# Patient Record
Sex: MALE | Race: WHITE | NOT HISPANIC OR LATINO | Employment: OTHER | URBAN - METROPOLITAN AREA
[De-identification: names, ages, dates, MRNs, and addresses within clinical notes are randomized per-mention and may not be internally consistent; named-entity substitution may affect disease eponyms.]

---

## 2017-01-03 ENCOUNTER — GENERIC CONVERSION - ENCOUNTER (OUTPATIENT)
Dept: OTHER | Facility: OTHER | Age: 62
End: 2017-01-03

## 2017-01-04 LAB — ERYTHROCYTE SEDIMENTATION RATE (HISTORICAL): 6 MM/HR (ref 0–30)

## 2017-03-13 ENCOUNTER — ALLSCRIPTS OFFICE VISIT (OUTPATIENT)
Dept: OTHER | Facility: OTHER | Age: 62
End: 2017-03-13

## 2017-03-13 DIAGNOSIS — I00 RHEUMATIC FEVER WITHOUT HEART INVOLVEMENT: ICD-10-CM

## 2017-03-15 ENCOUNTER — GENERIC CONVERSION - ENCOUNTER (OUTPATIENT)
Dept: OTHER | Facility: OTHER | Age: 62
End: 2017-03-15

## 2017-03-16 LAB
ANTINUCLEAR ANTIBODIES, IFA (HISTORICAL): NEGATIVE
C-REACT.PROTEIN,QUANT (HISTORICAL): 1.4 MG/L (ref 0–4.9)
ERYTHROCYTE SEDIMENTATION RATE (HISTORICAL): 5 MM/HR (ref 0–30)
LYME IGG/IGM AB (HISTORICAL): <0.91 ISR (ref 0–0.9)
LYME IGM (HISTORICAL): <0.8 INDEX (ref 0–0.79)

## 2017-03-28 ENCOUNTER — ALLSCRIPTS OFFICE VISIT (OUTPATIENT)
Dept: OTHER | Facility: OTHER | Age: 62
End: 2017-03-28

## 2017-04-22 ENCOUNTER — TRANSCRIBE ORDERS (OUTPATIENT)
Dept: ADMINISTRATIVE | Age: 62
End: 2017-04-22

## 2017-04-22 ENCOUNTER — HOSPITAL ENCOUNTER (OUTPATIENT)
Dept: RADIOLOGY | Age: 62
Discharge: HOME | End: 2017-04-22
Payer: COMMERCIAL

## 2017-04-22 DIAGNOSIS — M06.09 RHEUMATOID ARTHRITIS OF MULTIPLE SITES WITHOUT RHEUMATOID FACTOR (HCC): ICD-10-CM

## 2017-04-22 DIAGNOSIS — M06.09 RHEUMATOID ARTHRITIS OF MULTIPLE SITES WITHOUT RHEUMATOID FACTOR (HCC): Primary | ICD-10-CM

## 2017-04-22 PROCEDURE — 73502 X-RAY EXAM HIP UNI 2-3 VIEWS: CPT

## 2017-08-18 ENCOUNTER — APPOINTMENT (EMERGENCY)
Dept: RADIOLOGY | Facility: HOSPITAL | Age: 62
End: 2017-08-18
Payer: COMMERCIAL

## 2017-08-18 ENCOUNTER — HOSPITAL ENCOUNTER (EMERGENCY)
Facility: HOSPITAL | Age: 62
Discharge: HOME | End: 2017-08-18
Attending: EMERGENCY MEDICINE | Admitting: EMERGENCY MEDICINE
Payer: COMMERCIAL

## 2017-08-18 VITALS
DIASTOLIC BLOOD PRESSURE: 79 MMHG | OXYGEN SATURATION: 93 % | SYSTOLIC BLOOD PRESSURE: 123 MMHG | TEMPERATURE: 99 F | BODY MASS INDEX: 29.13 KG/M2 | WEIGHT: 203.04 LBS | RESPIRATION RATE: 18 BRPM | HEART RATE: 82 BPM

## 2017-08-18 DIAGNOSIS — M06.9 RHEUMATOID ARTHRITIS INVOLVING RIGHT HIP, UNSPECIFIED RHEUMATOID FACTOR PRESENCE: ICD-10-CM

## 2017-08-18 DIAGNOSIS — M25.551 ACUTE RIGHT HIP PAIN: Primary | ICD-10-CM

## 2017-08-18 PROCEDURE — 99284 EMERGENCY DEPT VISIT MOD MDM: CPT

## 2017-08-18 PROCEDURE — 73502 X-RAY EXAM HIP UNI 2-3 VIEWS: CPT

## 2017-08-18 PROCEDURE — 96374 THER/PROPH/DIAG INJ IV PUSH: CPT

## 2017-08-18 RX ORDER — FLUOXETINE HYDROCHLORIDE 20 MG/1
5 CAPSULE ORAL DAILY
COMMUNITY
End: 2018-07-18 | Stop reason: SDUPTHER

## 2017-08-18 RX ORDER — LISINOPRIL AND HYDROCHLOROTHIAZIDE 12.5; 1 MG/1; MG/1
1 TABLET ORAL DAILY
COMMUNITY
End: 2018-09-20 | Stop reason: SDUPTHER

## 2017-08-18 RX ORDER — ATORVASTATIN CALCIUM 10 MG/1
10 TABLET, FILM COATED ORAL DAILY
COMMUNITY
End: 2018-08-20 | Stop reason: SDUPTHER

## 2017-08-18 RX ORDER — PRAMIPEXOLE DIHYDROCHLORIDE 0.25 MG/1
0.25 TABLET ORAL DAILY
COMMUNITY
End: 2017-12-29 | Stop reason: ALTCHOICE

## 2017-08-18 RX ORDER — ASPIRIN 81 MG/1
81 TABLET, CHEWABLE ORAL DAILY
COMMUNITY
End: 2018-07-16 | Stop reason: SDUPTHER

## 2017-08-18 RX ORDER — PREDNISONE 10 MG/1
TABLET ORAL
Qty: 42 TABLET | Refills: 0 | Status: SHIPPED | OUTPATIENT
Start: 2017-08-18 | End: 2017-10-13

## 2017-08-18 RX ORDER — OXYBUTYNIN CHLORIDE 5 MG/1
5 TABLET, EXTENDED RELEASE ORAL DAILY
COMMUNITY
End: 2018-01-31 | Stop reason: ALTCHOICE

## 2017-08-18 RX ORDER — MORPHINE SULFATE 10 MG/ML
6 INJECTION, SOLUTION INTRAMUSCULAR; INTRAVENOUS ONCE
Status: COMPLETED | OUTPATIENT
Start: 2017-08-18 | End: 2017-08-18

## 2017-08-18 RX ORDER — PREDNISONE 20 MG/1
60 TABLET ORAL ONCE
Status: COMPLETED | OUTPATIENT
Start: 2017-08-18 | End: 2017-08-18

## 2017-08-18 RX ORDER — FOLIC ACID 1 MG/1
TABLET ORAL DAILY
COMMUNITY
End: 2022-01-01 | Stop reason: HOSPADM

## 2017-08-18 RX ORDER — CHLORAL HYDRATE 500 MG
1000 CAPSULE ORAL DAILY
COMMUNITY
End: 2021-01-01

## 2017-08-18 RX ORDER — TAMSULOSIN HYDROCHLORIDE 0.4 MG/1
0.4 CAPSULE ORAL
COMMUNITY
End: 2018-02-10 | Stop reason: SDUPTHER

## 2017-08-18 RX ADMIN — MORPHINE SULFATE 6 MG: 10 INJECTION, SOLUTION INTRAMUSCULAR; INTRAVENOUS at 09:23

## 2017-08-18 RX ADMIN — PREDNISONE 60 MG: 20 TABLET ORAL at 08:40

## 2017-08-23 ENCOUNTER — TRANSCRIBE ORDERS (OUTPATIENT)
Dept: ADMINISTRATIVE | Facility: HOSPITAL | Age: 62
End: 2017-08-23

## 2017-08-23 DIAGNOSIS — Z09 FOLLOW UP: Primary | ICD-10-CM

## 2017-08-28 ENCOUNTER — ALLSCRIPTS OFFICE VISIT (OUTPATIENT)
Dept: OTHER | Facility: OTHER | Age: 62
End: 2017-08-28

## 2017-08-30 ENCOUNTER — GENERIC CONVERSION - ENCOUNTER (OUTPATIENT)
Dept: OTHER | Facility: OTHER | Age: 62
End: 2017-08-30

## 2017-09-01 ENCOUNTER — GENERIC CONVERSION - ENCOUNTER (OUTPATIENT)
Dept: OTHER | Facility: OTHER | Age: 62
End: 2017-09-01

## 2017-09-01 LAB
A/G RATIO (HISTORICAL): 2.1 (ref 1.2–2.2)
ALBUMIN SERPL BCP-MCNC: 3.9 G/DL (ref 3.6–4.8)
ALP SERPL-CCNC: 75 IU/L (ref 39–117)
ALT SERPL W P-5'-P-CCNC: 33 IU/L (ref 0–44)
AST SERPL W P-5'-P-CCNC: 18 IU/L (ref 0–40)
BASOPHILS # BLD AUTO: 0 %
BASOPHILS # BLD AUTO: 0 X10E3/UL (ref 0–0.2)
BILIRUB SERPL-MCNC: 1 MG/DL (ref 0–1.2)
BUN SERPL-MCNC: 22 MG/DL (ref 8–27)
BUN/CREA RATIO (HISTORICAL): 20 (ref 10–24)
CALCIUM SERPL-MCNC: 8.9 MG/DL (ref 8.6–10.2)
CHLORIDE SERPL-SCNC: 103 MMOL/L (ref 96–106)
CO2 SERPL-SCNC: 23 MMOL/L (ref 18–29)
CREAT SERPL-MCNC: 1.11 MG/DL (ref 0.76–1.27)
DEPRECATED RDW RBC AUTO: 14.3 % (ref 12.3–15.4)
EGFR AFRICAN AMERICAN (HISTORICAL): 82 ML/MIN/1.73
EGFR-AMERICAN CALC (HISTORICAL): 71 ML/MIN/1.73
EOSINOPHIL # BLD AUTO: 0.3 X10E3/UL (ref 0–0.4)
EOSINOPHIL # BLD AUTO: 3 %
ERYTHROCYTE SEDIMENTATION RATE (HISTORICAL): 3 MM/HR (ref 0–30)
GLUCOSE SERPL-MCNC: 117 MG/DL (ref 65–99)
HCT VFR BLD AUTO: 43 % (ref 37.5–51)
HGB BLD-MCNC: 15 G/DL (ref 12.6–17.7)
IMM.GRANULOCYTES (CD4/8) (HISTORICAL): 0.1 X10E3/UL (ref 0–0.1)
IMM.GRANULOCYTES (CD4/8) (HISTORICAL): 1 %
LYMPHOCYTES # BLD AUTO: 2.2 X10E3/UL (ref 0.7–3.1)
LYMPHOCYTES # BLD AUTO: 28 %
MCH RBC QN AUTO: 32.6 PG (ref 26.6–33)
MCHC RBC AUTO-ENTMCNC: 34.9 G/DL (ref 31.5–35.7)
MCV RBC AUTO: 94 FL (ref 79–97)
MONOCYTES # BLD AUTO: 0.7 X10E3/UL (ref 0.1–0.9)
MONOCYTES (HISTORICAL): 10 %
NEUTROPHILS # BLD AUTO: 4.5 X10E3/UL (ref 1.4–7)
NEUTROPHILS # BLD AUTO: 58 %
PLATELET # BLD AUTO: 262 X10E3/UL (ref 150–379)
POTASSIUM SERPL-SCNC: 4.5 MMOL/L (ref 3.5–5.2)
RBC (HISTORICAL): 4.6 X10E6/UL (ref 4.14–5.8)
SODIUM SERPL-SCNC: 143 MMOL/L (ref 134–144)
TOT. GLOBULIN, SERUM (HISTORICAL): 1.9 G/DL (ref 1.5–4.5)
TOTAL PROTEIN (HISTORICAL): 5.8 G/DL (ref 6–8.5)
WBC # BLD AUTO: 7.8 X10E3/UL (ref 3.4–10.8)

## 2017-09-02 LAB
LYME 18 KD IGG (HISTORICAL): ABNORMAL
LYME 23 KD IGG (HISTORICAL): ABNORMAL
LYME 23 KD IGM (HISTORICAL): ABNORMAL
LYME 28 KD IGG (HISTORICAL): ABNORMAL
LYME 30 KD IGG (HISTORICAL): PRESENT
LYME 39 KD IGG (HISTORICAL): PRESENT
LYME 39 KD IGM (HISTORICAL): ABNORMAL
LYME 41 KD IGG (HISTORICAL): PRESENT
LYME 41 KD IGM (HISTORICAL): ABNORMAL
LYME 45 KD IGG (HISTORICAL): ABNORMAL
LYME 58 KD IGG (HISTORICAL): PRESENT
LYME 66 KD IGG (HISTORICAL): ABNORMAL
LYME 93 KD IGG (HISTORICAL): ABNORMAL
LYME IGG WB INTERP. (HISTORICAL): NEGATIVE
LYME IGM WB INTERP. (HISTORICAL): NEGATIVE

## 2017-09-08 ENCOUNTER — HOSPITAL ENCOUNTER (OUTPATIENT)
Dept: MRI IMAGING | Facility: HOSPITAL | Age: 62
Discharge: HOME | End: 2017-09-08
Payer: COMMERCIAL

## 2017-09-08 DIAGNOSIS — R16.0 HEPATOMEGALY, NOT ELSEWHERE CLASSIFIED: ICD-10-CM

## 2017-09-08 DIAGNOSIS — R93.89 ABNORMAL FINDINGS ON DIAGNOSTIC IMAGING OF OTHER SPECIFIED BODY STRUCTURES: ICD-10-CM

## 2017-09-08 DIAGNOSIS — R31.9 HEMATURIA: ICD-10-CM

## 2017-09-08 DIAGNOSIS — R10.9 ABDOMINAL PAIN: ICD-10-CM

## 2017-09-08 DIAGNOSIS — S37.009A INJURY OF KIDNEY: ICD-10-CM

## 2017-09-08 PROCEDURE — A9585 GADOBUTROL INJECTION: HCPCS | Performed by: FAMILY MEDICINE

## 2017-09-08 PROCEDURE — 74183 MRI ABD W/O CNTR FLWD CNTR: CPT

## 2017-09-08 RX ADMIN — GADOBUTROL 9 ML: 604.72 INJECTION INTRAVENOUS at 16:52

## 2017-09-12 ENCOUNTER — TRANSCRIBE ORDERS (OUTPATIENT)
Dept: ADMINISTRATIVE | Facility: HOSPITAL | Age: 62
End: 2017-09-12

## 2017-09-12 DIAGNOSIS — M25.551 RIGHT HIP PAIN: Primary | ICD-10-CM

## 2017-09-15 ENCOUNTER — GENERIC CONVERSION - ENCOUNTER (OUTPATIENT)
Dept: OTHER | Facility: OTHER | Age: 62
End: 2017-09-15

## 2017-09-26 ENCOUNTER — ALLSCRIPTS OFFICE VISIT (OUTPATIENT)
Dept: OTHER | Facility: OTHER | Age: 62
End: 2017-09-26

## 2017-09-26 ENCOUNTER — TRANSCRIBE ORDERS (OUTPATIENT)
Dept: ADMINISTRATIVE | Facility: HOSPITAL | Age: 62
End: 2017-09-26

## 2017-09-26 ENCOUNTER — HOSPITAL ENCOUNTER (OUTPATIENT)
Dept: MRI IMAGING | Facility: HOSPITAL | Age: 62
Discharge: HOME | End: 2017-09-26
Payer: COMMERCIAL

## 2017-09-26 ENCOUNTER — HOSPITAL ENCOUNTER (OUTPATIENT)
Dept: RADIOLOGY | Age: 62
Discharge: HOME | End: 2017-09-26
Payer: COMMERCIAL

## 2017-09-26 DIAGNOSIS — R31.9 PAINLESS HEMATURIA: Primary | ICD-10-CM

## 2017-09-26 DIAGNOSIS — R31.9 PAINLESS HEMATURIA: ICD-10-CM

## 2017-09-26 DIAGNOSIS — M25.551 RIGHT HIP PAIN: ICD-10-CM

## 2017-09-26 LAB
BILIRUB UR QL STRIP: NORMAL
CLARITY UR: NORMAL
COLOR UR: YELLOW
GLUCOSE (HISTORICAL): NORMAL
HGB UR QL STRIP.AUTO: NORMAL
KETONES UR STRIP-MCNC: NORMAL MG/DL
LEUKOCYTE ESTERASE UR QL STRIP: NORMAL
NITRITE UR QL STRIP: NORMAL
PH UR STRIP.AUTO: 7 [PH]
PROT UR STRIP-MCNC: NORMAL MG/DL
SP GR UR STRIP.AUTO: 1.01
UROBILINOGEN UR QL STRIP.AUTO: 0.2

## 2017-09-26 PROCEDURE — 74177 CT ABD & PELVIS W/CONTRAST: CPT

## 2017-09-26 PROCEDURE — 73721 MRI JNT OF LWR EXTRE W/O DYE: CPT

## 2017-09-26 RX ADMIN — IOHEXOL 100 ML: 350 INJECTION, SOLUTION INTRAVENOUS at 15:47

## 2017-10-13 ENCOUNTER — APPOINTMENT (EMERGENCY)
Dept: RADIOLOGY | Facility: HOSPITAL | Age: 62
DRG: 546 | End: 2017-10-13
Payer: COMMERCIAL

## 2017-10-13 ENCOUNTER — APPOINTMENT (INPATIENT)
Dept: CT IMAGING | Facility: HOSPITAL | Age: 62
DRG: 546 | End: 2017-10-13
Payer: COMMERCIAL

## 2017-10-13 ENCOUNTER — HOSPITAL ENCOUNTER (INPATIENT)
Facility: HOSPITAL | Age: 62
LOS: 4 days | Discharge: HOME | DRG: 546 | End: 2017-10-17
Attending: EMERGENCY MEDICINE | Admitting: FAMILY MEDICINE
Payer: COMMERCIAL

## 2017-10-13 ENCOUNTER — GENERIC CONVERSION - ENCOUNTER (OUTPATIENT)
Dept: OTHER | Facility: OTHER | Age: 62
End: 2017-10-13

## 2017-10-13 DIAGNOSIS — M79.601 RIGHT ARM PAIN: ICD-10-CM

## 2017-10-13 DIAGNOSIS — R50.9 FEVER: ICD-10-CM

## 2017-10-13 DIAGNOSIS — M79.601 ARM PAIN, DIFFUSE, RIGHT: Primary | ICD-10-CM

## 2017-10-13 PROBLEM — I10 HYPERTENSION: Status: ACTIVE | Noted: 2017-10-13

## 2017-10-13 PROBLEM — M06.9 RHEUMATOID ARTHRITIS (HCC): Status: ACTIVE | Noted: 2017-10-13

## 2017-10-13 LAB
ALBUMIN SERPL BCP-MCNC: 3.5 G/DL (ref 3.5–5)
ALP SERPL-CCNC: 89 U/L (ref 46–116)
ALT SERPL W P-5'-P-CCNC: 40 U/L (ref 12–78)
ANION GAP SERPL CALCULATED.3IONS-SCNC: 10 MMOL/L (ref 4–13)
AST SERPL W P-5'-P-CCNC: 15 U/L (ref 5–45)
BACTERIA UR QL AUTO: ABNORMAL /HPF
BASOPHILS # BLD AUTO: 0.01 THOUSANDS/ΜL (ref 0–0.1)
BASOPHILS NFR BLD AUTO: 0 % (ref 0–1)
BILIRUB SERPL-MCNC: 0.9 MG/DL (ref 0.2–1)
BILIRUB UR QL STRIP: NEGATIVE
BUN SERPL-MCNC: 20 MG/DL (ref 5–25)
CALCIUM SERPL-MCNC: 8.6 MG/DL (ref 8.3–10.1)
CHLORIDE SERPL-SCNC: 103 MMOL/L (ref 100–108)
CLARITY UR: CLEAR
CO2 SERPL-SCNC: 25 MMOL/L (ref 21–32)
COLOR UR: YELLOW
CREAT SERPL-MCNC: 1.07 MG/DL (ref 0.6–1.3)
CRP SERPL QL: 8.8 MG/L
EOSINOPHIL # BLD AUTO: 0.02 THOUSAND/ΜL (ref 0–0.61)
EOSINOPHIL NFR BLD AUTO: 0 % (ref 0–6)
ERYTHROCYTE [DISTWIDTH] IN BLOOD BY AUTOMATED COUNT: 14.1 % (ref 11.6–15.1)
ERYTHROCYTE [SEDIMENTATION RATE] IN BLOOD: 10 MM/HOUR (ref 0–10)
GFR SERPL CREATININE-BSD FRML MDRD: 75 ML/MIN/1.73SQ M
GLUCOSE SERPL-MCNC: 126 MG/DL (ref 65–140)
GLUCOSE UR STRIP-MCNC: NEGATIVE MG/DL
HCT VFR BLD AUTO: 40.5 % (ref 36.5–49.3)
HGB BLD-MCNC: 13.7 G/DL (ref 12–17)
HGB UR QL STRIP.AUTO: ABNORMAL
KETONES UR STRIP-MCNC: NEGATIVE MG/DL
LACTATE SERPL-SCNC: 1.3 MMOL/L (ref 0.5–2)
LEUKOCYTE ESTERASE UR QL STRIP: NEGATIVE
LYMPHOCYTES # BLD AUTO: 1.38 THOUSANDS/ΜL (ref 0.6–4.47)
LYMPHOCYTES NFR BLD AUTO: 9 % (ref 14–44)
MCH RBC QN AUTO: 31.2 PG (ref 26.8–34.3)
MCHC RBC AUTO-ENTMCNC: 33.8 G/DL (ref 31.4–37.4)
MCV RBC AUTO: 92 FL (ref 82–98)
MONOCYTES # BLD AUTO: 1.16 THOUSAND/ΜL (ref 0.17–1.22)
MONOCYTES NFR BLD AUTO: 8 % (ref 4–12)
NEUTROPHILS # BLD AUTO: 12.28 THOUSANDS/ΜL (ref 1.85–7.62)
NEUTS SEG NFR BLD AUTO: 83 % (ref 43–75)
NITRITE UR QL STRIP: NEGATIVE
NON-SQ EPI CELLS URNS QL MICRO: ABNORMAL /HPF
PH UR STRIP.AUTO: 8 [PH] (ref 4.5–8)
PLATELET # BLD AUTO: 273 THOUSANDS/UL (ref 149–390)
PMV BLD AUTO: 11.1 FL (ref 8.9–12.7)
POTASSIUM SERPL-SCNC: 3.4 MMOL/L (ref 3.5–5.3)
PROT SERPL-MCNC: 6.5 G/DL (ref 6.4–8.2)
PROT UR STRIP-MCNC: ABNORMAL MG/DL
RBC # BLD AUTO: 4.39 MILLION/UL (ref 3.88–5.62)
RBC #/AREA URNS AUTO: ABNORMAL /HPF
SODIUM SERPL-SCNC: 138 MMOL/L (ref 136–145)
SP GR UR STRIP.AUTO: 1.01 (ref 1–1.03)
UROBILINOGEN UR QL STRIP.AUTO: 0.2 E.U./DL
WBC # BLD AUTO: 14.85 THOUSAND/UL (ref 4.31–10.16)
WBC #/AREA URNS AUTO: ABNORMAL /HPF

## 2017-10-13 PROCEDURE — 93005 ELECTROCARDIOGRAM TRACING: CPT

## 2017-10-13 PROCEDURE — 36415 COLL VENOUS BLD VENIPUNCTURE: CPT | Performed by: EMERGENCY MEDICINE

## 2017-10-13 PROCEDURE — 81001 URINALYSIS AUTO W/SCOPE: CPT | Performed by: EMERGENCY MEDICINE

## 2017-10-13 PROCEDURE — 73060 X-RAY EXAM OF HUMERUS: CPT

## 2017-10-13 PROCEDURE — 87040 BLOOD CULTURE FOR BACTERIA: CPT | Performed by: EMERGENCY MEDICINE

## 2017-10-13 PROCEDURE — 86617 LYME DISEASE ANTIBODY: CPT | Performed by: EMERGENCY MEDICINE

## 2017-10-13 PROCEDURE — 96375 TX/PRO/DX INJ NEW DRUG ADDON: CPT

## 2017-10-13 PROCEDURE — 80053 COMPREHEN METABOLIC PANEL: CPT | Performed by: EMERGENCY MEDICINE

## 2017-10-13 PROCEDURE — 86140 C-REACTIVE PROTEIN: CPT | Performed by: EMERGENCY MEDICINE

## 2017-10-13 PROCEDURE — 96361 HYDRATE IV INFUSION ADD-ON: CPT

## 2017-10-13 PROCEDURE — 99285 EMERGENCY DEPT VISIT HI MDM: CPT

## 2017-10-13 PROCEDURE — 73030 X-RAY EXAM OF SHOULDER: CPT

## 2017-10-13 PROCEDURE — 73201 CT UPPER EXTREMITY W/DYE: CPT

## 2017-10-13 PROCEDURE — 85652 RBC SED RATE AUTOMATED: CPT | Performed by: EMERGENCY MEDICINE

## 2017-10-13 PROCEDURE — 71020 HB CHEST X-RAY 2VW FRONTAL&LATL: CPT

## 2017-10-13 PROCEDURE — 85025 COMPLETE CBC W/AUTO DIFF WBC: CPT | Performed by: EMERGENCY MEDICINE

## 2017-10-13 PROCEDURE — 83605 ASSAY OF LACTIC ACID: CPT | Performed by: EMERGENCY MEDICINE

## 2017-10-13 PROCEDURE — 96374 THER/PROPH/DIAG INJ IV PUSH: CPT

## 2017-10-13 RX ORDER — ACETAMINOPHEN 325 MG/1
650 TABLET ORAL EVERY 6 HOURS PRN
Status: DISCONTINUED | OUTPATIENT
Start: 2017-10-13 | End: 2017-10-17 | Stop reason: HOSPADM

## 2017-10-13 RX ORDER — KETOROLAC TROMETHAMINE 30 MG/ML
15 INJECTION, SOLUTION INTRAMUSCULAR; INTRAVENOUS EVERY 6 HOURS PRN
Status: DISPENSED | OUTPATIENT
Start: 2017-10-13 | End: 2017-10-16

## 2017-10-13 RX ORDER — ATORVASTATIN CALCIUM 10 MG/1
10 TABLET, FILM COATED ORAL DAILY
Status: DISCONTINUED | OUTPATIENT
Start: 2017-10-14 | End: 2017-10-17 | Stop reason: HOSPADM

## 2017-10-13 RX ORDER — OXYBUTYNIN CHLORIDE 5 MG/1
5 TABLET, EXTENDED RELEASE ORAL DAILY
Status: DISCONTINUED | OUTPATIENT
Start: 2017-10-14 | End: 2017-10-17 | Stop reason: HOSPADM

## 2017-10-13 RX ORDER — SODIUM CHLORIDE 9 MG/ML
125 INJECTION, SOLUTION INTRAVENOUS CONTINUOUS
Status: DISCONTINUED | OUTPATIENT
Start: 2017-10-13 | End: 2017-10-14

## 2017-10-13 RX ORDER — PRAMIPEXOLE DIHYDROCHLORIDE 0.25 MG/1
0.25 TABLET ORAL DAILY
Status: DISCONTINUED | OUTPATIENT
Start: 2017-10-14 | End: 2017-10-17 | Stop reason: HOSPADM

## 2017-10-13 RX ORDER — TRAMADOL HYDROCHLORIDE 50 MG/1
100 TABLET ORAL EVERY 6 HOURS PRN
Status: DISCONTINUED | OUTPATIENT
Start: 2017-10-13 | End: 2017-10-17 | Stop reason: HOSPADM

## 2017-10-13 RX ORDER — ASPIRIN 81 MG/1
81 TABLET, CHEWABLE ORAL DAILY
Status: DISCONTINUED | OUTPATIENT
Start: 2017-10-14 | End: 2017-10-17 | Stop reason: HOSPADM

## 2017-10-13 RX ORDER — FLUOXETINE HYDROCHLORIDE 20 MG/1
20 CAPSULE ORAL DAILY
Status: DISCONTINUED | OUTPATIENT
Start: 2017-10-14 | End: 2017-10-17 | Stop reason: HOSPADM

## 2017-10-13 RX ORDER — CHLORAL HYDRATE 500 MG
1000 CAPSULE ORAL DAILY
Status: DISCONTINUED | OUTPATIENT
Start: 2017-10-14 | End: 2017-10-17 | Stop reason: HOSPADM

## 2017-10-13 RX ORDER — FOLIC ACID 1 MG/1
1 TABLET ORAL DAILY
Status: DISCONTINUED | OUTPATIENT
Start: 2017-10-14 | End: 2017-10-17 | Stop reason: HOSPADM

## 2017-10-13 RX ORDER — DOCUSATE SODIUM 100 MG/1
100 CAPSULE, LIQUID FILLED ORAL 2 TIMES DAILY
Status: DISCONTINUED | OUTPATIENT
Start: 2017-10-13 | End: 2017-10-17 | Stop reason: HOSPADM

## 2017-10-13 RX ORDER — KETOROLAC TROMETHAMINE 30 MG/ML
15 INJECTION, SOLUTION INTRAMUSCULAR; INTRAVENOUS ONCE
Status: COMPLETED | OUTPATIENT
Start: 2017-10-13 | End: 2017-10-13

## 2017-10-13 RX ORDER — ONDANSETRON 2 MG/ML
4 INJECTION INTRAMUSCULAR; INTRAVENOUS EVERY 6 HOURS PRN
Status: DISCONTINUED | OUTPATIENT
Start: 2017-10-13 | End: 2017-10-17 | Stop reason: HOSPADM

## 2017-10-13 RX ORDER — TAMSULOSIN HYDROCHLORIDE 0.4 MG/1
0.4 CAPSULE ORAL
Status: DISCONTINUED | OUTPATIENT
Start: 2017-10-14 | End: 2017-10-17 | Stop reason: HOSPADM

## 2017-10-13 RX ORDER — MORPHINE SULFATE 4 MG/ML
4 INJECTION, SOLUTION INTRAMUSCULAR; INTRAVENOUS ONCE
Status: COMPLETED | OUTPATIENT
Start: 2017-10-13 | End: 2017-10-13

## 2017-10-13 RX ORDER — SIMETHICONE 80 MG
80 TABLET,CHEWABLE ORAL 4 TIMES DAILY PRN
Status: DISCONTINUED | OUTPATIENT
Start: 2017-10-13 | End: 2017-10-17 | Stop reason: HOSPADM

## 2017-10-13 RX ORDER — ACETAMINOPHEN 325 MG/1
650 TABLET ORAL ONCE
Status: COMPLETED | OUTPATIENT
Start: 2017-10-13 | End: 2017-10-13

## 2017-10-13 RX ADMIN — ACETAMINOPHEN 650 MG: 325 TABLET ORAL at 17:10

## 2017-10-13 RX ADMIN — KETOROLAC TROMETHAMINE 15 MG: 30 INJECTION, SOLUTION INTRAMUSCULAR at 20:40

## 2017-10-13 RX ADMIN — SODIUM CHLORIDE 1000 ML: 0.9 INJECTION, SOLUTION INTRAVENOUS at 17:18

## 2017-10-13 RX ADMIN — KETOROLAC TROMETHAMINE 15 MG: 30 INJECTION, SOLUTION INTRAMUSCULAR at 15:35

## 2017-10-13 RX ADMIN — IOHEXOL 100 ML: 350 INJECTION, SOLUTION INTRAVENOUS at 18:36

## 2017-10-13 RX ADMIN — SODIUM CHLORIDE 125 ML/HR: 0.9 INJECTION, SOLUTION INTRAVENOUS at 20:45

## 2017-10-13 RX ADMIN — MORPHINE SULFATE 4 MG: 4 INJECTION INTRAVENOUS at 15:35

## 2017-10-13 NOTE — ED NOTES
Pt returned from X-Ray, reported from X-Ray tech that arm is starting to hurt more again     Bret Godoy RN  10/13/17 4660

## 2017-10-13 NOTE — LETTER
October 17, 2017     Patient: Sabrina Ham   YOB: 1955   Date of Visit: 10/13/2017       To Whom It May Concern: It is my medical opinion that Demetrius Whitehead may return to work on 10/23/17  If you have any questions or concerns, please don't hesitate to call  Alfreda Sole    Sincerely,        No name on file      CC: No Recipients

## 2017-10-13 NOTE — ED PROVIDER NOTES
History  Chief Complaint   Patient presents with    Arm Pain     Pt states he had some R arm pain last night but ok today, Pt presents now with R upper arm pain that is constant, pt denies injury     This is a 66 y/o male that presents to the ED with c/o right shoulder/upper arm pain  Fairly sudden onset while driving  Patient with hx of RA  Had some right shoulder pain last night  Pain is worse with movement  Rated as severe in nature  Patient denies fevers  No trauma  No radiation of pain  No weakness or numbness  No neck pain  DDx: RA flare, infectious process  Prior to Admission Medications   Prescriptions Last Dose Informant Patient Reported? Taking?    FLUoxetine (PROzac) 20 mg capsule   Yes Yes   Sig: Take 20 mg by mouth daily   Multiple Vitamins-Minerals (MULTIVITAMIN MEN) TABS   Yes Yes   Sig: Take 1 tablet by mouth daily   Omega-3 Fatty Acids (FISH OIL) 1,000 mg   Yes Yes   Sig: Take 1,000 mg by mouth daily   adalimumab (HUMIRA) 40 mg/0 8 mL PSKT   Yes Yes   Sig: Inject 40 mg under the skin once   aspirin 81 mg chewable tablet   Yes Yes   Sig: Chew 81 mg daily   atorvastatin (LIPITOR) 10 mg tablet   Yes Yes   Sig: Take 10 mg by mouth daily   folic acid (FOLVITE) 1 mg tablet   Yes Yes   Sig: Take by mouth daily   lisinopril-hydrochlorothiazide (PRINZIDE,ZESTORETIC) 10-12 5 MG per tablet   Yes Yes   Sig: Take 1 tablet by mouth daily   methotrexate 2 5 mg tablet   Yes Yes   Sig: Take 20 mg by mouth once a week   oxybutynin (DITROPAN-XL) 5 mg 24 hr tablet   Yes Yes   Sig: Take 5 mg by mouth daily   pramipexole (MIRAPEX) 0 25 mg tablet   Yes Yes   Sig: Take 0 25 mg by mouth daily   tamsulosin (FLOMAX) 0 4 mg   Yes Yes   Sig: Take 0 4 mg by mouth daily with dinner      Facility-Administered Medications: None       Past Medical History:   Diagnosis Date    Arthritis     Hyperlipidemia     Hypertension        Past Surgical History:   Procedure Laterality Date    SCROTAL SURGERY      benign "lump" History reviewed  No pertinent family history  I have reviewed and agree with the history as documented  Social History   Substance Use Topics    Smoking status: Never Smoker    Smokeless tobacco: Never Used    Alcohol use No        Review of Systems   Constitutional: Negative for activity change, appetite change, chills and fever  HENT: Negative for congestion, ear pain, rhinorrhea and sore throat  Eyes: Negative for pain, discharge and redness  Respiratory: Negative for cough, shortness of breath and wheezing  Cardiovascular: Negative for chest pain and palpitations  Gastrointestinal: Negative for abdominal pain, diarrhea, nausea and vomiting  Endocrine: Negative for polyuria  Genitourinary: Negative for difficulty urinating, dysuria, frequency and urgency  Musculoskeletal: Positive for arthralgias  Negative for back pain, joint swelling, myalgias, neck pain and neck stiffness  Skin: Negative for color change, pallor, rash and wound  Allergic/Immunologic: Negative for immunocompromised state  Neurological: Negative for dizziness, syncope, weakness, light-headedness and numbness  Hematological: Does not bruise/bleed easily  Psychiatric/Behavioral: Negative for confusion  All other systems reviewed and are negative  Physical Exam  ED Triage Vitals [10/13/17 1437]   Temperature Pulse Respirations Blood Pressure SpO2   98 1 °F (36 7 °C) 76 18 126/71 97 %      Temp Source Heart Rate Source Patient Position - Orthostatic VS BP Location FiO2 (%)   Oral Monitor Sitting Left arm --      Pain Score       Worst Possible Pain           Physical Exam   Constitutional: He is oriented to person, place, and time  He appears well-developed  No distress  HENT:   Head: Normocephalic and atraumatic  Nose: Nose normal    Eyes: Conjunctivae are normal  No scleral icterus  Neck: Normal range of motion  Neck supple     Cardiovascular: Normal rate, regular rhythm, normal heart sounds and intact distal pulses  Pulmonary/Chest: Effort normal and breath sounds normal  No stridor  No respiratory distress  He has no wheezes  Abdominal: Soft  He exhibits no distension  There is no tenderness  There is no rebound and no guarding  Musculoskeletal: He exhibits no edema or deformity  Severe Right shoulder pain with rom  Tender over deltoid of the right arm  Neurological: He is alert and oriented to person, place, and time  Skin: Skin is warm and dry  No rash noted  Psychiatric: He has a normal mood and affect  Thought content normal    Nursing note and vitals reviewed        ED Medications  Medications   ketorolac (TORADOL) 30 mg/mL injection 15 mg (15 mg Intravenous Given 10/13/17 2040)    EMS REPLENISHMENT MED ( Does not apply Given to EMS 10/13/17 1539)   morphine (PF) 4 mg/mL injection 4 mg (4 mg Intravenous Given 10/13/17 1535)   ketorolac (TORADOL) 30 mg/mL injection 15 mg (15 mg Intravenous Given 10/13/17 1535)   sodium chloride 0 9 % bolus 1,000 mL (0 mL Intravenous Stopped 10/13/17 1928)   acetaminophen (TYLENOL) tablet 650 mg (650 mg Oral Given 10/13/17 1710)   iohexol (OMNIPAQUE) 350 MG/ML injection (SINGLE-DOSE) 100 mL (100 mL Intravenous Given 10/13/17 1836)   gadobutrol injection (MULTI-DOSE) SOLN 8 mL (8 mL Intravenous Given 10/14/17 1334)   iohexol (OMNIPAQUE) 300 mg/mL injection 50 mL (3 mL Other Given 10/16/17 1500)   lidocaine (PF) (XYLOCAINE-MPF) 1 % injection 5 mL (5 mL Injection Given 10/16/17 1500)   predniSONE tablet 60 mg (60 mg Oral Given 10/17/17 1433)       Diagnostic Studies  Labs Reviewed   CBC AND DIFFERENTIAL - Abnormal        Result Value Ref Range Status    WBC 14 85 (*) 4 31 - 10 16 Thousand/uL Final    Neutrophils Relative 83 (*) 43 - 75 % Final    Lymphocytes Relative 9 (*) 14 - 44 % Final    Neutrophils Absolute 12 28 (*) 1 85 - 7 62 Thousands/µL Final    RBC 4 39  3 88 - 5 62 Million/uL Final    Hemoglobin 13 7  12 0 - 17 0 g/dL Final    Hematocrit 40 5 m Final    Narrative:     National Kidney Disease Education Program recommendations are as follows:  GFR calculation is accurate only with a steady state creatinine  Chronic Kidney disease less than 60 ml/min/1 73 sq  meters  Kidney failure less than 15 ml/min/1 73 sq  meters  URINALYSIS WITH REFLEX TO MICROSCOPIC - Abnormal     Protein, UA Trace (*) Negative mg/dl Final    Blood, UA Small (*) Negative Final    Color, UA Yellow   Final    Clarity, UA Clear   Final    Specific [place], UA 1 015  1 003 - 1 030 Final    pH, UA 8 0  4 5 - 8 0 Final    Leukocytes, UA Negative  Negative Final    Nitrite, UA Negative  Negative Final    Glucose, UA Negative  Negative mg/dl Final    Ketones, UA Negative  Negative mg/dl Final    Urobilinogen, UA 0 2  0 2, 1 0 E U /dl E U /dl Final    Bilirubin, UA Negative  Negative Final   URINE MICROSCOPIC - Abnormal     RBC, UA 10-20 (*) None Seen, 0-5 /hpf Final    WBC, UA 2-4 (*) None Seen, 0-5, 5-55, 5-65 /hpf Final    Epithelial Cells None Seen  None Seen, Occasional /hpf Final    Bacteria, UA Occasional  None Seen, Occasional /hpf Final   C-REACTIVE PROTEIN - Abnormal     CRP 8 8 (*) <3 0 mg/L Final   LACTIC ACID, PLASMA - Normal    LACTIC ACID 1 3  0 5 - 2 0 mmol/L Final    Narrative:     Result may be elevated if tourniquet was used during collection  FL guided needle plac bx/asp/inj   Final Result      Successful right shoulder arthrocentesis with aspiration of 8 mL of yellow, opaque right shoulder joint fluid  Workstation performed: [id number]         MRI shoulder right w wo contrast   Final Result   There is moderate sized shoulder joint effusion along with the fluid in the subacromial subdeltoid bursa, subscapularis bursa and subcoracoid bursa    This fluid may be inflammatory, reactive, if concern for infection correlate clinically      No focal lesion seen within the proximal humerus      A small 6 mm full-thickness tear seen within the supraspinatus Partial tearing infraspinatus with the undersurface delamination      No significant atrophy of the supraspinatus or infraspinatus      Tendinosis and synovitis of the long head of the biceps tendon with extensive inflammatory changes within the rotator interval with obliteration of fat in the subcoracoid triangle  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  There are no focal lesion seen within the proximal humerus on the scapular      ##sigslh##sigslh         Workstation performed: [id number]         XR chest 2 views   ED Interpretation   No infiltrate      Final Result      No active pulmonary disease  Workstation performed: [id number]         CT humerus right w contrast   Final Result   Impression:   No acute fractures or dislocations identified  There is a nonspecific focal 2 8 x 2 cm area of intramuscular hypoattenuation identified in the anterior portion of the proximal humeral neck of uncertain significance  MRI right shoulder is recommended for    further evaluation  Acromioclavicular degenerative changes are identified  No drainable fluid collection identified  The vessels appear patent  Workstation performed: [id number]         XR humerus RIGHT   Final Result      No acute osseous abnormality  Workstation performed: [id number]         XR shoulder 2+ views RIGHT   ED Interpretation   No fracture   No free air      Final Result      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  No acute findings         Workstation performed: [id number]             Procedures  Procedures      Phone Contacts  ED Phone Contact    ED Course  ED Course as of Nov 02 1239   Fri Oct 13, 2017   1803 Discussed with Dr Dariana Hardwick  Will admit patient inpatient status  I will order CT humerus  No clear infectious source at this time  Patient with elevated WBC however increased steroids to Prednisone 10 mg today   DDx: RA flare vs  Infectious source  No clear infectious source at this time  MDM  Number of Diagnoses or Management Options  Arm pain, diffuse, right:   Fever:   Diagnosis management comments: Patient with severe right shoulder pain  Developed fever while in the ED  CT arm ordered to r/o infectious process  Amount and/or Complexity of Data Reviewed  Clinical lab tests: ordered and reviewed  Tests in the radiology section of CPT®: ordered and reviewed  Discuss the patient with other providers: yes  Independent visualization of images, tracings, or specimens: yes      CritCare Time    Disposition  Final diagnoses:   Arm pain, diffuse, right   Fever     Time reflects when diagnosis was documented in both MDM as applicable and the Disposition within this note     Time User Action Codes Description Comment    10/13/2017  6:10 PM Elena Roberts Add [M79 601] Arm pain, diffuse, right     10/13/2017  6:10 PM Elena Roberts Add [R50 9] Fever     10/13/2017  8:18 PM Carlineharshad Logan [R50 9] Fever     10/14/2017  8:36 AM Librada Lefort Add [M79 601] Right arm pain       ED Disposition     ED Disposition Condition Comment    Admit  Case was discussed with Dr Kimmie Willett and the patient's admission status was agreed to be Admission Status: inpatient status to the service of Dr Kimmie Willett   Follow-up Information     Follow up With Specialties Details Why Contact Info    Trina Guerin MD Family Medicine Follow up in 1 day(s) Appointment has been made for Wednesday, Oct  18th at 1:45pm   Please take your discharge instructions with you to this appointment  If you are unable to make this appointment, please reschedule as soon as possible  [id number] [phone]          Discharge Medication List as of 10/17/2017  3:40 PM      START taking these medications    Details   predniSONE 10 mg tablet 6tabs daily for 3 days,5 tabs daily for 3 days,4 tabs daily for 3 days,3 tabs daily  For 3 days, 2 tabs daily for 3 days, 1 tab daily for 3 days, Print      traMADol (ULTRAM) 50 mg tablet Take 2 tablets by mouth every 6 (six) hours as needed for moderate pain for up to 10 days, Starting Tue 10/17/2017, Until Fri 10/27/2017, Print         CONTINUE these medications which have NOT CHANGED    Details   aspirin 81 mg chewable tablet Chew 81 mg daily, Historical Med      atorvastatin (LIPITOR) 10 mg tablet Take 10 mg by mouth daily, Historical Med      FLUoxetine (PROzac) 20 mg capsule Take 20 mg by mouth daily, Historical Med      folic acid (FOLVITE) 1 mg tablet Take by mouth daily, Historical Med      lisinopril-hydrochlorothiazide (PRINZIDE,ZESTORETIC) 10-12 5 MG per tablet Take 1 tablet by mouth daily, Historical Med      methotrexate 2 5 mg tablet Take 20 mg by mouth once a week, Historical Med      Multiple Vitamins-Minerals (MULTIVITAMIN MEN) TABS Take 1 tablet by mouth daily, Historical Med      Omega-3 Fatty Acids (FISH OIL) 1,000 mg Take 1,000 mg by mouth daily, Historical Med      oxybutynin (DITROPAN-XL) 5 mg 24 hr tablet Take 5 mg by mouth daily, Historical Med      pramipexole (MIRAPEX) 0 25 mg tablet Take 0 25 mg by mouth daily, Historical Med      tamsulosin (FLOMAX) 0 4 mg Take 0 4 mg by mouth daily with dinner, Historical Med         STOP taking these medications       adalimumab (HUMIRA) 40 mg/0 8 mL PSKT Comments:   Reason for Stopping:               Outpatient Discharge Orders  Discharge Diet     Activity as tolerated     Call provider for:  severe uncontrolled pain         ED Provider  Electronically Signed by       Funmi Ellsworth MD  11/02/17 2377

## 2017-10-14 ENCOUNTER — APPOINTMENT (INPATIENT)
Dept: MRI IMAGING | Facility: HOSPITAL | Age: 62
DRG: 546 | End: 2017-10-14
Payer: COMMERCIAL

## 2017-10-14 LAB
ALBUMIN SERPL BCP-MCNC: 3 G/DL (ref 3.5–5)
ALP SERPL-CCNC: 80 U/L (ref 46–116)
ALT SERPL W P-5'-P-CCNC: 34 U/L (ref 12–78)
ANION GAP SERPL CALCULATED.3IONS-SCNC: 7 MMOL/L (ref 4–13)
AST SERPL W P-5'-P-CCNC: 13 U/L (ref 5–45)
BILIRUB SERPL-MCNC: 1.2 MG/DL (ref 0.2–1)
BUN SERPL-MCNC: 19 MG/DL (ref 5–25)
CALCIUM SERPL-MCNC: 8.3 MG/DL (ref 8.3–10.1)
CHLORIDE SERPL-SCNC: 108 MMOL/L (ref 100–108)
CK SERPL-CCNC: 50 U/L (ref 39–308)
CO2 SERPL-SCNC: 26 MMOL/L (ref 21–32)
CREAT SERPL-MCNC: 1.05 MG/DL (ref 0.6–1.3)
ERYTHROCYTE [DISTWIDTH] IN BLOOD BY AUTOMATED COUNT: 14.5 % (ref 11.6–15.1)
FLUAV AG SPEC QL: NORMAL
FLUBV AG SPEC QL: NORMAL
GFR SERPL CREATININE-BSD FRML MDRD: 76 ML/MIN/1.73SQ M
GLUCOSE SERPL-MCNC: 99 MG/DL (ref 65–140)
HCT VFR BLD AUTO: 39.6 % (ref 36.5–49.3)
HGB BLD-MCNC: 13.1 G/DL (ref 12–17)
MAGNESIUM SERPL-MCNC: 2.1 MG/DL (ref 1.6–2.6)
MCH RBC QN AUTO: 31.2 PG (ref 26.8–34.3)
MCHC RBC AUTO-ENTMCNC: 33.1 G/DL (ref 31.4–37.4)
MCV RBC AUTO: 94 FL (ref 82–98)
PLATELET # BLD AUTO: 253 THOUSANDS/UL (ref 149–390)
PLATELET # BLD AUTO: 253 THOUSANDS/UL (ref 149–390)
PMV BLD AUTO: 11 FL (ref 8.9–12.7)
PMV BLD AUTO: 11.6 FL (ref 8.9–12.7)
POTASSIUM SERPL-SCNC: 3.9 MMOL/L (ref 3.5–5.3)
PROT SERPL-MCNC: 5.8 G/DL (ref 6.4–8.2)
RBC # BLD AUTO: 4.2 MILLION/UL (ref 3.88–5.62)
RSV B RNA SPEC QL NAA+PROBE: NORMAL
SODIUM SERPL-SCNC: 141 MMOL/L (ref 136–145)
WBC # BLD AUTO: 10.88 THOUSAND/UL (ref 4.31–10.16)

## 2017-10-14 PROCEDURE — 85027 COMPLETE CBC AUTOMATED: CPT | Performed by: FAMILY MEDICINE

## 2017-10-14 PROCEDURE — 80053 COMPREHEN METABOLIC PANEL: CPT | Performed by: FAMILY MEDICINE

## 2017-10-14 PROCEDURE — 85049 AUTOMATED PLATELET COUNT: CPT | Performed by: FAMILY MEDICINE

## 2017-10-14 PROCEDURE — 87798 DETECT AGENT NOS DNA AMP: CPT | Performed by: FAMILY MEDICINE

## 2017-10-14 PROCEDURE — 83735 ASSAY OF MAGNESIUM: CPT | Performed by: FAMILY MEDICINE

## 2017-10-14 PROCEDURE — 86618 LYME DISEASE ANTIBODY: CPT | Performed by: FAMILY MEDICINE

## 2017-10-14 PROCEDURE — A9585 GADOBUTROL INJECTION: HCPCS | Performed by: FAMILY MEDICINE

## 2017-10-14 PROCEDURE — 82550 ASSAY OF CK (CPK): CPT | Performed by: FAMILY MEDICINE

## 2017-10-14 PROCEDURE — 73223 MRI JOINT UPR EXTR W/O&W/DYE: CPT

## 2017-10-14 RX ORDER — LORAZEPAM 0.5 MG/1
0.5 TABLET ORAL ONCE
Status: DISCONTINUED | OUTPATIENT
Start: 2017-10-14 | End: 2017-10-17 | Stop reason: HOSPADM

## 2017-10-14 RX ADMIN — ASPIRIN 81 MG 81 MG: 81 TABLET ORAL at 08:12

## 2017-10-14 RX ADMIN — TAMSULOSIN HYDROCHLORIDE 0.4 MG: 0.4 CAPSULE ORAL at 17:28

## 2017-10-14 RX ADMIN — Medication 1 TABLET: at 08:12

## 2017-10-14 RX ADMIN — FOLIC ACID 1 MG: 1 TABLET ORAL at 08:12

## 2017-10-14 RX ADMIN — PRAMIPEXOLE DIHYDROCHLORIDE 0.25 MG: 0.25 TABLET ORAL at 08:12

## 2017-10-14 RX ADMIN — ATORVASTATIN CALCIUM 10 MG: 10 TABLET, FILM COATED ORAL at 08:12

## 2017-10-14 RX ADMIN — LISINOPRIL: 10 TABLET ORAL at 08:12

## 2017-10-14 RX ADMIN — DOCUSATE SODIUM 100 MG: 100 CAPSULE, LIQUID FILLED ORAL at 17:28

## 2017-10-14 RX ADMIN — TRAMADOL HYDROCHLORIDE 100 MG: 50 TABLET, FILM COATED ORAL at 21:16

## 2017-10-14 RX ADMIN — DOCUSATE SODIUM 100 MG: 100 CAPSULE, LIQUID FILLED ORAL at 08:12

## 2017-10-14 RX ADMIN — OXYBUTYNIN CHLORIDE 5 MG: 5 TABLET, EXTENDED RELEASE ORAL at 08:12

## 2017-10-14 RX ADMIN — FLUOXETINE 20 MG: 20 CAPSULE ORAL at 08:13

## 2017-10-14 RX ADMIN — Medication 1000 MG: at 08:12

## 2017-10-14 RX ADMIN — GADOBUTROL 8 ML: 604.72 INJECTION INTRAVENOUS at 13:34

## 2017-10-14 NOTE — H&P
History and Physical - [facility] Internal Medicine    Patient Information: Kalie Colmenares 65 y o  male MRN: [id number]  Unit/Bed#: -01 Encounter: [id number]  Admitting Physician: aJnki Ayon MD  PCP: Arun Serra MD  Date of Admission:  10/13/17    Assessment/Plan:    Hospital Problem List:     Active Problems:    Right arm pain    Fever    Rheumatoid arthritis ([address] )    Hypertension      Plan   1  Right upper extremity pain and fever-there is no rash suggestive of and cellulitis or any fluid collection suggestive of an abscess    CT scan Report reviewed will obtain an MRI follow up on the cultures  Consult Infectious Disease  Patient with known history of rheumatoid arthritis but ESR within normal limits  Patient remains hemodynamically stable so will monitor off of the antibiotics just in case if he had proceed with the culture  Discussed with the overnight provider just in case if there is any concern we can start on antibiotics overnight  2   Hypertension-monitor blood pressure with current medication  3  Leukocytosis-likely related to acute even  VTE Prophylaxis: Enoxaparin (Lovenox)  / sequential compression device   Code Status: full code  POLST: POLST form is not discussed and not completed at this time  Anticipated Length of Stay:  Patient will be admitted on an Inpatient basis with an anticipated length of stay of  > 2 midnights  Justification for Hospital Stay:     Total Time for Visit, including Counseling / Coordination of Care: 1 hour  Greater than 50% of this total time spent on direct patient counseling and coordination of care  Chief Complaint:   Right upper extremity    History of Present Illness:    Kalie Colmenares is a 65 y o  male who presents with right upper extremity pain  Patient reported that his symptoms started at last night with right upper extremity pain and he had some Zack-Trejo to his right upper extremity with improvement in his symptoms    Today his pain was constant so he presented to the emergency room  Patient denied any fever or chills at home  Patient with known history of rheumatoid arthritis and had flare-ups in the past involving upper extremity  But he reported that his pain is different from his usual rheumatoid arthritis flare ups  The patient has not had any fever while at home and he developed a fever in the emergency room  Patient denies any cough runny nose , no nausea vomiting diarrhea abdominal   No chest pain no shortness of breath  No palpitation no leg pain lower edema no headache no dizziness no lightheadedness  No urinary symptoms  No sick contacts  Patient reported that he usually gets rheumatoid arthritis flare-up in his hands  He was on Humira recently which was discontinued by his rheumatologist   He denied any injury or any kind of injections to right upper extremity  Review of Systems:    Review of Systems   HENT: Negative  Respiratory: Negative  Cardiovascular: Negative  Gastrointestinal: Negative  Genitourinary: Negative  Musculoskeletal: Positive for arthralgias and myalgias  Skin: Negative  Neurological: Negative  Past Medical and Surgical History:     Past Medical History:   Diagnosis Date    Arthritis     Hyperlipidemia     Hypertension        Past Surgical History:   Procedure Laterality Date    SCROTAL SURGERY      benign "lump"       Meds/Allergies:    Prior to Admission medications    Medication Sig Start Date End Date Taking?  Authorizing Provider   adalimumab (HUMIRA) 40 mg/0 8 mL PSKT Inject 40 mg under the skin once   Yes Historical Provider, MD   aspirin 81 mg chewable tablet Chew 81 mg daily   Yes Historical Provider, MD   atorvastatin (LIPITOR) 10 mg tablet Take 10 mg by mouth daily   Yes Historical Provider, MD   FLUoxetine (PROzac) 20 mg capsule Take 20 mg by mouth daily   Yes Historical Provider, MD   folic acid (FOLVITE) 1 mg tablet Take by mouth daily   Yes Historical Provider, MD   lisinopril-hydrochlorothiazide (PRINZIDE,ZESTORETIC) 10-12 5 MG per tablet Take 1 tablet by mouth daily   Yes Historical Provider, MD   methotrexate 2 5 mg tablet Take 20 mg by mouth once a week   Yes Historical Provider, MD   Multiple Vitamins-Minerals (MULTIVITAMIN MEN) TABS Take 1 tablet by mouth daily   Yes Historical Provider, MD   Omega-3 Fatty Acids (FISH OIL) 1,000 mg Take 1,000 mg by mouth daily   Yes Historical Provider, MD   oxybutynin (DITROPAN-XL) 5 mg 24 hr tablet Take 5 mg by mouth daily   Yes Historical Provider, MD   pramipexole (MIRAPEX) 0 25 mg tablet Take 0 25 mg by mouth daily   Yes Historical Provider, MD   tamsulosin (FLOMAX) 0 4 mg Take 0 4 mg by mouth daily with dinner   Yes Historical Provider, MD   predniSONE 10 mg tablet 6 tabs PO qDaily x2 days, 5 tabs PO qD x 2d, 4 tabs PO qD x 2d,   3 tabs PO qD x 2d, 2 tabs PO qD x 2d, 1 tab PO qD x 2d 8/18/17 10/13/17  Darin York Spurling, DO   PREDNISONE PO Take by mouth daily  10/13/17  Historical Provider, MD     I have reviewed home medications with patient personally  Allergies:    Allergies   Allergen Reactions    Fentanyl        Social History:     Marital Status: /Civil Union   Occupation:   Patient Pre-hospital Living Situation:  Lives at home with family  Patient Pre-hospital Level of Mobility:   Patient Pre-hospital Diet Restrictions:   Substance Use History:   History   Alcohol Use No     History   Smoking Status    Never Smoker   Smokeless Tobacco    Never Used     History   Drug Use No       Family History:    non-contributory    Physical Exam:     Vitals:   Blood Pressure: 122/77 (10/13/17 2007)  Pulse: 94 (10/13/17 2007)  Temperature: 97 9 °F (36 6 °C) (10/13/17 2007)  Temp Source: Oral (10/13/17 2007)  Respirations: 18 (10/13/17 2007)  Height: 5' 11" (180 3 cm) (10/13/17 2007)  Weight - Scale: 89 4 kg (197 lb 1 5 oz) (10/13/17 2007)  SpO2: 95 % (10/13/17 2007)    Physical Exam   Constitutional: He is oriented to person, place, and time  He appears well-developed and well-nourished  HENT:   Head: Normocephalic and atraumatic  Eyes: EOM are normal  Pupils are equal, round, and reactive to light  Neck: Normal range of motion  Neck supple  Cardiovascular: Normal rate and regular rhythm  No murmur heard  Pulmonary/Chest: Effort normal and breath sounds normal  No respiratory distress  He has no wheezes  Abdominal: Soft  Bowel sounds are normal  He exhibits no distension  There is no tenderness  Musculoskeletal: Normal range of motion  Decreased range of movement on the right shoulder joint  Tenderness to palpation in the right shoulder and also right upper arm  No warmth or erythema   Neurological: He is alert and oriented to person, place, and time  Skin: Skin is warm and dry  Additional Data:     Lab Results: I have personally reviewed pertinent reports  Results from last 7 days  Lab Units 10/13/17  1722   WBC Thousand/uL 14 85*   HEMOGLOBIN g/dL 13 7   HEMATOCRIT % 40 5   PLATELETS Thousands/uL 273   NEUTROS PCT % 83*   LYMPHS PCT % 9*   MONOS PCT % 8   EOS PCT % 0       Results from last 7 days  Lab Units 10/13/17  1722   SODIUM mmol/L 138   POTASSIUM mmol/L 3 4*   CHLORIDE mmol/L 103   CO2 mmol/L 25   BUN mg/dL 20   CREATININE mg/dL 1 07   CALCIUM mg/dL 8 6   TOTAL PROTEIN g/dL 6 5   BILIRUBIN TOTAL mg/dL 0 90   ALK PHOS U/L 89   ALT U/L 40   AST U/L 15   GLUCOSE RANDOM mg/dL 126           Imaging: I have personally reviewed pertinent films in PACS    Xr Chest 2 Views    Result Date: 10/13/2017  Narrative: CHEST INDICATION:  Fever COMPARISON:  Chest x-ray dated September 27, 2013  VIEWS:  Frontal and lateral projections IMAGES:  3 FINDINGS:     Cardiomediastinal silhouette appears unremarkable  The lungs are clear  No pneumothorax or pleural effusion  Visualized osseous structures appear within normal limits for the patient's age  Impression: No active pulmonary disease  Workstation performed: [id number]     Xr Shoulder 2+ Views Right    Result Date: 10/13/2017  Narrative: RIGHT SHOULDER INDICATION:  Constant pain for one to 2 days COMPARISON: 12/7/2012 VIEWS:  3 IMAGES:  3 FINDINGS: There is no acute fracture or dislocation  There is degenerative arthritis of the acromioclavicular joint  This has progressed since the previous exam The glenohumeral joint is unremarkable  No lytic or blastic lesions are seen  Soft tissues are unremarkable  Impression: 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  No acute findings Workstation performed: [id number]     Xr Humerus Right    Result Date: 10/13/2017  Narrative: RIGHT HUMERUS INDICATION:  Constant right arm pain for one to 2 days COMPARISON: None VIEWS:  2 IMAGES:  4 FINDINGS: There is no acute fracture or dislocation  Questionable old mild right rib deformities  Degenerative change at the acromioclavicular joint  Glenohumeral joint and elbow joint grossly unremarkable  No lytic or blastic lesions are seen  Soft tissues are unremarkable  Impression: No acute osseous abnormality  Workstation performed: [id number]     Ct Humerus Right W Contrast    Result Date: 10/13/2017  Narrative: CT right humerus with contrast  History: Right arm pain and fever  Technique: Multiplanar contrast enhanced CT of the right humerus  Findings: No acute fractures or dislocations identified  There is a nonspecific focal 2 8 x 2 cm area of intramuscular hypoattenuation identified in the anterior portion of the proximal humeral neck of uncertain significance  MRI right shoulder is recommended for further evaluation  Acromioclavicular degenerative changes are identified  No drainable fluid collection identified  The vessels appear patent  Impression: Impression: No acute fractures or dislocations identified   There is a nonspecific focal 2 8 x 2 cm area of intramuscular hypoattenuation identified in the anterior portion of the proximal humeral neck of uncertain significance  MRI right shoulder is recommended for further evaluation  Acromioclavicular degenerative changes are identified  No drainable fluid collection identified  The vessels appear patent  Workstation performed: [id number]     Mri Hip Right Wo Contrast    Result Date: 9/26/2017  Narrative: MRI RIGHT HIP INDICATION:  M25 [id number]: Pain in right hip  History taken directly from the electronic ordering system  Right hip pain  COMPARISON: None  TECHNIQUE:  MR sequences were obtained of the right hip and pelvis including: Localizer, axial T2 fat sat, coronal T1/STIR, cone-down axial oblique PD of the affected hip, coronal PD of the affected hip, sagittal T2 fat sat of the affected hip  Images were acquired on a 1 5 Ban unit  Gadolinium was not used  FINDINGS: RIGHT HIP: -JOINT EFFUSION: None  -BONE MARROW SIGNAL AND ALIGNMENT: Mild right hip degenerative change noted  No fracture dislocation or osseous destructive lesion such as AVN  -ACETABULAR LABRUM: Intact  -TROCHANTERIC BURSA: Normal  LEFT HIP: (please note dedicated small field of view images were not made of the left hip joint limiting its evaluation ) -JOINT EFFUSION: None  -BONE MARROW SIGNAL AND ALIGNMENT: Normal marrow signal demonstrated without hip fracture or AVN  -ACETABULAR LABRUM: No gross abnormalities although evaluation is very limited  -TROCHANTERIC BURSA: Normal  REST OF PELVIS: -BONE MARROW SIGNAL: Normal  -SI JOINTS AND SYMPHYSIS PUBIS:  Intact  -VISUALIZED LUMBAR SPINE:  Unremarkable  -MUSCULATURE: Intact with intact hamstring origins bilaterally  -PELVIC SOFT TISSUES: Enlarged prostate gland  SUBCUTANEOUS TISSUES: Left hydrocele appears to extend into a left inguinal hernia that also contains fat  Similar findings noted on prior CT study dated 11/10/2016  Impression: 1  Mild right hip degenerative change without acute osseous abnormality  Intact acetabular labrum  2   Prostatomegaly   Workstation performed: [id number]     Ct Abdomen Pelvis W Contrast    Result Date: 9/26/2017  Narrative: CT ABDOMEN AND PELVIS WITH IV CONTRAST INDICATION:  Painless hematuria  COMPARISON: CT of the abdomen/pelvis dated 11/10/2016  TECHNIQUE:  CT examination of the abdomen and pelvis was performed  In addition to portal venous phase postcontrast scanning through the abdomen and pelvis, delayed phase postcontrast scanning was performed through the upper abdominal viscera  Reformatted images were created in axial, sagittal, and coronal planes  Radiation dose length product (DLP) for this visit:  1019 mGy-cm   This examination, like all CT scans performed in the [facility], was performed utilizing techniques to minimize radiation dose exposure, including the use of iterative reconstruction and automated exposure control  IV Contrast:  100 mL of iohexol (OMNIPAQUE)     Enteric Contrast:  Enteric contrast was not administered  FINDINGS: ABDOMEN LOWER CHEST:  Consolidation in the left lower lobe with associated volume loss demonstrated by posterior bowing of the major fissure  Linear atelectasis is also demonstrated in the lingula and right lower lobe  LIVER/BILIARY TREE:  Multiple hepatic cysts demonstrated  2 6 cm hyperdense lesion in segment 3 of the left hepatic lobe  Hyperdensity could represent blood products or enhancement  However, there is a change in internal density between portal venous and delayed phases (187 HU versus 85 HU, respectively) which would be inconsistent with stagnant blood products  GALLBLADDER:  No calcified gallstones  No pericholecystic inflammatory change  SPLEEN:  Unremarkable  PANCREAS:  Unremarkable  ADRENAL GLANDS:  Unremarkable  KIDNEYS/URETERS:  One or more sharply circumscribed subcentimeter renal hypodensities are noted  These lesions are too small to accurately characterize, but are statistically most likely to represent benign cortical renal cyst(s)    According to the guidelines published in the [facility] Paper of the ACR Incidental Findings Committee (Radiology 2010), no further workup of these lesions is recommended  No suspicious masses  No nephrolithiasis  No ureteral stones or endoluminal masses  Renal collecting systems are decompressed  STOMACH AND BOWEL:  Unremarkable  APPENDIX:  No findings to suggest appendicitis  ABDOMINOPELVIC CAVITY:  No ascites or free intraperitoneal air  No lymphadenopathy  VESSELS:  Unremarkable for patient's age  PELVIS REPRODUCTIVE ORGANS:  Nodular prostatic hypertrophy with dystrophic calcifications  Normal seminal vesicles  URINARY BLADDER:  Unremarkable  ABDOMINAL WALL/INGUINAL REGIONS:  Lobular mass measuring 2 2 cm centered on the subcutaneous tissues of the right ventral chest wall overlying the right anterior 6th rib  OSSEOUS STRUCTURES:  No acute fracture or destructive osseous lesion  Impression: 1   2 6 cm hyperdense lesion in the left hepatic lobe  Change in attenuation of the lesion between phases suggests enhancement rather then intrinsically hyperdense material such as hemorrhagic cyst   Comparison to prior MRI shows that this is, indeed, a  solid enhancing lesion--though nonaggressive in appearance  This could represent a hemangioma  Recommend repeat liver MRI in 6 months to document stability  2   No urolithiasis, suspicious renal parenchymal masses, or urothelial masses to account for the patient's hematuria  It should be noted that negative CT urography may not obviate the need for cystoscopy in the setting of persistent microhematuria  3   Left lower lobe consolidation  In the absence of clinical evidence for pneumonia, this likely represents atelectasis given bowing of the major fissure suggesting volume loss  4   Soft tissue mass in the ventral right chest wall  Could represent proteinaceous sebaceous cyst, epidermal inclusion cyst, or solid lesion  Recommend direct visualization   Workstation performed: [id number]       EKG, Pathology, and Other Studies Reviewed on Admission:   · EKG:  Sinus rhythm    Allscripts / Epic Records Reviewed: Yes     ** Please Note: This note has been constructed using a voice recognition system   **

## 2017-10-14 NOTE — PROGRESS NOTES
[facility] Internal Medicine Progress Note  Patient: Ludwin Clinton 65 y o  male   MRN: [id number]  PCP: Giancarlo Lopes MD  Unit/Bed#: -01 Encounter: [id number]  Date Of Visit: 10/14/17    Assessment:    Principal Problem:    Fever  Active Problems:    Right arm pain    Rheumatoid arthritis ([address] )    Hypertension      Plan:    1  Febrile illness-no further fever    White count is improving  Flu negative follow-up on the blood culture  No other definite source of infection yet  2  Right shoulder pain-orthopedic evaluation pending     MRI pending  3  Rheumatoid arthritis-continue with the rheumatology follow-up as an outpatient continue with  The steroids  4  Hypertension-monitor blood pressure  5  Leukocytosis-improving    VTE Pharmacologic Prophylaxis:   Pharmacologic: Enoxaparin (Lovenox)  Mechanical VTE Prophylaxis in Place: Yes    Patient Centered Rounds: I have performed bedside rounds with nursing staff today  Discussions with Specialists or Other Care Team Provider:     Education and Discussions with Family / Patient:  Wife updated in the room    Time Spent for Care: 30 minutes  More than 50% of total time spent on counseling and coordination of care as described above  Current Length of Stay: 1 day(s)    Current Patient Status: Inpatient   Certification Statement: The patient will continue to require additional inpatient hospital stay due to Pending MRI    Discharge Plan / Estimated Discharge Date:     Code Status: Level 1 - Full Code      Subjective:   Patient seen and examined  Patient still has right shoulder pain with decreased range of motion  No further fever  Objective:     Vitals:   Temp (24hrs), Av 1 °F (37 3 °C), Min:97 9 °F (36 6 °C), Max:102 2 °F (39 °C)    HR:  [60-94] 79  Resp:  [16-18] 17  BP: (117-133)/(65-77) 126/74  SpO2:  [91 %-96 %] 94 %  Body mass index is 27 58 kg/m²  Input and Output Summary (last 24 hours):        Intake/Output Summary (Last 24 hours) at 10/14/17 1614  Last data filed at 10/14/17 1317   Gross per 24 hour   Intake             1960 ml   Output             1500 ml   Net              460 ml       Physical Exam:     Physical Exam   Constitutional: He is oriented to person, place, and time  He appears well-developed and well-nourished  HENT:   Head: Normocephalic and atraumatic  Eyes: Pupils are equal, round, and reactive to light  Neck: Normal range of motion  Neck supple  Cardiovascular: Normal rate and regular rhythm  Pulmonary/Chest: Effort normal and breath sounds normal  No respiratory distress  He has no wheezes  Abdominal: Soft  Bowel sounds are normal  He exhibits no distension  There is no tenderness  Musculoskeletal: He exhibits no edema  Still with decreased range of movement on the right upper extremity  Tenderness to palpation in the shoulder area   Neurological: He is alert and oriented to person, place, and time  No cranial nerve deficit  Skin: Skin is warm and dry  Additional Data:     Labs:      Results from last 7 days  Lab Units 10/14/17  0445  10/13/17  1722   WBC Thousand/uL 10 88*  --  14 85*   HEMOGLOBIN g/dL 13 1  --  13 7   HEMATOCRIT % 39 6  --  40 5   PLATELETS Thousands/uL 253  < > 273   NEUTROS PCT %  --   --  83*   LYMPHS PCT %  --   --  9*   MONOS PCT %  --   --  8   EOS PCT %  --   --  0   < > = values in this interval not displayed  Results from last 7 days  Lab Units 10/14/17  0445   SODIUM mmol/L 141   POTASSIUM mmol/L 3 9   CHLORIDE mmol/L 108   CO2 mmol/L 26   BUN mg/dL 19   CREATININE mg/dL 1 05   CALCIUM mg/dL 8 3   TOTAL PROTEIN g/dL 5 8*   BILIRUBIN TOTAL mg/dL 1 20*   ALK PHOS U/L 80   ALT U/L 34   AST U/L 13   GLUCOSE RANDOM mg/dL 99           * I Have Reviewed All Lab Data Listed Above  * Additional Pertinent Lab Tests Reviewed:  All Labs For Current Hospital Admission Reviewed    Imaging:    Imaging Reports Reviewed Today Include:   Imaging Personally Reviewed by Myself Includes:      Recent Cultures (last 7 days):       Results from last 7 days  Lab Units 10/14/17  0007   INFLUENZA A PCR  None Detected   INFLUENZA B PCR  None Detected   RSV PCR  None Detected       Last 24 Hours Medication List:     aspirin 81 mg Oral Daily   atorvastatin 10 mg Oral Daily   docusate sodium 100 mg Oral BID   enoxaparin 40 mg Subcutaneous Q24H [address]   fish oil 1,000 mg Oral Daily   FLUoxetine 20 mg Oral Daily   folic acid 1 mg Oral Daily   lisinopril-hydrochlorothiazide (PRINZIDE 10/12  5) combo dose  Oral Daily   LORazepam 0 5 mg Oral Once   multivitamin-minerals 1 tablet Oral Daily   oxybutynin 5 mg Oral Daily   pramipexole 0 25 mg Oral Daily   tamsulosin 0 4 mg Oral Daily With Dinner        Today, Patient Was Seen By: Jabier Martínez MD    ** Please Note: This note has been constructed using a voice recognition system   **

## 2017-10-14 NOTE — CASE MANAGEMENT
Initial Clinical Review    Admission: Date/Time/Statement: 10/13/17 @ 1812     Orders Placed This Encounter   Procedures    Inpatient Admission (expected length of stay for this patient is greater than two midnights)     Standing Status:   Standing     Number of Occurrences:   1     Order Specific Question:   Admitting Physician     Answer:   Trudi Primrose [[id number]]     Order Specific Question:   Level of Care     Answer:   Med Surg [[id number]]     Order Specific Question:   Estimated length of stay     Answer:   More than 2 Midnights     Order Specific Question:   Certification     Answer:   I certify that inpatient services are medically necessary for this patient for a duration of greater than two midnights  See H&P and MD Progress Notes for additional information about the patient's course of treatment  ED: Date/Time/Mode of Arrival:   ED Arrival Information     Expected Arrival Acuity Means of Arrival Escorted By Service Admission Type    - 10/13/2017 14:32 Urgent Ambulance [facility] General Medicine Urgent    Arrival Complaint    Right Arm Pain          Chief Complaint:   Chief Complaint   Patient presents with    Arm Pain     Pt states he had some R arm pain last night but ok today, Pt presents now with R upper arm pain that is constant, pt denies injury       History of Illness: 65 y o  male who presents with right upper extremity pain  Patient reported that his symptoms started at last night with right upper extremity pain and he had some Zack-Trejo to his right upper extremity with improvement in his symptoms  Today his pain was constant so he presented to the emergency room  Patient denied any fever or chills at home  Patient with known history of rheumatoid arthritis and had flare-ups in the past involving upper extremity  But he reported that his pain is different from his usual rheumatoid arthritis flare ups    The patient has not had any fever while at home and he developed a fever in the emergency room    ED Vital Signs:   ED Triage Vitals [10/13/17 1437]   Temperature Pulse Respirations Blood Pressure SpO2   98 1 °F (36 7 °C) 76 18 126/71 97 %      Temp Source Heart Rate Source Patient Position - Orthostatic VS BP Location FiO2 (%)   Oral Monitor Sitting Left arm --      Pain Score       Worst Possible Pain        Wt Readings from Last 1 Encounters:   10/14/17 89 7 kg (197 lb 12 oz)       Vital Signs (abnormal):  Maximum  Temperature 102 2  Exam - Decreased range of movement on the right shoulder joint  Tenderness to palpation in the right shoulder and also right upper arm  Abnormal Labs/Diagnostic Test Results:   CRP 8 8    K 3 4    UA trace protein  Small blood  Wbc 14 85    Ct right humerus - No acute fractures or dislocations identified  There is a nonspecific focal 2 8 x 2 cm area of intramuscular hypoattenuation identified in the anterior portion of the proximal humeral neck of uncertain significance  MRI right shoulder is recommended for further evaluation  Acromioclavicular degenerative changes are identified  No drainable fluid collection identified  The vessels appear patent  Xray right shoulder - 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   No acute findings    Labs 10/14 am - total protein 5 8  Albumin 3 0  Total bilirubin 1 20  Wbc 10 88    ED Treatment: blood cultures     Medication Administration from 10/13/2017 1432 to 10/13/2017 1934       Date/Time Order Dose Route Action Action by Comments     10/13/2017 1539  EMS REPLENISHMENT MED 0  Does not apply Given to Gautam Witt RN      10/13/2017 1535 morphine (PF) 4 mg/mL injection 4 mg 4 mg Intravenous Given Dalila Molina RN      10/13/2017 1535 ketorolac (TORADOL) 30 mg/mL injection 15 mg 15 mg Intravenous Given Dalila Molina RN      10/13/2017 1928 sodium chloride 0 9 % bolus 1,000 mL 0 mL Intravenous Stopped Dalila Molina RN      10/13/2017 1718 sodium chloride 0 9 % bolus 1,000 mL 1,000 mL Intravenous [address] Rajinder Juárez RN      10/13/2017 1710 acetaminophen (TYLENOL) tablet 650 mg 650 mg Oral Given Rajinder Juárez RN      10/13/2017 1836 iohexol (OMNIPAQUE) 350 MG/ML injection (SINGLE-DOSE) 100 mL 100 mL Intravenous Given Fady Benavidez           Past Medical/Surgical History: Active Ambulatory Problems     Diagnosis Date Noted    No Active Ambulatory Problems     Resolved Ambulatory Problems     Diagnosis Date Noted    No Resolved Ambulatory Problems     Past Medical History:   Diagnosis Date    Arthritis     Hyperlipidemia     Hypertension        Admitting Diagnosis: Right arm pain [M79 601]  Fever [R50 9]  Arm pain, diffuse, right [M79 601]    Age/Sex: 65 y o  male    Assessment/Plan: Right upper extremity pain and fever-there is no rash suggestive of and cellulitis or any fluid collection suggestive of an abscess    CT scan Report reviewed will obtain an MRI follow up on the cultures  Consult Infectious Disease  Patient with known history of rheumatoid arthritis but ESR within normal limits  Patient remains hemodynamically stable so will monitor off of the antibiotics just in case if he had proceed with the culture  Discussed with the overnight provider just in case if there is any concern we can start on antibiotics overnight  2   Hypertension-monitor blood pressure with current medication  3  Leukocytosis-likely related to acute even    Admission Orders:  10/13/2017  1812 INPATIENT   Scheduled Meds:   aspirin 81 mg Oral Daily   atorvastatin 10 mg Oral Daily   docusate sodium 100 mg Oral BID   enoxaparin 40 mg Subcutaneous Q24H [address]   fish oil 1,000 mg Oral Daily   FLUoxetine 20 mg Oral Daily   folic acid 1 mg Oral Daily   lisinopril-hydrochlorothiazide (PRINZIDE 10/12  5) combo dose  Oral Daily   LORazepam 0 5 mg Oral Once   multivitamin-minerals 1 tablet Oral Daily   oxybutynin 5 mg Oral Daily   pramipexole 0 25 mg Oral Daily   tamsulosin 0 4 mg Oral Daily With Dinner     Continuous Infusions:    PRN Meds:   acetaminophen    Ketorolac  15 mg iv - used x 1      ondansetron    simethicone    traMADol     OTHER ORDERS: scds  MRI right shoulder  Consult orthopedics; ID      [id number] [facility] in the [facility] by Tate White for 2017  Network Utilization Review Department  Phone: [phone]; Fax [phone]  ATTENTION: The Network Utilization Review Department is now centralized for our 7 Facilities  Make a note that we have a new phone and fax numbers for our Department  Please call with any questions or concerns to [phone] and carefully follow the prompts so that you are directed to the right person  All voicemails are confidential  Fax any determinations, approvals, denials, and requests for initial or continue stay review clinical to [phone]  Due to HIGH CALL volume, it would be easier if you could please send faxed requests to expedite your requests and in part, help us provide discharge notifications faster

## 2017-10-14 NOTE — CONSULTS
Consultation - Marli Mary Hanson 65 y o  male MRN: [id number]  Unit/Bed#: -01 Encounter: [id number]      Assessment/Plan     Assessment:  Right shoulder pain, possible evolving adhesive capsulitis versus rheumatologic disorder; low suspicion for infectious etiology  Plan: Activity modification and ice as needed  Sling as needed for comfort, wean as tolerated  Consider NSAIDs for 2-4 weeks if not medically contraindicated  Await MRI scan for further evaluation  History of Present Illness   Physician Requesting Consult: Jenni Smith MD  Reason for Consult / Principal Problem:  Right shoulder pain with abnormality on CT scan  HPI: Ana Sullivan is a 65y o  year old male who presents with right shoulder pain for the past 2 days  He reports sudden onset of pain without a preceding injury  Past medical history is notable for rheumatoid arthritis  He adds that his outpatient physician Raul Cunningham thinks I have Lyme disease   The pain is somewhat generalized in location, mostly over the lateral aspect  He denies any fevers at home but was noted to have a fever of 102 2° F on initial presentation to the ER  Patient denies any redness or swelling  Inpatient consult to Orthopedic Surgery  Consult performed by: Shelby Crowley ordered by: Melissa Lopes          Review of Systems   Constitutional: Positive for fever  Musculoskeletal:        As stated in HPI   All other systems reviewed and are negative        Historical Information   Past Medical History:   Diagnosis Date    Arthritis     Hyperlipidemia     Hypertension      Past Surgical History:   Procedure Laterality Date    SCROTAL SURGERY      benign "lump"     Social History   History   Alcohol Use No     History   Drug Use No     History   Smoking Status    Never Smoker   Smokeless Tobacco    Never Used     Family History: non-contributory    Meds/Allergies   all current active meds have been reviewed and current meds: Current Facility-Administered Medications   Medication Dose Route Frequency    acetaminophen (TYLENOL) tablet 650 mg  650 mg Oral Q6H PRN    aspirin chewable tablet 81 mg  81 mg Oral Daily    atorvastatin (LIPITOR) tablet 10 mg  10 mg Oral Daily    docusate sodium (COLACE) capsule 100 mg  100 mg Oral BID    enoxaparin (LOVENOX) subcutaneous injection 40 mg  40 mg Subcutaneous Q24H [facility]    fish oil capsule 1,000 mg  1,000 mg Oral Daily    FLUoxetine (PROzac) capsule 20 mg  20 mg Oral Daily    folic acid (FOLVITE) tablet 1 mg  1 mg Oral Daily    ketorolac (TORADOL) 30 mg/mL injection 15 mg  15 mg Intravenous Q6H PRN    lisinopril-hydrochlorothiazide (PRINZIDE 10/12  5) combo dose   Oral Daily    multivitamin-minerals (CENTRUM) tablet 1 tablet  1 tablet Oral Daily    ondansetron (ZOFRAN) injection 4 mg  4 mg Intravenous Q6H PRN    oxybutynin (DITROPAN-XL) 24 hr tablet 5 mg  5 mg Oral Daily    pramipexole (MIRAPEX) tablet 0 25 mg  0 25 mg Oral Daily    simethicone (MYLICON) chewable tablet 80 mg  80 mg Oral 4x Daily PRN    sodium chloride 0 9 % infusion  125 mL/hr Intravenous Continuous    tamsulosin (FLOMAX) capsule 0 4 mg  0 4 mg Oral Daily With Dinner    traMADol (ULTRAM) tablet 100 mg  100 mg Oral Q6H PRN     Allergies   Allergen Reactions    Fentanyl        Objective   Vitals: Blood pressure 117/65, pulse 67, temperature 98 7 °F (37 1 °C), temperature source Oral, resp  rate 18, height 5' 11" (1 803 m), weight 89 7 kg (197 lb 12 oz), SpO2 96 %  ,Body mass index is 27 58 kg/m²  Intake/Output Summary (Last 24 hours) at 10/14/17 1111  Last data filed at 10/14/17 0928   Gross per 24 hour   Intake             1720 ml   Output             1650 ml   Net               70 ml     I/O last 24 hours:   In: 4197 [P O :720; IV Piggyback:1000]  Out: 1650 [Urine:1650]    Invasive Devices     Peripheral Intravenous Line            Peripheral IV 10/13/17 Left Antecubital less than 1 day                Physical Exam   Constitutional: He is oriented to person, place, and time  He appears well-developed  HENT:   Right Ear: External ear normal    Left Ear: External ear normal    Eyes: No scleral icterus  Neck: Neck supple  Cardiovascular: Intact distal pulses  Pulmonary/Chest: Effort normal  No respiratory distress  Abdominal: He exhibits no distension  Neurological: He is alert and oriented to person, place, and time  Skin: No erythema  Psychiatric: He has a normal mood and affect  Right Shoulder Exam     Comments:  Tenderness to palpation diffusely  No gross deformity  Skin is intact without erythema  No palpable fluctuance  Range of motion is limited in all planes by pain/guarding  Special tests are unable to be assessed secondary to severe pain and guarding with global shoulder maneuvers  Sensation intact to light touch axillary, muscular cutaneous, median, ulnar, and radial nerve distribution  2+ radial pulse  Lab Results:   I have personally reviewed pertinent lab results  CBC:   Lab Results   Component Value Date    WBC 10 88 (H) 10/14/2017    HGB 13 1 10/14/2017    HCT 39 6 10/14/2017    MCV 94 10/14/2017     10/14/2017    MCH 31 2 10/14/2017    MCHC 33 1 10/14/2017    RDW 14 5 10/14/2017    MPV 11 6 10/14/2017     CMP:   Lab Results   Component Value Date     10/14/2017     10/14/2017    CO2 26 10/14/2017    ANIONGAP 7 10/14/2017    BUN 19 10/14/2017    CREATININE 1 05 10/14/2017    GLUCOSE 99 10/14/2017    CALCIUM 8 3 10/14/2017    AST 13 10/14/2017    ALT 34 10/14/2017    ALKPHOS 80 10/14/2017    PROT 5 8 (L) 10/14/2017    ALBUMIN 3 0 (L) 10/14/2017    BILITOT 1 20 (H) 10/14/2017    EGFR 76 10/14/2017     Imaging Studies: I have personally reviewed pertinent films in PACS   X-ray right shoulder 10/13/17: Moderate degenerative changes acromioclavicular joint    CT right humerus 10/13/17: Nonspecific focal 2 8 cm x 2 0 cm area of intramuscular hypoattenuation identified in the anterior portion of the proximal humeral neck of uncertain significance        Code Status: Level 1 - Full Code

## 2017-10-14 NOTE — CONSULTS
Consultation - Infectious Disease   Hola Millard 65 y o  male MRN: [id number]  Unit/Bed#: -01 Encounter: [id number]      IMPRESSION & RECOMMENDATIONS:   1  Systemic inflammatory response syndrome-POA  Fever and leukocytosis and tachycardia  No definitive source has been found  Consideration for the possibility of sepsis  Consideration for the possibility of a noninfectious etiology such as a muscle infarct  Consideration for the possibility of a flare of his inflammatory condition  Fortunately the patient remains hemodynamically stable and nontoxic despite his illness  He remains stable off all antibiotics  His temperature has come down, and his leukocytosis has improved since yesterday   -monitor off all antibiotics for now  -follow-up blood cultures  -additional workup as below  -monitor CBC with diff and creatinine    2  Right arm and left hand pain-possibly related to the patient's rheumatoid arthritis  Unclear etiology of the abnormality seen on CT scan of the right arm  Possible right arm muscle infarct versus early myositis   -await MRI of the right arm  -monitor and treat symptomatology  -await orthopedics evaluation    3  Rheumatoid arthritis-on biologic therapy  Seems to have failed Humira   -management as per the primary service  -rheumatology follow-up    4  Hypertension-seems to be well controlled    HISTORY OF PRESENT ILLNESS:  Reason for Consult:  Fever and right arm pain  HPI: Hola Millard is a 65y o  year old male with a history of rheumatoid arthritis admitted to [facility] with worsening right arm pain who I am asked to assist with management  The patient has a history of rheumatoid arthritis and has been receiving Humira until recently  He apparently has had bouts of pain involving his hands and 1 time involving the right arm as well as a more recent episode of right hip pain    Because his rheumatologist did not feel the Humira was working, he was changed to [place] approximately 2 and half weeks ago  On the evening prior to admission the patient began developed worsening proximal right upper extremity pain as well as some left hand pain  He tried to treat himself at home but the symptoms worsened and therefore he came to the ER for further evaluation  While at home the patient did not have any fever chills or sweats  He apparently had had a similar right arm pain a few months ago that lasted a few days and resolved spontaneously  However the pain this time is much worse  After coming to the emergency department he was noted to have a leukocytosis and subsequently spiked a fever greater 102  He had blood cultures obtained but has been maintained off all antibiotics  He denies any headache or stiff neck, denies any sore throat or rhinorrhea or nasal congestion, denies any cough or difficulty breathing, denies any chest pain or abdominal pain, denies any nausea vomiting or diarrhea, denies any dysuria or hematuria  His temperature has come down today and his arm feels a bit better  During his brief hospital stay he has remained hemodynamically stable off all antibiotics  The patient denies any recent or remote travel out of the area  He denies any exposure to tuberculosis or any other ill contacts  He denies any trauma to the arm or the left hand  REVIEW OF SYSTEMS:  A complete 12 point system-based review of systems is otherwise negative      PAST MEDICAL HISTORY:  Past Medical History:   Diagnosis Date    Arthritis     Hyperlipidemia     Hypertension      Past Surgical History:   Procedure Laterality Date    SCROTAL SURGERY      benign "lump"       FAMILY HISTORY:  Non-contributory    SOCIAL HISTORY:  Social History   History   Alcohol Use No     History   Drug Use No     History   Smoking Status    Never Smoker   Smokeless Tobacco    Never Used       ALLERGIES:  Allergies   Allergen Reactions    Fentanyl MEDICATIONS:  All current active medications have been reviewed  Antibiotics:  None    PHYSICAL EXAM:  HR:  [67-94] 67  Resp:  [16-18] 18  BP: (117-133)/(65-77) 117/65  SpO2:  [91 %-97 %] 96 %  Temp (24hrs), Av °F (37 2 °C), Min:97 9 °F (36 6 °C), Max:102 2 °F (39 °C)  Current: Temperature: 98 7 °F (37 1 °C)    Intake/Output Summary (Last 24 hours) at 10/14/17 0838  Last data filed at 10/14/17 0801   Gross per 24 hour   Intake             1240 ml   Output             1250 ml   Net              -10 ml       General Appearance:  Appearing well, nontoxic, and in no distress   Head:  Normocephalic, without obvious abnormality, atraumatic   Eyes:  Conjunctiva pink and sclera anicteric, both eyes   Nose: Nares normal, mucosa normal, no drainage   Throat: Oropharynx moist without lesions   Neck: Supple, symmetrical, no adenopathy, no tenderness/mass/nodules   Back:   Symmetric, no curvature, ROM normal, no CVA tenderness   Lungs:   Clear to auscultation bilaterally, no audible wheezes, rhonchi and rales, respirations unlabored   Chest Wall:  No tenderness or deformity   Heart:  RRR; no murmur, rub or gallop   Abdomen:   Soft, non-tender, non-distended, positive bowel sounds,    Extremities: No cyanosis, clubbing or edema  Right arm with reasonably good range of motion but with tenderness noted in the mid humeral region  No swelling, drainage, or erythema   Skin: No rashes or lesions  No draining wounds noted  Lymph nodes: Cervical, supraclavicular nodes normal   Neurologic: Alert and oriented times 3, extremity strength 5/5 and symmetric       LABS, IMAGING, & OTHER STUDIES:  Lab Results:  I have personally reviewed pertinent labs      Results from last 7 days  Lab Units 10/14/17  0445 10/14/17  0012 10/13/17  1722   WBC Thousand/uL 10 88*  --  14 85*   HEMOGLOBIN g/dL 13 1  --  13 7   PLATELETS Thousands/uL 253 253 273       Results from last 7 days  Lab Units 10/14/17  0445 10/13/17  1722   SODIUM mmol/L 141 138   POTASSIUM mmol/L 3 9 3 4*   CHLORIDE mmol/L 108 103   CO2 mmol/L 26 25   ANION GAP mmol/L 7 10   BUN mg/dL 19 20   CREATININE mg/dL 1 05 1 07   EGFR ml/min/1 73sq m 76 75   GLUCOSE RANDOM mg/dL 99 126   CALCIUM mg/dL 8 3 8 6   AST U/L 13 15   ALT U/L 34 40   ALK PHOS U/L 80 89   TOTAL PROTEIN g/dL 5 8* 6 5   ALBUMIN g/dL 3 0* 3 5   BILIRUBIN TOTAL mg/dL 1 20* 0 90        blood cultures x2 sets pending    Imaging Studies:   I have personally reviewed pertinent imaging study reports and images in PACS  CT right upper extremity-No acute fractures or dislocations identified  There is a nonspecific focal 2 8 x 2 cm area of intramuscular hypoattenuation identified in the anterior portion of the proximal humeral neck of uncertain significance  MRI right shoulder is recommended for   further evaluation  Acromioclavicular degenerative changes are identified  No drainable fluid collection identified  The vessels appear patent

## 2017-10-15 RX ADMIN — TAMSULOSIN HYDROCHLORIDE 0.4 MG: 0.4 CAPSULE ORAL at 17:27

## 2017-10-15 RX ADMIN — OXYBUTYNIN CHLORIDE 5 MG: 5 TABLET, EXTENDED RELEASE ORAL at 08:40

## 2017-10-15 RX ADMIN — Medication 1000 MG: at 08:40

## 2017-10-15 RX ADMIN — ASPIRIN 81 MG 81 MG: 81 TABLET ORAL at 08:40

## 2017-10-15 RX ADMIN — PRAMIPEXOLE DIHYDROCHLORIDE 0.25 MG: 0.25 TABLET ORAL at 08:40

## 2017-10-15 RX ADMIN — ATORVASTATIN CALCIUM 10 MG: 10 TABLET, FILM COATED ORAL at 08:40

## 2017-10-15 RX ADMIN — FLUOXETINE 20 MG: 20 CAPSULE ORAL at 08:40

## 2017-10-15 RX ADMIN — FOLIC ACID 1 MG: 1 TABLET ORAL at 08:40

## 2017-10-15 RX ADMIN — LISINOPRIL: 10 TABLET ORAL at 08:40

## 2017-10-15 RX ADMIN — Medication 1 TABLET: at 08:40

## 2017-10-15 NOTE — PROGRESS NOTES
Progress Note - Orthopedics   Rene Hager 65 y o  male MRN: [id number]  Unit/Bed#: -01 Encounter: [id number]    Assessment:  Right shoulder large effusion with significant subcoracoid fluid collection of uncertain etiology - suspect inflammatory/rheumatologic disorder; infectious etiology is possible but very unlikely in the absence of any clinical risk factors  Small rotator cuff tear and degenerative changes are unlikely to be the source of patient's symptoms, which are significantly worse than expected from these conditions  Plan:  Recommend image-guided aspiration by Interventional Radiology, with aerobic and anaerobic culture of fluid aspirate  Activity as tolerated, modify as needed  Ice and anti-inflammatory medication as needed  Would avoid antibiotics for now until aspirate can be obtained unless patient is worsening or showing more obvious signs of infection  No surgery is planned at this point  Await results of IR aspiration  Subjective: Patient reports persistent shoulder pain that is worse with movement of his right arm  No significant change overall since yesterday  Vitals: Blood pressure 107/63, pulse 69, temperature 98 8 °F (37 1 °C), temperature source Oral, resp  rate 18, height 5' 11" (1 803 m), weight 89 7 kg (197 lb 12 oz), SpO2 93 %  ,Body mass index is 27 58 kg/m²  Intake/Output Summary (Last 24 hours) at 10/15/17 0954  Last data filed at 10/15/17 0902   Gross per 24 hour   Intake             2200 ml   Output              800 ml   Net             1400 ml       Invasive Devices          No matching active lines, drains, or airways          Physical Exam:   Right shoulder: Mild swelling in the anterior shoulder and subcoracoid region but without obvious fluctuance  Range of motion and strength are limited by pain  Neurovascularly intact distally  Skin is intact without erythema or ecchymosis       Lab, Imaging and other studies: I have personally reviewed the images and my interpretation follows  MRI right shoulder 10/14/17: Large effusion with extension into the subacromial, subacromial, and subcoracoid bursa with a large fluid collection in the subcoracoid region  Small full-thickness rotator cuff tear involving the supraspinatus tendon with possible partial-thickness tear infraspinatus tendon  Severe degenerative changes AC joint

## 2017-10-15 NOTE — PROGRESS NOTES
Progress Note - Infectious Disease   Malick Pillai 65 y o  male MRN: [id number]  Unit/Bed#: -01 Encounter: [id number]      Impression/Plan:  1  Systemic inflammatory response syndrome-POA  Fever and leukocytosis and tachycardia  No definitive source has been found  No recurrence of the fever and the leukocytosis had improved  Consideration for the possibility of sepsis although less likely with resolution of the fever without any antibiotics  Consideration for the possibility of a flare of his inflammatory condition with a flare of the patient's rheumatoid arthritis  Fortunately the patient remains hemodynamically stable and nontoxic despite his illness  He remains stable off all antibiotics  -monitor off all antibiotics for now  -follow-up blood cultures  -await IR aspiration of the right shoulder  -monitor CBC with diff and creatinine     2  Right arm and left hand pain-possibly related to the patient's rheumatoid arthritis  Suspect this is all inflammatory  Consideration for the possibility of an occult infection in the right shoulder but this is less likely  -await IR aspiration of the right shoulder  -recommend sending the fluid for cell count, crystal analysis, Gram stain and culture  -monitor and treat symptomatology  -orthopedics follow-up     3  Rheumatoid arthritis-on biologic therapy  Seems to have failed Humira   -management as per the primary service  -rheumatology follow-up     4  Hypertension-seems to be well controlled    Antibiotics:  None    Subjective:  Patient has no fever, chills, sweats; no nausea, vomiting, diarrhea; no cough, shortness of breath; slightly decreased right shoulder pain  No new symptoms      Objective:  Vitals:  HR:  [60-79] 69  Resp:  [16-18] 18  BP: (106-130)/(60-80) 107/63  SpO2:  [91 %-97 %] 93 %  Temp (24hrs), Av 8 °F (37 1 °C), Min:98 6 °F (37 °C), Max:99 °F (37 2 °C)  Current: Temperature: 98 8 °F (37 1 °C)    Physical Exam:   General Appearance:  Alert, nontoxic, no acute distress  Throat: Oropharynx moist without lesions  Lungs:   Clear to auscultation bilaterally; respirations unlabored   Heart:  RRR; no murmur, rub or gallop   Abdomen:   Soft, non-tender, non-distended, positive bowel sounds  Extremities: No clubbing, cyanosis or edema  Right shoulder with reasonably good range of motion  Left hand with some tenderness and swelling   Skin: No new rashes or lesions  No draining wounds noted  Labs, Imaging, & Other studies:   All pertinent labs and imaging studies were personally reviewed    Results from last 7 days  Lab Units 10/14/17  0445 10/14/17  0012 10/13/17  1722   WBC Thousand/uL 10 88*  --  14 85*   HEMOGLOBIN g/dL 13 1  --  13 7   PLATELETS Thousands/uL 253 253 273       Results from last 7 days  Lab Units 10/14/17  0445 10/13/17  1722   SODIUM mmol/L 141 138   POTASSIUM mmol/L 3 9 3 4*   CHLORIDE mmol/L 108 103   CO2 mmol/L 26 25   ANION GAP mmol/L 7 10   BUN mg/dL 19 20   CREATININE mg/dL 1 05 1 07   EGFR ml/min/1 73sq m 76 75   GLUCOSE RANDOM mg/dL 99 126   CALCIUM mg/dL 8 3 8 6   AST U/L 13 15   ALT U/L 34 40   ALK PHOS U/L 80 89   TOTAL PROTEIN g/dL 5 8* 6 5   ALBUMIN g/dL 3 0* 3 5   BILIRUBIN TOTAL mg/dL 1 20* 0 90       Results from last 7 days  Lab Units 10/14/17  0007 10/13/17  1722 10/13/17  1715   BLOOD CULTURE   --  No Growth at 24 hrs  No Growth at 24 hrs     INFLUENZA A PCR  None Detected  --   --    INFLUENZA B PCR  None Detected  --   --    RSV PCR  None Detected  --   --        MRI right shoulder-There is moderate sized shoulder joint effusion along with the fluid in the subacromial subdeltoid bursa, subscapularis bursa and subcoracoid bursa   This fluid may be inflammatory, reactive, if concern for infection correlate clinically    No focal lesion seen within the proximal humerus    A small 6 mm full-thickness tear seen within the supraspinatus    Partial tearing infraspinatus with the undersurface delamination    No significant atrophy of the supraspinatus or infraspinatus    Tendinosis and synovitis of the long head of the biceps tendon with extensive inflammatory changes within the rotator interval with obliteration of fat in the subcoracoid triangle      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

## 2017-10-15 NOTE — PROGRESS NOTES
[facility] Internal Medicine Progress Note  Patient: Leonie Smith 65 y o  male   MRN: [id number]  PCP: Mynor Serrano MD  Unit/Bed#: -01 Encounter: [id number]  Date Of Visit: 10/15/17    Assessment:    Principal Problem:    Fever  Active Problems:    Right arm pain    Rheumatoid arthritis ([address] )    Hypertension      Plan:  1  Right shoulder pain with abnormal MRI with joint effusion  -orthopedic evaluation appreciated  Consult IR for joint aspiration  Observe off of antibiotics  2  Fever-monitor-febrile in the last 24 hours  3  Leukocytosis-appears to be improving    VTE Pharmacologic Prophylaxis:   Pharmacologic: Enoxaparin (Lovenox)  Mechanical VTE Prophylaxis in Place: Yes    Patient Centered Rounds: I have performed bedside rounds with nursing staff today  Discussions with Specialists or Other Care Team Provider:     Education and Discussions with Family / Patient:  Wife updated in the room    Time Spent for Care: 30 minutes  More than 50% of total time spent on counseling and coordination of care as described above  Current Length of Stay: 2 day(s)    Current Patient Status: Inpatient   Certification Statement: The patient will continue to require additional inpatient hospital stay due to Pending IR procedure tomorrow    Discharge Plan / Estimated Discharge Date:     Code Status: Level 1 - Full Code      Subjective:   Patient seen and examined still with right shoulder pain without any significant improvement  No further fever  Objective:     Vitals:   Temp (24hrs), Av 9 °F (37 2 °C), Min:98 8 °F (37 1 °C), Max:99 °F (37 2 °C)    HR:  [69-75] 75  Resp:  [16-18] 18  BP: (106-130)/(60-80) 107/67  SpO2:  [91 %-97 %] 94 %  Body mass index is 27 58 kg/m²  Input and Output Summary (last 24 hours):        Intake/Output Summary (Last 24 hours) at 10/15/17 1531  Last data filed at 10/15/17 0902   Gross per 24 hour   Intake             1960 ml   Output              800 ml   Net 1160 ml       Physical Exam:     Physical Exam   Constitutional: He is oriented to person, place, and time  He appears well-developed and well-nourished  HENT:   Head: Normocephalic and atraumatic  Eyes: EOM are normal  Pupils are equal, round, and reactive to light  Neck: Normal range of motion  Neck supple  Cardiovascular: Normal rate and regular rhythm  No murmur heard  Pulmonary/Chest: Effort normal and breath sounds normal  No respiratory distress  He has no wheezes  Abdominal: Soft  Bowel sounds are normal    Musculoskeletal:   Right shoulder pain and tenderness with palpation present  Decreased range of movement   Neurological: He is alert and oriented to person, place, and time  Skin: Skin is warm and dry  Additional Data:     Labs:      Results from last 7 days  Lab Units 10/14/17  0445  10/13/17  1722   WBC Thousand/uL 10 88*  --  14 85*   HEMOGLOBIN g/dL 13 1  --  13 7   HEMATOCRIT % 39 6  --  40 5   PLATELETS Thousands/uL 253  < > 273   NEUTROS PCT %  --   --  83*   LYMPHS PCT %  --   --  9*   MONOS PCT %  --   --  8   EOS PCT %  --   --  0   < > = values in this interval not displayed  Results from last 7 days  Lab Units 10/14/17  0445   SODIUM mmol/L 141   POTASSIUM mmol/L 3 9   CHLORIDE mmol/L 108   CO2 mmol/L 26   BUN mg/dL 19   CREATININE mg/dL 1 05   CALCIUM mg/dL 8 3   TOTAL PROTEIN g/dL 5 8*   BILIRUBIN TOTAL mg/dL 1 20*   ALK PHOS U/L 80   ALT U/L 34   AST U/L 13   GLUCOSE RANDOM mg/dL 99           * I Have Reviewed All Lab Data Listed Above  * Additional Pertinent Lab Tests Reviewed: [facility]    Imaging:    Imaging Reports Reviewed Today Include:  MRI  Imaging Personally Reviewed by Myself Includes:     Recent Cultures (last 7 days):       Results from last 7 days  Lab Units 10/14/17  0007 10/13/17  1722 10/13/17  1715   BLOOD CULTURE   --  No Growth at 24 hrs  No Growth at 24 hrs     INFLUENZA A PCR  None Detected  -- --    INFLUENZA B PCR  None Detected  --   --    RSV PCR  None Detected  --   --        Last 24 Hours Medication List:     aspirin 81 mg Oral Daily   atorvastatin 10 mg Oral Daily   docusate sodium 100 mg Oral BID   enoxaparin 40 mg Subcutaneous Q24H [address]   fish oil 1,000 mg Oral Daily   FLUoxetine 20 mg Oral Daily   folic acid 1 mg Oral Daily   lisinopril-hydrochlorothiazide (PRINZIDE 10/12  5) combo dose  Oral Daily   LORazepam 0 5 mg Oral Once   multivitamin-minerals 1 tablet Oral Daily   oxybutynin 5 mg Oral Daily   pramipexole 0 25 mg Oral Daily   tamsulosin 0 4 mg Oral Daily With Dinner        Today, Patient Was Seen By: Cleta Moritz, MD    ** Please Note: This note has been constructed using a voice recognition system   **

## 2017-10-16 ENCOUNTER — APPOINTMENT (INPATIENT)
Dept: RADIOLOGY | Facility: HOSPITAL | Age: 62
DRG: 546 | End: 2017-10-16
Payer: COMMERCIAL

## 2017-10-16 LAB
ALBUMIN SERPL BCP-MCNC: 2.9 G/DL (ref 3.5–5)
ALP SERPL-CCNC: 79 U/L (ref 46–116)
ALT SERPL W P-5'-P-CCNC: 31 U/L (ref 12–78)
ANION GAP SERPL CALCULATED.3IONS-SCNC: 11 MMOL/L (ref 4–13)
APPEARANCE FLD: ABNORMAL
AST SERPL W P-5'-P-CCNC: 18 U/L (ref 5–45)
B BURGDOR IGG SER IA-ACNC: 0.13
B BURGDOR IGM SER IA-ACNC: 0.18
BASOPHILS # BLD AUTO: 0.02 THOUSANDS/ΜL (ref 0–0.1)
BASOPHILS NFR BLD AUTO: 0 % (ref 0–1)
BILIRUB SERPL-MCNC: 0.6 MG/DL (ref 0.2–1)
BUN SERPL-MCNC: 21 MG/DL (ref 5–25)
CALCIUM SERPL-MCNC: 8.7 MG/DL (ref 8.3–10.1)
CHLORIDE SERPL-SCNC: 105 MMOL/L (ref 100–108)
CO2 SERPL-SCNC: 22 MMOL/L (ref 21–32)
COLOR FLD: ABNORMAL
CREAT SERPL-MCNC: 0.99 MG/DL (ref 0.6–1.3)
CRYSTALS SNV QL MICRO: NORMAL
EOSINOPHIL # BLD AUTO: 0.26 THOUSAND/ΜL (ref 0–0.61)
EOSINOPHIL NFR BLD AUTO: 3 % (ref 0–6)
ERYTHROCYTE [DISTWIDTH] IN BLOOD BY AUTOMATED COUNT: 14.1 % (ref 11.6–15.1)
GFR SERPL CREATININE-BSD FRML MDRD: 82 ML/MIN/1.73SQ M
GLUCOSE SERPL-MCNC: 103 MG/DL (ref 65–140)
HCT VFR BLD AUTO: 41 % (ref 36.5–49.3)
HGB BLD-MCNC: 13.5 G/DL (ref 12–17)
INR PPP: 1.12 (ref 0.86–1.16)
LYMPHOCYTES # BLD AUTO: 1.66 THOUSANDS/ΜL (ref 0.6–4.47)
LYMPHOCYTES NFR BLD AUTO: 2 %
LYMPHOCYTES NFR BLD AUTO: 20 % (ref 14–44)
MCH RBC QN AUTO: 31 PG (ref 26.8–34.3)
MCHC RBC AUTO-ENTMCNC: 32.9 G/DL (ref 31.4–37.4)
MCV RBC AUTO: 94 FL (ref 82–98)
MONOCYTES # BLD AUTO: 0.98 THOUSAND/ΜL (ref 0.17–1.22)
MONOCYTES NFR BLD AUTO: 10 %
MONOCYTES NFR BLD AUTO: 12 % (ref 4–12)
MONONUC CELLS NFR FLD MANUAL: 6 %
NEUTROPHILS # BLD AUTO: 5.2 THOUSANDS/ΜL (ref 1.85–7.62)
NEUTS BAND NFR FLD MANUAL: 3 %
NEUTS SEG NFR BLD AUTO: 65 % (ref 43–75)
NEUTS SEG NFR BLD AUTO: 79 %
PLATELET # BLD AUTO: 269 THOUSANDS/UL (ref 149–390)
PMV BLD AUTO: 11.5 FL (ref 8.9–12.7)
POTASSIUM SERPL-SCNC: 4 MMOL/L (ref 3.5–5.3)
PROT SERPL-MCNC: 6.1 G/DL (ref 6.4–8.2)
PROTHROMBIN TIME: 14.8 SECONDS (ref 12.1–14.4)
RBC # BLD AUTO: 4.36 MILLION/UL (ref 3.88–5.62)
SITE: ABNORMAL
SODIUM SERPL-SCNC: 138 MMOL/L (ref 136–145)
TOTAL CELLS COUNTED SPEC: 100
WBC # BLD AUTO: 8.12 THOUSAND/UL (ref 4.31–10.16)
WBC # FLD MANUAL: ABNORMAL /UL (ref 0–200)

## 2017-10-16 PROCEDURE — 85025 COMPLETE CBC W/AUTO DIFF WBC: CPT | Performed by: FAMILY MEDICINE

## 2017-10-16 PROCEDURE — 85610 PROTHROMBIN TIME: CPT | Performed by: FAMILY MEDICINE

## 2017-10-16 PROCEDURE — 89051 BODY FLUID CELL COUNT: CPT | Performed by: FAMILY MEDICINE

## 2017-10-16 PROCEDURE — 89060 EXAM SYNOVIAL FLUID CRYSTALS: CPT | Performed by: FAMILY MEDICINE

## 2017-10-16 PROCEDURE — 87181 SC STD AGAR DILUTION PER AGT: CPT | Performed by: FAMILY MEDICINE

## 2017-10-16 PROCEDURE — 87075 CULTR BACTERIA EXCEPT BLOOD: CPT | Performed by: FAMILY MEDICINE

## 2017-10-16 PROCEDURE — 87070 CULTURE OTHR SPECIMN AEROBIC: CPT | Performed by: FAMILY MEDICINE

## 2017-10-16 PROCEDURE — 87186 SC STD MICRODIL/AGAR DIL: CPT | Performed by: FAMILY MEDICINE

## 2017-10-16 PROCEDURE — 20610 DRAIN/INJ JOINT/BURSA W/O US: CPT

## 2017-10-16 PROCEDURE — 80053 COMPREHEN METABOLIC PANEL: CPT | Performed by: FAMILY MEDICINE

## 2017-10-16 PROCEDURE — 0R9J3ZZ DRAINAGE OF RIGHT SHOULDER JOINT, PERCUTANEOUS APPROACH: ICD-10-PCS | Performed by: RADIOLOGY

## 2017-10-16 PROCEDURE — 87205 SMEAR GRAM STAIN: CPT | Performed by: FAMILY MEDICINE

## 2017-10-16 PROCEDURE — 77002 NEEDLE LOCALIZATION BY XRAY: CPT

## 2017-10-16 RX ORDER — LIDOCAINE HYDROCHLORIDE 10 MG/ML
5 INJECTION, SOLUTION EPIDURAL; INFILTRATION; INTRACAUDAL; PERINEURAL
Status: COMPLETED | OUTPATIENT
Start: 2017-10-16 | End: 2017-10-16

## 2017-10-16 RX ADMIN — Medication 1000 MG: at 09:50

## 2017-10-16 RX ADMIN — FLUOXETINE 20 MG: 20 CAPSULE ORAL at 09:50

## 2017-10-16 RX ADMIN — LIDOCAINE HYDROCHLORIDE 5 ML: 10 INJECTION, SOLUTION INFILTRATION; PERINEURAL at 15:00

## 2017-10-16 RX ADMIN — FOLIC ACID 1 MG: 1 TABLET ORAL at 09:50

## 2017-10-16 RX ADMIN — LISINOPRIL: 10 TABLET ORAL at 09:50

## 2017-10-16 RX ADMIN — ASPIRIN 81 MG 81 MG: 81 TABLET ORAL at 09:50

## 2017-10-16 RX ADMIN — DOCUSATE SODIUM 100 MG: 100 CAPSULE, LIQUID FILLED ORAL at 17:30

## 2017-10-16 RX ADMIN — IOHEXOL 3 ML: 300 INJECTION, SOLUTION INTRAVENOUS at 15:00

## 2017-10-16 RX ADMIN — ATORVASTATIN CALCIUM 10 MG: 10 TABLET, FILM COATED ORAL at 09:50

## 2017-10-16 RX ADMIN — Medication 1 TABLET: at 09:50

## 2017-10-16 RX ADMIN — TAMSULOSIN HYDROCHLORIDE 0.4 MG: 0.4 CAPSULE ORAL at 17:30

## 2017-10-16 RX ADMIN — PRAMIPEXOLE DIHYDROCHLORIDE 0.25 MG: 0.25 TABLET ORAL at 09:50

## 2017-10-16 RX ADMIN — DOCUSATE SODIUM 100 MG: 100 CAPSULE, LIQUID FILLED ORAL at 09:50

## 2017-10-16 RX ADMIN — OXYBUTYNIN CHLORIDE 5 MG: 5 TABLET, EXTENDED RELEASE ORAL at 09:50

## 2017-10-16 NOTE — CASE MANAGEMENT
Continued Stay Review    Date: 10/16/2017    Vital Signs: /74   Pulse 65   Temp 98 °F (36 7 °C) (Oral)   Resp 22   Ht 5' 11" (1 803 m)   Wt 89 7 kg (197 lb 12 oz)   SpO2 92%   BMI 27 58 kg/m²     Medications:   Scheduled Meds:   aspirin 81 mg Oral Daily   atorvastatin 10 mg Oral Daily   docusate sodium 100 mg Oral BID   fish oil 1,000 mg Oral Daily   FLUoxetine 20 mg Oral Daily   folic acid 1 mg Oral Daily   lisinopril-hydrochlorothiazide (PRINZIDE 10/12  5) combo dose  Oral Daily   LORazepam 0 5 mg Oral Once   multivitamin-minerals 1 tablet Oral Daily   oxybutynin 5 mg Oral Daily   pramipexole 0 25 mg Oral Daily   tamsulosin 0 4 mg Oral Daily With Dinner     Continuous Infusions:    PRN Meds:   acetaminophen    ketorolac    ondansetron    simethicone    traMADol    Abnormal Labs/Diagnostic Results: Total Protein 6 1    Age/Sex: 65 y o  male     Assessment/Plan:     Impression/Plan:  1   Systemic inflammatory response syndrome-POA   Fever and leukocytosis and tachycardia   No definitive source has been found   No recurrence of the fever and the leukocytosis has resolved   Consideration for the possibility of sepsis although less likely with resolution of the fever without any antibiotics   Consideration for the possibility of a flare of his inflammatory condition with a flare of the patient's rheumatoid arthritis   Fortunately the patient remains hemodynamically stable and nontoxic despite his illness   He remains stable off all antibiotics      -monitor off all antibiotics for now  -follow-up blood cultures  -await IR aspiration of the right shoulder  -monitor CBC with diff and creatinine     2   Right arm and left hand pain-possibly related to the patient's rheumatoid arthritis   Suspect this is all inflammatory   Consideration for the possibility of an occult infection in the right shoulder but this is less likely  -await IR aspiration of the right shoulder  -recommend sending the fluid for cell count, crystal analysis, Gram stain and culture  -monitor and treat symptomatology  -orthopedics follow-up     3   Rheumatoid arthritis-on biologic therapy   Seems to have failed Humira   -management as per the primary service  -rheumatology follow-up     4   Hypertension-seems to be well controlled

## 2017-10-16 NOTE — PROGRESS NOTES
Emigdio Hanson 65 y o  male MRN: [id number]  Unit/Bed#: -01      Subjective:  Pt seen this am  Notes continued shoulder pain with improvement  Notes weakness  No numbness/tingling  No fever/chills    Labs:    0  Lab Value Date/Time   HCT 41 0 10/16/2017 0437   HCT 39 6 10/14/2017 0445   HCT 40 5 10/13/2017 1722   HGB 13 5 10/16/2017 0437   HGB 13 1 10/14/2017 0445   HGB 13 7 10/13/2017 1722   INR 1 12 10/16/2017 0437   WBC 8 12 10/16/2017 0437   WBC 10 88 (H) 10/14/2017 0445   WBC 14 85 (H) 10/13/2017 1722   ESR 10 10/13/2017 1722   CRP 8 8 (H) 10/13/2017 1722       Meds:    Current Facility-Administered Medications:     acetaminophen (TYLENOL) tablet 650 mg, 650 mg, Oral, Q6H PRN, Aftab Beasley MD    aspirin chewable tablet 81 mg, 81 mg, Oral, Daily, Aftab Beasley MD, 81 mg at 10/15/17 0840    atorvastatin (LIPITOR) tablet 10 mg, 10 mg, Oral, Daily, Aftab Beasley MD, 10 mg at 10/15/17 0840    docusate sodium (COLACE) capsule 100 mg, 100 mg, Oral, BID, Aftab Beasley MD, 100 mg at 10/14/17 1728    fish oil capsule 1,000 mg, 1,000 mg, Oral, Daily, Aftab Beasley MD, 1,000 mg at 10/15/17 0840    FLUoxetine (PROzac) capsule 20 mg, 20 mg, Oral, Daily, Aftab Beasley MD, 20 mg at 67/02/83 1391    folic acid (FOLVITE) tablet 1 mg, 1 mg, Oral, Daily, Aftab Beasley MD, 1 mg at 10/15/17 0840    ketorolac (TORADOL) 30 mg/mL injection 15 mg, 15 mg, Intravenous, Q6H PRN, Aftab Beasley MD, 15 mg at 10/13/17 2040    lisinopril-hydrochlorothiazide (Kacy Bradley 10/12  5) combo dose, , Oral, Daily, Aftab Beasley MD    LORazepam (ATIVAN) tablet 0 5 mg, 0 5 mg, Oral, Once, Aftab Beasley MD    multivitamin-minerals (CENTRUM) tablet 1 tablet, 1 tablet, Oral, Daily, Aftab Beasley MD, 1 tablet at 10/15/17 0840    ondansetron [facility]) injection 4 mg, 4 mg, Intravenous, Q6H PRN, Aftab Beasley MD    oxybutynin (DITROPAN-XL) 24 hr tablet 5 mg, 5 mg, Oral, Daily, Aftab Beasley MD, 5 mg at 10/15/17 0840    pramipexole (MIRAPEX) tablet 0 25 mg, 0 25 mg, Oral, Daily, Miladis Paulson MD, 0 25 mg at 10/15/17 0840    simethicone (MYLICON) chewable tablet 80 mg, 80 mg, Oral, 4x Daily PRN, Miladis Paulson MD    tamsulosin [facility]) capsule 0 4 mg, 0 4 mg, Oral, Daily With Dinner, Miladis Paulson MD, 0 4 mg at 10/15/17 1727    traMADol (ULTRAM) tablet 100 mg, 100 mg, Oral, Q6H PRN, Miladis Paulson MD, 100 mg at 10/14/17 2116    Blood Culture:   Lab Results   Component Value Date    BLOODCX No Growth at 48 hrs  10/13/2017     Physical exam:  Vitals:    10/15/17 2326   BP: 107/65   Pulse: 65   Resp: 18   Temp: 98 3 °F (36 8 °C)   SpO2: 93%     Right Shoulder  · Soft tissue swelling noted anteriorly  · No obvious fluctuance or palpable collection/effusion noted  · ROM limited by pain  · NVI axillary, median, ulnar, and radial nerves  · 2+ Radial pulse    _*_*_*_*_*_*_*_*_*_*_*_*_*_*_*_*_*_*_*_*_*_*_*_*_*_*_*_*_*_*_*_*_*_*_*_*_*_*_*_*_*    Assessment: 62 y o male with R shoulder pain/large effusion with significant subcoracoid fluid collection of uncertain etiology    Plan:  Recommend IR aspiration of collection with aerobic and anaerobic culture of fluid aspirate  Pain Control  Activities as tolerated  PT/OT  Will follow   Await fluid analysis results    Freddy Pederson PA-C

## 2017-10-16 NOTE — DISCHARGE INSTR - APPOINTMENTS
Nicolás Haney MD     Appointment has been made for Wednesday, Oct  18th at 1:45pm   Please take your discharge instructions with you to this appointment    If you are unable to make this appointment, please reschedule as soon as possible

## 2017-10-16 NOTE — PLAN OF CARE
Problem: DISCHARGE PLANNING - CARE MANAGEMENT  Goal: Discharge to post-acute care or home with appropriate resources  INTERVENTIONS:  - Conduct assessment to determine patient/family and health care team treatment goals, and need for post-acute services based on payer coverage, community resources, and patient preferences, and barriers to discharge  - Address psychosocial, clinical, and financial barriers to discharge as identified in assessment in conjunction with the patient/family and health care team  - Arrange appropriate level of post-acute services according to patient's   needs and preference and payer coverage in collaboration with the physician and health care team  - Communicate with and update the patient/family, physician, and health care team regarding progress on the discharge plan  - Arrange appropriate transportation to post-acute venues  Outcome: Progressing  LOS 3  Not a bundle; Not a readmit  Pt qualifies for AHR  Program   Appointment has been arranged with pt's PCP for 10/18/17 at 1:45pm   This information has been included on his AVS and Cm s/w pt at bedside and encouraged him to f/u with this appointment as he is a high readmission rate and this will assist with keeping him well and out of the hospital   Cm also encouraged pt to contact the office and reschedule his appointment if he is not able to make his appointment  Cm emailed Vipin Allan and Larry Calvin to notify them of pt's status

## 2017-10-16 NOTE — SOCIAL WORK
LOS 3   Not a bundle; Not a readmit  Pt qualifies for AHR  Program   Appointment has been arranged with pt's PCP for 10/18/17 at 1:45pm   This information has been included on his AVS and Cm s/w pt at bedside and encouraged him to f/u with this appointment as he is a high readmission rate and this will assist with keeping him well and out of the hospital   Cm also encouraged pt to contact the office and reschedule his appointment if he is not able to make his appointment  Cm emailed Kiara Montanez and Natasha Carrington to notify them of pt's status

## 2017-10-16 NOTE — PROGRESS NOTES
Pt sleeping in bed, easy to arouse, c/o pain in R shoulder with movement, refusing intervention at this time, some b/l weakness of pt's , pt attributes this to his RA and says it is a chronic weakness, unable to life R arm past chest area, LUE WDL, no other c/o at this time, agree with prior nurse assessment, call bell within reach, will continue to monitor

## 2017-10-16 NOTE — PROGRESS NOTES
[facility] Internal Medicine Progress Note  Patient: Kalie Colmenares 65 y o  male   MRN: [id number]  PCP: Arun Serra MD  Unit/Bed#: -01 Encounter: [id number]  Date Of Visit: 10/16/17    Assessment:    Principal Problem:    Fever  Active Problems:    Right arm pain    Rheumatoid arthritis ([address] )    Hypertension      Plan: 1  Right shoulder pain with abnormal MRI showing joint effusion-  Patient was febrile once for aspiration today  Orthopedics on board monitor off of antibiotics  2  Fever-appears to be resolved  3  Leukocytosis-resolved    VTE Pharmacologic Prophylaxis:   Pharmacologic: Enoxaparin (Lovenox)-on hold today for the IR procedure  Mechanical VTE Prophylaxis in Place: Yes    Patient Centered Rounds: I have performed bedside rounds with nursing staff today  Discussions with Specialists or Other Care Team Provider:     Education and Discussions with Family / Patient:     Time Spent for Care: 30 minutes  More than 50% of total time spent on counseling and coordination of care as described above  Current Length of Stay: 3 day(s)    Current Patient Status: Inpatient   Certification Statement: The patient will continue to require additional inpatient hospital stay due to Pending arthrocentesis    Discharge Plan / Estimated Discharge Date:     Code Status: Level 1 - Full Code      Subjective:   Patient seen and examined  Patient still with the right shoulder pain with decreased mobility  No further fever    Objective:     Vitals:   Temp (24hrs), Av 4 °F (36 9 °C), Min:98 °F (36 7 °C), Max:98 8 °F (37 1 °C)    HR:  [65-75] 65  Resp:  [16-22] 22  BP: (107-120)/(65-74) 120/74  SpO2:  [92 %-94 %] 92 %  Body mass index is 27 58 kg/m²  Input and Output Summary (last 24 hours):        Intake/Output Summary (Last 24 hours) at 10/16/17 9097  Last data filed at 10/16/17 0955   Gross per 24 hour   Intake              600 ml   Output             1875 ml   Net            -1275 ml Physical Exam:     Physical Exam   Constitutional: He is oriented to person, place, and time  He appears well-developed and well-nourished  HENT:   Head: Normocephalic and atraumatic  Eyes: EOM are normal  Pupils are equal, round, and reactive to light  Neck: Normal range of motion  Neck supple  Cardiovascular: Normal rate and regular rhythm  Pulmonary/Chest: Effort normal and breath sounds normal  No respiratory distress  He has no wheezes  Abdominal: Soft  Bowel sounds are normal    Musculoskeletal: He exhibits no edema  Decreased range of movement right shoulder  Tenderness to palpation right shoulder   Neurological: He is alert and oriented to person, place, and time  No cranial nerve deficit  Skin: Skin is warm and dry  Additional Data:     Labs:      Results from last 7 days  Lab Units 10/16/17  0437   WBC Thousand/uL 8 12   HEMOGLOBIN g/dL 13 5   HEMATOCRIT % 41 0   PLATELETS Thousands/uL 269   NEUTROS PCT % 65   LYMPHS PCT % 20   MONOS PCT % 12   EOS PCT % 3       Results from last 7 days  Lab Units 10/16/17  0437   SODIUM mmol/L 138   POTASSIUM mmol/L 4 0   CHLORIDE mmol/L 105   CO2 mmol/L 22   BUN mg/dL 21   CREATININE mg/dL 0 99   CALCIUM mg/dL 8 7   TOTAL PROTEIN g/dL 6 1*   BILIRUBIN TOTAL mg/dL 0 60   ALK PHOS U/L 79   ALT U/L 31   AST U/L 18   GLUCOSE RANDOM mg/dL 103       Results from last 7 days  Lab Units 10/16/17  0437   INR  1 12       * I Have Reviewed All Lab Data Listed Above  * Additional Pertinent Lab Tests Reviewed: [address] Admission Reviewed    Imaging:    Imaging Reports Reviewed Today Include:   Imaging Personally Reviewed by Myself Includes:      Recent Cultures (last 7 days):       Results from last 7 days  Lab Units 10/14/17  0007 10/13/17  1722 10/13/17  1715   BLOOD CULTURE   --  No Growth at 48 hrs  No Growth at 48 hrs     INFLUENZA A PCR  None Detected  --   --    INFLUENZA B PCR  None Detected  --   --    RSV PCR  None Detected  --   --        Last 24 Hours Medication List:     aspirin 81 mg Oral Daily   atorvastatin 10 mg Oral Daily   docusate sodium 100 mg Oral BID   fish oil 1,000 mg Oral Daily   FLUoxetine 20 mg Oral Daily   folic acid 1 mg Oral Daily   lisinopril-hydrochlorothiazide (PRINZIDE 10/12  5) combo dose  Oral Daily   LORazepam 0 5 mg Oral Once   multivitamin-minerals 1 tablet Oral Daily   oxybutynin 5 mg Oral Daily   pramipexole 0 25 mg Oral Daily   tamsulosin 0 4 mg Oral Daily With Dinner        Today, Patient Was Seen By: Dylan Ambrosio MD    ** Please Note: This note has been constructed using a voice recognition system   **

## 2017-10-17 VITALS
RESPIRATION RATE: 18 BRPM | TEMPERATURE: 98 F | OXYGEN SATURATION: 95 % | HEART RATE: 80 BPM | BODY MASS INDEX: 27.69 KG/M2 | DIASTOLIC BLOOD PRESSURE: 63 MMHG | WEIGHT: 197.75 LBS | SYSTOLIC BLOOD PRESSURE: 121 MMHG | HEIGHT: 71 IN

## 2017-10-17 PROBLEM — R50.9 FEVER: Status: RESOLVED | Noted: 2017-10-13 | Resolved: 2017-10-17

## 2017-10-17 LAB
ATRIAL RATE: 86 BPM
B BURGDOR IGG PATRN SER IB-IMP: NEGATIVE
B BURGDOR IGM PATRN SER IB-IMP: NEGATIVE
B BURGDOR18KD IGG SER QL IB: ABNORMAL
B BURGDOR23KD IGG SER QL IB: ABNORMAL
B BURGDOR23KD IGM SER QL IB: ABNORMAL
B BURGDOR28KD IGG SER QL IB: ABNORMAL
B BURGDOR30KD IGG SER QL IB: PRESENT
B BURGDOR39KD IGG SER QL IB: PRESENT
B BURGDOR39KD IGM SER QL IB: ABNORMAL
B BURGDOR41KD IGG SER QL IB: ABNORMAL
B BURGDOR41KD IGM SER QL IB: ABNORMAL
B BURGDOR45KD IGG SER QL IB: ABNORMAL
B BURGDOR58KD IGG SER QL IB: PRESENT
B BURGDOR66KD IGG SER QL IB: ABNORMAL
B BURGDOR93KD IGG SER QL IB: ABNORMAL
P AXIS: 95 DEGREES
PR INTERVAL: 214 MS
QRS AXIS: -27 DEGREES
QRSD INTERVAL: 78 MS
QT INTERVAL: 376 MS
QTC INTERVAL: 449 MS
T WAVE AXIS: 57 DEGREES
VENTRICULAR RATE: 86 BPM

## 2017-10-17 RX ORDER — PREDNISONE 20 MG/1
60 TABLET ORAL ONCE
Status: COMPLETED | OUTPATIENT
Start: 2017-10-17 | End: 2017-10-17

## 2017-10-17 RX ORDER — METHYLPREDNISOLONE SODIUM SUCCINATE 125 MG/2ML
125 INJECTION, POWDER, LYOPHILIZED, FOR SOLUTION INTRAMUSCULAR; INTRAVENOUS ONCE
Status: DISCONTINUED | OUTPATIENT
Start: 2017-10-17 | End: 2017-10-17

## 2017-10-17 RX ORDER — PREDNISONE 10 MG/1
TABLET ORAL
Qty: 63 TABLET | Refills: 0 | Status: SHIPPED | OUTPATIENT
Start: 2017-10-17 | End: 2018-10-30 | Stop reason: ALTCHOICE

## 2017-10-17 RX ORDER — TRAMADOL HYDROCHLORIDE 50 MG/1
100 TABLET ORAL EVERY 6 HOURS PRN
Qty: 30 TABLET | Refills: 0 | Status: SHIPPED | OUTPATIENT
Start: 2017-10-17 | End: 2017-10-27

## 2017-10-17 RX ADMIN — FOLIC ACID 1 MG: 1 TABLET ORAL at 08:17

## 2017-10-17 RX ADMIN — Medication 1000 MG: at 08:16

## 2017-10-17 RX ADMIN — LISINOPRIL: 10 TABLET ORAL at 08:17

## 2017-10-17 RX ADMIN — TAMSULOSIN HYDROCHLORIDE 0.4 MG: 0.4 CAPSULE ORAL at 17:17

## 2017-10-17 RX ADMIN — PREDNISONE 60 MG: 20 TABLET ORAL at 14:33

## 2017-10-17 RX ADMIN — MENTHOL 5.4 MG: 5.4 LOZENGE ORAL at 00:18

## 2017-10-17 RX ADMIN — ATORVASTATIN CALCIUM 10 MG: 10 TABLET, FILM COATED ORAL at 08:16

## 2017-10-17 RX ADMIN — DOCUSATE SODIUM 100 MG: 100 CAPSULE, LIQUID FILLED ORAL at 17:17

## 2017-10-17 RX ADMIN — PRAMIPEXOLE DIHYDROCHLORIDE 0.25 MG: 0.25 TABLET ORAL at 08:16

## 2017-10-17 RX ADMIN — OXYBUTYNIN CHLORIDE 5 MG: 5 TABLET, EXTENDED RELEASE ORAL at 08:16

## 2017-10-17 RX ADMIN — DOCUSATE SODIUM 100 MG: 100 CAPSULE, LIQUID FILLED ORAL at 08:16

## 2017-10-17 RX ADMIN — Medication 1 TABLET: at 08:16

## 2017-10-17 RX ADMIN — ASPIRIN 81 MG 81 MG: 81 TABLET ORAL at 08:16

## 2017-10-17 RX ADMIN — FLUOXETINE 20 MG: 20 CAPSULE ORAL at 08:17

## 2017-10-17 NOTE — SOCIAL WORK
CM received call from Pt's wife regarding doctor appointment  Wife stated pt is not able to attend the appointment tomorrow st 1:45pm because he will be going back to work  CM reviewed with wife the concern with readmission and making appointments for those who have a high readmission rate to assist with managing their health and keep them out of the hospital   Wife stated she understood and followed up by saying she would make an appointment for as soon as possible after he is released  Cm reminded her that we recommend between 3 and 5 days to see their doctor  Wife is agreeable to this and will follow up with scheduling an appointment

## 2017-10-17 NOTE — PLAN OF CARE
Problem: DISCHARGE PLANNING - CARE MANAGEMENT  Goal: Discharge to post-acute care or home with appropriate resources  INTERVENTIONS:  - Conduct assessment to determine patient/family and health care team treatment goals, and need for post-acute services based on payer coverage, community resources, and patient preferences, and barriers to discharge  - Address psychosocial, clinical, and financial barriers to discharge as identified in assessment in conjunction with the patient/family and health care team  - Arrange appropriate level of post-acute services according to patient's   needs and preference and payer coverage in collaboration with the physician and health care team  - Communicate with and update the patient/family, physician, and health care team regarding progress on the discharge plan  - Arrange appropriate transportation to post-acute venues   Outcome: Progressing  CM received call from Pt's wife regarding doctor appointment  Wife stated pt is not able to attend the appointment tomorrow st 1:45pm because he will be going back to work  CM reviewed with wife the concern with readmission and making appointments for those who have a high readmission rate to assist with managing their health and keep them out of the hospital   Wife stated she understood and followed up by saying she would make an appointment for as soon as possible after he is released  Cm reminded her that we recommend between 3 and 5 days to see their doctor  Wife is agreeable to this and will follow up with scheduling an appointment

## 2017-10-17 NOTE — PROGRESS NOTES
Shelli Hanson 65 y o  male MRN: [id number]  Unit/Bed#: -01      Subjective:  Pt seen this am  Notes continued shoulder pain, stiffness, and weakness  No fever/chills  Pt had IR aspiration of shoulder collection yesterday  Labs:    0  Lab Value Date/Time   HCT 41 0 10/16/2017 0437   HCT 39 6 10/14/2017 0445   HCT 40 5 10/13/2017 1722   HGB 13 5 10/16/2017 0437   HGB 13 1 10/14/2017 0445   HGB 13 7 10/13/2017 1722   INR 1 12 10/16/2017 0437   WBC 8 12 10/16/2017 0437   WBC 10 88 (H) 10/14/2017 0445   WBC 14 85 (H) 10/13/2017 1722   ESR 10 10/13/2017 1722   CRP 8 8 (H) 10/13/2017 1722       Meds:    Current Facility-Administered Medications:     acetaminophen (TYLENOL) tablet 650 mg, 650 mg, Oral, Q6H PRN, Kulwinder Dowd MD    aspirin chewable tablet 81 mg, 81 mg, Oral, Daily, Kulwinder Dowd MD, 81 mg at 10/16/17 0950    atorvastatin (LIPITOR) tablet 10 mg, 10 mg, Oral, Daily, Kulwinder Dowd MD, 10 mg at 10/16/17 0950    docusate sodium (COLACE) capsule 100 mg, 100 mg, Oral, BID, Kulwinder Dowd MD, 100 mg at 10/16/17 1730    fish oil capsule 1,000 mg, 1,000 mg, Oral, Daily, Kulwinder Dowd MD, 1,000 mg at 10/16/17 0950    FLUoxetine (PROzac) capsule 20 mg, 20 mg, Oral, Daily, Kulwinder Dowd MD, 20 mg at 84/11/95 6882    folic acid (FOLVITE) tablet 1 mg, 1 mg, Oral, Daily, Kulwinder Dowd MD, 1 mg at 10/16/17 0950    lisinopril-hydrochlorothiazide (PRINZIDE 10/12  5) combo dose, , Oral, Daily, Kulwinder Dowd MD    LORazepam (ATIVAN) tablet 0 5 mg, 0 5 mg, Oral, Once, Kulwinder Dowd MD    Menthol lozenge 5 4 mg, 1 lozenge, Mouth/Throat, Q4H PRN, Fina Martin PA-C, 5 4 mg at 10/17/17 0018    multivitamin-minerals (CENTRUM) tablet 1 tablet, 1 tablet, Oral, Daily, Kulwinder Dowd MD, 1 tablet at 10/16/17 0950    ondansetron (ZOFRAN) injection 4 mg, 4 mg, Intravenous, Q6H PRN, Kulwinder Dowd MD    oxybutynin (DITROPAN-XL) 24 hr tablet 5 mg, 5 mg, Oral, Daily, Kulwinder Dowd MD, 5 mg at 10/16/17 0950   pramipexole (MIRAPEX) tablet 0 25 mg, 0 25 mg, Oral, Daily, Cleta Moritz, MD, 0 25 mg at 10/16/17 0950    simethicone (MYLICON) chewable tablet 80 mg, 80 mg, Oral, 4x Daily PRN, Cleta Moritz, MD    [facility] capsule 0 4 mg, 0 4 mg, Oral, Daily With Dinner, Cleta Moritz, MD, 0 4 mg at 10/16/17 1730    traMADol (ULTRAM) tablet 100 mg, 100 mg, Oral, Q6H PRN, Cleta Moritz, MD, 100 mg at 10/14/17 2116    Blood Culture:   Lab Results   Component Value Date    BLOODCX No Growth at 72 hrs  10/13/2017     Physical exam:  Vitals:    10/17/17 0710   BP: 114/71   Pulse: 71   Resp: 18   Temp: 98 2 °F (36 8 °C)   SpO2:      Right Shoulder  · Soft tissue swelling noted anteriorly  · No palpable collection/effusion noted  · ROM includes 150 FF and 70 ext rot-abd  · NVI axillary, median, ulnar, and radial nerves  · 2+ Radial pulse  · Right shoulder fluid aspirate  · WBC: 13,788  · Gram Stain: 2+ polys, no bacteria  · Crystals: negative  _*_*_*_*_*_*_*_*_*_*_*_*_*_*_*_*_*_*_*_*_*_*_*_*_*_*_*_*_*_*_*_*_*_*_*_*_*_*_*_*_*    Assessment: 62 y o male with right shoulder pain/collection s/p IR aspiration 10/16/17  Likely RA flare      Plan:  · Pain Control  · WBAT RUE  · PT/OT  · F/u final cultures  · F/u with Rheum as outpt    Angie Fine PA-C

## 2017-10-17 NOTE — DISCHARGE INSTRUCTIONS
Follow-up with your rheumatologist as soon as possible    Continue with the prednisone as directed by the Rheumatology

## 2017-10-17 NOTE — PROGRESS NOTES
Progress Note - Infectious Disease   Sabrina Ham 65 y o  male MRN: [id number]  Unit/Bed#: -[facility] Encounter: [id number]      Impression/Plan:  1   Systemic inflammatory response syndrome-POA  Damaris Fair and leukocytosis and tachycardia   No evidence of an active infection has been found   No recurrence of the fever and the leukocytosis has resolved  Suspect this is all inflammatory related to the patient's rheumatoid arthritis with a flare   Fortunately the patient remains hemodynamically stable and nontoxic despite his illness   He remains stable off all antibiotics   His cultures are all negative   -monitor off all antibiotics for now  -follow-up blood cultures  -monitor CBC with diff and creatinine as needed     2   Right shoulder and left hand pain-suspect related to the patient's rheumatoid arthritis   Suspect this is all inflammatory  Doubt this is infectious process  Synovial fluid is consistent with an inflammatory noninfectious process  -follow-up synovial culture  -monitor and treat symptomatology  -orthopedics follow-up  -recommend Rheumatology evaluation     3   Rheumatoid arthritis-on biologic therapy   Seems to have failed Humira   -management as per the primary service  -rheumatology follow-up     4   Hypertension-seems to be well controlled    5  Disposition-as per the primary service    Antibiotics:  None    Subjective:  Patient has no fever, chills, sweats; no nausea, vomiting, diarrhea; no cough, shortness of breath; still with right shoulder and left hand pain  No new symptoms  Objective:  Vitals:  HR:  [65-83] 71  Resp:  [16-22] 18  BP: (112-123)/(58-77) 114/71  SpO2:  [92 %-96 %] 96 %  Temp (24hrs), Av 2 °F (36 8 °C), Min:97 9 °F (36 6 °C), Max:98 8 °F (37 1 °C)  Current: Temperature: 98 2 °F (36 8 °C)    Physical Exam:   General Appearance:  Alert, nontoxic, no acute distress  Throat: Oropharynx moist without lesions      Lungs:   Clear to auscultation bilaterally; respirations unlabored   Heart:  RRR; no murmur, rub or gallop   Abdomen:   Soft, non-tender, non-distended, positive bowel sounds  Extremities: No clubbing, cyanosis  Right shoulder tenderness with decreased range of motion  Left hand with prominent swelling without erythema   Skin: No new rashes or lesions  No draining wounds noted  Labs, Imaging, & Other studies:   All pertinent labs and imaging studies were personally reviewed    Results from last 7 days  Lab Units 10/16/17  0437 10/14/17  0445 10/14/17  0012 10/13/17  1722   WBC Thousand/uL 8 12 10 88*  --  14 85*   HEMOGLOBIN g/dL 13 5 13 1  --  13 7   PLATELETS Thousands/uL 269 253 253 273       Results from last 7 days  Lab Units 10/16/17  0437 10/14/17  0445 10/13/17  1722   SODIUM mmol/L 138 141 138   POTASSIUM mmol/L 4 0 3 9 3 4*   CHLORIDE mmol/L 105 108 103   CO2 mmol/L 22 26 25   ANION GAP mmol/L 11 7 10   BUN mg/dL 21 19 20   CREATININE mg/dL 0 99 1 05 1 07   EGFR ml/min/1 73sq m 82 76 75   GLUCOSE RANDOM mg/dL 103 99 126   CALCIUM mg/dL 8 7 8 3 8 6   AST U/L 18 13 15   ALT U/L 31 34 40   ALK PHOS U/L 79 80 89   TOTAL PROTEIN g/dL 6 1* 5 8* 6 5   ALBUMIN g/dL 2 9* 3 0* 3 5   BILIRUBIN TOTAL mg/dL 0 60 1 20* 0 90       Results from last 7 days  Lab Units 10/16/17  1526 10/14/17  0007 10/13/17  1722 10/13/17  1715   BLOOD CULTURE   --   --  No Growth at 72 hrs  No Growth at 72 hrs     GRAM STAIN RESULT  2+ Polys  No bacteria seen  --   --   --    INFLUENZA A PCR   --  None Detected  --   --    INFLUENZA B PCR   --  None Detected  --   --    RSV PCR   --  None Detected  --   --      Synovial WBC 61717, synovial crystals negative

## 2017-10-17 NOTE — PLAN OF CARE
DISCHARGE PLANNING     Discharge to home or other facility with appropriate resources Progressing        DISCHARGE PLANNING - CARE MANAGEMENT     Discharge to post-acute care or home with appropriate resources Progressing        INFECTION - ADULT     Absence or prevention of progression during hospitalization Progressing     Absence of fever/infection during neutropenic period Progressing        Knowledge Deficit     Patient/family/caregiver demonstrates understanding of disease process, treatment plan, medications, and discharge instructions Progressing        PAIN - ADULT     Verbalizes/displays adequate comfort level or baseline comfort level Progressing        SAFETY ADULT     Patient will remain free of falls Progressing     Maintain or return to baseline ADL function Progressing     Maintain or return mobility status to optimal level Progressing

## 2017-10-17 NOTE — SOCIAL WORK
CM contacted PCP in order to inform them pt will be discharged today as requested by the office  Office staff informed Cm that wife had called approximately 2 hours prior to cancel the appointment  Wife did not reschedule the appointment when she called  Cm called wife and LM requesting she cb explaining the program and due to her 's high readmission rate and attempting to assist him with staying out of the hospital, these appointments have been made  Cm will continue to follow to assist with follow up and DC needs

## 2017-10-17 NOTE — DISCHARGE SUMMARY
Discharge Summary - [address] [facility] Medicine    Patient Information: Sam Pierre 65 y o  male MRN: [id number]  Unit/Bed#: -01 Encounter: [id number]    Discharging Physician / Practitioner: Cristi Mensah MD  PCP: Eddie Galvin MD  Admission Date: 10/13/2017  Discharge Date: 10/17/17    Reason for Admission:  Fever and right shoulder pain    Discharge Diagnoses:     Principal Problem:    Rheumatoid arthritis ([address] )  Active Problems:    Right arm pain    Hypertension  Resolved Problems:    Fever      Consultations During Hospital Stay:  · Infectious Disease  · Orthopedics    Procedures Performed:     · Arthrocentesis right shoulder    Significant Findings / Test Results:   X-ray right shoulder-progression of osteoarthritis of the acromioclavicular joint  X-ray chest-no acute pulmonary disease  X-ray humerus-no acute osseous abnormality  CT humerus-There is a nonspecific focal 2 8 x 2 cm area of intramuscular hypoattenuation identified in the anterior portion of the proximal humeral neck of uncertain significance  MRI right shoulder is recommended for   further evaluation  Acromioclavicular degenerative changes are identified  No drainable fluid collection identified  The vessels appear patent  MRI shoulder-There is moderate sized shoulder joint effusion along with the fluid in the subacromial subdeltoid bursa, subscapularis bursa and subcoracoid bursa  This fluid may be inflammatory, reactive, if concern for infection correlate   Small rotator cuff tear  Tendinosis and synovitis of the long head of the biceps tendon with extensive inflammatory changes   Incidental Findings:   ·     Test Results Pending at Discharge (will require follow up): Final culture from the joint aspiration  Outpatient Tests Requested:  ·     Complications:  none    Hospital Course: Sam Pierre is a 65 y o  male patient who originally presented to the hospital on 10/13/2017 due to right shoulder pain  Patient with known history of rheumatoid arthritis  Patient reported that he usually gets rheumatoid arthritis in smaller joints  Patient also had an episode of fever while in the emergency room  Patient was admitted for further evaluation  The infectious workup was been negative  Patient had a CT scan of the left humerus which was abnormal showing fluid collection  Patient had an MRI which did confirm the presence of joint fluid so patient had arthrocentesis done by Interventional Radiology  It appears that the patient has inflammatory collection likely secondary to his flare-up from the rheumatoid arthritis  Patient was started on steroids as per recommendation of the Orthopedics  Patient is already improving with the mobility in his shoulder  The plan is to follow up with his rheumatologist for further management  Patient remained afebrile after the initial fever in the emergency room and his infectious workup has been negative  For details refer to the chart    Condition at Discharge: good     Discharge Day Visit / Exam:     Subjective:  Patient seen and examined  Patient still has pain but appears to have improving range of motion in the right shoulder  Patient agreeable to go home with steroids and follow up with the primary care physician as an outpatient also with his rheumatologist  Vitals: Blood Pressure: 121/63 (10/17/17 1508)  Pulse: 80 (10/17/17 1508)  Temperature: 98 °F (36 7 °C) (10/17/17 1508)  Temp Source: Oral (10/17/17 1508)  Respirations: 18 (10/17/17 1508)  Height: 5' 11" (180 3 cm) (10/13/17 2007)  Weight - Scale: 89 7 kg (197 lb 12 oz) (10/14/17 0530)  SpO2: 95 % (10/17/17 1508)  Exam:   Physical Exam   Constitutional: He appears well-developed and well-nourished  HENT:   Head: Normocephalic and atraumatic  Eyes: EOM are normal  Pupils are equal, round, and reactive to light  Neck: Normal range of motion  Neck supple  Cardiovascular: Normal rate and regular rhythm      No murmur heard  Pulmonary/Chest: Effort normal and breath sounds normal  No respiratory distress  He has no wheezes  Abdominal: Soft  Bowel sounds are normal  He exhibits no distension  There is no tenderness  Musculoskeletal: He exhibits no edema  Appears to have improving range of motion on the right shoulder   Neurological: He is alert  No cranial nerve deficit  Skin: Skin is warm and dry  Discussion with Family:  Offered to call wife, patient declined    Discharge instructions/Information to patient and family:   See after visit summary for information provided to patient and family  Provisions for Follow-Up Care:  See after visit summary for information related to follow-up care and any pertinent home health orders  Disposition:     Home    Planned Readmission: none     Discharge Statement:  I spent  45 minutes discharging the patient  This time was spent on the day of discharge  I had direct contact with the patient on the day of discharge  Greater than 50% of the total time was spent examining patient, answering all patient questions, arranging and discussing plan of care with patient as well as directly providing post-discharge instructions  Additional time then spent on discharge activities  Discharge Medications:  See after visit summary for reconciled discharge medications provided to patient and family        ** Please Note: This note has been constructed using a voice recognition system **

## 2017-10-18 ENCOUNTER — ALLSCRIPTS OFFICE VISIT (OUTPATIENT)
Dept: OTHER | Facility: OTHER | Age: 62
End: 2017-10-18

## 2017-10-18 LAB
BACTERIA BLD CULT: NORMAL
BACTERIA BLD CULT: NORMAL

## 2017-10-19 LAB
BACTERIA SPEC BFLD CULT: NO GROWTH
GRAM STN SPEC: NORMAL
GRAM STN SPEC: NORMAL

## 2017-10-19 NOTE — CASE MANAGEMENT
Notification of Discharge  This is a Notification of Discharge from our facility [address]  Please be advised that this patient has been discharge from our facility  Below you will find the admission and discharge date and time including the patients disposition  PRESENTATION DATE: 10/13/2017  2:33 PM  IP ADMISSION DATE: 10/13/17 1812  DISCHARGE DATE: 10/17/2017  5:57 PM  DISPOSITION: [address] [place] [address] in the [facility] by Tate White for 2017  Network Utilization Review Department  Phone: [phone]; Fax [phone]  ATTENTION: The Network Utilization Review Department is now centralized for our 7 Facilities  Make a note that we have a new phone and fax numbers for our Department  Please call with any questions or concerns to [phone] and carefully follow the prompts so that you are directed to the right person  All voicemails are confidential  Fax any determinations, approvals, denials, and requests for initial or continue stay review clinical to [phone]  Due to HIGH CALL volume, it would be easier if you could please send faxed requests to expedite your requests and in part, help us provide discharge notifications faster

## 2017-10-21 LAB
BACTERIA SPEC ANAEROBE CULT: ABNORMAL
BACTERIA SPEC ANAEROBE CULT: ABNORMAL

## 2017-10-24 NOTE — PROGRESS NOTES
Assessment  1  Right hip pain (719 45) (M25 551)   2  Rheumatoid arthritis (714 0) (M06 9)    Discussion/Summary    #1 right hip painexacerbation of rheumatoid arthritis? I reviewed with patient  Patient had been well-controlled on Humira but now with recent exacerbation  Unclear cause  Patient improved with prednisone and is now asymptomatic  At this point I recommended observation  Patient agrees to call his rheumatologist to discuss further  He'll follow-up with his next scheduled visit  Patient or wife to call for problems or concerns in the interim  The patient, patient's family was counseled regarding instructions for management,-- risk factor reductions,-- prognosis,-- patient and family education,-- impressions,-- risks and benefits of treatment options,-- importance of compliance with treatment  Possible side effects of new medications were reviewed with the patient/guardian today  The treatment plan was reviewed with the patient/guardian  The patient/guardian understands and agrees with the treatment plan      Chief Complaint  ER [facility] R GROIN RX'S PREDNISONE TAPER; PINS & NEEDLES R FOOT  History of Present Illness  HPI: as above10+ day hx of R leg pain  Used to get bialt groin pain in the past but has been doing well since starting Humira  Thurs, 8/10, pt awoke with hip pain that slowly worsened  Seen at [facility] ED on 8/18 when he could not walk  Pt had negative hip xrays and was discharged on Pred  Pt notes that his hip improved but then he developed L forearm, Left elbow then L hand pain  Pt states that all pain has resolved now  Pt has not called his Rheumatologist      Review of Systems    Cardiovascular: no complaints of slow or fast heart rate, no chest pain, no palpitations, no leg claudication or lower extremity edema  Respiratory: no complaints of shortness of breath, no wheezing or cough, no dyspnea on exertion, no orthopnea or PND     Musculoskeletal: as noted in HPI    Integumentary: no complaints of skin rash or lesion, no itching or dry skin, no skin wounds  Neurological: as noted in HPI  Active Problems  1  Abnormal MRI (793 99) (R93 8)   2  Allergy (995 3) (T78 40XA)   3  Anxiety disorder (300 00) (F41 9)   4  Arthritis (716 90) (M19 90)   5  Blood pressure elevated (401 9) (I10)   6  Colonoscopy (Fiberoptic) Screening   7  Contusion of left shoulder (923 00) (S40 012A)   8  Contusion of right wrist (923 21) (S60 211A)   9  Cough (786 2) (R05)   10  Diastasis recti (728 84) (M62 08)   11  Esophageal reflux (530 81) (K21 9)   12  Fever (780 60) (R50 9)   13  Headache (784 0) (R51)   14  Hoarseness (784 42) (R49 0)   15  Hydrocele (603 9) (N43 3)   16  Hyperlipidemia (272 4) (E78 5)   17  Hypertension (401 9) (I10)   18  Injury of right wrist, initial encounter (959 3) (S69 91XA)   19  Liver mass (573 9) (R16 0)   20  Low back pain (724 2) (M54 5)   21  Microscopic hematuria (599 72) (R31 29)   22  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)   23  Orthostasis (458 0) (I95 1)   24  Osteoarthritis (715 90) (M19 90)   25  Pain of lower leg, unspecified laterality (729 5) (M79 669)   26  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)   27  Rheumatoid arthritis (714 0) (M06 9)   28  Right shoulder pain (719 41) (M25 511)   29  Screening PSA (prostate specific antigen) (V76 44) (Z12 5)   30  Scrotal edema (608 86) (N50 89)   31  Sinus infection (473 9) (J32 9)   32  Synovitis of left hand (727 05) (M65 9)   33  Testicle pain (608 9) (N50 819)   34  Urgency of urination (788 63) (R39 15)   35  Urinary frequency (788 41) (R35 0)    Past Medical History  1  History of Acute upper respiratory infection (465 9) (J06 9)   2  History of Cellulitis (682 9) (L03 90)   3  History of Epidermal inclusion cyst (706 2) (L72 0)   4  History of Erythrasma (039 0) (L08 1)   5  History of acute sinusitis (V12 69) (Z87 09)   6  History of bronchitis (V12 69) (Z87 09)   7   History of furuncle (V13 3) (Z87 2)  Active Problems And Past Medical History Reviewed: The active problems and past medical history were reviewed and updated today  Family History  Mother    1  Family history of hypertension (V17 49) (Z82 49)  Father    2  Family history of cardiac disorder (V17 49) (Z82 49)   3  Family history of hypertension (V17 49) (Z82 49)  Sibling    4  Family history of hypertension (V17 49) (Z82 49)  Maternal Grandmother    5  Family history of Diabetes Mellitus (V18 0)    Social History   · Denied: History of Alcohol Use (History)   · Always uses seat belt   · Denied: History of Daily Coffee Consumption (___ Cups/Day)   · Denied: History of Daily Cola Consumption (___ Cans/Day)   · Daily Tea Consumption (___ Cups/Day)   · Dental care, regularly   · Denied: History of Drug use   · Denied: History of Drug Use   · Exercise: Walking   · Full-time employment   · High school graduate   · Marital History - Currently    · Denied: History of Multiple organ donor   · Never A Smoker   · No living will   · Not currently sexually active   · Pets / animals   · Denied: History of Power of  in existence   · Two children   · Water intake, adequate (per day)  The social history was reviewed and updated today  Current Meds   1  Adult Aspirin EC Low Strength 81 MG Oral Tablet Delayed Release; Take 1 tablet daily   Recorded   2  Atorvastatin Calcium 10 MG Oral Tablet; Take 1 tablet by mouth  daily; Therapy: 46WTZ1338 to (Lauren Yu)  Requested for: 15HYT0809; Last   Rx:50Opq0000 Ordered   3  Daily Multivitamin TABS; Take 1 tablet daily Recorded   4  FLUoxetine HCl - 20 MG Oral Capsule; TAKE ONE CAPSULE BY MOUTH EVERY DAY; Therapy: 24FEX0721 to (Evaluate:35Vom3830)  Requested for: 78QNU1175; Last   Rx:08Jan2017 Ordered   5  Folic Acid 1 MG Oral Tablet; Take 1 tablet daily Recorded   6  Lisinopril-Hydrochlorothiazide 10-12 5 MG Oral Tablet; TAKE ONE (1) TABLET(S) DAILY;    Therapy: 54Gom1771 to (Evaluate:15Jqv0923)  Requested for: 30HHZ5353; Last   Rx:80Cbm1656 Ordered   7  Loratadine 10 MG Oral Tablet; TAKE 1 TABLET DAILY AS NEEDED  Requested for:   26BTL2761; Last Rx:75Uwj4035 Ordered   8  Methotrexate 2 5 MG Oral Tablet; 8 tabs weekly Recorded   9  Oxybutynin Chloride 5 MG Oral Tablet; take 1 tablet by mouth twice a day; Therapy: 97UFF2986 to (Evaluate:15Mar2018)  Requested for: 21Mar2017; Last   Rx:20Mar2017 Ordered   10  Oxybutynin Chloride ER 5 MG Oral Tablet Extended Release 24 Hour; Take 1 tablet    daily; Therapy: 24XFT8584 to (Evaluate:22Ihe2862)  Requested for: 24Bjd3328; Last    Rx:59Wsk5101 Ordered   11  Pramipexole Dihydrochloride 0 25 MG Oral Tablet; TAKE ONE TABLET BY MOUTH AT    BEDTIME Recorded   12  PredniSONE TABS; Therapy: (Yohan Neely to Recorded   13  Tamsulosin HCl - 0 4 MG Oral Capsule; TAKE 1 CAPSULE BY MOUTH DAILY AT    BEDTIME; Therapy: 48Jph0069 to (Evaluate:12Nov2017); Last Rx:91Ddx2556 Ordered    The medication list was reviewed and updated today  Allergies  1  FentaNYL Citrate SOLN    Vitals   Recorded: 52Xge7604 01:56PM   Temperature 97 8 F   Heart Rate 88   Systolic 747   Diastolic 82   Height 5 ft 10 in   Weight 202 lb    BMI Calculated 28 98   BSA Calculated 2 1     Physical Exam    Constitutional   General appearance: No acute distress, well appearing and well nourished  Eyes   Conjunctiva and lids: No swelling, erythema, or discharge  Pupils and irises: Equal, round and reactive to light  Ears, Nose, Mouth, and Throat   Oropharynx: Normal with no erythema, edema, exudate or lesions  Pulmonary   Respiratory effort: No increased work of breathing or signs of respiratory distress  Auscultation of lungs: Clear to auscultation, equal breath sounds bilaterally, no wheezes, no rales, no rhonci  Cardiovascular   Auscultation of heart: Normal rate and rhythm, normal S1 and S2, without murmurs      Examination of extremities for edema and/or varicosities: Normal     Musculoskeletal   Gait and station: Normal     Digits and nails: Normal without clubbing or cyanosis  -- no synovitis noted  Inspection/palpation of joints, bones, and muscles: Normal          Future Appointments    Date/Time Provider Specialty Site   11/21/2017 02:30 PM VAMSHI Hernandez   Urology [address]     Signatures   Electronically signed by : VAMSHI Jones ; Aug 29 2017  7:32AM EST                       (Author)

## 2017-11-25 ENCOUNTER — GENERIC CONVERSION - ENCOUNTER (OUTPATIENT)
Dept: OTHER | Facility: OTHER | Age: 62
End: 2017-11-25

## 2017-11-26 LAB — PROSTATE SPECIFIC ANTIGEN (HISTORICAL): 1.7 NG/ML (ref 0–4)

## 2017-11-29 ENCOUNTER — GENERIC CONVERSION - ENCOUNTER (OUTPATIENT)
Dept: OTHER | Facility: OTHER | Age: 62
End: 2017-11-29

## 2017-12-22 ENCOUNTER — ANESTHESIA EVENT (OUTPATIENT)
Dept: PERIOP | Facility: AMBULARY SURGERY CENTER | Age: 62
End: 2017-12-22
Payer: COMMERCIAL

## 2017-12-29 ENCOUNTER — GENERIC CONVERSION - ENCOUNTER (OUTPATIENT)
Dept: UROLOGY | Facility: AMBULATORY SURGERY CENTER | Age: 62
End: 2017-12-29

## 2017-12-29 ENCOUNTER — TRANSCRIBE ORDERS (OUTPATIENT)
Dept: LAB | Facility: CLINIC | Age: 62
End: 2017-12-29

## 2017-12-29 ENCOUNTER — APPOINTMENT (OUTPATIENT)
Dept: LAB | Facility: CLINIC | Age: 62
End: 2017-12-29
Payer: COMMERCIAL

## 2017-12-29 ENCOUNTER — OFFICE VISIT (OUTPATIENT)
Dept: LAB | Facility: CLINIC | Age: 62
End: 2017-12-29
Payer: COMMERCIAL

## 2017-12-29 DIAGNOSIS — N43.3 HYDROCELE, RIGHT: Primary | ICD-10-CM

## 2017-12-29 DIAGNOSIS — N43.3 HYDROCELE, RIGHT: ICD-10-CM

## 2017-12-29 LAB
ANION GAP SERPL CALCULATED.3IONS-SCNC: 9 MMOL/L (ref 4–13)
APTT PPP: 28 SECONDS (ref 23–35)
BASOPHILS # BLD AUTO: 0.01 THOUSANDS/ΜL (ref 0–0.1)
BASOPHILS NFR BLD AUTO: 0 % (ref 0–1)
BUN SERPL-MCNC: 16 MG/DL (ref 5–25)
CALCIUM SERPL-MCNC: 9.3 MG/DL (ref 8.3–10.1)
CHLORIDE SERPL-SCNC: 102 MMOL/L (ref 100–108)
CO2 SERPL-SCNC: 30 MMOL/L (ref 21–32)
CREAT SERPL-MCNC: 0.97 MG/DL (ref 0.6–1.3)
EOSINOPHIL # BLD AUTO: 0.11 THOUSAND/ΜL (ref 0–0.61)
EOSINOPHIL NFR BLD AUTO: 1 % (ref 0–6)
ERYTHROCYTE [DISTWIDTH] IN BLOOD BY AUTOMATED COUNT: 14.3 % (ref 11.6–15.1)
GFR SERPL CREATININE-BSD FRML MDRD: 84 ML/MIN/1.73SQ M
GLUCOSE SERPL-MCNC: 87 MG/DL (ref 65–140)
HCT VFR BLD AUTO: 44.4 % (ref 36.5–49.3)
HGB BLD-MCNC: 15 G/DL (ref 12–17)
INR PPP: 1.03 (ref 0.86–1.16)
LYMPHOCYTES # BLD AUTO: 2.2 THOUSANDS/ΜL (ref 0.6–4.47)
LYMPHOCYTES NFR BLD AUTO: 24 % (ref 14–44)
MCH RBC QN AUTO: 31.3 PG (ref 26.8–34.3)
MCHC RBC AUTO-ENTMCNC: 33.8 G/DL (ref 31.4–37.4)
MCV RBC AUTO: 93 FL (ref 82–98)
MONOCYTES # BLD AUTO: 0.76 THOUSAND/ΜL (ref 0.17–1.22)
MONOCYTES NFR BLD AUTO: 8 % (ref 4–12)
NEUTROPHILS # BLD AUTO: 5.96 THOUSANDS/ΜL (ref 1.85–7.62)
NEUTS SEG NFR BLD AUTO: 67 % (ref 43–75)
PLATELET # BLD AUTO: 227 THOUSANDS/UL (ref 149–390)
PMV BLD AUTO: 11.6 FL (ref 8.9–12.7)
POTASSIUM SERPL-SCNC: 3.7 MMOL/L (ref 3.5–5.3)
PROTHROMBIN TIME: 13.8 SECONDS (ref 12.1–14.4)
RBC # BLD AUTO: 4.8 MILLION/UL (ref 3.88–5.62)
SODIUM SERPL-SCNC: 141 MMOL/L (ref 136–145)
WBC # BLD AUTO: 9.04 THOUSAND/UL (ref 4.31–10.16)

## 2017-12-29 PROCEDURE — 85730 THROMBOPLASTIN TIME PARTIAL: CPT

## 2017-12-29 PROCEDURE — 80048 BASIC METABOLIC PNL TOTAL CA: CPT

## 2017-12-29 PROCEDURE — 85025 COMPLETE CBC W/AUTO DIFF WBC: CPT

## 2017-12-29 PROCEDURE — 36415 COLL VENOUS BLD VENIPUNCTURE: CPT

## 2017-12-29 PROCEDURE — 85610 PROTHROMBIN TIME: CPT

## 2017-12-29 PROCEDURE — 93005 ELECTROCARDIOGRAM TRACING: CPT

## 2017-12-29 NOTE — PRE-PROCEDURE INSTRUCTIONS
Pre-Surgery Instructions:   Medication Instructions    aspirin 81 mg chewable tablet Instructed patient per Anesthesia Guidelines   atorvastatin (LIPITOR) 10 mg tablet Instructed patient per Anesthesia Guidelines   FLUoxetine (PROzac) 20 mg capsule Instructed patient per Anesthesia Guidelines   folic acid (FOLVITE) 1 mg tablet Instructed patient per Anesthesia Guidelines   lisinopril-hydrochlorothiazide (PRINZIDE,ZESTORETIC) 10-12 5 MG per tablet Instructed patient per Anesthesia Guidelines   methotrexate 2 5 mg tablet Instructed patient per Anesthesia Guidelines   Multiple Vitamins-Minerals (MULTIVITAMIN MEN) TABS Instructed patient per Anesthesia Guidelines   Omega-3 Fatty Acids (FISH OIL) 1,000 mg Instructed patient per Anesthesia Guidelines   oxybutynin (DITROPAN-XL) 5 mg 24 hr tablet Instructed patient per Anesthesia Guidelines   predniSONE 10 mg tablet Instructed patient per Anesthesia Guidelines   tamsulosin (FLOMAX) 0 4 mg Instructed patient per Anesthesia Guidelines      Pre op instructions and showering instructions reviewed with wife- Patient has hibiclens given to him by surgeons office

## 2018-01-02 LAB
ATRIAL RATE: 60 BPM
P AXIS: 48 DEGREES
PR INTERVAL: 198 MS
QRS AXIS: -15 DEGREES
QRSD INTERVAL: 100 MS
QT INTERVAL: 430 MS
QTC INTERVAL: 430 MS
T WAVE AXIS: 59 DEGREES
VENTRICULAR RATE: 60 BPM

## 2018-01-04 ENCOUNTER — GENERIC CONVERSION - ENCOUNTER (OUTPATIENT)
Dept: OTHER | Facility: OTHER | Age: 63
End: 2018-01-04

## 2018-01-10 NOTE — RESULT NOTES
Verified Results  * MRI ABDOMEN W WO CONTRAST [id number] 03:51PM Andrae Carrier Order Number: [id number]   Performing Comments: attn: LIVER/ ARTERIAL PHASE IMAGING (MISSED ON PREVIOUS STUDY)   - Patient Instructions: To schedule this appointment, please contact Central Scheduling at [phone]  Test Name Result Flag Reference   MRI ABDOMEN W [address] (Report)     This is a summary report  The complete report is available in the patient's medical record  If you cannot access the medical record, please contact the sending organization for a detailed fax or copy  MRI OF THE ABDOMEN (LIVER) WITH AND WITHOUT CONTRAST     INDICATION: Hepatomegaly, hepatic cysts and suspected left lobe hemangioma  COMPARISON: MRI abdomen 12/2/2016, CT abdomen and pelvis 11/10/2016     TECHNIQUE: The following pulse sequences were obtained on a 1 5 T scanner: Coronal and axial T2 with TE of 90 and 180 respectively, axial T2 with fat saturation, axial FIESTA fat-sat, axial T1-weighted in-and-out-of phase, axial DWI/ADC, pre-contrast    axial T1 with fat saturation, post-contrast dynamic axial T1 with fat saturation at 20, 70, and 180 seconds, followed by coronal and 7 minute delayed axial T1 with fat saturation  Pre- and postcontrast subtraction images were also obtained  IV Contrast: 9 mL of Gadobutrol injection (SINGLE-DOSE)      FINDINGS: The study is degraded by respiratory motion     LIVER:    General: Normal in size and contour  Diffuse hepatic steatosis is seen  Lesions: 2 6 x 2 4 x 2 3 cm lateral segment left lobe T1 hypointense, T2 hyperintense nodule appears stable  There is central and peripheral arterial enhancement noted  The pattern is not completely characteristic of cavernous hemangioma although is    quite difficult to accurately characterize given respiratory motion   The nodule is more uniform on portal venous and 3 minute postcontrast images and is faintly persistent on delayed imaging  It remains high signal on the long TE T2 sequence and is    diffusion negative which are consistent with hemangioma  Numerous hepatic cysts are again seen some of which are simple, others demonstrating thin septations  No suspicious interval changes  Vasculature: Portal and hepatic veins patent without evidence of thrombosis  BILIARY TREE: Normal       GALLBLADDER: Normal      PANCREAS: Normal      ADRENAL GLANDS: Normal      SPLEEN: Normal      KIDNEYS: Small bilateral renal cysts  ABDOMINAL CAVITY: No lymphadenopathy or mass  No ascites  BOWEL: Unremarkable MRI appearance  OSSEOUS STRUCTURES: No osseous destruction  EXTRAHEPATIC VASCULAR STRUCTURES: Visualized vasculature is normal      ABDOMINAL WALL:    Suspected sebaceous cyst in the anterior right lower chest wall  Left-sided hydrocele noted  LOWER CHEST: Unremarkable MRI appearance  IMPRESSION:     Unfortunately, the study is moderately degraded by respiratory motion limiting evaluation  2 6 cm left hepatic lobe lesion is difficult to fully characterize although stable, favoring benignity  Imaging findings are believed most consistent with    incomplete flash enhancement of cavernous hemangioma  A low-grade primary hepatic neoplasm is felt much less likely  Given the somewhat atypical early enhancement pattern, I would recommend follow-up nuclear medicine RBC scintigraphy with SPECT imaging   to confirm hemangioma  Six-month follow-up MRI abdomen with IV contrast (Gadavist) would be a 2nd alternative choice  Fatty liver with numerous scattered hepatic cysts which otherwise appear stable  Small bilateral renal cysts         ##sigslh##sigslh         ##fuslh6##fuslh6       Workstation performed: [id number]     Signed by:   Mustapha Headley DO   9/12/17

## 2018-01-11 ENCOUNTER — HOSPITAL ENCOUNTER (OUTPATIENT)
Facility: AMBULARY SURGERY CENTER | Age: 63
Setting detail: OUTPATIENT SURGERY
Discharge: HOME | End: 2018-01-11
Attending: UROLOGY | Admitting: UROLOGY
Payer: COMMERCIAL

## 2018-01-11 ENCOUNTER — ANESTHESIA (OUTPATIENT)
Dept: PERIOP | Facility: AMBULARY SURGERY CENTER | Age: 63
End: 2018-01-11
Payer: COMMERCIAL

## 2018-01-11 VITALS
DIASTOLIC BLOOD PRESSURE: 69 MMHG | OXYGEN SATURATION: 95 % | HEART RATE: 66 BPM | HEIGHT: 70 IN | SYSTOLIC BLOOD PRESSURE: 111 MMHG | BODY MASS INDEX: 28.92 KG/M2 | WEIGHT: 202 LBS | TEMPERATURE: 97.5 F | RESPIRATION RATE: 18 BRPM

## 2018-01-11 RX ORDER — MIDAZOLAM HYDROCHLORIDE 1 MG/ML
INJECTION INTRAMUSCULAR; INTRAVENOUS AS NEEDED
Status: DISCONTINUED | OUTPATIENT
Start: 2018-01-11 | End: 2018-01-11 | Stop reason: SURG

## 2018-01-11 RX ORDER — SODIUM CHLORIDE 9 MG/ML
INJECTION, SOLUTION INTRAVENOUS CONTINUOUS PRN
Status: DISCONTINUED | OUTPATIENT
Start: 2018-01-11 | End: 2018-01-11

## 2018-01-11 RX ORDER — FENTANYL CITRATE/PF 50 MCG/ML
25 SYRINGE (ML) INJECTION
Status: DISCONTINUED | OUTPATIENT
Start: 2018-01-11 | End: 2018-01-11

## 2018-01-11 RX ORDER — LIDOCAINE HYDROCHLORIDE 10 MG/ML
INJECTION, SOLUTION INFILTRATION; PERINEURAL AS NEEDED
Status: DISCONTINUED | OUTPATIENT
Start: 2018-01-11 | End: 2018-01-11 | Stop reason: SURG

## 2018-01-11 RX ORDER — ONDANSETRON 2 MG/ML
INJECTION INTRAMUSCULAR; INTRAVENOUS AS NEEDED
Status: DISCONTINUED | OUTPATIENT
Start: 2018-01-11 | End: 2018-01-11 | Stop reason: SURG

## 2018-01-11 RX ORDER — ONDANSETRON 2 MG/ML
4 INJECTION INTRAMUSCULAR; INTRAVENOUS ONCE AS NEEDED
Status: DISCONTINUED | OUTPATIENT
Start: 2018-01-11 | End: 2018-01-11 | Stop reason: HOSPADM

## 2018-01-11 RX ORDER — BUPIVACAINE HYDROCHLORIDE 2.5 MG/ML
INJECTION, SOLUTION EPIDURAL; INFILTRATION; INTRACAUDAL AS NEEDED
Status: DISCONTINUED | OUTPATIENT
Start: 2018-01-11 | End: 2018-01-11 | Stop reason: HOSPADM

## 2018-01-11 RX ORDER — SODIUM CHLORIDE, SODIUM LACTATE, POTASSIUM CHLORIDE, CALCIUM CHLORIDE 600; 310; 30; 20 MG/100ML; MG/100ML; MG/100ML; MG/100ML
50 INJECTION, SOLUTION INTRAVENOUS CONTINUOUS
Status: DISCONTINUED | OUTPATIENT
Start: 2018-01-11 | End: 2018-01-11 | Stop reason: HOSPADM

## 2018-01-11 RX ORDER — HYDROCODONE BITARTRATE AND ACETAMINOPHEN 5; 325 MG/1; MG/1
1 TABLET ORAL EVERY 4 HOURS PRN
Qty: 20 TABLET | Refills: 0 | Status: SHIPPED | OUTPATIENT
Start: 2018-01-11 | End: 2018-01-21

## 2018-01-11 RX ORDER — SODIUM CHLORIDE 9 MG/ML
100 INJECTION, SOLUTION INTRAVENOUS CONTINUOUS
Status: DISCONTINUED | OUTPATIENT
Start: 2018-01-11 | End: 2018-01-11 | Stop reason: HOSPADM

## 2018-01-11 RX ORDER — FENTANYL CITRATE 50 UG/ML
INJECTION, SOLUTION INTRAMUSCULAR; INTRAVENOUS AS NEEDED
Status: DISCONTINUED | OUTPATIENT
Start: 2018-01-11 | End: 2018-01-11 | Stop reason: SURG

## 2018-01-11 RX ORDER — PROPOFOL 10 MG/ML
INJECTION, EMULSION INTRAVENOUS AS NEEDED
Status: DISCONTINUED | OUTPATIENT
Start: 2018-01-11 | End: 2018-01-11 | Stop reason: SURG

## 2018-01-11 RX ORDER — HYDROCODONE BITARTRATE AND ACETAMINOPHEN 5; 325 MG/1; MG/1
1-2 TABLET ORAL EVERY 4 HOURS PRN
Status: DISCONTINUED | OUTPATIENT
Start: 2018-01-11 | End: 2018-01-11 | Stop reason: HOSPADM

## 2018-01-11 RX ADMIN — MIDAZOLAM HYDROCHLORIDE 2 MG: 1 INJECTION, SOLUTION INTRAMUSCULAR; INTRAVENOUS at 07:25

## 2018-01-11 RX ADMIN — HYDROMORPHONE HYDROCHLORIDE 0.2 MG: 1 INJECTION, SOLUTION INTRAMUSCULAR; INTRAVENOUS; SUBCUTANEOUS at 09:33

## 2018-01-11 RX ADMIN — HYDROMORPHONE HYDROCHLORIDE 0.2 MG: 1 INJECTION, SOLUTION INTRAMUSCULAR; INTRAVENOUS; SUBCUTANEOUS at 09:43

## 2018-01-11 RX ADMIN — FENTANYL CITRATE 25 MCG: 50 INJECTION, SOLUTION INTRAMUSCULAR; INTRAVENOUS at 07:54

## 2018-01-11 RX ADMIN — FENTANYL CITRATE 25 MCG: 50 INJECTION, SOLUTION INTRAMUSCULAR; INTRAVENOUS at 08:20

## 2018-01-11 RX ADMIN — LIDOCAINE HYDROCHLORIDE 50 MG: 10 INJECTION, SOLUTION INFILTRATION; PERINEURAL at 07:33

## 2018-01-11 RX ADMIN — DEXAMETHASONE SODIUM PHOSPHATE 4 MG: 10 INJECTION INTRAMUSCULAR; INTRAVENOUS at 07:40

## 2018-01-11 RX ADMIN — HYDROMORPHONE HYDROCHLORIDE 0.2 MG: 1 INJECTION, SOLUTION INTRAMUSCULAR; INTRAVENOUS; SUBCUTANEOUS at 09:11

## 2018-01-11 RX ADMIN — FENTANYL CITRATE 25 MCG: 50 INJECTION, SOLUTION INTRAMUSCULAR; INTRAVENOUS at 07:59

## 2018-01-11 RX ADMIN — CEFAZOLIN SODIUM 2000 MG: 2 SOLUTION INTRAVENOUS at 07:39

## 2018-01-11 RX ADMIN — FENTANYL CITRATE 25 MCG: 50 INJECTION, SOLUTION INTRAMUSCULAR; INTRAVENOUS at 07:42

## 2018-01-11 RX ADMIN — SODIUM CHLORIDE: 0.9 INJECTION, SOLUTION INTRAVENOUS at 07:06

## 2018-01-11 RX ADMIN — HYDROMORPHONE HYDROCHLORIDE 0.2 MG: 1 INJECTION, SOLUTION INTRAMUSCULAR; INTRAVENOUS; SUBCUTANEOUS at 09:21

## 2018-01-11 RX ADMIN — ONDANSETRON 4 MG: 2 INJECTION INTRAMUSCULAR; INTRAVENOUS at 07:33

## 2018-01-11 RX ADMIN — PROPOFOL 200 MG: 10 INJECTION, EMULSION INTRAVENOUS at 07:33

## 2018-01-11 NOTE — DISCHARGE INSTRUCTIONS
Remove wrapping around scrotum 1st thing tomorrow morning  Hold aspirin for 2 days  Your drain can be removed in  my office on Monday  We will contact you to schedule  You may shower on Saturday  No tubs or pools for 2 weeks  Light activity for 2 weeks  Continue scrotal support and fluffs until drain has been removed  Hydrocele   WHAT YOU NEED TO KNOW:   A hydrocele is a collection of fluid inside the scrotum  The scrotum holds the testicles  Hydroceles are simple or communicating  A simple hydrocele stays the same size  A communicating hydrocele gets bigger and smaller as fluid flows into and out of the scrotum  DISCHARGE INSTRUCTIONS:   Medicines:   · Pain medicine: You may need medicine to take away or decrease pain  ¨ Learn how to take your medicine  Ask what medicine and how much you should take  Be sure you know how, when, and how often to take it  ¨ Do not wait until the pain is severe before you take your medicine  Tell caregivers if your pain does not decrease  ¨ Pain medicine can make you dizzy or sleepy  Prevent falls by calling someone when you get out of bed or if you need help  · Take your medicine as directed  Contact your healthcare provider if you think your medicine is not helping or if you have side effects  Tell him or her if you are allergic to any medicine  Keep a list of the medicines, vitamins, and herbs you take  Include the amounts, and when and why you take them  Bring the list or the pill bottles to follow-up visits  Carry your medicine list with you in case of an emergency  Follow up with your healthcare provider or urologist as directed:  Write down your questions so you remember to ask them during your visits  Support: You may need to wear a fabric support device similar to a jock strap to decrease swelling  Wound care:  Ask your healthcare provider how to care for your incision after surgery    Contact your healthcare provider or urologist if:   · The swelling gets worse or does not go away  · Your scrotum is swollen and you have a fever  · Your surgery wound is swollen, red, or it is leaking green or yellow fluid  · You have questions or concerns about your condition or care  Seek care immediately or call 911 if:   · You have severe pain in your scrotum  · You see blood on your bandages and the amount is increasing  © 2017 [address] [employer] Information is for End User's use only and may not be sold, redistributed or otherwise used for commercial purposes  All illustrations and images included in CareNotes® are the copyrighted property of A D A M , Inc  or [employer]  The above information is an  only  It is not intended as medical advice for individual conditions or treatments  Talk to your doctor, nurse or pharmacist before following any medical regimen to see if it is safe and effective for you

## 2018-01-11 NOTE — OP NOTE
OPERATIVE REPORT  PATIENT NAME: Jose Price    :  1955  MRN: [id number]  Pt Location: [facility] OR ROOM 05    SURGERY DATE: 2018    Surgeon(s) and Role:     Jordana Arredondo MD - Primary    Preop Diagnosis:  Hydrocele [N43 3]    Post-Op Diagnosis Codes: * Hydrocele [N43 3]    Procedure(s) (LRB):  HYDROCELECTOMY (Right)    Specimen(s):  * No specimens in log *    Estimated Blood Loss:   Minimal    Drains:       Anesthesia Type:   General    Operative Indications:  Hydrocele       Operative Findings:  Right hydrocele with 325 mL of straw colored fluid  Complications:   None    Procedure and Technique:  After informed consent was obtained the patient was brought to the operating room  Preoperative antibiotics were given  Timeout was performed to identify the patient, surgery to be performed, and correct laterality  Bilateral sequential compressive device was placed on the lower extremities for DVT prophylaxis  He was placed under general anesthesia and prepped and draped in standard sterile fashion in supine position  An incision was made in the midline of the scrotum along the median raphae  Dissection was carried down through the dartos tissue with electrocautery  The hydrocele sac was dissected free from the surrounding dartos tissue and the sac was delivered out of the scrotum  The sac was then incised sharply with return of 325 mL of straw-colored fluid  The hydrocele sac was then incised all the way up the cord with electrocautery  Excess tissue was excised and the sac was inverted around the cord and sewn to itself with a 2-0 Vicryl in a running fashion  A quarter-inch Penrose was placed in the inferior portion of the scrotum through a separate stab incision  The dartos was then reapproximated with a 2-0 Vicryl suture  The skin was reapproximated with a running 3-0 chromic suture  The scrotum was then wrapped and fluffs and scrotal support were placed   The patient was awoken and taken to the postanesthesia care unit in stable condition     I was present for the entire procedure   I was present for the entire procedure    Patient Disposition:  PACU     SIGNATURE: Jacoby Solano MD  DATE: January 11, 2018  TIME: 8:34 AM

## 2018-01-11 NOTE — ANESTHESIA PREPROCEDURE EVALUATION
Review of Systems/Medical History  Patient summary reviewed  Chart reviewed  No history of anesthetic complications     Cardiovascular  EKG reviewed, Exercise tolerance: good,  Hyperlipidemia, Hypertension , No CAD, , No history of CABG,    Pulmonary       GI/Hepatic            Endo/Other     GYN       Hematology   Musculoskeletal  Rheumatoid arthritis (DENIES NECK PAIN OR PAIN WITH FLEX/EXTENSION; per patient, neck films showing arthritic changes approximately five years ago; no radicular sx or instability) ,        Neurology   Psychology           Physical Exam    Airway    Mallampati score: II  TM Distance: >3 FB       Dental   No notable dental hx     Cardiovascular      Pulmonary      Other Findings      Lab Results   Component Value Date    WBC 9 04 12/29/2017    HGB 15 0 12/29/2017     12/29/2017     Lab Results   Component Value Date     12/29/2017    K 3 7 12/29/2017    BUN 16 12/29/2017    CREATININE 0 97 12/29/2017    GLUCOSE 87 12/29/2017     Lab Results   Component Value Date    PTT 28 12/29/2017      Lab Results   Component Value Date    INR 1 03 12/29/2017         No results found for: HGBA1C    Anesthesia Plan  ASA Score- 2     Anesthesia Type- general with ASA Monitors  Additional Monitors:   Airway Plan: LMA  Comment:  VAMSHI Ken , have personally seen and evaluated the patient prior to anesthetic care  I have reviewed the pre-anesthetic record, and other medical records if appropriate to the anesthetic care  If a CRNA is involved in the case, I have reviewed the CRNA assessment, if present, and agree  Risks/benefits and alternatives discussed with patient including possible PONV, sore throat, and possibility of rare anesthetic and surgical emergencies        Plan Factors-Patient not instructed to abstain from smoking on day of procedure  Patient did not smoke on day of surgery  Induction- intravenous      Postoperative Plan- Plan for postoperative opioid use  Planned trial extubation    Informed Consent- Anesthetic plan and risks discussed with patient  I personally reviewed this patient with the CRNA  Discussed and agreed on the Anesthesia Plan with the CRNA  Seth Sheikh

## 2018-01-11 NOTE — ANESTHESIA POSTPROCEDURE EVALUATION
Post-Op Assessment Note      CV Status:  Stable    Mental Status:  Alert and awake    Hydration Status:  Euvolemic    PONV Controlled:  Controlled    Airway Patency:  Patent    Post Op Vitals Reviewed: Yes          Staff: CRNA           BP 97/55 (01/11/18 0843)    Temp 97 8 °F (36 6 °C) (01/11/18 0843)    Pulse 69 (01/11/18 0843)   Resp 14 (01/11/18 0843)    SpO2 93 % (01/11/18 0843)

## 2018-01-11 NOTE — RESULT NOTES
Verified Results  (1) LIPID PANEL, FASTING 22Ofl1373 11:39AM Jabier Lobato Asp     Test Name Result Flag Reference   Cholesterol, Total 229 mg/dL H 100-199   Triglycerides 258 mg/dL H 0-149   HDL Cholesterol 33 mg/dL L >39   According to ATP-III Guidelines, HDL-C >59 mg/dL is considered a  negative risk factor for CHD  VLDL Cholesterol Kenan 52 mg/dL H 5-40   LDL Cholesterol Calc 144 mg/dL H 0-99   T  Chol/HDL Ratio 6 9 ratio units H 0 0-5 0   T  Chol/HDL Ratio                                                             Men  Women                                               1/2 Avg  Risk  3 4    3 3                                                   Avg Risk  5 0    4 4                                                2X Avg  Risk  9 6    7 1                                                3X Avg  Risk 23 4   11 0     [facility]) Donald Kyle CMP14 Default [id number] 11:39AM Jabier Lobato Asp     Test Name Result Flag Reference   Donald Kyle CMP14 Default Comment     A hand-written panel/profile was received from your office  In  accordance with the [facility] Ambiguous Test Code Policy dated July 6202, we have completed your order by using the closest currently  or formerly recognized AMA panel  We have assigned Comprehensive  Metabolic Panel (14), Test Code #529676 to this request   If this  is not the testing you wished to receive on this specimen, please  contact the [facility] Client Inquiry/Technical Services Department  to clarify the test order  We appreciate your business

## 2018-01-13 VITALS
HEIGHT: 70 IN | TEMPERATURE: 97.8 F | HEART RATE: 88 BPM | WEIGHT: 202 LBS | BODY MASS INDEX: 28.92 KG/M2 | DIASTOLIC BLOOD PRESSURE: 82 MMHG | SYSTOLIC BLOOD PRESSURE: 124 MMHG

## 2018-01-13 VITALS
TEMPERATURE: 98.3 F | DIASTOLIC BLOOD PRESSURE: 78 MMHG | RESPIRATION RATE: 18 BRPM | BODY MASS INDEX: 28.27 KG/M2 | SYSTOLIC BLOOD PRESSURE: 110 MMHG | HEIGHT: 70 IN | WEIGHT: 197.5 LBS | HEART RATE: 80 BPM

## 2018-01-14 VITALS
DIASTOLIC BLOOD PRESSURE: 94 MMHG | RESPIRATION RATE: 18 BRPM | SYSTOLIC BLOOD PRESSURE: 122 MMHG | TEMPERATURE: 95.8 F | WEIGHT: 205 LBS | BODY MASS INDEX: 29.41 KG/M2 | HEART RATE: 80 BPM

## 2018-01-14 VITALS
DIASTOLIC BLOOD PRESSURE: 80 MMHG | WEIGHT: 202 LBS | TEMPERATURE: 97.3 F | HEIGHT: 70 IN | HEART RATE: 72 BPM | BODY MASS INDEX: 28.92 KG/M2 | SYSTOLIC BLOOD PRESSURE: 138 MMHG

## 2018-01-15 ENCOUNTER — ALLSCRIPTS OFFICE VISIT (OUTPATIENT)
Dept: OTHER | Facility: OTHER | Age: 63
End: 2018-01-15

## 2018-01-18 NOTE — MISCELLANEOUS
Assessment    1  Acute pain of right shoulder (719 41) (M25 511)   2  Rheumatoid arthritis (714 0) (M06 9)    Discussion/Summary  Discussion Summary:   1  Acute right shoulder pain   2  exacerbation of rheumatoid arthritis  - I reviewed with patient  We discussed further treatment going forward  I explained that he needs to get in with his rheumatologist to discuss adjustments to maintenance medications  Patient will continue prednisone taper for now  We discussed stomach protection  Patient will be out of work for the rest of the week  We discussed watching for exacerbation as he weans off of medication  Patient will call me in 1 week with follow-up -further follow-up based on this discussion  Patient to call for problems or concerns in the interim  Counseling Documentation With Imm: The patient was counseled regarding diagnostic results, instructions for management, risk factor reductions, prognosis, patient and family education, impressions, risks and benefits of treatment options, importance of compliance with treatment  Medication SE Review and Pt Understands Tx: Possible side effects of new medications were reviewed with the patient/guardian today  The treatment plan was reviewed with the patient/guardian  The patient/guardian understands and agrees with the treatment plan      History of Present Illness  TCM Communication [facility]: The patient is being contacted for follow-up after hospitalization and appoint today at 2:00 with dr Ene Reyes  He was hospitalized at and [facility]  The dates of hospitalization:, date of admission: 10-13-17, date of discharge: 10-17-17  Diagnosis: rheumatoid arthritis  He was discharged to home  Medications reviewed and updated today  He scheduled a follow up appointment  Follow-up appointments with other specialists: none  The patient is currently asymptomatic  Counseling was provided to the patient  Communication performed and completed by VAMSHI NOE LPN   HPI: As above  - patient seen in this office for acute visit on October 13th for right shoulder pain  Patient states the pain started fairly mild in the morning but became severe as the day went on  By the time patient was seen in our office, he was in acute distress and then suffered lightheadedness with difficulty arousing  Patient was sent to [facility] by ambulance  In the emergency room, patient developed fever and was subsequently admitted  - While in the hospital, patient ruled out for infectious cause of fever  CT then MRI of the shoulder showed widespread inflammation with effusions in the joint, subdeltoid bursa and subacromial bursitis  Patient was seen by Orthopedics as well as Infectious Disease -both felt that this was an acute exacerbation of his rheumatoid arthritis  The patient underwent arthrocentesis with Interventional Radiology and was started on prednisone  patient improved and was discharged on October 17th  Patient is here for transitional care management visit  - Patient states that the shoulder feels much better  He has improved range of motion  He is not going back to work for the rest of the week  He continues to take prednisone  - The patient does not have a follow-up appointment with his rheumatologist yet  We discussed recent changes in medication as well as potential causes for the flare-up of the rheumatoid arthritis in his shoulder   - Other than his shoulder and scattered body aches, patient states that he feels well  He denies any chest pain, palpitations, shortness of breath, GI or  complaints  - I reviewed available hospital records, consult notes, lab reports and radiology reports with patient  I reviewed present medications list       Review of Systems  Complete-Male:   Constitutional: as noted in HPI  Eyes: No complaints of eye pain, no red eyes, no discharge from eyes, no itchy eyes     ENT: no complaints of earache, no hearing loss, no nosebleeds, no nasal discharge, no sore throat, no hoarseness  Cardiovascular: No complaints of slow heart rate, no fast heart rate, no chest pain, no palpitations, no leg claudication, no lower extremity  Respiratory: No complaints of shortness of breath, no wheezing, no cough, no SOB on exertion, no orthopnea or PND  Gastrointestinal: No complaints of abdominal pain, no constipation, no nausea or vomiting, no diarrhea or bloody stools  Genitourinary: No complaints of dysuria, no incontinence, no hesitancy, no nocturia, no genital lesion, no testicular pain  Musculoskeletal: as noted in HPI  Integumentary: No complaints of skin rash or skin lesions, no itching, no skin wound, no dry skin  Neurological: No compliants of headache, no confusion, no convulsions, no numbness or tingling, no dizziness or fainting, no limb weakness, no difficulty walking  Psychiatric: Is not suicidal, no sleep disturbances, no anxiety or depression, no change in personality, no emotional problems  Endocrine: No complaints of proptosis, no hot flashes, no muscle weakness, no erectile dysfunction, no deepening of the voice, no feelings of weakness  Hematologic/Lymphatic: No complaints of swollen glands, no swollen glands in the neck, does not bleed easily, no easy bruising  Active Problems    1  Abnormal MRI (793 99) (R93 8)   2  Acute pain of right shoulder (719 41) (M25 511)   3  Allergy (995 3) (T78 40XA)   4  Anxiety disorder (300 00) (F41 9)   5  Colonoscopy (Fiberoptic) Screening   6  Esophageal reflux (530 81) (K21 9)   7  Hematuria (599 70) (R31 9)   8  Hoarseness (784 42) (R49 0)   9  Hydrocele (603 9) (N43 3)   10  Hyperlipidemia (272 4) (E78 5)   11  Hypertension (401 9) (I10)   12  Liver mass (573 9) (R16 0)   13  Low back pain (724 2) (M54 5)   14  Microscopic hematuria (599 72) (R31 29)   15  Orthostasis (458 0) (I95 1)   16  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)   17  Rheumatoid arthritis (714 0) (M06 9)   18   Screening PSA (prostate specific antigen) (V76 44) (Z12 5)   19  Scrotal edema (608 86) (N50 89)    Past Medical History    1  History of Acute upper respiratory infection (465 9) (J06 9)   2  History of Cellulitis (682 9) (L03 90)   3  History of Epidermal inclusion cyst (706 2) (L72 0)   4  History of Erythrasma (039 0) (L08 1)   5  History of acute sinusitis (V12 69) (Z87 09)   6  History of bronchitis (V12 69) (Z87 09)   7  History of furuncle (V13 3) (Z87 2)    Surgical History  Surgical History Reviewed: The surgical history was reviewed and updated today  Family History  Mother    1  Family history of hypertension (V17 49) (Z82 49)  Father    2  Family history of cardiac disorder (V17 49) (Z82 49)   3  Family history of hypertension (V17 49) (Z82 49)  Sibling    4  Family history of hypertension (V17 49) (Z82 49)  Maternal Grandmother    5  Family history of Diabetes Mellitus (V18 0)    Social History    · Denied: History of Alcohol Use (History)   · Always uses seat belt   · Denied: History of Daily Coffee Consumption (___ Cups/Day)   · Denied: History of Daily Cola Consumption (___ Cans/Day)   · Daily Tea Consumption (___ Cups/Day)   · Dental care, regularly   · Denied: History of Drug use   · Denied: History of Drug Use   · Exercise: Walking   · Full-time employment   · High school graduate   · Marital History - Currently    · Denied: History of Multiple organ donor   · Never A Smoker   · No living will   · Not currently sexually active   · Pets / animals   · Denied: History of Power of  in existence   · Two children   · Water intake, adequate (per day)  Social History Reviewed: The social history was reviewed and updated today  Current Meds   1  Adult Aspirin EC Low Strength 81 MG Oral Tablet Delayed Release; Take 1 tablet daily   Recorded   2  Atorvastatin Calcium 10 MG Oral Tablet; Take 1 tablet by mouth  daily;    Therapy: 45WVM0664 to (Meera Campos)  Requested for: 95WTF6869; Last Rx: 01JFC5522 Ordered   3  Daily Multivitamin TABS; Take 1 tablet daily Recorded   4  FLUoxetine HCl - 20 MG Oral Capsule; TAKE ONE CAPSULE BY MOUTH EVERY DAY; Therapy: 72GIZ4056 to (Evaluate:19Rnd8254)  Requested for: 12Oct2017; Last   Rx:12Oct2017 Ordered   5  Folic Acid 1 MG Oral Tablet; Take 1 tablet daily Recorded   6  Lisinopril-Hydrochlorothiazide 10-12 5 MG Oral Tablet; TAKE ONE (1) TABLET(S) DAILY; Therapy: 94Bbu9942 to (Evaluate:62Phb9040)  Requested for: 27FAQ3491; Last   Rx:61Kml3794 Ordered   7  Methotrexate 2 5 MG Oral Tablet; 8 tabs weekly Recorded   8  Omega 3 1000 MG Oral Capsule; Therapy: (Maribell Smith) to Recorded   9  Oxybutynin Chloride 5 MG Oral Tablet; take 1 tablet by mouth twice a day; Therapy: 88HQL5871 to (Evaluate:15Mar2018)  Requested for: 21Mar2017; Last   Rx:20Mar2017 Ordered   10  Oxybutynin Chloride ER 5 MG Oral Tablet Extended Release 24 Hour; Take 1 tablet daily; Therapy: 17NYE9256 to (Evaluate:41Kpe8098)  Requested for: 02Ekv2531; Last    Rx:88Ajq1705 Ordered   11  Pramipexole Dihydrochloride 0 25 MG Oral Tablet; TAKE ONE TABLET BY MOUTH AT    BEDTIME Recorded   12  PredniSONE 10 MG Oral Tablet; TAKE AS DIRECTED; Therapy: (Recorded:18Oct2017) to Recorded   13  Tamsulosin HCl - 0 4 MG Oral Capsule; Take 1 capsule by mouth  daily at bedtime; Therapy: 01Lvm5415 to (Evaluate:44Zfv2843)  Requested for: 03KBT3916; Last    Rx:20Sep2017 Ordered   14  Xeljanz XR 11 MG Oral Tablet Extended Release 24 Hour; Therapy: (Recorded:18Oct2017) to Recorded  Medication List Reviewed: The medication list was reviewed and updated today  Allergies    1   FentaNYL Citrate SOLN    Vitals  Signs   Recorded: 40YKV0868 01:52PM   Temperature: 96 3 F  Heart Rate: 82  Respiration: 18  Systolic: 205  Diastolic: 82  Height: 5 ft 2 in  Weight: 197 lb 2 oz  BMI Calculated: 36 05  BSA Calculated: 1 9    Physical Exam    Constitutional   General appearance: No acute distress, well appearing and well nourished  Head and Face   Head and face: Normal     Eyes   Conjunctiva and lids: No erythema, swelling or discharge  Pupils and irises: Equal, round, reactive to light  Ears, Nose, Mouth, and Throat   External inspection of ears and nose: Normal     Nasal mucosa, septum, and turbinates: Normal without edema or erythema  Lips, teeth, and gums: Normal, good dentition  Oropharynx: Normal with no erythema, edema, exudate or lesions  Neck   Neck: Supple, symmetric, trachea midline, no masses  Thyroid: Normal, no thyromegaly  Pulmonary   Respiratory effort: No increased work of breathing or signs of respiratory distress  Auscultation of lungs: Clear to auscultation  Cardiovascular   Auscultation of heart: Normal rate and rhythm, normal S1 and S2, no murmurs  Carotid pulses: 2+ bilaterally  Abdominal aorta: Normal     Pedal pulses: 2+ bilaterally  Peripheral vascular exam: Normal     Examination of extremities for edema and/or varicosities: Normal     Abdomen   Abdomen: Non-tender, no masses  Liver and spleen: No hepatomegaly or splenomegaly  Lymphatic   Palpation of lymph nodes in neck: No lymphadenopathy  Palpation of lymph nodes in other areas: No lymphadenopathy  Musculoskeletal   Gait and station: Normal     Inspection/palpation of digits and nails: Normal without clubbing or cyanosis  Inspection/palpation of joints, bones, and muscles: Abnormal   Improved range of motion of the right shoulder with mild discomfort  Muscle strength/tone: Normal     Skin   Skin and subcutaneous tissue: Normal without rashes or lesions  Neurologic   Cranial nerves: Cranial nerves 2-12 intact  Cortical function: Normal mental status  Reflexes: 2+ and symmetric  Sensation: No sensory loss  Psychiatric   Mood and affect: Normal        Future Appointments    Date/Time Provider Specialty Site   11/29/2017 02:00 PM VAMSHI Kim   Urology [facility] [address]     Signatures   Electronically signed by : VAMSHI Mcdowell ; Oct 22 2017  9:54AM EST                       (Author)

## 2018-01-22 VITALS
DIASTOLIC BLOOD PRESSURE: 80 MMHG | HEIGHT: 70 IN | SYSTOLIC BLOOD PRESSURE: 126 MMHG | HEART RATE: 80 BPM | BODY MASS INDEX: 28.97 KG/M2 | WEIGHT: 202.38 LBS

## 2018-01-22 VITALS
SYSTOLIC BLOOD PRESSURE: 124 MMHG | WEIGHT: 205 LBS | DIASTOLIC BLOOD PRESSURE: 82 MMHG | TEMPERATURE: 99.4 F | HEART RATE: 84 BPM | HEIGHT: 70 IN | BODY MASS INDEX: 29.35 KG/M2 | RESPIRATION RATE: 20 BRPM

## 2018-01-22 VITALS
HEART RATE: 82 BPM | BODY MASS INDEX: 36.28 KG/M2 | SYSTOLIC BLOOD PRESSURE: 122 MMHG | TEMPERATURE: 96.3 F | DIASTOLIC BLOOD PRESSURE: 82 MMHG | RESPIRATION RATE: 18 BRPM | WEIGHT: 197.13 LBS | HEIGHT: 62 IN

## 2018-01-22 VITALS — BODY MASS INDEX: 28.97 KG/M2 | WEIGHT: 202.38 LBS | HEIGHT: 70 IN

## 2018-01-23 VITALS
WEIGHT: 208 LBS | BODY MASS INDEX: 29.78 KG/M2 | HEIGHT: 70 IN | DIASTOLIC BLOOD PRESSURE: 78 MMHG | SYSTOLIC BLOOD PRESSURE: 130 MMHG | HEART RATE: 80 BPM

## 2018-01-23 NOTE — PROGRESS NOTES
Chief Complaint  Penrose drain removal S/P R hydrocelectomy 1/11/18 by Dr Chad Xiao  Active Problems    1  Abnormal MRI (793 99) (R93 8)   2  Acute pain of right shoulder (719 41) (M25 511)   3  Allergy (995 3) (T78 40XA)   4  Anxiety disorder (300 00) (F41 9)   5  Colonoscopy (Fiberoptic) Screening   6  Esophageal reflux (530 81) (K21 9)   7  Hematuria (599 70) (R31 9)   8  Hoarseness (784 42) (R49 0)   9  Hydrocele (603 9) (N43 3)   10  Hyperlipidemia (272 4) (E78 5)   11  Hypertension (401 9) (I10)   12  Liver mass (573 9) (R16 0)   13  Low back pain (724 2) (M54 5)   14  Microscopic hematuria (599 72) (R31 29)   15  Orthostasis (458 0) (I95 1)   16  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)   17  Rheumatoid arthritis (714 0) (M06 9)   18  Screening PSA (prostate specific antigen) (V76 44) (Z12 5)   19  Scrotal edema (608 86) (N50 89)   20  Urinary urgency (788 63) (R39 15)    Current Meds   1  Adult Aspirin EC Low Strength 81 MG Oral Tablet Delayed Release; Take 1 tablet daily   Recorded   2  Atorvastatin Calcium 10 MG Oral Tablet; Take 1 tablet by mouth  daily; Therapy: 05XSQ2310 to (Alan Arredondo)  Requested for: 86UIB6862; Last   Rx:89Zto6909 Ordered   3  Daily Multivitamin TABS; Take 1 tablet daily Recorded   4  FLUoxetine HCl - 20 MG Oral Capsule; TAKE ONE CAPSULE BY MOUTH EVERY DAY; Therapy: 75OGT7266 to (Evaluate:99Ttp2730)  Requested for: 12Oct2017; Last   Rx:12Oct2017 Ordered   5  Folic Acid 1 MG Oral Tablet; Take 1 tablet daily Recorded   6  Lisinopril-Hydrochlorothiazide 10-12 5 MG Oral Tablet; TAKE ONE (1) TABLET(S) DAILY; Therapy: 89Rfs7483 to (Evaluate:14Jeh0896)  Requested for: 05NBH6498; Last   Rx:57Ret2622 Ordered   7  Methotrexate 2 5 MG Oral Tablet; 8 tabs weekly Recorded   8  Omega 3 1000 MG Oral Capsule; Therapy: (Jah Brooks) to Recorded   9  Oxybutynin Chloride 5 MG Oral Tablet; take 1 tablet by mouth twice a day;    Therapy: 79EDZ4346 to (Evaluate:15Mar2018)  Requested for: 33KXT8304; Last   Rx:20Mar2017 Ordered   10  Pramipexole Dihydrochloride 0 25 MG Oral Tablet; TAKE ONE TABLET BY MOUTH AT    BEDTIME Recorded   11  PredniSONE 10 MG Oral Tablet; TAKE AS DIRECTED; Therapy: (Recorded:18Oct2017) to Recorded   12  Tamsulosin HCl - 0 4 MG Oral Capsule; Take 1 capsule by mouth  daily at bedtime; Therapy: 42Onf6763 to (Evaluate:35Zkl0549)  Requested for: 50LLR5731; Last    Rx:40Nyf4436 Ordered   13  Xeljanz XR 11 MG Oral Tablet Extended Release 24 Hour; Therapy: (Recorded:18Oct2017) to Recorded    Allergies    1  FentaNYL Citrate SOLN    Vitals  Signs    Heart Rate: 80  Systolic: 624  Diastolic: 78  Height: 5 ft 10 in  Weight: 208 lb   BMI Calculated: 29 85  BSA Calculated: 2 12    Procedure    Procedure: Drain Removal   Suture around Penrose drain cut and removed  Penrose drain removed without difficulty  ABD gauze applied over the drain site  Plan  Scrotal edema    · Follow-up as previously scheduled Evaluation and Treatment  Follow-up  Status:  Complete - Retrospective By Protocol Authorization  Done: 65OVY7082 11:30AM   Ordered; For: Scrotal edema; Ordered By: Efren Kearney Performed:  Due: 67RJR3627    Discussion/Summary    Instructed patient to remain out of work until follow up visit with Dr Alvah Gosselin on 1/31/18 since patient is a   Other instructions reviewed with patient were to continue to wear scrotal support, not lift anything > 10 lbs until follow up, and do not shower x 3 days  Reviewed Post Op orders with Michelle  prior to reviewing with patient  Future Appointments    Date/Time Provider Specialty Site   01/31/2018 03:00 PM VAMSHI Borden   Urology [address]     Signatures   Electronically signed by : Jo-Ann Boucher, ; Sean 15 2018 11:31AM EST                       (Author)    Electronically signed by : VAMSHI Blackwell ; Jan 16 2018  9:07AM EST

## 2018-01-24 VITALS
WEIGHT: 205 LBS | HEART RATE: 84 BPM | BODY MASS INDEX: 29.35 KG/M2 | SYSTOLIC BLOOD PRESSURE: 110 MMHG | HEIGHT: 70 IN | DIASTOLIC BLOOD PRESSURE: 80 MMHG

## 2018-01-31 ENCOUNTER — CONSULT (OUTPATIENT)
Dept: UROLOGY | Facility: AMBULATORY SURGERY CENTER | Age: 63
End: 2018-01-31
Payer: COMMERCIAL

## 2018-01-31 VITALS
SYSTOLIC BLOOD PRESSURE: 140 MMHG | HEIGHT: 71 IN | DIASTOLIC BLOOD PRESSURE: 78 MMHG | BODY MASS INDEX: 29.37 KG/M2 | WEIGHT: 209.8 LBS | HEART RATE: 78 BPM

## 2018-01-31 DIAGNOSIS — N43.3 HYDROCELE, UNSPECIFIED HYDROCELE TYPE: ICD-10-CM

## 2018-01-31 DIAGNOSIS — R39.15 URGENCY OF URINATION: Primary | ICD-10-CM

## 2018-01-31 PROCEDURE — 99024 POSTOP FOLLOW-UP VISIT: CPT | Performed by: UROLOGY

## 2018-01-31 RX ORDER — ASPIRIN 81 MG/1
81 TABLET, CHEWABLE ORAL
COMMUNITY
End: 2022-01-01 | Stop reason: ALTCHOICE

## 2018-01-31 RX ORDER — OXYBUTYNIN CHLORIDE 10 MG/1
10 TABLET, EXTENDED RELEASE ORAL
Qty: 30 TABLET | Refills: 0 | Status: SHIPPED | OUTPATIENT
Start: 2018-01-31 | End: 2018-03-02 | Stop reason: SDUPTHER

## 2018-01-31 RX ORDER — PREDNISONE 1 MG/1
2.5 TABLET ORAL
COMMUNITY
Start: 2018-01-28 | End: 2018-11-19 | Stop reason: ALTCHOICE

## 2018-01-31 NOTE — ASSESSMENT & PLAN NOTE
I discussed with the patient the healing is as expected  He has slight opening of the incision but this should heal fine  There is no evidence of infection  He will continue with light activity for 1 more week and then should be hopefully ready to return to work

## 2018-01-31 NOTE — PROGRESS NOTES
Assessment/Plan:    Hydrocele  I discussed with the patient the healing is as expected  He has slight opening of the incision but this should heal fine  There is no evidence of infection  He will continue with light activity for 1 more week and then should be hopefully ready to return to work  Urgency of urination  The patient continues to have lower urinary tract symptoms despite tamsulosin and oxybutynin  We discussed that his oxybutynin dose is low and we will increase it to 10 milligrams daily  We discussed that he can try this for few weeks and if he still has a lot of urinary urgency he can increase to 15 milligrams by taking 1 of his 5 milligram pills with the 10 milligram pills that I am prescribing  We discussed that the main side effect would be increased dry mouth  He will follow up in 6 weeks to re-evaluate his symptoms on this new medication dosage  Diagnoses and all orders for this visit:    Urgency of urination  -     oxybutynin (DITROPAN-XL) 10 MG 24 hr tablet; Take 1 tablet (10 mg total) by mouth daily at bedtime    Hydrocele, unspecified hydrocele type    Other orders  -     aspirin 81 mg chewable tablet; Chew 81 mg  -     predniSONE 5 mg tablet;           Subjective:      Patient ID: Duke Oneill is a 65 y o  male  78-year-old male presents for follow-up after right hydrocelectomy  He also is here to discuss his lower urinary tract symptoms  He states that is healing is going well from the surgery  He still is a bit tender especially when riding in the car  He denies any significant drainage, fevers, or chills  He denies any redness  He continues to have severe urinary urgency and infrequent urge incontinence  He is currently taking tamsulosin and oxybutynin 5 milligrams daily          The following portions of the patient's history were reviewed and updated as appropriate: allergies, current medications, past family history, past medical history, past social history, past surgical history and problem list     Review of Systems   Constitutional: Negative  HENT: Negative  Eyes: Negative  Respiratory: Negative  Cardiovascular: Negative  Gastrointestinal: Negative  Endocrine: Negative  Genitourinary:        As per HPI   Musculoskeletal: Negative  Skin: Negative  Allergic/Immunologic: Negative  Neurological: Negative  Hematological: Negative  Psychiatric/Behavioral: Negative  Objective:     Physical Exam   Constitutional: He is oriented to person, place, and time  He appears well-developed and well-nourished  Neck: Normal range of motion  Cardiovascular: Intact distal pulses  Pulmonary/Chest: Effort normal    Abdominal: Soft  Bowel sounds are normal  He exhibits no distension and no mass  There is no tenderness  There is no rebound and no guarding  Genitourinary:   Genitourinary Comments: Scrotal incision is healing well  There is some small areas of dehiscence but no evidence of infection  The scrotum is smaller in size than previous exam   The left testicle is surgically absent  The right testicle is normal to palpation  Musculoskeletal: Normal range of motion  Neurological: He is alert and oriented to person, place, and time  Skin: Skin is warm and dry  Psychiatric: He has a normal mood and affect

## 2018-01-31 NOTE — ASSESSMENT & PLAN NOTE
The patient continues to have lower urinary tract symptoms despite tamsulosin and oxybutynin  We discussed that his oxybutynin dose is low and we will increase it to 10 milligrams daily  We discussed that he can try this for few weeks and if he still has a lot of urinary urgency he can increase to 15 milligrams by taking 1 of his 5 milligram pills with the 10 milligram pills that I am prescribing  We discussed that the main side effect would be increased dry mouth  He will follow up in 6 weeks to re-evaluate his symptoms on this new medication dosage

## 2018-01-31 NOTE — LETTER
January 31, 2018     Archana Hernandez MD  [address]  [address] [place] [address]    Patient: Gretta Hill   YOB: 1955   Date of Visit: 1/31/2018       Dear Dr Tasneem Guillaume:    Thank you for referring Mary Palacios to me for evaluation  Below are my notes for this consultation  If you have questions, please do not hesitate to call me  I look forward to following your patient along with you  Sincerely,        Richard Mora MD        CC: No Recipients  Richard Mora MD  1/31/2018  5:13 PM  Sign at close encounter  Assessment/Plan:    Hydrocele  I discussed with the patient the healing is as expected  He has slight opening of the incision but this should heal fine  There is no evidence of infection  He will continue with light activity for 1 more week and then should be hopefully ready to return to work  Urgency of urination  The patient continues to have lower urinary tract symptoms despite tamsulosin and oxybutynin  We discussed that his oxybutynin dose is low and we will increase it to 10 milligrams daily  We discussed that he can try this for few weeks and if he still has a lot of urinary urgency he can increase to 15 milligrams by taking 1 of his 5 milligram pills with the 10 milligram pills that I am prescribing  We discussed that the main side effect would be increased dry mouth  He will follow up in 6 weeks to re-evaluate his symptoms on this new medication dosage  Diagnoses and all orders for this visit:    Urgency of urination  -     oxybutynin (DITROPAN-XL) 10 MG 24 hr tablet; Take 1 tablet (10 mg total) by mouth daily at bedtime    Hydrocele, unspecified hydrocele type    Other orders  -     aspirin 81 mg chewable tablet; Chew 81 mg  -     predniSONE 5 mg tablet;           Subjective:      Patient ID: Gretta Hill is a 65 y o  male  62-year-old male presents for follow-up after right hydrocelectomy    He also is here to discuss his lower urinary tract symptoms  He states that is healing is going well from the surgery  He still is a bit tender especially when riding in the car  He denies any significant drainage, fevers, or chills  He denies any redness  He continues to have severe urinary urgency and infrequent urge incontinence  He is currently taking tamsulosin and oxybutynin 5 milligrams daily  The following portions of the patient's history were reviewed and updated as appropriate: allergies, current medications, past family history, past medical history, past social history, past surgical history and problem list     Review of Systems   Constitutional: Negative  HENT: Negative  Eyes: Negative  Respiratory: Negative  Cardiovascular: Negative  Gastrointestinal: Negative  Endocrine: Negative  Genitourinary:        As per HPI   Musculoskeletal: Negative  Skin: Negative  Allergic/Immunologic: Negative  Neurological: Negative  Hematological: Negative  Psychiatric/Behavioral: Negative  Objective:     Physical Exam   Constitutional: He is oriented to person, place, and time  He appears well-developed and well-nourished  Neck: Normal range of motion  Cardiovascular: Intact distal pulses  Pulmonary/Chest: Effort normal    Abdominal: Soft  Bowel sounds are normal  He exhibits no distension and no mass  There is no tenderness  There is no rebound and no guarding  Genitourinary:   Genitourinary Comments: Scrotal incision is healing well  There is some small areas of dehiscence but no evidence of infection  The scrotum is smaller in size than previous exam   The left testicle is surgically absent  The right testicle is normal to palpation  Musculoskeletal: Normal range of motion  Neurological: He is alert and oriented to person, place, and time  Skin: Skin is warm and dry  Psychiatric: He has a normal mood and affect

## 2018-01-31 NOTE — PATIENT INSTRUCTIONS
Overactive Bladder   AMBULATORY CARE:   Overactive bladder  is a sudden urge to urinate that is difficult for you to control  It occurs when the muscles of the bladder contract (tighten) more than normal  This causes a frequent or sudden need to urinate  You usually have to urinate more than 8 times in 24 hours  You may need to get up more than once in the middle of the night to urinate  You may also leak urine before you are able to make it to the bathroom  Contact your healthcare provider for any of the following:   · Pink or bloody urine    · Painful urination    · Continued symptoms even after treatment    · Questions or concerns about your condition or care  Manage your symptoms:   · Limit liquids as directed  Limit liquids to decrease the amount you urinate  Ask how much liquid to drink each day and which liquids are best for you  You may need to avoid drinking liquids several hours before you go to sleep  Your healthcare provider may also recommend that you limit caffeine and alcohol  · Exercise regularly and maintain a healthy weight  Ask your healthcare provider how much you should weigh and about the best exercise plan for you  Extra weight puts pressure on your bladder and may make your symptoms worse  Ask him to help you create a weight loss plan if you are overweight  · Do pelvic muscle exercises often  Your pelvic muscles help you stop urinating  Squeeze these muscles tightly for 5 seconds, then relax for 5 seconds  Gradually work up to squeezing for 10 seconds  Do 3 sets of 15 repetitions a day, or as directed  This will help strengthen your pelvic muscles and improve bladder control  · Train your bladder  Go to the bathroom at set times, such as every 2 hours, even if you do not feel the urge to go  You can also try to hold your urine when you feel the urge to go  For example, hold your urine for 5 minutes when you feel the urge to go   As that becomes easier, hold your urine for 10 minutes  Work up to every 3 or 4 hours to help control your bladder  Treatment for overactive bladder  may be needed if other methods are not working:  · Medicines  may be given to relax your bladder and decrease urination  · Sacral nerve stimulation  sends electrical signals to your sacral nerve through a small device implanted under your skin  Your sacral nerve controls your bladder, sphincter, and pelvic floor muscles  · Surgery  may be done if all other treatments cannot help you control your bladder  Follow up with your healthcare provider as directed:  Write down your questions so you remember to ask them during your visits  © 2017 [facility] Information is for End User's use only and may not be sold, redistributed or otherwise used for commercial purposes  All illustrations and images included in CareNotes® are the copyrighted property of [employer]  or Tate White  The above information is an  only  It is not intended as medical advice for individual conditions or treatments  Talk to your doctor, nurse or pharmacist before following any medical regimen to see if it is safe and effective for you

## 2018-02-01 ENCOUNTER — TELEPHONE (OUTPATIENT)
Dept: UROLOGY | Facility: AMBULATORY SURGERY CENTER | Age: 63
End: 2018-02-01

## 2018-02-01 NOTE — TELEPHONE ENCOUNTER
Patient left without a release back to work note   Dr Monster Gamble wrote note and was faxed to [facility] @ [phone]

## 2018-02-10 DIAGNOSIS — N40.0 BENIGN PROSTATIC HYPERPLASIA, UNSPECIFIED WHETHER LOWER URINARY TRACT SYMPTOMS PRESENT: Primary | ICD-10-CM

## 2018-02-12 RX ORDER — TAMSULOSIN HYDROCHLORIDE 0.4 MG/1
CAPSULE ORAL
Qty: 90 CAPSULE | Refills: 3 | Status: SHIPPED | OUTPATIENT
Start: 2018-02-12 | End: 2018-10-30 | Stop reason: ALTCHOICE

## 2018-02-19 ENCOUNTER — TELEPHONE (OUTPATIENT)
Dept: FAMILY MEDICINE CLINIC | Facility: CLINIC | Age: 63
End: 2018-02-19

## 2018-02-19 ENCOUNTER — OFFICE VISIT (OUTPATIENT)
Dept: FAMILY MEDICINE CLINIC | Facility: CLINIC | Age: 63
End: 2018-02-19
Payer: COMMERCIAL

## 2018-02-19 VITALS — DIASTOLIC BLOOD PRESSURE: 78 MMHG | SYSTOLIC BLOOD PRESSURE: 118 MMHG

## 2018-02-19 DIAGNOSIS — I10 HYPERTENSION, UNSPECIFIED TYPE: Primary | ICD-10-CM

## 2018-02-19 PROCEDURE — 3078F DIAST BP <80 MM HG: CPT | Performed by: FAMILY MEDICINE

## 2018-02-19 PROCEDURE — 99211 OFF/OP EST MAY X REQ PHY/QHP: CPT | Performed by: FAMILY MEDICINE

## 2018-02-19 PROCEDURE — 3074F SYST BP LT 130 MM HG: CPT | Performed by: FAMILY MEDICINE

## 2018-02-19 RX ORDER — METHOTREXATE 20 MG/.4ML
15 INJECTION, SOLUTION SUBCUTANEOUS WEEKLY
Refills: 4 | COMMUNITY
Start: 2018-02-09 | End: 2020-08-04 | Stop reason: ALTCHOICE

## 2018-02-19 NOTE — PROGRESS NOTES
Pt is here for blood pressure check, was at dentist today and had high blood pressure readings  Denies symptoms  States he was nervous due to a procedure he was getting done  He is currently on lisinopril- hctz 10-12 5mg  Today's bp reading in the office was 118/78  Per Dr Noemi Cooper could have been due to being nervous at dentist, blood pressure is good now   Continue with same medication and dosage

## 2018-02-19 NOTE — TELEPHONE ENCOUNTER
Prev  bp incorrect    Before procedere 128/92 after 165/115    Pt is feeling better, bp now 192/85,     Pl adv

## 2018-03-02 DIAGNOSIS — R39.15 URGENCY OF URINATION: ICD-10-CM

## 2018-03-02 RX ORDER — OXYBUTYNIN CHLORIDE 10 MG/1
10 TABLET, EXTENDED RELEASE ORAL
Qty: 90 TABLET | Refills: 3 | Status: SHIPPED | OUTPATIENT
Start: 2018-03-02 | End: 2018-03-28

## 2018-03-02 NOTE — TELEPHONE ENCOUNTER
Patient's wife called and he need refill on Oxybutynin 10 mg - Insurance wants them to get the 90 day instead of 30 day

## 2018-03-28 ENCOUNTER — OFFICE VISIT (OUTPATIENT)
Dept: UROLOGY | Facility: AMBULATORY SURGERY CENTER | Age: 63
End: 2018-03-28
Payer: COMMERCIAL

## 2018-03-28 VITALS
BODY MASS INDEX: 28.29 KG/M2 | HEART RATE: 72 BPM | SYSTOLIC BLOOD PRESSURE: 122 MMHG | WEIGHT: 191 LBS | DIASTOLIC BLOOD PRESSURE: 86 MMHG | HEIGHT: 69 IN

## 2018-03-28 DIAGNOSIS — R39.15 URGENCY OF URINATION: Primary | ICD-10-CM

## 2018-03-28 PROCEDURE — 99024 POSTOP FOLLOW-UP VISIT: CPT | Performed by: NURSE PRACTITIONER

## 2018-03-28 NOTE — PROGRESS NOTES
3/28/2018    Niel Lanes  1955  [id number]        Assessment  Right hydrocele s/p right hydrocelectomy (1/11/2018)  Urinary frequency  BPH    Discussion  Ace Nuñez is a 59 y o  male being managed by Katia Wills  He continues to find his urinary symptoms bothersome despite the use of oxybutynin and Flomax  We discussed discontinuation of oxybutynin as this is the cause of his blurred vision  We discussed alternative medication and he is agreeable  He was prescribed Myrbetriq 50 mg  Use and side effects reviewed  He will return in 4-6 weeks for follow-up  If symptoms do not improve he will then discuss surgical interventions with Dr Laura Ocasio  Prior cystoscopy did reveal moderate to severe bilobar obstruction of the prostate  Cystoscopy was performed in 2016  All questions answered  History of Present Illness  59 y o  male with a history of right hydrocele s/p hydrocelectomy (1/11/2018), BPH, and urinary frequency presents today for  follow up  He denies any testicular discomfort or swelling  He states his incision has not healed well day and he is doing well since surgery  He continues to have urinary symptoms despite use of Flomax and oxybutynin  He has a developed blurred vision since starting oxybutynin  He complains of urinary frequency, urgency, hesitancy and interruption of stream   He feels that symptoms have slightly worsened as well  He denies any other changes in his overall health  Review of Systems  Review of Systems   Constitutional: Negative  HENT: Negative  Respiratory: Negative  Cardiovascular: Negative  Gastrointestinal: Negative  Genitourinary:        As per HPI   Musculoskeletal: Negative  Skin: Negative  Neurological: Negative  Hematological: Negative          Past Medical History  Past Medical History:   Diagnosis Date    Arthritis     Enlarged prostate     Hyperlipidemia     Hypertension        Past Surgical History  Past Surgical History:   Procedure Laterality Date    MA REMOVAL OF HYDROCELE,TUNICA,UNILAT Right 1/11/2018    Procedure: HYDROCELECTOMY;  Surgeon: David Rossi MD;  [facility];  Service: Urology    SCROTAL SURGERY      benign "lump"        Past Family History  Family History   Problem Relation Age of Onset    No Known Problems Father     No Known Problems Mother     No Known Problems Maternal Aunt     No Known Problems Maternal Uncle     No Known Problems Paternal Aunt     No Known Problems Paternal Uncle     No Known Problems Paternal Grandmother     No Known Problems Paternal Grandfather     No Known Problems Maternal Grandmother     No Known Problems Maternal Grandfather        Past Social history  Social History     Social History    Marital status: /Civil Union     Spouse name: N/A    Number of children: N/A    Years of education: N/A     Occupational History    Not on file       Social History Main Topics    Smoking status: Never Smoker    Smokeless tobacco: Never Used    Alcohol use No    Drug use: No    Sexual activity: No     Other Topics Concern    Not on file     Social History Narrative    No narrative on file       Current Medications  Current Outpatient Prescriptions   Medication Sig Dispense Refill    aspirin 81 mg chewable tablet Chew 81 mg daily      atorvastatin (LIPITOR) 10 mg tablet Take 10 mg by mouth daily      FLUoxetine (PROzac) 20 mg capsule Take 20 mg by mouth daily      folic acid (FOLVITE) 1 mg tablet Take by mouth daily      lisinopril-hydrochlorothiazide (PRINZIDE,ZESTORETIC) 10-12 5 MG per tablet Take 1 tablet by mouth daily      methotrexate 2 5 mg tablet Take 20 mg by mouth once a week      Multiple Vitamins-Minerals (MULTIVITAMIN MEN) TABS Take 1 tablet by mouth daily      Omega-3 Fatty Acids (FISH OIL) 1,000 mg Take 1,000 mg by mouth daily      oxybutynin (DITROPAN-XL) 10 MG 24 hr tablet Take 1 tablet (10 mg total) by mouth daily at bedtime 90 tablet 3    predniSONE 10 mg tablet 6tabs daily for 3 days,5 tabs daily for 3 days,4 tabs daily for 3 days,3 tabs daily  For 3 days, 2 tabs daily for 3 days, 1 tab daily for 3 days 63 tablet 0    RASUVO 20 MG/0 4ML SOAJ INJECT 20 MG SUBCUTANEOUSLY ONCE A WEEK  4    tamsulosin (FLOMAX) 0 4 mg TAKE 1 CAPSULE BY MOUTH  DAILY AT BEDTIME 90 capsule 3    aspirin 81 mg chewable tablet Chew 81 mg      predniSONE 5 mg tablet        No current facility-administered medications for this visit  Allergies  Allergies   Allergen Reactions    Fentanyl Other (See Comments) and Anaphylaxis     Oxygen drops severely       Past Medical History, Social History, Family History, medications and allergies were reviewed  Vitals  Vitals:    03/28/18 1459   BP: 122/86   Pulse: 72   Weight: 86 6 kg (191 lb)   Height: 5' 9" (1 753 m)       Physical Exam  Skin: warm, dry, intact  Pulmonary: Non-labored breathing  Abdomen: Soft, non-tender, non-distended  Musculoskeletal: AROM with no joint deformity or tenderness    Neurology: Alert and oriented        Results  Lab Results   Component Value Date    PSA 1 7 11/25/2017    PSA 1 6 06/25/2016    PSA 1 6 02/27/2016     Lab Results   Component Value Date    GLUCOSE 87 12/29/2017    CALCIUM 9 3 12/29/2017     12/29/2017    K 3 7 12/29/2017    CO2 30 12/29/2017     12/29/2017    BUN 16 12/29/2017    CREATININE 0 97 12/29/2017     Lab Results   Component Value Date    WBC 9 04 12/29/2017    HGB 15 0 12/29/2017    HCT 44 4 12/29/2017    MCV 93 12/29/2017     12/29/2017

## 2018-03-29 ENCOUNTER — TELEPHONE (OUTPATIENT)
Dept: UROLOGY | Facility: AMBULATORY SURGERY CENTER | Age: 63
End: 2018-03-29

## 2018-03-29 NOTE — TELEPHONE ENCOUNTER
[employer] short term disability sent signed medical record request  Records faxed, confirmation to be scanned

## 2018-04-02 ENCOUNTER — TELEPHONE (OUTPATIENT)
Dept: UROLOGY | Facility: AMBULATORY SURGERY CENTER | Age: 63
End: 2018-04-02

## 2018-04-02 DIAGNOSIS — R39.15 URGENCY OF URINATION: Primary | ICD-10-CM

## 2018-04-02 RX ORDER — FESOTERODINE FUMARATE 4 MG/1
4 TABLET, EXTENDED RELEASE ORAL
Qty: 30 TABLET | Refills: 11 | Status: SHIPPED | OUTPATIENT
Start: 2018-04-02 | End: 2018-10-30 | Stop reason: ALTCHOICE

## 2018-04-02 NOTE — TELEPHONE ENCOUNTER
Patient is not approved for Myrbetriq and oxybutynin 10 mg is not working - His insurance approved Blaire Leslie - can an order be placed for this he using [facility]

## 2018-04-02 NOTE — TELEPHONE ENCOUNTER
Frieda Mulligan  This patients Myrbetriq was denied with the insurance it is excluded from the plan an alternative would be Toviaz 4 or 8 mg or oxybutnin which he already took and had no results    Thanks  Gus Rizzo

## 2018-05-03 ENCOUNTER — TELEPHONE (OUTPATIENT)
Dept: UROLOGY | Facility: AMBULATORY SURGERY CENTER | Age: 63
End: 2018-05-03

## 2018-05-03 DIAGNOSIS — R39.15 URGENCY OF URINATION: ICD-10-CM

## 2018-05-03 NOTE — TELEPHONE ENCOUNTER
Dawn Tony called stating that the Julito Speller that Seth Prime is taking is causing blurred vision  He had the same side effects with  Oxybutynin  Myrbetriq was not approved  Is there anything else we can try?

## 2018-05-04 NOTE — TELEPHONE ENCOUNTER
Patient prescribed Myrbetriq 25mg daily  This is medically necessary as he has intolerable side effect with multiple anticholingerics  Roya Maria may need to a medical necessity request for this

## 2018-05-07 ENCOUNTER — DOCUMENTATION (OUTPATIENT)
Dept: UROLOGY | Facility: HOSPITAL | Age: 63
End: 2018-05-07

## 2018-05-07 NOTE — TELEPHONE ENCOUNTER
Oneyda Bradley  [employer] needs a medically necessity letter sent over with clinicals do his Myrbetriq to be added onto his formulary  Please obtain and I will fax it over once it is done    Thanks  Gamaliel Cancino

## 2018-05-08 ENCOUNTER — TELEPHONE (OUTPATIENT)
Dept: UROLOGY | Facility: AMBULATORY SURGERY CENTER | Age: 63
End: 2018-05-08

## 2018-05-08 NOTE — TELEPHONE ENCOUNTER
Spoke with Susan James, patient's wife  Patient has appt with Dr Bryan Santos on 5/16/18, and would like to wait and discuss options at that time

## 2018-05-08 NOTE — TELEPHONE ENCOUNTER
Patient can try Detrol La 4mg or another anticholinergic that is on his formulary  If this is not effective or if he has side effects then will attempt to gain coverage for Myrbetriq

## 2018-05-08 NOTE — PROGRESS NOTES
Can try a different anticholinergic such as Detrol La 4mg daily   If this is not effective or he has side effects then will re attempt to prescribe Myrbetriq

## 2018-05-08 NOTE — PROGRESS NOTES
Pastor Martin from [employer] called back about the Myrbetriq ordered and letter sent for necessity and it was still denied case #[id number] - suggestions

## 2018-05-14 NOTE — TELEPHONE ENCOUNTER
Jasmin Auguste  [employer] is still denying this with the appeal sent over with the letter     Thanks  Domingo Callahan

## 2018-05-16 ENCOUNTER — OFFICE VISIT (OUTPATIENT)
Dept: UROLOGY | Facility: AMBULATORY SURGERY CENTER | Age: 63
End: 2018-05-16
Payer: COMMERCIAL

## 2018-05-16 VITALS
WEIGHT: 215.4 LBS | BODY MASS INDEX: 30.84 KG/M2 | HEART RATE: 72 BPM | SYSTOLIC BLOOD PRESSURE: 130 MMHG | DIASTOLIC BLOOD PRESSURE: 82 MMHG | HEIGHT: 70 IN

## 2018-05-16 DIAGNOSIS — R39.15 URGENCY OF URINATION: Primary | ICD-10-CM

## 2018-05-16 PROCEDURE — 99214 OFFICE O/P EST MOD 30 MIN: CPT | Performed by: UROLOGY

## 2018-05-21 ENCOUNTER — TELEPHONE (OUTPATIENT)
Dept: UROLOGY | Facility: AMBULATORY SURGERY CENTER | Age: 63
End: 2018-05-21

## 2018-05-21 NOTE — TELEPHONE ENCOUNTER
Tom Morel  The patient was approved for the assistance program for his Myrbetriq he received a letter as well the program is good for 12 months 5/18/18-5/18/19  Thanks  [employer]

## 2018-05-23 NOTE — PROGRESS NOTES
Assessment/Plan:    No problem-specific Assessment & Plan notes found for this encounter  There are no diagnoses linked to this encounter  Total visit time was 25 minutes of which over 50% was spent on counseling  Subjective:     Patient ID: Rowe Hammans is a 59 y o  male    78-year-old male with BPH and refractory lower urinary tract symptoms including urinary urgency and frequency presents for follow-up  The patient has been trying to get approval for Myrbetriq but has been unsuccessful so far  He is currently on tolterodine and tamsulosin and continues to have significant symptoms as noted below  He also has a history of hydrocele repair and is doing well from this standpoint  He has no other complaints  The following portions of the patient's history were reviewed and updated as appropriate: allergies, current medications, past family history, past medical history, past social history, past surgical history and problem list     Review of Systems   Constitutional: Negative  HENT: Negative  Eyes: Negative  Respiratory: Negative  Cardiovascular: Negative  Gastrointestinal: Negative  Endocrine: Negative  Genitourinary:        As noted per HPI   Musculoskeletal: Negative  Skin: Negative  Allergic/Immunologic: Negative  Neurological: Negative  Hematological: Negative  Psychiatric/Behavioral: Negative  AUA SYMPTOM SCORE      Most Recent Value   AUA SYMPTOM SCORE   How often have you had a sensation of not emptying your bladder completely after you finished urinating? 2   How often have you had to urinate again less than two hours after you finished urinating? 5   How often have you found you stopped and started again several times when you urinate? 2   How often have you found it difficult to postpone urination? 5   How often have you had a weak urinary stream?  1   How often have you had to push or strain to begin urination?   2   How many times did you most typically get up to urinate from the time you went to bed at night until the time you got up in the morning? 1   Quality of Life: If you were to spend the rest of your life with your urinary condition just the way it is now, how would you feel about that?  --   AUA SYMPTOM SCORE  18          Urinary Incontinence Screening      Most Recent Value   Urinary Incontinence   Urinary Incontinence? No   Incomplete emptying? No   Urinary frequency? Yes [every 1/2]   Urinary urgency? Yes   Urinary hesitancy? No   Dysuria (painful difficult urination)? No   Nocturia (waking up to use the bathroom)? No   Straining (having to push to go)? No   Weak stream?  No   Intermittent stream?  No   Post void dribbling? Yes          Objective:    Physical Exam   Constitutional: He is oriented to person, place, and time  He appears well-developed and well-nourished  Neck: Normal range of motion  Cardiovascular: Intact distal pulses  Pulmonary/Chest: Effort normal    Abdominal: Soft  Bowel sounds are normal  He exhibits no distension and no mass  There is no tenderness  There is no rebound and no guarding  Genitourinary:   Genitourinary Comments: Scrotal incision well healed  No evidence of recurrent hydrocele  Musculoskeletal: Normal range of motion  Neurological: He is alert and oriented to person, place, and time  Skin: Skin is warm and dry  Psychiatric: He has a normal mood and affect  Vitals reviewed          Results  Lab Results   Component Value Date    PSA 1 7 11/25/2017    PSA 1 6 06/25/2016    PSA 1 6 02/27/2016     Lab Results   Component Value Date    GLUCOSE 87 12/29/2017    CALCIUM 9 3 12/29/2017     12/29/2017    K 3 7 12/29/2017    CO2 30 12/29/2017     12/29/2017    BUN 16 12/29/2017    CREATININE 0 97 12/29/2017     Lab Results   Component Value Date    WBC 9 04 12/29/2017    HGB 15 0 12/29/2017    HCT 44 4 12/29/2017    MCV 93 12/29/2017     12/29/2017 No results found for this or any previous visit (from the past 1 hour(s)) ]

## 2018-05-29 ENCOUNTER — TELEPHONE (OUTPATIENT)
Dept: FAMILY MEDICINE CLINIC | Facility: CLINIC | Age: 63
End: 2018-05-29

## 2018-05-29 NOTE — TELEPHONE ENCOUNTER
Wife called back: They are currently seeing a urologist, he is having a bladder uplift, and he just had testicle drained in April  (The MRI last year ended up costing them $4000 ) Dr Mariposa Ruvalcaba treating them

## 2018-05-29 NOTE — TELEPHONE ENCOUNTER
CALLED PT REGARDING MRI ABD DONE 9-8-17 AND THEY ARE SUGGESTING PT HAVE A REPEAT MRI ABD /PELVIS W / CONTARST DUE TO A 2 6 LT HEPATIC LOBE LESION ,WAITING FOR CB

## 2018-06-07 ENCOUNTER — TELEPHONE (OUTPATIENT)
Dept: UROLOGY | Facility: AMBULATORY SURGERY CENTER | Age: 63
End: 2018-06-07

## 2018-06-07 NOTE — ASSESSMENT & PLAN NOTE
Had a lengthy discussion with the patient and his wife regarding options  We will continue to try to get approval for the Myrbetriq  Otherwise he has tried maximal therapy and options would include trying to treat his prostate or proceeding with a advanced treatment option for overactive bladder  We discussed prostate surgeries including transurethral resection of the prostate and Uro lift procedure  We also discussed options such as Botox injection, percutaneous tibial nerve stimulation, and InterStim sacral neuromodulation  Risks and benefits of all these options were discussed in detail  After lengthy discussion he is leaning towards doing a Uro lift procedure  We will schedule this for the near future

## 2018-06-07 NOTE — TELEPHONE ENCOUNTER
I called the [facility] program assistance to check on this patients case with them they told me they have tried 2 times to reach the patient at his home number once on 5/18/18 and 6/5/18 and was unable to reach them voicemails were left  I gave them his wifes cell number which they tried calling with me on the phone and no answer they left a voicemail with the number for the patient to call back to schedule there delivery for the medication   I also left a voicemail on the wifes cell to let them know they need to follow up with the program

## 2018-06-07 NOTE — TELEPHONE ENCOUNTER
Talked with Yani Mendoza and they are still waiting for prior authorization from their insurance   Dr Bryan Shannon notified also they have not taken the Myrbetriq yet so they will try that

## 2018-06-07 NOTE — TELEPHONE ENCOUNTER
Dr Esme Cottrell had tried to contact [employer] to see how he was doing with the Myrbetriq and if he was still interested in getting the urolift  He was unable to leave a message - so contacted Jose Olivas his wife which is whom I usually communicate with   LM with Dr Prudence Hilario questions and asked her to call back

## 2018-06-13 DIAGNOSIS — K76.9 HEPATIC LESION: Primary | ICD-10-CM

## 2018-07-08 ENCOUNTER — TRANSCRIBE ORDERS (OUTPATIENT)
Dept: ADMINISTRATIVE | Age: 63
End: 2018-07-08

## 2018-07-08 ENCOUNTER — APPOINTMENT (OUTPATIENT)
Dept: LAB | Age: 63
End: 2018-07-08
Payer: COMMERCIAL

## 2018-07-08 DIAGNOSIS — M06.09 RHEUMATOID ARTHRITIS OF MULTIPLE SITES WITHOUT RHEUMATOID FACTOR (HCC): ICD-10-CM

## 2018-07-08 DIAGNOSIS — M06.09 RHEUMATOID ARTHRITIS OF MULTIPLE SITES WITHOUT RHEUMATOID FACTOR (HCC): Primary | ICD-10-CM

## 2018-07-08 LAB
ALBUMIN SERPL BCP-MCNC: 3.8 G/DL (ref 3.5–5)
ALP SERPL-CCNC: 70 U/L (ref 46–116)
ALT SERPL W P-5'-P-CCNC: 53 U/L (ref 12–78)
ANION GAP SERPL CALCULATED.3IONS-SCNC: 4 MMOL/L (ref 4–13)
AST SERPL W P-5'-P-CCNC: 22 U/L (ref 5–45)
BASOPHILS # BLD AUTO: 0.03 THOUSANDS/ΜL (ref 0–0.1)
BASOPHILS NFR BLD AUTO: 0 % (ref 0–1)
BILIRUB SERPL-MCNC: 0.86 MG/DL (ref 0.2–1)
BUN SERPL-MCNC: 20 MG/DL (ref 5–25)
CALCIUM SERPL-MCNC: 8.5 MG/DL (ref 8.3–10.1)
CHLORIDE SERPL-SCNC: 109 MMOL/L (ref 100–108)
CO2 SERPL-SCNC: 28 MMOL/L (ref 21–32)
CREAT SERPL-MCNC: 1.04 MG/DL (ref 0.6–1.3)
EOSINOPHIL # BLD AUTO: 0.1 THOUSAND/ΜL (ref 0–0.61)
EOSINOPHIL NFR BLD AUTO: 1 % (ref 0–6)
ERYTHROCYTE [DISTWIDTH] IN BLOOD BY AUTOMATED COUNT: 13.5 % (ref 11.6–15.1)
ERYTHROCYTE [SEDIMENTATION RATE] IN BLOOD: 5 MM/HOUR (ref 0–10)
GFR SERPL CREATININE-BSD FRML MDRD: 77 ML/MIN/1.73SQ M
GLUCOSE P FAST SERPL-MCNC: 100 MG/DL (ref 65–99)
HCT VFR BLD AUTO: 43.8 % (ref 36.5–49.3)
HGB BLD-MCNC: 14.6 G/DL (ref 12–17)
IMM GRANULOCYTES # BLD AUTO: 0.03 THOUSAND/UL (ref 0–0.2)
IMM GRANULOCYTES NFR BLD AUTO: 0 % (ref 0–2)
LYMPHOCYTES # BLD AUTO: 1.37 THOUSANDS/ΜL (ref 0.6–4.47)
LYMPHOCYTES NFR BLD AUTO: 20 % (ref 14–44)
MCH RBC QN AUTO: 31.3 PG (ref 26.8–34.3)
MCHC RBC AUTO-ENTMCNC: 33.3 G/DL (ref 31.4–37.4)
MCV RBC AUTO: 94 FL (ref 82–98)
MONOCYTES # BLD AUTO: 0.7 THOUSAND/ΜL (ref 0.17–1.22)
MONOCYTES NFR BLD AUTO: 10 % (ref 4–12)
NEUTROPHILS # BLD AUTO: 4.69 THOUSANDS/ΜL (ref 1.85–7.62)
NEUTS SEG NFR BLD AUTO: 69 % (ref 43–75)
NRBC BLD AUTO-RTO: 0 /100 WBCS
PLATELET # BLD AUTO: 219 THOUSANDS/UL (ref 149–390)
PMV BLD AUTO: 12.6 FL (ref 8.9–12.7)
POTASSIUM SERPL-SCNC: 4.1 MMOL/L (ref 3.5–5.3)
PROT SERPL-MCNC: 6.6 G/DL (ref 6.4–8.2)
RBC # BLD AUTO: 4.66 MILLION/UL (ref 3.88–5.62)
SODIUM SERPL-SCNC: 141 MMOL/L (ref 136–145)
WBC # BLD AUTO: 6.92 THOUSAND/UL (ref 4.31–10.16)

## 2018-07-08 PROCEDURE — 85652 RBC SED RATE AUTOMATED: CPT

## 2018-07-08 PROCEDURE — 80053 COMPREHEN METABOLIC PANEL: CPT

## 2018-07-08 PROCEDURE — 36415 COLL VENOUS BLD VENIPUNCTURE: CPT

## 2018-07-08 PROCEDURE — 85025 COMPLETE CBC W/AUTO DIFF WBC: CPT

## 2018-07-13 ENCOUNTER — HOSPITAL ENCOUNTER (OUTPATIENT)
Dept: MRI IMAGING | Facility: HOSPITAL | Age: 63
Discharge: HOME | End: 2018-07-13
Payer: COMMERCIAL

## 2018-07-13 DIAGNOSIS — K76.9 HEPATIC LESION: ICD-10-CM

## 2018-07-13 PROCEDURE — A9585 GADOBUTROL INJECTION: HCPCS | Performed by: FAMILY MEDICINE

## 2018-07-13 PROCEDURE — 74183 MRI ABD W/O CNTR FLWD CNTR: CPT

## 2018-07-13 RX ADMIN — GADOBUTROL 9 ML: 604.72 INJECTION INTRAVENOUS at 18:56

## 2018-07-16 ENCOUNTER — OFFICE VISIT (OUTPATIENT)
Dept: FAMILY MEDICINE CLINIC | Facility: CLINIC | Age: 63
End: 2018-07-16
Payer: COMMERCIAL

## 2018-07-16 VITALS
BODY MASS INDEX: 29.58 KG/M2 | SYSTOLIC BLOOD PRESSURE: 114 MMHG | DIASTOLIC BLOOD PRESSURE: 82 MMHG | HEIGHT: 70 IN | TEMPERATURE: 97.7 F | HEART RATE: 80 BPM | WEIGHT: 206.6 LBS

## 2018-07-16 DIAGNOSIS — K21.9 GASTROESOPHAGEAL REFLUX DISEASE, ESOPHAGITIS PRESENCE NOT SPECIFIED: Primary | ICD-10-CM

## 2018-07-16 PROCEDURE — 99213 OFFICE O/P EST LOW 20 MIN: CPT | Performed by: NURSE PRACTITIONER

## 2018-07-16 PROCEDURE — 3008F BODY MASS INDEX DOCD: CPT | Performed by: NURSE PRACTITIONER

## 2018-07-16 RX ORDER — TOFACITINIB 11 MG/1
TABLET, FILM COATED, EXTENDED RELEASE ORAL
COMMUNITY
Start: 2018-07-09 | End: 2021-04-27 | Stop reason: ALTCHOICE

## 2018-07-16 NOTE — PROGRESS NOTES
Patient ID: Rajinder Silverio is a 59 y o  male  HPI: 59 y o male presenting with liquid diarrhea for one day that as resolved on its own and a constant need to belch        SUBJECTIVE    Family History   Problem Relation Age of Onset    Heart disease Father         CARDIAC DISORDER    Hypertension Father     Hypertension Mother     No Known Problems Maternal Aunt     No Known Problems Maternal Uncle     No Known Problems Paternal Aunt     No Known Problems Paternal Uncle     No Known Problems Paternal Grandmother     No Known Problems Paternal Grandfather     Diabetes Maternal Grandmother         MELLITUS    No Known Problems Maternal Grandfather     Hypertension Other      Social History     Social History    Marital status: /Civil Union     Spouse name: N/A    Number of children: 2    Years of education: N/A     Occupational History    FULL-TIME EMPLOYMENT      Social History Main Topics    Smoking status: Never Smoker    Smokeless tobacco: Never Used    Alcohol use No    Drug use: No    Sexual activity: No      Comment: NOT CURRENTLY SEXUALLY ACTIVE AS PER [employer]     Other Topics Concern    Not on file     Social History Narrative    ALWAYS USES SEAT BELT    DENIED DAILY COFFEE CONSUMPTION (__CUPS/DAY)    DENIED DAILY COLA CONSUMPTION (__CANS/DAY)    DAILY TEA CONSUMPTION (__CUPS/DAY)    DENTAL CARE, REGULARLY    EXERCISE: WALKING    HIGH SCHOOL GRADUATE    DENIED MULTIPLE ORGAN DONOR    NO LIVING WILL    PETS/ANIMALS    DENIED POWER OF  IN EXISTENCE    WATER INTAKE, ADEQUATE (PER DAY)     Past Medical History:   Diagnosis Date    Arthritis     Cellulitis     LAST ASSESSED: 6/13/14    Enlarged prostate     Epidermal inclusion cyst     LAST ASSESSED: 10/4/13    Erythrasma     LAST ASSESSED: 9/23/13    Furuncle     LAST ASSESSED: 6/11/14    Hyperlipidemia     Hypertension      Past Surgical History:   Procedure Laterality Date    HYDROCELE EXCISION / REPAIR Right 01/11/2018    SPERMATIC CORD EXCSION OF HYDROCELE; MANAGED BY: Consuelo BANDA REMOVAL OF HYDROCELE,TUNICA,UNILAT Right 1/11/2018    Procedure: HYDROCELECTOMY;  Surgeon: Chase Rausch MD;  [facility];  Service: Urology    SCROTAL SURGERY      benign "lump"     Allergies   Allergen Reactions    Fentanyl Other (See Comments) and Anaphylaxis     Oxygen drops severely       Current Outpatient Prescriptions:     aspirin 81 mg chewable tablet, Chew 81 mg, Disp: , Rfl:     atorvastatin (LIPITOR) 10 mg tablet, Take 10 mg by mouth daily, Disp: , Rfl:     folic acid (FOLVITE) 1 mg tablet, Take by mouth daily, Disp: , Rfl:     lisinopril-hydrochlorothiazide (PRINZIDE,ZESTORETIC) 10-12 5 MG per tablet, Take 1 tablet by mouth daily, Disp: , Rfl:     Mirabegron ER (MYRBETRIQ) 25 MG TB24, Take 25 mg by mouth daily with dinner, Disp: 30 tablet, Rfl: 0    Multiple Vitamins-Minerals (MULTIVITAMIN MEN) TABS, Take 1 tablet by mouth daily, Disp: , Rfl:     Omega-3 Fatty Acids (FISH OIL) 1,000 mg, Take 1,000 mg by mouth daily, Disp: , Rfl:     RASUVO 20 MG/0 4ML SOAJ, INJECT 20 MG SUBCUTANEOUSLY ONCE A WEEK, Disp: , Rfl: 4    Fesoterodine Fumarate ER (TOVIAZ) 4 MG TB24, Take 1 tablet (4 mg total) by mouth daily with dinner, Disp: 30 tablet, Rfl: 11    FLUoxetine (PROzac) 20 mg capsule, TAKE ONE CAPSULE BY MOUTH EVERY DAY, Disp: 90 capsule, Rfl: 2    methotrexate 2 5 mg tablet, Take 20 mg by mouth once a week, Disp: , Rfl:     predniSONE 10 mg tablet, 6tabs daily for 3 days,5 tabs daily for 3 days,4 tabs daily for 3 days,3 tabs daily  For 3 days, 2 tabs daily for 3 days, 1 tab daily for 3 days, Disp: 63 tablet, Rfl: 0    predniSONE 5 mg tablet, , Disp: , Rfl:     tamsulosin (FLOMAX) 0 4 mg, TAKE 1 CAPSULE BY MOUTH  DAILY AT BEDTIME, Disp: 90 capsule, Rfl: 3    XELJANZ XR 11 MG TB24, , Disp: , Rfl:     Review of Systems    Consitutional:  Denies, chills, fatigue and fever   Pulmonary:  Denies cough, shortness of breath or dyspnea on exertion    Cardiovascular:  Denies chest pain/pressure   Abdomen:  Positive for diarrhea gas/bloating   Integumentary:  Denies ecchymosis, petechiae, rash or lesions   Neurological:  Denies headaches, dizziness, confusion, loss of consciousness or behavioral changes  Psychological:  Denies anxiety, depression or sleep disturbances      OBJECTIVE    /82   Pulse 80   Temp 97 7 °F (36 5 °C)   Ht 5' 10" (1 778 m)   Wt 93 7 kg (206 lb 9 6 oz)   BMI 29 64 kg/m²     Constitutional:  Well appearing and in no acute distress  Pulmonary:  clear to auscultation bilaterally and no crackles, no wheezes, chest expansion normal  Cardiovascular:  S1S2, regular rate and rhythm  Gastrointestinal:  abdomen is soft with slight epigastric tenderness    Lymphatic:  no lymphadenopathy   Skin:  skin color, texture and turgor are normal; no bruising, rashes or lesions noted  Neurologic:  Alert and oriented x 4      Assessment/Plan:  Diagnoses and all orders for this visit:    Gastroesophageal reflux disease, esophagitis presence not specified    Other orders  -     XELJANZ XR 11 MG TB24;       GERD  Reviewed with patient plan to wait for abdominal MRI results performed on 07/13/18  Discussed with patient use of OTC acid medication use  Patient instructed to call in 72 hours if not feeling better or if symptoms worsen

## 2018-07-17 ENCOUNTER — TELEPHONE (OUTPATIENT)
Dept: FAMILY MEDICINE CLINIC | Facility: CLINIC | Age: 63
End: 2018-07-17

## 2018-07-18 DIAGNOSIS — F32.A DEPRESSION, UNSPECIFIED DEPRESSION TYPE: Primary | ICD-10-CM

## 2018-07-19 RX ORDER — FLUOXETINE HYDROCHLORIDE 20 MG/1
CAPSULE ORAL
Qty: 90 CAPSULE | Refills: 2 | Status: SHIPPED | OUTPATIENT
Start: 2018-07-19 | End: 2019-04-20 | Stop reason: SDUPTHER

## 2018-08-20 DIAGNOSIS — E78.00 HYPERCHOLESTEROLEMIA: Primary | ICD-10-CM

## 2018-08-20 RX ORDER — ATORVASTATIN CALCIUM 10 MG/1
TABLET, FILM COATED ORAL
Qty: 90 TABLET | Refills: 1 | Status: SHIPPED | OUTPATIENT
Start: 2018-08-20 | End: 2019-03-02 | Stop reason: SDUPTHER

## 2018-09-04 ENCOUNTER — OFFICE VISIT (OUTPATIENT)
Dept: URGENT CARE | Age: 63
End: 2018-09-04
Payer: COMMERCIAL

## 2018-09-04 VITALS
DIASTOLIC BLOOD PRESSURE: 80 MMHG | HEIGHT: 70 IN | WEIGHT: 200 LBS | OXYGEN SATURATION: 98 % | RESPIRATION RATE: 18 BRPM | SYSTOLIC BLOOD PRESSURE: 135 MMHG | HEART RATE: 77 BPM | TEMPERATURE: 98 F | BODY MASS INDEX: 28.63 KG/M2

## 2018-09-04 DIAGNOSIS — R10.9 FLANK PAIN: Primary | ICD-10-CM

## 2018-09-04 PROCEDURE — 99213 OFFICE O/P EST LOW 20 MIN: CPT | Performed by: FAMILY MEDICINE

## 2018-09-10 DIAGNOSIS — R39.15 URGENCY OF URINATION: ICD-10-CM

## 2018-09-10 NOTE — TELEPHONE ENCOUNTER
Received message from [employer] [facility] program asking for refills on Mrybetriq    They will need to be faxed once approved

## 2018-09-20 DIAGNOSIS — I10 ESSENTIAL HYPERTENSION: Primary | ICD-10-CM

## 2018-09-20 RX ORDER — LISINOPRIL AND HYDROCHLOROTHIAZIDE 12.5; 1 MG/1; MG/1
TABLET ORAL
Qty: 90 TABLET | Refills: 3 | Status: SHIPPED | OUTPATIENT
Start: 2018-09-20 | End: 2019-09-18 | Stop reason: SDUPTHER

## 2018-10-30 ENCOUNTER — OFFICE VISIT (OUTPATIENT)
Dept: FAMILY MEDICINE CLINIC | Facility: CLINIC | Age: 63
End: 2018-10-30
Payer: COMMERCIAL

## 2018-10-30 VITALS
SYSTOLIC BLOOD PRESSURE: 132 MMHG | HEART RATE: 78 BPM | DIASTOLIC BLOOD PRESSURE: 82 MMHG | TEMPERATURE: 97.5 F | HEIGHT: 70 IN | BODY MASS INDEX: 28.7 KG/M2

## 2018-10-30 DIAGNOSIS — K21.9 GASTROESOPHAGEAL REFLUX DISEASE, ESOPHAGITIS PRESENCE NOT SPECIFIED: ICD-10-CM

## 2018-10-30 DIAGNOSIS — M05.79 RHEUMATOID ARTHRITIS INVOLVING MULTIPLE SITES WITH POSITIVE RHEUMATOID FACTOR (HCC): ICD-10-CM

## 2018-10-30 DIAGNOSIS — I10 ESSENTIAL HYPERTENSION: Primary | ICD-10-CM

## 2018-10-30 DIAGNOSIS — M25.511 BILATERAL SHOULDER PAIN, UNSPECIFIED CHRONICITY: ICD-10-CM

## 2018-10-30 DIAGNOSIS — M25.512 BILATERAL SHOULDER PAIN, UNSPECIFIED CHRONICITY: ICD-10-CM

## 2018-10-30 DIAGNOSIS — F41.9 ANXIETY DISORDER, UNSPECIFIED TYPE: ICD-10-CM

## 2018-10-30 DIAGNOSIS — E78.2 MIXED HYPERLIPIDEMIA: ICD-10-CM

## 2018-10-30 PROCEDURE — 3075F SYST BP GE 130 - 139MM HG: CPT | Performed by: FAMILY MEDICINE

## 2018-10-30 PROCEDURE — 99214 OFFICE O/P EST MOD 30 MIN: CPT | Performed by: FAMILY MEDICINE

## 2018-10-30 PROCEDURE — 1036F TOBACCO NON-USER: CPT | Performed by: FAMILY MEDICINE

## 2018-10-30 PROCEDURE — 3079F DIAST BP 80-89 MM HG: CPT | Performed by: FAMILY MEDICINE

## 2018-10-30 NOTE — PROGRESS NOTES
Assessment/Plan:    Esophageal reflux  Stable  Cont to monitor  Hypertension  Controlled  Cont present meds  Check labs  Recheck 6m    Rheumatoid arthritis ([address] )  Still with some signs of synovitis, though pt states that he is doing better  Cont f/u with Rheum    Anxiety disorder  Pt denies depression and states that he is doing well  Recheck 6m    Hyperlipidemia  Check labs       Diagnoses and all orders for this visit:    Essential hypertension  -     CBC and differential; Future  -     Comprehensive metabolic panel; Future  -     Lipid panel; Future    Rheumatoid arthritis involving multiple sites with positive rheumatoid factor (HCC)    Anxiety disorder, unspecified type  -     TSH, 3rd generation with Free T4 reflex; Future    Mixed hyperlipidemia    Gastroesophageal reflux disease, esophagitis presence not specified    Bilateral shoulder pain, unspecified chronicity  Comments:  Pt with hx of R RC tear  Now with bilat pain with motion  I reviewed with pt  f/u with rheum          Subjective:      Patient ID: Sydney Conrad is a 59 y o  male  f/u multiple med issues  - pt states that his anxiety is "ok"  Wife is concerned that he is "not happy" and "cranky"  Pt denies depression or anhedonia  - headaches have actually improved over the last several years      - both shoulder have bothering him for a few months  Pt has a partial tear of this R RC but is not sure why the L is acting up  Pt does some lifting with work  - pt notes that he seems to have decreased hand strength and gets some muscle cramping with repetitive motion  Pt does feel that treatment of his RA has made a difference but he still has some pain   Pt is up to date withRheum  - pt denies CP, palp, lightheadedness or other CV symptoms with or without exertion  - no GI or  complaints        The following portions of the patient's history were reviewed and updated as appropriate:   He  has a past medical history of Arthritis; Cellulitis; Enlarged prostate; Epidermal inclusion cyst; Erythrasma; Furuncle; Hyperlipidemia; and Hypertension  He   Patient Active Problem List    Diagnosis Date Noted    Hydrocele 01/31/2018    Urgency of urination 01/31/2018    Right arm pain 10/13/2017    Rheumatoid arthritis ([facility] [address] ) 10/13/2017    Hypertension 10/13/2017    Liver mass 11/17/2016    Hyperlipidemia 02/03/2016    Anxiety disorder 08/15/2013    Esophageal reflux 08/15/2013     He  has a past surgical history that includes Scrotal surgery; pr removal of hydrocele,tunica,unilat (Right, 1/11/2018); and Hydrocele excision / repair (Right, 01/11/2018)  He  reports that he has never smoked  He has never used smokeless tobacco  He reports that he does not drink alcohol or use drugs  Current Outpatient Prescriptions   Medication Sig Dispense Refill    aspirin 81 mg chewable tablet Chew 81 mg      atorvastatin (LIPITOR) 10 mg tablet TAKE 1 TABLET BY MOUTH  DAILY 90 tablet 1    FLUoxetine (PROzac) 20 mg capsule TAKE ONE CAPSULE BY MOUTH EVERY DAY 90 capsule 2    folic acid (FOLVITE) 1 mg tablet Take by mouth daily      lisinopril-hydrochlorothiazide (PRINZIDE,ZESTORETIC) 10-12 5 MG per tablet TAKE ONE TABLET BY MOUTH EVERY DAY 90 tablet 3    Multiple Vitamins-Minerals (MULTIVITAMIN MEN) TABS Take 1 tablet by mouth daily      Omega-3 Fatty Acids (FISH OIL) 1,000 mg Take 1,000 mg by mouth daily      predniSONE 5 mg tablet 2 5 mg        RASUVO 20 MG/0 4ML SOAJ INJECT 20 MG SUBCUTANEOUSLY ONCE A WEEK  4    XELJANZ XR 11 MG TB24        No current facility-administered medications for this visit  He is allergic to fentanyl       Review of Systems   HENT: Negative  Eyes: Negative  Respiratory: Negative  Cardiovascular: Negative  Gastrointestinal: Negative  Endocrine: Negative  Genitourinary: Negative  Musculoskeletal: Positive for arthralgias, back pain and joint swelling (occasional)  Negative for myalgias and neck pain     Skin: Negative  Allergic/Immunologic: Negative  Neurological: Positive for weakness and headaches  Negative for dizziness, light-headedness and numbness  Hematological: Negative  Objective:      /82   Pulse 78   Temp 97 5 °F (36 4 °C)   Ht 5' 10" (1 778 m)   BMI 28 70 kg/m²          Physical Exam   Constitutional: He is oriented to person, place, and time  He appears well-developed and well-nourished  HENT:   Head: Normocephalic and atraumatic  Ears:    Nose: Nose normal    Mouth/Throat: Oropharynx is clear and moist    Eyes: Pupils are equal, round, and reactive to light  Conjunctivae and EOM are normal    Neck: Normal range of motion  Neck supple  Cardiovascular: Normal rate, regular rhythm and intact distal pulses  Pulmonary/Chest: Effort normal and breath sounds normal    Abdominal: Soft  Bowel sounds are normal  He exhibits no distension and no mass  There is no tenderness  Musculoskeletal:        Hands:  Bilateral shoulder with sl decreased ROM with discomfort on abduction or anterior flexion > 100d  No effusion   Lymphadenopathy:     He has no cervical adenopathy  Neurological: He is alert and oriented to person, place, and time  No cranial nerve deficit  Skin: Skin is warm     Psychiatric: His behavior is normal  Judgment and thought content normal    Denies increased anxiety or depression

## 2018-11-06 NOTE — PROGRESS NOTES
Patient trialed both oxybutynin and toviaz and had blurring vision with both   Myrbetriq should qualify then mother

## 2018-11-16 LAB
ALBUMIN SERPL-MCNC: 4.3 G/DL (ref 3.6–4.8)
ALBUMIN/GLOB SERPL: 2.3 {RATIO} (ref 1.2–2.2)
ALP SERPL-CCNC: 76 IU/L (ref 39–117)
ALT SERPL-CCNC: 32 IU/L (ref 0–44)
AST SERPL-CCNC: 23 IU/L (ref 0–40)
BASOPHILS # BLD AUTO: 0 X10E3/UL (ref 0–0.2)
BASOPHILS NFR BLD AUTO: 0 %
BILIRUB SERPL-MCNC: 0.7 MG/DL (ref 0–1.2)
BUN SERPL-MCNC: 21 MG/DL (ref 8–27)
BUN/CREAT SERPL: 21 (ref 10–24)
CALCIUM SERPL-MCNC: 9.2 MG/DL (ref 8.6–10.2)
CHLORIDE SERPL-SCNC: 106 MMOL/L (ref 96–106)
CHOLEST SERPL-MCNC: 177 MG/DL (ref 100–199)
CO2 SERPL-SCNC: 23 MMOL/L (ref 20–29)
CREAT SERPL-MCNC: 1.01 MG/DL (ref 0.76–1.27)
EOSINOPHIL # BLD AUTO: 0.1 X10E3/UL (ref 0–0.4)
EOSINOPHIL NFR BLD AUTO: 3 %
ERYTHROCYTE [DISTWIDTH] IN BLOOD BY AUTOMATED COUNT: 14.7 % (ref 12.3–15.4)
GLOBULIN SER-MCNC: 1.9 G/DL (ref 1.5–4.5)
GLUCOSE SERPL-MCNC: 91 MG/DL (ref 65–99)
HCT VFR BLD AUTO: 44.2 % (ref 37.5–51)
HDLC SERPL-MCNC: 44 MG/DL
HGB BLD-MCNC: 14.9 G/DL (ref 13–17.7)
IMM GRANULOCYTES # BLD: 0 X10E3/UL (ref 0–0.1)
IMM GRANULOCYTES NFR BLD: 0 %
LABCORP COMMENT: NORMAL
LDLC SERPL CALC-MCNC: 104 MG/DL (ref 0–99)
LYMPHOCYTES # BLD AUTO: 1.5 X10E3/UL (ref 0.7–3.1)
LYMPHOCYTES NFR BLD AUTO: 32 %
MCH RBC QN AUTO: 31 PG (ref 26.6–33)
MCHC RBC AUTO-ENTMCNC: 33.7 G/DL (ref 31.5–35.7)
MCV RBC AUTO: 92 FL (ref 79–97)
MONOCYTES # BLD AUTO: 0.5 X10E3/UL (ref 0.1–0.9)
MONOCYTES NFR BLD AUTO: 10 %
NEUTROPHILS # BLD AUTO: 2.6 X10E3/UL (ref 1.4–7)
NEUTROPHILS NFR BLD AUTO: 55 %
PLATELET # BLD AUTO: 242 X10E3/UL (ref 150–379)
POTASSIUM SERPL-SCNC: 4.5 MMOL/L (ref 3.5–5.2)
PROT SERPL-MCNC: 6.2 G/DL (ref 6–8.5)
RBC # BLD AUTO: 4.8 X10E6/UL (ref 4.14–5.8)
SL AMB EGFR AFRICAN AMERICAN: 92 ML/MIN/1.73
SL AMB EGFR NON AFRICAN AMERICAN: 79 ML/MIN/1.73
SL AMB VLDL CHOLESTEROL CALC: 29 MG/DL (ref 5–40)
SODIUM SERPL-SCNC: 144 MMOL/L (ref 134–144)
TRIGL SERPL-MCNC: 147 MG/DL (ref 0–149)
TSH SERPL DL<=0.005 MIU/L-ACNC: 1.13 UIU/ML (ref 0.45–4.5)
WBC # BLD AUTO: 4.8 X10E3/UL (ref 3.4–10.8)

## 2018-11-19 ENCOUNTER — OFFICE VISIT (OUTPATIENT)
Dept: FAMILY MEDICINE CLINIC | Facility: CLINIC | Age: 63
End: 2018-11-19
Payer: COMMERCIAL

## 2018-11-19 VITALS
WEIGHT: 205 LBS | HEART RATE: 80 BPM | DIASTOLIC BLOOD PRESSURE: 80 MMHG | TEMPERATURE: 98 F | SYSTOLIC BLOOD PRESSURE: 130 MMHG | HEIGHT: 70 IN | BODY MASS INDEX: 29.35 KG/M2

## 2018-11-19 DIAGNOSIS — J06.9 ACUTE URI: Primary | ICD-10-CM

## 2018-11-19 PROCEDURE — 3008F BODY MASS INDEX DOCD: CPT | Performed by: FAMILY MEDICINE

## 2018-11-19 PROCEDURE — 99213 OFFICE O/P EST LOW 20 MIN: CPT | Performed by: FAMILY MEDICINE

## 2018-11-19 NOTE — PROGRESS NOTES
Assessment/Plan:      Diagnoses and all orders for this visit:    Acute URI  Comments:  probably viral  REC; cont present care  Watch  Recheck Friday if not improved - earlier if worse  pt to call for problems or concerns in the interim          Subjective:     Patient ID: Sherif Reveles is a 59 y o  male  48 hour hx of URI symptoms  Patient denies fever/chills, increased bodyaches, sinus pain, productive cough, shortness of breath, nausea or diarrhea  Patient does have a dry cough and sore throat  Cough   Associated symptoms include headaches and a sore throat  Pertinent negatives include no chills, fever or shortness of breath  Headache    Associated symptoms include coughing and a sore throat  Pertinent negatives include no fever or sinus pressure  Sore Throat    Associated symptoms include congestion, coughing and headaches  Pertinent negatives include no shortness of breath  Vitals:    11/19/18 1539   BP: 130/80   Pulse: 80   Temp: 98 °F (36 7 °C)       Review of Systems   Constitutional: Positive for fatigue  Negative for chills and fever  HENT: Positive for congestion and sore throat  Negative for sinus pain and sinus pressure  Eyes: Negative  Respiratory: Positive for cough  Negative for shortness of breath  Cardiovascular: Negative  Musculoskeletal: Negative  Neurological: Positive for headaches  Objective:     Physical Exam   Constitutional:   Mildly ill-appearing male in no apparent distress   HENT:   Head: Normocephalic and atraumatic  Right Ear: External ear normal    Left Ear: External ear normal    Mouth/Throat: Oropharynx is clear and moist    Nose congested with clear discharge  Sinuses nontender  Eyes: Pupils are equal, round, and reactive to light  Conjunctivae and EOM are normal    Neck: Normal range of motion  Neck supple  Cardiovascular: Normal rate, regular rhythm and intact distal pulses      Pulmonary/Chest: Effort normal and breath sounds normal  He has no wheezes  He has no rales  Lymphadenopathy:     He has no cervical adenopathy  Skin: Skin is warm

## 2018-11-21 ENCOUNTER — TELEPHONE (OUTPATIENT)
Dept: FAMILY MEDICINE CLINIC | Facility: CLINIC | Age: 63
End: 2018-11-21

## 2018-11-21 DIAGNOSIS — J32.9 SINUSITIS, UNSPECIFIED CHRONICITY, UNSPECIFIED LOCATION: Primary | ICD-10-CM

## 2018-11-21 RX ORDER — LEVOFLOXACIN 500 MG/1
500 TABLET, FILM COATED ORAL EVERY 24 HOURS
Qty: 10 TABLET | Refills: 0 | Status: SHIPPED | OUTPATIENT
Start: 2018-11-21 | End: 2018-12-01

## 2018-11-21 NOTE — TELEPHONE ENCOUNTER
He was here Monday for virus, was to callback if not better  Nasal Congestion, runny nose, productive cough, sore throat, NO sob,  headache, earache, fever  Wife sent home yesterday for FLU  Can you call something to [facility]  Allergy:  Fentanyl  Will ck pharm later

## 2019-02-09 DIAGNOSIS — R39.15 URGENCY OF URINATION: Primary | ICD-10-CM

## 2019-02-13 RX ORDER — TAMSULOSIN HYDROCHLORIDE 0.4 MG/1
CAPSULE ORAL
Qty: 90 CAPSULE | Refills: 3 | Status: SHIPPED | OUTPATIENT
Start: 2019-02-13 | End: 2020-06-10

## 2019-03-02 DIAGNOSIS — E78.00 HYPERCHOLESTEROLEMIA: ICD-10-CM

## 2019-03-03 RX ORDER — ATORVASTATIN CALCIUM 10 MG/1
TABLET, FILM COATED ORAL
Qty: 90 TABLET | Refills: 1 | Status: SHIPPED | OUTPATIENT
Start: 2019-03-03 | End: 2019-08-15 | Stop reason: SDUPTHER

## 2019-03-12 ENCOUNTER — HOSPITAL ENCOUNTER (EMERGENCY)
Facility: HOSPITAL | Age: 64
Discharge: HOME | End: 2019-03-13
Attending: EMERGENCY MEDICINE
Payer: COMMERCIAL

## 2019-03-12 DIAGNOSIS — K52.9 GASTROENTERITIS: Primary | ICD-10-CM

## 2019-03-12 DIAGNOSIS — E86.0 DEHYDRATION: ICD-10-CM

## 2019-03-12 DIAGNOSIS — D72.829 LEUKOCYTOSIS: ICD-10-CM

## 2019-03-12 PROCEDURE — 96374 THER/PROPH/DIAG INJ IV PUSH: CPT

## 2019-03-12 PROCEDURE — 99284 EMERGENCY DEPT VISIT MOD MDM: CPT

## 2019-03-12 RX ORDER — ONDANSETRON 2 MG/ML
1 INJECTION INTRAMUSCULAR; INTRAVENOUS ONCE
Status: DISCONTINUED | OUTPATIENT
Start: 2019-03-12 | End: 2019-03-12

## 2019-03-12 RX ORDER — ONDANSETRON 2 MG/ML
4 INJECTION INTRAMUSCULAR; INTRAVENOUS ONCE
Status: COMPLETED | OUTPATIENT
Start: 2019-03-12 | End: 2019-03-12

## 2019-03-12 RX ADMIN — ONDANSETRON 4 MG: 2 INJECTION INTRAMUSCULAR; INTRAVENOUS at 23:00

## 2019-03-13 VITALS
HEART RATE: 100 BPM | OXYGEN SATURATION: 93 % | BODY MASS INDEX: 29.64 KG/M2 | TEMPERATURE: 97.8 F | WEIGHT: 206.57 LBS | RESPIRATION RATE: 18 BRPM | SYSTOLIC BLOOD PRESSURE: 126 MMHG | DIASTOLIC BLOOD PRESSURE: 78 MMHG

## 2019-03-13 LAB
ALBUMIN SERPL BCP-MCNC: 3.8 G/DL (ref 3.5–5)
ALP SERPL-CCNC: 87 U/L (ref 46–116)
ALT SERPL W P-5'-P-CCNC: 52 U/L (ref 12–78)
ANION GAP SERPL CALCULATED.3IONS-SCNC: 11 MMOL/L (ref 4–13)
AST SERPL W P-5'-P-CCNC: 27 U/L (ref 5–45)
BASOPHILS # BLD MANUAL: 0 THOUSAND/UL (ref 0–0.1)
BASOPHILS NFR MAR MANUAL: 0 % (ref 0–1)
BILIRUB SERPL-MCNC: 1.2 MG/DL (ref 0.2–1)
BUN SERPL-MCNC: 24 MG/DL (ref 5–25)
CALCIUM SERPL-MCNC: 8.8 MG/DL (ref 8.3–10.1)
CHLORIDE SERPL-SCNC: 107 MMOL/L (ref 100–108)
CO2 SERPL-SCNC: 26 MMOL/L (ref 21–32)
CREAT SERPL-MCNC: 1.34 MG/DL (ref 0.6–1.3)
EOSINOPHIL # BLD MANUAL: 0.28 THOUSAND/UL (ref 0–0.4)
EOSINOPHIL NFR BLD MANUAL: 2 % (ref 0–6)
ERYTHROCYTE [DISTWIDTH] IN BLOOD BY AUTOMATED COUNT: 14.2 % (ref 11.6–15.1)
GFR SERPL CREATININE-BSD FRML MDRD: 56 ML/MIN/1.73SQ M
GLUCOSE SERPL-MCNC: 126 MG/DL (ref 65–140)
HCT VFR BLD AUTO: 46.3 % (ref 36.5–49.3)
HGB BLD-MCNC: 15.7 G/DL (ref 12–17)
LIPASE SERPL-CCNC: 232 U/L (ref 73–393)
LYMPHOCYTES # BLD AUTO: 0.85 THOUSAND/UL (ref 0.6–4.47)
LYMPHOCYTES # BLD AUTO: 6 % (ref 14–44)
MCH RBC QN AUTO: 30.8 PG (ref 26.8–34.3)
MCHC RBC AUTO-ENTMCNC: 33.9 G/DL (ref 31.4–37.4)
MCV RBC AUTO: 91 FL (ref 82–98)
MONOCYTES # BLD AUTO: 0.99 THOUSAND/UL (ref 0–1.22)
MONOCYTES NFR BLD: 7 % (ref 4–12)
NEUTROPHILS # BLD MANUAL: 11.71 THOUSAND/UL (ref 1.85–7.62)
NEUTS BAND NFR BLD MANUAL: 10 % (ref 0–8)
NEUTS SEG NFR BLD AUTO: 73 % (ref 43–75)
NRBC BLD AUTO-RTO: 0 /100 WBCS
PLATELET # BLD AUTO: 200 THOUSANDS/UL (ref 149–390)
PLATELET BLD QL SMEAR: ADEQUATE
PMV BLD AUTO: 11.5 FL (ref 8.9–12.7)
POTASSIUM SERPL-SCNC: 4.2 MMOL/L (ref 3.5–5.3)
PROT SERPL-MCNC: 6.7 G/DL (ref 6.4–8.2)
RBC # BLD AUTO: 5.09 MILLION/UL (ref 3.88–5.62)
SODIUM SERPL-SCNC: 144 MMOL/L (ref 136–145)
TOTAL CELLS COUNTED SPEC: 100
VARIANT LYMPHS # BLD AUTO: 2 %
WBC # BLD AUTO: 14.11 THOUSAND/UL (ref 4.31–10.16)

## 2019-03-13 PROCEDURE — 85007 BL SMEAR W/DIFF WBC COUNT: CPT | Performed by: EMERGENCY MEDICINE

## 2019-03-13 PROCEDURE — 36415 COLL VENOUS BLD VENIPUNCTURE: CPT | Performed by: EMERGENCY MEDICINE

## 2019-03-13 PROCEDURE — 85027 COMPLETE CBC AUTOMATED: CPT | Performed by: EMERGENCY MEDICINE

## 2019-03-13 PROCEDURE — 96361 HYDRATE IV INFUSION ADD-ON: CPT

## 2019-03-13 PROCEDURE — 83690 ASSAY OF LIPASE: CPT | Performed by: EMERGENCY MEDICINE

## 2019-03-13 PROCEDURE — 80053 COMPREHEN METABOLIC PANEL: CPT | Performed by: EMERGENCY MEDICINE

## 2019-03-13 RX ORDER — ONDANSETRON 4 MG/1
4 TABLET, ORALLY DISINTEGRATING ORAL 3 TIMES DAILY
Qty: 10 TABLET | Refills: 0 | Status: SHIPPED | OUTPATIENT
Start: 2019-03-13 | End: 2019-04-30 | Stop reason: ALTCHOICE

## 2019-03-13 RX ADMIN — SODIUM CHLORIDE 1000 ML: 0.9 INJECTION, SOLUTION INTRAVENOUS at 01:36

## 2019-03-13 RX ADMIN — SODIUM CHLORIDE 1000 ML: 0.9 INJECTION, SOLUTION INTRAVENOUS at 00:21

## 2019-03-13 NOTE — ED PROVIDER NOTES
History  Chief Complaint   Patient presents with    Diarrhea     Pt arrives via EMS from home for N/V/D that started a few days ago  Pt decided to come to the ER because he felt weak, pale, chills, and like hes going to pass out  Pt got 4mg of zofran and 500mg of NS  Patient presents to the emergency department via ambulance with 3 days of nausea vomiting diarrhea and increasing weakness for the past 24 hours  Patient denies bloody diarrhea or hematemesis  Denies fever chills or rash  There are no ill contacts  He denies recent antibiotic use foreign travel or unusual food ingestions  Denies chest pain or sob  Prior to Admission Medications   Prescriptions Last Dose Informant Patient Reported? Taking?    FLUoxetine (PROzac) 20 mg capsule  Self No No   Sig: TAKE ONE CAPSULE BY MOUTH EVERY DAY   Multiple Vitamins-Minerals (MULTIVITAMIN MEN) TABS  Self Yes No   Sig: Take 1 tablet by mouth daily   Omega-3 Fatty Acids (FISH OIL) 1,000 mg  Self Yes No   Sig: Take 1,000 mg by mouth daily   RASUVO 20 MG/0 4ML SOAJ  Self Yes No   Sig: INJECT 20 MG SUBCUTANEOUSLY ONCE A WEEK   XELJANZ XR 11 MG TB24  Self Yes No   aspirin 81 mg chewable tablet  Self Yes No   Sig: Chew 81 mg   atorvastatin (LIPITOR) 10 mg tablet   No No   Sig: TAKE 1 TABLET BY MOUTH  DAILY   folic acid (FOLVITE) 1 mg tablet  Self Yes No   Sig: Take by mouth daily   lisinopril-hydrochlorothiazide (PRINZIDE,ZESTORETIC) 10-12 5 MG per tablet  Self No No   Sig: TAKE ONE TABLET BY MOUTH EVERY DAY   tamsulosin (FLOMAX) 0 4 mg   No No   Sig: TAKE 1 CAPSULE BY MOUTH  DAILY AT BEDTIME      Facility-Administered Medications: None       Past Medical History:   Diagnosis Date    Arthritis     Cellulitis     LAST ASSESSED: 6/13/14    Enlarged prostate     Epidermal inclusion cyst     LAST ASSESSED: 10/4/13    Erythrasma     LAST ASSESSED: 9/23/13    Furuncle     LAST ASSESSED: 6/11/14    Hyperlipidemia     Hypertension        Past Surgical History:   Procedure Laterality Date    HYDROCELE EXCISION / REPAIR Right 01/11/2018    SPERMATIC CORD EXCSION OF HYDROCELE; MANAGED BY: Angel BANDA REMOVAL OF HYDROCELE,TUNICA,UNILAT Right 1/11/2018    Procedure: HYDROCELECTOMY;  Surgeon: Tamanna Candelaria MD;  [facility];  Service: Urology    SCROTAL SURGERY      benign "lump"       Family History   Problem Relation Age of Onset    Heart disease Father         CARDIAC DISORDER    Hypertension Father     Hypertension Mother     No Known Problems Maternal Aunt     No Known Problems Maternal Uncle     No Known Problems Paternal Aunt     No Known Problems Paternal Uncle     No Known Problems Paternal Grandmother     No Known Problems Paternal Grandfather     Diabetes Maternal Grandmother         MELLITUS    No Known Problems Maternal Grandfather     Hypertension Other      I have reviewed and agree with the history as documented  Social History     Tobacco Use    Smoking status: Never Smoker    Smokeless tobacco: Never Used   Substance Use Topics    Alcohol use: No    Drug use: No        Review of Systems   Constitutional: Positive for activity change, appetite change and fatigue  Negative for chills, diaphoresis and fever  HENT: Negative  Negative for congestion, drooling, ear pain, rhinorrhea, sinus pressure, sneezing, trouble swallowing and voice change  Eyes: Negative  Negative for photophobia and visual disturbance  Respiratory: Negative  Negative for cough, chest tightness, shortness of breath, wheezing and stridor  Cardiovascular: Negative  Negative for chest pain, palpitations and leg swelling  Gastrointestinal: Positive for abdominal pain, diarrhea, nausea and vomiting  Negative for abdominal distention, anal bleeding and blood in stool  Endocrine: Negative  Genitourinary: Negative  Negative for discharge, dysuria, flank pain, hematuria, penile pain and urgency  Musculoskeletal: Negative  Negative for back pain, neck pain and neck stiffness  Skin: Negative  Negative for rash and wound  Allergic/Immunologic: Negative  Neurological: Positive for weakness  Negative for dizziness, tremors, seizures, syncope, facial asymmetry, speech difficulty, light-headedness, numbness and headaches  Hematological: Negative  Does not bruise/bleed easily  Psychiatric/Behavioral: Negative  Negative for confusion  Physical Exam  Physical Exam   Constitutional: He is oriented to person, place, and time  He appears well-developed and well-nourished  No distress  Nontoxic appearance without respiratory distress  Patient does look mildly washed out  He can engage in simple conversation and follow simple commands and answer simple questions appropriately  HENT:   Head: Normocephalic and atraumatic  Right Ear: External ear normal    Left Ear: External ear normal    Nose: Nose normal    Mouth/Throat: Oropharynx is clear and moist  No oropharyngeal exudate  Mildly dry oropharynx   Eyes: Pupils are equal, round, and reactive to light  Conjunctivae and EOM are normal  Left eye exhibits no discharge  Neck: Normal range of motion  Neck supple  Cardiovascular: Normal rate, regular rhythm, normal heart sounds and intact distal pulses  Pulmonary/Chest: Effort normal and breath sounds normal  No stridor  No respiratory distress  He has no wheezes  He has no rales  He exhibits no tenderness  Abdominal: Soft  Bowel sounds are normal  He exhibits no distension and no mass  There is no tenderness  There is no rebound and no guarding  No hernia  No reproducible tenderness to palpation  No peritoneal signs masses or hernias  Musculoskeletal: Normal range of motion  He exhibits no edema, tenderness or deformity  Neurological: He is alert and oriented to person, place, and time  He has normal reflexes  He displays normal reflexes  No cranial nerve deficit or sensory deficit   He exhibits normal muscle tone  Coordination normal    Skin: Skin is warm and dry  No rash noted  He is not diaphoretic  No erythema  No pallor  Psychiatric: He has a normal mood and affect  His behavior is normal  Judgment and thought content normal    Nursing note and vitals reviewed  Vital Signs  ED Triage Vitals [03/12/19 2335]   Temperature Pulse Respirations Blood Pressure SpO2   97 8 °F (36 6 °C) 85 20 128/66 98 %      Temp Source Heart Rate Source Patient Position - Orthostatic VS BP Location FiO2 (%)   Oral Monitor Lying Right arm --      Pain Score       6           Vitals:    03/12/19 2335 03/13/19 0015 03/13/19 0138   BP: 128/66  131/61   Pulse: 85 88 96   Patient Position - Orthostatic VS: Lying  Lying       qSOFA     Row Name 03/13/19 0138 03/12/19 2340 03/12/19 2335          Altered mental status GCS < 15  --  0  --      Respiratory Rate > / =22  0  --  0      Systolic BP < / =046  0  --  0      Q Sofa Score  0  0  0            Visual Acuity      ED Medications  Medications   ondansetron (ZOFRAN) injection 4 mg (4 mg Intravenous Given 3/12/19 2300)   sodium chloride 0 9 % bolus 1,000 mL (0 mL Intravenous Stopped 3/13/19 0136)   sodium chloride 0 9 % bolus 1,000 mL (1,000 mL Intravenous New Bag 3/13/19 0136)       Diagnostic Studies  Results Reviewed     Procedure Component Value Units Date/Time    CBC and differential [[id number]]  (Abnormal) Collected:  03/13/19 0021    Lab Status:  Final result Specimen:  Blood from Arm, Left Updated:  03/13/19 0053     WBC 14 11 Thousand/uL      RBC 5 09 Million/uL      Hemoglobin 15 7 g/dL      Hematocrit 46 3 %      MCV 91 fL      MCH 30 8 pg      MCHC 33 9 g/dL      RDW 14 2 %      MPV 11 5 fL      Platelets 926 Thousands/uL      nRBC 0 /100 WBCs     Narrative: This is an appended report  These results have been appended to a previously verified report      Comprehensive metabolic panel [[id number]]  (Abnormal) Collected:  03/13/19 0021    Lab Status:  Final result Specimen: Blood from Arm, Left Updated:  03/13/19 0047     Sodium 144 mmol/L      Potassium 4 2 mmol/L      Chloride 107 mmol/L      CO2 26 mmol/L      ANION GAP 11 mmol/L      BUN 24 mg/dL      Creatinine 1 34 mg/dL      Glucose 126 mg/dL      Calcium 8 8 mg/dL      AST 27 U/L      ALT 52 U/L      Alkaline Phosphatase 87 U/L      Total Protein 6 7 g/dL      Albumin 3 8 g/dL      Total Bilirubin 1 20 mg/dL      eGFR 56 ml/min/1 73sq m     Narrative:       National Kidney Disease Education Program recommendations are as follows:  GFR calculation is accurate only with a steady state creatinine  Chronic Kidney disease less than 60 ml/min/1 73 sq  meters  Kidney failure less than 15 ml/min/1 73 sq  meters  Lipase [[id number]]  (Normal) Collected:  03/13/19 0021    Lab Status:  Final result Specimen:  Blood from Arm, Left Updated:  03/13/19 0047     Lipase 232 u/L                  No orders to display              Procedures  Procedures       Phone Contacts  ED Phone Contact    ED Course  ED Course as of Mar 13 0303   Wed Mar 13, 2019   0300 Patient feeling improved for discharge  He was able to ambulate to the bathroom and had a volume is urine output  He will be given a script for Zofran  I discussed signs and symptoms that would require return to the emergency department  MDM    Disposition  Final diagnoses:   Gastroenteritis   Dehydration   Leukocytosis     Time reflects when diagnosis was documented in both MDM as applicable and the Disposition within this note     Time User Action Codes Description Comment    3/13/2019  3:01 AM Bartolo Hernandez Add [K52 9] Gastroenteritis     3/13/2019  3:01 AM Bartolo Hernandez Add [E86 0] Dehydration     3/13/2019  3:01 AM Bartolo Hernandez Add [R04 905] Leukocytosis       ED Disposition     ED Disposition Condition Date/Time Comment    Discharge Fair Wed Mar 13, 2019  3:01 AM [place] discharge to home/self care              Follow-up Information Follow up With Specialties Details Why Suleiman Unger MD [facility] Medicine Schedule an appointment as soon as possible for a visit  As needed [address]  [phone]            Patient's Medications   Discharge Prescriptions    ONDANSETRON (ZOFRAN-ODT) 4 MG DISINTEGRATING TABLET    Take 1 tablet (4 mg total) by mouth 3 (three) times a day       Start Date: 3/13/2019 End Date: --       Order Dose: 4 mg       Quantity: 10 tablet    Refills: 0     No discharge procedures on file      ED Provider  Electronically Signed by           Timbo Morillo MD  03/13/19 8842

## 2019-03-13 NOTE — ED NOTES
Pt is alert and oriented  Pt VSS  Pt wheeled out to car in wheel chair where wife drove him home        Erinn Pritchard RN  03/13/19 9553

## 2019-03-14 ENCOUNTER — OFFICE VISIT (OUTPATIENT)
Dept: FAMILY MEDICINE CLINIC | Facility: CLINIC | Age: 64
End: 2019-03-14
Payer: COMMERCIAL

## 2019-03-14 ENCOUNTER — HOSPITAL ENCOUNTER (EMERGENCY)
Facility: HOSPITAL | Age: 64
Discharge: HOME | End: 2019-03-14
Attending: EMERGENCY MEDICINE | Admitting: EMERGENCY MEDICINE
Payer: COMMERCIAL

## 2019-03-14 ENCOUNTER — APPOINTMENT (EMERGENCY)
Dept: CT IMAGING | Facility: HOSPITAL | Age: 64
End: 2019-03-14
Payer: COMMERCIAL

## 2019-03-14 ENCOUNTER — APPOINTMENT (EMERGENCY)
Dept: ULTRASOUND IMAGING | Facility: HOSPITAL | Age: 64
End: 2019-03-14
Payer: COMMERCIAL

## 2019-03-14 ENCOUNTER — TELEPHONE (OUTPATIENT)
Dept: FAMILY MEDICINE CLINIC | Facility: CLINIC | Age: 64
End: 2019-03-14

## 2019-03-14 VITALS
HEIGHT: 70 IN | WEIGHT: 206 LBS | BODY MASS INDEX: 29.49 KG/M2 | DIASTOLIC BLOOD PRESSURE: 78 MMHG | TEMPERATURE: 98.9 F | SYSTOLIC BLOOD PRESSURE: 120 MMHG | HEART RATE: 86 BPM

## 2019-03-14 VITALS
HEART RATE: 71 BPM | OXYGEN SATURATION: 97 % | DIASTOLIC BLOOD PRESSURE: 90 MMHG | TEMPERATURE: 97.7 F | WEIGHT: 206 LBS | SYSTOLIC BLOOD PRESSURE: 171 MMHG | RESPIRATION RATE: 18 BRPM | BODY MASS INDEX: 29.56 KG/M2

## 2019-03-14 DIAGNOSIS — R10.9 ABDOMINAL PAIN: Primary | ICD-10-CM

## 2019-03-14 DIAGNOSIS — R11.0 NAUSEA: ICD-10-CM

## 2019-03-14 DIAGNOSIS — R10.11 RUQ ABDOMINAL PAIN: Primary | ICD-10-CM

## 2019-03-14 DIAGNOSIS — R14.0 ABDOMINAL DISTENSION: ICD-10-CM

## 2019-03-14 LAB
ALBUMIN SERPL BCP-MCNC: 3.4 G/DL (ref 3.5–5)
ALP SERPL-CCNC: 80 U/L (ref 46–116)
ALT SERPL W P-5'-P-CCNC: 56 U/L (ref 12–78)
ANION GAP SERPL CALCULATED.3IONS-SCNC: 9 MMOL/L (ref 4–13)
AST SERPL W P-5'-P-CCNC: 25 U/L (ref 5–45)
BASOPHILS # BLD AUTO: 0.01 THOUSANDS/ΜL (ref 0–0.1)
BASOPHILS NFR BLD AUTO: 0 % (ref 0–1)
BILIRUB SERPL-MCNC: 0.7 MG/DL (ref 0.2–1)
BUN SERPL-MCNC: 18 MG/DL (ref 5–25)
CALCIUM SERPL-MCNC: 8.6 MG/DL (ref 8.3–10.1)
CHLORIDE SERPL-SCNC: 105 MMOL/L (ref 100–108)
CO2 SERPL-SCNC: 26 MMOL/L (ref 21–32)
CREAT SERPL-MCNC: 1.16 MG/DL (ref 0.6–1.3)
EOSINOPHIL # BLD AUTO: 0.07 THOUSAND/ΜL (ref 0–0.61)
EOSINOPHIL NFR BLD AUTO: 1 % (ref 0–6)
ERYTHROCYTE [DISTWIDTH] IN BLOOD BY AUTOMATED COUNT: 14.1 % (ref 11.6–15.1)
GFR SERPL CREATININE-BSD FRML MDRD: 67 ML/MIN/1.73SQ M
GLUCOSE SERPL-MCNC: 99 MG/DL (ref 65–140)
HCT VFR BLD AUTO: 42.9 % (ref 36.5–49.3)
HGB BLD-MCNC: 14.5 G/DL (ref 12–17)
IMM GRANULOCYTES # BLD AUTO: 0.02 THOUSAND/UL (ref 0–0.2)
IMM GRANULOCYTES NFR BLD AUTO: 0 % (ref 0–2)
LIPASE SERPL-CCNC: 132 U/L (ref 73–393)
LYMPHOCYTES # BLD AUTO: 0.71 THOUSANDS/ΜL (ref 0.6–4.47)
LYMPHOCYTES NFR BLD AUTO: 13 % (ref 14–44)
MCH RBC QN AUTO: 30.5 PG (ref 26.8–34.3)
MCHC RBC AUTO-ENTMCNC: 33.8 G/DL (ref 31.4–37.4)
MCV RBC AUTO: 90 FL (ref 82–98)
MONOCYTES # BLD AUTO: 0.74 THOUSAND/ΜL (ref 0.17–1.22)
MONOCYTES NFR BLD AUTO: 14 % (ref 4–12)
NEUTROPHILS # BLD AUTO: 3.78 THOUSANDS/ΜL (ref 1.85–7.62)
NEUTS SEG NFR BLD AUTO: 72 % (ref 43–75)
NRBC BLD AUTO-RTO: 0 /100 WBCS
PLATELET # BLD AUTO: 191 THOUSANDS/UL (ref 149–390)
PMV BLD AUTO: 11.3 FL (ref 8.9–12.7)
POTASSIUM SERPL-SCNC: 3.6 MMOL/L (ref 3.5–5.3)
PROT SERPL-MCNC: 6.4 G/DL (ref 6.4–8.2)
RBC # BLD AUTO: 4.75 MILLION/UL (ref 3.88–5.62)
SODIUM SERPL-SCNC: 140 MMOL/L (ref 136–145)
WBC # BLD AUTO: 5.33 THOUSAND/UL (ref 4.31–10.16)

## 2019-03-14 PROCEDURE — 85025 COMPLETE CBC W/AUTO DIFF WBC: CPT | Performed by: EMERGENCY MEDICINE

## 2019-03-14 PROCEDURE — 99284 EMERGENCY DEPT VISIT MOD MDM: CPT

## 2019-03-14 PROCEDURE — 80053 COMPREHEN METABOLIC PANEL: CPT | Performed by: EMERGENCY MEDICINE

## 2019-03-14 PROCEDURE — 36415 COLL VENOUS BLD VENIPUNCTURE: CPT | Performed by: EMERGENCY MEDICINE

## 2019-03-14 PROCEDURE — 96374 THER/PROPH/DIAG INJ IV PUSH: CPT

## 2019-03-14 PROCEDURE — 1036F TOBACCO NON-USER: CPT | Performed by: FAMILY MEDICINE

## 2019-03-14 PROCEDURE — 99214 OFFICE O/P EST MOD 30 MIN: CPT | Performed by: FAMILY MEDICINE

## 2019-03-14 PROCEDURE — 96375 TX/PRO/DX INJ NEW DRUG ADDON: CPT

## 2019-03-14 PROCEDURE — 74177 CT ABD & PELVIS W/CONTRAST: CPT

## 2019-03-14 PROCEDURE — 83690 ASSAY OF LIPASE: CPT | Performed by: EMERGENCY MEDICINE

## 2019-03-14 PROCEDURE — 96361 HYDRATE IV INFUSION ADD-ON: CPT

## 2019-03-14 PROCEDURE — 76705 ECHO EXAM OF ABDOMEN: CPT

## 2019-03-14 PROCEDURE — 3008F BODY MASS INDEX DOCD: CPT | Performed by: FAMILY MEDICINE

## 2019-03-14 RX ORDER — ONDANSETRON 4 MG/1
4 TABLET, FILM COATED ORAL EVERY 8 HOURS PRN
Qty: 15 TABLET | Refills: 0 | Status: SHIPPED | OUTPATIENT
Start: 2019-03-14 | End: 2019-04-30 | Stop reason: ALTCHOICE

## 2019-03-14 RX ORDER — DICYCLOMINE HCL 20 MG
20 TABLET ORAL 2 TIMES DAILY
Qty: 20 TABLET | Refills: 0 | Status: SHIPPED | OUTPATIENT
Start: 2019-03-14 | End: 2019-04-30 | Stop reason: ALTCHOICE

## 2019-03-14 RX ORDER — HYDROMORPHONE HCL/PF 1 MG/ML
0.5 SYRINGE (ML) INJECTION ONCE
Status: COMPLETED | OUTPATIENT
Start: 2019-03-14 | End: 2019-03-14

## 2019-03-14 RX ORDER — ONDANSETRON 2 MG/ML
4 INJECTION INTRAMUSCULAR; INTRAVENOUS ONCE
Status: COMPLETED | OUTPATIENT
Start: 2019-03-14 | End: 2019-03-14

## 2019-03-14 RX ADMIN — HYDROMORPHONE HYDROCHLORIDE 0.5 MG: 1 INJECTION, SOLUTION INTRAMUSCULAR; INTRAVENOUS; SUBCUTANEOUS at 16:02

## 2019-03-14 RX ADMIN — ONDANSETRON 4 MG: 2 INJECTION INTRAMUSCULAR; INTRAVENOUS at 16:02

## 2019-03-14 RX ADMIN — IOHEXOL 100 ML: 350 INJECTION, SOLUTION INTRAVENOUS at 16:51

## 2019-03-14 RX ADMIN — SODIUM CHLORIDE 1000 ML: 0.9 INJECTION, SOLUTION INTRAVENOUS at 16:02

## 2019-03-14 NOTE — PROGRESS NOTES
Assessment/Plan:      Diagnoses and all orders for this visit:    RUQ abdominal pain    Abdominal distension    Nausea        Discussion:  Persistent abdominal discomfort now focus in the right upper quadrant radiating to the right shoulder  Palpation of the right upper quadrant does healed some right shoulder pain  Patient also had discomfort with Calles's maneuver and borderline dehydration on exam   Patient has an intact gallbladder - need to rule out stone disease  Also consider partial obstruction  After discussion, patient agrees to go to the emergency department  Patient refuses ambulance-patient is not lightheaded and has stable vital signs  Emergency department notified-case discussed with attending  Patient will call tomorrow with follow-up  Subjective:     Patient ID: Hiro Esparaz is a 62 y o  male  61 yo male developed N/V and diarrhea Tues evening  Pt became very weak so he was taken to ED  Pt reports that he received 2 5L of saline and felt better  Pt was discharged to home  Pt was able to keep some food down yesterday but last night developed diarrhea again and vomited x 1  (+) severe chills Tuesday and mild chills yesterday  Today, pt has abd bloating that radiates to R shoulder and neck   Worse with deep breath  No substernal CP or SOB  (+) passing gas but no diarrhea today  Wife is not sick  Pt was exposed to viral AGE - saw mother at [facility] then found out that many patients on that floor had the "stomach bug"  Has not eaten today but had some chocolate mil and water  Review of Systems   Constitutional: Positive for activity change and chills  Negative for diaphoresis  HENT: Negative  Eyes: Negative  Respiratory: Negative  Cardiovascular: Positive for chest pain (R chest  shoulder and  neck)  Gastrointestinal: Positive for abdominal distention, abdominal pain (global) and diarrhea  Negative for blood in stool, nausea and vomiting     Genitourinary: Negative  Skin: Negative  Objective:     Physical Exam   Constitutional: He is oriented to person, place, and time  Uncomfortable appearing male in NRD   HENT:   Head: Normocephalic and atraumatic  Mouth/Throat: Oropharynx is clear and moist    Eyes: Pupils are equal, round, and reactive to light  EOM are normal    Cardiovascular: Normal rate, regular rhythm, normal heart sounds and intact distal pulses  Pulmonary/Chest: Effort normal and breath sounds normal    Abdominal: He exhibits distension  He exhibits no fluid wave, no ascites and no mass  Bowel sounds are increased  There is generalized tenderness and tenderness in the right upper quadrant  There is guarding and positive Calles's sign  There is no rigidity  Palpation of the RUQ caused some R shoulder pain   Neurological: He is alert and oriented to person, place, and time  Skin: Skin is warm  Capillary refill takes 2 to 3 seconds

## 2019-03-14 NOTE — TELEPHONE ENCOUNTER
Went to ER on tues for vomiting  And diarrhea   Gave him fluids      He went to see his mom in nursing home last weekend and the entire unit had the stomach bug       He still doesn't feel right he's weak and body aches    And last night had  Some diarrhea again and threw up once     He wants to be seen today     What time is best?

## 2019-03-14 NOTE — ED PROVIDER NOTES
History  Chief Complaint   Patient presents with    Abdominal Pain     Pt c/o abd pain with vomiting and diarrhea  Seen Tuesday for same but pain is increasing  60-year-old gentleman comes in complaining of right upper quadrant abdominal pain nausea vomiting diarrhea  Patient was seen here for nausea vomiting and diarrhea several days ago had blood work was given IV fluids and Zofran was feeling better and went home patient states pain has now gotten worse in his abdomen went to go see his family doctor who was concerned about his gallbladder and sent him back in for re-evaluation  Patient states pain is now worse and mostly in his right upper quadrant and radiates into his shoulder and back      History provided by:  Medical records and patient   used: No    Abdominal Pain   Pain location:  RUQ  Pain quality: sharp and stabbing    Pain radiates to:  Back and R shoulder  Pain severity:  Moderate  Onset quality:  Gradual  Duration:  5 days  Timing:  Constant  Progression:  Worsening  Chronicity:  New  Context: not alcohol use, not recent illness and not trauma    Ineffective treatments:  None tried  Associated symptoms: anorexia, nausea and vomiting    Associated symptoms: no chest pain, no cough, no fever and no hematuria    Risk factors: no alcohol abuse, no NSAID use and not pregnant        Prior to Admission Medications   Prescriptions Last Dose Informant Patient Reported? Taking?    FLUoxetine (PROzac) 20 mg capsule  Self No No   Sig: TAKE ONE CAPSULE BY MOUTH EVERY DAY   Multiple Vitamins-Minerals (MULTIVITAMIN MEN) TABS  Self Yes No   Sig: Take 1 tablet by mouth daily   Omega-3 Fatty Acids (FISH OIL) 1,000 mg  Self Yes No   Sig: Take 1,000 mg by mouth daily   RASUVO 20 MG/0 4ML SOAJ  Self Yes No   Sig: INJECT 20 MG SUBCUTANEOUSLY ONCE A WEEK   XELJANZ XR 11 MG TB24  Self Yes No   aspirin 81 mg chewable tablet  Self Yes No   Sig: Chew 81 mg   atorvastatin (LIPITOR) 10 mg tablet   No No Sig: TAKE 1 TABLET BY MOUTH  DAILY   folic acid (FOLVITE) 1 mg tablet  Self Yes No   Sig: Take by mouth daily   lisinopril-hydrochlorothiazide (PRINZIDE,ZESTORETIC) 10-12 5 MG per tablet  Self No No   Sig: TAKE ONE TABLET BY MOUTH EVERY DAY   ondansetron (ZOFRAN-ODT) 4 mg disintegrating tablet   No No   Sig: Take 1 tablet (4 mg total) by mouth 3 (three) times a day   Patient not taking: Reported on 3/14/2019   tamsulosin (FLOMAX) 0 4 mg   No No   Sig: TAKE 1 CAPSULE BY MOUTH  DAILY AT BEDTIME      Facility-Administered Medications: None       Past Medical History:   Diagnosis Date    Arthritis     Cellulitis     LAST ASSESSED: 6/13/14    Enlarged prostate     Epidermal inclusion cyst     LAST ASSESSED: 10/4/13    Erythrasma     LAST ASSESSED: 9/23/13    Furuncle     LAST ASSESSED: 6/11/14    Hyperlipidemia     Hypertension        Past Surgical History:   Procedure Laterality Date    HYDROCELE EXCISION / REPAIR Right 01/11/2018    SPERMATIC CORD EXCSION OF HYDROCELE; MANAGED BY: Roberto Carlos Payne    [place] REMOVAL OF HYDROCELE,TUNICA,UNILAT Right 1/11/2018    Procedure: HYDROCELECTOMY;  Surgeon: Mere Moore MD;  [facility];  Service: Urology    SCROTAL SURGERY      benign "lump"       Family History   Problem Relation Age of Onset    Heart disease Father         CARDIAC DISORDER    Hypertension Father     Hypertension Mother     No Known Problems Maternal Aunt     No Known Problems Maternal Uncle     No Known Problems Paternal Aunt     No Known Problems Paternal Uncle     No Known Problems Paternal Grandmother     No Known Problems Paternal Grandfather     Diabetes Maternal Grandmother         MELLITUS    No Known Problems Maternal Grandfather     Hypertension Other      I have reviewed and agree with the history as documented      Social History     Tobacco Use    Smoking status: Never Smoker    Smokeless tobacco: Never Used   Substance Use Topics    Alcohol use: No    Drug use: No        Review of Systems   Constitutional: Negative for activity change, appetite change and fever  HENT: Negative for congestion and facial swelling  Eyes: Negative for discharge and redness  Respiratory: Negative for cough and wheezing  Cardiovascular: Negative for chest pain and leg swelling  Gastrointestinal: Positive for abdominal pain, anorexia, nausea and vomiting  Negative for abdominal distention and blood in stool  Endocrine: Negative for cold intolerance and polydipsia  Genitourinary: Negative for difficulty urinating and hematuria  Musculoskeletal: Negative for arthralgias and gait problem  Skin: Negative for color change and rash  Allergic/Immunologic: Negative for food allergies and immunocompromised state  Neurological: Negative for dizziness, seizures and headaches  Hematological: Negative for adenopathy  Does not bruise/bleed easily  Psychiatric/Behavioral: Negative for agitation, confusion and decreased concentration  All other systems reviewed and are negative  Physical Exam  Physical Exam   Constitutional: He is oriented to person, place, and time  He appears well-developed and well-nourished  Non-toxic appearance  He appears distressed  HENT:   Head: Normocephalic and atraumatic  Right Ear: Tympanic membrane normal    Left Ear: Tympanic membrane normal    Nose: Nose normal    Mouth/Throat: Oropharynx is clear and moist    Eyes: Pupils are equal, round, and reactive to light  Conjunctivae, EOM and lids are normal    Neck: Trachea normal and normal range of motion  Neck supple  No JVD present  Carotid bruit is not present  Cardiovascular: Normal rate, regular rhythm, normal heart sounds and intact distal pulses  No extrasystoles are present  Pulmonary/Chest: Effort normal  He has no decreased breath sounds  He has no wheezes  He has no rhonchi  He has no rales  He exhibits no tenderness and no deformity  Abdominal: Soft   Bowel sounds are normal  There is tenderness in the right upper quadrant  There is no rebound, no guarding and no CVA tenderness  Musculoskeletal:        Right shoulder: He exhibits normal range of motion, no tenderness, no swelling and no deformity  Cervical back: Normal  He exhibits normal range of motion, no tenderness, no bony tenderness and no deformity  Lymphadenopathy:     He has no cervical adenopathy  He has no axillary adenopathy  Neurological: He is alert and oriented to person, place, and time  He has normal strength and normal reflexes  No cranial nerve deficit or sensory deficit  He displays a negative Romberg sign  Skin: Skin is warm and dry  Psychiatric: He has a normal mood and affect  His speech is normal and behavior is normal  Judgment and thought content normal  Cognition and memory are normal    Nursing note and vitals reviewed        Vital Signs  ED Triage Vitals   Temperature Pulse Respirations Blood Pressure SpO2   03/14/19 1350 03/14/19 1350 03/14/19 1350 03/14/19 1350 03/14/19 1350   97 7 °F (36 5 °C) 72 18 151/78 98 %      Temp Source Heart Rate Source Patient Position - Orthostatic VS BP Location FiO2 (%)   03/14/19 1350 03/14/19 1602 03/14/19 1602 03/14/19 1602 --   Oral Monitor Lying Right arm       Pain Score       03/14/19 1350       5           Vitals:    03/14/19 1350 03/14/19 1602   BP: 151/78 (!) 171/90   Pulse: 72 71   Patient Position - Orthostatic VS:  Lying       qSOFA     Row Name 03/14/19 1602 03/14/19 1350 03/14/19 1252          Altered mental status GCS < 15  --  --  --      Respiratory Rate > / =22  0  0  --      Systolic BP < / =085  0  0  0      Q Sofa Score  0  0  --            Visual Acuity      ED Medications  Medications   ondansetron (ZOFRAN) injection 4 mg (4 mg Intravenous Given 3/14/19 1602)   HYDROmorphone (DILAUDID) injection 0 5 mg (0 5 mg Intravenous Given 3/14/19 1602)   sodium chloride 0 9 % bolus 1,000 mL (0 mL Intravenous Stopped 3/14/19 1722)   iohexol (OMNIPAQUE) 350 MG/ML injection (MULTI-DOSE) 100 mL (100 mL Intravenous Given 3/14/19 1651)       Diagnostic Studies  Results Reviewed     Procedure Component Value Units Date/Time    Comprehensive metabolic panel [[id number]]  (Abnormal) Collected:  03/14/19 1557    Lab Status:  Final result Specimen:  Blood from Arm, Right Updated:  03/14/19 1627     Sodium 140 mmol/L      Potassium 3 6 mmol/L      Chloride 105 mmol/L      CO2 26 mmol/L      ANION GAP 9 mmol/L      BUN 18 mg/dL      Creatinine 1 16 mg/dL      Glucose 99 mg/dL      Calcium 8 6 mg/dL      AST 25 U/L      ALT 56 U/L      Alkaline Phosphatase 80 U/L      Total Protein 6 4 g/dL      Albumin 3 4 g/dL      Total Bilirubin 0 70 mg/dL      eGFR 67 ml/min/1 73sq m     Narrative:       National Kidney Disease Education Program recommendations are as follows:  GFR calculation is accurate only with a steady state creatinine  Chronic Kidney disease less than 60 ml/min/1 73 sq  meters  Kidney failure less than 15 ml/min/1 73 sq  meters      Lipase [[id number]]  (Normal) Collected:  03/14/19 1557    Lab Status:  Final result Specimen:  Blood from Arm, Right Updated:  03/14/19 1627     Lipase 132 u/L     CBC and differential [[id number]]  (Abnormal) Collected:  03/14/19 1557    Lab Status:  Final result Specimen:  Blood from Arm, Right Updated:  03/14/19 1609     WBC 5 33 Thousand/uL      RBC 4 75 Million/uL      Hemoglobin 14 5 g/dL      Hematocrit 42 9 %      MCV 90 fL      MCH 30 5 pg      MCHC 33 8 g/dL      RDW 14 1 %      MPV 11 3 fL      Platelets 754 Thousands/uL      nRBC 0 /100 WBCs      Neutrophils Relative 72 %      Immat GRANS % 0 %      Lymphocytes Relative 13 %      Monocytes Relative 14 %      Eosinophils Relative 1 %      Basophils Relative 0 %      Neutrophils Absolute 3 78 Thousands/µL      Immature Grans Absolute 0 02 Thousand/uL      Lymphocytes Absolute 0 71 Thousands/µL      Monocytes Absolute 0 74 Thousand/µL      Eosinophils Absolute 0 07 Thousand/µL      Basophils Absolute 0 01 Thousands/µL                  CT abdomen pelvis with contrast   Final Result by Kina Bernardo MD (03/14 1722)      1  No acute abnormality   2  Enhancing indeterminate lesion in the left hepatic lobe appears similar in size  Recommendation remains for a follow-up MRI            Workstation performed: [id number]         US right upper quadrant   Final Result by Yuriy Galvan MD (03/14 1615)   1  In the left liver, there is an indeterminant hypoechoic vascular nodule measuring 2 4 x 2 1 x 2 5 cm  This corresponds with the lesion seen on prior CT and MRI  Continued follow-up recommended as previously outlined  2   Multiple hepatic cysts  Workstation performed: [id number]                    Procedures  Procedures       Phone Contacts  ED Phone Contact    ED Course                               MDM  Number of Diagnoses or Management Options  Abdominal pain: new and requires workup     Amount and/or Complexity of Data Reviewed  Clinical lab tests: ordered and reviewed  Tests in the radiology section of CPT®: ordered and reviewed  Tests in the medicine section of CPT®: ordered and reviewed  Review and summarize past medical records: yes  Independent visualization of images, tracings, or specimens: yes    Patient Progress  Patient progress: stable      Disposition  Final diagnoses:   Abdominal pain     Time reflects when diagnosis was documented in both MDM as applicable and the Disposition within this note     Time User Action Codes Description Comment    3/14/2019  5:24 PM Madan Zavala Add [R10 9] Abdominal pain       ED Disposition     ED Disposition Condition Date/Time Comment    Discharge Stable u Mar 14, 2019  5:24 PM Rc Getting discharge to home/self care  Follow-up Information     Follow up With Specialties Details Why Contact Info    Katina Hubbard MD [facility]   [id number] [facility]    [place]  [id number]  [phone]            Discharge Medication List as of 3/14/2019  5:25 PM      START taking these medications    Details   dicyclomine (BENTYL) 20 mg tablet Take 1 tablet (20 mg total) by mouth 2 (two) times a day, Starting Thu 3/14/2019, Normal      ondansetron (ZOFRAN) 4 mg tablet Take 1 tablet (4 mg total) by mouth every 8 (eight) hours as needed for nausea or vomiting, Starting Thu 3/14/2019, Print         CONTINUE these medications which have NOT CHANGED    Details   aspirin 81 mg chewable tablet Chew 81 mg, Historical Med      atorvastatin (LIPITOR) 10 mg tablet TAKE 1 TABLET BY MOUTH  DAILY, Normal      FLUoxetine (PROzac) 20 mg capsule TAKE ONE CAPSULE BY MOUTH EVERY DAY, Normal      folic acid (FOLVITE) 1 mg tablet Take by mouth daily, Historical Med      lisinopril-hydrochlorothiazide (PRINZIDE,ZESTORETIC) 10-12 5 MG per tablet TAKE ONE TABLET BY MOUTH EVERY DAY, Normal      Multiple Vitamins-Minerals (MULTIVITAMIN MEN) TABS Take 1 tablet by mouth daily, Historical Med      Omega-3 Fatty Acids (FISH OIL) 1,000 mg Take 1,000 mg by mouth daily, Historical Med      ondansetron (ZOFRAN-ODT) 4 mg disintegrating tablet Take 1 tablet (4 mg total) by mouth 3 (three) times a day, Starting Wed 3/13/2019, Normal      RASUVO 20 MG/0 4ML SOAJ INJECT 20 MG SUBCUTANEOUSLY ONCE A WEEK, Historical Med      tamsulosin (FLOMAX) 0 4 mg TAKE 1 CAPSULE BY MOUTH  DAILY AT BEDTIME, Normal      XELJANZ XR 11 MG TB24 Starting Mon 7/9/2018, Historical Med           No discharge procedures on file      ED Provider  Electronically Signed by           Vicky Naylor, DO  03/14/19 2041

## 2019-04-20 DIAGNOSIS — F32.A DEPRESSION, UNSPECIFIED DEPRESSION TYPE: ICD-10-CM

## 2019-04-22 RX ORDER — FLUOXETINE HYDROCHLORIDE 20 MG/1
CAPSULE ORAL
Qty: 90 CAPSULE | Refills: 2 | Status: SHIPPED | OUTPATIENT
Start: 2019-04-22 | End: 2020-01-16

## 2019-04-29 ENCOUNTER — TELEPHONE (OUTPATIENT)
Dept: UROLOGY | Facility: AMBULATORY SURGERY CENTER | Age: 64
End: 2019-04-29

## 2019-04-30 ENCOUNTER — OFFICE VISIT (OUTPATIENT)
Dept: FAMILY MEDICINE CLINIC | Facility: CLINIC | Age: 64
End: 2019-04-30
Payer: COMMERCIAL

## 2019-04-30 VITALS
HEIGHT: 70 IN | DIASTOLIC BLOOD PRESSURE: 80 MMHG | TEMPERATURE: 98.3 F | SYSTOLIC BLOOD PRESSURE: 118 MMHG | WEIGHT: 198.8 LBS | BODY MASS INDEX: 28.46 KG/M2 | HEART RATE: 72 BPM

## 2019-04-30 DIAGNOSIS — M54.2 CERVICALGIA: ICD-10-CM

## 2019-04-30 DIAGNOSIS — E78.2 MIXED HYPERLIPIDEMIA: Primary | ICD-10-CM

## 2019-04-30 DIAGNOSIS — I10 ESSENTIAL HYPERTENSION: ICD-10-CM

## 2019-04-30 DIAGNOSIS — M05.79 RHEUMATOID ARTHRITIS INVOLVING MULTIPLE SITES WITH POSITIVE RHEUMATOID FACTOR (HCC): ICD-10-CM

## 2019-04-30 DIAGNOSIS — Z12.5 SCREENING FOR MALIGNANT NEOPLASM OF PROSTATE: ICD-10-CM

## 2019-04-30 PROBLEM — N43.3 HYDROCELE: Status: RESOLVED | Noted: 2018-01-31 | Resolved: 2019-04-30

## 2019-04-30 PROCEDURE — 1036F TOBACCO NON-USER: CPT | Performed by: FAMILY MEDICINE

## 2019-04-30 PROCEDURE — 3074F SYST BP LT 130 MM HG: CPT | Performed by: FAMILY MEDICINE

## 2019-04-30 PROCEDURE — 99214 OFFICE O/P EST MOD 30 MIN: CPT | Performed by: FAMILY MEDICINE

## 2019-04-30 PROCEDURE — 3079F DIAST BP 80-89 MM HG: CPT | Performed by: FAMILY MEDICINE

## 2019-04-30 PROCEDURE — 3008F BODY MASS INDEX DOCD: CPT | Performed by: FAMILY MEDICINE

## 2019-08-06 ENCOUNTER — TELEPHONE (OUTPATIENT)
Dept: FAMILY MEDICINE CLINIC | Facility: CLINIC | Age: 64
End: 2019-08-06

## 2019-08-06 NOTE — TELEPHONE ENCOUNTER
Spoke with Pt and he is not having any new problems ,except this feeling he is experiencing ,so he took the 9:30 appoint tomorrow

## 2019-08-06 NOTE — TELEPHONE ENCOUNTER
He has a weird feeling in groin area, behind penis, like vibration lasting few seconds, then repeating, getting stronger  No pain or swelling, started last night  Wanted to see you today, I offered 9:30 tomorrow, he still wanted to check with you for today

## 2019-08-07 ENCOUNTER — OFFICE VISIT (OUTPATIENT)
Dept: FAMILY MEDICINE CLINIC | Facility: CLINIC | Age: 64
End: 2019-08-07
Payer: COMMERCIAL

## 2019-08-07 VITALS
HEART RATE: 74 BPM | BODY MASS INDEX: 28.4 KG/M2 | HEIGHT: 70 IN | DIASTOLIC BLOOD PRESSURE: 82 MMHG | WEIGHT: 198.4 LBS | TEMPERATURE: 98.3 F | SYSTOLIC BLOOD PRESSURE: 122 MMHG

## 2019-08-07 DIAGNOSIS — R20.2 PARESTHESIAS: Primary | ICD-10-CM

## 2019-08-07 PROCEDURE — 3008F BODY MASS INDEX DOCD: CPT | Performed by: FAMILY MEDICINE

## 2019-08-07 PROCEDURE — 1036F TOBACCO NON-USER: CPT | Performed by: FAMILY MEDICINE

## 2019-08-07 PROCEDURE — 99213 OFFICE O/P EST LOW 20 MIN: CPT | Performed by: FAMILY MEDICINE

## 2019-08-07 NOTE — PROGRESS NOTES
Assessment/Plan:     Diagnoses and all orders for this visit:    Paresthesias  Comments:  Suprapubic  r/o fasciculation  Normal exam  REC: watch for now  Avoid trauma or overuse  Recheck 1-2 weeks if not resolving - earlier if worse          Subjective:      Patient ID: Helena Shah is a 62 y o  male  Approximately 36 hour history of vibration sensation in the suprapubic area  Symptoms are not associated with trauma, pain, dysuria, rash or other findings  The following portions of the patient's history were reviewed and updated as appropriate: He  has a past medical history of Arthritis, Cellulitis, Enlarged prostate, Epidermal inclusion cyst, Erythrasma, Furuncle, Hyperlipidemia, and Hypertension  He   Patient Active Problem List    Diagnosis Date Noted    Urgency of urination 01/31/2018    Right arm pain 10/13/2017    Rheumatoid arthritis ([facility] [address] ) 10/13/2017    Hypertension 10/13/2017    Liver mass 11/17/2016    Hyperlipidemia 02/03/2016    Anxiety disorder 08/15/2013    Esophageal reflux 08/15/2013     He  has a past surgical history that includes Scrotal surgery; pr removal of hydrocele,tunica,unilat (Right, 1/11/2018); and Hydrocele excision / repair (Right, 01/11/2018)  He  reports that he has never smoked  He has never used smokeless tobacco  He reports that he does not drink alcohol or use drugs    Current Outpatient Medications   Medication Sig Dispense Refill    aspirin 81 mg chewable tablet Chew 81 mg      atorvastatin (LIPITOR) 10 mg tablet TAKE 1 TABLET BY MOUTH  DAILY 90 tablet 1    FLUoxetine (PROzac) 20 mg capsule TAKE ONE CAPSULE BY MOUTH EVERY DAY 90 capsule 2    folic acid (FOLVITE) 1 mg tablet Take by mouth daily      lisinopril-hydrochlorothiazide (PRINZIDE,ZESTORETIC) 10-12 5 MG per tablet TAKE ONE TABLET BY MOUTH EVERY DAY 90 tablet 3    Multiple Vitamins-Minerals (MULTIVITAMIN MEN) TABS Take 1 tablet by mouth daily      Omega-3 Fatty Acids (FISH OIL) 1,000 mg Take 1,000 mg by mouth daily      RASUVO 20 MG/0 4ML SOAJ INJECT 20 MG SUBCUTANEOUSLY ONCE A WEEK  4    tamsulosin (FLOMAX) 0 4 mg TAKE 1 CAPSULE BY MOUTH  DAILY AT BEDTIME 90 capsule 3    XELJANZ XR 11 MG TB24        No current facility-administered medications for this visit  He is allergic to fentanyl       Review of Systems   Constitutional: Negative  Gastrointestinal: Negative  Musculoskeletal: Negative for myalgias  Skin: Negative  Neurological: Negative for numbness  ?paresthesias in suprapubic area         Objective:      /82 (BP Location: Right arm, Patient Position: Sitting, Cuff Size: Standard)   Pulse 74   Temp 98 3 °F (36 8 °C)   Ht 5' 10" (1 778 m)   Wt 90 kg (198 lb 6 4 oz)   BMI 28 47 kg/m²          Physical Exam   Constitutional: He appears well-developed and well-nourished  Abdominal: Soft  Bowel sounds are normal  He exhibits no distension  There is no tenderness  Musculoskeletal: He exhibits no edema  Neurological: No sensory deficit  Skin: Skin is warm

## 2019-08-15 DIAGNOSIS — E78.00 HYPERCHOLESTEROLEMIA: ICD-10-CM

## 2019-08-15 RX ORDER — ATORVASTATIN CALCIUM 10 MG/1
TABLET, FILM COATED ORAL
Qty: 90 TABLET | Refills: 1 | Status: SHIPPED | OUTPATIENT
Start: 2019-08-15 | End: 2020-03-16

## 2019-09-18 DIAGNOSIS — I10 ESSENTIAL HYPERTENSION: ICD-10-CM

## 2019-09-18 RX ORDER — LISINOPRIL AND HYDROCHLOROTHIAZIDE 12.5; 1 MG/1; MG/1
TABLET ORAL
Qty: 90 TABLET | Refills: 3 | Status: SHIPPED | OUTPATIENT
Start: 2019-09-18 | End: 2020-09-15

## 2019-09-19 ENCOUNTER — HOSPITAL ENCOUNTER (EMERGENCY)
Facility: HOSPITAL | Age: 64
Discharge: HOME | End: 2019-09-19
Attending: EMERGENCY MEDICINE | Admitting: EMERGENCY MEDICINE
Payer: COMMERCIAL

## 2019-09-19 ENCOUNTER — APPOINTMENT (EMERGENCY)
Dept: CT IMAGING | Facility: HOSPITAL | Age: 64
End: 2019-09-19
Payer: COMMERCIAL

## 2019-09-19 VITALS
TEMPERATURE: 97.7 F | HEART RATE: 70 BPM | BODY MASS INDEX: 28 KG/M2 | WEIGHT: 200 LBS | OXYGEN SATURATION: 94 % | SYSTOLIC BLOOD PRESSURE: 140 MMHG | RESPIRATION RATE: 15 BRPM | DIASTOLIC BLOOD PRESSURE: 80 MMHG | HEIGHT: 71 IN

## 2019-09-19 DIAGNOSIS — R19.7 DIARRHEA, UNSPECIFIED TYPE: Primary | ICD-10-CM

## 2019-09-19 LAB
ALBUMIN SERPL BCP-MCNC: 3.9 G/DL (ref 3.5–5)
ALP SERPL-CCNC: 98 U/L (ref 46–116)
ALT SERPL W P-5'-P-CCNC: 50 U/L (ref 12–78)
ANION GAP SERPL CALCULATED.3IONS-SCNC: 9 MMOL/L (ref 4–13)
AST SERPL W P-5'-P-CCNC: 19 U/L (ref 5–45)
BASOPHILS # BLD AUTO: 0.02 THOUSANDS/ΜL (ref 0–0.1)
BASOPHILS NFR BLD AUTO: 0 % (ref 0–1)
BILIRUB SERPL-MCNC: 0.8 MG/DL (ref 0.2–1)
BUN SERPL-MCNC: 23 MG/DL (ref 5–25)
CALCIUM SERPL-MCNC: 9.2 MG/DL (ref 8.3–10.1)
CHLORIDE SERPL-SCNC: 105 MMOL/L (ref 100–108)
CO2 SERPL-SCNC: 25 MMOL/L (ref 21–32)
CREAT SERPL-MCNC: 1.13 MG/DL (ref 0.6–1.3)
EOSINOPHIL # BLD AUTO: 0.09 THOUSAND/ΜL (ref 0–0.61)
EOSINOPHIL NFR BLD AUTO: 1 % (ref 0–6)
ERYTHROCYTE [DISTWIDTH] IN BLOOD BY AUTOMATED COUNT: 13.8 % (ref 11.6–15.1)
GFR SERPL CREATININE-BSD FRML MDRD: 69 ML/MIN/1.73SQ M
GLUCOSE SERPL-MCNC: 106 MG/DL (ref 65–140)
HCT VFR BLD AUTO: 43.5 % (ref 36.5–49.3)
HGB BLD-MCNC: 15.1 G/DL (ref 12–17)
IMM GRANULOCYTES # BLD AUTO: 0.05 THOUSAND/UL (ref 0–0.2)
IMM GRANULOCYTES NFR BLD AUTO: 0 % (ref 0–2)
LACTATE SERPL-SCNC: 0.8 MMOL/L (ref 0.5–2)
LACTATE SERPL-SCNC: 2.1 MMOL/L (ref 0.5–2)
LIPASE SERPL-CCNC: 211 U/L (ref 73–393)
LYMPHOCYTES # BLD AUTO: 1.35 THOUSANDS/ΜL (ref 0.6–4.47)
LYMPHOCYTES NFR BLD AUTO: 10 % (ref 14–44)
MCH RBC QN AUTO: 31.7 PG (ref 26.8–34.3)
MCHC RBC AUTO-ENTMCNC: 34.7 G/DL (ref 31.4–37.4)
MCV RBC AUTO: 91 FL (ref 82–98)
MONOCYTES # BLD AUTO: 1.08 THOUSAND/ΜL (ref 0.17–1.22)
MONOCYTES NFR BLD AUTO: 8 % (ref 4–12)
NEUTROPHILS # BLD AUTO: 10.62 THOUSANDS/ΜL (ref 1.85–7.62)
NEUTS SEG NFR BLD AUTO: 81 % (ref 43–75)
NRBC BLD AUTO-RTO: 0 /100 WBCS
PLATELET # BLD AUTO: 263 THOUSANDS/UL (ref 149–390)
PMV BLD AUTO: 11.1 FL (ref 8.9–12.7)
POTASSIUM SERPL-SCNC: 3.4 MMOL/L (ref 3.5–5.3)
PROT SERPL-MCNC: 7.2 G/DL (ref 6.4–8.2)
RBC # BLD AUTO: 4.77 MILLION/UL (ref 3.88–5.62)
SODIUM SERPL-SCNC: 139 MMOL/L (ref 136–145)
WBC # BLD AUTO: 13.21 THOUSAND/UL (ref 4.31–10.16)

## 2019-09-19 PROCEDURE — 83605 ASSAY OF LACTIC ACID: CPT | Performed by: EMERGENCY MEDICINE

## 2019-09-19 PROCEDURE — 85025 COMPLETE CBC W/AUTO DIFF WBC: CPT | Performed by: EMERGENCY MEDICINE

## 2019-09-19 PROCEDURE — 36415 COLL VENOUS BLD VENIPUNCTURE: CPT | Performed by: EMERGENCY MEDICINE

## 2019-09-19 PROCEDURE — 96374 THER/PROPH/DIAG INJ IV PUSH: CPT

## 2019-09-19 PROCEDURE — 99284 EMERGENCY DEPT VISIT MOD MDM: CPT | Performed by: EMERGENCY MEDICINE

## 2019-09-19 PROCEDURE — 96375 TX/PRO/DX INJ NEW DRUG ADDON: CPT

## 2019-09-19 PROCEDURE — 83690 ASSAY OF LIPASE: CPT | Performed by: EMERGENCY MEDICINE

## 2019-09-19 PROCEDURE — 96361 HYDRATE IV INFUSION ADD-ON: CPT

## 2019-09-19 PROCEDURE — 74177 CT ABD & PELVIS W/CONTRAST: CPT

## 2019-09-19 PROCEDURE — 80053 COMPREHEN METABOLIC PANEL: CPT | Performed by: EMERGENCY MEDICINE

## 2019-09-19 PROCEDURE — 99284 EMERGENCY DEPT VISIT MOD MDM: CPT

## 2019-09-19 RX ORDER — ONDANSETRON 4 MG/1
4 TABLET, ORALLY DISINTEGRATING ORAL EVERY 8 HOURS PRN
Qty: 20 TABLET | Refills: 0 | Status: SHIPPED | OUTPATIENT
Start: 2019-09-19 | End: 2019-11-07 | Stop reason: ALTCHOICE

## 2019-09-19 RX ORDER — KETOROLAC TROMETHAMINE 10 MG/1
10 TABLET, FILM COATED ORAL EVERY 6 HOURS PRN
Qty: 20 TABLET | Refills: 0 | Status: SHIPPED | OUTPATIENT
Start: 2019-09-19 | End: 2019-11-07 | Stop reason: ALTCHOICE

## 2019-09-19 RX ORDER — KETOROLAC TROMETHAMINE 30 MG/ML
15 INJECTION, SOLUTION INTRAMUSCULAR; INTRAVENOUS ONCE
Status: COMPLETED | OUTPATIENT
Start: 2019-09-19 | End: 2019-09-19

## 2019-09-19 RX ORDER — ONDANSETRON 2 MG/ML
4 INJECTION INTRAMUSCULAR; INTRAVENOUS ONCE
Status: COMPLETED | OUTPATIENT
Start: 2019-09-19 | End: 2019-09-19

## 2019-09-19 RX ADMIN — ONDANSETRON 4 MG: 2 INJECTION INTRAMUSCULAR; INTRAVENOUS at 17:56

## 2019-09-19 RX ADMIN — SODIUM CHLORIDE 1000 ML: 0.9 INJECTION, SOLUTION INTRAVENOUS at 17:58

## 2019-09-19 RX ADMIN — IOHEXOL 100 ML: 350 INJECTION, SOLUTION INTRAVENOUS at 18:23

## 2019-09-19 RX ADMIN — KETOROLAC TROMETHAMINE 15 MG: 30 INJECTION, SOLUTION INTRAMUSCULAR at 17:56

## 2019-09-19 RX ADMIN — SODIUM CHLORIDE 1000 ML: 0.9 INJECTION, SOLUTION INTRAVENOUS at 18:44

## 2019-09-19 NOTE — ED PROVIDER NOTES
History  Chief Complaint   Patient presents with    Diarrhea     pt states started with diarrhea at 0100 this morning last 2 were bloody       History provided by:  Patient   used: No    Diarrhea   Quality:  Watery and bloody  Severity:  Moderate  Onset quality:  Gradual  Duration:  1 day  Timing:  Intermittent  Progression:  Unchanged  Relieved by:  Nothing  Worsened by:  Nothing  Associated symptoms: abdominal pain    Associated symptoms: no arthralgias, no chills and no fever    Risk factors: no recent antibiotic use, no sick contacts, no suspicious food intake and no travel to endemic areas        Prior to Admission Medications   Prescriptions Last Dose Informant Patient Reported? Taking?    FLUoxetine (PROzac) 20 mg capsule 9/18/2019 at Unknown time Self No Yes   Sig: TAKE ONE CAPSULE BY MOUTH EVERY DAY   Multiple Vitamins-Minerals (MULTIVITAMIN MEN) TABS 9/19/2019 at 0900 Self Yes Yes   Sig: Take 1 tablet by mouth daily   Omega-3 Fatty Acids (FISH OIL) 1,000 mg 9/18/2019 at Unknown time Self Yes Yes   Sig: Take 1,000 mg by mouth daily   RASUVO 20 MG/0 4ML SOAJ 9/18/2019 Self Yes No   Sig: INJECT 20 MG SUBCUTANEOUSLY ONCE A WEEK   XELJANZ XR 11 MG TB24 9/18/2019 at Unknown time Self Yes Yes   aspirin 81 mg chewable tablet 9/19/2019 at 0900 Self Yes Yes   Sig: Chew 81 mg   atorvastatin (LIPITOR) 10 mg tablet 9/19/2019 at 0900  No Yes   Sig: TAKE 1 TABLET BY MOUTH  DAILY   folic acid (FOLVITE) 1 mg tablet 9/19/2019 at 0900 Self Yes Yes   Sig: Take by mouth daily   lisinopril-hydrochlorothiazide (PRINZIDE,ZESTORETIC) 10-12 5 MG per tablet 9/19/2019 at 0900  No Yes   Sig: TAKE ONE TABLET BY MOUTH EVERY DAY   tamsulosin (FLOMAX) 0 4 mg 9/18/2019 at Unknown time Self No Yes   Sig: TAKE 1 CAPSULE BY MOUTH  DAILY AT BEDTIME      Facility-Administered Medications: None       Past Medical History:   Diagnosis Date    Arthritis     Cellulitis     LAST ASSESSED: 6/13/14    Enlarged prostate     Epidermal inclusion cyst     LAST ASSESSED: 10/4/13    Erythrasma     LAST ASSESSED: 9/23/13    Furuncle     LAST ASSESSED: 6/11/14    Hyperlipidemia     Hypertension        Past Surgical History:   Procedure Laterality Date    HYDROCELE EXCISION / REPAIR Right 01/11/2018    SPERMATIC CORD EXCSION OF HYDROCELE; MANAGED BY: Krystal BANDA REMOVAL OF HYDROCELE,TUNICA,UNILAT Right 1/11/2018    Procedure: HYDROCELECTOMY;  Surgeon: Charlie Lee MD;  [facility]: [facility];  Service: Urology    SCROTAL SURGERY      benign "lump"       Family History   Problem Relation Age of Onset    Heart disease Father         CARDIAC DISORDER    Hypertension Father     Hypertension Mother     No Known Problems Maternal Aunt     No Known Problems Maternal Uncle     No Known Problems Paternal Aunt     No Known Problems Paternal Uncle     No Known Problems Paternal Grandmother     No Known Problems Paternal Grandfather     Diabetes Maternal Grandmother         MELLITUS    No Known Problems Maternal Grandfather     Hypertension Other      I have reviewed and agree with the history as documented  Social History     Tobacco Use    Smoking status: Never Smoker    Smokeless tobacco: Never Used   Substance Use Topics    Alcohol use: No    Drug use: No        Review of Systems   Constitutional: Negative for activity change, appetite change, chills, fatigue and fever  HENT: Negative for congestion, dental problem, facial swelling, sore throat, tinnitus and trouble swallowing  Eyes: Negative for pain, discharge and itching  Respiratory: Negative for apnea, chest tightness and wheezing  Cardiovascular: Negative for chest pain, palpitations and leg swelling  Gastrointestinal: Positive for abdominal pain and diarrhea  Negative for nausea  Genitourinary: Negative for difficulty urinating, dysuria and flank pain     Musculoskeletal: Negative for arthralgias, back pain, gait problem, joint swelling and neck pain    Skin: Negative for color change, rash and wound  Neurological: Negative for dizziness and facial asymmetry  Psychiatric/Behavioral: Negative for agitation and behavioral problems  The patient is not nervous/anxious  All other systems reviewed and are negative  Physical Exam  Physical Exam   Constitutional: He is oriented to person, place, and time  He appears well-developed and well-nourished  No distress  HENT:   Head: Normocephalic and atraumatic  Right Ear: External ear normal    Left Ear: External ear normal    Eyes: Pupils are equal, round, and reactive to light  EOM are normal  Right eye exhibits no discharge  Left eye exhibits no discharge  Neck: Normal range of motion  Neck supple  No tracheal deviation present  No thyromegaly present  Cardiovascular: Normal rate and regular rhythm  No murmur heard  Pulmonary/Chest: Effort normal and breath sounds normal    Abdominal: Soft  Bowel sounds are normal  He exhibits no distension  There is no tenderness  Genitourinary:   Genitourinary Comments: Brown stool in rectal vault   Musculoskeletal: Normal range of motion  He exhibits no edema or deformity  Neurological: He is alert and oriented to person, place, and time  No cranial nerve deficit  He exhibits normal muscle tone  Skin: Skin is warm  Capillary refill takes less than 2 seconds  He is not diaphoretic  Psychiatric: He has a normal mood and affect  His behavior is normal    Nursing note and vitals reviewed        Vital Signs  ED Triage Vitals   Temperature Pulse Respirations Blood Pressure SpO2   09/19/19 1719 09/19/19 1719 09/19/19 1719 09/19/19 1719 09/19/19 1719   97 7 °F (36 5 °C) 62 16 157/77 100 %      Temp Source Heart Rate Source Patient Position - Orthostatic VS BP Location FiO2 (%)   09/19/19 1719 09/19/19 1803 09/19/19 1803 09/19/19 1803 --   Oral Monitor Lying Left arm       Pain Score       09/19/19 1719       9           Vitals:    09/19/19 1945 09/19/19 2015 09/19/19 2045 09/19/19 2115   BP:  140/80     Pulse: 70 66 64 70   Patient Position - Orthostatic VS:  Lying           Visual Acuity      ED Medications  Medications   sodium chloride 0 9 % bolus 1,000 mL (0 mL Intravenous Stopped 9/19/19 1940)   ondansetron (ZOFRAN) injection 4 mg (4 mg Intravenous Given 9/19/19 1756)   ketorolac (TORADOL) injection 15 mg (15 mg Intravenous Given 9/19/19 1756)   iohexol (OMNIPAQUE) 350 MG/ML injection (MULTI-DOSE) 100 mL (100 mL Intravenous Given 9/19/19 1823)   sodium chloride 0 9 % bolus 1,000 mL (0 mL Intravenous Stopped 9/19/19 1952)       Diagnostic Studies  Results Reviewed     Procedure Component Value Units Date/Time    Lactic acid, plasma [[id number]]  (Normal) Collected:  09/19/19 1949    Lab Status:  Final result Specimen:  Blood from Arm, Right Updated:  09/19/19 2046     LACTIC ACID 0 8 mmol/L     Narrative:       Result may be elevated if tourniquet was used during collection  Lactic acid, plasma [[id number]]  (Abnormal) Collected:  09/19/19 1746    Lab Status:  Final result Specimen:  Blood from Arm, Right Updated:  09/19/19 1835     LACTIC ACID 2 1 mmol/L     Narrative:       Result may be elevated if tourniquet was used during collection      Comprehensive metabolic panel [[id number]]  (Abnormal) Collected:  09/19/19 1746    Lab Status:  Final result Specimen:  Blood from Arm, Right Updated:  09/19/19 1809     Sodium 139 mmol/L      Potassium 3 4 mmol/L      Chloride 105 mmol/L      CO2 25 mmol/L      ANION GAP 9 mmol/L      BUN 23 mg/dL      Creatinine 1 13 mg/dL      Glucose 106 mg/dL      Calcium 9 2 mg/dL      AST 19 U/L      ALT 50 U/L      Alkaline Phosphatase 98 U/L      Total Protein 7 2 g/dL      Albumin 3 9 g/dL      Total Bilirubin 0 80 mg/dL      eGFR 69 ml/min/1 73sq m     Narrative:       [place] guidelines for Chronic Kidney Disease (CKD):     Stage 1 with normal or high GFR (GFR > 90 mL/min/1 73 square meters)    Stage 2 Mild CKD (GFR = 60-89 mL/min/1 73 square meters)    Stage 3A Moderate CKD (GFR = 45-59 mL/min/1 73 square meters)    Stage 3B Moderate CKD (GFR = 30-44 mL/min/1 73 square meters)    Stage 4 Severe CKD (GFR = 15-29 mL/min/1 73 square meters)    Stage 5 End Stage CKD (GFR <15 mL/min/1 73 square meters)  Note: GFR calculation is accurate only with a steady state creatinine    Lipase [[id number]]  (Normal) Collected:  09/19/19 1746    Lab Status:  Final result Specimen:  Blood from Arm, Right Updated:  09/19/19 1809     Lipase 211 u/L     CBC and differential [[id number]]  (Abnormal) Collected:  09/19/19 1746    Lab Status:  Final result Specimen:  Blood from Arm, Right Updated:  09/19/19 1753     WBC 13 21 Thousand/uL      RBC 4 77 Million/uL      Hemoglobin 15 1 g/dL      Hematocrit 43 5 %      MCV 91 fL      MCH 31 7 pg      MCHC 34 7 g/dL      RDW 13 8 %      MPV 11 1 fL      Platelets 412 Thousands/uL      nRBC 0 /100 WBCs      Neutrophils Relative 81 %      Immat GRANS % 0 %      Lymphocytes Relative 10 %      Monocytes Relative 8 %      Eosinophils Relative 1 %      Basophils Relative 0 %      Neutrophils Absolute 10 62 Thousands/µL      Immature Grans Absolute 0 05 Thousand/uL      Lymphocytes Absolute 1 35 Thousands/µL      Monocytes Absolute 1 08 Thousand/µL      Eosinophils Absolute 0 09 Thousand/µL      Basophils Absolute 0 02 Thousands/µL                  CT abdomen pelvis with contrast   Final Result by Arias Mustafa MD (09/19 1844)      Moderate segmental thickening of the distal transverse and proximal descending colon in keeping with a nonspecific segmental colitis, likely infectious or inflammatory  No bowel obstruction or bowel pneumatosis              Workstation performed: [id number]                    Procedures  Procedures       ED Course                               MDM  Number of Diagnoses or Management Options  Diarrhea, unspecified type:   Diagnosis management comments: Patient with 1 day of diarrhea, intermittently bloody  Also with diffuse crampy abdominal pain  No recent travel, no recent antibiotics  Laboratory studies reveal slightly elevated white blood cell count, initial lactate of 2 1 which cleared by IV fluids  CT scan with nonspecific colitis  Patient given IV fluids, Toradol, Zofran with improvement of his symptoms  Feels much better, stable for discharge home with outpatient follow-up  Return precautions given for inability tolerate oral intake, worsening or changing condition  Prescription for Zofran at home, recommended good oral hydration  He is accompanied by his wife  They will return to the ER with any new or concerning symptoms  Patient ambulatory, no acute distress at time of discharge       Amount and/or Complexity of Data Reviewed  Clinical lab tests: ordered and reviewed  Tests in the radiology section of CPT®: ordered and reviewed  Tests in the medicine section of CPT®: ordered and reviewed    Risk of Complications, Morbidity, and/or Mortality  Presenting problems: high  Diagnostic procedures: moderate  Management options: moderate    Patient Progress  Patient progress: improved      Disposition  Final diagnoses:   Diarrhea, unspecified type     Time reflects when diagnosis was documented in both MDM as applicable and the Disposition within this note     Time User Action Codes Description Comment    9/19/2019  9:00 PM Lena Salomon Add [R19 7] Diarrhea, unspecified type       ED Disposition     ED Disposition Condition Date/Time Comment    Discharge Stable u Sep 19, 2019  8:59 PM Jones Altman discharge to home/self care  Follow-up Information     Follow up With Specialties Details Why Contact Info Additional Information    Lorenza Freedman MD [facility] Medicine Schedule an appointment as soon as possible for a visit in 2 days As needed [address] [facility]    Chantelle Salgado [address] [facility] Emergency Medicine Go to  If symptoms worsen [address] [facility]  [facility], [address], [place], [place], [address]          Discharge Medication List as of 9/19/2019  9:09 PM      START taking these medications    Details   ketorolac (TORADOL) 10 mg tablet Take 1 tablet (10 mg total) by mouth every 6 (six) hours as needed for moderate pain for up to 5 days, Starting Thu 9/19/2019, Until Tue 9/24/2019, Print      ondansetron (ZOFRAN-ODT) 4 mg disintegrating tablet Take 1 tablet (4 mg total) by mouth every 8 (eight) hours as needed for nausea or vomiting, Starting Thu 9/19/2019, Print         CONTINUE these medications which have NOT CHANGED    Details   aspirin 81 mg chewable tablet Chew 81 mg, Historical Med      atorvastatin (LIPITOR) 10 mg tablet TAKE 1 TABLET BY MOUTH  DAILY, Normal      FLUoxetine (PROzac) 20 mg capsule TAKE ONE CAPSULE BY MOUTH EVERY DAY, Normal      folic acid (FOLVITE) 1 mg tablet Take by mouth daily, Historical Med      lisinopril-hydrochlorothiazide (PRINZIDE,ZESTORETIC) 10-12 5 MG per tablet TAKE ONE TABLET BY MOUTH EVERY DAY, Normal      Multiple Vitamins-Minerals (MULTIVITAMIN MEN) TABS Take 1 tablet by mouth daily, Historical Med      Omega-3 Fatty Acids (FISH OIL) 1,000 mg Take 1,000 mg by mouth daily, Historical Med      tamsulosin (FLOMAX) 0 4 mg TAKE 1 CAPSULE BY MOUTH  DAILY AT BEDTIME, Normal      XELJANZ XR 11 MG TB24 Starting Mon 7/9/2018, Historical Med      RASUVO 20 MG/0 4ML SOAJ INJECT 20 MG SUBCUTANEOUSLY ONCE A WEEK, Historical Med           No discharge procedures on file      ED Provider  Electronically Signed by           Chase Tay MD  09/19/19 3408

## 2019-09-20 NOTE — ED NOTES
Pt in no acute distress  Ambulates with steady gait        Adriana Edwards, [address] [facility]  09/19/19 4850

## 2019-11-07 ENCOUNTER — OFFICE VISIT (OUTPATIENT)
Dept: FAMILY MEDICINE CLINIC | Facility: CLINIC | Age: 64
End: 2019-11-07
Payer: COMMERCIAL

## 2019-11-07 VITALS
BODY MASS INDEX: 27.92 KG/M2 | SYSTOLIC BLOOD PRESSURE: 116 MMHG | DIASTOLIC BLOOD PRESSURE: 72 MMHG | WEIGHT: 199.4 LBS | HEIGHT: 71 IN | HEART RATE: 72 BPM | TEMPERATURE: 97.9 F

## 2019-11-07 DIAGNOSIS — F41.9 ANXIETY DISORDER, UNSPECIFIED TYPE: ICD-10-CM

## 2019-11-07 DIAGNOSIS — E78.2 MIXED HYPERLIPIDEMIA: ICD-10-CM

## 2019-11-07 DIAGNOSIS — I10 ESSENTIAL HYPERTENSION: Primary | ICD-10-CM

## 2019-11-07 DIAGNOSIS — Z12.5 SCREENING FOR MALIGNANT NEOPLASM OF PROSTATE: ICD-10-CM

## 2019-11-07 DIAGNOSIS — Z23 IMMUNIZATION DUE: ICD-10-CM

## 2019-11-07 DIAGNOSIS — M05.79 RHEUMATOID ARTHRITIS INVOLVING MULTIPLE SITES WITH POSITIVE RHEUMATOID FACTOR (HCC): ICD-10-CM

## 2019-11-07 PROCEDURE — 99214 OFFICE O/P EST MOD 30 MIN: CPT | Performed by: FAMILY MEDICINE

## 2019-11-07 PROCEDURE — 90682 RIV4 VACC RECOMBINANT DNA IM: CPT | Performed by: FAMILY MEDICINE

## 2019-11-07 PROCEDURE — 90471 IMMUNIZATION ADMIN: CPT | Performed by: FAMILY MEDICINE

## 2019-11-07 NOTE — PROGRESS NOTES
Assessment/Plan:    Hypertension  Well controlled  Cont present treatment  Monitor labs  Recheck 6m    Rheumatoid arthritis ([facility] [address] )  Stable on present meds  Cont present treatment  F/u with Rheum  Recheck 6m    Anxiety disorder  Sl increased stress but mood is stable  Cont fluoxetine  Recheck 6m    Hyperlipidemia  Monitor lipids  Cont atorvastatin  Recheck 6m       Diagnoses and all orders for this visit:    Essential hypertension  -     CBC and differential; Future  -     Comprehensive metabolic panel; Future  -     Lipid panel; Future    Rheumatoid arthritis involving multiple sites with positive rheumatoid factor ([facility] [address] )    Mixed hyperlipidemia    Anxiety disorder, unspecified type    Immunization due  -     influenza vaccine, [id number], quadrivalent, recombinant, PF, 0 5 mL, for patients 18 yr+ (FLUBLOK)    Screening for malignant neoplasm of prostate  -     PSA, Total Screen; Future          Subjective:      Patient ID: Nidhi Mora is a 62 y o  male  f/u multiple med issues  - pt still struggling with neck and back pain with various joint pains  (+) low back radiates to L buttock  Up to date with Rheum re;  RA  Rheum offered to send pt to PT but pt wanted to wait  Compliant with meds  - pt states that his anxiety is a little high right now  Pt notes a lot of family stress  Pt denies depression or anhedonia  - notes that his muscle cramps have resolved since starting Susan Fret  No worsening hand weakness/pain  - pt denies CP, palp, lightheadedness or other CV symptoms with or without exertion  - no GI or  complaints  Up to date with colonoscopy - due next Feb  - no other concerns  - would like a flu shot      The following portions of the patient's history were reviewed and updated as appropriate:   He  has a past medical history of Arthritis, Cellulitis, Enlarged prostate, Epidermal inclusion cyst, Erythrasma, Furuncle, Hyperlipidemia, and Hypertension    He   Patient Active Problem List Diagnosis Date Noted    Urgency of urination 01/31/2018    Right arm pain 10/13/2017    Rheumatoid arthritis ([facility] [address] ) 10/13/2017    Hypertension 10/13/2017    Liver mass 11/17/2016    Hyperlipidemia 02/03/2016    Anxiety disorder 08/15/2013    Esophageal reflux 08/15/2013     He  has a past surgical history that includes Scrotal surgery; pr removal of hydrocele,tunica,unilat (Right, 1/11/2018); and Hydrocele excision / repair (Right, 01/11/2018)  He  reports that he has never smoked  He has never used smokeless tobacco  He reports that he does not drink alcohol or use drugs  Current Outpatient Medications   Medication Sig Dispense Refill    aspirin 81 mg chewable tablet Chew 81 mg      atorvastatin (LIPITOR) 10 mg tablet TAKE 1 TABLET BY MOUTH  DAILY 90 tablet 1    FLUoxetine (PROzac) 20 mg capsule TAKE ONE CAPSULE BY MOUTH EVERY DAY 90 capsule 2    folic acid (FOLVITE) 1 mg tablet Take by mouth daily      lisinopril-hydrochlorothiazide (PRINZIDE,ZESTORETIC) 10-12 5 MG per tablet TAKE ONE TABLET BY MOUTH EVERY DAY 90 tablet 3    Multiple Vitamins-Minerals (MULTIVITAMIN MEN) TABS Take 1 tablet by mouth daily      Omega-3 Fatty Acids (FISH OIL) 1,000 mg Take 1,000 mg by mouth daily      RASUVO 20 MG/0 4ML SOAJ INJECT 20 MG SUBCUTANEOUSLY ONCE A WEEK  4    tamsulosin (FLOMAX) 0 4 mg TAKE 1 CAPSULE BY MOUTH  DAILY AT BEDTIME 90 capsule 3    XELJANZ XR 11 MG TB24        No current facility-administered medications for this visit  He is allergic to fentanyl       Review of Systems   HENT: Negative  Eyes: Negative  Respiratory: Negative  Cardiovascular: Negative  Gastrointestinal: Negative  Endocrine: Negative  Genitourinary: Negative  Musculoskeletal: Positive for back pain  Skin: Negative  Allergic/Immunologic: Negative  Neurological: Positive for headaches (occasional)  Negative for weakness, light-headedness and numbness  Hematological: Negative  Psychiatric/Behavioral: Negative  Objective:      /72 (BP Location: Right arm, Patient Position: Sitting, Cuff Size: Standard)   Pulse 72   Temp 97 9 °F (36 6 °C)   Ht 5' 10 5" (1 791 m)   Wt 90 4 kg (199 lb 6 4 oz)   BMI 28 21 kg/m²          Physical Exam   Constitutional: He is oriented to person, place, and time  He appears well-developed and well-nourished  HENT:   Head: Normocephalic and atraumatic  Nose: Nose normal    Mouth/Throat: Oropharynx is clear and moist    Eyes: Pupils are equal, round, and reactive to light  Conjunctivae and EOM are normal    Neck: Neck supple  Cardiovascular: Normal rate, regular rhythm and intact distal pulses  Pulmonary/Chest: Effort normal and breath sounds normal    Abdominal: Soft  Bowel sounds are normal  He exhibits no distension and no mass  There is no tenderness  Musculoskeletal: He exhibits no edema  Lymphadenopathy:     He has no cervical adenopathy  Neurological: He is alert and oriented to person, place, and time  No cranial nerve deficit  He exhibits normal muscle tone  Skin: Skin is warm     Psychiatric: His behavior is normal  Judgment and thought content normal    PHQ-2 = 0

## 2019-11-12 LAB
ALBUMIN SERPL-MCNC: 4.5 G/DL (ref 3.6–4.8)
ALBUMIN/GLOB SERPL: 2.4 {RATIO} (ref 1.2–2.2)
ALP SERPL-CCNC: 104 IU/L (ref 39–117)
ALT SERPL-CCNC: 30 IU/L (ref 0–44)
AST SERPL-CCNC: 21 IU/L (ref 0–40)
BASOPHILS # BLD AUTO: 0 X10E3/UL (ref 0–0.2)
BASOPHILS NFR BLD AUTO: 0 %
BILIRUB SERPL-MCNC: 0.4 MG/DL (ref 0–1.2)
BUN SERPL-MCNC: 22 MG/DL (ref 8–27)
BUN/CREAT SERPL: 21 (ref 10–24)
CALCIUM SERPL-MCNC: 9.4 MG/DL (ref 8.6–10.2)
CHLORIDE SERPL-SCNC: 104 MMOL/L (ref 96–106)
CHOLEST SERPL-MCNC: 182 MG/DL (ref 100–199)
CO2 SERPL-SCNC: 23 MMOL/L (ref 20–29)
CREAT SERPL-MCNC: 1.05 MG/DL (ref 0.76–1.27)
EOSINOPHIL # BLD AUTO: 0.1 X10E3/UL (ref 0–0.4)
EOSINOPHIL NFR BLD AUTO: 3 %
ERYTHROCYTE [DISTWIDTH] IN BLOOD BY AUTOMATED COUNT: 14.7 % (ref 12.3–15.4)
GLOBULIN SER-MCNC: 1.9 G/DL (ref 1.5–4.5)
GLUCOSE SERPL-MCNC: 97 MG/DL (ref 65–99)
HCT VFR BLD AUTO: 44.7 % (ref 37.5–51)
HDLC SERPL-MCNC: 44 MG/DL
HGB BLD-MCNC: 15.2 G/DL (ref 13–17.7)
IMM GRANULOCYTES # BLD: 0 X10E3/UL (ref 0–0.1)
IMM GRANULOCYTES NFR BLD: 0 %
LDLC SERPL CALC-MCNC: 112 MG/DL (ref 0–99)
LYMPHOCYTES # BLD AUTO: 1.4 X10E3/UL (ref 0.7–3.1)
LYMPHOCYTES NFR BLD AUTO: 28 %
MCH RBC QN AUTO: 31 PG (ref 26.6–33)
MCHC RBC AUTO-ENTMCNC: 34 G/DL (ref 31.5–35.7)
MCV RBC AUTO: 91 FL (ref 79–97)
MONOCYTES # BLD AUTO: 0.4 X10E3/UL (ref 0.1–0.9)
MONOCYTES NFR BLD AUTO: 9 %
NEUTROPHILS # BLD AUTO: 3 X10E3/UL (ref 1.4–7)
NEUTROPHILS NFR BLD AUTO: 60 %
PLATELET # BLD AUTO: 272 X10E3/UL (ref 150–450)
POTASSIUM SERPL-SCNC: 4.5 MMOL/L (ref 3.5–5.2)
PROT SERPL-MCNC: 6.4 G/DL (ref 6–8.5)
PSA SERPL-MCNC: 1.4 NG/ML (ref 0–4)
RBC # BLD AUTO: 4.9 X10E6/UL (ref 4.14–5.8)
SL AMB EGFR AFRICAN AMERICAN: 87 ML/MIN/1.73
SL AMB EGFR NON AFRICAN AMERICAN: 75 ML/MIN/1.73
SL AMB VLDL CHOLESTEROL CALC: 26 MG/DL (ref 5–40)
SODIUM SERPL-SCNC: 142 MMOL/L (ref 134–144)
TRIGL SERPL-MCNC: 131 MG/DL (ref 0–149)
WBC # BLD AUTO: 4.9 X10E3/UL (ref 3.4–10.8)

## 2019-12-16 ENCOUNTER — TELEPHONE (OUTPATIENT)
Dept: FAMILY MEDICINE CLINIC | Facility: CLINIC | Age: 64
End: 2019-12-16

## 2019-12-16 DIAGNOSIS — R81 GLUCOSURIA: Primary | ICD-10-CM

## 2019-12-16 NOTE — TELEPHONE ENCOUNTER
Had DOT Michael done on Sat    His sugars were a little high In urine test  He does not know the numbers     He needs orders for Blood work to check his sugars     Please put orders in and let patient know

## 2019-12-16 NOTE — TELEPHONE ENCOUNTER
Patient dropped off form  Awaiting bloodwork results before form can be completed     Form located in nursing bin

## 2019-12-19 LAB — HBA1C MFR BLD HPLC: 5.7 %

## 2019-12-24 LAB
BUN SERPL-MCNC: 19 MG/DL (ref 8–27)
BUN/CREAT SERPL: 18 (ref 10–24)
CHLORIDE SERPL-SCNC: 99 MMOL/L (ref 96–106)
CO2 SERPL-SCNC: 22 MMOL/L (ref 20–29)
CREAT SERPL-MCNC: 1.08 MG/DL (ref 0.76–1.27)
GLUCOSE SERPL-MCNC: 83 MG/DL (ref 65–99)
HBA1C MFR BLD: 5.7 % (ref 4.8–5.6)
POTASSIUM SERPL-SCNC: 4.1 MMOL/L (ref 3.5–5.2)
SL AMB EGFR AFRICAN AMERICAN: 84 ML/MIN/1.73
SL AMB EGFR NON AFRICAN AMERICAN: 73 ML/MIN/1.73
SODIUM SERPL-SCNC: 140 MMOL/L (ref 134–144)

## 2020-01-15 DIAGNOSIS — F32.A DEPRESSION, UNSPECIFIED DEPRESSION TYPE: ICD-10-CM

## 2020-01-16 RX ORDER — FLUOXETINE HYDROCHLORIDE 20 MG/1
CAPSULE ORAL
Qty: 90 CAPSULE | Refills: 2 | Status: SHIPPED | OUTPATIENT
Start: 2020-01-16 | End: 2020-10-13

## 2020-02-12 ENCOUNTER — APPOINTMENT (OUTPATIENT)
Dept: RADIOLOGY | Age: 65
End: 2020-02-12
Payer: COMMERCIAL

## 2020-02-12 ENCOUNTER — OFFICE VISIT (OUTPATIENT)
Dept: FAMILY MEDICINE CLINIC | Facility: CLINIC | Age: 65
End: 2020-02-12
Payer: COMMERCIAL

## 2020-02-12 ENCOUNTER — APPOINTMENT (OUTPATIENT)
Dept: LAB | Age: 65
End: 2020-02-12
Payer: COMMERCIAL

## 2020-02-12 VITALS
WEIGHT: 193 LBS | TEMPERATURE: 98.3 F | SYSTOLIC BLOOD PRESSURE: 116 MMHG | BODY MASS INDEX: 27.02 KG/M2 | HEART RATE: 74 BPM | HEIGHT: 71 IN | DIASTOLIC BLOOD PRESSURE: 74 MMHG

## 2020-02-12 DIAGNOSIS — R14.0 ABDOMINAL BLOATING: ICD-10-CM

## 2020-02-12 DIAGNOSIS — R14.0 ABDOMINAL BLOATING: Primary | ICD-10-CM

## 2020-02-12 DIAGNOSIS — R10.84 GENERALIZED ABDOMINAL PAIN: ICD-10-CM

## 2020-02-12 LAB
ALBUMIN SERPL BCP-MCNC: 3.7 G/DL (ref 3.5–5)
ALP SERPL-CCNC: 121 U/L (ref 46–116)
ALT SERPL W P-5'-P-CCNC: 31 U/L (ref 12–78)
ANION GAP SERPL CALCULATED.3IONS-SCNC: 7 MMOL/L (ref 4–13)
AST SERPL W P-5'-P-CCNC: 9 U/L (ref 5–45)
BASOPHILS # BLD AUTO: 0.02 THOUSANDS/ΜL (ref 0–0.1)
BASOPHILS NFR BLD AUTO: 0 % (ref 0–1)
BILIRUB SERPL-MCNC: 0.82 MG/DL (ref 0.2–1)
BUN SERPL-MCNC: 23 MG/DL (ref 5–25)
CALCIUM SERPL-MCNC: 9.4 MG/DL (ref 8.3–10.1)
CHLORIDE SERPL-SCNC: 108 MMOL/L (ref 100–108)
CO2 SERPL-SCNC: 27 MMOL/L (ref 21–32)
CREAT SERPL-MCNC: 1.11 MG/DL (ref 0.6–1.3)
EOSINOPHIL # BLD AUTO: 0.11 THOUSAND/ΜL (ref 0–0.61)
EOSINOPHIL NFR BLD AUTO: 1 % (ref 0–6)
ERYTHROCYTE [DISTWIDTH] IN BLOOD BY AUTOMATED COUNT: 13.9 % (ref 11.6–15.1)
GFR SERPL CREATININE-BSD FRML MDRD: 70 ML/MIN/1.73SQ M
GLUCOSE SERPL-MCNC: 116 MG/DL (ref 65–140)
HCT VFR BLD AUTO: 45.9 % (ref 36.5–49.3)
HGB BLD-MCNC: 15.3 G/DL (ref 12–17)
IMM GRANULOCYTES # BLD AUTO: 0.03 THOUSAND/UL (ref 0–0.2)
IMM GRANULOCYTES NFR BLD AUTO: 0 % (ref 0–2)
LYMPHOCYTES # BLD AUTO: 1.07 THOUSANDS/ΜL (ref 0.6–4.47)
LYMPHOCYTES NFR BLD AUTO: 13 % (ref 14–44)
MCH RBC QN AUTO: 31 PG (ref 26.8–34.3)
MCHC RBC AUTO-ENTMCNC: 33.3 G/DL (ref 31.4–37.4)
MCV RBC AUTO: 93 FL (ref 82–98)
MONOCYTES # BLD AUTO: 0.68 THOUSAND/ΜL (ref 0.17–1.22)
MONOCYTES NFR BLD AUTO: 8 % (ref 4–12)
NEUTROPHILS # BLD AUTO: 6.4 THOUSANDS/ΜL (ref 1.85–7.62)
NEUTS SEG NFR BLD AUTO: 78 % (ref 43–75)
NRBC BLD AUTO-RTO: 0 /100 WBCS
PLATELET # BLD AUTO: 270 THOUSANDS/UL (ref 149–390)
PMV BLD AUTO: 11.8 FL (ref 8.9–12.7)
POTASSIUM SERPL-SCNC: 3.4 MMOL/L (ref 3.5–5.3)
PROT SERPL-MCNC: 7.2 G/DL (ref 6.4–8.2)
RBC # BLD AUTO: 4.94 MILLION/UL (ref 3.88–5.62)
SODIUM SERPL-SCNC: 142 MMOL/L (ref 136–145)
WBC # BLD AUTO: 8.31 THOUSAND/UL (ref 4.31–10.16)

## 2020-02-12 PROCEDURE — 3078F DIAST BP <80 MM HG: CPT | Performed by: FAMILY MEDICINE

## 2020-02-12 PROCEDURE — 99213 OFFICE O/P EST LOW 20 MIN: CPT | Performed by: FAMILY MEDICINE

## 2020-02-12 PROCEDURE — 36415 COLL VENOUS BLD VENIPUNCTURE: CPT

## 2020-02-12 PROCEDURE — 1036F TOBACCO NON-USER: CPT | Performed by: FAMILY MEDICINE

## 2020-02-12 PROCEDURE — 80053 COMPREHEN METABOLIC PANEL: CPT

## 2020-02-12 PROCEDURE — 85025 COMPLETE CBC W/AUTO DIFF WBC: CPT

## 2020-02-12 PROCEDURE — 74019 RADEX ABDOMEN 2 VIEWS: CPT

## 2020-02-12 PROCEDURE — 3008F BODY MASS INDEX DOCD: CPT | Performed by: FAMILY MEDICINE

## 2020-02-12 PROCEDURE — 3074F SYST BP LT 130 MM HG: CPT | Performed by: FAMILY MEDICINE

## 2020-02-12 NOTE — PROGRESS NOTES
Assessment/Plan:     Diagnoses and all orders for this visit:    Abdominal bloating  -     CBC and differential; Future  -     Comprehensive metabolic panel; Future  -     XR abdomen complete inc upright and/or decubitus; Future    Generalized abdominal pain  -     CBC and differential; Future  -     Comprehensive metabolic panel; Future  -     XR abdomen complete inc upright and/or decubitus; Future        Discussion:  I reviewed with pt  No acute abd findings  Doubt obstruction given length of time without more significant findings  ?constipation as cause  Check labs as well as acute abd series  Treat constipation  Recehck 2 d if not improving - earlier if worse      Subjective:      Patient ID: Marlon Hussein is a 62 y o  male  67-year-old male developed "gas pressure" like pain throughout his abdomen starting Monday evening  Patient denies any nausea/vomiting or fever  Last bowel movement was on Monday morning and was normal   Patient has been eating normally and states that other than the bloating discomfort, he feels well  Worse with shaking movements,  especially driving on uneven roads  No worsening after eating  Pt had similar episodes in the past that usually resolved within 1-2 days  Pt is due for colonoscopy      The following portions of the patient's history were reviewed and updated as appropriate:   He  has a past medical history of Arthritis, Cellulitis, Enlarged prostate, Epidermal inclusion cyst, Erythrasma, Furuncle, Hyperlipidemia, and Hypertension    He   Patient Active Problem List    Diagnosis Date Noted    Urgency of urination 01/31/2018    Right arm pain 10/13/2017    Rheumatoid arthritis ([facility] [address] ) 10/13/2017    Hypertension 10/13/2017    Liver mass 11/17/2016    Hyperlipidemia 02/03/2016    Anxiety disorder 08/15/2013    Esophageal reflux 08/15/2013     He  has a past surgical history that includes Scrotal surgery; pr removal of hydrocele,tunica,unilat (Right, 1/11/2018); and Hydrocele excision / repair (Right, 01/11/2018)  He  reports that he has never smoked  He has never used smokeless tobacco  He reports that he does not drink alcohol or use drugs  Current Outpatient Medications   Medication Sig Dispense Refill    aspirin 81 mg chewable tablet Chew 81 mg      atorvastatin (LIPITOR) 10 mg tablet TAKE 1 TABLET BY MOUTH  DAILY 90 tablet 1    FLUoxetine (PROzac) 20 mg capsule TAKE ONE CAPSULE BY MOUTH EVERY DAY 90 capsule 2    folic acid (FOLVITE) 1 mg tablet Take by mouth daily      lisinopril-hydrochlorothiazide (PRINZIDE,ZESTORETIC) 10-12 5 MG per tablet TAKE ONE TABLET BY MOUTH EVERY DAY 90 tablet 3    Multiple Vitamins-Minerals (MULTIVITAMIN MEN) TABS Take 1 tablet by mouth daily      Omega-3 Fatty Acids (FISH OIL) 1,000 mg Take 1,000 mg by mouth daily      RASUVO 20 MG/0 4ML SOAJ INJECT 20 MG SUBCUTANEOUSLY ONCE A WEEK  4    tamsulosin (FLOMAX) 0 4 mg TAKE 1 CAPSULE BY MOUTH  DAILY AT BEDTIME 90 capsule 3    XELJANZ XR 11 MG TB24        No current facility-administered medications for this visit  He is allergic to fentanyl       Review of Systems   Constitutional: Negative  HENT: Negative  Respiratory: Negative  Cardiovascular: Negative  Gastrointestinal: Positive for abdominal distention, abdominal pain and constipation (no BM in 2 days)  Negative for diarrhea, nausea and vomiting  Skin: Negative  Objective:      /74   Pulse 74   Temp 98 3 °F (36 8 °C)   Ht 5' 10 5" (1 791 m)   Wt 87 5 kg (193 lb)   BMI 27 30 kg/m²          Physical Exam   Constitutional: He appears well-developed and well-nourished  HENT:   Head: Normocephalic and atraumatic  Nose: Nose normal    Eyes: Pupils are equal, round, and reactive to light  Conjunctivae and EOM are normal    No pallor or icterus noted   Neck: Normal range of motion  Neck supple  Cardiovascular: Normal rate, regular rhythm, normal heart sounds and intact distal pulses  Pulmonary/Chest: Effort normal and breath sounds normal    Abdominal: Soft  Bowel sounds are normal  He exhibits distension (mild  No fluid wave)  He exhibits no mass  There is tenderness (mild diffuse  Neg heel strike)  There is no rebound  Lymphadenopathy:     He has no cervical adenopathy  Skin: Skin is warm  Capillary refill takes less than 2 seconds

## 2020-02-27 ENCOUNTER — TELEPHONE (OUTPATIENT)
Dept: FAMILY MEDICINE CLINIC | Facility: CLINIC | Age: 65
End: 2020-02-27

## 2020-02-27 NOTE — TELEPHONE ENCOUNTER
Patient called stating he saw you on 02/12/2020 and the miralax worked for a little but now he is having stomach pains  He did have a bowel movement the other day but he has not had a bowel movement in two days  He is asking what the next step would be?

## 2020-03-03 NOTE — TELEPHONE ENCOUNTER
He is taking Miralax powder BID but the store brand, he did have a bowel movement this morning but its not his normal   He is aware he may need GI eval

## 2020-03-16 DIAGNOSIS — E78.00 HYPERCHOLESTEROLEMIA: ICD-10-CM

## 2020-03-16 RX ORDER — ATORVASTATIN CALCIUM 10 MG/1
TABLET, FILM COATED ORAL
Qty: 90 TABLET | Refills: 1 | Status: SHIPPED | OUTPATIENT
Start: 2020-03-16 | End: 2020-08-28

## 2020-04-11 DIAGNOSIS — R39.15 URGENCY OF URINATION: ICD-10-CM

## 2020-06-10 RX ORDER — TAMSULOSIN HYDROCHLORIDE 0.4 MG/1
CAPSULE ORAL
Qty: 90 CAPSULE | Refills: 3 | Status: SHIPPED | OUTPATIENT
Start: 2020-06-10 | End: 2021-01-01

## 2020-08-04 ENCOUNTER — OFFICE VISIT (OUTPATIENT)
Dept: FAMILY MEDICINE CLINIC | Facility: CLINIC | Age: 65
End: 2020-08-04
Payer: COMMERCIAL

## 2020-08-04 VITALS
TEMPERATURE: 97.9 F | WEIGHT: 201 LBS | HEIGHT: 71 IN | SYSTOLIC BLOOD PRESSURE: 122 MMHG | BODY MASS INDEX: 28.14 KG/M2 | HEART RATE: 72 BPM | DIASTOLIC BLOOD PRESSURE: 82 MMHG

## 2020-08-04 DIAGNOSIS — F41.9 ANXIETY DISORDER, UNSPECIFIED TYPE: ICD-10-CM

## 2020-08-04 DIAGNOSIS — Z12.11 SCREEN FOR COLON CANCER: ICD-10-CM

## 2020-08-04 DIAGNOSIS — Z00.00 ANNUAL PHYSICAL EXAM: Primary | ICD-10-CM

## 2020-08-04 DIAGNOSIS — Z12.5 SCREENING FOR MALIGNANT NEOPLASM OF PROSTATE: ICD-10-CM

## 2020-08-04 DIAGNOSIS — E78.2 MIXED HYPERLIPIDEMIA: ICD-10-CM

## 2020-08-04 DIAGNOSIS — I10 ESSENTIAL HYPERTENSION: ICD-10-CM

## 2020-08-04 DIAGNOSIS — M05.79 RHEUMATOID ARTHRITIS INVOLVING MULTIPLE SITES WITH POSITIVE RHEUMATOID FACTOR (HCC): ICD-10-CM

## 2020-08-04 DIAGNOSIS — D17.9 MULTIPLE LIPOMAS: ICD-10-CM

## 2020-08-04 PROCEDURE — 3725F SCREEN DEPRESSION PERFORMED: CPT | Performed by: FAMILY MEDICINE

## 2020-08-04 PROCEDURE — 3008F BODY MASS INDEX DOCD: CPT | Performed by: FAMILY MEDICINE

## 2020-08-04 PROCEDURE — 1036F TOBACCO NON-USER: CPT | Performed by: FAMILY MEDICINE

## 2020-08-04 PROCEDURE — 3074F SYST BP LT 130 MM HG: CPT | Performed by: FAMILY MEDICINE

## 2020-08-04 PROCEDURE — 3079F DIAST BP 80-89 MM HG: CPT | Performed by: FAMILY MEDICINE

## 2020-08-04 PROCEDURE — 99396 PREV VISIT EST AGE 40-64: CPT | Performed by: FAMILY MEDICINE

## 2020-08-04 RX ORDER — METHOTREXATE 15 MG/.3ML
15 INJECTION, SOLUTION SUBCUTANEOUS WEEKLY
Qty: 4 PEN | Refills: 4
Start: 2020-08-04 | End: 2021-02-22

## 2020-08-04 NOTE — PATIENT INSTRUCTIONS

## 2020-08-04 NOTE — PROGRESS NOTES
[address]    NAME: Rc Samuels  AGE: 60 y o  SEX: male  : 1955     DATE: 2020     Assessment and Plan:     Problem List Items Addressed This Visit        Cardiovascular and Mediastinum    Hypertension     Well controlled  Cont present treatment  Monitor labs  Recheck 6m           Relevant Orders    CBC and differential    Comprehensive metabolic panel    Lipid panel       Musculoskeletal and Integument    Rheumatoid arthritis ([address] )     Stable  Continue present med  F/u with Rheum         Relevant Medications    Methotrexate, PF, (Rasuvo) 15 MG/0 3ML SOAJ       Other    Anxiety disorder     Stable  Cont fluoxetine  Recheck 6m         Hyperlipidemia     Monitor labs  Continue to monitor diet  Recheck 6m         Multiple lipomas     Benign  I reviewed with pt  REC: observe for now  Recheck prn           Other Visit Diagnoses     Annual physical exam    -  Primary    Screening for malignant neoplasm of prostate        Relevant Orders    PSA, Total Screen    Screen for colon cancer        Relevant Orders    Ambulatory referral to Gastroenterology          Immunizations and preventive care screenings were discussed with patient today  Appropriate education was printed on patient's after visit summary  Counseling:  · Exercise: the importance of regular exercise/physical activity was discussed  Recommend exercise 3-5 times per week for at least 30 minutes  · BMI Counseling: Body mass index is 28 43 kg/m²  The BMI is above normal  Nutrition recommendations include reducing portion sizes, decreasing overall calorie intake, consuming healthier snacks, moderation in carbohydrate intake, increasing intake of lean protein, reducing intake of saturated fat and trans fat and reducing intake of cholesterol  Return in about 6 months (around 2021)       Chief Complaint:     Chief Complaint   Patient presents with  Physical Exam    Follow-up    Cyst     on back      History of Present Illness:     Adult Annual Physical   Patient here for a comprehensive physical exam  The patient reports problems - as below  - pt has a lump on his back that he would like checked  No pain  - up to date with Rheum  Joints are stable    Diet and Physical Activity  · Diet/Nutrition: well balanced diet  · Exercise: no formal exercise  Depression Screening  PHQ-9 Depression Screening    PHQ-9:    Frequency of the following problems over the past two weeks:       Little interest or pleasure in doing things:  0 - not at all  Feeling down, depressed, or hopeless:  0 - not at all  PHQ-2 Score:  0       General Health  · Sleep: sleeps well and gets 4-6 hours of sleep on average  · Hearing: normal - bilateral   · Vision: no vision problems  · Dental: regular dental visits and brushes teeth twice daily   Health  · Symptoms include: none     Review of Systems:     Review of Systems   Constitutional: Negative  HENT: Negative  Eyes: Negative  Respiratory: Negative  Cardiovascular: Negative  Gastrointestinal: Negative  Endocrine: Negative  Genitourinary: Negative  Musculoskeletal: Positive for arthralgias (occ, scattered secondary to RA)  Skin: Negative  Allergic/Immunologic: Negative  Neurological: Negative  Hematological: Negative  Psychiatric/Behavioral: Negative         Past Medical History:     Past Medical History:   Diagnosis Date    Arthritis     Cellulitis     LAST ASSESSED: 6/13/14    Enlarged prostate     Epidermal inclusion cyst     LAST ASSESSED: 10/4/13    Erythrasma     LAST ASSESSED: 9/23/13    Furuncle     LAST ASSESSED: 6/11/14    Hyperlipidemia     Hypertension       Past Surgical History:     Past Surgical History:   Procedure Laterality Date    HYDROCELE EXCISION / REPAIR Right 01/11/2018    SPERMATIC CORD EXCSION OF HYDROCELE; MANAGED BY: GABBIE CARBAJAL    [place] REMOVAL OF HYDROCELE,TUNICA,UNILAT Right 1/11/2018    Procedure: HYDROCELECTOMY;  Surgeon: Primo Keyes MD;  [facility];  Service: Urology    SCROTAL SURGERY      benign "lump"      Family History:     Family History   Problem Relation Age of Onset    Heart disease Father         CARDIAC DISORDER    Hypertension Father     Hypertension Mother     No Known Problems Maternal Aunt     No Known Problems Maternal Uncle     No Known Problems Paternal Aunt     No Known Problems Paternal Uncle     No Known Problems Paternal Grandmother     No Known Problems Paternal Grandfather     Diabetes Maternal Grandmother         MELLITUS    No Known Problems Maternal Grandfather     Hypertension Other       Social History:        Social History     Socioeconomic History    Marital status: /Civil Union     Spouse name: None    Number of children: 2    Years of education: None    Highest education level: None   Occupational History    Occupation: FULL-TIME EMPLOYMENT   Social Needs    Financial resource strain: None    Food insecurity     Worry: None     Inability: None    Transportation needs     Medical: None     Non-medical: None   Tobacco Use    Smoking status: Never Smoker    Smokeless tobacco: Never Used   Substance and Sexual Activity    Alcohol use: No    Drug use: No    Sexual activity: Never     Comment: NOT CURRENTLY SEXUALLY ACTIVE AS PER [employer]   Lifestyle    Physical activity     Days per week: None     Minutes per session: None    Stress: None   Relationships    Social connections     Talks on phone: None     Gets together: None     Attends Jew service: None     Active member of club or organization: None     Attends meetings of clubs or organizations: None     Relationship status: None    Intimate partner violence     Fear of current or ex partner: None     Emotionally abused: None     Physically abused: None     Forced sexual activity: None   Other Topics Concern  None   Social History Narrative    ALWAYS USES SEAT BELT    DENIED DAILY COFFEE CONSUMPTION (__CUPS/DAY)    DENIED DAILY COLA CONSUMPTION (__CANS/DAY)    DAILY TEA CONSUMPTION (__CUPS/DAY)    DENTAL CARE, REGULARLY    EXERCISE: WALKING    HIGH SCHOOL GRADUATE    DENIED MULTIPLE ORGAN DONOR    NO LIVING WILL    PETS/ANIMALS    DENIED POWER OF  IN EXISTENCE    WATER INTAKE, ADEQUATE (PER DAY)      Current Medications:     Current Outpatient Medications   Medication Sig Dispense Refill    aspirin 81 mg chewable tablet Chew 81 mg      atorvastatin (LIPITOR) 10 mg tablet TAKE 1 TABLET BY MOUTH  DAILY 90 tablet 1    FLUoxetine (PROzac) 20 mg capsule TAKE ONE CAPSULE BY MOUTH EVERY DAY 90 capsule 2    folic acid (FOLVITE) 1 mg tablet Take by mouth daily      lisinopril-hydrochlorothiazide (PRINZIDE,ZESTORETIC) 10-12 5 MG per tablet TAKE ONE TABLET BY MOUTH EVERY DAY 90 tablet 3    Methotrexate, PF, (Rasuvo) 15 MG/0 3ML SOAJ Inject 0 3 mL (15 mg total) under the skin once a week 4 pen 4    Multiple Vitamins-Minerals (MULTIVITAMIN MEN) TABS Take 1 tablet by mouth daily      Omega-3 Fatty Acids (FISH OIL) 1,000 mg Take 1,000 mg by mouth daily      tamsulosin (FLOMAX) 0 4 mg TAKE 1 CAPSULE BY MOUTH  DAILY AT BEDTIME 90 capsule 3    XELJANZ XR 11 MG TB24        No current facility-administered medications for this visit  Allergies: Allergies   Allergen Reactions    Fentanyl Other (See Comments) and Anaphylaxis     Oxygen drops severely      Physical Exam:     /82   Pulse 72   Temp 97 9 °F (36 6 °C)   Ht 5' 10 5"   Wt 91 2 kg (201 lb)   BMI 28 43 kg/m²     Physical Exam   Constitutional: He is oriented to person, place, and time  He appears well-developed  HENT:   Head: Normocephalic and atraumatic     Right Ear: Tympanic membrane, external ear and ear canal normal    Left Ear: Tympanic membrane, external ear and ear canal normal    Eyes: Pupils are equal, round, and reactive to light  Conjunctivae are normal  No scleral icterus  Neck: Normal range of motion  Neck supple  No muscular tenderness present  Carotid bruit is not present  No thyromegaly present  Cardiovascular: Normal rate, regular rhythm, normal heart sounds and normal pulses  No murmur heard  Pulmonary/Chest: Effort normal and breath sounds normal  Stridor present  Abdominal: Soft  Normal appearance and bowel sounds are normal  He exhibits no distension and no mass  There is no abdominal tenderness  Musculoskeletal: Normal range of motion  General: Deformity (mild arthritic changes in fingers without gross synovitis) present  No swelling or tenderness  Right lower leg: No edema  Left lower leg: No edema  Lymphadenopathy:     He has no cervical adenopathy  Neurological: He is alert and oriented to person, place, and time  He displays no weakness and normal reflexes  No cranial nerve deficit or sensory deficit  Coordination and gait normal    Skin: Skin is warm and dry  Capillary refill takes less than 2 seconds  Psychiatric: His behavior is normal  Mood, judgment and thought content normal    Vitals reviewed        MD Mirza PrasadHonorHealth [facility]

## 2020-08-05 ENCOUNTER — TELEPHONE (OUTPATIENT)
Dept: FAMILY MEDICINE CLINIC | Facility: CLINIC | Age: 65
End: 2020-08-05

## 2020-08-05 NOTE — TELEPHONE ENCOUNTER
Kaylee from Dr Thien Arauz office called    She got a order for a colonoscopy screening for the patient    His last test was normal and is not due for another until 2023  Was he having issues or was this just a screening ?    Please advise      [phone] option #2

## 2020-08-28 DIAGNOSIS — E78.00 HYPERCHOLESTEROLEMIA: ICD-10-CM

## 2020-08-28 RX ORDER — ATORVASTATIN CALCIUM 10 MG/1
TABLET, FILM COATED ORAL
Qty: 90 TABLET | Refills: 3 | Status: SHIPPED | OUTPATIENT
Start: 2020-08-28 | End: 2021-04-27 | Stop reason: ALTCHOICE

## 2020-09-15 DIAGNOSIS — I10 ESSENTIAL HYPERTENSION: ICD-10-CM

## 2020-09-15 RX ORDER — LISINOPRIL AND HYDROCHLOROTHIAZIDE 12.5; 1 MG/1; MG/1
TABLET ORAL
Qty: 90 TABLET | Refills: 3 | Status: SHIPPED | OUTPATIENT
Start: 2020-09-15 | End: 2021-01-01 | Stop reason: SDUPTHER

## 2020-09-22 ENCOUNTER — TELEMEDICINE (OUTPATIENT)
Dept: FAMILY MEDICINE CLINIC | Facility: CLINIC | Age: 65
End: 2020-09-22
Payer: COMMERCIAL

## 2020-09-22 DIAGNOSIS — J40 BRONCHITIS: Primary | ICD-10-CM

## 2020-09-22 PROCEDURE — 99213 OFFICE O/P EST LOW 20 MIN: CPT | Performed by: FAMILY MEDICINE

## 2020-09-22 RX ORDER — BROMPHENIRAMINE MALEATE, PSEUDOEPHEDRINE HYDROCHLORIDE, AND DEXTROMETHORPHAN HYDROBROMIDE 2; 30; 10 MG/5ML; MG/5ML; MG/5ML
5 SYRUP ORAL 4 TIMES DAILY PRN
Qty: 120 ML | Refills: 0 | Status: SHIPPED | OUTPATIENT
Start: 2020-09-22 | End: 2021-02-22 | Stop reason: ALTCHOICE

## 2020-09-22 RX ORDER — AZITHROMYCIN 250 MG/1
TABLET, FILM COATED ORAL
Qty: 6 TABLET | Refills: 0 | Status: SHIPPED | OUTPATIENT
Start: 2020-09-22 | End: 2020-09-27

## 2020-09-22 NOTE — PROGRESS NOTES
Virtual Regular Visit    Assessment/Plan:    Problem List Items Addressed This Visit     None      Visit Diagnoses     Bronchitis    -  Primary    Relevant Medications    azithromycin (ZITHROMAX) 250 mg tablet      Given strict call back/ED precautions given current Covid-19 pandemic and patient with RA on biologics  Counseled the patient regarding supportive care  They are to call or return to the office if not improving  Reason for visit is   Chief Complaint   Patient presents with    Virtual Regular Visit      Encounter provider Van Castellanos MD    Provider located at [address] [place] [address]      Recent Visits  No visits were found meeting these conditions  Showing recent visits within past 7 days and meeting all other requirements     Future Appointments  No visits were found meeting these conditions  Showing future appointments within next 150 days and meeting all other requirements        The patient was identified by name and date of birth  Jairo Muniz was informed that this is a telemedicine visit and that the visit is being conducted through [employer] and patient was informed that this is not a secure, HIPAA-complaint platform  He agrees to proceed     My office door was closed  No one else was in the room  He acknowledged consent and understanding of privacy and security of the video platform  The patient has agreed to participate and understands they can discontinue the visit at any time  Patient is aware this is a billable service  Subjective  Jairo Muniz is a 60 y o  male complains of 2 days of sore throat, cough productive of clear sputum, and headache  Denies sinus congestion, runny nose, nausea, loss of appetite, diarrhea, myalgias, rash  Denies contact with sick persons or Covid-19 persons  He has not taken anything for his symptoms   He has a history of RA and is on methotrexate and Isabella Wheeler, roxanna recent medication changes  Past Medical History:   Diagnosis Date    Arthritis     Cellulitis     LAST ASSESSED: 6/13/14    Enlarged prostate     Epidermal inclusion cyst     LAST ASSESSED: 10/4/13    Erythrasma     LAST ASSESSED: 9/23/13    Furuncle     LAST ASSESSED: 6/11/14    Hyperlipidemia     Hypertension        Past Surgical History:   Procedure Laterality Date    HYDROCELE EXCISION / REPAIR Right 01/11/2018    SPERMATIC CORD EXCSION OF HYDROCELE; MANAGED BY: Tor BANDA REMOVAL OF HYDROCELE,TUNICA,UNILAT Right 1/11/2018    Procedure: HYDROCELECTOMY;  Surgeon: Diana Rose MD;  [facility];  Service: Urology    SCROTAL SURGERY      benign "lump"       Current Outpatient Medications   Medication Sig Dispense Refill    aspirin 81 mg chewable tablet Chew 81 mg      atorvastatin (LIPITOR) 10 mg tablet TAKE 1 TABLET BY MOUTH  DAILY 90 tablet 3    azithromycin (ZITHROMAX) 250 mg tablet Take 2 tablets (500 mg total) by mouth every 24 hours for 1 day, THEN 1 tablet (250 mg total) every 24 hours for 4 days  6 tablet 0    FLUoxetine (PROzac) 20 mg capsule TAKE ONE CAPSULE BY MOUTH EVERY DAY 90 capsule 2    folic acid (FOLVITE) 1 mg tablet Take by mouth daily      lisinopril-hydrochlorothiazide (PRINZIDE,ZESTORETIC) 10-12 5 MG per tablet TAKE ONE TABLET BY MOUTH EVERY DAY 90 tablet 3    Methotrexate, PF, (Rasuvo) 15 MG/0 3ML SOAJ Inject 0 3 mL (15 mg total) under the skin once a week 4 pen 4    Multiple Vitamins-Minerals (MULTIVITAMIN MEN) TABS Take 1 tablet by mouth daily      Omega-3 Fatty Acids (FISH OIL) 1,000 mg Take 1,000 mg by mouth daily      tamsulosin (FLOMAX) 0 4 mg TAKE 1 CAPSULE BY MOUTH  DAILY AT BEDTIME 90 capsule 3    XELJANZ XR 11 MG TB24        No current facility-administered medications for this visit           Allergies   Allergen Reactions    Fentanyl Other (See Comments) and Anaphylaxis     Oxygen drops severely       Review of Systems   Constitutional: Negative for activity change, chills and fever  HENT: Positive for sore throat  Negative for congestion, rhinorrhea and sinus pressure  Eyes: Negative for visual disturbance  Respiratory: Positive for cough  Negative for shortness of breath and wheezing  Cardiovascular: Negative for chest pain and palpitations  Gastrointestinal: Negative for abdominal pain, blood in stool, constipation, diarrhea, nausea and vomiting  Genitourinary: Negative for dysuria  Musculoskeletal: Negative for arthralgias and myalgias  Skin: Negative for rash  Neurological: Positive for headaches  Negative for dizziness and weakness  All other systems reviewed and are negative  Video Exam    There were no vitals filed for this visit  Physical Exam  Vitals signs and nursing note reviewed  Constitutional:       General: He is not in acute distress  Appearance: He is well-developed  He is not ill-appearing or diaphoretic  HENT:      Head: Normocephalic and atraumatic  Right Ear: External ear normal       Left Ear: External ear normal    Eyes:      Conjunctiva/sclera: Conjunctivae normal    Pulmonary:      Effort: Pulmonary effort is normal  No respiratory distress  Skin:     Findings: No rash  Neurological:      Mental Status: He is alert  Cranial Nerves: No cranial nerve deficit  I spent 8 minutes directly with the patient during this visit    [address] [place] [address] acknowledges that he has consented to an online visit or consultation  He understands that the online visit is based solely on information provided by him, and that, in the absence of a face-to-face physical evaluation by the physician, the diagnosis he receives is both limited and provisional in terms of accuracy and completeness  This is not intended to replace a full medical face-to-face evaluation by the physician  Graham Gotti understands and accepts these terms

## 2020-09-22 NOTE — LETTER
September 22, 2020     Patient: Sahra Martins   YOB: 1955   Date of Visit: 9/22/2020       To Whom it May Concern:    Monica Morgan is under my professional care  He was seen in my office on 9/22/2020  He may return to work on 9/24/2020  If you have any questions or concerns, please don't hesitate to call           Sincerely,          Grace Rm MD        CC: No Recipients

## 2020-10-12 DIAGNOSIS — F32.A DEPRESSION, UNSPECIFIED DEPRESSION TYPE: ICD-10-CM

## 2020-10-13 RX ORDER — FLUOXETINE HYDROCHLORIDE 20 MG/1
CAPSULE ORAL
Qty: 90 CAPSULE | Refills: 2 | Status: SHIPPED | OUTPATIENT
Start: 2020-10-13 | End: 2021-01-01

## 2020-11-30 LAB
ALBUMIN SERPL-MCNC: 4.2 G/DL (ref 3.8–4.8)
ALBUMIN/GLOB SERPL: 1.8 {RATIO} (ref 1.2–2.2)
ALP SERPL-CCNC: 134 IU/L (ref 39–117)
ALT SERPL-CCNC: 29 IU/L (ref 0–44)
AST SERPL-CCNC: 20 IU/L (ref 0–40)
BASOPHILS # BLD AUTO: 0 X10E3/UL (ref 0–0.2)
BASOPHILS NFR BLD AUTO: 0 %
BILIRUB SERPL-MCNC: 0.5 MG/DL (ref 0–1.2)
BUN SERPL-MCNC: 23 MG/DL (ref 8–27)
BUN/CREAT SERPL: 23 (ref 10–24)
CALCIUM SERPL-MCNC: 9.5 MG/DL (ref 8.6–10.2)
CHLORIDE SERPL-SCNC: 104 MMOL/L (ref 96–106)
CHOLEST SERPL-MCNC: 176 MG/DL (ref 100–199)
CO2 SERPL-SCNC: 22 MMOL/L (ref 20–29)
CREAT SERPL-MCNC: 0.99 MG/DL (ref 0.76–1.27)
EOSINOPHIL # BLD AUTO: 0.2 X10E3/UL (ref 0–0.4)
EOSINOPHIL NFR BLD AUTO: 3 %
ERYTHROCYTE [DISTWIDTH] IN BLOOD BY AUTOMATED COUNT: 13.4 % (ref 11.6–15.4)
GLOBULIN SER-MCNC: 2.3 G/DL (ref 1.5–4.5)
GLUCOSE SERPL-MCNC: 112 MG/DL (ref 65–99)
HCT VFR BLD AUTO: 43.1 % (ref 37.5–51)
HDLC SERPL-MCNC: 38 MG/DL
HGB BLD-MCNC: 14.5 G/DL (ref 13–17.7)
IMM GRANULOCYTES # BLD: 0 X10E3/UL (ref 0–0.1)
IMM GRANULOCYTES NFR BLD: 1 %
LDLC SERPL CALC-MCNC: 114 MG/DL (ref 0–99)
LYMPHOCYTES # BLD AUTO: 1.5 X10E3/UL (ref 0.7–3.1)
LYMPHOCYTES NFR BLD AUTO: 22 %
MCH RBC QN AUTO: 30.1 PG (ref 26.6–33)
MCHC RBC AUTO-ENTMCNC: 33.6 G/DL (ref 31.5–35.7)
MCV RBC AUTO: 89 FL (ref 79–97)
MONOCYTES # BLD AUTO: 0.5 X10E3/UL (ref 0.1–0.9)
MONOCYTES NFR BLD AUTO: 8 %
NEUTROPHILS # BLD AUTO: 4.5 X10E3/UL (ref 1.4–7)
NEUTROPHILS NFR BLD AUTO: 66 %
PLATELET # BLD AUTO: 355 X10E3/UL (ref 150–450)
POTASSIUM SERPL-SCNC: 4.8 MMOL/L (ref 3.5–5.2)
PROT SERPL-MCNC: 6.5 G/DL (ref 6–8.5)
PSA SERPL-MCNC: 1.6 NG/ML (ref 0–4)
RBC # BLD AUTO: 4.82 X10E6/UL (ref 4.14–5.8)
SL AMB EGFR AFRICAN AMERICAN: 93 ML/MIN/1.73
SL AMB EGFR NON AFRICAN AMERICAN: 80 ML/MIN/1.73
SL AMB VLDL CHOLESTEROL CALC: 24 MG/DL (ref 5–40)
SODIUM SERPL-SCNC: 140 MMOL/L (ref 134–144)
TRIGL SERPL-MCNC: 134 MG/DL (ref 0–149)
WBC # BLD AUTO: 6.8 X10E3/UL (ref 3.4–10.8)

## 2021-01-01 ENCOUNTER — TELEPHONE (OUTPATIENT)
Dept: FAMILY MEDICINE CLINIC | Facility: CLINIC | Age: 66
End: 2021-01-01

## 2021-01-01 ENCOUNTER — OFFICE VISIT (OUTPATIENT)
Dept: DIABETES SERVICES | Facility: CLINIC | Age: 66
End: 2021-01-01
Payer: COMMERCIAL

## 2021-01-01 ENCOUNTER — PREP FOR PROCEDURE (OUTPATIENT)
Dept: INTERVENTIONAL RADIOLOGY/VASCULAR | Facility: CLINIC | Age: 66
End: 2021-01-01

## 2021-01-01 ENCOUNTER — HOSPITAL ENCOUNTER (OUTPATIENT)
Dept: INFUSION CENTER | Facility: CLINIC | Age: 66
Discharge: HOME | End: 2021-12-01
Payer: MEDICARE

## 2021-01-01 ENCOUNTER — HOSPITAL ENCOUNTER (OUTPATIENT)
Dept: INFUSION CENTER | Facility: CLINIC | Age: 66
Discharge: HOME | End: 2021-12-03
Payer: MEDICARE

## 2021-01-01 ENCOUNTER — HOSPITAL ENCOUNTER (OUTPATIENT)
Dept: INFUSION CENTER | Facility: CLINIC | Age: 66
Discharge: HOME | End: 2021-07-08
Payer: COMMERCIAL

## 2021-01-01 ENCOUNTER — HOSPITAL ENCOUNTER (OUTPATIENT)
Dept: INFUSION CENTER | Facility: CLINIC | Age: 66
Discharge: HOME | End: 2021-11-03

## 2021-01-01 ENCOUNTER — HOSPITAL ENCOUNTER (OUTPATIENT)
Dept: INFUSION CENTER | Facility: CLINIC | Age: 66
Discharge: HOME | End: 2021-10-20
Payer: COMMERCIAL

## 2021-01-01 ENCOUNTER — HOSPITAL ENCOUNTER (OUTPATIENT)
Dept: INFUSION CENTER | Facility: CLINIC | Age: 66
Discharge: HOME | End: 2021-08-05
Payer: COMMERCIAL

## 2021-01-01 ENCOUNTER — APPOINTMENT (OUTPATIENT)
Dept: NON INVASIVE DIAGNOSTICS | Facility: HOSPITAL | Age: 66
DRG: 445 | End: 2021-01-01
Payer: COMMERCIAL

## 2021-01-01 ENCOUNTER — HOSPITAL ENCOUNTER (OUTPATIENT)
Dept: INFUSION CENTER | Facility: CLINIC | Age: 66
End: 2021-01-01

## 2021-01-01 ENCOUNTER — HOSPITAL ENCOUNTER (OUTPATIENT)
Dept: INFUSION CENTER | Facility: CLINIC | Age: 66
Discharge: HOME | End: 2021-10-22
Payer: COMMERCIAL

## 2021-01-01 ENCOUNTER — PATIENT OUTREACH (OUTPATIENT)
Dept: CASE MANAGEMENT | Facility: HOSPITAL | Age: 66
End: 2021-01-01

## 2021-01-01 ENCOUNTER — OFFICE VISIT (OUTPATIENT)
Dept: FAMILY MEDICINE CLINIC | Facility: CLINIC | Age: 66
End: 2021-01-01
Payer: COMMERCIAL

## 2021-01-01 ENCOUNTER — TELEPHONE (OUTPATIENT)
Dept: HEMATOLOGY ONCOLOGY | Facility: HOSPITAL | Age: 66
End: 2021-01-01

## 2021-01-01 ENCOUNTER — APPOINTMENT (EMERGENCY)
Dept: RADIOLOGY | Facility: HOSPITAL | Age: 66
DRG: 948 | End: 2021-01-01
Payer: COMMERCIAL

## 2021-01-01 ENCOUNTER — HOSPITAL ENCOUNTER (OUTPATIENT)
Dept: INFUSION CENTER | Facility: CLINIC | Age: 66
Discharge: HOME | End: 2021-07-15

## 2021-01-01 ENCOUNTER — APPOINTMENT (INPATIENT)
Dept: NON INVASIVE DIAGNOSTICS | Facility: HOSPITAL | Age: 66
DRG: 853 | End: 2021-01-01
Payer: COMMERCIAL

## 2021-01-01 ENCOUNTER — TELEPHONE (OUTPATIENT)
Dept: GYNECOLOGIC ONCOLOGY | Facility: CLINIC | Age: 66
End: 2021-01-01

## 2021-01-01 ENCOUNTER — ANESTHESIA EVENT (INPATIENT)
Dept: PERIOP | Facility: HOSPITAL | Age: 66
DRG: 853 | End: 2021-01-01
Payer: COMMERCIAL

## 2021-01-01 ENCOUNTER — HOSPITAL ENCOUNTER (INPATIENT)
Facility: HOSPITAL | Age: 66
LOS: 1 days | Discharge: HOME HEALTH | DRG: 871 | End: 2021-12-21
Attending: EMERGENCY MEDICINE | Admitting: HOSPITALIST
Payer: MEDICARE

## 2021-01-01 ENCOUNTER — TELEPHONE (OUTPATIENT)
Dept: GENETICS | Facility: CLINIC | Age: 66
End: 2021-01-01

## 2021-01-01 ENCOUNTER — HOSPITAL ENCOUNTER (OUTPATIENT)
Dept: INFUSION CENTER | Facility: CLINIC | Age: 66
Discharge: HOME | DRG: 175 | End: 2021-11-19
Payer: MEDICARE

## 2021-01-01 ENCOUNTER — TELEPHONE (OUTPATIENT)
Dept: HEMATOLOGY ONCOLOGY | Facility: CLINIC | Age: 66
End: 2021-01-01

## 2021-01-01 ENCOUNTER — APPOINTMENT (OUTPATIENT)
Dept: CT IMAGING | Facility: HOSPITAL | Age: 66
DRG: 871 | End: 2021-01-01
Payer: MEDICARE

## 2021-01-01 ENCOUNTER — APPOINTMENT (OUTPATIENT)
Dept: LAB | Facility: HOSPITAL | Age: 66
End: 2021-01-01
Payer: COMMERCIAL

## 2021-01-01 ENCOUNTER — ANESTHESIA (INPATIENT)
Dept: PERIOP | Facility: HOSPITAL | Age: 66
DRG: 853 | End: 2021-01-01
Payer: COMMERCIAL

## 2021-01-01 ENCOUNTER — PATIENT OUTREACH (OUTPATIENT)
Dept: HEMATOLOGY ONCOLOGY | Facility: CLINIC | Age: 66
End: 2021-01-01

## 2021-01-01 ENCOUNTER — APPOINTMENT (OUTPATIENT)
Dept: LAB | Facility: CLINIC | Age: 66
End: 2021-01-01
Payer: COMMERCIAL

## 2021-01-01 ENCOUNTER — APPOINTMENT (EMERGENCY)
Dept: RADIOLOGY | Facility: HOSPITAL | Age: 66
End: 2021-01-01
Payer: COMMERCIAL

## 2021-01-01 ENCOUNTER — OFFICE VISIT (OUTPATIENT)
Dept: HEMATOLOGY ONCOLOGY | Facility: CLINIC | Age: 66
End: 2021-01-01
Payer: COMMERCIAL

## 2021-01-01 ENCOUNTER — OFFICE VISIT (OUTPATIENT)
Dept: PALLIATIVE MEDICINE | Facility: CLINIC | Age: 66
End: 2021-01-01
Payer: MEDICARE

## 2021-01-01 ENCOUNTER — HOSPITAL ENCOUNTER (OUTPATIENT)
Dept: INFUSION CENTER | Facility: CLINIC | Age: 66
Discharge: HOME | End: 2021-11-17
Payer: MEDICARE

## 2021-01-01 ENCOUNTER — APPOINTMENT (EMERGENCY)
Dept: CT IMAGING | Facility: HOSPITAL | Age: 66
DRG: 871 | End: 2021-01-01
Payer: MEDICARE

## 2021-01-01 ENCOUNTER — NURSE TRIAGE (OUTPATIENT)
Dept: OTHER | Facility: OTHER | Age: 66
End: 2021-01-01

## 2021-01-01 ENCOUNTER — HOSPITAL ENCOUNTER (OUTPATIENT)
Dept: RADIOLOGY | Facility: HOSPITAL | Age: 66
Discharge: HOME | End: 2021-08-16
Attending: RADIOLOGY | Admitting: INTERNAL MEDICINE
Payer: COMMERCIAL

## 2021-01-01 ENCOUNTER — OFFICE VISIT (OUTPATIENT)
Dept: PALLIATIVE MEDICINE | Facility: CLINIC | Age: 66
End: 2021-01-01
Payer: COMMERCIAL

## 2021-01-01 ENCOUNTER — HOSPITAL ENCOUNTER (EMERGENCY)
Facility: HOSPITAL | Age: 66
Discharge: HOME | End: 2021-12-04
Attending: EMERGENCY MEDICINE | Admitting: EMERGENCY MEDICINE
Payer: COMMERCIAL

## 2021-01-01 ENCOUNTER — TELEPHONE (OUTPATIENT)
Dept: ENDOCRINOLOGY | Facility: CLINIC | Age: 66
End: 2021-01-01

## 2021-01-01 ENCOUNTER — APPOINTMENT (OUTPATIENT)
Dept: NUCLEAR MEDICINE | Facility: HOSPITAL | Age: 66
DRG: 445 | End: 2021-01-01
Payer: COMMERCIAL

## 2021-01-01 ENCOUNTER — OFFICE VISIT (OUTPATIENT)
Dept: FAMILY MEDICINE CLINIC | Facility: CLINIC | Age: 66
End: 2021-01-01
Payer: MEDICARE

## 2021-01-01 ENCOUNTER — TRANSITIONAL CARE MANAGEMENT (OUTPATIENT)
Dept: FAMILY MEDICINE CLINIC | Facility: CLINIC | Age: 66
End: 2021-01-01

## 2021-01-01 ENCOUNTER — APPOINTMENT (EMERGENCY)
Dept: RADIOLOGY | Facility: HOSPITAL | Age: 66
DRG: 853 | End: 2021-01-01
Payer: COMMERCIAL

## 2021-01-01 ENCOUNTER — TELEPHONE (OUTPATIENT)
Dept: INFECTIOUS DISEASES | Facility: CLINIC | Age: 66
End: 2021-01-01

## 2021-01-01 ENCOUNTER — HOSPITAL ENCOUNTER (OUTPATIENT)
Dept: INFUSION CENTER | Facility: HOSPITAL | Age: 66
Discharge: HOME | End: 2021-06-17
Attending: INTERNAL MEDICINE

## 2021-01-01 ENCOUNTER — HOSPITAL ENCOUNTER (OUTPATIENT)
Dept: INFUSION CENTER | Facility: CLINIC | Age: 66
Discharge: HOME | End: 2021-12-29
Payer: MEDICARE

## 2021-01-01 ENCOUNTER — DOCUMENTATION (OUTPATIENT)
Dept: GENETICS | Facility: CLINIC | Age: 66
End: 2021-01-01

## 2021-01-01 ENCOUNTER — HOSPITAL ENCOUNTER (INPATIENT)
Facility: HOSPITAL | Age: 66
LOS: 1 days | Discharge: HOME | DRG: 175 | End: 2021-11-23
Attending: EMERGENCY MEDICINE | Admitting: INTERNAL MEDICINE
Payer: MEDICARE

## 2021-01-01 ENCOUNTER — TELEPHONE (OUTPATIENT)
Dept: OTHER | Facility: OTHER | Age: 66
End: 2021-01-01

## 2021-01-01 ENCOUNTER — HOSPITAL ENCOUNTER (OUTPATIENT)
Dept: INFUSION CENTER | Facility: CLINIC | Age: 66
Discharge: HOME | End: 2021-10-08
Payer: COMMERCIAL

## 2021-01-01 ENCOUNTER — HOSPITAL ENCOUNTER (OUTPATIENT)
Dept: RADIOLOGY | Facility: HOSPITAL | Age: 66
Discharge: HOME | End: 2021-06-22
Attending: RADIOLOGY | Admitting: RADIOLOGY
Payer: COMMERCIAL

## 2021-01-01 ENCOUNTER — DOCUMENTATION (OUTPATIENT)
Dept: HEMATOLOGY ONCOLOGY | Facility: CLINIC | Age: 66
End: 2021-01-01

## 2021-01-01 ENCOUNTER — TELEPHONE (OUTPATIENT)
Dept: PALLIATIVE MEDICINE | Facility: CLINIC | Age: 66
End: 2021-01-01

## 2021-01-01 ENCOUNTER — OFFICE VISIT (OUTPATIENT)
Dept: ENDOCRINOLOGY | Facility: CLINIC | Age: 66
End: 2021-01-01
Payer: COMMERCIAL

## 2021-01-01 ENCOUNTER — HOSPITAL ENCOUNTER (INPATIENT)
Facility: HOSPITAL | Age: 66
LOS: 2 days | Discharge: HOME HEALTH | DRG: 948 | End: 2021-12-12
Attending: EMERGENCY MEDICINE | Admitting: INTERNAL MEDICINE
Payer: MEDICARE

## 2021-01-01 ENCOUNTER — DOCUMENTATION (OUTPATIENT)
Dept: NUTRITION | Facility: CLINIC | Age: 66
End: 2021-01-01

## 2021-01-01 ENCOUNTER — HOSPITAL ENCOUNTER (OUTPATIENT)
Dept: INFUSION CENTER | Facility: CLINIC | Age: 66
Discharge: HOME | End: 2021-09-02
Payer: COMMERCIAL

## 2021-01-01 ENCOUNTER — TELEPHONE (OUTPATIENT)
Dept: INFUSION CENTER | Facility: CLINIC | Age: 66
End: 2021-01-01

## 2021-01-01 ENCOUNTER — TELEMEDICINE (OUTPATIENT)
Dept: FAMILY MEDICINE CLINIC | Facility: CLINIC | Age: 66
End: 2021-01-01
Payer: MEDICARE

## 2021-01-01 ENCOUNTER — APPOINTMENT (OUTPATIENT)
Dept: RADIOLOGY | Facility: HOSPITAL | Age: 66
DRG: 445 | End: 2021-01-01
Attending: RADIOLOGY
Payer: COMMERCIAL

## 2021-01-01 ENCOUNTER — APPOINTMENT (EMERGENCY)
Dept: CT IMAGING | Facility: HOSPITAL | Age: 66
DRG: 853 | End: 2021-01-01
Payer: COMMERCIAL

## 2021-01-01 ENCOUNTER — HOSPITAL ENCOUNTER (OUTPATIENT)
Dept: INFUSION CENTER | Facility: CLINIC | Age: 66
Discharge: HOME | End: 2021-09-24
Payer: COMMERCIAL

## 2021-01-01 ENCOUNTER — TELEPHONE (OUTPATIENT)
Dept: NUTRITION | Facility: CLINIC | Age: 66
End: 2021-01-01

## 2021-01-01 ENCOUNTER — HOSPITAL ENCOUNTER (OUTPATIENT)
Dept: INFUSION CENTER | Facility: CLINIC | Age: 66
Discharge: HOME | End: 2021-10-06
Payer: COMMERCIAL

## 2021-01-01 ENCOUNTER — APPOINTMENT (OUTPATIENT)
Dept: LAB | Facility: HOSPITAL | Age: 66
End: 2021-01-01
Payer: MEDICARE

## 2021-01-01 ENCOUNTER — APPOINTMENT (EMERGENCY)
Dept: CT IMAGING | Facility: HOSPITAL | Age: 66
DRG: 175 | End: 2021-01-01
Payer: MEDICARE

## 2021-01-01 ENCOUNTER — NUTRITION (OUTPATIENT)
Dept: NUTRITION | Facility: CLINIC | Age: 66
End: 2021-01-01

## 2021-01-01 ENCOUNTER — APPOINTMENT (INPATIENT)
Dept: CT IMAGING | Facility: HOSPITAL | Age: 66
DRG: 853 | End: 2021-01-01
Payer: COMMERCIAL

## 2021-01-01 ENCOUNTER — APPOINTMENT (EMERGENCY)
Dept: CT IMAGING | Facility: HOSPITAL | Age: 66
DRG: 445 | End: 2021-01-01
Payer: COMMERCIAL

## 2021-01-01 ENCOUNTER — APPOINTMENT (EMERGENCY)
Dept: CT IMAGING | Facility: HOSPITAL | Age: 66
End: 2021-01-01
Payer: COMMERCIAL

## 2021-01-01 ENCOUNTER — APPOINTMENT (INPATIENT)
Dept: CT IMAGING | Facility: HOSPITAL | Age: 66
DRG: 175 | End: 2021-01-01
Payer: MEDICARE

## 2021-01-01 ENCOUNTER — APPOINTMENT (EMERGENCY)
Dept: RADIOLOGY | Facility: HOSPITAL | Age: 66
DRG: 871 | End: 2021-01-01
Payer: MEDICARE

## 2021-01-01 ENCOUNTER — HOSPITAL ENCOUNTER (INPATIENT)
Facility: HOSPITAL | Age: 66
LOS: 10 days | Discharge: HOME HEALTH | DRG: 853 | End: 2021-07-23
Attending: EMERGENCY MEDICINE | Admitting: INTERNAL MEDICINE
Payer: COMMERCIAL

## 2021-01-01 ENCOUNTER — DOCUMENTATION (OUTPATIENT)
Dept: INTERVENTIONAL RADIOLOGY/VASCULAR | Facility: CLINIC | Age: 66
End: 2021-01-01

## 2021-01-01 ENCOUNTER — APPOINTMENT (EMERGENCY)
Dept: CT IMAGING | Facility: HOSPITAL | Age: 66
DRG: 948 | End: 2021-01-01
Payer: MEDICARE

## 2021-01-01 ENCOUNTER — HOSPITAL ENCOUNTER (OUTPATIENT)
Dept: INFUSION CENTER | Facility: CLINIC | Age: 66
Discharge: HOME | End: 2021-12-31
Payer: MEDICARE

## 2021-01-01 ENCOUNTER — OFFICE VISIT (OUTPATIENT)
Dept: HEMATOLOGY ONCOLOGY | Facility: CLINIC | Age: 66
End: 2021-01-01
Payer: MEDICARE

## 2021-01-01 ENCOUNTER — HOSPITAL ENCOUNTER (OUTPATIENT)
Dept: INFUSION CENTER | Facility: CLINIC | Age: 66
Discharge: HOME | End: 2021-08-12
Payer: COMMERCIAL

## 2021-01-01 ENCOUNTER — DOCUMENTATION (OUTPATIENT)
Dept: HEMATOLOGY ONCOLOGY | Facility: HOSPITAL | Age: 66
End: 2021-01-01

## 2021-01-01 ENCOUNTER — TELEMEDICINE (OUTPATIENT)
Dept: PALLIATIVE MEDICINE | Facility: CLINIC | Age: 66
End: 2021-01-01
Payer: COMMERCIAL

## 2021-01-01 ENCOUNTER — HOSPITAL ENCOUNTER (OUTPATIENT)
Dept: INFUSION CENTER | Facility: CLINIC | Age: 66
Discharge: HOME | End: 2021-08-19
Payer: COMMERCIAL

## 2021-01-01 ENCOUNTER — HOSPITAL ENCOUNTER (INPATIENT)
Facility: HOSPITAL | Age: 66
LOS: 6 days | Discharge: HOME HEALTH | DRG: 948 | End: 2021-06-21
Attending: EMERGENCY MEDICINE | Admitting: INTERNAL MEDICINE
Payer: COMMERCIAL

## 2021-01-01 ENCOUNTER — HOSPITAL ENCOUNTER (INPATIENT)
Facility: HOSPITAL | Age: 66
LOS: 1 days | Discharge: HOME | DRG: 445 | End: 2021-08-25
Attending: EMERGENCY MEDICINE | Admitting: INTERNAL MEDICINE
Payer: COMMERCIAL

## 2021-01-01 ENCOUNTER — HOSPITAL ENCOUNTER (OUTPATIENT)
Dept: INFUSION CENTER | Facility: CLINIC | Age: 66
Discharge: HOME | End: 2021-09-22
Payer: COMMERCIAL

## 2021-01-01 ENCOUNTER — HOSPITAL ENCOUNTER (OUTPATIENT)
Dept: INFUSION CENTER | Facility: CLINIC | Age: 66
Discharge: HOME | End: 2021-07-01
Payer: COMMERCIAL

## 2021-01-01 ENCOUNTER — APPOINTMENT (EMERGENCY)
Dept: RADIOLOGY | Facility: HOSPITAL | Age: 66
DRG: 445 | End: 2021-01-01
Payer: COMMERCIAL

## 2021-01-01 ENCOUNTER — CLINICAL SUPPORT (OUTPATIENT)
Dept: GENETICS | Facility: CLINIC | Age: 66
End: 2021-01-01

## 2021-01-01 ENCOUNTER — HOSPITAL ENCOUNTER (INPATIENT)
Dept: VASCULAR ULTRASOUND | Facility: HOSPITAL | Age: 66
Discharge: HOME | DRG: 175 | End: 2021-11-23
Payer: MEDICARE

## 2021-01-01 ENCOUNTER — APPOINTMENT (EMERGENCY)
Dept: RADIOLOGY | Facility: HOSPITAL | Age: 66
DRG: 175 | End: 2021-01-01
Payer: MEDICARE

## 2021-01-01 VITALS
TEMPERATURE: 97.4 F | OXYGEN SATURATION: 97 % | HEIGHT: 69 IN | RESPIRATION RATE: 18 BRPM | DIASTOLIC BLOOD PRESSURE: 68 MMHG | BODY MASS INDEX: 25.92 KG/M2 | WEIGHT: 175 LBS | SYSTOLIC BLOOD PRESSURE: 142 MMHG | HEART RATE: 78 BPM

## 2021-01-01 VITALS
BODY MASS INDEX: 24.29 KG/M2 | HEART RATE: 89 BPM | DIASTOLIC BLOOD PRESSURE: 78 MMHG | OXYGEN SATURATION: 96 % | RESPIRATION RATE: 18 BRPM | TEMPERATURE: 97 F | SYSTOLIC BLOOD PRESSURE: 126 MMHG | WEIGHT: 164 LBS | HEIGHT: 69 IN

## 2021-01-01 VITALS
OXYGEN SATURATION: 93 % | HEART RATE: 81 BPM | TEMPERATURE: 97.3 F | BODY MASS INDEX: 23.03 KG/M2 | SYSTOLIC BLOOD PRESSURE: 118 MMHG | DIASTOLIC BLOOD PRESSURE: 58 MMHG | RESPIRATION RATE: 16 BRPM | WEIGHT: 155.5 LBS | HEIGHT: 69 IN

## 2021-01-01 VITALS
WEIGHT: 160.94 LBS | SYSTOLIC BLOOD PRESSURE: 120 MMHG | BODY MASS INDEX: 23.77 KG/M2 | RESPIRATION RATE: 18 BRPM | OXYGEN SATURATION: 98 % | DIASTOLIC BLOOD PRESSURE: 75 MMHG | HEART RATE: 76 BPM | TEMPERATURE: 96.9 F

## 2021-01-01 VITALS
BODY MASS INDEX: 23.7 KG/M2 | HEIGHT: 69 IN | SYSTOLIC BLOOD PRESSURE: 124 MMHG | WEIGHT: 160 LBS | DIASTOLIC BLOOD PRESSURE: 68 MMHG | OXYGEN SATURATION: 99 % | TEMPERATURE: 97.5 F | RESPIRATION RATE: 16 BRPM | HEART RATE: 57 BPM

## 2021-01-01 VITALS
RESPIRATION RATE: 18 BRPM | TEMPERATURE: 97.2 F | HEART RATE: 89 BPM | DIASTOLIC BLOOD PRESSURE: 60 MMHG | HEIGHT: 69 IN | BODY MASS INDEX: 24.51 KG/M2 | OXYGEN SATURATION: 98 % | WEIGHT: 165.5 LBS | SYSTOLIC BLOOD PRESSURE: 116 MMHG

## 2021-01-01 VITALS
BODY MASS INDEX: 24.62 KG/M2 | SYSTOLIC BLOOD PRESSURE: 100 MMHG | DIASTOLIC BLOOD PRESSURE: 52 MMHG | HEART RATE: 90 BPM | TEMPERATURE: 97.7 F | WEIGHT: 172 LBS | OXYGEN SATURATION: 95 % | HEIGHT: 70 IN | RESPIRATION RATE: 16 BRPM

## 2021-01-01 VITALS
HEIGHT: 69 IN | SYSTOLIC BLOOD PRESSURE: 116 MMHG | HEART RATE: 68 BPM | BODY MASS INDEX: 23.85 KG/M2 | WEIGHT: 161 LBS | DIASTOLIC BLOOD PRESSURE: 64 MMHG | TEMPERATURE: 98.2 F

## 2021-01-01 VITALS
DIASTOLIC BLOOD PRESSURE: 66 MMHG | SYSTOLIC BLOOD PRESSURE: 111 MMHG | RESPIRATION RATE: 18 BRPM | HEART RATE: 79 BPM | OXYGEN SATURATION: 95 % | HEIGHT: 69 IN | BODY MASS INDEX: 22.66 KG/M2 | WEIGHT: 153 LBS | TEMPERATURE: 97.9 F

## 2021-01-01 VITALS
TEMPERATURE: 97.4 F | SYSTOLIC BLOOD PRESSURE: 80 MMHG | RESPIRATION RATE: 16 BRPM | WEIGHT: 153.44 LBS | BODY MASS INDEX: 22.73 KG/M2 | DIASTOLIC BLOOD PRESSURE: 50 MMHG | HEART RATE: 96 BPM | OXYGEN SATURATION: 96 % | HEIGHT: 69 IN

## 2021-01-01 VITALS
SYSTOLIC BLOOD PRESSURE: 122 MMHG | BODY MASS INDEX: 24.29 KG/M2 | DIASTOLIC BLOOD PRESSURE: 76 MMHG | HEART RATE: 82 BPM | TEMPERATURE: 97.6 F | WEIGHT: 164.5 LBS

## 2021-01-01 VITALS
OXYGEN SATURATION: 90 % | HEIGHT: 70 IN | RESPIRATION RATE: 16 BRPM | DIASTOLIC BLOOD PRESSURE: 62 MMHG | HEART RATE: 65 BPM | WEIGHT: 155 LBS | BODY MASS INDEX: 22.19 KG/M2 | TEMPERATURE: 97.4 F | SYSTOLIC BLOOD PRESSURE: 112 MMHG

## 2021-01-01 VITALS
DIASTOLIC BLOOD PRESSURE: 80 MMHG | TEMPERATURE: 97.2 F | RESPIRATION RATE: 18 BRPM | HEIGHT: 69 IN | SYSTOLIC BLOOD PRESSURE: 130 MMHG | OXYGEN SATURATION: 97 % | BODY MASS INDEX: 25.92 KG/M2 | WEIGHT: 175 LBS | HEART RATE: 83 BPM

## 2021-01-01 VITALS
RESPIRATION RATE: 14 BRPM | WEIGHT: 169.75 LBS | SYSTOLIC BLOOD PRESSURE: 120 MMHG | OXYGEN SATURATION: 97 % | DIASTOLIC BLOOD PRESSURE: 60 MMHG | TEMPERATURE: 97.2 F | HEART RATE: 109 BPM | BODY MASS INDEX: 25.07 KG/M2

## 2021-01-01 VITALS
SYSTOLIC BLOOD PRESSURE: 125 MMHG | TEMPERATURE: 97.9 F | HEART RATE: 92 BPM | RESPIRATION RATE: 16 BRPM | OXYGEN SATURATION: 93 % | DIASTOLIC BLOOD PRESSURE: 81 MMHG

## 2021-01-01 VITALS
DIASTOLIC BLOOD PRESSURE: 74 MMHG | HEART RATE: 76 BPM | SYSTOLIC BLOOD PRESSURE: 132 MMHG | RESPIRATION RATE: 18 BRPM | TEMPERATURE: 97.4 F | OXYGEN SATURATION: 98 % | WEIGHT: 179.01 LBS | BODY MASS INDEX: 26.51 KG/M2 | HEIGHT: 69 IN

## 2021-01-01 VITALS
RESPIRATION RATE: 16 BRPM | HEIGHT: 69 IN | OXYGEN SATURATION: 98 % | TEMPERATURE: 97.6 F | DIASTOLIC BLOOD PRESSURE: 98 MMHG | BODY MASS INDEX: 25.99 KG/M2 | HEART RATE: 85 BPM | WEIGHT: 175.49 LBS | SYSTOLIC BLOOD PRESSURE: 140 MMHG

## 2021-01-01 VITALS
HEART RATE: 76 BPM | HEIGHT: 69 IN | SYSTOLIC BLOOD PRESSURE: 166 MMHG | TEMPERATURE: 97.9 F | BODY MASS INDEX: 25.99 KG/M2 | WEIGHT: 175.49 LBS | DIASTOLIC BLOOD PRESSURE: 72 MMHG | RESPIRATION RATE: 18 BRPM | OXYGEN SATURATION: 95 %

## 2021-01-01 VITALS
BODY MASS INDEX: 24.88 KG/M2 | RESPIRATION RATE: 17 BRPM | OXYGEN SATURATION: 93 % | TEMPERATURE: 98 F | HEIGHT: 69 IN | SYSTOLIC BLOOD PRESSURE: 113 MMHG | WEIGHT: 168 LBS | DIASTOLIC BLOOD PRESSURE: 71 MMHG | HEART RATE: 53 BPM

## 2021-01-01 VITALS
OXYGEN SATURATION: 96 % | BODY MASS INDEX: 26.81 KG/M2 | DIASTOLIC BLOOD PRESSURE: 72 MMHG | TEMPERATURE: 98.2 F | HEIGHT: 69 IN | RESPIRATION RATE: 16 BRPM | HEART RATE: 73 BPM | WEIGHT: 181 LBS | SYSTOLIC BLOOD PRESSURE: 138 MMHG

## 2021-01-01 VITALS
SYSTOLIC BLOOD PRESSURE: 144 MMHG | WEIGHT: 173.5 LBS | OXYGEN SATURATION: 97 % | TEMPERATURE: 97.2 F | RESPIRATION RATE: 16 BRPM | DIASTOLIC BLOOD PRESSURE: 80 MMHG | BODY MASS INDEX: 25.62 KG/M2 | HEART RATE: 74 BPM

## 2021-01-01 VITALS — WEIGHT: 180 LBS | HEIGHT: 69 IN | BODY MASS INDEX: 26.66 KG/M2

## 2021-01-01 VITALS
TEMPERATURE: 98.1 F | BODY MASS INDEX: 25.77 KG/M2 | HEIGHT: 69 IN | RESPIRATION RATE: 18 BRPM | SYSTOLIC BLOOD PRESSURE: 118 MMHG | OXYGEN SATURATION: 96 % | WEIGHT: 174 LBS | DIASTOLIC BLOOD PRESSURE: 70 MMHG | HEART RATE: 72 BPM

## 2021-01-01 VITALS
SYSTOLIC BLOOD PRESSURE: 112 MMHG | OXYGEN SATURATION: 98 % | WEIGHT: 171 LBS | TEMPERATURE: 98.2 F | RESPIRATION RATE: 16 BRPM | DIASTOLIC BLOOD PRESSURE: 76 MMHG | HEIGHT: 69 IN | BODY MASS INDEX: 25.33 KG/M2 | HEART RATE: 94 BPM

## 2021-01-01 VITALS
SYSTOLIC BLOOD PRESSURE: 124 MMHG | OXYGEN SATURATION: 97 % | RESPIRATION RATE: 20 BRPM | HEART RATE: 75 BPM | TEMPERATURE: 97.1 F | DIASTOLIC BLOOD PRESSURE: 68 MMHG

## 2021-01-01 VITALS
SYSTOLIC BLOOD PRESSURE: 98 MMHG | HEIGHT: 69 IN | OXYGEN SATURATION: 99 % | TEMPERATURE: 98.1 F | WEIGHT: 157 LBS | HEART RATE: 106 BPM | DIASTOLIC BLOOD PRESSURE: 62 MMHG | BODY MASS INDEX: 23.25 KG/M2

## 2021-01-01 VITALS
RESPIRATION RATE: 18 BRPM | OXYGEN SATURATION: 91 % | SYSTOLIC BLOOD PRESSURE: 153 MMHG | BODY MASS INDEX: 24.97 KG/M2 | HEIGHT: 70 IN | HEART RATE: 80 BPM | WEIGHT: 174.38 LBS | DIASTOLIC BLOOD PRESSURE: 80 MMHG | TEMPERATURE: 98.2 F

## 2021-01-01 VITALS
HEART RATE: 74 BPM | BODY MASS INDEX: 26.44 KG/M2 | DIASTOLIC BLOOD PRESSURE: 72 MMHG | WEIGHT: 178.5 LBS | TEMPERATURE: 97.2 F | RESPIRATION RATE: 18 BRPM | OXYGEN SATURATION: 94 % | SYSTOLIC BLOOD PRESSURE: 118 MMHG | HEIGHT: 69 IN

## 2021-01-01 VITALS
TEMPERATURE: 98.2 F | HEIGHT: 69 IN | WEIGHT: 175.5 LBS | SYSTOLIC BLOOD PRESSURE: 120 MMHG | RESPIRATION RATE: 18 BRPM | BODY MASS INDEX: 26 KG/M2 | OXYGEN SATURATION: 99 % | DIASTOLIC BLOOD PRESSURE: 78 MMHG | HEART RATE: 77 BPM

## 2021-01-01 VITALS
HEART RATE: 80 BPM | DIASTOLIC BLOOD PRESSURE: 80 MMHG | TEMPERATURE: 98 F | SYSTOLIC BLOOD PRESSURE: 128 MMHG | BODY MASS INDEX: 24.88 KG/M2 | HEIGHT: 69 IN | WEIGHT: 168 LBS

## 2021-01-01 VITALS — HEIGHT: 69 IN | BODY MASS INDEX: 25.51 KG/M2 | WEIGHT: 172.2 LBS

## 2021-01-01 VITALS
RESPIRATION RATE: 20 BRPM | SYSTOLIC BLOOD PRESSURE: 130 MMHG | HEART RATE: 65 BPM | DIASTOLIC BLOOD PRESSURE: 88 MMHG | OXYGEN SATURATION: 95 % | HEIGHT: 70 IN | BODY MASS INDEX: 23.77 KG/M2 | WEIGHT: 166.01 LBS | TEMPERATURE: 96.9 F

## 2021-01-01 VITALS
OXYGEN SATURATION: 95 % | TEMPERATURE: 97.2 F | HEART RATE: 51 BPM | WEIGHT: 175.8 LBS | SYSTOLIC BLOOD PRESSURE: 120 MMHG | DIASTOLIC BLOOD PRESSURE: 66 MMHG | HEIGHT: 69 IN | BODY MASS INDEX: 26.04 KG/M2

## 2021-01-01 VITALS
RESPIRATION RATE: 18 BRPM | WEIGHT: 165 LBS | OXYGEN SATURATION: 95 % | BODY MASS INDEX: 24.35 KG/M2 | TEMPERATURE: 98 F | HEART RATE: 79 BPM | SYSTOLIC BLOOD PRESSURE: 142 MMHG | DIASTOLIC BLOOD PRESSURE: 81 MMHG

## 2021-01-01 VITALS
TEMPERATURE: 96.9 F | SYSTOLIC BLOOD PRESSURE: 127 MMHG | HEART RATE: 94 BPM | BODY MASS INDEX: 23.77 KG/M2 | RESPIRATION RATE: 18 BRPM | WEIGHT: 166 LBS | DIASTOLIC BLOOD PRESSURE: 81 MMHG | HEIGHT: 70 IN

## 2021-01-01 VITALS
HEART RATE: 72 BPM | RESPIRATION RATE: 16 BRPM | WEIGHT: 160 LBS | TEMPERATURE: 97.8 F | BODY MASS INDEX: 23.7 KG/M2 | SYSTOLIC BLOOD PRESSURE: 118 MMHG | OXYGEN SATURATION: 99 % | DIASTOLIC BLOOD PRESSURE: 60 MMHG | HEIGHT: 69 IN

## 2021-01-01 VITALS
TEMPERATURE: 98.2 F | DIASTOLIC BLOOD PRESSURE: 72 MMHG | HEART RATE: 76 BPM | WEIGHT: 177 LBS | BODY MASS INDEX: 26.22 KG/M2 | HEIGHT: 69 IN | SYSTOLIC BLOOD PRESSURE: 120 MMHG

## 2021-01-01 VITALS
HEART RATE: 64 BPM | TEMPERATURE: 97.9 F | WEIGHT: 79.6 LBS | DIASTOLIC BLOOD PRESSURE: 82 MMHG | SYSTOLIC BLOOD PRESSURE: 110 MMHG | HEIGHT: 69 IN | BODY MASS INDEX: 11.79 KG/M2 | OXYGEN SATURATION: 96 %

## 2021-01-01 VITALS
BODY MASS INDEX: 24.96 KG/M2 | WEIGHT: 168.5 LBS | HEIGHT: 69 IN | SYSTOLIC BLOOD PRESSURE: 140 MMHG | TEMPERATURE: 97.4 F | OXYGEN SATURATION: 98 % | HEART RATE: 72 BPM | DIASTOLIC BLOOD PRESSURE: 82 MMHG

## 2021-01-01 VITALS
WEIGHT: 154.5 LBS | SYSTOLIC BLOOD PRESSURE: 112 MMHG | HEIGHT: 69 IN | DIASTOLIC BLOOD PRESSURE: 68 MMHG | HEART RATE: 72 BPM | TEMPERATURE: 97.8 F | BODY MASS INDEX: 22.88 KG/M2 | RESPIRATION RATE: 18 BRPM

## 2021-01-01 VITALS
SYSTOLIC BLOOD PRESSURE: 127 MMHG | RESPIRATION RATE: 18 BRPM | OXYGEN SATURATION: 96 % | TEMPERATURE: 97.6 F | DIASTOLIC BLOOD PRESSURE: 81 MMHG | HEART RATE: 75 BPM

## 2021-01-01 VITALS
OXYGEN SATURATION: 96 % | TEMPERATURE: 98.2 F | HEART RATE: 85 BPM | RESPIRATION RATE: 16 BRPM | BODY MASS INDEX: 26.22 KG/M2 | WEIGHT: 177 LBS | DIASTOLIC BLOOD PRESSURE: 72 MMHG | SYSTOLIC BLOOD PRESSURE: 132 MMHG | HEIGHT: 69 IN

## 2021-01-01 DIAGNOSIS — C25.9 PANCREATIC CANCER METASTASIZED TO LIVER (HCC): ICD-10-CM

## 2021-01-01 DIAGNOSIS — F41.9 ANXIETY DISORDER, UNSPECIFIED TYPE: ICD-10-CM

## 2021-01-01 DIAGNOSIS — R06.6 INTRACTABLE HICCUPS: Primary | ICD-10-CM

## 2021-01-01 DIAGNOSIS — T40.2X5A THERAPEUTIC OPIOID INDUCED CONSTIPATION: ICD-10-CM

## 2021-01-01 DIAGNOSIS — D61.810 PANCYTOPENIA DUE TO CHEMOTHERAPY (HCC): ICD-10-CM

## 2021-01-01 DIAGNOSIS — Z51.5 PALLIATIVE CARE PATIENT: ICD-10-CM

## 2021-01-01 DIAGNOSIS — C25.9 PANCREATIC ADENOCARCINOMA (HCC): Primary | ICD-10-CM

## 2021-01-01 DIAGNOSIS — Z79.4 TYPE 2 DIABETES MELLITUS WITH HYPOGLYCEMIA WITHOUT COMA, WITH LONG-TERM CURRENT USE OF INSULIN (HCC): ICD-10-CM

## 2021-01-01 DIAGNOSIS — E11.9 INSULIN DEPENDENT TYPE 2 DIABETES MELLITUS (HCC): ICD-10-CM

## 2021-01-01 DIAGNOSIS — T45.1X5A CHEMOTHERAPY INDUCED NEUTROPENIA (HCC): ICD-10-CM

## 2021-01-01 DIAGNOSIS — R07.9 CHEST PAIN, UNSPECIFIED TYPE: ICD-10-CM

## 2021-01-01 DIAGNOSIS — K81.0 ACUTE CHOLECYSTITIS: ICD-10-CM

## 2021-01-01 DIAGNOSIS — E11.649 TYPE 2 DIABETES MELLITUS WITH HYPOGLYCEMIA WITHOUT COMA, WITH LONG-TERM CURRENT USE OF INSULIN (HCC): ICD-10-CM

## 2021-01-01 DIAGNOSIS — I10 ESSENTIAL HYPERTENSION: ICD-10-CM

## 2021-01-01 DIAGNOSIS — C25.9 MALIGNANT NEOPLASM OF PANCREAS, UNSPECIFIED LOCATION OF MALIGNANCY (HCC): ICD-10-CM

## 2021-01-01 DIAGNOSIS — R10.13 EPIGASTRIC PAIN: ICD-10-CM

## 2021-01-01 DIAGNOSIS — D70.1 CHEMOTHERAPY INDUCED NEUTROPENIA (HCC): ICD-10-CM

## 2021-01-01 DIAGNOSIS — C25.9 PANCREATIC ADENOCARCINOMA (HCC): ICD-10-CM

## 2021-01-01 DIAGNOSIS — K86.89 PANCREATIC INSUFFICIENCY: ICD-10-CM

## 2021-01-01 DIAGNOSIS — K55.069 SUPERIOR MESENTERIC VEIN THROMBOSIS (HCC): ICD-10-CM

## 2021-01-01 DIAGNOSIS — E87.1 HYPONATREMIA: Primary | ICD-10-CM

## 2021-01-01 DIAGNOSIS — F32.A DEPRESSION, UNSPECIFIED DEPRESSION TYPE: ICD-10-CM

## 2021-01-01 DIAGNOSIS — E11.9 TYPE 2 DIABETES MELLITUS WITHOUT COMPLICATION, WITHOUT LONG-TERM CURRENT USE OF INSULIN (HCC): ICD-10-CM

## 2021-01-01 DIAGNOSIS — E86.0 DEHYDRATION: ICD-10-CM

## 2021-01-01 DIAGNOSIS — N40.1 BENIGN PROSTATIC HYPERPLASIA WITH LOWER URINARY TRACT SYMPTOMS, SYMPTOM DETAILS UNSPECIFIED: Primary | ICD-10-CM

## 2021-01-01 DIAGNOSIS — Z76.89 ENCOUNTER FOR SUPPORT AND COORDINATION OF TRANSITION OF CARE: Primary | ICD-10-CM

## 2021-01-01 DIAGNOSIS — E87.6 HYPOKALEMIA: ICD-10-CM

## 2021-01-01 DIAGNOSIS — T40.2X5A THERAPEUTIC OPIOID-INDUCED CONSTIPATION (OIC): ICD-10-CM

## 2021-01-01 DIAGNOSIS — M05.79 RHEUMATOID ARTHRITIS INVOLVING MULTIPLE SITES WITH POSITIVE RHEUMATOID FACTOR (HCC): ICD-10-CM

## 2021-01-01 DIAGNOSIS — A41.9 SEPSIS WITHOUT ACUTE ORGAN DYSFUNCTION, DUE TO UNSPECIFIED ORGANISM (HCC): ICD-10-CM

## 2021-01-01 DIAGNOSIS — Z79.4 TYPE 2 DIABETES MELLITUS WITH HYPERGLYCEMIA, WITH LONG-TERM CURRENT USE OF INSULIN (HCC): Primary | ICD-10-CM

## 2021-01-01 DIAGNOSIS — K59.03 THERAPEUTIC OPIOID INDUCED CONSTIPATION: ICD-10-CM

## 2021-01-01 DIAGNOSIS — R11.0 NAUSEA: ICD-10-CM

## 2021-01-01 DIAGNOSIS — R06.6 INTRACTABLE HICCUPS: ICD-10-CM

## 2021-01-01 DIAGNOSIS — R20.2 NUMBNESS AND TINGLING OF FOOT: Primary | ICD-10-CM

## 2021-01-01 DIAGNOSIS — U07.1 COVID: Primary | ICD-10-CM

## 2021-01-01 DIAGNOSIS — T45.1X5A ADVERSE EFFECT OF CHEMOTHERAPY, INITIAL ENCOUNTER: Primary | ICD-10-CM

## 2021-01-01 DIAGNOSIS — K83.8 PNEUMOBILIA: ICD-10-CM

## 2021-01-01 DIAGNOSIS — E11.65 TYPE 2 DIABETES MELLITUS WITH HYPERGLYCEMIA, WITH LONG-TERM CURRENT USE OF INSULIN (HCC): Primary | ICD-10-CM

## 2021-01-01 DIAGNOSIS — K55.069 MESENTERIC VEIN THROMBOSIS (HCC): ICD-10-CM

## 2021-01-01 DIAGNOSIS — C78.7 PANCREATIC CANCER METASTASIZED TO LIVER (HCC): Primary | ICD-10-CM

## 2021-01-01 DIAGNOSIS — R73.9 HYPERGLYCEMIA: ICD-10-CM

## 2021-01-01 DIAGNOSIS — Z79.4 TYPE 2 DIABETES MELLITUS WITH HYPERGLYCEMIA, WITH LONG-TERM CURRENT USE OF INSULIN (HCC): ICD-10-CM

## 2021-01-01 DIAGNOSIS — U07.1 COVID-19: ICD-10-CM

## 2021-01-01 DIAGNOSIS — K86.89 PANCREATIC MASS: ICD-10-CM

## 2021-01-01 DIAGNOSIS — G89.3 CANCER RELATED PAIN: ICD-10-CM

## 2021-01-01 DIAGNOSIS — Z71.3 NUTRITIONAL COUNSELING: Primary | ICD-10-CM

## 2021-01-01 DIAGNOSIS — Z71.89 COUNSELING AND COORDINATION OF CARE: Primary | ICD-10-CM

## 2021-01-01 DIAGNOSIS — Z23 IMMUNIZATION DUE: ICD-10-CM

## 2021-01-01 DIAGNOSIS — E11.9 TYPE 2 DIABETES MELLITUS, WITHOUT LONG-TERM CURRENT USE OF INSULIN (HCC): ICD-10-CM

## 2021-01-01 DIAGNOSIS — R53.1 GENERALIZED WEAKNESS: ICD-10-CM

## 2021-01-01 DIAGNOSIS — K04.7 DENTAL INFECTION: ICD-10-CM

## 2021-01-01 DIAGNOSIS — R41.82 ALTERED MENTAL STATUS: ICD-10-CM

## 2021-01-01 DIAGNOSIS — Z79.4 TYPE 2 DIABETES MELLITUS WITHOUT COMPLICATION, WITH LONG-TERM CURRENT USE OF INSULIN (HCC): ICD-10-CM

## 2021-01-01 DIAGNOSIS — R20.0 NUMBNESS AND TINGLING OF FOOT: Primary | ICD-10-CM

## 2021-01-01 DIAGNOSIS — D72.829 LEUKOCYTOSIS, UNSPECIFIED TYPE: ICD-10-CM

## 2021-01-01 DIAGNOSIS — E11.65 TYPE 2 DIABETES MELLITUS WITH HYPERGLYCEMIA, WITH LONG-TERM CURRENT USE OF INSULIN (HCC): ICD-10-CM

## 2021-01-01 DIAGNOSIS — K81.0 ACUTE CHOLECYSTITIS WITHOUT CALCULUS: ICD-10-CM

## 2021-01-01 DIAGNOSIS — R10.30 LOWER ABDOMINAL PAIN: ICD-10-CM

## 2021-01-01 DIAGNOSIS — K59.03 THERAPEUTIC OPIOID-INDUCED CONSTIPATION (OIC): ICD-10-CM

## 2021-01-01 DIAGNOSIS — R10.84 GENERALIZED ABDOMINAL PAIN: ICD-10-CM

## 2021-01-01 DIAGNOSIS — K21.9 GASTROESOPHAGEAL REFLUX DISEASE, UNSPECIFIED WHETHER ESOPHAGITIS PRESENT: ICD-10-CM

## 2021-01-01 DIAGNOSIS — C25.9 MALIGNANT NEOPLASM OF PANCREAS, UNSPECIFIED LOCATION OF MALIGNANCY (HCC): Primary | ICD-10-CM

## 2021-01-01 DIAGNOSIS — C25.9 PANCREATIC CANCER (HCC): ICD-10-CM

## 2021-01-01 DIAGNOSIS — I26.94 MULTIPLE SUBSEGMENTAL PULMONARY EMBOLI WITHOUT ACUTE COR PULMONALE (HCC): Primary | ICD-10-CM

## 2021-01-01 DIAGNOSIS — R06.00 DYSPNEA, UNSPECIFIED TYPE: Primary | ICD-10-CM

## 2021-01-01 DIAGNOSIS — E46 PROTEIN-CALORIE MALNUTRITION, UNSPECIFIED SEVERITY (HCC): ICD-10-CM

## 2021-01-01 DIAGNOSIS — E43 SEVERE PROTEIN-CALORIE MALNUTRITION (HCC): ICD-10-CM

## 2021-01-01 DIAGNOSIS — C25.9 PANCREATIC CANCER METASTASIZED TO LIVER (HCC): Primary | ICD-10-CM

## 2021-01-01 DIAGNOSIS — Z79.4 INSULIN DEPENDENT TYPE 2 DIABETES MELLITUS (HCC): ICD-10-CM

## 2021-01-01 DIAGNOSIS — R78.81 BACTEREMIA: Primary | ICD-10-CM

## 2021-01-01 DIAGNOSIS — R26.2 AMBULATORY DYSFUNCTION: ICD-10-CM

## 2021-01-01 DIAGNOSIS — E11.9 TYPE 2 DIABETES MELLITUS WITHOUT COMPLICATION, WITH LONG-TERM CURRENT USE OF INSULIN (HCC): ICD-10-CM

## 2021-01-01 DIAGNOSIS — I26.94 MULTIPLE SUBSEGMENTAL PULMONARY EMBOLI WITHOUT ACUTE COR PULMONALE (HCC): ICD-10-CM

## 2021-01-01 DIAGNOSIS — I26.99 PULMONARY EMBOLISM (HCC): ICD-10-CM

## 2021-01-01 DIAGNOSIS — C78.7 PANCREATIC CANCER METASTASIZED TO LIVER (HCC): ICD-10-CM

## 2021-01-01 DIAGNOSIS — R41.0 DISORIENTATION: ICD-10-CM

## 2021-01-01 DIAGNOSIS — R07.9 CHEST PAIN, UNSPECIFIED: Primary | ICD-10-CM

## 2021-01-01 DIAGNOSIS — C25.0 MALIGNANT NEOPLASM OF HEAD OF PANCREAS (HCC): ICD-10-CM

## 2021-01-01 DIAGNOSIS — R10.9 ABDOMINAL PAIN: ICD-10-CM

## 2021-01-01 DIAGNOSIS — R78.81 BACTEREMIA: ICD-10-CM

## 2021-01-01 DIAGNOSIS — F11.90 CHRONIC, CONTINUOUS USE OF OPIOIDS: ICD-10-CM

## 2021-01-01 DIAGNOSIS — R14.3 FLATULENCE: ICD-10-CM

## 2021-01-01 DIAGNOSIS — R65.10 SIRS (SYSTEMIC INFLAMMATORY RESPONSE SYNDROME) (HCC): Primary | ICD-10-CM

## 2021-01-01 DIAGNOSIS — R60.9 EDEMA: ICD-10-CM

## 2021-01-01 DIAGNOSIS — R07.89 OTHER CHEST PAIN: ICD-10-CM

## 2021-01-01 DIAGNOSIS — R78.81 POSITIVE BLOOD CULTURE: ICD-10-CM

## 2021-01-01 DIAGNOSIS — A41.9 SEPSIS (HCC): ICD-10-CM

## 2021-01-01 DIAGNOSIS — A41.9 SEPSIS, DUE TO UNSPECIFIED ORGANISM, UNSPECIFIED WHETHER ACUTE ORGAN DYSFUNCTION PRESENT (HCC): ICD-10-CM

## 2021-01-01 DIAGNOSIS — M19.90 IDIOPATHIC OSTEOARTHRITIS: ICD-10-CM

## 2021-01-01 DIAGNOSIS — R16.0 LIVER MASS: ICD-10-CM

## 2021-01-01 LAB
2HR DELTA HS TROPONIN: 0 NG/L
2HR DELTA HS TROPONIN: 11 NG/L
2HR DELTA HS TROPONIN: 2 NG/L
4HR DELTA HS TROPONIN: -11 NG/L
4HR DELTA HS TROPONIN: 4 NG/L
4HR DELTA HS TROPONIN: 54 NG/L
ALBUMIN SERPL BCP-MCNC: 1.9 G/DL (ref 3.5–5)
ALBUMIN SERPL BCP-MCNC: 2 G/DL (ref 3.5–5)
ALBUMIN SERPL BCP-MCNC: 2.1 G/DL (ref 3.5–5)
ALBUMIN SERPL BCP-MCNC: 2.1 G/DL (ref 3.5–5)
ALBUMIN SERPL BCP-MCNC: 2.2 G/DL (ref 3.5–5)
ALBUMIN SERPL BCP-MCNC: 2.4 G/DL (ref 3.5–5)
ALBUMIN SERPL BCP-MCNC: 2.4 G/DL (ref 3.5–5)
ALBUMIN SERPL BCP-MCNC: 2.5 G/DL (ref 3.5–5)
ALBUMIN SERPL BCP-MCNC: 2.7 G/DL (ref 3.5–5)
ALBUMIN SERPL BCP-MCNC: 2.8 G/DL (ref 3.4–4.8)
ALBUMIN SERPL BCP-MCNC: 2.9 G/DL (ref 3.4–4.8)
ALBUMIN SERPL BCP-MCNC: 2.9 G/DL (ref 3.4–4.8)
ALBUMIN SERPL BCP-MCNC: 2.9 G/DL (ref 3.5–5)
ALBUMIN SERPL BCP-MCNC: 2.9 G/DL (ref 3.5–5)
ALBUMIN SERPL BCP-MCNC: 3 G/DL (ref 3.5–5)
ALBUMIN SERPL BCP-MCNC: 3.2 G/DL (ref 3.4–4.8)
ALBUMIN SERPL BCP-MCNC: 3.2 G/DL (ref 3.5–5)
ALBUMIN SERPL BCP-MCNC: 3.3 G/DL (ref 3.4–4.8)
ALBUMIN SERPL BCP-MCNC: 3.4 G/DL (ref 3.5–5)
ALBUMIN SERPL BCP-MCNC: 3.5 G/DL (ref 3.4–4.8)
ALBUMIN SERPL BCP-MCNC: 3.5 G/DL (ref 3.4–4.8)
ALBUMIN SERPL BCP-MCNC: 3.7 G/DL (ref 3.4–4.8)
ALBUMIN SERPL BCP-MCNC: 3.7 G/DL (ref 3.4–4.8)
ALBUMIN SERPL BCP-MCNC: 3.8 G/DL (ref 3.4–4.8)
ALBUMIN SERPL BCP-MCNC: 3.9 G/DL (ref 3.4–4.8)
ALBUMIN SERPL BCP-MCNC: 3.9 G/DL (ref 3.4–4.8)
ALBUMIN SERPL BCP-MCNC: 4 G/DL (ref 3.4–4.8)
ALBUMIN SERPL BCP-MCNC: 4.1 G/DL (ref 3.4–4.8)
ALBUMIN SERPL-MCNC: 3.2 G/DL (ref 3.8–4.8)
ALBUMIN/GLOB SERPL: 1.3 {RATIO} (ref 1.2–2.2)
ALP SERPL-CCNC: 100.8 U/L (ref 10–129)
ALP SERPL-CCNC: 109.2 U/L (ref 10–129)
ALP SERPL-CCNC: 110 U/L (ref 10–129)
ALP SERPL-CCNC: 112 U/L (ref 46–116)
ALP SERPL-CCNC: 115 U/L (ref 46–116)
ALP SERPL-CCNC: 132.3 U/L (ref 10–129)
ALP SERPL-CCNC: 135 U/L (ref 46–116)
ALP SERPL-CCNC: 153 U/L (ref 46–116)
ALP SERPL-CCNC: 164 U/L (ref 10–129)
ALP SERPL-CCNC: 165 U/L (ref 46–116)
ALP SERPL-CCNC: 171 IU/L (ref 48–121)
ALP SERPL-CCNC: 178 U/L (ref 46–116)
ALP SERPL-CCNC: 184 U/L (ref 46–116)
ALP SERPL-CCNC: 187 U/L (ref 46–116)
ALP SERPL-CCNC: 188.1 U/L (ref 10–129)
ALP SERPL-CCNC: 188.2 U/L (ref 10–129)
ALP SERPL-CCNC: 192 U/L (ref 46–116)
ALP SERPL-CCNC: 200 U/L (ref 46–116)
ALP SERPL-CCNC: 202.6 U/L (ref 10–129)
ALP SERPL-CCNC: 207 U/L (ref 46–116)
ALP SERPL-CCNC: 210 U/L (ref 46–116)
ALP SERPL-CCNC: 210.9 U/L (ref 10–129)
ALP SERPL-CCNC: 214.2 U/L (ref 10–129)
ALP SERPL-CCNC: 230 U/L (ref 46–116)
ALP SERPL-CCNC: 231.7 U/L (ref 10–129)
ALP SERPL-CCNC: 237.3 U/L (ref 10–129)
ALP SERPL-CCNC: 265.8 U/L (ref 10–129)
ALP SERPL-CCNC: 266 U/L (ref 46–116)
ALP SERPL-CCNC: 267 U/L (ref 46–116)
ALP SERPL-CCNC: 305.8 U/L (ref 10–129)
ALP SERPL-CCNC: 88 U/L (ref 10–129)
ALP SERPL-CCNC: 94 U/L (ref 46–116)
ALP SERPL-CCNC: 98 U/L (ref 46–116)
ALP SERPL-CCNC: 98 U/L (ref 46–116)
ALP SERPL-CCNC: 99.1 U/L (ref 10–129)
ALT SERPL W P-5'-P-CCNC: 17 U/L (ref 12–78)
ALT SERPL W P-5'-P-CCNC: 22 U/L (ref 12–78)
ALT SERPL W P-5'-P-CCNC: 22 U/L (ref 5–63)
ALT SERPL W P-5'-P-CCNC: 23 U/L (ref 12–78)
ALT SERPL W P-5'-P-CCNC: 23 U/L (ref 5–63)
ALT SERPL W P-5'-P-CCNC: 23 U/L (ref 5–63)
ALT SERPL W P-5'-P-CCNC: 25 U/L (ref 12–78)
ALT SERPL W P-5'-P-CCNC: 25 U/L (ref 5–63)
ALT SERPL W P-5'-P-CCNC: 26 U/L (ref 12–78)
ALT SERPL W P-5'-P-CCNC: 27 U/L (ref 5–63)
ALT SERPL W P-5'-P-CCNC: 28 U/L (ref 12–78)
ALT SERPL W P-5'-P-CCNC: 28 U/L (ref 12–78)
ALT SERPL W P-5'-P-CCNC: 28 U/L (ref 5–63)
ALT SERPL W P-5'-P-CCNC: 28 U/L (ref 5–63)
ALT SERPL W P-5'-P-CCNC: 29 U/L (ref 12–78)
ALT SERPL W P-5'-P-CCNC: 29 U/L (ref 5–63)
ALT SERPL W P-5'-P-CCNC: 29 U/L (ref 5–63)
ALT SERPL W P-5'-P-CCNC: 30 U/L (ref 5–63)
ALT SERPL W P-5'-P-CCNC: 30 U/L (ref 5–63)
ALT SERPL W P-5'-P-CCNC: 32 U/L (ref 12–78)
ALT SERPL W P-5'-P-CCNC: 33 U/L (ref 12–78)
ALT SERPL W P-5'-P-CCNC: 33 U/L (ref 5–63)
ALT SERPL W P-5'-P-CCNC: 33 U/L (ref 5–63)
ALT SERPL W P-5'-P-CCNC: 34 U/L (ref 12–78)
ALT SERPL W P-5'-P-CCNC: 36 U/L (ref 12–78)
ALT SERPL W P-5'-P-CCNC: 36 U/L (ref 5–63)
ALT SERPL W P-5'-P-CCNC: 39 U/L (ref 12–78)
ALT SERPL W P-5'-P-CCNC: 40 U/L (ref 12–78)
ALT SERPL W P-5'-P-CCNC: 40 U/L (ref 5–63)
ALT SERPL W P-5'-P-CCNC: 40 U/L (ref 5–63)
ALT SERPL W P-5'-P-CCNC: 44 U/L (ref 12–78)
ALT SERPL W P-5'-P-CCNC: 44 U/L (ref 12–78)
ALT SERPL W P-5'-P-CCNC: 46 U/L (ref 12–78)
ALT SERPL W P-5'-P-CCNC: 48 U/L (ref 12–78)
ALT SERPL-CCNC: 32 IU/L (ref 0–44)
ANION GAP SERPL CALCULATED.3IONS-SCNC: 10 MMOL/L (ref 4–13)
ANION GAP SERPL CALCULATED.3IONS-SCNC: 11 MMOL/L (ref 4–13)
ANION GAP SERPL CALCULATED.3IONS-SCNC: 12 MMOL/L (ref 4–13)
ANION GAP SERPL CALCULATED.3IONS-SCNC: 13 MMOL/L (ref 4–13)
ANION GAP SERPL CALCULATED.3IONS-SCNC: 6 MMOL/L (ref 4–13)
ANION GAP SERPL CALCULATED.3IONS-SCNC: 6 MMOL/L (ref 4–13)
ANION GAP SERPL CALCULATED.3IONS-SCNC: 7 MMOL/L (ref 4–13)
ANION GAP SERPL CALCULATED.3IONS-SCNC: 8 MMOL/L (ref 4–13)
ANION GAP SERPL CALCULATED.3IONS-SCNC: 9 MMOL/L (ref 4–13)
ANISOCYTOSIS BLD QL SMEAR: PRESENT
APTT PPP: 25 SECONDS (ref 23–37)
APTT PPP: 26 SECONDS (ref 23–37)
APTT PPP: 27 SECONDS (ref 23–37)
APTT PPP: 30 SECONDS (ref 23–37)
APTT PPP: 32 SECONDS (ref 23–37)
APTT PPP: 32 SECONDS (ref 23–37)
APTT PPP: 62 SECONDS (ref 23–37)
APTT PPP: 72 SECONDS (ref 23–37)
APTT PPP: 81 SECONDS (ref 23–37)
APTT PPP: 86 SECONDS (ref 23–37)
AST SERPL W P-5'-P-CCNC: 11 U/L (ref 5–45)
AST SERPL W P-5'-P-CCNC: 11 U/L (ref 5–45)
AST SERPL W P-5'-P-CCNC: 12 U/L (ref 5–45)
AST SERPL W P-5'-P-CCNC: 14 U/L (ref 5–45)
AST SERPL W P-5'-P-CCNC: 15 U/L (ref 5–45)
AST SERPL W P-5'-P-CCNC: 16 U/L (ref 5–45)
AST SERPL W P-5'-P-CCNC: 17 U/L (ref 5–45)
AST SERPL W P-5'-P-CCNC: 18 U/L (ref 15–41)
AST SERPL W P-5'-P-CCNC: 18 U/L (ref 5–45)
AST SERPL W P-5'-P-CCNC: 19 U/L (ref 15–41)
AST SERPL W P-5'-P-CCNC: 20 U/L (ref 15–41)
AST SERPL W P-5'-P-CCNC: 20 U/L (ref 15–41)
AST SERPL W P-5'-P-CCNC: 21 U/L (ref 15–41)
AST SERPL W P-5'-P-CCNC: 21 U/L (ref 15–41)
AST SERPL W P-5'-P-CCNC: 22 U/L (ref 15–41)
AST SERPL W P-5'-P-CCNC: 22 U/L (ref 5–45)
AST SERPL W P-5'-P-CCNC: 23 U/L (ref 15–41)
AST SERPL W P-5'-P-CCNC: 23 U/L (ref 15–41)
AST SERPL W P-5'-P-CCNC: 25 U/L (ref 15–41)
AST SERPL W P-5'-P-CCNC: 26 U/L (ref 15–41)
AST SERPL W P-5'-P-CCNC: 27 U/L (ref 15–41)
AST SERPL W P-5'-P-CCNC: 28 U/L (ref 15–41)
AST SERPL W P-5'-P-CCNC: 30 U/L (ref 5–45)
AST SERPL W P-5'-P-CCNC: 32 U/L (ref 5–45)
AST SERPL W P-5'-P-CCNC: 34 U/L (ref 5–45)
AST SERPL W P-5'-P-CCNC: 34 U/L (ref 5–45)
AST SERPL W P-5'-P-CCNC: 39 U/L (ref 15–41)
AST SERPL W P-5'-P-CCNC: 6 U/L (ref 5–45)
AST SERPL-CCNC: 30 IU/L (ref 0–40)
ATRIAL RATE: 109 BPM
ATRIAL RATE: 118 BPM
ATRIAL RATE: 120 BPM
ATRIAL RATE: 74 BPM
ATRIAL RATE: 81 BPM
ATRIAL RATE: 86 BPM
ATRIAL RATE: 86 BPM
ATRIAL RATE: 90 BPM
BACTERIA BLD CULT: ABNORMAL
BACTERIA BLD CULT: NORMAL
BACTERIA UR QL AUTO: ABNORMAL /HPF
BACTERIA UR QL AUTO: NORMAL /HPF
BACTERIA UR QL AUTO: NORMAL /HPF
BASOPHILS # BLD AUTO: 0 X10E3/UL (ref 0–0.2)
BASOPHILS # BLD AUTO: 0.01 THOUSANDS/ΜL (ref 0–0.1)
BASOPHILS # BLD AUTO: 0.02 THOUSANDS/ΜL (ref 0–0.1)
BASOPHILS # BLD AUTO: 0.03 THOUSANDS/ΜL (ref 0–0.1)
BASOPHILS # BLD AUTO: 0.04 THOUSANDS/ΜL (ref 0–0.1)
BASOPHILS # BLD AUTO: 0.05 THOUSANDS/ΜL (ref 0–0.1)
BASOPHILS # BLD AUTO: 0.08 THOUSANDS/ΜL (ref 0–0.1)
BASOPHILS # BLD MANUAL: 0 THOUSAND/UL (ref 0–0.1)
BASOPHILS # BLD MANUAL: 0.09 THOUSAND/UL (ref 0–0.1)
BASOPHILS # BLD MANUAL: 0.09 THOUSAND/UL (ref 0–0.1)
BASOPHILS NFR BLD AUTO: 0 %
BASOPHILS NFR BLD AUTO: 0 % (ref 0–1)
BASOPHILS NFR BLD AUTO: 1 % (ref 0–1)
BASOPHILS NFR MAR MANUAL: 0 % (ref 0–1)
BASOPHILS NFR MAR MANUAL: 1 % (ref 0–1)
BASOPHILS NFR MAR MANUAL: 1 % (ref 0–1)
BILIRUB DIRECT SERPL-MCNC: 0.09 MG/DL (ref 0–0.3)
BILIRUB DIRECT SERPL-MCNC: 0.12 MG/DL (ref 0–0.2)
BILIRUB DIRECT SERPL-MCNC: 0.33 MG/DL (ref 0–0.2)
BILIRUB SERPL-MCNC: 0.2 MG/DL (ref 0.2–1)
BILIRUB SERPL-MCNC: 0.29 MG/DL (ref 0.2–1)
BILIRUB SERPL-MCNC: 0.3 MG/DL (ref 0.2–1)
BILIRUB SERPL-MCNC: 0.3 MG/DL (ref 0–1.2)
BILIRUB SERPL-MCNC: 0.35 MG/DL (ref 0.2–1)
BILIRUB SERPL-MCNC: 0.37 MG/DL (ref 0.2–1)
BILIRUB SERPL-MCNC: 0.39 MG/DL (ref 0.3–1.2)
BILIRUB SERPL-MCNC: 0.42 MG/DL (ref 0.3–1.2)
BILIRUB SERPL-MCNC: 0.43 MG/DL (ref 0.2–1)
BILIRUB SERPL-MCNC: 0.44 MG/DL (ref 0.2–1)
BILIRUB SERPL-MCNC: 0.46 MG/DL (ref 0.2–1)
BILIRUB SERPL-MCNC: 0.46 MG/DL (ref 0.2–1)
BILIRUB SERPL-MCNC: 0.47 MG/DL (ref 0.3–1.2)
BILIRUB SERPL-MCNC: 0.48 MG/DL (ref 0.3–1.2)
BILIRUB SERPL-MCNC: 0.49 MG/DL (ref 0.3–1.2)
BILIRUB SERPL-MCNC: 0.49 MG/DL (ref 0.3–1.2)
BILIRUB SERPL-MCNC: 0.54 MG/DL (ref 0.2–1)
BILIRUB SERPL-MCNC: 0.54 MG/DL (ref 0.3–1.2)
BILIRUB SERPL-MCNC: 0.57 MG/DL (ref 0.2–1)
BILIRUB SERPL-MCNC: 0.58 MG/DL (ref 0.3–1.2)
BILIRUB SERPL-MCNC: 0.59 MG/DL (ref 0.3–1.2)
BILIRUB SERPL-MCNC: 0.6 MG/DL (ref 0.3–1.2)
BILIRUB SERPL-MCNC: 0.66 MG/DL (ref 0.2–1)
BILIRUB SERPL-MCNC: 0.67 MG/DL (ref 0.2–1)
BILIRUB SERPL-MCNC: 0.68 MG/DL (ref 0.3–1.2)
BILIRUB SERPL-MCNC: 0.71 MG/DL (ref 0.3–1.2)
BILIRUB SERPL-MCNC: 0.75 MG/DL (ref 0.3–1.2)
BILIRUB SERPL-MCNC: 0.78 MG/DL (ref 0.2–1)
BILIRUB SERPL-MCNC: 0.8 MG/DL (ref 0.2–1)
BILIRUB SERPL-MCNC: 0.82 MG/DL (ref 0.2–1)
BILIRUB SERPL-MCNC: 0.86 MG/DL (ref 0.2–1)
BILIRUB SERPL-MCNC: 0.97 MG/DL (ref 0.3–1.2)
BILIRUB SERPL-MCNC: 0.97 MG/DL (ref 0.3–1.2)
BILIRUB SERPL-MCNC: 1.04 MG/DL (ref 0.2–1)
BILIRUB SERPL-MCNC: 1.04 MG/DL (ref 0.3–1.2)
BILIRUB UR QL STRIP: NEGATIVE
BLD SMEAR INTERP: NORMAL
BNP SERPL-MCNC: 164.7 PG/ML (ref 1–100)
BUN SERPL-MCNC: 11 MG/DL (ref 5–25)
BUN SERPL-MCNC: 12 MG/DL (ref 5–25)
BUN SERPL-MCNC: 13 MG/DL (ref 5–25)
BUN SERPL-MCNC: 13 MG/DL (ref 6–20)
BUN SERPL-MCNC: 14 MG/DL (ref 5–25)
BUN SERPL-MCNC: 14 MG/DL (ref 6–20)
BUN SERPL-MCNC: 15 MG/DL (ref 5–25)
BUN SERPL-MCNC: 15 MG/DL (ref 6–20)
BUN SERPL-MCNC: 16 MG/DL (ref 6–20)
BUN SERPL-MCNC: 16 MG/DL (ref 6–20)
BUN SERPL-MCNC: 17 MG/DL (ref 5–25)
BUN SERPL-MCNC: 17 MG/DL (ref 6–20)
BUN SERPL-MCNC: 17 MG/DL (ref 8–27)
BUN SERPL-MCNC: 18 MG/DL (ref 6–20)
BUN SERPL-MCNC: 18 MG/DL (ref 6–20)
BUN SERPL-MCNC: 19 MG/DL (ref 5–25)
BUN SERPL-MCNC: 19 MG/DL (ref 6–20)
BUN SERPL-MCNC: 20 MG/DL (ref 5–25)
BUN SERPL-MCNC: 20 MG/DL (ref 5–25)
BUN SERPL-MCNC: 20 MG/DL (ref 6–20)
BUN SERPL-MCNC: 23 MG/DL (ref 5–25)
BUN SERPL-MCNC: 23 MG/DL (ref 6–20)
BUN SERPL-MCNC: 24 MG/DL (ref 6–20)
BUN SERPL-MCNC: 26 MG/DL (ref 5–25)
BUN SERPL-MCNC: 31 MG/DL (ref 5–25)
BUN/CREAT SERPL: 26 (ref 10–24)
C PEPTIDE SERPL-MCNC: 1.7 NG/ML (ref 1.1–4.4)
CALCIUM ALBUM COR SERPL-MCNC: 10.1 MG/DL (ref 8.3–10.1)
CALCIUM ALBUM COR SERPL-MCNC: 10.1 MG/DL (ref 8.3–10.1)
CALCIUM ALBUM COR SERPL-MCNC: 10.2 MG/DL (ref 8.3–10.1)
CALCIUM ALBUM COR SERPL-MCNC: 8.5 MG/DL (ref 8.3–10.1)
CALCIUM ALBUM COR SERPL-MCNC: 8.7 MG/DL (ref 8.3–10.1)
CALCIUM ALBUM COR SERPL-MCNC: 8.9 MG/DL (ref 8.3–10.1)
CALCIUM ALBUM COR SERPL-MCNC: 8.9 MG/DL (ref 8.3–10.1)
CALCIUM ALBUM COR SERPL-MCNC: 9.1 MG/DL (ref 8.3–10.1)
CALCIUM ALBUM COR SERPL-MCNC: 9.2 MG/DL (ref 8.3–10.1)
CALCIUM ALBUM COR SERPL-MCNC: 9.2 MG/DL (ref 8.3–10.1)
CALCIUM ALBUM COR SERPL-MCNC: 9.3 MG/DL (ref 8.3–10.1)
CALCIUM ALBUM COR SERPL-MCNC: 9.4 MG/DL (ref 8.3–10.1)
CALCIUM ALBUM COR SERPL-MCNC: 9.5 MG/DL (ref 8.3–10.1)
CALCIUM ALBUM COR SERPL-MCNC: 9.6 MG/DL (ref 8.3–10.1)
CALCIUM ALBUM COR SERPL-MCNC: 9.6 MG/DL (ref 8.3–10.1)
CALCIUM ALBUM COR SERPL-MCNC: 9.9 MG/DL (ref 8.3–10.1)
CALCIUM SERPL-MCNC: 7.3 MG/DL (ref 8.3–10.1)
CALCIUM SERPL-MCNC: 7.8 MG/DL (ref 8.3–10.1)
CALCIUM SERPL-MCNC: 7.8 MG/DL (ref 8.3–10.1)
CALCIUM SERPL-MCNC: 7.8 MG/DL (ref 8.4–10.2)
CALCIUM SERPL-MCNC: 8 MG/DL (ref 8.3–10.1)
CALCIUM SERPL-MCNC: 8 MG/DL (ref 8.4–10.2)
CALCIUM SERPL-MCNC: 8.1 MG/DL (ref 8.3–10.1)
CALCIUM SERPL-MCNC: 8.1 MG/DL (ref 8.3–10.1)
CALCIUM SERPL-MCNC: 8.1 MG/DL (ref 8.4–10.2)
CALCIUM SERPL-MCNC: 8.2 MG/DL (ref 8.3–10.1)
CALCIUM SERPL-MCNC: 8.3 MG/DL (ref 8.3–10.1)
CALCIUM SERPL-MCNC: 8.3 MG/DL (ref 8.3–10.1)
CALCIUM SERPL-MCNC: 8.4 MG/DL (ref 8.3–10.1)
CALCIUM SERPL-MCNC: 8.5 MG/DL (ref 8.3–10.1)
CALCIUM SERPL-MCNC: 8.5 MG/DL (ref 8.3–10.1)
CALCIUM SERPL-MCNC: 8.6 MG/DL (ref 8.3–10.1)
CALCIUM SERPL-MCNC: 8.6 MG/DL (ref 8.4–10.2)
CALCIUM SERPL-MCNC: 8.6 MG/DL (ref 8.4–10.2)
CALCIUM SERPL-MCNC: 8.7 MG/DL (ref 8.3–10.1)
CALCIUM SERPL-MCNC: 8.8 MG/DL (ref 8.3–10.1)
CALCIUM SERPL-MCNC: 8.8 MG/DL (ref 8.4–10.2)
CALCIUM SERPL-MCNC: 8.8 MG/DL (ref 8.6–10.2)
CALCIUM SERPL-MCNC: 8.9 MG/DL (ref 8.3–10.1)
CALCIUM SERPL-MCNC: 8.9 MG/DL (ref 8.4–10.2)
CALCIUM SERPL-MCNC: 8.9 MG/DL (ref 8.4–10.2)
CALCIUM SERPL-MCNC: 9 MG/DL (ref 8.3–10.1)
CALCIUM SERPL-MCNC: 9.1 MG/DL (ref 8.4–10.2)
CALCIUM SERPL-MCNC: 9.2 MG/DL (ref 8.3–10.1)
CALCIUM SERPL-MCNC: 9.2 MG/DL (ref 8.4–10.2)
CALCIUM SERPL-MCNC: 9.3 MG/DL (ref 8.4–10.2)
CALCIUM SERPL-MCNC: 9.3 MG/DL (ref 8.4–10.2)
CALCIUM SERPL-MCNC: 9.4 MG/DL (ref 8.4–10.2)
CANCER AG19-9 SERPL-ACNC: 1304 U/ML (ref 0–35)
CANCER AG19-9 SERPL-ACNC: 1685 U/ML (ref 0–35)
CANCER AG19-9 SERPL-ACNC: 1734 U/ML (ref 0–35)
CANCER AG19-9 SERPL-ACNC: 2346 U/ML (ref 0–35)
CANCER AG19-9 SERPL-ACNC: 2794 U/ML (ref 0–35)
CANCER AG19-9 SERPL-ACNC: 2944 U/ML (ref 0–35)
CARDIAC TROPONIN I PNL SERPL HS: 102 NG/L
CARDIAC TROPONIN I PNL SERPL HS: 15 NG/L
CARDIAC TROPONIN I PNL SERPL HS: 17 NG/L
CARDIAC TROPONIN I PNL SERPL HS: 18 NG/L
CARDIAC TROPONIN I PNL SERPL HS: 19 NG/L
CARDIAC TROPONIN I PNL SERPL HS: 29 NG/L
CARDIAC TROPONIN I PNL SERPL HS: 29 NG/L
CARDIAC TROPONIN I PNL SERPL HS: 48 NG/L
CARDIAC TROPONIN I PNL SERPL HS: 59 NG/L
CARDIAC TROPONIN I PNL SERPL HS: 9 NG/L
CHEST PAIN STATEMENT: NORMAL
CHLORIDE SERPL-SCNC: 100 MMOL/L (ref 96–108)
CHLORIDE SERPL-SCNC: 101 MMOL/L (ref 96–106)
CHLORIDE SERPL-SCNC: 102 MMOL/L (ref 100–108)
CHLORIDE SERPL-SCNC: 102 MMOL/L (ref 100–108)
CHLORIDE SERPL-SCNC: 102 MMOL/L (ref 96–108)
CHLORIDE SERPL-SCNC: 103 MMOL/L (ref 100–108)
CHLORIDE SERPL-SCNC: 103 MMOL/L (ref 100–108)
CHLORIDE SERPL-SCNC: 103 MMOL/L (ref 96–108)
CHLORIDE SERPL-SCNC: 104 MMOL/L (ref 100–108)
CHLORIDE SERPL-SCNC: 104 MMOL/L (ref 100–108)
CHLORIDE SERPL-SCNC: 104 MMOL/L (ref 96–108)
CHLORIDE SERPL-SCNC: 105 MMOL/L (ref 100–108)
CHLORIDE SERPL-SCNC: 105 MMOL/L (ref 100–108)
CHLORIDE SERPL-SCNC: 105 MMOL/L (ref 96–108)
CHLORIDE SERPL-SCNC: 106 MMOL/L (ref 100–108)
CHLORIDE SERPL-SCNC: 106 MMOL/L (ref 96–108)
CHLORIDE SERPL-SCNC: 106 MMOL/L (ref 96–108)
CHLORIDE SERPL-SCNC: 107 MMOL/L (ref 100–108)
CHLORIDE SERPL-SCNC: 108 MMOL/L (ref 100–108)
CHLORIDE SERPL-SCNC: 109 MMOL/L (ref 100–108)
CHLORIDE SERPL-SCNC: 92 MMOL/L (ref 100–108)
CHLORIDE SERPL-SCNC: 96 MMOL/L (ref 100–108)
CK SERPL-CCNC: 18.2 U/L (ref 49–397)
CLARITY UR: CLEAR
CO2 SERPL-SCNC: 22 MMOL/L (ref 22–33)
CO2 SERPL-SCNC: 23 MMOL/L (ref 21–32)
CO2 SERPL-SCNC: 23 MMOL/L (ref 21–32)
CO2 SERPL-SCNC: 24 MMOL/L (ref 21–32)
CO2 SERPL-SCNC: 25 MMOL/L (ref 20–29)
CO2 SERPL-SCNC: 25 MMOL/L (ref 21–32)
CO2 SERPL-SCNC: 25 MMOL/L (ref 21–32)
CO2 SERPL-SCNC: 25 MMOL/L (ref 22–33)
CO2 SERPL-SCNC: 25 MMOL/L (ref 22–33)
CO2 SERPL-SCNC: 26 MMOL/L (ref 21–32)
CO2 SERPL-SCNC: 26 MMOL/L (ref 22–33)
CO2 SERPL-SCNC: 27 MMOL/L (ref 21–32)
CO2 SERPL-SCNC: 27 MMOL/L (ref 21–32)
CO2 SERPL-SCNC: 27 MMOL/L (ref 22–33)
CO2 SERPL-SCNC: 27 MMOL/L (ref 22–33)
CO2 SERPL-SCNC: 28 MMOL/L (ref 21–32)
CO2 SERPL-SCNC: 28 MMOL/L (ref 22–33)
CO2 SERPL-SCNC: 29 MMOL/L (ref 21–32)
CO2 SERPL-SCNC: 29 MMOL/L (ref 22–33)
CO2 SERPL-SCNC: 29 MMOL/L (ref 22–33)
CO2 SERPL-SCNC: 30 MMOL/L (ref 22–33)
CO2 SERPL-SCNC: 30 MMOL/L (ref 22–33)
CO2 SERPL-SCNC: 31 MMOL/L (ref 22–33)
CO2 SERPL-SCNC: 31 MMOL/L (ref 22–33)
COLOR UR: ABNORMAL
COLOR UR: ABNORMAL
COLOR UR: YELLOW
CREAT SERPL-MCNC: 0.43 MG/DL (ref 0.6–1.3)
CREAT SERPL-MCNC: 0.43 MG/DL (ref 0.6–1.3)
CREAT SERPL-MCNC: 0.5 MG/DL (ref 0.6–1.3)
CREAT SERPL-MCNC: 0.65 MG/DL (ref 0.6–1.3)
CREAT SERPL-MCNC: 0.65 MG/DL (ref 0.76–1.27)
CREAT SERPL-MCNC: 0.67 MG/DL (ref 0.6–1.3)
CREAT SERPL-MCNC: 0.68 MG/DL (ref 0.5–1.2)
CREAT SERPL-MCNC: 0.68 MG/DL (ref 0.6–1.3)
CREAT SERPL-MCNC: 0.7 MG/DL (ref 0.5–1.2)
CREAT SERPL-MCNC: 0.7 MG/DL (ref 0.5–1.2)
CREAT SERPL-MCNC: 0.71 MG/DL (ref 0.5–1.2)
CREAT SERPL-MCNC: 0.72 MG/DL (ref 0.6–1.3)
CREAT SERPL-MCNC: 0.73 MG/DL (ref 0.5–1.2)
CREAT SERPL-MCNC: 0.73 MG/DL (ref 0.6–1.3)
CREAT SERPL-MCNC: 0.74 MG/DL (ref 0.5–1.2)
CREAT SERPL-MCNC: 0.74 MG/DL (ref 0.6–1.3)
CREAT SERPL-MCNC: 0.75 MG/DL (ref 0.6–1.3)
CREAT SERPL-MCNC: 0.83 MG/DL (ref 0.6–1.3)
CREAT SERPL-MCNC: 0.84 MG/DL (ref 0.5–1.2)
CREAT SERPL-MCNC: 0.84 MG/DL (ref 0.6–1.3)
CREAT SERPL-MCNC: 0.85 MG/DL (ref 0.6–1.3)
CREAT SERPL-MCNC: 0.86 MG/DL (ref 0.6–1.3)
CREAT SERPL-MCNC: 0.87 MG/DL (ref 0.5–1.2)
CREAT SERPL-MCNC: 0.88 MG/DL (ref 0.6–1.3)
CREAT SERPL-MCNC: 0.91 MG/DL (ref 0.5–1.2)
CREAT SERPL-MCNC: 0.92 MG/DL (ref 0.5–1.2)
CREAT SERPL-MCNC: 0.93 MG/DL (ref 0.5–1.2)
CREAT SERPL-MCNC: 0.93 MG/DL (ref 0.5–1.2)
CREAT SERPL-MCNC: 0.94 MG/DL (ref 0.5–1.2)
CREAT SERPL-MCNC: 0.94 MG/DL (ref 0.5–1.2)
CREAT SERPL-MCNC: 0.94 MG/DL (ref 0.6–1.3)
CREAT SERPL-MCNC: 0.97 MG/DL (ref 0.5–1.2)
CREAT SERPL-MCNC: 0.97 MG/DL (ref 0.5–1.2)
CREAT SERPL-MCNC: 0.97 MG/DL (ref 0.6–1.3)
CREAT SERPL-MCNC: 0.97 MG/DL (ref 0.6–1.3)
CREAT SERPL-MCNC: 1.13 MG/DL (ref 0.6–1.3)
CREAT SERPL-MCNC: 1.17 MG/DL (ref 0.6–1.3)
CREAT UR-MCNC: 129 MG/DL
CRP SERPL HS-MCNC: 7.37 MG/L
CRP SERPL QL: 13 MG/L
D DIMER PPP FEU-MCNC: 1.07 UG/ML FEU
D DIMER PPP FEU-MCNC: 3.19 UG/ML FEU
D DIMER PPP FEU-MCNC: 3.19 UG/ML FEU
DOHLE BOD BLD QL SMEAR: PRESENT
DOHLE BOD BLD QL SMEAR: PRESENT
EOSINOPHIL # BLD AUTO: 0.01 THOUSAND/ΜL (ref 0–0.61)
EOSINOPHIL # BLD AUTO: 0.03 THOUSAND/ΜL (ref 0–0.61)
EOSINOPHIL # BLD AUTO: 0.04 THOUSAND/ΜL (ref 0–0.61)
EOSINOPHIL # BLD AUTO: 0.05 THOUSAND/ΜL (ref 0–0.61)
EOSINOPHIL # BLD AUTO: 0.05 THOUSAND/ΜL (ref 0–0.61)
EOSINOPHIL # BLD AUTO: 0.06 THOUSAND/ΜL (ref 0–0.61)
EOSINOPHIL # BLD AUTO: 0.1 X10E3/UL (ref 0–0.4)
EOSINOPHIL # BLD AUTO: 0.11 THOUSAND/ΜL (ref 0–0.61)
EOSINOPHIL # BLD AUTO: 0.18 THOUSAND/ΜL (ref 0–0.61)
EOSINOPHIL # BLD AUTO: 0.18 THOUSAND/ΜL (ref 0–0.61)
EOSINOPHIL # BLD AUTO: 0.21 THOUSAND/ΜL (ref 0–0.61)
EOSINOPHIL # BLD AUTO: 0.24 THOUSAND/ΜL (ref 0–0.61)
EOSINOPHIL # BLD AUTO: 0.24 THOUSAND/ΜL (ref 0–0.61)
EOSINOPHIL # BLD MANUAL: 0 THOUSAND/UL (ref 0–0.4)
EOSINOPHIL # BLD MANUAL: 0.03 THOUSAND/UL (ref 0–0.4)
EOSINOPHIL # BLD MANUAL: 0.03 THOUSAND/UL (ref 0–0.4)
EOSINOPHIL # BLD MANUAL: 0.04 THOUSAND/UL (ref 0–0.4)
EOSINOPHIL # BLD MANUAL: 0.06 THOUSAND/UL (ref 0–0.4)
EOSINOPHIL # BLD MANUAL: 0.07 THOUSAND/UL (ref 0–0.4)
EOSINOPHIL # BLD MANUAL: 0.08 THOUSAND/UL (ref 0–0.4)
EOSINOPHIL # BLD MANUAL: 0.09 THOUSAND/UL (ref 0–0.4)
EOSINOPHIL # BLD MANUAL: 0.25 THOUSAND/UL (ref 0–0.4)
EOSINOPHIL # BLD MANUAL: 0.43 THOUSAND/UL (ref 0–0.4)
EOSINOPHIL # BLD MANUAL: 0.84 THOUSAND/UL (ref 0–0.4)
EOSINOPHIL NFR BLD AUTO: 0 % (ref 0–6)
EOSINOPHIL NFR BLD AUTO: 1 %
EOSINOPHIL NFR BLD AUTO: 1 % (ref 0–6)
EOSINOPHIL NFR BLD AUTO: 2 % (ref 0–6)
EOSINOPHIL NFR BLD AUTO: 4 % (ref 0–6)
EOSINOPHIL NFR BLD MANUAL: 0 % (ref 0–6)
EOSINOPHIL NFR BLD MANUAL: 1 % (ref 0–6)
EOSINOPHIL NFR BLD MANUAL: 2 % (ref 0–6)
EOSINOPHIL NFR BLD MANUAL: 2 % (ref 0–6)
EOSINOPHIL NFR BLD MANUAL: 3 % (ref 0–6)
ERYTHROCYTE [DISTWIDTH] IN BLOOD BY AUTOMATED COUNT: 14.1 % (ref 11.6–15.1)
ERYTHROCYTE [DISTWIDTH] IN BLOOD BY AUTOMATED COUNT: 14.3 % (ref 11.6–15.1)
ERYTHROCYTE [DISTWIDTH] IN BLOOD BY AUTOMATED COUNT: 14.4 % (ref 11.6–15.1)
ERYTHROCYTE [DISTWIDTH] IN BLOOD BY AUTOMATED COUNT: 14.7 % (ref 11.6–15.1)
ERYTHROCYTE [DISTWIDTH] IN BLOOD BY AUTOMATED COUNT: 14.8 % (ref 11.6–15.4)
ERYTHROCYTE [DISTWIDTH] IN BLOOD BY AUTOMATED COUNT: 15.2 % (ref 11.6–15.1)
ERYTHROCYTE [DISTWIDTH] IN BLOOD BY AUTOMATED COUNT: 15.4 % (ref 11.6–15.1)
ERYTHROCYTE [DISTWIDTH] IN BLOOD BY AUTOMATED COUNT: 15.6 % (ref 11.6–15.1)
ERYTHROCYTE [DISTWIDTH] IN BLOOD BY AUTOMATED COUNT: 15.8 % (ref 11.6–15.1)
ERYTHROCYTE [DISTWIDTH] IN BLOOD BY AUTOMATED COUNT: 15.8 % (ref 11.6–15.1)
ERYTHROCYTE [DISTWIDTH] IN BLOOD BY AUTOMATED COUNT: 15.9 % (ref 11.6–15.1)
ERYTHROCYTE [DISTWIDTH] IN BLOOD BY AUTOMATED COUNT: 15.9 % (ref 11.6–15.1)
ERYTHROCYTE [DISTWIDTH] IN BLOOD BY AUTOMATED COUNT: 16 % (ref 11.6–15.1)
ERYTHROCYTE [DISTWIDTH] IN BLOOD BY AUTOMATED COUNT: 16.1 % (ref 11.6–15.1)
ERYTHROCYTE [DISTWIDTH] IN BLOOD BY AUTOMATED COUNT: 16.2 % (ref 11.6–15.1)
ERYTHROCYTE [DISTWIDTH] IN BLOOD BY AUTOMATED COUNT: 16.4 % (ref 11.6–15.1)
ERYTHROCYTE [DISTWIDTH] IN BLOOD BY AUTOMATED COUNT: 16.5 % (ref 11.6–15.1)
ERYTHROCYTE [DISTWIDTH] IN BLOOD BY AUTOMATED COUNT: 16.6 % (ref 11.6–15.1)
ERYTHROCYTE [DISTWIDTH] IN BLOOD BY AUTOMATED COUNT: 16.7 % (ref 11.6–15.1)
ERYTHROCYTE [DISTWIDTH] IN BLOOD BY AUTOMATED COUNT: 16.8 % (ref 11.6–15.1)
ERYTHROCYTE [DISTWIDTH] IN BLOOD BY AUTOMATED COUNT: 16.8 % (ref 11.6–15.1)
ERYTHROCYTE [DISTWIDTH] IN BLOOD BY AUTOMATED COUNT: 17 % (ref 11.6–15.1)
ERYTHROCYTE [DISTWIDTH] IN BLOOD BY AUTOMATED COUNT: 17.1 % (ref 11.6–15.1)
ERYTHROCYTE [DISTWIDTH] IN BLOOD BY AUTOMATED COUNT: 17.2 % (ref 11.6–15.1)
ERYTHROCYTE [DISTWIDTH] IN BLOOD BY AUTOMATED COUNT: 17.2 % (ref 11.6–15.1)
ERYTHROCYTE [DISTWIDTH] IN BLOOD BY AUTOMATED COUNT: 17.3 % (ref 11.6–15.1)
ERYTHROCYTE [DISTWIDTH] IN BLOOD BY AUTOMATED COUNT: 17.4 % (ref 11.6–15.1)
ERYTHROCYTE [DISTWIDTH] IN BLOOD BY AUTOMATED COUNT: 17.5 % (ref 11.6–15.1)
ERYTHROCYTE [DISTWIDTH] IN BLOOD BY AUTOMATED COUNT: 17.6 % (ref 11.6–15.1)
ERYTHROCYTE [DISTWIDTH] IN BLOOD BY AUTOMATED COUNT: 17.9 % (ref 11.6–15.1)
ERYTHROCYTE [DISTWIDTH] IN BLOOD BY AUTOMATED COUNT: 18.4 % (ref 11.6–15.1)
ERYTHROCYTE [DISTWIDTH] IN BLOOD BY AUTOMATED COUNT: 18.6 % (ref 11.6–15.1)
ERYTHROCYTE [DISTWIDTH] IN BLOOD BY AUTOMATED COUNT: 19 % (ref 11.6–15.1)
EST. AVERAGE GLUCOSE BLD GHB EST-MCNC: 143 MG/DL
FERRITIN SERPL-MCNC: 625 NG/ML (ref 8–388)
FIBRINOGEN PPP-MCNC: 412 MG/DL (ref 227–495)
FLUAV RNA RESP QL NAA+PROBE: NEGATIVE
FLUAV RNA RESP QL NAA+PROBE: NEGATIVE
FLUBV RNA RESP QL NAA+PROBE: NEGATIVE
FLUBV RNA RESP QL NAA+PROBE: NEGATIVE
FOLATE SERPL-MCNC: >20 NG/ML (ref 3.1–17.5)
GFR SERPL CREATININE-BSD FRML MDRD: 100 ML/MIN/1.73SQ M
GFR SERPL CREATININE-BSD FRML MDRD: 100 ML/MIN/1.73SQ M
GFR SERPL CREATININE-BSD FRML MDRD: 101 ML/MIN/1.73SQ M
GFR SERPL CREATININE-BSD FRML MDRD: 102 ML/MIN/1.73SQ M
GFR SERPL CREATININE-BSD FRML MDRD: 114 ML/MIN/1.73SQ M
GFR SERPL CREATININE-BSD FRML MDRD: 121 ML/MIN/1.73SQ M
GFR SERPL CREATININE-BSD FRML MDRD: 121 ML/MIN/1.73SQ M
GFR SERPL CREATININE-BSD FRML MDRD: 65 ML/MIN/1.73SQ M
GFR SERPL CREATININE-BSD FRML MDRD: 67 ML/MIN/1.73SQ M
GFR SERPL CREATININE-BSD FRML MDRD: 81 ML/MIN/1.73SQ M
GFR SERPL CREATININE-BSD FRML MDRD: 82 ML/MIN/1.73SQ M
GFR SERPL CREATININE-BSD FRML MDRD: 85 ML/MIN/1.73SQ M
GFR SERPL CREATININE-BSD FRML MDRD: 86 ML/MIN/1.73SQ M
GFR SERPL CREATININE-BSD FRML MDRD: 86 ML/MIN/1.73SQ M
GFR SERPL CREATININE-BSD FRML MDRD: 87 ML/MIN/1.73SQ M
GFR SERPL CREATININE-BSD FRML MDRD: 88 ML/MIN/1.73SQ M
GFR SERPL CREATININE-BSD FRML MDRD: 90 ML/MIN/1.73SQ M
GFR SERPL CREATININE-BSD FRML MDRD: 90 ML/MIN/1.73SQ M
GFR SERPL CREATININE-BSD FRML MDRD: 91 ML/MIN/1.73SQ M
GFR SERPL CREATININE-BSD FRML MDRD: 91 ML/MIN/1.73SQ M
GFR SERPL CREATININE-BSD FRML MDRD: 92 ML/MIN/1.73SQ M
GFR SERPL CREATININE-BSD FRML MDRD: 96 ML/MIN/1.73SQ M
GFR SERPL CREATININE-BSD FRML MDRD: 97 ML/MIN/1.73SQ M
GFR SERPL CREATININE-BSD FRML MDRD: 98 ML/MIN/1.73SQ M
GFR SERPL CREATININE-BSD FRML MDRD: 98 ML/MIN/1.73SQ M
GFR SERPL CREATININE-BSD FRML MDRD: 99 ML/MIN/1.73SQ M
GFR SERPL CREATININE-BSD FRML MDRD: 99 ML/MIN/1.73SQ M
GLOBULIN SER-MCNC: 2.4 G/DL (ref 1.5–4.5)
GLUCOSE P FAST SERPL-MCNC: 104 MG/DL (ref 70–105)
GLUCOSE P FAST SERPL-MCNC: 114 MG/DL (ref 70–105)
GLUCOSE P FAST SERPL-MCNC: 119 MG/DL (ref 70–105)
GLUCOSE P FAST SERPL-MCNC: 119 MG/DL (ref 70–105)
GLUCOSE P FAST SERPL-MCNC: 130 MG/DL (ref 70–105)
GLUCOSE P FAST SERPL-MCNC: 135 MG/DL (ref 70–105)
GLUCOSE P FAST SERPL-MCNC: 140 MG/DL (ref 70–105)
GLUCOSE P FAST SERPL-MCNC: 57 MG/DL (ref 70–105)
GLUCOSE P FAST SERPL-MCNC: 74 MG/DL (ref 70–105)
GLUCOSE P FAST SERPL-MCNC: 78 MG/DL (ref 70–105)
GLUCOSE P FAST SERPL-MCNC: 93 MG/DL (ref 70–105)
GLUCOSE P FAST SERPL-MCNC: 97 MG/DL (ref 70–105)
GLUCOSE SERPL-MCNC: 100 MG/DL (ref 65–140)
GLUCOSE SERPL-MCNC: 100 MG/DL (ref 65–140)
GLUCOSE SERPL-MCNC: 101 MG/DL (ref 65–140)
GLUCOSE SERPL-MCNC: 102 MG/DL (ref 65–140)
GLUCOSE SERPL-MCNC: 105 MG/DL (ref 65–140)
GLUCOSE SERPL-MCNC: 106 MG/DL (ref 65–140)
GLUCOSE SERPL-MCNC: 106 MG/DL (ref 65–140)
GLUCOSE SERPL-MCNC: 107 MG/DL (ref 65–140)
GLUCOSE SERPL-MCNC: 109 MG/DL (ref 65–140)
GLUCOSE SERPL-MCNC: 109 MG/DL (ref 65–140)
GLUCOSE SERPL-MCNC: 110 MG/DL (ref 65–140)
GLUCOSE SERPL-MCNC: 110 MG/DL (ref 65–140)
GLUCOSE SERPL-MCNC: 111 MG/DL (ref 65–140)
GLUCOSE SERPL-MCNC: 113 MG/DL (ref 65–140)
GLUCOSE SERPL-MCNC: 113 MG/DL (ref 65–140)
GLUCOSE SERPL-MCNC: 114 MG/DL (ref 65–140)
GLUCOSE SERPL-MCNC: 114 MG/DL (ref 65–140)
GLUCOSE SERPL-MCNC: 116 MG/DL (ref 65–140)
GLUCOSE SERPL-MCNC: 120 MG/DL (ref 65–140)
GLUCOSE SERPL-MCNC: 122 MG/DL (ref 65–140)
GLUCOSE SERPL-MCNC: 122 MG/DL (ref 65–140)
GLUCOSE SERPL-MCNC: 124 MG/DL (ref 65–140)
GLUCOSE SERPL-MCNC: 127 MG/DL (ref 65–140)
GLUCOSE SERPL-MCNC: 127 MG/DL (ref 65–140)
GLUCOSE SERPL-MCNC: 129 MG/DL (ref 65–140)
GLUCOSE SERPL-MCNC: 130 MG/DL (ref 65–140)
GLUCOSE SERPL-MCNC: 131 MG/DL (ref 65–140)
GLUCOSE SERPL-MCNC: 132 MG/DL (ref 65–140)
GLUCOSE SERPL-MCNC: 134 MG/DL (ref 65–140)
GLUCOSE SERPL-MCNC: 135 MG/DL (ref 65–140)
GLUCOSE SERPL-MCNC: 138 MG/DL (ref 65–140)
GLUCOSE SERPL-MCNC: 143 MG/DL (ref 65–140)
GLUCOSE SERPL-MCNC: 143 MG/DL (ref 65–140)
GLUCOSE SERPL-MCNC: 144 MG/DL (ref 65–140)
GLUCOSE SERPL-MCNC: 145 MG/DL (ref 65–140)
GLUCOSE SERPL-MCNC: 147 MG/DL (ref 65–140)
GLUCOSE SERPL-MCNC: 151 MG/DL (ref 65–140)
GLUCOSE SERPL-MCNC: 154 MG/DL (ref 65–140)
GLUCOSE SERPL-MCNC: 159 MG/DL (ref 65–140)
GLUCOSE SERPL-MCNC: 159 MG/DL (ref 65–140)
GLUCOSE SERPL-MCNC: 162 MG/DL (ref 65–140)
GLUCOSE SERPL-MCNC: 163 MG/DL (ref 65–140)
GLUCOSE SERPL-MCNC: 165 MG/DL (ref 65–140)
GLUCOSE SERPL-MCNC: 166 MG/DL (ref 65–140)
GLUCOSE SERPL-MCNC: 168 MG/DL (ref 65–140)
GLUCOSE SERPL-MCNC: 170 MG/DL (ref 65–140)
GLUCOSE SERPL-MCNC: 172 MG/DL (ref 65–140)
GLUCOSE SERPL-MCNC: 172 MG/DL (ref 65–140)
GLUCOSE SERPL-MCNC: 173 MG/DL (ref 65–140)
GLUCOSE SERPL-MCNC: 174 MG/DL (ref 65–140)
GLUCOSE SERPL-MCNC: 175 MG/DL (ref 65–140)
GLUCOSE SERPL-MCNC: 178 MG/DL (ref 65–140)
GLUCOSE SERPL-MCNC: 179 MG/DL (ref 65–140)
GLUCOSE SERPL-MCNC: 184 MG/DL (ref 65–140)
GLUCOSE SERPL-MCNC: 185 MG/DL (ref 65–140)
GLUCOSE SERPL-MCNC: 186 MG/DL (ref 65–140)
GLUCOSE SERPL-MCNC: 187 MG/DL (ref 65–140)
GLUCOSE SERPL-MCNC: 193 MG/DL (ref 65–140)
GLUCOSE SERPL-MCNC: 195 MG/DL (ref 65–140)
GLUCOSE SERPL-MCNC: 200 MG/DL (ref 65–140)
GLUCOSE SERPL-MCNC: 203 MG/DL (ref 65–140)
GLUCOSE SERPL-MCNC: 203 MG/DL (ref 65–140)
GLUCOSE SERPL-MCNC: 204 MG/DL (ref 65–140)
GLUCOSE SERPL-MCNC: 204 MG/DL (ref 65–140)
GLUCOSE SERPL-MCNC: 208 MG/DL (ref 65–140)
GLUCOSE SERPL-MCNC: 209 MG/DL (ref 65–140)
GLUCOSE SERPL-MCNC: 212 MG/DL (ref 65–140)
GLUCOSE SERPL-MCNC: 213 MG/DL (ref 65–140)
GLUCOSE SERPL-MCNC: 214 MG/DL (ref 65–140)
GLUCOSE SERPL-MCNC: 217 MG/DL (ref 65–140)
GLUCOSE SERPL-MCNC: 220 MG/DL (ref 65–140)
GLUCOSE SERPL-MCNC: 226 MG/DL (ref 65–140)
GLUCOSE SERPL-MCNC: 226 MG/DL (ref 65–140)
GLUCOSE SERPL-MCNC: 229 MG/DL (ref 65–140)
GLUCOSE SERPL-MCNC: 229 MG/DL (ref 65–140)
GLUCOSE SERPL-MCNC: 233 MG/DL (ref 65–140)
GLUCOSE SERPL-MCNC: 237 MG/DL (ref 65–140)
GLUCOSE SERPL-MCNC: 240 MG/DL (ref 65–140)
GLUCOSE SERPL-MCNC: 240 MG/DL (ref 65–140)
GLUCOSE SERPL-MCNC: 245 MG/DL (ref 65–140)
GLUCOSE SERPL-MCNC: 247 MG/DL (ref 65–140)
GLUCOSE SERPL-MCNC: 248 MG/DL (ref 65–140)
GLUCOSE SERPL-MCNC: 250 MG/DL (ref 65–140)
GLUCOSE SERPL-MCNC: 250 MG/DL (ref 65–140)
GLUCOSE SERPL-MCNC: 253 MG/DL (ref 65–140)
GLUCOSE SERPL-MCNC: 257 MG/DL (ref 65–140)
GLUCOSE SERPL-MCNC: 259 MG/DL (ref 65–140)
GLUCOSE SERPL-MCNC: 262 MG/DL (ref 65–140)
GLUCOSE SERPL-MCNC: 263 MG/DL (ref 65–140)
GLUCOSE SERPL-MCNC: 268 MG/DL (ref 65–140)
GLUCOSE SERPL-MCNC: 269 MG/DL (ref 65–140)
GLUCOSE SERPL-MCNC: 274 MG/DL (ref 65–140)
GLUCOSE SERPL-MCNC: 279 MG/DL (ref 65–140)
GLUCOSE SERPL-MCNC: 281 MG/DL (ref 65–140)
GLUCOSE SERPL-MCNC: 296 MG/DL (ref 65–140)
GLUCOSE SERPL-MCNC: 311 MG/DL (ref 65–140)
GLUCOSE SERPL-MCNC: 319 MG/DL (ref 65–140)
GLUCOSE SERPL-MCNC: 320 MG/DL (ref 65–140)
GLUCOSE SERPL-MCNC: 329 MG/DL (ref 65–140)
GLUCOSE SERPL-MCNC: 337 MG/DL (ref 65–140)
GLUCOSE SERPL-MCNC: 340 MG/DL (ref 65–140)
GLUCOSE SERPL-MCNC: 355 MG/DL (ref 65–140)
GLUCOSE SERPL-MCNC: 364 MG/DL (ref 65–140)
GLUCOSE SERPL-MCNC: 384 MG/DL (ref 65–140)
GLUCOSE SERPL-MCNC: 422 MG/DL (ref 65–140)
GLUCOSE SERPL-MCNC: 435 MG/DL (ref 65–140)
GLUCOSE SERPL-MCNC: 57 MG/DL (ref 65–140)
GLUCOSE SERPL-MCNC: 67 MG/DL (ref 65–140)
GLUCOSE SERPL-MCNC: 71 MG/DL (ref 65–140)
GLUCOSE SERPL-MCNC: 77 MG/DL (ref 65–140)
GLUCOSE SERPL-MCNC: 79 MG/DL (ref 65–140)
GLUCOSE SERPL-MCNC: 80 MG/DL (ref 65–140)
GLUCOSE SERPL-MCNC: 80 MG/DL (ref 65–99)
GLUCOSE SERPL-MCNC: 81 MG/DL (ref 65–140)
GLUCOSE SERPL-MCNC: 83 MG/DL (ref 65–140)
GLUCOSE SERPL-MCNC: 87 MG/DL (ref 65–140)
GLUCOSE SERPL-MCNC: 92 MG/DL (ref 65–140)
GLUCOSE SERPL-MCNC: 93 MG/DL (ref 65–140)
GLUCOSE SERPL-MCNC: 93 MG/DL (ref 65–140)
GLUCOSE SERPL-MCNC: 94 MG/DL (ref 65–140)
GLUCOSE SERPL-MCNC: 96 MG/DL (ref 65–140)
GLUCOSE SERPL-MCNC: 97 MG/DL (ref 65–140)
GLUCOSE SERPL-MCNC: 99 MG/DL (ref 65–140)
GLUCOSE UR STRIP-MCNC: ABNORMAL MG/DL
GLUCOSE UR STRIP-MCNC: NEGATIVE MG/DL
GLUCOSE UR STRIP-MCNC: NEGATIVE MG/DL
GRAM STN SPEC: ABNORMAL
HAPTOGLOB SERPL-MCNC: 196 MG/DL (ref 32–363)
HBA1C MFR BLD: 6.6 %
HCT VFR BLD AUTO: 29.4 % (ref 36.5–49.3)
HCT VFR BLD AUTO: 30.9 % (ref 36.5–49.3)
HCT VFR BLD AUTO: 31 % (ref 36.5–49.3)
HCT VFR BLD AUTO: 31.8 % (ref 36.5–49.3)
HCT VFR BLD AUTO: 31.9 % (ref 36.5–49.3)
HCT VFR BLD AUTO: 32.1 % (ref 36.5–49.3)
HCT VFR BLD AUTO: 32.4 % (ref 36.5–49.3)
HCT VFR BLD AUTO: 32.5 % (ref 36.5–49.3)
HCT VFR BLD AUTO: 32.8 % (ref 36.5–49.3)
HCT VFR BLD AUTO: 32.8 % (ref 36.5–49.3)
HCT VFR BLD AUTO: 33.9 % (ref 36.5–49.3)
HCT VFR BLD AUTO: 34.5 % (ref 36.5–49.3)
HCT VFR BLD AUTO: 34.9 % (ref 36.5–49.3)
HCT VFR BLD AUTO: 35.3 % (ref 36.5–49.3)
HCT VFR BLD AUTO: 35.3 % (ref 36.5–49.3)
HCT VFR BLD AUTO: 35.6 % (ref 36.5–49.3)
HCT VFR BLD AUTO: 35.9 % (ref 36.5–49.3)
HCT VFR BLD AUTO: 36.3 % (ref 36.5–49.3)
HCT VFR BLD AUTO: 37 % (ref 36.5–49.3)
HCT VFR BLD AUTO: 37.2 % (ref 37.5–51)
HCT VFR BLD AUTO: 37.3 % (ref 36.5–49.3)
HCT VFR BLD AUTO: 38.1 % (ref 36.5–49.3)
HCT VFR BLD AUTO: 38.2 % (ref 36.5–49.3)
HCT VFR BLD AUTO: 38.6 % (ref 36.5–49.3)
HCT VFR BLD AUTO: 39 % (ref 36.5–49.3)
HCT VFR BLD AUTO: 39.1 % (ref 36.5–49.3)
HCT VFR BLD AUTO: 39.2 % (ref 36.5–49.3)
HCT VFR BLD AUTO: 39.4 % (ref 36.5–49.3)
HCT VFR BLD AUTO: 40.1 % (ref 36.5–49.3)
HCT VFR BLD AUTO: 40.6 % (ref 36.5–49.3)
HCT VFR BLD AUTO: 40.9 % (ref 36.5–49.3)
HCT VFR BLD AUTO: 41.1 % (ref 36.5–49.3)
HCT VFR BLD AUTO: 41.7 % (ref 36.5–49.3)
HCT VFR BLD AUTO: 41.9 % (ref 36.5–49.3)
HCT VFR BLD AUTO: 42.6 % (ref 36.5–49.3)
HCT VFR BLD AUTO: 42.6 % (ref 36.5–49.3)
HCT VFR BLD AUTO: 43.1 % (ref 36.5–49.3)
HCT VFR BLD AUTO: 43.2 % (ref 36.5–49.3)
HCT VFR BLD AUTO: 43.5 % (ref 36.5–49.3)
HGB BLD-MCNC: 10 G/DL (ref 12–17)
HGB BLD-MCNC: 10 G/DL (ref 12–17)
HGB BLD-MCNC: 10.1 G/DL (ref 12–17)
HGB BLD-MCNC: 10.1 G/DL (ref 12–17)
HGB BLD-MCNC: 10.2 G/DL (ref 12–17)
HGB BLD-MCNC: 10.5 G/DL (ref 12–17)
HGB BLD-MCNC: 11 G/DL (ref 12–17)
HGB BLD-MCNC: 11.1 G/DL (ref 12–17)
HGB BLD-MCNC: 11.2 G/DL (ref 12–17)
HGB BLD-MCNC: 11.5 G/DL (ref 12–17)
HGB BLD-MCNC: 11.5 G/DL (ref 12–17)
HGB BLD-MCNC: 11.6 G/DL (ref 12–17)
HGB BLD-MCNC: 11.8 G/DL (ref 12–17)
HGB BLD-MCNC: 11.9 G/DL (ref 12–17)
HGB BLD-MCNC: 11.9 G/DL (ref 12–17)
HGB BLD-MCNC: 12 G/DL (ref 12–17)
HGB BLD-MCNC: 12.1 G/DL (ref 12–17)
HGB BLD-MCNC: 12.3 G/DL (ref 12–17)
HGB BLD-MCNC: 12.3 G/DL (ref 12–17)
HGB BLD-MCNC: 12.3 G/DL (ref 13–17.7)
HGB BLD-MCNC: 12.4 G/DL (ref 12–17)
HGB BLD-MCNC: 12.4 G/DL (ref 12–17)
HGB BLD-MCNC: 12.5 G/DL (ref 12–17)
HGB BLD-MCNC: 12.7 G/DL (ref 12–17)
HGB BLD-MCNC: 13 G/DL (ref 12–17)
HGB BLD-MCNC: 13.1 G/DL (ref 12–17)
HGB BLD-MCNC: 13.1 G/DL (ref 12–17)
HGB BLD-MCNC: 13.2 G/DL (ref 12–17)
HGB BLD-MCNC: 13.3 G/DL (ref 12–17)
HGB BLD-MCNC: 13.5 G/DL (ref 12–17)
HGB BLD-MCNC: 13.7 G/DL (ref 12–17)
HGB BLD-MCNC: 13.8 G/DL (ref 12–17)
HGB BLD-MCNC: 13.8 G/DL (ref 12–17)
HGB BLD-MCNC: 14 G/DL (ref 12–17)
HGB BLD-MCNC: 14 G/DL (ref 12–17)
HGB BLD-MCNC: 9.4 G/DL (ref 12–17)
HGB BLD-MCNC: 9.8 G/DL (ref 12–17)
HGB BLD-MCNC: 9.8 G/DL (ref 12–17)
HGB BLD-MCNC: 9.9 G/DL (ref 12–17)
HGB UR QL STRIP.AUTO: ABNORMAL
HYPERCHROMIA BLD QL SMEAR: PRESENT
IMM GRANULOCYTES # BLD AUTO: 0.02 THOUSAND/UL (ref 0–0.2)
IMM GRANULOCYTES # BLD AUTO: 0.03 THOUSAND/UL (ref 0–0.2)
IMM GRANULOCYTES # BLD AUTO: 0.04 THOUSAND/UL (ref 0–0.2)
IMM GRANULOCYTES # BLD AUTO: 0.07 THOUSAND/UL (ref 0–0.2)
IMM GRANULOCYTES # BLD AUTO: 0.08 THOUSAND/UL (ref 0–0.2)
IMM GRANULOCYTES # BLD AUTO: 0.09 THOUSAND/UL (ref 0–0.2)
IMM GRANULOCYTES # BLD AUTO: 0.12 THOUSAND/UL (ref 0–0.2)
IMM GRANULOCYTES # BLD AUTO: 0.24 THOUSAND/UL (ref 0–0.2)
IMM GRANULOCYTES # BLD AUTO: 0.26 THOUSAND/UL (ref 0–0.2)
IMM GRANULOCYTES # BLD AUTO: 0.31 THOUSAND/UL (ref 0–0.2)
IMM GRANULOCYTES # BLD AUTO: 0.32 THOUSAND/UL (ref 0–0.2)
IMM GRANULOCYTES # BLD: 0.2 X10E3/UL (ref 0–0.1)
IMM GRANULOCYTES NFR BLD AUTO: 0 % (ref 0–2)
IMM GRANULOCYTES NFR BLD AUTO: 1 % (ref 0–2)
IMM GRANULOCYTES NFR BLD AUTO: 2 % (ref 0–2)
IMM GRANULOCYTES NFR BLD AUTO: 4 % (ref 0–2)
IMM GRANULOCYTES NFR BLD: 2 %
INR PPP: 1.25 (ref 0.84–1.19)
INR PPP: 1.26 (ref 0.84–1.19)
INR PPP: 1.26 (ref 0.84–1.19)
INR PPP: 1.27 (ref 0.84–1.19)
INR PPP: 1.36 (ref 0.84–1.19)
INR PPP: 1.4 (ref 0.84–1.19)
IRON SATN MFR SERPL: 6 %
IRON SERPL-MCNC: 16 UG/DL (ref 65–175)
KETONES UR STRIP-MCNC: NEGATIVE MG/DL
LACTATE SERPL-SCNC: 1.8 MMOL/L (ref 0.5–2)
LACTATE SERPL-SCNC: 1.9 MMOL/L (ref 0.5–2)
LACTATE SERPL-SCNC: 2 MMOL/L (ref 0.5–2)
LACTATE SERPL-SCNC: 2 MMOL/L (ref 0.5–2)
LACTATE SERPL-SCNC: 2.7 MMOL/L (ref 0–2)
LACTATE SERPL-SCNC: 2.9 MMOL/L (ref 0–2)
LDH SERPL-CCNC: 220 U/L (ref 81–234)
LEUKOCYTE ESTERASE UR QL STRIP: NEGATIVE
LG PLATELETS BLD QL SMEAR: PRESENT
LIPASE SERPL-CCNC: 22 U/L (ref 73–393)
LIPASE SERPL-CCNC: 33 U/L (ref 73–393)
LIPASE SERPL-CCNC: 72 U/L (ref 73–393)
LIPASE SERPL-CCNC: <10 U/L (ref 73–393)
LIPASE SERPL-CCNC: <6 U/L (ref 13–60)
LYMPHOCYTES # BLD AUTO: 0.17 THOUSAND/UL (ref 0.6–4.47)
LYMPHOCYTES # BLD AUTO: 0.28 THOUSAND/UL (ref 0.6–4.47)
LYMPHOCYTES # BLD AUTO: 0.37 THOUSAND/UL (ref 0.6–4.47)
LYMPHOCYTES # BLD AUTO: 0.7 THOUSAND/UL (ref 0.6–4.47)
LYMPHOCYTES # BLD AUTO: 0.72 THOUSAND/UL (ref 0.6–4.47)
LYMPHOCYTES # BLD AUTO: 0.85 THOUSANDS/ΜL (ref 0.6–4.47)
LYMPHOCYTES # BLD AUTO: 0.93 THOUSAND/UL (ref 0.6–4.47)
LYMPHOCYTES # BLD AUTO: 1.05 THOUSANDS/ΜL (ref 0.6–4.47)
LYMPHOCYTES # BLD AUTO: 1.08 THOUSAND/UL (ref 0.6–4.47)
LYMPHOCYTES # BLD AUTO: 1.12 THOUSAND/UL (ref 0.6–4.47)
LYMPHOCYTES # BLD AUTO: 1.13 THOUSANDS/ΜL (ref 0.6–4.47)
LYMPHOCYTES # BLD AUTO: 1.13 THOUSANDS/ΜL (ref 0.6–4.47)
LYMPHOCYTES # BLD AUTO: 1.16 THOUSANDS/ΜL (ref 0.6–4.47)
LYMPHOCYTES # BLD AUTO: 1.18 THOUSAND/UL (ref 0.6–4.47)
LYMPHOCYTES # BLD AUTO: 1.31 THOUSAND/UL (ref 0.6–4.47)
LYMPHOCYTES # BLD AUTO: 1.35 THOUSAND/UL (ref 0.6–4.47)
LYMPHOCYTES # BLD AUTO: 1.39 THOUSAND/UL (ref 0.6–4.47)
LYMPHOCYTES # BLD AUTO: 1.43 THOUSANDS/ΜL (ref 0.6–4.47)
LYMPHOCYTES # BLD AUTO: 1.53 THOUSAND/UL (ref 0.6–4.47)
LYMPHOCYTES # BLD AUTO: 1.61 THOUSAND/UL (ref 0.6–4.47)
LYMPHOCYTES # BLD AUTO: 1.61 THOUSANDS/ΜL (ref 0.6–4.47)
LYMPHOCYTES # BLD AUTO: 1.68 THOUSANDS/ΜL (ref 0.6–4.47)
LYMPHOCYTES # BLD AUTO: 1.75 THOUSANDS/ΜL (ref 0.6–4.47)
LYMPHOCYTES # BLD AUTO: 1.89 THOUSAND/UL (ref 0.6–4.47)
LYMPHOCYTES # BLD AUTO: 10 % (ref 14–44)
LYMPHOCYTES # BLD AUTO: 10 % (ref 14–44)
LYMPHOCYTES # BLD AUTO: 11 % (ref 14–44)
LYMPHOCYTES # BLD AUTO: 12 % (ref 14–44)
LYMPHOCYTES # BLD AUTO: 13 % (ref 14–44)
LYMPHOCYTES # BLD AUTO: 17 % (ref 14–44)
LYMPHOCYTES # BLD AUTO: 19 % (ref 14–44)
LYMPHOCYTES # BLD AUTO: 2 THOUSAND/UL (ref 0.6–4.47)
LYMPHOCYTES # BLD AUTO: 2 X10E3/UL (ref 0.7–3.1)
LYMPHOCYTES # BLD AUTO: 2.07 THOUSAND/UL (ref 0.6–4.47)
LYMPHOCYTES # BLD AUTO: 2.27 THOUSANDS/ΜL (ref 0.6–4.47)
LYMPHOCYTES # BLD AUTO: 2.32 THOUSANDS/ΜL (ref 0.6–4.47)
LYMPHOCYTES # BLD AUTO: 2.44 THOUSANDS/ΜL (ref 0.6–4.47)
LYMPHOCYTES # BLD AUTO: 2.63 THOUSANDS/ΜL (ref 0.6–4.47)
LYMPHOCYTES # BLD AUTO: 2.72 THOUSAND/UL (ref 0.6–4.47)
LYMPHOCYTES # BLD AUTO: 2.75 THOUSANDS/ΜL (ref 0.6–4.47)
LYMPHOCYTES # BLD AUTO: 2.93 THOUSAND/UL (ref 0.6–4.47)
LYMPHOCYTES # BLD AUTO: 2.97 THOUSAND/UL (ref 0.6–4.47)
LYMPHOCYTES # BLD AUTO: 4 % (ref 14–44)
LYMPHOCYTES # BLD AUTO: 5 % (ref 14–44)
LYMPHOCYTES # BLD AUTO: 6 % (ref 14–44)
LYMPHOCYTES # BLD AUTO: 6 % (ref 14–44)
LYMPHOCYTES # BLD AUTO: 7 % (ref 14–44)
LYMPHOCYTES # BLD AUTO: 8 % (ref 14–44)
LYMPHOCYTES NFR BLD AUTO: 12 % (ref 14–44)
LYMPHOCYTES NFR BLD AUTO: 14 %
LYMPHOCYTES NFR BLD AUTO: 14 % (ref 14–44)
LYMPHOCYTES NFR BLD AUTO: 15 % (ref 14–44)
LYMPHOCYTES NFR BLD AUTO: 16 % (ref 14–44)
LYMPHOCYTES NFR BLD AUTO: 18 % (ref 14–44)
LYMPHOCYTES NFR BLD AUTO: 19 % (ref 14–44)
LYMPHOCYTES NFR BLD AUTO: 20 % (ref 14–44)
LYMPHOCYTES NFR BLD AUTO: 25 % (ref 14–44)
LYMPHOCYTES NFR BLD AUTO: 27 % (ref 14–44)
LYMPHOCYTES NFR BLD AUTO: 29 % (ref 14–44)
LYMPHOCYTES NFR BLD AUTO: 31 % (ref 14–44)
LYMPHOCYTES NFR BLD AUTO: 8 % (ref 14–44)
MACROCYTES BLD QL AUTO: PRESENT
MAGNESIUM SERPL-MCNC: 1.7 MG/DL (ref 1.6–2.6)
MAGNESIUM SERPL-MCNC: 1.9 MG/DL (ref 1.6–2.6)
MAGNESIUM SERPL-MCNC: 1.9 MG/DL (ref 1.6–2.6)
MAGNESIUM SERPL-MCNC: 2.1 MG/DL (ref 1.6–2.6)
MAX DIASTOLIC BP: 74 MMHG
MAX HEART RATE: 142 BPM
MAX PREDICTED HEART RATE: 155 BPM
MAX. SYSTOLIC BP: 138 MMHG
MCH RBC QN AUTO: 27.9 PG (ref 26.8–34.3)
MCH RBC QN AUTO: 28.2 PG (ref 26.8–34.3)
MCH RBC QN AUTO: 28.2 PG (ref 26.8–34.3)
MCH RBC QN AUTO: 28.4 PG (ref 26.8–34.3)
MCH RBC QN AUTO: 28.7 PG (ref 26.8–34.3)
MCH RBC QN AUTO: 28.8 PG (ref 26.8–34.3)
MCH RBC QN AUTO: 28.9 PG (ref 26.8–34.3)
MCH RBC QN AUTO: 29 PG (ref 26.8–34.3)
MCH RBC QN AUTO: 29.2 PG (ref 26.8–34.3)
MCH RBC QN AUTO: 29.3 PG (ref 26.8–34.3)
MCH RBC QN AUTO: 29.3 PG (ref 26.8–34.3)
MCH RBC QN AUTO: 29.4 PG (ref 26.8–34.3)
MCH RBC QN AUTO: 29.5 PG (ref 26.8–34.3)
MCH RBC QN AUTO: 29.5 PG (ref 26.8–34.3)
MCH RBC QN AUTO: 29.6 PG (ref 26.6–33)
MCH RBC QN AUTO: 29.6 PG (ref 26.8–34.3)
MCH RBC QN AUTO: 29.7 PG (ref 26.8–34.3)
MCH RBC QN AUTO: 29.8 PG (ref 26.8–34.3)
MCH RBC QN AUTO: 30 PG (ref 26.8–34.3)
MCH RBC QN AUTO: 30.1 PG (ref 26.8–34.3)
MCH RBC QN AUTO: 30.1 PG (ref 26.8–34.3)
MCH RBC QN AUTO: 30.3 PG (ref 26.8–34.3)
MCH RBC QN AUTO: 30.4 PG (ref 26.8–34.3)
MCH RBC QN AUTO: 30.4 PG (ref 26.8–34.3)
MCHC RBC AUTO-ENTMCNC: 30.8 G/DL (ref 31.4–37.4)
MCHC RBC AUTO-ENTMCNC: 31.1 G/DL (ref 31.4–37.4)
MCHC RBC AUTO-ENTMCNC: 31.2 G/DL (ref 31.4–37.4)
MCHC RBC AUTO-ENTMCNC: 31.3 G/DL (ref 31.4–37.4)
MCHC RBC AUTO-ENTMCNC: 31.3 G/DL (ref 31.4–37.4)
MCHC RBC AUTO-ENTMCNC: 31.4 G/DL (ref 31.4–37.4)
MCHC RBC AUTO-ENTMCNC: 31.5 G/DL (ref 31.4–37.4)
MCHC RBC AUTO-ENTMCNC: 31.6 G/DL (ref 31.4–37.4)
MCHC RBC AUTO-ENTMCNC: 31.6 G/DL (ref 31.4–37.4)
MCHC RBC AUTO-ENTMCNC: 31.7 G/DL (ref 31.4–37.4)
MCHC RBC AUTO-ENTMCNC: 31.8 G/DL (ref 31.4–37.4)
MCHC RBC AUTO-ENTMCNC: 32 G/DL (ref 31.4–37.4)
MCHC RBC AUTO-ENTMCNC: 32 G/DL (ref 31.4–37.4)
MCHC RBC AUTO-ENTMCNC: 32.1 G/DL (ref 31.4–37.4)
MCHC RBC AUTO-ENTMCNC: 32.2 G/DL (ref 31.4–37.4)
MCHC RBC AUTO-ENTMCNC: 32.3 G/DL (ref 31.4–37.4)
MCHC RBC AUTO-ENTMCNC: 32.4 G/DL (ref 31.4–37.4)
MCHC RBC AUTO-ENTMCNC: 32.5 G/DL (ref 31.4–37.4)
MCHC RBC AUTO-ENTMCNC: 32.5 G/DL (ref 31.4–37.4)
MCHC RBC AUTO-ENTMCNC: 32.6 G/DL (ref 31.4–37.4)
MCHC RBC AUTO-ENTMCNC: 32.6 G/DL (ref 31.4–37.4)
MCHC RBC AUTO-ENTMCNC: 32.7 G/DL (ref 31.4–37.4)
MCHC RBC AUTO-ENTMCNC: 32.8 G/DL (ref 31.4–37.4)
MCHC RBC AUTO-ENTMCNC: 33 G/DL (ref 31.4–37.4)
MCHC RBC AUTO-ENTMCNC: 33.1 G/DL (ref 31.5–35.7)
MCHC RBC AUTO-ENTMCNC: 33.2 G/DL (ref 31.4–37.4)
MCHC RBC AUTO-ENTMCNC: 33.7 G/DL (ref 31.4–37.4)
MCV RBC AUTO: 88 FL (ref 82–98)
MCV RBC AUTO: 88 FL (ref 82–98)
MCV RBC AUTO: 89 FL (ref 79–97)
MCV RBC AUTO: 89 FL (ref 82–98)
MCV RBC AUTO: 90 FL (ref 82–98)
MCV RBC AUTO: 91 FL (ref 82–98)
MCV RBC AUTO: 92 FL (ref 82–98)
MCV RBC AUTO: 93 FL (ref 82–98)
MCV RBC AUTO: 94 FL (ref 82–98)
MCV RBC AUTO: 95 FL (ref 82–98)
MCV RBC AUTO: 95 FL (ref 82–98)
METAMYELOCYTES NFR BLD MANUAL: 1 % (ref 0–1)
METAMYELOCYTES NFR BLD MANUAL: 3 % (ref 0–1)
METAMYELOCYTES NFR BLD MANUAL: 4 % (ref 0–1)
METAMYELOCYTES NFR BLD MANUAL: 5 % (ref 0–1)
METAMYELOCYTES NFR BLD MANUAL: 6 % (ref 0–1)
METAMYELOCYTES NFR BLD MANUAL: 8 % (ref 0–1)
MICROALBUMIN UR-MCNC: 37.1 MG/L (ref 0–20)
MICROALBUMIN/CREAT 24H UR: 29 MG/G CREATININE (ref 0–30)
MICROCYTES BLD QL AUTO: PRESENT
MISCELLANEOUS LAB TEST RESULT: NORMAL
MONOCYTES # BLD AUTO: 0 THOUSAND/UL (ref 0–1.22)
MONOCYTES # BLD AUTO: 0.06 THOUSAND/UL (ref 0–1.22)
MONOCYTES # BLD AUTO: 0.09 THOUSAND/UL (ref 0–1.22)
MONOCYTES # BLD AUTO: 0.13 THOUSAND/UL (ref 0–1.22)
MONOCYTES # BLD AUTO: 0.13 THOUSAND/UL (ref 0–1.22)
MONOCYTES # BLD AUTO: 0.16 THOUSAND/UL (ref 0–1.22)
MONOCYTES # BLD AUTO: 0.17 THOUSAND/ΜL (ref 0.17–1.22)
MONOCYTES # BLD AUTO: 0.18 THOUSAND/UL (ref 0–1.22)
MONOCYTES # BLD AUTO: 0.21 THOUSAND/UL (ref 0–1.22)
MONOCYTES # BLD AUTO: 0.21 THOUSAND/ΜL (ref 0.17–1.22)
MONOCYTES # BLD AUTO: 0.32 THOUSAND/UL (ref 0–1.22)
MONOCYTES # BLD AUTO: 0.35 THOUSAND/ΜL (ref 0.17–1.22)
MONOCYTES # BLD AUTO: 0.37 THOUSAND/ΜL (ref 0.17–1.22)
MONOCYTES # BLD AUTO: 0.39 THOUSAND/ΜL (ref 0.17–1.22)
MONOCYTES # BLD AUTO: 0.41 THOUSAND/UL (ref 0–1.22)
MONOCYTES # BLD AUTO: 0.49 THOUSAND/UL (ref 0–1.22)
MONOCYTES # BLD AUTO: 0.53 THOUSAND/UL (ref 0–1.22)
MONOCYTES # BLD AUTO: 0.56 THOUSAND/ΜL (ref 0.17–1.22)
MONOCYTES # BLD AUTO: 0.62 THOUSAND/UL (ref 0–1.22)
MONOCYTES # BLD AUTO: 0.67 THOUSAND/ΜL (ref 0.17–1.22)
MONOCYTES # BLD AUTO: 0.69 THOUSAND/UL (ref 0–1.22)
MONOCYTES # BLD AUTO: 0.74 THOUSAND/UL (ref 0–1.22)
MONOCYTES # BLD AUTO: 0.75 THOUSAND/ΜL (ref 0.17–1.22)
MONOCYTES # BLD AUTO: 0.76 THOUSAND/ΜL (ref 0.17–1.22)
MONOCYTES # BLD AUTO: 0.84 THOUSAND/UL (ref 0–1.22)
MONOCYTES # BLD AUTO: 0.84 THOUSAND/ΜL (ref 0.17–1.22)
MONOCYTES # BLD AUTO: 0.86 THOUSAND/UL (ref 0–1.22)
MONOCYTES # BLD AUTO: 0.88 THOUSAND/ΜL (ref 0.17–1.22)
MONOCYTES # BLD AUTO: 1 X10E3/UL (ref 0.1–0.9)
MONOCYTES # BLD AUTO: 1.02 THOUSAND/ΜL (ref 0.17–1.22)
MONOCYTES # BLD AUTO: 1.06 THOUSAND/ΜL (ref 0.17–1.22)
MONOCYTES # BLD AUTO: 1.08 THOUSAND/UL (ref 0–1.22)
MONOCYTES # BLD AUTO: 1.14 THOUSAND/ΜL (ref 0.17–1.22)
MONOCYTES # BLD AUTO: 1.51 THOUSAND/UL (ref 0–1.22)
MONOCYTES # BLD AUTO: 3.33 THOUSAND/UL (ref 0–1.22)
MONOCYTES NFR BLD AUTO: 11 % (ref 4–12)
MONOCYTES NFR BLD AUTO: 11 % (ref 4–12)
MONOCYTES NFR BLD AUTO: 3 % (ref 4–12)
MONOCYTES NFR BLD AUTO: 4 % (ref 4–12)
MONOCYTES NFR BLD AUTO: 4 % (ref 4–12)
MONOCYTES NFR BLD AUTO: 5 % (ref 4–12)
MONOCYTES NFR BLD AUTO: 6 % (ref 4–12)
MONOCYTES NFR BLD AUTO: 7 %
MONOCYTES NFR BLD AUTO: 7 % (ref 4–12)
MONOCYTES NFR BLD AUTO: 8 % (ref 4–12)
MONOCYTES NFR BLD AUTO: 8 % (ref 4–12)
MONOCYTES NFR BLD AUTO: 9 % (ref 4–12)
MONOCYTES NFR BLD AUTO: 9 % (ref 4–12)
MONOCYTES NFR BLD: 0 % (ref 4–12)
MONOCYTES NFR BLD: 1 % (ref 4–12)
MONOCYTES NFR BLD: 1 % (ref 4–12)
MONOCYTES NFR BLD: 10 % (ref 4–12)
MONOCYTES NFR BLD: 13 % (ref 4–12)
MONOCYTES NFR BLD: 2 % (ref 4–12)
MONOCYTES NFR BLD: 3 % (ref 4–12)
MONOCYTES NFR BLD: 3 % (ref 4–12)
MONOCYTES NFR BLD: 4 % (ref 4–12)
MONOCYTES NFR BLD: 4 % (ref 4–12)
MONOCYTES NFR BLD: 5 % (ref 4–12)
MONOCYTES NFR BLD: 5 % (ref 4–12)
MONOCYTES NFR BLD: 6 % (ref 4–12)
MONOCYTES NFR BLD: 7 % (ref 4–12)
MUCOUS THREADS UR QL AUTO: ABNORMAL
MYELOCYTES NFR BLD MANUAL: 1 % (ref 0–1)
MYELOCYTES NFR BLD MANUAL: 2 % (ref 0–1)
MYELOCYTES NFR BLD MANUAL: 3 % (ref 0–1)
MYELOCYTES NFR BLD MANUAL: 5 % (ref 0–1)
NEUTROPHILS # BLD AUTO: 10.4 X10E3/UL (ref 1.4–7)
NEUTROPHILS # BLD AUTO: 10.73 THOUSANDS/ΜL (ref 1.85–7.62)
NEUTROPHILS # BLD AUTO: 12.25 THOUSANDS/ΜL (ref 1.85–7.62)
NEUTROPHILS # BLD AUTO: 12.35 THOUSANDS/ΜL (ref 1.85–7.62)
NEUTROPHILS # BLD AUTO: 2.28 THOUSANDS/ΜL (ref 1.85–7.62)
NEUTROPHILS # BLD AUTO: 3.57 THOUSANDS/ΜL (ref 1.85–7.62)
NEUTROPHILS # BLD AUTO: 3.9 THOUSANDS/ΜL (ref 1.85–7.62)
NEUTROPHILS # BLD AUTO: 4.12 THOUSANDS/ΜL (ref 1.85–7.62)
NEUTROPHILS # BLD AUTO: 4.3 THOUSANDS/ΜL (ref 1.85–7.62)
NEUTROPHILS # BLD AUTO: 5.61 THOUSANDS/ΜL (ref 1.85–7.62)
NEUTROPHILS # BLD AUTO: 6.02 THOUSANDS/ΜL (ref 1.85–7.62)
NEUTROPHILS # BLD AUTO: 7.04 THOUSANDS/ΜL (ref 1.85–7.62)
NEUTROPHILS # BLD AUTO: 7.67 THOUSANDS/ΜL (ref 1.85–7.62)
NEUTROPHILS # BLD AUTO: 8.64 THOUSANDS/ΜL (ref 1.85–7.62)
NEUTROPHILS # BLD AUTO: 8.89 THOUSANDS/ΜL (ref 1.85–7.62)
NEUTROPHILS # BLD MANUAL: 10.31 THOUSAND/UL (ref 1.85–7.62)
NEUTROPHILS # BLD MANUAL: 11.98 THOUSAND/UL (ref 1.85–7.62)
NEUTROPHILS # BLD MANUAL: 13.42 THOUSAND/UL (ref 1.85–7.62)
NEUTROPHILS # BLD MANUAL: 14.13 THOUSAND/UL (ref 1.85–7.62)
NEUTROPHILS # BLD MANUAL: 2.45 THOUSAND/UL (ref 1.85–7.62)
NEUTROPHILS # BLD MANUAL: 2.67 THOUSAND/UL (ref 1.85–7.62)
NEUTROPHILS # BLD MANUAL: 20.52 THOUSAND/UL (ref 1.85–7.62)
NEUTROPHILS # BLD MANUAL: 24.96 THOUSAND/UL (ref 1.85–7.62)
NEUTROPHILS # BLD MANUAL: 3.19 THOUSAND/UL (ref 1.85–7.62)
NEUTROPHILS # BLD MANUAL: 3.75 THOUSAND/UL (ref 1.85–7.62)
NEUTROPHILS # BLD MANUAL: 3.98 THOUSAND/UL (ref 1.85–7.62)
NEUTROPHILS # BLD MANUAL: 30.63 THOUSAND/UL (ref 1.85–7.62)
NEUTROPHILS # BLD MANUAL: 35.15 THOUSAND/UL (ref 1.85–7.62)
NEUTROPHILS # BLD MANUAL: 5.09 THOUSAND/UL (ref 1.85–7.62)
NEUTROPHILS # BLD MANUAL: 6.53 THOUSAND/UL (ref 1.85–7.62)
NEUTROPHILS # BLD MANUAL: 6.74 THOUSAND/UL (ref 1.85–7.62)
NEUTROPHILS # BLD MANUAL: 7.22 THOUSAND/UL (ref 1.85–7.62)
NEUTROPHILS # BLD MANUAL: 7.47 THOUSAND/UL (ref 1.85–7.62)
NEUTROPHILS # BLD MANUAL: 9.26 THOUSAND/UL (ref 1.85–7.62)
NEUTROPHILS # BLD MANUAL: 9.44 THOUSAND/UL (ref 1.85–7.62)
NEUTROPHILS NFR BLD AUTO: 76 %
NEUTS BAND NFR BLD MANUAL: 1 % (ref 0–8)
NEUTS BAND NFR BLD MANUAL: 1 % (ref 0–8)
NEUTS BAND NFR BLD MANUAL: 10 % (ref 0–8)
NEUTS BAND NFR BLD MANUAL: 11 % (ref 0–8)
NEUTS BAND NFR BLD MANUAL: 12 % (ref 0–8)
NEUTS BAND NFR BLD MANUAL: 12 % (ref 0–8)
NEUTS BAND NFR BLD MANUAL: 15 % (ref 0–8)
NEUTS BAND NFR BLD MANUAL: 16 % (ref 0–8)
NEUTS BAND NFR BLD MANUAL: 2 % (ref 0–8)
NEUTS BAND NFR BLD MANUAL: 20 % (ref 0–8)
NEUTS BAND NFR BLD MANUAL: 26 % (ref 0–8)
NEUTS BAND NFR BLD MANUAL: 28 % (ref 0–8)
NEUTS BAND NFR BLD MANUAL: 4 % (ref 0–8)
NEUTS BAND NFR BLD MANUAL: 7 % (ref 0–8)
NEUTS SEG NFR BLD AUTO: 49 % (ref 43–75)
NEUTS SEG NFR BLD AUTO: 53 % (ref 43–75)
NEUTS SEG NFR BLD AUTO: 59 % (ref 43–75)
NEUTS SEG NFR BLD AUTO: 60 % (ref 43–75)
NEUTS SEG NFR BLD AUTO: 62 % (ref 43–75)
NEUTS SEG NFR BLD AUTO: 63 % (ref 43–75)
NEUTS SEG NFR BLD AUTO: 64 % (ref 43–75)
NEUTS SEG NFR BLD AUTO: 64 % (ref 43–75)
NEUTS SEG NFR BLD AUTO: 65 % (ref 43–75)
NEUTS SEG NFR BLD AUTO: 67 % (ref 43–75)
NEUTS SEG NFR BLD AUTO: 68 % (ref 43–75)
NEUTS SEG NFR BLD AUTO: 68 % (ref 43–75)
NEUTS SEG NFR BLD AUTO: 70 % (ref 43–75)
NEUTS SEG NFR BLD AUTO: 70 % (ref 43–75)
NEUTS SEG NFR BLD AUTO: 71 % (ref 43–75)
NEUTS SEG NFR BLD AUTO: 72 % (ref 43–75)
NEUTS SEG NFR BLD AUTO: 73 % (ref 43–75)
NEUTS SEG NFR BLD AUTO: 74 % (ref 43–75)
NEUTS SEG NFR BLD AUTO: 74 % (ref 43–75)
NEUTS SEG NFR BLD AUTO: 75 % (ref 43–75)
NEUTS SEG NFR BLD AUTO: 76 % (ref 43–75)
NEUTS SEG NFR BLD AUTO: 76 % (ref 43–75)
NEUTS SEG NFR BLD AUTO: 77 % (ref 43–75)
NEUTS SEG NFR BLD AUTO: 78 % (ref 43–75)
NEUTS SEG NFR BLD AUTO: 79 % (ref 43–75)
NEUTS SEG NFR BLD AUTO: 80 % (ref 43–75)
NEUTS SEG NFR BLD AUTO: 82 % (ref 43–75)
NEUTS SEG NFR BLD AUTO: 83 % (ref 43–75)
NEUTS SEG NFR BLD AUTO: 85 % (ref 43–75)
NEUTS SEG NFR BLD AUTO: 91 % (ref 43–75)
NITRITE UR QL STRIP: NEGATIVE
NON-SQ EPI CELLS URNS QL MICRO: ABNORMAL /HPF
NON-SQ EPI CELLS URNS QL MICRO: NORMAL /HPF
NON-SQ EPI CELLS URNS QL MICRO: NORMAL /HPF
NRBC BLD AUTO-RTO: 0 /100 WBCS
NRBC BLD AUTO-RTO: 1 /100 WBC (ref 0–2)
NRBC BLD AUTO-RTO: 1 /100 WBC (ref 0–2)
NRBC BLD AUTO-RTO: 1 /100 WBCS
NRBC BLD AUTO-RTO: 1 /100 WBCS
NRBC BLD AUTO-RTO: 2 /100 WBC (ref 0–2)
NRBC BLD AUTO-RTO: 2 /100 WBCS
NT-PROBNP SERPL-MCNC: 243 PG/ML
NT-PROBNP SERPL-MCNC: 273 PG/ML
NT-PROBNP SERPL-MCNC: 563 PG/ML
OVALOCYTES BLD QL SMEAR: PRESENT
P AXIS: 36 DEGREES
P AXIS: 39 DEGREES
P AXIS: 44 DEGREES
P AXIS: 49 DEGREES
P AXIS: 50 DEGREES
P AXIS: 54 DEGREES
P AXIS: 58 DEGREES
P AXIS: 60 DEGREES
P AXIS: 72 DEGREES
P AXIS: 74 DEGREES
PH UR STRIP.AUTO: 5.5 [PH]
PH UR STRIP.AUTO: 6 [PH]
PH UR STRIP.AUTO: 7 [PH]
PH UR STRIP.AUTO: 7.5 [PH]
PH UR STRIP.AUTO: 7.5 [PH]
PHOSPHATE SERPL-MCNC: 2.7 MG/DL (ref 2.3–4.1)
PHOSPHATE SERPL-MCNC: 4 MG/DL (ref 2.3–4.1)
PLATELET # BLD AUTO: 107 THOUSANDS/UL (ref 149–390)
PLATELET # BLD AUTO: 136 THOUSANDS/UL (ref 149–390)
PLATELET # BLD AUTO: 140 THOUSANDS/UL (ref 149–390)
PLATELET # BLD AUTO: 142 THOUSANDS/UL (ref 149–390)
PLATELET # BLD AUTO: 157 THOUSANDS/UL (ref 149–390)
PLATELET # BLD AUTO: 169 THOUSANDS/UL (ref 149–390)
PLATELET # BLD AUTO: 190 THOUSANDS/UL (ref 149–390)
PLATELET # BLD AUTO: 195 THOUSANDS/UL (ref 149–390)
PLATELET # BLD AUTO: 196 THOUSANDS/UL (ref 149–390)
PLATELET # BLD AUTO: 204 THOUSANDS/UL (ref 149–390)
PLATELET # BLD AUTO: 208 THOUSANDS/UL (ref 149–390)
PLATELET # BLD AUTO: 209 THOUSANDS/UL (ref 149–390)
PLATELET # BLD AUTO: 212 THOUSANDS/UL (ref 149–390)
PLATELET # BLD AUTO: 216 THOUSANDS/UL (ref 149–390)
PLATELET # BLD AUTO: 235 THOUSANDS/UL (ref 149–390)
PLATELET # BLD AUTO: 237 THOUSANDS/UL (ref 149–390)
PLATELET # BLD AUTO: 237 THOUSANDS/UL (ref 149–390)
PLATELET # BLD AUTO: 240 THOUSANDS/UL (ref 149–390)
PLATELET # BLD AUTO: 245 THOUSANDS/UL (ref 149–390)
PLATELET # BLD AUTO: 257 THOUSANDS/UL (ref 149–390)
PLATELET # BLD AUTO: 264 THOUSANDS/UL (ref 149–390)
PLATELET # BLD AUTO: 265 THOUSANDS/UL (ref 149–390)
PLATELET # BLD AUTO: 270 THOUSANDS/UL (ref 149–390)
PLATELET # BLD AUTO: 271 THOUSANDS/UL (ref 149–390)
PLATELET # BLD AUTO: 276 THOUSANDS/UL (ref 149–390)
PLATELET # BLD AUTO: 277 THOUSANDS/UL (ref 149–390)
PLATELET # BLD AUTO: 293 THOUSANDS/UL (ref 149–390)
PLATELET # BLD AUTO: 338 THOUSANDS/UL (ref 149–390)
PLATELET # BLD AUTO: 353 THOUSANDS/UL (ref 149–390)
PLATELET # BLD AUTO: 373 THOUSANDS/UL (ref 149–390)
PLATELET # BLD AUTO: 379 THOUSANDS/UL (ref 149–390)
PLATELET # BLD AUTO: 391 THOUSANDS/UL (ref 149–390)
PLATELET # BLD AUTO: 404 THOUSANDS/UL (ref 149–390)
PLATELET # BLD AUTO: 444 THOUSANDS/UL (ref 149–390)
PLATELET # BLD AUTO: 447 THOUSANDS/UL (ref 149–390)
PLATELET # BLD AUTO: 484 THOUSANDS/UL (ref 149–390)
PLATELET # BLD AUTO: 531 THOUSANDS/UL (ref 149–390)
PLATELET # BLD AUTO: 600 THOUSANDS/UL (ref 149–390)
PLATELET # BLD AUTO: 730 X10E3/UL (ref 150–450)
PLATELET # BLD AUTO: 88 THOUSANDS/UL (ref 149–390)
PLATELET # BLD AUTO: 99 THOUSANDS/UL (ref 149–390)
PLATELET BLD QL SMEAR: ABNORMAL
PLATELET BLD QL SMEAR: ADEQUATE
PMV BLD AUTO: 10 FL (ref 8.9–12.7)
PMV BLD AUTO: 10.1 FL (ref 8.9–12.7)
PMV BLD AUTO: 10.2 FL (ref 8.9–12.7)
PMV BLD AUTO: 10.3 FL (ref 8.9–12.7)
PMV BLD AUTO: 10.4 FL (ref 8.9–12.7)
PMV BLD AUTO: 10.4 FL (ref 8.9–12.7)
PMV BLD AUTO: 10.5 FL (ref 8.9–12.7)
PMV BLD AUTO: 10.8 FL (ref 8.9–12.7)
PMV BLD AUTO: 10.9 FL (ref 8.9–12.7)
PMV BLD AUTO: 11 FL (ref 8.9–12.7)
PMV BLD AUTO: 11 FL (ref 8.9–12.7)
PMV BLD AUTO: 11.2 FL (ref 8.9–12.7)
PMV BLD AUTO: 11.3 FL (ref 8.9–12.7)
PMV BLD AUTO: 11.5 FL (ref 8.9–12.7)
PMV BLD AUTO: 11.5 FL (ref 8.9–12.7)
PMV BLD AUTO: 11.6 FL (ref 8.9–12.7)
PMV BLD AUTO: 11.7 FL (ref 8.9–12.7)
PMV BLD AUTO: 11.8 FL (ref 8.9–12.7)
PMV BLD AUTO: 11.9 FL (ref 8.9–12.7)
PMV BLD AUTO: 12.1 FL (ref 8.9–12.7)
PMV BLD AUTO: 12.3 FL (ref 8.9–12.7)
PMV BLD AUTO: 12.6 FL (ref 8.9–12.7)
PMV BLD AUTO: 12.6 FL (ref 8.9–12.7)
POIKILOCYTOSIS BLD QL SMEAR: PRESENT
POLYCHROMASIA BLD QL SMEAR: PRESENT
POTASSIUM SERPL-SCNC: 3.1 MMOL/L (ref 3.5–5.3)
POTASSIUM SERPL-SCNC: 3.2 MMOL/L (ref 3.5–5.3)
POTASSIUM SERPL-SCNC: 3.3 MMOL/L (ref 3.5–5)
POTASSIUM SERPL-SCNC: 3.3 MMOL/L (ref 3.5–5)
POTASSIUM SERPL-SCNC: 3.3 MMOL/L (ref 3.5–5.3)
POTASSIUM SERPL-SCNC: 3.4 MMOL/L (ref 3.5–5)
POTASSIUM SERPL-SCNC: 3.4 MMOL/L (ref 3.5–5)
POTASSIUM SERPL-SCNC: 3.4 MMOL/L (ref 3.5–5.3)
POTASSIUM SERPL-SCNC: 3.5 MMOL/L (ref 3.5–5)
POTASSIUM SERPL-SCNC: 3.5 MMOL/L (ref 3.5–5.3)
POTASSIUM SERPL-SCNC: 3.5 MMOL/L (ref 3.5–5.3)
POTASSIUM SERPL-SCNC: 3.6 MMOL/L (ref 3.5–5)
POTASSIUM SERPL-SCNC: 3.6 MMOL/L (ref 3.5–5)
POTASSIUM SERPL-SCNC: 3.6 MMOL/L (ref 3.5–5.3)
POTASSIUM SERPL-SCNC: 3.6 MMOL/L (ref 3.5–5.3)
POTASSIUM SERPL-SCNC: 3.7 MMOL/L (ref 3.5–5)
POTASSIUM SERPL-SCNC: 3.7 MMOL/L (ref 3.5–5.3)
POTASSIUM SERPL-SCNC: 3.8 MMOL/L (ref 3.5–5)
POTASSIUM SERPL-SCNC: 3.8 MMOL/L (ref 3.5–5.3)
POTASSIUM SERPL-SCNC: 3.8 MMOL/L (ref 3.5–5.3)
POTASSIUM SERPL-SCNC: 3.9 MMOL/L (ref 3.5–5)
POTASSIUM SERPL-SCNC: 3.9 MMOL/L (ref 3.5–5)
POTASSIUM SERPL-SCNC: 4 MMOL/L (ref 3.5–5)
POTASSIUM SERPL-SCNC: 4 MMOL/L (ref 3.5–5.3)
POTASSIUM SERPL-SCNC: 4 MMOL/L (ref 3.5–5.3)
POTASSIUM SERPL-SCNC: 4.1 MMOL/L (ref 3.5–5)
POTASSIUM SERPL-SCNC: 4.1 MMOL/L (ref 3.5–5)
POTASSIUM SERPL-SCNC: 4.1 MMOL/L (ref 3.5–5.3)
POTASSIUM SERPL-SCNC: 4.2 MMOL/L (ref 3.5–5.3)
POTASSIUM SERPL-SCNC: 4.2 MMOL/L (ref 3.5–5.3)
POTASSIUM SERPL-SCNC: 4.4 MMOL/L (ref 3.5–5.2)
PR INTERVAL: 152 MS
PR INTERVAL: 172 MS
PR INTERVAL: 173 MS
PR INTERVAL: 174 MS
PR INTERVAL: 176 MS
PR INTERVAL: 179 MS
PR INTERVAL: 184 MS
PR INTERVAL: 186 MS
PROCALCITONIN SERPL-MCNC: 0.11 NG/ML
PROCALCITONIN SERPL-MCNC: 0.15 NG/ML
PROCALCITONIN SERPL-MCNC: 1.5 NG/ML
PROCALCITONIN SERPL-MCNC: 2.72 NG/ML
PROT SERPL-MCNC: 5 G/DL (ref 6.4–8.3)
PROT SERPL-MCNC: 5.1 G/DL (ref 6.4–8.2)
PROT SERPL-MCNC: 5.1 G/DL (ref 6.4–8.2)
PROT SERPL-MCNC: 5.1 G/DL (ref 6.4–8.3)
PROT SERPL-MCNC: 5.2 G/DL (ref 6.4–8.2)
PROT SERPL-MCNC: 5.2 G/DL (ref 6.4–8.3)
PROT SERPL-MCNC: 5.3 G/DL (ref 6.4–8.2)
PROT SERPL-MCNC: 5.3 G/DL (ref 6.4–8.2)
PROT SERPL-MCNC: 5.5 G/DL (ref 6.4–8.2)
PROT SERPL-MCNC: 5.6 G/DL (ref 6.4–8.2)
PROT SERPL-MCNC: 5.6 G/DL (ref 6–8.5)
PROT SERPL-MCNC: 5.8 G/DL (ref 6.4–8.2)
PROT SERPL-MCNC: 5.8 G/DL (ref 6.4–8.2)
PROT SERPL-MCNC: 6 G/DL (ref 6.4–8.2)
PROT SERPL-MCNC: 6 G/DL (ref 6.4–8.2)
PROT SERPL-MCNC: 6.1 G/DL (ref 6.4–8.2)
PROT SERPL-MCNC: 6.1 G/DL (ref 6.4–8.2)
PROT SERPL-MCNC: 6.1 G/DL (ref 6.4–8.3)
PROT SERPL-MCNC: 6.2 G/DL (ref 6.4–8.3)
PROT SERPL-MCNC: 6.3 G/DL (ref 6.4–8.2)
PROT SERPL-MCNC: 6.3 G/DL (ref 6.4–8.3)
PROT SERPL-MCNC: 6.3 G/DL (ref 6.4–8.3)
PROT SERPL-MCNC: 6.4 G/DL (ref 6.4–8.2)
PROT SERPL-MCNC: 6.4 G/DL (ref 6.4–8.3)
PROT SERPL-MCNC: 6.4 G/DL (ref 6.4–8.3)
PROT SERPL-MCNC: 6.6 G/DL (ref 6.4–8.2)
PROT SERPL-MCNC: 6.6 G/DL (ref 6.4–8.3)
PROT SERPL-MCNC: 6.6 G/DL (ref 6.4–8.3)
PROT SERPL-MCNC: 6.7 G/DL (ref 6.4–8.3)
PROT SERPL-MCNC: 6.8 G/DL (ref 6.4–8.2)
PROT SERPL-MCNC: 6.8 G/DL (ref 6.4–8.2)
PROT UR STRIP-MCNC: NEGATIVE MG/DL
PROTHROMBIN TIME: 15.8 SECONDS (ref 11.6–14.5)
PROTHROMBIN TIME: 16.7 SECONDS (ref 11.6–14.5)
PROTHROMBIN TIME: 16.9 SECONDS (ref 11.6–14.5)
PROTOCOL NAME: NORMAL
QRS AXIS: -12 DEGREES
QRS AXIS: -27 DEGREES
QRS AXIS: -31 DEGREES
QRS AXIS: -33 DEGREES
QRS AXIS: -36 DEGREES
QRS AXIS: -37 DEGREES
QRS AXIS: -39 DEGREES
QRS AXIS: -42 DEGREES
QRS AXIS: -5 DEGREES
QRS AXIS: 1 DEGREES
QRSD INTERVAL: 100 MS
QRSD INTERVAL: 74 MS
QRSD INTERVAL: 78 MS
QRSD INTERVAL: 78 MS
QRSD INTERVAL: 82 MS
QRSD INTERVAL: 82 MS
QRSD INTERVAL: 83 MS
QRSD INTERVAL: 86 MS
QRSD INTERVAL: 90 MS
QRSD INTERVAL: 94 MS
QT INTERVAL: 290 MS
QT INTERVAL: 320 MS
QT INTERVAL: 343 MS
QT INTERVAL: 394 MS
QT INTERVAL: 404 MS
QT INTERVAL: 404 MS
QT INTERVAL: 406 MS
QT INTERVAL: 408 MS
QT INTERVAL: 410 MS
QT INTERVAL: 410 MS
QTC INTERVAL: 409 MS
QTC INTERVAL: 439 MS
QTC INTERVAL: 452 MS
QTC INTERVAL: 455 MS
QTC INTERVAL: 455 MS
QTC INTERVAL: 463 MS
QTC INTERVAL: 469 MS
QTC INTERVAL: 480 MS
QTC INTERVAL: 481 MS
QTC INTERVAL: 483 MS
RBC # BLD AUTO: 3.28 MILLION/UL (ref 3.88–5.62)
RBC # BLD AUTO: 3.38 MILLION/UL (ref 3.88–5.62)
RBC # BLD AUTO: 3.4 MILLION/UL (ref 3.88–5.62)
RBC # BLD AUTO: 3.42 MILLION/UL (ref 3.88–5.62)
RBC # BLD AUTO: 3.49 MILLION/UL (ref 3.88–5.62)
RBC # BLD AUTO: 3.54 MILLION/UL (ref 3.88–5.62)
RBC # BLD AUTO: 3.56 MILLION/UL (ref 3.88–5.62)
RBC # BLD AUTO: 3.56 MILLION/UL (ref 3.88–5.62)
RBC # BLD AUTO: 3.58 MILLION/UL (ref 3.88–5.62)
RBC # BLD AUTO: 3.65 MILLION/UL (ref 3.88–5.62)
RBC # BLD AUTO: 3.7 MILLION/UL (ref 3.88–5.62)
RBC # BLD AUTO: 3.72 MILLION/UL (ref 3.88–5.62)
RBC # BLD AUTO: 3.81 MILLION/UL (ref 3.88–5.62)
RBC # BLD AUTO: 3.81 MILLION/UL (ref 3.88–5.62)
RBC # BLD AUTO: 3.82 MILLION/UL (ref 3.88–5.62)
RBC # BLD AUTO: 3.91 MILLION/UL (ref 3.88–5.62)
RBC # BLD AUTO: 3.93 MILLION/UL (ref 3.88–5.62)
RBC # BLD AUTO: 3.99 MILLION/UL (ref 3.88–5.62)
RBC # BLD AUTO: 4 MILLION/UL (ref 3.88–5.62)
RBC # BLD AUTO: 4 MILLION/UL (ref 3.88–5.62)
RBC # BLD AUTO: 4.03 MILLION/UL (ref 3.88–5.62)
RBC # BLD AUTO: 4.04 MILLION/UL (ref 3.88–5.62)
RBC # BLD AUTO: 4.16 MILLION/UL (ref 3.88–5.62)
RBC # BLD AUTO: 4.16 X10E6/UL (ref 4.14–5.8)
RBC # BLD AUTO: 4.19 MILLION/UL (ref 3.88–5.62)
RBC # BLD AUTO: 4.2 MILLION/UL (ref 3.88–5.62)
RBC # BLD AUTO: 4.21 MILLION/UL (ref 3.88–5.62)
RBC # BLD AUTO: 4.25 MILLION/UL (ref 3.88–5.62)
RBC # BLD AUTO: 4.27 MILLION/UL (ref 3.88–5.62)
RBC # BLD AUTO: 4.29 MILLION/UL (ref 3.88–5.62)
RBC # BLD AUTO: 4.31 MILLION/UL (ref 3.88–5.62)
RBC # BLD AUTO: 4.33 MILLION/UL (ref 3.88–5.62)
RBC # BLD AUTO: 4.37 MILLION/UL (ref 3.88–5.62)
RBC # BLD AUTO: 4.45 MILLION/UL (ref 3.88–5.62)
RBC # BLD AUTO: 4.48 MILLION/UL (ref 3.88–5.62)
RBC # BLD AUTO: 4.49 MILLION/UL (ref 3.88–5.62)
RBC # BLD AUTO: 4.63 MILLION/UL (ref 3.88–5.62)
RBC # BLD AUTO: 4.64 MILLION/UL (ref 3.88–5.62)
RBC # BLD AUTO: 4.65 MILLION/UL (ref 3.88–5.62)
RBC # BLD AUTO: 4.7 MILLION/UL (ref 3.88–5.62)
RBC # BLD AUTO: 4.74 MILLION/UL (ref 3.88–5.62)
RBC #/AREA URNS AUTO: ABNORMAL /HPF
RBC #/AREA URNS AUTO: NORMAL /HPF
RBC #/AREA URNS AUTO: NORMAL /HPF
RBC MORPH BLD: NORMAL
RBC MORPH BLD: PRESENT
REASON FOR TERMINATION: NORMAL
RSV RNA RESP QL NAA+PROBE: NEGATIVE
RSV RNA RESP QL NAA+PROBE: NEGATIVE
SARS-COV-2 RNA RESP QL NAA+PROBE: NEGATIVE
SARS-COV-2 RNA RESP QL NAA+PROBE: POSITIVE
SCAN RESULT: NORMAL
SL AMB EGFR AFRICAN AMERICAN: 118 ML/MIN/1.73
SL AMB EGFR NON AFRICAN AMERICAN: 102 ML/MIN/1.73
SL AMB POCT HEMOGLOBIN AIC: 7 (ref ?–6.5)
SODIUM SERPL-SCNC: 129 MMOL/L (ref 136–145)
SODIUM SERPL-SCNC: 130 MMOL/L (ref 136–145)
SODIUM SERPL-SCNC: 136 MMOL/L (ref 136–145)
SODIUM SERPL-SCNC: 136 MMOL/L (ref 136–145)
SODIUM SERPL-SCNC: 137 MMOL/L (ref 133–145)
SODIUM SERPL-SCNC: 137 MMOL/L (ref 136–145)
SODIUM SERPL-SCNC: 138 MMOL/L (ref 133–145)
SODIUM SERPL-SCNC: 139 MMOL/L (ref 133–145)
SODIUM SERPL-SCNC: 139 MMOL/L (ref 133–145)
SODIUM SERPL-SCNC: 139 MMOL/L (ref 134–144)
SODIUM SERPL-SCNC: 139 MMOL/L (ref 136–145)
SODIUM SERPL-SCNC: 139 MMOL/L (ref 136–145)
SODIUM SERPL-SCNC: 140 MMOL/L (ref 133–145)
SODIUM SERPL-SCNC: 140 MMOL/L (ref 133–145)
SODIUM SERPL-SCNC: 140 MMOL/L (ref 136–145)
SODIUM SERPL-SCNC: 140 MMOL/L (ref 136–145)
SODIUM SERPL-SCNC: 141 MMOL/L (ref 133–145)
SODIUM SERPL-SCNC: 141 MMOL/L (ref 136–145)
SODIUM SERPL-SCNC: 142 MMOL/L (ref 133–145)
SODIUM SERPL-SCNC: 142 MMOL/L (ref 133–145)
SODIUM SERPL-SCNC: 142 MMOL/L (ref 136–145)
SODIUM SERPL-SCNC: 143 MMOL/L (ref 133–145)
SODIUM SERPL-SCNC: 143 MMOL/L (ref 136–145)
SODIUM SERPL-SCNC: 144 MMOL/L (ref 136–145)
SODIUM SERPL-SCNC: 145 MMOL/L (ref 133–145)
SODIUM SERPL-SCNC: 145 MMOL/L (ref 136–145)
SP GR UR STRIP.AUTO: 1.01 (ref 1–1.03)
SP GR UR STRIP.AUTO: 1.02 (ref 1–1.03)
SP GR UR STRIP.AUTO: 1.02 (ref 1–1.03)
T WAVE AXIS: 48 DEGREES
T WAVE AXIS: 51 DEGREES
T WAVE AXIS: 53 DEGREES
T WAVE AXIS: 55 DEGREES
T WAVE AXIS: 65 DEGREES
T WAVE AXIS: 69 DEGREES
T WAVE AXIS: 72 DEGREES
T WAVE AXIS: 80 DEGREES
T WAVE AXIS: 93 DEGREES
T WAVE AXIS: 95 DEGREES
TARGET HR FORMULA: NORMAL
TIBC SERPL-MCNC: 248 UG/DL (ref 250–450)
TIME IN EXERCISE PHASE: NORMAL
TOTAL CELLS COUNTED SPEC: 100
TOXIC GRANULES BLD QL SMEAR: PRESENT
TOXIC GRANULES BLD QL SMEAR: PRESENT
TROPONIN I SERPL-MCNC: <0.02 NG/ML
TSH SERPL DL<=0.05 MIU/L-ACNC: 1.63 UIU/ML (ref 0.36–3.74)
UROBILINOGEN UR QL STRIP.AUTO: 0.2 E.U./DL
VANCOMYCIN TROUGH SERPL-MCNC: 12.4 UG/ML (ref 10–20)
VANCOMYCIN TROUGH SERPL-MCNC: 18.8 UG/ML (ref 10–20)
VARIANT LYMPHS # BLD AUTO: 1 %
VARIANT LYMPHS # BLD AUTO: 2 %
VENTRICULAR RATE: 109 BPM
VENTRICULAR RATE: 113 BPM
VENTRICULAR RATE: 120 BPM
VENTRICULAR RATE: 74 BPM
VENTRICULAR RATE: 80 BPM
VENTRICULAR RATE: 85 BPM
VENTRICULAR RATE: 86 BPM
VENTRICULAR RATE: 90 BPM
VIT B12 SERPL-MCNC: 238 PG/ML (ref 100–900)
WBC # BLD AUTO: 10.29 THOUSAND/UL (ref 4.31–10.16)
WBC # BLD AUTO: 10.52 THOUSAND/UL (ref 4.31–10.16)
WBC # BLD AUTO: 10.77 THOUSAND/UL (ref 4.31–10.16)
WBC # BLD AUTO: 12.04 THOUSAND/UL (ref 4.31–10.16)
WBC # BLD AUTO: 12.42 THOUSAND/UL (ref 4.31–10.16)
WBC # BLD AUTO: 12.79 THOUSAND/UL (ref 4.31–10.16)
WBC # BLD AUTO: 13.46 THOUSAND/UL (ref 4.31–10.16)
WBC # BLD AUTO: 13.6 X10E3/UL (ref 3.4–10.8)
WBC # BLD AUTO: 13.78 THOUSAND/UL (ref 4.31–10.16)
WBC # BLD AUTO: 14.03 THOUSAND/UL (ref 4.31–10.16)
WBC # BLD AUTO: 14.32 THOUSAND/UL (ref 4.31–10.16)
WBC # BLD AUTO: 14.96 THOUSAND/UL (ref 4.31–10.16)
WBC # BLD AUTO: 15.43 THOUSAND/UL (ref 4.31–10.16)
WBC # BLD AUTO: 16.19 THOUSAND/UL (ref 4.31–10.16)
WBC # BLD AUTO: 17.23 THOUSAND/UL (ref 4.31–10.16)
WBC # BLD AUTO: 2.82 THOUSAND/UL (ref 4.31–10.16)
WBC # BLD AUTO: 24.72 THOUSAND/UL (ref 4.31–10.16)
WBC # BLD AUTO: 3.07 THOUSAND/UL (ref 4.31–10.16)
WBC # BLD AUTO: 3.66 THOUSAND/UL (ref 4.31–10.16)
WBC # BLD AUTO: 33.28 THOUSAND/UL (ref 4.31–10.16)
WBC # BLD AUTO: 37.82 THOUSAND/UL (ref 4.31–10.16)
WBC # BLD AUTO: 4.25 THOUSAND/UL (ref 4.31–10.16)
WBC # BLD AUTO: 4.37 THOUSAND/UL (ref 4.31–10.16)
WBC # BLD AUTO: 40.9 THOUSAND/UL (ref 4.31–10.16)
WBC # BLD AUTO: 41.85 THOUSAND/UL (ref 4.31–10.16)
WBC # BLD AUTO: 5.26 THOUSAND/UL (ref 4.31–10.16)
WBC # BLD AUTO: 5.6 THOUSAND/UL (ref 4.31–10.16)
WBC # BLD AUTO: 5.63 THOUSAND/UL (ref 4.31–10.16)
WBC # BLD AUTO: 5.68 THOUSAND/UL (ref 4.31–10.16)
WBC # BLD AUTO: 5.97 THOUSAND/UL (ref 4.31–10.16)
WBC # BLD AUTO: 6.87 THOUSAND/UL (ref 4.31–10.16)
WBC # BLD AUTO: 6.88 THOUSAND/UL (ref 4.31–10.16)
WBC # BLD AUTO: 8.16 THOUSAND/UL (ref 4.31–10.16)
WBC # BLD AUTO: 8.33 THOUSAND/UL (ref 4.31–10.16)
WBC # BLD AUTO: 8.81 THOUSAND/UL (ref 4.31–10.16)
WBC # BLD AUTO: 8.87 THOUSAND/UL (ref 4.31–10.16)
WBC # BLD AUTO: 8.99 THOUSAND/UL (ref 4.31–10.16)
WBC # BLD AUTO: 9.25 THOUSAND/UL (ref 4.31–10.16)
WBC # BLD AUTO: 9.34 THOUSAND/UL (ref 4.31–10.16)
WBC # BLD AUTO: 9.64 THOUSAND/UL (ref 4.31–10.16)
WBC # BLD AUTO: 9.72 THOUSAND/UL (ref 4.31–10.16)
WBC #/AREA URNS AUTO: ABNORMAL /HPF
WBC #/AREA URNS AUTO: NORMAL /HPF
WBC #/AREA URNS AUTO: NORMAL /HPF

## 2021-01-01 PROCEDURE — NC001 PR NO CHARGE: Performed by: GENETIC COUNSELOR, MS

## 2021-01-01 PROCEDURE — 36415 COLL VENOUS BLD VENIPUNCTURE: CPT

## 2021-01-01 PROCEDURE — 85730 THROMBOPLASTIN TIME PARTIAL: CPT | Performed by: EMERGENCY MEDICINE

## 2021-01-01 PROCEDURE — 80053 COMPREHEN METABOLIC PANEL: CPT

## 2021-01-01 PROCEDURE — 85027 COMPLETE CBC AUTOMATED: CPT | Performed by: INTERNAL MEDICINE

## 2021-01-01 PROCEDURE — 82728 ASSAY OF FERRITIN: CPT | Performed by: PHYSICIAN ASSISTANT

## 2021-01-01 PROCEDURE — 96375 TX/PRO/DX INJ NEW DRUG ADDON: CPT

## 2021-01-01 PROCEDURE — 96367 TX/PROPH/DG ADDL SEQ IV INF: CPT

## 2021-01-01 PROCEDURE — 99232 SBSQ HOSP IP/OBS MODERATE 35: CPT | Performed by: PHYSICIAN ASSISTANT

## 2021-01-01 PROCEDURE — 84145 PROCALCITONIN (PCT): CPT | Performed by: EMERGENCY MEDICINE

## 2021-01-01 PROCEDURE — 83540 ASSAY OF IRON: CPT | Performed by: PHYSICIAN ASSISTANT

## 2021-01-01 PROCEDURE — 85007 BL SMEAR W/DIFF WBC COUNT: CPT

## 2021-01-01 PROCEDURE — 99285 EMERGENCY DEPT VISIT HI MDM: CPT

## 2021-01-01 PROCEDURE — 99214 OFFICE O/P EST MOD 30 MIN: CPT | Performed by: FAMILY MEDICINE

## 2021-01-01 PROCEDURE — 96411 CHEMO IV PUSH ADDL DRUG: CPT

## 2021-01-01 PROCEDURE — 96417 CHEMO IV INFUS EACH ADDL SEQ: CPT

## 2021-01-01 PROCEDURE — 82948 REAGENT STRIP/BLOOD GLUCOSE: CPT

## 2021-01-01 PROCEDURE — 85027 COMPLETE CBC AUTOMATED: CPT | Performed by: EMERGENCY MEDICINE

## 2021-01-01 PROCEDURE — 86301 IMMUNOASSAY TUMOR CA 19-9: CPT

## 2021-01-01 PROCEDURE — 88342 IMHCHEM/IMCYTCHM 1ST ANTB: CPT | Performed by: PATHOLOGY

## 2021-01-01 PROCEDURE — 80202 ASSAY OF VANCOMYCIN: CPT | Performed by: INTERNAL MEDICINE

## 2021-01-01 PROCEDURE — 85730 THROMBOPLASTIN TIME PARTIAL: CPT | Performed by: PHYSICIAN ASSISTANT

## 2021-01-01 PROCEDURE — 93005 ELECTROCARDIOGRAM TRACING: CPT

## 2021-01-01 PROCEDURE — 99214 OFFICE O/P EST MOD 30 MIN: CPT | Performed by: INTERNAL MEDICINE

## 2021-01-01 PROCEDURE — 99232 SBSQ HOSP IP/OBS MODERATE 35: CPT | Performed by: NURSE PRACTITIONER

## 2021-01-01 PROCEDURE — 71275 CT ANGIOGRAPHY CHEST: CPT

## 2021-01-01 PROCEDURE — 93320 DOPPLER ECHO COMPLETE: CPT | Performed by: INTERNAL MEDICINE

## 2021-01-01 PROCEDURE — 93010 ELECTROCARDIOGRAM REPORT: CPT | Performed by: INTERNAL MEDICINE

## 2021-01-01 PROCEDURE — 82550 ASSAY OF CK (CPK): CPT

## 2021-01-01 PROCEDURE — 77012 CT SCAN FOR NEEDLE BIOPSY: CPT

## 2021-01-01 PROCEDURE — NC001 PR NO CHARGE

## 2021-01-01 PROCEDURE — G0498 CHEMO EXTEND IV INFUS W/PUMP: HCPCS

## 2021-01-01 PROCEDURE — 85025 COMPLETE CBC W/AUTO DIFF WBC: CPT

## 2021-01-01 PROCEDURE — 71045 X-RAY EXAM CHEST 1 VIEW: CPT

## 2021-01-01 PROCEDURE — 85730 THROMBOPLASTIN TIME PARTIAL: CPT | Performed by: INTERNAL MEDICINE

## 2021-01-01 PROCEDURE — 99232 SBSQ HOSP IP/OBS MODERATE 35: CPT | Performed by: STUDENT IN AN ORGANIZED HEALTH CARE EDUCATION/TRAINING PROGRAM

## 2021-01-01 PROCEDURE — 87040 BLOOD CULTURE FOR BACTERIA: CPT | Performed by: EMERGENCY MEDICINE

## 2021-01-01 PROCEDURE — 3079F DIAST BP 80-89 MM HG: CPT | Performed by: FAMILY MEDICINE

## 2021-01-01 PROCEDURE — 85610 PROTHROMBIN TIME: CPT | Performed by: EMERGENCY MEDICINE

## 2021-01-01 PROCEDURE — 85730 THROMBOPLASTIN TIME PARTIAL: CPT

## 2021-01-01 PROCEDURE — 93018 CV STRESS TEST I&R ONLY: CPT | Performed by: INTERNAL MEDICINE

## 2021-01-01 PROCEDURE — G1004 CDSM NDSC: HCPCS

## 2021-01-01 PROCEDURE — 99214 OFFICE O/P EST MOD 30 MIN: CPT | Performed by: STUDENT IN AN ORGANIZED HEALTH CARE EDUCATION/TRAINING PROGRAM

## 2021-01-01 PROCEDURE — 85379 FIBRIN DEGRADATION QUANT: CPT

## 2021-01-01 PROCEDURE — 99232 SBSQ HOSP IP/OBS MODERATE 35: CPT | Performed by: INTERNAL MEDICINE

## 2021-01-01 PROCEDURE — 99205 OFFICE O/P NEW HI 60 MIN: CPT | Performed by: INTERNAL MEDICINE

## 2021-01-01 PROCEDURE — 74177 CT ABD & PELVIS W/CONTRAST: CPT

## 2021-01-01 PROCEDURE — 3725F SCREEN DEPRESSION PERFORMED: CPT | Performed by: FAMILY MEDICINE

## 2021-01-01 PROCEDURE — 96415 CHEMO IV INFUSION ADDL HR: CPT

## 2021-01-01 PROCEDURE — 85730 THROMBOPLASTIN TIME PARTIAL: CPT | Performed by: HOSPITALIST

## 2021-01-01 PROCEDURE — 83615 LACTATE (LD) (LDH) ENZYME: CPT | Performed by: PHYSICIAN ASSISTANT

## 2021-01-01 PROCEDURE — 96368 THER/DIAG CONCURRENT INF: CPT

## 2021-01-01 PROCEDURE — 99220 PR INITIAL OBSERVATION CARE/DAY 70 MINUTES: CPT | Performed by: INTERNAL MEDICINE

## 2021-01-01 PROCEDURE — 76942 ECHO GUIDE FOR BIOPSY: CPT

## 2021-01-01 PROCEDURE — 85379 FIBRIN DEGRADATION QUANT: CPT | Performed by: EMERGENCY MEDICINE

## 2021-01-01 PROCEDURE — 47531 INJECTION FOR CHOLANGIOGRAM: CPT

## 2021-01-01 PROCEDURE — 93312 ECHO TRANSESOPHAGEAL: CPT | Performed by: INTERNAL MEDICINE

## 2021-01-01 PROCEDURE — 3074F SYST BP LT 130 MM HG: CPT | Performed by: FAMILY MEDICINE

## 2021-01-01 PROCEDURE — 96366 THER/PROPH/DIAG IV INF ADDON: CPT

## 2021-01-01 PROCEDURE — 80048 BASIC METABOLIC PNL TOTAL CA: CPT | Performed by: INTERNAL MEDICINE

## 2021-01-01 PROCEDURE — 99285 EMERGENCY DEPT VISIT HI MDM: CPT | Performed by: EMERGENCY MEDICINE

## 2021-01-01 PROCEDURE — 93306 TTE W/DOPPLER COMPLETE: CPT | Performed by: INTERNAL MEDICINE

## 2021-01-01 PROCEDURE — 83880 ASSAY OF NATRIURETIC PEPTIDE: CPT

## 2021-01-01 PROCEDURE — 83690 ASSAY OF LIPASE: CPT

## 2021-01-01 PROCEDURE — 85027 COMPLETE CBC AUTOMATED: CPT | Performed by: PHYSICIAN ASSISTANT

## 2021-01-01 PROCEDURE — 85027 COMPLETE CBC AUTOMATED: CPT | Performed by: NURSE PRACTITIONER

## 2021-01-01 PROCEDURE — 96413 CHEMO IV INFUSION 1 HR: CPT

## 2021-01-01 PROCEDURE — 80076 HEPATIC FUNCTION PANEL: CPT | Performed by: PHYSICIAN ASSISTANT

## 2021-01-01 PROCEDURE — 36415 COLL VENOUS BLD VENIPUNCTURE: CPT | Performed by: EMERGENCY MEDICINE

## 2021-01-01 PROCEDURE — 3008F BODY MASS INDEX DOCD: CPT | Performed by: STUDENT IN AN ORGANIZED HEALTH CARE EDUCATION/TRAINING PROGRAM

## 2021-01-01 PROCEDURE — 3078F DIAST BP <80 MM HG: CPT | Performed by: FAMILY MEDICINE

## 2021-01-01 PROCEDURE — 96361 HYDRATE IV INFUSION ADD-ON: CPT

## 2021-01-01 PROCEDURE — 82570 ASSAY OF URINE CREATININE: CPT

## 2021-01-01 PROCEDURE — 85610 PROTHROMBIN TIME: CPT

## 2021-01-01 PROCEDURE — 36415 COLL VENOUS BLD VENIPUNCTURE: CPT | Performed by: PHYSICIAN ASSISTANT

## 2021-01-01 PROCEDURE — 85007 BL SMEAR W/DIFF WBC COUNT: CPT | Performed by: PHYSICIAN ASSISTANT

## 2021-01-01 PROCEDURE — 70450 CT HEAD/BRAIN W/O DYE: CPT

## 2021-01-01 PROCEDURE — 83690 ASSAY OF LIPASE: CPT | Performed by: EMERGENCY MEDICINE

## 2021-01-01 PROCEDURE — 87077 CULTURE AEROBIC IDENTIFY: CPT | Performed by: EMERGENCY MEDICINE

## 2021-01-01 PROCEDURE — 99239 HOSP IP/OBS DSCHRG MGMT >30: CPT | Performed by: STUDENT IN AN ORGANIZED HEALTH CARE EDUCATION/TRAINING PROGRAM

## 2021-01-01 PROCEDURE — 85027 COMPLETE CBC AUTOMATED: CPT

## 2021-01-01 PROCEDURE — 97802 MEDICAL NUTRITION INDIV IN: CPT | Performed by: DIETITIAN, REGISTERED

## 2021-01-01 PROCEDURE — 99239 HOSP IP/OBS DSCHRG MGMT >30: CPT | Performed by: INTERNAL MEDICINE

## 2021-01-01 PROCEDURE — 76942 ECHO GUIDE FOR BIOPSY: CPT | Performed by: INTERNAL MEDICINE

## 2021-01-01 PROCEDURE — 3074F SYST BP LT 130 MM HG: CPT | Performed by: INTERNAL MEDICINE

## 2021-01-01 PROCEDURE — 84484 ASSAY OF TROPONIN QUANT: CPT | Performed by: EMERGENCY MEDICINE

## 2021-01-01 PROCEDURE — 96372 THER/PROPH/DIAG INJ SC/IM: CPT

## 2021-01-01 PROCEDURE — 85610 PROTHROMBIN TIME: CPT | Performed by: PHYSICIAN ASSISTANT

## 2021-01-01 PROCEDURE — 83605 ASSAY OF LACTIC ACID: CPT | Performed by: EMERGENCY MEDICINE

## 2021-01-01 PROCEDURE — 85025 COMPLETE CBC W/AUTO DIFF WBC: CPT | Performed by: INTERNAL MEDICINE

## 2021-01-01 PROCEDURE — 36415 COLL VENOUS BLD VENIPUNCTURE: CPT | Performed by: INTERNAL MEDICINE

## 2021-01-01 PROCEDURE — 80053 COMPREHEN METABOLIC PANEL: CPT | Performed by: PHYSICIAN ASSISTANT

## 2021-01-01 PROCEDURE — 80053 COMPREHEN METABOLIC PANEL: CPT | Performed by: STUDENT IN AN ORGANIZED HEALTH CARE EDUCATION/TRAINING PROGRAM

## 2021-01-01 PROCEDURE — 82746 ASSAY OF FOLIC ACID SERUM: CPT | Performed by: PHYSICIAN ASSISTANT

## 2021-01-01 PROCEDURE — 80053 COMPREHEN METABOLIC PANEL: CPT | Performed by: INTERNAL MEDICINE

## 2021-01-01 PROCEDURE — 93970 EXTREMITY STUDY: CPT | Performed by: SURGERY

## 2021-01-01 PROCEDURE — 97167 OT EVAL HIGH COMPLEX 60 MIN: CPT

## 2021-01-01 PROCEDURE — 4040F PNEUMOC VAC/ADMIN/RCVD: CPT | Performed by: FAMILY MEDICINE

## 2021-01-01 PROCEDURE — 83036 HEMOGLOBIN GLYCOSYLATED A1C: CPT

## 2021-01-01 PROCEDURE — 90732 PPSV23 VACC 2 YRS+ SUBQ/IM: CPT

## 2021-01-01 PROCEDURE — 90471 IMMUNIZATION ADMIN: CPT

## 2021-01-01 PROCEDURE — 84484 ASSAY OF TROPONIN QUANT: CPT | Performed by: PHYSICIAN ASSISTANT

## 2021-01-01 PROCEDURE — G0108 DIAB MANAGE TRN  PER INDIV: HCPCS | Performed by: DIETITIAN, REGISTERED

## 2021-01-01 PROCEDURE — 83010 ASSAY OF HAPTOGLOBIN QUANT: CPT | Performed by: PHYSICIAN ASSISTANT

## 2021-01-01 PROCEDURE — 82043 UR ALBUMIN QUANTITATIVE: CPT

## 2021-01-01 PROCEDURE — 84484 ASSAY OF TROPONIN QUANT: CPT

## 2021-01-01 PROCEDURE — 1111F DSCHRG MED/CURRENT MED MERGE: CPT | Performed by: FAMILY MEDICINE

## 2021-01-01 PROCEDURE — 80053 COMPREHEN METABOLIC PANEL: CPT | Performed by: NURSE PRACTITIONER

## 2021-01-01 PROCEDURE — 1036F TOBACCO NON-USER: CPT | Performed by: INTERNAL MEDICINE

## 2021-01-01 PROCEDURE — 80048 BASIC METABOLIC PNL TOTAL CA: CPT | Performed by: PHYSICIAN ASSISTANT

## 2021-01-01 PROCEDURE — 99495 TRANSJ CARE MGMT MOD F2F 14D: CPT | Performed by: FAMILY MEDICINE

## 2021-01-01 PROCEDURE — 1160F RVW MEDS BY RX/DR IN RCRD: CPT | Performed by: FAMILY MEDICINE

## 2021-01-01 PROCEDURE — 88304 TISSUE EXAM BY PATHOLOGIST: CPT | Performed by: PATHOLOGY

## 2021-01-01 PROCEDURE — 85007 BL SMEAR W/DIFF WBC COUNT: CPT | Performed by: INTERNAL MEDICINE

## 2021-01-01 PROCEDURE — 87040 BLOOD CULTURE FOR BACTERIA: CPT | Performed by: PHYSICIAN ASSISTANT

## 2021-01-01 PROCEDURE — 87181 SC STD AGAR DILUTION PER AGT: CPT | Performed by: EMERGENCY MEDICINE

## 2021-01-01 PROCEDURE — 99233 SBSQ HOSP IP/OBS HIGH 50: CPT | Performed by: INTERNAL MEDICINE

## 2021-01-01 PROCEDURE — 80076 HEPATIC FUNCTION PANEL: CPT | Performed by: INTERNAL MEDICINE

## 2021-01-01 PROCEDURE — 85025 COMPLETE CBC W/AUTO DIFF WBC: CPT | Performed by: STUDENT IN AN ORGANIZED HEALTH CARE EDUCATION/TRAINING PROGRAM

## 2021-01-01 PROCEDURE — 99213 OFFICE O/P EST LOW 20 MIN: CPT | Performed by: FAMILY MEDICINE

## 2021-01-01 PROCEDURE — 99223 1ST HOSP IP/OBS HIGH 75: CPT | Performed by: INTERNAL MEDICINE

## 2021-01-01 PROCEDURE — 84443 ASSAY THYROID STIM HORMONE: CPT

## 2021-01-01 PROCEDURE — 96360 HYDRATION IV INFUSION INIT: CPT

## 2021-01-01 PROCEDURE — 3008F BODY MASS INDEX DOCD: CPT | Performed by: INTERNAL MEDICINE

## 2021-01-01 PROCEDURE — 88307 TISSUE EXAM BY PATHOLOGIST: CPT | Performed by: PATHOLOGY

## 2021-01-01 PROCEDURE — 86140 C-REACTIVE PROTEIN: CPT

## 2021-01-01 PROCEDURE — 85384 FIBRINOGEN ACTIVITY: CPT | Performed by: PHYSICIAN ASSISTANT

## 2021-01-01 PROCEDURE — 97116 GAIT TRAINING THERAPY: CPT

## 2021-01-01 PROCEDURE — 99222 1ST HOSP IP/OBS MODERATE 55: CPT | Performed by: PHYSICIAN ASSISTANT

## 2021-01-01 PROCEDURE — 81001 URINALYSIS AUTO W/SCOPE: CPT | Performed by: EMERGENCY MEDICINE

## 2021-01-01 PROCEDURE — 81001 URINALYSIS AUTO W/SCOPE: CPT | Performed by: PHYSICIAN ASSISTANT

## 2021-01-01 PROCEDURE — 3078F DIAST BP <80 MM HG: CPT | Performed by: INTERNAL MEDICINE

## 2021-01-01 PROCEDURE — 0241U HB NFCT DS VIR RESP RNA 4 TRGT: CPT | Performed by: EMERGENCY MEDICINE

## 2021-01-01 PROCEDURE — 88341 IMHCHEM/IMCYTCHM EA ADD ANTB: CPT | Performed by: PATHOLOGY

## 2021-01-01 PROCEDURE — 93970 EXTREMITY STUDY: CPT

## 2021-01-01 PROCEDURE — 93325 DOPPLER ECHO COLOR FLOW MAPG: CPT | Performed by: INTERNAL MEDICINE

## 2021-01-01 PROCEDURE — 87040 BLOOD CULTURE FOR BACTERIA: CPT | Performed by: INTERNAL MEDICINE

## 2021-01-01 PROCEDURE — 87186 SC STD MICRODIL/AGAR DIL: CPT

## 2021-01-01 PROCEDURE — 3008F BODY MASS INDEX DOCD: CPT | Performed by: FAMILY MEDICINE

## 2021-01-01 PROCEDURE — 85379 FIBRIN DEGRADATION QUANT: CPT | Performed by: PHYSICIAN ASSISTANT

## 2021-01-01 PROCEDURE — 93017 CV STRESS TEST TRACING ONLY: CPT

## 2021-01-01 PROCEDURE — 84100 ASSAY OF PHOSPHORUS: CPT

## 2021-01-01 PROCEDURE — 3074F SYST BP LT 130 MM HG: CPT | Performed by: STUDENT IN AN ORGANIZED HEALTH CARE EDUCATION/TRAINING PROGRAM

## 2021-01-01 PROCEDURE — XW033E5 INTRODUCTION OF REMDESIVIR ANTI-INFECTIVE INTO PERIPHERAL VEIN, PERCUTANEOUS APPROACH, NEW TECHNOLOGY GROUP 5: ICD-10-PCS | Performed by: HOSPITALIST

## 2021-01-01 PROCEDURE — 83036 HEMOGLOBIN GLYCOSYLATED A1C: CPT | Performed by: INTERNAL MEDICINE

## 2021-01-01 PROCEDURE — 0241U HB NFCT DS VIR RESP RNA 4 TRGT: CPT

## 2021-01-01 PROCEDURE — G0270 MNT SUBS TX FOR CHANGE DX: HCPCS | Performed by: DIETITIAN, REGISTERED

## 2021-01-01 PROCEDURE — 70487 CT MAXILLOFACIAL W/DYE: CPT

## 2021-01-01 PROCEDURE — 1124F ACP DISCUSS-NO DSCNMKR DOCD: CPT | Performed by: EMERGENCY MEDICINE

## 2021-01-01 PROCEDURE — 1111F DSCHRG MED/CURRENT MED MERGE: CPT | Performed by: INTERNAL MEDICINE

## 2021-01-01 PROCEDURE — 96376 TX/PRO/DX INJ SAME DRUG ADON: CPT

## 2021-01-01 PROCEDURE — 83605 ASSAY OF LACTIC ACID: CPT

## 2021-01-01 PROCEDURE — G0425 INPT/ED TELECONSULT30: HCPCS | Performed by: NURSE PRACTITIONER

## 2021-01-01 PROCEDURE — 83735 ASSAY OF MAGNESIUM: CPT | Performed by: EMERGENCY MEDICINE

## 2021-01-01 PROCEDURE — 80053 COMPREHEN METABOLIC PANEL: CPT | Performed by: EMERGENCY MEDICINE

## 2021-01-01 PROCEDURE — 85007 BL SMEAR W/DIFF WBC COUNT: CPT | Performed by: EMERGENCY MEDICINE

## 2021-01-01 PROCEDURE — 96377 APPLICATON ON-BODY INJECTOR: CPT

## 2021-01-01 PROCEDURE — 99153 MOD SED SAME PHYS/QHP EA: CPT

## 2021-01-01 PROCEDURE — 83735 ASSAY OF MAGNESIUM: CPT | Performed by: PHYSICIAN ASSISTANT

## 2021-01-01 PROCEDURE — 87076 CULTURE ANAEROBE IDENT EACH: CPT | Performed by: EMERGENCY MEDICINE

## 2021-01-01 PROCEDURE — 84681 ASSAY OF C-PEPTIDE: CPT | Performed by: INTERNAL MEDICINE

## 2021-01-01 PROCEDURE — 3060F POS MICROALBUMINURIA REV: CPT | Performed by: STUDENT IN AN ORGANIZED HEALTH CARE EDUCATION/TRAINING PROGRAM

## 2021-01-01 PROCEDURE — 99239 HOSP IP/OBS DSCHRG MGMT >30: CPT | Performed by: PHYSICIAN ASSISTANT

## 2021-01-01 PROCEDURE — 96374 THER/PROPH/DIAG INJ IV PUSH: CPT

## 2021-01-01 PROCEDURE — 99223 1ST HOSP IP/OBS HIGH 75: CPT | Performed by: STUDENT IN AN ORGANIZED HEALTH CARE EDUCATION/TRAINING PROGRAM

## 2021-01-01 PROCEDURE — BF031ZZ PLAIN RADIOGRAPHY OF GALLBLADDER AND BILE DUCTS USING LOW OSMOLAR CONTRAST: ICD-10-PCS | Performed by: INTERNAL MEDICINE

## 2021-01-01 PROCEDURE — 99152 MOD SED SAME PHYS/QHP 5/>YRS: CPT | Performed by: INTERNAL MEDICINE

## 2021-01-01 PROCEDURE — 93312 ECHO TRANSESOPHAGEAL: CPT

## 2021-01-01 PROCEDURE — 99222 1ST HOSP IP/OBS MODERATE 55: CPT | Performed by: INTERNAL MEDICINE

## 2021-01-01 PROCEDURE — NC001 PR NO CHARGE: Performed by: RADIOLOGY

## 2021-01-01 PROCEDURE — 93016 CV STRESS TEST SUPVJ ONLY: CPT | Performed by: INTERNAL MEDICINE

## 2021-01-01 PROCEDURE — 99215 OFFICE O/P EST HI 40 MIN: CPT | Performed by: FAMILY MEDICINE

## 2021-01-01 PROCEDURE — 1036F TOBACCO NON-USER: CPT | Performed by: FAMILY MEDICINE

## 2021-01-01 PROCEDURE — 85025 COMPLETE CBC W/AUTO DIFF WBC: CPT | Performed by: PHYSICIAN ASSISTANT

## 2021-01-01 PROCEDURE — 47531 INJECTION FOR CHOLANGIOGRAM: CPT | Performed by: RADIOLOGY

## 2021-01-01 PROCEDURE — 87040 BLOOD CULTURE FOR BACTERIA: CPT

## 2021-01-01 PROCEDURE — 83880 ASSAY OF NATRIURETIC PEPTIDE: CPT | Performed by: EMERGENCY MEDICINE

## 2021-01-01 PROCEDURE — 0CDWXZ1 EXTRACTION OF UPPER TOOTH, MULTIPLE, EXTERNAL APPROACH: ICD-10-PCS | Performed by: DENTIST

## 2021-01-01 PROCEDURE — 3079F DIAST BP 80-89 MM HG: CPT | Performed by: STUDENT IN AN ORGANIZED HEALTH CARE EDUCATION/TRAINING PROGRAM

## 2021-01-01 PROCEDURE — A9502 TC99M TETROFOSMIN: HCPCS

## 2021-01-01 PROCEDURE — 90472 IMMUNIZATION ADMIN EACH ADD: CPT

## 2021-01-01 PROCEDURE — 85610 PROTHROMBIN TIME: CPT | Performed by: INTERNAL MEDICINE

## 2021-01-01 PROCEDURE — 85025 COMPLETE CBC W/AUTO DIFF WBC: CPT | Performed by: EMERGENCY MEDICINE

## 2021-01-01 PROCEDURE — NC001 PR NO CHARGE: Performed by: PHYSICIAN ASSISTANT

## 2021-01-01 PROCEDURE — 47000 NEEDLE BIOPSY OF LIVER PERQ: CPT

## 2021-01-01 PROCEDURE — 99220 PR INITIAL OBSERVATION CARE/DAY 70 MINUTES: CPT | Performed by: HOSPITALIST

## 2021-01-01 PROCEDURE — 81003 URINALYSIS AUTO W/O SCOPE: CPT

## 2021-01-01 PROCEDURE — 0WB3XZZ EXCISION OF ORAL CAVITY AND THROAT, EXTERNAL APPROACH: ICD-10-PCS | Performed by: DENTIST

## 2021-01-01 PROCEDURE — 97163 PT EVAL HIGH COMPLEX 45 MIN: CPT

## 2021-01-01 PROCEDURE — 74160 CT ABDOMEN W/CONTRAST: CPT

## 2021-01-01 PROCEDURE — 82248 BILIRUBIN DIRECT: CPT

## 2021-01-01 PROCEDURE — 97535 SELF CARE MNGMENT TRAINING: CPT

## 2021-01-01 PROCEDURE — 84100 ASSAY OF PHOSPHORUS: CPT | Performed by: INTERNAL MEDICINE

## 2021-01-01 PROCEDURE — 80048 BASIC METABOLIC PNL TOTAL CA: CPT | Performed by: STUDENT IN AN ORGANIZED HEALTH CARE EDUCATION/TRAINING PROGRAM

## 2021-01-01 PROCEDURE — 90662 IIV NO PRSV INCREASED AG IM: CPT

## 2021-01-01 PROCEDURE — 86301 IMMUNOASSAY TUMOR CA 19-9: CPT | Performed by: INTERNAL MEDICINE

## 2021-01-01 PROCEDURE — 47000 NEEDLE BIOPSY OF LIVER PERQ: CPT | Performed by: INTERNAL MEDICINE

## 2021-01-01 PROCEDURE — 96365 THER/PROPH/DIAG IV INF INIT: CPT

## 2021-01-01 PROCEDURE — 3044F HG A1C LEVEL LT 7.0%: CPT | Performed by: STUDENT IN AN ORGANIZED HEALTH CARE EDUCATION/TRAINING PROGRAM

## 2021-01-01 PROCEDURE — 3051F HG A1C>EQUAL 7.0%<8.0%: CPT | Performed by: FAMILY MEDICINE

## 2021-01-01 PROCEDURE — 83690 ASSAY OF LIPASE: CPT | Performed by: PHYSICIAN ASSISTANT

## 2021-01-01 PROCEDURE — 99236 HOSP IP/OBS SAME DATE HI 85: CPT | Performed by: INTERNAL MEDICINE

## 2021-01-01 PROCEDURE — 83735 ASSAY OF MAGNESIUM: CPT | Performed by: INTERNAL MEDICINE

## 2021-01-01 PROCEDURE — 99152 MOD SED SAME PHYS/QHP 5/>YRS: CPT

## 2021-01-01 PROCEDURE — 93306 TTE W/DOPPLER COMPLETE: CPT

## 2021-01-01 PROCEDURE — 83880 ASSAY OF NATRIURETIC PEPTIDE: CPT | Performed by: PHYSICIAN ASSISTANT

## 2021-01-01 PROCEDURE — 81001 URINALYSIS AUTO W/SCOPE: CPT

## 2021-01-01 PROCEDURE — 99233 SBSQ HOSP IP/OBS HIGH 50: CPT | Performed by: FAMILY MEDICINE

## 2021-01-01 PROCEDURE — 83735 ASSAY OF MAGNESIUM: CPT

## 2021-01-01 PROCEDURE — 85007 BL SMEAR W/DIFF WBC COUNT: CPT | Performed by: NURSE PRACTITIONER

## 2021-01-01 PROCEDURE — 83605 ASSAY OF LACTIC ACID: CPT | Performed by: PHYSICIAN ASSISTANT

## 2021-01-01 PROCEDURE — 86141 C-REACTIVE PROTEIN HS: CPT | Performed by: EMERGENCY MEDICINE

## 2021-01-01 PROCEDURE — 99496 TRANSJ CARE MGMT HIGH F2F 7D: CPT | Performed by: FAMILY MEDICINE

## 2021-01-01 PROCEDURE — 78452 HT MUSCLE IMAGE SPECT MULT: CPT

## 2021-01-01 PROCEDURE — 99215 OFFICE O/P EST HI 40 MIN: CPT | Performed by: INTERNAL MEDICINE

## 2021-01-01 PROCEDURE — 87077 CULTURE AEROBIC IDENTIFY: CPT

## 2021-01-01 PROCEDURE — 1036F TOBACCO NON-USER: CPT | Performed by: STUDENT IN AN ORGANIZED HEALTH CARE EDUCATION/TRAINING PROGRAM

## 2021-01-01 PROCEDURE — 99222 1ST HOSP IP/OBS MODERATE 55: CPT | Performed by: FAMILY MEDICINE

## 2021-01-01 PROCEDURE — 78452 HT MUSCLE IMAGE SPECT MULT: CPT | Performed by: INTERNAL MEDICINE

## 2021-01-01 PROCEDURE — 99285 EMERGENCY DEPT VISIT HI MDM: CPT | Performed by: PHYSICIAN ASSISTANT

## 2021-01-01 PROCEDURE — 82607 VITAMIN B-12: CPT | Performed by: PHYSICIAN ASSISTANT

## 2021-01-01 PROCEDURE — 83550 IRON BINDING TEST: CPT | Performed by: PHYSICIAN ASSISTANT

## 2021-01-01 RX ORDER — OXYCODONE HYDROCHLORIDE 10 MG/1
10 TABLET ORAL EVERY 4 HOURS PRN
Status: DISCONTINUED | OUTPATIENT
Start: 2021-01-01 | End: 2021-01-01

## 2021-01-01 RX ORDER — SODIUM CHLORIDE 9 MG/ML
20 INJECTION, SOLUTION INTRAVENOUS ONCE AS NEEDED
Status: CANCELLED | OUTPATIENT
Start: 2021-01-01

## 2021-01-01 RX ORDER — SODIUM CHLORIDE 9 MG/ML
20 INJECTION, SOLUTION INTRAVENOUS ONCE AS NEEDED
Status: DISCONTINUED | OUTPATIENT
Start: 2021-01-01 | End: 2021-01-01 | Stop reason: HOSPADM

## 2021-01-01 RX ORDER — INSULIN GLARGINE 100 [IU]/ML
12 INJECTION, SOLUTION SUBCUTANEOUS
Status: DISCONTINUED | OUTPATIENT
Start: 2021-01-01 | End: 2021-01-01 | Stop reason: HOSPADM

## 2021-01-01 RX ORDER — PREDNISONE 20 MG/1
10 TABLET ORAL
Qty: 15 TABLET
Start: 2021-01-01 | End: 2021-01-01 | Stop reason: SDUPTHER

## 2021-01-01 RX ORDER — FLUOROURACIL 50 MG/ML
400 INJECTION, SOLUTION INTRAVENOUS ONCE
Status: COMPLETED | OUTPATIENT
Start: 2021-01-01 | End: 2021-01-01

## 2021-01-01 RX ORDER — FLUOROURACIL 50 MG/ML
400 INJECTION, SOLUTION INTRAVENOUS ONCE
Status: CANCELLED | OUTPATIENT
Start: 2021-01-01

## 2021-01-01 RX ORDER — TAMSULOSIN HYDROCHLORIDE 0.4 MG/1
0.4 CAPSULE ORAL
Status: DISCONTINUED | OUTPATIENT
Start: 2021-01-01 | End: 2021-01-01 | Stop reason: HOSPADM

## 2021-01-01 RX ORDER — BUTALBITAL, ACETAMINOPHEN AND CAFFEINE 50; 325; 40 MG/1; MG/1; MG/1
1 TABLET ORAL ONCE
Status: COMPLETED | OUTPATIENT
Start: 2021-01-01 | End: 2021-01-01

## 2021-01-01 RX ORDER — CHLORAL HYDRATE 500 MG
1000 CAPSULE ORAL DAILY
Status: DISCONTINUED | OUTPATIENT
Start: 2021-01-01 | End: 2021-01-01 | Stop reason: HOSPADM

## 2021-01-01 RX ORDER — GABAPENTIN 100 MG/1
200 CAPSULE ORAL 3 TIMES DAILY
Qty: 180 CAPSULE | Refills: 3 | Status: SHIPPED | OUTPATIENT
Start: 2021-01-01 | End: 2022-01-01

## 2021-01-01 RX ORDER — ONDANSETRON 2 MG/ML
4 INJECTION INTRAMUSCULAR; INTRAVENOUS EVERY 4 HOURS PRN
Status: DISCONTINUED | OUTPATIENT
Start: 2021-01-01 | End: 2021-01-01 | Stop reason: HOSPADM

## 2021-01-01 RX ORDER — SIMETHICONE 80 MG
80 TABLET,CHEWABLE ORAL EVERY 6 HOURS PRN
Status: DISCONTINUED | OUTPATIENT
Start: 2021-01-01 | End: 2021-01-01 | Stop reason: HOSPADM

## 2021-01-01 RX ORDER — SODIUM CHLORIDE 9 MG/ML
100 INJECTION, SOLUTION INTRAVENOUS CONTINUOUS
Status: DISCONTINUED | OUTPATIENT
Start: 2021-01-01 | End: 2021-01-01

## 2021-01-01 RX ORDER — INSULIN GLARGINE 100 [IU]/ML
20 INJECTION, SOLUTION SUBCUTANEOUS EVERY MORNING
Status: DISCONTINUED | OUTPATIENT
Start: 2021-01-01 | End: 2021-01-01 | Stop reason: HOSPADM

## 2021-01-01 RX ORDER — GABAPENTIN 100 MG/1
200 CAPSULE ORAL 3 TIMES DAILY
Status: DISCONTINUED | OUTPATIENT
Start: 2021-01-01 | End: 2021-01-01 | Stop reason: HOSPADM

## 2021-01-01 RX ORDER — DOCUSATE SODIUM 100 MG/1
100 CAPSULE, LIQUID FILLED ORAL 2 TIMES DAILY
Status: DISCONTINUED | OUTPATIENT
Start: 2021-01-01 | End: 2021-01-01 | Stop reason: HOSPADM

## 2021-01-01 RX ORDER — DEXTROSE MONOHYDRATE 50 MG/ML
20 INJECTION, SOLUTION INTRAVENOUS ONCE
Status: CANCELLED | OUTPATIENT
Start: 2021-01-01

## 2021-01-01 RX ORDER — ATROPINE SULFATE 1 MG/ML
0.25 INJECTION, SOLUTION INTRAMUSCULAR; INTRAVENOUS; SUBCUTANEOUS ONCE
Status: CANCELLED | OUTPATIENT
Start: 2021-01-01

## 2021-01-01 RX ORDER — ASPIRIN 81 MG/1
81 TABLET, CHEWABLE ORAL DAILY
Status: DISCONTINUED | OUTPATIENT
Start: 2021-01-01 | End: 2021-01-01 | Stop reason: HOSPADM

## 2021-01-01 RX ORDER — SODIUM CHLORIDE 9 MG/ML
20 INJECTION, SOLUTION INTRAVENOUS ONCE
Status: CANCELLED | OUTPATIENT
Start: 2021-01-01

## 2021-01-01 RX ORDER — NALOXONE HYDROCHLORIDE 0.4 MG/ML
0.1 INJECTION, SOLUTION INTRAMUSCULAR; INTRAVENOUS; SUBCUTANEOUS ONCE
Status: DISCONTINUED | OUTPATIENT
Start: 2021-01-01 | End: 2021-01-01

## 2021-01-01 RX ORDER — FLUOXETINE HYDROCHLORIDE 20 MG/1
20 CAPSULE ORAL DAILY
Status: DISCONTINUED | OUTPATIENT
Start: 2021-01-01 | End: 2021-01-01 | Stop reason: HOSPADM

## 2021-01-01 RX ORDER — POTASSIUM CHLORIDE 20 MEQ/1
40 TABLET, EXTENDED RELEASE ORAL ONCE
Status: COMPLETED | OUTPATIENT
Start: 2021-01-01 | End: 2021-01-01

## 2021-01-01 RX ORDER — INSULIN LISPRO 100 [IU]/ML
5 INJECTION, SOLUTION INTRAVENOUS; SUBCUTANEOUS
Qty: 15 ML | Refills: 0
Start: 2021-01-01 | End: 2021-01-01 | Stop reason: SDUPTHER

## 2021-01-01 RX ORDER — GABAPENTIN 100 MG/1
200 CAPSULE ORAL 3 TIMES DAILY
Qty: 190 CAPSULE | Refills: 0 | Status: SHIPPED | OUTPATIENT
Start: 2021-01-01 | End: 2021-01-01 | Stop reason: SDUPTHER

## 2021-01-01 RX ORDER — MORPHINE SULFATE 30 MG/1
30 TABLET, FILM COATED, EXTENDED RELEASE ORAL EVERY 12 HOURS SCHEDULED
Status: DISCONTINUED | OUTPATIENT
Start: 2021-01-01 | End: 2021-01-01 | Stop reason: HOSPADM

## 2021-01-01 RX ORDER — INSULIN GLARGINE 100 [IU]/ML
15 INJECTION, SOLUTION SUBCUTANEOUS ONCE
Status: COMPLETED | OUTPATIENT
Start: 2021-01-01 | End: 2021-01-01

## 2021-01-01 RX ORDER — HYDROMORPHONE HCL/PF 1 MG/ML
0.5 SYRINGE (ML) INJECTION ONCE
Status: COMPLETED | OUTPATIENT
Start: 2021-01-01 | End: 2021-01-01

## 2021-01-01 RX ORDER — OXYCODONE HYDROCHLORIDE 10 MG/1
10 TABLET ORAL EVERY 4 HOURS PRN
Status: DISCONTINUED | OUTPATIENT
Start: 2021-01-01 | End: 2021-01-01 | Stop reason: HOSPADM

## 2021-01-01 RX ORDER — PANCRELIPASE 24000; 76000; 120000 [USP'U]/1; [USP'U]/1; [USP'U]/1
CAPSULE, DELAYED RELEASE PELLETS ORAL
Qty: 270 CAPSULE | Refills: 0 | Status: SHIPPED | OUTPATIENT
Start: 2021-01-01 | End: 2022-01-01

## 2021-01-01 RX ORDER — PANTOPRAZOLE SODIUM 40 MG/1
40 TABLET, DELAYED RELEASE ORAL
Status: DISCONTINUED | OUTPATIENT
Start: 2021-01-01 | End: 2021-01-01 | Stop reason: DRUGHIGH

## 2021-01-01 RX ORDER — ACETAMINOPHEN 325 MG/1
650 TABLET ORAL ONCE
Status: CANCELLED | OUTPATIENT
Start: 2021-01-01

## 2021-01-01 RX ORDER — SODIUM CHLORIDE 9 MG/ML
20 INJECTION, SOLUTION INTRAVENOUS ONCE
Status: COMPLETED | OUTPATIENT
Start: 2021-01-01 | End: 2021-01-01

## 2021-01-01 RX ORDER — ONDANSETRON 2 MG/ML
4 INJECTION INTRAMUSCULAR; INTRAVENOUS ONCE
Status: COMPLETED | OUTPATIENT
Start: 2021-01-01 | End: 2021-01-01

## 2021-01-01 RX ORDER — DIAZEPAM 2 MG/1
2 TABLET ORAL DAILY PRN
Status: DISCONTINUED | OUTPATIENT
Start: 2021-01-01 | End: 2021-01-01 | Stop reason: HOSPADM

## 2021-01-01 RX ORDER — SODIUM CHLORIDE 9 MG/ML
INJECTION, SOLUTION INTRAVENOUS CONTINUOUS PRN
Status: DISCONTINUED | OUTPATIENT
Start: 2021-01-01 | End: 2021-01-01

## 2021-01-01 RX ORDER — PROPOFOL 10 MG/ML
INJECTION, EMULSION INTRAVENOUS CONTINUOUS PRN
Status: DISCONTINUED | OUTPATIENT
Start: 2021-01-01 | End: 2021-01-01

## 2021-01-01 RX ORDER — HYDROMORPHONE HCL IN WATER/PF 6 MG/30 ML
0.2 PATIENT CONTROLLED ANALGESIA SYRINGE INTRAVENOUS
Status: DISCONTINUED | OUTPATIENT
Start: 2021-01-01 | End: 2021-01-01 | Stop reason: HOSPADM

## 2021-01-01 RX ORDER — PANTOPRAZOLE SODIUM 40 MG/1
40 TABLET, DELAYED RELEASE ORAL 2 TIMES DAILY
Qty: 60 TABLET | Refills: 1 | Status: SHIPPED | OUTPATIENT
Start: 2021-01-01 | End: 2021-01-01 | Stop reason: SDUPTHER

## 2021-01-01 RX ORDER — INSULIN GLARGINE 100 [IU]/ML
20 INJECTION, SOLUTION SUBCUTANEOUS DAILY
Qty: 15 ML | Refills: 0 | Status: ON HOLD | OUTPATIENT
Start: 2021-01-01 | End: 2021-01-01 | Stop reason: SDUPTHER

## 2021-01-01 RX ORDER — SODIUM CHLORIDE 9 MG/ML
75 INJECTION, SOLUTION INTRAVENOUS CONTINUOUS
Status: DISCONTINUED | OUTPATIENT
Start: 2021-01-01 | End: 2021-01-01 | Stop reason: HOSPADM

## 2021-01-01 RX ORDER — PREDNISONE 10 MG/1
10 TABLET ORAL DAILY
Status: DISCONTINUED | OUTPATIENT
Start: 2021-01-01 | End: 2021-01-01

## 2021-01-01 RX ORDER — ACETAMINOPHEN 325 MG/1
650 TABLET ORAL EVERY 6 HOURS PRN
Status: DISCONTINUED | OUTPATIENT
Start: 2021-01-01 | End: 2021-01-01 | Stop reason: HOSPADM

## 2021-01-01 RX ORDER — MORPHINE SULFATE 30 MG/1
30 TABLET, FILM COATED, EXTENDED RELEASE ORAL EVERY 12 HOURS
Qty: 60 TABLET | Refills: 0 | Status: SHIPPED | OUTPATIENT
Start: 2021-01-01 | End: 2022-01-01 | Stop reason: ALTCHOICE

## 2021-01-01 RX ORDER — NITROGLYCERIN 0.4 MG/1
0.4 TABLET SUBLINGUAL
Status: DISCONTINUED | OUTPATIENT
Start: 2021-01-01 | End: 2021-01-01

## 2021-01-01 RX ORDER — INSULIN GLARGINE 100 [IU]/ML
12 INJECTION, SOLUTION SUBCUTANEOUS
Qty: 15 ML | Refills: 0 | Status: SHIPPED | OUTPATIENT
Start: 2021-01-01 | End: 2022-01-01 | Stop reason: HOSPADM

## 2021-01-01 RX ORDER — PANTOPRAZOLE SODIUM 40 MG/1
40 TABLET, DELAYED RELEASE ORAL
Status: DISCONTINUED | OUTPATIENT
Start: 2021-01-01 | End: 2021-01-01 | Stop reason: HOSPADM

## 2021-01-01 RX ORDER — ATROPINE SULFATE 1 MG/ML
0.25 INJECTION, SOLUTION INTRAMUSCULAR; INTRAVENOUS; SUBCUTANEOUS ONCE AS NEEDED
Status: CANCELLED | OUTPATIENT
Start: 2022-01-01

## 2021-01-01 RX ORDER — OXYCODONE HYDROCHLORIDE 5 MG/1
5 TABLET ORAL EVERY 4 HOURS PRN
Status: DISCONTINUED | OUTPATIENT
Start: 2021-01-01 | End: 2021-01-01 | Stop reason: HOSPADM

## 2021-01-01 RX ORDER — INSULIN LISPRO 100 [IU]/ML
INJECTION, SOLUTION INTRAVENOUS; SUBCUTANEOUS
Qty: 15 ML | Refills: 0
Start: 2021-01-01 | End: 2021-01-01 | Stop reason: SDUPTHER

## 2021-01-01 RX ORDER — HYDROMORPHONE HCL/PF 1 MG/ML
0.5 SYRINGE (ML) INJECTION
Status: DISCONTINUED | OUTPATIENT
Start: 2021-01-01 | End: 2021-01-01 | Stop reason: HOSPADM

## 2021-01-01 RX ORDER — MORPHINE SULFATE 10 MG/ML
6 INJECTION, SOLUTION INTRAMUSCULAR; INTRAVENOUS ONCE
Status: COMPLETED | OUTPATIENT
Start: 2021-01-01 | End: 2021-01-01

## 2021-01-01 RX ORDER — AMOXICILLIN AND CLAVULANATE POTASSIUM 875; 125 MG/1; MG/1
TABLET, FILM COATED ORAL
COMMUNITY
Start: 2021-01-01 | End: 2021-01-01

## 2021-01-01 RX ORDER — HYDROMORPHONE HCL/PF 1 MG/ML
0.2 SYRINGE (ML) INJECTION ONCE
Status: COMPLETED | OUTPATIENT
Start: 2021-01-01 | End: 2021-01-01

## 2021-01-01 RX ORDER — ONDANSETRON 2 MG/ML
INJECTION INTRAMUSCULAR; INTRAVENOUS AS NEEDED
Status: DISCONTINUED | OUTPATIENT
Start: 2021-01-01 | End: 2021-01-01

## 2021-01-01 RX ORDER — HEPARIN SODIUM 5000 [USP'U]/ML
5000 INJECTION, SOLUTION INTRAVENOUS; SUBCUTANEOUS EVERY 8 HOURS SCHEDULED
Status: DISCONTINUED | OUTPATIENT
Start: 2021-01-01 | End: 2021-01-01 | Stop reason: HOSPADM

## 2021-01-01 RX ORDER — POTASSIUM CHLORIDE 14.9 MG/ML
20 INJECTION INTRAVENOUS ONCE
Status: DISCONTINUED | OUTPATIENT
Start: 2021-01-01 | End: 2021-01-01

## 2021-01-01 RX ORDER — GLUCOSAMINE HCL/CHONDROITIN SU 500-400 MG
CAPSULE ORAL
Qty: 200 EACH | Refills: 0 | Status: SHIPPED | OUTPATIENT
Start: 2021-01-01

## 2021-01-01 RX ORDER — POTASSIUM CHLORIDE 20 MEQ/1
20 TABLET, EXTENDED RELEASE ORAL ONCE
Status: DISCONTINUED | OUTPATIENT
Start: 2021-01-01 | End: 2021-01-01

## 2021-01-01 RX ORDER — METRONIDAZOLE 500 MG/1
500 TABLET ORAL EVERY 8 HOURS SCHEDULED
Qty: 3 TABLET | Refills: 0 | Status: SHIPPED | OUTPATIENT
Start: 2021-01-01 | End: 2021-01-01

## 2021-01-01 RX ORDER — FLUOXETINE 10 MG/1
20 CAPSULE ORAL DAILY
Status: DISCONTINUED | OUTPATIENT
Start: 2021-01-01 | End: 2021-01-01 | Stop reason: HOSPADM

## 2021-01-01 RX ORDER — ATORVASTATIN CALCIUM 20 MG/1
20 TABLET, FILM COATED ORAL
Status: DISCONTINUED | OUTPATIENT
Start: 2021-01-01 | End: 2021-01-01 | Stop reason: HOSPADM

## 2021-01-01 RX ORDER — NALOXONE HYDROCHLORIDE 4 MG/.1ML
1 SPRAY NASAL
Qty: 1 EACH | Refills: 1 | Status: SHIPPED | OUTPATIENT
Start: 2021-01-01 | End: 2022-01-01 | Stop reason: ALTCHOICE

## 2021-01-01 RX ORDER — ALBUTEROL SULFATE 2.5 MG/3ML
2.5 SOLUTION RESPIRATORY (INHALATION) ONCE AS NEEDED
Status: DISCONTINUED | OUTPATIENT
Start: 2021-01-01 | End: 2021-01-01 | Stop reason: HOSPADM

## 2021-01-01 RX ORDER — OXYCODONE HYDROCHLORIDE 5 MG/1
5 TABLET ORAL EVERY 4 HOURS PRN
Status: DISCONTINUED | OUTPATIENT
Start: 2021-01-01 | End: 2021-01-01

## 2021-01-01 RX ORDER — HYDROMORPHONE HCL/PF 1 MG/ML
1 SYRINGE (ML) INJECTION ONCE
Status: COMPLETED | OUTPATIENT
Start: 2021-01-01 | End: 2021-01-01

## 2021-01-01 RX ORDER — MAGNESIUM HYDROXIDE/ALUMINUM HYDROXICE/SIMETHICONE 120; 1200; 1200 MG/30ML; MG/30ML; MG/30ML
30 SUSPENSION ORAL EVERY 4 HOURS PRN
Status: DISCONTINUED | OUTPATIENT
Start: 2021-01-01 | End: 2021-01-01 | Stop reason: HOSPADM

## 2021-01-01 RX ORDER — PEN NEEDLE, DIABETIC 32GX 5/32"
NEEDLE, DISPOSABLE MISCELLANEOUS
Qty: 100 EACH | Refills: 0 | Status: SHIPPED | OUTPATIENT
Start: 2021-01-01 | End: 2021-01-01 | Stop reason: SDUPTHER

## 2021-01-01 RX ORDER — ONDANSETRON 4 MG/1
4 TABLET, ORALLY DISINTEGRATING ORAL EVERY 6 HOURS PRN
Status: DISCONTINUED | OUTPATIENT
Start: 2021-01-01 | End: 2021-01-01 | Stop reason: HOSPADM

## 2021-01-01 RX ORDER — DEXTROSE MONOHYDRATE 50 MG/ML
20 INJECTION, SOLUTION INTRAVENOUS ONCE
Status: COMPLETED | OUTPATIENT
Start: 2021-01-01 | End: 2021-01-01

## 2021-01-01 RX ORDER — PREDNISONE 10 MG/1
10 TABLET ORAL
Qty: 30 TABLET | Refills: 0 | Status: SHIPPED | OUTPATIENT
Start: 2021-01-01 | End: 2021-01-01 | Stop reason: SDUPTHER

## 2021-01-01 RX ORDER — POTASSIUM CHLORIDE 14.9 MG/ML
20 INJECTION INTRAVENOUS ONCE
Status: COMPLETED | OUTPATIENT
Start: 2021-01-01 | End: 2021-01-01

## 2021-01-01 RX ORDER — PROPOFOL 10 MG/ML
INJECTION, EMULSION INTRAVENOUS AS NEEDED
Status: DISCONTINUED | OUTPATIENT
Start: 2021-01-01 | End: 2021-01-01

## 2021-01-01 RX ORDER — FOLIC ACID 1 MG/1
1 TABLET ORAL DAILY
Status: DISCONTINUED | OUTPATIENT
Start: 2021-01-01 | End: 2021-01-01 | Stop reason: HOSPADM

## 2021-01-01 RX ORDER — OXYCODONE HYDROCHLORIDE 5 MG/1
7.5 TABLET ORAL EVERY 4 HOURS PRN
Status: DISCONTINUED | OUTPATIENT
Start: 2021-01-01 | End: 2021-01-01 | Stop reason: HOSPADM

## 2021-01-01 RX ORDER — PREDNISONE 1 MG/1
5 TABLET ORAL DAILY
Status: DISCONTINUED | OUTPATIENT
Start: 2021-01-01 | End: 2021-01-01 | Stop reason: HOSPADM

## 2021-01-01 RX ORDER — CEPHALEXIN 500 MG/1
500 CAPSULE ORAL EVERY 6 HOURS SCHEDULED
Qty: 20 CAPSULE | Refills: 0 | Status: SHIPPED | OUTPATIENT
Start: 2021-01-01 | End: 2021-01-01

## 2021-01-01 RX ORDER — ACETAMINOPHEN 325 MG/1
650 TABLET ORAL ONCE
Status: COMPLETED | OUTPATIENT
Start: 2021-01-01 | End: 2021-01-01

## 2021-01-01 RX ORDER — POLYETHYLENE GLYCOL 3350 17 G/17G
17 POWDER, FOR SOLUTION ORAL DAILY PRN
Status: DISCONTINUED | OUTPATIENT
Start: 2021-01-01 | End: 2021-01-01

## 2021-01-01 RX ORDER — PREDNISONE 20 MG/1
20 TABLET ORAL DAILY
Qty: 30 TABLET | Refills: 0 | Status: SHIPPED | OUTPATIENT
Start: 2021-01-01 | End: 2021-01-01 | Stop reason: ALTCHOICE

## 2021-01-01 RX ORDER — PANTOPRAZOLE SODIUM 40 MG/1
40 TABLET, DELAYED RELEASE ORAL 2 TIMES DAILY
Qty: 60 TABLET | Refills: 1 | Status: SHIPPED | OUTPATIENT
Start: 2021-01-01 | End: 2022-01-01

## 2021-01-01 RX ORDER — METOCLOPRAMIDE HYDROCHLORIDE 5 MG/ML
10 INJECTION INTRAMUSCULAR; INTRAVENOUS ONCE AS NEEDED
Status: DISCONTINUED | OUTPATIENT
Start: 2021-01-01 | End: 2021-01-01 | Stop reason: HOSPADM

## 2021-01-01 RX ORDER — PREDNISONE 10 MG/1
10 TABLET ORAL
Status: DISCONTINUED | OUTPATIENT
Start: 2021-01-01 | End: 2021-01-01 | Stop reason: HOSPADM

## 2021-01-01 RX ORDER — DIAZEPAM 2 MG/1
2 TABLET ORAL DAILY PRN
Status: DISCONTINUED | OUTPATIENT
Start: 2021-01-01 | End: 2021-01-01

## 2021-01-01 RX ORDER — ATROPINE SULFATE 1 MG/ML
0.25 INJECTION, SOLUTION INTRAMUSCULAR; INTRAVENOUS; SUBCUTANEOUS ONCE
Status: CANCELLED | OUTPATIENT
Start: 2022-01-01

## 2021-01-01 RX ORDER — PROCHLORPERAZINE MALEATE 10 MG
10 TABLET ORAL EVERY 6 HOURS PRN
Status: DISCONTINUED | OUTPATIENT
Start: 2021-01-01 | End: 2021-01-01 | Stop reason: HOSPADM

## 2021-01-01 RX ORDER — LANCETS
EACH MISCELLANEOUS
Qty: 100 EACH | Refills: 5 | Status: SHIPPED | OUTPATIENT
Start: 2021-01-01 | End: 2022-01-01 | Stop reason: ALTCHOICE

## 2021-01-01 RX ORDER — SODIUM CHLORIDE 9 MG/ML
75 INJECTION, SOLUTION INTRAVENOUS CONTINUOUS
Status: CANCELLED | OUTPATIENT
Start: 2021-01-01

## 2021-01-01 RX ORDER — CEFAZOLIN SODIUM 2 G/50ML
2000 SOLUTION INTRAVENOUS EVERY 8 HOURS
Status: DISCONTINUED | OUTPATIENT
Start: 2021-01-01 | End: 2021-01-01

## 2021-01-01 RX ORDER — GABAPENTIN 100 MG/1
100 CAPSULE ORAL 3 TIMES DAILY
Status: DISCONTINUED | OUTPATIENT
Start: 2021-01-01 | End: 2021-01-01

## 2021-01-01 RX ORDER — METRONIDAZOLE 500 MG/1
500 TABLET ORAL EVERY 8 HOURS SCHEDULED
Status: DISCONTINUED | OUTPATIENT
Start: 2021-01-01 | End: 2021-01-01 | Stop reason: HOSPADM

## 2021-01-01 RX ORDER — HEPARIN SODIUM 10000 [USP'U]/100ML
3-30 INJECTION, SOLUTION INTRAVENOUS
Status: DISCONTINUED | OUTPATIENT
Start: 2021-01-01 | End: 2021-01-01 | Stop reason: HOSPADM

## 2021-01-01 RX ORDER — ALPRAZOLAM 0.5 MG/1
0.5 TABLET ORAL
Status: DISCONTINUED | OUTPATIENT
Start: 2021-01-01 | End: 2021-01-01 | Stop reason: HOSPADM

## 2021-01-01 RX ORDER — POTASSIUM CHLORIDE 20 MEQ/1
20 TABLET, EXTENDED RELEASE ORAL ONCE
Status: COMPLETED | OUTPATIENT
Start: 2021-01-01 | End: 2021-01-01

## 2021-01-01 RX ORDER — XYLITOL/YERBA SANTA
5 AEROSOL, SPRAY WITH PUMP (ML) MUCOUS MEMBRANE 4 TIMES DAILY PRN
Status: DISCONTINUED | OUTPATIENT
Start: 2021-01-01 | End: 2021-01-01 | Stop reason: HOSPADM

## 2021-01-01 RX ORDER — PREDNISONE 20 MG/1
20 TABLET ORAL DAILY
Status: DISCONTINUED | OUTPATIENT
Start: 2021-01-01 | End: 2021-01-01 | Stop reason: HOSPADM

## 2021-01-01 RX ORDER — INSULIN GLARGINE 100 [IU]/ML
25 INJECTION, SOLUTION SUBCUTANEOUS EVERY MORNING
Status: DISCONTINUED | OUTPATIENT
Start: 2021-01-01 | End: 2021-01-01

## 2021-01-01 RX ORDER — CEFEPIME HYDROCHLORIDE 2 G/50ML
2000 INJECTION, SOLUTION INTRAVENOUS EVERY 8 HOURS
Status: DISCONTINUED | OUTPATIENT
Start: 2021-01-01 | End: 2021-01-01 | Stop reason: HOSPADM

## 2021-01-01 RX ORDER — AMOXICILLIN 250 MG
1 CAPSULE ORAL
Status: DISCONTINUED | OUTPATIENT
Start: 2021-01-01 | End: 2021-01-01 | Stop reason: HOSPADM

## 2021-01-01 RX ORDER — ASPIRIN 81 MG/1
81 TABLET, CHEWABLE ORAL ONCE
Status: COMPLETED | OUTPATIENT
Start: 2021-01-01 | End: 2021-01-01

## 2021-01-01 RX ORDER — AMOXICILLIN 250 MG
1 CAPSULE ORAL
Status: DISCONTINUED | OUTPATIENT
Start: 2021-01-01 | End: 2021-01-01

## 2021-01-01 RX ORDER — ATROPINE SULFATE 1 MG/ML
0.25 INJECTION, SOLUTION INTRAMUSCULAR; INTRAVENOUS; SUBCUTANEOUS ONCE AS NEEDED
Status: CANCELLED | OUTPATIENT
Start: 2021-01-01

## 2021-01-01 RX ORDER — SENNA PLUS 8.6 MG/1
1 TABLET ORAL
Status: DISCONTINUED | OUTPATIENT
Start: 2021-01-01 | End: 2021-01-01 | Stop reason: HOSPADM

## 2021-01-01 RX ORDER — PANTOPRAZOLE SODIUM 40 MG/1
40 TABLET, DELAYED RELEASE ORAL 2 TIMES DAILY
Status: DISCONTINUED | OUTPATIENT
Start: 2021-01-01 | End: 2021-01-01 | Stop reason: HOSPADM

## 2021-01-01 RX ORDER — SENNA PLUS 8.6 MG/1
1 TABLET ORAL
Qty: 30 TABLET | Refills: 0 | Status: SHIPPED | OUTPATIENT
Start: 2021-01-01 | End: 2021-01-01

## 2021-01-01 RX ORDER — HYDROMORPHONE HCL IN WATER/PF 6 MG/30 ML
0.2 PATIENT CONTROLLED ANALGESIA SYRINGE INTRAVENOUS ONCE
Status: DISCONTINUED | OUTPATIENT
Start: 2021-01-01 | End: 2021-01-01 | Stop reason: HOSPADM

## 2021-01-01 RX ORDER — ONDANSETRON 4 MG/1
4 TABLET, ORALLY DISINTEGRATING ORAL EVERY 6 HOURS PRN
Qty: 12 TABLET | Refills: 0 | Status: SHIPPED | OUTPATIENT
Start: 2021-01-01 | End: 2022-01-01 | Stop reason: SDUPTHER

## 2021-01-01 RX ORDER — MIDAZOLAM HYDROCHLORIDE 2 MG/2ML
INJECTION, SOLUTION INTRAMUSCULAR; INTRAVENOUS CODE/TRAUMA/SEDATION MEDICATION
Status: COMPLETED | OUTPATIENT
Start: 2021-01-01 | End: 2021-01-01

## 2021-01-01 RX ORDER — SODIUM CHLORIDE 9 MG/ML
3 INJECTION INTRAVENOUS
Status: DISCONTINUED | OUTPATIENT
Start: 2021-01-01 | End: 2021-01-01 | Stop reason: HOSPADM

## 2021-01-01 RX ORDER — HEPARIN SODIUM 1000 [USP'U]/ML
6400 INJECTION, SOLUTION INTRAVENOUS; SUBCUTANEOUS ONCE
Status: COMPLETED | OUTPATIENT
Start: 2021-01-01 | End: 2021-01-01

## 2021-01-01 RX ORDER — LISINOPRIL AND HYDROCHLOROTHIAZIDE 12.5; 1 MG/1; MG/1
1 TABLET ORAL 3 TIMES WEEKLY
Qty: 90 TABLET | Refills: 3 | Status: SHIPPED | OUTPATIENT
Start: 2021-01-01 | End: 2022-01-01 | Stop reason: HOSPADM

## 2021-01-01 RX ORDER — HYDROMORPHONE HCL/PF 1 MG/ML
0.2 SYRINGE (ML) INJECTION EVERY 4 HOURS PRN
Status: DISCONTINUED | OUTPATIENT
Start: 2021-01-01 | End: 2021-01-01 | Stop reason: HOSPADM

## 2021-01-01 RX ORDER — CEFUROXIME AXETIL 500 MG/1
500 TABLET ORAL EVERY 12 HOURS SCHEDULED
Qty: 10 TABLET | Refills: 0 | Status: SHIPPED | OUTPATIENT
Start: 2021-01-01 | End: 2021-01-01 | Stop reason: HOSPADM

## 2021-01-01 RX ORDER — LABETALOL 20 MG/4 ML (5 MG/ML) INTRAVENOUS SYRINGE
10
Status: DISCONTINUED | OUTPATIENT
Start: 2021-01-01 | End: 2021-01-01 | Stop reason: HOSPADM

## 2021-01-01 RX ORDER — INSULIN GLARGINE 100 [IU]/ML
10 INJECTION, SOLUTION SUBCUTANEOUS
Qty: 15 ML | Refills: 0
Start: 2021-01-01 | End: 2021-01-01 | Stop reason: SDUPTHER

## 2021-01-01 RX ORDER — LANCETS
EACH MISCELLANEOUS
Qty: 100 EACH | Refills: 5 | Status: SHIPPED | OUTPATIENT
Start: 2021-01-01 | End: 2021-01-01 | Stop reason: SDUPTHER

## 2021-01-01 RX ORDER — HEPARIN SODIUM 10000 [USP'U]/100ML
3-20 INJECTION, SOLUTION INTRAVENOUS
Status: DISCONTINUED | OUTPATIENT
Start: 2021-01-01 | End: 2021-01-01 | Stop reason: HOSPADM

## 2021-01-01 RX ORDER — RIVAROXABAN 20 MG/1
20 TABLET, FILM COATED ORAL
Qty: 30 TABLET | Refills: 0 | Status: SHIPPED | OUTPATIENT
Start: 2021-01-01 | End: 2021-01-01 | Stop reason: HOSPADM

## 2021-01-01 RX ORDER — OXYCODONE HYDROCHLORIDE 10 MG/1
10-20 TABLET ORAL EVERY 4 HOURS PRN
Qty: 360 TABLET | Refills: 0 | Status: SHIPPED | OUTPATIENT
Start: 2021-01-01 | End: 2022-01-01

## 2021-01-01 RX ORDER — ATORVASTATIN CALCIUM 20 MG/1
20 TABLET, FILM COATED ORAL DAILY
Status: DISCONTINUED | OUTPATIENT
Start: 2021-01-01 | End: 2021-01-01 | Stop reason: HOSPADM

## 2021-01-01 RX ORDER — SENNOSIDES 8.6 MG
1 TABLET ORAL
Status: DISCONTINUED | OUTPATIENT
Start: 2021-01-01 | End: 2021-01-01 | Stop reason: HOSPADM

## 2021-01-01 RX ORDER — LIDOCAINE HYDROCHLORIDE 10 MG/ML
INJECTION, SOLUTION EPIDURAL; INFILTRATION; INTRACAUDAL; PERINEURAL AS NEEDED
Status: DISCONTINUED | OUTPATIENT
Start: 2021-01-01 | End: 2021-01-01

## 2021-01-01 RX ORDER — HYDRALAZINE HYDROCHLORIDE 20 MG/ML
10 INJECTION INTRAMUSCULAR; INTRAVENOUS EVERY 6 HOURS PRN
Status: DISCONTINUED | OUTPATIENT
Start: 2021-01-01 | End: 2021-01-01 | Stop reason: HOSPADM

## 2021-01-01 RX ORDER — INSULIN LISPRO 100 [IU]/ML
INJECTION, SOLUTION INTRAVENOUS; SUBCUTANEOUS
Qty: 15 ML | Refills: 5 | Status: SHIPPED | OUTPATIENT
Start: 2021-01-01 | End: 2022-01-01 | Stop reason: HOSPADM

## 2021-01-01 RX ORDER — ACETAMINOPHEN 325 MG/1
650 TABLET ORAL EVERY 8 HOURS
Status: DISCONTINUED | OUTPATIENT
Start: 2021-01-01 | End: 2021-01-01 | Stop reason: HOSPADM

## 2021-01-01 RX ORDER — SODIUM CHLORIDE 9 MG/ML
3 INJECTION INTRAVENOUS DAILY
Status: DISCONTINUED | OUTPATIENT
Start: 2021-01-01 | End: 2021-01-01 | Stop reason: HOSPADM

## 2021-01-01 RX ORDER — POLYETHYLENE GLYCOL 3350 17 G/17G
17 POWDER, FOR SOLUTION ORAL DAILY PRN
Status: DISCONTINUED | OUTPATIENT
Start: 2021-01-01 | End: 2021-01-01 | Stop reason: HOSPADM

## 2021-01-01 RX ORDER — FENTANYL CITRATE 50 UG/ML
INJECTION, SOLUTION INTRAMUSCULAR; INTRAVENOUS AS NEEDED
Status: DISCONTINUED | OUTPATIENT
Start: 2021-01-01 | End: 2021-01-01

## 2021-01-01 RX ORDER — SODIUM CHLORIDE 9 MG/ML
20 INJECTION, SOLUTION INTRAVENOUS ONCE
Status: CANCELLED | OUTPATIENT
Start: 2022-01-01

## 2021-01-01 RX ORDER — SENNOSIDES 8.6 MG
1 TABLET ORAL DAILY PRN
Status: DISCONTINUED | OUTPATIENT
Start: 2021-01-01 | End: 2021-01-01

## 2021-01-01 RX ORDER — INSULIN GLARGINE 100 [IU]/ML
12 INJECTION, SOLUTION SUBCUTANEOUS
Qty: 15 ML | Refills: 0 | Status: SHIPPED | OUTPATIENT
Start: 2021-01-01 | End: 2021-01-01

## 2021-01-01 RX ORDER — ACETAMINOPHEN 325 MG/1
975 TABLET ORAL ONCE
Status: COMPLETED | OUTPATIENT
Start: 2021-01-01 | End: 2021-01-01

## 2021-01-01 RX ORDER — INSULIN GLARGINE 100 [IU]/ML
12 INJECTION, SOLUTION SUBCUTANEOUS
Qty: 15 ML | Refills: 0
Start: 2021-01-01 | End: 2021-01-01 | Stop reason: SDUPTHER

## 2021-01-01 RX ORDER — OXYCODONE HYDROCHLORIDE 10 MG/1
20 TABLET ORAL EVERY 4 HOURS PRN
Status: DISCONTINUED | OUTPATIENT
Start: 2021-01-01 | End: 2021-01-01 | Stop reason: HOSPADM

## 2021-01-01 RX ORDER — PREDNISONE 1 MG/1
5 TABLET ORAL DAILY
Qty: 30 TABLET | Refills: 0 | Status: SHIPPED | OUTPATIENT
Start: 2021-01-01 | End: 2021-01-01 | Stop reason: ALTCHOICE

## 2021-01-01 RX ORDER — SODIUM CHLORIDE 9 MG/ML
5 INJECTION INTRAVENOUS EVERY 24 HOURS
Status: DISCONTINUED | OUTPATIENT
Start: 2021-01-01 | End: 2021-01-01 | Stop reason: HOSPADM

## 2021-01-01 RX ORDER — POLYETHYLENE GLYCOL 3350 17 G/17G
17 POWDER, FOR SOLUTION ORAL DAILY
Status: DISCONTINUED | OUTPATIENT
Start: 2021-01-01 | End: 2021-01-01 | Stop reason: HOSPADM

## 2021-01-01 RX ORDER — PREDNISONE 10 MG/1
10 TABLET ORAL
Qty: 90 TABLET | Refills: 0 | Status: SHIPPED | OUTPATIENT
Start: 2021-01-01 | End: 2022-01-01

## 2021-01-01 RX ORDER — DEXAMETHASONE SODIUM PHOSPHATE 4 MG/ML
INJECTION, SOLUTION INTRA-ARTICULAR; INTRALESIONAL; INTRAMUSCULAR; INTRAVENOUS; SOFT TISSUE AS NEEDED
Status: DISCONTINUED | OUTPATIENT
Start: 2021-01-01 | End: 2021-01-01

## 2021-01-01 RX ORDER — PEN NEEDLE, DIABETIC 32GX 5/32"
NEEDLE, DISPOSABLE MISCELLANEOUS
Qty: 100 EACH | Refills: 0 | Status: SHIPPED | OUTPATIENT
Start: 2021-01-01 | End: 2021-01-01 | Stop reason: ALTCHOICE

## 2021-01-01 RX ORDER — ONDANSETRON 2 MG/ML
4 INJECTION INTRAMUSCULAR; INTRAVENOUS ONCE AS NEEDED
Status: DISCONTINUED | OUTPATIENT
Start: 2021-01-01 | End: 2021-01-01 | Stop reason: HOSPADM

## 2021-01-01 RX ORDER — CEFTRIAXONE 1 G/50ML
1000 INJECTION, SOLUTION INTRAVENOUS EVERY 24 HOURS
Status: DISCONTINUED | OUTPATIENT
Start: 2021-01-01 | End: 2021-01-01

## 2021-01-01 RX ORDER — MAGNESIUM HYDROXIDE/ALUMINUM HYDROXICE/SIMETHICONE 120; 1200; 1200 MG/30ML; MG/30ML; MG/30ML
30 SUSPENSION ORAL EVERY 6 HOURS PRN
Status: DISCONTINUED | OUTPATIENT
Start: 2021-01-01 | End: 2021-01-01 | Stop reason: HOSPADM

## 2021-01-01 RX ORDER — GABAPENTIN 100 MG/1
200 CAPSULE ORAL 3 TIMES DAILY
Status: DISCONTINUED | OUTPATIENT
Start: 2021-01-01 | End: 2021-01-01

## 2021-01-01 RX ORDER — ATORVASTATIN CALCIUM 10 MG/1
10 TABLET, FILM COATED ORAL
Status: DISCONTINUED | OUTPATIENT
Start: 2021-01-01 | End: 2021-01-01 | Stop reason: HOSPADM

## 2021-01-01 RX ORDER — PREDNISONE 20 MG/1
20 TABLET ORAL DAILY
Qty: 60 TABLET | Refills: 0 | Status: SHIPPED | OUTPATIENT
Start: 2021-01-01 | End: 2021-01-01 | Stop reason: SDUPTHER

## 2021-01-01 RX ORDER — OXYCODONE HYDROCHLORIDE 5 MG/1
2.5 TABLET ORAL EVERY 4 HOURS PRN
Status: DISCONTINUED | OUTPATIENT
Start: 2021-01-01 | End: 2021-01-01 | Stop reason: HOSPADM

## 2021-01-01 RX ORDER — HYDRALAZINE HYDROCHLORIDE 20 MG/ML
5 INJECTION INTRAMUSCULAR; INTRAVENOUS
Status: DISCONTINUED | OUTPATIENT
Start: 2021-01-01 | End: 2021-01-01 | Stop reason: HOSPADM

## 2021-01-01 RX ORDER — SODIUM CHLORIDE 9 MG/ML
5 INJECTION INTRAVENOUS EVERY 12 HOURS SCHEDULED
Status: DISCONTINUED | OUTPATIENT
Start: 2021-01-01 | End: 2021-01-01 | Stop reason: HOSPADM

## 2021-01-01 RX ORDER — OXYCODONE HYDROCHLORIDE 10 MG/1
TABLET ORAL
Qty: 168 TABLET | Refills: 0 | Status: SHIPPED | OUTPATIENT
Start: 2021-01-01 | End: 2021-01-01 | Stop reason: SDUPTHER

## 2021-01-01 RX ORDER — INSULIN GLARGINE 100 [IU]/ML
5 INJECTION, SOLUTION SUBCUTANEOUS
Status: DISCONTINUED | OUTPATIENT
Start: 2021-01-01 | End: 2021-01-01 | Stop reason: HOSPADM

## 2021-01-01 RX ORDER — SODIUM CHLORIDE, SODIUM LACTATE, POTASSIUM CHLORIDE, CALCIUM CHLORIDE 600; 310; 30; 20 MG/100ML; MG/100ML; MG/100ML; MG/100ML
125 INJECTION, SOLUTION INTRAVENOUS CONTINUOUS
Status: DISCONTINUED | OUTPATIENT
Start: 2021-01-01 | End: 2021-01-01

## 2021-01-01 RX ORDER — NALOXONE HYDROCHLORIDE 0.4 MG/ML
0.2 INJECTION, SOLUTION INTRAMUSCULAR; INTRAVENOUS; SUBCUTANEOUS ONCE
Status: COMPLETED | OUTPATIENT
Start: 2021-01-01 | End: 2021-01-01

## 2021-01-01 RX ORDER — ATORVASTATIN CALCIUM 40 MG/1
20 TABLET, FILM COATED ORAL DAILY
Status: DISCONTINUED | OUTPATIENT
Start: 2021-01-01 | End: 2021-01-01 | Stop reason: HOSPADM

## 2021-01-01 RX ORDER — ACETAMINOPHEN 325 MG/1
975 TABLET ORAL EVERY 8 HOURS SCHEDULED
Status: DISCONTINUED | OUTPATIENT
Start: 2021-01-01 | End: 2021-01-01 | Stop reason: HOSPADM

## 2021-01-01 RX ORDER — BUTALBITAL, ACETAMINOPHEN AND CAFFEINE 50; 325; 40 MG/1; MG/1; MG/1
1 TABLET ORAL EVERY 4 HOURS PRN
Status: DISCONTINUED | OUTPATIENT
Start: 2021-01-01 | End: 2021-01-01 | Stop reason: HOSPADM

## 2021-01-01 RX ORDER — LANOLIN ALCOHOL/MO/W.PET/CERES
3 CREAM (GRAM) TOPICAL
Status: DISCONTINUED | OUTPATIENT
Start: 2021-01-01 | End: 2021-01-01 | Stop reason: HOSPADM

## 2021-01-01 RX ORDER — DIAZEPAM 5 MG/ML
1 INJECTION, SOLUTION INTRAMUSCULAR; INTRAVENOUS ONCE
Status: COMPLETED | OUTPATIENT
Start: 2021-01-01 | End: 2021-01-01

## 2021-01-01 RX ORDER — INSULIN LISPRO 100 [IU]/ML
10 INJECTION, SOLUTION INTRAVENOUS; SUBCUTANEOUS
Qty: 15 ML | Refills: 0 | Status: ON HOLD | OUTPATIENT
Start: 2021-01-01 | End: 2021-01-01 | Stop reason: SDUPTHER

## 2021-01-01 RX ORDER — SODIUM CHLORIDE, SODIUM LACTATE, POTASSIUM CHLORIDE, CALCIUM CHLORIDE 600; 310; 30; 20 MG/100ML; MG/100ML; MG/100ML; MG/100ML
INJECTION, SOLUTION INTRAVENOUS CONTINUOUS PRN
Status: DISCONTINUED | OUTPATIENT
Start: 2021-01-01 | End: 2021-01-01

## 2021-01-01 RX ORDER — ACETAMINOPHEN 325 MG/1
650 TABLET ORAL EVERY 8 HOURS
Qty: 168 TABLET | Refills: 2 | Status: SHIPPED | OUTPATIENT
Start: 2021-01-01 | End: 2022-01-01

## 2021-01-01 RX ORDER — INSULIN GLARGINE 100 [IU]/ML
15 INJECTION, SOLUTION SUBCUTANEOUS
Status: DISCONTINUED | OUTPATIENT
Start: 2021-01-01 | End: 2021-01-01

## 2021-01-01 RX ORDER — SODIUM CHLORIDE 9 MG/ML
75 INJECTION, SOLUTION INTRAVENOUS CONTINUOUS
Status: DISPENSED | OUTPATIENT
Start: 2021-01-01 | End: 2021-01-01

## 2021-01-01 RX ORDER — ATROPINE SULFATE 1 MG/ML
0.25 INJECTION, SOLUTION INTRAMUSCULAR; INTRAVENOUS; SUBCUTANEOUS ONCE
Status: COMPLETED | OUTPATIENT
Start: 2021-01-01 | End: 2021-01-01

## 2021-01-01 RX ORDER — FENTANYL CITRATE 50 UG/ML
INJECTION, SOLUTION INTRAMUSCULAR; INTRAVENOUS CODE/TRAUMA/SEDATION MEDICATION
Status: COMPLETED | OUTPATIENT
Start: 2021-01-01 | End: 2021-01-01

## 2021-01-01 RX ORDER — BUPIVACAINE HYDROCHLORIDE AND EPINEPHRINE 5; 5 MG/ML; UG/ML
INJECTION, SOLUTION PERINEURAL AS NEEDED
Status: DISCONTINUED | OUTPATIENT
Start: 2021-01-01 | End: 2021-01-01 | Stop reason: HOSPADM

## 2021-01-01 RX ORDER — ATROPINE SULFATE 1 MG/ML
0.25 INJECTION, SOLUTION INTRAMUSCULAR; INTRAVENOUS; SUBCUTANEOUS ONCE AS NEEDED
Status: DISCONTINUED | OUTPATIENT
Start: 2021-01-01 | End: 2022-01-01 | Stop reason: HOSPADM

## 2021-01-01 RX ORDER — BLOOD SUGAR DIAGNOSTIC
STRIP MISCELLANEOUS
Qty: 50 EACH | Refills: 5 | Status: SHIPPED | OUTPATIENT
Start: 2021-01-01 | End: 2021-01-01 | Stop reason: SDUPTHER

## 2021-01-01 RX ORDER — SENNA PLUS 8.6 MG/1
1-2 TABLET ORAL DAILY PRN
Qty: 45 TABLET | Refills: 0 | Status: SHIPPED | OUTPATIENT
Start: 2021-01-01 | End: 2021-01-01

## 2021-01-01 RX ORDER — ACETAMINOPHEN, DEXTROMETHORPHAN HBR, DOXYLAMINE SUCCINATE 650; 30; 12.5 MG/30ML; MG/30ML; MG/30ML
LIQUID ORAL
Qty: 100 EACH | Refills: 3 | Status: SHIPPED | OUTPATIENT
Start: 2021-01-01 | End: 2022-01-01 | Stop reason: ALTCHOICE

## 2021-01-01 RX ORDER — SIMETHICONE 180 MG
180 CAPSULE ORAL EVERY 8 HOURS PRN
Qty: 84 CAPSULE | Refills: 1 | Status: SHIPPED | OUTPATIENT
Start: 2021-01-01 | End: 2022-01-01

## 2021-01-01 RX ORDER — CEFEPIME HYDROCHLORIDE 2 G/50ML
2000 INJECTION, SOLUTION INTRAVENOUS EVERY 12 HOURS
Status: DISCONTINUED | OUTPATIENT
Start: 2021-01-01 | End: 2021-01-01

## 2021-01-01 RX ORDER — METOCLOPRAMIDE 10 MG/1
10 TABLET ORAL
Status: DISCONTINUED | OUTPATIENT
Start: 2021-01-01 | End: 2021-01-01

## 2021-01-01 RX ORDER — INSULIN GLARGINE 100 [IU]/ML
20 INJECTION, SOLUTION SUBCUTANEOUS
Status: DISCONTINUED | OUTPATIENT
Start: 2021-01-01 | End: 2021-01-01

## 2021-01-01 RX ORDER — TAMSULOSIN HYDROCHLORIDE 0.4 MG/1
CAPSULE ORAL
Qty: 90 CAPSULE | Refills: 3 | Status: SHIPPED | OUTPATIENT
Start: 2021-01-01 | End: 2022-01-01 | Stop reason: SDUPTHER

## 2021-01-01 RX ORDER — MIDAZOLAM HYDROCHLORIDE 2 MG/2ML
INJECTION, SOLUTION INTRAMUSCULAR; INTRAVENOUS AS NEEDED
Status: DISCONTINUED | OUTPATIENT
Start: 2021-01-01 | End: 2021-01-01

## 2021-01-01 RX ORDER — PROCHLORPERAZINE MALEATE 5 MG/1
10 TABLET ORAL EVERY 6 HOURS PRN
Status: DISCONTINUED | OUTPATIENT
Start: 2021-01-01 | End: 2021-01-01 | Stop reason: HOSPADM

## 2021-01-01 RX ORDER — MAGNESIUM HYDROXIDE 1200 MG/15ML
LIQUID ORAL AS NEEDED
Status: DISCONTINUED | OUTPATIENT
Start: 2021-01-01 | End: 2021-01-01 | Stop reason: HOSPADM

## 2021-01-01 RX ORDER — FLUOXETINE HYDROCHLORIDE 20 MG/1
CAPSULE ORAL
Qty: 90 CAPSULE | Refills: 2 | Status: SHIPPED | OUTPATIENT
Start: 2021-01-01

## 2021-01-01 RX ORDER — SODIUM CHLORIDE, SODIUM GLUCONATE, SODIUM ACETATE, POTASSIUM CHLORIDE, MAGNESIUM CHLORIDE, SODIUM PHOSPHATE, DIBASIC, AND POTASSIUM PHOSPHATE .53; .5; .37; .037; .03; .012; .00082 G/100ML; G/100ML; G/100ML; G/100ML; G/100ML; G/100ML; G/100ML
75 INJECTION, SOLUTION INTRAVENOUS CONTINUOUS
Status: DISCONTINUED | OUTPATIENT
Start: 2021-01-01 | End: 2021-01-01 | Stop reason: HOSPADM

## 2021-01-01 RX ORDER — INSULIN GLARGINE 100 [IU]/ML
10 INJECTION, SOLUTION SUBCUTANEOUS
Status: DISCONTINUED | OUTPATIENT
Start: 2021-01-01 | End: 2021-01-01

## 2021-01-01 RX ORDER — HYDROMORPHONE HCL/PF 1 MG/ML
0.5 SYRINGE (ML) INJECTION EVERY 4 HOURS PRN
Status: DISCONTINUED | OUTPATIENT
Start: 2021-01-01 | End: 2021-01-01 | Stop reason: HOSPADM

## 2021-01-01 RX ORDER — DIAZEPAM 2 MG/1
2 TABLET ORAL DAILY PRN
Qty: 15 TABLET | Refills: 0 | Status: SHIPPED | OUTPATIENT
Start: 2021-01-01 | End: 2021-01-01

## 2021-01-01 RX ORDER — KETAMINE HCL IN NACL, ISO-OSM 100MG/10ML
SYRINGE (ML) INJECTION AS NEEDED
Status: DISCONTINUED | OUTPATIENT
Start: 2021-01-01 | End: 2021-01-01

## 2021-01-01 RX ORDER — OXYCODONE HYDROCHLORIDE 5 MG/1
5 TABLET ORAL EVERY 4 HOURS PRN
Qty: 120 TABLET | Refills: 0 | Status: SHIPPED | OUTPATIENT
Start: 2021-01-01 | End: 2021-01-01 | Stop reason: ALTCHOICE

## 2021-01-01 RX ORDER — CYANOCOBALAMIN 1000 UG/ML
1000 INJECTION INTRAMUSCULAR; SUBCUTANEOUS ONCE
Status: COMPLETED | OUTPATIENT
Start: 2021-01-01 | End: 2021-01-01

## 2021-01-01 RX ORDER — PROMETHAZINE HYDROCHLORIDE 25 MG/ML
12.5 INJECTION, SOLUTION INTRAMUSCULAR; INTRAVENOUS ONCE AS NEEDED
Status: DISCONTINUED | OUTPATIENT
Start: 2021-01-01 | End: 2021-01-01 | Stop reason: HOSPADM

## 2021-01-01 RX ORDER — DEXAMETHASONE SODIUM PHOSPHATE 4 MG/ML
8 INJECTION, SOLUTION INTRA-ARTICULAR; INTRALESIONAL; INTRAMUSCULAR; INTRAVENOUS; SOFT TISSUE ONCE
Status: COMPLETED | OUTPATIENT
Start: 2021-01-01 | End: 2021-01-01

## 2021-01-01 RX ADMIN — SODIUM CHLORIDE 20 ML/HR: 0.9 INJECTION, SOLUTION INTRAVENOUS at 08:15

## 2021-01-01 RX ADMIN — PANCRELIPASE 24000 UNITS: 24000; 76000; 120000 CAPSULE, DELAYED RELEASE PELLETS ORAL at 08:11

## 2021-01-01 RX ADMIN — POTASSIUM CHLORIDE 40 MEQ: 1500 TABLET, EXTENDED RELEASE ORAL at 06:34

## 2021-01-01 RX ADMIN — SODIUM CHLORIDE 3 G: 9 INJECTION, SOLUTION INTRAVENOUS at 19:11

## 2021-01-01 RX ADMIN — PREDNISONE 20 MG: 20 TABLET ORAL at 08:04

## 2021-01-01 RX ADMIN — GABAPENTIN 200 MG: 100 CAPSULE ORAL at 17:49

## 2021-01-01 RX ADMIN — SODIUM CHLORIDE 3 G: 9 INJECTION, SOLUTION INTRAVENOUS at 18:49

## 2021-01-01 RX ADMIN — ASPIRIN 81 MG: 81 TABLET, CHEWABLE ORAL at 08:30

## 2021-01-01 RX ADMIN — SODIUM CHLORIDE 3 G: 9 INJECTION, SOLUTION INTRAVENOUS at 00:28

## 2021-01-01 RX ADMIN — SODIUM CHLORIDE 20 ML/HR: 0.9 INJECTION, SOLUTION INTRAVENOUS at 12:09

## 2021-01-01 RX ADMIN — PANTOPRAZOLE SODIUM 40 MG: 40 TABLET, DELAYED RELEASE ORAL at 21:59

## 2021-01-01 RX ADMIN — PANTOPRAZOLE SODIUM 40 MG: 40 TABLET, DELAYED RELEASE ORAL at 17:22

## 2021-01-01 RX ADMIN — CEFAZOLIN SODIUM 2000 MG: 2 SOLUTION INTRAVENOUS at 13:57

## 2021-01-01 RX ADMIN — SODIUM CHLORIDE 5 ML: 9 INJECTION INTRAMUSCULAR; INTRAVENOUS; SUBCUTANEOUS at 09:01

## 2021-01-01 RX ADMIN — HYDROMORPHONE HYDROCHLORIDE 0.5 MG: 1 INJECTION, SOLUTION INTRAMUSCULAR; INTRAVENOUS; SUBCUTANEOUS at 06:11

## 2021-01-01 RX ADMIN — VANCOMYCIN HYDROCHLORIDE 1500 MG: 5 INJECTION, POWDER, LYOPHILIZED, FOR SOLUTION INTRAVENOUS at 13:58

## 2021-01-01 RX ADMIN — INSULIN GLARGINE 12 UNITS: 100 INJECTION, SOLUTION SUBCUTANEOUS at 22:06

## 2021-01-01 RX ADMIN — ASPIRIN 81 MG CHEWABLE TABLET 81 MG: 81 TABLET CHEWABLE at 10:03

## 2021-01-01 RX ADMIN — SODIUM CHLORIDE, SODIUM LACTATE, POTASSIUM CHLORIDE, AND CALCIUM CHLORIDE: .6; .31; .03; .02 INJECTION, SOLUTION INTRAVENOUS at 11:41

## 2021-01-01 RX ADMIN — INSULIN LISPRO 1 UNITS: 100 INJECTION, SOLUTION INTRAVENOUS; SUBCUTANEOUS at 11:28

## 2021-01-01 RX ADMIN — FOLIC ACID 1 MG: 1 TABLET ORAL at 08:33

## 2021-01-01 RX ADMIN — SODIUM CHLORIDE 20 ML/HR: 0.9 INJECTION, SOLUTION INTRAVENOUS at 09:20

## 2021-01-01 RX ADMIN — APIXABAN 5 MG: 5 TABLET, FILM COATED ORAL at 17:36

## 2021-01-01 RX ADMIN — SODIUM CHLORIDE, SODIUM LACTATE, POTASSIUM CHLORIDE, AND CALCIUM CHLORIDE 1000 ML: .6; .31; .03; .02 INJECTION, SOLUTION INTRAVENOUS at 11:45

## 2021-01-01 RX ADMIN — IOHEXOL 100 ML: 350 INJECTION, SOLUTION INTRAVENOUS at 15:47

## 2021-01-01 RX ADMIN — PANCRELIPASE 24000 UNITS: 24000; 76000; 120000 CAPSULE, DELAYED RELEASE PELLETS ORAL at 12:05

## 2021-01-01 RX ADMIN — ENOXAPARIN SODIUM 40 MG: 40 INJECTION SUBCUTANEOUS at 08:19

## 2021-01-01 RX ADMIN — Medication 191 MG: at 09:15

## 2021-01-01 RX ADMIN — FLUOXETINE 20 MG: 20 CAPSULE ORAL at 10:03

## 2021-01-01 RX ADMIN — PREDNISONE 20 MG: 20 TABLET ORAL at 08:11

## 2021-01-01 RX ADMIN — ENOXAPARIN SODIUM 40 MG: 40 INJECTION SUBCUTANEOUS at 13:26

## 2021-01-01 RX ADMIN — POTASSIUM CHLORIDE 40 MEQ: 1500 TABLET, EXTENDED RELEASE ORAL at 08:38

## 2021-01-01 RX ADMIN — PANTOPRAZOLE SODIUM 40 MG: 40 TABLET, DELAYED RELEASE ORAL at 21:08

## 2021-01-01 RX ADMIN — SODIUM CHLORIDE 3 G: 9 INJECTION, SOLUTION INTRAVENOUS at 01:30

## 2021-01-01 RX ADMIN — FLUOXETINE 20 MG: 20 CAPSULE ORAL at 09:53

## 2021-01-01 RX ADMIN — INSULIN LISPRO 10 UNITS: 100 INJECTION, SOLUTION INTRAVENOUS; SUBCUTANEOUS at 09:23

## 2021-01-01 RX ADMIN — HYDROMORPHONE HYDROCHLORIDE 0.2 MG: 1 INJECTION, SOLUTION INTRAMUSCULAR; INTRAVENOUS; SUBCUTANEOUS at 03:30

## 2021-01-01 RX ADMIN — SODIUM CHLORIDE 20 ML/HR: 0.9 INJECTION, SOLUTION INTRAVENOUS at 09:04

## 2021-01-01 RX ADMIN — INSULIN LISPRO 10 UNITS: 100 INJECTION, SOLUTION INTRAVENOUS; SUBCUTANEOUS at 17:29

## 2021-01-01 RX ADMIN — HYDROMORPHONE HYDROCHLORIDE 0.5 MG: 1 INJECTION, SOLUTION INTRAMUSCULAR; INTRAVENOUS; SUBCUTANEOUS at 18:51

## 2021-01-01 RX ADMIN — TAMSULOSIN HYDROCHLORIDE 0.4 MG: 0.4 CAPSULE ORAL at 21:55

## 2021-01-01 RX ADMIN — INSULIN LISPRO 3 UNITS: 100 INJECTION, SOLUTION INTRAVENOUS; SUBCUTANEOUS at 21:51

## 2021-01-01 RX ADMIN — PROPOFOL 30 MG: 10 INJECTION, EMULSION INTRAVENOUS at 15:11

## 2021-01-01 RX ADMIN — GABAPENTIN 200 MG: 100 CAPSULE ORAL at 08:30

## 2021-01-01 RX ADMIN — SODIUM CHLORIDE 5 ML: 9 INJECTION INTRAMUSCULAR; INTRAVENOUS; SUBCUTANEOUS at 09:00

## 2021-01-01 RX ADMIN — SENNOSIDES 8.6 MG: 8.6 TABLET, FILM COATED ORAL at 21:39

## 2021-01-01 RX ADMIN — INSULIN LISPRO 5 UNITS: 100 INJECTION, SOLUTION INTRAVENOUS; SUBCUTANEOUS at 11:56

## 2021-01-01 RX ADMIN — IOHEXOL 100 ML: 350 INJECTION, SOLUTION INTRAVENOUS at 11:11

## 2021-01-01 RX ADMIN — ACETAMINOPHEN 975 MG: 325 TABLET, FILM COATED ORAL at 07:05

## 2021-01-01 RX ADMIN — IOHEXOL 85 ML: 350 INJECTION, SOLUTION INTRAVENOUS at 14:19

## 2021-01-01 RX ADMIN — FOLIC ACID 1 MG: 1 TABLET ORAL at 09:06

## 2021-01-01 RX ADMIN — IOHEXOL 10 ML: 350 INJECTION, SOLUTION INTRAVENOUS at 09:11

## 2021-01-01 RX ADMIN — HEPARIN SODIUM 5000 UNITS: 5000 INJECTION INTRAVENOUS; SUBCUTANEOUS at 15:22

## 2021-01-01 RX ADMIN — POTASSIUM CHLORIDE 40 MEQ: 1500 TABLET, EXTENDED RELEASE ORAL at 10:48

## 2021-01-01 RX ADMIN — GEMCITABINE HYDROCHLORIDE 1909.9 MG: 2 INJECTION, SOLUTION INTRAVENOUS at 15:02

## 2021-01-01 RX ADMIN — OXYCODONE HYDROCHLORIDE 5 MG: 5 TABLET ORAL at 17:57

## 2021-01-01 RX ADMIN — INSULIN LISPRO 10 UNITS: 100 INJECTION, SOLUTION INTRAVENOUS; SUBCUTANEOUS at 11:55

## 2021-01-01 RX ADMIN — GABAPENTIN 200 MG: 100 CAPSULE ORAL at 16:45

## 2021-01-01 RX ADMIN — ASPIRIN 81 MG: 81 TABLET, CHEWABLE ORAL at 09:52

## 2021-01-01 RX ADMIN — ENOXAPARIN SODIUM 40 MG: 40 INJECTION SUBCUTANEOUS at 08:28

## 2021-01-01 RX ADMIN — FOLIC ACID 1 MG: 1 TABLET ORAL at 12:40

## 2021-01-01 RX ADMIN — SODIUM CHLORIDE 5 ML: 9 INJECTION INTRAMUSCULAR; INTRAVENOUS; SUBCUTANEOUS at 08:44

## 2021-01-01 RX ADMIN — PREDNISONE 20 MG: 20 TABLET ORAL at 08:20

## 2021-01-01 RX ADMIN — SODIUM CHLORIDE 5 ML: 9 INJECTION INTRAMUSCULAR; INTRAVENOUS; SUBCUTANEOUS at 08:25

## 2021-01-01 RX ADMIN — ALPRAZOLAM 0.5 MG: 0.5 TABLET ORAL at 22:55

## 2021-01-01 RX ADMIN — ENOXAPARIN SODIUM 40 MG: 40 INJECTION SUBCUTANEOUS at 14:00

## 2021-01-01 RX ADMIN — GABAPENTIN 200 MG: 100 CAPSULE ORAL at 17:43

## 2021-01-01 RX ADMIN — INSULIN LISPRO 3 UNITS: 100 INJECTION, SOLUTION INTRAVENOUS; SUBCUTANEOUS at 17:17

## 2021-01-01 RX ADMIN — OXYCODONE HYDROCHLORIDE 7.5 MG: 5 TABLET ORAL at 15:32

## 2021-01-01 RX ADMIN — PANTOPRAZOLE SODIUM 40 MG: 40 TABLET, DELAYED RELEASE ORAL at 06:04

## 2021-01-01 RX ADMIN — ACETAMINOPHEN 650 MG: 325 TABLET, FILM COATED ORAL at 23:56

## 2021-01-01 RX ADMIN — GABAPENTIN 200 MG: 100 CAPSULE ORAL at 16:15

## 2021-01-01 RX ADMIN — ASPIRIN 81 MG: 81 TABLET, CHEWABLE ORAL at 08:19

## 2021-01-01 RX ADMIN — ATORVASTATIN CALCIUM 20 MG: 20 TABLET, FILM COATED ORAL at 09:31

## 2021-01-01 RX ADMIN — ACETAMINOPHEN 650 MG: 325 TABLET, FILM COATED ORAL at 22:34

## 2021-01-01 RX ADMIN — FOLIC ACID 1 MG: 1 TABLET ORAL at 08:59

## 2021-01-01 RX ADMIN — PREDNISONE 20 MG: 20 TABLET ORAL at 11:51

## 2021-01-01 RX ADMIN — ENOXAPARIN SODIUM 40 MG: 40 INJECTION SUBCUTANEOUS at 13:10

## 2021-01-01 RX ADMIN — PANTOPRAZOLE SODIUM 40 MG: 40 TABLET, DELAYED RELEASE ORAL at 23:22

## 2021-01-01 RX ADMIN — ACETAMINOPHEN 650 MG: 325 TABLET, FILM COATED ORAL at 12:48

## 2021-01-01 RX ADMIN — GABAPENTIN 200 MG: 100 CAPSULE ORAL at 16:52

## 2021-01-01 RX ADMIN — FLUOXETINE 20 MG: 20 CAPSULE ORAL at 09:02

## 2021-01-01 RX ADMIN — ASPIRIN 81 MG: 81 TABLET, CHEWABLE ORAL at 11:47

## 2021-01-01 RX ADMIN — GABAPENTIN 200 MG: 100 CAPSULE ORAL at 21:25

## 2021-01-01 RX ADMIN — FLUOXETINE 20 MG: 20 CAPSULE ORAL at 08:59

## 2021-01-01 RX ADMIN — PREDNISONE 10 MG: 10 TABLET ORAL at 10:18

## 2021-01-01 RX ADMIN — DEXTROSE 20 ML/HR: 5 SOLUTION INTRAVENOUS at 09:10

## 2021-01-01 RX ADMIN — MORPHINE SULFATE 6 MG: 10 INJECTION INTRAVENOUS at 10:06

## 2021-01-01 RX ADMIN — ASPIRIN 81 MG: 81 TABLET, CHEWABLE ORAL at 08:18

## 2021-01-01 RX ADMIN — ENOXAPARIN SODIUM 40 MG: 40 INJECTION SUBCUTANEOUS at 14:53

## 2021-01-01 RX ADMIN — ACETAMINOPHEN 650 MG: 325 TABLET, FILM COATED ORAL at 05:31

## 2021-01-01 RX ADMIN — CEFAZOLIN SODIUM 2000 MG: 2 SOLUTION INTRAVENOUS at 14:53

## 2021-01-01 RX ADMIN — BUTALBITAL, ACETAMINOPHEN AND CAFFEINE 1 TABLET: 50; 325; 40 TABLET ORAL at 11:07

## 2021-01-01 RX ADMIN — PEGFILGRASTIM 6 MG: KIT SUBCUTANEOUS at 10:10

## 2021-01-01 RX ADMIN — PANCRELIPASE 24000 UNITS: 24000; 76000; 120000 CAPSULE, DELAYED RELEASE PELLETS ORAL at 11:56

## 2021-01-01 RX ADMIN — GABAPENTIN 200 MG: 100 CAPSULE ORAL at 19:30

## 2021-01-01 RX ADMIN — SODIUM CHLORIDE 75 ML/HR: 0.9 INJECTION, SOLUTION INTRAVENOUS at 00:16

## 2021-01-01 RX ADMIN — OXYCODONE HYDROCHLORIDE 7.5 MG: 5 TABLET ORAL at 19:51

## 2021-01-01 RX ADMIN — GABAPENTIN 200 MG: 100 CAPSULE ORAL at 12:40

## 2021-01-01 RX ADMIN — LEUCOVORIN CALCIUM 800 MG: 10 INJECTION INTRAMUSCULAR; INTRAVENOUS at 09:15

## 2021-01-01 RX ADMIN — GABAPENTIN 200 MG: 100 CAPSULE ORAL at 20:35

## 2021-01-01 RX ADMIN — LEUCOVORIN CALCIUM 800 MG: 10 INJECTION INTRAMUSCULAR; INTRAVENOUS at 12:49

## 2021-01-01 RX ADMIN — FLUOXETINE 20 MG: 20 CAPSULE ORAL at 09:30

## 2021-01-01 RX ADMIN — FLUOXETINE 20 MG: 20 CAPSULE ORAL at 11:51

## 2021-01-01 RX ADMIN — SODIUM CHLORIDE 3 G: 9 INJECTION, SOLUTION INTRAVENOUS at 06:00

## 2021-01-01 RX ADMIN — VANCOMYCIN HYDROCHLORIDE 1500 MG: 5 INJECTION, POWDER, LYOPHILIZED, FOR SOLUTION INTRAVENOUS at 06:14

## 2021-01-01 RX ADMIN — SODIUM CHLORIDE, SODIUM LACTATE, POTASSIUM CHLORIDE, AND CALCIUM CHLORIDE 125 ML/HR: .6; .31; .03; .02 INJECTION, SOLUTION INTRAVENOUS at 18:08

## 2021-01-01 RX ADMIN — PANCRELIPASE 24000 UNITS: 24000; 76000; 120000 CAPSULE, DELAYED RELEASE PELLETS ORAL at 11:18

## 2021-01-01 RX ADMIN — TAMSULOSIN HYDROCHLORIDE 0.4 MG: 0.4 CAPSULE ORAL at 21:39

## 2021-01-01 RX ADMIN — ACETAMINOPHEN 650 MG: 325 TABLET, FILM COATED ORAL at 20:35

## 2021-01-01 RX ADMIN — BUTALBITAL, ACETAMINOPHEN AND CAFFEINE 1 TABLET: 50; 325; 40 TABLET ORAL at 13:18

## 2021-01-01 RX ADMIN — PANTOPRAZOLE SODIUM 40 MG: 40 TABLET, DELAYED RELEASE ORAL at 17:43

## 2021-01-01 RX ADMIN — PANCRELIPASE 24000 UNITS: 24000; 76000; 120000 CAPSULE, DELAYED RELEASE PELLETS ORAL at 16:42

## 2021-01-01 RX ADMIN — OXALIPLATIN 166.6 MG: 5 INJECTION, SOLUTION INTRAVENOUS at 09:26

## 2021-01-01 RX ADMIN — LEUCOVORIN CALCIUM 800 MG: 200 INJECTION, POWDER, LYOPHILIZED, FOR SUSPENSION INTRAMUSCULAR; INTRAVENOUS at 09:34

## 2021-01-01 RX ADMIN — PANTOPRAZOLE SODIUM 40 MG: 40 TABLET, DELAYED RELEASE ORAL at 21:24

## 2021-01-01 RX ADMIN — SODIUM CHLORIDE 20 ML/HR: 0.9 INJECTION, SOLUTION INTRAVENOUS at 12:40

## 2021-01-01 RX ADMIN — INSULIN LISPRO 3 UNITS: 100 INJECTION, SOLUTION INTRAVENOUS; SUBCUTANEOUS at 21:28

## 2021-01-01 RX ADMIN — PROPOFOL 50 MG: 10 INJECTION, EMULSION INTRAVENOUS at 11:58

## 2021-01-01 RX ADMIN — ENOXAPARIN SODIUM 40 MG: 40 INJECTION SUBCUTANEOUS at 08:03

## 2021-01-01 RX ADMIN — FOLIC ACID 1 MG: 1 TABLET ORAL at 09:01

## 2021-01-01 RX ADMIN — SODIUM CHLORIDE 20 ML/HR: 0.9 INJECTION, SOLUTION INTRAVENOUS at 13:27

## 2021-01-01 RX ADMIN — ACETAMINOPHEN 650 MG: 325 TABLET, FILM COATED ORAL at 09:23

## 2021-01-01 RX ADMIN — PREDNISONE 5 MG: 5 TABLET ORAL at 08:58

## 2021-01-01 RX ADMIN — FOLIC ACID 1 MG: 1 TABLET ORAL at 09:00

## 2021-01-01 RX ADMIN — PANCRELIPASE 24000 UNITS: 24000; 76000; 120000 CAPSULE, DELAYED RELEASE PELLETS ORAL at 17:00

## 2021-01-01 RX ADMIN — INSULIN LISPRO 5 UNITS: 100 INJECTION, SOLUTION INTRAVENOUS; SUBCUTANEOUS at 11:39

## 2021-01-01 RX ADMIN — IOHEXOL 100 ML: 350 INJECTION, SOLUTION INTRAVENOUS at 04:58

## 2021-01-01 RX ADMIN — GABAPENTIN 200 MG: 100 CAPSULE ORAL at 10:04

## 2021-01-01 RX ADMIN — ASPIRIN 81 MG CHEWABLE TABLET 81 MG: 81 TABLET CHEWABLE at 10:18

## 2021-01-01 RX ADMIN — SODIUM CHLORIDE 20 ML/HR: 0.9 INJECTION, SOLUTION INTRAVENOUS at 08:35

## 2021-01-01 RX ADMIN — METOCLOPRAMIDE 10 MG: 10 TABLET ORAL at 16:02

## 2021-01-01 RX ADMIN — INSULIN LISPRO 2 UNITS: 100 INJECTION, SOLUTION INTRAVENOUS; SUBCUTANEOUS at 11:52

## 2021-01-01 RX ADMIN — DEXAMETHASONE SODIUM PHOSPHATE: 10 INJECTION, SOLUTION INTRAMUSCULAR; INTRAVENOUS at 13:29

## 2021-01-01 RX ADMIN — PANTOPRAZOLE SODIUM 40 MG: 40 TABLET, DELAYED RELEASE ORAL at 21:56

## 2021-01-01 RX ADMIN — APIXABAN 5 MG: 5 TABLET, FILM COATED ORAL at 09:29

## 2021-01-01 RX ADMIN — GABAPENTIN 100 MG: 100 CAPSULE ORAL at 15:32

## 2021-01-01 RX ADMIN — FLUOXETINE 20 MG: 20 CAPSULE ORAL at 08:10

## 2021-01-01 RX ADMIN — CEFTRIAXONE 2000 MG: 2 INJECTION, POWDER, FOR SOLUTION INTRAMUSCULAR; INTRAVENOUS at 14:09

## 2021-01-01 RX ADMIN — INSULIN LISPRO 5 UNITS: 100 INJECTION, SOLUTION INTRAVENOUS; SUBCUTANEOUS at 18:14

## 2021-01-01 RX ADMIN — GABAPENTIN 200 MG: 100 CAPSULE ORAL at 23:21

## 2021-01-01 RX ADMIN — INSULIN LISPRO 1 UNITS: 100 INJECTION, SOLUTION INTRAVENOUS; SUBCUTANEOUS at 21:55

## 2021-01-01 RX ADMIN — VANCOMYCIN HYDROCHLORIDE 1500 MG: 5 INJECTION, POWDER, LYOPHILIZED, FOR SOLUTION INTRAVENOUS at 13:41

## 2021-01-01 RX ADMIN — MIDAZOLAM HYDROCHLORIDE 1 MG: 1 INJECTION, SOLUTION INTRAMUSCULAR; INTRAVENOUS at 15:03

## 2021-01-01 RX ADMIN — PEGFILGRASTIM 6 MG: KIT SUBCUTANEOUS at 09:35

## 2021-01-01 RX ADMIN — GABAPENTIN 200 MG: 100 CAPSULE ORAL at 22:07

## 2021-01-01 RX ADMIN — INSULIN GLARGINE 12 UNITS: 100 INJECTION, SOLUTION SUBCUTANEOUS at 21:42

## 2021-01-01 RX ADMIN — OXYCODONE HYDROCHLORIDE 7.5 MG: 5 TABLET ORAL at 08:29

## 2021-01-01 RX ADMIN — Medication 191 MG: at 14:07

## 2021-01-01 RX ADMIN — Medication 5 SPRAY: at 16:27

## 2021-01-01 RX ADMIN — OXALIPLATIN 166.6 MG: 5 INJECTION, SOLUTION INTRAVENOUS at 09:10

## 2021-01-01 RX ADMIN — GABAPENTIN 200 MG: 100 CAPSULE ORAL at 15:51

## 2021-01-01 RX ADMIN — FENTANYL CITRATE 25 MCG: 50 INJECTION INTRAMUSCULAR; INTRAVENOUS at 15:33

## 2021-01-01 RX ADMIN — PANCRELIPASE 24000 UNITS: 24000; 76000; 120000 CAPSULE, DELAYED RELEASE PELLETS ORAL at 07:26

## 2021-01-01 RX ADMIN — CYANOCOBALAMIN TAB 500 MCG 1000 MCG: 500 TAB at 08:59

## 2021-01-01 RX ADMIN — VANCOMYCIN HYDROCHLORIDE 1500 MG: 5 INJECTION, POWDER, LYOPHILIZED, FOR SOLUTION INTRAVENOUS at 14:51

## 2021-01-01 RX ADMIN — POTASSIUM CHLORIDE 40 MEQ: 1500 TABLET, EXTENDED RELEASE ORAL at 10:04

## 2021-01-01 RX ADMIN — PANCRELIPASE 24000 UNITS: 24000; 76000; 120000 CAPSULE, DELAYED RELEASE PELLETS ORAL at 17:43

## 2021-01-01 RX ADMIN — ACETAMINOPHEN 650 MG: 325 TABLET, FILM COATED ORAL at 02:37

## 2021-01-01 RX ADMIN — INSULIN LISPRO 1 UNITS: 100 INJECTION, SOLUTION INTRAVENOUS; SUBCUTANEOUS at 17:36

## 2021-01-01 RX ADMIN — INSULIN LISPRO 3 UNITS: 100 INJECTION, SOLUTION INTRAVENOUS; SUBCUTANEOUS at 17:16

## 2021-01-01 RX ADMIN — MORPHINE SULFATE 30 MG: 30 TABLET, EXTENDED RELEASE ORAL at 08:30

## 2021-01-01 RX ADMIN — CYANOCOBALAMIN TAB 500 MCG 1000 MCG: 500 TAB at 09:04

## 2021-01-01 RX ADMIN — Medication 191 MG: at 10:39

## 2021-01-01 RX ADMIN — FOLIC ACID 1 MG: 1 TABLET ORAL at 08:30

## 2021-01-01 RX ADMIN — FLUOROURACIL 785 MG: 50 INJECTION, SOLUTION INTRAVENOUS at 11:20

## 2021-01-01 RX ADMIN — PANCRELIPASE 24000 UNITS: 24000; 76000; 120000 CAPSULE, DELAYED RELEASE PELLETS ORAL at 17:51

## 2021-01-01 RX ADMIN — SODIUM CHLORIDE 5 ML: 9 INJECTION INTRAMUSCULAR; INTRAVENOUS; SUBCUTANEOUS at 21:56

## 2021-01-01 RX ADMIN — Medication 3 MG: at 23:08

## 2021-01-01 RX ADMIN — INSULIN GLARGINE 5 UNITS: 100 INJECTION, SOLUTION SUBCUTANEOUS at 22:00

## 2021-01-01 RX ADMIN — SODIUM CHLORIDE 5 ML: 9 INJECTION INTRAMUSCULAR; INTRAVENOUS; SUBCUTANEOUS at 09:40

## 2021-01-01 RX ADMIN — PANCRELIPASE 24000 UNITS: 24000; 76000; 120000 CAPSULE, DELAYED RELEASE PELLETS ORAL at 17:08

## 2021-01-01 RX ADMIN — PREDNISONE 10 MG: 10 TABLET ORAL at 13:08

## 2021-01-01 RX ADMIN — ACETAMINOPHEN 650 MG: 325 TABLET, FILM COATED ORAL at 08:30

## 2021-01-01 RX ADMIN — PANCRELIPASE 24000 UNITS: 24000; 76000; 120000 CAPSULE, DELAYED RELEASE PELLETS ORAL at 14:44

## 2021-01-01 RX ADMIN — PANCRELIPASE 24000 UNITS: 24000; 76000; 120000 CAPSULE, DELAYED RELEASE PELLETS ORAL at 16:45

## 2021-01-01 RX ADMIN — FOLIC ACID 1 MG: 1 TABLET ORAL at 08:10

## 2021-01-01 RX ADMIN — TAMSULOSIN HYDROCHLORIDE 0.4 MG: 0.4 CAPSULE ORAL at 16:22

## 2021-01-01 RX ADMIN — PREDNISONE 10 MG: 10 TABLET ORAL at 09:38

## 2021-01-01 RX ADMIN — PANTOPRAZOLE SODIUM 40 MG: 40 TABLET, DELAYED RELEASE ORAL at 05:35

## 2021-01-01 RX ADMIN — LEUCOVORIN CALCIUM 800 MG: 500 INJECTION, POWDER, LYOPHILIZED, FOR SOLUTION INTRAMUSCULAR; INTRAVENOUS at 09:17

## 2021-01-01 RX ADMIN — SODIUM CHLORIDE 1000 ML: 0.9 INJECTION, SOLUTION INTRAVENOUS at 03:53

## 2021-01-01 RX ADMIN — GABAPENTIN 200 MG: 100 CAPSULE ORAL at 18:12

## 2021-01-01 RX ADMIN — ATROPINE SULFATE 0.25 MG: 1 INJECTION, SOLUTION INTRAMUSCULAR; INTRAVENOUS; SUBCUTANEOUS at 09:03

## 2021-01-01 RX ADMIN — PREDNISONE 10 MG: 10 TABLET ORAL at 09:02

## 2021-01-01 RX ADMIN — SODIUM CHLORIDE 100 ML/HR: 0.9 INJECTION, SOLUTION INTRAVENOUS at 22:17

## 2021-01-01 RX ADMIN — CEFAZOLIN SODIUM 2000 MG: 2 SOLUTION INTRAVENOUS at 06:10

## 2021-01-01 RX ADMIN — FLUOXETINE 20 MG: 20 CAPSULE ORAL at 08:38

## 2021-01-01 RX ADMIN — FLUOXETINE 20 MG: 20 CAPSULE ORAL at 09:40

## 2021-01-01 RX ADMIN — FENTANYL CITRATE 25 MCG: 50 INJECTION INTRAMUSCULAR; INTRAVENOUS at 15:00

## 2021-01-01 RX ADMIN — PANTOPRAZOLE SODIUM 40 MG: 40 TABLET, DELAYED RELEASE ORAL at 08:24

## 2021-01-01 RX ADMIN — METRONIDAZOLE 500 MG: 500 TABLET ORAL at 05:31

## 2021-01-01 RX ADMIN — INSULIN GLARGINE 12 UNITS: 100 INJECTION, SOLUTION SUBCUTANEOUS at 22:48

## 2021-01-01 RX ADMIN — DEXAMETHASONE SODIUM PHOSPHATE: 10 INJECTION, SOLUTION INTRAMUSCULAR; INTRAVENOUS at 08:28

## 2021-01-01 RX ADMIN — OXYCODONE HYDROCHLORIDE 7.5 MG: 5 TABLET ORAL at 14:47

## 2021-01-01 RX ADMIN — SODIUM CHLORIDE 5 ML: 9 INJECTION INTRAMUSCULAR; INTRAVENOUS; SUBCUTANEOUS at 21:59

## 2021-01-01 RX ADMIN — PANCRELIPASE 24000 UNITS: 24000; 76000; 120000 CAPSULE, DELAYED RELEASE PELLETS ORAL at 10:03

## 2021-01-01 RX ADMIN — PREDNISONE 20 MG: 5 TABLET ORAL at 08:24

## 2021-01-01 RX ADMIN — FLUOROURACIL 785 MG: 50 INJECTION, SOLUTION INTRAVENOUS at 11:38

## 2021-01-01 RX ADMIN — ENOXAPARIN SODIUM 40 MG: 40 INJECTION SUBCUTANEOUS at 09:52

## 2021-01-01 RX ADMIN — GABAPENTIN 100 MG: 100 CAPSULE ORAL at 11:17

## 2021-01-01 RX ADMIN — PREDNISONE 10 MG: 10 TABLET ORAL at 09:06

## 2021-01-01 RX ADMIN — INSULIN GLARGINE 12 UNITS: 100 INJECTION, SOLUTION SUBCUTANEOUS at 03:20

## 2021-01-01 RX ADMIN — PANCRELIPASE 24000 UNITS: 24000; 76000; 120000 CAPSULE, DELAYED RELEASE PELLETS ORAL at 17:24

## 2021-01-01 RX ADMIN — INSULIN LISPRO 6 UNITS: 100 INJECTION, SOLUTION INTRAVENOUS; SUBCUTANEOUS at 19:12

## 2021-01-01 RX ADMIN — VANCOMYCIN HYDROCHLORIDE 1500 MG: 5 INJECTION, POWDER, LYOPHILIZED, FOR SOLUTION INTRAVENOUS at 13:49

## 2021-01-01 RX ADMIN — INSULIN GLARGINE 12 UNITS: 100 INJECTION, SOLUTION SUBCUTANEOUS at 02:03

## 2021-01-01 RX ADMIN — SODIUM CHLORIDE 75 ML/HR: 0.9 INJECTION, SOLUTION INTRAVENOUS at 13:01

## 2021-01-01 RX ADMIN — GABAPENTIN 200 MG: 100 CAPSULE ORAL at 08:17

## 2021-01-01 RX ADMIN — CEFEPIME HYDROCHLORIDE 2000 MG: 2 INJECTION, SOLUTION INTRAVENOUS at 14:06

## 2021-01-01 RX ADMIN — FOLIC ACID 1 MG: 1 TABLET ORAL at 10:19

## 2021-01-01 RX ADMIN — DEXAMETHASONE SODIUM PHOSPHATE: 10 INJECTION, SOLUTION INTRAMUSCULAR; INTRAVENOUS at 08:20

## 2021-01-01 RX ADMIN — ENOXAPARIN SODIUM 40 MG: 40 INJECTION SUBCUTANEOUS at 16:51

## 2021-01-01 RX ADMIN — MORPHINE SULFATE 30 MG: 30 TABLET, EXTENDED RELEASE ORAL at 21:33

## 2021-01-01 RX ADMIN — SODIUM CHLORIDE 75 ML/HR: 0.9 INJECTION, SOLUTION INTRAVENOUS at 22:08

## 2021-01-01 RX ADMIN — PEGFILGRASTIM 6 MG: KIT SUBCUTANEOUS at 09:52

## 2021-01-01 RX ADMIN — DEXAMETHASONE SODIUM PHOSPHATE 4 MG: 4 INJECTION INTRA-ARTICULAR; INTRALESIONAL; INTRAMUSCULAR; INTRAVENOUS; SOFT TISSUE at 15:11

## 2021-01-01 RX ADMIN — TAMSULOSIN HYDROCHLORIDE 0.4 MG: 0.4 CAPSULE ORAL at 21:33

## 2021-01-01 RX ADMIN — GABAPENTIN 200 MG: 100 CAPSULE ORAL at 08:33

## 2021-01-01 RX ADMIN — IOHEXOL 85 ML: 350 INJECTION, SOLUTION INTRAVENOUS at 06:06

## 2021-01-01 RX ADMIN — METOCLOPRAMIDE 10 MG: 10 TABLET ORAL at 07:26

## 2021-01-01 RX ADMIN — PANCRELIPASE 24000 UNITS: 24000; 76000; 120000 CAPSULE, DELAYED RELEASE PELLETS ORAL at 08:46

## 2021-01-01 RX ADMIN — OXALIPLATIN 164.05 MG: 5 INJECTION, SOLUTION INTRAVENOUS at 09:37

## 2021-01-01 RX ADMIN — INSULIN LISPRO 5 UNITS: 100 INJECTION, SOLUTION INTRAVENOUS; SUBCUTANEOUS at 17:50

## 2021-01-01 RX ADMIN — PANCRELIPASE 24000 UNITS: 24000; 76000; 120000 CAPSULE, DELAYED RELEASE PELLETS ORAL at 22:01

## 2021-01-01 RX ADMIN — LEUCOVORIN CALCIUM 800 MG: 10 INJECTION INTRAMUSCULAR; INTRAVENOUS at 09:25

## 2021-01-01 RX ADMIN — PANTOPRAZOLE SODIUM 40 MG: 40 TABLET, DELAYED RELEASE ORAL at 22:06

## 2021-01-01 RX ADMIN — SODIUM CHLORIDE 20 ML/HR: 0.9 INJECTION, SOLUTION INTRAVENOUS at 08:00

## 2021-01-01 RX ADMIN — PANCRELIPASE 24000 UNITS: 24000; 76000; 120000 CAPSULE, DELAYED RELEASE PELLETS ORAL at 11:52

## 2021-01-01 RX ADMIN — SODIUM CHLORIDE 3 G: 9 INJECTION, SOLUTION INTRAVENOUS at 06:50

## 2021-01-01 RX ADMIN — INSULIN LISPRO 2 UNITS: 100 INJECTION, SOLUTION INTRAVENOUS; SUBCUTANEOUS at 11:44

## 2021-01-01 RX ADMIN — INSULIN LISPRO 10 UNITS: 100 INJECTION, SOLUTION INTRAVENOUS; SUBCUTANEOUS at 12:30

## 2021-01-01 RX ADMIN — ATORVASTATIN CALCIUM 10 MG: 10 TABLET, FILM COATED ORAL at 18:09

## 2021-01-01 RX ADMIN — INSULIN LISPRO 1 UNITS: 100 INJECTION, SOLUTION INTRAVENOUS; SUBCUTANEOUS at 22:48

## 2021-01-01 RX ADMIN — PANCRELIPASE 24000 UNITS: 24000; 76000; 120000 CAPSULE, DELAYED RELEASE PELLETS ORAL at 10:19

## 2021-01-01 RX ADMIN — CEFEPIME HYDROCHLORIDE 2000 MG: 2 INJECTION, POWDER, FOR SOLUTION INTRAVENOUS at 10:23

## 2021-01-01 RX ADMIN — MIDAZOLAM 1 MG: 1 INJECTION INTRAMUSCULAR; INTRAVENOUS at 15:33

## 2021-01-01 RX ADMIN — PREDNISONE 5 MG: 5 TABLET ORAL at 08:24

## 2021-01-01 RX ADMIN — DOCUSATE SODIUM 100 MG: 100 CAPSULE ORAL at 08:32

## 2021-01-01 RX ADMIN — FLUOROURACIL 770 MG: 50 INJECTION, SOLUTION INTRAVENOUS at 15:02

## 2021-01-01 RX ADMIN — INSULIN LISPRO 2 UNITS: 100 INJECTION, SOLUTION INTRAVENOUS; SUBCUTANEOUS at 18:53

## 2021-01-01 RX ADMIN — SODIUM CHLORIDE 20 ML/HR: 0.9 INJECTION, SOLUTION INTRAVENOUS at 08:29

## 2021-01-01 RX ADMIN — OXALIPLATIN 164.05 MG: 5 INJECTION, SOLUTION INTRAVENOUS at 12:55

## 2021-01-01 RX ADMIN — SODIUM CHLORIDE 3 G: 9 INJECTION, SOLUTION INTRAVENOUS at 06:48

## 2021-01-01 RX ADMIN — GABAPENTIN 200 MG: 100 CAPSULE ORAL at 09:53

## 2021-01-01 RX ADMIN — INSULIN GLARGINE 10 UNITS: 100 INJECTION, SOLUTION SUBCUTANEOUS at 23:08

## 2021-01-01 RX ADMIN — GABAPENTIN 200 MG: 100 CAPSULE ORAL at 08:19

## 2021-01-01 RX ADMIN — CEFAZOLIN SODIUM 2000 MG: 2 SOLUTION INTRAVENOUS at 15:08

## 2021-01-01 RX ADMIN — ASPIRIN 81 MG: 81 TABLET, CHEWABLE ORAL at 10:03

## 2021-01-01 RX ADMIN — INSULIN LISPRO 2 UNITS: 100 INJECTION, SOLUTION INTRAVENOUS; SUBCUTANEOUS at 21:56

## 2021-01-01 RX ADMIN — OXYCODONE HYDROCHLORIDE 5 MG: 5 TABLET ORAL at 06:18

## 2021-01-01 RX ADMIN — PANTOPRAZOLE SODIUM 40 MG: 40 TABLET, DELAYED RELEASE ORAL at 09:04

## 2021-01-01 RX ADMIN — METOCLOPRAMIDE 10 MG: 10 TABLET ORAL at 11:17

## 2021-01-01 RX ADMIN — ATORVASTATIN CALCIUM 10 MG: 10 TABLET, FILM COATED ORAL at 17:43

## 2021-01-01 RX ADMIN — INSULIN LISPRO 5 UNITS: 100 INJECTION, SOLUTION INTRAVENOUS; SUBCUTANEOUS at 07:39

## 2021-01-01 RX ADMIN — CEFAZOLIN SODIUM 2000 MG: 2 SOLUTION INTRAVENOUS at 05:40

## 2021-01-01 RX ADMIN — METOCLOPRAMIDE 10 MG: 10 TABLET ORAL at 15:35

## 2021-01-01 RX ADMIN — PANCRELIPASE 24000 UNITS: 24000; 76000; 120000 CAPSULE, DELAYED RELEASE PELLETS ORAL at 11:38

## 2021-01-01 RX ADMIN — INSULIN GLARGINE 10 UNITS: 100 INJECTION, SOLUTION SUBCUTANEOUS at 21:56

## 2021-01-01 RX ADMIN — INSULIN GLARGINE 5 UNITS: 100 INJECTION, SOLUTION SUBCUTANEOUS at 22:16

## 2021-01-01 RX ADMIN — DIAZEPAM 2 MG: 2 TABLET ORAL at 11:51

## 2021-01-01 RX ADMIN — PREDNISONE 10 MG: 10 TABLET ORAL at 14:00

## 2021-01-01 RX ADMIN — SENNOSIDES 8.6 MG: 8.6 TABLET, FILM COATED ORAL at 21:02

## 2021-01-01 RX ADMIN — INSULIN GLARGINE 15 UNITS: 100 INJECTION, SOLUTION SUBCUTANEOUS at 12:01

## 2021-01-01 RX ADMIN — GABAPENTIN 200 MG: 100 CAPSULE ORAL at 22:54

## 2021-01-01 RX ADMIN — HEPARIN SODIUM 12 UNITS/KG/HR: 10000 INJECTION, SOLUTION INTRAVENOUS at 01:52

## 2021-01-01 RX ADMIN — MORPHINE SULFATE 2 MG: 2 INJECTION, SOLUTION INTRAMUSCULAR; INTRAVENOUS at 05:11

## 2021-01-01 RX ADMIN — PREDNISONE 10 MG: 10 TABLET ORAL at 16:45

## 2021-01-01 RX ADMIN — ALPRAZOLAM 0.5 MG: 0.5 TABLET ORAL at 20:54

## 2021-01-01 RX ADMIN — INSULIN LISPRO 2 UNITS: 100 INJECTION, SOLUTION INTRAVENOUS; SUBCUTANEOUS at 17:00

## 2021-01-01 RX ADMIN — SODIUM CHLORIDE 75 ML/HR: 0.9 INJECTION, SOLUTION INTRAVENOUS at 09:57

## 2021-01-01 RX ADMIN — OXYCODONE HYDROCHLORIDE 7.5 MG: 5 TABLET ORAL at 21:53

## 2021-01-01 RX ADMIN — IOHEXOL 100 ML: 350 INJECTION, SOLUTION INTRAVENOUS at 19:51

## 2021-01-01 RX ADMIN — GABAPENTIN 200 MG: 100 CAPSULE ORAL at 00:24

## 2021-01-01 RX ADMIN — SODIUM CHLORIDE 5 ML: 9 INJECTION INTRAMUSCULAR; INTRAVENOUS; SUBCUTANEOUS at 09:06

## 2021-01-01 RX ADMIN — SODIUM CHLORIDE 1000 ML: 0.9 INJECTION, SOLUTION INTRAVENOUS at 18:42

## 2021-01-01 RX ADMIN — METRONIDAZOLE 500 MG: 500 TABLET ORAL at 14:00

## 2021-01-01 RX ADMIN — DEXAMETHASONE SODIUM PHOSPHATE: 10 INJECTION, SOLUTION INTRAMUSCULAR; INTRAVENOUS at 09:23

## 2021-01-01 RX ADMIN — PREDNISONE 10 MG: 10 TABLET ORAL at 08:31

## 2021-01-01 RX ADMIN — DEXAMETHASONE SODIUM PHOSPHATE 8 MG: 4 INJECTION INTRA-ARTICULAR; INTRALESIONAL; INTRAMUSCULAR; INTRAVENOUS; SOFT TISSUE at 20:05

## 2021-01-01 RX ADMIN — SODIUM CHLORIDE 3 G: 9 INJECTION, SOLUTION INTRAVENOUS at 01:20

## 2021-01-01 RX ADMIN — PANCRELIPASE 24000 UNITS: 24000; 76000; 120000 CAPSULE, DELAYED RELEASE PELLETS ORAL at 11:44

## 2021-01-01 RX ADMIN — ACETAMINOPHEN 650 MG: 325 TABLET, FILM COATED ORAL at 05:54

## 2021-01-01 RX ADMIN — ASPIRIN 81 MG: 81 TABLET, CHEWABLE ORAL at 08:24

## 2021-01-01 RX ADMIN — ENOXAPARIN SODIUM 40 MG: 40 INJECTION SUBCUTANEOUS at 08:11

## 2021-01-01 RX ADMIN — INSULIN GLARGINE 5 UNITS: 100 INJECTION, SOLUTION SUBCUTANEOUS at 22:54

## 2021-01-01 RX ADMIN — INSULIN LISPRO 3 UNITS: 100 INJECTION, SOLUTION INTRAVENOUS; SUBCUTANEOUS at 18:13

## 2021-01-01 RX ADMIN — FENTANYL CITRATE 25 MCG: 50 INJECTION, SOLUTION INTRAMUSCULAR; INTRAVENOUS at 15:10

## 2021-01-01 RX ADMIN — INSULIN LISPRO 2 UNITS: 100 INJECTION, SOLUTION INTRAVENOUS; SUBCUTANEOUS at 11:36

## 2021-01-01 RX ADMIN — GABAPENTIN 200 MG: 100 CAPSULE ORAL at 17:17

## 2021-01-01 RX ADMIN — PANTOPRAZOLE SODIUM 40 MG: 40 TABLET, DELAYED RELEASE ORAL at 10:03

## 2021-01-01 RX ADMIN — METRONIDAZOLE 500 MG: 500 TABLET ORAL at 23:22

## 2021-01-01 RX ADMIN — FLUOROURACIL 770 MG: 50 INJECTION, SOLUTION INTRAVENOUS at 11:47

## 2021-01-01 RX ADMIN — PANTOPRAZOLE SODIUM 40 MG: 40 TABLET, DELAYED RELEASE ORAL at 05:47

## 2021-01-01 RX ADMIN — ATORVASTATIN CALCIUM 20 MG: 40 TABLET, FILM COATED ORAL at 08:32

## 2021-01-01 RX ADMIN — POTASSIUM CHLORIDE 20 MEQ: 14.9 INJECTION, SOLUTION INTRAVENOUS at 15:43

## 2021-01-01 RX ADMIN — FLUOXETINE 20 MG: 20 CAPSULE ORAL at 08:03

## 2021-01-01 RX ADMIN — METRONIDAZOLE 500 MG: 500 TABLET ORAL at 22:54

## 2021-01-01 RX ADMIN — PANTOPRAZOLE SODIUM 40 MG: 40 TABLET, DELAYED RELEASE ORAL at 22:16

## 2021-01-01 RX ADMIN — GABAPENTIN 200 MG: 100 CAPSULE ORAL at 21:24

## 2021-01-01 RX ADMIN — INSULIN GLARGINE 15 UNITS: 100 INJECTION, SOLUTION SUBCUTANEOUS at 21:24

## 2021-01-01 RX ADMIN — VANCOMYCIN HYDROCHLORIDE 1500 MG: 5 INJECTION, POWDER, LYOPHILIZED, FOR SOLUTION INTRAVENOUS at 22:16

## 2021-01-01 RX ADMIN — PEGFILGRASTIM 6 MG: KIT SUBCUTANEOUS at 15:35

## 2021-01-01 RX ADMIN — GABAPENTIN 200 MG: 100 CAPSULE ORAL at 08:37

## 2021-01-01 RX ADMIN — GABAPENTIN 200 MG: 100 CAPSULE ORAL at 20:44

## 2021-01-01 RX ADMIN — GABAPENTIN 200 MG: 100 CAPSULE ORAL at 15:42

## 2021-01-01 RX ADMIN — PREDNISONE 20 MG: 5 TABLET ORAL at 08:20

## 2021-01-01 RX ADMIN — PREDNISONE 10 MG: 10 TABLET ORAL at 16:25

## 2021-01-01 RX ADMIN — DIAZEPAM 1 MG: 10 INJECTION, SOLUTION INTRAMUSCULAR; INTRAVENOUS at 20:49

## 2021-01-01 RX ADMIN — GABAPENTIN 200 MG: 100 CAPSULE ORAL at 21:56

## 2021-01-01 RX ADMIN — INSULIN LISPRO 10 UNITS: 100 INJECTION, SOLUTION INTRAVENOUS; SUBCUTANEOUS at 10:03

## 2021-01-01 RX ADMIN — INSULIN LISPRO 4 UNITS: 100 INJECTION, SOLUTION INTRAVENOUS; SUBCUTANEOUS at 21:25

## 2021-01-01 RX ADMIN — INSULIN LISPRO 1 UNITS: 100 INJECTION, SOLUTION INTRAVENOUS; SUBCUTANEOUS at 13:58

## 2021-01-01 RX ADMIN — METOCLOPRAMIDE 10 MG: 10 TABLET ORAL at 12:21

## 2021-01-01 RX ADMIN — PANCRELIPASE 24000 UNITS: 24000; 76000; 120000 CAPSULE, DELAYED RELEASE PELLETS ORAL at 13:15

## 2021-01-01 RX ADMIN — SODIUM CHLORIDE 100 ML/HR: 0.9 INJECTION, SOLUTION INTRAVENOUS at 01:31

## 2021-01-01 RX ADMIN — SODIUM CHLORIDE 3 G: 9 INJECTION, SOLUTION INTRAVENOUS at 23:55

## 2021-01-01 RX ADMIN — CYANOCOBALAMIN TAB 500 MCG 1000 MCG: 500 TAB at 08:19

## 2021-01-01 RX ADMIN — CEFEPIME HYDROCHLORIDE 2000 MG: 2 INJECTION, POWDER, FOR SOLUTION INTRAVENOUS at 17:03

## 2021-01-01 RX ADMIN — PANCRELIPASE 24000 UNITS: 24000; 76000; 120000 CAPSULE, DELAYED RELEASE PELLETS ORAL at 09:00

## 2021-01-01 RX ADMIN — INSULIN LISPRO 10 UNITS: 100 INJECTION, SOLUTION INTRAVENOUS; SUBCUTANEOUS at 16:22

## 2021-01-01 RX ADMIN — VANCOMYCIN HYDROCHLORIDE 1500 MG: 5 INJECTION, POWDER, LYOPHILIZED, FOR SOLUTION INTRAVENOUS at 05:44

## 2021-01-01 RX ADMIN — PANTOPRAZOLE SODIUM 40 MG: 40 TABLET, DELAYED RELEASE ORAL at 23:08

## 2021-01-01 RX ADMIN — PANCRELIPASE 24000 UNITS: 24000; 76000; 120000 CAPSULE, DELAYED RELEASE PELLETS ORAL at 11:55

## 2021-01-01 RX ADMIN — SIMETHICONE 80 MG: 80 TABLET, CHEWABLE ORAL at 12:40

## 2021-01-01 RX ADMIN — PANTOPRAZOLE SODIUM 40 MG: 40 TABLET, DELAYED RELEASE ORAL at 16:52

## 2021-01-01 RX ADMIN — PANCRELIPASE 24000 UNITS: 24000; 76000; 120000 CAPSULE, DELAYED RELEASE PELLETS ORAL at 09:55

## 2021-01-01 RX ADMIN — HYDROMORPHONE HYDROCHLORIDE 0.5 MG: 1 INJECTION, SOLUTION INTRAMUSCULAR; INTRAVENOUS; SUBCUTANEOUS at 16:44

## 2021-01-01 RX ADMIN — FLUOXETINE 20 MG: 20 CAPSULE ORAL at 16:51

## 2021-01-01 RX ADMIN — GABAPENTIN 200 MG: 100 CAPSULE ORAL at 08:11

## 2021-01-01 RX ADMIN — GABAPENTIN 200 MG: 100 CAPSULE ORAL at 17:18

## 2021-01-01 RX ADMIN — SODIUM CHLORIDE 5 ML: 9 INJECTION INTRAMUSCULAR; INTRAVENOUS; SUBCUTANEOUS at 21:18

## 2021-01-01 RX ADMIN — CYANOCOBALAMIN TAB 500 MCG 1000 MCG: 500 TAB at 08:12

## 2021-01-01 RX ADMIN — CEFTRIAXONE SODIUM 2000 MG: 10 INJECTION, POWDER, FOR SOLUTION INTRAVENOUS at 20:11

## 2021-01-01 RX ADMIN — PANCRELIPASE 24000 UNITS: 24000; 76000; 120000 CAPSULE, DELAYED RELEASE PELLETS ORAL at 17:18

## 2021-01-01 RX ADMIN — SODIUM CHLORIDE 1000 ML: 0.9 INJECTION, SOLUTION INTRAVENOUS at 17:11

## 2021-01-01 RX ADMIN — PANCRELIPASE 24000 UNITS: 24000; 76000; 120000 CAPSULE, DELAYED RELEASE PELLETS ORAL at 16:02

## 2021-01-01 RX ADMIN — PREDNISONE 5 MG: 5 TABLET ORAL at 08:38

## 2021-01-01 RX ADMIN — IOHEXOL 85 ML: 350 INJECTION, SOLUTION INTRAVENOUS at 02:41

## 2021-01-01 RX ADMIN — PANCRELIPASE 24000 UNITS: 24000; 76000; 120000 CAPSULE, DELAYED RELEASE PELLETS ORAL at 10:04

## 2021-01-01 RX ADMIN — GABAPENTIN 100 MG: 100 CAPSULE ORAL at 08:30

## 2021-01-01 RX ADMIN — INSULIN LISPRO 5 UNITS: 100 INJECTION, SOLUTION INTRAVENOUS; SUBCUTANEOUS at 07:54

## 2021-01-01 RX ADMIN — FLUOXETINE 20 MG: 20 CAPSULE ORAL at 08:30

## 2021-01-01 RX ADMIN — PREDNISONE 20 MG: 5 TABLET ORAL at 08:38

## 2021-01-01 RX ADMIN — ASPIRIN 81 MG: 81 TABLET, CHEWABLE ORAL at 08:58

## 2021-01-01 RX ADMIN — PANCRELIPASE 24000 UNITS: 24000; 76000; 120000 CAPSULE, DELAYED RELEASE PELLETS ORAL at 18:29

## 2021-01-01 RX ADMIN — CYANOCOBALAMIN TAB 500 MCG 1000 MCG: 500 TAB at 09:07

## 2021-01-01 RX ADMIN — PANTOPRAZOLE SODIUM 40 MG: 40 TABLET, DELAYED RELEASE ORAL at 09:00

## 2021-01-01 RX ADMIN — GABAPENTIN 200 MG: 100 CAPSULE ORAL at 23:08

## 2021-01-01 RX ADMIN — ASPIRIN 81 MG: 81 TABLET, CHEWABLE ORAL at 09:04

## 2021-01-01 RX ADMIN — FOLIC ACID 1 MG: 1 TABLET ORAL at 16:52

## 2021-01-01 RX ADMIN — PANCRELIPASE 24000 UNITS: 24000; 76000; 120000 CAPSULE, DELAYED RELEASE PELLETS ORAL at 12:14

## 2021-01-01 RX ADMIN — METRONIDAZOLE 500 MG: 500 TABLET ORAL at 06:34

## 2021-01-01 RX ADMIN — Medication 3 MG: at 21:56

## 2021-01-01 RX ADMIN — ATORVASTATIN CALCIUM 20 MG: 20 TABLET, FILM COATED ORAL at 09:06

## 2021-01-01 RX ADMIN — GABAPENTIN 200 MG: 100 CAPSULE ORAL at 08:24

## 2021-01-01 RX ADMIN — IRINOTECAN HYDROCHLORIDE 129 MG: 4.3 INJECTION, POWDER, FOR SOLUTION INTRAVENOUS at 09:08

## 2021-01-01 RX ADMIN — ENOXAPARIN SODIUM 40 MG: 40 INJECTION SUBCUTANEOUS at 14:48

## 2021-01-01 RX ADMIN — PEGFILGRASTIM 6 MG: KIT SUBCUTANEOUS at 10:00

## 2021-01-01 RX ADMIN — FLUOXETINE 20 MG: 20 CAPSULE ORAL at 10:18

## 2021-01-01 RX ADMIN — ACETAMINOPHEN 650 MG: 325 TABLET, FILM COATED ORAL at 13:30

## 2021-01-01 RX ADMIN — IOHEXOL 100 ML: 350 INJECTION, SOLUTION INTRAVENOUS at 20:24

## 2021-01-01 RX ADMIN — CYANOCOBALAMIN TAB 500 MCG 1000 MCG: 500 TAB at 08:30

## 2021-01-01 RX ADMIN — VANCOMYCIN HYDROCHLORIDE 1500 MG: 1 INJECTION, POWDER, LYOPHILIZED, FOR SOLUTION INTRAVENOUS at 10:27

## 2021-01-01 RX ADMIN — CEFTRIAXONE 2000 MG: 2 INJECTION, POWDER, FOR SOLUTION INTRAMUSCULAR; INTRAVENOUS at 14:49

## 2021-01-01 RX ADMIN — FOLIC ACID 1 MG: 1 TABLET ORAL at 08:38

## 2021-01-01 RX ADMIN — GABAPENTIN 200 MG: 100 CAPSULE ORAL at 22:10

## 2021-01-01 RX ADMIN — FLUOXETINE 20 MG: 20 CAPSULE ORAL at 08:24

## 2021-01-01 RX ADMIN — ATORVASTATIN CALCIUM 10 MG: 10 TABLET, FILM COATED ORAL at 17:41

## 2021-01-01 RX ADMIN — SODIUM CHLORIDE 5 ML: 9 INJECTION INTRAMUSCULAR; INTRAVENOUS; SUBCUTANEOUS at 09:04

## 2021-01-01 RX ADMIN — GABAPENTIN 200 MG: 100 CAPSULE ORAL at 21:33

## 2021-01-01 RX ADMIN — FOLIC ACID 1 MG: 1 TABLET ORAL at 08:05

## 2021-01-01 RX ADMIN — ACETAMINOPHEN 650 MG: 325 TABLET, FILM COATED ORAL at 12:43

## 2021-01-01 RX ADMIN — PANCRELIPASE 24000 UNITS: 24000; 76000; 120000 CAPSULE, DELAYED RELEASE PELLETS ORAL at 14:14

## 2021-01-01 RX ADMIN — MIDAZOLAM HYDROCHLORIDE 1 MG: 1 INJECTION, SOLUTION INTRAMUSCULAR; INTRAVENOUS at 15:01

## 2021-01-01 RX ADMIN — SODIUM CHLORIDE 3 G: 9 INJECTION, SOLUTION INTRAVENOUS at 13:09

## 2021-01-01 RX ADMIN — INSULIN LISPRO 5 UNITS: 100 INJECTION, SOLUTION INTRAVENOUS; SUBCUTANEOUS at 16:51

## 2021-01-01 RX ADMIN — INSULIN LISPRO 3 UNITS: 100 INJECTION, SOLUTION INTRAVENOUS; SUBCUTANEOUS at 13:47

## 2021-01-01 RX ADMIN — PREDNISONE 5 MG: 5 TABLET ORAL at 09:07

## 2021-01-01 RX ADMIN — FLUOXETINE 20 MG: 20 CAPSULE ORAL at 09:06

## 2021-01-01 RX ADMIN — FOLIC ACID 1 MG: 1 TABLET ORAL at 08:19

## 2021-01-01 RX ADMIN — Medication 3 MG: at 23:21

## 2021-01-01 RX ADMIN — INSULIN LISPRO 2 UNITS: 100 INJECTION, SOLUTION INTRAVENOUS; SUBCUTANEOUS at 22:11

## 2021-01-01 RX ADMIN — ACETAMINOPHEN 975 MG: 325 TABLET, FILM COATED ORAL at 21:07

## 2021-01-01 RX ADMIN — LIDOCAINE HYDROCHLORIDE 100 MG: 10 INJECTION, SOLUTION EPIDURAL; INFILTRATION; INTRACAUDAL at 11:58

## 2021-01-01 RX ADMIN — FOLIC ACID 1 MG: 1 TABLET ORAL at 08:24

## 2021-01-01 RX ADMIN — LEUCOVORIN CALCIUM 800 MG: 200 INJECTION, POWDER, LYOPHILIZED, FOR SOLUTION INTRAMUSCULAR; INTRAVENOUS at 10:42

## 2021-01-01 RX ADMIN — PANTOPRAZOLE SODIUM 40 MG: 40 TABLET, DELAYED RELEASE ORAL at 06:38

## 2021-01-01 RX ADMIN — PREDNISONE 5 MG: 5 TABLET ORAL at 09:00

## 2021-01-01 RX ADMIN — APIXABAN 5 MG: 5 TABLET, FILM COATED ORAL at 09:06

## 2021-01-01 RX ADMIN — SODIUM CHLORIDE 500 ML: 0.9 INJECTION, SOLUTION INTRAVENOUS at 21:49

## 2021-01-01 RX ADMIN — CYANOCOBALAMIN TAB 500 MCG 1000 MCG: 500 TAB at 12:05

## 2021-01-01 RX ADMIN — DEXAMETHASONE SODIUM PHOSPHATE: 10 INJECTION, SOLUTION INTRAMUSCULAR; INTRAVENOUS at 09:34

## 2021-01-01 RX ADMIN — PREDNISONE 5 MG: 5 TABLET ORAL at 09:39

## 2021-01-01 RX ADMIN — FENTANYL CITRATE 25 MCG: 50 INJECTION, SOLUTION INTRAMUSCULAR; INTRAVENOUS at 15:08

## 2021-01-01 RX ADMIN — GABAPENTIN 200 MG: 100 CAPSULE ORAL at 21:39

## 2021-01-01 RX ADMIN — PREDNISONE 20 MG: 5 TABLET ORAL at 09:07

## 2021-01-01 RX ADMIN — TAMSULOSIN HYDROCHLORIDE 0.4 MG: 0.4 CAPSULE ORAL at 22:48

## 2021-01-01 RX ADMIN — PANCRELIPASE 24000 UNITS: 24000; 76000; 120000 CAPSULE, DELAYED RELEASE PELLETS ORAL at 18:04

## 2021-01-01 RX ADMIN — PANCRELIPASE 24000 UNITS: 24000; 76000; 120000 CAPSULE, DELAYED RELEASE PELLETS ORAL at 07:39

## 2021-01-01 RX ADMIN — SODIUM CHLORIDE 5 ML: 9 INJECTION INTRAMUSCULAR; INTRAVENOUS; SUBCUTANEOUS at 22:54

## 2021-01-01 RX ADMIN — PANCRELIPASE 24000 UNITS: 24000; 76000; 120000 CAPSULE, DELAYED RELEASE PELLETS ORAL at 08:19

## 2021-01-01 RX ADMIN — SODIUM CHLORIDE 20 ML/HR: 0.9 INJECTION, SOLUTION INTRAVENOUS at 08:26

## 2021-01-01 RX ADMIN — INSULIN LISPRO 10 UNITS: 100 INJECTION, SOLUTION INTRAVENOUS; SUBCUTANEOUS at 14:47

## 2021-01-01 RX ADMIN — ACETAMINOPHEN 975 MG: 325 TABLET, FILM COATED ORAL at 10:04

## 2021-01-01 RX ADMIN — DEXAMETHASONE SODIUM PHOSPHATE: 10 INJECTION, SOLUTION INTRAMUSCULAR; INTRAVENOUS at 12:09

## 2021-01-01 RX ADMIN — PANCRELIPASE 24000 UNITS: 24000; 76000; 120000 CAPSULE, DELAYED RELEASE PELLETS ORAL at 17:49

## 2021-01-01 RX ADMIN — INSULIN LISPRO 5 UNITS: 100 INJECTION, SOLUTION INTRAVENOUS; SUBCUTANEOUS at 12:25

## 2021-01-01 RX ADMIN — DOCUSATE SODIUM AND SENNOSIDES 1 TABLET: 8.6; 5 TABLET ORAL at 02:17

## 2021-01-01 RX ADMIN — FOLIC ACID 1 MG: 1 TABLET ORAL at 09:52

## 2021-01-01 RX ADMIN — INSULIN LISPRO 1 UNITS: 100 INJECTION, SOLUTION INTRAVENOUS; SUBCUTANEOUS at 11:51

## 2021-01-01 RX ADMIN — REMDESIVIR 200 MG: 100 INJECTION, POWDER, LYOPHILIZED, FOR SOLUTION INTRAVENOUS at 01:58

## 2021-01-01 RX ADMIN — GABAPENTIN 200 MG: 100 CAPSULE ORAL at 09:00

## 2021-01-01 RX ADMIN — PANTOPRAZOLE SODIUM 40 MG: 40 TABLET, DELAYED RELEASE ORAL at 17:18

## 2021-01-01 RX ADMIN — INSULIN LISPRO 10 UNITS: 100 INJECTION, SOLUTION INTRAVENOUS; SUBCUTANEOUS at 12:14

## 2021-01-01 RX ADMIN — SODIUM CHLORIDE 1000 ML: 0.9 INJECTION, SOLUTION INTRAVENOUS at 09:52

## 2021-01-01 RX ADMIN — PROPOFOL 130 MCG/KG/MIN: 10 INJECTION, EMULSION INTRAVENOUS at 11:58

## 2021-01-01 RX ADMIN — REMDESIVIR 100 MG: 100 INJECTION, POWDER, LYOPHILIZED, FOR SOLUTION INTRAVENOUS at 23:56

## 2021-01-01 RX ADMIN — GABAPENTIN 200 MG: 100 CAPSULE ORAL at 16:51

## 2021-01-01 RX ADMIN — PREDNISONE 20 MG: 5 TABLET ORAL at 09:02

## 2021-01-01 RX ADMIN — FOLIC ACID 1 MG: 1 TABLET ORAL at 09:02

## 2021-01-01 RX ADMIN — PREDNISONE 10 MG: 10 TABLET ORAL at 10:02

## 2021-01-01 RX ADMIN — PANCRELIPASE 24000 UNITS: 24000; 76000; 120000 CAPSULE, DELAYED RELEASE PELLETS ORAL at 18:09

## 2021-01-01 RX ADMIN — GABAPENTIN 100 MG: 100 CAPSULE ORAL at 21:02

## 2021-01-01 RX ADMIN — ASPIRIN 81 MG: 81 TABLET, CHEWABLE ORAL at 08:38

## 2021-01-01 RX ADMIN — CEFTRIAXONE 1000 MG: 1 INJECTION, SOLUTION INTRAVENOUS at 12:34

## 2021-01-01 RX ADMIN — PANCRELIPASE 24000 UNITS: 24000; 76000; 120000 CAPSULE, DELAYED RELEASE PELLETS ORAL at 14:00

## 2021-01-01 RX ADMIN — PANTOPRAZOLE SODIUM 40 MG: 40 TABLET, DELAYED RELEASE ORAL at 02:17

## 2021-01-01 RX ADMIN — PANTOPRAZOLE SODIUM 40 MG: 40 TABLET, DELAYED RELEASE ORAL at 08:48

## 2021-01-01 RX ADMIN — ASPIRIN 81 MG: 81 TABLET, CHEWABLE ORAL at 08:11

## 2021-01-01 RX ADMIN — PANCRELIPASE 24000 UNITS: 24000; 76000; 120000 CAPSULE, DELAYED RELEASE PELLETS ORAL at 18:13

## 2021-01-01 RX ADMIN — HEPARIN SODIUM 5000 UNITS: 5000 INJECTION INTRAVENOUS; SUBCUTANEOUS at 21:01

## 2021-01-01 RX ADMIN — HYDROMORPHONE HYDROCHLORIDE 0.5 MG: 1 INJECTION, SOLUTION INTRAMUSCULAR; INTRAVENOUS; SUBCUTANEOUS at 00:24

## 2021-01-01 RX ADMIN — INSULIN LISPRO 2 UNITS: 100 INJECTION, SOLUTION INTRAVENOUS; SUBCUTANEOUS at 16:42

## 2021-01-01 RX ADMIN — PANCRELIPASE 24000 UNITS: 24000; 76000; 120000 CAPSULE, DELAYED RELEASE PELLETS ORAL at 08:30

## 2021-01-01 RX ADMIN — Medication 3 MG: at 21:17

## 2021-01-01 RX ADMIN — PANCRELIPASE 24000 UNITS: 24000; 76000; 120000 CAPSULE, DELAYED RELEASE PELLETS ORAL at 12:44

## 2021-01-01 RX ADMIN — SODIUM CHLORIDE 100 ML/HR: 0.9 INJECTION, SOLUTION INTRAVENOUS at 15:36

## 2021-01-01 RX ADMIN — POTASSIUM CHLORIDE 20 MEQ: 1500 TABLET, EXTENDED RELEASE ORAL at 09:03

## 2021-01-01 RX ADMIN — ATORVASTATIN CALCIUM 20 MG: 40 TABLET, FILM COATED ORAL at 16:22

## 2021-01-01 RX ADMIN — DEXAMETHASONE SODIUM PHOSPHATE: 10 INJECTION, SOLUTION INTRAMUSCULAR; INTRAVENOUS at 08:31

## 2021-01-01 RX ADMIN — GABAPENTIN 200 MG: 100 CAPSULE ORAL at 08:10

## 2021-01-01 RX ADMIN — TAMSULOSIN HYDROCHLORIDE 0.4 MG: 0.4 CAPSULE ORAL at 22:27

## 2021-01-01 RX ADMIN — PANCRELIPASE 24000 UNITS: 24000; 76000; 120000 CAPSULE, DELAYED RELEASE PELLETS ORAL at 16:16

## 2021-01-01 RX ADMIN — PANTOPRAZOLE SODIUM 40 MG: 40 TABLET, DELAYED RELEASE ORAL at 21:17

## 2021-01-01 RX ADMIN — SODIUM CHLORIDE 1000 ML: 0.9 INJECTION, SOLUTION INTRAVENOUS at 20:13

## 2021-01-01 RX ADMIN — ASPIRIN 81 MG: 81 TABLET, CHEWABLE ORAL at 08:02

## 2021-01-01 RX ADMIN — INSULIN LISPRO 3 UNITS: 100 INJECTION, SOLUTION INTRAVENOUS; SUBCUTANEOUS at 17:51

## 2021-01-01 RX ADMIN — INSULIN LISPRO 3 UNITS: 100 INJECTION, SOLUTION INTRAVENOUS; SUBCUTANEOUS at 16:05

## 2021-01-01 RX ADMIN — ACETAMINOPHEN 975 MG: 325 TABLET, FILM COATED ORAL at 06:38

## 2021-01-01 RX ADMIN — HYDROCHLOROTHIAZIDE: 12.5 TABLET ORAL at 08:31

## 2021-01-01 RX ADMIN — PREDNISONE 10 MG: 10 TABLET ORAL at 10:06

## 2021-01-01 RX ADMIN — INSULIN LISPRO 5 UNITS: 100 INJECTION, SOLUTION INTRAVENOUS; SUBCUTANEOUS at 09:05

## 2021-01-01 RX ADMIN — SODIUM CHLORIDE 5 ML: 9 INJECTION INTRAMUSCULAR; INTRAVENOUS; SUBCUTANEOUS at 23:09

## 2021-01-01 RX ADMIN — FOLIC ACID 1 MG: 1 TABLET ORAL at 10:04

## 2021-01-01 RX ADMIN — Medication 25 MG: at 12:03

## 2021-01-01 RX ADMIN — PREDNISONE 10 MG: 10 TABLET ORAL at 08:30

## 2021-01-01 RX ADMIN — GABAPENTIN 200 MG: 100 CAPSULE ORAL at 22:27

## 2021-01-01 RX ADMIN — FLUOXETINE 20 MG: 20 CAPSULE ORAL at 08:20

## 2021-01-01 RX ADMIN — PANCRELIPASE 24000 UNITS: 24000; 76000; 120000 CAPSULE, DELAYED RELEASE PELLETS ORAL at 11:35

## 2021-01-01 RX ADMIN — SODIUM CHLORIDE 5 ML: 9 INJECTION INTRAMUSCULAR; INTRAVENOUS; SUBCUTANEOUS at 03:29

## 2021-01-01 RX ADMIN — TAMSULOSIN HYDROCHLORIDE 0.4 MG: 0.4 CAPSULE ORAL at 21:34

## 2021-01-01 RX ADMIN — INSULIN LISPRO 5 UNITS: 100 INJECTION, SOLUTION INTRAVENOUS; SUBCUTANEOUS at 17:01

## 2021-01-01 RX ADMIN — ACETAMINOPHEN 975 MG: 325 TABLET, FILM COATED ORAL at 22:06

## 2021-01-01 RX ADMIN — PREDNISONE 10 MG: 10 TABLET ORAL at 08:10

## 2021-01-01 RX ADMIN — TAMSULOSIN HYDROCHLORIDE 0.4 MG: 0.4 CAPSULE ORAL at 16:35

## 2021-01-01 RX ADMIN — HYDROCHLOROTHIAZIDE: 12.5 TABLET ORAL at 08:11

## 2021-01-01 RX ADMIN — INSULIN LISPRO 2 UNITS: 100 INJECTION, SOLUTION INTRAVENOUS; SUBCUTANEOUS at 23:23

## 2021-01-01 RX ADMIN — INSULIN LISPRO 2 UNITS: 100 INJECTION, SOLUTION INTRAVENOUS; SUBCUTANEOUS at 22:56

## 2021-01-01 RX ADMIN — ACETAMINOPHEN 650 MG: 325 TABLET, FILM COATED ORAL at 08:14

## 2021-01-01 RX ADMIN — PANTOPRAZOLE SODIUM 40 MG: 40 TABLET, DELAYED RELEASE ORAL at 06:05

## 2021-01-01 RX ADMIN — PANTOPRAZOLE SODIUM 40 MG: 40 TABLET, DELAYED RELEASE ORAL at 05:26

## 2021-01-01 RX ADMIN — APIXABAN 5 MG: 5 TABLET, FILM COATED ORAL at 10:04

## 2021-01-01 RX ADMIN — PANCRELIPASE 24000 UNITS: 24000; 76000; 120000 CAPSULE, DELAYED RELEASE PELLETS ORAL at 09:38

## 2021-01-01 RX ADMIN — HYDROMORPHONE HYDROCHLORIDE 0.5 MG: 1 INJECTION, SOLUTION INTRAMUSCULAR; INTRAVENOUS; SUBCUTANEOUS at 01:17

## 2021-01-01 RX ADMIN — CYANOCOBALAMIN TAB 500 MCG 1000 MCG: 500 TAB at 09:39

## 2021-01-01 RX ADMIN — DEXTROSE 20 ML/HR: 5 SOLUTION INTRAVENOUS at 09:33

## 2021-01-01 RX ADMIN — INSULIN LISPRO 10 UNITS: 100 INJECTION, SOLUTION INTRAVENOUS; SUBCUTANEOUS at 08:10

## 2021-01-01 RX ADMIN — PROPOFOL 50 MG: 10 INJECTION, EMULSION INTRAVENOUS at 15:05

## 2021-01-01 RX ADMIN — HEPARIN SODIUM 5000 UNITS: 5000 INJECTION INTRAVENOUS; SUBCUTANEOUS at 05:01

## 2021-01-01 RX ADMIN — Medication 191 MG: at 13:36

## 2021-01-01 RX ADMIN — PANTOPRAZOLE SODIUM 40 MG: 40 TABLET, DELAYED RELEASE ORAL at 18:29

## 2021-01-01 RX ADMIN — GEMCITABINE HYDROCHLORIDE 1909.9 MG: 2 INJECTION, SOLUTION INTRAVENOUS at 10:49

## 2021-01-01 RX ADMIN — PANTOPRAZOLE SODIUM 40 MG: 40 TABLET, DELAYED RELEASE ORAL at 09:07

## 2021-01-01 RX ADMIN — GABAPENTIN 200 MG: 100 CAPSULE ORAL at 21:51

## 2021-01-01 RX ADMIN — INSULIN LISPRO 2 UNITS: 100 INJECTION, SOLUTION INTRAVENOUS; SUBCUTANEOUS at 18:29

## 2021-01-01 RX ADMIN — GABAPENTIN 200 MG: 100 CAPSULE ORAL at 21:59

## 2021-01-01 RX ADMIN — DEXAMETHASONE SODIUM PHOSPHATE: 10 INJECTION, SOLUTION INTRAMUSCULAR; INTRAVENOUS at 09:20

## 2021-01-01 RX ADMIN — PREDNISONE 10 MG: 10 TABLET ORAL at 07:25

## 2021-01-01 RX ADMIN — PANTOPRAZOLE SODIUM 40 MG: 40 TABLET, DELAYED RELEASE ORAL at 04:34

## 2021-01-01 RX ADMIN — SODIUM CHLORIDE 100 ML/HR: 0.9 INJECTION, SOLUTION INTRAVENOUS at 16:52

## 2021-01-01 RX ADMIN — PEGFILGRASTIM 6 MG: KIT SUBCUTANEOUS at 13:42

## 2021-01-01 RX ADMIN — PANCRELIPASE 24000 UNITS: 24000; 76000; 120000 CAPSULE, DELAYED RELEASE PELLETS ORAL at 12:25

## 2021-01-01 RX ADMIN — INSULIN LISPRO 2 UNITS: 100 INJECTION, SOLUTION INTRAVENOUS; SUBCUTANEOUS at 21:39

## 2021-01-01 RX ADMIN — PANTOPRAZOLE SODIUM 40 MG: 40 TABLET, DELAYED RELEASE ORAL at 07:05

## 2021-01-01 RX ADMIN — SODIUM CHLORIDE 20 ML/HR: 0.9 INJECTION, SOLUTION INTRAVENOUS at 09:35

## 2021-01-01 RX ADMIN — CEFAZOLIN SODIUM 2000 MG: 2 SOLUTION INTRAVENOUS at 22:48

## 2021-01-01 RX ADMIN — SODIUM CHLORIDE 3 G: 9 INJECTION, SOLUTION INTRAVENOUS at 13:14

## 2021-01-01 RX ADMIN — PANCRELIPASE 24000 UNITS: 24000; 76000; 120000 CAPSULE, DELAYED RELEASE PELLETS ORAL at 19:30

## 2021-01-01 RX ADMIN — OXYCODONE HYDROCHLORIDE 7.5 MG: 5 TABLET ORAL at 07:56

## 2021-01-01 RX ADMIN — FENTANYL CITRATE 25 MCG: 50 INJECTION, SOLUTION INTRAMUSCULAR; INTRAVENOUS at 15:07

## 2021-01-01 RX ADMIN — ACETAMINOPHEN 650 MG: 325 TABLET, FILM COATED ORAL at 08:32

## 2021-01-01 RX ADMIN — PANTOPRAZOLE SODIUM 40 MG: 40 TABLET, DELAYED RELEASE ORAL at 05:55

## 2021-01-01 RX ADMIN — Medication 3 MG: at 22:54

## 2021-01-01 RX ADMIN — PROPOFOL 30 MG: 10 INJECTION, EMULSION INTRAVENOUS at 15:08

## 2021-01-01 RX ADMIN — GABAPENTIN 200 MG: 100 CAPSULE ORAL at 16:42

## 2021-01-01 RX ADMIN — FLUOXETINE 20 MG: 20 CAPSULE ORAL at 09:04

## 2021-01-01 RX ADMIN — FOLIC ACID 1 MG: 1 TABLET ORAL at 08:03

## 2021-01-01 RX ADMIN — ASPIRIN 81 MG: 81 TABLET, CHEWABLE ORAL at 09:00

## 2021-01-01 RX ADMIN — PROPOFOL 60 MCG/KG/MIN: 10 INJECTION, EMULSION INTRAVENOUS at 15:05

## 2021-01-01 RX ADMIN — OXYCODONE HYDROCHLORIDE 10 MG: 10 TABLET ORAL at 05:54

## 2021-01-01 RX ADMIN — ASPIRIN 81 MG CHEWABLE TABLET 81 MG: 81 TABLET CHEWABLE at 02:16

## 2021-01-01 RX ADMIN — FOLIC ACID 1 MG: 1 TABLET ORAL at 09:07

## 2021-01-01 RX ADMIN — ENOXAPARIN SODIUM 40 MG: 40 INJECTION SUBCUTANEOUS at 11:52

## 2021-01-01 RX ADMIN — PANCRELIPASE 24000 UNITS: 24000; 76000; 120000 CAPSULE, DELAYED RELEASE PELLETS ORAL at 08:39

## 2021-01-01 RX ADMIN — Medication 3 MG: at 21:24

## 2021-01-01 RX ADMIN — HEPARIN SODIUM 18 UNITS/KG/HR: 10000 INJECTION, SOLUTION INTRAVENOUS at 03:32

## 2021-01-01 RX ADMIN — INSULIN LISPRO 5 UNITS: 100 INJECTION, SOLUTION INTRAVENOUS; SUBCUTANEOUS at 15:52

## 2021-01-01 RX ADMIN — FOLIC ACID 1 MG: 1 TABLET ORAL at 11:51

## 2021-01-01 RX ADMIN — PANCRELIPASE 24000 UNITS: 24000; 76000; 120000 CAPSULE, DELAYED RELEASE PELLETS ORAL at 18:52

## 2021-01-01 RX ADMIN — ASPIRIN 81 MG: 81 TABLET, CHEWABLE ORAL at 09:07

## 2021-01-01 RX ADMIN — PANTOPRAZOLE SODIUM 40 MG: 40 TABLET, DELAYED RELEASE ORAL at 09:38

## 2021-01-01 RX ADMIN — ATORVASTATIN CALCIUM 20 MG: 40 TABLET, FILM COATED ORAL at 09:22

## 2021-01-01 RX ADMIN — SODIUM CHLORIDE 3 ML: 9 INJECTION INTRAMUSCULAR; INTRAVENOUS; SUBCUTANEOUS at 08:14

## 2021-01-01 RX ADMIN — HYDROMORPHONE HYDROCHLORIDE 0.5 MG: 1 INJECTION, SOLUTION INTRAMUSCULAR; INTRAVENOUS; SUBCUTANEOUS at 06:07

## 2021-01-01 RX ADMIN — REGADENOSON 0.4 MG: 0.08 INJECTION, SOLUTION INTRAVENOUS at 08:37

## 2021-01-01 RX ADMIN — INSULIN GLARGINE 5 UNITS: 100 INJECTION, SOLUTION SUBCUTANEOUS at 21:18

## 2021-01-01 RX ADMIN — POTASSIUM CHLORIDE 40 MEQ: 1500 TABLET, EXTENDED RELEASE ORAL at 08:24

## 2021-01-01 RX ADMIN — Medication 10 MG: at 12:08

## 2021-01-01 RX ADMIN — Medication 3 MG: at 21:59

## 2021-01-01 RX ADMIN — ATORVASTATIN CALCIUM 10 MG: 10 TABLET, FILM COATED ORAL at 17:23

## 2021-01-01 RX ADMIN — SODIUM CHLORIDE 3 G: 9 INJECTION, SOLUTION INTRAVENOUS at 13:48

## 2021-01-01 RX ADMIN — INSULIN LISPRO 5 UNITS: 100 INJECTION, SOLUTION INTRAVENOUS; SUBCUTANEOUS at 11:52

## 2021-01-01 RX ADMIN — PANTOPRAZOLE SODIUM 40 MG: 40 TABLET, DELAYED RELEASE ORAL at 08:38

## 2021-01-01 RX ADMIN — PANTOPRAZOLE SODIUM 40 MG: 40 TABLET, DELAYED RELEASE ORAL at 06:29

## 2021-01-01 RX ADMIN — ACETAMINOPHEN 650 MG: 325 TABLET, FILM COATED ORAL at 22:48

## 2021-01-01 RX ADMIN — TAMSULOSIN HYDROCHLORIDE 0.4 MG: 0.4 CAPSULE ORAL at 21:54

## 2021-01-01 RX ADMIN — FLUOXETINE 20 MG: 20 CAPSULE ORAL at 08:11

## 2021-01-01 RX ADMIN — SODIUM CHLORIDE: 9 INJECTION, SOLUTION INTRAVENOUS at 14:57

## 2021-01-01 RX ADMIN — CEFAZOLIN SODIUM 2000 MG: 2 SOLUTION INTRAVENOUS at 21:18

## 2021-01-01 RX ADMIN — SODIUM CHLORIDE 3 G: 9 INJECTION, SOLUTION INTRAVENOUS at 18:43

## 2021-01-01 RX ADMIN — INSULIN GLARGINE 20 UNITS: 100 INJECTION, SOLUTION SUBCUTANEOUS at 08:18

## 2021-01-01 RX ADMIN — PANCRELIPASE 24000 UNITS: 24000; 76000; 120000 CAPSULE, DELAYED RELEASE PELLETS ORAL at 16:22

## 2021-01-01 RX ADMIN — INSULIN LISPRO 5 UNITS: 100 INJECTION, SOLUTION INTRAVENOUS; SUBCUTANEOUS at 18:53

## 2021-01-01 RX ADMIN — PREDNISONE 5 MG: 5 TABLET ORAL at 09:03

## 2021-01-01 RX ADMIN — INSULIN LISPRO 2 UNITS: 100 INJECTION, SOLUTION INTRAVENOUS; SUBCUTANEOUS at 21:18

## 2021-01-01 RX ADMIN — PANTOPRAZOLE SODIUM 40 MG: 40 TABLET, DELAYED RELEASE ORAL at 22:10

## 2021-01-01 RX ADMIN — SODIUM CHLORIDE 3 G: 9 INJECTION, SOLUTION INTRAVENOUS at 07:30

## 2021-01-01 RX ADMIN — SODIUM CHLORIDE 1000 ML: 0.9 INJECTION, SOLUTION INTRAVENOUS at 18:41

## 2021-01-01 RX ADMIN — HYDROMORPHONE HYDROCHLORIDE 0.2 MG: 1 INJECTION, SOLUTION INTRAMUSCULAR; INTRAVENOUS; SUBCUTANEOUS at 06:34

## 2021-01-01 RX ADMIN — PANCRELIPASE 24000 UNITS: 24000; 76000; 120000 CAPSULE, DELAYED RELEASE PELLETS ORAL at 17:17

## 2021-01-01 RX ADMIN — GABAPENTIN 200 MG: 100 CAPSULE ORAL at 22:16

## 2021-01-01 RX ADMIN — PANTOPRAZOLE SODIUM 40 MG: 40 TABLET, DELAYED RELEASE ORAL at 17:24

## 2021-01-01 RX ADMIN — GABAPENTIN 200 MG: 100 CAPSULE ORAL at 09:29

## 2021-01-01 RX ADMIN — PANTOPRAZOLE SODIUM 40 MG: 40 TABLET, DELAYED RELEASE ORAL at 09:06

## 2021-01-01 RX ADMIN — HEPARIN SODIUM 5000 UNITS: 5000 INJECTION INTRAVENOUS; SUBCUTANEOUS at 22:05

## 2021-01-01 RX ADMIN — ASPIRIN 81 MG: 81 TABLET, CHEWABLE ORAL at 09:06

## 2021-01-01 RX ADMIN — DEXAMETHASONE SODIUM PHOSPHATE: 10 INJECTION, SOLUTION INTRAMUSCULAR; INTRAVENOUS at 08:30

## 2021-01-01 RX ADMIN — APIXABAN 5 MG: 5 TABLET, FILM COATED ORAL at 12:40

## 2021-01-01 RX ADMIN — INSULIN LISPRO 3 UNITS: 100 INJECTION, SOLUTION INTRAVENOUS; SUBCUTANEOUS at 22:00

## 2021-01-01 RX ADMIN — ASPIRIN 81 MG: 81 TABLET, CHEWABLE ORAL at 09:01

## 2021-01-01 RX ADMIN — GABAPENTIN 200 MG: 100 CAPSULE ORAL at 22:53

## 2021-01-01 RX ADMIN — CYANOCOBALAMIN TAB 500 MCG 1000 MCG: 500 TAB at 09:00

## 2021-01-01 RX ADMIN — GEMCITABINE HYDROCHLORIDE 1909.9 MG: 2 INJECTION, SOLUTION INTRAVENOUS at 10:16

## 2021-01-01 RX ADMIN — ONDANSETRON 4 MG: 2 INJECTION INTRAMUSCULAR; INTRAVENOUS at 18:51

## 2021-01-01 RX ADMIN — GABAPENTIN 200 MG: 100 CAPSULE ORAL at 08:58

## 2021-01-01 RX ADMIN — SODIUM CHLORIDE 5 ML: 9 INJECTION INTRAMUSCULAR; INTRAVENOUS; SUBCUTANEOUS at 08:26

## 2021-01-01 RX ADMIN — DOCUSATE SODIUM AND SENNOSIDES 1 TABLET: 8.6; 5 TABLET, FILM COATED ORAL at 21:24

## 2021-01-01 RX ADMIN — GABAPENTIN 200 MG: 100 CAPSULE ORAL at 18:09

## 2021-01-01 RX ADMIN — PANCRELIPASE 24000 UNITS: 24000; 76000; 120000 CAPSULE, DELAYED RELEASE PELLETS ORAL at 08:21

## 2021-01-01 RX ADMIN — HYDROMORPHONE HYDROCHLORIDE 0.5 MG: 1 INJECTION, SOLUTION INTRAMUSCULAR; INTRAVENOUS; SUBCUTANEOUS at 21:05

## 2021-01-01 RX ADMIN — GABAPENTIN 200 MG: 100 CAPSULE ORAL at 09:04

## 2021-01-01 RX ADMIN — DEXTROSE 20 ML/HR: 5 SOLUTION INTRAVENOUS at 09:16

## 2021-01-01 RX ADMIN — PANCRELIPASE 24000 UNITS: 24000; 76000; 120000 CAPSULE, DELAYED RELEASE PELLETS ORAL at 08:25

## 2021-01-01 RX ADMIN — GABAPENTIN 200 MG: 100 CAPSULE ORAL at 17:50

## 2021-01-01 RX ADMIN — ACETAMINOPHEN 650 MG: 325 TABLET, FILM COATED ORAL at 06:14

## 2021-01-01 RX ADMIN — SODIUM CHLORIDE 3 G: 9 INJECTION, SOLUTION INTRAVENOUS at 17:54

## 2021-01-01 RX ADMIN — GABAPENTIN 200 MG: 100 CAPSULE ORAL at 17:22

## 2021-01-01 RX ADMIN — INSULIN GLARGINE 25 UNITS: 100 INJECTION, SOLUTION SUBCUTANEOUS at 10:15

## 2021-01-01 RX ADMIN — IOHEXOL 100 ML: 350 INJECTION, SOLUTION INTRAVENOUS at 22:18

## 2021-01-01 RX ADMIN — SODIUM CHLORIDE 5 ML: 9 INJECTION INTRAMUSCULAR; INTRAVENOUS; SUBCUTANEOUS at 21:28

## 2021-01-01 RX ADMIN — MORPHINE SULFATE 2 MG: 2 INJECTION, SOLUTION INTRAMUSCULAR; INTRAVENOUS at 21:49

## 2021-01-01 RX ADMIN — INSULIN LISPRO 10 UNITS: 100 INJECTION, SOLUTION INTRAVENOUS; SUBCUTANEOUS at 13:13

## 2021-01-01 RX ADMIN — INSULIN LISPRO 2 UNITS: 100 INJECTION, SOLUTION INTRAVENOUS; SUBCUTANEOUS at 21:05

## 2021-01-01 RX ADMIN — SODIUM CHLORIDE 5 ML: 9 INJECTION INTRAMUSCULAR; INTRAVENOUS; SUBCUTANEOUS at 23:22

## 2021-01-01 RX ADMIN — TAMSULOSIN HYDROCHLORIDE 0.4 MG: 0.4 CAPSULE ORAL at 21:51

## 2021-01-01 RX ADMIN — VANCOMYCIN HYDROCHLORIDE 1500 MG: 5 INJECTION, POWDER, LYOPHILIZED, FOR SOLUTION INTRAVENOUS at 01:59

## 2021-01-01 RX ADMIN — VANCOMYCIN HYDROCHLORIDE 1500 MG: 5 INJECTION, POWDER, LYOPHILIZED, FOR SOLUTION INTRAVENOUS at 13:13

## 2021-01-01 RX ADMIN — ACETAMINOPHEN 650 MG: 325 TABLET, FILM COATED ORAL at 13:58

## 2021-01-01 RX ADMIN — ASPIRIN 81 MG: 81 TABLET, CHEWABLE ORAL at 09:29

## 2021-01-01 RX ADMIN — FENTANYL CITRATE 25 MCG: 50 INJECTION, SOLUTION INTRAMUSCULAR; INTRAVENOUS at 15:12

## 2021-01-01 RX ADMIN — NALOXONE HYDROCHLORIDE 0.2 MG: 0.4 INJECTION, SOLUTION INTRAMUSCULAR; INTRAVENOUS; SUBCUTANEOUS at 17:40

## 2021-01-01 RX ADMIN — GABAPENTIN 200 MG: 100 CAPSULE ORAL at 21:17

## 2021-01-01 RX ADMIN — FOLIC ACID 1 MG: 1 TABLET ORAL at 10:03

## 2021-01-01 RX ADMIN — POTASSIUM CHLORIDE 40 MEQ: 1500 TABLET, EXTENDED RELEASE ORAL at 07:31

## 2021-01-01 RX ADMIN — HYDROMORPHONE HYDROCHLORIDE 1 MG: 1 INJECTION, SOLUTION INTRAMUSCULAR; INTRAVENOUS; SUBCUTANEOUS at 18:22

## 2021-01-01 RX ADMIN — CEFEPIME HYDROCHLORIDE 2000 MG: 2 INJECTION, SOLUTION INTRAVENOUS at 22:49

## 2021-01-01 RX ADMIN — GABAPENTIN 200 MG: 100 CAPSULE ORAL at 08:02

## 2021-01-01 RX ADMIN — CYANOCOBALAMIN TAB 500 MCG 1000 MCG: 500 TAB at 08:23

## 2021-01-01 RX ADMIN — VANCOMYCIN HYDROCHLORIDE 1500 MG: 5 INJECTION, POWDER, LYOPHILIZED, FOR SOLUTION INTRAVENOUS at 00:59

## 2021-01-01 RX ADMIN — ONDANSETRON 4 MG: 2 INJECTION INTRAMUSCULAR; INTRAVENOUS at 23:37

## 2021-01-01 RX ADMIN — TAMSULOSIN HYDROCHLORIDE 0.4 MG: 0.4 CAPSULE ORAL at 21:02

## 2021-01-01 RX ADMIN — DEXAMETHASONE SODIUM PHOSPHATE: 10 INJECTION, SOLUTION INTRAMUSCULAR; INTRAVENOUS at 12:49

## 2021-01-01 RX ADMIN — INSULIN LISPRO 5 UNITS: 100 INJECTION, SOLUTION INTRAVENOUS; SUBCUTANEOUS at 16:43

## 2021-01-01 RX ADMIN — HYDROCHLOROTHIAZIDE: 12.5 TABLET ORAL at 10:03

## 2021-01-01 RX ADMIN — OXALIPLATIN 166.6 MG: 5 INJECTION, SOLUTION INTRAVENOUS at 09:16

## 2021-01-01 RX ADMIN — INSULIN LISPRO 5 UNITS: 100 INJECTION, SOLUTION INTRAVENOUS; SUBCUTANEOUS at 09:01

## 2021-01-01 RX ADMIN — HEPARIN SODIUM 12 UNITS/KG/HR: 10000 INJECTION, SOLUTION INTRAVENOUS at 02:54

## 2021-01-01 RX ADMIN — PANTOPRAZOLE SODIUM 40 MG: 40 TABLET, DELAYED RELEASE ORAL at 08:59

## 2021-01-01 RX ADMIN — INSULIN GLARGINE 12 UNITS: 100 INJECTION, SOLUTION SUBCUTANEOUS at 22:05

## 2021-01-01 RX ADMIN — DOCUSATE SODIUM AND SENNOSIDES 1 TABLET: 8.6; 5 TABLET, FILM COATED ORAL at 21:08

## 2021-01-01 RX ADMIN — OXYCODONE HYDROCHLORIDE 10 MG: 10 TABLET ORAL at 04:34

## 2021-01-01 RX ADMIN — FENTANYL CITRATE 25 MCG: 50 INJECTION INTRAMUSCULAR; INTRAVENOUS at 15:19

## 2021-01-01 RX ADMIN — INSULIN LISPRO 3 UNITS: 100 INJECTION, SOLUTION INTRAVENOUS; SUBCUTANEOUS at 23:08

## 2021-01-01 RX ADMIN — PANCRELIPASE 24000 UNITS: 24000; 76000; 120000 CAPSULE, DELAYED RELEASE PELLETS ORAL at 12:11

## 2021-01-01 RX ADMIN — TAMSULOSIN HYDROCHLORIDE 0.4 MG: 0.4 CAPSULE ORAL at 20:35

## 2021-01-01 RX ADMIN — TAMSULOSIN HYDROCHLORIDE 0.4 MG: 0.4 CAPSULE ORAL at 02:17

## 2021-01-01 RX ADMIN — ATORVASTATIN CALCIUM 20 MG: 20 TABLET, FILM COATED ORAL at 12:40

## 2021-01-01 RX ADMIN — PANTOPRAZOLE SODIUM 40 MG: 40 TABLET, DELAYED RELEASE ORAL at 18:09

## 2021-01-01 RX ADMIN — APIXABAN 5 MG: 5 TABLET, FILM COATED ORAL at 18:29

## 2021-01-01 RX ADMIN — SODIUM CHLORIDE 5 ML: 9 INJECTION INTRAMUSCULAR; INTRAVENOUS; SUBCUTANEOUS at 08:21

## 2021-01-01 RX ADMIN — PANCRELIPASE 24000 UNITS: 24000; 76000; 120000 CAPSULE, DELAYED RELEASE PELLETS ORAL at 11:50

## 2021-01-01 RX ADMIN — ACETAMINOPHEN 650 MG: 325 TABLET, FILM COATED ORAL at 00:24

## 2021-01-01 RX ADMIN — DEXTROSE 20 ML/HR: 5 SOLUTION INTRAVENOUS at 12:49

## 2021-01-01 RX ADMIN — SODIUM CHLORIDE 5 ML: 9 INJECTION INTRAMUSCULAR; INTRAVENOUS; SUBCUTANEOUS at 16:50

## 2021-01-01 RX ADMIN — ATORVASTATIN CALCIUM 20 MG: 40 TABLET, FILM COATED ORAL at 08:30

## 2021-01-01 RX ADMIN — INSULIN GLARGINE 5 UNITS: 100 INJECTION, SOLUTION SUBCUTANEOUS at 21:28

## 2021-01-01 RX ADMIN — OXYCODONE HYDROCHLORIDE 5 MG: 5 TABLET ORAL at 13:47

## 2021-01-01 RX ADMIN — INSULIN LISPRO 1 UNITS: 100 INJECTION, SOLUTION INTRAVENOUS; SUBCUTANEOUS at 14:21

## 2021-01-01 RX ADMIN — INSULIN GLARGINE 5 UNITS: 100 INJECTION, SOLUTION SUBCUTANEOUS at 23:21

## 2021-01-01 RX ADMIN — CEFTRIAXONE 2000 MG: 2 INJECTION, POWDER, FOR SOLUTION INTRAMUSCULAR; INTRAVENOUS at 14:00

## 2021-01-01 RX ADMIN — PANCRELIPASE 24000 UNITS: 24000; 76000; 120000 CAPSULE, DELAYED RELEASE PELLETS ORAL at 07:33

## 2021-01-01 RX ADMIN — OXYCODONE HYDROCHLORIDE 10 MG: 10 TABLET ORAL at 23:37

## 2021-01-01 RX ADMIN — PEGFILGRASTIM 6 MG: KIT SUBCUTANEOUS at 09:26

## 2021-01-01 RX ADMIN — VANCOMYCIN HYDROCHLORIDE 1500 MG: 5 INJECTION, POWDER, LYOPHILIZED, FOR SOLUTION INTRAVENOUS at 22:17

## 2021-01-01 RX ADMIN — ONDANSETRON 4 MG: 2 INJECTION INTRAMUSCULAR; INTRAVENOUS at 15:11

## 2021-01-01 RX ADMIN — INSULIN LISPRO 10 UNITS: 100 INJECTION, SOLUTION INTRAVENOUS; SUBCUTANEOUS at 17:19

## 2021-01-01 RX ADMIN — ACETAMINOPHEN 650 MG: 325 TABLET ORAL at 09:35

## 2021-01-01 RX ADMIN — INSULIN LISPRO 1 UNITS: 100 INJECTION, SOLUTION INTRAVENOUS; SUBCUTANEOUS at 22:27

## 2021-01-01 RX ADMIN — ACETAMINOPHEN 975 MG: 325 TABLET, FILM COATED ORAL at 15:21

## 2021-01-01 RX ADMIN — ACETAMINOPHEN 650 MG: 325 TABLET, FILM COATED ORAL at 17:22

## 2021-01-01 RX ADMIN — PANTOPRAZOLE SODIUM 40 MG: 40 TABLET, DELAYED RELEASE ORAL at 16:16

## 2021-01-01 RX ADMIN — INSULIN LISPRO 5 UNITS: 100 INJECTION, SOLUTION INTRAVENOUS; SUBCUTANEOUS at 11:35

## 2021-01-01 RX ADMIN — PREDNISONE 5 MG: 5 TABLET ORAL at 08:20

## 2021-01-01 RX ADMIN — HYDROMORPHONE HYDROCHLORIDE 0.5 MG: 1 INJECTION, SOLUTION INTRAMUSCULAR; INTRAVENOUS; SUBCUTANEOUS at 03:57

## 2021-01-01 RX ADMIN — INSULIN LISPRO 5 UNITS: 100 INJECTION, SOLUTION INTRAVENOUS; SUBCUTANEOUS at 07:33

## 2021-01-01 RX ADMIN — PANCRELIPASE 24000 UNITS: 24000; 76000; 120000 CAPSULE, DELAYED RELEASE PELLETS ORAL at 13:58

## 2021-01-01 RX ADMIN — ENOXAPARIN SODIUM 40 MG: 40 INJECTION SUBCUTANEOUS at 09:01

## 2021-01-01 RX ADMIN — DOCUSATE SODIUM AND SENNOSIDES 1 TABLET: 8.6; 5 TABLET, FILM COATED ORAL at 22:10

## 2021-01-01 RX ADMIN — SODIUM CHLORIDE 500 ML: 0.9 INJECTION, SOLUTION INTRAVENOUS at 03:30

## 2021-01-01 RX ADMIN — TAMSULOSIN HYDROCHLORIDE 0.4 MG: 0.4 CAPSULE ORAL at 00:24

## 2021-01-01 RX ADMIN — INSULIN LISPRO 2 UNITS: 100 INJECTION, SOLUTION INTRAVENOUS; SUBCUTANEOUS at 22:27

## 2021-01-01 RX ADMIN — ENOXAPARIN SODIUM 40 MG: 40 INJECTION SUBCUTANEOUS at 13:08

## 2021-01-01 RX ADMIN — SODIUM CHLORIDE 5 ML: 9 INJECTION INTRAMUSCULAR; INTRAVENOUS; SUBCUTANEOUS at 22:10

## 2021-01-01 RX ADMIN — MIDAZOLAM 1 MG: 1 INJECTION INTRAMUSCULAR; INTRAVENOUS at 15:19

## 2021-01-01 RX ADMIN — GABAPENTIN 200 MG: 100 CAPSULE ORAL at 17:00

## 2021-01-01 RX ADMIN — CEFEPIME HYDROCHLORIDE 2000 MG: 2 INJECTION, SOLUTION INTRAVENOUS at 16:16

## 2021-01-01 RX ADMIN — IOHEXOL 85 ML: 350 INJECTION, SOLUTION INTRAVENOUS at 03:54

## 2021-01-01 RX ADMIN — VANCOMYCIN HYDROCHLORIDE 1500 MG: 5 INJECTION, POWDER, LYOPHILIZED, FOR SOLUTION INTRAVENOUS at 06:37

## 2021-01-01 RX ADMIN — SENNOSIDES 8.6 MG: 8.6 TABLET, FILM COATED ORAL at 22:27

## 2021-01-01 RX ADMIN — FOLIC ACID 1 MG: 1 TABLET ORAL at 09:40

## 2021-01-01 RX ADMIN — ASPIRIN 81 MG: 81 TABLET, CHEWABLE ORAL at 12:40

## 2021-01-01 RX ADMIN — SODIUM CHLORIDE 5 ML: 9 INJECTION INTRAMUSCULAR; INTRAVENOUS; SUBCUTANEOUS at 21:24

## 2021-01-01 RX ADMIN — GABAPENTIN 200 MG: 100 CAPSULE ORAL at 08:05

## 2021-01-01 RX ADMIN — CYANOCOBALAMIN TAB 500 MCG 1000 MCG: 500 TAB at 08:24

## 2021-01-01 RX ADMIN — PANTOPRAZOLE SODIUM 40 MG: 40 TABLET, DELAYED RELEASE ORAL at 09:40

## 2021-01-01 RX ADMIN — OXYCODONE HYDROCHLORIDE 10 MG: 10 TABLET ORAL at 10:18

## 2021-01-01 RX ADMIN — INSULIN LISPRO 1 UNITS: 100 INJECTION, SOLUTION INTRAVENOUS; SUBCUTANEOUS at 17:50

## 2021-01-01 RX ADMIN — PANTOPRAZOLE SODIUM 40 MG: 40 TABLET, DELAYED RELEASE ORAL at 17:41

## 2021-01-01 RX ADMIN — CYANOCOBALAMIN 1000 MCG: 1000 INJECTION, SOLUTION INTRAMUSCULAR; SUBCUTANEOUS at 17:17

## 2021-01-01 RX ADMIN — GABAPENTIN 200 MG: 100 CAPSULE ORAL at 09:06

## 2021-01-01 RX ADMIN — PREDNISONE 20 MG: 5 TABLET ORAL at 09:39

## 2021-01-01 RX ADMIN — METOCLOPRAMIDE 10 MG: 10 TABLET ORAL at 06:05

## 2021-01-01 RX ADMIN — INSULIN LISPRO 2 UNITS: 100 INJECTION, SOLUTION INTRAVENOUS; SUBCUTANEOUS at 22:00

## 2021-01-01 RX ADMIN — CEFEPIME HYDROCHLORIDE 2000 MG: 2 INJECTION, SOLUTION INTRAVENOUS at 05:56

## 2021-01-01 RX ADMIN — INSULIN GLARGINE 12 UNITS: 100 INJECTION, SOLUTION SUBCUTANEOUS at 21:55

## 2021-01-01 RX ADMIN — SODIUM CHLORIDE 20 ML/HR: 0.9 INJECTION, SOLUTION INTRAVENOUS at 08:31

## 2021-01-01 RX ADMIN — SODIUM CHLORIDE 1000 ML: 0.9 INJECTION, SOLUTION INTRAVENOUS at 09:51

## 2021-01-01 RX ADMIN — METRONIDAZOLE 500 MG: 500 TABLET ORAL at 21:24

## 2021-01-01 RX ADMIN — GABAPENTIN 200 MG: 100 CAPSULE ORAL at 09:07

## 2021-01-01 RX ADMIN — INSULIN GLARGINE 12 UNITS: 100 INJECTION, SOLUTION SUBCUTANEOUS at 21:08

## 2021-01-01 RX ADMIN — HEPARIN SODIUM 5000 UNITS: 5000 INJECTION INTRAVENOUS; SUBCUTANEOUS at 15:06

## 2021-01-01 RX ADMIN — CEFEPIME HYDROCHLORIDE 2000 MG: 2 INJECTION, SOLUTION INTRAVENOUS at 05:31

## 2021-01-01 RX ADMIN — PANCRELIPASE 24000 UNITS: 24000; 76000; 120000 CAPSULE, DELAYED RELEASE PELLETS ORAL at 08:05

## 2021-01-01 RX ADMIN — METRONIDAZOLE 500 MG: 500 TABLET ORAL at 05:18

## 2021-01-01 RX ADMIN — PANTOPRAZOLE SODIUM 40 MG: 40 TABLET, DELAYED RELEASE ORAL at 08:19

## 2021-01-01 RX ADMIN — OXYCODONE HYDROCHLORIDE 10 MG: 10 TABLET ORAL at 21:54

## 2021-01-01 RX ADMIN — ASPIRIN 81 MG CHEWABLE TABLET 81 MG: 81 TABLET CHEWABLE at 08:05

## 2021-01-01 RX ADMIN — IOHEXOL 5 ML: 350 INJECTION, SOLUTION INTRAVENOUS at 13:17

## 2021-01-01 RX ADMIN — OXYCODONE HYDROCHLORIDE 7.5 MG: 5 TABLET ORAL at 14:05

## 2021-01-01 RX ADMIN — SODIUM CHLORIDE 1000 ML: 0.9 INJECTION, SOLUTION INTRAVENOUS at 05:42

## 2021-01-01 RX ADMIN — PANCRELIPASE 24000 UNITS: 24000; 76000; 120000 CAPSULE, DELAYED RELEASE PELLETS ORAL at 11:51

## 2021-01-01 RX ADMIN — ATORVASTATIN CALCIUM 20 MG: 40 TABLET, FILM COATED ORAL at 16:35

## 2021-01-01 RX ADMIN — PANCRELIPASE 24000 UNITS: 24000; 76000; 120000 CAPSULE, DELAYED RELEASE PELLETS ORAL at 07:57

## 2021-01-01 RX ADMIN — CEFAZOLIN SODIUM 2000 MG: 2 SOLUTION INTRAVENOUS at 05:01

## 2021-01-01 RX ADMIN — SODIUM CHLORIDE 3 G: 9 INJECTION, SOLUTION INTRAVENOUS at 13:26

## 2021-01-01 RX ADMIN — HEPARIN SODIUM 6400 UNITS: 1000 INJECTION INTRAVENOUS; SUBCUTANEOUS at 03:24

## 2021-01-01 RX ADMIN — PANTOPRAZOLE SODIUM 40 MG: 40 TABLET, DELAYED RELEASE ORAL at 06:40

## 2021-01-01 RX ADMIN — SODIUM CHLORIDE, SODIUM GLUCONATE, SODIUM ACETATE, POTASSIUM CHLORIDE, MAGNESIUM CHLORIDE, SODIUM PHOSPHATE, DIBASIC, AND POTASSIUM PHOSPHATE 75 ML/HR: .53; .5; .37; .037; .03; .012; .00082 INJECTION, SOLUTION INTRAVENOUS at 03:50

## 2021-01-01 RX ADMIN — OXYCODONE HYDROCHLORIDE 7.5 MG: 5 TABLET ORAL at 07:26

## 2021-01-01 RX ADMIN — OXYCODONE HYDROCHLORIDE 7.5 MG: 5 TABLET ORAL at 20:45

## 2021-01-01 RX ADMIN — APIXABAN 5 MG: 5 TABLET, FILM COATED ORAL at 18:09

## 2021-01-01 RX ADMIN — ATORVASTATIN CALCIUM 10 MG: 10 TABLET, FILM COATED ORAL at 17:18

## 2021-01-01 RX ADMIN — SODIUM CHLORIDE 75 ML/HR: 0.9 INJECTION, SOLUTION INTRAVENOUS at 10:09

## 2021-01-01 RX ADMIN — FLUOXETINE 20 MG: 20 CAPSULE ORAL at 08:05

## 2021-01-01 RX ADMIN — FOLIC ACID 1 MG: 1 TABLET ORAL at 09:31

## 2021-01-01 RX ADMIN — ATORVASTATIN CALCIUM 20 MG: 20 TABLET, FILM COATED ORAL at 10:04

## 2021-01-01 RX ADMIN — PREDNISONE 10 MG: 10 TABLET ORAL at 13:40

## 2021-01-01 RX ADMIN — PANCRELIPASE 24000 UNITS: 24000; 76000; 120000 CAPSULE, DELAYED RELEASE PELLETS ORAL at 16:54

## 2021-01-01 RX ADMIN — PANTOPRAZOLE SODIUM 40 MG: 40 TABLET, DELAYED RELEASE ORAL at 22:54

## 2021-01-01 RX ADMIN — Medication 10 MG: at 12:14

## 2021-01-01 RX ADMIN — INSULIN GLARGINE 20 UNITS: 100 INJECTION, SOLUTION SUBCUTANEOUS at 22:10

## 2021-01-01 RX ADMIN — CEFEPIME HYDROCHLORIDE 2000 MG: 2 INJECTION, SOLUTION INTRAVENOUS at 21:55

## 2021-01-01 RX ADMIN — PANCRELIPASE 24000 UNITS: 24000; 76000; 120000 CAPSULE, DELAYED RELEASE PELLETS ORAL at 17:30

## 2021-01-01 RX ADMIN — POLYETHYLENE GLYCOL 3350 17 G: 17 POWDER, FOR SOLUTION ORAL at 08:39

## 2021-01-01 RX ADMIN — SENNOSIDES 8.6 MG: 8.6 TABLET, FILM COATED ORAL at 21:56

## 2021-01-01 RX ADMIN — FOLIC ACID 1 MG: 1 TABLET ORAL at 08:11

## 2021-01-01 RX ADMIN — PANCRELIPASE 24000 UNITS: 24000; 76000; 120000 CAPSULE, DELAYED RELEASE PELLETS ORAL at 15:42

## 2021-01-01 RX ADMIN — GABAPENTIN 200 MG: 100 CAPSULE ORAL at 18:29

## 2021-01-01 RX ADMIN — FLUOXETINE 20 MG: 10 CAPSULE ORAL at 08:32

## 2021-01-01 RX ADMIN — PANCRELIPASE 24000 UNITS: 24000; 76000; 120000 CAPSULE, DELAYED RELEASE PELLETS ORAL at 09:05

## 2021-01-01 RX ADMIN — INSULIN LISPRO 10 UNITS: 100 INJECTION, SOLUTION INTRAVENOUS; SUBCUTANEOUS at 08:47

## 2021-01-01 RX ADMIN — GABAPENTIN 200 MG: 100 CAPSULE ORAL at 09:39

## 2021-01-01 RX ADMIN — INSULIN LISPRO 10 UNITS: 100 INJECTION, SOLUTION INTRAVENOUS; SUBCUTANEOUS at 18:04

## 2021-01-01 RX ADMIN — GEMCITABINE HYDROCHLORIDE 1909.9 MG: 2 INJECTION, SOLUTION INTRAVENOUS at 11:26

## 2021-01-01 RX ADMIN — VANCOMYCIN HYDROCHLORIDE 1500 MG: 5 INJECTION, POWDER, LYOPHILIZED, FOR SOLUTION INTRAVENOUS at 22:00

## 2021-01-01 RX ADMIN — PREDNISONE 10 MG: 10 TABLET ORAL at 08:33

## 2021-01-01 RX ADMIN — FOLIC ACID 1 MG: 1 TABLET ORAL at 08:18

## 2021-01-01 RX ADMIN — SODIUM CHLORIDE 5 ML: 9 INJECTION INTRAMUSCULAR; INTRAVENOUS; SUBCUTANEOUS at 22:17

## 2021-01-01 RX ADMIN — SENNOSIDES 8.6 MG: 8.6 TABLET, FILM COATED ORAL at 21:51

## 2021-01-01 RX ADMIN — DEXAMETHASONE SODIUM PHOSPHATE: 10 INJECTION, SOLUTION INTRAMUSCULAR; INTRAVENOUS at 08:35

## 2021-01-01 RX ADMIN — INSULIN LISPRO 10 UNITS: 100 INJECTION, SOLUTION INTRAVENOUS; SUBCUTANEOUS at 13:59

## 2021-01-01 RX ADMIN — MIDAZOLAM 1 MG: 1 INJECTION INTRAMUSCULAR; INTRAVENOUS at 15:00

## 2021-01-01 RX ADMIN — ENOXAPARIN SODIUM 40 MG: 40 INJECTION SUBCUTANEOUS at 08:09

## 2021-01-01 RX ADMIN — CEFAZOLIN SODIUM 2000 MG: 2 SOLUTION INTRAVENOUS at 23:07

## 2021-01-01 RX ADMIN — FLUOXETINE 20 MG: 20 CAPSULE ORAL at 09:07

## 2021-01-01 RX ADMIN — GEMCITABINE HYDROCHLORIDE 1909.9 MG: 2 INJECTION, SOLUTION INTRAVENOUS at 14:26

## 2021-01-01 RX ADMIN — SODIUM CHLORIDE 100 ML/HR: 0.9 INJECTION, SOLUTION INTRAVENOUS at 01:17

## 2021-01-01 RX ADMIN — INSULIN LISPRO 10 UNITS: 100 INJECTION, SOLUTION INTRAVENOUS; SUBCUTANEOUS at 08:05

## 2021-01-01 RX ADMIN — INSULIN LISPRO 5 UNITS: 100 INJECTION, SOLUTION INTRAVENOUS; SUBCUTANEOUS at 17:52

## 2021-01-01 RX ADMIN — Medication 3 MG: at 22:16

## 2021-01-01 RX ADMIN — PANTOPRAZOLE SODIUM 40 MG: 40 TABLET, DELAYED RELEASE ORAL at 06:08

## 2021-01-01 RX ADMIN — SODIUM CHLORIDE, SODIUM LACTATE, POTASSIUM CHLORIDE, AND CALCIUM CHLORIDE 1000 ML: .6; .31; .03; .02 INJECTION, SOLUTION INTRAVENOUS at 11:39

## 2021-01-01 RX ADMIN — PREDNISONE 10 MG: 10 TABLET ORAL at 14:05

## 2021-01-01 RX ADMIN — SODIUM CHLORIDE 3 ML: 9 INJECTION, SOLUTION INTRAMUSCULAR; INTRAVENOUS; SUBCUTANEOUS at 17:43

## 2021-01-01 RX ADMIN — Medication 191 MG: at 10:18

## 2021-01-01 RX ADMIN — METRONIDAZOLE 500 MG: 500 TABLET ORAL at 14:49

## 2021-01-01 RX ADMIN — INSULIN LISPRO 3 UNITS: 100 INJECTION, SOLUTION INTRAVENOUS; SUBCUTANEOUS at 16:51

## 2021-01-01 RX ADMIN — SENNOSIDES 8.6 MG: 8.6 TABLET, FILM COATED ORAL at 21:54

## 2021-01-01 RX ADMIN — GEMCITABINE HYDROCHLORIDE 1909.9 MG: 2 INJECTION, SOLUTION INTRAVENOUS at 11:38

## 2021-01-01 RX ADMIN — Medication 191 MG: at 09:54

## 2021-01-01 RX ADMIN — DOCUSATE SODIUM AND SENNOSIDES 1 TABLET: 8.6; 5 TABLET, FILM COATED ORAL at 23:09

## 2021-01-01 RX ADMIN — INSULIN LISPRO 10 UNITS: 100 INJECTION, SOLUTION INTRAVENOUS; SUBCUTANEOUS at 11:58

## 2021-01-01 RX ADMIN — PREDNISONE 20 MG: 5 TABLET ORAL at 08:59

## 2021-01-01 RX ADMIN — METRONIDAZOLE 500 MG: 500 TABLET ORAL at 19:30

## 2021-01-01 RX ADMIN — FLUOXETINE 20 MG: 20 CAPSULE ORAL at 08:18

## 2021-01-01 RX ADMIN — FLUOXETINE 20 MG: 20 CAPSULE ORAL at 09:00

## 2021-01-01 RX ADMIN — PANCRELIPASE 24000 UNITS: 24000; 76000; 120000 CAPSULE, DELAYED RELEASE PELLETS ORAL at 09:06

## 2021-01-01 RX ADMIN — PREDNISONE 20 MG: 5 TABLET ORAL at 09:00

## 2021-02-22 ENCOUNTER — OFFICE VISIT (OUTPATIENT)
Dept: FAMILY MEDICINE CLINIC | Facility: CLINIC | Age: 66
End: 2021-02-22
Payer: COMMERCIAL

## 2021-02-22 VITALS
SYSTOLIC BLOOD PRESSURE: 132 MMHG | TEMPERATURE: 97.9 F | DIASTOLIC BLOOD PRESSURE: 82 MMHG | HEART RATE: 76 BPM | HEIGHT: 71 IN | WEIGHT: 197 LBS | BODY MASS INDEX: 27.58 KG/M2

## 2021-02-22 DIAGNOSIS — R73.9 HYPERGLYCEMIA: ICD-10-CM

## 2021-02-22 DIAGNOSIS — M05.79 RHEUMATOID ARTHRITIS INVOLVING MULTIPLE SITES WITH POSITIVE RHEUMATOID FACTOR (HCC): Primary | ICD-10-CM

## 2021-02-22 DIAGNOSIS — E78.2 MIXED HYPERLIPIDEMIA: ICD-10-CM

## 2021-02-22 DIAGNOSIS — F41.9 ANXIETY DISORDER, UNSPECIFIED TYPE: ICD-10-CM

## 2021-02-22 DIAGNOSIS — I10 ESSENTIAL HYPERTENSION: ICD-10-CM

## 2021-02-22 PROCEDURE — 99214 OFFICE O/P EST MOD 30 MIN: CPT | Performed by: FAMILY MEDICINE

## 2021-02-22 PROCEDURE — 3288F FALL RISK ASSESSMENT DOCD: CPT | Performed by: FAMILY MEDICINE

## 2021-02-22 PROCEDURE — 3725F SCREEN DEPRESSION PERFORMED: CPT | Performed by: FAMILY MEDICINE

## 2021-02-22 PROCEDURE — 1101F PT FALLS ASSESS-DOCD LE1/YR: CPT | Performed by: FAMILY MEDICINE

## 2021-02-22 RX ORDER — METHOTREXATE 15 MG/.3ML
7.5 INJECTION, SOLUTION SUBCUTANEOUS
Qty: 4 PEN | Refills: 4
Start: 2021-02-22 | End: 2021-04-27 | Stop reason: ALTCHOICE

## 2021-02-22 NOTE — PROGRESS NOTES
Assessment/Plan:    Hypertension  Well controlled  Cont present treatment  Monitor labs  Counseled re: diet, exercise and weight loss effects on BP  BMI Counseling: Body mass index is 27 87 kg/m²  The BMI is above normal  Nutrition recommendations include consuming healthier snacks, moderation in carbohydrate intake, increasing intake of lean protein, reducing intake of saturated fat and trans fat and reducing intake of cholesterol  Recheck 6m      Rheumatoid arthritis ([address] )  Stable  F/u with Rheum  Continue present care  Recheck 6m    Hyperlipidemia  Monitor labs  Continue atorvastatin  Recheck 6m    Anxiety disorder  Stress related  Continue fluoxetine  Counseled re: stress reduction  Recheck 6m - earlier if worse    Hyperglycemia  Monitor labs  Urged diet control  Recheck 6m       Diagnoses and all orders for this visit:    Rheumatoid arthritis involving multiple sites with positive rheumatoid factor (HCC)  -     Methotrexate, PF, (Rasuvo) 15 MG/0 3ML SOAJ; Inject 0 15 mL (7 5 mg total) under the skin every 14 (fourteen) days    Essential hypertension    Mixed hyperlipidemia    Hyperglycemia  -     Comprehensive metabolic panel; Future  -     Hemoglobin A1C; Future    Anxiety disorder, unspecified type          Subjective:      Patient ID: Loren Barnard is a 73 y o  male  f/u multiple med issues  - pt notes several week hx of increased belching/flatulence without abd bloating  Notes loose BM 1x a week  No melena/hematochezia  No recent illness, abx use or change in meds/diet  Pt denies chagne in weight  - anxiety is unchanged  Work,and family remain triggers  Pt denies depression or anhedonia  - pt is up to date with rheum  He is weaning off of MTX and remains on [employer]  Pt doing well on this regimen  - pt denies CP, palp, lightheadedness or other CV symptoms with or without exertion  - no new  complaints     - no other concerns        The following portions of the patient's history were reviewed and updated as appropriate:   He  has a past medical history of Arthritis, Cellulitis, Enlarged prostate, Epidermal inclusion cyst, Erythrasma, Furuncle, Hyperlipidemia, and Hypertension  He   Patient Active Problem List    Diagnosis Date Noted    Hyperglycemia 02/23/2021    Multiple lipomas 08/04/2020    Urgency of urination 01/31/2018    Right arm pain 10/13/2017    Rheumatoid arthritis ([address] ) 10/13/2017    Hypertension 10/13/2017    Liver mass 11/17/2016    Hyperlipidemia 02/03/2016    Anxiety disorder 08/15/2013    Esophageal reflux 08/15/2013     He  has a past surgical history that includes Scrotal surgery; pr removal of hydrocele,tunica,unilat (Right, 1/11/2018); and Hydrocele excision / repair (Right, 01/11/2018)  He  reports that he has never smoked  He has never used smokeless tobacco  He reports that he does not drink alcohol or use drugs  Current Outpatient Medications   Medication Sig Dispense Refill    aspirin 81 mg chewable tablet Chew 81 mg      atorvastatin (LIPITOR) 10 mg tablet TAKE 1 TABLET BY MOUTH  DAILY 90 tablet 3    FLUoxetine (PROzac) 20 mg capsule TAKE 1 CAPSULE BY MOUTH EVERY DAY  90 capsule 2    folic acid (FOLVITE) 1 mg tablet Take by mouth daily      lisinopril-hydrochlorothiazide (PRINZIDE,ZESTORETIC) 10-12 5 MG per tablet TAKE ONE TABLET BY MOUTH EVERY DAY 90 tablet 3    Methotrexate, PF, (Rasuvo) 15 MG/0 3ML SOAJ Inject 0 15 mL (7 5 mg total) under the skin every 14 (fourteen) days 4 pen 4    Multiple Vitamins-Minerals (MULTIVITAMIN MEN) TABS Take 1 tablet by mouth daily      Omega-3 Fatty Acids (FISH OIL) 1,000 mg Take 1,000 mg by mouth daily      tamsulosin (FLOMAX) 0 4 mg TAKE 1 CAPSULE BY MOUTH  DAILY AT BEDTIME 90 capsule 3    XELJANZ XR 11 MG TB24        No current facility-administered medications for this visit  He is allergic to fentanyl       Review of Systems   Constitutional: Negative  HENT: Negative  Eyes: Negative      Respiratory: Negative  Cardiovascular: Negative  Gastrointestinal: Negative  Endocrine: Negative  Genitourinary: Negative  Musculoskeletal: Positive for arthralgias  Skin: Negative  Allergic/Immunologic: Negative  Neurological: Negative  Hematological: Negative  Psychiatric/Behavioral: Negative for decreased concentration, dysphoric mood, self-injury and suicidal ideas  The patient is nervous/anxious  Objective:      /82   Pulse 76   Temp 97 9 °F (36 6 °C)   Ht 5' 10 5" (1 791 m)   Wt 89 4 kg (197 lb)   BMI 27 87 kg/m²          Physical Exam  Vitals signs reviewed  Constitutional:       Appearance: He is well-developed  HENT:      Head: Normocephalic and atraumatic  Right Ear: Tympanic membrane, ear canal and external ear normal       Left Ear: Tympanic membrane, ear canal and external ear normal    Eyes:      General: No scleral icterus  Extraocular Movements: Extraocular movements intact  Conjunctiva/sclera: Conjunctivae normal       Pupils: Pupils are equal, round, and reactive to light  Neck:      Musculoskeletal: Normal range of motion and neck supple  No muscular tenderness  Thyroid: No thyromegaly  Vascular: No carotid bruit  Cardiovascular:      Rate and Rhythm: Normal rate and regular rhythm  Pulses: Normal pulses  Heart sounds: Normal heart sounds  No murmur  Pulmonary:      Effort: Pulmonary effort is normal       Breath sounds: Normal breath sounds  Abdominal:      General: Bowel sounds are normal  There is no distension  Palpations: Abdomen is soft  There is no mass  Tenderness: There is no abdominal tenderness  Musculoskeletal:         General: Deformity (arthritic changes in hands bilat) present  No swelling or tenderness  Right lower leg: No edema  Left lower leg: No edema  Lymphadenopathy:      Cervical: No cervical adenopathy  Skin:     General: Skin is warm and dry        Capillary Refill: Capillary refill takes less than 2 seconds  Neurological:      Mental Status: He is alert and oriented to person, place, and time  Cranial Nerves: No cranial nerve deficit  Sensory: No sensory deficit  Motor: No weakness  Psychiatric:         Behavior: Behavior normal          Thought Content:  Thought content normal          Judgment: Judgment normal       Comments: PHQ-9 Depression Screening    PHQ-9:   Frequency of the following problems over the past two weeks:      Little interest or pleasure in doing things: 0 - not at all  Feeling down, depressed, or hopeless: 0 - not at all  PHQ-2 Score: 0

## 2021-02-22 NOTE — PATIENT INSTRUCTIONS
Low FODMAPs Diet     You need a diet that limits, but does not eliminate foods that contain: lactose, fructose, fructans, galactans, and sugar alcohols (polyols)    This is often referred to as a low FODMAPs diet as it is low in fermetable oligo-, di-, and monosaccharides and polyls (FODMAPs)    The low FODMAPs diet will help minimize symptoms, such as bloating, cramping, burping, flatulence, and constipation and/or diarrhea, that are often associated with Irritable Bowel Syndrome (IBS)    Because there are different levels of intolerance, you need to eliminate foods high in FODMAPs for 6-8 weeks and then gradually reintroduce foods to identify bothersome foods   Reintroduce one food every four days with a 2-week break between bothersome foods   The end result is to identify the threshold that you are able to consume FODMAP containing foods without causing bothersome GI symptoms       Type of Food High in FODMAPs Low in FODMAPs   Milk -Milk: Cow, Sheep, Goat, Soy  -Creamy soups made with    milk  -Evaporated milk  -Sweetened condensed milk -Milk: Burke, Coconut, Hazelnut, Hemp, Rice  -Lactose free cow's milk  -Lactose free ayden  -Lactose free ice cream    (non-dairy alternatives)  -Purchase lactase enzyme to  make your own evaporated or  condensed milk if needed   Yogurt -Cow's milk yogurt (Greek yogurt is lowest in FODMAPs)  -Soy yogurt -Coconut milk yogurt   Cheese -Cottage cheese  -Ricotta cheese  -Marscapone cheese -Hard cheeses including  cheddar, Swiss, brie, blue  cheese, mozzarella, parmesan,  and feta  -No more than 2 tablespoons ricotta or cottage cheese  -Lactose free cottage cheese   Dairy-based  Condiments -Sour cream  -Whipping cream -Butter  -Half and half  -Cream cheese   Dairy-based desserts -Ice cream  -Frozen yogurt  -Sherbet -Sorbet from FODMAPs friendly fruit   Fruit -Apples, Pears  -Cherries, Raspberries,  Blackberries  -Watermelon  -Nectarines, White peaches, Apricots, Plums  -Peaches  -Prunes  -Henrry, Papaya  -Persimmon  -Orange juice  -Canned fruit  -Large portions of any fruit -Banana  -Blueberries, Strawberries  -Cantaloupe, Honeydew  -Grapefruit, Lemon, Lime  -Grapes  -Kiwi  -Pineapple  -Rhubarb  -Tangelos  - <1/4 avocado  - <1 tablespoon dried fruit     Limit consumption to one low FODMAPs fruit per meal   Consume ripe fruit   (Firm, less- ripe fruit contains more fructose )   Vegetables -Artichokes  -Asparagus  -Sugar snap peas  -Cabbage  -Onions  -Shallot  -Moiz  -Onion and garlic salt  powders  -Garlic  -Cauliflower  -Mushrooms  -Pumpkin  -Green Peppers -Bok lashaun, Bean sprouts  -Red bell pepper  -Lettuce, Spinach  -Carrots  -Chives, Spring onion (green part    Only)  -Cucumber  -Eggplant  -Green beans  -Tomato  -Potatoes  -Garlic infused oil; purchase    flavored oil or saute onion and    garlic in oil and then discard    onion and garlic  -Water chestnuts  -<1 stick celery  -<1/2 cup sweet potato, broccoli,    [place] sprouts, butternut    squash, fennel  -<1/3 cup green peas  -<10 snow peas         Grains -Wheat  -Rye  -Barley-large quantities  -Spelt -Brown rice  -Oats, oat bran  -Quinoa  -Corn  -Gluten-free bread, cereals,    pastas and crackers without    honey, apple/pear juice, agave    or HFCS  -Namaste Food Perfect Flour  Blend or Pama Nanas Gluten Free  -Multi-Purpose Flour   Legumes -Chickpeas, Hummus  -Kidney beans, Baked beans  -Edamame  -Lentils  -Soy milk -Tofu  -Peanuts   Nuts and Seeds -Pistachios -10-15 max or 1-2 tablespoons of Almonds, Macadamia, Pecans,  Pine nuts, Walnuts, Pumpkin  Seed, Sesame seeds, and  Sunflower seeds   Sweeteners -Honey  -Agave  -High fructose corn syrup  -Sorbitol, Mannitol, Xylitol,  Maltitol  -Splenda (may alter friendly  gut chiquis) -Sugar  -Glucose, Sucrose  -Pure maple syrup  -Aspartame   Additives -Inulin, found in yogurt, ayden, cereals, and other foods with added fiber  -FOS    (fructo- oligosaccharides)  -Sugar alcohols (see sweeteners)  -Chicory root    Alcohol -Rum -Wine, Beer  -Vodka, Gin  -Limit to one serving as all  alcohol is a gastric irritant   Protein-rich food  -Fish, Chicken, [place] Eggs,  Meat   Fat-rich food  -Olive and canola oil  -Olives  -<1/4 avocado      Sample Low FODMAPs Diet Menu:     Breakfast   [place] Corn Flakes or oats, with rice or almond milk, banana and 1 tablespoon sliced almonds   San's or [employer] oatmeal with 1 tablespoon dried fruit and nuts   Quinoa flakes with rice or almond milk, 3/4 cup strawberries, and 1 tablespoon pecans     Lunch   Elvin's white bread sandwich with sliced turkey, lettuce or spinach leaves, tomato, sliced cheddar cheese and [place] lactose-free vanilla yogurt, 1/2 cup blueberries and baby carrots   Stir costa with brown rice or rice noodles, chicken, shrimp, or beef, peppers and [place], ask for no onion or garlic and the sauce on the side   Fruit salad with 1 cup (total) low FODMAP fruits, kiwi, strawberries and blueberries, spinach salad with lemon dressing and cherry tomatoes, and brown rice cakes with natural almond butter     Snack   Glutino pretzels or [place] Nut thins and mozzarella string cheese   Hard boiled egg and cherry tomatoes   Pumpkin seeds   Brown rice cakes with natural peanut butter   Banana and handful almonds   1 stick celery with natural almond butter or,   Carrots and red pepper dipped in [employer] chicken or salmon with baked sweet potato with olive oil or butter, sauteed spinach and red peppers seasoned with green parts of onion, salt, pepper, handful of pine nuts and olive oil, and a kiwi   Christin's baked potato and a side salad with chicken, bring your own homemade salad dressing that does not contain garlic or onion   Lindsey

## 2021-02-23 PROBLEM — R73.9 HYPERGLYCEMIA: Status: ACTIVE | Noted: 2021-02-23

## 2021-02-23 NOTE — ASSESSMENT & PLAN NOTE
Well controlled  Cont present treatment  Monitor labs  Counseled re: diet, exercise and weight loss effects on BP  BMI Counseling: Body mass index is 27 87 kg/m²  The BMI is above normal  Nutrition recommendations include consuming healthier snacks, moderation in carbohydrate intake, increasing intake of lean protein, reducing intake of saturated fat and trans fat and reducing intake of cholesterol    Recheck 6m

## 2021-03-03 LAB
ALBUMIN SERPL-MCNC: 4.5 G/DL (ref 3.8–4.8)
ALBUMIN/GLOB SERPL: 2.8 {RATIO} (ref 1.2–2.2)
ALP SERPL-CCNC: 116 IU/L (ref 39–117)
ALT SERPL-CCNC: 40 IU/L (ref 0–44)
AST SERPL-CCNC: 20 IU/L (ref 0–40)
BILIRUB SERPL-MCNC: 1.1 MG/DL (ref 0–1.2)
BUN SERPL-MCNC: 21 MG/DL (ref 8–27)
BUN/CREAT SERPL: 23 (ref 10–24)
CALCIUM SERPL-MCNC: 9.3 MG/DL (ref 8.6–10.2)
CHLORIDE SERPL-SCNC: 105 MMOL/L (ref 96–106)
CO2 SERPL-SCNC: 23 MMOL/L (ref 20–29)
CREAT SERPL-MCNC: 0.93 MG/DL (ref 0.76–1.27)
GLOBULIN SER-MCNC: 1.6 G/DL (ref 1.5–4.5)
GLUCOSE SERPL-MCNC: 109 MG/DL (ref 65–99)
HBA1C MFR BLD: 6.5 % (ref 4.8–5.6)
POTASSIUM SERPL-SCNC: 4.1 MMOL/L (ref 3.5–5.2)
PROT SERPL-MCNC: 6.1 G/DL (ref 6–8.5)
SL AMB EGFR AFRICAN AMERICAN: 99 ML/MIN/1.73
SL AMB EGFR NON AFRICAN AMERICAN: 86 ML/MIN/1.73
SODIUM SERPL-SCNC: 140 MMOL/L (ref 134–144)

## 2021-03-03 PROCEDURE — 3044F HG A1C LEVEL LT 7.0%: CPT | Performed by: FAMILY MEDICINE

## 2021-03-16 ENCOUNTER — OFFICE VISIT (OUTPATIENT)
Dept: FAMILY MEDICINE CLINIC | Facility: CLINIC | Age: 66
End: 2021-03-16
Payer: COMMERCIAL

## 2021-03-16 VITALS
BODY MASS INDEX: 26.88 KG/M2 | HEART RATE: 72 BPM | WEIGHT: 192 LBS | DIASTOLIC BLOOD PRESSURE: 80 MMHG | TEMPERATURE: 97.9 F | SYSTOLIC BLOOD PRESSURE: 128 MMHG | HEIGHT: 71 IN

## 2021-03-16 DIAGNOSIS — E78.2 MIXED HYPERLIPIDEMIA: ICD-10-CM

## 2021-03-16 DIAGNOSIS — E11.9 TYPE 2 DIABETES MELLITUS WITHOUT COMPLICATION, WITHOUT LONG-TERM CURRENT USE OF INSULIN (HCC): Primary | ICD-10-CM

## 2021-03-16 DIAGNOSIS — I10 ESSENTIAL HYPERTENSION: ICD-10-CM

## 2021-03-16 PROBLEM — R73.9 HYPERGLYCEMIA: Status: RESOLVED | Noted: 2021-02-23 | Resolved: 2021-03-16

## 2021-03-16 PROCEDURE — 99214 OFFICE O/P EST MOD 30 MIN: CPT | Performed by: FAMILY MEDICINE

## 2021-03-16 PROCEDURE — 3074F SYST BP LT 130 MM HG: CPT | Performed by: FAMILY MEDICINE

## 2021-03-16 PROCEDURE — 1036F TOBACCO NON-USER: CPT | Performed by: FAMILY MEDICINE

## 2021-03-16 PROCEDURE — 3008F BODY MASS INDEX DOCD: CPT | Performed by: FAMILY MEDICINE

## 2021-03-16 PROCEDURE — 3079F DIAST BP 80-89 MM HG: CPT | Performed by: FAMILY MEDICINE

## 2021-03-16 NOTE — PROGRESS NOTES
Assessment/Plan:    Type 2 diabetes mellitus without complication, without long-term current use of insulin ([facility])    Lab Results   Component Value Date    HGBA1C 6 5 (H) 02/27/2021     New onset  I reviewed with patient  We discussed disease state,  Diet/ exercise/weight loss affects, and other treatments  Recommend increasing exercise  Start low process carb diet  Recommend Education class-patient to consider  He will contact his insurance company to see which sugar monitor and strips are covered  Recheck 3 months    Hypertension   Remains well controlled  Continue present treatment  Recheck 3 months    Hyperlipidemia   Continue atorvastatin  Monitor diet  Recheck 3 months       Diagnoses and all orders for this visit:    Type 2 diabetes mellitus without complication, without long-term current use of insulin ([facility])  -     Comprehensive metabolic panel; Future  -     Hemoglobin A1C; Future  -     Lipid panel; Future  -     Microalbumin / creatinine urine ratio; Future  -     Ambulatory referral to Diabetic Education; Future    Essential hypertension  -     CBC and differential; Future    Mixed hyperlipidemia          Subjective:      Patient ID: Esha Richardson is a 73 y o  male  Here to discuss recent lab results  - 58-year-old male with history of hyperglycemia here to discuss recent labs  Fasting sugar was 109 however A1c increased to 6 5 ( up from 5 7 in 2019)  Patient  Denies increased thirst or urination  He also denies any changes in vision  He does not watch his diet carefully and does not exercise routinely  -   Patient denies any cardiovascular, respiratory, GI or  complaints at present   -   Patient has persistent body aches that are unchanged  -   I reviewed recent labs with patient        The following portions of the patient's history were reviewed and updated as appropriate:   He  has a past medical history of Arthritis, Cellulitis, Enlarged prostate, Epidermal inclusion cyst, Erythrasma, Furuncle, Hyperlipidemia, and Hypertension  He   Patient Active Problem List    Diagnosis Date Noted    Type 2 diabetes mellitus without complication, without long-term current use of insulin ([facility] ) 03/16/2021    Multiple lipomas 08/04/2020    Urgency of urination 01/31/2018    Right arm pain 10/13/2017    [facility] ) 10/13/2017    Hypertension 10/13/2017    Liver mass 11/17/2016    Hyperlipidemia 02/03/2016    Anxiety disorder 08/15/2013    Esophageal reflux 08/15/2013     He  has a past surgical history that includes Scrotal surgery; pr removal of hydrocele,tunica,unilat (Right, 1/11/2018); and Hydrocele excision / repair (Right, 01/11/2018)  He  reports that he has never smoked  He has never used smokeless tobacco  He reports that he does not drink alcohol or use drugs  Current Outpatient Medications   Medication Sig Dispense Refill    aspirin 81 mg chewable tablet Chew 81 mg      atorvastatin (LIPITOR) 10 mg tablet TAKE 1 TABLET BY MOUTH  DAILY 90 tablet 3    FLUoxetine (PROzac) 20 mg capsule TAKE 1 CAPSULE BY MOUTH EVERY DAY  90 capsule 2    folic acid (FOLVITE) 1 mg tablet Take by mouth daily      lisinopril-hydrochlorothiazide (PRINZIDE,ZESTORETIC) 10-12 5 MG per tablet TAKE ONE TABLET BY MOUTH EVERY DAY 90 tablet 3    Methotrexate, PF, (Rasuvo) 15 MG/0 3ML SOAJ Inject 0 15 mL (7 5 mg total) under the skin every 14 (fourteen) days 4 pen 4    Multiple Vitamins-Minerals (MULTIVITAMIN MEN) TABS Take 1 tablet by mouth daily      Omega-3 Fatty Acids (FISH OIL) 1,000 mg Take 1,000 mg by mouth daily      tamsulosin (FLOMAX) 0 4 mg TAKE 1 CAPSULE BY MOUTH  DAILY AT BEDTIME 90 capsule 3    XELJANZ XR 11 MG TB24        No current facility-administered medications for this visit  He is allergic to fentanyl       Review of Systems   Constitutional: Negative  HENT: Negative  Eyes: Negative  Respiratory: Negative  Cardiovascular: Negative  Gastrointestinal: Negative  Endocrine: Negative  Genitourinary: Negative  Musculoskeletal: Positive for arthralgias  Negative for back pain and gait problem  Skin: Negative  Allergic/Immunologic: Negative  Neurological: Negative  Hematological: Negative  Psychiatric/Behavioral: Negative  Objective:      /80   Pulse 72   Temp 97 9 °F (36 6 °C)   Ht 5' 10 5" (1 791 m)   Wt 87 1 kg (192 lb)   BMI 27 16 kg/m²          Physical Exam  Vitals signs reviewed  Constitutional:       Appearance: He is well-developed  HENT:      Head: Normocephalic and atraumatic  Right Ear: Tympanic membrane, ear canal and external ear normal       Left Ear: Tympanic membrane, ear canal and external ear normal    Eyes:      General: No scleral icterus  Extraocular Movements: Extraocular movements intact  Conjunctiva/sclera: Conjunctivae normal       Pupils: Pupils are equal, round, and reactive to light  Neck:      Musculoskeletal: Normal range of motion and neck supple  No muscular tenderness  Thyroid: No thyromegaly  Vascular: No carotid bruit  Cardiovascular:      Rate and Rhythm: Normal rate and regular rhythm  Pulses: Normal pulses  no weak pulses          Dorsalis pedis pulses are 2+ on the right side and 2+ on the left side  Heart sounds: Normal heart sounds  No murmur  Pulmonary:      Effort: Pulmonary effort is normal       Breath sounds: Normal breath sounds  Abdominal:      General: Bowel sounds are normal  There is no distension  Palpations: Abdomen is soft  There is no mass  Tenderness: There is no abdominal tenderness  Musculoskeletal: Normal range of motion  General: Deformity (Mild diffuse arthritic changes ) present  No swelling or tenderness  Right lower leg: No edema  Left lower leg: No edema  Feet:      Right foot:      Skin integrity: No ulcer, skin breakdown, erythema, warmth, callus or dry skin  Left foot:      Skin integrity: No ulcer, skin breakdown, erythema, warmth, callus or dry skin  Lymphadenopathy:      Cervical: No cervical adenopathy  Skin:     General: Skin is warm and dry  Capillary Refill: Capillary refill takes less than 2 seconds  Neurological:      Mental Status: He is alert and oriented to person, place, and time  Cranial Nerves: No cranial nerve deficit  Sensory: No sensory deficit  Motor: No weakness  Gait: Gait normal          Patient's shoes and socks removed  Right Foot/Ankle   Right Foot Inspection  Skin Exam: skin normal and skin intact no dry skin, no warmth, no callus, no erythema, no maceration, no abnormal color, no pre-ulcer, no ulcer and no callus                          Toe Exam: ROM and strength within normal limits  Sensory   Vibration: intact    Monofilament testing: intact  Vascular  Capillary refills: < 3 seconds  The right DP pulse is 2+  Left Foot/Ankle  Left Foot Inspection  Skin Exam: skin normal and skin intactno dry skin, no warmth, no erythema, no maceration, normal color, no pre-ulcer, no ulcer and no callus                         Toe Exam: ROM and strength within normal limits                   Sensory   Vibration: intact    Monofilament: intact  Vascular  Capillary refills: < 3 seconds  The left DP pulse is 2+  Assign Risk Category:  No deformity present; No loss of protective sensation;  No weak pulses       Risk: 0

## 2021-03-17 ENCOUNTER — TELEPHONE (OUTPATIENT)
Dept: FAMILY MEDICINE CLINIC | Facility: CLINIC | Age: 66
End: 2021-03-17

## 2021-03-17 DIAGNOSIS — E11.9 TYPE 2 DIABETES MELLITUS WITHOUT COMPLICATION, WITHOUT LONG-TERM CURRENT USE OF INSULIN (HCC): Primary | ICD-10-CM

## 2021-03-17 RX ORDER — BLOOD SUGAR DIAGNOSTIC
STRIP MISCELLANEOUS
Qty: 50 EACH | Refills: 5 | Status: SHIPPED | OUTPATIENT
Start: 2021-03-17 | End: 2021-01-01 | Stop reason: SDUPTHER

## 2021-03-17 RX ORDER — BLOOD-GLUCOSE METER
EACH MISCELLANEOUS DAILY
Qty: 1 EACH | Refills: 1 | Status: SHIPPED | OUTPATIENT
Start: 2021-03-17 | End: 2022-01-01 | Stop reason: ALTCHOICE

## 2021-03-17 RX ORDER — LANCETS
EACH MISCELLANEOUS
Qty: 50 EACH | Refills: 5 | Status: SHIPPED | OUTPATIENT
Start: 2021-03-17 | End: 2021-01-01 | Stop reason: SDUPTHER

## 2021-03-17 NOTE — ASSESSMENT & PLAN NOTE
Lab Results   Component Value Date    HGBA1C 6 5 (H) 02/27/2021     New onset  I reviewed with patient  We discussed disease state,  Diet/ exercise/weight loss affects, and other treatments  Recommend increasing exercise  Start low process carb diet  Recommend Education class-patient to consider  He will contact his insurance company to see which sugar monitor and strips are covered    Recheck 3 months

## 2021-03-17 NOTE — TELEPHONE ENCOUNTER
Patients wife called stating she contacted the insurance and was told [employer]  would be covered       Also patient would like to see a Nutritionist

## 2021-04-05 ENCOUNTER — OFFICE VISIT (OUTPATIENT)
Dept: DIABETES SERVICES | Facility: CLINIC | Age: 66
End: 2021-04-05
Payer: COMMERCIAL

## 2021-04-05 VITALS — WEIGHT: 187.2 LBS | BODY MASS INDEX: 26.21 KG/M2 | HEIGHT: 71 IN

## 2021-04-05 DIAGNOSIS — E11.9 TYPE 2 DIABETES MELLITUS WITHOUT COMPLICATION, WITHOUT LONG-TERM CURRENT USE OF INSULIN (HCC): Primary | ICD-10-CM

## 2021-04-05 PROCEDURE — 97802 MEDICAL NUTRITION INDIV IN: CPT | Performed by: DIETITIAN, REGISTERED

## 2021-04-05 NOTE — PATIENT INSTRUCTIONS
45-60 grams of carbohydrates per meal   15 grams of carbohydrate per snack    3 meals per day, 4-5 hours apart  If deciding to have snacks, keep to no more than 3 snacks per day, at least 2 hours apart from meals if containing carbohydrates  Use the Portion Book and nutrition labels to determine carbohydrate amounts in food and beverages  Please complete 3 day food record prior to follow-up appointment in 6 weeks

## 2021-04-05 NOTE — PROGRESS NOTES
Medical Nutrition Therapy        Assessment    Visit Type: Initial visit    Chief complaint T2DM    HPI: 73year old male with new onset T2DM, HTN, and HLD  Most recent HbA1c 6 5%  Has glucometer but has not started checking BG yet  Accompanied by his wife today  Art Sims states that he has difficulty remembering sometimes and that his wife is helpful when he has appointments  He reports that he was told to follow a low FODMAP diet by his PCP  Bensons diet history reveals meal skipping, inconsistent carbohydrate intake, excess intake of simple carbohydrates, and inadequate intake of fruit, vegetables, and whole grains  He reports that he will drink beverages throughout he morning such as chocolate milk and apple juice but does not typically eat breakfast or lunch  Currently meals range from 52 to 67 grams of carbohydrate  Explained basic pathophysiology of diabetes and impact of diet on blood glucose levels  Together we discussed what foods contain CHO, reading a food label, timing of meals and snacks, serving sizes, the role of fiber in glycemic control, and the role of consistent carbohydrate intake in the recommended amounts in glycemic control  Used the portion booklet to teach Art Sims more about food groups and basic carbohydrate counting  Created an individualized meal plan for Art Sims with 3 meals and 0 snacks providing 45-60 g carb per meal and 15 g carb per snack if decides to have snacks between meals  Recommended 8-10 oz of protein and 5 servings of dietary fats per day, focusing on lean proteins and monounsaturated and polyunsaturated fats  Put together sample meals for Benson's reference  Art Sims demonstrated good understanding and will call with any questions prior to follow-up appointment in 6 weeks      Ht Readings from Last 1 Encounters:   03/16/21 5' 10 5" (1 791 m)     Wt Readings from Last 2 Encounters:   03/16/21 87 1 kg (192 lb)   02/22/21 89 4 kg (197 lb)     Weight Change: Yes 10 lb loss over last 6-8 months    Medical Diagnosis/ICD10: E11 9 (ICD-10-CM) - Type 2 diabetes mellitus without complication, without long-term current use of insulin    Barriers to Learning: cognitive, difficulty remembering    Do you follow any special diet presently?: Yes - following low FODMAP per PCP  Who shops: patient  Who cooks: patient    Food Log: Completed via the method of food recall    Breakfast:wakes 2:30 am  Goes downstairs with dog for another hour, wakes up for good around 3:45 pm drinks chocolate 16 oz  , leaves for work by 4 am    Morning Snack:9 am  banana  Lunch:normally skips and will nap over lunch break instead   Afternoon Snack: n/a  Dinner:7-7:30 pm  Meat (chicken, pork beef, fish) and potatoes (1 baked medium-large) or noodles (1 5 cups with butter and parmesan and a vegetable (brussel sprouts, asparagus, peas, carrots) or salad  Evening Snack:8-8:30  sometimes but not always or cookies (5-6)   Beverages: chocolate milk, diet snapple, ensure between 8-9 am  Apple juice 8 oz usually sometime during the morning    Eating out/Take out:Friday nights take out  Exercise none     Calorie needs 1,800 kcals/day Carbs: 45-60 g/meal, 15 g/snack         Nutrition Diagnosis:  Inconsistent carbohydrate intake  intake related to Food and nutrition related knowledge deficit concerning appropriate amount and timing of carbohydrate intake as evidenced by  Estimated carbohydrate intake that is different from recommended types or ingested on an irregular basis    Intervention: plate method, increased fiber intake, label reading, carbohydrate counting, increased plant based foods, meal timing, meal planning, individualized meal plan, monitoring portion control and food diary     Treatment Goals: Patient understands education and recommendations, Patient will consume 3 meals a day, Patient will monitor portion control, Patient will consume 25-35 grams of fiber a day, Patient will increase their intake of plant based foods and Patient will count carbohydrates    Monitoring and evaluation:    Term code indicator  FH 1 6 3 Carbohydrate Intake Criteria: 45-60 g of carbohyrate per meal, 15 g of carbohydrate per snack  Term code indicator  FH 4 4 Mealtime Behavior Criteria: 3 meals per day, 4-5 hours apart  Up to 3 snacks per day, at least 2 hours apart from meals  Patients Response to Instruction:  Gabriela Vivar  Expected Compliancegood    Thank you for coming to the [facility] for education today  Please feel free to call with any questions or concerns      Start: 3:16 pm  Stop: 4:17 pm  Referred by: MD Bella Perez, [address]  [facility] [id number]

## 2021-04-08 DIAGNOSIS — Z23 ENCOUNTER FOR IMMUNIZATION: ICD-10-CM

## 2021-04-16 LAB — HCV AB SER-ACNC: <0.1

## 2021-04-20 ENCOUNTER — TELEPHONE (OUTPATIENT)
Dept: FAMILY MEDICINE CLINIC | Facility: CLINIC | Age: 66
End: 2021-04-20

## 2021-04-20 NOTE — TELEPHONE ENCOUNTER
Patient called stating he got blood work for his specialist doctor Coni Espino and was told his liver was high  Dr Otero took him off the Methotrexate until he figures out what is going on  He is suppose to get his blood work done again in two weeks  He wanted to make sure you were okay with this as well and would you like to do anything else at this time?

## 2021-04-20 NOTE — TELEPHONE ENCOUNTER
It is reasonable  Labs done in Feb had normal LFTs - Where did he have the labs done? Can he get us a copy to look at? What other tests has Dr Phan Diss ordered?

## 2021-04-21 NOTE — TELEPHONE ENCOUNTER
There are bloodwork results in media from 04/16/2021 sent from [facility] ordered by Dr Lauryn Mcmanus

## 2021-04-26 LAB
ALBUMIN SERPL-MCNC: 4.2 G/DL (ref 3.8–4.8)
ALBUMIN/CREAT UR: 5 MG/G CREAT (ref 0–29)
ALBUMIN/GLOB SERPL: 1.9 {RATIO} (ref 1.2–2.2)
ALP SERPL-CCNC: 557 IU/L (ref 39–117)
ALT SERPL-CCNC: 379 IU/L (ref 0–44)
AST SERPL-CCNC: 154 IU/L (ref 0–40)
BASOPHILS # BLD AUTO: 0 X10E3/UL (ref 0–0.2)
BASOPHILS NFR BLD AUTO: 0 %
BILIRUB SERPL-MCNC: 3.6 MG/DL (ref 0–1.2)
BUN SERPL-MCNC: 25 MG/DL (ref 8–27)
BUN/CREAT SERPL: 26 (ref 10–24)
CALCIUM SERPL-MCNC: 9.2 MG/DL (ref 8.6–10.2)
CHLORIDE SERPL-SCNC: 103 MMOL/L (ref 96–106)
CHOLEST SERPL-MCNC: 172 MG/DL (ref 100–199)
CO2 SERPL-SCNC: 18 MMOL/L (ref 20–29)
CREAT SERPL-MCNC: 0.96 MG/DL (ref 0.76–1.27)
CREAT UR-MCNC: 150.5 MG/DL
EOSINOPHIL # BLD AUTO: 0.1 X10E3/UL (ref 0–0.4)
EOSINOPHIL NFR BLD AUTO: 3 %
ERYTHROCYTE [DISTWIDTH] IN BLOOD BY AUTOMATED COUNT: 14.7 % (ref 11.6–15.4)
GLOBULIN SER-MCNC: 2.2 G/DL (ref 1.5–4.5)
GLUCOSE SERPL-MCNC: 101 MG/DL (ref 65–99)
HBA1C MFR BLD: 6.1 % (ref 4.8–5.6)
HCT VFR BLD AUTO: 41.7 % (ref 37.5–51)
HDLC SERPL-MCNC: 65 MG/DL
HGB BLD-MCNC: 14.1 G/DL (ref 13–17.7)
IMM GRANULOCYTES # BLD: 0 X10E3/UL (ref 0–0.1)
IMM GRANULOCYTES NFR BLD: 0 %
LDLC SERPL CALC-MCNC: 94 MG/DL (ref 0–99)
LYMPHOCYTES # BLD AUTO: 1.2 X10E3/UL (ref 0.7–3.1)
LYMPHOCYTES NFR BLD AUTO: 23 %
MCH RBC QN AUTO: 30.3 PG (ref 26.6–33)
MCHC RBC AUTO-ENTMCNC: 33.8 G/DL (ref 31.5–35.7)
MCV RBC AUTO: 90 FL (ref 79–97)
MICROALBUMIN UR-MCNC: 7.7 UG/ML
MONOCYTES # BLD AUTO: 0.5 X10E3/UL (ref 0.1–0.9)
MONOCYTES NFR BLD AUTO: 9 %
NEUTROPHILS # BLD AUTO: 3.3 X10E3/UL (ref 1.4–7)
NEUTROPHILS NFR BLD AUTO: 65 %
PLATELET # BLD AUTO: 239 X10E3/UL (ref 150–450)
POTASSIUM SERPL-SCNC: 4.3 MMOL/L (ref 3.5–5.2)
PROT SERPL-MCNC: 6.4 G/DL (ref 6–8.5)
RBC # BLD AUTO: 4.65 X10E6/UL (ref 4.14–5.8)
SL AMB EGFR AFRICAN AMERICAN: 95 ML/MIN/1.73
SL AMB EGFR NON AFRICAN AMERICAN: 83 ML/MIN/1.73
SL AMB VLDL CHOLESTEROL CALC: 13 MG/DL (ref 5–40)
SODIUM SERPL-SCNC: 142 MMOL/L (ref 134–144)
TRIGL SERPL-MCNC: 67 MG/DL (ref 0–149)
WBC # BLD AUTO: 5.1 X10E3/UL (ref 3.4–10.8)

## 2021-04-26 PROCEDURE — 3044F HG A1C LEVEL LT 7.0%: CPT | Performed by: FAMILY MEDICINE

## 2021-04-26 PROCEDURE — 3061F NEG MICROALBUMINURIA REV: CPT | Performed by: FAMILY MEDICINE

## 2021-04-26 NOTE — TELEPHONE ENCOUNTER
Where is his abd pain - AST/ALT sl lower but ALP remains elevated  Does he have a gall bladder?   Is his pain in the R upper or central upper abd?

## 2021-04-26 NOTE — TELEPHONE ENCOUNTER
Patient called back   Please call him asap   He is still waiting on results of blood work   And he still has Diarrhea and stomach pain

## 2021-04-26 NOTE — TELEPHONE ENCOUNTER
Spoke to patient, his pain is central lower abdomen  Denies having gall bladder removed   Still has diarrhea

## 2021-04-26 NOTE — TELEPHONE ENCOUNTER
He should be seen  I am full today and tomorrow  Is he willing to see Dr Cosmo Barrett or Reema Vega?

## 2021-04-27 ENCOUNTER — APPOINTMENT (EMERGENCY)
Dept: CT IMAGING | Facility: HOSPITAL | Age: 66
DRG: 435 | End: 2021-04-27
Payer: COMMERCIAL

## 2021-04-27 ENCOUNTER — HOSPITAL ENCOUNTER (INPATIENT)
Facility: HOSPITAL | Age: 66
LOS: 2 days | Discharge: HOME | DRG: 435 | End: 2021-04-29
Attending: EMERGENCY MEDICINE | Admitting: INTERNAL MEDICINE
Payer: COMMERCIAL

## 2021-04-27 ENCOUNTER — OFFICE VISIT (OUTPATIENT)
Dept: FAMILY MEDICINE CLINIC | Facility: CLINIC | Age: 66
End: 2021-04-27
Payer: COMMERCIAL

## 2021-04-27 ENCOUNTER — APPOINTMENT (INPATIENT)
Dept: MRI IMAGING | Facility: HOSPITAL | Age: 66
DRG: 435 | End: 2021-04-27
Payer: COMMERCIAL

## 2021-04-27 VITALS
TEMPERATURE: 99.3 F | WEIGHT: 176.2 LBS | BODY MASS INDEX: 25.22 KG/M2 | HEART RATE: 59 BPM | DIASTOLIC BLOOD PRESSURE: 78 MMHG | HEIGHT: 70 IN | OXYGEN SATURATION: 98 % | SYSTOLIC BLOOD PRESSURE: 116 MMHG

## 2021-04-27 DIAGNOSIS — R74.01 TRANSAMINITIS: ICD-10-CM

## 2021-04-27 DIAGNOSIS — E87.6 HYPOKALEMIA: ICD-10-CM

## 2021-04-27 DIAGNOSIS — K86.89 PANCREATIC MASS: Primary | ICD-10-CM

## 2021-04-27 DIAGNOSIS — K86.89 PANCREATIC INSUFFICIENCY: ICD-10-CM

## 2021-04-27 DIAGNOSIS — R63.4 EXCESSIVE BODY WEIGHT LOSS: ICD-10-CM

## 2021-04-27 DIAGNOSIS — R17 JAUNDICE: Primary | ICD-10-CM

## 2021-04-27 DIAGNOSIS — E80.6 HYPERBILIRUBINEMIA: ICD-10-CM

## 2021-04-27 DIAGNOSIS — K83.1 OBSTRUCTIVE JAUNDICE: ICD-10-CM

## 2021-04-27 PROBLEM — R19.7 DIARRHEA: Status: ACTIVE | Noted: 2021-04-27

## 2021-04-27 LAB
ALBUMIN SERPL BCP-MCNC: 3.6 G/DL (ref 3.5–5)
ALP SERPL-CCNC: 580 U/L (ref 46–116)
ALT SERPL W P-5'-P-CCNC: 404 U/L (ref 12–78)
AMYLASE SERPL-CCNC: 92 IU/L (ref 25–115)
ANION GAP SERPL CALCULATED.3IONS-SCNC: 10 MMOL/L (ref 4–13)
APAP SERPL-MCNC: <2 UG/ML (ref 10–20)
APTT PPP: 27 SECONDS (ref 23–37)
AST SERPL W P-5'-P-CCNC: 132 U/L (ref 5–45)
BACTERIA UR QL AUTO: ABNORMAL /HPF
BASOPHILS # BLD AUTO: 0.03 THOUSANDS/ΜL (ref 0–0.1)
BASOPHILS NFR BLD AUTO: 0 % (ref 0–1)
BILIRUB SERPL-MCNC: 7.74 MG/DL (ref 0.2–1)
BILIRUB UR QL STRIP: ABNORMAL
BUN SERPL-MCNC: 21 MG/DL (ref 5–25)
CALCIUM SERPL-MCNC: 9.1 MG/DL (ref 8.3–10.1)
CAOX CRY URNS QL MICRO: ABNORMAL /HPF
CHLORIDE SERPL-SCNC: 104 MMOL/L (ref 100–108)
CLARITY UR: CLEAR
CO2 SERPL-SCNC: 27 MMOL/L (ref 21–32)
COLOR UR: ABNORMAL
CREAT SERPL-MCNC: 1.04 MG/DL (ref 0.6–1.3)
EOSINOPHIL # BLD AUTO: 0.11 THOUSAND/ΜL (ref 0–0.61)
EOSINOPHIL NFR BLD AUTO: 2 % (ref 0–6)
ERYTHROCYTE [DISTWIDTH] IN BLOOD BY AUTOMATED COUNT: 15.2 % (ref 11.6–15.1)
GFR SERPL CREATININE-BSD FRML MDRD: 75 ML/MIN/1.73SQ M
GLUCOSE SERPL-MCNC: 158 MG/DL (ref 65–140)
GLUCOSE UR STRIP-MCNC: NEGATIVE MG/DL
HCT VFR BLD AUTO: 41.7 % (ref 36.5–49.3)
HGB BLD-MCNC: 14 G/DL (ref 12–17)
HGB UR QL STRIP.AUTO: ABNORMAL
HOLD SPECIMEN: NORMAL
IMM GRANULOCYTES # BLD AUTO: 0.03 THOUSAND/UL (ref 0–0.2)
IMM GRANULOCYTES NFR BLD AUTO: 0 % (ref 0–2)
INR PPP: 1.08 (ref 0.84–1.19)
KETONES UR STRIP-MCNC: NEGATIVE MG/DL
LACTATE SERPL-SCNC: 0.9 MMOL/L (ref 0.5–2)
LEUKOCYTE ESTERASE UR QL STRIP: NEGATIVE
LIPASE SERPL-CCNC: 215 U/L (ref 73–393)
LYMPHOCYTES # BLD AUTO: 0.65 THOUSANDS/ΜL (ref 0.6–4.47)
LYMPHOCYTES NFR BLD AUTO: 9 % (ref 14–44)
MCH RBC QN AUTO: 30.1 PG (ref 26.8–34.3)
MCHC RBC AUTO-ENTMCNC: 33.6 G/DL (ref 31.4–37.4)
MCV RBC AUTO: 90 FL (ref 82–98)
MONOCYTES # BLD AUTO: 0.53 THOUSAND/ΜL (ref 0.17–1.22)
MONOCYTES NFR BLD AUTO: 7 % (ref 4–12)
NEUTROPHILS # BLD AUTO: 6.14 THOUSANDS/ΜL (ref 1.85–7.62)
NEUTS SEG NFR BLD AUTO: 82 % (ref 43–75)
NITRITE UR QL STRIP: NEGATIVE
NON-SQ EPI CELLS URNS QL MICRO: ABNORMAL /HPF
NRBC BLD AUTO-RTO: 0 /100 WBCS
PH UR STRIP.AUTO: 5.5 [PH]
PLATELET # BLD AUTO: 257 THOUSANDS/UL (ref 149–390)
PMV BLD AUTO: 12.5 FL (ref 8.9–12.7)
POTASSIUM SERPL-SCNC: 3.2 MMOL/L (ref 3.5–5.3)
PROT SERPL-MCNC: 6.9 G/DL (ref 6.4–8.2)
PROT UR STRIP-MCNC: NEGATIVE MG/DL
PROTHROMBIN TIME: 14.1 SECONDS (ref 11.6–14.5)
RBC # BLD AUTO: 4.65 MILLION/UL (ref 3.88–5.62)
RBC #/AREA URNS AUTO: ABNORMAL /HPF
SODIUM SERPL-SCNC: 141 MMOL/L (ref 136–145)
SP GR UR STRIP.AUTO: 1.01 (ref 1–1.03)
UROBILINOGEN UR QL STRIP.AUTO: 0.2 E.U./DL
WBC # BLD AUTO: 7.49 THOUSAND/UL (ref 4.31–10.16)
WBC #/AREA URNS AUTO: ABNORMAL /HPF

## 2021-04-27 PROCEDURE — 96375 TX/PRO/DX INJ NEW DRUG ADDON: CPT

## 2021-04-27 PROCEDURE — G1004 CDSM NDSC: HCPCS

## 2021-04-27 PROCEDURE — 82150 ASSAY OF AMYLASE: CPT | Performed by: FAMILY MEDICINE

## 2021-04-27 PROCEDURE — 99285 EMERGENCY DEPT VISIT HI MDM: CPT | Performed by: EMERGENCY MEDICINE

## 2021-04-27 PROCEDURE — 82105 ALPHA-FETOPROTEIN SERUM: CPT | Performed by: SURGERY

## 2021-04-27 PROCEDURE — 74177 CT ABD & PELVIS W/CONTRAST: CPT

## 2021-04-27 PROCEDURE — 85610 PROTHROMBIN TIME: CPT | Performed by: EMERGENCY MEDICINE

## 2021-04-27 PROCEDURE — 80053 COMPREHEN METABOLIC PANEL: CPT | Performed by: EMERGENCY MEDICINE

## 2021-04-27 PROCEDURE — 74183 MRI ABD W/O CNTR FLWD CNTR: CPT

## 2021-04-27 PROCEDURE — 96365 THER/PROPH/DIAG IV INF INIT: CPT

## 2021-04-27 PROCEDURE — 81001 URINALYSIS AUTO W/SCOPE: CPT | Performed by: EMERGENCY MEDICINE

## 2021-04-27 PROCEDURE — 99223 1ST HOSP IP/OBS HIGH 75: CPT | Performed by: INTERNAL MEDICINE

## 2021-04-27 PROCEDURE — 85025 COMPLETE CBC W/AUTO DIFF WBC: CPT | Performed by: EMERGENCY MEDICINE

## 2021-04-27 PROCEDURE — 83690 ASSAY OF LIPASE: CPT | Performed by: EMERGENCY MEDICINE

## 2021-04-27 PROCEDURE — 86301 IMMUNOASSAY TUMOR CA 19-9: CPT | Performed by: SURGERY

## 2021-04-27 PROCEDURE — 1036F TOBACCO NON-USER: CPT | Performed by: FAMILY MEDICINE

## 2021-04-27 PROCEDURE — 80143 DRUG ASSAY ACETAMINOPHEN: CPT | Performed by: EMERGENCY MEDICINE

## 2021-04-27 PROCEDURE — 85730 THROMBOPLASTIN TIME PARTIAL: CPT | Performed by: EMERGENCY MEDICINE

## 2021-04-27 PROCEDURE — 96366 THER/PROPH/DIAG IV INF ADDON: CPT

## 2021-04-27 PROCEDURE — 99285 EMERGENCY DEPT VISIT HI MDM: CPT

## 2021-04-27 PROCEDURE — A9585 GADOBUTROL INJECTION: HCPCS | Performed by: EMERGENCY MEDICINE

## 2021-04-27 PROCEDURE — 96361 HYDRATE IV INFUSION ADD-ON: CPT

## 2021-04-27 PROCEDURE — 83605 ASSAY OF LACTIC ACID: CPT | Performed by: EMERGENCY MEDICINE

## 2021-04-27 PROCEDURE — 99214 OFFICE O/P EST MOD 30 MIN: CPT | Performed by: FAMILY MEDICINE

## 2021-04-27 PROCEDURE — 36415 COLL VENOUS BLD VENIPUNCTURE: CPT

## 2021-04-27 PROCEDURE — 87040 BLOOD CULTURE FOR BACTERIA: CPT | Performed by: EMERGENCY MEDICINE

## 2021-04-27 RX ORDER — ACETAMINOPHEN 325 MG/1
650 TABLET ORAL EVERY 6 HOURS PRN
Status: DISCONTINUED | OUTPATIENT
Start: 2021-04-27 | End: 2021-04-29 | Stop reason: HOSPADM

## 2021-04-27 RX ORDER — ONDANSETRON 2 MG/ML
4 INJECTION INTRAMUSCULAR; INTRAVENOUS EVERY 6 HOURS PRN
Status: DISCONTINUED | OUTPATIENT
Start: 2021-04-27 | End: 2021-04-29 | Stop reason: HOSPADM

## 2021-04-27 RX ORDER — ASPIRIN 81 MG/1
81 TABLET, CHEWABLE ORAL DAILY
Status: DISCONTINUED | OUTPATIENT
Start: 2021-04-28 | End: 2021-04-29 | Stop reason: HOSPADM

## 2021-04-27 RX ORDER — ONDANSETRON 2 MG/ML
4 INJECTION INTRAMUSCULAR; INTRAVENOUS ONCE
Status: COMPLETED | OUTPATIENT
Start: 2021-04-27 | End: 2021-04-27

## 2021-04-27 RX ORDER — CHLORAL HYDRATE 500 MG
1000 CAPSULE ORAL DAILY
Status: DISCONTINUED | OUTPATIENT
Start: 2021-04-28 | End: 2021-04-29 | Stop reason: HOSPADM

## 2021-04-27 RX ORDER — FOLIC ACID 1 MG/1
1 TABLET ORAL DAILY
Status: DISCONTINUED | OUTPATIENT
Start: 2021-04-28 | End: 2021-04-29 | Stop reason: HOSPADM

## 2021-04-27 RX ORDER — TAMSULOSIN HYDROCHLORIDE 0.4 MG/1
0.4 CAPSULE ORAL
Status: DISCONTINUED | OUTPATIENT
Start: 2021-04-27 | End: 2021-04-29 | Stop reason: HOSPADM

## 2021-04-27 RX ORDER — HYDROMORPHONE HCL/PF 1 MG/ML
0.2 SYRINGE (ML) INJECTION ONCE
Status: COMPLETED | OUTPATIENT
Start: 2021-04-27 | End: 2021-04-27

## 2021-04-27 RX ORDER — FLUOXETINE HYDROCHLORIDE 20 MG/1
20 CAPSULE ORAL DAILY
Status: DISCONTINUED | OUTPATIENT
Start: 2021-04-28 | End: 2021-04-29 | Stop reason: HOSPADM

## 2021-04-27 RX ORDER — POTASSIUM CHLORIDE 14.9 MG/ML
20 INJECTION INTRAVENOUS ONCE
Status: COMPLETED | OUTPATIENT
Start: 2021-04-27 | End: 2021-04-27

## 2021-04-27 RX ADMIN — SODIUM CHLORIDE 1000 ML: 0.9 INJECTION, SOLUTION INTRAVENOUS at 17:48

## 2021-04-27 RX ADMIN — GADOBUTROL 8 ML: 604.72 INJECTION INTRAVENOUS at 23:49

## 2021-04-27 RX ADMIN — POTASSIUM CHLORIDE 20 MEQ: 14.9 INJECTION, SOLUTION INTRAVENOUS at 18:38

## 2021-04-27 RX ADMIN — IOHEXOL 100 ML: 350 INJECTION, SOLUTION INTRAVENOUS at 19:01

## 2021-04-27 RX ADMIN — HYDROMORPHONE HYDROCHLORIDE 0.2 MG: 1 INJECTION, SOLUTION INTRAMUSCULAR; INTRAVENOUS; SUBCUTANEOUS at 17:55

## 2021-04-27 RX ADMIN — ONDANSETRON 4 MG: 2 INJECTION INTRAMUSCULAR; INTRAVENOUS at 17:50

## 2021-04-27 NOTE — ED PROVIDER NOTES
History  Chief Complaint   Patient presents with    Jaundice     pt was recently loosing weight, diarrhea, and yellowing of skin, got blood work done and told to come in d/t elevated liver enzymes  80-year-old male presents to the emergency department having referred in by his PCP Dr Jarrod Payne  He and his wife share that he is here because "he is yellow " Jaundice appreciated on PCP exam today  Patient has been feeling poorly over the last 3 weeks with abdominal discomfort and loose stools  Wife expresses concern that some of this may have been from switch to sugar free products including use of Splenda  Patient is a type 2 diabetic but endorses having switched to these products 2 weeks ago after loose stools had already started  He endorses having approximately 2 to 4 loose stools daily  These are light tan in coloring and without blood presence  He notes that the quantity with each passage is small  He gestures across the entire lower abdomen as site of discomfort  He has difficulty characterizing the pain but notes that it has been absolutely constant for the last 3 weeks  He reports unchanged appetite and absence of nausea or vomiting but has appreciated increased erectus and flatus  He has unintentionally lost 15 to 20 lb over the last 3 weeks  Urine has been dark in coloring  He has not appreciated dysuria along with this  He does have BPH and describes at times experiencing urgency, an initial rush of urine followed by slow stream   This has not changed over the last few weeks  LFTs were noticed to be elevated by rheumatologist who performs routine blood work in monitoring patient while on methotrexate and Tanna Chalet for his rheumatoid arthritis  These were discontinued on the 20th following significant transaminitis  He notes that he has been on these medications unchanged for several years  No recent new medications  He did take Imodium over-the-counter this morning without relief    He has not been using acetaminophen  He does not consume alcohol  Medical history significant for hypertension, type 2 diabetes (recently well controlled with glucoses in the 80s to 100 range), elevated cholesterol & rheumatoid arthritis  He did have a routine colonoscopy 7 years ago  Nothing concerning was identified at that time  Upon review of records patient had CT scan in September of 2019  This is notable for him having had colitis, stable hepatic cysts and an indeterminate enhancing left hepatic lesion of 2 1 cm  This was stable from prior imaging  No known sick contacts  No recent antibiotic use  No known bad food ingestions  Outpatient labs on April 24th revealed bilirubin of 3 6, alkaline phosphatase of 557, AST of 154 an ALT of 379  Blood work was initiated after checking in at triage today  Bilirubin has doubled  Prior to Admission Medications   Prescriptions Last Dose Informant Patient Reported? Taking? Blood Glucose Monitoring Suppl (ONE TOUCH ULTRA 2) w/Device KIT  Self No No   Sig: Use daily   FLUoxetine (PROzac) 20 mg capsule  Self No No   Sig: TAKE 1 CAPSULE BY MOUTH EVERY DAY     Lancets (onetouch ultrasoft) lancets  Self No No   Sig: Use as instructed once a day   Multiple Vitamins-Minerals (MULTIVITAMIN MEN) TABS  Self Yes No   Sig: Take 1 tablet by mouth daily   Omega-3 Fatty Acids (FISH OIL) 1,000 mg  Self Yes No   Sig: Take 1,000 mg by mouth daily   aspirin 81 mg chewable tablet  Self Yes No   Sig: Chew 81 mg   folic acid (FOLVITE) 1 mg tablet  Self Yes No   Sig: Take by mouth daily   glucose blood (OneTouch Ultra) test strip  Self No No   Sig: Use as instructed once a day   lisinopril-hydrochlorothiazide (PRINZIDE,ZESTORETIC) 10-12 5 MG per tablet  Self No No   Sig: TAKE ONE TABLET BY MOUTH EVERY DAY   tamsulosin (FLOMAX) 0 4 mg  Self No No   Sig: TAKE 1 CAPSULE BY MOUTH  DAILY AT BEDTIME      Facility-Administered Medications: None       Past Medical History:   Diagnosis Date    Arthritis     Cellulitis     LAST ASSESSED: 6/13/14    Enlarged prostate     Epidermal inclusion cyst     LAST ASSESSED: 10/4/13    Erythrasma     LAST ASSESSED: 9/23/13    Furuncle     LAST ASSESSED: 6/11/14    Hyperlipidemia     Hypertension        Past Surgical History:   Procedure Laterality Date    HYDROCELE EXCISION / REPAIR Right 01/11/2018    SPERMATIC CORD EXCSION OF HYDROCELE; MANAGED BY: [place] BANDA REMOVAL OF HYDROCELE,TUNICA,UNILAT Right 1/11/2018    Procedure: HYDROCELECTOMY;  Surgeon: Earnest White MD;  [facility]: [facility];  Service: Urology    SCROTAL SURGERY      benign "lump"       Family History   Problem Relation Age of Onset    Heart disease Father         CARDIAC DISORDER    Hypertension Father     Hypertension Mother     No Known Problems Maternal Aunt     No Known Problems Maternal Uncle     No Known Problems Paternal Aunt     No Known Problems Paternal Uncle     No Known Problems Paternal Grandmother     No Known Problems Paternal Grandfather     Diabetes Maternal Grandmother         MELLITUS    No Known Problems Maternal Grandfather     Hypertension Other      I have reviewed and agree with the history as documented  E-Cigarette/Vaping     E-Cigarette/Vaping Substances     Social History     Tobacco Use    Smoking status: Former Smoker    Smokeless tobacco: Never Used    Tobacco comment: tobacco in a pipe in the early 1980's   Substance Use Topics    Alcohol use: No    Drug use: No       Review of Systems   Constitutional: Negative for fever  HENT: Negative for rhinorrhea and sore throat  Respiratory: Positive for shortness of breath (Dyspnea with exertion)  Negative for cough  Cardiovascular: Negative for chest pain, palpitations and leg swelling  Gastrointestinal: Positive for abdominal pain and diarrhea  Negative for abdominal distention, blood in stool, nausea and vomiting  Genitourinary: Negative for dysuria  Musculoskeletal: Positive for back pain ( diffuse)  Skin: Negative for rash  All other systems reviewed and are negative  Physical Exam  Physical Exam  Vitals signs and nursing note reviewed  Constitutional:       Appearance: He is normal weight  He is ill-appearing  Comments: Very fatigued appearing after ambulating to room  Transiently tachypneic  HENT:      Head: Normocephalic  Mouth/Throat:      Mouth: Mucous membranes are moist    Eyes:      General: Scleral icterus present  Extraocular Movements: Extraocular movements intact  Cardiovascular:      Rate and Rhythm: Normal rate and regular rhythm  Pulmonary:      Breath sounds: Normal breath sounds  Comments: Initially tachypneic with mild accessory use  This resolved with rest  Abdominal:      General: Bowel sounds are normal       Palpations: Abdomen is soft  Tenderness: There is abdominal tenderness (Diffuse-maximal in the suprapubic region and right upper quadrant)  There is guarding  There is no right CVA tenderness or left CVA tenderness  Musculoskeletal: Normal range of motion  General: No swelling or tenderness  Right lower leg: No edema  Left lower leg: No edema  Comments: Patient with midline tenderness in much of the lumbar region  Right low lumbar/sacral tenderness additionally present  No overlying skin changes  Skin:     General: Skin is warm and dry  Coloration: Skin is jaundiced  Findings: No rash  Neurological:      General: No focal deficit present  Mental Status: He is alert and oriented to person, place, and time     Psychiatric:         Mood and Affect: Mood normal          Behavior: Behavior normal          Vital Signs  ED Triage Vitals [04/27/21 1535]   Temperature Pulse Respirations Blood Pressure SpO2   98 1 °F (36 7 °C) 56 18 105/65 99 %      Temp Source Heart Rate Source Patient Position - Orthostatic VS BP Location FiO2 (%)   Oral Monitor Sitting Left arm --      Pain Score       7           Vitals:    04/27/21 1815 04/27/21 1830 04/27/21 1930 04/27/21 2030   BP:  127/64 124/65 118/69   Pulse: (!) 44 (!) 54 (!) 50 (!) 46   Patient Position - Orthostatic VS:  Lying           Visual Acuity      ED Medications  Medications   ondansetron (ZOFRAN) injection 4 mg (4 mg Intravenous Given 4/27/21 1750)   sodium chloride 0 9 % bolus 1,000 mL (0 mL Intravenous Stopped 4/27/21 1948)   HYDROmorphone (DILAUDID) injection 0 2 mg (0 2 mg Intravenous Given 4/27/21 1755)   potassium chloride 20 mEq IVPB (premix) (20 mEq Intravenous New Bag 4/27/21 1838)   iohexol (OMNIPAQUE) 350 MG/ML injection (MULTI-DOSE) 100 mL (100 mL Intravenous Given 4/27/21 1901)       Diagnostic Studies  Results Reviewed     Procedure Component Value Units Date/Time    Amylase [[id number]]     Lab Status: No result Specimen: Blood     Cancer antigen 19-9 [[id number]]     Lab Status: No result Specimen: Blood     CEA [[id number]]     Lab Status: No result Specimen: Blood     AFP tumor marker [[id number]]     Lab Status: No result Specimen: Blood     UA w Reflex to Microscopic w Reflex to Culture [[id number]]  (Abnormal) Collected: 04/27/21 2143    Lab Status: Final result Specimen: Urine, Clean Catch Updated: 04/27/21 2201     Color, UA Melinda     Clarity, UA Clear     Specific Gravity, UA 1 015     pH, UA 5 5     Leukocytes, UA Negative     Nitrite, UA Negative     Protein, UA Negative mg/dl      Glucose, UA Negative mg/dl      Ketones, UA Negative mg/dl      Urobilinogen, UA 0 2 E U /dl      Bilirubin, UA Moderate     Blood, UA Small    Urine Microscopic [[id number]] Collected: 04/27/21 2143    Lab Status:  In process Specimen: Urine, Clean Catch Updated: 04/27/21 2201    Acetaminophen level-"If concentration is detectable, please discuss with medical  on call " [[id number]]  (Abnormal) Collected: 04/27/21 1539    Lab Status: Final result Specimen: Blood from Arm, Left Updated: 04/27/21 1850 Acetaminophen Level <2 ug/mL     Lactic acid [[id number]]  (Normal) Collected: 04/27/21 1747    Lab Status: Final result Specimen: Blood from Arm, Right Updated: 04/27/21 1818     LACTIC ACID 0 9 mmol/L     Narrative:      Result may be elevated if tourniquet was used during collection  Blood culture #1 [[id number]] Collected: 04/27/21 1747    Lab Status: In process Specimen: Blood from Arm, Right Updated: 04/27/21 1756    Blood culture #2 [[id number]] Collected: 04/27/21 1750    Lab Status:  In process Specimen: Blood from Arm, Left Updated: 04/27/21 1756    Clostridium difficile toxin by PCR with EIA [[id number]]     Lab Status: No result Specimen: Stool     Stool Enteric Bacterial Panel by PCR [[id number]]     Lab Status: No result Specimen: Stool     Lipase [[id number]]  (Normal) Collected: 04/27/21 1539    Lab Status: Final result Specimen: Blood from Arm, Left Updated: 04/27/21 1721     Lipase 215 u/L     Protime-INR [[id number]]  (Normal) Collected: 04/27/21 1539    Lab Status: Final result Specimen: Blood from Arm, Left Updated: 04/27/21 1720     Protime 14 1 seconds      INR 1 08    APTT [[id number]]  (Normal) Collected: 04/27/21 1539    Lab Status: Final result Specimen: Blood from Arm, Left Updated: 04/27/21 1720     PTT 27 seconds     Comprehensive metabolic panel [[id number]]  (Abnormal) Collected: 04/27/21 1539    Lab Status: Final result Specimen: Blood from Arm, Left Updated: 04/27/21 1608     Sodium 141 mmol/L      Potassium 3 2 mmol/L      Chloride 104 mmol/L      CO2 27 mmol/L      ANION GAP 10 mmol/L      BUN 21 mg/dL      Creatinine 1 04 mg/dL      Glucose 158 mg/dL      Calcium 9 1 mg/dL       U/L       U/L      Alkaline Phosphatase 580 U/L      Total Protein 6 9 g/dL      Albumin 3 6 g/dL      Total Bilirubin 7 74 mg/dL      eGFR 75 ml/min/1 73sq m     Narrative:      [place] guidelines for Chronic Kidney Disease (CKD):     Stage 1 with normal or high GFR (GFR > 90 mL/min/1 73 square meters)    Stage 2 Mild CKD (GFR = 60-89 mL/min/1 73 square meters)    Stage 3A Moderate CKD (GFR = 45-59 mL/min/1 73 square meters)    Stage 3B Moderate CKD (GFR = 30-44 mL/min/1 73 square meters)    Stage 4 Severe CKD (GFR = 15-29 mL/min/1 73 square meters)    Stage 5 End Stage CKD (GFR <15 mL/min/1 73 square meters)  Note: GFR calculation is accurate only with a steady state creatinine    CBC and differential [[id number]]  (Abnormal) Collected: 04/27/21 1539    Lab Status: Final result Specimen: Blood from Arm, Left Updated: 04/27/21 1550     WBC 7 49 Thousand/uL      RBC 4 65 Million/uL      Hemoglobin 14 0 g/dL      Hematocrit 41 7 %      MCV 90 fL      MCH 30 1 pg      MCHC 33 6 g/dL      RDW 15 2 %      MPV 12 5 fL      Platelets 255 Thousands/uL      nRBC 0 /100 WBCs      Neutrophils Relative 82 %      Immat GRANS % 0 %      Lymphocytes Relative 9 %      Monocytes Relative 7 %      Eosinophils Relative 2 %      Basophils Relative 0 %      Neutrophils Absolute 6 14 Thousands/µL      Immature Grans Absolute 0 03 Thousand/uL      Lymphocytes Absolute 0 65 Thousands/µL      Monocytes Absolute 0 53 Thousand/µL      Eosinophils Absolute 0 11 Thousand/µL      Basophils Absolute 0 03 Thousands/µL                  CT abdomen pelvis with contrast   Final Result by Sparkle Randall MD (04/27 1945)      Moderate intra and extrahepatic biliary ductal dilatation, pancreatic duct dilatation and gallbladder distention which appear secondary to a large ill-defined pancreatic head mass, pancreatic carcinoma until proven otherwise  Multiple hepatic cysts without suspicious change  Stable homogeneously enhancing left lateral segment lesion likely representing cavernous hemangioma  The study was marked in [facility] for immediate notification         Workstation performed: [id number]         MRI abdomen w wo contrast and mrcp    (Results Pending)              Procedures  Procedures ED Course  ED Course as of Apr 27 2251   Tue Apr 27, 2021 2008 I spoke with patient and wife following return of CT report  Pancreatic head mass likely responsible for compression of biliary ducts/jaundice/transaminitis  Mass likely malignant  I explained that I was reaching out to the inpatient team to coordinate further care  I subsequently contacted the resident covering surgical Oncology  Evan Males indicated that admission would be to the medicine service with consultation to both Gastroenterology and Surgical Oncology  Endoscopic ultrasound and biopsy would be performed by GI  I subsequently touched base with the on-call gastroenterologist-Dr Joanne Milan  GI service will see patient tomorrow  I will order MRCP at this time which will assist in evaluation  2056 Answered patient's and wife's questions to the best of my ability  He reports increased comfort following medications and is aware to let us know burping or abdominal discomfort return so that additional doses of medication can be administered  SBIRT 20yo+      Most Recent Value   SBIRT (22 yo +)   In order to provide better care to our patients, we are screening all of our patients for alcohol and drug use  Would it be okay to ask you these screening questions? Yes Filed at: 04/27/2021 1757   Initial Alcohol Screen: US AUDIT-C    1  How often do you have a drink containing alcohol?  0 Filed at: 04/27/2021 1757   2  How many drinks containing alcohol do you have on a typical day you are drinking? 0 Filed at: 04/27/2021 1757   3a  Male UNDER 65: How often do you have five or more drinks on one occasion? 0 Filed at: 04/27/2021 1757   3b  FEMALE Any Age, or MALE 65+: How often do you have 4 or more drinks on one occassion? 0 Filed at: 04/27/2021 1757   Audit-C Score  0 Filed at: 04/27/2021 1757   RITO: How many times in the past year have you       Used an illegal drug or used a prescription medication for non-medical reasons? Never Filed at: 04/27/2021 1757                    MDM    Disposition  Final diagnoses:   Pancreatic mass   Obstructive jaundice     Time reflects when diagnosis was documented in both MDM as applicable and the Disposition within this note     Time User Action Codes Description Comment    4/27/2021  8:56 PM Lc Liu Add [K86 89] Pancreatic mass     4/27/2021  8:56 PM Lc CLEMENTS Add [K83 1] Obstructive jaundice       ED Disposition     ED Disposition Condition Date/Time Comment    Admit Stable Tue Apr 27, 2021  8:55 PM Case was discussed with Medicine Resident and the patient's admission status was agreed to be Admission Status: inpatient status to the service of Dr Carson Shannon   Follow-up Information    None         Patient's Medications   Discharge Prescriptions    No medications on file     No discharge procedures on file      PDMP Review     None          ED Provider  Electronically Signed by           Martha Foreman MD  04/27/21 3827

## 2021-04-27 NOTE — PROGRESS NOTES
Sy Ortiz 1955 male MRN: [id number]    Acute Visit    Assessment/Plan   Hemodynamically stable, but due to acuity of symptoms and new weakness, icterus, jaundice, and intentional weight loss of 16 lbs in a month, urgent evaluation requested  Patient to go to the ER  Marie Fajardo was seen today for abdominal pain, fatigue and diarrhea  Diagnoses and all orders for this visit:    Jaundice  -     Transfer to other facility    Excessive body weight loss  -     Transfer to other facility    Transaminitis  -     Transfer to other facility      Algie Paget, MD  [address]  4/27/2021      Please be aware that this note contains text that was dictated and there may be errors pertaining to "sound-alike "words during the dictation process  SUBJECTIVE    CC: Abdominal Pain (3 week ), Fatigue, and Diarrhea (3 week)    HPI:  Sy Ortiz is a 73 y o  male who presented for an acute visit complaining of weight loss and diarrhea over the last 3 weeks  He has had associated nausea  He reports his stools are very light tan color (beige) with some rare white streaks in it, and soft but not watery  He denies blood or black in the stool  No vomiting  He is staying hydrated  He had labs done by his rheumatologist and was told his LFT were high and to stop methotrexate on 4/20  He repeated labs 4/27 with significant elevation of LFT and is here today due to feeling weak  Has had fecal incontinence due to urgency and not being able to get to the bathroom in time  Hep C screen and HbsAg testing negative  Review of Systems   Constitutional: Positive for unexpected weight change (-20 lbs in a few weeks)  Negative for chills and fever  HENT: Negative for ear pain and sore throat  Eyes: Negative for pain and visual disturbance  Respiratory: Negative for cough and shortness of breath  Cardiovascular: Negative for chest pain and palpitations     Gastrointestinal: Positive for diarrhea and nausea  Negative for abdominal distention, abdominal pain, blood in stool and vomiting  Increased flatulence   Genitourinary: Negative for dysuria and hematuria  Musculoskeletal: Negative for arthralgias and back pain  Skin: Negative for color change and rash  Neurological: Negative for seizures and syncope  All other systems reviewed and are negative  Medications:   Meds/Allergies   Current Outpatient Medications   Medication Sig Dispense Refill    aspirin 81 mg chewable tablet Chew 81 mg      Blood Glucose Monitoring Suppl (ONE TOUCH ULTRA 2) w/Device KIT Use daily 1 each 1    FLUoxetine (PROzac) 20 mg capsule TAKE 1 CAPSULE BY MOUTH EVERY DAY  90 capsule 2    folic acid (FOLVITE) 1 mg tablet Take by mouth daily      glucose blood (OneTouch Ultra) test strip Use as instructed once a day 50 each 5    Lancets (onetouch ultrasoft) lancets Use as instructed once a day 50 each 5    lisinopril-hydrochlorothiazide (PRINZIDE,ZESTORETIC) 10-12 5 MG per tablet TAKE ONE TABLET BY MOUTH EVERY DAY 90 tablet 3    Multiple Vitamins-Minerals (MULTIVITAMIN MEN) TABS Take 1 tablet by mouth daily      Omega-3 Fatty Acids (FISH OIL) 1,000 mg Take 1,000 mg by mouth daily      tamsulosin (FLOMAX) 0 4 mg TAKE 1 CAPSULE BY MOUTH  DAILY AT BEDTIME 90 capsule 3     No current facility-administered medications for this visit  Allergies   Allergen Reactions    Fentanyl Other (See Comments) and Anaphylaxis     Oxygen drops severely       OBJECTIVE    Vitals:   Vitals:    04/27/21 1420   BP: 116/78   Pulse: 59   Temp: 99 3 °F (37 4 °C)   SpO2: 98%   Weight: 79 9 kg (176 lb 3 2 oz)   Height: 5' 10" (1 778 m)       Physical Exam  Vitals signs and nursing note reviewed  Constitutional:       General: He is not in acute distress  Appearance: He is well-developed  He is not diaphoretic  HENT:      Head: Normocephalic and atraumatic        Right Ear: External ear normal       Left Ear: External ear normal    Eyes:      General: Scleral icterus present  Conjunctiva/sclera: Conjunctivae normal    Pulmonary:      Effort: Pulmonary effort is normal  No respiratory distress  Abdominal:      General: Abdomen is flat  Bowel sounds are decreased  Palpations: Abdomen is soft  There is hepatomegaly  There is no fluid wave or splenomegaly  Tenderness: There is generalized abdominal tenderness  There is no guarding or rebound  Negative signs include Calles's sign and Rovsing's sign  Skin:     Findings: No rash  Comments: jaundiced   Neurological:      Mental Status: He is alert  Cranial Nerves: No cranial nerve deficit  Component      Latest Ref Rng & Units 2/27/2021 4/24/2021   Glucose, Random      65 - 99 mg/dL 109 (H) 101 (H)   BUN      8 - 27 mg/dL 21 25   Creatinine      0 76 - 1 27 mg/dL 0 93 0 96   eGFR Non       >59 mL/min/1 73 86 83   eGFR       >59 mL/min/1 73 99 95   SL AMB BUN/CREATININE RATIO      10 - 24 23 26 (H)   Sodium      134 - 144 mmol/L 140 142   Potassium      3 5 - 5 2 mmol/L 4 1 4 3   Chloride      96 - 106 mmol/L 105 103   CO2      20 - 29 mmol/L 23 18 (L)   CALCIUM      8 6 - 10 2 mg/dL 9 3 9 2   Total Protein      6 0 - 8 5 g/dL 6 1 6 4   Albumin      3 8 - 4 8 g/dL 4 5 4 2   Globulin, Total      1 5 - 4 5 g/dL 1 6 2 2   Albumin/Globulin Ratio      1 2 - 2 2 2 8 (H) 1 9   TOTAL BILIRUBIN      0 0 - 1 2 mg/dL 1 1 3 6 (H)   ALKALINE PHOSPHATASE ISOENZYMES      39 - 117 IU/L 116 557 (H)   AST      0 - 40 IU/L 20 154 (H)   ALT      0 - 44 IU/L 40 379 (H)     Component      Latest Ref Rng & Units 4/24/2021   White Blood Cell Count      3 4 - 10 8 x10E3/uL 5 1   Red Blood Cell Count      4 14 - 5 80 x10E6/uL 4 65   Hemoglobin      13 0 - 17 7 g/dL 14 1   HCT      37 5 - 51 0 % 41 7   MCV      79 - 97 fL 90   MCH      26 6 - 33 0 pg 30 3   MCHC        31 5 - 35 7 g/dL 33 8   RDW      11 6 - 15 4 % 14 7   Platelet Count      604 - 450 x10E3/uL 239   Neutrophils      Not Estab  % 65   Lymphocytes      Not Estab  % 23   Monocytes      Not Estab  % 9   Eosinophils      Not Estab  % 3   Basophils PCT      Not Estab  % 0   Neutrophils (Absolute)      1 4 - 7 0 x10E3/uL 3 3   Lymphocytes (Absolute)      0 7 - 3 1 x10E3/uL 1 2   Monocytes (Absolute)      0 1 - 0 9 x10E3/uL 0 5   Eosinophils (Absolute)      0 0 - 0 4 x10E3/uL 0 1   Basophils ABS      0 0 - 0 2 x10E3/uL 0 0   Immature Granulocytes      Not Estab  % 0   Immature Granulocytes (Absolute)      0 0 - 0 1 x10E3/uL 0 0

## 2021-04-27 NOTE — LETTER
[facility] [address]  [place]  [place] [address]  Dept: [phone]    April 29, 2021     Patient: Jessica Driver   YOB: 1955   Date of Visit: 4/27/2021       To Whom it May Concern:    Rianna Lino is under my professional care  He was seen in the hospital from 4/27/2021   to 04/29/21  He may return to work on 5/10/2021 without limitations  However, patient may require future time off related to post-discharge follow ups related to this hospitalization  If you have any questions or concerns, please don't hesitate to call           Sincerely,              Radha Coughlin PA-C

## 2021-04-27 NOTE — LETTER
[facility] [address]  [place]  [address]  [facility] [address]  Dept: [phone]    April 29, 2021     Patient: Sy Ortiz   YOB: 1955   Date of Visit: 4/27/2021       To Whom it May Concern:    Roberto Call is under my professional care  He was seen in the hospital from 4/27/2021   to 04/29/21  He may return to work on 5/10/2021 without limitations  However, patient may need further time off for post-discharge follow up appointments related to this hospitalization  If you have any questions or concerns, please don't hesitate to call           Sincerely,            Juan Antonio Westbrook PA-C

## 2021-04-28 ENCOUNTER — TELEPHONE (OUTPATIENT)
Dept: ADMINISTRATIVE | Facility: OTHER | Age: 66
End: 2021-04-28

## 2021-04-28 ENCOUNTER — ANESTHESIA (INPATIENT)
Dept: GASTROENTEROLOGY | Facility: HOSPITAL | Age: 66
DRG: 435 | End: 2021-04-28
Payer: COMMERCIAL

## 2021-04-28 ENCOUNTER — APPOINTMENT (INPATIENT)
Dept: GASTROENTEROLOGY | Facility: HOSPITAL | Age: 66
DRG: 435 | End: 2021-04-28
Payer: COMMERCIAL

## 2021-04-28 ENCOUNTER — APPOINTMENT (INPATIENT)
Dept: RADIOLOGY | Facility: HOSPITAL | Age: 66
DRG: 435 | End: 2021-04-28
Payer: COMMERCIAL

## 2021-04-28 ENCOUNTER — ANESTHESIA EVENT (INPATIENT)
Dept: GASTROENTEROLOGY | Facility: HOSPITAL | Age: 66
DRG: 435 | End: 2021-04-28
Payer: COMMERCIAL

## 2021-04-28 LAB
AFP-TM SERPL-MCNC: 2.8 NG/ML (ref 0.5–8)
ALBUMIN SERPL BCP-MCNC: 3 G/DL (ref 3.5–5)
ALP SERPL-CCNC: 522 U/L (ref 46–116)
ALT SERPL W P-5'-P-CCNC: 354 U/L (ref 12–78)
ANION GAP SERPL CALCULATED.3IONS-SCNC: 12 MMOL/L (ref 4–13)
AST SERPL W P-5'-P-CCNC: 110 U/L (ref 5–45)
BASOPHILS # BLD AUTO: 0.03 THOUSANDS/ΜL (ref 0–0.1)
BASOPHILS NFR BLD AUTO: 0 % (ref 0–1)
BILIRUB SERPL-MCNC: 6.9 MG/DL (ref 0.2–1)
BUN SERPL-MCNC: 18 MG/DL (ref 5–25)
CALCIUM ALBUM COR SERPL-MCNC: 9.4 MG/DL (ref 8.3–10.1)
CALCIUM SERPL-MCNC: 8.6 MG/DL (ref 8.3–10.1)
CHLORIDE SERPL-SCNC: 107 MMOL/L (ref 100–108)
CO2 SERPL-SCNC: 23 MMOL/L (ref 21–32)
CREAT SERPL-MCNC: 0.88 MG/DL (ref 0.6–1.3)
EOSINOPHIL # BLD AUTO: 0.19 THOUSAND/ΜL (ref 0–0.61)
EOSINOPHIL NFR BLD AUTO: 3 % (ref 0–6)
ERYTHROCYTE [DISTWIDTH] IN BLOOD BY AUTOMATED COUNT: 15.7 % (ref 11.6–15.1)
GFR SERPL CREATININE-BSD FRML MDRD: 90 ML/MIN/1.73SQ M
GLUCOSE SERPL-MCNC: 107 MG/DL (ref 65–140)
GLUCOSE SERPL-MCNC: 93 MG/DL (ref 65–140)
GLUCOSE SERPL-MCNC: 94 MG/DL (ref 65–140)
HCT VFR BLD AUTO: 40.4 % (ref 36.5–49.3)
HGB BLD-MCNC: 13.3 G/DL (ref 12–17)
IMM GRANULOCYTES # BLD AUTO: 0.03 THOUSAND/UL (ref 0–0.2)
IMM GRANULOCYTES NFR BLD AUTO: 0 % (ref 0–2)
LYMPHOCYTES # BLD AUTO: 0.55 THOUSANDS/ΜL (ref 0.6–4.47)
LYMPHOCYTES NFR BLD AUTO: 8 % (ref 14–44)
MCH RBC QN AUTO: 30.5 PG (ref 26.8–34.3)
MCHC RBC AUTO-ENTMCNC: 32.9 G/DL (ref 31.4–37.4)
MCV RBC AUTO: 93 FL (ref 82–98)
MONOCYTES # BLD AUTO: 0.73 THOUSAND/ΜL (ref 0.17–1.22)
MONOCYTES NFR BLD AUTO: 10 % (ref 4–12)
NEUTROPHILS # BLD AUTO: 5.56 THOUSANDS/ΜL (ref 1.85–7.62)
NEUTS SEG NFR BLD AUTO: 79 % (ref 43–75)
NRBC BLD AUTO-RTO: 0 /100 WBCS
PLATELET # BLD AUTO: 210 THOUSANDS/UL (ref 149–390)
PMV BLD AUTO: 12.8 FL (ref 8.9–12.7)
POTASSIUM SERPL-SCNC: 3.4 MMOL/L (ref 3.5–5.3)
PROT SERPL-MCNC: 6 G/DL (ref 6.4–8.2)
RBC # BLD AUTO: 4.36 MILLION/UL (ref 3.88–5.62)
SODIUM SERPL-SCNC: 142 MMOL/L (ref 136–145)
WBC # BLD AUTO: 7.09 THOUSAND/UL (ref 4.31–10.16)

## 2021-04-28 PROCEDURE — 99255 IP/OBS CONSLTJ NEW/EST HI 80: CPT | Performed by: STUDENT IN AN ORGANIZED HEALTH CARE EDUCATION/TRAINING PROGRAM

## 2021-04-28 PROCEDURE — 85025 COMPLETE CBC W/AUTO DIFF WBC: CPT | Performed by: FAMILY MEDICINE

## 2021-04-28 PROCEDURE — 0F798DZ DILATION OF COMMON BILE DUCT WITH INTRALUMINAL DEVICE, VIA NATURAL OR ARTIFICIAL OPENING ENDOSCOPIC: ICD-10-PCS | Performed by: INTERNAL MEDICINE

## 2021-04-28 PROCEDURE — C1769 GUIDE WIRE: HCPCS

## 2021-04-28 PROCEDURE — 99223 1ST HOSP IP/OBS HIGH 75: CPT | Performed by: INTERNAL MEDICINE

## 2021-04-28 PROCEDURE — BF101ZZ FLUOROSCOPY OF BILE DUCTS USING LOW OSMOLAR CONTRAST: ICD-10-PCS | Performed by: RADIOLOGY

## 2021-04-28 PROCEDURE — 88305 TISSUE EXAM BY PATHOLOGIST: CPT | Performed by: PATHOLOGY

## 2021-04-28 PROCEDURE — 88112 CYTOPATH CELL ENHANCE TECH: CPT | Performed by: PATHOLOGY

## 2021-04-28 PROCEDURE — 36415 COLL VENOUS BLD VENIPUNCTURE: CPT | Performed by: FAMILY MEDICINE

## 2021-04-28 PROCEDURE — 74328 X-RAY BILE DUCT ENDOSCOPY: CPT

## 2021-04-28 PROCEDURE — 82948 REAGENT STRIP/BLOOD GLUCOSE: CPT

## 2021-04-28 PROCEDURE — 0FD98ZX EXTRACTION OF COMMON BILE DUCT, VIA NATURAL OR ARTIFICIAL OPENING ENDOSCOPIC, DIAGNOSTIC: ICD-10-PCS | Performed by: INTERNAL MEDICINE

## 2021-04-28 PROCEDURE — 80053 COMPREHEN METABOLIC PANEL: CPT | Performed by: FAMILY MEDICINE

## 2021-04-28 PROCEDURE — 43274 ERCP DUCT STENT PLACEMENT: CPT | Performed by: INTERNAL MEDICINE

## 2021-04-28 PROCEDURE — C1874 STENT, COATED/COV W/DEL SYS: HCPCS

## 2021-04-28 RX ORDER — AMPICILLIN 1 G/1
INJECTION, POWDER, FOR SOLUTION INTRAMUSCULAR; INTRAVENOUS AS NEEDED
Status: DISCONTINUED | OUTPATIENT
Start: 2021-04-28 | End: 2021-04-28

## 2021-04-28 RX ORDER — SODIUM CHLORIDE, SODIUM LACTATE, POTASSIUM CHLORIDE, CALCIUM CHLORIDE 600; 310; 30; 20 MG/100ML; MG/100ML; MG/100ML; MG/100ML
20 INJECTION, SOLUTION INTRAVENOUS CONTINUOUS
Status: DISCONTINUED | OUTPATIENT
Start: 2021-04-28 | End: 2021-04-28

## 2021-04-28 RX ORDER — FENTANYL CITRATE 50 UG/ML
INJECTION, SOLUTION INTRAMUSCULAR; INTRAVENOUS AS NEEDED
Status: DISCONTINUED | OUTPATIENT
Start: 2021-04-28 | End: 2021-04-28

## 2021-04-28 RX ORDER — ONDANSETRON 2 MG/ML
4 INJECTION INTRAMUSCULAR; INTRAVENOUS ONCE AS NEEDED
Status: DISCONTINUED | OUTPATIENT
Start: 2021-04-28 | End: 2021-04-29 | Stop reason: HOSPADM

## 2021-04-28 RX ORDER — GENTAMICIN SULFATE 40 MG/ML
INJECTION, SOLUTION INTRAMUSCULAR; INTRAVENOUS AS NEEDED
Status: DISCONTINUED | OUTPATIENT
Start: 2021-04-28 | End: 2021-04-28

## 2021-04-28 RX ORDER — SUCCINYLCHOLINE/SOD CL,ISO/PF 100 MG/5ML
SYRINGE (ML) INTRAVENOUS AS NEEDED
Status: DISCONTINUED | OUTPATIENT
Start: 2021-04-28 | End: 2021-04-28

## 2021-04-28 RX ORDER — EPHEDRINE SULFATE 50 MG/ML
INJECTION INTRAVENOUS AS NEEDED
Status: DISCONTINUED | OUTPATIENT
Start: 2021-04-28 | End: 2021-04-28

## 2021-04-28 RX ORDER — FENTANYL CITRATE/PF 50 MCG/ML
25 SYRINGE (ML) INJECTION
Status: DISCONTINUED | OUTPATIENT
Start: 2021-04-28 | End: 2021-04-28

## 2021-04-28 RX ORDER — SODIUM CHLORIDE, SODIUM LACTATE, POTASSIUM CHLORIDE, CALCIUM CHLORIDE 600; 310; 30; 20 MG/100ML; MG/100ML; MG/100ML; MG/100ML
100 INJECTION, SOLUTION INTRAVENOUS CONTINUOUS
Status: DISCONTINUED | OUTPATIENT
Start: 2021-04-28 | End: 2021-04-28

## 2021-04-28 RX ORDER — LIDOCAINE HYDROCHLORIDE 10 MG/ML
INJECTION, SOLUTION EPIDURAL; INFILTRATION; INTRACAUDAL; PERINEURAL AS NEEDED
Status: DISCONTINUED | OUTPATIENT
Start: 2021-04-28 | End: 2021-04-28

## 2021-04-28 RX ORDER — PROPOFOL 10 MG/ML
INJECTION, EMULSION INTRAVENOUS AS NEEDED
Status: DISCONTINUED | OUTPATIENT
Start: 2021-04-28 | End: 2021-04-28

## 2021-04-28 RX ORDER — ONDANSETRON 2 MG/ML
INJECTION INTRAMUSCULAR; INTRAVENOUS AS NEEDED
Status: DISCONTINUED | OUTPATIENT
Start: 2021-04-28 | End: 2021-04-28

## 2021-04-28 RX ORDER — SODIUM CHLORIDE, SODIUM LACTATE, POTASSIUM CHLORIDE, CALCIUM CHLORIDE 600; 310; 30; 20 MG/100ML; MG/100ML; MG/100ML; MG/100ML
100 INJECTION, SOLUTION INTRAVENOUS CONTINUOUS
Status: DISCONTINUED | OUTPATIENT
Start: 2021-04-28 | End: 2021-04-29 | Stop reason: HOSPADM

## 2021-04-28 RX ORDER — DEXAMETHASONE SODIUM PHOSPHATE 10 MG/ML
INJECTION, SOLUTION INTRAMUSCULAR; INTRAVENOUS AS NEEDED
Status: DISCONTINUED | OUTPATIENT
Start: 2021-04-28 | End: 2021-04-28

## 2021-04-28 RX ADMIN — LIDOCAINE HYDROCHLORIDE 50 MG: 10 INJECTION, SOLUTION EPIDURAL; INFILTRATION; INTRACAUDAL at 15:28

## 2021-04-28 RX ADMIN — ASPIRIN 81 MG: 81 TABLET, CHEWABLE ORAL at 09:27

## 2021-04-28 RX ADMIN — FENTANYL CITRATE 25 MCG: 50 INJECTION, SOLUTION INTRAMUSCULAR; INTRAVENOUS at 16:00

## 2021-04-28 RX ADMIN — TAMSULOSIN HYDROCHLORIDE 0.4 MG: 0.4 CAPSULE ORAL at 21:23

## 2021-04-28 RX ADMIN — FLUOXETINE 20 MG: 20 CAPSULE ORAL at 09:27

## 2021-04-28 RX ADMIN — INDOMETHACIN 100 MG: 50 SUPPOSITORY RECTAL at 15:36

## 2021-04-28 RX ADMIN — GENTAMICIN SULFATE 80 MG: 40 INJECTION, SOLUTION INTRAMUSCULAR; INTRAVENOUS at 15:39

## 2021-04-28 RX ADMIN — PROPOFOL 20 MG: 10 INJECTION, EMULSION INTRAVENOUS at 15:45

## 2021-04-28 RX ADMIN — EPHEDRINE SULFATE 10 MG: 50 INJECTION, SOLUTION INTRAVENOUS at 15:40

## 2021-04-28 RX ADMIN — IOHEXOL 3 ML: 240 INJECTION, SOLUTION INTRATHECAL; INTRAVASCULAR; INTRAVENOUS; ORAL at 16:05

## 2021-04-28 RX ADMIN — SODIUM CHLORIDE, SODIUM LACTATE, POTASSIUM CHLORIDE, AND CALCIUM CHLORIDE 100 ML/HR: .6; .31; .03; .02 INJECTION, SOLUTION INTRAVENOUS at 13:18

## 2021-04-28 RX ADMIN — FENTANYL CITRATE 25 MCG: 50 INJECTION, SOLUTION INTRAMUSCULAR; INTRAVENOUS at 15:49

## 2021-04-28 RX ADMIN — EPHEDRINE SULFATE 5 MG: 50 INJECTION, SOLUTION INTRAVENOUS at 15:56

## 2021-04-28 RX ADMIN — ONDANSETRON 4 MG: 2 INJECTION INTRAMUSCULAR; INTRAVENOUS at 15:48

## 2021-04-28 RX ADMIN — PROPOFOL 180 MG: 10 INJECTION, EMULSION INTRAVENOUS at 15:28

## 2021-04-28 RX ADMIN — Medication 80 MG: at 15:28

## 2021-04-28 RX ADMIN — TAMSULOSIN HYDROCHLORIDE 0.4 MG: 0.4 CAPSULE ORAL at 00:07

## 2021-04-28 RX ADMIN — DEXAMETHASONE SODIUM PHOSPHATE 4 MG: 10 INJECTION, SOLUTION INTRAMUSCULAR; INTRAVENOUS at 15:48

## 2021-04-28 RX ADMIN — FENTANYL CITRATE 50 MCG: 50 INJECTION, SOLUTION INTRAMUSCULAR; INTRAVENOUS at 15:28

## 2021-04-28 RX ADMIN — AMPICILLIN SODIUM 2000 MG: 1 INJECTION, POWDER, FOR SOLUTION INTRAMUSCULAR; INTRAVENOUS at 15:35

## 2021-04-28 NOTE — ASSESSMENT & PLAN NOTE
Patient reports he recently stopped taking his atorvastatin as Rheumatologist had reported this could theoretically affect liver enzymes as well      Plan  · Monitor off statin therapy

## 2021-04-28 NOTE — ASSESSMENT & PLAN NOTE
Patient w/ hx of RA on methotrexate and [place] Waucoma with notable new onset transaminitis noted on labs on 4/16/21  Rheumatologist concerned possible med side effect and subsequently d/c meds on 4/20/21 and requested repeat lab work      Plan  · Continue to hold methotrexate and [place] Waucoma

## 2021-04-28 NOTE — CONSULTS
Consultation - Surgical Oncology   Kailee Beaulieu 73 y o  male MRN: [id number]  Unit/Bed#: ED 25 Encounter: [id number]    Assessment/Plan     Assessment:  73 y o  male w/ hx of HTN, HLD, who presents w/ obstructive jaundice, CT imaging suspicious for pancreatic head mass    Plan:  --f/u MRCP  --Tumor markers- Ca 19-9, AFP, CEA  --GI consult placed- may require ERCP/biliary stenting, EUS w/ biopsy   --CT Chest for staging    History of Present Illness     HPI:  Kailee Beaulieu is a 73 y o  male w/ hx of HTN, HLD, who presents w/ jaundice, weakness, unintentional weight loss, CT imaging suspicious for pancreatic mass  Pt reports that 3 weeks ago, he began to notice he was having pale stools, along with 16 lb weight loss in that time period  Presented to his PCP yesterday with weakness and jaundice, and was recommended to come to the ED for evaluation  Labwork showed transaminitis (, ALT of 404, ALP of 580) with hyperbilirubinemia (TBili of 7 74)  CTAP showed large ill-defined pancreatic head mass (4 2 x 2 9 cm), along with moderate intra- and extra-hepatic biliary ductal dilatation, and patent regional vasculature  Inpatient Consult to Surgical Oncology     Performed by  Trenton Ho MD     Authorized by Mi Trujillo MD              Review of Systems   Constitutional: Positive for unexpected weight change  Negative for activity change, appetite change, chills and fever  HENT: Negative for congestion, sinus pressure and sinus pain  Respiratory: Negative for apnea, cough, chest tightness, shortness of breath and wheezing  Cardiovascular: Negative for chest pain, palpitations and leg swelling  Gastrointestinal: Positive for diarrhea  Negative for abdominal distention, abdominal pain, constipation, nausea and vomiting  Jaundice   Genitourinary: Negative for difficulty urinating and dysuria  Musculoskeletal: Negative for arthralgias, back pain and gait problem     Skin: Negative for color change, pallor, rash and wound  Neurological: Negative for dizziness, tremors, seizures, syncope, facial asymmetry and numbness  Psychiatric/Behavioral: Negative for agitation, behavioral problems and confusion  Historical Information   Past Medical History:   Diagnosis Date    Arthritis     Cellulitis     LAST ASSESSED: 6/13/14    Enlarged prostate     Epidermal inclusion cyst     LAST ASSESSED: 10/4/13    Erythrasma     LAST ASSESSED: 9/23/13    Furuncle     LAST ASSESSED: 6/11/14    Hyperlipidemia     Hypertension      Past Surgical History:   Procedure Laterality Date    HYDROCELE EXCISION / REPAIR Right 01/11/2018    SPERMATIC CORD EXCSION OF HYDROCELE; MANAGED BY: [facility]    CA REMOVAL OF HYDROCELE,TUNICA,UNILAT Right 1/11/2018    Procedure: HYDROCELECTOMY;  Surgeon: Mere Moore MD;  Location: [facility];  Service: Urology    SCROTAL SURGERY      benign "lump"     Social History   Social History     Substance and Sexual Activity   Alcohol Use No     Social History     Substance and Sexual Activity   Drug Use No     E-Cigarette/Vaping     E-Cigarette/Vaping Substances     Social History     Tobacco Use   Smoking Status Former Smoker   Smokeless Tobacco Never Used   Tobacco Comment    tobacco in a pipe in the early 1980's     Family History:   Family History   Problem Relation Age of Onset    Heart disease Father         CARDIAC DISORDER    Hypertension Father     Hypertension Mother     No Known Problems Maternal Aunt     No Known Problems Maternal Uncle     No Known Problems Paternal Aunt     No Known Problems Paternal Uncle     No Known Problems Paternal Grandmother     No Known Problems Paternal Grandfather     Diabetes Maternal Grandmother         MELLITUS    No Known Problems Maternal Grandfather     Hypertension Other        Meds/Allergies   current meds:   No current facility-administered medications for this encounter       and PTA meds: Prior to Admission Medications   Prescriptions Last Dose Informant Patient Reported? Taking? Blood Glucose Monitoring Suppl (ONE TOUCH ULTRA 2) w/Device KIT  Self No No   Sig: Use daily   FLUoxetine (PROzac) 20 mg capsule  Self No No   Sig: TAKE 1 CAPSULE BY MOUTH EVERY DAY     Lancets (onetouch ultrasoft) lancets  Self No No   Sig: Use as instructed once a day   Multiple Vitamins-Minerals (MULTIVITAMIN MEN) TABS  Self Yes No   Sig: Take 1 tablet by mouth daily   Omega-3 Fatty Acids (FISH OIL) 1,000 mg  Self Yes No   Sig: Take 1,000 mg by mouth daily   aspirin 81 mg chewable tablet  Self Yes No   Sig: Chew 81 mg   folic acid (FOLVITE) 1 mg tablet  Self Yes No   Sig: Take by mouth daily   glucose blood (OneTouch Ultra) test strip  Self No No   Sig: Use as instructed once a day   lisinopril-hydrochlorothiazide (PRINZIDE,ZESTORETIC) 10-12 5 MG per tablet  Self No No   Sig: TAKE ONE TABLET BY MOUTH EVERY DAY   tamsulosin (FLOMAX) 0 4 mg  Self No No   Sig: TAKE 1 CAPSULE BY MOUTH  DAILY AT BEDTIME      Facility-Administered Medications: None     Allergies   Allergen Reactions    Fentanyl Other (See Comments) and Anaphylaxis     Oxygen drops severely       Objective   First Vitals:   Blood Pressure: 105/65 (04/27/21 1535)  Pulse: 56 (04/27/21 1535)  Temperature: 98 1 °F (36 7 °C) (04/27/21 1535)  Temp Source: Oral (04/27/21 1535)  Respirations: 18 (04/27/21 1535)  SpO2: 99 % (04/27/21 1535)    Current Vitals:   Blood Pressure: 118/69 (04/27/21 2030)  Pulse: (!) 46 (04/27/21 2030)  Temperature: 98 1 °F (36 7 °C) (04/27/21 1535)  Temp Source: Oral (04/27/21 1535)  Respirations: 18 (04/27/21 1758)  SpO2: 97 % (04/27/21 2030)      Intake/Output Summary (Last 24 hours) at 4/27/2021 2148  Last data filed at 4/27/2021 1948  Gross per 24 hour   Intake 1000 ml   Output --   Net 1000 ml       Invasive Devices     Peripheral Intravenous Line            Peripheral IV 04/27/21 Right Antecubital less than 1 day          Drain Open Drain Right 1202 days                Physical Exam  Constitutional:       General: He is not in acute distress  Appearance: He is well-developed  He is not diaphoretic  HENT:      Head: Normocephalic and atraumatic  Neck:      Musculoskeletal: Normal range of motion and neck supple  Cardiovascular:      Rate and Rhythm: Normal rate and regular rhythm  Pulmonary:      Effort: Pulmonary effort is normal  No respiratory distress  Breath sounds: Normal breath sounds  No stridor  No wheezing  Abdominal:      General: There is no distension  Palpations: Abdomen is soft  Tenderness: There is no abdominal tenderness  Musculoskeletal: Normal range of motion  Skin:     General: Skin is warm  Coloration: Skin is jaundiced  Neurological:      Mental Status: He is alert and oriented to person, place, and time         Lab Results:   CBC:   Lab Results   Component Value Date    WBC 7 49 04/27/2021    HGB 14 0 04/27/2021    HCT 41 7 04/27/2021    MCV 90 04/27/2021     04/27/2021    MCH 30 1 04/27/2021    MCHC 33 6 04/27/2021    RDW 15 2 (H) 04/27/2021    MPV 12 5 04/27/2021    NRBC 0 04/27/2021   , CMP:   Lab Results   Component Value Date    SODIUM 141 04/27/2021    K 3 2 (L) 04/27/2021     04/27/2021    CO2 27 04/27/2021    BUN 21 04/27/2021    CREATININE 1 04 04/27/2021    CALCIUM 9 1 04/27/2021     (H) 04/27/2021     (H) 04/27/2021    ALKPHOS 580 (H) 04/27/2021    EGFR 75 04/27/2021   , Coagulation:   Lab Results   Component Value Date    INR 1 08 04/27/2021   , Urinalysis:   Lab Results   Component Value Date    COLORU Melinda 04/27/2021    CLARITYU Clear 04/27/2021    SPECGRAV 1 015 04/27/2021    PHUR 5 5 04/27/2021    LEUKOCYTESUR Negative 04/27/2021    NITRITE Negative 04/27/2021    GLUCOSEU Negative 04/27/2021    KETONESU Negative 04/27/2021    BILIRUBINUR Moderate (A) 04/27/2021    BLOODU Small (A) 04/27/2021     Imaging:     CT abdomen pelvis with contrast   Final Result by Domi Toledo MD (04/27 1945)      Moderate intra and extrahepatic biliary ductal dilatation, pancreatic duct dilatation and gallbladder distention which appear secondary to a large ill-defined pancreatic head mass, pancreatic carcinoma until proven otherwise  Multiple hepatic cysts without suspicious change  Stable homogeneously enhancing left lateral segment lesion likely representing cavernous hemangioma  The study was marked in [facility] for immediate notification         Workstation performed: [id number]         MRI abdomen w wo contrast and mrcp    (Results Pending)

## 2021-04-28 NOTE — ASSESSMENT & PLAN NOTE
Lab Results   Component Value Date    HGBA1C 6 1 (H) 04/24/2021       No results for input(s): POCGLU in the last 72 hours      Blood Sugar Average: Last 72 hrs:  · Diet controlled Type 2 DM  · SSI and Accu-checks  · Diabetic diet

## 2021-04-28 NOTE — H&P
[address] [facility] 1955, 73 y o  male MRN: [id number]  Unit/Bed#: ED 25 Encounter: [id number]  Primary Care Provider: Bee Cevallos MD   Date and time admitted to hospital: 4/27/2021  5:03 PM    * Transaminitis  Assessment & Plan  Patient referred to ED by PCP for further evaluation of abnormal lab work and jaundice  Patient w/ hx of RA on methotrexate and [place] with notable new onset transaminitis noted on labs on 4/16/21, on repeat lab work on 4/24/21 worsening transaminitis  Consider pancreatic cancer vs viral hepatitis vs less likely infectious diarrhea  Plan  · IVF for hydration  · NPO at midnight for MRCP in am  · CEA, AFP tumor marker, CA 19-9  · Lipase wnl, amylase pending    Hyperbilirubinemia  Assessment & Plan  POA: Elevated bilirubin to 7 7, likely secondary to underlying obstruction from pancreatic mass noted on CT imaging  Jaundice and scleral icterus present on exam     CT abdomen pelvis with contrast-showing a mass within the pancreatic head measuring 4 2 x 2 9 cm with dilatation/obstruction of the common bile duct and pancreatic duct  Abnormal distended gallbladder caliber and borderline wall thickening  Multiple scattered cysts seen throughout the liver without suspicious enhancing component and without suspicious change compared to the prior    Plan  · Inpatient Consult to GI-per discussion with ED attending requesting MRCP in the am for further evaluation  · Inpatient Consult to Surgical Oncology  · May consider consult with Medical Oncology    Diarrhea  Assessment & Plan  Patient reports that over the last 3 weeks he has had a worsening fatigue, abdominal cramping discomfort, and semi-formed tan-colored diarrhea with 4-5 episodes/day  Patient reports a 20 lbs unintentional weight loss      Plan  · Stool enteric bacterial panel  · C diff  · Lipase WN L, amylase pending    Rheumatoid arthritis [facility])  Assessment & Plan  Patient w/ hx of RA on methotrexate and [place] Constable with notable new onset transaminitis noted on labs on 4/16/21  Rheumatologist concerned possible med side effect and subsequently d/c meds on 4/20/21 and requested repeat lab work  Plan  · Continue to hold methotrexate and Xeljanz    Hyperlipidemia  Assessment & Plan  Patient reports he recently stopped taking his atorvastatin as Rheumatologist had reported this could theoretically affect liver enzymes as well  Plan  · Monitor off statin therapy    Type 2 diabetes mellitus without complication, without long-term current use of insulin (HCC)  Assessment & Plan  Lab Results   Component Value Date    HGBA1C 6 1 (H) 04/24/2021       No results for input(s): POCGLU in the last 72 hours  Blood Sugar Average: Last 72 hrs:  · Diet controlled Type 2 DM  · SSI and Accu-checks  · Diabetic diet    Hypokalemia  Assessment & Plan  POA 3 2    Plan  · Replete K+ prn    VTE Prophylaxis: Enoxaparin (Lovenox)  / sequential compression device   Code Status:  Level 1  POLST: There is no POLST form on file for this patient (pre-hospital)    Anticipated Length of Stay:  Patient will be admitted on an Inpatient basis with an anticipated length of stay of  > 2 midnights  Justification for Hospital Stay:  Transaminitis    Chief Complaint:   Diarrhea    History of Present Illness:    Yakelin Maldonado is a 73 y o  male with history significant for rheumatoid arthritis on methotrexate and [place] Con[place], diet controlled Type 2 DM, BPH, and HLD who was referred to ED by PCP for further evaluation of abnormal lab work and jaundice noted during an acute visit earlier today  Patient reports that over the last 3 weeks he has had a worsening fatigue, abdominal cramping discomfort, and semi-formed tan-colored diarrhea with 4-5 episodes/day  Patient reports a 20 lbs unintentional weight loss during this time frame as well  Patient denies any fevers, chills, sick contacts or recent travel   Outpatient lab work obtained by primary rheumatologist on 4/16/21 notable for new onset transaminitis  Rheumatologist discussed concern as pharmacotherapy as possible contributing factor and methotrexate and [place] subsequently discontinued on 4/20/21  Unfortunately, on repeat lab work on 4/24/21 worsening transaminitis noted and hyperbilirubinemia to 3 6  Today at PCP office repeat labs reviewed with patient today showing worsening abnormalities  On presentation to the ED, patient was afebrile, normotensive ,satting 99% on room air  ED labs AST-132, ALT-404, ALP-580, Tbili -7 7  CT abdomen pelvis with contrast-showing a mass within the pancreatic head measuring 4 2 x 2 9 cm with dilatation/obstruction of the common bile duct and pancreatic duct  Per discussion with ED provider, she has discussed case both with Surgical Oncology and Gastroenterology (Bill Sullivan)  Gastroenterology requesting MRCP in the [facility]  surgical oncology also to evaluate patient tomorrow as well  Review of Systems:    Review of Systems   Constitutional: Positive for fatigue and unexpected weight change  Negative for chills and fever  20lbs weight loss in 3 weeks   HENT: Negative for trouble swallowing  Eyes: Negative for visual disturbance  Respiratory: Negative for chest tightness and shortness of breath  Cardiovascular: Negative for chest pain and palpitations  Gastrointestinal: Positive for abdominal distention, abdominal pain and diarrhea  Negative for blood in stool, nausea and vomiting  Genitourinary: Negative for hematuria  Skin: Negative for rash  Neurological: Negative for dizziness, light-headedness and numbness         Past Medical and Surgical History:     Past Medical History:   Diagnosis Date    Arthritis     Cellulitis     LAST ASSESSED: 6/13/14    Enlarged prostate     Epidermal inclusion cyst     LAST ASSESSED: 10/4/13    Erythrasma     LAST ASSESSED: 9/23/13    Furuncle     LAST ASSESSED: 6/11/14    Hyperlipidemia     Hypertension        Past Surgical History:   Procedure Laterality Date    HYDROCELE EXCISION / REPAIR Right 01/11/2018    SPERMATIC CORD EXCSION OF HYDROCELE; MANAGED BY: Jt BANDA REMOVAL OF HYDROCELE,TUNICA,UNILAT Right 1/11/2018    Procedure: HYDROCELECTOMY;  Surgeon: Demarcus Kemp MD;  Location: [facility];  Service: Urology    SCROTAL SURGERY      benign "lump"       Meds/Allergies:    Prior to Admission medications    Medication Sig Start Date End Date Taking? Authorizing Provider   aspirin 81 mg chewable tablet Chew 81 mg    Historical Provider, MD   Blood Glucose Monitoring Suppl (ONE TOUCH ULTRA 2) w/Device KIT Use daily 3/17/21   Eva Watkins MD   FLUoxetine (PROzac) 20 mg capsule TAKE 1 CAPSULE BY MOUTH EVERY DAY   10/13/20   Eva Watkins MD   folic acid (FOLVITE) 1 mg tablet Take by mouth daily    Historical Provider, MD   glucose blood (OneTouch Ultra) test strip Use as instructed once a day 3/17/21   Eva Watkins MD   Lancets (onetouch ultrasoft) lancets Use as instructed once a day 3/17/21   Eva Watkins MD   lisinopril-hydrochlorothiazide (PRINZIDE,ZESTORETIC) 10-12 5 MG per tablet TAKE ONE TABLET BY MOUTH EVERY DAY 9/15/20   vEa Watkins MD   Multiple Vitamins-Minerals (MULTIVITAMIN MEN) TABS Take 1 tablet by mouth daily    Historical Provider, MD   Omega-3 Fatty Acids (FISH OIL) 1,000 mg Take 1,000 mg by mouth daily    Historical Provider, MD   tamsulosin (FLOMAX) 0 4 mg TAKE 1 CAPSULE BY MOUTH  DAILY AT BEDTIME 6/10/20   Demarcus Kemp MD   atorvastatin (LIPITOR) 10 mg tablet TAKE 1 TABLET BY MOUTH  DAILY 8/28/20 4/27/21  Eva Watkins MD   Methotrexate, PF, (Rasuvo) 15 MG/0 3ML SOAJ Inject 0 15 mL (7 5 mg total) under the skin every 14 (fourteen) days 2/22/21 4/27/21  Eva Watkins MD   Boneta Chyle XR 11 MG TB24  7/9/18 4/27/21  Historical Provider, MD     I have reviewed home medications with patient personally  Allergies: Allergies   Allergen Reactions    Fentanyl Other (See Comments) and Anaphylaxis     Oxygen drops severely       Social History:     Marital Status: /Civil Union   Occupation:   Patient Pre-hospital Living Situation: Single Home  Patient Pre-hospital Level of Mobility: Ambulates without assistance  Patient Pre-hospital Diet Restrictions: Diabetic diet  Substance Use History:   Social History     Substance and Sexual Activity   Alcohol Use No     Social History     Tobacco Use   Smoking Status Former Smoker   Smokeless Tobacco Never Used   Tobacco Comment    tobacco in a pipe in the early 1980's     Social History     Substance and Sexual Activity   Drug Use No       Family History:    Family History   Problem Relation Age of Onset    Heart disease Father         CARDIAC DISORDER    Hypertension Father     Hypertension Mother     No Known Problems Maternal Aunt     No Known Problems Maternal Uncle     No Known Problems Paternal Aunt     No Known Problems Paternal Uncle     No Known Problems Paternal Grandmother     No Known Problems Paternal Grandfather     Diabetes Maternal Grandmother         MELLITUS    No Known Problems Maternal Grandfather     Hypertension Other        Physical Exam:     Vitals:   Blood Pressure: 118/69 (04/27/21 2030)  Pulse: (!) 46 (04/27/21 2030)  Temperature: 98 1 °F (36 7 °C) (04/27/21 1535)  Temp Source: Oral (04/27/21 1535)  Respirations: 18 (04/27/21 1758)  SpO2: 97 % (04/27/21 2030)    Physical Exam  Constitutional:       General: He is not in acute distress  Appearance: He is well-developed  HENT:      Head: Normocephalic and atraumatic  Right Ear: External ear normal       Left Ear: External ear normal       Nose: Nose normal    Eyes:      General: Scleral icterus present  Pupils: Pupils are equal, round, and reactive to light  Neck:      Musculoskeletal: Normal range of motion and neck supple  Cardiovascular:      Rate and Rhythm: Normal rate and regular rhythm  Heart sounds: Normal heart sounds  Pulmonary:      Effort: Pulmonary effort is normal       Breath sounds: Normal breath sounds  Abdominal:      General: Bowel sounds are normal       Palpations: Abdomen is soft  Tenderness: There is abdominal tenderness  Musculoskeletal:      Right lower leg: No edema  Left lower leg: No edema  Skin:     General: Skin is warm and dry  Neurological:      Mental Status: He is alert and oriented to person, place, and time  Additional Data:     Lab Results: I have personally reviewed pertinent reports  Results from last 7 days   Lab Units 04/27/21  1539   WBC Thousand/uL 7 49   HEMOGLOBIN g/dL 14 0   HEMATOCRIT % 41 7   PLATELETS Thousands/uL 257   NEUTROS PCT % 82*   LYMPHS PCT % 9*   MONOS PCT % 7   EOS PCT % 2     Results from last 7 days   Lab Units 04/27/21  1539   POTASSIUM mmol/L 3 2*   CHLORIDE mmol/L 104   CO2 mmol/L 27   BUN mg/dL 21   CREATININE mg/dL 1 04   CALCIUM mg/dL 9 1   ALK PHOS U/L 580*   ALT U/L 404*   AST U/L 132*     Results from last 7 days   Lab Units 04/27/21  1539   INR  1 08       Imaging: I have personally reviewed pertinent reports  Ct Abdomen Pelvis With Contrast    Result Date: 4/27/2021  Narrative: CT ABDOMEN AND PELVIS WITH IV CONTRAST INDICATION:   Diarrhea Abdominal pain, weight loss, epigastric pain Abdominal discomfort, loose stools, weight loss and transaminitis over the last 3 weeks  COMPARISON:  9/19/2019 TECHNIQUE:  CT examination of the abdomen and pelvis was performed  Axial, sagittal, and coronal 2D reformatted images were created from the source data and submitted for interpretation  Radiation dose length product (DLP) for this visit:  815 mGy-cm     This examination, like all CT scans performed in the [facility], was performed utilizing techniques to minimize radiation dose exposure, including the use of iterative reconstruction and automated exposure control  IV Contrast:  100 mL of iohexol (OMNIPAQUE) Enteric Contrast:  Enteric contrast was not administered  FINDINGS: ABDOMEN LOWER CHEST:  No clinically significant abnormality identified in the visualized lower chest   Oval well-definedd soft tissue density nodule within the right chest   This measures 2 8 x 1 2 cm, similar to the prior in 2019  Correlate with physical exam findings  Unclear if this represents a subdermal nodule or asymmetric gynecomastia  LIVER/BILIARY TREE:  Multiple scattered cysts seen throughout the liver without suspicious enhancing component and without suspicious change compared to the prior  Single homogeneously enhancing lesion in the left lateral segment with relative washout on delayed imaging, contrast density follows portal venous density  Stable compared to prior  Although not pathognomonic, 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  New moderate intrahepatic biliary ductal dilatation  Common hepatic and common bile duct abnormally distended to the level of the pancreatic head  GALLBLADDER:  Abnormal distended gallbladder caliber and borderline wall thickening  No pericholecystic inflammation or calcified stones  SPLEEN:  Unremarkable  PANCREAS:  Large ill-defined hypoattenuating mass within the pancreatic head and uncinate measuring 4 2 x 2 9 cm with dilatation/obstruction of the common bile duct and pancreatic duct, likely represent pancreatic carcinoma  Regional vasculature remains  patent  No pathologic adenopathy identified  Pancreatic duct dilated within the neck at 8 mm, tapering through the body and tail  ADRENAL GLANDS:  Unremarkable  KIDNEYS/URETERS:  Unremarkable  No hydronephrosis  STOMACH AND BOWEL:  Unremarkable  APPENDIX:  No findings to suggest appendicitis  ABDOMINOPELVIC CAVITY:  No ascites  No pneumoperitoneum  No lymphadenopathy  VESSELS:  Unremarkable for patient's age   PELVIS REPRODUCTIVE ORGANS:  Unremarkable for patient's age  URINARY BLADDER:  Unremarkable  ABDOMINAL WALL/INGUINAL REGIONS:  Unremarkable  OSSEOUS STRUCTURES:  No acute fracture or destructive osseous lesion  Impression: Moderate intra and extrahepatic biliary ductal dilatation, pancreatic duct dilatation and gallbladder distention which appear secondary to a large ill-defined pancreatic head mass, pancreatic carcinoma until proven otherwise  Multiple hepatic cysts without suspicious change  Stable homogeneously enhancing left lateral segment lesion likely representing cavernous hemangioma  The study was marked in [facility] for immediate notification  Workstation performed: [id number]       EKG, Pathology, and Other Studies Reviewed on Admission:   · EKG: NSR    Epic / Care Everywhere Records Reviewed: Yes     ** Please Note: This note has been constructed using a voice recognition system   **

## 2021-04-28 NOTE — ANESTHESIA POSTPROCEDURE EVALUATION
Post-Op Assessment Note    CV Status:  Stable    Pain management: adequate     Mental Status:  Awake, sleepy and arousable   Hydration Status:  Euvolemic   PONV Controlled:  Controlled   Airway Patency:  Patent      Post Op Vitals Reviewed: Yes      Staff: CRNA         No complications documented      BP   140/68   Temp  97 5   Pulse  77   Resp   18   SpO2   99

## 2021-04-28 NOTE — CONSULTS
Consultation - [facility]  Sahra Martins 73 y o  male MRN: [id number]  Unit/Bed#: ED 25 Encounter: [id number]        Inpatient consult to gastroenterology  Consult performed by: Cecilia Emerson PA-C  Consult ordered by: Arnold Ye MD          Reason for Consult / Principal Problem: pancreatic mass with CBD obstruction    ASSESSMENT and PLAN:    Principal Problem:    Transaminitis  Active Problems:    Rheumatoid arthritis ([facility] [address] )    Hyperlipidemia    Type 2 diabetes mellitus without complication, without long-term current use of insulin ([facility] [address] )    Hyperbilirubinemia    Hypokalemia    Diarrhea    #1  Pancreatitic mass with CBD obstruction: MRI noting a pancreatic head mass with obstruction of the CBD causing dilation, concerning for pancreatic malignancy, LFTs elevated, patient has had abdominal discomfort, dark urine, jaundice, and soft pale stools over last 3 weeks consistent with biliary obstruction  Also with 20 pound weight loss in 3 weeks but has been eating normally  T bili 6 9 today with signs of CBD obstruction, no signs of cholangitis  -plan for ERCP today for stenting and bile duct brushings, discussed procedure in detail with patient  -NPO with IVF  -follow up CA 19-9  -monitor LFTs  -patient will likely need short interval EUS if brushings negative for diagnosis  -surg onc follow up      -------------------------------------------------------------------------------------------------------------------    HPI: This is a 73year old male with a history of HLD, HTN, and RA on methotrexate and Guerrero Love who presented to the hospital due to jaundice and elevated LFTs  Patient reports that for the last 3 weeks he has been having generalized abdominal discomfort with 20 pound weight loss, soft pale stools 3-4 times daily, dark urine, and yellowing of the skin  He denies any recent sick contacts, hospitalizations, or antibiotic use   He reports he had labs a few weeks ago with mild elevation in his liver enzymes an was told to stop his methotrexate  Repeat labs showed worsening  He reports being off methotrexate and Shreyas Michel now for about 2 weeks  He denies nausea or vomiting, reports his appetite has been normal and has been eating well without any postprandial pain  He denies any melena or blood in the stool  Denies any family history of pancreatic cancer  Denies any alcohol use and does not use tobacco, only smoked pipes in the 80's for a short period of time  He has had a colonoscopy approximately 7 years ago which was normal      REVIEW OF SYSTEMS:    CONSTITUTIONAL: Denies any fever, chills, or rigors  Good appetite, recent weight loss  HEENT: No earache or tinnitus  Denies hearing loss or visual disturbances  CARDIOVASCULAR: No chest pain or palpitations  RESPIRATORY: Denies any cough, hemoptysis, shortness of breath or dyspnea on exertion  GASTROINTESTINAL: As noted in the History of Present Illness  GENITOURINARY: No problems with urination  Denies any hematuria or dysuria  +dark urine  NEUROLOGIC: No dizziness or vertigo, denies headaches  MUSCULOSKELETAL: Denies any muscle or joint pain  SKIN: Denies skin rashes or itching  ENDOCRINE: Denies excessive thirst  Denies intolerance to heat or cold  +jaundice   PSYCHOSOCIAL: Denies depression or anxiety  Denies any recent memory loss         Historical Information   Past Medical History:   Diagnosis Date    Arthritis     Cellulitis     LAST ASSESSED: 6/13/14    Enlarged prostate     Epidermal inclusion cyst     LAST ASSESSED: 10/4/13    Erythrasma     LAST ASSESSED: 9/23/13    Furuncle     LAST ASSESSED: 6/11/14    Hyperlipidemia     Hypertension      Past Surgical History:   Procedure Laterality Date    HYDROCELE EXCISION / REPAIR Right 01/11/2018    SPERMATIC CORD EXCSION OF HYDROCELE; MANAGED BY: Luis Healy    DE REMOVAL OF HYDROCELE,TUNICA,UNILAT Right 1/11/2018    Procedure: HYDROCELECTOMY;  Surgeon: Faisal Cosme Josh Weber MD;  Location: [facility];  Service: Urology    SCROTAL SURGERY      benign "lump"     Social History   Social History     Substance and Sexual Activity   Alcohol Use No     Social History     Substance and Sexual Activity   Drug Use No     Social History     Tobacco Use   Smoking Status Former Smoker   Smokeless Tobacco Never Used   Tobacco Comment    tobacco in a pipe in the early 1980's     Family History   Problem Relation Age of Onset    Heart disease Father         CARDIAC DISORDER    Hypertension Father     Hypertension Mother     No Known Problems Maternal Aunt     No Known Problems Maternal Uncle     No Known Problems Paternal Aunt     No Known Problems Paternal Uncle     No Known Problems Paternal Grandmother     No Known Problems Paternal Grandfather     Diabetes Maternal Grandmother         MELLITUS    No Known Problems Maternal Grandfather     Hypertension Other        Meds/Allergies     (Not in a hospital admission)    Current Facility-Administered Medications   Medication Dose Route Frequency    acetaminophen (TYLENOL) tablet 650 mg  650 mg Oral Q6H PRN    aspirin chewable tablet 81 mg  81 mg Oral Daily    fish oil capsule 1,000 mg  1,000 mg Oral Daily    FLUoxetine (PROzac) capsule 20 mg  20 mg Oral Daily    folic acid (FOLVITE) tablet 1 mg  1 mg Oral Daily    insulin lispro (HumaLOG) 100 units/mL subcutaneous injection 1-6 Units  1-6 Units Subcutaneous 4 times day    lisinopril-hydrochlorothiazide (PRINZIDE 10/12  5) combo dose   Oral Daily    multivitamin-minerals (CENTRUM) tablet 1 tablet  1 tablet Oral Daily    ondansetron (ZOFRAN) injection 4 mg  4 mg Intravenous Q6H PRN    tamsulosin (FLOMAX) capsule 0 4 mg  0 4 mg Oral HS       Allergies   Allergen Reactions    Fentanyl Other (See Comments) and Anaphylaxis     Oxygen drops severely           Objective     Blood pressure 103/61, pulse 66, temperature 98 1 °F (36 7 °C), temperature source Oral, resp   rate 18, SpO2 95 %  Intake/Output Summary (Last 24 hours) at 4/28/2021 0941  Last data filed at 4/27/2021 2255  Gross per 24 hour   Intake 1214 17 ml   Output --   Net 1214 17 ml         PHYSICAL EXAM:      General Appearance:   Alert, cooperative, no distress, appears stated age    HEENT:   Normocephalic, atraumatic, scleral icterus present, no oropharyngeal thrush present      Neck:  Supple, symmetrical, trachea midline, no adenopathy;    thyroid: no enlargement/tenderness/nodules; no carotid  bruit or JVD    Lungs:   Clear to auscultation bilaterally; no rales, rhonchi or wheezing; respirations unlabored    Heart[de-identified]   S1 and S2 normal; regular rate and rhythm; no murmur, rub, or gallop  Abdomen:   Soft, diffuse abdominal discomfort to palpation, non-distended; normal bowel sounds; no masses, no organomegaly    Genitalia:   Deferred    Rectal:   Deferred    Extremities:  No cyanosis, clubbing or edema    Pulses:  2+ and symmetric all extremities    Skin:  Skin texture, turgor normal, no rashes or lesions; jaundice present    Lymph nodes:  No palpable cervical, axillary or inguinal lymphadenopathy        Lab Results:   Results from last 7 days   Lab Units 04/28/21  0458   WBC Thousand/uL 7 09   HEMOGLOBIN g/dL 13 3   HEMATOCRIT % 40 4   PLATELETS Thousands/uL 210   NEUTROS PCT % 79*   LYMPHS PCT % 8*   MONOS PCT % 10   EOS PCT % 3     Results from last 7 days   Lab Units 04/28/21  0458   POTASSIUM mmol/L 3 4*   CHLORIDE mmol/L 107   CO2 mmol/L 23   BUN mg/dL 18   CREATININE mg/dL 0 88   CALCIUM mg/dL 8 6   ALK PHOS U/L 522*   ALT U/L 354*   AST U/L 110*     Results from last 7 days   Lab Units 04/27/21  1539   INR  1 08     Results from last 7 days   Lab Units 04/27/21  1539   LIPASE u/L 215   AMYLASE IU/L 92       Imaging Studies: I have personally reviewed pertinent imaging studies      Mri Abdomen W Wo Contrast And Mrcp    Result Date: 4/28/2021  Impression: 3 5 cm mass at the pancreatic head/uncinate process resulting in upstream dilatation of the CBD and pancreatic duct  No evidence of encasement of the portal vein or superior mesenteric artery  Findings concerning for pancreatic neoplasm such as pancreatic adenocarcinoma  Unchanged appearance of 2 6 cm atypical lesion at segment 3 of the liver consistent with atypical hemangioma or less likely low-grade neoplasm    Workstation performed: [id number]     Ct Abdomen Pelvis With Contrast    Result Date: 4/27/2021  Impression: Moderate intra and extrahepatic biliary ductal dilatation, pancreatic duct dilatation and gallbladder distention which appear secondary to a large ill-defined pancreatic head mass, pancreatic carcinoma until proven otherwise  Multiple hepatic cysts without suspicious change  Stable homogeneously enhancing left lateral segment lesion likely representing cavernous hemangioma  The study was marked in [facility] for immediate notification  Workstation performed: [id number]           Patient was seen and examined by Dr Abbe Giles  All key medical decisions were made by Dr Abbe Giles  Thank you for allowing us to participate in the care of this present patient  We will follow-up with you closely  Yes

## 2021-04-28 NOTE — ASSESSMENT & PLAN NOTE
Patient reports that over the last 3 weeks he has had a worsening fatigue, abdominal cramping discomfort, and semi-formed tan-colored diarrhea with 4-5 episodes/day  Patient reports a 20 lbs unintentional weight loss  Consider malabsorption secondary to pancreatic insufficiency vs less likely infectious diarrhea vs less likely possible mets with carcinoid syndrome      Plan  · Stool enteric bacterial panel  · C diff  · Lipase WNL, amylase pending

## 2021-04-28 NOTE — TELEPHONE ENCOUNTER
----- Message from Shilpa Briceño, [address] [place] sent at 4/27/2021  5:04 PM EDT -----  Regarding: Diabetic Eye  Contact: [phone]  04/27/21 5:05 PM    Hello, our patient Sherif Reveles has had Diabetic Eye Exam completed/performed  Please assist in updating the patient chart by pulling a previous Electronic Medical Record (EMR) document  The previous EMR is [email or web address]   The date of service is 12/2020      Thank you,  Shilpa Briceño MA  [facility]

## 2021-04-28 NOTE — PLAN OF CARE
Problem: Potential for Falls  Goal: Patient will remain free of falls  Description: INTERVENTIONS:  - Assess patient frequently for physical needs  -  Identify cognitive and physical deficits and behaviors that affect risk of falls  -  [place] fall precautions as indicated by assessment   - Educate patient/family on patient safety including physical limitations  - Instruct patient to call for assistance with activity based on assessment  - Modify environment to reduce risk of injury  - Consider OT/PT consult to assist with strengthening/mobility  Outcome: Progressing     Problem: Prexisting or High Potential for Compromised Skin Integrity  Goal: Skin integrity is maintained or improved  Description: INTERVENTIONS:  - Identify patients at risk for skin breakdown  - Assess and monitor skin integrity  - Assess and monitor nutrition and hydration status  - Monitor labs   - Assess for incontinence   - Turn and reposition patient  - Assist with mobility/ambulation  - Relieve pressure over bony prominences  - Avoid friction and shearing  - Provide appropriate hygiene as needed including keeping skin clean and dry  - Evaluate need for skin moisturizer/barrier cream  - Collaborate with interdisciplinary team   - Patient/family teaching  - Consider wound care consult   Outcome: Progressing     Problem: Nutrition/Hydration-ADULT  Goal: Nutrient/Hydration intake appropriate for improving, restoring or maintaining nutritional needs  Description: Monitor and assess patient's nutrition/hydration status for malnutrition  Collaborate with interdisciplinary team and initiate plan and interventions as ordered  Monitor patient's weight and dietary intake as ordered or per policy  Utilize nutrition screening tool and intervene as necessary  Determine patient's food preferences and provide high-protein, high-caloric foods as appropriate       INTERVENTIONS:  - Monitor oral intake, urinary output, labs, and treatment plans  - Assess nutrition and hydration status and recommend course of action  - Evaluate amount of meals eaten  - Assist patient with eating if necessary   - Allow adequate time for meals  - Recommend/ encourage appropriate diets, oral nutritional supplements, and vitamin/mineral supplements  - Order, calculate, and assess calorie counts as needed  - Recommend, monitor, and adjust tube feedings and TPN/PPN based on assessed needs  - Assess need for intravenous fluids  - Provide specific nutrition/hydration education as appropriate  - Include patient/family/caregiver in decisions related to nutrition  Outcome: Progressing

## 2021-04-28 NOTE — UTILIZATION REVIEW
Initial Clinical Review    Admission: Date/Time/Statement:   Admission Orders (From admission, onward)     Ordered        04/27/21 2057  Inpatient Admission  Once                   Orders Placed This Encounter   Procedures    Inpatient Admission     Standing Status:   Standing     Number of Occurrences:   1     Order Specific Question:   Level of Care     Answer:   Med Surg [16]     Order Specific Question:   Estimated length of stay     Answer:   More than 2 Midnights     Order Specific Question:   Certification     Answer:   I certify that inpatient services are medically necessary for this patient for a duration of greater than two midnights  See H&P and MD Progress Notes for additional information about the patient's course of treatment  ED Arrival Information     Expected Arrival Acuity Means of Arrival Escorted By Service Admission Type    4/27/2021 4/27/2021 15:11 Urgent Walk-In Self Hospitalist Urgent    Arrival Complaint    Jaundice        Chief Complaint   Patient presents with    Jaundice     pt was recently loosing weight, diarrhea, and yellowing of skin, got blood work done and told to come in d/t elevated liver enzymes  Initial Presentation: This is a 73year old male from home to ED, per PCP,  admitted inpatient due to Transaminitis/hyperbilirubinemia/Diarrhea  With differential diagnosis of pancreatic cancer vs viral hepatitis vs infectious diarrhea  History of RA Presented from PCP due to juandice, also has abdominal pain with loose stools starting about 3 weeks ago  Stool color light tan, has lost 15 to 20 lbs, urine darkening  On exam fatigued after ambulating, tachypnea with accessory muscle use  Abdominal tenderness in suprapubic and RUQ, guarding  Midline tenderness of lumbar region  Right low lumbar/sacral tenderness  Deondreice  K 3 2      Total bilirubin 7 74  Ct abdomen showed biliary ductal dilatation due to large pancreatic head mass    Surgical oncology and GI consulted  Plan includes  IVF hydration, check tumor markers  Check stool C diff and enteric bacterial panel  Date: 4/28/2021    Day 2:  Has epigastric pain  On exam mild discomfort on palpation of epigastric region  Jaundice  To continue IVF  4/28/2021 per surgical oncology - Patient has obstructive juandice and ct suspicious for pancreatic mass  Recommend MRCP, tumor markers - Ca 19-9, AFP, CEA  Consult GI and CT chest for staging  4/28/2021 per GI - patient has obstructive jaundice secondary to pancreatic mass and may be malignant  Recommend ERCP  Monitor LFTs   4/28/2021 Procedure - ERCP - Ampulla appeared to be inflamed with the opening at the inferior aspect  Deeply cannulated the common bile duct after 1 attempt using a traction sphincterotome  Used guidewire  Cannulation was not difficult  Injected contrast  Common bile duct is dilated to about 1 5 cm with about 2 cm smooth stricture distally at the ampullary area  Sphincterotomy was performed and biliary brushings were done at the strictured area    Placed 10 x 60 mm covered wall stent across the stricture with good biliary drainage    ED Triage Vitals [04/27/21 1535]   Temperature Pulse Respirations Blood Pressure SpO2   98 1 °F (36 7 °C) 56 18 105/65 99 %      Temp Source Heart Rate Source Patient Position - Orthostatic VS BP Location FiO2 (%)   Oral Monitor Sitting Left arm --      Pain Score       7          Wt Readings from Last 1 Encounters:   04/27/21 79 9 kg (176 lb 3 2 oz)     Additional Vital Signs:   04/28/21 0930  --  66  --  103/61  76  95 %  --  --   04/28/21 0457  --  57  18  123/73  90  93 %  None (Room air)  Lying   04/28/21 0014  --  48Abnormal   18  113/67  84  95 %  None (Room air)  Lying   04/27/21 2030  --  46Abnormal   --  118/69  87  97 %  --  --   04/27/21 1930  --  50Abnormal   --  124/65  88  97 %  --  --   04/27/21 1830  --  54Abnormal   --  127/64  89  96 %  None (Room air)  Lying 04/27/21 1815  --  44Abnormal   --  --  --  96 %  None (Room air)  --   04/27/21 1800  --  46Abnormal   --  130/70  92  96 %  None (Room air)         Pertinent Labs/Diagnostic Test Results:   4/27/2021 ct abdomen -  Moderate intra and extrahepatic biliary ductal dilatation, pancreatic duct dilatation and gallbladder distention which appear secondary to a large ill-defined pancreatic head mass, pancreatic carcinoma until proven otherwise    Multiple hepatic cysts without suspicious change  Stable homogeneously enhancing left lateral segment lesion likely representing cavernous hemangioma  Results from last 7 days   Lab Units 04/28/21  0458 04/27/21  1539 04/24/21  0711   WBC Thousand/uL 7 09 7 49  --    WHITE BLOOD CELL COUNT  x10E3/uL  --   --  5 1   HEMOGLOBIN g/dL 13 3 14 0  --    HEMOGLOBIN  g/dL  --   --  14 1   HEMATOCRIT % 40 4 41 7  --    HEMATOCRIT  %  --   --  41 7   PLATELETS Thousands/uL 210 257  --    PLATELETS  Z56R0/KO  --   --  239   NEUTROS ABS Thousands/µL 5 56 6 14  --    NEUTROS ABS   x10E3/uL  --   --  3 3     Results from last 7 days   Lab Units 04/28/21 0458 04/27/21  1539 04/24/21  0711   SODIUM mmol/L 142 141 142   POTASSIUM mmol/L 3 4* 3 2* 4 3   CHLORIDE mmol/L 107 104 103   CO2 mmol/L 23 27 18*   ANION GAP mmol/L 12 10  --    BUN mg/dL 18 21 25   CREATININE mg/dL 0 88 1 04 0 96   EGFR ml/min/1 73sq m 90 75  --    CALCIUM mg/dL 8 6 9 1  --      Results from last 7 days   Lab Units 04/28/21  0458 04/27/21  1539 04/24/21  0711   AST U/L 110* 132* 154*   ALT U/L 354* 404* 379*   ALK PHOS U/L 522* 580*  --    TOTAL PROTEIN g/dL 6 0* 6 9 6 4   ALBUMIN g/dL 3 0* 3 6 4 2   TOTAL BILIRUBIN mg/dL 6 90* 7 74* 3 6*     Results from last 7 days   Lab Units 04/28/21  0927   POC GLUCOSE mg/dl 93     Results from last 7 days   Lab Units 04/28/21 0458 04/27/21  1539 04/24/21  0711   GLUCOSE RANDOM mg/dL 107 158* 101*     Results from last 7 days   Lab Units 04/24/21  0711   HEMOGLOBIN A1C % 6 1* Results from last 7 days   Lab Units 04/27/21  1539   PROTIME seconds 14 1   INR  1 08   PTT seconds 27     Results from last 7 days   Lab Units 04/27/21  1747   LACTIC ACID mmol/L 0 9     Results from last 7 days   Lab Units 04/27/21  1539   LIPASE u/L 215   AMYLASE IU/L 92     Results from last 7 days   Lab Units 04/27/21  2143 04/24/21  0711   CLARITY UA  Clear  --    COLOR UA  Melinda  --    SPEC GRAV UA  1 015  --    PH UA  5 5  --    GLUCOSE UA mg/dl Negative  --    KETONES UA mg/dl Negative  --    BLOOD UA  Small*  --    PROTEIN UA mg/dl Negative  --    NITRITE UA  Negative  --    BILIRUBIN UA  Moderate*  --    UROBILINOGEN UA E U /dl 0 2  --    LEUKOCYTES UA  Negative  --    WBC UA /hpf 1-2  --    RBC UA /hpf 0-1  --    BACTERIA UA /hpf None Seen  --    EPITHELIAL CELLS WET PREP /hpf None Seen  --    CREATININE UR mg/dL  --  150 5     Results from last 7 days   Lab Units 04/27/21  1539   ACETAMINOPHEN LVL ug/mL <2*     Results from last 7 days   Lab Units 04/27/21  1750 04/27/21  1747   BLOOD CULTURE  Received in Microbiology Lab  Culture in Progress  Received in Microbiology Lab  Culture in Progress       ED Treatment:   Medication Administration from 04/27/2021 1454 to 04/28/2021 0951       Date/Time Order Dose Route Action Comments     04/27/2021 1750 ondansetron (ZOFRAN) injection 4 mg 4 mg Intravenous Given      04/27/2021 1748 sodium chloride 0 9 % bolus 1,000 mL 1,000 mL Intravenous New Bag      04/27/2021 1755 HYDROmorphone (DILAUDID) injection 0 2 mg 0 2 mg Intravenous Given      04/27/2021 1838 potassium chloride 20 mEq IVPB (premix) 20 mEq Intravenous New Bag      04/28/2021 0927 aspirin chewable tablet 81 mg 81 mg Oral Given      04/28/2021 0927 FLUoxetine (PROzac) capsule 20 mg 20 mg Oral Given      04/28/2021 0007 tamsulosin (FLOMAX) capsule 0 4 mg 0 4 mg Oral Given      04/27/2021 2349 Gadobutrol injection (SINGLE-DOSE) SOLN 8 mL 8 mL Intravenous Given         Past Medical History: Diagnosis Date    Arthritis     Cellulitis     LAST ASSESSED: 6/13/14    Enlarged prostate     Epidermal inclusion cyst     LAST ASSESSED: 10/4/13    Erythrasma     LAST ASSESSED: 9/23/13    Furuncle     LAST ASSESSED: 6/11/14    Hyperlipidemia     Hypertension      Present on Admission:   Rheumatoid arthritis ([facility] [address] )   Hyperlipidemia   Type 2 diabetes mellitus without complication, without long-term current use of insulin ([facility])      Admitting Diagnosis: Jaundice [R17]  Age/Sex: 73 y o  male  Admission Orders:  4/27/2021 2057 inpatient   Scheduled Medications:  aspirin, 81 mg, Oral, Daily  fish oil, 1,000 mg, Oral, Daily  FLUoxetine, 20 mg, Oral, Daily  folic acid, 1 mg, Oral, Daily  insulin lispro, 1-6 Units, Subcutaneous, 4 times day  lisinopril-hydrochlorothiazide (PRINZIDE 10/12  5) combo dose, , Oral, Daily  multivitamin-minerals, 1 tablet, Oral, Daily  tamsulosin, 0 4 mg, Oral, HS    Continuous IV Infusions: none     PRN Meds: not used   acetaminophen, 650 mg, Oral, Q6H PRN  ondansetron, 4 mg, Intravenous, Q6H PRN    IP CONSULT TO SURGICAL ONCOLOGY  IP CONSULT TO GASTROENTEROLOGY    Network Utilization Review Department  ATTENTION: Please call with any questions or concerns to [phone] and carefully listen to the prompts so that you are directed to the right person  All voicemails are confidential   [facility] all requests for admission clinical reviews, approved or denied determinations and any other requests to dedicated fax number below belonging to the campus where the patient is receiving treatment   List of dedicated fax numbers for the Facilities:  [address] DENIALS (Administrative/Medical Necessity) [phone]   [address] (Maternity/NICU/Pediatrics) [phone]   [address] [facility] [address] [place] [address] [address] [place] [address] [phone]

## 2021-04-28 NOTE — ASSESSMENT & PLAN NOTE
Patient referred to ED by PCP for further evaluation of abnormal lab work and jaundice  Patient w/ hx of RA on methotrexate and Joe Bodily with notable new onset transaminitis noted on labs on 4/16/21, on repeat lab work on 4/24/21 worsening transaminitis  Consider pancreatic cancer vs viral hepatitis vs less likely infectious diarrhea      Plan  · IVF for hydration  · NPO at midnight for MRCP in am  · CEA, AFP tumor marker, CA 19-9  · Lipase wnl, amylase pending

## 2021-04-28 NOTE — LETTER
Diabetic Eye Exam Form    Date Requested: 21  Patient: Jamila Gray  Patient : [id number]   Referring Provider: Dasia Farrar MD    Dilated Retinal Exam, Optomap-Iris Exam, or Fundus Photography Done         Yes ([place] one above)         No     Date of Diabetic Eye Exam ______________________________  Left Eye      Exam did show retinopathy    Exam did not show retinopathy         Mild       Moderate       None       Proliferative       Severe     Right Eye     Exam did show retinopathy    Exam did not show retinopathy         Mild       Moderate       None       Proliferative       Severe     Comments __________________________________________________________    Practice Providing Exam ______________________________________________    Exam Performed By (print name) _______________________________________      Provider Signature ___________________________________________________      These reports are needed for  compliance    Please fax this completed form and a copy of the Diabetic Eye Exam report to our office located at [place] [address] as soon as possible to [phone] stephania Tyson: Phone [phone]    We thank you for your assistance in treating our mutual patient

## 2021-04-28 NOTE — ASSESSMENT & PLAN NOTE
POA: Elevated bilirubin to 7 7, likely secondary to underlying obstruction from pancreatic mass noted on CT imaging  Jaundice and scleral icterus present on exam     CT abdomen pelvis with contrast-showing a mass within the pancreatic head measuring 4 2 x 2 9 cm with dilatation/obstruction of the common bile duct and pancreatic duct  Abnormal distended gallbladder caliber and borderline wall thickening   Multiple scattered cysts seen throughout the liver without suspicious enhancing component and without suspicious change compared to the prior    Plan  · Inpatient Consult to GI-per discussion with ED attending requesting MRCP in the am for further evaluation  · Inpatient Consult to Surgical Oncology  · May consider consult with Medical Oncology

## 2021-04-28 NOTE — ANESTHESIA PREPROCEDURE EVALUATION
Procedure:  ERCP  OBSTRUCTIVE JUANDICE SEC  PANCREATIC MASS  Relevant Problems   CARDIO   (+) Hyperlipidemia   (+) Hypertension      ENDO   (+) Type 2 diabetes mellitus without complication, without long-term current use of insulin (HCC)      GI/HEPATIC   (+) Esophageal reflux      MUSCULOSKELETAL   (+) Rheumatoid arthritis (HCC)      NEURO/PSYCH   (+) Anxiety disorder        Physical Exam    Airway    Mallampati score: II  TM Distance: >3 FB  Neck ROM: full     Dental       Cardiovascular      Pulmonary      Other Findings        Anesthesia Plan  ASA Score- 3     Anesthesia Type- general with ASA Monitors  Additional Monitors:   Airway Plan: ETT  Comment: STOPPED BREATHING IN ED AFTER IV FENTANYL FOR L FOREARM  ADVISED NOT A TRUE ALLERGY  Gerhardt Sep Plan Factors-Exercise tolerance (METS): >4 METS  Chart reviewed  EKG reviewed  Imaging results reviewed  Existing labs reviewed  Patient summary reviewed  Patient is not a current smoker  Patient not instructed to abstain from smoking on day of procedure  Patient did not smoke on day of surgery  Induction- intravenous  Postoperative Plan-     Informed Consent- Anesthetic plan and risks discussed with patient and spouse  I personally reviewed this patient with the CRNA  Discussed and agreed on the Anesthesia Plan with the CRNA             Lab Results   Component Value Date    GLUC 107 04/28/2021    GLUF 100 (H) 07/08/2018     (H) 04/28/2021     (H) 04/28/2021    BUN 18 04/28/2021    CALCIUM 8 6 04/28/2021     04/28/2021    CHOL 190 01/03/2017    CO2 23 04/28/2021    CREATININE 0 88 04/28/2021    HDL 65 04/24/2021    INR 1 08 04/27/2021    HCT 40 4 04/28/2021    HGB 13 3 04/28/2021    PROT 5 8 (L) 09/01/2017    HGBA1C 6 1 (H) 04/24/2021    MG 2 1 10/14/2017     04/28/2021    K 3 4 (L) 04/28/2021    PSA 1 6 11/28/2020     09/01/2017    TRIG 67 04/24/2021    WBC 7 09 04/28/2021

## 2021-04-28 NOTE — TELEPHONE ENCOUNTER
Upon review of the In Basket request and the patient's chart, initial outreach has been made via telephone call, please see Contacts section for details       Thank you  Renetta Carpenter

## 2021-04-29 ENCOUNTER — APPOINTMENT (INPATIENT)
Dept: CT IMAGING | Facility: HOSPITAL | Age: 66
DRG: 435 | End: 2021-04-29
Payer: COMMERCIAL

## 2021-04-29 ENCOUNTER — TELEPHONE (OUTPATIENT)
Dept: CT IMAGING | Facility: HOSPITAL | Age: 66
End: 2021-04-29

## 2021-04-29 VITALS
HEART RATE: 57 BPM | RESPIRATION RATE: 16 BRPM | TEMPERATURE: 98.3 F | DIASTOLIC BLOOD PRESSURE: 70 MMHG | OXYGEN SATURATION: 95 % | SYSTOLIC BLOOD PRESSURE: 119 MMHG

## 2021-04-29 DIAGNOSIS — K86.89 PANCREATIC MASS: ICD-10-CM

## 2021-04-29 DIAGNOSIS — E80.6 HYPERBILIRUBINEMIA: Primary | ICD-10-CM

## 2021-04-29 PROBLEM — K83.1 OBSTRUCTIVE JAUNDICE DUE TO MALIGNANT NEOPLASM (HCC): Status: ACTIVE | Noted: 2021-04-29

## 2021-04-29 PROBLEM — E87.6 HYPOKALEMIA: Status: RESOLVED | Noted: 2021-04-27 | Resolved: 2021-04-29

## 2021-04-29 PROBLEM — C80.1 OBSTRUCTIVE JAUNDICE DUE TO MALIGNANT NEOPLASM (HCC): Status: ACTIVE | Noted: 2021-04-29

## 2021-04-29 LAB
ALBUMIN SERPL BCP-MCNC: 2.6 G/DL (ref 3.5–5)
ALP SERPL-CCNC: 450 U/L (ref 46–116)
ALT SERPL W P-5'-P-CCNC: 264 U/L (ref 12–78)
ANION GAP SERPL CALCULATED.3IONS-SCNC: 8 MMOL/L (ref 4–13)
AST SERPL W P-5'-P-CCNC: 54 U/L (ref 5–45)
BASOPHILS # BLD AUTO: 0.01 THOUSANDS/ΜL (ref 0–0.1)
BASOPHILS NFR BLD AUTO: 0 % (ref 0–1)
BILIRUB DIRECT SERPL-MCNC: 1.75 MG/DL (ref 0–0.2)
BILIRUB SERPL-MCNC: 2.42 MG/DL (ref 0.2–1)
BUN SERPL-MCNC: 16 MG/DL (ref 5–25)
CALCIUM ALBUM COR SERPL-MCNC: 8.9 MG/DL (ref 8.3–10.1)
CALCIUM SERPL-MCNC: 7.8 MG/DL (ref 8.3–10.1)
CANCER AG19-9 SERPL-ACNC: 487 U/ML (ref 0–35)
CEA SERPL-MCNC: 0.9 NG/ML (ref 0–3)
CHLORIDE SERPL-SCNC: 106 MMOL/L (ref 100–108)
CO2 SERPL-SCNC: 26 MMOL/L (ref 21–32)
CREAT SERPL-MCNC: 0.78 MG/DL (ref 0.6–1.3)
EOSINOPHIL # BLD AUTO: 0.08 THOUSAND/ΜL (ref 0–0.61)
EOSINOPHIL NFR BLD AUTO: 1 % (ref 0–6)
ERYTHROCYTE [DISTWIDTH] IN BLOOD BY AUTOMATED COUNT: 15.5 % (ref 11.6–15.1)
GFR SERPL CREATININE-BSD FRML MDRD: 95 ML/MIN/1.73SQ M
GLUCOSE SERPL-MCNC: 104 MG/DL (ref 65–140)
GLUCOSE SERPL-MCNC: 107 MG/DL (ref 65–140)
GLUCOSE SERPL-MCNC: 123 MG/DL (ref 65–140)
GLUCOSE SERPL-MCNC: 213 MG/DL (ref 65–140)
HCT VFR BLD AUTO: 38.8 % (ref 36.5–49.3)
HGB BLD-MCNC: 12.7 G/DL (ref 12–17)
IMM GRANULOCYTES # BLD AUTO: 0.03 THOUSAND/UL (ref 0–0.2)
IMM GRANULOCYTES NFR BLD AUTO: 1 % (ref 0–2)
LYMPHOCYTES # BLD AUTO: 0.64 THOUSANDS/ΜL (ref 0.6–4.47)
LYMPHOCYTES NFR BLD AUTO: 10 % (ref 14–44)
MCH RBC QN AUTO: 30 PG (ref 26.8–34.3)
MCHC RBC AUTO-ENTMCNC: 32.7 G/DL (ref 31.4–37.4)
MCV RBC AUTO: 92 FL (ref 82–98)
MONOCYTES # BLD AUTO: 0.55 THOUSAND/ΜL (ref 0.17–1.22)
MONOCYTES NFR BLD AUTO: 8 % (ref 4–12)
NEUTROPHILS # BLD AUTO: 5.33 THOUSANDS/ΜL (ref 1.85–7.62)
NEUTS SEG NFR BLD AUTO: 80 % (ref 43–75)
NRBC BLD AUTO-RTO: 0 /100 WBCS
PLATELET # BLD AUTO: 201 THOUSANDS/UL (ref 149–390)
PMV BLD AUTO: 13.5 FL (ref 8.9–12.7)
POTASSIUM SERPL-SCNC: 3.7 MMOL/L (ref 3.5–5.3)
PROT SERPL-MCNC: 5.5 G/DL (ref 6.4–8.2)
RBC # BLD AUTO: 4.23 MILLION/UL (ref 3.88–5.62)
SODIUM SERPL-SCNC: 140 MMOL/L (ref 136–145)
WBC # BLD AUTO: 6.64 THOUSAND/UL (ref 4.31–10.16)

## 2021-04-29 PROCEDURE — G1004 CDSM NDSC: HCPCS

## 2021-04-29 PROCEDURE — 99232 SBSQ HOSP IP/OBS MODERATE 35: CPT | Performed by: INTERNAL MEDICINE

## 2021-04-29 PROCEDURE — 71250 CT THORAX DX C-: CPT

## 2021-04-29 PROCEDURE — NC001 PR NO CHARGE: Performed by: STUDENT IN AN ORGANIZED HEALTH CARE EDUCATION/TRAINING PROGRAM

## 2021-04-29 PROCEDURE — 82948 REAGENT STRIP/BLOOD GLUCOSE: CPT

## 2021-04-29 PROCEDURE — 82378 CARCINOEMBRYONIC ANTIGEN: CPT | Performed by: PHYSICIAN ASSISTANT

## 2021-04-29 PROCEDURE — 99239 HOSP IP/OBS DSCHRG MGMT >30: CPT | Performed by: INTERNAL MEDICINE

## 2021-04-29 PROCEDURE — 82248 BILIRUBIN DIRECT: CPT | Performed by: INTERNAL MEDICINE

## 2021-04-29 PROCEDURE — 85025 COMPLETE CBC W/AUTO DIFF WBC: CPT | Performed by: INTERNAL MEDICINE

## 2021-04-29 PROCEDURE — 80053 COMPREHEN METABOLIC PANEL: CPT | Performed by: INTERNAL MEDICINE

## 2021-04-29 RX ADMIN — ASPIRIN 81 MG: 81 TABLET, CHEWABLE ORAL at 09:10

## 2021-04-29 RX ADMIN — PANCRELIPASE 24000 UNITS: 24000; 76000; 120000 CAPSULE, DELAYED RELEASE PELLETS ORAL at 13:27

## 2021-04-29 RX ADMIN — PANCRELIPASE 24000 UNITS: 24000; 76000; 120000 CAPSULE, DELAYED RELEASE PELLETS ORAL at 09:10

## 2021-04-29 RX ADMIN — FOLIC ACID 1 MG: 1 TABLET ORAL at 09:10

## 2021-04-29 RX ADMIN — OMEGA-3 FATTY ACIDS CAP 1000 MG 1000 MG: 1000 CAP at 09:10

## 2021-04-29 RX ADMIN — SODIUM CHLORIDE, SODIUM LACTATE, POTASSIUM CHLORIDE, AND CALCIUM CHLORIDE 200 ML/HR: .6; .31; .03; .02 INJECTION, SOLUTION INTRAVENOUS at 04:02

## 2021-04-29 RX ADMIN — Medication 1 TABLET: at 09:10

## 2021-04-29 RX ADMIN — PANCRELIPASE 24000 UNITS: 24000; 76000; 120000 CAPSULE, DELAYED RELEASE PELLETS ORAL at 16:51

## 2021-04-29 RX ADMIN — HYDROCHLOROTHIAZIDE: 12.5 TABLET ORAL at 09:10

## 2021-04-29 RX ADMIN — FLUOXETINE 20 MG: 20 CAPSULE ORAL at 09:10

## 2021-04-29 RX ADMIN — INSULIN LISPRO 2 UNITS: 100 INJECTION, SOLUTION INTRAVENOUS; SUBCUTANEOUS at 13:29

## 2021-04-29 NOTE — PROGRESS NOTES
Progress Note - Sahra Martins 73 y o  male MRN: [id number]    Unit/Bed#: S -16 Encounter: [id number]    Assessment and Plan:   Principal Problem:    Transaminitis  Active Problems:    Rheumatoid arthritis ([address] )    Hyperlipidemia    Type 2 diabetes mellitus without complication, without long-term current use of insulin (HCC)    Hyperbilirubinemia    Hypokalemia    Diarrhea    #1  Pancreatic head mass with CBD obstruction:  Highly suspicious for pancreatic malignancy  Status post ERCP yesterday with sphincterotomy, metal stent placement, and brushings of the bile duct  LFTs have significantly improved today  Patient reports that his abdominal pain has also improved  -advance to low-fat diet  -decrease fluids to 100 cc/hour  Discussed that if brushings are negative, patient will need endoscopic ultrasound for further evaluation and biopsies which can be done as an outpatient   -no contraindication to resuming Cesar Caldera from a GI standpoint  Suspect elevated LFTs were all related to biliary obstruction  -patient can be discharged from a GI standpoint if he tolerates a low-fat diet  Discussed plan with Surgical Oncology and slim    ----------------------------------------------------------------------------------------------------------------    Subjective:     Patient reports abdominal pain has improved  His appetite has improved and he is anxious to try something to eat  He is tolerating clear liquids  He is passing some gas  Denies any nausea or vomiting    Objective:     Vitals: Blood pressure 134/78, pulse 57, temperature 97 6 °F (36 4 °C), temperature source Oral, resp  rate 18, SpO2 95 %  ,There is no height or weight on file to calculate BMI        Intake/Output Summary (Last 24 hours) at 4/29/2021 1054  Last data filed at 4/29/2021 0932  Gross per 24 hour   Intake 1783 33 ml   Output 1140 ml   Net 643 33 ml       Physical Exam:     General Appearance: Alert, appears stated age and cooperative; jaundice and scleral icterus present  Lungs: Clear to auscultation bilaterally, no rales or rhonchi, no labored breathing/accessory muscle use  Heart: Regular rate and rhythm, S1, S2 normal, no murmur, click, rub or gallop  Abdomen: Soft, mild lower abdominal discomfort to palpation, non-distended; bowel sounds normal; no masses or no organomegaly  Extremities: No cyanosis, clubbing, or edema    Invasive Devices     Peripheral Intravenous Line            Peripheral IV 04/27/21 Right Antecubital 1 day          Drain            Open Drain Right 1204 days                Lab Results:  Results from last 7 days   Lab Units 04/29/21  0601   WBC Thousand/uL 6 64   HEMOGLOBIN g/dL 12 7   HEMATOCRIT % 38 8   PLATELETS Thousands/uL 201   NEUTROS PCT % 80*   LYMPHS PCT % 10*   MONOS PCT % 8   EOS PCT % 1     Results from last 7 days   Lab Units 04/29/21  0601   POTASSIUM mmol/L 3 7   CHLORIDE mmol/L 106   CO2 mmol/L 26   BUN mg/dL 16   CREATININE mg/dL 0 78   CALCIUM mg/dL 7 8*   ALK PHOS U/L 450*   ALT U/L 264*   AST U/L 54*     Invalid input(s): BILI  Results from last 7 days   Lab Units 04/27/21  1539   INR  1 08     Results from last 7 days   Lab Units 04/27/21  1539   LIPASE u/L 215   AMYLASE IU/L 92       Imaging Studies: I have personally reviewed pertinent imaging studies  Mri Abdomen W Wo Contrast And Mrcp    Result Date: 4/28/2021  Impression: 3 5 cm mass at the pancreatic head/uncinate process resulting in upstream dilatation of the CBD and pancreatic duct  No evidence of encasement of the portal vein or superior mesenteric artery  Findings concerning for pancreatic neoplasm such as pancreatic adenocarcinoma  Unchanged appearance of 2 6 cm atypical lesion at segment 3 of the liver consistent with atypical hemangioma or less likely low-grade neoplasm    Workstation performed: [id number]     Fl Ercp Biliary Only    Result Date: 4/28/2021  Impression: Distal common bile duct narrowing likely due to known pancreatic mass status post enterotomy, brush biopsy, and stenting  Workstation performed: [id number]     Ct Abdomen Pelvis With Contrast    Result Date: 4/27/2021  Impression: Moderate intra and extrahepatic biliary ductal dilatation, pancreatic duct dilatation and gallbladder distention which appear secondary to a large ill-defined pancreatic head mass, pancreatic carcinoma until proven otherwise  Multiple hepatic cysts without suspicious change  Stable homogeneously enhancing left lateral segment lesion likely representing cavernous hemangioma  The study was marked in [facility] for immediate notification   Workstation performed: [id number]

## 2021-04-29 NOTE — UTILIZATION REVIEW
Continued Stay Review    Date: 4/29/21                       Current Patient Class: Inpatient Current Level of Care: Med Surg    HPI:66 y o  male initially admitted on 4/27/21 with transaminitis, hyperbilirubinemia and diarrhea  Surgical Oncology and Gastroenterology on consult  Per Gastroenterology, patient has obstructive jaundice suspicious for pancreatic mass, recommended ERCP which was performed on 4/28 4/29 Gastroenterology: Pancreatic head mass with CBD obstruction:  Highly suspicious for pancreatic malignancy  Status post ERCP yesterday with sphincterotomy, metal stent placement, and brushings of the bile duct  LFTs have significantly improved today  Patient reports that his abdominal pain has also improved  Advance to low-fat diet, decrease fluids to 100 cc/hour  Physical exam: jaundice and scleral icterus, mild low abdominal discomfort to palpation  Pertinent Labs/Diagnostic Results:           Lab Units 04/29/21  0601 04/28/21  0458 04/27/21  1539   WBC Thousand/uL 6 64 7 09 7 49   WHITE BLOOD CELL COUNT  x10E3/uL  --   --   --    HEMOGLOBIN g/dL 12 7 13 3 14 0   HEMOGLOBIN  g/dL  --   --   --    HEMATOCRIT % 38 8 40 4 41 7   HEMATOCRIT  %  --   --   --    PLATELETS Thousands/uL 201 210 257   PLATELETS  M58J8/GO  --   --   --    NEUTROS ABS Thousands/µL 5 33 5 56 6 14   NEUTROS ABS   x10E3/uL  --   --   --          Lab Units 04/29/21  0601 04/28/21  0458 04/27/21  1539   SODIUM mmol/L 140 142 141   POTASSIUM mmol/L 3 7 3 4* 3 2*   CHLORIDE mmol/L 106 107 104   CO2 mmol/L 26 23 27   ANION GAP mmol/L 8 12 10   BUN mg/dL 16 18 21   CREATININE mg/dL 0 78 0 88 1 04   EGFR ml/min/1 73sq m 95 90 75   CALCIUM mg/dL 7 8* 8 6 9 1     Lab Units 04/29/21  0601 04/28/21  0458 04/27/21  1539   AST U/L 54* 110* 132*   ALT U/L 264* 354* 404*   ALK PHOS U/L 450* 522* 580*   TOTAL PROTEIN g/dL 5 5* 6 0* 6 9   ALBUMIN g/dL 2 6* 3 0* 3 6   TOTAL BILIRUBIN mg/dL 2 42* 6 90* 7 74*   BILIRUBIN DIRECT mg/dL 1 75*  --   --      Results from last 7 days   Lab Units 04/29/21  1156 04/29/21  0810 04/28/21  1431 04/28/21  0927   POC GLUCOSE mg/dl 213* 107 94 93     Lab Units 04/29/21  0601 04/28/21  0458 04/27/21  1539   GLUCOSE RANDOM mg/dL 123 107 158*             Results from last 7 days   Lab Units 04/27/21  1539   PROTIME seconds 14 1   INR  1 08   PTT seconds 27             Results from last 7 days   Lab Units 04/27/21  1747   LACTIC ACID mmol/L 0 9         Results from last 7 days   Lab Units 04/27/21  1539   LIPASE u/L 215   AMYLASE IU/L 92         Results from last 7 days   Lab Units 04/29/21  0929   CEA ng/mL 0 9     Lab Units 04/27/21  2143   CLARITY UA  Clear   COLOR UA  Melinda   SPEC GRAV UA  1 015   PH UA  5 5   GLUCOSE UA mg/dl Negative   KETONES UA mg/dl Negative   BLOOD UA  Small*   PROTEIN UA mg/dl Negative   NITRITE UA  Negative   BILIRUBIN UA  Moderate*   UROBILINOGEN UA E U /dl 0 2   LEUKOCYTES UA  Negative   WBC UA /hpf 1-2   RBC UA /hpf 0-1   BACTERIA UA /hpf None Seen   EPITHELIAL CELLS WET PREP /hpf None Seen   CREATININE UR mg/dL  --                  Results from last 7 days   Lab Units 04/27/21  1539   ACETAMINOPHEN LVL ug/mL <2*                 Results from last 7 days   Lab Units 04/27/21  1750 04/27/21  1747   BLOOD CULTURE  No Growth at 24 hrs  No Growth at 24 hrs  Medications:   Scheduled Medications:      aspirin, 81 mg, Oral, Daily  fish oil, 1,000 mg, Oral, Daily  FLUoxetine, 20 mg, Oral, Daily  folic acid, 1 mg, Oral, Daily  indomethacin, 100 mg, Rectal, Once  insulin lispro, 1-6 Units, Subcutaneous, 4 times day  lisinopril-hydrochlorothiazide (PRINZIDE 10/12  5) combo dose, , Oral, Daily  multivitamin-minerals, 1 tablet, Oral, Daily  pancrelipase (Lip-Prot-Amyl), 24,000 Units, Oral, TID With Meals  tamsulosin, 0 4 mg, Oral, HS      Continuous IV Infusions:      lactated ringers, 100 mL/hr, Intravenous, Continuous      PRN Meds:      acetaminophen, 650 mg, Oral, Q6H PRN  ondansetron, 4 mg, Intravenous, Q6H PRN  ondansetron, 4 mg, Intravenous, Once PRN        Discharge Plan: TBD        Network Utilization Review Department  ATTENTION: Please call with any questions or concerns to [phone] and carefully listen to the prompts so that you are directed to the right person  All voicemails are confidential   Mandyriaisha Skinner all requests for admission clinical reviews, approved or denied determinations and any other requests to dedicated fax number below belonging to the campus where the patient is receiving treatment   List of dedicated fax numbers for the Facilities:  [address] DENIALS (Administrative/Medical Necessity) [phone]   [address] (Maternity/NICU/Pediatrics) [phone]   [address] [place] [address] [address] [place] [phone] [place] [address] [address] [facility] [place] [address] [phone]

## 2021-04-29 NOTE — PLAN OF CARE
Problem: Potential for Falls  Goal: Patient will remain free of falls  Description: INTERVENTIONS:  - Assess patient frequently for physical needs  -  Identify cognitive and physical deficits and behaviors that affect risk of falls  -  [place] fall precautions as indicated by assessment   - Educate patient/family on patient safety including physical limitations  - Instruct patient to call for assistance with activity based on assessment  - Modify environment to reduce risk of injury  - Consider OT/PT consult to assist with strengthening/mobility  Outcome: Progressing     Problem: Prexisting or High Potential for Compromised Skin Integrity  Goal: Skin integrity is maintained or improved  Description: INTERVENTIONS:  - Identify patients at risk for skin breakdown  - Assess and monitor skin integrity  - Assess and monitor nutrition and hydration status  - Monitor labs   - Assess for incontinence   - Turn and reposition patient  - Assist with mobility/ambulation  - Relieve pressure over bony prominences  - Avoid friction and shearing  - Provide appropriate hygiene as needed including keeping skin clean and dry  - Evaluate need for skin moisturizer/barrier cream  - Collaborate with interdisciplinary team   - Patient/family teaching  - Consider wound care consult   Outcome: Progressing     Problem: Nutrition/Hydration-ADULT  Goal: Nutrient/Hydration intake appropriate for improving, restoring or maintaining nutritional needs  Description: Monitor and assess patient's nutrition/hydration status for malnutrition  Collaborate with interdisciplinary team and initiate plan and interventions as ordered  Monitor patient's weight and dietary intake as ordered or per policy  Utilize nutrition screening tool and intervene as necessary  Determine patient's food preferences and provide high-protein, high-caloric foods as appropriate       INTERVENTIONS:  - Monitor oral intake, urinary output, labs, and treatment plans  - Assess nutrition and hydration status and recommend course of action  - Evaluate amount of meals eaten  - Assist patient with eating if necessary   - Allow adequate time for meals  - Recommend/ encourage appropriate diets, oral nutritional supplements, and vitamin/mineral supplements  - Order, calculate, and assess calorie counts as needed  - Recommend, monitor, and adjust tube feedings and TPN/PPN based on assessed needs  - Assess need for intravenous fluids  - Provide specific nutrition/hydration education as appropriate  - Include patient/family/caregiver in decisions related to nutrition  Outcome: Progressing     Problem: PAIN - ADULT  Goal: Verbalizes/displays adequate comfort level or baseline comfort level  Description: Interventions:  - Encourage patient to monitor pain and request assistance  - Assess pain using appropriate pain scale  - Administer analgesics based on type and severity of pain and evaluate response  - Implement non-pharmacological measures as appropriate and evaluate response  - Consider cultural and social influences on pain and pain management  - Notify physician/advanced practitioner if interventions unsuccessful or patient reports new pain  Outcome: Progressing     Problem: INFECTION - ADULT  Goal: Absence or prevention of progression during hospitalization  Description: INTERVENTIONS:  - Assess and monitor for signs and symptoms of infection  - Monitor lab/diagnostic results  - Monitor all insertion sites, i e  indwelling lines, tubes, and drains  - Monitor endotracheal if appropriate and nasal secretions for changes in amount and color  - [place] appropriate cooling/warming therapies per order  - Administer medications as ordered  - Instruct and encourage patient and family to use good hand hygiene technique  - Identify and instruct in appropriate isolation precautions for identified infection/condition  Outcome: Progressing  Goal: Absence of fever/infection during neutropenic period  Description: INTERVENTIONS:  - Monitor WBC    Outcome: Progressing     Problem: DISCHARGE PLANNING  Goal: Discharge to home or other facility with appropriate resources  Description: INTERVENTIONS:  - Identify barriers to discharge w/patient and caregiver  - Arrange for needed discharge resources and transportation as appropriate  - Identify discharge learning needs (meds, wound care, etc )  - Arrange for interpretive services to assist at discharge as needed  - Refer to Case Management Department for coordinating discharge planning if the patient needs post-hospital services based on physician/advanced practitioner order or complex needs related to functional status, cognitive ability, or social support system  Outcome: Progressing     Problem: Knowledge Deficit  Goal: Patient/family/caregiver demonstrates understanding of disease process, treatment plan, medications, and discharge instructions  Description: Complete learning assessment and assess knowledge base    Interventions:  - Provide teaching at level of understanding  - Provide teaching via preferred learning methods  Outcome: Progressing     Problem: GASTROINTESTINAL - ADULT  Goal: Maintains adequate nutritional intake  Description: INTERVENTIONS:  - Monitor percentage of each meal consumed  - Identify factors contributing to decreased intake, treat as appropriate  - Assist with meals as needed  - Monitor I&O, weight, and lab values if indicated  - Obtain nutrition services referral as needed  Outcome: Progressing

## 2021-04-29 NOTE — PROGRESS NOTES
Progress Note - Surgical Oncology   Sherif Reveles 73 y o  male MRN: [id number]  Unit/Bed#: S -01 Encounter: [id number]    Assessment:  73 M with jaundice and obstructing pancreatic mass now status post ERCP, sphincterotomy, stenting    Plan:  Advance diet  Continue creon  Follow-up cytology and tumor markers  Will discuss port placement    Subjective/Objective     Subjective: No acute events overnight  Feels comfortable, hungry  Objective:    Blood pressure 121/80, pulse 60, temperature 97 6 °F (36 4 °C), temperature source Axillary, resp  rate 18, SpO2 94 %  ,There is no height or weight on file to calculate BMI        Intake/Output Summary (Last 24 hours) at 4/29/2021 0707  Last data filed at 4/29/2021 0403  Gross per 24 hour   Intake 1783 33 ml   Output 990 ml   Net 793 33 ml       Invasive Devices     Peripheral Intravenous Line            Peripheral IV 04/27/21 Right Antecubital 1 day          Drain            Open Drain Right 1203 days                Physical Exam:   General: NAD, AAOx3  Eyes: PERRL  ENT: moist mucous membranes  Neck: supple  CV: RRR +S1/S2  Chest: respirations non-labored  Abdomen: soft, NT ND  Extremities: atraumatic, no edema      Results from last 7 days   Lab Units 04/29/21  0601 04/28/21  0458 04/27/21  1539   WBC Thousand/uL 6 64 7 09 7 49   HEMOGLOBIN g/dL 12 7 13 3 14 0   HEMATOCRIT % 38 8 40 4 41 7   PLATELETS Thousands/uL 201 210 257     Results from last 7 days   Lab Units 04/28/21  0458 04/27/21  1539 04/24/21  0711   POTASSIUM mmol/L 3 4* 3 2* 4 3   CHLORIDE mmol/L 107 104 103   CO2 mmol/L 23 27 18*   BUN mg/dL 18 21 25   CREATININE mg/dL 0 88 1 04 0 96   CALCIUM mg/dL 8 6 9 1  --      Results from last 7 days   Lab Units 04/27/21  1539   INR  1 08   PTT seconds 27

## 2021-04-29 NOTE — DISCHARGE INSTR - AVS FIRST PAGE
Dear Ramone Portillo,     It was our pleasure to care for you here at [facility]  It is our hope that we were always able to exceed the expected standards for your care during your stay  You were hospitalized due to Obstructive Jaundice due to Pancreas Mass  You were cared for on the [facility] 4th floor under the service of Bennie Mata DO with the [facility] who covers for your primary care physician (PCP), Cherylene Spear, MD, while you were hospitalized  If you have any questions or concerns related to this hospitalization, you may contact us at [phone]  For follow up as well as medication refills, we recommend that you follow up with your primary care physician  A registered nurse will reach out to you by phone within a few days after your discharge to answer any additional questions that you may have after going home  However, at this time we provide for you here, the most important instructions / recommendations at discharge:     · Notable Medication Adjustments -   · Ok to resume your Jace Fridge, but continue to hold off on taking methotrexate  · Due to pancreas not functioning well from the mass, it is important that you continue to take pancreas enzyme (pancrelipase) which was started in the hospital   This will help with the diarrhea most likely as well  · If you have persistent diarrhea, you may also take imodium as needed  · Testing Required after Discharge -   · May need Endoscopic Ultrasound Biopsy depending on the results of the brush biopsies done during the ERCP procedure that you had done this admission  · Important follow up information -   · Follow up with surgical oncology - see discharge follow up section for details  Surgical oncology will go through biopsy results with you and overall plan of care    · Other Instructions -   · Notify surgical oncology or return to hospital if you notice the same obstruction symptoms again (urine getting darker, skin getting darker, stool getting lighter, worsening yellowing of the eyes, or pain in the upper abdomen that is worsening)  It is expected that stools will remain lighter over next few days till all older stool has been passed  Also contact physician or return to hospital if you have intractable nausea / vomiting  · We have given you a note for work  Hold off on work for next week  Note given separately  · Please review this entire after visit summary as additional general instructions including medication list, appointments, activity, diet, any pertinent wound care, and other additional recommendations from your care team that may be provided for you        Sincerely,     Peg Noland, DO

## 2021-04-29 NOTE — DISCHARGE SUMMARY
Discharge Summary - [facility]    Patient Information: Gabino Oreilly 73 y o  male MRN: [id number]  Unit/Bed#: S -01 Encounter: [id number]    Discharging Physician / Practitioner: Korey Pedraza DO  PCP: Randolph Hartley MD  Admission Date: 4/27/2021  Discharge Date: 04/29/21    Disposition:     Home    Reason for Admission:  Abnormal LFTs on outpatient labs    Discharge Diagnoses:     Principal Problem:    Pancreatic mass, Probable Pancreatic cancer  Active Problems:    Rheumatoid arthritis ([facility] [address] [address] )    Hyperlipidemia    Type 2 diabetes mellitus without complication, without long-term current use of insulin ([facility] [address] )    Diarrhea    Obstructive jaundice due to malignant neoplasm [facility])  Resolved Problems:    Hypokalemia      Consultations During Hospital Stay:  · Surgical Oncology  · Gastroenterology    Procedures Performed:     · ERCP on 04/28/2021 by Dr Ana Khan appeared to be inflamed with the opening at the inferior aspect  Deeply cannulated the common bile duct after 1 attempt using a traction sphincterotome  Used guidewire  Cannulation was not difficult  Injected contrast  Common bile duct is dilated to about 1 5 cm with about 2 cm smooth stricture distally at the ampullary area  Sphincterotomy was performed and biliary brushings were done at the strictured area  Placed 10 x 60 mm covered wall stent across the stricture with good biliary drainage    Significant Findings / Test Results:     · Contrast enhanced CT of the abdomen pelvis on 04/27/2021 - moderate intra and extrahepatic biliary ductal dilatation, pancreatic ductal dilatation and gallbladder distension which appeared to be secondary to a large ill-defined pancreatic head mass which is considered to be is very suspicious for pancreatic carcinoma unless proven otherwise  Multiple hepatic cysts were noted without any suspicious changes    Stable homogeneous Ching enhancing left lateral segment lesion in the liver likely representing cavernous hemangioma  · MRI/MRCP abdomen 04/28/2021 - 3 5 cm mass at the pancreatic head/uncinate process resulting in an upstream dilatation of the common bile duct and pancreatic duct  No evidence of encasement of the portal vein or superior mesenteric artery  Findings are concerning for pancreatic neoplasm such as pancreatic adenocarcinoma  Unchanged appearance of a 2 6 cm atypical lesion at segment 3 of the liver consistent with atypical hemangioma as most likely diagnosis  · Noncontrast CT chest 04/29/2021 - soft tissue lesion in the subcutaneous tissues of the right chest wall inferior to the right nipple measuring 8 x 1 4 cm for which tissue sampling may be warranted  No other evidence of any potential metastatic disease in the chest   · Blood cultures negative x2  · LFT show have bilirubin of 7 74 on admission associated with AST of 132 and ALT of 404 and alkaline phosphatase of 580  By discharge, following stent placement, the AST is down to 54, ALT is 264, alkaline phosphatase is 450, and bilirubin is 2 42 with a total bilirubin of 9 60   · Metabolic profile initially showed a creatinine 1 04 which is down to 0 78 by discharge  Initial potassium of 3 2 is normalized to 3 7 by discharge  Other electrolytes and aspects of the BMP are normal   · CBC grossly normal   · Acetaminophen level was normal   · Urinalysis showed moderate bilirubin and occasional calcium oxalate crystals but otherwise negative  · Alpha fetoprotein was 2 8, CEA was 0 9, but the CA 19-9 was elevated at 487  Incidental Findings:   · Cutaneous nipple lesion as noted above  Follow-up with PCP  Patient has known about this and states that it is chronic  · Hemangioma noted on the liver which also appears to be a chronic finding  Test Results Pending at Discharge (will require follow up):    · Results from brush biopsies taking during the ERCP on 04/20/2021     Outpatient Tests Requested:  · CMP ordered by Surgical Oncology for 51/68/9209    Complications:  None    Hospital Course: Makenna Francisco is a 73 y o  male patient who originally presented to the hospital on 4/27/2021 due to abnormal labs in the outpatient setting where upon patient was found to have abnormal LFTs  The patient had noted some vague discomfort in the epigastric region and the right upper quadrant but nothing that seems concerning at all  The patient had been recently having some diarrhea and noted his stool to be lighter in color in his urine to be darker in color  The patient had not noticed any clear jaundice of the skin or icterus of the eyes  The patient was sent into the hospital after attempts were made to hold various medications that were felt to possibly be a cause for transaminitis which did not improve the LFTs which, instead, seem to be worsening  When the patient was evaluated in the emergency department a CT scan was done which showed a pancreatic head mass consistent with pancreatic cancer as the primary diagnosis  Subsequently he had an MRCP and then an ERCP performed  During the ERCP, brush biopsies were done and results will be followed up in the outpatient setting as biopsies are anticipated to return sometime next week  In the meantime, gastroenterology placed a stent and perform sphincterotomy during the ERCP procedure  The patient was seen by Surgical Oncology who recommended a CT of the chest for staging purposes  No metastatic disease was seen in the chest itself  Most likely the patient will undergo neoadjuvant chemotherapy followed by surgical intervention and then additional rounds of chemotherapy after the surgical procedure  CA 19-9 was noted to be elevated but AFP and CEA were within normal range  We sure that the patient was doing okay in terms of the ability to take in food and liquid any did demonstrate ability to do so    The patient's diarrhea seems to be improving but he was largely NPO earlier in the hospitalization  We did add Creon pancrelipase to the patient's regimen to help with pancreatic insufficiency as a likely contributing factor to the diarrhea symptoms  Patient may also take Imodium on an as-needed basis for any persistent diarrhea  We do not feel there is any colitis or enteritis present currently  Patient is stable and in agreement with the plan for discharge today  Condition at Discharge: stable     Discharge Day Visit / Exam:     Subjective: The patient's diet was advanced at lunchtime today any tolerated the bone she will with solid foods  He had no increased abdominal pain or any nausea  No vomiting was noted  The patient denies any dizziness or lightheadedness  No chest pain, shortness breath, or palpitations  The patient did go for the CT scan of the chest this afternoon and I discussed results with him including a skin lesion that he states that he has had for a long time near the right nipple  However, no metastatic lesions were noted  The patient expresses understanding of his general plan of care going forward which will likely include chemotherapy followed by surgery and then chemotherapy again, but that for anything to start we need biopsy results to come back which we do not anticipate until next week  The patient does want to grab dinner here in the hospital but is only he does okay with that he feels very comfortable with going home today per  Vitals: Blood Pressure: 119/70 (04/29/21 1442)  Pulse: 57 (04/29/21 1442)  Temperature: 98 3 °F (36 8 °C) (04/29/21 1442)  Temp Source: Oral (04/29/21 1442)  Respirations: 16 (04/29/21 1442)  SpO2: 95 % (04/29/21 1442)  Exam:   Physical Exam  Vitals signs reviewed  Constitutional:       General: He is not in acute distress  HENT:      Mouth/Throat:      Mouth: Mucous membranes are moist       Pharynx: Oropharynx is clear  Eyes:      General: Scleral icterus (mild) present        Pupils: Pupils are equal, round, and reactive to light  Neck:      Musculoskeletal: Neck supple  No neck rigidity  Cardiovascular:      Rate and Rhythm: Normal rate and regular rhythm  Heart sounds: No murmur  No gallop  Pulmonary:      Effort: Pulmonary effort is normal       Breath sounds: No wheezing, rhonchi or rales  Abdominal:      General: Bowel sounds are normal  There is no distension  Palpations: Abdomen is soft  Tenderness: There is no abdominal tenderness (except for some mild discomfort in epigastrium / RUQ regions with deeper palpation)  Musculoskeletal:         General: No swelling or tenderness  Lymphadenopathy:      Cervical: No cervical adenopathy  Skin:     General: Skin is warm and dry  Findings: No erythema or rash  Comments: Not really jaundiced today   Neurological:      General: No focal deficit present  Mental Status: He is alert  Psychiatric:         Mood and Affect: Mood normal          Behavior: Behavior normal      Discussion with Family:  Patient only  He will update family on his own  Discharge instructions/Information to patient and family:   See after visit summary for information provided to patient and family  Provisions for Follow-Up Care:  See after visit summary for information related to follow-up care and any pertinent home health orders  Planned Readmission:  May need come back within the next 30 days for endoscopic ultrasound biopsy  Discharge Statement:  I spent 35 minutes discharging the patient  This time was spent on the day of discharge  I had direct contact with the patient on the day of discharge  Greater than 50% of the total time was spent examining patient, answering all patient questions, arranging and discussing plan of care with patient as well as directly providing post-discharge instructions  Additional time then spent on discharge activities      Discharge Medications:  See after visit summary for reconciled discharge medications provided to patient and family        ** Please Note: This note has been constructed using a voice recognition system **

## 2021-04-29 NOTE — INCIDENTAL FINDINGS
The following findings require follow up:  Radiographic finding   Finding: Soft tissue lesion in the subcutaneous tissues of the right chest wall inferior to the right nipple measuring 8 0-1 4 cm    Follow up required: surveillance may be ok if it has been there a long time, but tissue sampling may be warranted in some cases     Follow up should be done within 1 month(s)    Please notify the following clinician to assist with the follow up:   Dr Alesha Moser

## 2021-04-30 ENCOUNTER — TRANSITIONAL CARE MANAGEMENT (OUTPATIENT)
Dept: FAMILY MEDICINE CLINIC | Facility: CLINIC | Age: 66
End: 2021-04-30

## 2021-04-30 ENCOUNTER — TELEPHONE (OUTPATIENT)
Dept: HEMATOLOGY ONCOLOGY | Facility: CLINIC | Age: 66
End: 2021-04-30

## 2021-04-30 NOTE — TELEPHONE ENCOUNTER
New Patient Encounter    New Patient Intake Form   Patient Details:  Jairo Muniz  1955  [id number]    Background Information:  [address] starts by opening a telephone encounter and gathering the following information   Who is calling to schedule? If not self, relationship to patient? self   Referring Provider [facility]   What is the diagnosis? Pancreatic Mass   Is this diagnosis confirmed? Yes   When was the diagnosis? 4/29/21   Is there a confirmed diagnosis from a biopsy/tissue reviewed by pathology? Pending - 4/27   Were outside slides requested? NA   Is patient aware of diagnosis? Yes   Is there a personal history and what kind? No   Is there a family history and what kind? No   Reason for visit? New Diagnosis   Have you had any imaging or labs done? If so: when, where? yes  [facility]   Are records in [facility]? yes   If patient has a prior history of breast cancer were old records obtained? NA   Was the patient told to bring a disk? No   Does the patient smoke or Vape? No   If yes, how many packs or cartridges per day? Scheduling Information:   Preferred [place]:  [place]     Are there any dates/time the patient cannot be seen? Miscellaneous:    After completing the above information, please route to Financial Counselor and the appropriate Nurse Navigator for review

## 2021-05-02 LAB
BACTERIA BLD CULT: NORMAL
BACTERIA BLD CULT: NORMAL

## 2021-05-03 ENCOUNTER — OFFICE VISIT (OUTPATIENT)
Dept: FAMILY MEDICINE CLINIC | Facility: CLINIC | Age: 66
End: 2021-05-03
Payer: COMMERCIAL

## 2021-05-03 VITALS
BODY MASS INDEX: 25.2 KG/M2 | DIASTOLIC BLOOD PRESSURE: 78 MMHG | TEMPERATURE: 98.9 F | HEIGHT: 70 IN | SYSTOLIC BLOOD PRESSURE: 118 MMHG | WEIGHT: 176 LBS | HEART RATE: 64 BPM

## 2021-05-03 DIAGNOSIS — E11.9 TYPE 2 DIABETES MELLITUS WITHOUT COMPLICATION, WITHOUT LONG-TERM CURRENT USE OF INSULIN (HCC): ICD-10-CM

## 2021-05-03 DIAGNOSIS — C80.1 OBSTRUCTIVE JAUNDICE DUE TO MALIGNANT NEOPLASM (HCC): ICD-10-CM

## 2021-05-03 DIAGNOSIS — M05.79 RHEUMATOID ARTHRITIS INVOLVING MULTIPLE SITES WITH POSITIVE RHEUMATOID FACTOR (HCC): ICD-10-CM

## 2021-05-03 DIAGNOSIS — K86.89 PANCREATIC MASS: ICD-10-CM

## 2021-05-03 DIAGNOSIS — Z76.89 ENCOUNTER FOR SUPPORT AND COORDINATION OF TRANSITION OF CARE: Primary | ICD-10-CM

## 2021-05-03 DIAGNOSIS — I10 ESSENTIAL HYPERTENSION: ICD-10-CM

## 2021-05-03 DIAGNOSIS — K83.1 OBSTRUCTIVE JAUNDICE DUE TO MALIGNANT NEOPLASM (HCC): ICD-10-CM

## 2021-05-03 DIAGNOSIS — R10.13 EPIGASTRIC PAIN: ICD-10-CM

## 2021-05-03 PROCEDURE — 1111F DSCHRG MED/CURRENT MED MERGE: CPT | Performed by: FAMILY MEDICINE

## 2021-05-03 PROCEDURE — 99496 TRANSJ CARE MGMT HIGH F2F 7D: CPT | Performed by: FAMILY MEDICINE

## 2021-05-03 PROCEDURE — 3008F BODY MASS INDEX DOCD: CPT | Performed by: FAMILY MEDICINE

## 2021-05-03 RX ORDER — OMEPRAZOLE 20 MG/1
20 CAPSULE, DELAYED RELEASE ORAL
Qty: 30 CAPSULE | Refills: 0 | Status: SHIPPED | OUTPATIENT
Start: 2021-05-03 | End: 2021-01-01

## 2021-05-03 NOTE — PROGRESS NOTES
Assessment/Plan:    Obstructive jaundice due to malignant neoplasm [facility])  Resolved with stenting  Continue to monitor  F/u with surgical oncology    Type 2 diabetes mellitus without complication, without long-term current use of insulin (HCC)    Lab Results   Component Value Date    HGBA1C 6 1 (H) 04/24/2021     Related to pancreatic mass? BGs have been stable  Continue to monitor    Hypertension  Well controlled  Cont present treatment  Monitor labs  Recheck 6m      Rheumatoid arthritis ([facility] )  Pt off MTX presently  Monitor for now  F/u with Rheum    Pancreatic mass, Probable Pancreatic cancer  With bilary obstruction - improved s/p stenting  Brushing unfortunately was nondiagnostic  F/u with surgical oncology re: treatment plans going forward  Recheck 1m    Epigastric pain  ? Related to pancreatic process? Cannot r/o acid based issues  Will give a brief trial of omeprazole to see if it helps with pain  Recheck 2w if not improved       Diagnoses and all orders for this visit:    Encounter for support and coordination of transition of care    Pancreatic mass, Probable Pancreatic cancer    Obstructive jaundice due to malignant neoplasm (HCC)    Epigastric pain  -     omeprazole (PriLOSEC) 20 mg delayed release capsule;  Take 1 capsule (20 mg total) by mouth daily before breakfast    Type 2 diabetes mellitus without complication, without long-term current use of insulin (HCC)    Essential hypertension    Rheumatoid arthritis involving multiple sites with positive rheumatoid factor ([facility] )          Subjective:     TCM Call (since 4/3/2021)     Date and time call was made  4/30/2021 12:42 PM    Hospital care reviewed  Records reviewed    Patient was hospitialized at  [address] [place] [address]        Date of Admission  04/27/21    Date of discharge  04/29/21    Diagnosis  Pancreatic mass, Probable Pancreatic cancer    Disposition  Home    Current Symptoms  None      TCM Call (since 4/3/2021)     Post hospital issues  None Should patient be enrolled in anticoag monitoring? No    Scheduled for follow up? Yes    Did you obtain your prescribed medications  Yes    Do you need help managing your prescriptions or medications  No    Is transportation to your appointment needed  No    I have advised the patient to call PCP with any new or worsening symptoms  Aaron Holly     Living Arrangements  Spouse or Significiant other    Are you recieving any outpatient services  No    Are you recieving home care services  No    Are you using any community resources  No    Current waiver services  No    Counseling  Patient           Patient ID: Alva Igleisas is a 73 y o  male  Here for TCM visit  - 72 yo male with recent dx of DMII and hx of RA on MTX, developed increased liver enzymes in early April  MTX was held as was his statin, but enzymes continued to worsen  Pt developed some intermittent abd pain and back pain as well as lightening of stool color  Pt seen by Dr Leana Streeter on 4/27 and found to be grossly jaundiced  B/c of these findings and his progressive course, pt was sent to [facility] ED for eval  In the ED, pt again was found to have elevated transaminase levels, increased ALP and increased bilirubin  CT done which revealed a pancreatic head mass with evidence of biliary obstruction  No other masses or signs of metastases was appreciated  Pt was subsequently admitted  Surgical Oncology and GI were consulted  Pt underwent MRI of abd/MRCP later that day that that confirmed the pancreatic head mass  No encasement of the local vasculature was appreciated  Pt underwent ERCP the next day that visualized the mass  Brushing were done that revealed only atypical cells  Stent was successfully placed  By 4/29, bili had drastically decreased, and liver enzymes were improving  CT of chest did not show any pulmonary masses/issues  Pt was able to d/c'd to home on pancreatic enzymes at that point with recommendations to f/u with Surg Onc and GI   Pt here for TCM visit  - since discharge, pt has continued to have intermittent generalized abd pain  BMs have improved and his jaundice has resolved  He is eating well  Unclear if abd discomfort is related to eating  Pt has some fatigue but states that his mood is optimistic - pt denies depressed mood  He is scheduled to see surgical oncology on 5/6  - I reviewed available hospital records, lab results and study reports with pt        The following portions of the patient's history were reviewed and updated as appropriate:   He  has a past medical history of Arthritis, Cellulitis, Enlarged prostate, Epidermal inclusion cyst, Erythrasma, Furuncle, Hyperlipidemia, and Hypertension  He   Patient Active Problem List    Diagnosis Date Noted    Epigastric pain 05/04/2021    Pancreatic mass, Probable Pancreatic cancer 04/29/2021    Obstructive jaundice due to malignant neoplasm ([facility] ) 04/29/2021    Diarrhea 04/27/2021    Type 2 diabetes mellitus without complication, without long-term current use of insulin ([place] [id number] ) 03/16/2021    Multiple lipomas 08/04/2020    Urgency of urination 01/31/2018    Right arm pain 10/13/2017    Rheumatoid arthritis ([facility] ) 10/13/2017    Hypertension 10/13/2017    Liver mass 11/17/2016    Hyperlipidemia 02/03/2016    Anxiety disorder 08/15/2013    Esophageal reflux 08/15/2013     He  has a past surgical history that includes Scrotal surgery; pr removal of hydrocele,tunica,unilat (Right, 1/11/2018); and Hydrocele excision / repair (Right, 01/11/2018)  He  reports that he has quit smoking  He has never used smokeless tobacco  He reports that he does not drink alcohol or use drugs  Current Outpatient Medications   Medication Sig Dispense Refill    aspirin 81 mg chewable tablet Chew 81 mg      Blood Glucose Monitoring Suppl (ONE TOUCH ULTRA 2) w/Device KIT Use daily 1 each 1    FLUoxetine (PROzac) 20 mg capsule TAKE 1 CAPSULE BY MOUTH EVERY DAY   90 capsule 2    folic acid (FOLVITE) 1 mg tablet Take by mouth daily      glucose blood (OneTouch Ultra) test strip Use as instructed once a day 50 each 5    Lancets (onetouch ultrasoft) lancets Use as instructed once a day 50 each 5    lisinopril-hydrochlorothiazide (PRINZIDE,ZESTORETIC) 10-12 5 MG per tablet TAKE ONE TABLET BY MOUTH EVERY DAY 90 tablet 3    Multiple Vitamins-Minerals (MULTIVITAMIN MEN) TABS Take 1 tablet by mouth daily      Omega-3 Fatty Acids (FISH OIL) 1,000 mg Take 1,000 mg by mouth daily      pancrelipase, Lip-Prot-Amyl, (CREON) 24,000 units Take 1 capsule (24,000 Units total) by mouth 3 (three) times a day with meals 90 capsule 0    tamsulosin (FLOMAX) 0 4 mg TAKE 1 CAPSULE BY MOUTH  DAILY AT BEDTIME 90 capsule 3    omeprazole (PriLOSEC) 20 mg delayed release capsule Take 1 capsule (20 mg total) by mouth daily before breakfast 30 capsule 0     No current facility-administered medications for this visit  He is allergic to fentanyl       Review of Systems   Constitutional: Positive for fatigue  HENT: Negative  Eyes: Negative  Respiratory: Negative  Cardiovascular: Negative  Gastrointestinal: Positive for abdominal pain  Negative for abdominal distention, blood in stool, constipation, diarrhea, nausea and vomiting  Endocrine: Negative  Genitourinary: Negative  Musculoskeletal: Positive for arthralgias and back pain  Negative for joint swelling and myalgias  Skin: Negative  Allergic/Immunologic: Negative  Neurological: Negative  Hematological: Negative  Psychiatric/Behavioral: Negative  Objective:      /78   Pulse 64   Temp 98 9 °F (37 2 °C)   Ht 5' 10" (1 778 m)   Wt 79 8 kg (176 lb)   BMI 25 25 kg/m²          Physical Exam  Constitutional:       Appearance: He is well-developed  HENT:      Head: Normocephalic and atraumatic        Right Ear: Tympanic membrane, ear canal and external ear normal       Left Ear: Tympanic membrane, ear canal and external ear normal  Mouth/Throat:      Mouth: Mucous membranes are moist    Eyes:      General: No scleral icterus  Extraocular Movements: Extraocular movements intact  Conjunctiva/sclera: Conjunctivae normal       Pupils: Pupils are equal, round, and reactive to light  Neck:      Musculoskeletal: Normal range of motion and neck supple  No muscular tenderness  Thyroid: No thyromegaly  Vascular: No carotid bruit  Cardiovascular:      Rate and Rhythm: Normal rate and regular rhythm  Pulses: Normal pulses  Heart sounds: Normal heart sounds  No murmur  Pulmonary:      Effort: Pulmonary effort is normal       Breath sounds: Normal breath sounds  No wheezing or rales  Abdominal:      General: Bowel sounds are normal  There is no distension  Palpations: Abdomen is soft  There is no mass  Tenderness: There is abdominal tenderness (mild epigastric)  There is no right CVA tenderness, left CVA tenderness, guarding or rebound  Musculoskeletal: Normal range of motion  General: Deformity (diffuse arthritic change) present  No swelling or tenderness  Right lower leg: No edema  Left lower leg: No edema  Lymphadenopathy:      Cervical: No cervical adenopathy  Skin:     General: Skin is warm and dry  Capillary Refill: Capillary refill takes less than 2 seconds  Neurological:      Mental Status: He is alert and oriented to person, place, and time  Cranial Nerves: No cranial nerve deficit  Sensory: No sensory deficit  Motor: No weakness  Gait: Gait normal    Psychiatric:         Mood and Affect: Mood normal          Behavior: Behavior normal          Thought Content:  Thought content normal          Judgment: Judgment normal       Comments: PHQ-9 Depression Screening    PHQ-9:   Frequency of the following problems over the past two weeks:      Little interest or pleasure in doing things: 0 - not at all  Feeling down, depressed, or hopeless: 0 - not at all  PHQ-2 Score: 0

## 2021-05-04 PROBLEM — R10.13 EPIGASTRIC PAIN: Status: ACTIVE | Noted: 2021-05-04

## 2021-05-04 NOTE — ASSESSMENT & PLAN NOTE
With bilary obstruction - improved s/p stenting  Brushing unfortunately was nondiagnostic  F/u with surgical oncology re: treatment plans going forward    Recheck 1m

## 2021-05-04 NOTE — TELEPHONE ENCOUNTER
As a follow-up, a second attempt has been made for outreach via telephone call, please see Contacts section for details      Thank you  David Guajardo

## 2021-05-04 NOTE — TELEPHONE ENCOUNTER
Upon review of the In Basket request we were able to locate, review, and update the patient chart as requested for Diabetic Eye Exam     Any additional questions or concerns should be emailed to the Practice Liaisons via [email or web address] email, please do not reply via In Basket      Thank you  Slim Larsen

## 2021-05-04 NOTE — ASSESSMENT & PLAN NOTE
Lab Results   Component Value Date    HGBA1C 6 1 (H) 04/24/2021     Related to pancreatic mass? BGs have been stable   Continue to monitor

## 2021-05-05 ENCOUNTER — APPOINTMENT (EMERGENCY)
Dept: CT IMAGING | Facility: HOSPITAL | Age: 66
DRG: 436 | End: 2021-05-05
Payer: COMMERCIAL

## 2021-05-05 ENCOUNTER — HOSPITAL ENCOUNTER (INPATIENT)
Facility: HOSPITAL | Age: 66
LOS: 5 days | DRG: 436 | End: 2021-05-10
Attending: EMERGENCY MEDICINE | Admitting: INTERNAL MEDICINE
Payer: COMMERCIAL

## 2021-05-05 ENCOUNTER — APPOINTMENT (OUTPATIENT)
Dept: LAB | Facility: HOSPITAL | Age: 66
End: 2021-05-05
Payer: COMMERCIAL

## 2021-05-05 ENCOUNTER — APPOINTMENT (EMERGENCY)
Dept: RADIOLOGY | Facility: HOSPITAL | Age: 66
DRG: 436 | End: 2021-05-05
Payer: COMMERCIAL

## 2021-05-05 DIAGNOSIS — E80.6 HYPERBILIRUBINEMIA: ICD-10-CM

## 2021-05-05 DIAGNOSIS — K21.9 GASTROESOPHAGEAL REFLUX DISEASE, UNSPECIFIED WHETHER ESOPHAGITIS PRESENT: ICD-10-CM

## 2021-05-05 DIAGNOSIS — K86.89 PANCREATIC MASS: ICD-10-CM

## 2021-05-05 DIAGNOSIS — K59.00 CONSTIPATION: ICD-10-CM

## 2021-05-05 DIAGNOSIS — I10 ESSENTIAL HYPERTENSION: ICD-10-CM

## 2021-05-05 DIAGNOSIS — R10.13 EPIGASTRIC PAIN: ICD-10-CM

## 2021-05-05 DIAGNOSIS — R42 DIZZINESS: Primary | ICD-10-CM

## 2021-05-05 DIAGNOSIS — R10.9 ABDOMINAL PAIN: ICD-10-CM

## 2021-05-05 DIAGNOSIS — E11.9 TYPE 2 DIABETES MELLITUS WITHOUT COMPLICATION, WITHOUT LONG-TERM CURRENT USE OF INSULIN (HCC): ICD-10-CM

## 2021-05-05 PROBLEM — K83.1 OBSTRUCTIVE JAUNDICE DUE TO MALIGNANT NEOPLASM (HCC): Status: RESOLVED | Noted: 2021-04-29 | Resolved: 2021-05-05

## 2021-05-05 PROBLEM — C80.1 OBSTRUCTIVE JAUNDICE DUE TO MALIGNANT NEOPLASM (HCC): Status: RESOLVED | Noted: 2021-04-29 | Resolved: 2021-05-05

## 2021-05-05 PROBLEM — E87.2 LACTIC ACIDOSIS: Status: ACTIVE | Noted: 2021-05-05

## 2021-05-05 PROBLEM — E87.20 LACTIC ACIDOSIS: Status: ACTIVE | Noted: 2021-05-05

## 2021-05-05 LAB
ALBUMIN SERPL BCP-MCNC: 3.4 G/DL (ref 3.5–5)
ALBUMIN SERPL BCP-MCNC: 3.9 G/DL (ref 3.4–4.8)
ALP SERPL-CCNC: 272.9 U/L (ref 10–129)
ALP SERPL-CCNC: 348 U/L (ref 46–116)
ALT SERPL W P-5'-P-CCNC: 120 U/L (ref 12–78)
ALT SERPL W P-5'-P-CCNC: 97 U/L (ref 5–63)
ANION GAP SERPL CALCULATED.3IONS-SCNC: 8 MMOL/L (ref 4–13)
ANION GAP SERPL CALCULATED.3IONS-SCNC: 9 MMOL/L (ref 4–13)
APTT PPP: 24 SECONDS (ref 23–37)
AST SERPL W P-5'-P-CCNC: 26 U/L (ref 5–45)
AST SERPL W P-5'-P-CCNC: 34 U/L (ref 15–41)
BACTERIA UR QL AUTO: ABNORMAL /HPF
BASOPHILS # BLD AUTO: 0.02 THOUSANDS/ΜL (ref 0–0.1)
BASOPHILS # BLD AUTO: 0.02 THOUSANDS/ΜL (ref 0–0.1)
BASOPHILS NFR BLD AUTO: 0 % (ref 0–1)
BASOPHILS NFR BLD AUTO: 0 % (ref 0–1)
BILIRUB SERPL-MCNC: 1.04 MG/DL (ref 0.2–1)
BILIRUB SERPL-MCNC: 1.44 MG/DL (ref 0.3–1.2)
BILIRUB UR QL STRIP: NEGATIVE
BUN SERPL-MCNC: 17 MG/DL (ref 6–20)
BUN SERPL-MCNC: 18 MG/DL (ref 5–25)
CALCIUM ALBUM COR SERPL-MCNC: 9.1 MG/DL (ref 8.3–10.1)
CALCIUM SERPL-MCNC: 8.6 MG/DL (ref 8.3–10.1)
CALCIUM SERPL-MCNC: 9.1 MG/DL (ref 8.4–10.2)
CHLORIDE SERPL-SCNC: 102 MMOL/L (ref 100–108)
CHLORIDE SERPL-SCNC: 103 MMOL/L (ref 96–108)
CHOLEST SERPL-MCNC: 188 MG/DL
CLARITY UR: CLEAR
CO2 SERPL-SCNC: 28 MMOL/L (ref 21–32)
CO2 SERPL-SCNC: 29 MMOL/L (ref 22–33)
COLOR UR: YELLOW
CREAT SERPL-MCNC: 0.84 MG/DL (ref 0.5–1.2)
CREAT SERPL-MCNC: 1.08 MG/DL (ref 0.6–1.3)
CREAT UR-MCNC: 235 MG/DL
EOSINOPHIL # BLD AUTO: 0.09 THOUSAND/ΜL (ref 0–0.61)
EOSINOPHIL # BLD AUTO: 0.14 THOUSAND/ΜL (ref 0–0.61)
EOSINOPHIL NFR BLD AUTO: 1 % (ref 0–6)
EOSINOPHIL NFR BLD AUTO: 2 % (ref 0–6)
ERYTHROCYTE [DISTWIDTH] IN BLOOD BY AUTOMATED COUNT: 14 % (ref 11.6–15.1)
ERYTHROCYTE [DISTWIDTH] IN BLOOD BY AUTOMATED COUNT: 14.5 % (ref 11.6–15.1)
EST. AVERAGE GLUCOSE BLD GHB EST-MCNC: 131 MG/DL
GFR SERPL CREATININE-BSD FRML MDRD: 72 ML/MIN/1.73SQ M
GFR SERPL CREATININE-BSD FRML MDRD: 92 ML/MIN/1.73SQ M
GLUCOSE P FAST SERPL-MCNC: 147 MG/DL (ref 70–105)
GLUCOSE SERPL-MCNC: 235 MG/DL (ref 65–140)
GLUCOSE UR STRIP-MCNC: ABNORMAL MG/DL
HBA1C MFR BLD: 6.2 %
HCT VFR BLD AUTO: 43 % (ref 36.5–49.3)
HCT VFR BLD AUTO: 45 % (ref 36.5–49.3)
HDLC SERPL-MCNC: 36 MG/DL
HGB BLD-MCNC: 14.5 G/DL (ref 12–17)
HGB BLD-MCNC: 15.4 G/DL (ref 12–17)
HGB UR QL STRIP.AUTO: ABNORMAL
IMM GRANULOCYTES # BLD AUTO: 0.05 THOUSAND/UL (ref 0–0.2)
IMM GRANULOCYTES # BLD AUTO: 0.09 THOUSAND/UL (ref 0–0.2)
IMM GRANULOCYTES NFR BLD AUTO: 1 % (ref 0–2)
IMM GRANULOCYTES NFR BLD AUTO: 1 % (ref 0–2)
INR PPP: 1.08 (ref 0.84–1.19)
KETONES UR STRIP-MCNC: NEGATIVE MG/DL
LACTATE SERPL-SCNC: 1.7 MMOL/L (ref 0.5–2)
LACTATE SERPL-SCNC: 2.1 MMOL/L (ref 0.5–2)
LDLC SERPL CALC-MCNC: 116 MG/DL (ref 0–100)
LEUKOCYTE ESTERASE UR QL STRIP: NEGATIVE
LYMPHOCYTES # BLD AUTO: 0.83 THOUSANDS/ΜL (ref 0.6–4.47)
LYMPHOCYTES # BLD AUTO: 1.48 THOUSANDS/ΜL (ref 0.6–4.47)
LYMPHOCYTES NFR BLD AUTO: 18 % (ref 14–44)
LYMPHOCYTES NFR BLD AUTO: 8 % (ref 14–44)
MCH RBC QN AUTO: 30.7 PG (ref 26.8–34.3)
MCH RBC QN AUTO: 30.7 PG (ref 26.8–34.3)
MCHC RBC AUTO-ENTMCNC: 33.7 G/DL (ref 31.4–37.4)
MCHC RBC AUTO-ENTMCNC: 34.2 G/DL (ref 31.4–37.4)
MCV RBC AUTO: 90 FL (ref 82–98)
MCV RBC AUTO: 91 FL (ref 82–98)
MICROALBUMIN UR-MCNC: 23.9 MG/L (ref 0–20)
MICROALBUMIN/CREAT 24H UR: 10 MG/G CREATININE (ref 0–30)
MONOCYTES # BLD AUTO: 0.77 THOUSAND/ΜL (ref 0.17–1.22)
MONOCYTES # BLD AUTO: 0.86 THOUSAND/ΜL (ref 0.17–1.22)
MONOCYTES NFR BLD AUTO: 8 % (ref 4–12)
MONOCYTES NFR BLD AUTO: 9 % (ref 4–12)
NEUTROPHILS # BLD AUTO: 5.91 THOUSANDS/ΜL (ref 1.85–7.62)
NEUTROPHILS # BLD AUTO: 8.34 THOUSANDS/ΜL (ref 1.85–7.62)
NEUTS SEG NFR BLD AUTO: 70 % (ref 43–75)
NEUTS SEG NFR BLD AUTO: 82 % (ref 43–75)
NITRITE UR QL STRIP: NEGATIVE
NON-SQ EPI CELLS URNS QL MICRO: ABNORMAL /HPF
NONHDLC SERPL-MCNC: 152 MG/DL
NRBC BLD AUTO-RTO: 0 /100 WBCS
PH UR STRIP.AUTO: 8.5 [PH]
PLATELET # BLD AUTO: 238 THOUSANDS/UL (ref 149–390)
PLATELET # BLD AUTO: 245 THOUSANDS/UL (ref 149–390)
PMV BLD AUTO: 12.8 FL (ref 8.9–12.7)
PMV BLD AUTO: 13.2 FL (ref 8.9–12.7)
POTASSIUM SERPL-SCNC: 4.1 MMOL/L (ref 3.5–5)
POTASSIUM SERPL-SCNC: 4.4 MMOL/L (ref 3.5–5.3)
PROT SERPL-MCNC: 6.6 G/DL (ref 6.4–8.3)
PROT SERPL-MCNC: 6.8 G/DL (ref 6.4–8.2)
PROT UR STRIP-MCNC: NEGATIVE MG/DL
PROTHROMBIN TIME: 14.2 SECONDS (ref 11.6–14.5)
RBC # BLD AUTO: 4.73 MILLION/UL (ref 3.88–5.62)
RBC # BLD AUTO: 5.02 MILLION/UL (ref 3.88–5.62)
RBC #/AREA URNS AUTO: ABNORMAL /HPF
SODIUM SERPL-SCNC: 139 MMOL/L (ref 136–145)
SODIUM SERPL-SCNC: 140 MMOL/L (ref 133–145)
SP GR UR STRIP.AUTO: 1.01 (ref 1–1.03)
TRIGL SERPL-MCNC: 181.6 MG/DL
TROPONIN I SERPL-MCNC: <0.02 NG/ML
UROBILINOGEN UR QL STRIP.AUTO: 0.2 E.U./DL
WBC # BLD AUTO: 10.23 THOUSAND/UL (ref 4.31–10.16)
WBC # BLD AUTO: 8.37 THOUSAND/UL (ref 4.31–10.16)
WBC #/AREA URNS AUTO: ABNORMAL /HPF

## 2021-05-05 PROCEDURE — 36415 COLL VENOUS BLD VENIPUNCTURE: CPT

## 2021-05-05 PROCEDURE — 96375 TX/PRO/DX INJ NEW DRUG ADDON: CPT

## 2021-05-05 PROCEDURE — 96361 HYDRATE IV INFUSION ADD-ON: CPT

## 2021-05-05 PROCEDURE — 82570 ASSAY OF URINE CREATININE: CPT

## 2021-05-05 PROCEDURE — 71045 X-RAY EXAM CHEST 1 VIEW: CPT

## 2021-05-05 PROCEDURE — 85610 PROTHROMBIN TIME: CPT | Performed by: EMERGENCY MEDICINE

## 2021-05-05 PROCEDURE — NC001 PR NO CHARGE: Performed by: STUDENT IN AN ORGANIZED HEALTH CARE EDUCATION/TRAINING PROGRAM

## 2021-05-05 PROCEDURE — 93005 ELECTROCARDIOGRAM TRACING: CPT

## 2021-05-05 PROCEDURE — 82043 UR ALBUMIN QUANTITATIVE: CPT

## 2021-05-05 PROCEDURE — 83036 HEMOGLOBIN GLYCOSYLATED A1C: CPT

## 2021-05-05 PROCEDURE — 84145 PROCALCITONIN (PCT): CPT | Performed by: EMERGENCY MEDICINE

## 2021-05-05 PROCEDURE — 74177 CT ABD & PELVIS W/CONTRAST: CPT

## 2021-05-05 PROCEDURE — 86301 IMMUNOASSAY TUMOR CA 19-9: CPT

## 2021-05-05 PROCEDURE — 80053 COMPREHEN METABOLIC PANEL: CPT

## 2021-05-05 PROCEDURE — 81001 URINALYSIS AUTO W/SCOPE: CPT | Performed by: EMERGENCY MEDICINE

## 2021-05-05 PROCEDURE — 83605 ASSAY OF LACTIC ACID: CPT | Performed by: EMERGENCY MEDICINE

## 2021-05-05 PROCEDURE — 84484 ASSAY OF TROPONIN QUANT: CPT | Performed by: EMERGENCY MEDICINE

## 2021-05-05 PROCEDURE — 87040 BLOOD CULTURE FOR BACTERIA: CPT | Performed by: EMERGENCY MEDICINE

## 2021-05-05 PROCEDURE — 80053 COMPREHEN METABOLIC PANEL: CPT | Performed by: EMERGENCY MEDICINE

## 2021-05-05 PROCEDURE — 96360 HYDRATION IV INFUSION INIT: CPT

## 2021-05-05 PROCEDURE — G1004 CDSM NDSC: HCPCS

## 2021-05-05 PROCEDURE — 99285 EMERGENCY DEPT VISIT HI MDM: CPT

## 2021-05-05 PROCEDURE — 80061 LIPID PANEL: CPT

## 2021-05-05 PROCEDURE — 85025 COMPLETE CBC W/AUTO DIFF WBC: CPT

## 2021-05-05 PROCEDURE — 99285 EMERGENCY DEPT VISIT HI MDM: CPT | Performed by: EMERGENCY MEDICINE

## 2021-05-05 PROCEDURE — 85730 THROMBOPLASTIN TIME PARTIAL: CPT | Performed by: EMERGENCY MEDICINE

## 2021-05-05 PROCEDURE — 3061F NEG MICROALBUMINURIA REV: CPT | Performed by: FAMILY MEDICINE

## 2021-05-05 PROCEDURE — 96374 THER/PROPH/DIAG INJ IV PUSH: CPT

## 2021-05-05 PROCEDURE — 3044F HG A1C LEVEL LT 7.0%: CPT | Performed by: FAMILY MEDICINE

## 2021-05-05 PROCEDURE — 85025 COMPLETE CBC W/AUTO DIFF WBC: CPT | Performed by: EMERGENCY MEDICINE

## 2021-05-05 RX ORDER — ONDANSETRON 2 MG/ML
4 INJECTION INTRAMUSCULAR; INTRAVENOUS ONCE
Status: COMPLETED | OUTPATIENT
Start: 2021-05-05 | End: 2021-05-05

## 2021-05-05 RX ORDER — KETOROLAC TROMETHAMINE 30 MG/ML
15 INJECTION, SOLUTION INTRAMUSCULAR; INTRAVENOUS ONCE
Status: COMPLETED | OUTPATIENT
Start: 2021-05-05 | End: 2021-05-05

## 2021-05-05 RX ORDER — SODIUM CHLORIDE 9 MG/ML
3 INJECTION INTRAVENOUS
Status: DISCONTINUED | OUTPATIENT
Start: 2021-05-05 | End: 2021-05-10 | Stop reason: HOSPADM

## 2021-05-05 RX ADMIN — IOHEXOL 100 ML: 350 INJECTION, SOLUTION INTRAVENOUS at 19:45

## 2021-05-05 RX ADMIN — SODIUM CHLORIDE 1000 ML: 0.9 INJECTION, SOLUTION INTRAVENOUS at 20:59

## 2021-05-05 RX ADMIN — SODIUM CHLORIDE 1000 ML: 0.9 INJECTION, SOLUTION INTRAVENOUS at 19:12

## 2021-05-05 RX ADMIN — KETOROLAC TROMETHAMINE 15 MG: 30 INJECTION, SOLUTION INTRAMUSCULAR at 19:21

## 2021-05-05 RX ADMIN — ONDANSETRON 4 MG: 2 INJECTION INTRAMUSCULAR; INTRAVENOUS at 19:12

## 2021-05-06 ENCOUNTER — TELEPHONE (OUTPATIENT)
Dept: DIABETES SERVICES | Facility: CLINIC | Age: 66
End: 2021-05-06

## 2021-05-06 ENCOUNTER — TELEPHONE (OUTPATIENT)
Dept: HEMATOLOGY ONCOLOGY | Facility: CLINIC | Age: 66
End: 2021-05-06

## 2021-05-06 ENCOUNTER — APPOINTMENT (INPATIENT)
Dept: ULTRASOUND IMAGING | Facility: HOSPITAL | Age: 66
DRG: 436 | End: 2021-05-06
Payer: COMMERCIAL

## 2021-05-06 ENCOUNTER — DOCUMENTATION (OUTPATIENT)
Dept: HEMATOLOGY ONCOLOGY | Facility: CLINIC | Age: 66
End: 2021-05-06

## 2021-05-06 PROBLEM — N40.0 BPH (BENIGN PROSTATIC HYPERPLASIA): Status: ACTIVE | Noted: 2021-05-06

## 2021-05-06 PROBLEM — E87.20 LACTIC ACIDOSIS: Status: RESOLVED | Noted: 2021-05-05 | Resolved: 2021-05-06

## 2021-05-06 PROBLEM — E87.2 LACTIC ACIDOSIS: Status: RESOLVED | Noted: 2021-05-05 | Resolved: 2021-05-06

## 2021-05-06 LAB
ATRIAL RATE: 65 BPM
BASOPHILS # BLD AUTO: 0.03 THOUSANDS/ΜL (ref 0–0.1)
BASOPHILS NFR BLD AUTO: 0 % (ref 0–1)
CANCER AG19-9 SERPL-ACNC: 424 U/ML (ref 0–35)
EOSINOPHIL # BLD AUTO: 0.11 THOUSAND/ΜL (ref 0–0.61)
EOSINOPHIL NFR BLD AUTO: 1 % (ref 0–6)
ERYTHROCYTE [DISTWIDTH] IN BLOOD BY AUTOMATED COUNT: 14.4 % (ref 11.6–15.1)
GLUCOSE SERPL-MCNC: 109 MG/DL (ref 65–140)
GLUCOSE SERPL-MCNC: 124 MG/DL (ref 65–140)
GLUCOSE SERPL-MCNC: 138 MG/DL (ref 65–140)
GLUCOSE SERPL-MCNC: 138 MG/DL (ref 65–140)
GLUCOSE SERPL-MCNC: 153 MG/DL (ref 65–140)
HCT VFR BLD AUTO: 41.8 % (ref 36.5–49.3)
HGB BLD-MCNC: 13.6 G/DL (ref 12–17)
IMM GRANULOCYTES # BLD AUTO: 0.06 THOUSAND/UL (ref 0–0.2)
IMM GRANULOCYTES NFR BLD AUTO: 1 % (ref 0–2)
LIPASE SERPL-CCNC: 360 U/L (ref 73–393)
LYMPHOCYTES # BLD AUTO: 1.04 THOUSANDS/ΜL (ref 0.6–4.47)
LYMPHOCYTES NFR BLD AUTO: 9 % (ref 14–44)
MCH RBC QN AUTO: 29.8 PG (ref 26.8–34.3)
MCHC RBC AUTO-ENTMCNC: 32.5 G/DL (ref 31.4–37.4)
MCV RBC AUTO: 92 FL (ref 82–98)
MONOCYTES # BLD AUTO: 1.2 THOUSAND/ΜL (ref 0.17–1.22)
MONOCYTES NFR BLD AUTO: 11 % (ref 4–12)
NEUTROPHILS # BLD AUTO: 8.57 THOUSANDS/ΜL (ref 1.85–7.62)
NEUTS SEG NFR BLD AUTO: 78 % (ref 43–75)
NRBC BLD AUTO-RTO: 0 /100 WBCS
P AXIS: 54 DEGREES
PLATELET # BLD AUTO: 213 THOUSANDS/UL (ref 149–390)
PMV BLD AUTO: 12.8 FL (ref 8.9–12.7)
PR INTERVAL: 208 MS
PROCALCITONIN SERPL-MCNC: 0.12 NG/ML
PROCALCITONIN SERPL-MCNC: 0.22 NG/ML
QRS AXIS: -36 DEGREES
QRSD INTERVAL: 94 MS
QT INTERVAL: 416 MS
QTC INTERVAL: 432 MS
RBC # BLD AUTO: 4.56 MILLION/UL (ref 3.88–5.62)
T WAVE AXIS: 73 DEGREES
VENTRICULAR RATE: 65 BPM
WBC # BLD AUTO: 11.01 THOUSAND/UL (ref 4.31–10.16)

## 2021-05-06 PROCEDURE — 83690 ASSAY OF LIPASE: CPT | Performed by: PSYCHIATRY & NEUROLOGY

## 2021-05-06 PROCEDURE — 0FDG8ZX EXTRACTION OF PANCREAS, VIA NATURAL OR ARTIFICIAL OPENING ENDOSCOPIC, DIAGNOSTIC: ICD-10-PCS | Performed by: INTERNAL MEDICINE

## 2021-05-06 PROCEDURE — 82948 REAGENT STRIP/BLOOD GLUCOSE: CPT

## 2021-05-06 PROCEDURE — 84145 PROCALCITONIN (PCT): CPT | Performed by: EMERGENCY MEDICINE

## 2021-05-06 PROCEDURE — 99222 1ST HOSP IP/OBS MODERATE 55: CPT | Performed by: INTERNAL MEDICINE

## 2021-05-06 PROCEDURE — 99223 1ST HOSP IP/OBS HIGH 75: CPT | Performed by: INTERNAL MEDICINE

## 2021-05-06 PROCEDURE — 85025 COMPLETE CBC W/AUTO DIFF WBC: CPT | Performed by: INTERNAL MEDICINE

## 2021-05-06 PROCEDURE — 93010 ELECTROCARDIOGRAM REPORT: CPT | Performed by: INTERNAL MEDICINE

## 2021-05-06 PROCEDURE — 76705 ECHO EXAM OF ABDOMEN: CPT

## 2021-05-06 PROCEDURE — 99223 1ST HOSP IP/OBS HIGH 75: CPT | Performed by: STUDENT IN AN ORGANIZED HEALTH CARE EDUCATION/TRAINING PROGRAM

## 2021-05-06 RX ORDER — CHLORAL HYDRATE 500 MG
1000 CAPSULE ORAL DAILY
Status: DISCONTINUED | OUTPATIENT
Start: 2021-05-06 | End: 2021-05-10 | Stop reason: HOSPADM

## 2021-05-06 RX ORDER — PANTOPRAZOLE SODIUM 40 MG/1
40 INJECTION, POWDER, FOR SOLUTION INTRAVENOUS
Status: DISCONTINUED | OUTPATIENT
Start: 2021-05-06 | End: 2021-05-06

## 2021-05-06 RX ORDER — SODIUM CHLORIDE, SODIUM GLUCONATE, SODIUM ACETATE, POTASSIUM CHLORIDE, MAGNESIUM CHLORIDE, SODIUM PHOSPHATE, DIBASIC, AND POTASSIUM PHOSPHATE .53; .5; .37; .037; .03; .012; .00082 G/100ML; G/100ML; G/100ML; G/100ML; G/100ML; G/100ML; G/100ML
75 INJECTION, SOLUTION INTRAVENOUS CONTINUOUS
Status: DISCONTINUED | OUTPATIENT
Start: 2021-05-06 | End: 2021-05-08

## 2021-05-06 RX ORDER — TAMSULOSIN HYDROCHLORIDE 0.4 MG/1
0.4 CAPSULE ORAL
Status: DISCONTINUED | OUTPATIENT
Start: 2021-05-06 | End: 2021-05-10 | Stop reason: HOSPADM

## 2021-05-06 RX ORDER — FLUOXETINE HYDROCHLORIDE 20 MG/1
20 CAPSULE ORAL DAILY
Status: DISCONTINUED | OUTPATIENT
Start: 2021-05-06 | End: 2021-05-10 | Stop reason: HOSPADM

## 2021-05-06 RX ORDER — PANTOPRAZOLE SODIUM 40 MG/1
40 TABLET, DELAYED RELEASE ORAL
Status: DISCONTINUED | OUTPATIENT
Start: 2021-05-06 | End: 2021-05-06

## 2021-05-06 RX ORDER — KETOROLAC TROMETHAMINE 30 MG/ML
15 INJECTION, SOLUTION INTRAMUSCULAR; INTRAVENOUS EVERY 6 HOURS
Status: COMPLETED | OUTPATIENT
Start: 2021-05-06 | End: 2021-05-06

## 2021-05-06 RX ORDER — PANTOPRAZOLE SODIUM 40 MG/1
40 TABLET, DELAYED RELEASE ORAL
Status: DISCONTINUED | OUTPATIENT
Start: 2021-05-06 | End: 2021-05-09

## 2021-05-06 RX ORDER — HEPARIN SODIUM 5000 [USP'U]/ML
5000 INJECTION, SOLUTION INTRAVENOUS; SUBCUTANEOUS EVERY 8 HOURS SCHEDULED
Status: DISCONTINUED | OUTPATIENT
Start: 2021-05-06 | End: 2021-05-10 | Stop reason: HOSPADM

## 2021-05-06 RX ORDER — FOLIC ACID 1 MG/1
1 TABLET ORAL DAILY
Status: DISCONTINUED | OUTPATIENT
Start: 2021-05-06 | End: 2021-05-10 | Stop reason: HOSPADM

## 2021-05-06 RX ORDER — ASPIRIN 81 MG/1
81 TABLET, CHEWABLE ORAL DAILY
Status: DISCONTINUED | OUTPATIENT
Start: 2021-05-06 | End: 2021-05-10 | Stop reason: HOSPADM

## 2021-05-06 RX ORDER — OXYCODONE HYDROCHLORIDE 5 MG/1
2.5 TABLET ORAL EVERY 6 HOURS PRN
Status: DISCONTINUED | OUTPATIENT
Start: 2021-05-06 | End: 2021-05-08

## 2021-05-06 RX ADMIN — PANCRELIPASE 24000 UNITS: 24000; 76000; 120000 CAPSULE, DELAYED RELEASE PELLETS ORAL at 17:23

## 2021-05-06 RX ADMIN — FOLIC ACID 1 MG: 1 TABLET ORAL at 09:16

## 2021-05-06 RX ADMIN — PANTOPRAZOLE SODIUM 40 MG: 40 TABLET, DELAYED RELEASE ORAL at 06:48

## 2021-05-06 RX ADMIN — PANCRELIPASE 24000 UNITS: 24000; 76000; 120000 CAPSULE, DELAYED RELEASE PELLETS ORAL at 07:30

## 2021-05-06 RX ADMIN — HEPARIN SODIUM 5000 UNITS: 5000 INJECTION INTRAVENOUS; SUBCUTANEOUS at 13:55

## 2021-05-06 RX ADMIN — KETOROLAC TROMETHAMINE 15 MG: 30 INJECTION, SOLUTION INTRAMUSCULAR at 02:02

## 2021-05-06 RX ADMIN — HEPARIN SODIUM 5000 UNITS: 5000 INJECTION INTRAVENOUS; SUBCUTANEOUS at 21:48

## 2021-05-06 RX ADMIN — PANCRELIPASE 24000 UNITS: 24000; 76000; 120000 CAPSULE, DELAYED RELEASE PELLETS ORAL at 13:06

## 2021-05-06 RX ADMIN — FLUOXETINE 20 MG: 20 CAPSULE ORAL at 09:16

## 2021-05-06 RX ADMIN — OMEGA-3 FATTY ACIDS CAP 1000 MG 1000 MG: 1000 CAP at 09:16

## 2021-05-06 RX ADMIN — Medication 1 TABLET: at 09:16

## 2021-05-06 RX ADMIN — SODIUM CHLORIDE, SODIUM GLUCONATE, SODIUM ACETATE, POTASSIUM CHLORIDE, MAGNESIUM CHLORIDE, SODIUM PHOSPHATE, DIBASIC, AND POTASSIUM PHOSPHATE 75 ML/HR: .53; .5; .37; .037; .03; .012; .00082 INJECTION, SOLUTION INTRAVENOUS at 01:30

## 2021-05-06 RX ADMIN — OXYCODONE HYDROCHLORIDE 2.5 MG: 5 TABLET ORAL at 17:22

## 2021-05-06 RX ADMIN — ASPIRIN 81 MG: 81 TABLET, CHEWABLE ORAL at 09:16

## 2021-05-06 RX ADMIN — TAMSULOSIN HYDROCHLORIDE 0.4 MG: 0.4 CAPSULE ORAL at 21:48

## 2021-05-06 RX ADMIN — TAMSULOSIN HYDROCHLORIDE 0.4 MG: 0.4 CAPSULE ORAL at 01:43

## 2021-05-06 RX ADMIN — KETOROLAC TROMETHAMINE 15 MG: 30 INJECTION, SOLUTION INTRAMUSCULAR at 09:15

## 2021-05-06 RX ADMIN — INSULIN LISPRO 1 UNITS: 100 INJECTION, SOLUTION INTRAVENOUS; SUBCUTANEOUS at 21:49

## 2021-05-06 RX ADMIN — HEPARIN SODIUM 5000 UNITS: 5000 INJECTION INTRAVENOUS; SUBCUTANEOUS at 06:48

## 2021-05-06 RX ADMIN — SODIUM CHLORIDE, SODIUM GLUCONATE, SODIUM ACETATE, POTASSIUM CHLORIDE, MAGNESIUM CHLORIDE, SODIUM PHOSPHATE, DIBASIC, AND POTASSIUM PHOSPHATE 75 ML/HR: .53; .5; .37; .037; .03; .012; .00082 INJECTION, SOLUTION INTRAVENOUS at 13:54

## 2021-05-06 NOTE — ED PROVIDER NOTES
History  Chief Complaint   Patient presents with    Dizziness     Pt presents to the ED with severe dizziness and near syncopal episode  Pt reports worsening abd pain  Hx of pancreatic mass  45-year-old gentleman comes in for near syncope abdominal pain  Patient states he was recently diagnosed with a pancreatic mass and had a stent placed  since that time he has been having worsening playing abdominal pain and now dizziness and feels like he is going to pass out  Some nausea no vomiting no diarrhea  Pain is mostly epigastric did in the right upper quadrant  History provided by:  Patient   used: No    Dizziness  Quality:  Lightheadedness  Severity:  Severe  Onset quality:  Sudden  Duration:  2 days  Timing:  Intermittent  Progression:  Worsening  Chronicity:  New  Context: inactivity    Ineffective treatments:  Change in position and lying down  Associated symptoms: weakness    Associated symptoms: no blood in stool, no chest pain, no headaches and no vomiting    Risk factors: no anemia and no Meniere's disease        Prior to Admission Medications   Prescriptions Last Dose Informant Patient Reported? Taking? Blood Glucose Monitoring Suppl (ONE TOUCH ULTRA 2) w/Device KIT  Self No No   Sig: Use daily   FLUoxetine (PROzac) 20 mg capsule  Self No No   Sig: TAKE 1 CAPSULE BY MOUTH EVERY DAY     Lancets (onetouch ultrasoft) lancets  Self No No   Sig: Use as instructed once a day   Multiple Vitamins-Minerals (MULTIVITAMIN MEN) TABS  Self Yes No   Sig: Take 1 tablet by mouth daily   Omega-3 Fatty Acids (FISH OIL) 1,000 mg  Self Yes No   Sig: Take 1,000 mg by mouth daily   aspirin 81 mg chewable tablet  Self Yes No   Sig: Chew 81 mg   folic acid (FOLVITE) 1 mg tablet  Self Yes No   Sig: Take by mouth daily   glucose blood (OneTouch Ultra) test strip  Self No No   Sig: Use as instructed once a day   lisinopril-hydrochlorothiazide (PRINZIDE,ZESTORETIC) 10-12 5 MG per tablet  Self No No Sig: TAKE ONE TABLET BY MOUTH EVERY DAY   omeprazole (PriLOSEC) 20 mg delayed release capsule   No No   Sig: Take 1 capsule (20 mg total) by mouth daily before breakfast   pancrelipase, Lip-Prot-Amyl, (CREON) 24,000 units  Self No No   Sig: Take 1 capsule (24,000 Units total) by mouth 3 (three) times a day with meals   tamsulosin (FLOMAX) 0 4 mg  Self No No   Sig: TAKE 1 CAPSULE BY MOUTH  DAILY AT BEDTIME      Facility-Administered Medications: None       Past Medical History:   Diagnosis Date    Arthritis     Cellulitis     LAST ASSESSED: 6/13/14    Enlarged prostate     Epidermal inclusion cyst     LAST ASSESSED: 10/4/13    Erythrasma     LAST ASSESSED: 9/23/13    Furuncle     LAST ASSESSED: 6/11/14    Hyperlipidemia     Hypertension        Past Surgical History:   Procedure Laterality Date    HYDROCELE EXCISION / REPAIR Right 01/11/2018    SPERMATIC CORD EXCSION OF HYDROCELE; MANAGED BY: Zack BANDA REMOVAL OF HYDROCELE,TUNICA,UNILAT Right 1/11/2018    Procedure: HYDROCELECTOMY;  Surgeon: Marina Ambrosio MD;  Location: [facility];  Service: Urology    SCROTAL SURGERY      benign "lump"       Family History   Problem Relation Age of Onset    Heart disease Father         CARDIAC DISORDER    Hypertension Father     Hypertension Mother     No Known Problems Maternal Aunt     No Known Problems Maternal Uncle     No Known Problems Paternal Aunt     No Known Problems Paternal Uncle     No Known Problems Paternal Grandmother     No Known Problems Paternal Grandfather     Diabetes Maternal Grandmother         MELLITUS    No Known Problems Maternal Grandfather     Hypertension Other      I have reviewed and agree with the history as documented      E-Cigarette/Vaping     E-Cigarette/Vaping Substances     Social History     Tobacco Use    Smoking status: Former Smoker    Smokeless tobacco: Never Used    Tobacco comment: tobacco in a pipe in the early 1980's   Substance Use Topics    Alcohol use: No    Drug use: No       Review of Systems   Constitutional: Negative for activity change and appetite change  HENT: Negative for congestion and facial swelling  Eyes: Negative for discharge and redness  Respiratory: Negative for cough and wheezing  Cardiovascular: Negative for chest pain and leg swelling  Gastrointestinal: Negative for abdominal distention, abdominal pain, blood in stool and vomiting  Endocrine: Negative for cold intolerance and polydipsia  Genitourinary: Negative for difficulty urinating and hematuria  Musculoskeletal: Negative for arthralgias and gait problem  Skin: Negative for color change and rash  Allergic/Immunologic: Negative for food allergies and immunocompromised state  Neurological: Positive for dizziness and weakness  Negative for seizures and headaches  Hematological: Negative for adenopathy  Does not bruise/bleed easily  Psychiatric/Behavioral: Negative for agitation, confusion and decreased concentration  All other systems reviewed and are negative  Physical Exam  Physical Exam  Vitals signs and nursing note reviewed  Constitutional:       General: He is in acute distress  Appearance: He is well-developed  He is ill-appearing  He is not toxic-appearing  HENT:      Head: Normocephalic and atraumatic  Right Ear: Tympanic membrane normal       Left Ear: Tympanic membrane normal       Nose: Nose normal    Eyes:      General: Lids are normal       Conjunctiva/sclera: Conjunctivae normal       Pupils: Pupils are equal, round, and reactive to light  Neck:      Musculoskeletal: Normal range of motion and neck supple  Vascular: No carotid bruit or JVD  Trachea: Trachea normal    Cardiovascular:      Rate and Rhythm: Normal rate and regular rhythm  No extrasystoles are present  Heart sounds: Normal heart sounds     Pulmonary:      Effort: Pulmonary effort is normal       Breath sounds: No decreased breath sounds, wheezing, rhonchi or rales  Chest:      Chest wall: No deformity or tenderness  Abdominal:      Palpations: Abdomen is soft  Tenderness: There is abdominal tenderness in the right upper quadrant and epigastric area  There is no guarding or rebound  Musculoskeletal:      Right shoulder: He exhibits normal range of motion, no tenderness, no swelling and no deformity  Cervical back: Normal  He exhibits normal range of motion, no tenderness, no bony tenderness and no deformity  Lymphadenopathy:      Cervical: No cervical adenopathy  Skin:     General: Skin is warm and dry  Neurological:      Mental Status: He is alert and oriented to person, place, and time  Cranial Nerves: No cranial nerve deficit  Sensory: No sensory deficit  Deep Tendon Reflexes: Reflexes are normal and symmetric  Psychiatric:         Speech: Speech normal          Behavior: Behavior normal          Thought Content:  Thought content normal          Judgment: Judgment normal          Vital Signs  ED Triage Vitals   Temperature Pulse Respirations Blood Pressure SpO2   05/05/21 1844 05/05/21 1844 05/05/21 1844 05/05/21 1844 05/05/21 1844   98 1 °F (36 7 °C) 73 16 98/56 98 %      Temp Source Heart Rate Source Patient Position - Orthostatic VS BP Location FiO2 (%)   05/05/21 1844 05/05/21 1844 05/05/21 1844 05/05/21 1844 --   Oral Monitor Sitting Right arm       Pain Score       05/06/21 1600       6           Vitals:    05/06/21 1600 05/06/21 2301 05/07/21 0720 05/07/21 1336   BP: 147/70 127/60 108/58 136/71   Pulse: 65 77 61 60   Patient Position - Orthostatic VS: Lying Lying Lying          Visual Acuity      ED Medications  Medications   sodium chloride (PF) 0 9 % injection 3 mL (has no administration in time range)   sodium chloride 0 9 % bolus 1,000 mL (0 mL Intravenous Stopped 5/5/21 2059)     Followed by   sodium chloride 0 9 % bolus 1,000 mL (0 mL Intravenous Stopped 5/6/21 0017)     Followed by   sodium chloride 0 9 % bolus 1,000 mL (1,000 mL Intravenous Not Given 5/6/21 0034)   aspirin chewable tablet 81 mg (81 mg Oral Not Given 5/7/21 0917)   FLUoxetine (PROzac) capsule 20 mg (20 mg Oral Given 1/1/09 5135)   folic acid (FOLVITE) tablet 1 mg (1 mg Oral Given 5/7/21 0915)   multivitamin-minerals (CENTRUM) tablet 1 tablet (1 tablet Oral Not Given 5/7/21 0915)   fish oil capsule 1,000 mg (1,000 mg Oral Not Given 5/7/21 0915)   pancrelipase (Lip-Prot-Amyl) (CREON) delayed release capsule 24,000 Units (24,000 Units Oral Not Given 5/7/21 1144)   tamsulosin (FLOMAX) capsule 0 4 mg (0 4 mg Oral Given 5/6/21 2148)   heparin (porcine) subcutaneous injection 5,000 Units (5,000 Units Subcutaneous Not Given 5/7/21 1300)   multi-electrolyte (PLASMALYTE-A/ISOLYTE-S PH 7 4) IV solution (75 mL/hr Intravenous New Bag 5/7/21 0256)   pantoprazole (PROTONIX) EC tablet 40 mg (40 mg Oral Given 5/7/21 0526)   insulin lispro (HumaLOG) 100 units/mL subcutaneous injection 1-6 Units (1 Units Subcutaneous Not Given 5/7/21 1032)   oxyCODONE (ROXICODONE) IR tablet 2 5 mg (2 5 mg Oral Given 5/7/21 0729)   senna-docusate sodium (SENOKOT S) 8 6-50 mg per tablet 1 tablet (1 tablet Oral Not Given 5/7/21 1031)   polyethylene glycol (MIRALAX) packet 17 g ( Oral Canceled Entry 5/7/21 1500)   bupivacaine (PF) (MARCAINE) 0 25 % injection 10 mL (0 mL Infiltration Hold 5/7/21 1359)   Alcohol 98 % injection 20 mL (0 mL Infiltration Hold 5/7/21 1359)   ondansetron (ZOFRAN) injection 4 mg (4 mg Intravenous Given 5/5/21 1912)   ketorolac (TORADOL) injection 15 mg (15 mg Intravenous Given 5/5/21 1921)   iohexol (OMNIPAQUE) 350 MG/ML injection (SINGLE-DOSE) 100 mL (100 mL Intravenous Given 5/5/21 1945)   ketorolac (TORADOL) injection 15 mg (15 mg Intravenous Given 5/6/21 0915)       Diagnostic Studies  Results Reviewed     Procedure Component Value Units Date/Time    Blood culture #1 [[id number]] Collected: 05/05/21 1912    Lab Status: Preliminary result Specimen: Blood from Hand, Left Updated: 05/06/21 2301     Blood Culture No Growth at 24 hrs  Blood culture #2 [[id number]] Collected: 05/05/21 1912    Lab Status: Preliminary result Specimen: Blood from Hand, Right Updated: 05/06/21 2301     Blood Culture No Growth at 24 hrs      Procalcitonin Reflex [[id number]]  (Normal) Collected: 05/06/21 0634    Lab Status: Final result Specimen: Blood from Arm, Right Updated: 05/06/21 1041     Procalcitonin 0 22 ng/ml     CBC and differential [[id number]]  (Abnormal) Collected: 05/06/21 0634    Lab Status: Final result Specimen: Blood from Arm, Right Updated: 05/06/21 0650     WBC 11 01 Thousand/uL      RBC 4 56 Million/uL      Hemoglobin 13 6 g/dL      Hematocrit 41 8 %      MCV 92 fL      MCH 29 8 pg      MCHC 32 5 g/dL      RDW 14 4 %      MPV 12 8 fL      Platelets 072 Thousands/uL      nRBC 0 /100 WBCs      Neutrophils Relative 78 %      Immat GRANS % 1 %      Lymphocytes Relative 9 %      Monocytes Relative 11 %      Eosinophils Relative 1 %      Basophils Relative 0 %      Neutrophils Absolute 8 57 Thousands/µL      Immature Grans Absolute 0 06 Thousand/uL      Lymphocytes Absolute 1 04 Thousands/µL      Monocytes Absolute 1 20 Thousand/µL      Eosinophils Absolute 0 11 Thousand/µL      Basophils Absolute 0 03 Thousands/µL     Fingerstick Glucose (POCT) [[id number]]  (Normal) Collected: 05/06/21 0140    Lab Status: Final result Updated: 05/06/21 0142     POC Glucose 124 mg/dl     Procalcitonin with AM Reflex [[id number]]  (Normal) Collected: 05/05/21 1912    Lab Status: Final result Specimen: Blood from Arm, Right Updated: 05/06/21 0104     Procalcitonin 0 12 ng/ml     Urine Microscopic [[id number]]  (Abnormal) Collected: 05/05/21 2124    Lab Status: Final result Specimen: Urine, Clean Catch Updated: 05/05/21 2226     RBC, UA 30-50 /hpf      WBC, UA 0-1 /hpf      Epithelial Cells None Seen /hpf      Bacteria, UA Occasional /hpf     UA w Reflex to Microscopic w Reflex to Culture [[id number]]  (Abnormal) Collected: 05/05/21 2124    Lab Status: Final result Specimen: Urine, Clean Catch Updated: 05/05/21 2205     Color, UA Yellow     Clarity, UA Clear     Specific Gravity, UA 1 010     pH, UA 8 5     Leukocytes, UA Negative     Nitrite, UA Negative     Protein, UA Negative mg/dl      Glucose,  (1/4%) mg/dl      Ketones, UA Negative mg/dl      Urobilinogen, UA 0 2 E U /dl      Bilirubin, UA Negative     Blood, UA Moderate    Lactic acid 2 Hours [[id number]]  (Normal) Collected: 05/05/21 2124    Lab Status: Final result Specimen: Blood from Arm, Right Updated: 05/05/21 2144     LACTIC ACID 1 7 mmol/L     Narrative:      Result may be elevated if tourniquet was used during collection  Troponin I [[id number]]  (Normal) Collected: 05/05/21 1940    Lab Status: Final result Specimen: Blood from Arm, Right Updated: 05/05/21 2005     Troponin I <0 02 ng/mL     Lactic Acid [[id number]]  (Abnormal) Collected: 05/05/21 1912    Lab Status: Final result Specimen: Blood from Arm, Right Updated: 05/05/21 2000     LACTIC ACID 2 1 mmol/L     Narrative:      Result may be elevated if tourniquet was used during collection      Comprehensive metabolic panel [[id number]]  (Abnormal) Collected: 05/05/21 1912    Lab Status: Final result Specimen: Blood from Arm, Right Updated: 05/05/21 1940     Sodium 139 mmol/L      Potassium 4 4 mmol/L      Chloride 102 mmol/L      CO2 28 mmol/L      ANION GAP 9 mmol/L      BUN 18 mg/dL      Creatinine 1 08 mg/dL      Glucose 235 mg/dL      Calcium 8 6 mg/dL      Corrected Calcium 9 1 mg/dL      AST 26 U/L       U/L      Alkaline Phosphatase 348 U/L      Total Protein 6 8 g/dL      Albumin 3 4 g/dL      Total Bilirubin 1 04 mg/dL      eGFR 72 ml/min/1 73sq m     Narrative:      [place] guidelines for Chronic Kidney Disease (CKD):     Stage 1 with normal or high GFR (GFR > 90 mL/min/1 73 square meters)    Stage 2 Mild CKD (GFR = 60-89 mL/min/1 73 square meters)    Stage 3A Moderate CKD (GFR = 45-59 mL/min/1 73 square meters)    Stage 3B Moderate CKD (GFR = 30-44 mL/min/1 73 square meters)    Stage 4 Severe CKD (GFR = 15-29 mL/min/1 73 square meters)    Stage 5 End Stage CKD (GFR <15 mL/min/1 73 square meters)  Note: GFR calculation is accurate only with a steady state creatinine    Protime-INR [[id number]]  (Normal) Collected: 05/05/21 1912    Lab Status: Final result Specimen: Blood from Arm, Right Updated: 05/05/21 1937     Protime 14 2 seconds      INR 1 08    APTT [[id number]]  (Normal) Collected: 05/05/21 1912    Lab Status: Final result Specimen: Blood from Arm, Right Updated: 05/05/21 1937     PTT 24 seconds     CBC and differential [[id number]]  (Abnormal) Collected: 05/05/21 1912    Lab Status: Final result Specimen: Blood from Arm, Right Updated: 05/05/21 1925     WBC 10 23 Thousand/uL      RBC 4 73 Million/uL      Hemoglobin 14 5 g/dL      Hematocrit 43 0 %      MCV 91 fL      MCH 30 7 pg      MCHC 33 7 g/dL      RDW 14 0 %      MPV 13 2 fL      Platelets 767 Thousands/uL      nRBC 0 /100 WBCs      Neutrophils Relative 82 %      Immat GRANS % 1 %      Lymphocytes Relative 8 %      Monocytes Relative 8 %      Eosinophils Relative 1 %      Basophils Relative 0 %      Neutrophils Absolute 8 34 Thousands/µL      Immature Grans Absolute 0 09 Thousand/uL      Lymphocytes Absolute 0 83 Thousands/µL      Monocytes Absolute 0 86 Thousand/µL      Eosinophils Absolute 0 09 Thousand/µL      Basophils Absolute 0 02 Thousands/µL                  US right upper quadrant   Final Result by Shayna Abdullahi MD (05/06 1020)      Mild gallbladder wall thickening without additional evidence of acute cholecystitis  Pancreatic head mass again identified  Associated pancreatic ductal dilatation  Indeterminate hypoechoic lesion within the left lobe of the liver  Hyperechoic left hepatic lobe lesion possibly representing a hemangioma        Multiple hepatic cysts  Workstation performed: [id number]         CT abdomen pelvis with contrast   Final Result by Jayden Mcgowan MD (05/05 2017)      1  Uniform mural hyperenhancement of the wall of the gallbladder with a small amount of pericholecystic fluid, worrisome for acute cholecystitis  Further clinical assessment advised  Ultrasound of the right upper quadrant suggested for further    assessment  2   Pneumobilia with stent in the common bile duct redemonstrated  3   3 8 cm obstructing mass in the region of the head of the pancreas with marked pancreatic ductal dilatation, worrisome for adenocarcinoma  Other tumors not excluded  4   Numerous hepatic cysts redemonstrated with an enhancing lesion in the left lobe of liver possibly a hemangioma, stable  5   Moderate constipation  Workstation performed: [id number]         XR chest 1 view portable   Final Result by Rudi Litten, MD (05/05 2038)      No acute cardiopulmonary disease  Workstation performed: [id number]                    Procedures  Procedures         ED Course                             SBIRT 22yo+      Most Recent Value   SBIRT (25 yo +)   In order to provide better care to our patients, we are screening all of our patients for alcohol and drug use  Would it be okay to ask you these screening questions?   Unable to answer at this time Filed at: 05/05/2021 1941                    MDM  Number of Diagnoses or Management Options  Abdominal pain: new and requires workup  Dizziness: new and requires workup     Amount and/or Complexity of Data Reviewed  Clinical lab tests: ordered and reviewed  Tests in the radiology section of CPT®: ordered and reviewed  Tests in the medicine section of CPT®: ordered and reviewed  Independent visualization of images, tracings, or specimens: yes    Patient Progress  Patient progress: stable      Disposition  Final diagnoses:   Dizziness   Abdominal pain     Time reflects when diagnosis was documented in both MDM as applicable and the Disposition within this note     Time User Action Codes Description Comment    5/5/2021  9:57 PM Apoorva MCLAUGHLIN Add [R42] Dizziness     5/5/2021  9:57 PM Apoorva MCLAUGHLIN Add [R10 9] Abdominal pain     5/6/2021 12:01 AM Maverick Ro Add [R10 13] Epigastric pain     5/6/2021 12:01 AM Maverick Ro Add [K21 9] Gastroesophageal reflux disease, unspecified whether esophagitis present     5/6/2021  1:10 PM Korey Ha Add [F61 35] Pancreatic mass, Probable Pancreatic cancer       ED Disposition     ED Disposition Condition Date/Time Comment    Admit Stable Wed May 5, 2021  9:57 PM Case was discussed with surgical Oncology and Internal Medicine and the patient's admission status was agreed to be Admission Status: inpatient status to the service of Dr Mita Stern   Follow-up Information    None         Current Discharge Medication List      CONTINUE these medications which have NOT CHANGED    Details   aspirin 81 mg chewable tablet Chew 81 mg      Blood Glucose Monitoring Suppl (ONE TOUCH ULTRA 2) w/Device KIT Use daily  Qty: 1 each, Refills: 1    Associated Diagnoses: Type 2 diabetes mellitus without complication, without long-term current use of insulin ([facility])      FLUoxetine (PROzac) 20 mg capsule TAKE 1 CAPSULE BY MOUTH EVERY DAY    Qty: 90 capsule, Refills: 2    Associated Diagnoses: Depression, unspecified depression type      folic acid (FOLVITE) 1 mg tablet Take by mouth daily      glucose blood (OneTouch Ultra) test strip Use as instructed once a day  Qty: 50 each, Refills: 5    Associated Diagnoses: Type 2 diabetes mellitus without complication, without long-term current use of insulin ([facility])      Lancets (onetouch ultrasoft) lancets Use as instructed once a day  Qty: 50 each, Refills: 5    Associated Diagnoses: Type 2 diabetes mellitus without complication, without long-term current use of insulin ([facility] ) lisinopril-hydrochlorothiazide (PRINZIDE,ZESTORETIC) 10-12 5 MG per tablet TAKE ONE TABLET BY MOUTH EVERY DAY  Qty: 90 tablet, Refills: 3    Associated Diagnoses: Essential hypertension      Multiple Vitamins-Minerals (MULTIVITAMIN MEN) TABS Take 1 tablet by mouth daily      Omega-3 Fatty Acids (FISH OIL) 1,000 mg Take 1,000 mg by mouth daily      omeprazole (PriLOSEC) 20 mg delayed release capsule Take 1 capsule (20 mg total) by mouth daily before breakfast  Qty: 30 capsule, Refills: 0    Associated Diagnoses: Epigastric pain      pancrelipase, Lip-Prot-Amyl, (CREON) 24,000 units Take 1 capsule (24,000 Units total) by mouth 3 (three) times a day with meals  Qty: 90 capsule, Refills: 0    Associated Diagnoses: Pancreatic mass; Pancreatic insufficiency      tamsulosin (FLOMAX) 0 4 mg TAKE 1 CAPSULE BY MOUTH  DAILY AT BEDTIME  Qty: 90 capsule, Refills: 3    Associated Diagnoses: Urgency of urination           No discharge procedures on file      PDMP Review     None          ED Provider  Electronically Signed by           Ellis López DO  05/05/21 5169       Ellis López DO  05/07/21 9022

## 2021-05-06 NOTE — PLAN OF CARE
Problem: Potential for Falls  Goal: Patient will remain free of falls  Description: INTERVENTIONS:  - Assess patient frequently for physical needs  -  Identify cognitive and physical deficits and behaviors that affect risk of falls    -  [place] fall precautions as indicated by assessment   - Educate patient/family on patient safety including physical limitations  - Instruct patient to call for assistance with activity based on assessment  - Modify environment to reduce risk of injury  - Consider OT/PT consult to assist with strengthening/mobility  Outcome: Progressing     Problem: PAIN - ADULT  Goal: Verbalizes/displays adequate comfort level or baseline comfort level  Description: Interventions:  - Encourage patient to monitor pain and request assistance  - Assess pain using appropriate pain scale  - Administer analgesics based on type and severity of pain and evaluate response  - Implement non-pharmacological measures as appropriate and evaluate response  - Consider cultural and social influences on pain and pain management  - Notify physician/advanced practitioner if interventions unsuccessful or patient reports new pain  Outcome: Progressing     Problem: INFECTION - ADULT  Goal: Absence or prevention of progression during hospitalization  Description: INTERVENTIONS:  - Assess and monitor for signs and symptoms of infection  - Monitor lab/diagnostic results  - Monitor all insertion sites, i e  indwelling lines, tubes, and drains  - Monitor endotracheal if appropriate and nasal secretions for changes in amount and color  - [place] appropriate cooling/warming therapies per order  - Administer medications as ordered  - Instruct and encourage patient and family to use good hand hygiene technique  - Identify and instruct in appropriate isolation precautions for identified infection/condition  Outcome: Progressing  Goal: Absence of fever/infection during neutropenic period  Description: INTERVENTIONS:  - Monitor WBC    Outcome: Progressing     Problem: SAFETY ADULT  Goal: Patient will remain free of falls  Description: INTERVENTIONS:  - Assess patient frequently for physical needs  -  Identify cognitive and physical deficits and behaviors that affect risk of falls    -  [place] fall precautions as indicated by assessment   - Educate patient/family on patient safety including physical limitations  - Instruct patient to call for assistance with activity based on assessment  - Modify environment to reduce risk of injury  - Consider OT/PT consult to assist with strengthening/mobility  Outcome: Progressing  Goal: Maintain or return to baseline ADL function  Description: INTERVENTIONS:  -  Assess patient's ability to carry out ADLs; assess patient's baseline for ADL function and identify physical deficits which impact ability to perform ADLs (bathing, care of mouth/teeth, toileting, grooming, dressing, etc )  - Assess/evaluate cause of self-care deficits   - Assess range of motion  - Assess patient's mobility; develop plan if impaired  - Assess patient's need for assistive devices and provide as appropriate  - Encourage maximum independence but intervene and supervise when necessary  - Involve family in performance of ADLs  - Assess for home care needs following discharge   - Consider OT consult to assist with ADL evaluation and planning for discharge  - Provide patient education as appropriate  Outcome: Progressing  Goal: Maintain or return mobility status to optimal level  Description: INTERVENTIONS:  - Assess patient's baseline mobility status (ambulation, transfers, stairs, etc )    - Identify cognitive and physical deficits and behaviors that affect mobility  - Identify mobility aids required to assist with transfers and/or ambulation (gait belt, sit-to-stand, lift, walker, cane, etc )  - [place] fall precautions as indicated by assessment  - Record patient progress and toleration of activity level on Mobility SBAR; progress patient to next Phase/Stage  - Instruct patient to call for assistance with activity based on assessment  - Consider rehabilitation consult to assist with strengthening/weightbearing, etc   Outcome: Progressing     Problem: DISCHARGE PLANNING  Goal: Discharge to home or other facility with appropriate resources  Description: INTERVENTIONS:  - Identify barriers to discharge w/patient and caregiver  - Arrange for needed discharge resources and transportation as appropriate  - Identify discharge learning needs (meds, wound care, etc )  - Arrange for interpretive services to assist at discharge as needed  - Refer to Case Management Department for coordinating discharge planning if the patient needs post-hospital services based on physician/advanced practitioner order or complex needs related to functional status, cognitive ability, or social support system  Outcome: Progressing     Problem: Knowledge Deficit  Goal: Patient/family/caregiver demonstrates understanding of disease process, treatment plan, medications, and discharge instructions  Description: Complete learning assessment and assess knowledge base    Interventions:  - Provide teaching at level of understanding  - Provide teaching via preferred learning methods  Outcome: Progressing

## 2021-05-06 NOTE — UTILIZATION REVIEW
Initial Clinical Review    Admission: Date/Time/Statement:   Admission Orders (From admission, onward)     Ordered        05/05/21 2158  Inpatient Admission  Once                   Orders Placed This Encounter   Procedures    Inpatient Admission     Standing Status:   Standing     Number of Occurrences:   1     Order Specific Question:   Level of Care     Answer:   Med Surg [16]     Order Specific Question:   Estimated length of stay     Answer:   More than 2 Midnights     Order Specific Question:   Certification     Answer:   I certify that inpatient services are medically necessary for this patient for a duration of greater than two midnights  See H&P and MD Progress Notes for additional information about the patient's course of treatment  ED Arrival Information     Expected Arrival Acuity Means of Arrival Escorted By Service Admission Type    - 5/5/2021 18:11 Emergent Walk-In Spouse General Medicine Emergency    Arrival Complaint    abdominal pain stint        Chief Complaint   Patient presents with    Dizziness     Pt presents to the ED with severe dizziness and near syncopal episode  Pt reports worsening abd pain  Hx of pancreatic mass  Initial Presentation: This is a 73year old male from home to ED admitted inpatient due to epigastric pain  Recent admission 4/27 - 4/29/2021 due to pancreatic mass s/p ERCP with biliary stent on 4/28  Presented due to  Worsening abdominal pain since dc, now with dizziness starting 2 days prior to arrival  On exam abdominal tenderness RUQ and epigastric area  Lactic acid 2 1  Glucose 235  AST 26    Wbc 10 23  Ct abdomen showed possible acute cholecystitis, pneumbolia with stent CBD, obstructing mass region head of pancreas, constipation and hepatic cysts  In the ED given  2 liters of IVF, IV Zofran, Toradol  Plan is consult surgical oncology, GI  Continued IVF,  Diet as tolerated  Pain control as tolerated       5/5/2021 per surgical oncology - patient has persistent abdominal pain  Recommend no surgical intervention, trend   temperature curve, abdominal examination, CBC, lactate  Diet as tolerated  No antibiotics  Date: 5/6/2021   Day 2:  Patient with epigastric and lower quadrant abdominal pain  Feels pain improves after Toradol  On exam abdomen soft, minimally tender in epigastrium with some radiation to RUQ  Has severe juandice with mild scleral icterus  IVF in progress, continue pancreatic enzymes  Per GI 5/6/2021:  Patient with pancreatic mass and probable pancreatic cancer  Recommend EUS tomorrow         ED Triage Vitals [05/05/21 1844]   Temperature Pulse Respirations Blood Pressure SpO2   98 1 °F (36 7 °C) 73 16 98/56 98 %      Temp Source Heart Rate Source Patient Position - Orthostatic VS BP Location FiO2 (%)   Oral Monitor Sitting Right arm --      Pain Score       --          Wt Readings from Last 1 Encounters:   05/03/21 79 8 kg (176 lb)     Additional Vital Signs:   05/06/21 0700  99 5 °F (37 5 °C)  63  18  129/68  --  94 %  None (Room air)  Lying   05/06/21 0300  99 4 °F (37 4 °C)  72  --  149/76  106  92 %  None (Room air)  Lying   05/06/21 0130  --  78  16  141/65  97  95 %  None (Room air)  --   05/05/21 2100  --  100  17  131/76  96  95 %  None (Room air)  Lying   05/05/21 2030  --  74  16  132/69  94  94 %  None (Room air)         Pertinent Labs/Diagnostic Test Results:   5/5/2021 CxR -  No acute cardiopulmonary disease  5/5/2021 Ct abdomen - Uniform mural hyperenhancement of the wall of the gallbladder with a small amount of pericholecystic fluid, worrisome for acute cholecystitis  Further clinical assessment advised  Ultrasound of the right upper quadrant suggested for further   assessment  2   Pneumobilia with stent in the common bile duct redemonstrated  3   3 8 cm obstructing mass in the region of the head of the pancreas with marked pancreatic ductal dilatation, worrisome for adenocarcinoma  Other tumors not excluded  4   Numerous hepatic cysts redemonstrated with an enhancing lesion in the left lobe of liver possibly a hemangioma, stable  5   Moderate constipation  5/6/2021 US RUQ -  Mild gallbladder wall thickening without additional evidence of acute cholecystitis    Pancreatic head mass again identified    Associated pancreatic ductal dilatation    Indeterminate hypoechoic lesion within the left lobe of the liver    Hyperechoic left hepatic lobe lesion possibly representing a hemangioma    Multiple hepatic cysts      Results from last 7 days   Lab Units 05/06/21  0634 05/05/21 1912 05/05/21  0911   WBC Thousand/uL 11 01* 10 23* 8 37   HEMOGLOBIN g/dL 13 6 14 5 15 4   HEMATOCRIT % 41 8 43 0 45 0   PLATELETS Thousands/uL 213 245 238   NEUTROS ABS Thousands/µL 8 57* 8 34* 5 91     Results from last 7 days   Lab Units 05/05/21 1912 05/05/21  0911   SODIUM mmol/L 139 140   POTASSIUM mmol/L 4 4 4 1   CHLORIDE mmol/L 102 103   CO2 mmol/L 28 29   ANION GAP mmol/L 9 8   BUN mg/dL 18 17   CREATININE mg/dL 1 08 0 84   EGFR ml/min/1 73sq m 72 92   CALCIUM mg/dL 8 6 9 1     Results from last 7 days   Lab Units 05/05/21 1912 05/05/21  0911   AST U/L 26 34   ALT U/L 120* 97*   ALK PHOS U/L 348* 272 9*   TOTAL PROTEIN g/dL 6 8 6 6   ALBUMIN g/dL 3 4* 3 9   TOTAL BILIRUBIN mg/dL 1 04* 1 44*     Results from last 7 days   Lab Units 05/06/21  1118 05/06/21  0730 05/06/21  0140 04/29/21  1650   POC GLUCOSE mg/dl 138 109 124 104     Results from last 7 days   Lab Units 05/05/21 1912   GLUCOSE RANDOM mg/dL 235*     Results from last 7 days   Lab Units 05/05/21  0911   HEMOGLOBIN A1C % 6 2*   EAG mg/dl 131     Results from last 7 days   Lab Units 05/05/21  1940   TROPONIN I ng/mL <0 02     Results from last 7 days   Lab Units 05/05/21 1912   PROTIME seconds 14 2   INR  1 08   PTT seconds 24     Results from last 7 days   Lab Units 05/06/21  0634 05/05/21  1912   PROCALCITONIN ng/ml 0 22 0 12     Results from last 7 days   Lab Units 05/05/21 2124 05/05/21 1912   LACTIC ACID mmol/L 1 7 2 1*     Results from last 7 days   Lab Units 05/06/21  0634   LIPASE u/L 360     Results from last 7 days   Lab Units 05/05/21 2124 05/05/21  0911   CLARITY UA  Clear  --    COLOR UA  Yellow  --    SPEC GRAV UA  1 010  --    PH UA  8 5*  --    GLUCOSE UA mg/dl 250 (1/4%)*  --    KETONES UA mg/dl Negative  --    BLOOD UA  Moderate*  --    PROTEIN UA mg/dl Negative  --    NITRITE UA  Negative  --    BILIRUBIN UA  Negative  --    UROBILINOGEN UA E U /dl 0 2  --    LEUKOCYTES UA  Negative  --    WBC UA /hpf 0-1  --    RBC UA /hpf 30-50*  --    BACTERIA UA /hpf Occasional  --    EPITHELIAL CELLS WET PREP /hpf None Seen  --    CREATININE UR mg/dL  --  235 0     Results from last 7 days   Lab Units 05/05/21 1912   BLOOD CULTURE  Received in Microbiology Lab  Culture in Progress  Received in Microbiology Lab  Culture in Progress       ED Treatment:   Medication Administration from 05/05/2021 1811 to 05/06/2021 0302       Date/Time Order Dose Route Action Comments     05/05/2021 1912 sodium chloride 0 9 % bolus 1,000 mL 1,000 mL Intravenous New Bag      05/05/2021 2059 sodium chloride 0 9 % bolus 1,000 mL 1,000 mL Intravenous New Bag      05/05/2021 1912 ondansetron (ZOFRAN) injection 4 mg 4 mg Intravenous Given      05/05/2021 1921 ketorolac (TORADOL) injection 15 mg 15 mg Intravenous Given      05/06/2021 0143 tamsulosin (FLOMAX) capsule 0 4 mg 0 4 mg Oral Given      05/06/2021 0130 multi-electrolyte (PLASMALYTE-A/ISOLYTE-S PH 7 4) IV solution 75 mL/hr Intravenous New Bag      05/06/2021 0202 ketorolac (TORADOL) injection 15 mg 15 mg Intravenous Given         Past Medical History:   Diagnosis Date    Arthritis     Cellulitis     LAST ASSESSED: 6/13/14    Enlarged prostate     Epidermal inclusion cyst     LAST ASSESSED: 10/4/13    Erythrasma     LAST ASSESSED: 9/23/13    Furuncle     LAST ASSESSED: 6/11/14    Hyperlipidemia     Hypertension      Present on Admission:   Type 2 diabetes mellitus without complication, without long-term current use of insulin (HCC)   Hypertension   Hyperlipidemia   Epigastric pain      Admitting Diagnosis: Dizziness [R42]  Epigastric pain [R10 13]  Abdominal pain [R10 9]  Gastroesophageal reflux disease, unspecified whether esophagitis present [K21 9]  Age/Sex: 73 y o  male  Admission Orders:  5/5/2021 9008 inpatient   Scheduled Medications:  aspirin, 81 mg, Oral, Daily  fish oil, 1,000 mg, Oral, Daily  FLUoxetine, 20 mg, Oral, Daily  folic acid, 1 mg, Oral, Daily  heparin (porcine), 5,000 Units, Subcutaneous, Q8H INGRID  insulin lispro, 1-6 Units, Subcutaneous, 4x Daily (AC & HS)  multivitamin-minerals, 1 tablet, Oral, Daily  pancrelipase (Lip-Prot-Amyl), 24,000 Units, Oral, TID With Meals  pantoprazole, 40 mg, Oral, Early Morning  sodium chloride, 1,000 mL, Intravenous, Once  tamsulosin, 0 4 mg, Oral, HS    ketorolac (TORADOL) injection 15 mg   Dose: 15 mg  Freq: Every 6 hours Route: IV  Start: 05/06/21 0200 End: 05/06/21 0915    Continuous IV Infusions:  multi-electrolyte, 75 mL/hr, Intravenous, Continuous      PRN Meds:  sodium chloride (PF), 3 mL, Intravenous, Q1H PRN        IP CONSULT TO SURGICAL ONCOLOGY  IP CONSULT TO GASTROENTEROLOGY    Network Utilization Review Department  ATTENTION: Please call with any questions or concerns to [phone] and carefully listen to the prompts so that you are directed to the right person  All voicemails are confidential   Mike Maloney all requests for admission clinical reviews, approved or denied determinations and any other requests to dedicated fax number below belonging to the campus where the patient is receiving treatment   List of dedicated fax numbers for the Facilities:  [address] DENIALS (Administrative/Medical Necessity) [phone]   [address] (Maternity/NICU/Pediatrics) [phone]   [address] [facility] [address] [place] [address] [address] [place] [address] [facility] [id number] [address] [facility] [phone] [place] [address] [phone]

## 2021-05-06 NOTE — ASSESSMENT & PLAN NOTE
Lab Results   Component Value Date    HGBA1C 6 2 (H) 05/05/2021       No results for input(s): POCGLU in the last 72 hours      Blood Sugar Average: Last 72 hrs:   Home PO meds:  None  Unclear patient has true diabetes as this may be secondary to pancreatic neoplasm    Plan:   Diabetic Diet: Consistent CHO Level 2 (5 CHO servings/75g CHO per meal)   Insulin regimen  o Sliding Scale Insulin ACHS   Goal Blood Glucose 140-180   Glucose checks:  ACHS   Monitor for hypoglycemia and treat per protocol

## 2021-05-06 NOTE — CONSULTS
Consultation - Surgical Oncology  Joaquín Morales 73 y o  male MRN: [id number]  Unit/Bed#: ED 12 Encounter: [id number]    Assessment/Plan     Assessment:  73 M with recent admission for biliary obstruction in context of a pancreatic mass now s/p ERCP with biliary stenting on 4/28 returns with persistent abdominal pain  VSS, afebrile    Abdomen soft, minimally and diffusely tender, more pronounced in epigastrium and RUQ, non distended, no cantor's sign, no rebound or guarding    No signs of jaundice     Tbili 0 4 from 1 44  From 2 42 on d/c  Alk phos 348 from 273 form 450 on prior d/c   from  97 from 264  AST 26 from 34 from 54    Lactic acid 2 1    WBC 12 14, neutrophilic predominance on differential  Hb 14 5 from 15 4  plt 245      CT abdomen/pelvis showing persistent pancreatic mass with pancreatic ductal dilatation, CBD stent in place with post-procedural pneumobilia, mildly enhanced gallbladder with mural enhancement    MRCP from prior admission reviewed, no evident gallstones or sludge    Prior CT scans also reviewed showing slight enlargement and borderline wall thickening of the gallbladder   Plan:  -no acute surgical intervention  -recommend medical admission for observation  -okay for diet  -trend temperature curve, abdominal examination, CBC, lactate  -would recommend monitoring off antibiotics for now, can consider antibiotic addition if patient deteriorating clinically or worsening leukocytosis on repeat CBC  -recommend GI consultation, prior brushings from the ERCP inconclusive pathology, patient will eventually require EUS/biopsy of pancreatic mass, may be prudent to obtain this admission  -surgical Oncology will follow, if any clinical deterioration or further complication of clinical course, may consider eventual transfer to [facility] him, however feel that is not indicated at this point in time  History of Present Illness   History, ROS and PFSH unobtainable from any source due to none    HPI: Angel Tripp is a 73 y o  male who presents with diffuse abdominal pain which has been dull in character, persistent, and has occurred in a waxing and waning fashion since his last admission  He was previously seen in late April with obstructive jaundice secondary to a newly discovered pancreatic mass causing bile duct compression and underwent a successful ERCP with bile duct brushings and biliary stenting  He was released from the hospital after this procedure and was doing well at home with good oral intake and normal bowel function however he returned today with worsening pain  His pain was primarily in the upper abdomen with some radiation to the right upper quadrant  He denies difficulties with p o  Intake, no bowel habit changes, no darkening of urine, no nausea vomiting fever, chest pain, shortness of breath, constipation, or diarrhea  He was planned to follow up in office with surgical oncology tomorrow 05/06/2021  During his previous admission, his biliary ductal brushings were inconclusive and showed reactive epithelial cells only  Consults    Review of Systems   Constitutional: Negative for appetite change  Gastrointestinal: Positive for abdominal pain  Negative for blood in stool, constipation, diarrhea, nausea and vomiting  Genitourinary: Negative for difficulty urinating and dysuria  Skin: Negative for color change  All other systems reviewed and are negative        Historical Information   Past Medical History:   Diagnosis Date    Arthritis     Cellulitis     LAST ASSESSED: 6/13/14    Enlarged prostate     Epidermal inclusion cyst     LAST ASSESSED: 10/4/13    Erythrasma     LAST ASSESSED: 9/23/13    Furuncle     LAST ASSESSED: 6/11/14    Hyperlipidemia     Hypertension      Past Surgical History:   Procedure Laterality Date    HYDROCELE EXCISION / REPAIR Right 01/11/2018    SPERMATIC CORD EXCSION OF HYDROCELE; MANAGED BY: GABBIE BANDA REMOVAL OF HYDROCELE,TUNICA,UNILAT Right 1/11/2018    Procedure: HYDROCELECTOMY;  Surgeon: Richard Mora MD;  Location: [facility];  Service: Urology    SCROTAL SURGERY      benign "lump"     Social History   Social History     Substance and Sexual Activity   Alcohol Use No     Social History     Substance and Sexual Activity   Drug Use No     E-Cigarette/Vaping     E-Cigarette/Vaping Substances     Social History     Tobacco Use   Smoking Status Former Smoker   Smokeless Tobacco Never Used   Tobacco Comment    tobacco in a pipe in the early 1980's     Family History:   Family History   Problem Relation Age of Onset    Heart disease Father         CARDIAC DISORDER    Hypertension Father     Hypertension Mother     No Known Problems Maternal Aunt     No Known Problems Maternal Uncle     No Known Problems Paternal Aunt     No Known Problems Paternal Uncle     No Known Problems Paternal Grandmother     No Known Problems Paternal Grandfather     Diabetes Maternal Grandmother         MELLITUS    No Known Problems Maternal Grandfather     Hypertension Other        Meds/Allergies   current meds:   Current Facility-Administered Medications   Medication Dose Route Frequency    sodium chloride (PF) 0 9 % injection 3 mL  3 mL Intravenous Q1H PRN    sodium chloride 0 9 % bolus 1,000 mL  1,000 mL Intravenous Once     Allergies   Allergen Reactions    Fentanyl Anaphylaxis and Other (See Comments)     Oxygen drops severely       Objective   First Vitals:   Blood Pressure: 98/56 (05/05/21 1844)  Pulse: 73 (05/05/21 1844)  Temperature: 98 1 °F (36 7 °C) (05/05/21 1844)  Temp Source: Oral (05/05/21 1844)  Respirations: 16 (05/05/21 1844)  SpO2: 98 % (05/05/21 1844)    Current Vitals:   Blood Pressure: 131/76 (05/05/21 2100)  Pulse: 100 (05/05/21 2100)  Temperature: 98 1 °F (36 7 °C) (05/05/21 1844)  Temp Source: Oral (05/05/21 1844)  Respirations: 17 (05/05/21 2100)  SpO2: 95 % (05/05/21 2100)      Intake/Output Summary (Last 24 hours) at 5/5/2021 2119  Last data filed at 5/5/2021 2059  Gross per 24 hour   Intake 1000 ml   Output --   Net 1000 ml       Invasive Devices     Peripheral Intravenous Line            Peripheral IV 05/05/21 Left Antecubital less than 1 day          Drain            Open Drain Right 1210 days                Physical Exam  Constitutional:       General: He is not in acute distress  Appearance: He is normal weight  HENT:      Head: Normocephalic and atraumatic  Mouth/Throat:      Mouth: Mucous membranes are moist    Eyes:      General: No scleral icterus  Pupils: Pupils are equal, round, and reactive to light  Cardiovascular:      Rate and Rhythm: Normal rate and regular rhythm  Pulmonary:      Effort: Pulmonary effort is normal  No respiratory distress  Abdominal:      General: Abdomen is flat  There is no distension  Palpations: Abdomen is soft  Tenderness: There is generalized abdominal tenderness and tenderness in the right upper quadrant and epigastric area  There is no guarding or rebound  Positive signs include Calles's sign  Musculoskeletal:         General: No swelling, tenderness or deformity  Skin:     General: Skin is warm and dry  Capillary Refill: Capillary refill takes 2 to 3 seconds  Coloration: Skin is not jaundiced  Neurological:      General: No focal deficit present  Mental Status: He is alert  Psychiatric:         Mood and Affect: Mood normal          Behavior: Behavior normal          Lab Results:   I have personally reviewed pertinent lab results    , CBC:   Lab Results   Component Value Date    WBC 10 23 (H) 05/05/2021    HGB 14 5 05/05/2021    HCT 43 0 05/05/2021    MCV 91 05/05/2021     05/05/2021    MCH 30 7 05/05/2021    MCHC 33 7 05/05/2021    RDW 14 0 05/05/2021    MPV 13 2 (H) 05/05/2021    NRBC 0 05/05/2021   , CMP:   Lab Results   Component Value Date    SODIUM 139 05/05/2021    K 4 4 05/05/2021     05/05/2021 CO2 28 05/05/2021    BUN 18 05/05/2021    CREATININE 1 08 05/05/2021    CALCIUM 8 6 05/05/2021    AST 26 05/05/2021     (H) 05/05/2021    ALKPHOS 348 (H) 05/05/2021    EGFR 72 05/05/2021   , Coagulation:   Lab Results   Component Value Date    INR 1 08 05/05/2021   , Urinalysis: No results found for: Juline Christoph, SPECGRAV, PHUR, LEUKOCYTESUR, NITRITE, PROTEINUA, GLUCOSEU, KETONESU, BILIRUBINUR, BLOODU, Amylase: No results found for: AMYLASE, Lipase: No results found for: LIPASE  Imaging: I have personally reviewed pertinent reports  and I have personally reviewed pertinent films in PACS  EKG, Pathology, and Other Studies: I have personally reviewed pertinent reports  and I have personally reviewed pertinent films in PACS    Xr Chest 1 View Portable    Result Date: 5/5/2021  Impression: No acute cardiopulmonary disease  Workstation performed: [id number]     Ct Abdomen Pelvis With Contrast    Result Date: 5/5/2021  Impression: 1  Uniform mural hyperenhancement of the wall of the gallbladder with a small amount of pericholecystic fluid, worrisome for acute cholecystitis  Further clinical assessment advised  Ultrasound of the right upper quadrant suggested for further assessment  2   Pneumobilia with stent in the common bile duct redemonstrated  3   3 8 cm obstructing mass in the region of the head of the pancreas with marked pancreatic ductal dilatation, worrisome for adenocarcinoma  Other tumors not excluded  4   Numerous hepatic cysts redemonstrated with an enhancing lesion in the left lobe of liver possibly a hemangioma, stable  5   Moderate constipation   Workstation performed: [id number]

## 2021-05-06 NOTE — TELEPHONE ENCOUNTER
Darryle Mayers called to cancel Benson's appt with Dr Rashawn Rosario due to being admitted into the hospital  She states "Someone from Dr Ceferino Ryan team saw Kaylie Saldaña in the hospital and so he won't be needing this appointment but will he be charged since someone saw him?" I informed her that I was not sure if he would charged due to someone seeing in the hospital and being seen  Darryle Mayers would like a call back in regards to this matter [phone]

## 2021-05-06 NOTE — PROGRESS NOTES
Progress Note - Surgical Oncology  Hiro Esparza 73 y o  male MRN: [id number]  Unit/Bed#: S -01 Encounter: [id number]    Assessment:  73 M with pancreatic mass s/p ERCP and biliary decompression on /28 returning with epigastric abdominal pain  VSS, afebrile    Abdomen soft, minimally tender in epigastrium, some radiation to RUQ, non-distended, No cantor's sign     AM labs pending  WBC previously 10 2 from 8 3  Hb previously 14 5 from 15 4    Lactic acid 1 7 from 2 1    AST previously 26  ALT previously 120  Alk phos previously 348  Tbili previously 1 04    Plan:  -continue diet  -continue pancrelipase supplementation  -follow abdominal exam, AM labs, temperature curve  -observe off abx for now  -follow-up GI consultation for possible EUS/repeat mass biopsy this admission     Subjective/Objective     Subjective: No acute events transpired overnight, and the patient rested comfortably     Objective:     Blood pressure 149/76, pulse 72, temperature 99 4 °F (37 4 °C), temperature source Oral, resp  rate 16, SpO2 92 %  ,There is no height or weight on file to calculate BMI        Intake/Output Summary (Last 24 hours) at 5/6/2021 4825  Last data filed at 5/6/2021 0017  Gross per 24 hour   Intake 2000 ml   Output --   Net 2000 ml       Invasive Devices     Peripheral Intravenous Line            Peripheral IV 05/05/21 Left Antecubital less than 1 day          Drain            Open Drain Right 1210 days                Physical Exam:   General: NAD  HEENT: Normocephalic, atraumatic  CV: RRR  Resp: no distress  Abdomen: soft, minimally tender in epigastrium, non-distended, no rebound or guarding  Extremities: No C/C/E  Neuro: AAOx3  Skin: warm, dry, no jaundice     Lab, Imaging and other studies:pending    VTE Pharmacologic Prophylaxis: Heparin  VTE Mechanical Prophylaxis: sequential compression device

## 2021-05-06 NOTE — ASSESSMENT & PLAN NOTE
· Patient had pancreatic mass identified a previous admission on CT scan done on 04/29/2021  Mass presented with biliary obstruction which improved s/p biliary duct stenting  Brushing unfortunately was nondiagnostic  CT Abdomen Pelvis With Contrast Result Date: 5/5/2021 Impression:   3 8 cm obstructing mass in the region of the head of the pancreas with marked pancreatic ductal dilatation, worrisome for adenocarcinoma  Other tumors not excluded  · Us Right Upper Quadrant Result Date: 5/6/2021 Impression: Mild gallbladder wall thickening without additional evidence of acute cholecystitis  Pancreatic head mass again identified  Associated pancreatic ductal dilatation  Indeterminate hypoechoic lesion within the left lobe of the liver  Hyperechoic left hepatic lobe lesion possibly representing a hemangioma  Multiple hepatic cysts  · Patient had f/u with outpatient Surgical Oncology scheduled for 05/06/2021  Plan:  · Inpatient oncology team following

## 2021-05-06 NOTE — CONSULTS
Consultation - [facility]  Aram Yoav 73 y o  male MRN: [id number]  Unit/Bed#: S -01 Encounter: [id number]        Inpatient consult to gastroenterology  Consult performed by: Gill Ac PA-C  Consult ordered by: Nirmal Walter DO          Reason for Consult / Principal Problem:     ASSESSMENT and PLAN:      1  Pancreatic mass, probable pancreatic cancer  Patient presented last week with jaundice found to have pancreatic head mass and obstructive jaundice  Patient presents now to the emergency room with abdominal pain  ERCP was performed at that time with stent placement and resolution of jaundice  Surgical oncology has consulted us for EUS  -Plan for EUS tomorrow with possible celiac plexus block for pain control  -NPO after midnight   -Further recommendations by surgical oncology following procedure       -------------------------------------------------------------------------------------------------------------------    HPI:     This is a 50-year-old male with past medical history of newly diagnosed pancreatic mass status post ERCP with stent placement for obstructive jaundice, hypertension, hyperlipidemia who presented to the emergency room for dizziness and epigastric pain  Vital signs are stable on arrival   CT abdomen with contrast notable for possible cholecystitis, pneumobilia with stent in the CBD, 3 8 cm obstructing mass in the head of the pancreas with pancreatic ductal dilation and moderate constipation  Right upper quadrant ultrasound showed gallbladder without evidence of acute cholecystitis  White count elevated at 11, hemoglobin normal at 13  Elevated ALT at 1:20 a m , alk-phos elevated at 348, total bilirubin elevated at 1  Lactic acid elevated at 2 1 now resolved  Surgical Oncology was consulted and recommended no surgical intervention at this time but requested for GI consult for possible EUS and repeat biopsy      Patient reports he started having abdominal pain last week and bloating  He denies any nausea, vomiting, dysphagia, odynophagia  He has lost about 20 lb in 3 weeks  He reports that his jaundice has significantly improved since ERCP with stent placement last week  He reports his bowel movements are regular  He does report some weakness  Denies any shortness of breath, chest pain  Denies any family history of GI carcinomas, denies smoking or alcohol use  He is not on any blood thinners  Last colonoscopy was 7 years ago and he denies any removal of polyps  REVIEW OF SYSTEMS:    CONSTITUTIONAL: + 20 lb weight loss in 3 weeks, weakness  Denies any fever, chills, or rigors  HEENT: No earache or tinnitus  Denies hearing loss or visual disturbances  CARDIOVASCULAR: No chest pain or palpitations  RESPIRATORY: Denies any cough, hemoptysis, shortness of breath or dyspnea on exertion  GASTROINTESTINAL: As noted in the History of Present Illness  GENITOURINARY: No problems with urination  Denies any hematuria or dysuria  NEUROLOGIC: No dizziness or vertigo, denies headaches  MUSCULOSKELETAL: Denies any muscle or joint pain  SKIN: Denies skin rashes or itching  ENDOCRINE: Denies excessive thirst  Denies intolerance to heat or cold  PSYCHOSOCIAL: Denies depression or anxiety  Denies any recent memory loss         Historical Information   Past Medical History:   Diagnosis Date    Arthritis     Cellulitis     LAST ASSESSED: 6/13/14    Enlarged prostate     Epidermal inclusion cyst     LAST ASSESSED: 10/4/13    Erythrasma     LAST ASSESSED: 9/23/13    Furuncle     LAST ASSESSED: 6/11/14    Hyperlipidemia     Hypertension      Past Surgical History:   Procedure Laterality Date    HYDROCELE EXCISION / REPAIR Right 01/11/2018    SPERMATIC CORD EXCSION OF HYDROCELE; MANAGED BY: Topher Mathis    GA REMOVAL OF HYDROCELE,TUNICA,UNILAT Right 1/11/2018    Procedure: HYDROCELECTOMY;  Surgeon: Simon Maldonado MD;  [facility];  Service: Urology    SCROTAL SURGERY      benign "lump"     Social History   Social History     Substance and Sexual Activity   Alcohol Use No     Social History     Substance and Sexual Activity   Drug Use No     Social History     Tobacco Use   Smoking Status Former Smoker   Smokeless Tobacco Never Used   Tobacco Comment    tobacco in a pipe in the early 1980's     Family History   Problem Relation Age of Onset    Heart disease Father         CARDIAC DISORDER    Hypertension Father     Hypertension Mother     No Known Problems Maternal Aunt     No Known Problems Maternal Uncle     No Known Problems Paternal Aunt     No Known Problems Paternal Uncle     No Known Problems Paternal Grandmother     No Known Problems Paternal Grandfather     Diabetes Maternal Grandmother         MELLITUS    No Known Problems Maternal Grandfather     Hypertension Other        Meds/Allergies     Medications Prior to Admission   Medication    aspirin 81 mg chewable tablet    Blood Glucose Monitoring Suppl (ONE TOUCH ULTRA 2) w/Device KIT    FLUoxetine (PROzac) 20 mg capsule    folic acid (FOLVITE) 1 mg tablet    glucose blood (OneTouch Ultra) test strip    Lancets (onetouch ultrasoft) lancets    lisinopril-hydrochlorothiazide (PRINZIDE,ZESTORETIC) 10-12 5 MG per tablet    Multiple Vitamins-Minerals (MULTIVITAMIN MEN) TABS    Omega-3 Fatty Acids (FISH OIL) 1,000 mg    omeprazole (PriLOSEC) 20 mg delayed release capsule    pancrelipase, Lip-Prot-Amyl, (CREON) 24,000 units    tamsulosin (FLOMAX) 0 4 mg     Current Facility-Administered Medications   Medication Dose Route Frequency    aspirin chewable tablet 81 mg  81 mg Oral Daily    fish oil capsule 1,000 mg  1,000 mg Oral Daily    FLUoxetine (PROzac) capsule 20 mg  20 mg Oral Daily    folic acid (FOLVITE) tablet 1 mg  1 mg Oral Daily    heparin (porcine) subcutaneous injection 5,000 Units  5,000 Units Subcutaneous Q8H [facility]    insulin lispro (HumaLOG) 100 units/mL subcutaneous injection 1-6 Units  1-6 Units Subcutaneous 4x Daily (AC & HS)    multi-electrolyte (PLASMALYTE-A/ISOLYTE-S PH 7 4) IV solution  75 mL/hr Intravenous Continuous    multivitamin-minerals (CENTRUM) tablet 1 tablet  1 tablet Oral Daily    pancrelipase (Lip-Prot-Amyl) (CREON) delayed release capsule 24,000 Units  24,000 Units Oral TID With Meals    pantoprazole (PROTONIX) EC tablet 40 mg  40 mg Oral Early Morning    sodium chloride (PF) 0 9 % injection 3 mL  3 mL Intravenous Q1H PRN    sodium chloride 0 9 % bolus 1,000 mL  1,000 mL Intravenous Once    tamsulosin (FLOMAX) capsule 0 4 mg  0 4 mg Oral HS       Allergies   Allergen Reactions    Fentanyl Anaphylaxis and Other (See Comments)     Oxygen drops severely           Objective     Blood pressure 129/68, pulse 63, temperature 99 5 °F (37 5 °C), temperature source Oral, resp  rate 18, SpO2 94 %  Intake/Output Summary (Last 24 hours) at 5/6/2021 1308  Last data filed at 5/6/2021 0017  Gross per 24 hour   Intake 2000 ml   Output --   Net 2000 ml         PHYSICAL EXAM:      General Appearance:   Alert, cooperative, no distress, appears stated age    HEENT:   Normocephalic, atraumatic, anicteric   Neck:  Supple, symmetrical, trachea midline, no adenopathy;    thyroid: no enlargement/tenderness/nodules; no carotid  bruit or JVD    Lungs:   Clear to auscultation bilaterally; no rales, rhonchi or wheezing; respirations unlabored    Heart[de-identified]   S1 and S2 normal; regular rate and rhythm; no murmur, rub, or gallop  Abdomen:   + tender to palpation of upper abdomen   Soft,  non-distended; normal bowel sounds; no masses, no organomegaly    Genitalia:   Deferred    Rectal:   Deferred    Extremities:  No cyanosis, clubbing or edema    Pulses:  2+ and symmetric all extremities    Skin:  Skin color, texture, turgor normal, no rashes or lesions    Lymph nodes:  No palpable cervical, axillary or inguinal lymphadenopathy        Lab Results:   Results from last 7 days   Lab Units 05/06/21  0634   WBC Thousand/uL 11 01*   HEMOGLOBIN g/dL 13 6   HEMATOCRIT % 41 8   PLATELETS Thousands/uL 213   NEUTROS PCT % 78*   LYMPHS PCT % 9*   MONOS PCT % 11   EOS PCT % 1     Results from last 7 days   Lab Units 05/05/21 1912   POTASSIUM mmol/L 4 4   CHLORIDE mmol/L 102   CO2 mmol/L 28   BUN mg/dL 18   CREATININE mg/dL 1 08   CALCIUM mg/dL 8 6   ALK PHOS U/L 348*   ALT U/L 120*   AST U/L 26     Results from last 7 days   Lab Units 05/05/21  1912   INR  1 08     Results from last 7 days   Lab Units 05/06/21  0634   LIPASE u/L 360       Imaging Studies: I have personally reviewed pertinent imaging studies  Xr Chest 1 View Portable    Result Date: 5/5/2021  Impression: No acute cardiopulmonary disease  Workstation performed: [id number]     Us Right Upper Quadrant    Result Date: 5/6/2021  Impression: Mild gallbladder wall thickening without additional evidence of acute cholecystitis  Pancreatic head mass again identified  Associated pancreatic ductal dilatation  Indeterminate hypoechoic lesion within the left lobe of the liver  Hyperechoic left hepatic lobe lesion possibly representing a hemangioma  Multiple hepatic cysts  Workstation performed: [id number]     Ct Abdomen Pelvis With Contrast    Result Date: 5/5/2021  Impression: 1  Uniform mural hyperenhancement of the wall of the gallbladder with a small amount of pericholecystic fluid, worrisome for acute cholecystitis  Further clinical assessment advised  Ultrasound of the right upper quadrant suggested for further assessment  2   Pneumobilia with stent in the common bile duct redemonstrated  3   3 8 cm obstructing mass in the region of the head of the pancreas with marked pancreatic ductal dilatation, worrisome for adenocarcinoma  Other tumors not excluded  4   Numerous hepatic cysts redemonstrated with an enhancing lesion in the left lobe of liver possibly a hemangioma, stable   5   Moderate constipation  Workstation performed: [id number]           Patient was seen and examined by Dr Ashlyn Parmar  All velasquez medical decisions were made by Dr Ashlyn Parmar  Thank you for allowing us to participate in the care of this present patient  We will follow-up with you closely

## 2021-05-06 NOTE — ASSESSMENT & PLAN NOTE
Complaining of epigastric pain radiating diffusely  No nausea, vomiting, diarrhea  Recent admission for elevated LFTs where CT abdomen showed pancreatic head mass suspected to be malignant  · CA 19-9 elevated 487 indicative of pancreatic cancer, AFP and CEA within normal range  · Biliary stent was placed and sphincterotomy performed    Patient returning abdominal pain that has been intermittent    Ct Abdomen Pelvis With Contrast Result Date: 5/5/2021 Impression: 1  Uniform mural hyperenhancement of the wall of the gallbladder with a small amount of pericholecystic fluid, worrisome for acute cholecystitis  Further clinical assessment advised  Ultrasound of the right upper quadrant suggested for further assessment  2   Pneumobilia with stent in the common bile duct redemonstrated  3   3 8 cm obstructing mass in the region of the head of the pancreas with marked pancreatic ductal dilatation, worrisome for adenocarcinoma  Other tumors not excluded  4   Numerous hepatic cysts redemonstrated with an enhancing lesion in the left lobe of liver possibly a hemangioma, stable  5   Moderate constipation  Us Right Upper Quadrant Result Date: 5/6/2021 Impression: Mild gallbladder wall thickening without additional evidence of acute cholecystitis  Pancreatic head mass again identified  Associated pancreatic ductal dilatation  Indeterminate hypoechoic lesion within the left lobe of the liver  Hyperechoic left hepatic lobe lesion possibly representing a hemangioma  Multiple hepatic cysts          Recent Labs     05/05/21  0911 05/05/21  1912   AST 34 26   ALT 97* 120*   ALKPHOS 272 9* 348*   TBILI 1 44* 1 04*     · Transaminitis noted in prior admission as well, may be secondary to pancreatic neoplasm  · MRCP from prior admission showed no presence of gallstones or sludge  · Patient has severe jaundiced with mild scleral icterus    Plan:  · Continue pancrelipase that was started on previous admission  · Surgical Oncology consulted-appreciate recommendations  · No surgical intervention recommended at this time  · Diet as tolerated   NPO after midnight for possible EUS tomorrow    · GI consult for possible endoscopic ultrasound for biopsy of pancreatic mass  · Monitor fever/WBC curve of antibiotics

## 2021-05-06 NOTE — PROGRESS NOTES
RECTAL/GI MULTIDISCIPLINARY CASE REVIEW    DATE: 5/6/21      PRESENTING DOCTOR: Dr Veronique Harrison      DIAGNOSIS: Pancreas Mass      Reg Rosado was presented at the Rectal/GI Multidisciplinary Conference today  PHYSICIAN RECOMMENDED PLAN:    -Complete EUS for biopsy - try to obtain while in patient  -Reschedule follow up with Dr Veronique Harrison  -Once biopsy proven- set up consult with medical oncology for consideration of treatment    Team agreed to plan      NCCN guidelines were readily available for review at this discussion

## 2021-05-06 NOTE — H&P
[address] [facility] 1955, 73 y o  male MRN: [id number]  Unit/Bed#: ED 12 Encounter: [id number]  Primary Care Provider: Dasia Farrar MD   Date and time admitted to hospital: 5/5/2021  6:46 PM    * Epigastric pain  Assessment & Plan  Complaining of epigastric pain radiating diffusely  No nausea, vomiting, diarrhea  Recent admission for elevated LFTs where CT abdomen showed pancreatic mass suspected to be malignant  · CA 19-9 elevated 487 indicative of pancreatic cancer, AFP and CEA within normal range  · Biliary stent was placed and sphincterotomy performed    Patient returning abdominal pain that has been intermittent    Ct Abdomen Pelvis With Contrast    Result Date: 5/5/2021  Impression: 1  Uniform mural hyperenhancement of the wall of the gallbladder with a small amount of pericholecystic fluid, worrisome for acute cholecystitis  Further clinical assessment advised  Ultrasound of the right upper quadrant suggested for further assessment  2   Pneumobilia with stent in the common bile duct redemonstrated  3   3 8 cm obstructing mass in the region of the head of the pancreas with marked pancreatic ductal dilatation, worrisome for adenocarcinoma  Other tumors not excluded  4   Numerous hepatic cysts redemonstrated with an enhancing lesion in the left lobe of liver possibly a hemangioma, stable  5   Moderate constipation      Recent Labs     05/05/21  0911 05/05/21  1912   AST 34 26   ALT 97* 120*   ALKPHOS 272 9* 348*   TBILI 1 44* 1 04*   · Transaminitis noted in prior admission as well, may be secondary to pancreatic neoplasm  · MRCP from prior admission showed no presence of gallstones or sludge  · Patient has severe jaundiced with mild scleral icterus    Plan:  · Surgical Oncology consulted-appreciate recommendations  · No surgical intervention recommended at this time  · Diet as tolerated  · GI consult for possible endoscopic ultrasound for biopsy of pancreatic mass  · Continue pancrelipase that was started on previous admission  · Monitor fever/WBC curve of antibiotics  · Given CT findings of possible pericholecystic fluid, obtain right upper quadrant ultrasound to rule out gallstone    BPH (benign prostatic hyperplasia)  Assessment & Plan  Patient has history of BPH managed on Flomax    Continue meds    Type 2 diabetes mellitus without complication, without long-term current use of insulin (HCC)  Assessment & Plan  Lab Results   Component Value Date    HGBA1C 6 2 (H) 05/05/2021       No results for input(s): POCGLU in the last 72 hours  Blood Sugar Average: Last 72 hrs:   Home PO meds:  None  Unclear patient has true diabetes as this may be secondary to pancreatic neoplasm    Plan:   Diabetic Diet: Consistent CHO Level 2 (5 CHO servings/75g CHO per meal)   Insulin regimen  o Sliding Scale Insulin ACHS   Goal Blood Glucose 140-180   Glucose checks:  ACHS   Monitor for hypoglycemia and treat per protocol      Hyperlipidemia  Assessment & Plan  Lab Results   Component Value Date    CHOLESTEROL 188 05/05/2021    TRIG 181 6 (H) 05/05/2021    HDL 36 (L) 05/05/2021    LDLCALC 116 (H) 05/05/2021     Plan:  ·     Hypertension  Assessment & Plan  Blood Pressure: 131/76    History of hypertension managed on lisinopril-hydrochlorothiazide 10-12 5 mg q d  Plan:   Continue home meds   Medications held:  None   Monitor blood pressure      Lactic acidosis-resolved as of 5/6/2021  Assessment & Plan  Recent Labs     05/05/21  1912 05/05/21 2124   LACTICACID 2 1* 1 7     POA  Patient received 2 L of normal saline boluses in the ED  Now resolved    Plan:  · Continue with maintenance fluids Plasmalyte 75 mL/hr    VTE Prophylaxis: Heparin  / sequential compression device   Code Status: Level 1 - Full code  POLST: POLST form is not discussed and not completed at this time      Anticipated Length of Stay:  Patient will be admitted on an Inpatient basis with an anticipated length of stay of  Greater than 2 midnights  Justification for Hospital Stay: Possible further evaluation by Gastroenterology    Chief Complaint:   Epigastric and RLQ pain    History of Present Illness:    Makenna Francisco is a 73 y o  male who presents with abdominal pain that has been intermittently has since the previous admission on 04/29 during which the ERCP with biliary stenting was performed  Past medical history of rheumatoid arthritis, hyperlipidemia, pancreatic mass suspected to be pancreatic cancer, DM 2, obstructive jaundice due to malignant neoplasm  During the previous admission, patient was noted to be with obstructive jaundice secondary to newly discovered pancreatic mass lesion to bile duct compression which she underwent a successful ERCP with bile duct brushings and stenting of the biliary duct  Biliary duct brushings showed reactive epithelial cells and were inconclusive  He was instructed follow-up with Surgical Oncology on 5/6/21  Patient was noted to have normal bowel function on discharge, but intermittent pain would return shortly after  As result, patient presented to the ED  Pain is located in the right upper quadrant along with the midepigastric region  No changes in bowel habits or urine color  Denies nausea, vomiting, fever, chest pain, shortness of breath, constipation, diarrhea  In the ED, CT of the abdomen was obtained with results noted above and chest x-ray was negative  Patient was evaluated by Surgical Oncology in the ED and was recommended be admitted medical observation  Review of Systems:    Review of Systems   Constitutional: Positive for fatigue  Negative for activity change, appetite change, chills and fever  HENT: Negative for congestion  Eyes: Negative for visual disturbance  Respiratory: Negative for cough, shortness of breath and wheezing  Cardiovascular: Negative for chest pain and leg swelling     Gastrointestinal: Positive for abdominal pain  Negative for abdominal distention, blood in stool, constipation, diarrhea, nausea and vomiting  Genitourinary: Negative for decreased urine volume, difficulty urinating, dysuria, frequency and urgency  Musculoskeletal: Negative for arthralgias and myalgias  Skin: Positive for color change  Negative for pallor and rash  Neurological: Negative for dizziness, syncope, weakness and light-headedness  Psychiatric/Behavioral: Negative for confusion  All other systems reviewed and are negative  Past Medical and Surgical History:     Past Medical History:   Diagnosis Date    Arthritis     Cellulitis     LAST ASSESSED: 6/13/14    Enlarged prostate     Epidermal inclusion cyst     LAST ASSESSED: 10/4/13    Erythrasma     LAST ASSESSED: 9/23/13    Furuncle     LAST ASSESSED: 6/11/14    Hyperlipidemia     Hypertension        Past Surgical History:   Procedure Laterality Date    HYDROCELE EXCISION / REPAIR Right 01/11/2018    SPERMATIC CORD EXCSION OF HYDROCELE; MANAGED BY: Angel BANDA REMOVAL OF HYDROCELE,TUNICA,UNILAT Right 1/11/2018    Procedure: HYDROCELECTOMY;  Surgeon: Tamanna Candelaria MD;  Location: [facility];  Service: Urology    SCROTAL SURGERY      benign "lump"       Meds/Allergies:    Prior to Admission medications    Medication Sig Start Date End Date Taking? Authorizing Provider   aspirin 81 mg chewable tablet Chew 81 mg    Historical Provider, MD   Blood Glucose Monitoring Suppl (ONE TOUCH ULTRA 2) w/Device KIT Use daily 3/17/21   Sue Razo MD   FLUoxetine (PROzac) 20 mg capsule TAKE 1 CAPSULE BY MOUTH EVERY DAY   10/13/20   Sue Razo MD   folic acid (FOLVITE) 1 mg tablet Take by mouth daily    Historical Provider, MD   glucose blood (OneTouch Ultra) test strip Use as instructed once a day 3/17/21   Sue Razo MD   Lancets (onetouch ultrasoft) lancets Use as instructed once a day 3/17/21   Arnold Durant MD Cheryle   lisinopril-hydrochlorothiazide (PRINZIDE,ZESTORETIC) 10-12 5 MG per tablet TAKE ONE TABLET BY MOUTH EVERY DAY 9/15/20   Ebony Mccain MD   Multiple Vitamins-Minerals (MULTIVITAMIN MEN) TABS Take 1 tablet by mouth daily    Historical Provider, MD   Omega-3 Fatty Acids (FISH OIL) 1,000 mg Take 1,000 mg by mouth daily    Historical Provider, MD   omeprazole (PriLOSEC) 20 mg delayed release capsule Take 1 capsule (20 mg total) by mouth daily before breakfast 5/3/21   Ebony Mccain MD   pancrelipase, Lip-Prot-Amyl, (CREON) 24,000 units Take 1 capsule (24,000 Units total) by mouth 3 (three) times a day with meals 4/30/21   Francoise Bianchi DO   tamsulosin (FLOMAX) 0 4 mg TAKE 1 CAPSULE BY MOUTH  DAILY AT BEDTIME 6/10/20   Jacoby Solano MD     I have reviewed home medications with patient personally  Allergies:    Allergies   Allergen Reactions    Fentanyl Anaphylaxis and Other (See Comments)     Oxygen drops severely       Social History:     Marital Status: /Civil Union   Occupation:  Employed  Patient Pre-hospital Living Situation:  Lives at home  Patient Pre-hospital Level of Mobility:  Ambulates without assistance  Patient Pre-hospital Diet Restrictions:  No dietary restrictions  Substance Use History:   Social History     Substance and Sexual Activity   Alcohol Use No     Social History     Tobacco Use   Smoking Status Former Smoker   Smokeless Tobacco Never Used   Tobacco Comment    tobacco in a pipe in the early 1980's     Social History     Substance and Sexual Activity   Drug Use No       Family History:    Family History   Problem Relation Age of Onset    Heart disease Father         CARDIAC DISORDER    Hypertension Father     Hypertension Mother     No Known Problems Maternal Aunt     No Known Problems Maternal Uncle     No Known Problems Paternal Aunt     No Known Problems Paternal Uncle     No Known Problems Paternal Grandmother     No Known Problems Paternal Grandfather     Diabetes Maternal Grandmother         MELLITUS    No Known Problems Maternal Grandfather     Hypertension Other        Physical Exam:     Vitals:   Blood Pressure: 131/76 (05/05/21 2100)  Pulse: 100 (05/05/21 2100)  Temperature: 98 1 °F (36 7 °C) (05/05/21 1844)  Temp Source: Oral (05/05/21 1844)  Respirations: 17 (05/05/21 2100)  SpO2: 95 % (05/05/21 2100)    Physical Exam  Vitals signs and nursing note reviewed  Constitutional:       General: He is not in acute distress  Appearance: Normal appearance  He is well-developed  He is not toxic-appearing or diaphoretic  HENT:      Head: Normocephalic and atraumatic  Eyes:      General: Scleral icterus present  Conjunctiva/sclera: Conjunctivae normal    Neck:      Musculoskeletal: Neck supple  Trachea: Phonation normal    Cardiovascular:      Rate and Rhythm: Normal rate and regular rhythm  Heart sounds: Normal heart sounds, S1 normal and S2 normal  Heart sounds not distant  No murmur  No friction rub  No gallop  Pulmonary:      Effort: Pulmonary effort is normal  No accessory muscle usage or respiratory distress  Breath sounds: Normal breath sounds  No wheezing, rhonchi or rales  Chest:      Chest wall: No mass  Abdominal:      General: Bowel sounds are decreased  There is no distension  Palpations: Abdomen is soft  There is no mass  Tenderness: There is generalized abdominal tenderness and tenderness in the right upper quadrant, right lower quadrant, epigastric area and suprapubic area  There is no right CVA tenderness, guarding or rebound  Negative signs include Calles's sign  Skin:     General: Skin is warm and dry  Coloration: Skin is jaundiced  Neurological:      Mental Status: He is alert and oriented to person, place, and time  Psychiatric:         Behavior: Behavior normal        Additional Data:     Lab Results: I have personally reviewed pertinent reports        Results from last 7 days   Lab Units 05/05/21 1912   WBC Thousand/uL 10 23*   HEMOGLOBIN g/dL 14 5   HEMATOCRIT % 43 0   PLATELETS Thousands/uL 245   NEUTROS PCT % 82*   LYMPHS PCT % 8*   MONOS PCT % 8   EOS PCT % 1     Results from last 7 days   Lab Units 05/05/21 1912   POTASSIUM mmol/L 4 4   CHLORIDE mmol/L 102   CO2 mmol/L 28   BUN mg/dL 18   CREATININE mg/dL 1 08   CALCIUM mg/dL 8 6   ALK PHOS U/L 348*   ALT U/L 120*   AST U/L 26     Results from last 7 days   Lab Units 05/05/21 1912   INR  1 08       Imaging: I have personally reviewed pertinent reports  and I have personally reviewed pertinent films in PACS    Xr Chest 1 View Portable    Result Date: 5/5/2021  Narrative: CHEST INDICATION:   weak  COMPARISON:  4/29/2021 and 10/13/2017 EXAM PERFORMED/VIEWS:  XR CHEST PORTABLE FINDINGS: Cardiomediastinal silhouette appears unremarkable  The lungs are clear  No pneumothorax or pleural effusion  There is some degenerative arthritis at the acromioclavicular joint  Soft tissue mass of the right chest wall seen on the CT scan is not evident on plain film  Impression: No acute cardiopulmonary disease  Workstation performed: [id number]     Ct Chest Wo Contrast    Result Date: 4/29/2021  Narrative: CT CHEST WITHOUT IV CONTRAST INDICATION:   Pancreatic adenocarcinoma, initial workup staging pancreatic mass  COMPARISON:  Chest x-ray 10/13/2017 TECHNIQUE: CT examination of the chest was performed without intravenous contrast   Axial, sagittal, and coronal 2D reformatted images were created from the source data and submitted for interpretation  Radiation dose length product (DLP) for this visit:  345 mGy-cm   This examination, like all CT scans performed in the [facility], was performed utilizing techniques to minimize radiation dose exposure, including the use of iterative reconstruction and automated exposure control  FINDINGS: LUNGS:  Lungs are clear  There is no tracheal or endobronchial lesion   PLEURA: Unremarkable  HEART/GREAT VESSELS:  Unremarkable for patient's age  MEDIASTINUM AND WAQAS:  Unremarkable  CHEST WALL AND LOWER NECK:   Soft tissue lesion in the subcutaneous tissues of the right chest wall inferior to the right nipple measuring 3 0 x 1 4 cm on image 2/44  VISUALIZED STRUCTURES IN THE UPPER ABDOMEN:  Biliary stent with pneumobilia and multiple hypodense lesions throughout the liver previously attributed to cysts  OSSEOUS STRUCTURES:  No acute fracture or destructive osseous lesion  Impression: Soft tissue lesion in the subcutaneous tissues of the right chest wall inferior to the right nipple measuring 8 0-1 4 cm on image 2/44  Tissue sampling may be warranted  No other evidence of potential metastatic disease to the chest  Workstation performed: [id number]     Mri Abdomen W Wo Contrast And Mrcp    Result Date: 4/28/2021  Narrative: MRI OF THE ABDOMEN WITH AND WITHOUT CONTRAST WITH MRCP INDICATION:  Newly identified pancreatic mass with biliary dilatation COMPARISON: 4/27/2021 TECHNIQUE:  The following pulse sequences were obtained:  Coronal and axial T2 with TE of 90 and 180 respectively, axial T2 with fat saturation, axial FIESTA fat-sat, axial T1-weighted in-and-out-of phase, axial DWI/ADC, pre-contrast axial T1 with fat saturation, post-contrast dynamic axial T1 with fat saturation at 20, 70, and 180 seconds, followed by coronal and 7 minute delayed axial T1 with fat saturation  3D MRCP images were obtained with radial thick slabs and projections  3D rendering was performed from the acquisition scanner  IV Contrast:  8 mL of Gadobutrol injection (SINGLE-DOSE) FINDINGS: LOWER CHEST:   Unremarkable  LIVER: Normal in size and configuration    Multiple hepatic cysts again seen, the largest of which measures 5 6 cm at the junction of segments 7 and 8   Indeterminant lesion within segment 3 again seen measuring 2 6 cm which demonstrates a continuous ring peripheral enhancement with some nodularity and progressive fill-in on delayed images  No demonstrable washout    The hepatic veins and portal veins are patent  BILE DUCTS:  Intra and extrahepatic biliary ductal dilatation  CBD measures up to 1 7 cm  No evidence of filling defect  Abrupt cut off the level of the pancreatic head  GALLBLADDER:  Normal  PANCREAS:  Hypoenhancing mass centered at the pancreatic head/uncinate process measuring 2 9 x 3 5 x 3 5 cm (series 12, image 66, series 13, image 67)  Pancreatic ductal dilatation up to 8 mm  No evidence of encasement of the portal vein and superior mesenteric artery  Additional cystic change at the uncinate process likely represent upstream ductal dilatation  ADRENAL GLANDS:  Normal  SPLEEN:  Normal  KIDNEYS/PROXIMAL URETERS:  No hydroureteronephrosis  Bilateral renal cysts  BOWEL:   No dilated loops of bowel  PERITONEUM/RETROPERITONEUM:  No ascites  LYMPH NODES:  No abdominal lymphadenopathy  VASCULAR STRUCTURES:  No aneurysm  ABDOMINAL WALL:  Unremarkable  OSSEOUS STRUCTURES:  No suspicious osseous lesion  Impression: 3 5 cm mass at the pancreatic head/uncinate process resulting in upstream dilatation of the CBD and pancreatic duct  No evidence of encasement of the portal vein or superior mesenteric artery  Findings concerning for pancreatic neoplasm such as pancreatic adenocarcinoma  Unchanged appearance of 2 6 cm atypical lesion at segment 3 of the liver consistent with atypical hemangioma or less likely low-grade neoplasm    Workstation performed: [id number]     Fl Ercp Biliary Only    Result Date: 4/28/2021  Narrative: ERCP INDICATION:  Pancreatic mass COMPARISON:  MRI 4/27/2021 IMAGES:  2 FLUOROSCOPY TIME:   1 31 seconds CONTRAST: 3 mL of iohexol (OMNIPAQUE) FINDINGS: Fluoroscopic guidance was provided for performance of ERCP  BILIARY: Side-viewing duodenal scope identified  Common bile duct was cannulated and injected    Distal common bile duct 2 cm long smooth narrowing noted with proximal dilatation 15 mm  Sphincterotomy and brushings performed  10 mm x 6 cm covered metal stent placed across the stricture  Impression: Distal common bile duct narrowing likely due to known pancreatic mass status post enterotomy, brush biopsy, and stenting  Workstation performed: [id number]     Ct Abdomen Pelvis With Contrast    Result Date: 5/5/2021  Narrative: CT ABDOMEN AND PELVIS WITH IV CONTRAST INDICATION:   Abdominal pain, acute, nonlocalized Worsening abdominal pain  History of pancreatic mass  COMPARISON:  9/19/2019; 4/27/2021 TECHNIQUE:  CT examination of the abdomen and pelvis was performed  Axial, sagittal, and coronal 2D reformatted images were created from the source data and submitted for interpretation  Radiation dose length product (DLP) for this visit:  382 mGy-cm   This examination, like all CT scans performed in the [facility], was performed utilizing techniques to minimize radiation dose exposure, including the use of iterative reconstruction and automated exposure control  IV Contrast:  100 mL of iohexol (OMNIPAQUE) Enteric Contrast:  Enteric contrast was not administered  FINDINGS: ABDOMEN LOWER CHEST:  Well circumscribed approximately 2 9 cm mass in the right anterior chest wall in the mid clavicular line on image 3, series 2 redemonstrated, stable from September 2019, nonspecific  LIVER/BILIARY TREE:  There is pneumobilia in the nondependent portion of the biliary tree likely related to common bile duct stent present previously  Numerous rounded hypodense lesions noted compatible with hepatic cysts  Additionally there is a uniformly enhancing mass in the left lobe of liver, image 24, series 2, measuring 2 5 cm, stable from 2019 possibly a hemangioma  GALLBLADDER:  There is circumferential mural enhancement of the wall of the gallbladder with a small amount of pericholecystic fluid  SPLEEN:  Unremarkable   PANCREAS:  A large hypodense /necrotic mass in the head/uncinate process of the pancreas is redemonstrated on image 35, series 2 measuring 3 8 cm, abutting the stent in the common bile duct  Proximally the pancreatic duct is markedly dilated, measuring up to 0 8 cm on image 28, series 2 in the neck of the pancreas  No peripancreatic inflammatory changes  The mass in the region of the uncinate process/head of the pancreas compresses the 3rd portion of the duodenum without latha invasion of the lumen  Appearance is similar to the prior study  ADRENAL GLANDS:  Unremarkable  KIDNEYS/URETERS:  One or more simple renal cyst(s) is noted  Otherwise unremarkable kidneys  No hydronephrosis  STOMACH AND BOWEL:  Moderate amount of retained colonic stool in the right hemicolon, transverse colon and proximal half of the descending colon  Sigmoid colon and rectum relatively decompressed  APPENDIX:  No findings to suggest appendicitis  ABDOMINOPELVIC CAVITY:  No ascites  No pneumoperitoneum  No lymphadenopathy  VESSELS:  Atherosclerotic calcifications noted  PELVIS REPRODUCTIVE ORGANS:  Top normal to mildly enlarged prostate with dystrophic calcifications redemonstrated  URINARY BLADDER:  Unremarkable  ABDOMINAL WALL/INGUINAL REGIONS:  Unremarkable  OSSEOUS STRUCTURES:  No acute fracture or destructive osseous lesion  Impression: 1  Uniform mural hyperenhancement of the wall of the gallbladder with a small amount of pericholecystic fluid, worrisome for acute cholecystitis  Further clinical assessment advised  Ultrasound of the right upper quadrant suggested for further assessment  2   Pneumobilia with stent in the common bile duct redemonstrated  3   3 8 cm obstructing mass in the region of the head of the pancreas with marked pancreatic ductal dilatation, worrisome for adenocarcinoma  Other tumors not excluded  4   Numerous hepatic cysts redemonstrated with an enhancing lesion in the left lobe of liver possibly a hemangioma, stable  5   Moderate constipation  Workstation performed: [id number]     Ct Abdomen Pelvis With Contrast    Result Date: 4/27/2021  Narrative: CT ABDOMEN AND PELVIS WITH IV CONTRAST INDICATION:   Diarrhea Abdominal pain, weight loss, epigastric pain Abdominal discomfort, loose stools, weight loss and transaminitis over the last 3 weeks  COMPARISON:  9/19/2019 TECHNIQUE:  CT examination of the abdomen and pelvis was performed  Axial, sagittal, and coronal 2D reformatted images were created from the source data and submitted for interpretation  Radiation dose length product (DLP) for this visit:  815 mGy-cm   This examination, like all CT scans performed in the [facility], was performed utilizing techniques to minimize radiation dose exposure, including the use of iterative reconstruction and automated exposure control  IV Contrast:  100 mL of iohexol (OMNIPAQUE) Enteric Contrast:  Enteric contrast was not administered  FINDINGS: ABDOMEN LOWER CHEST:  No clinically significant abnormality identified in the visualized lower chest   Oval well-definedd soft tissue density nodule within the right chest   This measures 2 8 x 1 2 cm, similar to the prior in 2019  Correlate with physical exam findings  Unclear if this represents a subdermal nodule or asymmetric gynecomastia  LIVER/BILIARY TREE:  Multiple scattered cysts seen throughout the liver without suspicious enhancing component and without suspicious change compared to the prior  Single homogeneously enhancing lesion in the left lateral segment with relative washout on delayed imaging, contrast density follows portal venous density  Stable compared to prior  Although not pathognomonic, 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  New moderate intrahepatic biliary ductal dilatation  Common hepatic and common bile duct abnormally distended to the level of the pancreatic head  GALLBLADDER:  Abnormal distended gallbladder caliber and borderline wall thickening    No pericholecystic inflammation or calcified stones  SPLEEN:  Unremarkable  PANCREAS:  Large ill-defined hypoattenuating mass within the pancreatic head and uncinate measuring 4 2 x 2 9 cm with dilatation/obstruction of the common bile duct and pancreatic duct, likely represent pancreatic carcinoma  Regional vasculature remains  patent  No pathologic adenopathy identified  Pancreatic duct dilated within the neck at 8 mm, tapering through the body and tail  ADRENAL GLANDS:  Unremarkable  KIDNEYS/URETERS:  Unremarkable  No hydronephrosis  STOMACH AND BOWEL:  Unremarkable  APPENDIX:  No findings to suggest appendicitis  ABDOMINOPELVIC CAVITY:  No ascites  No pneumoperitoneum  No lymphadenopathy  VESSELS:  Unremarkable for patient's age  PELVIS REPRODUCTIVE ORGANS:  Unremarkable for patient's age  URINARY BLADDER:  Unremarkable  ABDOMINAL WALL/INGUINAL REGIONS:  Unremarkable  OSSEOUS STRUCTURES:  No acute fracture or destructive osseous lesion  Impression: Moderate intra and extrahepatic biliary ductal dilatation, pancreatic duct dilatation and gallbladder distention which appear secondary to a large ill-defined pancreatic head mass, pancreatic carcinoma until proven otherwise  Multiple hepatic cysts without suspicious change  Stable homogeneously enhancing left lateral segment lesion likely representing cavernous hemangioma  The study was marked in [facility] for immediate notification  Workstation performed: [id number]       EKG, Pathology, and Other Studies Reviewed on Admission:   · EKG:  Normal sinus rhythm with left axis deviation    Epic / Care Everywhere Records Reviewed: Yes     ** Please Note: This note has been constructed using a voice recognition system   **

## 2021-05-06 NOTE — ASSESSMENT & PLAN NOTE
Blood Pressure: 131/76    History of hypertension managed on lisinopril-hydrochlorothiazide 10-12 5 mg q d      Plan:   Continue home meds   Medications held:  None   Monitor blood pressure

## 2021-05-06 NOTE — ASSESSMENT & PLAN NOTE
Lab Results   Component Value Date    CHOLESTEROL 188 05/05/2021    TRIG 181 6 (H) 05/05/2021    HDL 36 (L) 05/05/2021    LDLCALC 116 (H) 05/05/2021     Plan:  ·

## 2021-05-06 NOTE — ASSESSMENT & PLAN NOTE
Recent Labs     05/05/21  1912 05/05/21  2124   LACTICACID 2 1* 1 7     POA  Patient received 2 L of normal saline boluses in the ED  Now resolved    Plan:  · Continue with maintenance fluids Plasmalyte 75 mL/hr

## 2021-05-06 NOTE — PROGRESS NOTES
[facility]  Progress Note - Rodney Hameed 1955, 73 y o  male MRN: [id number]  Unit/Bed#: S -01 Encounter: [id number]  Primary Care Provider: Marino Hassan MD   Date and time admitted to hospital: 5/5/2021  6:46 PM    * Epigastric pain  Assessment & Plan  Complaining of epigastric pain radiating diffusely  No nausea, vomiting, diarrhea  Recent admission for elevated LFTs where CT abdomen showed pancreatic head mass suspected to be malignant  · CA 19-9 elevated 487 indicative of pancreatic cancer, AFP and CEA within normal range  · Biliary stent was placed and sphincterotomy performed    Patient returning abdominal pain that has been intermittent    Ct Abdomen Pelvis With Contrast Result Date: 5/5/2021 Impression: 1  Uniform mural hyperenhancement of the wall of the gallbladder with a small amount of pericholecystic fluid, worrisome for acute cholecystitis  Further clinical assessment advised  Ultrasound of the right upper quadrant suggested for further assessment  2   Pneumobilia with stent in the common bile duct redemonstrated  3   3 8 cm obstructing mass in the region of the head of the pancreas with marked pancreatic ductal dilatation, worrisome for adenocarcinoma  Other tumors not excluded  4   Numerous hepatic cysts redemonstrated with an enhancing lesion in the left lobe of liver possibly a hemangioma, stable  5   Moderate constipation  Us Right Upper Quadrant Result Date: 5/6/2021 Impression: Mild gallbladder wall thickening without additional evidence of acute cholecystitis  Pancreatic head mass again identified  Associated pancreatic ductal dilatation  Indeterminate hypoechoic lesion within the left lobe of the liver  Hyperechoic left hepatic lobe lesion possibly representing a hemangioma  Multiple hepatic cysts          Recent Labs     05/05/21  0911 05/05/21  1912   AST 34 26   ALT 97* 120*   ALKPHOS 272 9* 348*   TBILI 1 44* 1 04* · Transaminitis noted in prior admission as well, may be secondary to pancreatic neoplasm  · MRCP from prior admission showed no presence of gallstones or sludge  · Patient has severe jaundiced with mild scleral icterus    Plan:  · Continue pancrelipase that was started on previous admission  · Surgical Oncology consulted-appreciate recommendations  · No surgical intervention recommended at this time  · Diet as tolerated   NPO after midnight for possible EUS tomorrow  · GI consult for possible endoscopic ultrasound for biopsy of pancreatic mass  · Monitor fever/WBC curve of antibiotics    Pancreatic mass, Probable Pancreatic cancer  Assessment & Plan  · Patient had pancreatic mass identified a previous admission on CT scan done on 04/29/2021  Mass presented with biliary obstruction which improved s/p biliary duct stenting  Brushing unfortunately was nondiagnostic  CT Abdomen Pelvis With Contrast Result Date: 5/5/2021 Impression:   3 8 cm obstructing mass in the region of the head of the pancreas with marked pancreatic ductal dilatation, worrisome for adenocarcinoma  Other tumors not excluded  · Us Right Upper Quadrant Result Date: 5/6/2021 Impression: Mild gallbladder wall thickening without additional evidence of acute cholecystitis  Pancreatic head mass again identified  Associated pancreatic ductal dilatation  Indeterminate hypoechoic lesion within the left lobe of the liver  Hyperechoic left hepatic lobe lesion possibly representing a hemangioma  Multiple hepatic cysts  · Patient had f/u with outpatient Surgical Oncology scheduled for 05/06/2021  Plan:  · Inpatient oncology team following  Hypertension  Assessment & Plan  Blood Pressure: 129/68    History of hypertension managed on lisinopril-hydrochlorothiazide 10-12 5 mg q d      Plan:   Continue home meds   Medications held:  None   Monitor blood pressure          VTE Pharmacologic Prophylaxis:   Pharmacologic: Heparin  Mechanical VTE Prophylaxis in Place: Yes    Discussions with Specialists or Other Care Team Provider:  GI team    Education and Discussions with Family / Patient:  ATC Wife's Lulu    Current Length of Stay: 1 day(s)    Current Patient Status: Inpatient     Discharge Plan / Estimated Discharge Date:  48-72 hours    Code Status: Level 1 - Full Code      Subjective:   Patient was awake and laying in bed  He admits to epigastric pain as well as lower quadrant pain bilaterally  He states that symptoms in the morning were about a 7 to 8/10  After a received is Toradol of pain was actually lessening  Objective:     Vitals:   Temp (24hrs), Av °F (37 2 °C), Min:98 1 °F (36 7 °C), Max:99 5 °F (37 5 °C)    Temp:  [98 1 °F (36 7 °C)-99 5 °F (37 5 °C)] 99 5 °F (37 5 °C)  HR:  [] 63  Resp:  [16-18] 18  BP: ()/(56-76) 129/68  SpO2:  [92 %-98 %] 94 %  There is no height or weight on file to calculate BMI  Input and Output Summary (last 24 hours): Intake/Output Summary (Last 24 hours) at 2021 1304  Last data filed at 2021 0017  Gross per 24 hour   Intake 2000 ml   Output --   Net 2000 ml       Physical Exam:     Physical Exam  Vitals signs and nursing note reviewed  Constitutional:       General: He is not in acute distress  Appearance: Normal appearance  He is not ill-appearing or toxic-appearing  HENT:      Head: Normocephalic and atraumatic  Nose: Nose normal       Mouth/Throat:      Mouth: Mucous membranes are moist       Pharynx: No oropharyngeal exudate or posterior oropharyngeal erythema  Eyes:      General: No scleral icterus  Right eye: No discharge  Left eye: No discharge  Pupils: Pupils are equal, round, and reactive to light  Neck:      Musculoskeletal: Normal range of motion and neck supple  No neck rigidity or muscular tenderness  Cardiovascular:      Rate and Rhythm: Normal rate and regular rhythm  Pulses: Normal pulses        Heart sounds: Normal heart sounds  No murmur  No friction rub  No gallop  Pulmonary:      Effort: Pulmonary effort is normal  No respiratory distress  Breath sounds: Normal breath sounds  No stridor  No wheezing, rhonchi or rales  Abdominal:      General: Bowel sounds are normal       Palpations: Abdomen is soft  Tenderness: There is abdominal tenderness (Epigastric region)  There is no guarding or rebound  Musculoskeletal:         General: No swelling, tenderness or deformity  Right lower leg: No edema  Left lower leg: No edema  Skin:     General: Skin is warm and dry  Capillary Refill: Capillary refill takes less than 2 seconds  Coloration: Skin is not jaundiced  Findings: No lesion or rash  Neurological:      General: No focal deficit present  Mental Status: He is alert and oriented to person, place, and time  Mental status is at baseline  Motor: No weakness  Psychiatric:         Mood and Affect: Mood normal          Behavior: Behavior normal          Thought Content: Thought content normal          Judgment: Judgment normal          Additional Data:     Labs:    Results from last 7 days   Lab Units 05/06/21  0634   WBC Thousand/uL 11 01*   HEMOGLOBIN g/dL 13 6   HEMATOCRIT % 41 8   PLATELETS Thousands/uL 213   NEUTROS PCT % 78*   LYMPHS PCT % 9*   MONOS PCT % 11   EOS PCT % 1     Results from last 7 days   Lab Units 05/05/21  1912   POTASSIUM mmol/L 4 4   CHLORIDE mmol/L 102   CO2 mmol/L 28   BUN mg/dL 18   CREATININE mg/dL 1 08   CALCIUM mg/dL 8 6   ALK PHOS U/L 348*   ALT U/L 120*   AST U/L 26     Results from last 7 days   Lab Units 05/05/21  1912   INR  1 08       * I Have Reviewed All Lab Data Listed Above  * Additional Pertinent Lab Tests Reviewed:  Pau 66 Admission Reviewed    Imaging:    Imaging Reports Reviewed Today Include: CT Abdomen and Pelvis  Imaging Personally Reviewed by Myself Includes:  CT abdomen and pelvis    Recent Cultures (last 7 days):     Results from last 7 days   Lab Units 05/05/21 1912   BLOOD CULTURE  Received in Microbiology Lab  Culture in Progress  Received in Microbiology Lab  Culture in Progress  Last 24 Hours Medication List:   Current Facility-Administered Medications   Medication Dose Route Frequency Provider Last Rate    aspirin  81 mg Oral Daily Maverick Ro, DO      fish oil  1,000 mg Oral Daily Maverick Ro, DO      FLUoxetine  20 mg Oral Daily Maverick Clairean, DO      folic acid  1 mg Oral Daily Maverick Aletheaan, DO      heparin (porcine)  5,000 Units Subcutaneous Q8H [address] Maverick Aletheaan, DO      insulin lispro  1-6 Units Subcutaneous 4x Daily (AC & HS) Maverick Ro, DO      multi-electrolyte  75 mL/hr Intravenous Continuous Maverick Ro, DO 75 mL/hr (05/06/21 0130)    multivitamin-minerals  1 tablet Oral Daily Maverick Ro, DO      pancrelipase (Lip-Prot-Amyl)  24,000 Units Oral TID With Meals Maverick Ro, DO      pantoprazole  40 mg Oral Early Morning Maverick Ro, DO      sodium chloride (PF)  3 mL Intravenous Q1H PRN Maverick Ro, DO      sodium chloride  1,000 mL Intravenous Once Maverick Jayjay, DO      tamsulosin  0 4 mg Oral HS Maverick Ro,           Today, Patient Was Seen By: Loren Castano MD    ** Please Note: This note has been constructed using a voice recognition system   **

## 2021-05-06 NOTE — TELEPHONE ENCOUNTER
Patient's wife called and requested to cancel [facility] appointment on 5/17/21  States the reason for cancellation is that he does not have diabetes and that they just found out he has a tumor on his pancreas  Please cancel Benson's appointment on 5/17/21 per his wife's request  Thank you

## 2021-05-06 NOTE — ASSESSMENT & PLAN NOTE
Blood Pressure: 129/68    History of hypertension managed on lisinopril-hydrochlorothiazide 10-12 5 mg q d      Plan:   Continue home meds   Medications held:  None   Monitor blood pressure

## 2021-05-07 ENCOUNTER — ANESTHESIA (INPATIENT)
Dept: GASTROENTEROLOGY | Facility: HOSPITAL | Age: 66
DRG: 436 | End: 2021-05-07
Payer: COMMERCIAL

## 2021-05-07 ENCOUNTER — APPOINTMENT (INPATIENT)
Dept: RADIOLOGY | Facility: HOSPITAL | Age: 66
DRG: 436 | End: 2021-05-07
Payer: COMMERCIAL

## 2021-05-07 ENCOUNTER — APPOINTMENT (INPATIENT)
Dept: GASTROENTEROLOGY | Facility: HOSPITAL | Age: 66
DRG: 436 | End: 2021-05-07
Payer: COMMERCIAL

## 2021-05-07 ENCOUNTER — ANESTHESIA EVENT (INPATIENT)
Dept: GASTROENTEROLOGY | Facility: HOSPITAL | Age: 66
DRG: 436 | End: 2021-05-07
Payer: COMMERCIAL

## 2021-05-07 PROBLEM — K59.00 CONSTIPATION: Status: ACTIVE | Noted: 2021-05-07

## 2021-05-07 LAB
ALBUMIN SERPL BCP-MCNC: 2.7 G/DL (ref 3.5–5)
ALP SERPL-CCNC: 261 U/L (ref 46–116)
ALT SERPL W P-5'-P-CCNC: 75 U/L (ref 12–78)
ANION GAP SERPL CALCULATED.3IONS-SCNC: 10 MMOL/L (ref 4–13)
AST SERPL W P-5'-P-CCNC: 20 U/L (ref 5–45)
BILIRUB SERPL-MCNC: 1.25 MG/DL (ref 0.2–1)
BUN SERPL-MCNC: 19 MG/DL (ref 5–25)
CALCIUM ALBUM COR SERPL-MCNC: 9.1 MG/DL (ref 8.3–10.1)
CALCIUM SERPL-MCNC: 8.1 MG/DL (ref 8.3–10.1)
CHLORIDE SERPL-SCNC: 104 MMOL/L (ref 100–108)
CO2 SERPL-SCNC: 24 MMOL/L (ref 21–32)
CREAT SERPL-MCNC: 0.93 MG/DL (ref 0.6–1.3)
ERYTHROCYTE [DISTWIDTH] IN BLOOD BY AUTOMATED COUNT: 14.2 % (ref 11.6–15.1)
GFR SERPL CREATININE-BSD FRML MDRD: 86 ML/MIN/1.73SQ M
GLUCOSE SERPL-MCNC: 114 MG/DL (ref 65–140)
GLUCOSE SERPL-MCNC: 125 MG/DL (ref 65–140)
GLUCOSE SERPL-MCNC: 127 MG/DL (ref 65–140)
GLUCOSE SERPL-MCNC: 129 MG/DL (ref 65–140)
GLUCOSE SERPL-MCNC: 138 MG/DL (ref 65–140)
HCT VFR BLD AUTO: 40 % (ref 36.5–49.3)
HGB BLD-MCNC: 13.1 G/DL (ref 12–17)
MCH RBC QN AUTO: 29.9 PG (ref 26.8–34.3)
MCHC RBC AUTO-ENTMCNC: 32.8 G/DL (ref 31.4–37.4)
MCV RBC AUTO: 91 FL (ref 82–98)
PLATELET # BLD AUTO: 185 THOUSANDS/UL (ref 149–390)
PMV BLD AUTO: 13.6 FL (ref 8.9–12.7)
POTASSIUM SERPL-SCNC: 3.6 MMOL/L (ref 3.5–5.3)
PROT SERPL-MCNC: 5.9 G/DL (ref 6.4–8.2)
RBC # BLD AUTO: 4.38 MILLION/UL (ref 3.88–5.62)
SODIUM SERPL-SCNC: 138 MMOL/L (ref 136–145)
WBC # BLD AUTO: 10.39 THOUSAND/UL (ref 4.31–10.16)

## 2021-05-07 PROCEDURE — 99232 SBSQ HOSP IP/OBS MODERATE 35: CPT | Performed by: INTERNAL MEDICINE

## 2021-05-07 PROCEDURE — 88173 CYTOPATH EVAL FNA REPORT: CPT | Performed by: PATHOLOGY

## 2021-05-07 PROCEDURE — 82948 REAGENT STRIP/BLOOD GLUCOSE: CPT

## 2021-05-07 PROCEDURE — 88172 CYTP DX EVAL FNA 1ST EA SITE: CPT | Performed by: PATHOLOGY

## 2021-05-07 PROCEDURE — 85027 COMPLETE CBC AUTOMATED: CPT | Performed by: PSYCHIATRY & NEUROLOGY

## 2021-05-07 PROCEDURE — 80053 COMPREHEN METABOLIC PANEL: CPT | Performed by: PSYCHIATRY & NEUROLOGY

## 2021-05-07 PROCEDURE — NC001 PR NO CHARGE: Performed by: STUDENT IN AN ORGANIZED HEALTH CARE EDUCATION/TRAINING PROGRAM

## 2021-05-07 PROCEDURE — 71045 X-RAY EXAM CHEST 1 VIEW: CPT

## 2021-05-07 PROCEDURE — 88305 TISSUE EXAM BY PATHOLOGIST: CPT | Performed by: PATHOLOGY

## 2021-05-07 PROCEDURE — 43238 EGD US FINE NEEDLE BX/ASPIR: CPT | Performed by: INTERNAL MEDICINE

## 2021-05-07 RX ORDER — PROPOFOL 10 MG/ML
INJECTION, EMULSION INTRAVENOUS CONTINUOUS PRN
Status: DISCONTINUED | OUTPATIENT
Start: 2021-05-07 | End: 2021-05-07

## 2021-05-07 RX ORDER — AMOXICILLIN 250 MG
1 CAPSULE ORAL 2 TIMES DAILY
Status: DISCONTINUED | OUTPATIENT
Start: 2021-05-07 | End: 2021-05-10 | Stop reason: HOSPADM

## 2021-05-07 RX ORDER — GLYCOPYRROLATE 0.2 MG/ML
INJECTION INTRAMUSCULAR; INTRAVENOUS AS NEEDED
Status: DISCONTINUED | OUTPATIENT
Start: 2021-05-07 | End: 2021-05-07

## 2021-05-07 RX ORDER — KETAMINE HYDROCHLORIDE 50 MG/ML
INJECTION, SOLUTION, CONCENTRATE INTRAMUSCULAR; INTRAVENOUS AS NEEDED
Status: DISCONTINUED | OUTPATIENT
Start: 2021-05-07 | End: 2021-05-07

## 2021-05-07 RX ORDER — ALCOHOL 0.98 ML/ML
20 INJECTION INTRASPINAL ONCE
Status: COMPLETED | OUTPATIENT
Start: 2021-05-07 | End: 2021-05-07

## 2021-05-07 RX ORDER — BUPIVACAINE HYDROCHLORIDE 2.5 MG/ML
10 INJECTION, SOLUTION EPIDURAL; INFILTRATION; INTRACAUDAL ONCE
Status: COMPLETED | OUTPATIENT
Start: 2021-05-07 | End: 2021-05-07

## 2021-05-07 RX ORDER — SODIUM CHLORIDE, SODIUM LACTATE, POTASSIUM CHLORIDE, CALCIUM CHLORIDE 600; 310; 30; 20 MG/100ML; MG/100ML; MG/100ML; MG/100ML
INJECTION, SOLUTION INTRAVENOUS CONTINUOUS PRN
Status: DISCONTINUED | OUTPATIENT
Start: 2021-05-07 | End: 2021-05-07

## 2021-05-07 RX ORDER — POLYETHYLENE GLYCOL 3350 17 G/17G
17 POWDER, FOR SOLUTION ORAL DAILY
Status: DISCONTINUED | OUTPATIENT
Start: 2021-05-07 | End: 2021-05-10 | Stop reason: HOSPADM

## 2021-05-07 RX ORDER — IBUPROFEN 600 MG/1
600 TABLET ORAL EVERY 6 HOURS PRN
Status: DISCONTINUED | OUTPATIENT
Start: 2021-05-07 | End: 2021-05-08

## 2021-05-07 RX ORDER — PROPOFOL 10 MG/ML
INJECTION, EMULSION INTRAVENOUS AS NEEDED
Status: DISCONTINUED | OUTPATIENT
Start: 2021-05-07 | End: 2021-05-07

## 2021-05-07 RX ORDER — LIDOCAINE HYDROCHLORIDE 10 MG/ML
INJECTION, SOLUTION EPIDURAL; INFILTRATION; INTRACAUDAL; PERINEURAL AS NEEDED
Status: DISCONTINUED | OUTPATIENT
Start: 2021-05-07 | End: 2021-05-07

## 2021-05-07 RX ADMIN — SODIUM CHLORIDE, SODIUM GLUCONATE, SODIUM ACETATE, POTASSIUM CHLORIDE, MAGNESIUM CHLORIDE, SODIUM PHOSPHATE, DIBASIC, AND POTASSIUM PHOSPHATE 75 ML/HR: .53; .5; .37; .037; .03; .012; .00082 INJECTION, SOLUTION INTRAVENOUS at 02:56

## 2021-05-07 RX ADMIN — HEPARIN SODIUM 5000 UNITS: 5000 INJECTION INTRAVENOUS; SUBCUTANEOUS at 05:26

## 2021-05-07 RX ADMIN — TAMSULOSIN HYDROCHLORIDE 0.4 MG: 0.4 CAPSULE ORAL at 21:56

## 2021-05-07 RX ADMIN — TOPICAL ANESTHETIC 1 SPRAY: 200 SPRAY DENTAL; PERIODONTAL at 16:05

## 2021-05-07 RX ADMIN — KETAMINE HYDROCHLORIDE 20 MG: 50 INJECTION INTRAMUSCULAR; INTRAVENOUS at 16:29

## 2021-05-07 RX ADMIN — PROPOFOL 100 MCG/KG/MIN: 10 INJECTION, EMULSION INTRAVENOUS at 16:09

## 2021-05-07 RX ADMIN — PANTOPRAZOLE SODIUM 40 MG: 40 TABLET, DELAYED RELEASE ORAL at 05:26

## 2021-05-07 RX ADMIN — OXYCODONE HYDROCHLORIDE 2.5 MG: 5 TABLET ORAL at 20:13

## 2021-05-07 RX ADMIN — ALCOHOL 16 ML: 0.98 INJECTION INTRASPINAL at 13:59

## 2021-05-07 RX ADMIN — SODIUM CHLORIDE, SODIUM GLUCONATE, SODIUM ACETATE, POTASSIUM CHLORIDE, MAGNESIUM CHLORIDE, SODIUM PHOSPHATE, DIBASIC, AND POTASSIUM PHOSPHATE 75 ML/HR: .53; .5; .37; .037; .03; .012; .00082 INJECTION, SOLUTION INTRAVENOUS at 18:50

## 2021-05-07 RX ADMIN — SODIUM CHLORIDE, SODIUM LACTATE, POTASSIUM CHLORIDE, AND CALCIUM CHLORIDE: .6; .31; .03; .02 INJECTION, SOLUTION INTRAVENOUS at 16:03

## 2021-05-07 RX ADMIN — PROPOFOL 30 MG: 10 INJECTION, EMULSION INTRAVENOUS at 17:02

## 2021-05-07 RX ADMIN — BUPIVACAINE HYDROCHLORIDE 10 ML: 2.5 INJECTION, SOLUTION EPIDURAL; INFILTRATION; INTRACAUDAL; PERINEURAL at 13:59

## 2021-05-07 RX ADMIN — FLUOXETINE 20 MG: 20 CAPSULE ORAL at 09:15

## 2021-05-07 RX ADMIN — KETAMINE HYDROCHLORIDE 30 MG: 50 INJECTION INTRAMUSCULAR; INTRAVENOUS at 16:07

## 2021-05-07 RX ADMIN — PROPOFOL 30 MG: 10 INJECTION, EMULSION INTRAVENOUS at 16:24

## 2021-05-07 RX ADMIN — OXYCODONE HYDROCHLORIDE 2.5 MG: 5 TABLET ORAL at 07:29

## 2021-05-07 RX ADMIN — ASPIRIN 81 MG: 81 TABLET, CHEWABLE ORAL at 09:15

## 2021-05-07 RX ADMIN — FOLIC ACID 1 MG: 1 TABLET ORAL at 09:15

## 2021-05-07 RX ADMIN — IBUPROFEN 600 MG: 600 TABLET ORAL at 21:56

## 2021-05-07 RX ADMIN — GLYCOPYRROLATE 0.2 MG: 0.2 INJECTION, SOLUTION INTRAMUSCULAR; INTRAVENOUS at 16:07

## 2021-05-07 RX ADMIN — HEPARIN SODIUM 5000 UNITS: 5000 INJECTION INTRAVENOUS; SUBCUTANEOUS at 21:56

## 2021-05-07 RX ADMIN — PROPOFOL 130 MG: 10 INJECTION, EMULSION INTRAVENOUS at 16:07

## 2021-05-07 RX ADMIN — LIDOCAINE HYDROCHLORIDE 50 MG: 10 INJECTION, SOLUTION EPIDURAL; INFILTRATION; INTRACAUDAL at 16:07

## 2021-05-07 NOTE — PROGRESS NOTES
Progress Note -    Aram Cason 73 y o  male MRN: [id number]  Unit/Bed#: S -01 Encounter: [id number]    Assessment:  73 M with pancreatic mass s/p ERCP and biliary decompression on /28 returning with epigastric abdominal pain  Stable VS in room air  PLAN:  - for EUS/bx today per GI   - keep NPO  - surgOnc will follow up results  Subjective:   - no new complaints  - pain score: 5/10  Objective:     Vitals: Temp:  [97 9 °F (36 6 °C)-99 8 °F (37 7 °C)] 98 2 °F (36 8 °C)  HR:  [61-77] 61  Resp:  [16-18] 16  BP: (108-147)/(58-70) 108/58  There is no height or weight on file to calculate BMI  I/O       05/05 0701 - 05/06 0700 05/06 0701 - 05/07 0700 05/07 0701 - 05/08 0700    P  O   0     I V   1880     IV Piggyback 2000      Total Intake 2000 1880     Urine  325 50    Total Output  325 50    Net +2000 +1555 -50                 Physical Exam:  GEN: NAD, flat affect  HEENT: atraumatic  CV: RRR  Lung: Normal effort  Ab: Soft, epigastric tenderness  Extrem: No CCE  Neuro: A+Ox3    Lab, Imaging and other studies:   I have personally reviewed pertinent reports    , CBC with diff:   Lab Results   Component Value Date    WBC 10 39 (H) 05/07/2021    HGB 13 1 05/07/2021    HCT 40 0 05/07/2021    MCV 91 05/07/2021     05/07/2021    MCH 29 9 05/07/2021    MCHC 32 8 05/07/2021    RDW 14 2 05/07/2021    MPV 13 6 (H) 05/07/2021   , BMP/CMP: No results found for: SODIUM, K, CL, CO2, ANIONGAP, BUN, CREATININE, GLUCOSE, CALCIUM, AST, ALT, ALKPHOS, PROT, BILITOT, EGFR  VTE Pharmacologic Prophylaxis: Sequential compression device (Venodyne)   VTE Mechanical Prophylaxis: sequential compression device

## 2021-05-07 NOTE — ASSESSMENT & PLAN NOTE
Complaining of epigastric pain radiating diffusely  No nausea, vomiting, diarrhea  Recent admission for elevated LFTs where CT abdomen showed pancreatic head mass suspected to be malignant  · CA 19-9 elevated 487 indicative of pancreatic cancer, AFP and CEA within normal range  · Biliary stent was placed and sphincterotomy performed    Patient returning abdominal pain that has been intermittent    Ct Abdomen Pelvis With Contrast Result Date: 5/5/2021 Impression: 1  Uniform mural hyperenhancement of the wall of the gallbladder with a small amount of pericholecystic fluid, worrisome for acute cholecystitis  Further clinical assessment advised  Ultrasound of the right upper quadrant suggested for further assessment  2   Pneumobilia with stent in the common bile duct redemonstrated  3   3 8 cm obstructing mass in the region of the head of the pancreas with marked pancreatic ductal dilatation, worrisome for adenocarcinoma  Other tumors not excluded  4   Numerous hepatic cysts redemonstrated with an enhancing lesion in the left lobe of liver possibly a hemangioma, stable  5   Moderate constipation  Us Right Upper Quadrant Result Date: 5/6/2021 Impression: Mild gallbladder wall thickening without additional evidence of acute cholecystitis  Pancreatic head mass again identified  Associated pancreatic ductal dilatation  Indeterminate hypoechoic lesion within the left lobe of the liver  Hyperechoic left hepatic lobe lesion possibly representing a hemangioma  Multiple hepatic cysts          Recent Labs     05/05/21  0911 05/05/21  1912 05/07/21  0632   AST 34 26 20   ALT 97* 120* 75   ALKPHOS 272 9* 348* 261*   TBILI 1 44* 1 04* 1 25*     · Transaminitis noted in prior admission as well, may be secondary to pancreatic neoplasm  · MRCP from prior admission showed no presence of gallstones or sludge  · Patient has severe jaundiced with mild scleral icterus    Plan:  · Continue pancrelipase that was started on previous admission  · Surgical Oncology consulted-appreciate recommendations  · No surgical intervention recommended at this time  · Diet as tolerated   NPO after midnight for possible EUS today    · GI consult for possible endoscopic ultrasound for biopsy of pancreatic mass  · Monitor fever/WBC curve of antibiotics

## 2021-05-07 NOTE — ANESTHESIA PREPROCEDURE EVALUATION
Procedure:  ENDOSCOPIC ULTRASOUND (UPPER)    Relevant Problems   CARDIO   (+) Hyperlipidemia   (+) Hypertension      ENDO   (+) Type 2 diabetes mellitus without complication, without long-term current use of insulin (HCC)      GI/HEPATIC   (+) Esophageal reflux      /RENAL   (+) BPH (benign prostatic hyperplasia)      MUSCULOSKELETAL   (+) Rheumatoid arthritis (HCC)      NEURO/PSYCH   (+) Anxiety disorder        Physical Exam    Airway    Mallampati score: III  TM Distance: >3 FB  Neck ROM: full     Dental       Cardiovascular  Rhythm: regular, Rate: normal,     Pulmonary  Breath sounds clear to auscultation,     Other Findings  Upper plate      Anesthesia Plan  ASA Score- 3     Anesthesia Type- IV sedation with anesthesia with ASA Monitors  Additional Monitors:   Airway Plan:           Plan Factors-    Chart reviewed  EKG reviewed  Existing labs reviewed  Patient is not a current smoker  Induction-     Postoperative Plan-     Informed Consent-   I personally reviewed this patient with the CRNA  Discussed and agreed on the Anesthesia Plan with the CRNA  Robert Beckwith

## 2021-05-07 NOTE — ASSESSMENT & PLAN NOTE
Blood Pressure: 136/71    History of hypertension managed on lisinopril-hydrochlorothiazide 10-12 5 mg q d      Plan:   Continue home meds   Medications held:  None   Monitor blood pressure

## 2021-05-07 NOTE — PROGRESS NOTES
Patient resting in bed  Patient provided with 2 5 mg of oxycode for pain of 6/10 in lower abdomen  Bed low and locked and call bell within reach  Will reassess pain score in one hour

## 2021-05-07 NOTE — ANESTHESIA PREPROCEDURE EVALUATION
Procedure:  ENDOSCOPIC ULTRASOUND (UPPER)    Relevant Problems   CARDIO   (+) Hyperlipidemia   (+) Hypertension      ENDO   (+) Type 2 diabetes mellitus without complication, without long-term current use of insulin (HCC)      GI/HEPATIC   (+) Esophageal reflux      /RENAL   (+) BPH (benign prostatic hyperplasia)      MUSCULOSKELETAL   (+) Rheumatoid arthritis (HCC)      NEURO/PSYCH   (+) Anxiety disorder      Other   (+) Liver mass   (+) Pancreatic mass, Probable Pancreatic cancer        Physical Exam    Airway    Mallampati score: III  TM Distance: >3 FB  Neck ROM: full     Dental   No notable dental hx upper dentures,     Cardiovascular  Cardiovascular exam normal    Pulmonary  Pulmonary exam normal     Other Findings        Anesthesia Plan  ASA Score- 3     Anesthesia Type- IV sedation with anesthesia with ASA Monitors  Additional Monitors:   Airway Plan:           Plan Factors-Exercise tolerance (METS): >4 METS  Chart reviewed  Existing labs reviewed  Patient summary reviewed  Patient is not a current smoker  Patient did not smoke on day of surgery  Induction- intravenous  Postoperative Plan-     Informed Consent- Anesthetic plan and risks discussed with patient  I personally reviewed this patient with the CRNA  Discussed and agreed on the Anesthesia Plan with the CRNA  Prashant Roberts

## 2021-05-07 NOTE — ASSESSMENT & PLAN NOTE
Lab Results   Component Value Date    CHOLESTEROL 188 05/05/2021    TRIG 181 6 (H) 05/05/2021    HDL 36 (L) 05/05/2021    LDLCALC 116 (H) 05/05/2021     Plan:  · Continue fish oil capsule 1000 mg q d

## 2021-05-07 NOTE — ASSESSMENT & PLAN NOTE
Lab Results   Component Value Date    HGBA1C 6 2 (H) 05/05/2021       Recent Labs     05/06/21  1604 05/06/21 2020 05/07/21  0724 05/07/21  1034   POCGLU 138 153* 114 127       Blood Sugar Average: Last 72 hrs:  (P) 129 Home PO meds:  None  Unclear patient has true diabetes as this may be secondary to pancreatic neoplasm    Plan:   Diabetic Diet: Consistent CHO Level 2 (5 CHO servings/75g CHO per meal)   Insulin regimen  o Sliding Scale Insulin ACHS   Goal Blood Glucose 140-180   Glucose checks:  ACHS   Monitor for hypoglycemia and treat per protocol

## 2021-05-07 NOTE — ANESTHESIA POSTPROCEDURE EVALUATION
Post-Op Assessment Note    CV Status:  Stable  Pain Score: 0    Pain management: adequate     Mental Status:  Sleepy and arousable   Hydration Status:  Euvolemic and stable   PONV Controlled:  Controlled   Airway Patency:  Patent      Post Op Vitals Reviewed: Yes      Staff: Anesthesiologist, CRNA         No complications documented      /63 (05/07/21 1721)    Temp      Pulse 63 (05/07/21 1721)   Resp 18 (05/07/21 1721)    SpO2 96 % (05/07/21 1721)

## 2021-05-07 NOTE — PLAN OF CARE
Problem: Potential for Falls  Goal: Patient will remain free of falls  Description: INTERVENTIONS:  - Assess patient frequently for physical needs  -  Identify cognitive and physical deficits and behaviors that affect risk of falls    -  [place] fall precautions as indicated by assessment   - Educate patient/family on patient safety including physical limitations  - Instruct patient to call for assistance with activity based on assessment  - Modify environment to reduce risk of injury  - Consider OT/PT consult to assist with strengthening/mobility  Outcome: Progressing     Problem: PAIN - ADULT  Goal: Verbalizes/displays adequate comfort level or baseline comfort level  Description: Interventions:  - Encourage patient to monitor pain and request assistance  - Assess pain using appropriate pain scale  - Administer analgesics based on type and severity of pain and evaluate response  - Implement non-pharmacological measures as appropriate and evaluate response  - Consider cultural and social influences on pain and pain management  - Notify physician/advanced practitioner if interventions unsuccessful or patient reports new pain  Outcome: Progressing     Problem: INFECTION - ADULT  Goal: Absence or prevention of progression during hospitalization  Description: INTERVENTIONS:  - Assess and monitor for signs and symptoms of infection  - Monitor lab/diagnostic results  - Monitor all insertion sites, i e  indwelling lines, tubes, and drains  - Monitor endotracheal if appropriate and nasal secretions for changes in amount and color  - [place] appropriate cooling/warming therapies per order  - Administer medications as ordered  - Instruct and encourage patient and family to use good hand hygiene technique  - Identify and instruct in appropriate isolation precautions for identified infection/condition  Outcome: Progressing  Goal: Absence of fever/infection during neutropenic period  Description: INTERVENTIONS:  - Monitor WBC    Outcome: Progressing     Problem: SAFETY ADULT  Goal: Patient will remain free of falls  Description: INTERVENTIONS:  - Assess patient frequently for physical needs  -  Identify cognitive and physical deficits and behaviors that affect risk of falls    -  [place] fall precautions as indicated by assessment   - Educate patient/family on patient safety including physical limitations  - Instruct patient to call for assistance with activity based on assessment  - Modify environment to reduce risk of injury  - Consider OT/PT consult to assist with strengthening/mobility  Outcome: Progressing  Goal: Maintain or return to baseline ADL function  Description: INTERVENTIONS:  -  Assess patient's ability to carry out ADLs; assess patient's baseline for ADL function and identify physical deficits which impact ability to perform ADLs (bathing, care of mouth/teeth, toileting, grooming, dressing, etc )  - Assess/evaluate cause of self-care deficits   - Assess range of motion  - Assess patient's mobility; develop plan if impaired  - Assess patient's need for assistive devices and provide as appropriate  - Encourage maximum independence but intervene and supervise when necessary  - Involve family in performance of ADLs  - Assess for home care needs following discharge   - Consider OT consult to assist with ADL evaluation and planning for discharge  - Provide patient education as appropriate  Outcome: Progressing  Goal: Maintain or return mobility status to optimal level  Description: INTERVENTIONS:  - Assess patient's baseline mobility status (ambulation, transfers, stairs, etc )    - Identify cognitive and physical deficits and behaviors that affect mobility  - Identify mobility aids required to assist with transfers and/or ambulation (gait belt, sit-to-stand, lift, walker, cane, etc )  - [place] fall precautions as indicated by assessment  - Record patient progress and toleration of activity level on Mobility SBAR; progress patient to next Phase/Stage  - Instruct patient to call for assistance with activity based on assessment  - Consider rehabilitation consult to assist with strengthening/weightbearing, etc   Outcome: Progressing     Problem: DISCHARGE PLANNING  Goal: Discharge to home or other facility with appropriate resources  Description: INTERVENTIONS:  - Identify barriers to discharge w/patient and caregiver  - Arrange for needed discharge resources and transportation as appropriate  - Identify discharge learning needs (meds, wound care, etc )  - Arrange for interpretive services to assist at discharge as needed  - Refer to Case Management Department for coordinating discharge planning if the patient needs post-hospital services based on physician/advanced practitioner order or complex needs related to functional status, cognitive ability, or social support system  Outcome: Progressing     Problem: Knowledge Deficit  Goal: Patient/family/caregiver demonstrates understanding of disease process, treatment plan, medications, and discharge instructions  Description: Complete learning assessment and assess knowledge base    Interventions:  - Provide teaching at level of understanding  - Provide teaching via preferred learning methods  Outcome: Progressing

## 2021-05-07 NOTE — PROGRESS NOTES
[facility]  Progress Note - Enochel Lanes 1955, 73 y o  male MRN: [id number]  Unit/Bed#: S -01 Encounter: [id number]  Primary Care Provider: Gaurav Haney MD   Date and time admitted to hospital: 5/5/2021  6:46 PM    * Epigastric pain  Assessment & Plan  Complaining of epigastric pain radiating diffusely  No nausea, vomiting, diarrhea  Recent admission for elevated LFTs where CT abdomen showed pancreatic head mass suspected to be malignant  · CA 19-9 elevated 487 indicative of pancreatic cancer, AFP and CEA within normal range  · Biliary stent was placed and sphincterotomy performed    Patient returning abdominal pain that has been intermittent    Ct Abdomen Pelvis With Contrast Result Date: 5/5/2021 Impression: 1  Uniform mural hyperenhancement of the wall of the gallbladder with a small amount of pericholecystic fluid, worrisome for acute cholecystitis  Further clinical assessment advised  Ultrasound of the right upper quadrant suggested for further assessment  2   Pneumobilia with stent in the common bile duct redemonstrated  3   3 8 cm obstructing mass in the region of the head of the pancreas with marked pancreatic ductal dilatation, worrisome for adenocarcinoma  Other tumors not excluded  4   Numerous hepatic cysts redemonstrated with an enhancing lesion in the left lobe of liver possibly a hemangioma, stable  5   Moderate constipation  Us Right Upper Quadrant Result Date: 5/6/2021 Impression: Mild gallbladder wall thickening without additional evidence of acute cholecystitis  Pancreatic head mass again identified  Associated pancreatic ductal dilatation  Indeterminate hypoechoic lesion within the left lobe of the liver  Hyperechoic left hepatic lobe lesion possibly representing a hemangioma  Multiple hepatic cysts          Recent Labs     05/05/21  0911 05/05/21  1912 05/07/21  0632   AST 34 26 20   ALT 97* 120* 75   ALKPHOS 272 9* 348* 261*   TBILI 1 44* 1 04* 1 25*     · Transaminitis noted in prior admission as well, may be secondary to pancreatic neoplasm  · MRCP from prior admission showed no presence of gallstones or sludge  · Patient has severe jaundiced with mild scleral icterus    Plan:  · Continue pancrelipase that was started on previous admission  · Surgical Oncology consulted-appreciate recommendations  · No surgical intervention recommended at this time  · Diet as tolerated   NPO after midnight for possible EUS today  · GI consult for possible endoscopic ultrasound for biopsy of pancreatic mass  · Monitor fever/WBC curve of antibiotics    Pancreatic mass, Probable Pancreatic cancer  Assessment & Plan  · Patient had pancreatic mass identified a previous admission on CT scan done on 04/29/2021  Mass presented with biliary obstruction which improved s/p biliary duct stenting  Brushing unfortunately was nondiagnostic  CT Abdomen Pelvis With Contrast Result Date: 5/5/2021 Impression:   3 8 cm obstructing mass in the region of the head of the pancreas with marked pancreatic ductal dilatation, worrisome for adenocarcinoma  Other tumors not excluded  · Us Right Upper Quadrant Result Date: 5/6/2021 Impression: Mild gallbladder wall thickening without additional evidence of acute cholecystitis  Pancreatic head mass again identified  Associated pancreatic ductal dilatation  Indeterminate hypoechoic lesion within the left lobe of the liver  Hyperechoic left hepatic lobe lesion possibly representing a hemangioma  Multiple hepatic cysts  · Patient had f/u with outpatient Surgical Oncology scheduled for 05/06/2021  Plan:  · Inpatient oncology team following  Constipation  Assessment & Plan  · Patient has not had a bowel movement since admission  · Currently on oxycodone p o      Plan:  · Senna Colace p o  B i d     BPH (benign prostatic hyperplasia)  Assessment & Plan  Patient has history of BPH managed on Flomax    Continue meds    Type 2 diabetes mellitus without complication, without long-term current use of insulin [facility])  Assessment & Plan  Lab Results   Component Value Date    HGBA1C 6 2 (H) 2021       Recent Labs     21  1604 21  2020 21  0724 21  1034   POCGLU 138 153* 114 127       Blood Sugar Average: Last 72 hrs:  (P) 129 Home PO meds:  None  Unclear patient has true diabetes as this may be secondary to pancreatic neoplasm    Plan:   Diabetic Diet: Consistent CHO Level 2 (5 CHO servings/75g CHO per meal)   Insulin regimen  o Sliding Scale Insulin ACHS   Goal Blood Glucose 140-180   Glucose checks:  ACHS   Monitor for hypoglycemia and treat per protocol      Hyperlipidemia  Assessment & Plan  Lab Results   Component Value Date    CHOLESTEROL 188 2021    TRIG 181 6 (H) 2021    HDL 36 (L) 2021    LDLCALC 116 (H) 2021     Plan:  · Continue fish oil capsule 1000 mg q d  Hypertension  Assessment & Plan  Blood Pressure: 136/71    History of hypertension managed on lisinopril-hydrochlorothiazide 10-12 5 mg q d  Plan:   Continue home meds   Medications held:  None   Monitor blood pressure        VTE Pharmacologic Prophylaxis:   Pharmacologic: Enoxaparin (Lovenox)  Mechanical VTE Prophylaxis in Place: Yes    Discussions with Specialists or Other Care Team Provider:  GI team     Education and Discussions with Family / Patient:  NEHEMIAH for Wife Maximo Brown  Current Length of Stay: 2 day(s)    Current Patient Status: Inpatient     Discharge Plan / Estimated Discharge Date:  24 hours  S/p EUS biopsy     Code Status: Level 1 - Full Code      Subjective:   Patient was awake and laying in bed  He admits to epigastric and right upper quadrant pain  He states that pain is relieved by p o  Oxycodone  He admits to constipation  He denies any fever, chills, dizziness, CP, SOB, N/V/D      Objective:     Vitals:   Temp (24hrs), Av 6 °F (37 °C), Min:97 9 °F (36 6 °C), Max:99 8 °F (37 7 °C)    Temp: [97 9 °F (36 6 °C)-99 8 °F (37 7 °C)] 98 3 °F (36 8 °C)  HR:  [60-77] 60  Resp:  [16-18] 16  BP: (108-147)/(58-71) 136/71  SpO2:  [92 %-98 %] 98 %  There is no height or weight on file to calculate BMI  Input and Output Summary (last 24 hours): Intake/Output Summary (Last 24 hours) at 5/7/2021 1529  Last data filed at 5/7/2021 0701  Gross per 24 hour   Intake 950 ml   Output 375 ml   Net 575 ml       Physical Exam:     Physical Exam  Vitals signs and nursing note reviewed  Constitutional:       General: He is not in acute distress  Appearance: Normal appearance  He is not ill-appearing, toxic-appearing or diaphoretic  HENT:      Head: Normocephalic and atraumatic  Nose: Nose normal       Mouth/Throat:      Mouth: Mucous membranes are moist       Pharynx: No oropharyngeal exudate or posterior oropharyngeal erythema  Eyes:      General: No scleral icterus  Right eye: No discharge  Left eye: No discharge  Pupils: Pupils are equal, round, and reactive to light  Neck:      Musculoskeletal: Normal range of motion and neck supple  No neck rigidity or muscular tenderness  Vascular: No carotid bruit  Cardiovascular:      Rate and Rhythm: Normal rate and regular rhythm  Pulses: Normal pulses  Heart sounds: Normal heart sounds  No murmur  No friction rub  No gallop  Pulmonary:      Effort: Pulmonary effort is normal  No respiratory distress  Breath sounds: Normal breath sounds  No stridor  No wheezing, rhonchi or rales  Abdominal:      General: Bowel sounds are normal       Palpations: Abdomen is soft  Tenderness: There is abdominal tenderness (Epigastric and RUQ)  There is no right CVA tenderness, left CVA tenderness, guarding or rebound  Musculoskeletal:         General: No swelling, tenderness or deformity  Right lower leg: No edema  Left lower leg: No edema  Lymphadenopathy:      Cervical: No cervical adenopathy     Skin: General: Skin is warm and dry  Capillary Refill: Capillary refill takes less than 2 seconds  Coloration: Skin is not jaundiced  Findings: No lesion or rash  Neurological:      General: No focal deficit present  Mental Status: He is alert and oriented to person, place, and time  Mental status is at baseline  Motor: No weakness  Psychiatric:         Mood and Affect: Mood normal          Behavior: Behavior normal          Thought Content: Thought content normal          Judgment: Judgment normal          Additional Data:     Labs:    Results from last 7 days   Lab Units 05/07/21  0632 05/06/21  0634   WBC Thousand/uL 10 39* 11 01*   HEMOGLOBIN g/dL 13 1 13 6   HEMATOCRIT % 40 0 41 8   PLATELETS Thousands/uL 185 213   NEUTROS PCT %  --  78*   LYMPHS PCT %  --  9*   MONOS PCT %  --  11   EOS PCT %  --  1     Results from last 7 days   Lab Units 05/07/21  0632   POTASSIUM mmol/L 3 6   CHLORIDE mmol/L 104   CO2 mmol/L 24   BUN mg/dL 19   CREATININE mg/dL 0 93   CALCIUM mg/dL 8 1*   ALK PHOS U/L 261*   ALT U/L 75   AST U/L 20     Results from last 7 days   Lab Units 05/05/21  1912   INR  1 08       * I Have Reviewed All Lab Data Listed Above  * Additional Pertinent Lab Tests Reviewed: All Labs Within Last 24 Hours Reviewed    Imaging:    Imaging Reports Reviewed Today Include: None  Imaging Personally Reviewed by Myself Includes:  None    Recent Cultures (last 7 days):     Results from last 7 days   Lab Units 05/05/21 1912   BLOOD CULTURE  No Growth at 24 hrs  No Growth at 24 hrs         Last 24 Hours Medication List:   Current Facility-Administered Medications   Medication Dose Route Frequency Provider Last Rate    Alcohol  20 mL Infiltration Once Denisse Tay MD      aspirin  81 mg Oral Daily Maverick Ro DO      bupivacaine (PF)  10 mL Infiltration Once Denisse Tay MD      fish oil  1,000 mg Oral Daily Maverick Ro DO      FLUoxetine  20 mg Oral Daily Maverick DO Jayjay      folic acid  1 mg Oral Daily Maverick Ro DO      heparin (porcine)  5,000 Units Subcutaneous Q8H [facility] Maverick Ro DO      insulin lispro  1-6 Units Subcutaneous 4x Daily (AC & HS) Maverick Ro DO      multi-electrolyte  75 mL/hr Intravenous Continuous Maverick Ro DO 75 mL/hr (05/07/21 0256)    multivitamin-minerals  1 tablet Oral Daily Maverick Ro DO      oxyCODONE  2 5 mg Oral Q6H PRN Sage Luis MD      pancrelipase (Lip-Prot-Amyl)  24,000 Units Oral TID With Meals Maverick Ro DO      pantoprazole  40 mg Oral Early Morning Maverick Ro DO      polyethylene glycol  17 g Oral Daily Clarance Hodgkins, MD      senna-docusate sodium  1 tablet Oral BID Clarance Hodgkins, MD      sodium chloride (PF)  3 mL Intravenous Q1H PRN Maverick Ro DO      sodium chloride  1,000 mL Intravenous Once Maverick Ro DO      tamsulosin  0 4 mg Oral HS Maverick Ro DO          Today, Patient Was Seen By: Clarance Hodgkins, MD    ** Please Note: This note has been constructed using a voice recognition system   **

## 2021-05-07 NOTE — ASSESSMENT & PLAN NOTE
· Patient has not had a bowel movement since admission  · Currently on oxycodone p o      Plan:  · Senna Colace p o  B i d

## 2021-05-07 NOTE — UTILIZATION REVIEW
Continued Stay Review    Date:  5/7/2021                         Current Patient Class: inpatient   Current Level of Care:  Med surg     HPI:66 y o  male initially admitted on  5/5/2021 inpatient due to epigastric pain  Ct abdomen showed possible acute cholecystitis, pneumbolia with stent CBD, obstructing mass region head of pancreas, constipation and hepatic cysts  Plan is consult surgical oncology, GI  Continued IVF,  Diet as tolerated  Pain control as tolerated  Assessment/Plan:  5/7/2021:  Patient with ongoing epigastric pain and rates pain 5/10  On exam abdomen soft, epigastric tenderness  Wbc 10 39  IVF in progress  Plan is EUS/bx today per GI        5/7/2021:  Endoscopic Ultrasonography, GI (Upper)- Vague head of pancreas mass identified  Common bile duct stent was seen  Mild dilation of pancreatic duct  Using 25 gauge fine-needle aspiration needle some passes were taken with preliminary cytology present and positive  No lymphadenopathy seen, vascular invasion was difficult to visualize due to artifact from stent but did not see any clear vascular involvement  The celiac axis was well identified, 22 gauge needle was inserted into the celiac plexus region  The area was aspirated with no blood return  5 cc of bupivacaine followed by 60 cc of 98% ethanol followed by 5 cc of bupivacaine and 5 cc of saline was injected into the area for celiac plexus neurolysis      The patient received 2 L of fluid pre and intraop  Post Biopsy: patient supine for 2 hours, expect diarrhea, and use Imodium as needed  Check orthostatic BP after 2 hours  Diet as tolerated     In evening placed on oxygen 2 liters for sat 89%    Vital Signs:   05/07/21 2347  99 2 °F (37 3 °C)  67  --  121/66  --  93 %  28  2 L/min  Nasal cannula  Lying   05/07/21 2325  --  --  --  --  --  95 %  28  2 L/min  --  --   05/07/21 2100  100 7 °F (38 2 °C)Abnormal   70  --  160/75  --  89 %Abnormal   --  --  --  Lying   05/07/21 2009 --  --  --  139/75  --  --  --  --  --  Sitting   05/07/21 2005  --  --  --  104/59  --  --  --  --  --  Standing for 3 minutes - Orthostatic VS   05/07/21 2001  --  --  --  160/77  --  --  --  --  --  Standing   05/07/21 1957  --  70  --  159/78  --  95 %  --  --  --  Sitting - Orthostatic VS   05/07/21 1953  --  67  --  157/77  --  92 %  --  --  None (Room air)  Lying - Orthostatic VS       05/07/21 1336  98 3 °F (36 8 °C)  60  16  136/71  --  98 %  None (Room air)  --   05/07/21 0720  98 2 °F (36 8 °C)  61  16  108/58  --  92 %  None (Room air)  Lying   05/06/21 2301  99 8 °F (37 7 °C)  77  16  127/60  87  92 %  None (Room air)         Pertinent Labs/Diagnostic Results:   5/5/2021 CxR -  No acute cardiopulmonary disease      5/5/2021 Ct abdomen - Uniform mural hyperenhancement of the wall of the gallbladder with a small amount of pericholecystic fluid, worrisome for acute cholecystitis   Further clinical assessment advised   Ultrasound of the right upper quadrant suggested for further   assessment  2   Pneumobilia with stent in the common bile duct redemonstrated  3   3 8 cm obstructing mass in the region of the head of the pancreas with marked pancreatic ductal dilatation, worrisome for adenocarcinoma   Other tumors not excluded  4   Numerous hepatic cysts redemonstrated with an enhancing lesion in the left lobe of liver possibly a hemangioma, stable  5   Moderate constipation      5/6/2021 US RUQ -  Mild gallbladder wall thickening without additional evidence of acute cholecystitis    Pancreatic head mass again identified   Associated pancreatic ductal dilatation    Indeterminate hypoechoic lesion within the left lobe of the liver    Hyperechoic left hepatic lobe lesion possibly representing a hemangioma    Multiple hepatic cysts    Results from last 7 days   Lab Units 05/07/21  0632 05/06/21  0634 05/05/21  1912 05/05/21  0911   WBC Thousand/uL 10 39* 11 01* 10 23* 8 37   HEMOGLOBIN g/dL 13 1 13 6 14 5 15 4 HEMATOCRIT % 40 0 41 8 43 0 45 0   PLATELETS Thousands/uL 185 213 245 238   NEUTROS ABS Thousands/µL  --  8 57* 8 34* 5 91     Results from last 7 days   Lab Units 05/07/21  0632 05/05/21 1912 05/05/21  0911   SODIUM mmol/L 138 139 140   POTASSIUM mmol/L 3 6 4 4 4 1   CHLORIDE mmol/L 104 102 103   CO2 mmol/L 24 28 29   ANION GAP mmol/L 10 9 8   BUN mg/dL 19 18 17   CREATININE mg/dL 0 93 1 08 0 84   EGFR ml/min/1 73sq m 86 72 92   CALCIUM mg/dL 8 1* 8 6 9 1     Results from last 7 days   Lab Units 05/07/21  0632 05/05/21 1912 05/05/21  0911   AST U/L 20 26 34   ALT U/L 75 120* 97*   ALK PHOS U/L 261* 348* 272 9*   TOTAL PROTEIN g/dL 5 9* 6 8 6 6   ALBUMIN g/dL 2 7* 3 4* 3 9   TOTAL BILIRUBIN mg/dL 1 25* 1 04* 1 44*     Results from last 7 days   Lab Units 05/07/21  1034 05/07/21  0724 05/06/21  2020 05/06/21  1604 05/06/21  1118 05/06/21  0730 05/06/21  0140   POC GLUCOSE mg/dl 127 114 153* 138 138 109 124     Results from last 7 days   Lab Units 05/07/21  0632 05/05/21  1912   GLUCOSE RANDOM mg/dL 138 235*     Results from last 7 days   Lab Units 05/05/21  0911   HEMOGLOBIN A1C % 6 2*   EAG mg/dl 131     Results from last 7 days   Lab Units 05/05/21  1940   TROPONIN I ng/mL <0 02     Results from last 7 days   Lab Units 05/05/21 1912   PROTIME seconds 14 2   INR  1 08   PTT seconds 24     Results from last 7 days   Lab Units 05/06/21  0634 05/05/21  1912   PROCALCITONIN ng/ml 0 22 0 12     Results from last 7 days   Lab Units 05/05/21 2124 05/05/21  1912   LACTIC ACID mmol/L 1 7 2 1*     Results from last 7 days   Lab Units 05/06/21  0634   LIPASE u/L 360     Results from last 7 days   Lab Units 05/05/21 2124 05/05/21  0911   CLARITY UA  Clear  --    COLOR UA  Yellow  --    SPEC GRAV UA  1 010  --    PH UA  8 5*  --    GLUCOSE UA mg/dl 250 (1/4%)*  --    KETONES UA mg/dl Negative  --    BLOOD UA  Moderate*  --    PROTEIN UA mg/dl Negative  --    NITRITE UA  Negative  --    BILIRUBIN UA  Negative  -- UROBILINOGEN UA E U /dl 0 2  --    LEUKOCYTES UA  Negative  --    WBC UA /hpf 0-1  --    RBC UA /hpf 30-50*  --    BACTERIA UA /hpf Occasional  --    EPITHELIAL CELLS WET PREP /hpf None Seen  --    CREATININE UR mg/dL  --  235 0     Results from last 7 days   Lab Units 05/05/21 1912   BLOOD CULTURE  No Growth at 24 hrs  No Growth at 24 hrs  Medications:   Scheduled Medications:  Alcohol, 20 mL, Infiltration, Once  aspirin, 81 mg, Oral, Daily  bupivacaine (PF), 10 mL, Infiltration, Once  fish oil, 1,000 mg, Oral, Daily  FLUoxetine, 20 mg, Oral, Daily  folic acid, 1 mg, Oral, Daily  heparin (porcine), 5,000 Units, Subcutaneous, Q8H INGRID  insulin lispro, 1-6 Units, Subcutaneous, 4x Daily (AC & HS)  multivitamin-minerals, 1 tablet, Oral, Daily  pancrelipase (Lip-Prot-Amyl), 24,000 Units, Oral, TID With Meals  pantoprazole, 40 mg, Oral, Early Morning  polyethylene glycol, 17 g, Oral, Daily  senna-docusate sodium, 1 tablet, Oral, BID  sodium chloride, 1,000 mL, Intravenous, Once  tamsulosin, 0 4 mg, Oral, HS    Continuous IV Infusions:  multi-electrolyte, 75 mL/hr, Intravenous, Continuous    PRN Meds:  oxyCODONE, 2 5 mg, Oral, Q6H PRN -  Used x 1 5/6 and x 2  5/7  sodium chloride (PF), 3 mL, Intravenous, Q1H PRN    ibuprofen (MOTRIN) tablet 600 mg - used x 1 5/7   Dose: 600 mg  Freq: Every 6 hours PRN Route: PO  PRN Reasons: mild pain,fever,headaches  Start: 05/07/21 2142    Discharge Plan: To be determined  Network Utilization Review Department  ATTENTION: Please call with any questions or concerns to [phone] and carefully listen to the prompts so that you are directed to the right person  All voicemails are confidential   Shaista Wilbert all requests for admission clinical reviews, approved or denied determinations and any other requests to dedicated fax number below belonging to the campus where the patient is receiving treatment   List of dedicated fax numbers for the Facilities:  [facility] [place] ADMISSION DENIALS (Administrative/Medical Necessity) [phone]   [address] (Maternity/NICU/Pediatrics) [address] [facility],[address] [facility] [address] [facility] [address] Jovan Naylor [address] [address] [place] [address] Meenakshi Biggs [address] [facility] [phone]

## 2021-05-08 ENCOUNTER — APPOINTMENT (INPATIENT)
Dept: RADIOLOGY | Facility: HOSPITAL | Age: 66
DRG: 436 | End: 2021-05-08
Payer: COMMERCIAL

## 2021-05-08 LAB
ERYTHROCYTE [DISTWIDTH] IN BLOOD BY AUTOMATED COUNT: 14.3 % (ref 11.6–15.1)
GLUCOSE SERPL-MCNC: 112 MG/DL (ref 65–140)
GLUCOSE SERPL-MCNC: 120 MG/DL (ref 65–140)
GLUCOSE SERPL-MCNC: 137 MG/DL (ref 65–140)
GLUCOSE SERPL-MCNC: 211 MG/DL (ref 65–140)
HCT VFR BLD AUTO: 38 % (ref 36.5–49.3)
HGB BLD-MCNC: 12.6 G/DL (ref 12–17)
MCH RBC QN AUTO: 31 PG (ref 26.8–34.3)
MCHC RBC AUTO-ENTMCNC: 33.2 G/DL (ref 31.4–37.4)
MCV RBC AUTO: 93 FL (ref 82–98)
PLATELET # BLD AUTO: 170 THOUSANDS/UL (ref 149–390)
PMV BLD AUTO: 13.1 FL (ref 8.9–12.7)
RBC # BLD AUTO: 4.07 MILLION/UL (ref 3.88–5.62)
WBC # BLD AUTO: 7.46 THOUSAND/UL (ref 4.31–10.16)

## 2021-05-08 PROCEDURE — 85027 COMPLETE CBC AUTOMATED: CPT | Performed by: PHYSICIAN ASSISTANT

## 2021-05-08 PROCEDURE — 99232 SBSQ HOSP IP/OBS MODERATE 35: CPT | Performed by: INTERNAL MEDICINE

## 2021-05-08 PROCEDURE — 74018 RADEX ABDOMEN 1 VIEW: CPT

## 2021-05-08 PROCEDURE — 82948 REAGENT STRIP/BLOOD GLUCOSE: CPT

## 2021-05-08 RX ORDER — HYDROMORPHONE HCL/PF 1 MG/ML
0.5 SYRINGE (ML) INJECTION EVERY 4 HOURS PRN
Status: COMPLETED | OUTPATIENT
Start: 2021-05-08 | End: 2021-05-08

## 2021-05-08 RX ORDER — OXYCODONE HYDROCHLORIDE 5 MG/1
2.5 TABLET ORAL EVERY 6 HOURS PRN
Qty: 30 TABLET | Refills: 0 | Status: SHIPPED | OUTPATIENT
Start: 2021-05-08 | End: 2021-05-27

## 2021-05-08 RX ORDER — POLYETHYLENE GLYCOL 3350 17 G/17G
17 POWDER, FOR SOLUTION ORAL DAILY PRN
Refills: 0
Start: 2021-05-08 | End: 2021-01-01

## 2021-05-08 RX ORDER — ACETAMINOPHEN 325 MG/1
975 TABLET ORAL EVERY 8 HOURS
Status: DISCONTINUED | OUTPATIENT
Start: 2021-05-08 | End: 2021-05-08

## 2021-05-08 RX ORDER — OXYCODONE HYDROCHLORIDE 5 MG/1
5 TABLET ORAL EVERY 6 HOURS PRN
Status: DISCONTINUED | OUTPATIENT
Start: 2021-05-08 | End: 2021-05-08

## 2021-05-08 RX ORDER — OXYCODONE HYDROCHLORIDE 5 MG/1
5 TABLET ORAL EVERY 6 HOURS PRN
Status: DISCONTINUED | OUTPATIENT
Start: 2021-05-08 | End: 2021-05-10 | Stop reason: HOSPADM

## 2021-05-08 RX ORDER — AMOXICILLIN 250 MG
1 CAPSULE ORAL 2 TIMES DAILY
Refills: 0
Start: 2021-05-08 | End: 2021-01-01

## 2021-05-08 RX ORDER — OXYCODONE HYDROCHLORIDE 5 MG/1
2.5 TABLET ORAL EVERY 6 HOURS PRN
Status: DISCONTINUED | OUTPATIENT
Start: 2021-05-08 | End: 2021-05-10 | Stop reason: HOSPADM

## 2021-05-08 RX ORDER — OXYCODONE HYDROCHLORIDE 10 MG/1
10 TABLET ORAL EVERY 6 HOURS PRN
Status: DISCONTINUED | OUTPATIENT
Start: 2021-05-08 | End: 2021-05-10 | Stop reason: HOSPADM

## 2021-05-08 RX ORDER — HYDROMORPHONE HCL/PF 1 MG/ML
0.5 SYRINGE (ML) INJECTION EVERY 4 HOURS PRN
Status: DISCONTINUED | OUTPATIENT
Start: 2021-05-08 | End: 2021-05-10 | Stop reason: HOSPADM

## 2021-05-08 RX ADMIN — HYDROMORPHONE HYDROCHLORIDE 0.5 MG: 1 INJECTION, SOLUTION INTRAMUSCULAR; INTRAVENOUS; SUBCUTANEOUS at 16:16

## 2021-05-08 RX ADMIN — PANCRELIPASE 24000 UNITS: 24000; 76000; 120000 CAPSULE, DELAYED RELEASE PELLETS ORAL at 11:28

## 2021-05-08 RX ADMIN — OMEGA-3 FATTY ACIDS CAP 1000 MG 1000 MG: 1000 CAP at 08:02

## 2021-05-08 RX ADMIN — OXYCODONE HYDROCHLORIDE 5 MG: 5 TABLET ORAL at 21:59

## 2021-05-08 RX ADMIN — ASPIRIN 81 MG: 81 TABLET, CHEWABLE ORAL at 08:03

## 2021-05-08 RX ADMIN — OXYCODONE HYDROCHLORIDE 2.5 MG: 5 TABLET ORAL at 05:57

## 2021-05-08 RX ADMIN — HEPARIN SODIUM 5000 UNITS: 5000 INJECTION INTRAVENOUS; SUBCUTANEOUS at 05:57

## 2021-05-08 RX ADMIN — HEPARIN SODIUM 5000 UNITS: 5000 INJECTION INTRAVENOUS; SUBCUTANEOUS at 21:39

## 2021-05-08 RX ADMIN — FLUOXETINE 20 MG: 20 CAPSULE ORAL at 08:02

## 2021-05-08 RX ADMIN — IBUPROFEN 600 MG: 600 TABLET ORAL at 08:00

## 2021-05-08 RX ADMIN — TAMSULOSIN HYDROCHLORIDE 0.4 MG: 0.4 CAPSULE ORAL at 21:39

## 2021-05-08 RX ADMIN — PANCRELIPASE 24000 UNITS: 24000; 76000; 120000 CAPSULE, DELAYED RELEASE PELLETS ORAL at 08:03

## 2021-05-08 RX ADMIN — Medication 1 TABLET: at 08:03

## 2021-05-08 RX ADMIN — DOCUSATE SODIUM AND SENNOSIDES 1 TABLET: 8.6; 5 TABLET, FILM COATED ORAL at 08:02

## 2021-05-08 RX ADMIN — OXYCODONE HYDROCHLORIDE 2.5 MG: 5 TABLET ORAL at 14:15

## 2021-05-08 RX ADMIN — INSULIN LISPRO 2 UNITS: 100 INJECTION, SOLUTION INTRAVENOUS; SUBCUTANEOUS at 11:27

## 2021-05-08 RX ADMIN — FOLIC ACID 1 MG: 1 TABLET ORAL at 08:03

## 2021-05-08 RX ADMIN — PANTOPRAZOLE SODIUM 40 MG: 40 TABLET, DELAYED RELEASE ORAL at 05:57

## 2021-05-08 NOTE — DISCHARGE SUMMARY
[facility]  Discharge- Gabino Oreilly 1955, 73 y o  male MRN: [id number]  Unit/Bed#: S -01 Encounter: [id number]  Primary Care Provider: Randolph Hartley MD   Date and time admitted to hospital: 5/5/2021  6:46 PM    * Epigastric pain  Assessment & Plan  Complaining of epigastric pain radiating diffusely  No nausea, vomiting, diarrhea  Recent admission for elevated LFTs where CT abdomen showed pancreatic head mass suspected to be malignant  · CA 19-9 elevated 487 indicative of pancreatic cancer, AFP and CEA within normal range  · Biliary stent was placed and sphincterotomy performed    Patient returning abdominal pain that has been intermittent    Ct Abdomen Pelvis With Contrast Result Date: 5/5/2021 Impression: 1  Uniform mural hyperenhancement of the wall of the gallbladder with a small amount of pericholecystic fluid, worrisome for acute cholecystitis  Further clinical assessment advised  Ultrasound of the right upper quadrant suggested for further assessment  2   Pneumobilia with stent in the common bile duct redemonstrated  3   3 8 cm obstructing mass in the region of the head of the pancreas with marked pancreatic ductal dilatation, worrisome for adenocarcinoma  Other tumors not excluded  4   Numerous hepatic cysts redemonstrated with an enhancing lesion in the left lobe of liver possibly a hemangioma, stable  5   Moderate constipation  Us Right Upper Quadrant Result Date: 5/6/2021 Impression: Mild gallbladder wall thickening without additional evidence of acute cholecystitis  Pancreatic head mass again identified  Associated pancreatic ductal dilatation  Indeterminate hypoechoic lesion within the left lobe of the liver  Hyperechoic left hepatic lobe lesion possibly representing a hemangioma  Multiple hepatic cysts          Recent Labs     05/05/21  1912 05/07/21  0632   AST 26 20   * 75   ALKPHOS 348* 261*   TBILI 1 04* 1 25*     · Transaminitis noted in prior admission as well, may be secondary to pancreatic neoplasm  · MRCP from prior admission showed no presence of gallstones or sludge  · Patient has severe jaundiced with mild scleral icterus    Plan:  · Continue pancrelipase that was started on previous admission  · Discharged home on oxycodone 2 5 mg Q 6 H p r n  Pain  · Continue bowel regimen to avoid constipation  · Resume regular diet    Pancreatic mass, Probable Pancreatic cancer  Assessment & Plan  · Patient had pancreatic mass identified a previous admission on CT scan done on 04/29/2021  Mass presented with biliary obstruction which improved s/p biliary duct stenting  Brushing unfortunately was nondiagnostic  CT Abdomen Pelvis With Contrast Result Date: 5/5/2021 Impression:   3 8 cm obstructing mass in the region of the head of the pancreas with marked pancreatic ductal dilatation, worrisome for adenocarcinoma  Other tumors not excluded  · Us Right Upper Quadrant Result Date: 5/6/2021 Impression: Mild gallbladder wall thickening without additional evidence of acute cholecystitis  Pancreatic head mass again identified  Associated pancreatic ductal dilatation  Indeterminate hypoechoic lesion within the left lobe of the liver  Hyperechoic left hepatic lobe lesion possibly representing a hemangioma  Multiple hepatic cysts  · Patient is S/P EUS procedure with biopsies taken and celiac plexus nerve block on 05/07  · Patient had f/u with outpatient Surgical Oncology scheduled for 05/06/2021  Plan:  · Discharge home on oxycodone 2 5 mg q 6h p r n  Pain  · Continue bowel regimen to avoid constipation  · Follow-up with GI doctor in 2 weeks for biopsy results      Constipation  Assessment & Plan  · Patient has not had a bowel movement since admission  · Currently on oxycodone p o      Plan:  · Senna-Colace p o  B i d   · MiraLax 17 G q d     BPH (benign prostatic hyperplasia)  Assessment & Plan  Patient has history of BPH managed on Flomax    Continue meds    Type 2 diabetes mellitus without complication, without long-term current use of insulin [facility])  Assessment & Plan  Lab Results   Component Value Date    HGBA1C 6 2 (H) 05/05/2021       Recent Labs     05/07/21  1843 05/07/21 2023 05/08/21  0756 05/08/21  1118   POCGLU 125 129 112 211*       Blood Sugar Average: Last 72 hrs:  (P) 570 [id number] Home PO meds:  None  Unclear patient has true diabetes as this may be secondary to pancreatic neoplasm    Plan:   Diabetic Diet: Consistent CHO Level 2 (5 CHO servings/75g CHO per meal)   Insulin regimen  o Sliding Scale Insulin ACHS   Goal Blood Glucose 140-180   Glucose checks:  ACHS   Monitor for hypoglycemia and treat per protocol      Hyperlipidemia  Assessment & Plan  Lab Results   Component Value Date    CHOLESTEROL 188 05/05/2021    TRIG 181 6 (H) 05/05/2021    HDL 36 (L) 05/05/2021    LDLCALC 116 (H) 05/05/2021     Plan:  · Continue fish oil capsule 1000 mg q d  Hypertension  Assessment & Plan  Blood Pressure: 131/74    History of hypertension managed on lisinopril-hydrochlorothiazide 10-12 5 mg q d  Plan:   Continue home meds        Discharging Resident Physician: Benny Sprague MD  Attending: Shannan Del Rosario MD  PCP: Katina Hubbard MD  Admission Date: 5/5/2021  Discharge Date: 05/08/21    Disposition:     Home    Reason for Admission:  Abdominal pain    Consultations During Hospital Stay:  · GI team     Procedures Performed:     · EUS with Celiac Plexus nerve block  5/6/21    Significant Findings / Test Results:     · Xr Chest Portable Result Date: 5/8/2021 Impression: No acute cardiopulmonary disease  · Xr Chest 1 View Portable Result Date: 5/5/2021 Impression: No acute cardiopulmonary disease  · Us Right Upper Quadrant Result Date: 5/6/2021 Impression: Mild gallbladder wall thickening without additional evidence of acute cholecystitis  Pancreatic head mass again identified    Associated pancreatic ductal dilatation  Indeterminate hypoechoic lesion within the left lobe of the liver  Hyperechoic left hepatic lobe lesion possibly representing a hemangioma  Multiple hepatic cysts  · Ct Abdomen Pelvis With Contrast  Result Date: 5/5/2021 Impression: 1  Uniform mural hyperenhancement of the wall of the gallbladder with a small amount of pericholecystic fluid, worrisome for acute cholecystitis  Further clinical assessment advised  Ultrasound of the right upper quadrant suggested for further assessment  2   Pneumobilia with stent in the common bile duct redemonstrated  3   3 8 cm obstructing mass in the region of the head of the pancreas with marked pancreatic ductal dilatation, worrisome for adenocarcinoma  Other tumors not excluded  4   Numerous hepatic cysts redemonstrated with an enhancing lesion in the left lobe of liver possibly a hemangioma, stable  5   Moderate constipation  Incidental Findings:   ·  Ct Abdomen Pelvis With Contrast  Result Date: 5/5/2021 Numerous hepatic cysts redemonstrated with an enhancing lesion in the left lobe of liver possibly a hemangioma, stable  Test Results Pending at Discharge (will require follow up): · Pancreatic head mass biopsies     Outpatient Tests Requested:  · None    Complications:  None    Hospital Course: Jessica Driver is a 73 y o  male patient PMH of RA, HLD,DM2, pancreatic head mass S/P ERCP with CBD stenting on 04/29 who originally presented to the hospital on 5/5/2021 due to epigastric and RUQ pain that is been intermittent for about a week  Patient was recently admitted for obstructive jaundice secondary to newly diagnosed pancreatic head mass that was compressing the bile duct and patient underwent ERCP and biliary duct stenting  Patient was scheduled to follow-up with Surgical Oncology on 05/06/2021  In the ED patient was afebrile and hemodynamically stable    Physical exam was significant for right upper quadrant and epigastric tenderness on palpation  CBC had mildly elevated WBC at 10 2  CMP was significant for elevated LFTs  CXR was negative CT scan was significant for previously known mass and questionable acute cholecystitis  Patient was admitted for abdominal pain and GI was consulted  During hospitalization patient remained in stable condition  GI team evaluated patient and scheduled patient for EUS procedure  Patient also had an ultrasound which ruled out acute cholecystitis  Patient underwent EUS procedure with celiac plexus nerve block and was discharged in stable condition to follow up for biopsy results in 2 weeks  Condition at Discharge: stable     Discharge Day Visit / Exam:     Subjective:    Vitals: Blood Pressure: 131/74 (05/08/21 0753)  Pulse: 69 (05/08/21 0753)  Temperature: 97 6 °F (36 4 °C) (05/08/21 0753)  Temp Source: Oral (05/08/21 0753)  Respirations: 16 (05/08/21 0753)  SpO2: 94 % (05/08/21 0753)    Exam:   Physical Exam  Vitals signs and nursing note reviewed  Constitutional:       General: He is not in acute distress  Appearance: Normal appearance  He is not ill-appearing, toxic-appearing or diaphoretic  HENT:      Head: Normocephalic and atraumatic  Nose: Nose normal       Mouth/Throat:      Mouth: Mucous membranes are moist       Pharynx: No oropharyngeal exudate or posterior oropharyngeal erythema  Eyes:      General: No scleral icterus  Right eye: No discharge  Left eye: No discharge  Pupils: Pupils are equal, round, and reactive to light  Neck:      Musculoskeletal: Normal range of motion and neck supple  No neck rigidity or muscular tenderness  Vascular: No carotid bruit  Cardiovascular:      Rate and Rhythm: Normal rate and regular rhythm  Pulses: Normal pulses  Heart sounds: Normal heart sounds  No murmur  No friction rub  No gallop  Pulmonary:      Effort: Pulmonary effort is normal  No respiratory distress        Breath sounds: Normal breath sounds  No stridor  No wheezing, rhonchi or rales  Abdominal:      General: Bowel sounds are normal       Palpations: Abdomen is soft  Tenderness: There is abdominal tenderness (Epigastric)  There is no right CVA tenderness, left CVA tenderness, guarding or rebound  Musculoskeletal:         General: No swelling, tenderness or deformity  Right lower leg: No edema  Left lower leg: No edema  Lymphadenopathy:      Cervical: No cervical adenopathy  Skin:     General: Skin is warm and dry  Capillary Refill: Capillary refill takes less than 2 seconds  Coloration: Skin is not jaundiced  Findings: No lesion or rash  Neurological:      General: No focal deficit present  Mental Status: He is alert and oriented to person, place, and time  Mental status is at baseline  Motor: No weakness  Psychiatric:         Mood and Affect: Mood normal          Behavior: Behavior normal          Thought Content: Thought content normal          Judgment: Judgment normal        Discussion with Family:  None per patient's request    Discharge instructions/Information to patient and family:   See after visit summary for information provided to patient and family  Provisions for Follow-Up Care:  See after visit summary for information related to follow-up care and any pertinent home health orders  Planned Readmission:  None     Discharge Medications:  See after visit summary for reconciled discharge medications provided to patient and family        ** Please Note: This note has been constructed using a voice recognition system **

## 2021-05-08 NOTE — QUICK NOTE
Patient was experiencing 10/10 diffuse abdominal pain before discharge  PE: + BS in all quadrants  Tender to palpation in all quadrants with no guarding or rebound tenderness  Xray of abdomen erect view was negative for free air inferior to the diaphragm  Plan:  Patient received Dilaudid  Mg IV and pain subsided to 5/10  Patient will not be discharged tonight  Will observe patient tonight and maybe d/c tomorrow  Pain regimen to include:  Oxycodone 2 5 mg po q6h prn for mild pain  Oxycodone 5 mg po q6h prn for severe pain  Oxycodone 10 mg po q6h for severe pain  Dilaudid 0 5 mg IV 46h prn breakthrough pain

## 2021-05-08 NOTE — ASSESSMENT & PLAN NOTE
Blood Pressure: 131/74    History of hypertension managed on lisinopril-hydrochlorothiazide 10-12 5 mg q d      Plan:   Continue home meds

## 2021-05-08 NOTE — ASSESSMENT & PLAN NOTE
· Patient had pancreatic mass identified a previous admission on CT scan done on 04/29/2021  Mass presented with biliary obstruction which improved s/p biliary duct stenting  Brushing unfortunately was nondiagnostic  CT Abdomen Pelvis With Contrast Result Date: 5/5/2021 Impression:   3 8 cm obstructing mass in the region of the head of the pancreas with marked pancreatic ductal dilatation, worrisome for adenocarcinoma  Other tumors not excluded  · Us Right Upper Quadrant Result Date: 5/6/2021 Impression: Mild gallbladder wall thickening without additional evidence of acute cholecystitis  Pancreatic head mass again identified  Associated pancreatic ductal dilatation  Indeterminate hypoechoic lesion within the left lobe of the liver  Hyperechoic left hepatic lobe lesion possibly representing a hemangioma  Multiple hepatic cysts  · Patient is S/P EUS procedure with biopsies taken and celiac plexus nerve block on 05/07  · Patient had f/u with outpatient Surgical Oncology scheduled for 05/06/2021  Plan:  · Discharge home on oxycodone 2 5 mg q 6h p r n  Pain  · Patient received Dilaudid  Mg IV and pain subsided to 5/10  · Patient will not be discharged tonight  Will observe patient tonight and maybe d/c tomorrow  · Pain regimen to include:   Oxycodone 2 5 mg po q6h prn for mild pain  Oxycodone 5 mg po q6h prn for severe pain  Oxycodone 10 mg po q6h for severe pain   Dilaudid 0 5 mg IV 46h prn breakthrough pain    · Continue bowel regimen to avoid constipation  · Follow-up with GI doctor in 2 weeks for biopsy results

## 2021-05-08 NOTE — ASSESSMENT & PLAN NOTE
Complaining of epigastric pain radiating diffusely  No nausea, vomiting, diarrhea  Recent admission for elevated LFTs where CT abdomen showed pancreatic head mass suspected to be malignant  · CA 19-9 elevated 487 indicative of pancreatic cancer, AFP and CEA within normal range  · Biliary stent was placed and sphincterotomy performed    Patient returning abdominal pain that has been intermittent    Ct Abdomen Pelvis With Contrast Result Date: 5/5/2021 Impression: 1  Uniform mural hyperenhancement of the wall of the gallbladder with a small amount of pericholecystic fluid, worrisome for acute cholecystitis  Further clinical assessment advised  Ultrasound of the right upper quadrant suggested for further assessment  2   Pneumobilia with stent in the common bile duct redemonstrated  3   3 8 cm obstructing mass in the region of the head of the pancreas with marked pancreatic ductal dilatation, worrisome for adenocarcinoma  Other tumors not excluded  4   Numerous hepatic cysts redemonstrated with an enhancing lesion in the left lobe of liver possibly a hemangioma, stable  5   Moderate constipation  Us Right Upper Quadrant Result Date: 5/6/2021 Impression: Mild gallbladder wall thickening without additional evidence of acute cholecystitis  Pancreatic head mass again identified  Associated pancreatic ductal dilatation  Indeterminate hypoechoic lesion within the left lobe of the liver  Hyperechoic left hepatic lobe lesion possibly representing a hemangioma  Multiple hepatic cysts          Recent Labs     05/05/21  1912 05/07/21  0632   AST 26 20   * 75   ALKPHOS 348* 261*   TBILI 1 04* 1 25*     · Transaminitis noted in prior admission as well, may be secondary to pancreatic neoplasm  · MRCP from prior admission showed no presence of gallstones or sludge  · Patient has severe jaundiced with mild scleral icterus    Plan:  · Continue pancrelipase that was started on previous admission  · Discharged home on oxycodone 2 5 mg Q 6 H p r n  Pain  · Continue bowel regimen to avoid constipation    · Resume regular diet

## 2021-05-08 NOTE — DISCHARGE INSTR - AVS FIRST PAGE
Dear Pari ,     It was our pleasure to care for you here at [facility]  It is our hope that we were always able to exceed the expected standards for your care during your stay  You were hospitalized due to abdominal pain  You were cared for on the [facility] 4th floor by Konstantin Ramos MD under the service of Fernando Streeter MD with the [facility] who covers for your primary care physician (PCP), Anais Melo MD, while you were hospitalized  If you have any questions or concerns related to this hospitalization, you may contact us at [phone]  For follow up as well as any medication refills, we recommend that you follow up with your primary care physician  A registered nurse will reach out to you by phone within a few days after your discharge to answer any additional questions that you may have after going home  However, at this time we provide for you here, the most important instructions / recommendations at discharge:     · Notable Medication Adjustments -   · Please take oxycodone 2 5 mg by mouth every 6 hours as needed for moderate pain or 5 mg every 6 hours as needed for severe pain  This medication may cause constipation  Please take stool softeners daily while on this medication  · Please avoid taking NSAIDs such as Motrin, ibuprofen, Aleve or naproxen  This may worsen you abdominal pain  · Testing Required after Discharge -   · None  · Important follow up information -   · Please follow up with Primary Care doctor in one week  · Please follow-up with GI doctor in about 2 weeks for biopsy results    · Other Instructions -   Practice social distancing  Adequate hand washing hygiene  Wear mask in public at all times  · Please review this entire after visit summary as additional general instructions including medication list, appointments, activity, diet, any pertinent wound care, and other additional recommendations from your care team that may be provided for you        Sincerely,     Bunny Mancilla MD

## 2021-05-08 NOTE — ASSESSMENT & PLAN NOTE
Lab Results   Component Value Date    HGBA1C 6 2 (H) 05/05/2021       Recent Labs     05/07/21 2023 05/08/21  0756 05/08/21  1118 05/08/21  1617   POCGLU 129 112 211* 120       Blood Sugar Average: Last 72 hrs:  (P) 505 [id number] Home PO meds:  None  Unclear patient has true diabetes as this may be secondary to pancreatic neoplasm    Plan:   Diabetic Diet: Consistent CHO Level 2 (5 CHO servings/75g CHO per meal)   Insulin regimen  o Sliding Scale Insulin ACHS   Goal Blood Glucose 140-180   Glucose checks:  ACHS   Monitor for hypoglycemia and treat per protocol

## 2021-05-08 NOTE — ASSESSMENT & PLAN NOTE
· Patient has not had a bowel movement since admission  · Currently on oxycodone p o      Plan:  · Senna-Colace p o  B i d   · MiraLax 17 G q d

## 2021-05-08 NOTE — ASSESSMENT & PLAN NOTE
Blood Pressure: 146/75    History of hypertension managed on lisinopril-hydrochlorothiazide 10-12 5 mg q d      Plan:   Continue home meds

## 2021-05-08 NOTE — ASSESSMENT & PLAN NOTE
Lab Results   Component Value Date    HGBA1C 6 2 (H) 05/05/2021       Recent Labs     05/07/21  1843 05/07/21 2023 05/08/21  0756 05/08/21  1118   POCGLU 125 129 112 211*       Blood Sugar Average: Last 72 hrs:  (P) 793 [id number] Home PO meds:  None  Unclear patient has true diabetes as this may be secondary to pancreatic neoplasm    Plan:   Diabetic Diet: Consistent CHO Level 2 (5 CHO servings/75g CHO per meal)   Insulin regimen  o Sliding Scale Insulin ACHS   Goal Blood Glucose 140-180   Glucose checks:  ACHS   Monitor for hypoglycemia and treat per protocol

## 2021-05-08 NOTE — PROGRESS NOTES
[facility]  Progress Note - Rodney Hameed 1955, 73 y o  male MRN: [id number]  Unit/Bed#: S -01 Encounter: [id number]  Primary Care Provider: Marino Hassan MD   Date and time admitted to hospital: 5/5/2021  6:46 PM    * Epigastric pain  Assessment & Plan  Complaining of epigastric pain radiating diffusely  No nausea, vomiting, diarrhea  Recent admission for elevated LFTs where CT abdomen showed pancreatic head mass suspected to be malignant  · CA 19-9 elevated 487 indicative of pancreatic cancer, AFP and CEA within normal range  · Biliary stent was placed and sphincterotomy performed    Patient returning abdominal pain that has been intermittent    Ct Abdomen Pelvis With Contrast Result Date: 5/5/2021 Impression: 1  Uniform mural hyperenhancement of the wall of the gallbladder with a small amount of pericholecystic fluid, worrisome for acute cholecystitis  Further clinical assessment advised  Ultrasound of the right upper quadrant suggested for further assessment  2   Pneumobilia with stent in the common bile duct redemonstrated  3   3 8 cm obstructing mass in the region of the head of the pancreas with marked pancreatic ductal dilatation, worrisome for adenocarcinoma  Other tumors not excluded  4   Numerous hepatic cysts redemonstrated with an enhancing lesion in the left lobe of liver possibly a hemangioma, stable  5   Moderate constipation  Us Right Upper Quadrant Result Date: 5/6/2021 Impression: Mild gallbladder wall thickening without additional evidence of acute cholecystitis  Pancreatic head mass again identified  Associated pancreatic ductal dilatation  Indeterminate hypoechoic lesion within the left lobe of the liver  Hyperechoic left hepatic lobe lesion possibly representing a hemangioma  Multiple hepatic cysts          Recent Labs     05/05/21  1912 05/07/21  0632   AST 26 20   * 75   ALKPHOS 348* 261*   TBILI 1 04* 1 25*     · Patient was having severe pain this afternoon and will not be discharged  Plan:  · Continue pancrelipase that was started on previous admission  · Patient received Dilaudid  Mg IV and pain subsided to 5/10  · Patient will not be discharged tonight  Will observe patient tonight and maybe d/c tomorrow  · Pain regimen to include:   Oxycodone 2 5 mg po q6h prn for mild pain  Oxycodone 5 mg po q6h prn for severe pain  Oxycodone 10 mg po q6h for severe pain   Dilaudid 0 5 mg IV 46h prn breakthrough pain  · Continue bowel regimen to avoid constipation  · Resume regular diet    Pancreatic mass, Probable Pancreatic cancer  Assessment & Plan  · Patient had pancreatic mass identified a previous admission on CT scan done on 04/29/2021  Mass presented with biliary obstruction which improved s/p biliary duct stenting  Brushing unfortunately was nondiagnostic  CT Abdomen Pelvis With Contrast Result Date: 5/5/2021 Impression:   3 8 cm obstructing mass in the region of the head of the pancreas with marked pancreatic ductal dilatation, worrisome for adenocarcinoma  Other tumors not excluded  · Us Right Upper Quadrant Result Date: 5/6/2021 Impression: Mild gallbladder wall thickening without additional evidence of acute cholecystitis  Pancreatic head mass again identified  Associated pancreatic ductal dilatation  Indeterminate hypoechoic lesion within the left lobe of the liver  Hyperechoic left hepatic lobe lesion possibly representing a hemangioma  Multiple hepatic cysts  · Patient is S/P EUS procedure with biopsies taken and celiac plexus nerve block on 05/07  · Patient had f/u with outpatient Surgical Oncology scheduled for 05/06/2021  Plan:  · Discharge home on oxycodone 2 5 mg q 6h p r n  Pain  · Patient received Dilaudid  Mg IV and pain subsided to 5/10  · Patient will not be discharged tonight  Will observe patient tonight and maybe d/c tomorrow     · Pain regimen to include:   Oxycodone 2 5 mg po q6h prn for mild pain    Oxycodone 5 mg po q6h prn for severe pain  Oxycodone 10 mg po q6h for severe pain   Dilaudid 0 5 mg IV 46h prn breakthrough pain  · Continue bowel regimen to avoid constipation  · Follow-up with GI doctor in 2 weeks for biopsy results      Constipation  Assessment & Plan  · Patient has not had a bowel movement since admission  · Currently on oxycodone p o  Plan:  · Senna-Colace p o  B i d   · MiraLax 17 G q d     BPH (benign prostatic hyperplasia)  Assessment & Plan  Patient has history of BPH managed on Flomax    Continue meds    Type 2 diabetes mellitus without complication, without long-term current use of insulin [facility])  Assessment & Plan  Lab Results   Component Value Date    HGBA1C 6 2 (H) 05/05/2021       Recent Labs     05/07/21 2023 05/08/21  0756 05/08/21  1118 05/08/21  1617   POCGLU 129 112 211* 120       Blood Sugar Average: Last 72 hrs:  (P) 570 [id number] Home PO meds:  None  Unclear patient has true diabetes as this may be secondary to pancreatic neoplasm    Plan:   Diabetic Diet: Consistent CHO Level 2 (5 CHO servings/75g CHO per meal)   Insulin regimen  o Sliding Scale Insulin ACHS   Goal Blood Glucose 140-180   Glucose checks:  ACHS   Monitor for hypoglycemia and treat per protocol      Hyperlipidemia  Assessment & Plan  Lab Results   Component Value Date    CHOLESTEROL 188 05/05/2021    TRIG 181 6 (H) 05/05/2021    HDL 36 (L) 05/05/2021    LDLCALC 116 (H) 05/05/2021     Plan:  · Continue fish oil capsule 1000 mg q d  Hypertension  Assessment & Plan  Blood Pressure: 146/75    History of hypertension managed on lisinopril-hydrochlorothiazide 10-12 5 mg q d      Plan:   Continue home meds          VTE Pharmacologic Prophylaxis:   Pharmacologic: Heparin  Mechanical VTE Prophylaxis in Place: Yes    Discussions with Specialists or Other Care Team Provider: GI team    Education and Discussions with Family / Patient: Family at bedside    Current Length of Stay: 3 day(s)    Current Patient Status: Inpatient     Discharge Plan / Estimated Discharge Date: 24 hours    Code Status: Level 1 - Full Code      Subjective:   Patient was awake and having moderate 7/10 pain this morning that improved with 2 5 mg oxycodone  This afternoon, pain worsened to a 10/10 diffuse pain that had him doubled over in bed  Stat Xray was negative for free air under the diaphragm  Patient denied fever, chills, dizziness, n/v/d  Pain improved to a 6/10 with Dilaudid 0 5 mg IV  Patient will be discharged tomorrow is s/s improve  Objective:     Vitals:   Temp (24hrs), Av 4 °F (36 9 °C), Min:97 6 °F (36 4 °C), Max:100 7 °F (38 2 °C)    Temp:  [97 6 °F (36 4 °C)-100 7 °F (38 2 °C)] 97 6 °F (36 4 °C)  HR:  [54-73] 73  Resp:  [16-20] 18  BP: (104-162)/(59-78) 146/75  SpO2:  [89 %-96 %] 96 %  There is no height or weight on file to calculate BMI  Input and Output Summary (last 24 hours): Intake/Output Summary (Last 24 hours) at 2021 1734  Last data filed at 2021 1727  Gross per 24 hour   Intake 500 ml   Output 1175 ml   Net -675 ml       Physical Exam:     Physical Exam  Vitals signs and nursing note reviewed  Constitutional:       General: He is not in acute distress  Appearance: Normal appearance  He is not ill-appearing, toxic-appearing or diaphoretic  HENT:      Head: Normocephalic and atraumatic  Nose: Nose normal       Mouth/Throat:      Mouth: Mucous membranes are moist       Pharynx: No oropharyngeal exudate or posterior oropharyngeal erythema  Eyes:      General: No scleral icterus  Right eye: No discharge  Left eye: No discharge  Pupils: Pupils are equal, round, and reactive to light  Neck:      Musculoskeletal: Normal range of motion and neck supple  No neck rigidity or muscular tenderness  Vascular: No carotid bruit  Cardiovascular:      Rate and Rhythm: Normal rate and regular rhythm  Pulses: Normal pulses        Heart sounds: Normal heart sounds  No murmur  No friction rub  No gallop  Pulmonary:      Effort: Pulmonary effort is normal  No respiratory distress  Breath sounds: Normal breath sounds  No stridor  No wheezing, rhonchi or rales  Abdominal:      General: Bowel sounds are normal       Palpations: Abdomen is soft  Tenderness: There is abdominal tenderness (diffuse)  There is no right CVA tenderness, left CVA tenderness, guarding or rebound  Musculoskeletal:         General: No swelling, tenderness or deformity  Right lower leg: No edema  Left lower leg: No edema  Lymphadenopathy:      Cervical: No cervical adenopathy  Skin:     General: Skin is warm and dry  Capillary Refill: Capillary refill takes less than 2 seconds  Coloration: Skin is not jaundiced  Findings: No lesion or rash  Neurological:      General: No focal deficit present  Mental Status: He is alert and oriented to person, place, and time  Mental status is at baseline  Motor: No weakness  Psychiatric:         Mood and Affect: Mood normal          Behavior: Behavior normal          Thought Content: Thought content normal          Judgment: Judgment normal            Additional Data:     Labs:    Results from last 7 days   Lab Units 05/08/21  0505  05/06/21  0634   WBC Thousand/uL 7 46   < > 11 01*   HEMOGLOBIN g/dL 12 6   < > 13 6   HEMATOCRIT % 38 0   < > 41 8   PLATELETS Thousands/uL 170   < > 213   NEUTROS PCT %  --   --  78*   LYMPHS PCT %  --   --  9*   MONOS PCT %  --   --  11   EOS PCT %  --   --  1    < > = values in this interval not displayed  Results from last 7 days   Lab Units 05/07/21  0632   POTASSIUM mmol/L 3 6   CHLORIDE mmol/L 104   CO2 mmol/L 24   BUN mg/dL 19   CREATININE mg/dL 0 93   CALCIUM mg/dL 8 1*   ALK PHOS U/L 261*   ALT U/L 75   AST U/L 20     Results from last 7 days   Lab Units 05/05/21  1912   INR  1 08       * I Have Reviewed All Lab Data Listed Above    * Additional Pertinent Lab Tests Reviewed: All Labs Within Last 24 Hours Reviewed    Imaging:    Imaging Reports Reviewed Today Include: Xray Abdomen  Imaging Personally Reviewed by Myself Includes:  Xray Abdomen    Recent Cultures (last 7 days):     Results from last 7 days   Lab Units 05/05/21 1912   BLOOD CULTURE  No Growth at 48 hrs  No Growth at 48 hrs  Last 24 Hours Medication List:   Current Facility-Administered Medications   Medication Dose Route Frequency Provider Last Rate    aspirin  81 mg Oral Daily Mary Hearn MD      fish oil  1,000 mg Oral Daily Mary Hearn MD      FLUoxetine  20 mg Oral Daily Mary Hearn MD      folic acid  1 mg Oral Daily Mary Hearn MD      heparin (porcine)  5,000 Units Subcutaneous Q8H [facility] Mary Hearn MD      HYDROmorphone  0 5 mg Intravenous Q4H PRN Madeline Guzman MD      insulin lispro  1-6 Units Subcutaneous 4x Daily (AC & HS) Mary Hearn MD      multivitamin-minerals  1 tablet Oral Daily Mary Hearn MD      oxyCODONE  10 mg Oral Q6H PRN Madeline Guzman MD      oxyCODONE  2 5 mg Oral Q6H PRN Madeline Guzman MD      oxyCODONE  5 mg Oral Q6H PRN Madeline Guzman MD      pancrelipase (Lip-Prot-Amyl)  24,000 Units Oral TID With Meals Mary Hearn MD      pantoprazole  40 mg Oral Early Morning Mary Hearn MD      polyethylene glycol  17 g Oral Daily Mary Hearn MD      senna-docusate sodium  1 tablet Oral BID Mary Hearn MD      sodium chloride (PF)  3 mL Intravenous Q1H PRN Mary Hearn MD      sodium chloride  1,000 mL Intravenous Once Mary Hearn MD      tamsulosin  0 4 mg Oral HS Mary Hearn MD          Today, Patient Was Seen By: Madeline Guzman MD    ** Please Note: This note has been constructed using a voice recognition system   **

## 2021-05-08 NOTE — PROGRESS NOTES
Progress Note - Yakelin Maldonado 73 y o  male MRN: [id number]    Unit/Bed#: S -01 Encounter: [id number]    Assessment and Plan:     1  Abdominal pain likely secondary to pancreatic head mass  Patient had EUS with celiac plexus block yesterday  He did not have any episodes of hypotension last night  He reports large bowel movement this morning  He does report some continued epigastric pain that is relieved with medication     -Continue to monitor for any worsening of abdominal pain   -Can progress to regular diet and see if patient tolerates   -Follow-up pathology reports  ----------------------------------------------------------------------------------------------------------------    Subjective:     Patient reports some epigastric pain that is relieved with medication  He did not have any episodes of hypotension last night  He had a large bowel movement prior to us seeing him this morning  Objective:     Vitals: Blood pressure 131/74, pulse 69, temperature 97 6 °F (36 4 °C), temperature source Oral, resp  rate 16, SpO2 94 %  ,There is no height or weight on file to calculate BMI  Intake/Output Summary (Last 24 hours) at 5/8/2021 1527  Last data filed at 5/8/2021 1001  Gross per 24 hour   Intake 800 ml   Output 1175 ml   Net -375 ml       Physical Exam:     General Appearance: Alert, appears stated age and cooperative  In mild distress secondary to epigastric pain prior to receiving pain medication    Lungs: Clear to auscultation bilaterally, no rales or rhonchi, no labored breathing/accessory muscle use  Heart: Regular rate and rhythm, S1, S2 normal, no murmur, click, rub or gallop  Abdomen: Soft, non-tender, non-distended; bowel sounds normal; no masses or no organomegaly  Extremities: No cyanosis, clubbing, or edema    Invasive Devices     Drain            Open Drain Right 1213 days                Lab Results:  Results from last 7 days   Lab Units 05/08/21  0505  05/06/21  0634   WBC Thousand/uL 7 46   < > 11 01*   HEMOGLOBIN g/dL 12 6   < > 13 6   HEMATOCRIT % 38 0   < > 41 8   PLATELETS Thousands/uL 170   < > 213   NEUTROS PCT %  --   --  78*   LYMPHS PCT %  --   --  9*   MONOS PCT %  --   --  11   EOS PCT %  --   --  1    < > = values in this interval not displayed  Results from last 7 days   Lab Units 05/07/21  0632   POTASSIUM mmol/L 3 6   CHLORIDE mmol/L 104   CO2 mmol/L 24   BUN mg/dL 19   CREATININE mg/dL 0 93   CALCIUM mg/dL 8 1*   ALK PHOS U/L 261*   ALT U/L 75   AST U/L 20     Invalid input(s): BILI  Results from last 7 days   Lab Units 05/05/21  1912   INR  1 08     Results from last 7 days   Lab Units 05/06/21  0634   LIPASE u/L 360       Imaging Studies: I have personally reviewed pertinent imaging studies  Xr Chest Portable    Result Date: 5/8/2021  Impression: No acute cardiopulmonary disease  Workstation performed: [id number]     Xr Chest 1 View Portable    Result Date: 5/5/2021  Impression: No acute cardiopulmonary disease  Workstation performed: [id number]     Us Right Upper Quadrant    Result Date: 5/6/2021  Impression: Mild gallbladder wall thickening without additional evidence of acute cholecystitis  Pancreatic head mass again identified  Associated pancreatic ductal dilatation  Indeterminate hypoechoic lesion within the left lobe of the liver  Hyperechoic left hepatic lobe lesion possibly representing a hemangioma  Multiple hepatic cysts  Workstation performed: [id number]     Ct Abdomen Pelvis With Contrast    Result Date: 5/5/2021  Impression: 1  Uniform mural hyperenhancement of the wall of the gallbladder with a small amount of pericholecystic fluid, worrisome for acute cholecystitis  Further clinical assessment advised  Ultrasound of the right upper quadrant suggested for further assessment  2   Pneumobilia with stent in the common bile duct redemonstrated   3   3 8 cm obstructing mass in the region of the head of the pancreas with marked pancreatic ductal dilatation, worrisome for adenocarcinoma  Other tumors not excluded  4   Numerous hepatic cysts redemonstrated with an enhancing lesion in the left lobe of liver possibly a hemangioma, stable  5   Moderate constipation   Workstation performed: [id number]

## 2021-05-08 NOTE — ASSESSMENT & PLAN NOTE
Complaining of epigastric pain radiating diffusely  No nausea, vomiting, diarrhea  Recent admission for elevated LFTs where CT abdomen showed pancreatic head mass suspected to be malignant  · CA 19-9 elevated 487 indicative of pancreatic cancer, AFP and CEA within normal range  · Biliary stent was placed and sphincterotomy performed    Patient returning abdominal pain that has been intermittent    Ct Abdomen Pelvis With Contrast Result Date: 5/5/2021 Impression: 1  Uniform mural hyperenhancement of the wall of the gallbladder with a small amount of pericholecystic fluid, worrisome for acute cholecystitis  Further clinical assessment advised  Ultrasound of the right upper quadrant suggested for further assessment  2   Pneumobilia with stent in the common bile duct redemonstrated  3   3 8 cm obstructing mass in the region of the head of the pancreas with marked pancreatic ductal dilatation, worrisome for adenocarcinoma  Other tumors not excluded  4   Numerous hepatic cysts redemonstrated with an enhancing lesion in the left lobe of liver possibly a hemangioma, stable  5   Moderate constipation  Us Right Upper Quadrant Result Date: 5/6/2021 Impression: Mild gallbladder wall thickening without additional evidence of acute cholecystitis  Pancreatic head mass again identified  Associated pancreatic ductal dilatation  Indeterminate hypoechoic lesion within the left lobe of the liver  Hyperechoic left hepatic lobe lesion possibly representing a hemangioma  Multiple hepatic cysts  Recent Labs     05/05/21  1912 05/07/21  0632   AST 26 20   * 75   ALKPHOS 348* 261*   TBILI 1 04* 1 25*     · Patient was having severe pain this afternoon and will not be discharged  Plan:  · Continue pancrelipase that was started on previous admission  · Patient received Dilaudid  Mg IV and pain subsided to 5/10  · Patient will not be discharged tonight  Will observe patient tonight and maybe d/c tomorrow     · Pain regimen to include:   Oxycodone 2 5 mg po q6h prn for mild pain  Oxycodone 5 mg po q6h prn for severe pain  Oxycodone 10 mg po q6h for severe pain   Dilaudid 0 5 mg IV 46h prn breakthrough pain  · Continue bowel regimen to avoid constipation    · Resume regular diet

## 2021-05-08 NOTE — ASSESSMENT & PLAN NOTE
· Patient had pancreatic mass identified a previous admission on CT scan done on 04/29/2021  Mass presented with biliary obstruction which improved s/p biliary duct stenting  Brushing unfortunately was nondiagnostic  CT Abdomen Pelvis With Contrast Result Date: 5/5/2021 Impression:   3 8 cm obstructing mass in the region of the head of the pancreas with marked pancreatic ductal dilatation, worrisome for adenocarcinoma  Other tumors not excluded  · Us Right Upper Quadrant Result Date: 5/6/2021 Impression: Mild gallbladder wall thickening without additional evidence of acute cholecystitis  Pancreatic head mass again identified  Associated pancreatic ductal dilatation  Indeterminate hypoechoic lesion within the left lobe of the liver  Hyperechoic left hepatic lobe lesion possibly representing a hemangioma  Multiple hepatic cysts  · Patient is S/P EUS procedure with biopsies taken and celiac plexus nerve block on 05/07  · Patient had f/u with outpatient Surgical Oncology scheduled for 05/06/2021  Plan:  · Discharge home on oxycodone 2 5 mg q 6h p r n   Pain  · Continue bowel regimen to avoid constipation  · Follow-up with GI doctor in 2 weeks for biopsy results

## 2021-05-09 ENCOUNTER — APPOINTMENT (INPATIENT)
Dept: CT IMAGING | Facility: HOSPITAL | Age: 66
DRG: 436 | End: 2021-05-09
Payer: COMMERCIAL

## 2021-05-09 PROBLEM — R65.10 SIRS (SYSTEMIC INFLAMMATORY RESPONSE SYNDROME) (HCC): Status: ACTIVE | Noted: 2021-05-09

## 2021-05-09 LAB
ALBUMIN SERPL BCP-MCNC: 2.4 G/DL (ref 3.5–5)
ALP SERPL-CCNC: 211 U/L (ref 46–116)
ALT SERPL W P-5'-P-CCNC: 68 U/L (ref 12–78)
AMYLASE SERPL-CCNC: 103 IU/L (ref 25–115)
ANION GAP SERPL CALCULATED.3IONS-SCNC: 11 MMOL/L (ref 4–13)
AST SERPL W P-5'-P-CCNC: 28 U/L (ref 5–45)
BASOPHILS # BLD MANUAL: 0 THOUSAND/UL (ref 0–0.1)
BASOPHILS NFR MAR MANUAL: 0 % (ref 0–1)
BILIRUB SERPL-MCNC: 0.96 MG/DL (ref 0.2–1)
BUN SERPL-MCNC: 17 MG/DL (ref 5–25)
CALCIUM ALBUM COR SERPL-MCNC: 9 MG/DL (ref 8.3–10.1)
CALCIUM SERPL-MCNC: 7.7 MG/DL (ref 8.3–10.1)
CHLORIDE SERPL-SCNC: 103 MMOL/L (ref 100–108)
CO2 SERPL-SCNC: 24 MMOL/L (ref 21–32)
CREAT SERPL-MCNC: 0.91 MG/DL (ref 0.6–1.3)
EOSINOPHIL # BLD MANUAL: 0 THOUSAND/UL (ref 0–0.4)
EOSINOPHIL NFR BLD MANUAL: 0 % (ref 0–6)
ERYTHROCYTE [DISTWIDTH] IN BLOOD BY AUTOMATED COUNT: 14.1 % (ref 11.6–15.1)
GFR SERPL CREATININE-BSD FRML MDRD: 88 ML/MIN/1.73SQ M
GLUCOSE SERPL-MCNC: 123 MG/DL (ref 65–140)
GLUCOSE SERPL-MCNC: 130 MG/DL (ref 65–140)
GLUCOSE SERPL-MCNC: 134 MG/DL (ref 65–140)
GLUCOSE SERPL-MCNC: 135 MG/DL (ref 65–140)
GLUCOSE SERPL-MCNC: 142 MG/DL (ref 65–140)
HCT VFR BLD AUTO: 40 % (ref 36.5–49.3)
HGB BLD-MCNC: 13.4 G/DL (ref 12–17)
LG PLATELETS BLD QL SMEAR: PRESENT
LIPASE SERPL-CCNC: 221 U/L (ref 73–393)
LYMPHOCYTES # BLD AUTO: 0.69 THOUSAND/UL (ref 0.6–4.47)
LYMPHOCYTES # BLD AUTO: 5 % (ref 14–44)
MCH RBC QN AUTO: 30.4 PG (ref 26.8–34.3)
MCHC RBC AUTO-ENTMCNC: 33.5 G/DL (ref 31.4–37.4)
MCV RBC AUTO: 91 FL (ref 82–98)
MONOCYTES # BLD AUTO: 1.52 THOUSAND/UL (ref 0–1.22)
MONOCYTES NFR BLD: 11 % (ref 4–12)
NEUTROPHILS # BLD MANUAL: 11.59 THOUSAND/UL (ref 1.85–7.62)
NEUTS BAND NFR BLD MANUAL: 3 % (ref 0–8)
NEUTS SEG NFR BLD AUTO: 81 % (ref 43–75)
NRBC BLD AUTO-RTO: 0 /100 WBCS
PLATELET # BLD AUTO: 195 THOUSANDS/UL (ref 149–390)
PLATELET BLD QL SMEAR: ADEQUATE
PMV BLD AUTO: 11.9 FL (ref 8.9–12.7)
POTASSIUM SERPL-SCNC: 3.4 MMOL/L (ref 3.5–5.3)
PROCALCITONIN SERPL-MCNC: 0.54 NG/ML
PROT SERPL-MCNC: 5.5 G/DL (ref 6.4–8.2)
RBC # BLD AUTO: 4.41 MILLION/UL (ref 3.88–5.62)
RBC MORPH BLD: NORMAL
SODIUM SERPL-SCNC: 138 MMOL/L (ref 136–145)
TOTAL CELLS COUNTED SPEC: 100
WBC # BLD AUTO: 13.8 THOUSAND/UL (ref 4.31–10.16)

## 2021-05-09 PROCEDURE — 84145 PROCALCITONIN (PCT): CPT | Performed by: INTERNAL MEDICINE

## 2021-05-09 PROCEDURE — 82150 ASSAY OF AMYLASE: CPT | Performed by: PSYCHIATRY & NEUROLOGY

## 2021-05-09 PROCEDURE — 82948 REAGENT STRIP/BLOOD GLUCOSE: CPT

## 2021-05-09 PROCEDURE — 83690 ASSAY OF LIPASE: CPT | Performed by: PHYSICIAN ASSISTANT

## 2021-05-09 PROCEDURE — 85007 BL SMEAR W/DIFF WBC COUNT: CPT | Performed by: PHYSICIAN ASSISTANT

## 2021-05-09 PROCEDURE — C9113 INJ PANTOPRAZOLE SODIUM, VIA: HCPCS | Performed by: PHYSICIAN ASSISTANT

## 2021-05-09 PROCEDURE — 99232 SBSQ HOSP IP/OBS MODERATE 35: CPT | Performed by: INTERNAL MEDICINE

## 2021-05-09 PROCEDURE — 85027 COMPLETE CBC AUTOMATED: CPT | Performed by: PHYSICIAN ASSISTANT

## 2021-05-09 PROCEDURE — 74177 CT ABD & PELVIS W/CONTRAST: CPT

## 2021-05-09 PROCEDURE — G1004 CDSM NDSC: HCPCS

## 2021-05-09 PROCEDURE — 80053 COMPREHEN METABOLIC PANEL: CPT | Performed by: PHYSICIAN ASSISTANT

## 2021-05-09 RX ORDER — PANTOPRAZOLE SODIUM 40 MG/1
40 INJECTION, POWDER, FOR SOLUTION INTRAVENOUS EVERY 12 HOURS SCHEDULED
Status: DISCONTINUED | OUTPATIENT
Start: 2021-05-09 | End: 2021-05-10 | Stop reason: HOSPADM

## 2021-05-09 RX ORDER — POTASSIUM CHLORIDE 20 MEQ/1
40 TABLET, EXTENDED RELEASE ORAL ONCE
Status: COMPLETED | OUTPATIENT
Start: 2021-05-09 | End: 2021-05-09

## 2021-05-09 RX ORDER — SODIUM CHLORIDE, SODIUM GLUCONATE, SODIUM ACETATE, POTASSIUM CHLORIDE, MAGNESIUM CHLORIDE, SODIUM PHOSPHATE, DIBASIC, AND POTASSIUM PHOSPHATE .53; .5; .37; .037; .03; .012; .00082 G/100ML; G/100ML; G/100ML; G/100ML; G/100ML; G/100ML; G/100ML
100 INJECTION, SOLUTION INTRAVENOUS CONTINUOUS
Status: DISCONTINUED | OUTPATIENT
Start: 2021-05-09 | End: 2021-05-10 | Stop reason: HOSPADM

## 2021-05-09 RX ADMIN — OMEGA-3 FATTY ACIDS CAP 1000 MG 1000 MG: 1000 CAP at 09:27

## 2021-05-09 RX ADMIN — HEPARIN SODIUM 5000 UNITS: 5000 INJECTION INTRAVENOUS; SUBCUTANEOUS at 05:43

## 2021-05-09 RX ADMIN — PANTOPRAZOLE SODIUM 40 MG: 40 INJECTION, POWDER, FOR SOLUTION INTRAVENOUS at 21:26

## 2021-05-09 RX ADMIN — FLUOXETINE 20 MG: 20 CAPSULE ORAL at 09:27

## 2021-05-09 RX ADMIN — HEPARIN SODIUM 5000 UNITS: 5000 INJECTION INTRAVENOUS; SUBCUTANEOUS at 14:25

## 2021-05-09 RX ADMIN — POTASSIUM CHLORIDE 40 MEQ: 1500 TABLET, EXTENDED RELEASE ORAL at 09:27

## 2021-05-09 RX ADMIN — SODIUM CHLORIDE, SODIUM GLUCONATE, SODIUM ACETATE, POTASSIUM CHLORIDE, MAGNESIUM CHLORIDE, SODIUM PHOSPHATE, DIBASIC, AND POTASSIUM PHOSPHATE 100 ML/HR: .53; .5; .37; .037; .03; .012; .00082 INJECTION, SOLUTION INTRAVENOUS at 21:23

## 2021-05-09 RX ADMIN — DOCUSATE SODIUM AND SENNOSIDES 1 TABLET: 8.6; 5 TABLET, FILM COATED ORAL at 09:27

## 2021-05-09 RX ADMIN — ASPIRIN 81 MG: 81 TABLET, CHEWABLE ORAL at 09:27

## 2021-05-09 RX ADMIN — IOHEXOL 100 ML: 350 INJECTION, SOLUTION INTRAVENOUS at 13:41

## 2021-05-09 RX ADMIN — PANCRELIPASE 24000 UNITS: 24000; 76000; 120000 CAPSULE, DELAYED RELEASE PELLETS ORAL at 09:26

## 2021-05-09 RX ADMIN — OXYCODONE HYDROCHLORIDE 5 MG: 5 TABLET ORAL at 04:04

## 2021-05-09 RX ADMIN — PANCRELIPASE 24000 UNITS: 24000; 76000; 120000 CAPSULE, DELAYED RELEASE PELLETS ORAL at 17:02

## 2021-05-09 RX ADMIN — HEPARIN SODIUM 5000 UNITS: 5000 INJECTION INTRAVENOUS; SUBCUTANEOUS at 21:27

## 2021-05-09 RX ADMIN — PANTOPRAZOLE SODIUM 40 MG: 40 TABLET, DELAYED RELEASE ORAL at 05:43

## 2021-05-09 RX ADMIN — CEFEPIME HYDROCHLORIDE 2000 MG: 2 INJECTION, POWDER, FOR SOLUTION INTRAVENOUS at 20:31

## 2021-05-09 RX ADMIN — OXYCODONE HYDROCHLORIDE 5 MG: 5 TABLET ORAL at 11:07

## 2021-05-09 RX ADMIN — POLYETHYLENE GLYCOL 3350 17 G: 17 POWDER, FOR SOLUTION ORAL at 14:25

## 2021-05-09 RX ADMIN — Medication 1 TABLET: at 09:26

## 2021-05-09 RX ADMIN — OXYCODONE HYDROCHLORIDE 5 MG: 5 TABLET ORAL at 21:34

## 2021-05-09 RX ADMIN — METRONIDAZOLE 500 MG: 500 INJECTION, SOLUTION INTRAVENOUS at 19:44

## 2021-05-09 RX ADMIN — PANCRELIPASE 24000 UNITS: 24000; 76000; 120000 CAPSULE, DELAYED RELEASE PELLETS ORAL at 12:33

## 2021-05-09 RX ADMIN — FOLIC ACID 1 MG: 1 TABLET ORAL at 09:27

## 2021-05-09 RX ADMIN — TAMSULOSIN HYDROCHLORIDE 0.4 MG: 0.4 CAPSULE ORAL at 21:26

## 2021-05-09 RX ADMIN — DOCUSATE SODIUM AND SENNOSIDES 1 TABLET: 8.6; 5 TABLET, FILM COATED ORAL at 17:02

## 2021-05-09 NOTE — ASSESSMENT & PLAN NOTE
· Febrile overnight with Tmax 100 6  · Leukocytosis  · Check Procalcitonin   · Monitor off abx while awaiting for procal levels  · Monitor fever curves and WBCs

## 2021-05-09 NOTE — ASSESSMENT & PLAN NOTE
· Potassium level this morning is 3 4  · Repleted with KCl 40 mEq q d  · Monitor daily BMP and replete as needed

## 2021-05-09 NOTE — PROGRESS NOTES
Progress Note - Sy Ortiz 73 y o  male MRN: [id number]    Unit/Bed#: S -01 Encounter: [id number]    Assessment and Plan:     1  Pancreatic head mass with abdominal pain  Patient with newly discovered pancreatic head mass and GI is consulted to perform EUS with biopsies and celiac plexus block  Procedure was completed on 05/07/2021  Patient was ready to be discharged yesterday but was having 10/10 abdominal pain  KUB was performed which does not show evidence of visceral perforation  Final read is still pending  Patient had fever 100 6 last night with vital signs stable  LFTs downtrending with alk phos at 211 and bilirubin now normalized  Patient reports his pain is now 5/10     -Agree with SLIM to obtain CT abdomen with contrast   Follow up on results   -Continue pain control   -Diet as tolerated  -Follow-up pathology results  ----------------------------------------------------------------------------------------------------------------    Subjective:     Patient reports pain which started yesterday morning  He reports his pain is now 5/10 which has improved from the 10/10 yesterday  He has not had any nausea or vomiting  He had 1 large bowel movement yesterday after the EUS and plexus block  He has not had 1 today yet  He denies any blood  He denies any fevers or chills  Objective:     Vitals: Blood pressure 127/67, pulse 76, temperature 98 3 °F (36 8 °C), temperature source Oral, resp  rate 16, SpO2 92 %  ,There is no height or weight on file to calculate BMI  Intake/Output Summary (Last 24 hours) at 5/9/2021 0759  Last data filed at 5/9/2021 0547  Gross per 24 hour   Intake 500 ml   Output 725 ml   Net -225 ml       Physical Exam:     General Appearance: Alert, appears stated age and cooperative    Lungs: Clear to auscultation bilaterally, no rales or rhonchi, no labored breathing/accessory muscle use  Heart: Regular rate and rhythm, S1, S2 normal, no murmur, click, rub or gallop  Abdomen: + tender to palpation throughout abdomen, mild distention but soft  Bowel sounds normal; no masses or no organomegaly  Extremities: No cyanosis, clubbing, or edema    Invasive Devices     Peripheral Intravenous Line            Peripheral IV 05/08/21 Right Antecubital less than 1 day          Drain            Open Drain Right 1213 days                Lab Results:  Results from last 7 days   Lab Units 05/08/21  0505  05/06/21  0634   WBC Thousand/uL 7 46   < > 11 01*   HEMOGLOBIN g/dL 12 6   < > 13 6   HEMATOCRIT % 38 0   < > 41 8   PLATELETS Thousands/uL 170   < > 213   NEUTROS PCT %  --   --  78*   LYMPHS PCT %  --   --  9*   MONOS PCT %  --   --  11   EOS PCT %  --   --  1    < > = values in this interval not displayed  Results from last 7 days   Lab Units 05/07/21  0632   POTASSIUM mmol/L 3 6   CHLORIDE mmol/L 104   CO2 mmol/L 24   BUN mg/dL 19   CREATININE mg/dL 0 93   CALCIUM mg/dL 8 1*   ALK PHOS U/L 261*   ALT U/L 75   AST U/L 20     Invalid input(s): BILI  Results from last 7 days   Lab Units 05/05/21  1912   INR  1 08     Results from last 7 days   Lab Units 05/06/21  0634   LIPASE u/L 360       Imaging Studies: I have personally reviewed pertinent imaging studies  Xr Chest Portable    Result Date: 5/8/2021  Impression: No acute cardiopulmonary disease  Workstation performed: [id number]     Xr Chest 1 View Portable    Result Date: 5/5/2021  Impression: No acute cardiopulmonary disease  Workstation performed: [id number]     Us Right Upper Quadrant    Result Date: 5/6/2021  Impression: Mild gallbladder wall thickening without additional evidence of acute cholecystitis  Pancreatic head mass again identified  Associated pancreatic ductal dilatation  Indeterminate hypoechoic lesion within the left lobe of the liver  Hyperechoic left hepatic lobe lesion possibly representing a hemangioma  Multiple hepatic cysts    Workstation performed: [id number]     Ct Abdomen Pelvis With Contrast    Result Date: 5/5/2021  Impression: 1  Uniform mural hyperenhancement of the wall of the gallbladder with a small amount of pericholecystic fluid, worrisome for acute cholecystitis  Further clinical assessment advised  Ultrasound of the right upper quadrant suggested for further assessment  2   Pneumobilia with stent in the common bile duct redemonstrated  3   3 8 cm obstructing mass in the region of the head of the pancreas with marked pancreatic ductal dilatation, worrisome for adenocarcinoma  Other tumors not excluded  4   Numerous hepatic cysts redemonstrated with an enhancing lesion in the left lobe of liver possibly a hemangioma, stable  5   Moderate constipation   Workstation performed: [id number]

## 2021-05-09 NOTE — PLAN OF CARE
Problem: Potential for Falls  Goal: Patient will remain free of falls  Description: INTERVENTIONS:  - Assess patient frequently for physical needs  -  Identify cognitive and physical deficits and behaviors that affect risk of falls    -  [place] fall precautions as indicated by assessment   - Educate patient/family on patient safety including physical limitations  - Instruct patient to call for assistance with activity based on assessment  - Modify environment to reduce risk of injury  - Consider OT/PT consult to assist with strengthening/mobility  Outcome: Progressing     Problem: PAIN - ADULT  Goal: Verbalizes/displays adequate comfort level or baseline comfort level  Description: Interventions:  - Encourage patient to monitor pain and request assistance  - Assess pain using appropriate pain scale  - Administer analgesics based on type and severity of pain and evaluate response  - Implement non-pharmacological measures as appropriate and evaluate response  - Consider cultural and social influences on pain and pain management  - Notify physician/advanced practitioner if interventions unsuccessful or patient reports new pain  Outcome: Progressing     Problem: INFECTION - ADULT  Goal: Absence or prevention of progression during hospitalization  Description: INTERVENTIONS:  - Assess and monitor for signs and symptoms of infection  - Monitor lab/diagnostic results  - Monitor all insertion sites, i e  indwelling lines, tubes, and drains  - Monitor endotracheal if appropriate and nasal secretions for changes in amount and color  - [place] appropriate cooling/warming therapies per order  - Administer medications as ordered  - Instruct and encourage patient and family to use good hand hygiene technique  - Identify and instruct in appropriate isolation precautions for identified infection/condition  Outcome: Progressing  Goal: Absence of fever/infection during neutropenic period  Description: INTERVENTIONS:  - Monitor WBC    Outcome: Progressing     Problem: SAFETY ADULT  Goal: Patient will remain free of falls  Description: INTERVENTIONS:  - Assess patient frequently for physical needs  -  Identify cognitive and physical deficits and behaviors that affect risk of falls    -  [place] fall precautions as indicated by assessment   - Educate patient/family on patient safety including physical limitations  - Instruct patient to call for assistance with activity based on assessment  - Modify environment to reduce risk of injury  - Consider OT/PT consult to assist with strengthening/mobility  Outcome: Progressing  Goal: Maintain or return to baseline ADL function  Description: INTERVENTIONS:  -  Assess patient's ability to carry out ADLs; assess patient's baseline for ADL function and identify physical deficits which impact ability to perform ADLs (bathing, care of mouth/teeth, toileting, grooming, dressing, etc )  - Assess/evaluate cause of self-care deficits   - Assess range of motion  - Assess patient's mobility; develop plan if impaired  - Assess patient's need for assistive devices and provide as appropriate  - Encourage maximum independence but intervene and supervise when necessary  - Involve family in performance of ADLs  - Assess for home care needs following discharge   - Consider OT consult to assist with ADL evaluation and planning for discharge  - Provide patient education as appropriate  Outcome: Progressing  Goal: Maintain or return mobility status to optimal level  Description: INTERVENTIONS:  - Assess patient's baseline mobility status (ambulation, transfers, stairs, etc )    - Identify cognitive and physical deficits and behaviors that affect mobility  - Identify mobility aids required to assist with transfers and/or ambulation (gait belt, sit-to-stand, lift, walker, cane, etc )  - [place] fall precautions as indicated by assessment  - Record patient progress and toleration of activity level on Mobility SBAR; progress patient to next Phase/Stage  - Instruct patient to call for assistance with activity based on assessment  - Consider rehabilitation consult to assist with strengthening/weightbearing, etc   Outcome: Progressing     Problem: DISCHARGE PLANNING  Goal: Discharge to home or other facility with appropriate resources  Description: INTERVENTIONS:  - Identify barriers to discharge w/patient and caregiver  - Arrange for needed discharge resources and transportation as appropriate  - Identify discharge learning needs (meds, wound care, etc )  - Arrange for interpretive services to assist at discharge as needed  - Refer to Case Management Department for coordinating discharge planning if the patient needs post-hospital services based on physician/advanced practitioner order or complex needs related to functional status, cognitive ability, or social support system  Outcome: Progressing     Problem: Knowledge Deficit  Goal: Patient/family/caregiver demonstrates understanding of disease process, treatment plan, medications, and discharge instructions  Description: Complete learning assessment and assess knowledge base    Interventions:  - Provide teaching at level of understanding  - Provide teaching via preferred learning methods  Outcome: Progressing

## 2021-05-09 NOTE — ASSESSMENT & PLAN NOTE
Lab Results   Component Value Date    HGBA1C 6 2 (H) 05/05/2021       Recent Labs     05/08/21  1118 05/08/21  1617 05/08/21 2037 05/09/21  0716   POCGLU 211* 120 137 142*       Blood Sugar Average: Last 72 hrs:  (P) 134 [id number] Home PO meds:  None  Unclear patient has true diabetes as this may be secondary to pancreatic neoplasm    Plan:   Diabetic Diet: Consistent CHO Level 2 (5 CHO servings/75g CHO per meal)   Insulin regimen  o Sliding Scale Insulin ACHS   Goal Blood Glucose 140-180   Glucose checks:  ACHS   Monitor for hypoglycemia and treat per protocol

## 2021-05-09 NOTE — UTILIZATION REVIEW
Continued Stay Review    Date: 5/9/2021                          Current Patient Class: inpatient  Current Level of Care: med surg    HPI:66 y o  male initially admitted on 5/5/2021 inpatient due to epigastric pain  Ct abdomen showed possible acute cholecystitis, pneumbolia with stent CBD, obstructing mass region head of pancreas, constipation and hepatic cysts   Plan is consult surgical oncology, GI   Continued IVF,  Diet as tolerated    Pain control as tolerated  5/7/2021:  Endoscopic Ultrasonography, GI (Upper)-     Assessment/Plan:   5/8/2021:  Patient has epigastric abdominal pain  On exam abdomen tender in all quadrants  Plan is check xray abdomen which did not show free air  Given Dilaudid for pain then oxycodone as needed  Monitor abdominal exam    5/9/2021:  Has ongoing abdominal pain but improved from yesterday  Has early satiety  On exam abdomen soft  RUQ and epigastric tenderness  Ct obtained  IV Protonix and pain control in progress  Changed to NPO        Vital Signs:   /09/21 1552  98 8 °F (37 1 °C)  79  16  135/60  87  92 %  --  --  None (Room air)  Lying   05/09/21 0700  98 3 °F (36 8 °C)  76  16  127/67  --  92 %  --  --  None (Room air)  Lying   05/08/21 2211  100 6 °F (38 1 °C)Abnormal   70  16  130/61  88  93 %  --  --  None (Room air)  Lying   05/08/21 2145  --  --  --  --  --  --  --  --  None (Room air)  --   05/08/21 1500  --  73  18  146/75  102  96 %  --  --  None (Room air)  Lying   05/08/21 0753  97 6 °F (36 4 °C)  69  16  131/74  --  94 %  --  --  Nasal cannula  Lying   05/08/21 0716  --  --  --  --  --  --  --  --  Nasal cannula  --   05/08/21 0423  97 9 °F (36 6 °C)  59  17  130/72  95  94 %  28  2 L/min  Nasal cannula  Lying   05/08/21 0303  97 9 °F (36 6 °C)  58  16  122/66  88  96 %  28  2 L/min  Nasal cannula  Lying   05/08/21 0223  97 8 °F (36 6 °C)  59  16  129/66  92  96 %  28  2 L/min  Nasal cannula  Lying   05/08/21 0148  97 9 °F (36 6 °C)  54Abnormal   18  108/64 64  96 %  28  2 L/min  Nasal cannula  Lying   05/08/21 0118  98 °F (36 7 °C)  64  20  118/65  87  92 %  28  2 L/min  Nasal cannula  Lying   05/08/21 0001  99 °F (37 2 °C)  --  --  --  --  --  --  --  --       Pertinent Labs/Diagnostic Results:   5/9/2021 ct abdomen Inflammatory changes surrounding the pylorus and proximal duodenum, as well as the pancreatic head  A small amount of free air  Findings are most suspicious for a perforated gastric ulcer  Recommend correlation with endoscopy  Pancreatitis is also   considered, but felt less likely given the interval development of free air in the location of the inflammatory changes  Pancreatic head mass concerning for malignancy again noted, unchanged since study of 5/5/2021 5/8/2021 Xray abdomen Common bile duct stent  Nonobstructive bowel gas pattern  No evidence for free air on this semierect examination    Results from last 7 days   Lab Units 05/09/21  0804 05/08/21  0505 05/07/21  3060 05/06/21  0634 05/05/21 1912 05/05/21  0911   WBC Thousand/uL 13 80* 7 46 10 39* 11 01* 10 23* 8 37   HEMOGLOBIN g/dL 13 4 12 6 13 1 13 6 14 5 15 4   HEMATOCRIT % 40 0 38 0 40 0 41 8 43 0 45 0   PLATELETS Thousands/uL 195 170 185 213 245 238   NEUTROS ABS Thousands/µL  --   --   --  8 57* 8 34* 5 91   BANDS PCT % 3  --   --   --   --   --      Results from last 7 days   Lab Units 05/09/21  0804 05/07/21  0632 05/05/21 1912 05/05/21  0911   SODIUM mmol/L 138 138 139 140   POTASSIUM mmol/L 3 4* 3 6 4 4 4 1   CHLORIDE mmol/L 103 104 102 103   CO2 mmol/L 24 24 28 29   ANION GAP mmol/L 11 10 9 8   BUN mg/dL 17 19 18 17   CREATININE mg/dL 0 91 0 93 1 08 0 84   EGFR ml/min/1 73sq m 88 86 72 92   CALCIUM mg/dL 7 7* 8 1* 8 6 9 1     Results from last 7 days   Lab Units 05/09/21  0804 05/07/21  0632 05/05/21 1912 05/05/21  0911   AST U/L 28 20 26 34   ALT U/L 68 75 120* 97*   ALK PHOS U/L 211* 261* 348* 272 9*   TOTAL PROTEIN g/dL 5 5* 5 9* 6 8 6 6   ALBUMIN g/dL 2 4* 2 7* 3 4* 3  9   TOTAL BILIRUBIN mg/dL 0 96 1 25* 1 04* 1 44*     Results from last 7 days   Lab Units 05/09/21  1619 05/09/21  1100 05/09/21  0716 05/08/21 2037 05/08/21  1617 05/08/21  1118 05/08/21  0756 05/07/21 2023 05/07/21  1843 05/07/21  1034 05/07/21  0724 05/06/21 2020   POC GLUCOSE mg/dl 123 134 142* 137 120 211* 112 129 125 127 114 153*     Results from last 7 days   Lab Units 05/09/21  0804 05/07/21  0632 05/05/21  1912   GLUCOSE RANDOM mg/dL 135 138 235*     Results from last 7 days   Lab Units 05/05/21  0911   HEMOGLOBIN A1C % 6 2*   EAG mg/dl 131     Results from last 7 days   Lab Units 05/05/21  1940   TROPONIN I ng/mL <0 02     Results from last 7 days   Lab Units 05/05/21 1912   PROTIME seconds 14 2   INR  1 08   PTT seconds 24     Results from last 7 days   Lab Units 05/06/21  0634 05/05/21  1912   PROCALCITONIN ng/ml 0 22 0 12     Results from last 7 days   Lab Units 05/05/21 2124 05/05/21  1912   LACTIC ACID mmol/L 1 7 2 1*     Results from last 7 days   Lab Units 05/09/21  1655 05/09/21  1626 05/06/21  0634   LIPASE u/L  --  221 360   AMYLASE IU/L 103  --   --      Results from last 7 days   Lab Units 05/05/21 2124 05/05/21  0911   CLARITY UA  Clear  --    COLOR UA  Yellow  --    SPEC GRAV UA  1 010  --    PH UA  8 5*  --    GLUCOSE UA mg/dl 250 (1/4%)*  --    KETONES UA mg/dl Negative  --    BLOOD UA  Moderate*  --    PROTEIN UA mg/dl Negative  --    NITRITE UA  Negative  --    BILIRUBIN UA  Negative  --    UROBILINOGEN UA E U /dl 0 2  --    LEUKOCYTES UA  Negative  --    WBC UA /hpf 0-1  --    RBC UA /hpf 30-50*  --    BACTERIA UA /hpf Occasional  --    EPITHELIAL CELLS WET PREP /hpf None Seen  --    CREATININE UR mg/dL  --  235 0     Results from last 7 days   Lab Units 05/05/21  1912   BLOOD CULTURE  No Growth at 72 hrs  No Growth at 72 hrs       Results from last 7 days   Lab Units 05/09/21  0804   TOTAL COUNTED  100       Medications:   Scheduled Medications:  aspirin, 81 mg, Oral, Daily  fish oil, 1,000 mg, Oral, Daily  FLUoxetine, 20 mg, Oral, Daily  folic acid, 1 mg, Oral, Daily  heparin (porcine), 5,000 Units, Subcutaneous, Q8H INGRID  insulin lispro, 1-6 Units, Subcutaneous, 4x Daily (AC & HS)  multivitamin-minerals, 1 tablet, Oral, Daily  pancrelipase (Lip-Prot-Amyl), 24,000 Units, Oral, TID With Meals  pantoprazole, 40 mg, Intravenous, Q12H INGRID  polyethylene glycol, 17 g, Oral, Daily  senna-docusate sodium, 1 tablet, Oral, BID  tamsulosin, 0 4 mg, Oral, HS      Continuous IV Infusions: dc 5/8      PRN Meds:  HYDROmorphone, 0 5 mg, Intravenous, Q4H PRN - used x 1 5/8  oxyCODONE, 10 mg, Oral, Q6H PRN  oxyCODONE, 2 5 mg, Oral, Q6H PRN - used x 2 5/8  oxyCODONE, 5 mg, Oral, Q6H PRN - used x 1 5/8, x 2 5/9  sodium chloride (PF), 3 mL, Intravenous, Q1H PRN        Discharge Plan: To be determined    Network Utilization Review Department  ATTENTION: Please call with any questions or concerns to [phone] and carefully listen to the prompts so that you are directed to the right person  All voicemails are confidential   Sudie Crigler all requests for admission clinical reviews, approved or denied determinations and any other requests to dedicated fax number below belonging to the campus where the patient is receiving treatment   List of dedicated fax numbers for the Facilities:  [address] DENIALS (Administrative/Medical Necessity) [phone]   [address] (Maternity/NICU/Pediatrics) [phone]   [address] [place] [address] Dr Jovan Naylor [address] [address] [address]   Tarun Garcia [address]  [place] [address] [place] [address] [phone]

## 2021-05-09 NOTE — PROGRESS NOTES
Progress Note - Surgical Oncology  Jamila Gray 73 y o  male MRN: [id number]  Unit/Bed#: S -01 Encounter: [id number]    Assessment:  73 M with pancreatic mass status post ERCP biliary decompression on 04/28, readmitted with abdominal pain, now status post endoscopic ultrasound with biopsy on 05/07 by GI  Remained admitted over the weekend on medical service for persistent intermittent abdominal pain  CT scan abdomen/pelvis obtained 5/9 showing inflammation in the vicinity of duodenum and pancreas with some extraluminal air, concerning for possible enteric perforation  VSS, afebrile, no tachycardia    Abdomen remains soft, diffusely tender, worst in epigastrium with focal voluntary guarding, nondistended, no rebound    Lipase 221, amylase 103    WBC 13 66 from 12 1, neutrophilic predominance  Hemoglobin 12 8 from 13 4  BMP within normal limits  Plan:  -maintain NPO  -IV fluid hydration  -continue IV antibiotics, cefepime/Flagyl  -continue serial examinations  -insulin glycemic control  -consider procedural intervention versus OR if clinically deteriorating or latha peritonitis  -will follow-up results of EUS/biopsy as an outpatient      Subjective/Objective     Subjective: The patient had persistent pain overnight, however on serial examinations he remained tender with exquisite focal tenderness and voluntary guarding in the epigastrium only, he denies fevers and chills, says his pain is stable and not worsening, is making urine    Objective:     Blood pressure 119/62, pulse 77, temperature 99 8 °F (37 7 °C), temperature source Oral, resp  rate 16, SpO2 95 %  ,There is no height or weight on file to calculate BMI        Intake/Output Summary (Last 24 hours) at 5/10/2021 0620  Last data filed at 5/10/2021 0516  Gross per 24 hour   Intake 100 ml   Output 500 ml   Net -400 ml       Invasive Devices     Peripheral Intravenous Line            Peripheral IV 05/08/21 Right Antecubital 1 day          Drain Open Drain Right 1214 days                Physical Exam:     General:  No acute distress, resting comfortably  HEENT:  Normocephalic, atraumatic  CV:  Regular rate and rhythm, no murmurs  Resp:  No distress, on room air  Abdomen:  Soft, diffusely tender, exquisitely tender in epigastrium with voluntary guarding locally, no rebound, abdomen is nondistended  Extremities:  No clubbing, cyanosis, or edema  Neuro:  AAO x3  Skin:  Warm, dry    Lab, Imaging and other studies:  I have personally reviewed pertinent lab results    , CBC:   Lab Results   Component Value Date    WBC 13 66 (H) 05/10/2021    HGB 12 8 05/10/2021    HCT 38 6 05/10/2021    MCV 91 05/10/2021     05/10/2021    MCH 30 1 05/10/2021    MCHC 33 2 05/10/2021    RDW 14 3 05/10/2021    MPV 11 6 05/10/2021    NRBC 0 05/10/2021   , CMP:   Lab Results   Component Value Date    SODIUM 137 05/10/2021    K 3 8 05/10/2021     05/10/2021    CO2 25 05/10/2021    BUN 16 05/10/2021    CREATININE 0 93 05/10/2021    CALCIUM 7 7 (L) 05/10/2021    AST 28 05/09/2021    ALT 68 05/09/2021    ALKPHOS 211 (H) 05/09/2021    EGFR 86 05/10/2021     VTE Pharmacologic Prophylaxis: Heparin  VTE Mechanical Prophylaxis: sequential compression device

## 2021-05-09 NOTE — QUICK NOTE
Spoke with Radiology regarding findings from CT scan completed today, 05/09/2021:    Inflammatory changes surrounding the pylorus and proximal duodenum, as well as the pancreatic head  A small amount of free air  Findings are most suspicious for a perforated gastric ulcer  Recommend correlation with endoscopy  Pancreatitis is also   considered, but felt less likely given the interval development of free air in the location of the inflammatory changes      Pancreatic head mass concerning for malignancy again noted, unchanged since study of 5/5/2021  Per radiologist suspicion higher for gastric ulcer due to free air versus pancreatitis or other eitology  · Ordered amylase and lipase labs, both WNL  · Reached out to GI:  Made patient NPO, will continue to follow  · Reach out to General surgery: Concerning exam for possible perforation in setting of recent endoscopy but patient not peritonitic at present  Will need frequent vitals and serial abdominal exams by GS  · Added cefepime and Flagyl  · Added plasmalyte 100 mL/hr    Will continue to follow

## 2021-05-09 NOTE — ASSESSMENT & PLAN NOTE
· Patient had pancreatic mass identified a previous admission on CT scan done on 04/29/2021  Mass presented with biliary obstruction which improved s/p biliary duct stenting  Brushing unfortunately was nondiagnostic  CT Abdomen Pelvis With Contrast Result Date: 5/5/2021 Impression:   3 8 cm obstructing mass in the region of the head of the pancreas with marked pancreatic ductal dilatation, worrisome for adenocarcinoma  Other tumors not excluded  · Us Right Upper Quadrant Result Date: 5/6/2021 Impression: Mild gallbladder wall thickening without additional evidence of acute cholecystitis  Pancreatic head mass again identified  Associated pancreatic ductal dilatation  Indeterminate hypoechoic lesion within the left lobe of the liver  Hyperechoic left hepatic lobe lesion possibly representing a hemangioma  Multiple hepatic cysts  · Patient is S/P EUS procedure with biopsies taken and celiac plexus nerve block on 05/07  · Patient had f/u with outpatient Surgical Oncology scheduled for 05/06/2021  Plan:  · Discharge home on oxycodone 2 5 mg q 6h p r n  Pain   · Pain regimen to include:   Oxycodone 2 5 mg po q6h prn for mild pain  Oxycodone 5 mg po q6h prn for severe pain  Oxycodone 10 mg po q6h for severe pain   Dilaudid 0 5 mg IV 46h prn breakthrough pain    · Continue bowel regimen to avoid constipation  · Follow-up with GI doctor in 2 weeks for biopsy results

## 2021-05-09 NOTE — PROGRESS NOTES
[facility]  Progress Note - Yakelin Maldonado 1955, 73 y o  male MRN: [id number]  Unit/Bed#: S -01 Encounter: [id number]  Primary Care Provider: Kiran Carrington MD   Date and time admitted to hospital: 5/5/2021  6:46 PM    * Epigastric pain  Assessment & Plan  Complaining of epigastric pain radiating diffusely  No nausea, vomiting, diarrhea  Recent admission for elevated LFTs where CT abdomen showed pancreatic head mass suspected to be malignant  · CA 19-9 elevated 487 indicative of pancreatic cancer, AFP and CEA within normal range  · Biliary stent was placed and sphincterotomy performed    Patient returning abdominal pain that has been intermittent    Ct Abdomen Pelvis With Contrast Result Date: 5/5/2021 Impression: 1  Uniform mural hyperenhancement of the wall of the gallbladder with a small amount of pericholecystic fluid, worrisome for acute cholecystitis  Further clinical assessment advised  Ultrasound of the right upper quadrant suggested for further assessment  2   Pneumobilia with stent in the common bile duct redemonstrated  3   3 8 cm obstructing mass in the region of the head of the pancreas with marked pancreatic ductal dilatation, worrisome for adenocarcinoma  Other tumors not excluded  4   Numerous hepatic cysts redemonstrated with an enhancing lesion in the left lobe of liver possibly a hemangioma, stable  5   Moderate constipation  Us Right Upper Quadrant Result Date: 5/6/2021 Impression: Mild gallbladder wall thickening without additional evidence of acute cholecystitis  Pancreatic head mass again identified  Associated pancreatic ductal dilatation  Indeterminate hypoechoic lesion within the left lobe of the liver  Hyperechoic left hepatic lobe lesion possibly representing a hemangioma  Multiple hepatic cysts          Recent Labs     05/07/21  0632 05/09/21  0804   AST 20 28   ALT 75 68   ALKPHOS 261* 211*   TBILI 1 25* 0 96     · Patient was having severe pain yesterday and could not be discharged  Plan:  · Continue pancrelipase that was started on previous admission  · Pain regimen to include:   Oxycodone 2 5 mg po q6h prn for mild pain  Oxycodone 5 mg po q6h prn for severe pain  Oxycodone 10 mg po q6h for severe pain   Dilaudid 0 5 mg IV 46h prn breakthrough pain  · Continue bowel regimen to avoid constipation  · Advance diet as tolerated  · Check CT abdomen and pelvis with contrast the setting of ongoing abdominal pain  SIRS (systemic inflammatory response syndrome) (HCC)  Assessment & Plan  · Febrile overnight with Tmax 100 6  · Leukocytosis  · Check Procalcitonin   · Monitor off abx while awaiting for procal levels  · Monitor fever curves and WBCs    Pancreatic mass, Probable Pancreatic cancer  Assessment & Plan  · Patient had pancreatic mass identified a previous admission on CT scan done on 04/29/2021  Mass presented with biliary obstruction which improved s/p biliary duct stenting  Brushing unfortunately was nondiagnostic  CT Abdomen Pelvis With Contrast Result Date: 5/5/2021 Impression:   3 8 cm obstructing mass in the region of the head of the pancreas with marked pancreatic ductal dilatation, worrisome for adenocarcinoma  Other tumors not excluded  · Us Right Upper Quadrant Result Date: 5/6/2021 Impression: Mild gallbladder wall thickening without additional evidence of acute cholecystitis  Pancreatic head mass again identified  Associated pancreatic ductal dilatation  Indeterminate hypoechoic lesion within the left lobe of the liver  Hyperechoic left hepatic lobe lesion possibly representing a hemangioma  Multiple hepatic cysts  · Patient is S/P EUS procedure with biopsies taken and celiac plexus nerve block on 05/07  · Patient had f/u with outpatient Surgical Oncology scheduled for 05/06/2021  Plan:  · Discharge home on oxycodone 2 5 mg q 6h p r n   Pain   · Pain regimen to include:   Oxycodone 2 5 mg po q6h prn for mild pain  Oxycodone 5 mg po q6h prn for severe pain  Oxycodone 10 mg po q6h for severe pain   Dilaudid 0 5 mg IV 46h prn breakthrough pain  · Continue bowel regimen to avoid constipation  · Follow-up with GI doctor in 2 weeks for biopsy results      Hypokalemia  Assessment & Plan  · Potassium level this morning is 3 4  · Repleted with KCl 40 mEq q d  · Monitor daily BMP and replete as needed  Type 2 diabetes mellitus without complication, without long-term current use of insulin [facility])  Assessment & Plan  Lab Results   Component Value Date    HGBA1C 6 2 (H) 05/05/2021       Recent Labs     05/08/21  1118 05/08/21  1617 05/08/21 2037 05/09/21  0716   POCGLU 211* 120 137 142*       Blood Sugar Average: Last 72 hrs:  (P) 134 [id number] Home PO meds:  None  Unclear patient has true diabetes as this may be secondary to pancreatic neoplasm    Plan:   Diabetic Diet: Consistent CHO Level 2 (5 CHO servings/75g CHO per meal)   Insulin regimen  o Sliding Scale Insulin ACHS   Goal Blood Glucose 140-180   Glucose checks:  ACHS   Monitor for hypoglycemia and treat per protocol          VTE Pharmacologic Prophylaxis:   Pharmacologic: Heparin  Mechanical VTE Prophylaxis in Place: Yes    Discussions with Specialists or Other Care Team Provider:  Gastroenterologist    Education and Discussions with Family / Patient:  Patient  Discussed with wife  All her concerns have been addressed  I made her aware of today's plan of care which will include CT scan of the abdomen and pelvis and also checking procalcitonin to conform/rule out any infection  Current Length of Stay: 4 day(s)    Current Patient Status: Inpatient     Discharge Plan / Estimated Discharge Date:  Possibly within 24 hours     Code Status: Level 1 - Full Code      Subjective:   Patient resting comfortably in bed with no distress  Reports abdominal pain has improved , 3/10  Reports easy fullness after eating  Denies nausea vomiting  Overnight :  Febrile with T-max 100 6°  Objective:     Vitals:   Temp (24hrs), Av 5 °F (37 5 °C), Min:98 3 °F (36 8 °C), Max:100 6 °F (38 1 °C)    Temp:  [98 3 °F (36 8 °C)-100 6 °F (38 1 °C)] 98 3 °F (36 8 °C)  HR:  [70-76] 76  Resp:  [16-18] 16  BP: (127-146)/(61-75) 127/67  SpO2:  [92 %-96 %] 92 %  There is no height or weight on file to calculate BMI  Input and Output Summary (last 24 hours): Intake/Output Summary (Last 24 hours) at 2021 1027  Last data filed at 2021 0700  Gross per 24 hour   Intake 600 ml   Output 525 ml   Net 75 ml       Physical Exam:     Physical Exam  Vitals signs and nursing note reviewed  HENT:      Head: Normocephalic and atraumatic  Mouth/Throat:      Mouth: Mucous membranes are moist    Eyes:      General: Scleral icterus present  Neck:      Musculoskeletal: Neck supple  Cardiovascular:      Rate and Rhythm: Normal rate and regular rhythm  Pulses: Normal pulses  Heart sounds: Normal heart sounds  Pulmonary:      Effort: Pulmonary effort is normal       Breath sounds: Normal breath sounds  Abdominal:      General: Bowel sounds are normal  There is distension  Palpations: Abdomen is soft  Tenderness: There is abdominal tenderness (Epigastric and lower abdominal)  Musculoskeletal:      Right lower leg: No edema  Left lower leg: No edema  Neurological:      General: No focal deficit present  Mental Status: He is alert and oriented to person, place, and time     Psychiatric:         Mood and Affect: Mood normal          Behavior: Behavior normal            Additional Data:     Labs:    Results from last 7 days   Lab Units 21  0804  21  0634   WBC Thousand/uL 13 80*   < > 11 01*   HEMOGLOBIN g/dL 13 4   < > 13 6   HEMATOCRIT % 40 0   < > 41 8   PLATELETS Thousands/uL 195   < > 213   NEUTROS PCT %  --   --  78*   LYMPHS PCT %  --   --  9*   LYMPHO PCT % 5*  --   --    MONOS PCT %  --   --  11   MONO PCT % 11  --   --    EOS PCT % 0  --  1    < > = values in this interval not displayed  Results from last 7 days   Lab Units 05/09/21  0804   POTASSIUM mmol/L 3 4*   CHLORIDE mmol/L 103   CO2 mmol/L 24   BUN mg/dL 17   CREATININE mg/dL 0 91   CALCIUM mg/dL 7 7*   ALK PHOS U/L 211*   ALT U/L 68   AST U/L 28     Results from last 7 days   Lab Units 05/05/21 1912   INR  1 08       * I Have Reviewed All Lab Data Listed Above  * Additional Pertinent Lab Tests Reviewed: All Labs Within Last 24 Hours Reviewed    Imaging:    Reviewed    Recent Cultures (last 7 days):     Results from last 7 days   Lab Units 05/05/21 1912   BLOOD CULTURE  No Growth at 72 hrs  No Growth at 72 hrs         Last 24 Hours Medication List:   Current Facility-Administered Medications   Medication Dose Route Frequency Provider Last Rate    aspirin  81 mg Oral Daily Wang Myers MD      fish oil  1,000 mg Oral Daily Wang Myers MD      FLUoxetine  20 mg Oral Daily Wang Myers MD      folic acid  1 mg Oral Daily Wang Myers MD      heparin (porcine)  5,000 Units Subcutaneous Q8H [address] Wang Myers MD      HYDROmorphone  0 5 mg Intravenous Q4H PRN Katie Cisneros MD      insulin lispro  1-6 Units Subcutaneous 4x Daily (AC & HS) Wang Myers MD      multivitamin-minerals  1 tablet Oral Daily Wang Myers MD      oxyCODONE  10 mg Oral Q6H PRN Katie Cisneros MD      oxyCODONE  2 5 mg Oral Q6H PRN Katie Cisneros MD      oxyCODONE  5 mg Oral Q6H PRN Katie Cisneros MD      pancrelipase (Lip-Prot-Amyl)  24,000 Units Oral TID With Meals Wang Myers MD      pantoprazole  40 mg Oral Early Morning Wang Myers MD      polyethylene glycol  17 g Oral Daily Wang Myers MD      senna-docusate sodium  1 tablet Oral BID Wang Myers MD      sodium chloride (PF)  3 mL Intravenous Q1H PRN Wang Myers MD      tamsulosin  0 4 mg Oral HS Wang Myers MD          Today, Patient Was Seen By: Mis Maria MD    ** Please Note: This note has been constructed using a voice recognition system   **

## 2021-05-09 NOTE — ASSESSMENT & PLAN NOTE
Complaining of epigastric pain radiating diffusely  No nausea, vomiting, diarrhea  Recent admission for elevated LFTs where CT abdomen showed pancreatic head mass suspected to be malignant  · CA 19-9 elevated 487 indicative of pancreatic cancer, AFP and CEA within normal range  · Biliary stent was placed and sphincterotomy performed    Patient returning abdominal pain that has been intermittent    Ct Abdomen Pelvis With Contrast Result Date: 5/5/2021 Impression: 1  Uniform mural hyperenhancement of the wall of the gallbladder with a small amount of pericholecystic fluid, worrisome for acute cholecystitis  Further clinical assessment advised  Ultrasound of the right upper quadrant suggested for further assessment  2   Pneumobilia with stent in the common bile duct redemonstrated  3   3 8 cm obstructing mass in the region of the head of the pancreas with marked pancreatic ductal dilatation, worrisome for adenocarcinoma  Other tumors not excluded  4   Numerous hepatic cysts redemonstrated with an enhancing lesion in the left lobe of liver possibly a hemangioma, stable  5   Moderate constipation  Us Right Upper Quadrant Result Date: 5/6/2021 Impression: Mild gallbladder wall thickening without additional evidence of acute cholecystitis  Pancreatic head mass again identified  Associated pancreatic ductal dilatation  Indeterminate hypoechoic lesion within the left lobe of the liver  Hyperechoic left hepatic lobe lesion possibly representing a hemangioma  Multiple hepatic cysts  Recent Labs     05/07/21  0632 05/09/21  0804   AST 20 28   ALT 75 68   ALKPHOS 261* 211*   TBILI 1 25* 0 96     · Patient was having severe pain yesterday and could not be discharged  Plan:  · Continue pancrelipase that was started on previous admission  · Pain regimen to include:   Oxycodone 2 5 mg po q6h prn for mild pain  Oxycodone 5 mg po q6h prn for severe pain     Oxycodone 10 mg po q6h for severe pain   Dilaudid 0 5 mg IV 46h prn breakthrough pain  · Continue bowel regimen to avoid constipation  · Advance diet as tolerated  · Check CT abdomen and pelvis with contrast the setting of ongoing abdominal pain

## 2021-05-10 ENCOUNTER — APPOINTMENT (INPATIENT)
Dept: RADIOLOGY | Facility: HOSPITAL | Age: 66
DRG: 446 | End: 2021-05-10
Payer: COMMERCIAL

## 2021-05-10 ENCOUNTER — HOSPITAL ENCOUNTER (INPATIENT)
Facility: HOSPITAL | Age: 66
LOS: 7 days | Discharge: HOME HEALTH | DRG: 446 | End: 2021-05-17
Attending: INTERNAL MEDICINE | Admitting: INTERNAL MEDICINE
Payer: COMMERCIAL

## 2021-05-10 VITALS
RESPIRATION RATE: 17 BRPM | OXYGEN SATURATION: 95 % | SYSTOLIC BLOOD PRESSURE: 103 MMHG | HEART RATE: 80 BPM | TEMPERATURE: 99.7 F | DIASTOLIC BLOOD PRESSURE: 56 MMHG

## 2021-05-10 DIAGNOSIS — C25.9 PANCREATIC ADENOCARCINOMA (HCC): ICD-10-CM

## 2021-05-10 DIAGNOSIS — R10.13 EPIGASTRIC PAIN: ICD-10-CM

## 2021-05-10 DIAGNOSIS — N40.0 BPH (BENIGN PROSTATIC HYPERPLASIA): ICD-10-CM

## 2021-05-10 DIAGNOSIS — K81.0 ACUTE CHOLECYSTITIS WITHOUT CALCULUS: Primary | ICD-10-CM

## 2021-05-10 DIAGNOSIS — K86.89 PANCREATIC MASS: ICD-10-CM

## 2021-05-10 PROBLEM — K63.1 PERFORATION BOWEL (HCC): Status: ACTIVE | Noted: 2021-05-10

## 2021-05-10 LAB
ANION GAP SERPL CALCULATED.3IONS-SCNC: 8 MMOL/L (ref 4–13)
BACTERIA BLD CULT: NORMAL
BACTERIA BLD CULT: NORMAL
BASOPHILS # BLD AUTO: 0.02 THOUSANDS/ΜL (ref 0–0.1)
BASOPHILS NFR BLD AUTO: 0 % (ref 0–1)
BUN SERPL-MCNC: 16 MG/DL (ref 5–25)
CALCIUM SERPL-MCNC: 7.7 MG/DL (ref 8.3–10.1)
CHLORIDE SERPL-SCNC: 104 MMOL/L (ref 100–108)
CO2 SERPL-SCNC: 25 MMOL/L (ref 21–32)
CREAT SERPL-MCNC: 0.93 MG/DL (ref 0.6–1.3)
EOSINOPHIL # BLD AUTO: 0.03 THOUSAND/ΜL (ref 0–0.61)
EOSINOPHIL NFR BLD AUTO: 0 % (ref 0–6)
ERYTHROCYTE [DISTWIDTH] IN BLOOD BY AUTOMATED COUNT: 14.3 % (ref 11.6–15.1)
GFR SERPL CREATININE-BSD FRML MDRD: 86 ML/MIN/1.73SQ M
GLUCOSE SERPL-MCNC: 113 MG/DL (ref 65–140)
GLUCOSE SERPL-MCNC: 130 MG/DL (ref 65–140)
GLUCOSE SERPL-MCNC: 131 MG/DL (ref 65–140)
GLUCOSE SERPL-MCNC: 135 MG/DL (ref 65–140)
GLUCOSE SERPL-MCNC: 143 MG/DL (ref 65–140)
HCT VFR BLD AUTO: 38.6 % (ref 36.5–49.3)
HGB BLD-MCNC: 12.8 G/DL (ref 12–17)
IMM GRANULOCYTES # BLD AUTO: 0.07 THOUSAND/UL (ref 0–0.2)
IMM GRANULOCYTES NFR BLD AUTO: 1 % (ref 0–2)
LYMPHOCYTES # BLD AUTO: 0.58 THOUSANDS/ΜL (ref 0.6–4.47)
LYMPHOCYTES NFR BLD AUTO: 4 % (ref 14–44)
MCH RBC QN AUTO: 30.1 PG (ref 26.8–34.3)
MCHC RBC AUTO-ENTMCNC: 33.2 G/DL (ref 31.4–37.4)
MCV RBC AUTO: 91 FL (ref 82–98)
MONOCYTES # BLD AUTO: 0.78 THOUSAND/ΜL (ref 0.17–1.22)
MONOCYTES NFR BLD AUTO: 6 % (ref 4–12)
NEUTROPHILS # BLD AUTO: 12.18 THOUSANDS/ΜL (ref 1.85–7.62)
NEUTS SEG NFR BLD AUTO: 89 % (ref 43–75)
NRBC BLD AUTO-RTO: 0 /100 WBCS
PLATELET # BLD AUTO: 215 THOUSANDS/UL (ref 149–390)
PMV BLD AUTO: 11.6 FL (ref 8.9–12.7)
POTASSIUM SERPL-SCNC: 3.8 MMOL/L (ref 3.5–5.3)
PROCALCITONIN SERPL-MCNC: 0.72 NG/ML
RBC # BLD AUTO: 4.25 MILLION/UL (ref 3.88–5.62)
SODIUM SERPL-SCNC: 137 MMOL/L (ref 136–145)
WBC # BLD AUTO: 13.66 THOUSAND/UL (ref 4.31–10.16)

## 2021-05-10 PROCEDURE — A9537 TC99M MEBROFENIN: HCPCS

## 2021-05-10 PROCEDURE — 80048 BASIC METABOLIC PNL TOTAL CA: CPT | Performed by: INTERNAL MEDICINE

## 2021-05-10 PROCEDURE — C9113 INJ PANTOPRAZOLE SODIUM, VIA: HCPCS | Performed by: INTERNAL MEDICINE

## 2021-05-10 PROCEDURE — 82784 ASSAY IGA/IGD/IGG/IGM EACH: CPT | Performed by: PHYSICIAN ASSISTANT

## 2021-05-10 PROCEDURE — 78227 HEPATOBIL SYST IMAGE W/DRUG: CPT

## 2021-05-10 PROCEDURE — 99239 HOSP IP/OBS DSCHRG MGMT >30: CPT | Performed by: INTERNAL MEDICINE

## 2021-05-10 PROCEDURE — G1004 CDSM NDSC: HCPCS

## 2021-05-10 PROCEDURE — 82948 REAGENT STRIP/BLOOD GLUCOSE: CPT

## 2021-05-10 PROCEDURE — 99232 SBSQ HOSP IP/OBS MODERATE 35: CPT | Performed by: STUDENT IN AN ORGANIZED HEALTH CARE EDUCATION/TRAINING PROGRAM

## 2021-05-10 PROCEDURE — NC001 PR NO CHARGE: Performed by: STUDENT IN AN ORGANIZED HEALTH CARE EDUCATION/TRAINING PROGRAM

## 2021-05-10 PROCEDURE — 85027 COMPLETE CBC AUTOMATED: CPT | Performed by: INTERNAL MEDICINE

## 2021-05-10 PROCEDURE — 99223 1ST HOSP IP/OBS HIGH 75: CPT | Performed by: INTERNAL MEDICINE

## 2021-05-10 PROCEDURE — C9113 INJ PANTOPRAZOLE SODIUM, VIA: HCPCS | Performed by: PHYSICIAN ASSISTANT

## 2021-05-10 PROCEDURE — 82787 IGG 1 2 3 OR 4 EACH: CPT | Performed by: PHYSICIAN ASSISTANT

## 2021-05-10 PROCEDURE — 84145 PROCALCITONIN (PCT): CPT | Performed by: INTERNAL MEDICINE

## 2021-05-10 RX ORDER — FOLIC ACID 1 MG/1
1 TABLET ORAL DAILY
Status: CANCELLED | OUTPATIENT
Start: 2021-05-10

## 2021-05-10 RX ORDER — HYDROMORPHONE HCL/PF 1 MG/ML
0.5 SYRINGE (ML) INJECTION EVERY 4 HOURS PRN
Status: CANCELLED | OUTPATIENT
Start: 2021-05-10

## 2021-05-10 RX ORDER — FOLIC ACID 1 MG/1
1 TABLET ORAL DAILY
Status: DISCONTINUED | OUTPATIENT
Start: 2021-05-11 | End: 2021-05-17 | Stop reason: HOSPADM

## 2021-05-10 RX ORDER — OXYCODONE HYDROCHLORIDE 10 MG/1
10 TABLET ORAL EVERY 6 HOURS PRN
Status: CANCELLED | OUTPATIENT
Start: 2021-05-10

## 2021-05-10 RX ORDER — OXYCODONE HYDROCHLORIDE 5 MG/1
5 TABLET ORAL EVERY 6 HOURS PRN
Status: DISCONTINUED | OUTPATIENT
Start: 2021-05-10 | End: 2021-05-17 | Stop reason: HOSPADM

## 2021-05-10 RX ORDER — OXYCODONE HYDROCHLORIDE 5 MG/1
2.5 TABLET ORAL EVERY 6 HOURS PRN
Status: DISCONTINUED | OUTPATIENT
Start: 2021-05-10 | End: 2021-05-17 | Stop reason: HOSPADM

## 2021-05-10 RX ORDER — ASPIRIN 81 MG/1
81 TABLET, CHEWABLE ORAL DAILY
Status: CANCELLED | OUTPATIENT
Start: 2021-05-10

## 2021-05-10 RX ORDER — AMOXICILLIN 250 MG
1 CAPSULE ORAL 2 TIMES DAILY
Status: CANCELLED | OUTPATIENT
Start: 2021-05-10

## 2021-05-10 RX ORDER — PANTOPRAZOLE SODIUM 40 MG/1
40 INJECTION, POWDER, FOR SOLUTION INTRAVENOUS EVERY 12 HOURS SCHEDULED
Status: CANCELLED | OUTPATIENT
Start: 2021-05-10

## 2021-05-10 RX ORDER — SODIUM CHLORIDE 9 MG/ML
3 INJECTION INTRAVENOUS
Status: CANCELLED | OUTPATIENT
Start: 2021-05-10

## 2021-05-10 RX ORDER — AMOXICILLIN 250 MG
1 CAPSULE ORAL 2 TIMES DAILY
Status: DISCONTINUED | OUTPATIENT
Start: 2021-05-10 | End: 2021-05-17 | Stop reason: HOSPADM

## 2021-05-10 RX ORDER — SODIUM CHLORIDE, SODIUM GLUCONATE, SODIUM ACETATE, POTASSIUM CHLORIDE, MAGNESIUM CHLORIDE, SODIUM PHOSPHATE, DIBASIC, AND POTASSIUM PHOSPHATE .53; .5; .37; .037; .03; .012; .00082 G/100ML; G/100ML; G/100ML; G/100ML; G/100ML; G/100ML; G/100ML
100 INJECTION, SOLUTION INTRAVENOUS CONTINUOUS
Status: CANCELLED | OUTPATIENT
Start: 2021-05-10

## 2021-05-10 RX ORDER — OXYCODONE HYDROCHLORIDE 10 MG/1
10 TABLET ORAL EVERY 6 HOURS PRN
Status: DISCONTINUED | OUTPATIENT
Start: 2021-05-10 | End: 2021-05-17 | Stop reason: HOSPADM

## 2021-05-10 RX ORDER — TAMSULOSIN HYDROCHLORIDE 0.4 MG/1
0.4 CAPSULE ORAL
Status: DISCONTINUED | OUTPATIENT
Start: 2021-05-10 | End: 2021-05-17 | Stop reason: HOSPADM

## 2021-05-10 RX ORDER — CHLORAL HYDRATE 500 MG
1000 CAPSULE ORAL DAILY
Status: DISCONTINUED | OUTPATIENT
Start: 2021-05-11 | End: 2021-05-17 | Stop reason: HOSPADM

## 2021-05-10 RX ORDER — FLUOXETINE HYDROCHLORIDE 20 MG/1
20 CAPSULE ORAL DAILY
Status: DISCONTINUED | OUTPATIENT
Start: 2021-05-11 | End: 2021-05-17 | Stop reason: HOSPADM

## 2021-05-10 RX ORDER — PANTOPRAZOLE SODIUM 40 MG/1
40 INJECTION, POWDER, FOR SOLUTION INTRAVENOUS EVERY 12 HOURS SCHEDULED
Status: DISCONTINUED | OUTPATIENT
Start: 2021-05-10 | End: 2021-05-16

## 2021-05-10 RX ORDER — POLYETHYLENE GLYCOL 3350 17 G/17G
17 POWDER, FOR SOLUTION ORAL DAILY
Status: DISCONTINUED | OUTPATIENT
Start: 2021-05-10 | End: 2021-05-17 | Stop reason: HOSPADM

## 2021-05-10 RX ORDER — ASPIRIN 81 MG/1
81 TABLET, CHEWABLE ORAL DAILY
Status: DISCONTINUED | OUTPATIENT
Start: 2021-05-11 | End: 2021-05-17 | Stop reason: HOSPADM

## 2021-05-10 RX ORDER — HEPARIN SODIUM 5000 [USP'U]/ML
5000 INJECTION, SOLUTION INTRAVENOUS; SUBCUTANEOUS EVERY 8 HOURS SCHEDULED
Status: CANCELLED | OUTPATIENT
Start: 2021-05-10

## 2021-05-10 RX ORDER — OXYCODONE HYDROCHLORIDE 5 MG/1
5 TABLET ORAL EVERY 6 HOURS PRN
Status: CANCELLED | OUTPATIENT
Start: 2021-05-10

## 2021-05-10 RX ORDER — FLUOXETINE HYDROCHLORIDE 20 MG/1
20 CAPSULE ORAL DAILY
Status: CANCELLED | OUTPATIENT
Start: 2021-05-10

## 2021-05-10 RX ORDER — SODIUM CHLORIDE 9 MG/ML
3 INJECTION INTRAVENOUS
Status: DISCONTINUED | OUTPATIENT
Start: 2021-05-10 | End: 2021-05-17 | Stop reason: HOSPADM

## 2021-05-10 RX ORDER — CHLORAL HYDRATE 500 MG
1000 CAPSULE ORAL DAILY
Status: CANCELLED | OUTPATIENT
Start: 2021-05-10

## 2021-05-10 RX ORDER — HEPARIN SODIUM 5000 [USP'U]/ML
5000 INJECTION, SOLUTION INTRAVENOUS; SUBCUTANEOUS EVERY 8 HOURS SCHEDULED
Status: DISCONTINUED | OUTPATIENT
Start: 2021-05-10 | End: 2021-05-12

## 2021-05-10 RX ORDER — TAMSULOSIN HYDROCHLORIDE 0.4 MG/1
0.4 CAPSULE ORAL
Status: CANCELLED | OUTPATIENT
Start: 2021-05-10

## 2021-05-10 RX ORDER — HYDROMORPHONE HCL/PF 1 MG/ML
0.5 SYRINGE (ML) INJECTION EVERY 4 HOURS PRN
Status: DISCONTINUED | OUTPATIENT
Start: 2021-05-10 | End: 2021-05-17 | Stop reason: HOSPADM

## 2021-05-10 RX ORDER — OXYCODONE HYDROCHLORIDE 5 MG/1
2.5 TABLET ORAL EVERY 6 HOURS PRN
Status: CANCELLED | OUTPATIENT
Start: 2021-05-10

## 2021-05-10 RX ORDER — SODIUM CHLORIDE, SODIUM GLUCONATE, SODIUM ACETATE, POTASSIUM CHLORIDE, MAGNESIUM CHLORIDE, SODIUM PHOSPHATE, DIBASIC, AND POTASSIUM PHOSPHATE .53; .5; .37; .037; .03; .012; .00082 G/100ML; G/100ML; G/100ML; G/100ML; G/100ML; G/100ML; G/100ML
100 INJECTION, SOLUTION INTRAVENOUS CONTINUOUS
Status: DISCONTINUED | OUTPATIENT
Start: 2021-05-10 | End: 2021-05-11

## 2021-05-10 RX ORDER — POLYETHYLENE GLYCOL 3350 17 G/17G
17 POWDER, FOR SOLUTION ORAL DAILY
Status: CANCELLED | OUTPATIENT
Start: 2021-05-10

## 2021-05-10 RX ADMIN — HEPARIN SODIUM 5000 UNITS: 5000 INJECTION INTRAVENOUS; SUBCUTANEOUS at 05:12

## 2021-05-10 RX ADMIN — OXYCODONE HYDROCHLORIDE 5 MG: 5 TABLET ORAL at 04:43

## 2021-05-10 RX ADMIN — PANTOPRAZOLE SODIUM 40 MG: 40 INJECTION, POWDER, FOR SOLUTION INTRAVENOUS at 20:29

## 2021-05-10 RX ADMIN — OXYCODONE HYDROCHLORIDE 10 MG: 10 TABLET ORAL at 20:28

## 2021-05-10 RX ADMIN — HEPARIN SODIUM 5000 UNITS: 5000 INJECTION INTRAVENOUS; SUBCUTANEOUS at 22:31

## 2021-05-10 RX ADMIN — METRONIDAZOLE 500 MG: 500 INJECTION, SOLUTION INTRAVENOUS at 04:40

## 2021-05-10 RX ADMIN — CEFEPIME HYDROCHLORIDE 2000 MG: 2 INJECTION, POWDER, FOR SOLUTION INTRAVENOUS at 08:39

## 2021-05-10 RX ADMIN — PANTOPRAZOLE SODIUM 40 MG: 40 INJECTION, POWDER, FOR SOLUTION INTRAVENOUS at 08:09

## 2021-05-10 RX ADMIN — CEFEPIME HYDROCHLORIDE 2000 MG: 2 INJECTION, POWDER, FOR SOLUTION INTRAVENOUS at 20:41

## 2021-05-10 RX ADMIN — METRONIDAZOLE 500 MG: 500 INJECTION, SOLUTION INTRAVENOUS at 13:03

## 2021-05-10 RX ADMIN — SODIUM CHLORIDE, SODIUM GLUCONATE, SODIUM ACETATE, POTASSIUM CHLORIDE, MAGNESIUM CHLORIDE, SODIUM PHOSPHATE, DIBASIC, AND POTASSIUM PHOSPHATE 100 ML/HR: .53; .5; .37; .037; .03; .012; .00082 INJECTION, SOLUTION INTRAVENOUS at 09:36

## 2021-05-10 RX ADMIN — HYDROMORPHONE HYDROCHLORIDE 0.5 MG: 1 INJECTION, SOLUTION INTRAMUSCULAR; INTRAVENOUS; SUBCUTANEOUS at 09:43

## 2021-05-10 RX ADMIN — METRONIDAZOLE 500 MG: 500 INJECTION, SOLUTION INTRAVENOUS at 22:00

## 2021-05-10 RX ADMIN — FLUOXETINE 20 MG: 20 CAPSULE ORAL at 08:09

## 2021-05-10 RX ADMIN — TAMSULOSIN HYDROCHLORIDE 0.4 MG: 0.4 CAPSULE ORAL at 22:28

## 2021-05-10 NOTE — ASSESSMENT & PLAN NOTE
· Patient had pancreatic mass identified a previous admission on CT scan done on 04/29/2021  Mass presented with biliary obstruction which improved s/p biliary duct stenting  Brushing unfortunately was nondiagnostic  · "CT Abdomen Pelvis With Contrast Result Date: 5/5/2021 Impression:   3 8 cm obstructing mass in the region of the head of the pancreas with marked pancreatic ductal dilatation, worrisome for adenocarcinoma  Other tumors not excluded "   · Us Right Upper Quadrant Result Date: 5/6/2021 Impression: Mild gallbladder wall thickening without additional evidence of acute cholecystitis  Pancreatic head mass again identified  Associated pancreatic ductal dilatation  Indeterminate hypoechoic lesion within the left lobe of the liver  Hyperechoic left hepatic lobe lesion possibly representing a hemangioma  Multiple hepatic cysts  · Patient is S/P EUS procedure with biopsies taken and celiac plexus nerve block on 05/07  · Patient had f/u with outpatient Surgical Oncology scheduled for 05/06/2021      Plan:  · Pain regimen to include:              Oxycodone 2 5 mg po q6h prn for mild pain  Oxycodone 5 mg po q6h prn for severe pain  Oxycodone 10 mg po q6h for severe pain        Dilaudid 0 5 mg IV 46h prn breakthrough pain  · Continue bowel regimen to avoid constipation  · Patient transferred to [facility] for possible OR procedure

## 2021-05-10 NOTE — DISCHARGE SUMMARY
[address] [facility]  Discharge- Hiro Stalls 1955, 73 y o  male MRN: [id number]  Unit/Bed#: [facility] 928-01 Encounter: [id number]  Primary Care Provider: Rufina Hamlin MD   Date and time admitted to hospital: 5/10/2021 10:40 AM    Epigastric pain  Assessment & Plan  Complaining of epigastric pain radiating diffusely  No nausea, vomiting, diarrhea  Recent admission for elevated LFTs where CT abdomen showed pancreatic head mass suspected to be malignant  · CA 19-9 elevated 487 indicative of pancreatic cancer, AFP and CEA within normal range  · ERCP with biliary stent was placed and sphincterotomy performed on 4/28/21     Patient readmitted for abdominal pain that has been intermittent     Ct Abdomen Pelvis With Contrast Result Date: 5/5/2021 Impression: 1  Uniform mural hyperenhancement of the wall of the gallbladder with a small amount of pericholecystic fluid, worrisome for acute cholecystitis  Further clinical assessment advised  Ultrasound of the right upper quadrant suggested for further assessment  2   Pneumobilia with stent in the common bile duct redemonstrated  3   3 8 cm obstructing mass in the region of the head of the pancreas with marked pancreatic ductal dilatation, worrisome for adenocarcinoma  Other tumors not excluded  4   Numerous hepatic cysts redemonstrated with an enhancing lesion in the left lobe of liver possibly a hemangioma, stable  5   Moderate constipation      Us Right Upper Quadrant Result Date: 5/6/2021 Impression: Mild gallbladder wall thickening without additional evidence of acute cholecystitis  Pancreatic head mass again identified  Associated pancreatic ductal dilatation  Indeterminate hypoechoic lesion within the left lobe of the liver  Hyperechoic left hepatic lobe lesion possibly representing a hemangioma  Multiple hepatic cysts  CT scan Abdomen and Pelvis 5/9/21 ;  Impression: Inflammatory changes surrounding the pylorus and proximal duodenum, as well as the pancreatic head  A small amount of free air  Findings are most suspicious for a perforated gastric ulcer  Recommend correlation with endoscopy  Pancreatitis is also considered, but felt less likely given the interval development of free air in the location of the inflammatory changes         Recent Labs     05/09/21  0804   AST 28   ALT 68   ALKPHOS 211*   TBILI 0 96     · Patient was having severe pain yesterday and could not be discharged      Plan:  · Continue pancrelipase that was started on previous admission  · Pain regimen to include:              Oxycodone 2 5 mg po q6h prn for mild pain  Oxycodone 5 mg po q6h prn for severe pain  Oxycodone 10 mg po q6h for severe pain              Dilaudid 0 5 mg IV 46h prn breakthrough pain  · Continue bowel regimen to avoid constipation  · Patient transferred to [place] for possible OR procedure to evaluate ongoing diffuse abdominal pain secondary to perforation most likely from EUS procedure on 5/07/2021 or gastric ulcer  Pancreatic mass, Probable Pancreatic cancer  Assessment & Plan  · Patient had pancreatic mass identified a previous admission on CT scan done on 04/29/2021  Mass presented with biliary obstruction which improved s/p biliary duct stenting  Brushing unfortunately was nondiagnostic  · CT Abdomen Pelvis With Contrast Result Date: 5/5/2021 Impression:   3 8 cm obstructing mass in the region of the head of the pancreas with marked pancreatic ductal dilatation, worrisome for adenocarcinoma  Other tumors not excluded  · Us Right Upper Quadrant Result Date: 5/6/2021 Impression: Mild gallbladder wall thickening without additional evidence of acute cholecystitis  Pancreatic head mass again identified  Associated pancreatic ductal dilatation  Indeterminate hypoechoic lesion within the left lobe of the liver  Hyperechoic left hepatic lobe lesion possibly representing a hemangioma  Multiple hepatic cysts  · Patient is S/P EUS procedure with biopsies taken and celiac plexus nerve block on 05/07  · Patient had f/u with outpatient Surgical Oncology scheduled for 05/06/2021      Plan:  · Pain regimen to include:              Oxycodone 2 5 mg po q6h prn for mild pain  Oxycodone 5 mg po q6h prn for severe pain  Oxycodone 10 mg po q6h for severe pain        Dilaudid 0 5 mg IV 46h prn breakthrough pain  · Continue bowel regimen to avoid constipation  · Patient transferred to [facility] for possible OR procedure  Type 2 diabetes mellitus without complication, without long-term current use of insulin [facility])  Assessment & Plan  Lab Results   Component Value Date    HGBA1C 6 2 (H) 05/05/2021       Recent Labs     05/09/21  1100 05/09/21  1619 05/09/21  2037 05/10/21  0741   POCGLU 134 123 130 113       Blood Sugar Average: Last 72 hrs:     Unclear patient has true diabetes as this may be secondary to pancreatic neoplasm     Plan:  · Diabetic Diet: Consistent CHO Level 2 (5 CHO servings/75g CHO per meal)  · Insulin regimen  ? Sliding Scale Insulin ACHS  · Goal Blood Glucose 140-180  · Glucose checks:  ACHS  · Monitor for hypoglycemia and treat per protocol    Hyperlipidemia  Assessment & Plan  Lab Results   Component Value Date    CHOLESTEROL 188 05/05/2021    TRIG 181 6 (H) 05/05/2021    HDL 36 (L) 05/05/2021    LDLCALC 116 (H) 05/05/2021     Plan:  · Continue fish oil capsule 1000 mg q d  Hypertension  Assessment & Plan  History of hypertension managed on lisinopril-hydrochlorothiazide 10-12 5 mg q d      Plan:  · Continue home meds    Rheumatoid arthritis ([facility] [address] )  Assessment & Plan  Pt off MTX presently  Monitor for now   F/u with Rheum        Discharging Resident Physician: Symone Monae MD  Attending: Yung Shah MD  PCP: Dasia Farrar MD  Admission Date: 5/10/2021  Discharge Date: 05/10/21    Disposition:     Other: Transfer to [facility] [address] [facility]  Reason for Admission:  Epigastric pain  Consultations During Hospital Stay:  · Surgical oncology team   · GI team     Procedures Performed:     · 05/07/2021 EUS procedure, with fine-needle biopsy of pancreatic head mass and celiac plexus block  Significant Findings / Test Results:       · CT abdomen and Pelvis 5/9/2021 : IMPRESSION:Inflammatory changes surrounding the pylorus and proximal duodenum, as well as the pancreatic head  A small amount of free air  Findings are most suspicious for a perforated gastric ulcer  Recommend correlation with endoscopy  Pancreatitis is also considered, but felt less likely given the interval development of free air in the location of the inflammatory changes  Pancreatic head mass concerning for malignancy again noted, unchanged since study of 5/5/2021  Additional findings, as described, essentially unchanged    · Xray abdomen 1 vw  Portable  5/8/2021 IMPRESSION: Common bile duct stent  Nonobstructive bowel gas pattern  No evidence for free air on this semierect examination  · CXR 5/7/2021 IMPRESSION: No acute cardiopulmonary disease  · Xr Chest Portable Result Date: 5/8/2021 Impression: No acute cardiopulmonary disease        · Xr Chest 1 View Portable Result Date: 5/5/2021 Impression: No acute cardiopulmonary disease       · Us Right Upper Quadrant Result Date: 5/6/2021 Impression: Mild gallbladder wall thickening without additional evidence of acute cholecystitis  Pancreatic head mass again identified  Associated pancreatic ductal dilatation  Indeterminate hypoechoic lesion within the left lobe of the liver  Hyperechoic left hepatic lobe lesion possibly representing a hemangioma  Multiple hepatic cysts       · Ct Abdomen Pelvis With Contrast  Result Date: 5/5/2021 Impression: 1  Uniform mural hyperenhancement of the wall of the gallbladder with a small amount of pericholecystic fluid, worrisome for acute cholecystitis    Further clinical assessment advised  Ultrasound of the right upper quadrant suggested for further assessment  2   Pneumobilia with stent in the common bile duct redemonstrated  3   3 8 cm obstructing mass in the region of the head of the pancreas with marked pancreatic ductal dilatation, worrisome for adenocarcinoma  Other tumors not excluded  4   Numerous hepatic cysts redemonstrated with an enhancing lesion in the left lobe of liver possibly a hemangioma, stable  5   Moderate constipation         Incidental Findings:   · Ct Abdomen Pelvis With Contrast  Result Date: 5/5/2021 Numerous hepatic cysts redemonstrated with an enhancing lesion in the left lobe of liver possibly a hemangioma, stable  Test Results Pending at Discharge (will require follow up): · Fine needle biopsy of pancreatic head mass biopsies     Outpatient Tests Requested:  · None  Complications:  Suspected perforation secondary to gastric ulcer or EUS procedure  Hospital Course: Duke Oneill is a 73 y o  male patient PMH of RA, HLD,DM2, pancreatic head mass S/P ERCP with CBD stenting on 04/29 who originally presented to the hospital on 5/5/2021 due to epigastric and RUQ pain that is been intermittent for about a week  Patient was recently admitted for obstructive jaundice secondary to newly diagnosed pancreatic head mass that was compressing the bile duct and patient underwent ERCP and biliary duct stenting  Patient was scheduled to follow-up with Surgical Oncology on 05/06/2021      In the ED patient was afebrile and hemodynamically stable  Physical exam was significant for right upper quadrant and epigastric tenderness on palpation  CBC had mildly elevated WBC at 10 2  CMP was significant for elevated LFTs  CXR was negative CT scan was significant for previously known mass and questionable acute cholecystitis  Patient was admitted for abdominal pain and GI was consulted      During hospitalization patient remained in stable condition  GI team evaluated patient and scheduled patient for EUS procedure  Patient also had an ultrasound which ruled out acute cholecystitis  Patient underwent EUS procedure with celiac plexus nerve block  The following day after procedure patient started complaining of worsening abdominal pain right before discharge  He received Dilaudid 0 5 mg IV and symptoms improved  Stat x-ray of abdomen was negative for free air  Patient's abdominal pain progressively and steadily worsened and on 05/09 a CT of the abdomen was obtained and demonstrated small amounts of free air  Patient was treated with pain regimen and remained stable and comfortable  He was transferred to [facility] for further evaluation and workup of the possible bowel perforation  Bowel perforation could be secondary to EUS procedure or gastric ulcer  Condition at Discharge: stable     Discharge Day Visit / Exam:     Subjective:    Vitals: Blood Pressure: 99/65 (05/10/21 1141)  Pulse: 65 (05/10/21 1141)  SpO2: 96 % (05/10/21 1141)    Exam:   Physical Exam  Vitals signs and nursing note reviewed  Constitutional:       General: He is not in acute distress  Appearance: Normal appearance  He is not ill-appearing, toxic-appearing or diaphoretic  HENT:      Head: Normocephalic and atraumatic  Nose: Nose normal       Mouth/Throat:      Mouth: Mucous membranes are moist       Pharynx: No oropharyngeal exudate or posterior oropharyngeal erythema  Eyes:      General: No scleral icterus  Right eye: No discharge  Left eye: No discharge  Pupils: Pupils are equal, round, and reactive to light  Neck:      Musculoskeletal: Normal range of motion and neck supple  No neck rigidity or muscular tenderness  Vascular: No carotid bruit  Cardiovascular:      Rate and Rhythm: Normal rate and regular rhythm  Pulses: Normal pulses  Heart sounds: Normal heart sounds  No murmur  No friction rub  No gallop      Pulmonary: Effort: Pulmonary effort is normal  No respiratory distress  Breath sounds: Normal breath sounds  No stridor  No wheezing, rhonchi or rales  Abdominal:      General: Bowel sounds are normal       Palpations: Abdomen is soft  Tenderness: There is abdominal tenderness (diffuse)  There is no right CVA tenderness, left CVA tenderness, guarding or rebound  Musculoskeletal:         General: No swelling, tenderness or deformity  Right lower leg: No edema  Left lower leg: No edema  Lymphadenopathy:      Cervical: No cervical adenopathy  Skin:     General: Skin is warm and dry  Capillary Refill: Capillary refill takes less than 2 seconds  Coloration: Skin is not jaundiced  Findings: No lesion or rash  Neurological:      General: No focal deficit present  Mental Status: He is alert and oriented to person, place, and time  Mental status is at baseline  Motor: No weakness  Psychiatric:         Mood and Affect: Mood normal          Behavior: Behavior normal          Thought Content: Thought content normal          Judgment: Judgment normal        Discussion with Family:  Wife via telephone  Discharge instructions/Information to patient and family:   See after visit summary for information provided to patient and family  Provisions for Follow-Up Care:  See after visit summary for information related to follow-up care and any pertinent home health orders  Planned Readmission:  Yes  SLB     Discharge Medications:  See after visit summary for reconciled discharge medications provided to patient and family        ** Please Note: This note has been constructed using a voice recognition system **

## 2021-05-10 NOTE — PLAN OF CARE
Problem: Potential for Falls  Goal: Patient will remain free of falls  Description: INTERVENTIONS:  - Assess patient frequently for physical needs  -  Identify cognitive and physical deficits and behaviors that affect risk of falls    -  [place] fall precautions as indicated by assessment   - Educate patient/family on patient safety including physical limitations  - Instruct patient to call for assistance with activity based on assessment  - Modify environment to reduce risk of injury  - Consider OT/PT consult to assist with strengthening/mobility  Outcome: Progressing     Problem: PAIN - ADULT  Goal: Verbalizes/displays adequate comfort level or baseline comfort level  Description: Interventions:  - Encourage patient to monitor pain and request assistance  - Assess pain using appropriate pain scale  - Administer analgesics based on type and severity of pain and evaluate response  - Implement non-pharmacological measures as appropriate and evaluate response  - Consider cultural and social influences on pain and pain management  - Notify physician/advanced practitioner if interventions unsuccessful or patient reports new pain  Outcome: Progressing     Problem: INFECTION - ADULT  Goal: Absence or prevention of progression during hospitalization  Description: INTERVENTIONS:  - Assess and monitor for signs and symptoms of infection  - Monitor lab/diagnostic results  - Monitor all insertion sites, i e  indwelling lines, tubes, and drains  - Monitor endotracheal if appropriate and nasal secretions for changes in amount and color  - [place] appropriate cooling/warming therapies per order  - Administer medications as ordered  - Instruct and encourage patient and family to use good hand hygiene technique  - Identify and instruct in appropriate isolation precautions for identified infection/condition  Outcome: Progressing  Goal: Absence of fever/infection during neutropenic period  Description: INTERVENTIONS:  - Monitor WBC    Outcome: Progressing     Problem: SAFETY ADULT  Goal: Patient will remain free of falls  Description: INTERVENTIONS:  - Assess patient frequently for physical needs  -  Identify cognitive and physical deficits and behaviors that affect risk of falls    -  [place] fall precautions as indicated by assessment   - Educate patient/family on patient safety including physical limitations  - Instruct patient to call for assistance with activity based on assessment  - Modify environment to reduce risk of injury  - Consider OT/PT consult to assist with strengthening/mobility  Outcome: Progressing  Goal: Maintain or return to baseline ADL function  Description: INTERVENTIONS:  -  Assess patient's ability to carry out ADLs; assess patient's baseline for ADL function and identify physical deficits which impact ability to perform ADLs (bathing, care of mouth/teeth, toileting, grooming, dressing, etc )  - Assess/evaluate cause of self-care deficits   - Assess range of motion  - Assess patient's mobility; develop plan if impaired  - Assess patient's need for assistive devices and provide as appropriate  - Encourage maximum independence but intervene and supervise when necessary  - Involve family in performance of ADLs  - Assess for home care needs following discharge   - Consider OT consult to assist with ADL evaluation and planning for discharge  - Provide patient education as appropriate  Outcome: Progressing  Goal: Maintain or return mobility status to optimal level  Description: INTERVENTIONS:  - Assess patient's baseline mobility status (ambulation, transfers, stairs, etc )    - Identify cognitive and physical deficits and behaviors that affect mobility  - Identify mobility aids required to assist with transfers and/or ambulation (gait belt, sit-to-stand, lift, walker, cane, etc )  - [place] fall precautions as indicated by assessment  - Record patient progress and toleration of activity level on Mobility SBAR; progress patient to next Phase/Stage  - Instruct patient to call for assistance with activity based on assessment  - Consider rehabilitation consult to assist with strengthening/weightbearing, etc   Outcome: Progressing     Problem: DISCHARGE PLANNING  Goal: Discharge to home or other facility with appropriate resources  Description: INTERVENTIONS:  - Identify barriers to discharge w/patient and caregiver  - Arrange for needed discharge resources and transportation as appropriate  - Identify discharge learning needs (meds, wound care, etc )  - Arrange for interpretive services to assist at discharge as needed  - Refer to Case Management Department for coordinating discharge planning if the patient needs post-hospital services based on physician/advanced practitioner order or complex needs related to functional status, cognitive ability, or social support system  Outcome: Progressing     Problem: Knowledge Deficit  Goal: Patient/family/caregiver demonstrates understanding of disease process, treatment plan, medications, and discharge instructions  Description: Complete learning assessment and assess knowledge base    Interventions:  - Provide teaching at level of understanding  - Provide teaching via preferred learning methods  Outcome: Progressing

## 2021-05-10 NOTE — H&P
[address] [facility] 1955, 73 y o  male MRN: [id number]  Unit/Bed#: [facility] 928-01 Encounter: [id number]  Primary Care Provider: Kin Potter MD   Date and time admitted to hospital: 5/10/2021 10:40 AM    Epigastric pain  Assessment & Plan  Transferred from [facility] of epigastric pain radiating diffusely  No nausea, vomiting, diarrhea  Prior admission to [facility] [place], he has elevated liver function tests, at that time CT abdomen:pancreatic head mass suspected to be malignant  CA 19-9 elevated 487 indicative of pancreatic cancer, AFP and CEA WNL  Subsequent ERCP, showed biliary stent in place and sphincterotomy performed on 4/28/21     Patient readmitted for abdominal pain that has been intermittent     Ct Abdomen Pelvis With Contrast Result Date: 5/5/2021 Impression: 1  Uniform mural hyperenhancement of the wall of the gallbladder with a small amount of pericholecystic fluid, worrisome for acute cholecystitis  Further clinical assessment advised  Ultrasound of the right upper quadrant suggested for further assessment  2   Pneumobilia with stent in the common bile duct redemonstrated  3   3 8 cm obstructing mass in the region of the head of the pancreas with marked pancreatic ductal dilatation, worrisome for adenocarcinoma  Other tumors not excluded  4   Numerous hepatic cysts redemonstrated with an enhancing lesion in the left lobe of liver possibly a hemangioma, stable  5   Moderate constipation      Us Right Upper Quadrant Result Date: 5/6/2021 Impression: Mild gallbladder wall thickening without additional evidence of acute cholecystitis  Pancreatic head mass again identified  Associated pancreatic ductal dilatation  Indeterminate hypoechoic lesion within the left lobe of the liver  Hyperechoic left hepatic lobe lesion possibly representing a hemangioma  Multiple hepatic cysts  CT scan Abdomen and Pelvis 5/9/21 ;  Impression: Inflammatory changes surrounding the pylorus and proximal duodenum, as well as the pancreatic head  A small amount of free air  Findings are most suspicious for a perforated gastric ulcer  Recommend correlation with endoscopy  Pancreatitis is also considered, but felt less likely given the interval development of free air in the location of the inflammatory changes         Recent Labs     05/09/21  0804   AST 28   ALT 68   ALKPHOS 211*   TBILI 0 96     · Patient was having severe pain again on 5/9, could be from perforation, trapped air bubble possible     Plan:  · Continue pancrelipase that was started on previous admission  · Pain regimen to include:              Oxycodone 2 5 mg po q6h prn for mild pain  Oxycodone 5 mg po q6h prn for severe pain  Oxycodone 10 mg po q6h for severe pain              Dilaudid 0 5 mg IV 46h prn breakthrough pain  · Continue bowel regimen to avoid constipation  · Patient transferred to [facility] for possible intervention for evaluate ongoing diffuse abdominal pain secondary to perforation most likely from EUS procedure on 5/07/2021 or gastric ulcer  · Await HIDA scan at 3 pm today  · NPO for now      Pancreatic mass, Probable Pancreatic cancer  Assessment & Plan  · Patient had pancreatic mass identified a previous admission on CT scan done on 04/29/2021  Mass presented with biliary obstruction which improved s/p biliary duct stenting  Brushing unfortunately was nondiagnostic  · "CT Abdomen Pelvis With Contrast Result Date: 5/5/2021 Impression:   3 8 cm obstructing mass in the region of the head of the pancreas with marked pancreatic ductal dilatation, worrisome for adenocarcinoma  Other tumors not excluded "   · Us Right Upper Quadrant Result Date: 5/6/2021 Impression: Mild gallbladder wall thickening without additional evidence of acute cholecystitis  Pancreatic head mass again identified  Associated pancreatic ductal dilatation   Indeterminate hypoechoic lesion within the left lobe of the liver  Hyperechoic left hepatic lobe lesion possibly representing a hemangioma  Multiple hepatic cysts  · Patient is S/P EUS procedure with biopsies taken and celiac plexus nerve block on 05/07  · Patient had f/u with outpatient Surgical Oncology scheduled for 05/06/2021      Plan:  · Pain regimen to include:              Oxycodone 2 5 mg po q6h prn for mild pain  Oxycodone 5 mg po q6h prn for severe pain  Oxycodone 10 mg po q6h for severe pain        Dilaudid 0 5 mg IV 46h prn breakthrough pain  · Continue bowel regimen to avoid constipation  · Patient transferred to [facility] for possible OR procedure  Type 2 diabetes mellitus without complication, without long-term current use of insulin [facility])  Assessment & Plan  Lab Results   Component Value Date    HGBA1C 6 2 (H) 05/05/2021       Recent Labs     05/09/21  1100 05/09/21  1619 05/09/21  2037 05/10/21  0741   POCGLU 134 123 130 113       Blood Sugar Average: Last 72 hrs:     Unclear patient has true diabetes as this may be secondary to pancreatic neoplasm     Plan:  · Diabetic Diet: Level 2 carb diet   · Insulin regimen  ? Sliding Scale   · Goal Blood Glucose 140-180  · Glucose checks:  ACHS   · Monitor for hypoglycemia and treat per protocol    Hyperlipidemia  Assessment & Plan  Lab Results   Component Value Date    CHOLESTEROL 188 05/05/2021    TRIG 181 6 (H) 05/05/2021    HDL 36 (L) 05/05/2021    LDLCALC 116 (H) 05/05/2021     Plan:  · Continue fish oil capsule 1000 mg q d  Hypertension  Assessment & Plan  History of hypertension managed on lisinopril-hydrochlorothiazide 10-12 5 mg q d      Plan:  · Continue home meds    Rheumatoid arthritis ([address] )  Assessment & Plan  Pt off MTX presently  Monitor for now   F/u with Rheum    VTE Prophylaxis: Heparin  / sequential compression device   Code Status: full code   POLST: POLST is not applicable to this patient  Discussion with family: Anticipated Length of Stay:  Patient will be admitted on an Inpatient basis with an anticipated length of stay of  More than  2 midnights  Justification for Hospital Stay: due to abdominal pain, perforation of bowels     Total Time for Visit, including Counseling / Coordination of Care: 45 minutes  Greater than 50% of this total time spent on direct patient counseling and coordination of care  Chief Complaint:   Abdominal pain and transferred to [place] for surgery evaluation for perforation  History of Present Illness:    Cale Stein is a 73 y o  male who presents with history of pancreatic cancer work up, hyperlipidemia, HTN, DM, RA presented to [facility] with [facility] times for abdominal pain  Initial admission, he was found to have pancreatic mass on the work up  Now he presented to [facility] again with abdominal pain has EUS done, he might have perforation from possible EUS procedure  He is being transferred here for surgery evaluation, pain management and HIDA scan is ordered  Possible surgical intervention if he has perforation  Review of Systems:    Review of Systems   Constitutional: Negative  HENT: Negative  Eyes: Negative  Respiratory: Negative  Cardiovascular: Negative  Gastrointestinal: Negative  Endocrine: Negative  Genitourinary: Negative  Musculoskeletal: Negative  Neurological: Negative  Hematological: Negative  Psychiatric/Behavioral: Negative  All other systems reviewed and are negative        Past Medical and Surgical History:     Past Medical History:   Diagnosis Date    Arthritis     Cellulitis     LAST ASSESSED: 6/13/14    Enlarged prostate     Epidermal inclusion cyst     LAST ASSESSED: 10/4/13    Erythrasma     LAST ASSESSED: 9/23/13    Furuncle     LAST ASSESSED: 6/11/14    Hyperlipidemia     Hypertension        Past Surgical History:   Procedure Laterality Date    HYDROCELE EXCISION / REPAIR Right 01/11/2018 SPERMATIC CORD EXCSION OF HYDROCELE; MANAGED BY: Manas BANDA REMOVAL OF HYDROCELE,TUNICA,UNILAT Right 1/11/2018    Procedure: HYDROCELECTOMY;  Surgeon: Darell Beckwith MD;  Location: [facility]  MAIN OR;  Service: Urology    SCROTAL SURGERY      benign "lump"       Meds/Allergies:    Prior to Admission medications    Medication Sig Start Date End Date Taking? Authorizing Provider   aspirin 81 mg chewable tablet Chew 81 mg   Yes Historical Provider, MD   Blood Glucose Monitoring Suppl (ONE TOUCH ULTRA 2) w/Device KIT Use daily 3/17/21  Yes Rufina Hamlin MD   FLUoxetine (PROzac) 20 mg capsule TAKE 1 CAPSULE BY MOUTH EVERY DAY   10/13/20  Yes Rufina Hamlin MD   folic acid (FOLVITE) 1 mg tablet Take by mouth daily   Yes Historical Provider, MD   glucose blood (OneTouch Ultra) test strip Use as instructed once a day 3/17/21  Yes Rufina Hamlin MD   Lancets (onetouch ultrasoft) lancets Use as instructed once a day 3/17/21  Yes Rufina Hamlin MD   lisinopril-hydrochlorothiazide (PRINZIDE,ZESTORETIC) 10-12 5 MG per tablet TAKE ONE TABLET BY MOUTH EVERY DAY 9/15/20  Yes Rufina Hamlin MD   Multiple Vitamins-Minerals (MULTIVITAMIN MEN) TABS Take 1 tablet by mouth daily   Yes Historical Provider, MD   Omega-3 Fatty Acids (FISH OIL) 1,000 mg Take 1,000 mg by mouth daily   Yes Historical Provider, MD   pancrelipase, Lip-Prot-Amyl, (CREON) 24,000 units Take 1 capsule (24,000 Units total) by mouth 3 (three) times a day with meals 4/30/21  Yes Wilder Whalen,    tamsulosin (FLOMAX) 0 4 mg TAKE 1 CAPSULE BY MOUTH  DAILY AT BEDTIME 6/10/20  Yes Darell Beckwith MD   omeprazole (PriLOSEC) 20 mg delayed release capsule Take 1 capsule (20 mg total) by mouth daily before breakfast 5/3/21   Rufina Hamlin MD   oxyCODONE (ROXICODONE) 5 mg immediate release tablet Take 0 5 tablets (2 5 mg total) by mouth every 6 (six) hours as needed for moderate pain Or take 1 tablet (5 mg total) by mouth every 6 hours as needed for severe painMax Daily Amount: 10 mg  Patient not taking: Reported on 5/10/2021 5/8/21   Sherri Reza MD   polyethylene glycol (MIRALAX) 17 g packet Take 17 g by mouth daily as needed (Constipation) 5/8/21   Sherri Reza MD   senna-docusate sodium (SENOKOT S) 8 6-50 mg per tablet Take 1 tablet by mouth 2 (two) times a day 5/8/21   Sherri Reza MD     I have reviewed home medications with patient personally  Allergies: Allergies   Allergen Reactions    Fentanyl Anaphylaxis and Other (See Comments)     Oxygen drops severely       Social History:     Marital Status: /Civil Union   Occupation:    Patient Pre-hospital Living Situation: lives at home   Patient Pre-hospital Level of Mobility: mihaela to ambulate   Patient Pre-hospital Diet Restrictions: diabetic   Substance Use History:   Social History     Substance and Sexual Activity   Alcohol Use No     Social History     Tobacco Use   Smoking Status Former Smoker   Smokeless Tobacco Never Used   Tobacco Comment    tobacco in a pipe in the early 1980's     Social History     Substance and Sexual Activity   Drug Use No       Family History:    Family History   Problem Relation Age of Onset    Heart disease Father         CARDIAC DISORDER    Hypertension Father     Hypertension Mother     No Known Problems Maternal Aunt     No Known Problems Maternal Uncle     No Known Problems Paternal Aunt     No Known Problems Paternal Uncle     No Known Problems Paternal Grandmother     No Known Problems Paternal Grandfather     Diabetes Maternal Grandmother         MELLITUS    No Known Problems Maternal Grandfather     Hypertension Other        Physical Exam:     Vitals:   Blood Pressure: 99/65 (05/10/21 1141)  Pulse: 65 (05/10/21 1141)  SpO2: 96 % (05/10/21 1141)    Physical Exam  Vitals signs and nursing note reviewed  Constitutional:       Appearance: Normal appearance  He is normal weight     HENT:      Head: Normocephalic and atraumatic  Nose: Nose normal  No congestion or rhinorrhea  Mouth/Throat:      Mouth: Mucous membranes are moist       Pharynx: No oropharyngeal exudate or posterior oropharyngeal erythema  Eyes:      General: No scleral icterus  Right eye: No discharge  Left eye: No discharge  Extraocular Movements: Extraocular movements intact  Conjunctiva/sclera: Conjunctivae normal       Pupils: Pupils are equal, round, and reactive to light  Neck:      Musculoskeletal: Normal range of motion and neck supple  Cardiovascular:      Rate and Rhythm: Normal rate and regular rhythm  Heart sounds: No murmur  Pulmonary:      Effort: Pulmonary effort is normal  No respiratory distress  Breath sounds: Normal breath sounds  No stridor  No wheezing  Abdominal:      General: Abdomen is flat  Bowel sounds are normal  There is no distension  Palpations: Abdomen is soft  There is no mass  Tenderness: There is no abdominal tenderness  There is no right CVA tenderness, left CVA tenderness, guarding or rebound  Hernia: No hernia is present  Musculoskeletal: Normal range of motion  General: No swelling, tenderness, deformity or signs of injury  Skin:     General: Skin is warm and dry  Coloration: Skin is not jaundiced or pale  Findings: No bruising or erythema  Neurological:      General: No focal deficit present  Mental Status: He is alert and oriented to person, place, and time  Mental status is at baseline  Cranial Nerves: No cranial nerve deficit  Sensory: No sensory deficit  Motor: No weakness  Coordination: Coordination normal       Gait: Gait normal    Psychiatric:         Mood and Affect: Mood normal          Behavior: Behavior normal          Thought Content:  Thought content normal          Judgment: Judgment normal               Additional Data:     Lab Results: I have personally reviewed pertinent reports  Results from last 7 days   Lab Units 05/10/21  0447 05/09/21  0804   WBC Thousand/uL 13 66* 13 80*   HEMOGLOBIN g/dL 12 8 13 4   HEMATOCRIT % 38 6 40 0   PLATELETS Thousands/uL 215 195   BANDS PCT %  --  3   NEUTROS PCT % 89*  --    LYMPHS PCT % 4*  --    LYMPHO PCT %  --  5*   MONOS PCT % 6  --    MONO PCT %  --  11   EOS PCT % 0 0     Results from last 7 days   Lab Units 05/10/21  0447 05/09/21  0804   SODIUM mmol/L 137 138   POTASSIUM mmol/L 3 8 3 4*   CHLORIDE mmol/L 104 103   CO2 mmol/L 25 24   BUN mg/dL 16 17   CREATININE mg/dL 0 93 0 91   ANION GAP mmol/L 8 11   CALCIUM mg/dL 7 7* 7 7*   ALBUMIN g/dL  --  2 4*   TOTAL BILIRUBIN mg/dL  --  0 96   ALK PHOS U/L  --  211*   ALT U/L  --  68   AST U/L  --  28   GLUCOSE RANDOM mg/dL 135 135     Results from last 7 days   Lab Units 05/05/21  1912   INR  1 08     Results from last 7 days   Lab Units 05/10/21  1252 05/10/21  0741 05/09/21  2037 05/09/21  1619 05/09/21  1100 05/09/21  0716 05/08/21  2037 05/08/21  1617 05/08/21  1118 05/08/21  0756 05/07/21  2023 05/07/21  1843   POC GLUCOSE mg/dl 131 113 130 123 134 142* 137 120 211* 112 129 125     Results from last 7 days   Lab Units 05/05/21  0911   HEMOGLOBIN A1C % 6 2*     Results from last 7 days   Lab Units 05/10/21  0447 05/09/21  1031 05/06/21  0634 05/05/21  2124 05/05/21  1912   LACTIC ACID mmol/L  --   --   --  1 7 2 1*   PROCALCITONIN ng/ml 0 72* 0 54* 0 22  --  0 12       Imaging: I have personally reviewed pertinent reports  NM Hepatobiliary w RX    (Results Pending)       EKG, Pathology, and Other Studies Reviewed on Admission:   · EKG:      Allscripts / Epic Records Reviewed: Yes     ** Please Note: This note has been constructed using a voice recognition system   **

## 2021-05-10 NOTE — ASSESSMENT & PLAN NOTE
Lab Results   Component Value Date    HGBA1C 6 2 (H) 05/05/2021       Recent Labs     05/09/21  1100 05/09/21  1619 05/09/21  2037 05/10/21  0741   POCGLU 134 123 130 113       Blood Sugar Average: Last 72 hrs:     Unclear patient has true diabetes as this may be secondary to pancreatic neoplasm     Plan:  · Diabetic Diet: Level 2 carb diet   · Insulin regimen  ?  Sliding Scale   · Goal Blood Glucose 140-180  · Glucose checks:  ACHS   · Monitor for hypoglycemia and treat per protocol

## 2021-05-10 NOTE — ASSESSMENT & PLAN NOTE
Transferred from [place] of epigastric pain radiating diffusely  No nausea, vomiting, diarrhea  Prior admission to [facility] [place], he has elevated liver function tests, at that time CT abdomen:pancreatic head mass suspected to be malignant  CA 19-9 elevated 487 indicative of pancreatic cancer, AFP and CEA WNL  Subsequent ERCP, showed biliary stent in place and sphincterotomy performed on 4/28/21     Patient readmitted for abdominal pain that has been intermittent     Ct Abdomen Pelvis With Contrast Result Date: 5/5/2021 Impression: 1  Uniform mural hyperenhancement of the wall of the gallbladder with a small amount of pericholecystic fluid, worrisome for acute cholecystitis  Further clinical assessment advised  Ultrasound of the right upper quadrant suggested for further assessment  2   Pneumobilia with stent in the common bile duct redemonstrated  3   3 8 cm obstructing mass in the region of the head of the pancreas with marked pancreatic ductal dilatation, worrisome for adenocarcinoma  Other tumors not excluded  4   Numerous hepatic cysts redemonstrated with an enhancing lesion in the left lobe of liver possibly a hemangioma, stable  5   Moderate constipation      Us Right Upper Quadrant Result Date: 5/6/2021 Impression: Mild gallbladder wall thickening without additional evidence of acute cholecystitis  Pancreatic head mass again identified  Associated pancreatic ductal dilatation  Indeterminate hypoechoic lesion within the left lobe of the liver  Hyperechoic left hepatic lobe lesion possibly representing a hemangioma  Multiple hepatic cysts  CT scan Abdomen and Pelvis 5/9/21 ; Impression: Inflammatory changes surrounding the pylorus and proximal duodenum, as well as the pancreatic head  A small amount of free air  Findings are most suspicious for a perforated gastric ulcer  Recommend correlation with endoscopy    Pancreatitis is also considered, but felt less likely given the interval development of free air in the location of the inflammatory changes         Recent Labs     05/09/21  0804   AST 28   ALT 68   ALKPHOS 211*   TBILI 0 96     · Patient was having severe pain again on 5/9, could be from perforation, trapped air bubble possible     Plan:  · Continue pancrelipase that was started on previous admission  · Pain regimen to include:              Oxycodone 2 5 mg po q6h prn for mild pain  Oxycodone 5 mg po q6h prn for severe pain  Oxycodone 10 mg po q6h for severe pain              Dilaudid 0 5 mg IV 46h prn breakthrough pain  · Continue bowel regimen to avoid constipation  · Patient transferred to [place] for possible intervention for evaluate ongoing diffuse abdominal pain secondary to perforation most likely from EUS procedure on 5/07/2021 or gastric ulcer    · Await HIDA scan at 3 pm today  · NPO for now

## 2021-05-10 NOTE — CONSULTS
Please see consult note written by Dr Yasmany Cadet on 5/5/21  The surgical oncology team has been following this patient daily at the [facility]  Will resume daily evaluation here at [facility]

## 2021-05-10 NOTE — ASSESSMENT & PLAN NOTE
History of hypertension managed on lisinopril-hydrochlorothiazide 10-12 5 mg q d      Plan:  · Continue home meds

## 2021-05-10 NOTE — ASSESSMENT & PLAN NOTE
Complaining of epigastric pain radiating diffusely  No nausea, vomiting, diarrhea  Recent admission for elevated LFTs where CT abdomen showed pancreatic head mass suspected to be malignant  · CA 19-9 elevated 487 indicative of pancreatic cancer, AFP and CEA within normal range  · ERCP with biliary stent was placed and sphincterotomy performed on 4/28/21    Patient returning abdominal pain that has been intermittent    Ct Abdomen Pelvis With Contrast Result Date: 5/5/2021 Impression: 1  Uniform mural hyperenhancement of the wall of the gallbladder with a small amount of pericholecystic fluid, worrisome for acute cholecystitis  Further clinical assessment advised  Ultrasound of the right upper quadrant suggested for further assessment  2   Pneumobilia with stent in the common bile duct redemonstrated  3   3 8 cm obstructing mass in the region of the head of the pancreas with marked pancreatic ductal dilatation, worrisome for adenocarcinoma  Other tumors not excluded  4   Numerous hepatic cysts redemonstrated with an enhancing lesion in the left lobe of liver possibly a hemangioma, stable  5   Moderate constipation  Us Right Upper Quadrant Result Date: 5/6/2021 Impression: Mild gallbladder wall thickening without additional evidence of acute cholecystitis  Pancreatic head mass again identified  Associated pancreatic ductal dilatation  Indeterminate hypoechoic lesion within the left lobe of the liver  Hyperechoic left hepatic lobe lesion possibly representing a hemangioma  Multiple hepatic cysts  Recent Labs     05/09/21  0804   AST 28   ALT 68   ALKPHOS 211*   TBILI 0 96     · Patient was having severe pain yesterday and could not be discharged  Plan:  · Continue pancrelipase that was started on previous admission  · Pain regimen to include:   Oxycodone 2 5 mg po q6h prn for mild pain  Oxycodone 5 mg po q6h prn for severe pain     Oxycodone 10 mg po q6h for severe pain   Dilaudid 0 5 mg IV 46h prn breakthrough pain  · Continue bowel regimen to avoid constipation  · Advance diet as tolerated  · Check CT abdomen and pelvis with contrast the setting of ongoing abdominal pain

## 2021-05-11 ENCOUNTER — TRANSITIONAL CARE MANAGEMENT (OUTPATIENT)
Dept: FAMILY MEDICINE CLINIC | Facility: CLINIC | Age: 66
End: 2021-05-11

## 2021-05-11 ENCOUNTER — TELEPHONE (OUTPATIENT)
Dept: FAMILY MEDICINE CLINIC | Facility: CLINIC | Age: 66
End: 2021-05-11

## 2021-05-11 ENCOUNTER — APPOINTMENT (INPATIENT)
Dept: RADIOLOGY | Facility: HOSPITAL | Age: 66
DRG: 446 | End: 2021-05-11
Payer: COMMERCIAL

## 2021-05-11 PROBLEM — K81.0 ACUTE CHOLECYSTITIS WITHOUT CALCULUS: Status: ACTIVE | Noted: 2021-05-11

## 2021-05-11 PROBLEM — D72.829 LEUKOCYTOSIS: Status: ACTIVE | Noted: 2021-05-11

## 2021-05-11 LAB
ALBUMIN SERPL BCP-MCNC: 1.9 G/DL (ref 3.5–5)
ALP SERPL-CCNC: 162 U/L (ref 46–116)
ALT SERPL W P-5'-P-CCNC: 56 U/L (ref 12–78)
ANION GAP SERPL CALCULATED.3IONS-SCNC: 6 MMOL/L (ref 4–13)
AST SERPL W P-5'-P-CCNC: 44 U/L (ref 5–45)
BASOPHILS # BLD AUTO: 0.03 THOUSANDS/ΜL (ref 0–0.1)
BASOPHILS NFR BLD AUTO: 0 % (ref 0–1)
BILIRUB SERPL-MCNC: 0.98 MG/DL (ref 0.2–1)
BUN SERPL-MCNC: 15 MG/DL (ref 5–25)
CALCIUM ALBUM COR SERPL-MCNC: 9.5 MG/DL (ref 8.3–10.1)
CALCIUM SERPL-MCNC: 7.8 MG/DL (ref 8.3–10.1)
CHLORIDE SERPL-SCNC: 108 MMOL/L (ref 100–108)
CO2 SERPL-SCNC: 25 MMOL/L (ref 21–32)
CREAT SERPL-MCNC: 0.66 MG/DL (ref 0.6–1.3)
EOSINOPHIL # BLD AUTO: 0.05 THOUSAND/ΜL (ref 0–0.61)
EOSINOPHIL NFR BLD AUTO: 0 % (ref 0–6)
ERYTHROCYTE [DISTWIDTH] IN BLOOD BY AUTOMATED COUNT: 14.5 % (ref 11.6–15.1)
GFR SERPL CREATININE-BSD FRML MDRD: 101 ML/MIN/1.73SQ M
GLUCOSE SERPL-MCNC: 116 MG/DL (ref 65–140)
GLUCOSE SERPL-MCNC: 119 MG/DL (ref 65–140)
GLUCOSE SERPL-MCNC: 135 MG/DL (ref 65–140)
GLUCOSE SERPL-MCNC: 141 MG/DL (ref 65–140)
HCT VFR BLD AUTO: 35.2 % (ref 36.5–49.3)
HGB BLD-MCNC: 11.5 G/DL (ref 12–17)
IMM GRANULOCYTES # BLD AUTO: 0.2 THOUSAND/UL (ref 0–0.2)
IMM GRANULOCYTES NFR BLD AUTO: 1 % (ref 0–2)
LYMPHOCYTES # BLD AUTO: 0.66 THOUSANDS/ΜL (ref 0.6–4.47)
LYMPHOCYTES NFR BLD AUTO: 5 % (ref 14–44)
MCH RBC QN AUTO: 29.8 PG (ref 26.8–34.3)
MCHC RBC AUTO-ENTMCNC: 32.7 G/DL (ref 31.4–37.4)
MCV RBC AUTO: 91 FL (ref 82–98)
MONOCYTES # BLD AUTO: 1.38 THOUSAND/ΜL (ref 0.17–1.22)
MONOCYTES NFR BLD AUTO: 10 % (ref 4–12)
NEUTROPHILS # BLD AUTO: 12.02 THOUSANDS/ΜL (ref 1.85–7.62)
NEUTS SEG NFR BLD AUTO: 84 % (ref 43–75)
NRBC BLD AUTO-RTO: 0 /100 WBCS
PLATELET # BLD AUTO: 228 THOUSANDS/UL (ref 149–390)
PMV BLD AUTO: 12.3 FL (ref 8.9–12.7)
POTASSIUM SERPL-SCNC: 3.3 MMOL/L (ref 3.5–5.3)
PROT SERPL-MCNC: 4.9 G/DL (ref 6.4–8.2)
RBC # BLD AUTO: 3.86 MILLION/UL (ref 3.88–5.62)
SODIUM SERPL-SCNC: 139 MMOL/L (ref 136–145)
WBC # BLD AUTO: 14.34 THOUSAND/UL (ref 4.31–10.16)

## 2021-05-11 PROCEDURE — 80053 COMPREHEN METABOLIC PANEL: CPT | Performed by: INTERNAL MEDICINE

## 2021-05-11 PROCEDURE — 99152 MOD SED SAME PHYS/QHP 5/>YRS: CPT | Performed by: RADIOLOGY

## 2021-05-11 PROCEDURE — 82948 REAGENT STRIP/BLOOD GLUCOSE: CPT

## 2021-05-11 PROCEDURE — 87077 CULTURE AEROBIC IDENTIFY: CPT | Performed by: SURGERY

## 2021-05-11 PROCEDURE — 99152 MOD SED SAME PHYS/QHP 5/>YRS: CPT

## 2021-05-11 PROCEDURE — 47490 INCISION OF GALLBLADDER: CPT

## 2021-05-11 PROCEDURE — 87186 SC STD MICRODIL/AGAR DIL: CPT | Performed by: SURGERY

## 2021-05-11 PROCEDURE — C1769 GUIDE WIRE: HCPCS

## 2021-05-11 PROCEDURE — 99232 SBSQ HOSP IP/OBS MODERATE 35: CPT | Performed by: INTERNAL MEDICINE

## 2021-05-11 PROCEDURE — 99153 MOD SED SAME PHYS/QHP EA: CPT

## 2021-05-11 PROCEDURE — 87205 SMEAR GRAM STAIN: CPT | Performed by: SURGERY

## 2021-05-11 PROCEDURE — C9113 INJ PANTOPRAZOLE SODIUM, VIA: HCPCS | Performed by: INTERNAL MEDICINE

## 2021-05-11 PROCEDURE — 87075 CULTR BACTERIA EXCEPT BLOOD: CPT | Performed by: SURGERY

## 2021-05-11 PROCEDURE — 87070 CULTURE OTHR SPECIMN AEROBIC: CPT | Performed by: SURGERY

## 2021-05-11 PROCEDURE — 99232 SBSQ HOSP IP/OBS MODERATE 35: CPT | Performed by: STUDENT IN AN ORGANIZED HEALTH CARE EDUCATION/TRAINING PROGRAM

## 2021-05-11 PROCEDURE — NC001 PR NO CHARGE: Performed by: PHYSICIAN ASSISTANT

## 2021-05-11 PROCEDURE — C1729 CATH, DRAINAGE: HCPCS

## 2021-05-11 PROCEDURE — 85025 COMPLETE CBC W/AUTO DIFF WBC: CPT | Performed by: INTERNAL MEDICINE

## 2021-05-11 PROCEDURE — NC001 PR NO CHARGE: Performed by: RADIOLOGY

## 2021-05-11 PROCEDURE — 47490 INCISION OF GALLBLADDER: CPT | Performed by: RADIOLOGY

## 2021-05-11 RX ORDER — FENTANYL CITRATE 50 UG/ML
INJECTION, SOLUTION INTRAMUSCULAR; INTRAVENOUS CODE/TRAUMA/SEDATION MEDICATION
Status: DISCONTINUED | OUTPATIENT
Start: 2021-05-11 | End: 2021-05-17 | Stop reason: HOSPADM

## 2021-05-11 RX ORDER — POTASSIUM CHLORIDE 14.9 MG/ML
20 INJECTION INTRAVENOUS
Status: DISPENSED | OUTPATIENT
Start: 2021-05-11 | End: 2021-05-11

## 2021-05-11 RX ORDER — SODIUM CHLORIDE 9 MG/ML
10 INJECTION INTRAVENOUS DAILY
Qty: 300 ML | Refills: 3 | Status: SHIPPED | OUTPATIENT
Start: 2021-05-11 | End: 2021-05-12 | Stop reason: SDUPTHER

## 2021-05-11 RX ORDER — MIDAZOLAM HYDROCHLORIDE 2 MG/2ML
INJECTION, SOLUTION INTRAMUSCULAR; INTRAVENOUS CODE/TRAUMA/SEDATION MEDICATION
Status: DISCONTINUED | OUTPATIENT
Start: 2021-05-11 | End: 2021-05-17 | Stop reason: HOSPADM

## 2021-05-11 RX ORDER — POTASSIUM CHLORIDE 14.9 MG/ML
20 INJECTION INTRAVENOUS ONCE
Status: COMPLETED | OUTPATIENT
Start: 2021-05-11 | End: 2021-05-11

## 2021-05-11 RX ORDER — DEXTROSE, SODIUM CHLORIDE, AND POTASSIUM CHLORIDE 5; .45; .15 G/100ML; G/100ML; G/100ML
100 INJECTION INTRAVENOUS CONTINUOUS
Status: DISCONTINUED | OUTPATIENT
Start: 2021-05-11 | End: 2021-05-12

## 2021-05-11 RX ADMIN — POTASSIUM CHLORIDE 20 MEQ: 14.9 INJECTION, SOLUTION INTRAVENOUS at 14:52

## 2021-05-11 RX ADMIN — POTASSIUM CHLORIDE 20 MEQ: 14.9 INJECTION, SOLUTION INTRAVENOUS at 10:01

## 2021-05-11 RX ADMIN — FENTANYL CITRATE 25 MCG: 50 INJECTION INTRAMUSCULAR; INTRAVENOUS at 12:45

## 2021-05-11 RX ADMIN — MIDAZOLAM 0.5 MG: 1 INJECTION INTRAMUSCULAR; INTRAVENOUS at 12:31

## 2021-05-11 RX ADMIN — IOHEXOL 20 ML: 350 INJECTION, SOLUTION INTRAVENOUS at 13:05

## 2021-05-11 RX ADMIN — CEFEPIME HYDROCHLORIDE 2000 MG: 2 INJECTION, POWDER, FOR SOLUTION INTRAVENOUS at 09:11

## 2021-05-11 RX ADMIN — HYDROMORPHONE HYDROCHLORIDE 0.5 MG: 1 INJECTION, SOLUTION INTRAMUSCULAR; INTRAVENOUS; SUBCUTANEOUS at 20:09

## 2021-05-11 RX ADMIN — FENTANYL CITRATE 25 MCG: 50 INJECTION INTRAMUSCULAR; INTRAVENOUS at 12:28

## 2021-05-11 RX ADMIN — METRONIDAZOLE 500 MG: 500 INJECTION, SOLUTION INTRAVENOUS at 20:04

## 2021-05-11 RX ADMIN — METRONIDAZOLE 500 MG: 500 INJECTION, SOLUTION INTRAVENOUS at 12:04

## 2021-05-11 RX ADMIN — SODIUM CHLORIDE, SODIUM GLUCONATE, SODIUM ACETATE, POTASSIUM CHLORIDE, MAGNESIUM CHLORIDE, SODIUM PHOSPHATE, DIBASIC, AND POTASSIUM PHOSPHATE 100 ML/HR: .53; .5; .37; .037; .03; .012; .00082 INJECTION, SOLUTION INTRAVENOUS at 01:14

## 2021-05-11 RX ADMIN — Medication 1 TABLET: at 09:16

## 2021-05-11 RX ADMIN — MIDAZOLAM 0.5 MG: 1 INJECTION INTRAMUSCULAR; INTRAVENOUS at 12:28

## 2021-05-11 RX ADMIN — SODIUM CHLORIDE, SODIUM GLUCONATE, SODIUM ACETATE, POTASSIUM CHLORIDE, MAGNESIUM CHLORIDE, SODIUM PHOSPHATE, DIBASIC, AND POTASSIUM PHOSPHATE 100 ML/HR: .53; .5; .37; .037; .03; .012; .00082 INJECTION, SOLUTION INTRAVENOUS at 05:22

## 2021-05-11 RX ADMIN — MIDAZOLAM 0.5 MG: 1 INJECTION INTRAMUSCULAR; INTRAVENOUS at 12:45

## 2021-05-11 RX ADMIN — PANTOPRAZOLE SODIUM 40 MG: 40 INJECTION, POWDER, FOR SOLUTION INTRAVENOUS at 20:10

## 2021-05-11 RX ADMIN — CEFEPIME HYDROCHLORIDE 2000 MG: 2 INJECTION, POWDER, FOR SOLUTION INTRAVENOUS at 21:45

## 2021-05-11 RX ADMIN — ASPIRIN 81 MG: 81 TABLET, CHEWABLE ORAL at 09:16

## 2021-05-11 RX ADMIN — DEXTROSE, SODIUM CHLORIDE, AND POTASSIUM CHLORIDE 100 ML/HR: 5; .45; .15 INJECTION INTRAVENOUS at 20:04

## 2021-05-11 RX ADMIN — OXYCODONE HYDROCHLORIDE 5 MG: 5 TABLET ORAL at 05:28

## 2021-05-11 RX ADMIN — FOLIC ACID 1 MG: 1 TABLET ORAL at 09:16

## 2021-05-11 RX ADMIN — OXYCODONE HYDROCHLORIDE 10 MG: 10 TABLET ORAL at 17:03

## 2021-05-11 RX ADMIN — TAMSULOSIN HYDROCHLORIDE 0.4 MG: 0.4 CAPSULE ORAL at 21:58

## 2021-05-11 RX ADMIN — PANTOPRAZOLE SODIUM 40 MG: 40 INJECTION, POWDER, FOR SOLUTION INTRAVENOUS at 09:15

## 2021-05-11 RX ADMIN — METRONIDAZOLE 500 MG: 500 INJECTION, SOLUTION INTRAVENOUS at 05:00

## 2021-05-11 RX ADMIN — FLUOXETINE 20 MG: 20 CAPSULE ORAL at 09:16

## 2021-05-11 RX ADMIN — HEPARIN SODIUM 5000 UNITS: 5000 INJECTION INTRAVENOUS; SUBCUTANEOUS at 14:52

## 2021-05-11 RX ADMIN — HEPARIN SODIUM 5000 UNITS: 5000 INJECTION INTRAVENOUS; SUBCUTANEOUS at 21:58

## 2021-05-11 RX ADMIN — DEXTROSE, SODIUM CHLORIDE, AND POTASSIUM CHLORIDE 100 ML/HR: 5; .45; .15 INJECTION INTRAVENOUS at 10:01

## 2021-05-11 RX ADMIN — OMEGA-3 FATTY ACIDS CAP 1000 MG 1000 MG: 1000 CAP at 09:16

## 2021-05-11 NOTE — SEDATION DOCUMENTATION
Spoke with patient at length regarding fentyl allergy  States " They gave me too much and it almost killed me"   He also states " Even with morphine and other medications like that my oxygen drops really bad"   Spoke with Dr Nelson Hernandez and ok to give small doses of fentyl for sedation

## 2021-05-11 NOTE — PLAN OF CARE
Problem: Prexisting or High Potential for Compromised Skin Integrity  Goal: Skin integrity is maintained or improved  Description: INTERVENTIONS:  - Identify patients at risk for skin breakdown  - Assess and monitor skin integrity  - Assess and monitor nutrition and hydration status  - Monitor labs   - Assess for incontinence   - Turn and reposition patient  - Assist with mobility/ambulation  - Relieve pressure over bony prominences  - Avoid friction and shearing  - Provide appropriate hygiene as needed including keeping skin clean and dry  - Evaluate need for skin moisturizer/barrier cream  - Collaborate with interdisciplinary team   - Patient/family teaching  - Consider wound care consult   Outcome: Progressing     Problem: Nutrition/Hydration-ADULT  Goal: Nutrient/Hydration intake appropriate for improving, restoring or maintaining nutritional needs  Description: Monitor and assess patient's nutrition/hydration status for malnutrition  Collaborate with interdisciplinary team and initiate plan and interventions as ordered  Monitor patient's weight and dietary intake as ordered or per policy  Utilize nutrition screening tool and intervene as necessary  Determine patient's food preferences and provide high-protein, high-caloric foods as appropriate       INTERVENTIONS:  - Monitor oral intake, urinary output, labs, and treatment plans  - Assess nutrition and hydration status and recommend course of action  - Evaluate amount of meals eaten  - Assist patient with eating if necessary   - Allow adequate time for meals  - Recommend/ encourage appropriate diets, oral nutritional supplements, and vitamin/mineral supplements  - Order, calculate, and assess calorie counts as needed  - Recommend, monitor, and adjust tube feedings and TPN/PPN based on assessed needs  - Assess need for intravenous fluids  - Provide specific nutrition/hydration education as appropriate  - Include patient/family/caregiver in decisions related to nutrition  Outcome: Progressing     Problem: Potential for Falls  Goal: Patient will remain free of falls  Description: INTERVENTIONS:  - Assess patient frequently for physical needs  -  Identify cognitive and physical deficits and behaviors that affect risk of falls    -  [place] fall precautions as indicated by assessment   - Educate patient/family on patient safety including physical limitations  - Instruct patient to call for assistance with activity based on assessment  - Modify environment to reduce risk of injury  - Consider OT/PT consult to assist with strengthening/mobility  Outcome: Progressing

## 2021-05-11 NOTE — QUICK NOTE
Discussed EUS biopsy results confirming adenocarcinoma  Will place medical oncology consult for discussion of neoadjuvant chemotherapy  Again briefly discussed my preferred treatment of sandwich chemo with 3 months before surgery and 3 months after surgery  Pt expresses understanding

## 2021-05-11 NOTE — PROGRESS NOTES
After chart review, interviewing and examining the patient, and discussion with Dr Peng Noland, it is my opinion that this patient may have acute cholecystitis related to his biliary stent covering his cystic duct  I know that the covered biliary stents are often placed in positions where they cover the cystic duct, and they do not create an issue On his CT scan performed May 9th, after his stent was placed April 28th, there is a large air bubble in his gallbladder  It is possible that bowel contents were refluxed into the common duct and into the gallbladder during ERCP manipulation  Although the patient has a high white blood cell count, he does not have a positive Calles sign by physical exam, and his pain is epigastric in nature  I also examined the patient with ultrasound, and there is a small amount of pericholecystic fluid as well as gallbladder wall thickening  There are no stones or sludge within his gallbladder lumen  The previously seen gas bubble in his gallbladder has resorbed  Dr Peng Noland and I decided collectively that it would be prudent to place the drain and treat whenever underlying infection exists regardless of the cause so that the patient can proceed with chemotherapy and have his Whipple as soon as possible  Patient is aware of the discussion, and agrees to proceed with the plan

## 2021-05-11 NOTE — BRIEF OP NOTE (RAD/CATH)
INTERVENTIONAL RADIOLOGY PROCEDURE NOTE    Date: 5/11/2021    Procedure: IR CHOLECYSTOSTOMY TUBE PLACEMENT     Preoperative diagnosis:   1  Pancreatic mass, Probable Pancreatic cancer    2  Epigastric pain    3  BPH (benign prostatic hyperplasia)    4  Pancreatic adenocarcinoma (HCC)         Postoperative diagnosis: Same  Surgeon: Michaela Peters MD     Assistant: None  No qualified resident was available  Blood loss:  Trace    Specimens:  Sent for cultures    Findings:  Gallbladder noted to be markedly thick walled with a small amount of pericholecystic fluid under ultrasound  Ten Armenian drain placed under ultrasound and fluoroscopic guidance  Despite the appearance under ultrasound, the cystic duct is patent  Complications: None immediate      Anesthesia: conscious sedation

## 2021-05-11 NOTE — SEDATION DOCUMENTATION
Patient tolerated procedure and sedation without any immediate complications  Patient offers no complainants of pain at this time  Bed rest start time now

## 2021-05-11 NOTE — UTILIZATION REVIEW
Notification of Discharge   This is a Notification of Discharge from our facility [address]  Please be advised that this patient has been discharge from our facility  Below you will find the admission and discharge date and time including the patients disposition  UTILIZATION REVIEW CONTACT:  Caleb Barrera  Utilization   Network Utilization Review Department  Phone: [phone] x carefully listen to the prompts  All voicemails are confidential   Email: [email or web address]     PHYSICIAN ADVISORY SERVICES:  FOR VVBK-NI-IILM REVIEW - MEDICAL NECESSITY DENIAL  Phone: [phone]  Fax: [phone]  Email: [email or web address]     PRESENTATION DATE: 5/5/2021  6:46 PM  OBERVATION ADMISSION DATE:   INPATIENT ADMISSION DATE: 5/5/21 2158   DISCHARGE DATE: 5/10/2021 10:39 AM  DISPOSITION: Home/Self Care Home/Self Care      IMPORTANT INFORMATION:  Send all requests for admission clinical reviews, approved or denied determinations and any other requests to dedicated fax number below belonging to the campus where the patient is receiving treatment   List of dedicated fax numbers:  [address] DENIALS (Administrative/Medical Necessity) [phone]   [address] [place] [address] (Maternity/NICU/Pediatrics) [phone]   Yemi Min [phone]     [address] [phone]   Rachel Nassar [phone]   [address] [phone]   [facility]  [phone]   [address] [place] [address] W  2401 [facility] 1000 [facility] [phone]

## 2021-05-11 NOTE — CASE MANAGEMENT
CM met with the pt at bedside and gathered the following information:     HOME:  Pt lives in a 2 level house w/ 4 GREG    LIVES W/:  Spouse    :   Ricardo Hall (spouse) [phone]    INDEPENDENCE:  Ind w/o a device    TRANSPORTATION:  Drives self    DME:  [facility],     HHC:  None reported    I/P REHAB:  None reported    INCOME:  Employed    MENTAL HEALTH: None reported    D&A:  None reported    PCP:  Dr Coughlin Po:  Jeanna MONTERROSO    INSURANCE:  [employer]/ Part A    MEDICAL POA:  None reported    TRANSPORTATION AT D/C:  Family Transport    Due to the patient's high risk of readmission, "Access to Care" and "Care Now Facility" sheets were explained and given to the patient and/or caregiver  Patient/caregiver received discharge checklist   Content reviewed  Patient/caregiver encouraged to participate in discharge plan of care prior to discharge home  CM reviewed d/c planning process including the following: identifying help at home, patient preference for d/c planning needs, Discharge Lounge, [facility] Meds to Bed program, availability of treatment team to discuss questions or concerns patient and/or family may have regarding understanding medications and recognizing signs and symptoms once discharged  CM also encouraged patient to follow up with all recommended appointments after discharge  Patient advised of importance for patient and family to participate in managing patients medical well being

## 2021-05-11 NOTE — ASSESSMENT & PLAN NOTE
Pancreatic mass identified a previous admission on CT scan done on 4/29 - presented w/ biliary obstruction which improved s/p biliary duct stenting- brushing unfortunately was nondiagnostic  CT imaging on 5/5 revealed a "3 8 cm obstructing mass in the region of the head of the pancreas with marked pancreatic ductal dilatation, worrisome for adenocarcinoma  Other tumors not excluded "  Subsequent right upper quadrant ultrasound on 5/6 noting a "Mild gallbladder wall thickening without additional evidence of acute cholecystitis  Pancreatic head mass again identified    Associated pancreatic ductal dilatation "  S/p endoscopic ultrasound w/ biopsies taken and celiac plexus nerve block on 5/7  Eventual plan for Whipple's procedure and hopeful chemotherapy once infection resolves noted - surgical oncology following  PRN pain control - supportive care otherwise

## 2021-05-11 NOTE — ASSESSMENT & PLAN NOTE
Currently off agents due to borderline hypertensive states  Consider resume tomorrow if normotensive

## 2021-05-11 NOTE — UTILIZATION REVIEW
Initial Clinical Review admitted at the [facility] 5/5-5/10/21  Transferred to the [facility] 5/10/21 for higher level of care  Admission: Date/Time/Statement:   Admission Orders (From admission, onward)     Ordered        05/10/21 1138  Inpatient Admission  Once                   Orders Placed This Encounter   Procedures    Inpatient Admission     Standing Status:   Standing     Number of Occurrences:   1     Order Specific Question:   Admitting Physician     Answer:   Joaquín Nunez [[id number]]     Order Specific Question:   Level of Care     Answer:   Med Surg [[id number]]     Order Specific Question:   Estimated length of stay     Answer:   More than 2 Midnights     Order Specific Question:   Certification     Answer:   I certify that inpatient services are medically necessary for this patient for a duration of greater than two midnights  See H&P and MD Progress Notes for additional information about the patient's course of treatment  ED Arrival Information     Patient not seen in ED - transferred from [facility] to [address] [place] [address]  Initial Presentation: 73 y o  male with PMH of RA, DM2, HLD, pancreatic head mass s/p ERCP with CBD stenting on 4/28 who presents as a transfer from the [address] He presented to [facility] on 5/5 with abdominal pain  EUS was performed on 5/7, patient c/o worsening pain the following day  CT demonstrated small amounts of free air, possible perforation  Transferred to the [facility] here for surgery evaluation, pain management and HIDA scan  Possible surgical intervention if he has perforation  Plan: Inpatient Med Surg admission for evaluation and treatment of ongoing diffuse abdominal pain , secondary to perforation most likely from EUS procedure on 5/7, or gastric ulcer:  NPO, HIDA scan, pain control, continue pancrelipase  5/11 Surgical Oncology:  Patient now with acute cholecystitis   Continues to have epigastric pain, WBC further elevated  Continue NPO, IV cefepime and Flagyl, IV fluids, pain and nausea control, OOB, consult Interventional Radiology for percutaneous cholecystostomy tube  Addendum:  EUS biopsy confirming adenocarcinoma, consult Medical Oncology for discussion of neoadjuvant chemotherapy  5/11 INTERVENTIONAL RADIOLOGY PROCEDURE NOTE:    Procedure: IR CHOLECYSTOSTOMY TUBE PLACEMENT     Findings:  Gallbladder noted to be markedly thick walled with a small amount of pericholecystic fluid under ultrasound  Ten Greenlandic drain placed under ultrasound and fluoroscopic guidance  Despite the appearance under ultrasound, the cystic duct is patent  Specimens sent for culture  Admission  Vitals   Temperature Pulse Respirations Blood Pressure SpO2   05/10/21 2227 05/10/21 1141 05/10/21 2227 05/10/21 1141 05/10/21 1100   99 9 °F (37 7 °C) 65 18 99/65 96 %      Temp src Heart Rate Source Patient Position - Orthostatic VS BP Location FiO2 (%)   -- -- -- -- --             Pain Score       05/10/21 1136       6          Wt Readings from Last 1 Encounters:   05/10/21 79 8 kg (176 lb)     Additional Vital Signs:     Date/Time  Temp  Pulse  Resp  BP  MAP   SpO2  Calculated FIO2 (%) - Nasal Cannula  Nasal Cannula O2 Flow Rate (L/min)  O2 Device   05/11/21 08:20:45  97 8 °F (36 6 °C)  66  16  105/59  74  96 %  --  --  --   05/10/21 2300  --  --  --  --  --  --  32  3 L/min  Nasal cannula   05/10/21 22:27:53  99 9 °F (37 7 °C)  83  18  121/71  88  96 %  --  --  --         Pertinent Labs/Diagnostic Test Results:     5/10 HIDA scan:   Nonvisualization of the gallbladder on delayed imaging up to 4 hours  Findings suspicious for cystic duct obstruction and acute cholecystitis  5/9 CT AP: (performed at [facility])  Inflammatory changes surrounding the pylorus and proximal duodenum, as well as the pancreatic head  A small amount of free air    Findings are most suspicious for a perforated gastric ulcer   Recommend correlation with endoscopy  Pancreatitis is also considered, but felt less likely given the interval development of free air in the location of the inflammatory changes  Pancreatic head mass concerning for malignancy again noted, unchanged since study of 5/5/2021          Results from last 7 days   Lab Units 05/11/21  0617 05/10/21  0447 05/09/21  0804 05/08/21  0505 05/07/21  0632 05/06/21  0634   WBC Thousand/uL 14 34* 13 66* 13 80* 7 46 10 39* 11 01*   HEMOGLOBIN g/dL 11 5* 12 8 13 4 12 6 13 1 13 6   HEMATOCRIT % 35 2* 38 6 40 0 38 0 40 0 41 8   PLATELETS Thousands/uL 228 215 195 170 185 213   NEUTROS ABS Thousands/µL 12 02* 12 18*  --   --   --  8 57*   BANDS PCT %  --   --  3  --   --   --          Results from last 7 days   Lab Units 05/11/21  0617 05/10/21  0447 05/09/21  0804 05/07/21  0632 05/05/21  1912   SODIUM mmol/L 139 137 138 138 139   POTASSIUM mmol/L 3 3* 3 8 3 4* 3 6 4 4   CHLORIDE mmol/L 108 104 103 104 102   CO2 mmol/L 25 25 24 24 28   ANION GAP mmol/L 6 8 11 10 9   BUN mg/dL 15 16 17 19 18   CREATININE mg/dL 0 66 0 93 0 91 0 93 1 08   EGFR ml/min/1 73sq m 101 86 88 86 72   CALCIUM mg/dL 7 8* 7 7* 7 7* 8 1* 8 6     Results from last 7 days   Lab Units 05/11/21  0617 05/09/21  0804 05/07/21  0632 05/05/21  1912 05/05/21  0911   AST U/L 44 28 20 26 34   ALT U/L 56 68 75 120* 97*   ALK PHOS U/L 162* 211* 261* 348* 272 9*   TOTAL PROTEIN g/dL 4 9* 5 5* 5 9* 6 8 6 6   ALBUMIN g/dL 1 9* 2 4* 2 7* 3 4* 3 9   TOTAL BILIRUBIN mg/dL 0 98 0 96 1 25* 1 04* 1 44*     Results from last 7 days   Lab Units 05/11/21  0519 05/10/21  2058 05/10/21  1744 05/10/21  1252 05/10/21  0741 05/09/21  2037 05/09/21  1619 05/09/21  1100 05/09/21  0716 05/08/21  2037 05/08/21  1617 05/08/21  1118   POC GLUCOSE mg/dl 119 143* 130 131 113 130 123 134 142* 137 120 211*     Results from last 7 days   Lab Units 05/11/21  0617 05/10/21  0447 05/09/21  0804 05/07/21  0632 05/05/21  1912   GLUCOSE RANDOM mg/dL 116 135 135 138 235*         Results from last 7 days   Lab Units 05/05/21  0911   HEMOGLOBIN A1C % 6 2*   EAG mg/dl 131             Results from last 7 days   Lab Units 05/05/21  1940   TROPONIN I ng/mL <0 02         Results from last 7 days   Lab Units 05/05/21 1912   PROTIME seconds 14 2   INR  1 08   PTT seconds 24         Results from last 7 days   Lab Units 05/10/21  0447 05/09/21  1031 05/06/21  0634 05/05/21  1912   PROCALCITONIN ng/ml 0 72* 0 54* 0 22 0 12     Results from last 7 days   Lab Units 05/05/21 2124 05/05/21 1912   LACTIC ACID mmol/L 1 7 2 1*                         Results from last 7 days   Lab Units 05/09/21  1655 05/09/21  1626 05/06/21  0634   LIPASE u/L  --  221 360   AMYLASE IU/L 103  --   --              Results from last 7 days   Lab Units 05/05/21 2124 05/05/21  0911   CLARITY UA  Clear  --    COLOR UA  Yellow  --    SPEC GRAV UA  1 010  --    PH UA  8 5*  --    GLUCOSE UA mg/dl 250 (1/4%)*  --    KETONES UA mg/dl Negative  --    BLOOD UA  Moderate*  --    PROTEIN UA mg/dl Negative  --    NITRITE UA  Negative  --    BILIRUBIN UA  Negative  --    UROBILINOGEN UA E U /dl 0 2  --    LEUKOCYTES UA  Negative  --    WBC UA /hpf 0-1  --    RBC UA /hpf 30-50*  --    BACTERIA UA /hpf Occasional  --    EPITHELIAL CELLS WET PREP /hpf None Seen  --    CREATININE UR mg/dL  --  235 0           Results from last 7 days   Lab Units 05/05/21  1912   BLOOD CULTURE  No Growth After 5 Days  No Growth After 5 Days       Results from last 7 days   Lab Units 05/09/21  0804   TOTAL COUNTED  100           Past Medical History:   Diagnosis Date    Arthritis     Cellulitis     LAST ASSESSED: 6/13/14    Enlarged prostate     Epidermal inclusion cyst     LAST ASSESSED: 10/4/13    Erythrasma     LAST ASSESSED: 9/23/13    Furuncle     LAST ASSESSED: 6/11/14    Hyperlipidemia     Hypertension      Present on Admission:   Epigastric pain   Pancreatic mass, Probable Pancreatic cancer   Type 2 diabetes mellitus without complication, without long-term current use of insulin (HCC)   Rheumatoid arthritis ([facility] [address] )   Hypertension   Hyperlipidemia   [facility]      Admitting Diagnosis: Epigastric pain [R10 13]  Age/Sex: 73 y o  male       Admission Orders:      Scheduled Medications:      aspirin, 81 mg, Oral, Daily  cefepime, 2,000 mg, Intravenous, Q12H  fish oil, 1,000 mg, Oral, Daily  FLUoxetine, 20 mg, Oral, Daily  folic acid, 1 mg, Oral, Daily  heparin (porcine), 5,000 Units, Subcutaneous, Q8H INGRID  insulin lispro, 1-6 Units, Subcutaneous, Q6H INGRID  metroNIDAZOLE, 500 mg, Intravenous, Q8H  multivitamin-minerals, 1 tablet, Oral, Daily  pancrelipase (Lip-Prot-Amyl), 24,000 Units, Oral, TID With Meals  pantoprazole, 40 mg, Intravenous, Q12H INGRID  polyethylene glycol, 17 g, Oral, Daily  potassium chloride, 20 mEq, Intravenous, Q2H  senna-docusate sodium, 1 tablet, Oral, BID  tamsulosin, 0 4 mg, Oral, HS      Continuous IV Infusions:      dextrose 5 % and sodium chloride 0 45 % with KCl 20 mEq/L, 100 mL/hr, Intravenous, Continuous      PRN Meds:      HYDROmorphone, 0 5 mg, Intravenous, Q4H PRN  oxyCODONE, 10 mg, Oral, Q6H PRN  oxyCODONE, 2 5 mg, Oral, Q6H PRN  oxyCODONE, 5 mg, Oral, Q6H PRN x 1 dose 5/11 @ 0528  sincalide, 0 02 mcg/kg (Order-Specific), Intravenous, Once in imaging  sodium chloride (PF), 3 mL, Intravenous, Q1H PRN        IP CONSULT TO SURGICAL ONCOLOGY  INPATIENT CONSULT TO IR    Network Utilization Review Department  ATTENTION: Please call with any questions or concerns to [phone] and carefully listen to the prompts so that you are directed to the right person  All voicemails are confidential   Lynette Kussmaul all requests for admission clinical reviews, approved or denied determinations and any other requests to dedicated fax number below belonging to the campus where the patient is receiving treatment   List of dedicated fax numbers for the Facilities:  [address] DENIALS (Administrative/Medical Necessity) [phone]   [address] (Maternity/NICU/Pediatrics) 261 [facility] [address] [place] [address] [address] [address] [place] [address] Meenakshi Biggs [address]81 [address] [facility] [address] [phone]

## 2021-05-11 NOTE — PROGRESS NOTES
[facility]  Progress Note - Earma Rotunda 1955, 73 y o  male MRN: [id number]  Unit/Bed#: [facility] 928-01 Encounter: [id number]  Primary Care Provider: Aleksandar Lu MD   Date and time admitted to hospital: 5/10/2021 10:40 AM      * Acute cholecystitis  Assessment & Plan  HIDA scan on 5/10 revealed "Nonvisualization of the gallbladder on delayed imaging up to 4 hours  Findings suspicious for cystic duct obstruction and acute cholecystitis "  Underwent IR guided cholecystostomy tube placement today as ultrasound imaging noted presence of eliu cholecystic fluid - await fluid culture  Blood cultures from 5/5 are negative  Continue empiric IV Cefepime/Flagyl  PRN pain/emesis control - supportive care otherwise    Pancreatic mass  Assessment & Plan  Pancreatic mass identified a previous admission on CT scan done on 4/29 - presented w/ biliary obstruction which improved s/p biliary duct stenting- brushing unfortunately was nondiagnostic  CT imaging on 5/5 revealed a "3 8 cm obstructing mass in the region of the head of the pancreas with marked pancreatic ductal dilatation, worrisome for adenocarcinoma  Other tumors not excluded "  Subsequent right upper quadrant ultrasound on 5/6 noting a "Mild gallbladder wall thickening without additional evidence of acute cholecystitis  Pancreatic head mass again identified    Associated pancreatic ductal dilatation "  S/p endoscopic ultrasound w/ biopsies taken and celiac plexus nerve block on 5/7  Eventual plan for Whipple's procedure and hopeful chemotherapy once infection resolves noted - surgical oncology following  PRN pain control - supportive care otherwise    Hyperlipidemia  Assessment & Plan  Continue ASA/fish oil    BPH (benign prostatic hyperplasia)  Assessment & Plan  Continue Flomax    Leukocytosis  Assessment & Plan  Likely reactive due to acute medical issue(s)  Monitor WBC count    Essential hypertension  Assessment & Plan  Currently off agents due to borderline hypotensive states  Consider resume tomorrow if normotensive    Hypokalemia  Assessment & Plan  Monitor/replete serum potassium  Check serum magnesium level      DVT Prophylaxis:  Heparin SC       Patient Centered Rounds:  I have performed bedside rounds and discussed plan of care with nursing today  Discussions with Specialists or Other Care Team Provider:  see above assessments if applicable    Education and Discussions with Family / Patient:  at bedside    Time Spent for Care:  32 minutes  More than 50% of total time spent on counseling and coordination of care as described above  Current Length of Stay: 1 day(s)    Current Patient Status: Inpatient   Certification Statement:  Patient will continue to require additional hospital stay due to assessments as noted above  Code Status: Level 1 - Full Code        Subjective:     Seen/examined after returning from cholecystostomy tube placement  Remains weak/fatigued  Intermittent abdominal discomfort persists  Objective:     Vitals:   Temp (24hrs), Av 5 °F (36 9 °C), Min:97 8 °F (36 6 °C), Max:99 9 °F (37 7 °C)    Temp:  [97 8 °F (36 6 °C)-99 9 °F (37 7 °C)] 97 9 °F (36 6 °C)  HR:  [63-83] 66  Resp:  [16-18] 18  BP: (105-121)/(59-79) 120/63  SpO2:  [95 %-98 %] 95 %  Body mass index is 25 25 kg/m²  Input and Output Summary (last 24 hours):        Intake/Output Summary (Last 24 hours) at 2021 1756  Last data filed at 2021 1446  Gross per 24 hour   Intake 1740 ml   Output 600 ml   Net 1140 ml       Physical Exam:     GENERAL:  Weak/fatigued  HEAD:  Normocephalic - atraumatic  EYES: PERRL - EOMI   MOUTH:  Mucosa moist  NECK:  Supple - full range of motion  CARDIAC:  Rate controlled - S1/S2 positive  PULMONARY:  Clear breath sounds bilaterally - nonlabored respirations  ABDOMEN:  Soft - mild RUQ tenderness - nondistended - active bowel sounds - cholecystostomy tube in place  MUSCULOSKELETAL: Motor strength/range of motion deconditioned  NEUROLOGIC:  Drowsy postprocedure  SKIN:  Chronic wrinkles/blemishes       Additional Data:     Labs & Recent Cultures:    Results from last 7 days   Lab Units 05/11/21  0617  05/09/21  0804   WBC Thousand/uL 14 34*   < > 13 80*   HEMOGLOBIN g/dL 11 5*   < > 13 4   HEMATOCRIT % 35 2*   < > 40 0   PLATELETS Thousands/uL 228   < > 195   BANDS PCT %  --   --  3   NEUTROS PCT % 84*   < >  --    LYMPHS PCT % 5*   < >  --    LYMPHO PCT %  --   --  5*   MONOS PCT % 10   < >  --    MONO PCT %  --   --  11   EOS PCT % 0   < > 0    < > = values in this interval not displayed  Results from last 7 days   Lab Units 05/11/21  0617   POTASSIUM mmol/L 3 3*   CHLORIDE mmol/L 108   CO2 mmol/L 25   BUN mg/dL 15   CREATININE mg/dL 0 66   CALCIUM mg/dL 7 8*   ALK PHOS U/L 162*   ALT U/L 56   AST U/L 44     Results from last 7 days   Lab Units 05/05/21  1912   INR  1 08     Results from last 7 days   Lab Units 05/11/21  1717 05/11/21  1123 05/11/21  0519 05/10/21  2058 05/10/21  1744 05/10/21  1252 05/10/21  0741 05/09/21  2037 05/09/21  1619 05/09/21  1100 05/09/21  0716 05/08/21  2037   POC GLUCOSE mg/dl 141* 135 119 143* 130 131 113 130 123 134 142* 137     Results from last 7 days   Lab Units 05/05/21  0911   HEMOGLOBIN A1C % 6 2*     Results from last 7 days   Lab Units 05/10/21  0447 05/09/21  1031 05/06/21  0634 05/05/21  2124 05/05/21 1912   LACTIC ACID mmol/L  --   --   --  1 7 2 1*   PROCALCITONIN ng/ml 0 72* 0 54* 0 22  --  0 12         Results from last 7 days   Lab Units 05/11/21  1256 05/05/21 1912   BLOOD CULTURE   --  No Growth After 5 Days  No Growth After 5 Days     GRAM STAIN RESULT  No polys seen*  2+ Gram positive cocci in pairs and chains*  --          Last 24 Hours Medication List:   Current Facility-Administered Medications   Medication Dose Route Frequency Provider Last Rate    aspirin  81 mg Oral Daily Chico Brock MD      cefepime  2,000 mg Intravenous Q12H Yeni Kent MD 2,000 mg (05/11/21 0911)    dextrose 5 % and sodium chloride 0 45 % with KCl 20 mEq/L  100 mL/hr Intravenous Continuous Malaika Castillo PA-C 100 mL/hr (05/11/21 1001)    fentanyl citrate (PF)   Intravenous Code/Trauma/Sedation Eduardo Hurd MD      fish oil  1,000 mg Oral Daily Yeni Kent MD      FLUoxetine  20 mg Oral Daily Yeni Kent MD      folic acid  1 mg Oral Daily Yeni Kent MD      heparin (porcine)  5,000 Units Subcutaneous [facility] Yeni Kent MD      HYDROmorphone  0 5 mg Intravenous Q4H PRN Yeni Kent MD      insulin lispro  1-6 Units Subcutaneous Q6H [facility] J Luis Samaniego PA-C      metroNIDAZOLE  500 mg Intravenous Q8H Yeni Kent MD Stopped (05/11/21 1234)    midazolam    Code/Trauma/Sedation Eduardo Hurd MD      multivitamin-minerals  1 tablet Oral Daily Yeni Kent MD      oxyCODONE  10 mg Oral Q6H PRN Yeni Kent MD      oxyCODONE  2 5 mg Oral Q6H PRN Yeni Kent MD      oxyCODONE  5 mg Oral Q6H PRN Yeni Kent MD      pancrelipase (Lip-Prot-Amyl)  24,000 Units Oral TID With Meals Yeni Kent MD      pantoprazole  40 mg Intravenous Q12H [facility] Yeni Kent MD      polyethylene glycol  17 g Oral Daily Yeni Kent MD      senna-docusate sodium  1 tablet Oral BID Yeni Kent MD      sincalide  0 02 mcg/kg (Order-Specific) Intravenous Once in imaging Radha Brody DO      sodium chloride (PF)  3 mL Intravenous Q1H PRN Yeni Kent MD      tamsulosin  0 4 mg Oral HS Yeni Kent MD                    ** Please Note: This note is constructed using a voice recognition dictation system  An occasional wrong word/phrase or sound-a-like substitution may have been picked up by dictation device due to the inherent limitations of voice recognition software    Read the chart carefully and recognize, using reasonable context, where substitutions may have occurred  **

## 2021-05-11 NOTE — TELEMEDICINE
INTERPROFESSIONAL (PHONE) CONSULTATION - Interventional Radiology  Kailee Beaulieu 73 y o  male MRN: [id number]  Unit/Bed#: [facility] 928-01 Encounter: [id number]    IR has been consulted to evaluate the patient, determine the appropriate procedure, and whether or not a procedure can and should be performed regarding the care of Kailee Beaulieu  We were consulted by surgery concerning acute cholecystitis, and to possibly perform a percutaneous cholecystostomy tube if medically appropriate for the patient  IP Consult to IR  Consult performed by: Cathy Osei PA-C  Consult ordered by: Clary Carrion DO        05/11/21      Assessment/Recommendation:     73year old male presenting with pancreatic mass, s/p ERCP, biliary stent, brush bx and sphincterotomy 4/28/21, EUS with bx 5/7/21, presents with continued abdominal discomfort, HIDA 5/10/21 without visualization of gallbladder, concern for cystic duct obstruction    - will plan for cholecystostomy tube placement today  - please keep npo  - potential for gallbladder removal in the future if patient is to have whipple    Total time spent in review of data, discussion with requesting provider and rendering advice was 25 minutes       Patient or appropriate family member was verbally informed by surgery of this consultative service on their behalf to provide more timely access to specialty care in lieu of an in person consultation  Verbal consent was obtained  Thank you for allowing Interventional Radiology to participate in the care of Kailee Beaulieu  Please don't hesitate to call or TigerText us with any questions       Cathy Osei PA-C

## 2021-05-11 NOTE — DISCHARGE INSTRUCTIONS
TUBE CARE INSTRUCTIONS    Care after your procedure:    Resume your normal diet  Small sips of flat soda will help with nausea  1  The properly functioning catheter should be forward flushed once (1x) daily with 10ml of normal saline using clean technique  You will be given a prescription for flushes  To flush the tube, clean both connections with alcohol swab  Twist off the drainage bag/ bulb  tubing and twist the saline syringe into the drainage tube and flush  Remove the syringe and twist the drainage bag / bulb tubing tubing back on     2  The drainage bag/bulb may be emptied as necessary  Keep a record of the amount of fluid you drain from your tube  This should be done with clean technique as well  3  A fresh dressing should be applied daily over the tube insertion site  4  As the tube is secured to the skin with only a suture,try not to pull on your tube  Tub baths are not permitted  Showers are permitted if the patient's skin entry site is prevented from getting wet  Similarly, washcloth "baths" are acceptable  Contact Interventional Radiology at [phone] Pablo PATIENTS: Contact Interventional Radiology at [phone]) Anjali Frame PATIENTS: Contact Interventional Radiology at [phone]) if:    1  Leakage or large amounts of liquid around the catheter  2  Fever of 101 degrees lasting several hours without other obvious cause (such as sore throat, flu, etc)  3  Persistent nausea or vomiting  4  Diminished drainage, which may be associated with pressure or pain  Or when the     drainage from your tube is less than 10mls for 48 hours  5  Catheter pulled back or falls out  The following pharmacies carry the flush syringes         [facility]                     [facility]  [address]                         [address] [facility]  Phone [phone]            Phone [phone]   [place] [address]                                [phone]  [address] [facility] [address]                      [address] [facility]  Phone [phone]            Phone [phone]                      [facility]                                                                                                          [phone]  [facility]  [place] [address]    [address]  Phone [phone]

## 2021-05-11 NOTE — ASSESSMENT & PLAN NOTE
HIDA scan on 5/10 revealed "Nonvisualization of the gallbladder on delayed imaging up to 4 hours   Findings suspicious for cystic duct obstruction and acute cholecystitis "  Underwent IR guided cholecystostomy tube placement today as ultrasound imaging noted presence of eliu cholecystic fluid - await fluid culture  Blood cultures from 5/5 are negative  Continue empiric IV Cefepime/Flagyl  PRN pain/emesis control - supportive care otherwise

## 2021-05-11 NOTE — PROGRESS NOTES
Progress Note - Surgical Oncology  Awa Hdz 73 y o  male MRN: [id number]  Unit/Bed#: [facility] 928-01 Encounter: [id number]    Assessment:  73 M with pancreatic mass status post ERCP biliary decompression on 04/28, readmitted with abdominal pain, now status post endoscopic ultrasound with biopsy on 05/07 by GI  Patient now with acute cholecystitis     HIDA-Nonvisualization of GB on delayed imaging up to 4 hours  Finding suspcious for cystic duct obstruction and acute cholecystitist    AVSS  WBC 14 3 (13 66)    Plan:  -Continue NPO for potential intervention   -Continue IV abx, cefepime and flagyl for acute cholecystitis  -Will discuss plan with Dr Dede Milian definitive antibiotic management vs  Perc ashli  -Continue IVF  -Pain and nausea control prn  -OOB and ambulate  -DVT ppx  -Follow up pathology  -Remainder care per primary        Subjective/Objective     Subjective:  Patient states that he is feeling a better this morning  No nausea or vomiting  No fevers/chills  Still reports pain in RUQ    Objective:     Blood pressure 121/71, pulse 83, temperature 99 9 °F (37 7 °C), resp  rate 18, height 5' 10" (1 778 m), weight 79 8 kg (176 lb), SpO2 96 %  ,Body mass index is 25 25 kg/m²  Intake/Output Summary (Last 24 hours) at 5/11/2021 0704  Last data filed at 5/11/2021 0522  Gross per 24 hour   Intake 1856 67 ml   Output 500 ml   Net 1356 67 ml       Invasive Devices     Peripheral Intravenous Line            Peripheral IV 05/08/21 Right Antecubital 2 days          Drain            Open Drain Right 1215 days                Physical Exam:     General: NAD  HENT: NCAT MMM  Neck: supple, no JVD  CV: nl rate  Lungs: nl wob  No resp distress  ABD: Soft, localized tenderness in RUQ and epigastrum, +Calles's sign, nondistended  No rebound tenderness  Extrem: No CCE  Neuro: AAOx3      Lab, Imaging and other studies:  I have personally reviewed pertinent lab results    , CBC:   Lab Results   Component Value Date    WBC 14 34 (H) 05/11/2021    HGB 11 5 (L) 05/11/2021    HCT 35 2 (L) 05/11/2021    MCV 91 05/11/2021     05/11/2021    MCH 29 8 05/11/2021    MCHC 32 7 05/11/2021    RDW 14 5 05/11/2021    MPV 12 3 05/11/2021    NRBC 0 05/11/2021   , CMP:   No results found for: SODIUM, K, CL, CO2, ANIONGAP, BUN, CREATININE, GLUCOSE, CALCIUM, AST, ALT, ALKPHOS, PROT, BILITOT, EGFR  VTE Pharmacologic Prophylaxis: Heparin  VTE Mechanical Prophylaxis: sequential compression device

## 2021-05-12 LAB
ALBUMIN SERPL BCP-MCNC: 2 G/DL (ref 3.5–5)
ALP SERPL-CCNC: 175 U/L (ref 46–116)
ALT SERPL W P-5'-P-CCNC: 69 U/L (ref 12–78)
ANION GAP SERPL CALCULATED.3IONS-SCNC: 5 MMOL/L (ref 4–13)
AST SERPL W P-5'-P-CCNC: 60 U/L (ref 5–45)
BILIRUB SERPL-MCNC: 0.83 MG/DL (ref 0.2–1)
BUN SERPL-MCNC: 14 MG/DL (ref 5–25)
CALCIUM ALBUM COR SERPL-MCNC: 9.4 MG/DL (ref 8.3–10.1)
CALCIUM SERPL-MCNC: 7.8 MG/DL (ref 8.3–10.1)
CHLORIDE SERPL-SCNC: 104 MMOL/L (ref 100–108)
CO2 SERPL-SCNC: 27 MMOL/L (ref 21–32)
CREAT SERPL-MCNC: 0.66 MG/DL (ref 0.6–1.3)
ERYTHROCYTE [DISTWIDTH] IN BLOOD BY AUTOMATED COUNT: 14.8 % (ref 11.6–15.1)
GFR SERPL CREATININE-BSD FRML MDRD: 101 ML/MIN/1.73SQ M
GLUCOSE SERPL-MCNC: 124 MG/DL (ref 65–140)
GLUCOSE SERPL-MCNC: 145 MG/DL (ref 65–140)
GLUCOSE SERPL-MCNC: 152 MG/DL (ref 65–140)
GLUCOSE SERPL-MCNC: 166 MG/DL (ref 65–140)
GLUCOSE SERPL-MCNC: 190 MG/DL (ref 65–140)
HCT VFR BLD AUTO: 36 % (ref 36.5–49.3)
HGB BLD-MCNC: 12 G/DL (ref 12–17)
IGG SERPL-MCNC: 481 MG/DL (ref 603–1613)
IGG1 SER-MCNC: 203 MG/DL (ref 248–810)
IGG2 SER-MCNC: 189 MG/DL (ref 130–555)
IGG3 SER-MCNC: 16 MG/DL (ref 15–102)
IGG4 SER-MCNC: 19 MG/DL (ref 2–96)
MAGNESIUM SERPL-MCNC: 2.3 MG/DL (ref 1.6–2.6)
MCH RBC QN AUTO: 30.8 PG (ref 26.8–34.3)
MCHC RBC AUTO-ENTMCNC: 33.3 G/DL (ref 31.4–37.4)
MCV RBC AUTO: 93 FL (ref 82–98)
NRBC BLD AUTO-RTO: 0 /100 WBCS
PLATELET # BLD AUTO: 278 THOUSANDS/UL (ref 149–390)
PMV BLD AUTO: 12.8 FL (ref 8.9–12.7)
POTASSIUM SERPL-SCNC: 3.4 MMOL/L (ref 3.5–5.3)
PROT SERPL-MCNC: 5.1 G/DL (ref 6.4–8.2)
RBC # BLD AUTO: 3.89 MILLION/UL (ref 3.88–5.62)
SODIUM SERPL-SCNC: 136 MMOL/L (ref 136–145)
WBC # BLD AUTO: 13.92 THOUSAND/UL (ref 4.31–10.16)

## 2021-05-12 PROCEDURE — 0F943ZZ DRAINAGE OF GALLBLADDER, PERCUTANEOUS APPROACH: ICD-10-PCS | Performed by: INTERNAL MEDICINE

## 2021-05-12 PROCEDURE — 99222 1ST HOSP IP/OBS MODERATE 55: CPT | Performed by: INTERNAL MEDICINE

## 2021-05-12 PROCEDURE — 82948 REAGENT STRIP/BLOOD GLUCOSE: CPT

## 2021-05-12 PROCEDURE — 99232 SBSQ HOSP IP/OBS MODERATE 35: CPT | Performed by: INTERNAL MEDICINE

## 2021-05-12 PROCEDURE — C9113 INJ PANTOPRAZOLE SODIUM, VIA: HCPCS | Performed by: INTERNAL MEDICINE

## 2021-05-12 PROCEDURE — 99232 SBSQ HOSP IP/OBS MODERATE 35: CPT | Performed by: PHYSICIAN ASSISTANT

## 2021-05-12 PROCEDURE — 83735 ASSAY OF MAGNESIUM: CPT | Performed by: INTERNAL MEDICINE

## 2021-05-12 PROCEDURE — 99232 SBSQ HOSP IP/OBS MODERATE 35: CPT | Performed by: STUDENT IN AN ORGANIZED HEALTH CARE EDUCATION/TRAINING PROGRAM

## 2021-05-12 PROCEDURE — 80053 COMPREHEN METABOLIC PANEL: CPT | Performed by: INTERNAL MEDICINE

## 2021-05-12 PROCEDURE — 85027 COMPLETE CBC AUTOMATED: CPT | Performed by: INTERNAL MEDICINE

## 2021-05-12 RX ORDER — ACETAMINOPHEN 325 MG/1
650 TABLET ORAL EVERY 6 HOURS PRN
Status: DISCONTINUED | OUTPATIENT
Start: 2021-05-12 | End: 2021-05-17 | Stop reason: HOSPADM

## 2021-05-12 RX ORDER — LIDOCAINE 50 MG/G
1 PATCH TOPICAL DAILY
Status: DISCONTINUED | OUTPATIENT
Start: 2021-05-13 | End: 2021-05-17 | Stop reason: HOSPADM

## 2021-05-12 RX ORDER — POTASSIUM CHLORIDE 14.9 MG/ML
20 INJECTION INTRAVENOUS
Status: COMPLETED | OUTPATIENT
Start: 2021-05-12 | End: 2021-05-12

## 2021-05-12 RX ORDER — SODIUM CHLORIDE 9 MG/ML
10 INJECTION INTRAVENOUS DAILY
Qty: 300 ML | Refills: 3 | Status: SHIPPED | OUTPATIENT
Start: 2021-05-12 | End: 2021-05-27

## 2021-05-12 RX ADMIN — Medication 1 TABLET: at 08:41

## 2021-05-12 RX ADMIN — HEPARIN SODIUM 5000 UNITS: 5000 INJECTION INTRAVENOUS; SUBCUTANEOUS at 05:37

## 2021-05-12 RX ADMIN — PANTOPRAZOLE SODIUM 40 MG: 40 INJECTION, POWDER, FOR SOLUTION INTRAVENOUS at 08:41

## 2021-05-12 RX ADMIN — ACETAMINOPHEN 650 MG: 325 TABLET, FILM COATED ORAL at 17:23

## 2021-05-12 RX ADMIN — POTASSIUM CHLORIDE 20 MEQ: 14.9 INJECTION, SOLUTION INTRAVENOUS at 14:17

## 2021-05-12 RX ADMIN — PANCRELIPASE 24000 UNITS: 24000; 76000; 120000 CAPSULE, DELAYED RELEASE PELLETS ORAL at 17:23

## 2021-05-12 RX ADMIN — OXYCODONE HYDROCHLORIDE 10 MG: 10 TABLET ORAL at 17:21

## 2021-05-12 RX ADMIN — OXYCODONE HYDROCHLORIDE 10 MG: 10 TABLET ORAL at 11:21

## 2021-05-12 RX ADMIN — ASPIRIN 81 MG: 81 TABLET, CHEWABLE ORAL at 08:41

## 2021-05-12 RX ADMIN — OXYCODONE HYDROCHLORIDE 10 MG: 10 TABLET ORAL at 03:46

## 2021-05-12 RX ADMIN — METRONIDAZOLE 500 MG: 500 INJECTION, SOLUTION INTRAVENOUS at 03:47

## 2021-05-12 RX ADMIN — ENOXAPARIN SODIUM 40 MG: 40 INJECTION SUBCUTANEOUS at 13:39

## 2021-05-12 RX ADMIN — METRONIDAZOLE 500 MG: 500 INJECTION, SOLUTION INTRAVENOUS at 13:37

## 2021-05-12 RX ADMIN — METRONIDAZOLE 500 MG: 500 INJECTION, SOLUTION INTRAVENOUS at 20:45

## 2021-05-12 RX ADMIN — OMEGA-3 FATTY ACIDS CAP 1000 MG 1000 MG: 1000 CAP at 08:41

## 2021-05-12 RX ADMIN — PANTOPRAZOLE SODIUM 40 MG: 40 INJECTION, POWDER, FOR SOLUTION INTRAVENOUS at 20:45

## 2021-05-12 RX ADMIN — PANCRELIPASE 24000 UNITS: 24000; 76000; 120000 CAPSULE, DELAYED RELEASE PELLETS ORAL at 08:41

## 2021-05-12 RX ADMIN — CEFEPIME HYDROCHLORIDE 2000 MG: 2 INJECTION, POWDER, FOR SOLUTION INTRAVENOUS at 08:50

## 2021-05-12 RX ADMIN — INSULIN LISPRO 1 UNITS: 100 INJECTION, SOLUTION INTRAVENOUS; SUBCUTANEOUS at 05:38

## 2021-05-12 RX ADMIN — FLUOXETINE 20 MG: 20 CAPSULE ORAL at 08:41

## 2021-05-12 RX ADMIN — DEXTROSE, SODIUM CHLORIDE, AND POTASSIUM CHLORIDE 100 ML/HR: 5; .45; .15 INJECTION INTRAVENOUS at 05:38

## 2021-05-12 RX ADMIN — TAMSULOSIN HYDROCHLORIDE 0.4 MG: 0.4 CAPSULE ORAL at 21:32

## 2021-05-12 RX ADMIN — PANCRELIPASE 24000 UNITS: 24000; 76000; 120000 CAPSULE, DELAYED RELEASE PELLETS ORAL at 11:21

## 2021-05-12 RX ADMIN — CEFEPIME HYDROCHLORIDE 2000 MG: 2 INJECTION, POWDER, FOR SOLUTION INTRAVENOUS at 21:32

## 2021-05-12 RX ADMIN — POTASSIUM CHLORIDE 20 MEQ: 14.9 INJECTION, SOLUTION INTRAVENOUS at 11:09

## 2021-05-12 RX ADMIN — FOLIC ACID 1 MG: 1 TABLET ORAL at 08:41

## 2021-05-12 NOTE — UTILIZATION REVIEW
Continued Stay Review    Date: 5/12/21                        Current Patient Class: IP  Current Level of Care: MS    HPI:66 y o  male initially admitted on 5/10 with RA, DM2, HLD, pancreatic head mass s/p ERCP with CBD stenting on 4/28  Went back to [facility] 5/5, had EUS, worsening day day after, CT showed small amounts of free air, possible perforation  Transferred to [facility] for care of possible perforation  EUS biopsy confirming adenocarcinoma  Assessment/Plan:   Pt found to have acute cholecystitis, epigastric pain, leukocytosis, on IV antibiotics, IV fluids, analgesia, antiemetics  5/11 pt had cholecystectomy tube placed  5/12 pt is feeling improved with near total resolution of abd pain  Tolerating diet, only complaint is pain at tube site  Leukocytosis slowly downtrending  Remains on IV antibiotics - day #3  Tube has had 10 cc bile out  Will need daily tube care with flushes, will keep tube in until whipple is done  Will schedule tube check in 6 weeks  Culture showing 2+ gram + cocci  5/12 Oncology Consult - will place port prior to d/c so pt can start on 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        Vital Signs:   05/12/21 07:49:25  98 1 °F (36 7 °C)  69  19  134/74  94  94 %  --  --  --   05/11/21 2300  --  --  --  --  --  93 %  28  2 L/min  Nasal cannula   05/11/21 22:15:05  98 8 °F (37 1 °C)  70  18  115/62  80  93 %  --  --  --   05/11/21 14:11:31  97 9 °F (36 6 °C)  66  18  120/63  82  95 %  --  --  --   05/11/21 1255  --  63  16  113/67  85  95 %  28  2 L/min  Nasal cannula   05/11/21 1246  --  65  18  108/79  --  97 %  28  2 L/min  Nasal cannula   05/11/21 1240  --  65  18  110/69  85  97 %  28  2 L/min  Nasal cannula   05/11/21 1235  --  63  18  115/68  87  96 %  28  2 L/min  Nasal cannula   05/11/21 1230  --  63  18  117/69  89  97 %  28  2 L/min  Nasal cannula   05/11/21 1225  --  65  18  117/67  86  98 %  28  2 L/min  Nasal cannula   05/11/21 08:20:45  97 8 °F (36 6 °C) 66  16  105/59  74  96 %  --  --  --   05/10/21 2300  --  --  --  --  --  --  32  3 L/min  Nasal cannula   05/10/21 22:27:53  99 9 °F (37 7 °C)  83  18  121/71  88  96 %  --  --  --   05/10/21 11:41:16  --  65  --  99/65  76  96 %  --  --  --   05/10/21 1100  --  --  --  --  --  96 %  --  --  --     Pertinent Labs/Diagnostic Results:   5/11 IR Cholecystectomy tube placement - 1  Thick-walled gallbladder with pericholecystic fluid noted  No stones or sludge  2   Successful ultrasound and fluoroscopically guided placement of a percutaneous cholecystostomy tube    3   Small but patent cystic duct noted        Results from last 7 days   Lab Units 05/12/21  0452 05/11/21  0617 05/10/21  0447 05/09/21  0804 05/08/21  0505 05/06/21  0634   WBC Thousand/uL 13 92* 14 34* 13 66* 13 80* 7 46 11 01*   HEMOGLOBIN g/dL 12 0 11 5* 12 8 13 4 12 6 13 6   HEMATOCRIT % 36 0* 35 2* 38 6 40 0 38 0 41 8   PLATELETS Thousands/uL 278 228 215 195 170 213   NEUTROS ABS Thousands/µL  --  12 02* 12 18*  --   --  8 57*   BANDS PCT %  --   --   --  3  --   --          Results from last 7 days   Lab Units 05/12/21  0452 05/11/21  0617 05/10/21  0447 05/09/21  0804 05/07/21  0632   SODIUM mmol/L 136 139 137 138 138   POTASSIUM mmol/L 3 4* 3 3* 3 8 3 4* 3 6   CHLORIDE mmol/L 104 108 104 103 104   CO2 mmol/L 27 25 25 24 24   ANION GAP mmol/L 5 6 8 11 10   BUN mg/dL 14 15 16 17 19   CREATININE mg/dL 0 66 0 66 0 93 0 91 0 93   EGFR ml/min/1 73sq m 101 101 86 88 86   CALCIUM mg/dL 7 8* 7 8* 7 7* 7 7* 8 1*   MAGNESIUM mg/dL 2 3  --   --   --   --      Results from last 7 days   Lab Units 05/12/21 0452 05/11/21  0617 05/09/21  0804 05/07/21  0632 05/05/21  1912   AST U/L 60* 44 28 20 26   ALT U/L 69 56 68 75 120*   ALK PHOS U/L 175* 162* 211* 261* 348*   TOTAL PROTEIN g/dL 5 1* 4 9* 5 5* 5 9* 6 8   ALBUMIN g/dL 2 0* 1 9* 2 4* 2 7* 3 4*   TOTAL BILIRUBIN mg/dL 0 83 0 98 0 96 1 25* 1 04*     Results from last 7 days   Lab Units 05/12/21  1102 05/12/21  0529 05/12/21  0022 05/11/21  1717 05/11/21  1123 05/11/21  0519 05/10/21  2058 05/10/21  1744 05/10/21  1252 05/10/21  0741 05/09/21  2037 05/09/21  1619   POC GLUCOSE mg/dl 190* 166* 145* 141* 135 119 143* 130 131 113 130 123     Results from last 7 days   Lab Units 05/12/21  0452 05/11/21  0617 05/10/21  0447 05/09/21  0804 05/07/21  0632 05/05/21  1912   GLUCOSE RANDOM mg/dL 152* 116 135 135 138 235*     Results from last 7 days   Lab Units 05/05/21  1940   TROPONIN I ng/mL <0 02         Results from last 7 days   Lab Units 05/05/21 1912   PROTIME seconds 14 2   INR  1 08   PTT seconds 24         Results from last 7 days   Lab Units 05/10/21  0447 05/09/21  1031 05/06/21  0634 05/05/21  1912   PROCALCITONIN ng/ml 0 72* 0 54* 0 22 0 12     Results from last 7 days   Lab Units 05/05/21 2124 05/05/21 1912   LACTIC ACID mmol/L 1 7 2 1*     Results from last 7 days   Lab Units 05/09/21  1655 05/09/21  1626 05/06/21  0634   LIPASE u/L  --  221 360   AMYLASE IU/L 103  --   --      Results from last 7 days   Lab Units 05/05/21 2124   CLARITY UA  Clear   COLOR UA  Yellow   SPEC GRAV UA  1 010   PH UA  8 5*   GLUCOSE UA mg/dl 250 (1/4%)*   KETONES UA mg/dl Negative   BLOOD UA  Moderate*   PROTEIN UA mg/dl Negative   NITRITE UA  Negative   BILIRUBIN UA  Negative   UROBILINOGEN UA E U /dl 0 2   LEUKOCYTES UA  Negative   WBC UA /hpf 0-1   RBC UA /hpf 30-50*   BACTERIA UA /hpf Occasional   EPITHELIAL CELLS WET PREP /hpf None Seen     Results from last 7 days   Lab Units 05/11/21  1256 05/05/21 1912   BLOOD CULTURE   --  No Growth After 5 Days  No Growth After 5 Days     GRAM STAIN RESULT  No polys seen*  2+ Gram positive cocci in pairs and chains*  --    BODY FLUID CULTURE, STERILE  1+ Growth of Enterococcus species*  --      Medications:   Scheduled Medications:  aspirin, 81 mg, Oral, Daily  cefepime, 2,000 mg, Intravenous, Q12H  enoxaparin, 40 mg, Subcutaneous, Q24H INGRID  fish oil, 1,000 mg, Oral, Daily  FLUoxetine, 20 mg, Oral, Daily  folic acid, 1 mg, Oral, Daily  insulin lispro, 1-6 Units, Subcutaneous, TID With Meals  metroNIDAZOLE, 500 mg, Intravenous, Q8H  multivitamin-minerals, 1 tablet, Oral, Daily  pancrelipase (Lip-Prot-Amyl), 24,000 Units, Oral, TID With Meals  pantoprazole, 40 mg, Intravenous, Q12H INGRID  polyethylene glycol, 17 g, Oral, Daily  potassium chloride, 20 mEq, Intravenous, Q2H  senna-docusate sodium, 1 tablet, Oral, BID  tamsulosin, 0 4 mg, Oral, HS      Continuous IV Infusions:    IV D5/0 45% NaCl + KCl 20 mEq @ 100/hr - stopped 5/12 @ 0648    PRN Meds:  fentanyl citrate (PF), , Intravenous, Code/Trauma/Sedation Med - x 2 5/11  HYDROmorphone, 0 5 mg, Intravenous, Q4H PRN -x 1 5/11  midazolam, , , Code/Trauma/Sedation Med -x 3 5/11  oxyCODONE, 10 mg, Oral, Q6H PRN - x 1 5/10, 5/11, x 2 5/12  oxyCODONE, 2 5 mg, Oral, Q6H PRN  oxyCODONE, 5 mg, Oral, Q6H PRN  sincalide, 0 02 mcg/kg (Order-Specific), Intravenous, Once in imaging    Discharge Plan: [facility]    Network Utilization Review Department  ATTENTION: Please call with any questions or concerns to [phone] and carefully listen to the prompts so that you are directed to the right person  All voicemails are confidential   [facility] all requests for admission clinical reviews, approved or denied determinations and any other requests to dedicated fax number below belonging to the campus where the patient is receiving treatment   List of dedicated fax numbers for the Facilities:  [address] DENIALS (Administrative/Medical Necessity) [phone]   [address] (Maternity/NICU/Pediatrics) [phone]   [address] [phone]   [address] Jovan Naylor [address] [address] [facility] [phone] [address] [place] [address] Meenakshi Biggs [phone] [place] [address] [phone]

## 2021-05-12 NOTE — ASSESSMENT & PLAN NOTE
Pancreatic mass identified a previous admission on CT scan done on 4/29 - presented w/ biliary obstruction which improved s/p biliary duct stenting- brushing unfortunately was nondiagnostic  CT imaging on 5/5 revealed a "3 8 cm obstructing mass in the region of the head of the pancreas with marked pancreatic ductal dilatation, worrisome for adenocarcinoma  Other tumors not excluded "  Subsequent right upper quadrant ultrasound on 5/6 noting a "Mild gallbladder wall thickening without additional evidence of acute cholecystitis  Pancreatic head mass again identified    Associated pancreatic ductal dilatation "  S/p endoscopic ultrasound w/ biopsies taken and celiac plexus nerve block on 5/7 - fine-needle aspiration report confirmed adenocarcinoma  Eventual plan for Whipple's procedure and hopeful chemotherapy once infection resolves noted - surgical oncology following  PRN pain control - supportive care otherwise

## 2021-05-12 NOTE — CASE MANAGEMENT
CM s/w pt to discuss aftercare recommendations  Pt is being recommended to d/c with [facility] services for Nursing needs  Freedom of choice was given and pt requested a referral sent to [facility] and [facility]  JUAN M sent referral as requested  CM will follow

## 2021-05-12 NOTE — PROGRESS NOTES
Progress Note - Surgical Oncology  Angel Tripp 73 y o  male MRN: [id number]  Unit/Bed#: [facility] 928-01 Encounter: [id number]    Assessment:  73 M with pancreatic mass status post ERCP biliary decompression on 04/28, readmitted with abdominal pain, now status post endoscopic ultrasound with biopsy on 05/07 by GI  HIDA was positive for cholecystitis, patient is now s/p perc ashli tube placement by IR on 5/11  AVSS  Drain 10 cc-bilious  Uo-650      Plan:  -Advance diet as tolerated  -Continue IV abx, cefepime and flagyl   -Follow up labs, trend fever/wbc curve  -DC IVF if tolerating diet  -Pain and nausea control prn  -EUS brushings confirming adenocarcinoma, patient will need adjuvant chemotherapy, follow up heme/onc notes  -Will likely schedule patient for port placement this admission, will discuss timing with attending   -Remainder of care per primary      Subjective/Objective     Subjective:  Patient reports improvement in pain after IR drainage yesterday   around drain site  No fevers/chills  No nausea or vomiting  Tolerated clears yday  Objective:     Blood pressure 115/62, pulse 70, temperature 98 8 °F (37 1 °C), resp  rate 18, height 5' 10" (1 778 m), weight 79 8 kg (176 lb), SpO2 93 %  ,Body mass index is 25 25 kg/m²  Intake/Output Summary (Last 24 hours) at 5/12/2021 0541  Last data filed at 5/12/2021 0537  Gross per 24 hour   Intake 1968 33 ml   Output 660 ml   Net 1308 33 ml       Invasive Devices     Peripheral Intravenous Line            Peripheral IV 05/08/21 Right Antecubital 3 days    Peripheral IV 05/12/21 Distal;Left;Dorsal (posterior) Forearm less than 1 day          Drain            Open Drain Right 1216 days    Closed/Suction Drain  RUQ  10 Fr  less than 1 day                Physical Exam:     General: NAD  HENT: NCAT MMM  Neck: supple, no JVD  CV: nl rate  Lungs: nl wob  No resp distress  ABD: soft, nondistended, TTP in RUQ   Perc ashli tube in place with bilious output  Extrem: No CCE  Neuro: AAOx3      Lab, Imaging and other studies:  I have personally reviewed pertinent lab results    , CBC:   Lab Results   Component Value Date    WBC 14 34 (H) 05/11/2021    HGB 11 5 (L) 05/11/2021    HCT 35 2 (L) 05/11/2021    MCV 91 05/11/2021     05/11/2021    MCH 29 8 05/11/2021    MCHC 32 7 05/11/2021    RDW 14 5 05/11/2021    MPV 12 3 05/11/2021    NRBC 0 05/11/2021   , CMP:   Lab Results   Component Value Date    SODIUM 139 05/11/2021    K 3 3 (L) 05/11/2021     05/11/2021    CO2 25 05/11/2021    BUN 15 05/11/2021    CREATININE 0 66 05/11/2021    CALCIUM 7 8 (L) 05/11/2021    AST 44 05/11/2021    ALT 56 05/11/2021    ALKPHOS 162 (H) 05/11/2021    EGFR 101 05/11/2021     VTE Pharmacologic Prophylaxis: Heparin  VTE Mechanical Prophylaxis: sequential compression device

## 2021-05-12 NOTE — CONSULTS
HEMATOLOGY ONCOLOGY PROGRESS NOTE     PATIENT INFORMATION     Name: Marsha Read   Age & Sex: 73 y o  male   MRN: [id number]  Hospital Stay Days: 2  Unit/Bed#: [facility] 928-01   Encounter: [id number]    ASSESSMENT/PLAN     Principal Problem:    Acute cholecystitis  Active Problems:    Rheumatoid arthritis ([address] )    Essential hypertension    Hyperlipidemia    Type 2 diabetes mellitus without complication, without long-term current use of insulin (HCC)    Hypokalemia    Pancreatic mass    BPH (benign prostatic hyperplasia)    Leukocytosis    1  Pancreatic Mass  · 04/27 CT A/P - " Large ill-defined hypoattenuating mass within the pancreatic head and uncinate measuring 4 2 x 2 9 cm with dilatation/obstruction of the common bile duct and pancreatic duct, likely represent pancreatic carcinoma"  · 04/27 Baseline CA 19-9 = 487  · 05/07 Endoscopic US Biopsy - Results confirm pancreatic adenocarcinoma  · Plan for port placement before discharge for chemotherapy  · Recommend outpatient follow up with heme/onc for neoadjuvant chemotherapy for 3 months before planned whipple procedure, then for 3 months after whipple  · Encourage adequate nutrition and possibly increase creon if diarrhea persists     2  Acute Cholecystitis  · 05/10 HIDA scan - "Nonvisualization of the gallbladder on delayed imaging up to 4 hours  Findings suspicious for cystic duct obstruction and acute cholecystitis"  · 05/11 IR cholecystostomy tube placement  · 05/11 fluid culture - 2+ Gram positive cocci in pairs and chains  · Anaerobic culture pending  · 05/05 blood culture - no growth x5  · Currently on day 3 of IV cefepime and flagyl   SUBJECTIVE     Sinai Greta is a 73year old male with PMH of DM2, HTN, HLD, and RA who presented to [facility] ED on 4/27 with complaints of jaundice and epigastric pain   CT abdomen/pelvis w/ contrast showed "Moderate intra and extrahepatic biliary ductal dilatation, pancreatic duct dilatation and gallbladder distention which appear secondary to a large ill-defined pancreatic head mass measuring 4 2 x 2 9 cm " On  ERCP billiary decompression with stent placement was performed  He was subsequently readmitted when epigastric pain returned  On , endoscopic US biopsy and celiac plexus block were performed  Biopsy results later confirmed pancreatic adenocarcinoma  On  CT found "inflammatory changes surrounding the pylorus and proximal duodenum, as well as the pancreatic head" as well as a small amount of free air concerning for possible enteric perforation  He was subsequently transferred to [facility]  5/10 HIDA scan found "Nonvisualization of the gallbladder on delayed imaging up to 4 hours  Findings suspicious for cystic duct obstruction and acute cholecystitis " He was started on IV cefepime and flagyl for acute cholecystitis  IR placed percutaneous cholecystostomy tube on   Marie Fajardo was seen and examined this morning  No acute events overnight  He states that since his perc ashli tube was placed, his epigastric pain has nearly resolved with only very mild pain in the region  However, he now has new pain at the site of the tube that increases up to 10/10 with movement  He states that his current pain meds help a little but not significantly  He also states that he had 6 episodes of diarrhea yesterday but has not had a bowel movement today   He has no other complaints and denies subjective fever/chills, chest pain, SOB, N/V     OBJECTIVE     Vitals:    21 1411 21 2215 21 2300 21 0749   BP: 120/63 115/62  134/74   Pulse: 66 70  69   Resp: 18 18  19   Temp: 97 9 °F (36 6 °C) 98 8 °F (37 1 °C)  98 1 °F (36 7 °C)   SpO2: 95% 93% 93% 94%   Weight:       Height:          Temperature:   Temp (24hrs), Av 3 °F (36 8 °C), Min:97 9 °F (36 6 °C), Max:98 8 °F (37 1 °C)    Temperature: 98 1 °F (36 7 °C)  Intake & Output:  I/O       05/10 0701 -  0700 701 -  07 -  0700    P  O  0 0     I V  (mL/kg) 1856 7 (23 3) 1968 3 (24 7)     Total Intake(mL/kg) 1856 7 (23 3) 1968 3 (24 7)     Urine (mL/kg/hr) 500 750 (0 4)     Drains  10     Stool 0      Total Output 500 760     Net +1356 7 +1208 3            Unmeasured Stool Occurrence 7 x          Weights:   IBW (Ideal Body Weight): 73 kg    Body mass index is 25 25 kg/m²  Weight (last 2 days)     Date/Time   Weight    05/10/21 22:27:53   79 8 (176)            Physical Exam  Constitutional:       Appearance: Normal appearance  HENT:      Head: Normocephalic and atraumatic  Eyes:      Extraocular Movements: Extraocular movements intact  Conjunctiva/sclera: Conjunctivae normal       Pupils: Pupils are equal, round, and reactive to light  Cardiovascular:      Rate and Rhythm: Normal rate and regular rhythm  Pulses: Normal pulses  Heart sounds: Normal heart sounds  Pulmonary:      Effort: Pulmonary effort is normal       Breath sounds: Normal breath sounds  Abdominal:      General: Bowel sounds are normal       Palpations: Abdomen is soft  Comments: Tenderness at perc ashli tube site    Skin:     General: Skin is warm and dry  Capillary Refill: Capillary refill takes less than 2 seconds  Neurological:      Mental Status: He is alert  LABORATORY DATA     Labs: I have personally reviewed pertinent reports  Results from last 7 days   Lab Units 05/12/21  0452 05/11/21  0617 05/10/21  0447 05/09/21  0804  05/06/21  0634   WBC Thousand/uL 13 92* 14 34* 13 66* 13 80*   < > 11 01*   HEMOGLOBIN g/dL 12 0 11 5* 12 8 13 4   < > 13 6   HEMATOCRIT % 36 0* 35 2* 38 6 40 0   < > 41 8   PLATELETS Thousands/uL 278 228 215 195   < > 213   NEUTROS PCT %  --  84* 89*  --   --  78*   MONOS PCT %  --  10 6  --   --  11   MONO PCT %  --   --   --  11  --   --     < > = values in this interval not displayed        Results from last 7 days   Lab Units 05/12/21  0452 05/11/21  0617 05/10/21  0447 05/09/21  3558 POTASSIUM mmol/L 3 4* 3 3* 3 8 3 4*   CHLORIDE mmol/L 104 108 104 103   CO2 mmol/L 27 25 25 24   BUN mg/dL 14 15 16 17   CREATININE mg/dL 0 66 0 66 0 93 0 91   CALCIUM mg/dL 7 8* 7 8* 7 7* 7 7*   ALK PHOS U/L 175* 162*  --  211*   ALT U/L 69 56  --  68   AST U/L 60* 44  --  28     Results from last 7 days   Lab Units 05/12/21  0452   MAGNESIUM mg/dL 2 3          Results from last 7 days   Lab Units 05/05/21  1912   INR  1 08   PTT seconds 24     Results from last 7 days   Lab Units 05/05/21  2124   LACTIC ACID mmol/L 1 7     Results from last 7 days   Lab Units 05/05/21  1940   TROPONIN I ng/mL <0 02       IMAGING & DIAGNOSTIC TESTING     Radiology Results: I have personally reviewed pertinent reports  Nm Hepatobiliary W Rx    Result Date: 5/10/2021  Impression: 1  Nonvisualization of the gallbladder on delayed imaging up to 4 hours  Findings suspicious for cystic duct obstruction and acute cholecystitis  The study was marked in [facility] for immediate notification  Workstation performed: [id number]     Other Diagnostic Testing: I have personally reviewed pertinent reports        ACTIVE MEDICATIONS     Current Facility-Administered Medications   Medication Dose Route Frequency    aspirin chewable tablet 81 mg  81 mg Oral Daily    cefepime (MAXIPIME) 2,000 mg in dextrose 5 % 50 mL IVPB  2,000 mg Intravenous Q12H    fentanyl citrate (PF) 100 MCG/2ML   Intravenous Code/Trauma/Sedation Med    fish oil capsule 1,000 mg  1,000 mg Oral Daily    FLUoxetine (PROzac) capsule 20 mg  20 mg Oral Daily    folic acid (FOLVITE) tablet 1 mg  1 mg Oral Daily    heparin (porcine) subcutaneous injection 5,000 Units  5,000 Units Subcutaneous Q8H [address]    HYDROmorphone (DILAUDID) injection 0 5 mg  0 5 mg Intravenous Q4H PRN    insulin lispro (HumaLOG) 100 units/mL subcutaneous injection 1-6 Units  1-6 Units Subcutaneous Q6H [address]    metroNIDAZOLE (FLAGYL) IVPB (premix) 500 mg 100 mL  500 mg Intravenous Q8H    midazolam (VERSED) injection    Code/Trauma/Sedation Med    multivitamin-minerals (CENTRUM) tablet 1 tablet  1 tablet Oral Daily    oxyCODONE (ROXICODONE) immediate release tablet 10 mg  10 mg Oral Q6H PRN    oxyCODONE (ROXICODONE) IR tablet 2 5 mg  2 5 mg Oral Q6H PRN    oxyCODONE (ROXICODONE) IR tablet 5 mg  5 mg Oral Q6H PRN    pancrelipase (Lip-Prot-Amyl) (CREON) delayed release capsule 24,000 Units  24,000 Units Oral TID With Meals    pantoprazole (PROTONIX) injection 40 mg  40 mg Intravenous Q12H [address]    polyethylene glycol (MIRALAX) packet 17 g  17 g Oral Daily    senna-docusate sodium (SENOKOT S) 8 6-50 mg per tablet 1 tablet  1 tablet Oral BID    sincalide [facility]) injection 1 7 mcg  0 02 mcg/kg (Order-Specific) Intravenous Once in imaging    sodium chloride (PF) 0 9 % injection 3 mL  3 mL Intravenous Q1H PRN    tamsulosin (FLOMAX) capsule 0 4 mg  0 4 mg Oral HS     VTE Pharmacologic Prophylaxis: Enoxaparin (Lovenox)  VTE Mechanical Prophylaxis: sequential compression device    ==  Star Esquivel, MS3

## 2021-05-12 NOTE — ASSESSMENT & PLAN NOTE
HIDA scan on 5/10 revealed "Nonvisualization of the gallbladder on delayed imaging up to 4 hours   Findings suspicious for cystic duct obstruction and acute cholecystitis "  Underwent IR guided cholecystostomy tube placement on 5/11 as ultrasound imaging noted presence of eliu cholecystic fluid - preliminary fluid culture growing gram-positive cocci in pairs/change  Blood cultures from 5/5 are negative  Continue empiric IV Cefepime/Flagyl - trend procalcitonin tomorrow  PRN pain/emesis control - supportive care otherwise

## 2021-05-12 NOTE — PLAN OF CARE
Problem: Prexisting or High Potential for Compromised Skin Integrity  Goal: Skin integrity is maintained or improved  Description: INTERVENTIONS:  - Identify patients at risk for skin breakdown  - Assess and monitor skin integrity  - Assess and monitor nutrition and hydration status  - Monitor labs   - Assess for incontinence   - Turn and reposition patient  - Assist with mobility/ambulation  - Relieve pressure over bony prominences  - Avoid friction and shearing  - Provide appropriate hygiene as needed including keeping skin clean and dry  - Evaluate need for skin moisturizer/barrier cream  - Collaborate with interdisciplinary team   - Patient/family teaching  - Consider wound care consult   Outcome: Progressing     Problem: Nutrition/Hydration-ADULT  Goal: Nutrient/Hydration intake appropriate for improving, restoring or maintaining nutritional needs  Description: Monitor and assess patient's nutrition/hydration status for malnutrition  Collaborate with interdisciplinary team and initiate plan and interventions as ordered  Monitor patient's weight and dietary intake as ordered or per policy  Utilize nutrition screening tool and intervene as necessary  Determine patient's food preferences and provide high-protein, high-caloric foods as appropriate       INTERVENTIONS:  - Monitor oral intake, urinary output, labs, and treatment plans  - Assess nutrition and hydration status and recommend course of action  - Evaluate amount of meals eaten  - Assist patient with eating if necessary   - Allow adequate time for meals  - Recommend/ encourage appropriate diets, oral nutritional supplements, and vitamin/mineral supplements  - Order, calculate, and assess calorie counts as needed  - Recommend, monitor, and adjust tube feedings and TPN/PPN based on assessed needs  - Assess need for intravenous fluids  - Provide specific nutrition/hydration education as appropriate  - Include patient/family/caregiver in decisions related to nutrition  Outcome: Progressing     Problem: Potential for Falls  Goal: Patient will remain free of falls  Description: INTERVENTIONS:  - Assess patient frequently for physical needs  -  Identify cognitive and physical deficits and behaviors that affect risk of falls    -  [place] fall precautions as indicated by assessment   - Educate patient/family on patient safety including physical limitations  - Instruct patient to call for assistance with activity based on assessment  - Modify environment to reduce risk of injury  - Consider OT/PT consult to assist with strengthening/mobility  Outcome: Progressing

## 2021-05-12 NOTE — PROGRESS NOTES
[address] [facility]  Progress Note - Lynn Sport 1955, 73 y o  male MRN: [id number]  Unit/Bed#: [facility] 928-01 Encounter: [id number]  Primary Care Provider: Fabiana Poole MD   Date and time admitted to hospital: 5/10/2021 10:40 AM      * Acute cholecystitis  Assessment & Plan  HIDA scan on 5/10 revealed "Nonvisualization of the gallbladder on delayed imaging up to 4 hours  Findings suspicious for cystic duct obstruction and acute cholecystitis "  Underwent IR guided cholecystostomy tube placement on 5/11 as ultrasound imaging noted presence of eliu cholecystic fluid - preliminary fluid culture growing gram-positive cocci in pairs/change  Blood cultures from 5/5 are negative  Continue empiric IV Cefepime/Flagyl - trend procalcitonin tomorrow  PRN pain/emesis control - supportive care otherwise    Pancreatic mass  Assessment & Plan  Pancreatic mass identified a previous admission on CT scan done on 4/29 - presented w/ biliary obstruction which improved s/p biliary duct stenting- brushing unfortunately was nondiagnostic  CT imaging on 5/5 revealed a "3 8 cm obstructing mass in the region of the head of the pancreas with marked pancreatic ductal dilatation, worrisome for adenocarcinoma  Other tumors not excluded "  Subsequent right upper quadrant ultrasound on 5/6 noting a "Mild gallbladder wall thickening without additional evidence of acute cholecystitis  Pancreatic head mass again identified    Associated pancreatic ductal dilatation "  S/p endoscopic ultrasound w/ biopsies taken and celiac plexus nerve block on 5/7 - fine-needle aspiration report confirmed adenocarcinoma  Eventual plan for Whipple's procedure and hopeful chemotherapy once infection resolves noted - surgical oncology following  PRN pain control - supportive care otherwise    Hyperlipidemia  Assessment & Plan  Continue ASA/fish oil    BPH (benign prostatic hyperplasia)  Assessment & Plan  Continue Flomax    Leukocytosis  Assessment & Plan  Likely reactive due to acute medical issue(s)  Monitor WBC count    Essential hypertension  Assessment & Plan  Currently off agents due to borderline hypotensive states    Hypokalemia  Assessment & Plan  Monitor/replete serum potassium  Serum magnesium normal    Diabetes mellitus type 2  Assessment & Plan  Lab Results   Component Value Date    HGBA1C 6 2 (H) 2021     On SSI coverage per Accu-Cheks  Carbohydrate restricted diet      DVT Prophylaxis:  Lovenox       Patient Centered Rounds:  I have performed bedside rounds and discussed plan of care with nursing today  Discussions with Specialists or Other Care Team Provider:  see above assessments if applicable    Education and Discussions with Family / Patient:  Patient at bedside    Time Spent for Care:  32 minutes  More than 50% of total time spent on counseling and coordination of care as described above  Current Length of Stay: 2 day(s)    Current Patient Status: Inpatient   Certification Statement:  Patient will continue to require additional hospital stay due to assessments as noted above  Code Status: Level 1 - Full Code        Subjective:     Less fatigued today  Complains of soreness around percutaneous cholecystostomy tube insertion site  Denies fever/chills this time  Objective:     Vitals:   Temp (24hrs), Av °F (37 2 °C), Min:98 1 °F (36 7 °C), Max:100 1 °F (37 8 °C)    Temp:  [98 1 °F (36 7 °C)-100 1 °F (37 8 °C)] 100 1 °F (37 8 °C)  HR:  [69-80] 80  Resp:  [18-19] 18  BP: (112-134)/(60-74) 112/60  SpO2:  [93 %-95 %] 95 %  Body mass index is 25 25 kg/m²  Input and Output Summary (last 24 hours):        Intake/Output Summary (Last 24 hours) at 2021 1654  Last data filed at 2021 1545  Gross per 24 hour   Intake 2318 33 ml   Output 1110 ml   Net 1208 33 ml       Physical Exam:     GENERAL:  Improved weakness/fatigue  HEAD:  Normocephalic - atraumatic  EYES: PERRL - EOMI MOUTH:  Mucosa moist  NECK:  Supple - full range of motion  CARDIAC:  Rate controlled - S1/S2 positive  PULMONARY:  Clear breath sounds bilaterally - nonlabored respirations  ABDOMEN:  Soft - mild tenderness around cholecystostomy tube insertion site - nondistended - active bowel sounds  MUSCULOSKELETAL:  Motor strength/range of motion deconditioned  NEUROLOGIC:  Alert/oriented at baseline  SKIN:  Chronic wrinkles/blemishes   PSYCHIATRIC:  Mood/affect stable      Additional Data:     Labs & Recent Cultures:    Results from last 7 days   Lab Units 05/12/21  0452 05/11/21  0617  05/09/21  0804   WBC Thousand/uL 13 92* 14 34*   < > 13 80*   HEMOGLOBIN g/dL 12 0 11 5*   < > 13 4   HEMATOCRIT % 36 0* 35 2*   < > 40 0   PLATELETS Thousands/uL 278 228   < > 195   BANDS PCT %  --   --   --  3   NEUTROS PCT %  --  84*   < >  --    LYMPHS PCT %  --  5*   < >  --    LYMPHO PCT %  --   --   --  5*   MONOS PCT %  --  10   < >  --    MONO PCT %  --   --   --  11   EOS PCT %  --  0   < > 0    < > = values in this interval not displayed       Results from last 7 days   Lab Units 05/12/21  0452   POTASSIUM mmol/L 3 4*   CHLORIDE mmol/L 104   CO2 mmol/L 27   BUN mg/dL 14   CREATININE mg/dL 0 66   CALCIUM mg/dL 7 8*   ALK PHOS U/L 175*   ALT U/L 69   AST U/L 60*     Results from last 7 days   Lab Units 05/05/21  1912   INR  1 08     Results from last 7 days   Lab Units 05/12/21  1109 05/12/21  0529 05/12/21  0022 05/11/21  1717 05/11/21  1123 05/11/21  0519 05/10/21  2058 05/10/21  1744 05/10/21  1252 05/10/21  0741 05/09/21  2037 05/09/21  1619   POC GLUCOSE mg/dl 190* 166* 145* 141* 135 119 143* 130 131 113 130 123         Results from last 7 days   Lab Units 05/10/21  0447 05/09/21  1031 05/06/21  0634 05/05/21  2124 05/05/21 1912   LACTIC ACID mmol/L  --   --   --  1 7 2 1*   PROCALCITONIN ng/ml 0 72* 0 54* 0 22  --  0 12         Results from last 7 days   Lab Units 05/11/21  1256 05/05/21  1912   BLOOD CULTURE   --  No Growth After 5 Days  No Growth After 5 Days     GRAM STAIN RESULT  No polys seen*  2+ Gram positive cocci in pairs and chains*  --    BODY FLUID CULTURE, STERILE  1+ Growth of Enterococcus faecalis*  --          Last 24 Hours Medication List:   Current Facility-Administered Medications   Medication Dose Route Frequency Provider Last Rate    acetaminophen  650 mg Oral Q6H PRN Estrellita Monterroso MD      aspirin  81 mg Oral Daily Lisa White MD      cefepime  2,000 mg Intravenous Q12H Lisa White MD 2,000 mg (05/12/21 0850)    enoxaparin  40 mg Subcutaneous Q24H Edgar Machado MD      fentanyl citrate (PF)   Intravenous Code/Trauma/Sedation Med Shital Peterson MD      fish oil  1,000 mg Oral Daily Lisa White MD      FLUoxetine  20 mg Oral Daily Lisa White MD      folic acid  1 mg Oral Daily Lisa White MD      HYDROmorphone  0 5 mg Intravenous Q4H PRN Lisa White MD      insulin lispro  1-6 Units Subcutaneous TID With Meals Malaika Castillo PA-C      [START ON 5/13/2021] lidocaine  1 patch Topical Daily Nadia Bernard DO      metroNIDAZOLE  500 mg Intravenous Q8H Lisa White  mg (05/12/21 1337)    midazolam    Code/Trauma/Sedation Med Shital Peterson MD      multivitamin-minerals  1 tablet Oral Daily Lisa White MD      oxyCODONE  10 mg Oral Q6H PRN Lisa White MD      oxyCODONE  2 5 mg Oral Q6H PRN Lisa White MD      oxyCODONE  5 mg Oral Q6H PRN Lisa White MD      pancrelipase (Lip-Prot-Amyl)  24,000 Units Oral TID With Meals Lisa White MD      pantoprazole  40 mg Intravenous Q12H [address] Lisa White MD      polyethylene glycol  17 g Oral Daily Lisa White MD      senna-docusate sodium  1 tablet Oral BID Lisa White MD      sincalide  0 02 mcg/kg (Order-Specific) Intravenous Once in imaging Bryon Apo, DO      sodium chloride (PF)  3 mL Intravenous Q1H PRN MD Kriss Otoole tamsulosin  0 4 mg Oral HS Michele Muñoz MD                    ** Please Note: This note is constructed using a voice recognition dictation system  An occasional wrong word/phrase or sound-a-like substitution may have been picked up by dictation device due to the inherent limitations of voice recognition software  Read the chart carefully and recognize, using reasonable context, where substitutions may have occurred  **

## 2021-05-12 NOTE — PROGRESS NOTES
Progress Note -Interventional Radiology KRISS Gotti 73 y o  male MRN: [id number]  Unit/Bed#: [facility] 928-01 Encounter: [id number]    Assessment:    73year old male presenting with pancreatic mass, s/p ERCP, biliary stent, brush bx and sphincterotomy 4/28/21, EUS with bx 5/7/21, presents with continued abdominal discomfort, HIDA 5/10/21 without visualization of gallbladder, s/p IR cholecystostomy tube placement 5/11/21    Tube output since placement: 10cc bile + bile in bag    Plan:    - please flush tube daily with 10cc NS  Prescription will be sent to [facility]  - duration of ashli tube will be per surgery discretion  Will most likely have ashli tube until timing of Whipple procedure   - will schedule routine 6 week ashli tube check  - F/U final cultures  Currently 2+ gram positive cocci    Patient Active Problem List   Diagnosis    Right arm pain    Rheumatoid arthritis ([address] )    Essential hypertension    Urgency of urination    Anxiety disorder    Hyperlipidemia    Liver mass    Esophageal reflux    Multiple lipomas    Type 2 diabetes mellitus without complication, without long-term current use of insulin (HCC)    Hypokalemia    Diarrhea    Pancreatic mass    Epigastric pain    BPH (benign prostatic hyperplasia)    Constipation    SIRS (systemic inflammatory response syndrome) (HCC)    Perforation bowel (HCC)    Acute cholecystitis    Leukocytosis          Subjective: Patient states tube is causing him discomfort  Current cultures 2+ gram + cocci       Objective:    Vitals:  /74   Pulse 69   Temp 98 1 °F (36 7 °C)   Resp 19   Ht 5' 10" (1 778 m)   Wt 79 8 kg (176 lb)   SpO2 94%   BMI 25 25 kg/m²   Body mass index is 25 25 kg/m²  Weight (last 2 days)     Date/Time   Weight    05/10/21 22:27:53   79 8 (176)              I/Os:    Intake/Output Summary (Last 24 hours) at 5/12/2021 1104  Last data filed at 5/12/2021 0900  Gross per 24 hour   Intake 2088  33 ml Output 760 ml   Net 1328 33 ml       Invasive Devices     Peripheral Intravenous Line            Peripheral IV 05/12/21 Distal;Left;Dorsal (posterior) Forearm less than 1 day          Drain            Open Drain Right 1217 days    Closed/Suction Drain  RUQ  10 Fr  less than 1 day                Physical Exam:  General appearance: alert and oriented, in no acute distress  Lungs: clear to auscultation bilaterally  Heart: regular rate and rhythm, S1, S2 normal, no murmur, click, rub or gallop  Abdomen: mild distension, tender to palpation near tube insertion site, bile output from tube, bowel sounds +  Skin: tube insertion site c/d/i                Lab Results and Cultures:   CBC with diff:   Lab Results   Component Value Date    WBC 13 92 (H) 05/12/2021    HGB 12 0 05/12/2021    HCT 36 0 (L) 05/12/2021    MCV 93 05/12/2021     05/12/2021    MCH 30 8 05/12/2021    MCHC 33 3 05/12/2021    RDW 14 8 05/12/2021    MPV 12 8 (H) 05/12/2021    NRBC 0 05/12/2021      BMP/CMP:  Lab Results   Component Value Date     09/01/2017    K 3 4 (L) 05/12/2021    K 4 3 04/24/2021     05/12/2021     04/24/2021    CO2 27 05/12/2021    CO2 18 (L) 04/24/2021    BUN 14 05/12/2021    BUN 25 04/24/2021    CREATININE 0 66 05/12/2021    CREATININE 1 11 09/01/2017    GLUCOSE 117 (H) 09/01/2017    CALCIUM 7 8 (L) 05/12/2021    CALCIUM 8 9 09/01/2017    AST 60 (H) 05/12/2021     (H) 04/24/2021    ALT 69 05/12/2021     (H) 04/24/2021    ALKPHOS 175 (H) 05/12/2021    ALKPHOS 75 09/01/2017    PROT 5 8 (L) 09/01/2017    BILITOT 1 0 09/01/2017    EGFR 101 05/12/2021   ,     Coags:   Lab Results   Component Value Date    PTT 24 05/05/2021    INR 1 08 05/05/2021   ,   Results from last 7 days   Lab Units 05/05/21  1912   PTT seconds 24   INR  1 08        Lipid Panel:   Lab Results   Component Value Date    CHOL 190 01/03/2017     Lab Results   Component Value Date    HDL 36 (L) 05/05/2021     Lab Results   Component Value Date    HDL 36 (L) 05/05/2021     Lab Results   Component Value Date    LDLCALC 116 (H) 05/05/2021     Lab Results   Component Value Date    TRIG 181 6 (H) 05/05/2021       HgbA1c:   Lab Results   Component Value Date    HGBA1C 6 2 (H) 05/05/2021    HGBA1C 6 1 (H) 04/24/2021    HGBA1C 6 5 (H) 02/27/2021       Blood Culture:   Lab Results   Component Value Date    BLOODCX No Growth After 5 Days  05/05/2021    BLOODCX No Growth After 5 Days  05/05/2021   ,   Urinalysis:   Lab Results   Component Value Date    COLORU Yellow 05/05/2021    COLORU Yellow 09/26/2017    CLARITYU Clear 05/05/2021    CLARITYU Transparent 09/26/2017    SPECGRAV 1 010 05/05/2021    SPECGRAV 1 015 09/26/2017    PHUR 8 5 (A) 05/05/2021    PHUR 8 0 10/13/2017    PHUR 7 0 09/26/2017    LEUKOCYTESUR Negative 05/05/2021    LEUKOCYTESUR TRACE 09/26/2017    NITRITE Negative 05/05/2021    NITRITE NEG 09/26/2017    PROTEINUA TRACE 09/26/2017    GLUCOSEU 250 (1/4%) (A) 05/05/2021    KETONESU Negative 05/05/2021    KETONESU TRACE 09/26/2017    BILIRUBINUR Negative 05/05/2021    BILIRUBINUR NEG 09/26/2017    BLOODU Moderate (A) 05/05/2021    BLOODU TRACE 09/26/2017   ,   Urine Culture:   Lab Results   Component Value Date    URINECX No Growth <1000 cfu/mL 03/29/2016   ,   Wound Culure:  No results found for: WOUNDCULT    VTE Pharmacologic Prophylaxis: heparin    Thank you for allowing me to participate in the care of Jessica Driver  Please don't hesitate to call, text, email, or TigerText with any questions  This text is generated with voice recognition software  There may be translation, syntax,  or grammatical errors  If you have any questions, please contact the dictating provider      Angelo Mccabe PA-C

## 2021-05-12 NOTE — ASSESSMENT & PLAN NOTE
Lab Results   Component Value Date    HGBA1C 6 2 (H) 05/05/2021     On SSI coverage per [employer]  Carbohydrate restricted diet

## 2021-05-13 DIAGNOSIS — C25.9 MALIGNANT NEOPLASM OF PANCREAS, UNSPECIFIED LOCATION OF MALIGNANCY (HCC): Primary | ICD-10-CM

## 2021-05-13 LAB
ALBUMIN SERPL BCP-MCNC: 2 G/DL (ref 3.5–5)
ALP SERPL-CCNC: 199 U/L (ref 46–116)
ALT SERPL W P-5'-P-CCNC: 56 U/L (ref 12–78)
ANION GAP SERPL CALCULATED.3IONS-SCNC: 6 MMOL/L (ref 4–13)
AST SERPL W P-5'-P-CCNC: 32 U/L (ref 5–45)
BACTERIA SPEC BFLD CULT: ABNORMAL
BASOPHILS # BLD AUTO: 0.05 THOUSANDS/ΜL (ref 0–0.1)
BASOPHILS NFR BLD AUTO: 0 % (ref 0–1)
BILIRUB SERPL-MCNC: 0.71 MG/DL (ref 0.2–1)
BUN SERPL-MCNC: 9 MG/DL (ref 5–25)
CALCIUM ALBUM COR SERPL-MCNC: 9.6 MG/DL (ref 8.3–10.1)
CALCIUM SERPL-MCNC: 8 MG/DL (ref 8.3–10.1)
CHLORIDE SERPL-SCNC: 105 MMOL/L (ref 100–108)
CO2 SERPL-SCNC: 28 MMOL/L (ref 21–32)
CREAT SERPL-MCNC: 0.62 MG/DL (ref 0.6–1.3)
EOSINOPHIL # BLD AUTO: 0.09 THOUSAND/ΜL (ref 0–0.61)
EOSINOPHIL NFR BLD AUTO: 1 % (ref 0–6)
ERYTHROCYTE [DISTWIDTH] IN BLOOD BY AUTOMATED COUNT: 15.1 % (ref 11.6–15.1)
GFR SERPL CREATININE-BSD FRML MDRD: 104 ML/MIN/1.73SQ M
GLUCOSE SERPL-MCNC: 108 MG/DL (ref 65–140)
GLUCOSE SERPL-MCNC: 117 MG/DL (ref 65–140)
GLUCOSE SERPL-MCNC: 118 MG/DL (ref 65–140)
GLUCOSE SERPL-MCNC: 129 MG/DL (ref 65–140)
GLUCOSE SERPL-MCNC: 132 MG/DL (ref 65–140)
GLUCOSE SERPL-MCNC: 135 MG/DL (ref 65–140)
GLUCOSE SERPL-MCNC: 149 MG/DL (ref 65–140)
GRAM STN SPEC: ABNORMAL
GRAM STN SPEC: ABNORMAL
HCT VFR BLD AUTO: 37.6 % (ref 36.5–49.3)
HGB BLD-MCNC: 12.1 G/DL (ref 12–17)
IMM GRANULOCYTES # BLD AUTO: 0.33 THOUSAND/UL (ref 0–0.2)
IMM GRANULOCYTES NFR BLD AUTO: 2 % (ref 0–2)
LYMPHOCYTES # BLD AUTO: 1.01 THOUSANDS/ΜL (ref 0.6–4.47)
LYMPHOCYTES NFR BLD AUTO: 6 % (ref 14–44)
MCH RBC QN AUTO: 29.7 PG (ref 26.8–34.3)
MCHC RBC AUTO-ENTMCNC: 32.2 G/DL (ref 31.4–37.4)
MCV RBC AUTO: 92 FL (ref 82–98)
MONOCYTES # BLD AUTO: 1.18 THOUSAND/ΜL (ref 0.17–1.22)
MONOCYTES NFR BLD AUTO: 8 % (ref 4–12)
NEUTROPHILS # BLD AUTO: 13.01 THOUSANDS/ΜL (ref 1.85–7.62)
NEUTS SEG NFR BLD AUTO: 83 % (ref 43–75)
NRBC BLD AUTO-RTO: 0 /100 WBCS
PLATELET # BLD AUTO: 320 THOUSANDS/UL (ref 149–390)
PMV BLD AUTO: 11.9 FL (ref 8.9–12.7)
POTASSIUM SERPL-SCNC: 3.7 MMOL/L (ref 3.5–5.3)
PROCALCITONIN SERPL-MCNC: 0.82 NG/ML
PROT SERPL-MCNC: 5.4 G/DL (ref 6.4–8.2)
RBC # BLD AUTO: 4.07 MILLION/UL (ref 3.88–5.62)
SODIUM SERPL-SCNC: 139 MMOL/L (ref 136–145)
WBC # BLD AUTO: 15.67 THOUSAND/UL (ref 4.31–10.16)

## 2021-05-13 PROCEDURE — 82948 REAGENT STRIP/BLOOD GLUCOSE: CPT

## 2021-05-13 PROCEDURE — 80053 COMPREHEN METABOLIC PANEL: CPT | Performed by: INTERNAL MEDICINE

## 2021-05-13 PROCEDURE — 84145 PROCALCITONIN (PCT): CPT | Performed by: INTERNAL MEDICINE

## 2021-05-13 PROCEDURE — 99232 SBSQ HOSP IP/OBS MODERATE 35: CPT | Performed by: INTERNAL MEDICINE

## 2021-05-13 PROCEDURE — C9113 INJ PANTOPRAZOLE SODIUM, VIA: HCPCS | Performed by: INTERNAL MEDICINE

## 2021-05-13 PROCEDURE — 85025 COMPLETE CBC W/AUTO DIFF WBC: CPT | Performed by: INTERNAL MEDICINE

## 2021-05-13 PROCEDURE — NC001 PR NO CHARGE: Performed by: STUDENT IN AN ORGANIZED HEALTH CARE EDUCATION/TRAINING PROGRAM

## 2021-05-13 RX ORDER — VANCOMYCIN HYDROCHLORIDE 1 G/200ML
12.5 INJECTION, SOLUTION INTRAVENOUS EVERY 8 HOURS
Status: DISCONTINUED | OUTPATIENT
Start: 2021-05-13 | End: 2021-05-14

## 2021-05-13 RX ADMIN — METRONIDAZOLE 500 MG: 500 INJECTION, SOLUTION INTRAVENOUS at 13:37

## 2021-05-13 RX ADMIN — OMEGA-3 FATTY ACIDS CAP 1000 MG 1000 MG: 1000 CAP at 09:02

## 2021-05-13 RX ADMIN — FLUOXETINE 20 MG: 20 CAPSULE ORAL at 09:02

## 2021-05-13 RX ADMIN — OXYCODONE HYDROCHLORIDE 10 MG: 10 TABLET ORAL at 21:43

## 2021-05-13 RX ADMIN — Medication 1 TABLET: at 09:01

## 2021-05-13 RX ADMIN — VANCOMYCIN HYDROCHLORIDE 1000 MG: 1 INJECTION, SOLUTION INTRAVENOUS at 23:15

## 2021-05-13 RX ADMIN — METRONIDAZOLE 500 MG: 500 INJECTION, SOLUTION INTRAVENOUS at 04:33

## 2021-05-13 RX ADMIN — ENOXAPARIN SODIUM 40 MG: 40 INJECTION SUBCUTANEOUS at 09:02

## 2021-05-13 RX ADMIN — LIDOCAINE 1 PATCH: 50 PATCH TOPICAL at 09:02

## 2021-05-13 RX ADMIN — CEFEPIME HYDROCHLORIDE 2000 MG: 2 INJECTION, POWDER, FOR SOLUTION INTRAVENOUS at 09:04

## 2021-05-13 RX ADMIN — METRONIDAZOLE 500 MG: 500 INJECTION, SOLUTION INTRAVENOUS at 21:31

## 2021-05-13 RX ADMIN — POLYETHYLENE GLYCOL 3350 17 G: 17 POWDER, FOR SOLUTION ORAL at 14:42

## 2021-05-13 RX ADMIN — PANTOPRAZOLE SODIUM 40 MG: 40 INJECTION, POWDER, FOR SOLUTION INTRAVENOUS at 21:34

## 2021-05-13 RX ADMIN — TAMSULOSIN HYDROCHLORIDE 0.4 MG: 0.4 CAPSULE ORAL at 21:34

## 2021-05-13 RX ADMIN — PANCRELIPASE 24000 UNITS: 24000; 76000; 120000 CAPSULE, DELAYED RELEASE PELLETS ORAL at 17:02

## 2021-05-13 RX ADMIN — CEFEPIME HYDROCHLORIDE 2000 MG: 2 INJECTION, POWDER, FOR SOLUTION INTRAVENOUS at 22:23

## 2021-05-13 RX ADMIN — PANCRELIPASE 24000 UNITS: 24000; 76000; 120000 CAPSULE, DELAYED RELEASE PELLETS ORAL at 12:00

## 2021-05-13 RX ADMIN — PANCRELIPASE 24000 UNITS: 24000; 76000; 120000 CAPSULE, DELAYED RELEASE PELLETS ORAL at 09:01

## 2021-05-13 RX ADMIN — SENNOSIDES, DOCUSATE SODIUM 1 TABLET: 8.6; 5 TABLET ORAL at 17:02

## 2021-05-13 RX ADMIN — FOLIC ACID 1 MG: 1 TABLET ORAL at 09:01

## 2021-05-13 RX ADMIN — ASPIRIN 81 MG: 81 TABLET, CHEWABLE ORAL at 09:01

## 2021-05-13 RX ADMIN — VANCOMYCIN HYDROCHLORIDE 1000 MG: 1 INJECTION, SOLUTION INTRAVENOUS at 11:59

## 2021-05-13 RX ADMIN — HYDROMORPHONE HYDROCHLORIDE 0.5 MG: 1 INJECTION, SOLUTION INTRAMUSCULAR; INTRAVENOUS; SUBCUTANEOUS at 13:39

## 2021-05-13 RX ADMIN — PANTOPRAZOLE SODIUM 40 MG: 40 INJECTION, POWDER, FOR SOLUTION INTRAVENOUS at 09:01

## 2021-05-13 RX ADMIN — OXYCODONE HYDROCHLORIDE 10 MG: 10 TABLET ORAL at 12:04

## 2021-05-13 NOTE — CASE MANAGEMENT
CM met with pt and provided an update as to the status of the [facility] referrals sent previously  Pt was notified that his initial choices of [facility] and [facility] where unable to accept  Per pt he is open to a referral to [facility] and if they are not able to accept then he will be fine with a blanket referral to get an accepting agency  CM sent referral as requested  CM will follow

## 2021-05-13 NOTE — PROGRESS NOTES
[facility]  Progress Note - Awa Hdz 1955, 73 y o  male MRN: [id number]  [facility] Encounter: [id number]  Primary Care Provider: José Quintanilla MD   Date and time admitted to hospital: 5/10/2021 10:40 AM      * Acute cholecystitis  Assessment & Plan  HIDA scan on 5/10 revealed "Nonvisualization of the gallbladder on delayed imaging up to 4 hours  Findings suspicious for cystic duct obstruction and acute cholecystitis "  Underwent IR guided cholecystostomy tube placement on 5/11 as ultrasound imaging noted presence of eliu cholecystic fluid - fluid culture growing Enterococcus faecalis -> IV Vancomycin added today to current antibiotic course consisting of Cefepime/Flagyl - will appreciate Infectious disease evaluation for assistance in antibiotic management  Blood cultures from 5/5 are negative  Procalcitonin trended up from 0 72 -> 0 82 today -> trend until peak  PRN pain/emesis control - supportive care otherwise    Pancreatic mass  Assessment & Plan  Pancreatic mass identified a previous admission on CT scan done on 4/29 - presented w/ biliary obstruction which improved s/p biliary duct stenting- brushing unfortunately was nondiagnostic  CT imaging on 5/5 revealed a "3 8 cm obstructing mass in the region of the head of the pancreas with marked pancreatic ductal dilatation, worrisome for adenocarcinoma  Other tumors not excluded "  Subsequent right upper quadrant ultrasound on 5/6 noting a "Mild gallbladder wall thickening without additional evidence of acute cholecystitis  Pancreatic head mass again identified    Associated pancreatic ductal dilatation "  S/p endoscopic ultrasound w/ biopsies taken and celiac plexus nerve block on 5/7 - fine-needle aspiration report confirmed adenocarcinoma  Eventual plan for Whipple's procedure and hopeful chemotherapy once infection resolves noted - surgical oncology following  PRN pain control - supportive care otherwise    Hyperlipidemia  Assessment & Plan  Continue ASA/fish oil    BPH (benign prostatic hyperplasia)  Assessment & Plan  Continue Flomax    Leukocytosis  Assessment & Plan  Likely reactive due to acute medical issue(s)  WBC count increased today  Currently afebrile but developed a low-grade fever 100 1° yesterday afternoon - if fevers recur and/or WBC count continues to increase, may consider repeat CT imaging    Essential hypertension  Assessment & Plan  Previously off agents due to borderline hypotensive states - remains normotensive w/o medication    Hypokalemia  Assessment & Plan  Monitor/replete serum potassium  Serum magnesium normal    Diabetes mellitus type 2  Assessment & Plan  Lab Results   Component Value Date    HGBA1C 6 2 (H) 2021     On SSI coverage per [employer]  Carbohydrate restricted diet      DVT Prophylaxis:  Lovenox       Patient Centered Rounds:  I have performed bedside rounds and discussed plan of care with nursing today  Discussions with Specialists or Other Care Team Provider:  see above assessments if applicable    Education and Discussions with Family / Patient:  Patient at bedside    Time Spent for Care:  32 minutes  More than 50% of total time spent on counseling and coordination of care as described above  Current Length of Stay: 3 day(s)    Current Patient Status: Inpatient   Certification Statement:  Patient will continue to require additional hospital stay due to assessments as noted above  Code Status: Level 1 - Full Code        Subjective:     Remains weak/fatigued  States soreness/pain around cholecystostomy tube insertion site has improved today  Currently denying fever/chills but does complain of some constipation          Objective:     Vitals:   Temp (24hrs), Av 1 °F (37 3 °C), Min:98 9 °F (37 2 °C), Max:99 4 °F (37 4 °C)    Temp:  [98 9 °F (37 2 °C)-99 4 °F (37 4 °C)] 98 9 °F (37 2 °C)  HR:  [68-71] 71  Resp:  [18] 18  BP: (126-130)/(66-77) 130/77  SpO2:  [93 %-94 %] 94 %  Body mass index is 25 25 kg/m²  Input and Output Summary (last 24 hours): Intake/Output Summary (Last 24 hours) at 5/13/2021 1827  Last data filed at 5/13/2021 1822  Gross per 24 hour   Intake 610 ml   Output 1230 ml   Net -620 ml       Physical Exam:     GENERAL:  Weak/fatigued  HEAD:  Normocephalic - atraumatic  EYES: PERRL - EOMI   MOUTH:  Mucosa moist  NECK:  Supple - full range of motion  CARDIAC:  Rate controlled - S1/S2 positive  PULMONARY:  Clear breath sounds bilaterally - nonlabored respirations  ABDOMEN:  Soft - mild tenderness around cholecystostomy tube insertion site - nondistended - active bowel sounds  MUSCULOSKELETAL:  Motor strength/range of motion deconditioned  NEUROLOGIC:  Alert/oriented at baseline  SKIN:  Chronic wrinkles/blemishes   PSYCHIATRIC:  Mood/affect stable      Additional Data:     Labs & Recent Cultures:    Results from last 7 days   Lab Units 05/13/21  0548  05/09/21  0804   WBC Thousand/uL 15 67*   < > 13 80*   HEMOGLOBIN g/dL 12 1   < > 13 4   HEMATOCRIT % 37 6   < > 40 0   PLATELETS Thousands/uL 320   < > 195   BANDS PCT %  --   --  3   NEUTROS PCT % 83*   < >  --    LYMPHS PCT % 6*   < >  --    LYMPHO PCT %  --   --  5*   MONOS PCT % 8   < >  --    MONO PCT %  --   --  11   EOS PCT % 1   < > 0    < > = values in this interval not displayed       Results from last 7 days   Lab Units 05/13/21  0548   POTASSIUM mmol/L 3 7   CHLORIDE mmol/L 105   CO2 mmol/L 28   BUN mg/dL 9   CREATININE mg/dL 0 62   CALCIUM mg/dL 8 0*   ALK PHOS U/L 199*   ALT U/L 56   AST U/L 32         Results from last 7 days   Lab Units 05/13/21  1640 05/13/21  1109 05/13/21  0740 05/13/21  0542 05/13/21  0009 05/12/21  1707 05/12/21  1109 05/12/21  0529 05/12/21  0022 05/11/21  1717 05/11/21  1123 05/11/21  0519   POC GLUCOSE mg/dl 135 132 117 118 149* 124 190* 166* 145* 141* 135 119         Results from last 7 days   Lab Units 05/13/21  0507 05/10/21  0447 05/09/21  1031   PROCALCITONIN ng/ml 0 82* 0 72* 0 54*         Results from last 7 days   Lab Units 05/11/21  1256   GRAM STAIN RESULT  No polys seen*  2+ Gram positive cocci in pairs and chains*   BODY FLUID CULTURE, STERILE  1+ Growth of Enterococcus faecalis*         Last 24 Hours Medication List:   Current Facility-Administered Medications   Medication Dose Route Frequency Provider Last Rate    acetaminophen  650 mg Oral Q6H PRN Mary Quiroga MD      aspirin  81 mg Oral Daily Susi White MD      cefepime  2,000 mg Intravenous Q12H Susi White MD 2,000 mg (05/13/21 0904)    enoxaparin  40 mg Subcutaneous Q24H [address] Yola England MD      fentanyl citrate (PF)   Intravenous Code/Trauma/Sedation Eduardo Xiong MD      fish oil  1,000 mg Oral Daily Susi White MD      FLUoxetine  20 mg Oral Daily Susi White MD      folic acid  1 mg Oral Daily Susi White MD      HYDROmorphone  0 5 mg Intravenous Q4H PRN Susi White MD      insulin lispro  1-6 Units Subcutaneous TID With Meals Malaika Castillo PA-C      lidocaine  1 patch Topical Daily Alyssabenjamin Anguianog, DO      metroNIDAZOLE  500 mg Intravenous Q8H Susi White  mg (05/13/21 1337)    midazolam    Code/Trauma/Sedation Eduardo Xiong MD      multivitamin-minerals  1 tablet Oral Daily Susi White MD      oxyCODONE  10 mg Oral Q6H PRN Susi White MD      oxyCODONE  2 5 mg Oral Q6H PRN Susi White MD      oxyCODONE  5 mg Oral Q6H PRN Susi White MD      pancrelipase (Lip-Prot-Amyl)  24,000 Units Oral TID With Meals Susi White MD      pantoprazole  40 mg Intravenous Q12H [address] Susi White MD      polyethylene glycol  17 g Oral Daily Susi White MD      senna-docusate sodium  1 tablet Oral BID Susi White MD      sincalide  0 02 mcg/kg (Order-Specific) Intravenous Once in imaging Alyssa Pong, DO      sodium chloride (PF) 3 mL Intravenous Q1H PRN Nikia Worrell MD      tamsulosin  0 4 mg Oral HS Nikia Worrell MD      vancomycin  12 5 mg/kg Intravenous Q8H Ena Sinha MD 1,000 mg (05/13/21 8607)                  ** Please Note: This note is constructed using a voice recognition dictation system  An occasional wrong word/phrase or sound-a-like substitution may have been picked up by dictation device due to the inherent limitations of voice recognition software  Read the chart carefully and recognize, using reasonable context, where substitutions may have occurred  **

## 2021-05-13 NOTE — PLAN OF CARE
Problem: Prexisting or High Potential for Compromised Skin Integrity  Goal: Skin integrity is maintained or improved  Description: INTERVENTIONS:  - Identify patients at risk for skin breakdown  - Assess and monitor skin integrity  - Assess and monitor nutrition and hydration status  - Monitor labs   - Assess for incontinence   - Turn and reposition patient  - Assist with mobility/ambulation  - Relieve pressure over bony prominences  - Avoid friction and shearing  - Provide appropriate hygiene as needed including keeping skin clean and dry  - Evaluate need for skin moisturizer/barrier cream  - Collaborate with interdisciplinary team   - Patient/family teaching  - Consider wound care consult   Outcome: Progressing     Problem: Nutrition/Hydration-ADULT  Goal: Nutrient/Hydration intake appropriate for improving, restoring or maintaining nutritional needs  Description: Monitor and assess patient's nutrition/hydration status for malnutrition  Collaborate with interdisciplinary team and initiate plan and interventions as ordered  Monitor patient's weight and dietary intake as ordered or per policy  Utilize nutrition screening tool and intervene as necessary  Determine patient's food preferences and provide high-protein, high-caloric foods as appropriate       INTERVENTIONS:  - Monitor oral intake, urinary output, labs, and treatment plans  - Assess nutrition and hydration status and recommend course of action  - Evaluate amount of meals eaten  - Assist patient with eating if necessary   - Allow adequate time for meals  - Recommend/ encourage appropriate diets, oral nutritional supplements, and vitamin/mineral supplements  - Order, calculate, and assess calorie counts as needed  - Recommend, monitor, and adjust tube feedings and TPN/PPN based on assessed needs  - Assess need for intravenous fluids  - Provide specific nutrition/hydration education as appropriate  - Include patient/family/caregiver in decisions related to nutrition  Outcome: Progressing     Problem: Potential for Falls  Goal: Patient will remain free of falls  Description: INTERVENTIONS:  - Assess patient frequently for physical needs  -  Identify cognitive and physical deficits and behaviors that affect risk of falls    -  Ames fall precautions as indicated by assessment   - Educate patient/family on patient safety including physical limitations  - Instruct patient to call for assistance with activity based on assessment  - Modify environment to reduce risk of injury  - Consider OT/PT consult to assist with strengthening/mobility  Outcome: Progressing

## 2021-05-13 NOTE — ASSESSMENT & PLAN NOTE
HIDA scan on 5/10 revealed "Nonvisualization of the gallbladder on delayed imaging up to 4 hours   Findings suspicious for cystic duct obstruction and acute cholecystitis "  Underwent IR guided cholecystostomy tube placement on 5/11 as ultrasound imaging noted presence of eliu cholecystic fluid - fluid culture growing Enterococcus faecalis -> IV Vancomycin added today to current antibiotic course consisting of Cefepime/Flagyl - will appreciate Infectious disease evaluation for assistance in antibiotic management  Blood cultures from 5/5 are negative  Procalcitonin trended up from 0 72 -> 0 82 today -> trend until peak  PRN pain/emesis control - supportive care otherwise

## 2021-05-13 NOTE — PROGRESS NOTES
Vancomycin Assessment    El Gentile is a 73 y o  male who is currently receiving vancomycin   for other intra-abdominal e  faecalis   Relevant clinical data and objective history reviewed:  Creatinine   Date Value Ref Range Status   05/13/2021 0 62 0 60 - 1 30 mg/dL Final     Comment:     Standardized to IDMS reference method   05/12/2021 0 66 0 60 - 1 30 mg/dL Final     Comment:     Standardized to IDMS reference method   05/11/2021 0 66 0 60 - 1 30 mg/dL Final     Comment:     Standardized to IDMS reference method   09/01/2017 1 11 0 76 - 1 27 mg/dL Final   01/03/2017 0 94 0 76 - 1 27 mg/dL Final   11/26/2016 0 96 0 76 - 1 27 mg/dL Final     /66   Pulse 71   Temp 98 9 °F (37 2 °C)   Resp 18   Ht 5' 10" (1 778 m)   Wt 79 8 kg (176 lb)   SpO2 93%   BMI 25 25 kg/m²   I/O last 3 completed shifts: In: 2448 3 [P O :480; I V :1958  3; NG/GT:10]  Out: 1690 [Urine:1525; Drains:165]  Lab Results   Component Value Date/Time    BUN 9 05/13/2021 05:48 AM    BUN 25 04/24/2021 07:11 AM    WBC 15 67 (H) 05/13/2021 05:48 AM    WBC 7 8 09/01/2017 07:17 AM    HGB 12 1 05/13/2021 05:48 AM    HGB 15 0 09/01/2017 07:17 AM    HCT 37 6 05/13/2021 05:48 AM    HCT 43 0 09/01/2017 07:17 AM    MCV 92 05/13/2021 05:48 AM    MCV 94 09/01/2017 07:17 AM     05/13/2021 05:48 AM     09/01/2017 07:17 AM     Temp Readings from Last 3 Encounters:   05/13/21 98 9 °F (37 2 °C)   05/10/21 99 7 °F (37 6 °C) (Oral)   05/03/21 98 9 °F (37 2 °C)     Vancomycin Days of Therapy:1    Assessment/Plan  The patient is currently on vancomycin utilizing scheduled dosing  Baseline risks associated with therapy include: advanced age  The patient is to be initiated on  1000 mg IV q8h with an expected trough/AUC of 17 3/591, based on a goal of 15-20    Pharmacy will continue to follow closely for s/sx of nephrotoxicity, infusion reactions, and appropriateness of therapy  BMP and CBC will be ordered per protocol    Plan for trough as patient approaches steady state, prior to the 4th  dose at approximately 1130 on 5/14  Pharmacy will continue to follow the patients culture results and clinical progress daily      Nuria Kearney, Pharmacist

## 2021-05-13 NOTE — PROGRESS NOTES
Progress Note - Surgical Oncology  Gretta Hill 73 y o  male MRN: [id number]  Unit/Bed#: [facility] 928-01 Encounter: [id number]    Assessment:  73 M with pancreatic mass status post ERCP biliary decompression on 04/28, readmitted with abdominal pain, now status post endoscopic ultrasound with biopsy on 05/07 by GI  HIDA was positive for cholecystitis, patient is now s/p perc ashli tube placement by IR on 5/11  AVSS  Drain 155 cc-bilious    Plan:  -Continue diet as tolerated  -Continue antibiotics  -Follow up labs, trend fever/wbc curve  -Pain and nausea control prn  -EUS brushings confirming adenocarcinoma, patient will need adjuvant chemotherapy, outpatient chemotherapy  -Will need port placement, however this will be done as an outpatient  Can discharge on oral antibiotics when ok from primary perspective      Subjective/Objective     Subjective:  Doing well this morning, still complaining of pain from the drain site; however, original pain is now gone  No fevers/chills  No nausea or vomiting  Tolerating diet with no issues  Objective:     Blood pressure 128/66, pulse 68, temperature 99 4 °F (37 4 °C), resp  rate 18, height 5' 10" (1 778 m), weight 79 8 kg (176 lb), SpO2 93 %  ,Body mass index is 25 25 kg/m²  Intake/Output Summary (Last 24 hours) at 5/13/2021 0615  Last data filed at 5/13/2021 0433  Gross per 24 hour   Intake 490 ml   Output 1130 ml   Net -640 ml       Invasive Devices     Peripheral Intravenous Line            Peripheral IV 05/12/21 Distal;Left;Dorsal (posterior) Forearm 1 day          Drain            Open Drain Right 1217 days    Closed/Suction Drain  RUQ  10 Fr  1 day                Physical Exam:     General: NAD  HENT: NCAT MMM  Neck: supple, no JVD  CV: nl rate  Lungs: nl wob  No resp distress  ABD: Soft, tenderness around drain, nondistended  IR drain is bilious  Extrem: No CCE  Neuro: AAOx3      Lab, Imaging and other studies:  I have personally reviewed pertinent lab results    , CBC:   No results found for: WBC, HGB, HCT, MCV, PLT, ADJUSTEDWBC, MCH, MCHC, RDW, MPV, NRBC, CMP:   No results found for: SODIUM, K, CL, CO2, ANIONGAP, BUN, CREATININE, GLUCOSE, CALCIUM, AST, ALT, ALKPHOS, PROT, BILITOT, EGFR  VTE Pharmacologic Prophylaxis: Heparin  VTE Mechanical Prophylaxis: sequential compression device

## 2021-05-13 NOTE — ASSESSMENT & PLAN NOTE
Likely reactive due to acute medical issue(s)  WBC count increased today  Currently afebrile but developed a low-grade fever 100 1° yesterday afternoon - if fevers recur and/or WBC count continues to increase, may consider repeat CT imaging

## 2021-05-14 ENCOUNTER — APPOINTMENT (INPATIENT)
Dept: RADIOLOGY | Facility: HOSPITAL | Age: 66
DRG: 446 | End: 2021-05-14
Payer: COMMERCIAL

## 2021-05-14 LAB
ALBUMIN SERPL BCP-MCNC: 1.8 G/DL (ref 3.5–5)
ALP SERPL-CCNC: 168 U/L (ref 46–116)
ALT SERPL W P-5'-P-CCNC: 40 U/L (ref 12–78)
ANION GAP SERPL CALCULATED.3IONS-SCNC: 4 MMOL/L (ref 4–13)
ANISOCYTOSIS BLD QL SMEAR: PRESENT
AST SERPL W P-5'-P-CCNC: 20 U/L (ref 5–45)
BACTERIA SPEC ANAEROBE CULT: NORMAL
BASOPHILS # BLD MANUAL: 0 THOUSAND/UL (ref 0–0.1)
BASOPHILS NFR MAR MANUAL: 0 % (ref 0–1)
BILIRUB SERPL-MCNC: 0.74 MG/DL (ref 0.2–1)
BUN SERPL-MCNC: 9 MG/DL (ref 5–25)
CALCIUM ALBUM COR SERPL-MCNC: 9.5 MG/DL (ref 8.3–10.1)
CALCIUM SERPL-MCNC: 7.7 MG/DL (ref 8.3–10.1)
CHLORIDE SERPL-SCNC: 104 MMOL/L (ref 100–108)
CO2 SERPL-SCNC: 29 MMOL/L (ref 21–32)
CREAT SERPL-MCNC: 0.52 MG/DL (ref 0.6–1.3)
EOSINOPHIL # BLD MANUAL: 0 THOUSAND/UL (ref 0–0.4)
EOSINOPHIL NFR BLD MANUAL: 0 % (ref 0–6)
ERYTHROCYTE [DISTWIDTH] IN BLOOD BY AUTOMATED COUNT: 15.2 % (ref 11.6–15.1)
GFR SERPL CREATININE-BSD FRML MDRD: 112 ML/MIN/1.73SQ M
GIANT PLATELETS BLD QL SMEAR: PRESENT
GLUCOSE SERPL-MCNC: 139 MG/DL (ref 65–140)
GLUCOSE SERPL-MCNC: 140 MG/DL (ref 65–140)
GLUCOSE SERPL-MCNC: 152 MG/DL (ref 65–140)
GLUCOSE SERPL-MCNC: 154 MG/DL (ref 65–140)
HCT VFR BLD AUTO: 34.9 % (ref 36.5–49.3)
HGB BLD-MCNC: 11.8 G/DL (ref 12–17)
LIPASE SERPL-CCNC: 442 U/L (ref 73–393)
LYMPHOCYTES # BLD AUTO: 0.17 THOUSAND/UL (ref 0.6–4.47)
LYMPHOCYTES # BLD AUTO: 1 % (ref 14–44)
MCH RBC QN AUTO: 30.6 PG (ref 26.8–34.3)
MCHC RBC AUTO-ENTMCNC: 33.8 G/DL (ref 31.4–37.4)
MCV RBC AUTO: 90 FL (ref 82–98)
METAMYELOCYTES NFR BLD MANUAL: 2 % (ref 0–1)
MONOCYTES # BLD AUTO: 1.19 THOUSAND/UL (ref 0–1.22)
MONOCYTES NFR BLD: 7 % (ref 4–12)
NEUTROPHILS # BLD MANUAL: 14.84 THOUSAND/UL (ref 1.85–7.62)
NEUTS BAND NFR BLD MANUAL: 4 % (ref 0–8)
NEUTS SEG NFR BLD AUTO: 83 % (ref 43–75)
NRBC BLD AUTO-RTO: 0 /100 WBCS
PLATELET # BLD AUTO: 364 THOUSANDS/UL (ref 149–390)
PLATELET BLD QL SMEAR: ADEQUATE
PMV BLD AUTO: 11.2 FL (ref 8.9–12.7)
POIKILOCYTOSIS BLD QL SMEAR: PRESENT
POLYCHROMASIA BLD QL SMEAR: PRESENT
POTASSIUM SERPL-SCNC: 3.3 MMOL/L (ref 3.5–5.3)
PROCALCITONIN SERPL-MCNC: 0.48 NG/ML
PROT SERPL-MCNC: 5 G/DL (ref 6.4–8.2)
RBC # BLD AUTO: 3.86 MILLION/UL (ref 3.88–5.62)
RBC MORPH BLD: PRESENT
SODIUM SERPL-SCNC: 137 MMOL/L (ref 136–145)
VARIANT LYMPHS # BLD AUTO: 3 %
WBC # BLD AUTO: 17.06 THOUSAND/UL (ref 4.31–10.16)

## 2021-05-14 PROCEDURE — G1004 CDSM NDSC: HCPCS

## 2021-05-14 PROCEDURE — 74177 CT ABD & PELVIS W/CONTRAST: CPT

## 2021-05-14 PROCEDURE — 80053 COMPREHEN METABOLIC PANEL: CPT | Performed by: INTERNAL MEDICINE

## 2021-05-14 PROCEDURE — C9113 INJ PANTOPRAZOLE SODIUM, VIA: HCPCS | Performed by: INTERNAL MEDICINE

## 2021-05-14 PROCEDURE — 99223 1ST HOSP IP/OBS HIGH 75: CPT | Performed by: INTERNAL MEDICINE

## 2021-05-14 PROCEDURE — 84145 PROCALCITONIN (PCT): CPT | Performed by: INTERNAL MEDICINE

## 2021-05-14 PROCEDURE — 99232 SBSQ HOSP IP/OBS MODERATE 35: CPT | Performed by: INTERNAL MEDICINE

## 2021-05-14 PROCEDURE — 83690 ASSAY OF LIPASE: CPT | Performed by: STUDENT IN AN ORGANIZED HEALTH CARE EDUCATION/TRAINING PROGRAM

## 2021-05-14 PROCEDURE — 85007 BL SMEAR W/DIFF WBC COUNT: CPT | Performed by: INTERNAL MEDICINE

## 2021-05-14 PROCEDURE — 82948 REAGENT STRIP/BLOOD GLUCOSE: CPT

## 2021-05-14 PROCEDURE — 85027 COMPLETE CBC AUTOMATED: CPT | Performed by: INTERNAL MEDICINE

## 2021-05-14 RX ORDER — BISACODYL 10 MG
10 SUPPOSITORY, RECTAL RECTAL DAILY
Status: DISCONTINUED | OUTPATIENT
Start: 2021-05-15 | End: 2021-05-17 | Stop reason: HOSPADM

## 2021-05-14 RX ADMIN — OXYCODONE HYDROCHLORIDE 10 MG: 10 TABLET ORAL at 06:51

## 2021-05-14 RX ADMIN — ENOXAPARIN SODIUM 40 MG: 40 INJECTION SUBCUTANEOUS at 08:33

## 2021-05-14 RX ADMIN — SENNOSIDES, DOCUSATE SODIUM 1 TABLET: 8.6; 5 TABLET ORAL at 08:37

## 2021-05-14 RX ADMIN — IOHEXOL 100 ML: 350 INJECTION, SOLUTION INTRAVENOUS at 13:55

## 2021-05-14 RX ADMIN — HYDROMORPHONE HYDROCHLORIDE 0.5 MG: 1 INJECTION, SOLUTION INTRAMUSCULAR; INTRAVENOUS; SUBCUTANEOUS at 17:36

## 2021-05-14 RX ADMIN — VANCOMYCIN HYDROCHLORIDE 1000 MG: 1 INJECTION, SOLUTION INTRAVENOUS at 05:47

## 2021-05-14 RX ADMIN — TAMSULOSIN HYDROCHLORIDE 0.4 MG: 0.4 CAPSULE ORAL at 21:20

## 2021-05-14 RX ADMIN — SODIUM CHLORIDE 3 G: 9 INJECTION, SOLUTION INTRAVENOUS at 15:10

## 2021-05-14 RX ADMIN — VANCOMYCIN HYDROCHLORIDE 1000 MG: 1 INJECTION, SOLUTION INTRAVENOUS at 12:38

## 2021-05-14 RX ADMIN — SODIUM CHLORIDE 3 G: 9 INJECTION, SOLUTION INTRAVENOUS at 20:03

## 2021-05-14 RX ADMIN — HYDROMORPHONE HYDROCHLORIDE 0.5 MG: 1 INJECTION, SOLUTION INTRAMUSCULAR; INTRAVENOUS; SUBCUTANEOUS at 21:20

## 2021-05-14 RX ADMIN — PANCRELIPASE 24000 UNITS: 24000; 76000; 120000 CAPSULE, DELAYED RELEASE PELLETS ORAL at 12:43

## 2021-05-14 RX ADMIN — OXYCODONE HYDROCHLORIDE 10 MG: 10 TABLET ORAL at 20:03

## 2021-05-14 RX ADMIN — METRONIDAZOLE 500 MG: 500 INJECTION, SOLUTION INTRAVENOUS at 05:06

## 2021-05-14 RX ADMIN — Medication 1 TABLET: at 08:33

## 2021-05-14 RX ADMIN — POLYETHYLENE GLYCOL 3350 17 G: 17 POWDER, FOR SOLUTION ORAL at 17:36

## 2021-05-14 RX ADMIN — FLUOXETINE 20 MG: 20 CAPSULE ORAL at 08:34

## 2021-05-14 RX ADMIN — SENNOSIDES, DOCUSATE SODIUM 1 TABLET: 8.6; 5 TABLET ORAL at 17:46

## 2021-05-14 RX ADMIN — PANCRELIPASE 24000 UNITS: 24000; 76000; 120000 CAPSULE, DELAYED RELEASE PELLETS ORAL at 08:33

## 2021-05-14 RX ADMIN — OMEGA-3 FATTY ACIDS CAP 1000 MG 1000 MG: 1000 CAP at 08:33

## 2021-05-14 RX ADMIN — FOLIC ACID 1 MG: 1 TABLET ORAL at 08:34

## 2021-05-14 RX ADMIN — OXYCODONE HYDROCHLORIDE 2.5 MG: 5 TABLET ORAL at 12:49

## 2021-05-14 RX ADMIN — PANCRELIPASE 24000 UNITS: 24000; 76000; 120000 CAPSULE, DELAYED RELEASE PELLETS ORAL at 17:36

## 2021-05-14 RX ADMIN — ASPIRIN 81 MG: 81 TABLET, CHEWABLE ORAL at 08:33

## 2021-05-14 RX ADMIN — PANTOPRAZOLE SODIUM 40 MG: 40 INJECTION, POWDER, FOR SOLUTION INTRAVENOUS at 08:33

## 2021-05-14 RX ADMIN — ACETAMINOPHEN 650 MG: 325 TABLET, FILM COATED ORAL at 08:33

## 2021-05-14 RX ADMIN — PANTOPRAZOLE SODIUM 40 MG: 40 INJECTION, POWDER, FOR SOLUTION INTRAVENOUS at 20:03

## 2021-05-14 RX ADMIN — CEFEPIME HYDROCHLORIDE 2000 MG: 2 INJECTION, POWDER, FOR SOLUTION INTRAVENOUS at 08:30

## 2021-05-14 NOTE — CONSULTS
Consultation - Infectious Disease   Yakelin Maldonado 73 y o  male MRN: [id number]  [facility] Encounter: [id number]      IMPRESSION & RECOMMENDATIONS:   Impression/Recommendations:  1  Acute cholecystitis  In setting of # 2/3  Status post cholecystostomy 5/12  Cultures with Enterococcus  Clinically stable without sepsis  Blood cultures negative  Rec:  · Change antibiotics to Unasyn  · Serial abdominal exams  · Continues to improve anticipate transition to Augmentin with plan to continue through 5/19 for 7 days total of enterococcal coverage    2  Biliary obstruction  Due to # 3  Status post recent ERCP, CBD stent 4/28  Rec:  · LFTs remain normal    3  Newly diagnosed pancreatic cancer  Recently diagnosed by EUS  Rec:  · Surgical and medical oncology follow-up ongoing    4  Leukocytosis  Likely multifactorial   Consider due to # 1 with insufficient enterococcal coverage  Repeat CT suggest possible evolution of pancreatitis  Patient remains clinically stable without fever  Rec:  · Continue antibiotics as above  · Follow temperatures closely  · Recheck CBC in a m  · Recheck lipase in a m     5   DM  A1c 6 2  The above impression and plan was discussed in detail with the patient  Antibiotics:  Cefepime # 6  Flagyl # 6  Vancomycin # 2    Thank you for this consultation  We will follow along with you  HISTORY OF PRESENT ILLNESS:  Reason for Consult:  Acute cholecystitis    HPI: Yakelin Maldonado is a 73 y o  male with recently diagnosed pancreatic cancer  He initially presented in late April with jaundice and elevated LFTs  CT and MRI showed pancreatic head mass  He underwent ERCP with brush biopsies and common bile duct stent placement  Cytology showed atypical cellular changes  He presented [facility] on 05/05 complaining of epigastric pain  A CT initially showed findings concerning for acute cholecystitis    A right upper quadrant ultrasound was equivocal and ultimately a HIDA scan showed acute cholecystitis  He was started on cefepime and Flagyl  During this hospitalization he also underwent EUS with FNA which showed adenocarcinoma  He was ultimately transferred to [facility] on 05/10 for surgical evaluation  On 05/12 he underwent percutaneous cholecystostomy tube placement  Cultures grew Enterococcus and vancomycin was added  Today his WBC was noted to be worsened he underwent repeat CT which showed new evolution of possible pancreatitis  We are asked to comment on further evaluation and management  Fluid cultures came back growing Enterococcus  He was placed on vancomycin  We are asked to comment on further evaluation and management  In performing this consult, I have reviewed prior admission and outpatient visit records in detail  REVIEW OF SYSTEMS:  Has mild diffuse abdominal pain  Constipation  Endorses by belching  No nausea or vomiting  A complete system-based review of systems is otherwise negative      PAST MEDICAL HISTORY:  Past Medical History:   Diagnosis Date    Arthritis     Cellulitis     LAST ASSESSED: 6/13/14    Enlarged prostate     Epidermal inclusion cyst     LAST ASSESSED: 10/4/13    Erythrasma     LAST ASSESSED: 9/23/13    Furuncle     LAST ASSESSED: 6/11/14    Hyperlipidemia     Hypertension      Past Surgical History:   Procedure Laterality Date    HYDROCELE EXCISION / REPAIR Right 01/11/2018    SPERMATIC CORD EXCSION OF HYDROCELE; MANAGED BY: Adriana CONTRERAS CHOLECYSTOSTOMY TUBE PLACEMENT  5/11/2021    VA REMOVAL OF HYDROCELE,TUNICA,UNILAT Right 1/11/2018    Procedure: HYDROCELECTOMY;  Surgeon: Primo Keyes MD;  Location: [facility];  Service: Urology    SCROTAL SURGERY      benign "lump"       FAMILY HISTORY:  Non-contributory    SOCIAL HISTORY:  Social History     Substance and Sexual Activity   Alcohol Use Never    Frequency: Never     Social History     Substance and Sexual Activity   Drug Use Never     Social History     Tobacco Use   Smoking Status Former Smoker   Smokeless Tobacco Never Used   Tobacco Comment    tobacco in a pipe in the early        ALLERGIES:  Allergies   Allergen Reactions    Fentanyl Anaphylaxis and Other (See Comments)     Oxygen drops severely       MEDICATIONS:  All current active medications have been reviewed  PHYSICAL EXAM:  Vitals:  Temp:  [98 2 °F (36 8 °C)-99 3 °F (37 4 °C)] 98 2 °F (36 8 °C)  HR:  [60-71] 60  Resp:  [18-20] 20  BP: (114-130)/(69-77) 114/69  SpO2:  [92 %-94 %] 94 %  Temp (24hrs), Av 8 °F (37 1 °C), Min:98 2 °F (36 8 °C), Max:99 3 °F (37 4 °C)  Current: Temperature: 98 2 °F (36 8 °C)     Physical Exam:  General:  Well-nourished, well-developed, in no acute distress  Eyes:  Conjunctive clear with no hemorrhages or effusions  Oropharynx:  No ulcers, no lesions  Neck:  Supple, no lymphadenopathy  Lungs:  Clear to auscultation bilaterally, no accessory muscle use  Cardiac:  Regular rate and rhythm, no murmurs  Abdomen:  Soft, non-tender, non-distended, right upper quadrant drain intact  Extremities:  No peripheral cyanosis, clubbing, or edema  Skin:  No rashes, no ulcers  Neurological:  Moves all four extremities spontaneously, sensation grossly intact    LABS, IMAGING, & OTHER STUDIES:  Lab Results:  I have personally reviewed pertinent labs    Results from last 7 days   Lab Units 2131 2148 21  0452   POTASSIUM mmol/L 3 3* 3 7 3 4*   CHLORIDE mmol/L 104 105 104   CO2 mmol/L 29 28 27   BUN mg/dL 9 9 14   CREATININE mg/dL 0 52* 0 62 0 66   EGFR ml/min/1 73sq m 112 104 101   CALCIUM mg/dL 7 7* 8 0* 7 8*   AST U/L 20 32 60*   ALT U/L 40 56 69   ALK PHOS U/L 168* 199* 175*     Results from last 7 days   Lab Units 21  0631 21  0548 21  0452   WBC Thousand/uL 17 06* 15 67* 13 92*   HEMOGLOBIN g/dL 11 8* 12 1 12 0   PLATELETS Thousands/uL 364 320 278     Results from last 7 days   Lab Units 05/11/21  1256   GRAM STAIN RESULT  No polys seen*  2+ Gram positive cocci in pairs and chains*   BODY FLUID CULTURE, STERILE  1+ Growth of Enterococcus faecalis*       Imaging Studies:   I have personally reviewed pertinent imaging study reports and images in PACS  CTA/P reviewed personally increase fluid surrounding pylorus and proximal    EKG, Pathology, and Other Studies:   I have personally reviewed pertinent reports

## 2021-05-14 NOTE — UTILIZATION REVIEW
Continued Stay Review    Date: 5/14/21                          Current Patient Class: IP  Current Level of Care: MS    HPI:66 y o  male initially admitted on 5/10 with pancreatic mass status post ERCP biliary decompression on 04/28, readmitted with abdominal pain, now status post endoscopic ultrasound with biopsy on 05/07 by GI  HIDA was positive for cholecystitis, patient is now s/p perc ashli tube placement by IR on 5/11  Assessment/Plan:   Remains weak/fatigued  Clinically stable w/o sepsis  Procalcitonin trending downward  [place] cholecystic fluid culture growing Enterococcus faecalis -> IV Vancomycin added 5/13 but this is changed today to Unasyn  Continue IV antibiotics, analgesia, antiemetics  Brushings confirm Adeno CA and pt will need adjuvant chemo,will need port as OP  See results for CT AP below  Vital Signs:   05/14/21 07:51:09  98 2 °F (36 8 °C)  60  --  114/69  84  92 %  --   05/13/21 22:17:10  99 3 °F (37 4 °C)  68  20  130/77  95  93 %  --   05/13/21 15:22:26  98 9 °F (37 2 °C)  71  18  130/77  95  94 %  --   05/13/21 07:37:27  98 9 °F (37 2 °C)  71  18  126/66  86  93 %  --   05/13/21 0048  --  --  --  --  --  --  None (Room air)   05/12/21 21:11:01  99 4 °F (37 4 °C)  68  18  128/66  87  93 %  --   05/12/21 18:02:56  98 7 °F (37 1 °C)  87  --  --  --  93 %  --   05/12/21 15:45:24  100 1 °F (37 8 °C)  80  18  112/60  77  95 %  --   05/12/21 07:49:25  98 1 °F (36 7 °C)  69  19  134/74  94  94 %  --     Pertinent Labs/Diagnostic Results:        5/14 CT AP -    1  Marked increase in fluid surrounding inflammatory pylorus and proximal duodenum as described above  Given history of acute cholecystitis, 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  Fluid collection is not yet well formed  If patient develops worsening signs of infection, follow-up imaging recommended to exclude developing abscess    2   Status post cholecystostomy tube placement with decompression of the gallbladder  Biliary stent remains in place with stable pneumobilia  3   Pancreatic head/uncinate process mass as described above, consistent with malignancy    No vascular encasement       Results from last 7 days   Lab Units 05/14/21  0631 05/13/21  0548 05/12/21  0452 05/11/21  0617 05/10/21  0447 05/09/21  0804   WBC Thousand/uL 17 06* 15 67* 13 92* 14 34* 13 66* 13 80*   HEMOGLOBIN g/dL 11 8* 12 1 12 0 11 5* 12 8 13 4   HEMATOCRIT % 34 9* 37 6 36 0* 35 2* 38 6 40 0   PLATELETS Thousands/uL 364 320 278 228 215 195   NEUTROS ABS Thousands/µL  --  13 01*  --  12 02* 12 18*  --    BANDS PCT % 4  --   --   --   --  3         Results from last 7 days   Lab Units 05/14/21  0631 05/13/21  0548 05/12/21  0452 05/11/21  0617 05/10/21  0447   SODIUM mmol/L 137 139 136 139 137   POTASSIUM mmol/L 3 3* 3 7 3 4* 3 3* 3 8   CHLORIDE mmol/L 104 105 104 108 104   CO2 mmol/L 29 28 27 25 25   ANION GAP mmol/L 4 6 5 6 8   BUN mg/dL 9 9 14 15 16   CREATININE mg/dL 0 52* 0 62 0 66 0 66 0 93   EGFR ml/min/1 73sq m 112 104 101 101 86   CALCIUM mg/dL 7 7* 8 0* 7 8* 7 8* 7 7*   MAGNESIUM mg/dL  --   --  2 3  --   --      Results from last 7 days   Lab Units 05/14/21  0631 05/13/21  0548 05/12/21  0452 05/11/21  0617 05/09/21  0804   AST U/L 20 32 60* 44 28   ALT U/L 40 56 69 56 68   ALK PHOS U/L 168* 199* 175* 162* 211*   TOTAL PROTEIN g/dL 5 0* 5 4* 5 1* 4 9* 5 5*   ALBUMIN g/dL 1 8* 2 0* 2 0* 1 9* 2 4*   TOTAL BILIRUBIN mg/dL 0 74 0 71 0 83 0 98 0 96     Results from last 7 days   Lab Units 05/14/21  1132 05/14/21  0753 05/13/21 2047 05/13/21  1640 05/13/21  1109 05/13/21  0740 05/13/21  0542 05/13/21  0009 05/12/21  1707 05/12/21  1109 05/12/21  0529 05/12/21  0022   POC GLUCOSE mg/dl 140 139 129 135 132 117 118 149* 124 190* 166* 145*     Results from last 7 days   Lab Units 05/14/21  0631 05/13/21  0548 05/12/21  0452 05/11/21  0617 05/10/21  0447 05/09/21  0804   GLUCOSE RANDOM mg/dL 152* 108 152* 116 135 135     Results from last 7 days   Lab Units 05/14/21  0631 05/13/21  0548 05/10/21  0447 05/09/21  1031   PROCALCITONIN ng/ml 0 48* 0 82* 0 72* 0 54*     Results from last 7 days   Lab Units 05/09/21  1655 05/09/21  1626   LIPASE u/L  --  221   AMYLASE IU/L 103  --      Results from last 7 days   Lab Units 05/11/21  1256   GRAM STAIN RESULT  No polys seen*  2+ Gram positive cocci in pairs and chains*   BODY FLUID CULTURE, STERILE  1+ Growth of Enterococcus faecalis*     Medications:   Scheduled Medications:  ampicillin-sulbactam, 3 g, Intravenous, Q6H  aspirin, 81 mg, Oral, Daily  enoxaparin, 40 mg, Subcutaneous, Q24H INGRID  fish oil, 1,000 mg, Oral, Daily  FLUoxetine, 20 mg, Oral, Daily  folic acid, 1 mg, Oral, Daily  insulin lispro, 1-6 Units, Subcutaneous, TID With Meals  lidocaine, 1 patch, Topical, Daily  multivitamin-minerals, 1 tablet, Oral, Daily  pancrelipase (Lip-Prot-Amyl), 24,000 Units, Oral, TID With Meals  pantoprazole, 40 mg, Intravenous, Q12H INGRID  polyethylene glycol, 17 g, Oral, Daily  senna-docusate sodium, 1 tablet, Oral, BID  tamsulosin, 0 4 mg, Oral, HS      Continuous IV Infusions:     PRN Meds:  acetaminophen, 650 mg, Oral, Q6H PRN -x 1 5/14  fentanyl citrate (PF), , Intravenous, Code/Trauma/Sedation Med  HYDROmorphone, 0 5 mg, Intravenous, Q4H PRN -x 1 5/13  midazolam, , , Code/Trauma/Sedation Med  oxyCODONE, 10 mg, Oral, Q6H PRN - x 2 5/13, x 1 5/14  oxyCODONE, 2 5 mg, Oral, Q6H PRN -x 1 5/14  oxyCODONE, 5 mg, Oral, Q6H PRN  sincalide, 0 02 mcg/kg (Order-Specific), Intravenous, Once in imaging    Discharge Plan: D    Network Utilization Review Department  ATTENTION: Please call with any questions or concerns to [phone] and carefully listen to the prompts so that you are directed to the right person   All voicemails are confidential   [facility] all requests for admission clinical reviews, approved or denied determinations and any other requests to dedicated fax number below belonging to the campus where the patient is receiving treatment   List of dedicated fax numbers for the Facilities:  [address] [place] [address] DENIALS (Administrative/Medical Necessity) [phone]   [address] (Maternity/NICU/Pediatrics) [phone] [address] Jovan Naylor [address] [id number] [place] [address] Meenak[address] Ange [address] [address] [place], [place] [address] [phone]

## 2021-05-14 NOTE — QUICK NOTE
Surgical oncology team was asked to re-evaluate the patient after new CT findings  CT scan was ordered for persistent leukocytosis and elevated procalcitonin  CT shows marked increase in fluid surrounding inflammatory pylorus and proximal duodenum  Percutaneous cholecystostomy tube is in place with a decompressed gallbladder  Patient does complain of pain in his lower abdomen  The upper abdominal pain he presented with has subsided  Endorses early satiety, but no increased pain, nausea or vomiting with eating  On exam, the patient is tender around the perc ashli drain and epigastric region, but more tender in the lower quadrants    Do not suspect his persistent leukocytosis is from a non-functioning perc ashli tube as the tube is in proper position on CT scan with a decompressed gallbladder and the tube is drainage 100-150 cc bilious drainage a day  Continue Abx per ID  IR believes the CBD stent may be too long, causing obstruction which could cause pancreatitis  Spoke with Dr Jyothi Gaines with GI on the phone who states they will evaluate the patient  Recommend checking a lipase  Will make the patient NPO at this time in preparation for possible endoscopic evaluation of CBD stent tomorrow by GI

## 2021-05-14 NOTE — PROGRESS NOTES
[address] [facility]  Progress Note - Lynn Sport 1955, 73 y o  male MRN: [id number]  Unit/Bed#: [facility] 928-01 Encounter: [id number]  Primary Care Provider: Fabiana Poole MD   Date and time admitted to hospital: 5/10/2021 10:40 AM      * Acute cholecystitis  Assessment & Plan  HIDA scan on 5/10 revealed "Nonvisualization of the gallbladder on delayed imaging up to 4 hours  Findings suspicious for cystic duct obstruction and acute cholecystitis "  Underwent IR guided cholecystostomy tube placement on 5/11 as ultrasound imaging noted presence of eliu cholecystic fluid - fluid culture growing Enterococcus faecalis -> IV Vancomycin added yesterday to current antibiotic course consisting of Cefepime/Flagyl - will appreciate Infectious disease evaluation for assistance in antibiotic management  Blood cultures from 5/5 are negative  Procalcitonin trended up from 0 72 -> 0 82 -> improved to 0 48 today   PRN pain/emesis control - supportive care otherwise    Pancreatic mass  Assessment & Plan  Pancreatic mass identified a previous admission on CT scan done on 4/29 - presented w/ biliary obstruction which improved s/p biliary duct stenting- brushing unfortunately was nondiagnostic  CT imaging on 5/5 revealed a "3 8 cm obstructing mass in the region of the head of the pancreas with marked pancreatic ductal dilatation, worrisome for adenocarcinoma  Other tumors not excluded "  Subsequent right upper quadrant ultrasound on 5/6 noting a "Mild gallbladder wall thickening without additional evidence of acute cholecystitis  Pancreatic head mass again identified    Associated pancreatic ductal dilatation "  S/p endoscopic ultrasound w/ biopsies taken and celiac plexus nerve block on 5/7 - fine-needle aspiration report confirmed adenocarcinoma  Eventual plan for Whipple's procedure and hopeful chemotherapy once infection resolves noted - surgical oncology following  PRN pain control - supportive care otherwise    Hyperlipidemia  Assessment & Plan  Continue ASA/fish oil    BPH (benign prostatic hyperplasia)  Assessment & Plan  Continue Flomax    Leukocytosis  Assessment & Plan  Likely reactive due to acute medical issue(s)  WBC count continues to elevate  Currently afebrile but developed a low-grade fever 100 1° on the afternoon of 5/12  Repeat CT imaging today noting "Marked increase in fluid surrounding inflammatory pylorus and proximal duodenum as described above  Given history of acute cholecystitis, 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  Fluid collection is not yet well formed  If patient develops worsening signs of infection, follow-up imaging recommended to exclude developing abscess  Alejandra Soulier Alejandra Soulier Status post cholecystostomy tube placement with decompression of the gallbladder  Biliary stent remains in place with stable pneumobilia  Alejandra Soulier Alejandra Soulier Pancreatic head/uncinate process mass as described above, consistent with malignancy  No vascular encasement "  Await Infectious Disease input regarding antibiotic regimen/management    Essential hypertension  Assessment & Plan  Previously off agents due to borderline hypotensive states - remains normotensive w/o medication    Hypokalemia  Assessment & Plan  Monitor/replete serum potassium  Serum magnesium normal    Diabetes mellitus type 2  Assessment & Plan  Lab Results   Component Value Date    HGBA1C 6 2 (H) 05/05/2021     On SSI coverage per Accu-Cheks  Carbohydrate restricted diet      DVT Prophylaxis:  Lovenox       Patient Centered Rounds:  I have performed bedside rounds and discussed plan of care with nursing today  Discussions with Specialists or Other Care Team Provider:  see above assessments if applicable    Education and Discussions with Family / Patient: Patient at bedside    Time Spent for Care:  32 minutes  More than 50% of total time spent on counseling and coordination of care as described above      Current Length of Stay: 4 day(s)    Current Patient Status: Inpatient   Certification Statement:  Patient will continue to require additional hospital stay due to assessments as noted above  Code Status: Level 1 - Full Code        Subjective:     Weakness/fatigue improved today  Appetite also improved  Complains some abdominal distention/fullness but denies actual pain currently  Denies fever/chills  Objective:     Vitals:   Temp (24hrs), Av 8 °F (37 1 °C), Min:98 2 °F (36 8 °C), Max:99 3 °F (37 4 °C)    Temp:  [98 2 °F (36 8 °C)-99 3 °F (37 4 °C)] 98 9 °F (37 2 °C)  HR:  [60-68] 66  Resp:  [20] 20  BP: (114-130)/(69-77) 130/77  SpO2:  [92 %-96 %] 96 %  Body mass index is 25 25 kg/m²  Input and Output Summary (last 24 hours):        Intake/Output Summary (Last 24 hours) at 2021 1621  Last data filed at 2021 1510  Gross per 24 hour   Intake 1730 ml   Output 1850 ml   Net -120 ml       Physical Exam:     GENERAL:  Improving weakness/fatigue  HEAD:  Normocephalic - atraumatic  EYES: PERRL - EOMI   MOUTH:  Mucosa moist  NECK:  Supple - full range of motion  CARDIAC:  Regular rate/rhythm - S1/S2 positive  PULMONARY:  Clear breath sounds bilaterally - nonlabored respirations  ABDOMEN:  Soft - improving tenderness around cholecystostomy insertion site - distension noted today - active bowel sounds  MUSCULOSKELETAL:  Motor strength/range of motion intact  NEUROLOGIC:  Alert/oriented at baseline  SKIN:  Chronic wrinkles/blemishes   PSYCHIATRIC:  Mood/affect stable      Additional Data:     Labs & Recent Cultures:    Results from last 7 days   Lab Units 21  0631 21  0548   WBC Thousand/uL 17 06* 15 67*   HEMOGLOBIN g/dL 11 8* 12 1   HEMATOCRIT % 34 9* 37 6   PLATELETS Thousands/uL 364 320   BANDS PCT % 4  --    NEUTROS PCT %  --  83*   LYMPHS PCT %  --  6*   LYMPHO PCT % 1*  --    MONOS PCT %  --  8   MONO PCT % 7  --    EOS PCT % 0 1     Results from last 7 days   Lab Units 21  0631   POTASSIUM mmol/L 3 3*   CHLORIDE mmol/L 104   CO2 mmol/L 29   BUN mg/dL 9   CREATININE mg/dL 0 52*   CALCIUM mg/dL 7 7*   ALK PHOS U/L 168*   ALT U/L 40   AST U/L 20         Results from last 7 days   Lab Units 05/14/21  1132 05/14/21  0753 05/13/21  2047 05/13/21  1640 05/13/21  1109 05/13/21  0740 05/13/21  0542 05/13/21  0009 05/12/21  1707 05/12/21  1109 05/12/21  0529 05/12/21  0022   POC GLUCOSE mg/dl 140 139 129 135 132 117 118 149* 124 190* 166* 145*         Results from last 7 days   Lab Units 05/14/21  0631 05/13/21  0548 05/10/21  0447 05/09/21  1031   PROCALCITONIN ng/ml 0 48* 0 82* 0 72* 0 54*         Results from last 7 days   Lab Units 05/11/21  1256   GRAM STAIN RESULT  No polys seen*  2+ Gram positive cocci in pairs and chains*   BODY FLUID CULTURE, STERILE  1+ Growth of Enterococcus faecalis*         Last 24 Hours Medication List:   Current Facility-Administered Medications   Medication Dose Route Frequency Provider Last Rate    acetaminophen  650 mg Oral Q6H PRN Lashawn Oleary MD      ampicillin-sulbactam  3 g Intravenous Q6H Ester Tsang MD 3 g (05/14/21 1510)    aspirin  81 mg Oral Daily Yeni Kent MD      [START ON 5/15/2021] bisacodyl  10 mg Rectal Daily Lashawn Oleary MD      enoxaparin  40 mg Subcutaneous Q24H Shahzad Ya MD      fentanyl citrate (PF)   Intravenous Code/Trauma/Sedation Eduardo Hurd MD      fish oil  1,000 mg Oral Daily Yeni Kent MD      FLUoxetine  20 mg Oral Daily Yeni Kent MD      folic acid  1 mg Oral Daily Yeni Kent MD      HYDROmorphone  0 5 mg Intravenous Q4H PRN Yeni Kent MD      insulin lispro  1-6 Units Subcutaneous TID With Meals Malaika Castillo PA-C      lidocaine  1 patch Topical Daily Radha Brody DO      midazolam    Code/Trauma/Sedation Eduardo Hurd MD      multivitamin-minerals  1 tablet Oral Daily Yeni Kent MD      oxyCODONE  10 mg Oral Q6H PRN Yeni Kent MD      oxyCODONE 2 5 mg Oral Q6H PRN Grge Leiva MD      oxyCODONE  5 mg Oral Q6H PRN Greg Leiva MD      pancrelipase (Lip-Prot-Amyl)  24,000 Units Oral TID With Meals Greg Leiva MD      pantoprazole  40 mg Intravenous Q12H [address] Greg Leiva MD      polyethylene glycol  17 g Oral Daily Greg Leiva MD      senna-docusate sodium  1 tablet Oral BID Greg Leiva MD      sincalide  0 02 mcg/kg (Order-Specific) Intravenous Once in imaging Preston Art, DO      sodium chloride (PF)  3 mL Intravenous Q1H PRN Greg Leiva MD      tamsulosin  0 4 mg Oral HS Greg Leiva MD                ** Please Note: This note is constructed using a voice recognition dictation system  An occasional wrong word/phrase or sound-a-like substitution may have been picked up by dictation device due to the inherent limitations of voice recognition software  Read the chart carefully and recognize, using reasonable context, where substitutions may have occurred  **

## 2021-05-14 NOTE — ASSESSMENT & PLAN NOTE
HIDA scan on 5/10 revealed "Nonvisualization of the gallbladder on delayed imaging up to 4 hours   Findings suspicious for cystic duct obstruction and acute cholecystitis "  Underwent IR guided cholecystostomy tube placement on 5/11 as ultrasound imaging noted presence of eliu cholecystic fluid - fluid culture growing Enterococcus faecalis -> IV Vancomycin added yesterday to current antibiotic course consisting of Cefepime/Flagyl - will appreciate Infectious disease evaluation for assistance in antibiotic management  Blood cultures from 5/5 are negative  Procalcitonin trended up from 0 72 -> 0 82 -> improved to 0 48 today   PRN pain/emesis control - supportive care otherwise

## 2021-05-14 NOTE — ASSESSMENT & PLAN NOTE
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

## 2021-05-15 LAB
ALBUMIN SERPL BCP-MCNC: 1.9 G/DL (ref 3.5–5)
ALP SERPL-CCNC: 194 U/L (ref 46–116)
ALT SERPL W P-5'-P-CCNC: 35 U/L (ref 12–78)
ANION GAP SERPL CALCULATED.3IONS-SCNC: 6 MMOL/L (ref 4–13)
AST SERPL W P-5'-P-CCNC: 20 U/L (ref 5–45)
BILIRUB SERPL-MCNC: 0.72 MG/DL (ref 0.2–1)
BUN SERPL-MCNC: 9 MG/DL (ref 5–25)
CALCIUM ALBUM COR SERPL-MCNC: 9.6 MG/DL (ref 8.3–10.1)
CALCIUM SERPL-MCNC: 7.9 MG/DL (ref 8.3–10.1)
CHLORIDE SERPL-SCNC: 106 MMOL/L (ref 100–108)
CO2 SERPL-SCNC: 28 MMOL/L (ref 21–32)
CREAT SERPL-MCNC: 0.51 MG/DL (ref 0.6–1.3)
ERYTHROCYTE [DISTWIDTH] IN BLOOD BY AUTOMATED COUNT: 15.4 % (ref 11.6–15.1)
GFR SERPL CREATININE-BSD FRML MDRD: 113 ML/MIN/1.73SQ M
GLUCOSE SERPL-MCNC: 109 MG/DL (ref 65–140)
GLUCOSE SERPL-MCNC: 111 MG/DL (ref 65–140)
GLUCOSE SERPL-MCNC: 124 MG/DL (ref 65–140)
GLUCOSE SERPL-MCNC: 125 MG/DL (ref 65–140)
GLUCOSE SERPL-MCNC: 125 MG/DL (ref 65–140)
HCT VFR BLD AUTO: 38 % (ref 36.5–49.3)
HGB BLD-MCNC: 12.5 G/DL (ref 12–17)
LIPASE SERPL-CCNC: 318 U/L (ref 73–393)
MCH RBC QN AUTO: 29.6 PG (ref 26.8–34.3)
MCHC RBC AUTO-ENTMCNC: 32.9 G/DL (ref 31.4–37.4)
MCV RBC AUTO: 90 FL (ref 82–98)
NRBC BLD AUTO-RTO: 0 /100 WBCS
PLATELET # BLD AUTO: 459 THOUSANDS/UL (ref 149–390)
PMV BLD AUTO: 11.3 FL (ref 8.9–12.7)
POTASSIUM SERPL-SCNC: 3.6 MMOL/L (ref 3.5–5.3)
PROCALCITONIN SERPL-MCNC: 0.41 NG/ML
PROT SERPL-MCNC: 5.5 G/DL (ref 6.4–8.2)
RBC # BLD AUTO: 4.23 MILLION/UL (ref 3.88–5.62)
SODIUM SERPL-SCNC: 140 MMOL/L (ref 136–145)
WBC # BLD AUTO: 15.92 THOUSAND/UL (ref 4.31–10.16)

## 2021-05-15 PROCEDURE — 85027 COMPLETE CBC AUTOMATED: CPT | Performed by: INTERNAL MEDICINE

## 2021-05-15 PROCEDURE — 83690 ASSAY OF LIPASE: CPT | Performed by: INTERNAL MEDICINE

## 2021-05-15 PROCEDURE — 84145 PROCALCITONIN (PCT): CPT | Performed by: INTERNAL MEDICINE

## 2021-05-15 PROCEDURE — 99232 SBSQ HOSP IP/OBS MODERATE 35: CPT | Performed by: INTERNAL MEDICINE

## 2021-05-15 PROCEDURE — C9113 INJ PANTOPRAZOLE SODIUM, VIA: HCPCS | Performed by: INTERNAL MEDICINE

## 2021-05-15 PROCEDURE — 80053 COMPREHEN METABOLIC PANEL: CPT | Performed by: INTERNAL MEDICINE

## 2021-05-15 PROCEDURE — 99223 1ST HOSP IP/OBS HIGH 75: CPT | Performed by: INTERNAL MEDICINE

## 2021-05-15 PROCEDURE — NC001 PR NO CHARGE: Performed by: SURGERY

## 2021-05-15 PROCEDURE — 82948 REAGENT STRIP/BLOOD GLUCOSE: CPT

## 2021-05-15 RX ORDER — DICYCLOMINE HYDROCHLORIDE 10 MG/1
10 CAPSULE ORAL
Status: DISCONTINUED | OUTPATIENT
Start: 2021-05-15 | End: 2021-05-17 | Stop reason: HOSPADM

## 2021-05-15 RX ADMIN — DICYCLOMINE HYDROCHLORIDE 10 MG: 10 CAPSULE ORAL at 21:11

## 2021-05-15 RX ADMIN — TAMSULOSIN HYDROCHLORIDE 0.4 MG: 0.4 CAPSULE ORAL at 21:11

## 2021-05-15 RX ADMIN — FOLIC ACID 1 MG: 1 TABLET ORAL at 08:55

## 2021-05-15 RX ADMIN — DICYCLOMINE HYDROCHLORIDE 10 MG: 10 CAPSULE ORAL at 17:24

## 2021-05-15 RX ADMIN — ASPIRIN 81 MG: 81 TABLET, CHEWABLE ORAL at 08:56

## 2021-05-15 RX ADMIN — SENNOSIDES, DOCUSATE SODIUM 1 TABLET: 8.6; 5 TABLET ORAL at 17:24

## 2021-05-15 RX ADMIN — PANCRELIPASE 24000 UNITS: 24000; 76000; 120000 CAPSULE, DELAYED RELEASE PELLETS ORAL at 17:24

## 2021-05-15 RX ADMIN — SODIUM CHLORIDE 3 G: 9 INJECTION, SOLUTION INTRAVENOUS at 19:52

## 2021-05-15 RX ADMIN — SENNOSIDES, DOCUSATE SODIUM 1 TABLET: 8.6; 5 TABLET ORAL at 08:55

## 2021-05-15 RX ADMIN — PANTOPRAZOLE SODIUM 40 MG: 40 INJECTION, POWDER, FOR SOLUTION INTRAVENOUS at 08:56

## 2021-05-15 RX ADMIN — SODIUM CHLORIDE 3 G: 9 INJECTION, SOLUTION INTRAVENOUS at 08:55

## 2021-05-15 RX ADMIN — OXYCODONE HYDROCHLORIDE 10 MG: 10 TABLET ORAL at 21:16

## 2021-05-15 RX ADMIN — OXYCODONE HYDROCHLORIDE 10 MG: 10 TABLET ORAL at 03:39

## 2021-05-15 RX ADMIN — FLUOXETINE 20 MG: 20 CAPSULE ORAL at 08:55

## 2021-05-15 RX ADMIN — PANCRELIPASE 24000 UNITS: 24000; 76000; 120000 CAPSULE, DELAYED RELEASE PELLETS ORAL at 12:02

## 2021-05-15 RX ADMIN — PANTOPRAZOLE SODIUM 40 MG: 40 INJECTION, POWDER, FOR SOLUTION INTRAVENOUS at 21:12

## 2021-05-15 RX ADMIN — OXYCODONE HYDROCHLORIDE 10 MG: 10 TABLET ORAL at 14:09

## 2021-05-15 RX ADMIN — HYDROMORPHONE HYDROCHLORIDE 0.5 MG: 1 INJECTION, SOLUTION INTRAMUSCULAR; INTRAVENOUS; SUBCUTANEOUS at 23:43

## 2021-05-15 RX ADMIN — BISACODYL 10 MG: 10 SUPPOSITORY RECTAL at 19:48

## 2021-05-15 RX ADMIN — OMEGA-3 FATTY ACIDS CAP 1000 MG 1000 MG: 1000 CAP at 08:55

## 2021-05-15 RX ADMIN — SODIUM CHLORIDE 3 G: 9 INJECTION, SOLUTION INTRAVENOUS at 01:46

## 2021-05-15 RX ADMIN — SODIUM CHLORIDE 3 G: 9 INJECTION, SOLUTION INTRAVENOUS at 14:13

## 2021-05-15 RX ADMIN — Medication 1 TABLET: at 08:56

## 2021-05-15 NOTE — PROGRESS NOTES
Progress Note - Infectious Disease   Gretta Hill 73 y o  male MRN: [id number]  Unit/Bed#: [facility] 928-01 Encounter: [id number]      Impression/Recommendations:  1  Acute cholecystitis  In setting of # 2/3  Status post cholecystostomy   Cultures with Enterococcus  Clinically stable without sepsis  Blood cultures negative  Rec:  ? Continue Unasyn for now  ? Serial abdominal exams  ? When ready for D/C can change to Augmentin with plan to continue through  for 7 days total of enterococcal coverage     2  Mild groove pancreatitis  Possibly due to recent ERCP versus CBD stent itself  Relatively asymptomatic and lipase now normal   Rec:  · Serial abdominal exams  · GI follow up ongoing    3  Biliary obstruction  Due to # 3  Status post recent ERCP, CBD stent   LFTs remain normal     4  Newly diagnosed pancreatic cancer  Recently diagnosed by EUS  Surgical and medical oncology follow-up ongoing     5  Leukocytosis  Likely multifactorial   Consider due to # 1 with insufficient enterococcal coverage  Consider also role of mild pancreatitis  Patient remains clinically stable without fever  Improving  Rec:  ? Continue antibiotics as above  ? Follow temperatures closely  ? Recheck CBC in a m      6   DM  A1c 6 2      The above impression and plan was discussed in detail with the patient      Antibiotics:  Unasyn # 2  (Enterococcal antibiotics #3)    Subjective:  Patient seen on AM rounds  Feels OK  Had single episode of random sharp stabbing pain around drain while sleeping, now resolved  Denies fevers, chills, sweats, nausea, vomiting, or diarrhea  Denies current abdominal pain  24 Hour Events:  No documented fevers, chills, sweats, nausea, vomiting, or diarrhea      Objective:  Vitals:  Temp:  [97 6 °F (36 4 °C)-99 2 °F (37 3 °C)] 97 6 °F (36 4 °C)  HR:  [66-78] 75  Resp:  [18-20] 20  BP: (107-130)/(72-92) 107/72  SpO2:  [92 %-96 %] 93 %  Temp (24hrs), Av 6 °F (37 °C), Min:97 6 °F (36 4 °C), Max:99 2 °F (37 3 °C)  Current: Temperature: 97 6 °F (36 4 °C)    Physical Exam:   General:  No acute distress  Psychiatric:  Awake and alert  Pulmonary:  Normal respiratory excursion without accessory muscle use  Abdomen:  Soft, nontender  Extremities:  No edema  Skin:  No rashes    Lab Results:  I have personally reviewed pertinent labs  Results from last 7 days   Lab Units 05/15/21  0610 05/14/21  0631 05/13/21  0548   POTASSIUM mmol/L 3 6 3 3* 3 7   CHLORIDE mmol/L 106 104 105   CO2 mmol/L 28 29 28   BUN mg/dL 9 9 9   CREATININE mg/dL 0 51* 0 52* 0 62   EGFR ml/min/1 73sq m 113 112 104   CALCIUM mg/dL 7 9* 7 7* 8 0*   AST U/L 20 20 32   ALT U/L 35 40 56   ALK PHOS U/L 194* 168* 199*     Results from last 7 days   Lab Units 05/15/21  0610 05/14/21  0631 05/13/21  0548   WBC Thousand/uL 15 92* 17 06* 15 67*   HEMOGLOBIN g/dL 12 5 11 8* 12 1   PLATELETS Thousands/uL 459* 364 320     Results from last 7 days   Lab Units 05/11/21  1256   GRAM STAIN RESULT  No polys seen*  2+ Gram positive cocci in pairs and chains*   BODY FLUID CULTURE, STERILE  1+ Growth of Enterococcus faecalis*       Imaging Studies:   I have personally reviewed pertinent imaging study reports and images in PACS  CT A/P with probable groove pancreatitis    EKG, Pathology, and Other Studies:   I have personally reviewed pertinent reports

## 2021-05-15 NOTE — ASSESSMENT & PLAN NOTE
Pancreatic mass identified a previous admission on CT scan done on 4/29 - presented w/ biliary obstruction which improved s/p biliary duct stenting- brushing unfortunately was nondiagnostic  CT imaging on 5/5 revealed a "3 8 cm obstructing mass in the region of the head of the pancreas with marked pancreatic ductal dilatation, worrisome for adenocarcinoma  Other tumors not excluded "  Subsequent right upper quadrant ultrasound on 5/6 noting a "Mild gallbladder wall thickening without additional evidence of acute cholecystitis  Pancreatic head mass again identified    Associated pancreatic ductal dilatation "  S/p endoscopic ultrasound w/ biopsies taken and celiac plexus nerve block on 5/7 - fine-needle aspiration report confirmed adenocarcinoma  Eventual plan for Whipple's procedure and hopeful chemotherapy once infection resolves noted - surgical oncology following with plan for port placement during hospital course  PRN pain control - supportive care otherwise

## 2021-05-15 NOTE — ASSESSMENT & PLAN NOTE
HIDA scan on 5/10 revealed "Nonvisualization of the gallbladder on delayed imaging up to 4 hours   Findings suspicious for cystic duct obstruction and acute cholecystitis "  Underwent IR guided cholecystostomy tube placement on 5/11 as ultrasound imaging noted presence of eliu cholecystic fluid - fluid culture growing Enterococcus faecalis -> appreciate Infectious Disease input with transition of antibiotic course to IV Unasyn currently  Blood cultures from 5/5 are negative  Procalcitonin trended up from 0 72 -> 0 82 -> 0 48 -> 0 41 today  PRN pain/emesis control - supportive care otherwise

## 2021-05-15 NOTE — CONSULTS
Consultation - [facility]) [facility]  Toney Julio 73 y o  male MRN: [id number]  Unit/Bed#: [facility]PHP 928-01 Encounter: [id number]        Inpatient consult to gastroenterology     Performed by  Emerita Mcgovern MD     Authorized by Manny Reyes DO          Reason for Consult / Principal Problem:   CBD Stent, Pancreatitis    ASSESSMENT AND PLAN:    Toney Julio is a 73 y o  old male with PMH:  DM 2, recently diagnosed pancreatic cancer, biliary obstruction status post ERCP with stent placement on 04/28 who presented with:    #Pancreatitis  Patient underwent ERCP on 04/28 with fully covered metal stent placed across the biliary stricture  EUS a week later showed evidence of pancreatic adenocarcinoma  GI team was consulted for possible concern of pancreatitis from biliary stent  Though it is possible to have this at this point patient's pancreatitis has improved and he is not showing any signs of acute cholangitis  Would hold off on any urgent endoscopy at this time to change CBD stent  However if he continues to have evidence of inflammation can consider upper endoscopy to evaluate the site at the location of the proximal duodenum given the inflamed pylorus  Imagings and plan discussed with surgical team      Plan:  -gradually advance diet today  -continue IV fluids and pain control per primary team  -if no improvement in symptoms can consider upper endoscopy on Monday  -PO Bentyl added for pain symptoms  -CMP, CBC qdaily    #Cholecystitis s/p Percutaneous Cholecystomy  When CBD stents are significantly elevated they can occasionally block off the cystic duct and cause cholecystitis  After reviewing images it is not completely clear if the location would cause walling off of the cystic duct  Patient has cystitis is better now that he has a percutaneous cholecystostomy tube placed by IR on 05/11    Denies any significant right upper quadrant abdominal pain and continues to do well with drainage   -monitor drain output    #Recently Diagnosed Pancreatic Adenocarcinoma  Patient recently diagnosed pancreatic adenocarcinoma on EUS last week  Plan at this time after evaluation by surgery Oncology team is to start with neoadjuvant chemotherapy prior to possible surgical intervention  Patient did undergo celiac plexus block the same time of the EUS  -appreciate Surgical Oncology recommendations    ______________________________________________________________________    HPI:    Patient is a 52-year-old gentleman who was recently seen at [facility] in the setting recently diagnosed pancreatic mass suspected to be pancreatic adenocarcinoma  Was noted to have obstructive jaundice as well  While at [facility] patient underwent ERCP on 04/28/2021 with brushings as well as fully covered metal stent across a biliary stricture with brushings performed  Brushings were inconclusive at that time did have atypical cells  Patient was CT scan of the abdomen pelvis which showed uniform mural hyperenhancement of the gallbladder wall and worrisome symptoms for acute cholecystitis  Pneumobilia was seen within the CBD stent  And 3 8 cm obstructing mass was seen in the head of the pancreas with marked pancreatic ductal dilations worrisome for adenocarcinoma  Patient then underwent EUS on 05/07/2021 which showed a single pedunculated hypoechoic T2 mass measuring 3 3 x 2 6 cm with poorly defined margins and underwent plexus neural lysis given his abdominal pain  Biopsies came back and showed adenocarcinoma  CA 19 9 elevated at 487, AFP and CEA were normal   Patient continued to have some worsening abdominal pain hence GI team was consulted for evaluation of pancreatitis possibly being caused from biliary stent  REVIEW OF SYSTEMS:    CONSTITUTIONAL: Denies any fever, chills, rigors, and weight loss  HEENT: No earache or tinnitus  Denies hearing loss or visual disturbances    CARDIOVASCULAR: No chest pain or palpitations  RESPIRATORY: Denies any cough, hemoptysis, shortness of breath or dyspnea on exertion  GASTROINTESTINAL: As noted in the History of Present Illness  GENITOURINARY: No problems with urination  Denies any hematuria or dysuria  NEUROLOGIC: No dizziness or vertigo, denies headaches  MUSCULOSKELETAL: Denies any muscle or joint pain  SKIN: Denies skin rashes or itching  ENDOCRINE: Denies excessive thirst  Denies intolerance to heat or cold  PSYCHOSOCIAL: Denies depression or anxiety  Denies any recent memory loss       Historical Information   Past Medical History:   Diagnosis Date    Arthritis     Cellulitis     LAST ASSESSED: 6/13/14    Enlarged prostate     Epidermal inclusion cyst     LAST ASSESSED: 10/4/13    Erythrasma     LAST ASSESSED: 9/23/13    Furuncle     LAST ASSESSED: 6/11/14    Hyperlipidemia     Hypertension      Past Surgical History:   Procedure Laterality Date    HYDROCELE EXCISION / REPAIR Right 01/11/2018    SPERMATIC CORD EXCSION OF HYDROCELE; MANAGED BY: Ino CONTRERAS CHOLECYSTOSTOMY TUBE PLACEMENT  5/11/2021    DE REMOVAL OF HYDROCELE,TUNICA,UNILAT Right 1/11/2018    Procedure: HYDROCELECTOMY;  Surgeon: Mary Womack MD;  Location: [facility]  MAIN [facility];  Service: Urology    SCROTAL SURGERY      benign "lump"     Social History   Social History     Substance and Sexual Activity   Alcohol Use Never    Frequency: Never     Social History     Substance and Sexual Activity   Drug Use Never     Social History     Tobacco Use   Smoking Status Former Smoker   Smokeless Tobacco Never Used   Tobacco Comment    tobacco in a pipe in the early 1980's     Family History   Problem Relation Age of Onset    Heart disease Father         CARDIAC DISORDER    Hypertension Father     Hypertension Mother     No Known Problems Maternal Aunt     No Known Problems Maternal Uncle     No Known Problems Paternal Aunt     No Known Problems Paternal Uncle     No Known Problems Paternal Grandmother     No Known Problems Paternal Grandfather     Diabetes Maternal Grandmother         MELLITUS    No Known Problems Maternal Grandfather     Hypertension Other        Meds/Allergies     Medications Prior to Admission   Medication    aspirin 81 mg chewable tablet    Blood Glucose Monitoring Suppl (ONE TOUCH ULTRA 2) w/Device KIT    FLUoxetine (PROzac) 20 mg capsule    folic acid (FOLVITE) 1 mg tablet    glucose blood (OneTouch Ultra) test strip    Lancets (onetouch ultrasoft) lancets    lisinopril-hydrochlorothiazide (PRINZIDE,ZESTORETIC) 10-12 5 MG per tablet    Multiple Vitamins-Minerals (MULTIVITAMIN MEN) TABS    Omega-3 Fatty Acids (FISH OIL) 1,000 mg    pancrelipase, Lip-Prot-Amyl, (CREON) 24,000 units    tamsulosin (FLOMAX) 0 4 mg    omeprazole (PriLOSEC) 20 mg delayed release capsule    oxyCODONE (ROXICODONE) 5 mg immediate release tablet    polyethylene glycol (MIRALAX) 17 g packet    senna-docusate sodium (SENOKOT S) 8 6-50 mg per tablet     Current Facility-Administered Medications   Medication Dose Route Frequency    acetaminophen (TYLENOL) tablet 650 mg  650 mg Oral Q6H PRN    ampicillin-sulbactam (UNASYN) 3 g in sodium chloride 0 9 % 100 mL IVPB  3 g Intravenous Q6H    aspirin chewable tablet 81 mg  81 mg Oral Daily    bisacodyl (DULCOLAX) rectal suppository 10 mg  10 mg Rectal Daily    enoxaparin (LOVENOX) subcutaneous injection 40 mg  40 mg Subcutaneous Q24H INGRID    fentanyl citrate (PF) 100 MCG/2ML   Intravenous Code/Trauma/Sedation Med    fish oil capsule 1,000 mg  1,000 mg Oral Daily    FLUoxetine (PROzac) capsule 20 mg  20 mg Oral Daily    folic acid (FOLVITE) tablet 1 mg  1 mg Oral Daily    HYDROmorphone (DILAUDID) injection 0 5 mg  0 5 mg Intravenous Q4H PRN    insulin lispro (HumaLOG) 100 units/mL subcutaneous injection 1-6 Units  1-6 Units Subcutaneous TID With Meals    lidocaine (LIDODERM) 5 % patch 1 patch  1 patch Topical Daily  midazolam (VERSED) injection    Code/Trauma/Sedation Med    multivitamin-minerals (CENTRUM) tablet 1 tablet  1 tablet Oral Daily    oxyCODONE (ROXICODONE) immediate release tablet 10 mg  10 mg Oral Q6H PRN    oxyCODONE (ROXICODONE) IR tablet 2 5 mg  2 5 mg Oral Q6H PRN    oxyCODONE (ROXICODONE) IR tablet 5 mg  5 mg Oral Q6H PRN    pancrelipase (Lip-Prot-Amyl) (CREON) delayed release capsule 24,000 Units  24,000 Units Oral TID With Meals    pantoprazole (PROTONIX) injection 40 mg  40 mg Intravenous Q12H [facility]    polyethylene glycol (MIRALAX) packet 17 g  17 g Oral Daily    senna-docusate sodium (SENOKOT S) 8 6-50 mg per tablet 1 tablet  1 tablet Oral BID    sincalide [facility]) injection 1 7 mcg  0 02 mcg/kg (Order-Specific) Intravenous Once in imaging    sodium chloride (PF) 0 9 % injection 3 mL  3 mL Intravenous Q1H PRN    tamsulosin (FLOMAX) capsule 0 4 mg  0 4 mg Oral HS     Allergies   Allergen Reactions    Fentanyl Anaphylaxis and Other (See Comments)     Oxygen drops severely       Objective     Blood pressure 107/72, pulse 75, temperature 97 6 °F (36 4 °C), resp  rate 20, height 5' 10" (1 778 m), weight 79 8 kg (176 lb), SpO2 93 %  Body mass index is 25 25 kg/m²  Intake/Output Summary (Last 24 hours) at 5/15/2021 0954  Last data filed at 5/15/2021 7741  Gross per 24 hour   Intake 600 ml   Output 1980 ml   Net -1380 ml       PHYSICAL EXAM:      General Appearance:   Alert, cooperative, no distress   HEENT:   Normocephalic, atraumatic, anicteric  Neck:   Supple, symmetrical, trachea midline   Lungs:   Clear to auscultation bilaterally; no rales, rhonchi or wheezing; respirations unlabored    Heart:   Regular rate and rhythm; no murmur, rub, or gallop     Abdomen:   Soft mildly tende in epigastric region, biliary drain   Genitalia:   Deferred    Rectal:   Deferred    Extremities:  No cyanosis, clubbing or edema    Pulses:  2+ and symmetric all extremities    Skin:  No jaundice, rashes, or lesions    Lymph nodes:  No palpable cervical lymphadenopathy      Lab Results:   No results displayed because visit has over 200 results  Imaging Studies: I have personally reviewed pertinent imaging studies  Xr Chest Portable    Result Date: 5/8/2021  Narrative: CHEST INDICATION:  Desaturation post procedure  COMPARISON:  5/5/2021, CT chest 4/29/2021 EXAM PERFORMED/VIEWS:  XR CHEST PORTABLE FINDINGS:  The upper thoracic inlet was excluded from the field-of-view  Cardiomediastinal silhouette appears unremarkable  The lungs are clear  No pneumothorax or pleural effusion  Known right chest wall lesion on CT is not clearly visible on this exam  Osseous structures appear within normal limits for patient age  Impression: No acute cardiopulmonary disease  Workstation performed: [id number]     Xr Chest 1 View Portable    Result Date: 5/5/2021  Narrative: CHEST INDICATION:   weak  COMPARISON:  4/29/2021 and 10/13/2017 EXAM PERFORMED/VIEWS:  XR CHEST PORTABLE FINDINGS: Cardiomediastinal silhouette appears unremarkable  The lungs are clear  No pneumothorax or pleural effusion  There is some degenerative arthritis at the acromioclavicular joint  Soft tissue mass of the right chest wall seen on the CT scan is not evident on plain film  Impression: No acute cardiopulmonary disease  Workstation performed: [id number]     Ct Chest Wo Contrast    Result Date: 4/29/2021  Narrative: CT CHEST WITHOUT IV CONTRAST INDICATION:   Pancreatic adenocarcinoma, initial workup staging pancreatic mass  COMPARISON:  Chest x-ray 10/13/2017 TECHNIQUE: CT examination of the chest was performed without intravenous contrast   Axial, sagittal, and coronal 2D reformatted images were created from the source data and submitted for interpretation  Radiation dose length product (DLP) for this visit:  345 mGy-cm     This examination, like all CT scans performed in the [facility], was performed utilizing techniques to minimize radiation dose exposure, including the use of iterative reconstruction and automated exposure control  FINDINGS: LUNGS:  Lungs are clear  There is no tracheal or endobronchial lesion  PLEURA:  Unremarkable  HEART/GREAT VESSELS:  Unremarkable for patient's age  MEDIASTINUM AND WAQAS:  Unremarkable  CHEST WALL AND LOWER NECK:   Soft tissue lesion in the subcutaneous tissues of the right chest wall inferior to the right nipple measuring 3 0 x 1 4 cm on image 2/44  VISUALIZED STRUCTURES IN THE UPPER ABDOMEN:  Biliary stent with pneumobilia and multiple hypodense lesions throughout the liver previously attributed to cysts  OSSEOUS STRUCTURES:  No acute fracture or destructive osseous lesion  Impression: Soft tissue lesion in the subcutaneous tissues of the right chest wall inferior to the right nipple measuring 8 0-1 4 cm on image 2/44  Tissue sampling may be warranted  No other evidence of potential metastatic disease to the chest  Workstation performed: [id number]     Xr Abdomen 1 Vw Portable    Result Date: 5/9/2021  Narrative: ABDOMEN INDICATION:   worsening abdominal pain -- assess for gas under the diaphragm  COMPARISON:  2/12/2020 VIEWS:  AP supine Images: 2 FINDINGS: There is a common bile duct stent noted  There is a nonobstructive bowel gas pattern  No discernible free air on this semierect study  Upright or left lateral decubitus imaging is more sensitive to detect subtle free air in the appropriate setting  No pathologic calcifications or soft tissue masses  Visualized lung bases are clear  Visualized osseous structures are unremarkable for the patient's age  Impression: Common bile duct stent  Nonobstructive bowel gas pattern  No evidence for free air on this semierect examination   Workstation performed: [id number]     Nm Hepatobiliary W Rx    Result Date: 5/10/2021  Narrative: HEPATOBILIARY SCAN WITH CHOLECYSTOKININ ADMINISTRATION INDICATION: Pericholecystic inflammation COMPARISON: CT abdomen pelvis 5/9/2021, ultrasound 5/6/2021 TECHNIQUE: Patient was pretreated with 1 7 mcg CCK intravenously prior to starting the study as per protocol  Following the intravenous administration of 5 1 mCi Tc-99m labeled mebrofenin, dynamic abdominal images were obtained over a 60 minute time period  Images were performed in AP projection  FINDINGS: There is prompt, uniform accumulation with normal clearance of the radiopharmaceutical by the liver  Focal photopenia noted in the right lobe superiorly compatible with hepatic cyst as seen on prior CT  Small bowel lesion seen within the first hour  Gallbladder is not seen within the first hour  Additional imaging obtained at 75 minutes, 2 hours and 4 hours  Additional 1 1 mCi technetium mebrofenin was administered intravenously  Delayed imaging does not demonstrate gallbladder uptake  Impression: 1  Nonvisualization of the gallbladder on delayed imaging up to 4 hours  Findings suspicious for cystic duct obstruction and acute cholecystitis  The study was marked in [facility] for immediate notification  Workstation performed: [id number]     Mri Abdomen W Wo Contrast And Mrcp    Result Date: 4/28/2021  Narrative: MRI OF THE ABDOMEN WITH AND WITHOUT CONTRAST WITH MRCP INDICATION:  Newly identified pancreatic mass with biliary dilatation COMPARISON: 4/27/2021 TECHNIQUE:  The following pulse sequences were obtained:  Coronal and axial T2 with TE of 90 and 180 respectively, axial T2 with fat saturation, axial FIESTA fat-sat, axial T1-weighted in-and-out-of phase, axial DWI/ADC, pre-contrast axial T1 with fat saturation, post-contrast dynamic axial T1 with fat saturation at 20, 70, and 180 seconds, followed by coronal and 7 minute delayed axial T1 with fat saturation  3D MRCP images were obtained with radial thick slabs and projections  3D rendering was performed from the acquisition scanner   IV Contrast:  8 mL of Gadobutrol injection (SINGLE-DOSE) FINDINGS: LOWER CHEST: Unremarkable  LIVER: Normal in size and configuration  Multiple hepatic cysts again seen, the largest of which measures 5 6 cm at the junction of segments 7 and 8   Indeterminant lesion within segment 3 again seen measuring 2 6 cm which demonstrates a continuous ring peripheral enhancement with some nodularity and progressive fill-in on delayed images  No demonstrable washout    The hepatic veins and portal veins are patent  BILE DUCTS:  Intra and extrahepatic biliary ductal dilatation  CBD measures up to 1 7 cm  No evidence of filling defect  Abrupt cut off the level of the pancreatic head  GALLBLADDER:  Normal  PANCREAS:  Hypoenhancing mass centered at the pancreatic head/uncinate process measuring 2 9 x 3 5 x 3 5 cm (series 12, image 66, series 13, image 67)  Pancreatic ductal dilatation up to 8 mm  No evidence of encasement of the portal vein and superior mesenteric artery  Additional cystic change at the uncinate process likely represent upstream ductal dilatation  ADRENAL GLANDS:  Normal  SPLEEN:  Normal  KIDNEYS/PROXIMAL URETERS:  No hydroureteronephrosis  Bilateral renal cysts  BOWEL:   No dilated loops of bowel  PERITONEUM/RETROPERITONEUM:  No ascites  LYMPH NODES:  No abdominal lymphadenopathy  VASCULAR STRUCTURES:  No aneurysm  ABDOMINAL WALL:  Unremarkable  OSSEOUS STRUCTURES:  No suspicious osseous lesion  Impression: 3 5 cm mass at the pancreatic head/uncinate process resulting in upstream dilatation of the CBD and pancreatic duct  No evidence of encasement of the portal vein or superior mesenteric artery  Findings concerning for pancreatic neoplasm such as pancreatic adenocarcinoma  Unchanged appearance of 2 6 cm atypical lesion at segment 3 of the liver consistent with atypical hemangioma or less likely low-grade neoplasm    Workstation performed: [id number]     Ir Cholecystostomy Tube Placement    Result Date: 5/12/2021  Narrative: Cholecystostomy tube placement under ultrasound and fluoro Clinical History:  Suspected acute cholecystitis  Contrast: 20 mL of iohexol (OMNIPAQUE) Fluoro time: 1 3 mins Number of Images: 6 Radiation dose: 155 mGy Concious sedation time: 35mins Technique: The patient was brought to the interventional radiology suite and placed supine on the table  The gallbladder was examined with ultrasound, and found to be markedly thick walled with a small amount of pericholecystic fluid  No sludge or stones was noted in the lumen  The gallbladder was not significantly distended  The skin of the right upper quadrant was prepped and draped in the usual sterile fashion  After local anesthesia was administered to the skin, a 19 gauge needle was advanced percutaneously under direct ultrasound guidance into the gallbladder via a transhepatic route  Cloudy bile was removed and sent for culture and sensitivity with gram stain  A heavy duty wire was advanced through the needle, and the needle was removed  After tract dilatation, a 10 Chinese all-purpose drainage catheter was advanced over the wire until the loop was formed within the gallbladder  The catheter was then locked in place  A total of 30 mL of bile was then aspirated  The catheter was sutured at the skin  Contrast was injected, confirming satisfactory location of the tube  The cystic duct was noted to slowly fill and opacify the common bile duct and intrahepatic ducts  Overall, the cystic duct did not appear normal, but diffusely small  The tube was then connected to gravity bag, and dressed sterilely  Impression: Impression: 1  Thick-walled gallbladder with pericholecystic fluid noted  No stones or sludge  2   Successful ultrasound and fluoroscopically guided placement of a percutaneous cholecystostomy tube  3   Small but patent cystic duct noted   Workstation performed: [id number]     Fl Ercp Biliary Only    Result Date: 4/28/2021  Narrative: ERCP INDICATION:  Pancreatic mass COMPARISON:  MRI 4/27/2021 IMAGES:  2 FLUOROSCOPY TIME:   1 31 seconds CONTRAST: 3 mL of iohexol (OMNIPAQUE) FINDINGS: Fluoroscopic guidance was provided for performance of ERCP  BILIARY: Side-viewing duodenal scope identified  Common bile duct was cannulated and injected  Distal common bile duct 2 cm long smooth narrowing noted with proximal dilatation 15 mm  Sphincterotomy and brushings performed  10 mm x 6 cm covered metal stent placed across the stricture  Impression: Distal common bile duct narrowing likely due to known pancreatic mass status post enterotomy, brush biopsy, and stenting  Workstation performed: [id number]     Us Right Upper Quadrant    Result Date: 5/6/2021  Narrative: RIGHT UPPER QUADRANT ULTRASOUND INDICATION:  Right upper quadrant pain  COMPARISON: Ultrasound 5/14/2019, CT 5/5/2021 TECHNIQUE:   Real-time ultrasound of the right upper quadrant was performed with a curvilinear transducer with both volumetric sweeps and still imaging techniques  FINDINGS: PANCREAS:  Dilated pancreatic duct is identified  3 6 x 2 6 x 4 6 cm pancreatic head mass lesion is present  AORTA AND IVC:  Visualized portions are normal for patient age  LIVER: Normal size  Echogenicity and contour are normal  3 3 x 1 9 x 2 6 cm hypoechoic indeterminate left lobe hepatic lesion is noted  1 2 x 1 0 x 1 1 cm hyperechoic left lobe hepatic lesion noted  Hepatic cysts are present  Normal directional flow is present within the portal vein  BILIARY: The gallbladder is normal in caliber  Mild gallbladder wall thickening measuring 4 mm noted  No pericholecystic fluid is present  No stones or sludge  No sonographic Calles's sign  No intrahepatic biliary dilatation  CBD measures 3 mm  No choledocholithiasis  KIDNEY: Right kidney measures 10 6 x 4 6 x 5 8 cm  Within normal limits  ASCITES:   None  Impression: Mild gallbladder wall thickening without additional evidence of acute cholecystitis  Pancreatic head mass again identified    Associated pancreatic ductal dilatation  Indeterminate hypoechoic lesion within the left lobe of the liver  Hyperechoic left hepatic lobe lesion possibly representing a hemangioma  Multiple hepatic cysts  Workstation performed: [id number]     Ct Abdomen Pelvis W Contrast    Result Date: 5/14/2021  Narrative: CT ABDOMEN AND PELVIS WITH IV CONTRAST INDICATION:   Abdominal infection suspected leukocytosis - recent cholecystostomy tube  COMPARISON:  CT abdomen pelvis 5/9/2021, CT 9/26/2017 TECHNIQUE:  CT examination of the abdomen and pelvis was performed  Axial, sagittal, and coronal 2D reformatted images were created from the source data and submitted for interpretation  Radiation dose length product (DLP) for this visit:  636 68 mGy-cm   This examination, like all CT scans performed in the [facility], was performed utilizing techniques to minimize radiation dose exposure, including the use of iterative  reconstruction and automated exposure control  IV Contrast:  100 mL of iohexol (OMNIPAQUE) Enteric Contrast:  Enteric contrast was not administered  FINDINGS: ABDOMEN LOWER CHEST:  Bibasilar atelectasis and small bilateral pleural effusions are present  Right chest wall nodule again noted  LIVER/BILIARY TREE:  2 5 x 2 8 cm enhancing lesion within left hepatic lobe is stable dating back to at least 9/26/2017  Scattered hepatic cysts are also stable  Pneumobilia is stable  Common bile duct stent remains in place  GALLBLADDER:  Cholecystostomy tube has been placed  SPLEEN:  Unremarkable  PANCREAS:  3 9 x 2 5 cm hypoattenuating mass involving the pancreatic head and uncinate process resulting in upstream pancreatic ductal dilatation is again noted suspicious for pancreatic malignancy  The pancreatic mass is in close proximity to the superior mesenteric vein but with fat plane still maintained  There is no encasement of the celiac axis or superior mesenteric artery  ADRENAL GLANDS:  Unremarkable  KIDNEYS/URETERS:  No hydronephrosis or urinary tract calculus  One or more sharply circumscribed subcentimeter renal hypodensities are present, too small to accurately characterize, and statistically most likely benign findings  According to recent literature (Radiology 2019) no further workup of these findings is recommended  STOMACH AND BOWEL:  Marked wall thickening and submucosal enhancement involving the pylorus and duodenum with adjacent inflammatory stranding is again seen  There is marked increased fluid surrounding the pylorus and proximal duodenum, which is not yet well formed APPENDIX:  No findings to suggest appendicitis  ABDOMINOPELVIC CAVITY:  Trace ascites is present in the pelvis  VESSELS:  Unremarkable for patient's age  PELVIS REPRODUCTIVE ORGANS:  Unremarkable for patient's age  URINARY BLADDER:  Unremarkable  ABDOMINAL WALL/INGUINAL REGIONS:  Unremarkable  OSSEOUS STRUCTURES:  No acute fracture or destructive osseous lesion  Impression: 1  Marked increase in fluid surrounding inflammatory pylorus and proximal duodenum as described above  Given history of acute cholecystitis, 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  Fluid collection is not yet well formed  If patient develops worsening signs of infection, follow-up imaging recommended to exclude developing abscess  2   Status post cholecystostomy tube placement with decompression of the gallbladder  Biliary stent remains in place with stable pneumobilia  3   Pancreatic head/uncinate process mass as described above, consistent with malignancy  No vascular encasement  The study was marked in [facility] for immediate notification  Workstation performed: [id number]     Ct Abdomen Pelvis W Contrast    Result Date: 5/9/2021  Narrative: CT ABDOMEN AND PELVIS WITH IV CONTRAST INDICATION:   Abdominal pain, acute, nonlocalized abdominal pain   COMPARISON:  Multiple priors most recently CT 5/5/2021 and ultrasound 5/6/2021 TECHNIQUE:  CT examination of the abdomen and pelvis was performed  In addition to portal venous phase postcontrast scanning through the abdomen and pelvis, delayed phase postcontrast scanning was performed through the upper abdominal viscera  Axial, sagittal, and coronal 2D reformatted images were created from the source data and submitted for interpretation  Radiation dose length product (DLP) for this visit:  716 mGy-cm   This examination, like all CT scans performed in the [facility], was performed utilizing techniques to minimize radiation dose exposure, including the use of iterative reconstruction and automated exposure control  IV Contrast:  100 mL of iohexol (OMNIPAQUE) Enteric Contrast:  Enteric contrast was not administered  FINDINGS: ABDOMEN LOWER CHEST:  No clinically significant abnormality identified in the visualized lower chest  LIVER/BILIARY TREE:  Multiple smooth marginated rounded low-attenuation lesions throughout the liver, likely representing cysts, essentially unchanged since at least 2016  Hyperenhancing rounded lesion in hepatic segment 3 measuring up to 2 9 cm, possibly representing a hemangioma is essentially unchanged since 2016  Pneumobilia related to the presence of a biliary stent again noted  GALLBLADDER:  No gallstones  Mild gallbladder wall thickening again noted  SPLEEN:  Unremarkable  PANCREAS:  Again seen is a pancreatic head/uncinate process mass measuring up to 4 3 x 2 8 cm in greatest axial dimensions by approximately 3 8 cm craniocaudal   Extensive inflammatory stranding is now noted surrounding the pancreatic head and pyloric region/proximal duodenum  There is associated wall thickening of the distal stomach and proximal duodenum  ADRENAL GLANDS:  Unremarkable  KIDNEYS/URETERS:  No hydronephrosis or urinary tract calculus    One or more sharply circumscribed subcentimeter renal hypodensities are present, too small to accurately characterize, and statistically most likely benign findings  According to recent literature (Radiology 2019) no further workup of these findings is recommended  STOMACH AND BOWEL:  See pancreas section  No abnormally dilated loops of bowel  APPENDIX:  No findings to suggest appendicitis  ABDOMINOPELVIC CAVITY:  Small volume free fluid in the pelvis is likely reactive  Small amount of free air in the gastrohepatic region (series 2, images 24 through 28)     No lymphadenopathy  VESSELS:  No aneurysm  Perisplenic varices  PELVIS REPRODUCTIVE ORGANS:  The prostate is enlarged  URINARY BLADDER:  Unremarkable  ABDOMINAL WALL/INGUINAL REGIONS:  Lobulated smoothly marginated soft tissue mass in the right anterior lower chest wall subcutaneous tissues measuring approximately 2 7 cm is essentially unchanged since at least 2016, probably representing a sebaceous cyst  OSSEOUS STRUCTURES:  No acute fracture or destructive osseous lesion  Impression: Inflammatory changes surrounding the pylorus and proximal duodenum, as well as the pancreatic head  A small amount of free air  Findings are most suspicious for a perforated gastric ulcer  Recommend correlation with endoscopy  Pancreatitis is also considered, but felt less likely given the interval development of free air in the location of the inflammatory changes  Pancreatic head mass concerning for malignancy again noted, unchanged since study of 5/5/2021  Additional findings, as described, essentially unchanged  I personally discussed this study with Dr Alvarado Cooper on 5/9/2021 at 3:43 PM  Workstation performed: [id number]     Ct Abdomen Pelvis With Contrast    Result Date: 5/5/2021  Narrative: CT ABDOMEN AND PELVIS WITH IV CONTRAST INDICATION:   Abdominal pain, acute, nonlocalized Worsening abdominal pain  History of pancreatic mass  COMPARISON:  9/19/2019; 4/27/2021 TECHNIQUE:  CT examination of the abdomen and pelvis was performed   Axial, sagittal, and coronal 2D reformatted images were created from the source data and submitted for interpretation  Radiation dose length product (DLP) for this visit:  382 mGy-cm   This examination, like all CT scans performed in the [facility], was performed utilizing techniques to minimize radiation dose exposure, including the use of iterative reconstruction and automated exposure control  IV Contrast:  100 mL of iohexol (OMNIPAQUE) Enteric Contrast:  Enteric contrast was not administered  FINDINGS: ABDOMEN LOWER CHEST:  Well circumscribed approximately 2 9 cm mass in the right anterior chest wall in the mid clavicular line on image 3, series 2 redemonstrated, stable from September 2019, nonspecific  LIVER/BILIARY TREE:  There is pneumobilia in the nondependent portion of the biliary tree likely related to common bile duct stent present previously  Numerous rounded hypodense lesions noted compatible with hepatic cysts  Additionally there is a uniformly enhancing mass in the left lobe of liver, image 24, series 2, measuring 2 5 cm, stable from 2019 possibly a hemangioma  GALLBLADDER:  There is circumferential mural enhancement of the wall of the gallbladder with a small amount of pericholecystic fluid  SPLEEN:  Unremarkable  PANCREAS:  A large hypodense /necrotic mass in the head/uncinate process of the pancreas is redemonstrated on image 35, series 2 measuring 3 8 cm, abutting the stent in the common bile duct  Proximally the pancreatic duct is markedly dilated, measuring up to 0 8 cm on image 28, series 2 in the neck of the pancreas  No peripancreatic inflammatory changes  The mass in the region of the uncinate process/head of the pancreas compresses the 3rd portion of the duodenum without latha invasion of the lumen  Appearance is similar to the prior study  ADRENAL GLANDS:  Unremarkable  KIDNEYS/URETERS:  One or more simple renal cyst(s) is noted  Otherwise unremarkable kidneys  No hydronephrosis   STOMACH AND BOWEL:  Moderate amount of retained colonic stool in the right hemicolon, transverse colon and proximal half of the descending colon  Sigmoid colon and rectum relatively decompressed  APPENDIX:  No findings to suggest appendicitis  ABDOMINOPELVIC CAVITY:  No ascites  No pneumoperitoneum  No lymphadenopathy  VESSELS:  Atherosclerotic calcifications noted  PELVIS REPRODUCTIVE ORGANS:  Top normal to mildly enlarged prostate with dystrophic calcifications redemonstrated  URINARY BLADDER:  Unremarkable  ABDOMINAL WALL/INGUINAL REGIONS:  Unremarkable  OSSEOUS STRUCTURES:  No acute fracture or destructive osseous lesion  Impression: 1  Uniform mural hyperenhancement of the wall of the gallbladder with a small amount of pericholecystic fluid, worrisome for acute cholecystitis  Further clinical assessment advised  Ultrasound of the right upper quadrant suggested for further assessment  2   Pneumobilia with stent in the common bile duct redemonstrated  3   3 8 cm obstructing mass in the region of the head of the pancreas with marked pancreatic ductal dilatation, worrisome for adenocarcinoma  Other tumors not excluded  4   Numerous hepatic cysts redemonstrated with an enhancing lesion in the left lobe of liver possibly a hemangioma, stable  5   Moderate constipation  Workstation performed: [id number]     Ct Abdomen Pelvis With Contrast    Result Date: 4/27/2021  Narrative: CT ABDOMEN AND PELVIS WITH IV CONTRAST INDICATION:   Diarrhea Abdominal pain, weight loss, epigastric pain Abdominal discomfort, loose stools, weight loss and transaminitis over the last 3 weeks  COMPARISON:  9/19/2019 TECHNIQUE:  CT examination of the abdomen and pelvis was performed  Axial, sagittal, and coronal 2D reformatted images were created from the source data and submitted for interpretation  Radiation dose length product (DLP) for this visit:  815 mGy-cm     This examination, like all CT scans performed in the [facility], was performed utilizing techniques to minimize radiation dose exposure, including the use of iterative reconstruction and automated exposure control  IV Contrast:  100 mL of iohexol (OMNIPAQUE) Enteric Contrast:  Enteric contrast was not administered  FINDINGS: ABDOMEN LOWER CHEST:  No clinically significant abnormality identified in the visualized lower chest   Oval well-definedd soft tissue density nodule within the right chest   This measures 2 8 x 1 2 cm, similar to the prior in 2019  Correlate with physical exam findings  Unclear if this represents a subdermal nodule or asymmetric gynecomastia  LIVER/BILIARY TREE:  Multiple scattered cysts seen throughout the liver without suspicious enhancing component and without suspicious change compared to the prior  Single homogeneously enhancing lesion in the left lateral segment with relative washout on delayed imaging, contrast density follows portal venous density  Stable compared to prior  Although not pathognomonic, 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  New moderate intrahepatic biliary ductal dilatation  Common hepatic and common bile duct abnormally distended to the level of the pancreatic head  GALLBLADDER:  Abnormal distended gallbladder caliber and borderline wall thickening  No pericholecystic inflammation or calcified stones  SPLEEN:  Unremarkable  PANCREAS:  Large ill-defined hypoattenuating mass within the pancreatic head and uncinate measuring 4 2 x 2 9 cm with dilatation/obstruction of the common bile duct and pancreatic duct, likely represent pancreatic carcinoma  Regional vasculature remains  patent  No pathologic adenopathy identified  Pancreatic duct dilated within the neck at 8 mm, tapering through the body and tail  ADRENAL GLANDS:  Unremarkable  KIDNEYS/URETERS:  Unremarkable  No hydronephrosis  STOMACH AND BOWEL:  Unremarkable  APPENDIX:  No findings to suggest appendicitis  ABDOMINOPELVIC CAVITY:  No ascites  No pneumoperitoneum  No lymphadenopathy   VESSELS: Unremarkable for patient's age  PELVIS REPRODUCTIVE ORGANS:  Unremarkable for patient's age  URINARY BLADDER:  Unremarkable  ABDOMINAL WALL/INGUINAL REGIONS:  Unremarkable  OSSEOUS STRUCTURES:  No acute fracture or destructive osseous lesion  Impression: Moderate intra and extrahepatic biliary ductal dilatation, pancreatic duct dilatation and gallbladder distention which appear secondary to a large ill-defined pancreatic head mass, pancreatic carcinoma until proven otherwise  Multiple hepatic cysts without suspicious change  Stable homogeneously enhancing left lateral segment lesion likely representing cavernous hemangioma  The study was marked in [facility] for immediate notification   Workstation performed: [id number]       ---------------------------------------------------  Note Electronically Signed By:    Elise Al MD  [facility] Gastroenterology Fellow PGY-5  [address] #: [id number]

## 2021-05-15 NOTE — ASSESSMENT & PLAN NOTE
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  Fluid collection is not yet well formed  If patient develops worsening signs of infection, follow-up imaging recommended to exclude developing abscess  Monia Le Melvi Le Status post cholecystostomy tube placement with decompression of the gallbladder  Biliary stent remains in place with stable pneumobilia  Monia Le Monia Le Pancreatic head/uncinate process mass as described above, consistent with malignancy    No vascular encasement "  Discussed with IR and surgical oncology -> cholecystostomy tube in place with adequate drainage -> initially suspected possible CBD stent displacement, however, per GI, no need for urgent intervention -> may consider repeat endoscopy if pain/discomfort persists/recurs

## 2021-05-15 NOTE — PROGRESS NOTES
[address] [facility]  Progress Note - Marsha Read 1955, 73 y o  male MRN: [id number]  Unit/Bed#: [facility] 928-01 Encounter: [id number]  Primary Care Provider: Yolanda Santos MD   Date and time admitted to hospital: 5/10/2021 10:40 AM      * Acute cholecystitis  Assessment & Plan  HIDA scan on 5/10 revealed "Nonvisualization of the gallbladder on delayed imaging up to 4 hours  Findings suspicious for cystic duct obstruction and acute cholecystitis "  Underwent IR guided cholecystostomy tube placement on 5/11 as ultrasound imaging noted presence of eliu cholecystic fluid - fluid culture growing Enterococcus faecalis -> appreciate Infectious Disease input with transition of antibiotic course to IV Unasyn currently  Blood cultures from 5/5 are negative  Procalcitonin trended up from 0 72 -> 0 82 -> 0 48 -> 0 41 today  PRN pain/emesis control - supportive care otherwise    Pancreatic mass  Assessment & Plan  Pancreatic mass identified a previous admission on CT scan done on 4/29 - presented w/ biliary obstruction which improved s/p biliary duct stenting- brushing unfortunately was nondiagnostic  CT imaging on 5/5 revealed a "3 8 cm obstructing mass in the region of the head of the pancreas with marked pancreatic ductal dilatation, worrisome for adenocarcinoma  Other tumors not excluded "  Subsequent right upper quadrant ultrasound on 5/6 noting a "Mild gallbladder wall thickening without additional evidence of acute cholecystitis  Pancreatic head mass again identified    Associated pancreatic ductal dilatation "  S/p endoscopic ultrasound w/ biopsies taken and celiac plexus nerve block on 5/7 - fine-needle aspiration report confirmed adenocarcinoma  Eventual plan for Whipple's procedure and hopeful chemotherapy once infection resolves noted - surgical oncology following with plan for port placement during hospital course  PRN pain control - supportive care otherwise    Leukocytosis  Assessment & Plan  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  Fluid collection is not yet well formed  If patient develops worsening signs of infection, follow-up imaging recommended to exclude developing abscess  Brooks Mergigi Brooks Mergigi Status post cholecystostomy tube placement with decompression of the gallbladder  Biliary stent remains in place with stable pneumobilia  Brooks Merle Brooks Merle Pancreatic head/uncinate process mass as described above, consistent with malignancy  No vascular encasement "  Discussed with IR and surgical oncology -> cholecystostomy tube in place with adequate drainage -> initially suspected possible CBD stent displacement, however, per GI, no need for urgent intervention -> may consider repeat endoscopy if pain/discomfort persists/recurs    BPH (benign prostatic hyperplasia)  Assessment & Plan  Continue Flomax    Hyperlipidemia  Assessment & Plan  Continue ASA/fish oil    Essential hypertension  Assessment & Plan  Previously off agents due to borderline hypotensive states - remains normotensive w/o medication    Hypokalemia  Assessment & Plan  Monitor/replete serum potassium  Serum magnesium normal    Diabetes mellitus type 2  Assessment & Plan  Lab Results   Component Value Date    HGBA1C 6 2 (H) 05/05/2021     On SSI coverage per Accu-Cheks  Carbohydrate restricted diet      DVT Prophylaxis:  Lovenox       Patient Centered Rounds:  I have performed bedside rounds and discussed plan of care with nursing today      Discussions with Specialists or Other Care Team Provider:  see above assessments if applicable    Education and Discussions with Family / Patient: Patient at bedside    Time Spent for Care:  32 minutes  More than 50% of total time spent on counseling and coordination of care as described above  Current Length of Stay: 5 day(s)    Current Patient Status: Inpatient   Certification Statement:  Patient will continue to require additional hospital stay due to assessments as noted above  Code Status: Level 1 - Full Code        Subjective:     Seen/examined earlier in the day  Complains of some mild abdominal distention/discomfort and was started on a Bentyl course by gastroenterology earlier today  Denies fever/chills  States he has a decent appetite and bowel movements  Objective:     Vitals:   Temp (24hrs), Av 4 °F (36 9 °C), Min:97 6 °F (36 4 °C), Max:99 2 °F (37 3 °C)    Temp:  [97 6 °F (36 4 °C)-99 2 °F (37 3 °C)] 98 5 °F (36 9 °C)  HR:  [65-78] 68  Resp:  [18-20] 18  BP: (107-129)/(72-92) 129/80  SpO2:  [92 %-94 %] 94 %  Body mass index is 25 25 kg/m²  Input and Output Summary (last 24 hours):        Intake/Output Summary (Last 24 hours) at 5/15/2021 1708  Last data filed at 5/15/2021 1549  Gross per 24 hour   Intake 540 ml   Output 1730 ml   Net -1190 ml       Physical Exam:     GENERAL:  Improved weakness/fatigue  HEAD:  Normocephalic - atraumatic  EYES: PERRL - EOMI   MOUTH:  Mucosa moist  NECK:  Supple - full range of motion  CARDIAC:  Rate controlled - S1/S2 positive  PULMONARY:  Clear breath sounds bilaterally - nonlabored respirations  ABDOMEN:  Soft - mild epigastric tenderness - nondistended - active bowel sounds - colostomy tube in place  MUSCULOSKELETAL:  Motor strength/range of motion intact  NEUROLOGIC:  Alert/oriented at baseline  SKIN:  Chronic wrinkles/blemishes   PSYCHIATRIC:  Mood/affect stable      Additional Data:     Labs & Recent Cultures:    Results from last 7 days   Lab Units 05/15/21  0610 21  0631 21  0548   WBC Thousand/uL 15 92* 17 06* 15 67*   HEMOGLOBIN g/dL 12 5 11 8* 12 1   HEMATOCRIT % 38 0 34 9* 37 6 PLATELETS Thousands/uL 459* 364 320   BANDS PCT %  --  4  --    NEUTROS PCT %  --   --  83*   LYMPHS PCT %  --   --  6*   LYMPHO PCT %  --  1*  --    MONOS PCT %  --   --  8   MONO PCT %  --  7  --    EOS PCT %  --  0 1     Results from last 7 days   Lab Units 05/15/21  0610   POTASSIUM mmol/L 3 6   CHLORIDE mmol/L 106   CO2 mmol/L 28   BUN mg/dL 9   CREATININE mg/dL 0 51*   CALCIUM mg/dL 7 9*   ALK PHOS U/L 194*   ALT U/L 35   AST U/L 20         Results from last 7 days   Lab Units 05/15/21  1157 05/15/21  0825 05/14/21  2115 05/14/21  1132 05/14/21  0753 05/13/21  2047 05/13/21  1640 05/13/21  1109 05/13/21  0740 05/13/21  0542 05/13/21  0009 05/12/21  1707   POC GLUCOSE mg/dl 111 124 154* 140 139 129 135 132 117 118 149* 124         Results from last 7 days   Lab Units 05/15/21  0610 05/14/21  0631 05/13/21  0548 05/10/21  0447 05/09/21  1031   PROCALCITONIN ng/ml 0 41* 0 48* 0 82* 0 72* 0 54*         Results from last 7 days   Lab Units 05/11/21  1256   GRAM STAIN RESULT  No polys seen*  2+ Gram positive cocci in pairs and chains*   BODY FLUID CULTURE, STERILE  1+ Growth of Enterococcus faecalis*         Last 24 Hours Medication List:   Current Facility-Administered Medications   Medication Dose Route Frequency Provider Last Rate    acetaminophen  650 mg Oral Q6H PRN Chinmay Levy MD      ampicillin-sulbactam  3 g Intravenous Q6H Kendra Nunn MD 3 g (05/15/21 1413)    aspirin  81 mg Oral Daily Marleen Rebollar MD      bisacodyl  10 mg Rectal Daily Chinmay Levy MD      dicyclomine  10 mg Oral 4x Daily (AC & HS) Robe Cobos MD      enoxaparin  40 mg Subcutaneous Q24H Elmira Palafox MD      fentanyl citrate (PF)   Intravenous Code/Trauma/Sedation Eduardo Herrera MD      fish oil  1,000 mg Oral Daily Marleen Rebollar MD      FLUoxetine  20 mg Oral Daily Marleen Rebollar MD      folic acid  1 mg Oral Daily Marleen Rebollar MD      HYDROmorphone  0 5 mg Intravenous Q4H PRN Merril  Kerri Clark MD      insulin lispro  1-6 Units Subcutaneous TID With Meals Malaika Castillo PA-C      lidocaine  1 patch Topical Daily Shane Buchanan DO      midazolam    Code/Trauma/Sedation Med Fady Ny MD      multivitamin-minerals  1 tablet Oral Daily Rachid Barfield MD      oxyCODONE  10 mg Oral Q6H PRN Rachid Barfield MD      oxyCODONE  2 5 mg Oral Q6H PRN Rachid Barfield MD      oxyCODONE  5 mg Oral Q6H PRN Rachid Barfield MD      pancrelipase (Lip-Prot-Amyl)  24,000 Units Oral TID With Meals Rachid Barfield MD      pantoprazole  40 mg Intravenous Q12H [address] Rachid Barfield MD      polyethylene glycol  17 g Oral Daily Rachid Barfield MD      senna-docusate sodium  1 tablet Oral BID Rachid Barfield MD      sincalide  0 02 mcg/kg (Order-Specific) Intravenous Once in imaging Shane Buchanan DO      sodium chloride (PF)  3 mL Intravenous Q1H PRN Rachid Barfield MD      tamsulosin  0 4 mg Oral HS Rachid Barfield MD                  ** Please Note: This note is constructed using a voice recognition dictation system  An occasional wrong word/phrase or sound-a-like substitution may have been picked up by dictation device due to the inherent limitations of voice recognition software  Read the chart carefully and recognize, using reasonable context, where substitutions may have occurred  **

## 2021-05-15 NOTE — PROGRESS NOTES
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  Plan:  GI, Lo fat diet  Maintain perc ashli drain  PRN analgesia  PRN antiemetics  DVT PPX  Recommend GI re-evaluation of stent as a possible cause for pancreatitis  Will require port placement this admission for neoadjuvant chemotherapy    Subjective/Objective     Subjective:  Patient was NPO overnight, denied nausea vomiting, minimal pain around his percutaneous cholecystostomy tube, passing flatus but denies bowel movement    Objective:    Blood pressure 107/72, pulse 75, temperature 97 6 °F (36 4 °C), resp  rate 20, height 5' 10" (1 778 m), weight 79 8 kg (176 lb), SpO2 93 %  ,Body mass index is 25 25 kg/m²        Intake/Output Summary (Last 24 hours) at 5/15/2021 1015  Last data filed at 5/15/2021 0620  Gross per 24 hour   Intake 600 ml   Output 1980 ml   Net -1380 ml       Invasive Devices     Peripheral Intravenous Line            Peripheral IV 05/12/21 Distal;Left;Dorsal (posterior) Forearm 3 days          Drain            Open Drain Right 1220 days    Closed/Suction Drain  RUQ  10 Fr  3 days                Physical Exam:  General: AAO x 3, NAD  HEENT: Normocephalic, atraumatic, conjunctiva normal, EOMI, PERRLA  Neck: No JVD, No LAD  Cardio: RRR  Resp: NWB on RA  Abd: Soft, mildly tender around perc ashli, nondistended, No guarding, no rebound, perc ashli with bilious output  Neuro: Sensation and motor intact x 4 limbs  Extremities: WWP, No edema  Skin: Warm and dry      Lab, Imaging and other studies:I have personally reviewed pertinent lab results      VTE Pharmacologic Prophylaxis: Enoxaparin (Lovenox)  VTE Mechanical Prophylaxis: sequential compression device

## 2021-05-16 ENCOUNTER — APPOINTMENT (INPATIENT)
Dept: RADIOLOGY | Facility: HOSPITAL | Age: 66
DRG: 446 | End: 2021-05-16
Payer: COMMERCIAL

## 2021-05-16 LAB
ALBUMIN SERPL BCP-MCNC: 1.9 G/DL (ref 3.5–5)
ALP SERPL-CCNC: 187 U/L (ref 46–116)
ALT SERPL W P-5'-P-CCNC: 30 U/L (ref 12–78)
ANION GAP SERPL CALCULATED.3IONS-SCNC: 6 MMOL/L (ref 4–13)
AST SERPL W P-5'-P-CCNC: 19 U/L (ref 5–45)
BILIRUB SERPL-MCNC: 0.67 MG/DL (ref 0.2–1)
BUN SERPL-MCNC: 9 MG/DL (ref 5–25)
CALCIUM ALBUM COR SERPL-MCNC: 9.5 MG/DL (ref 8.3–10.1)
CALCIUM SERPL-MCNC: 7.8 MG/DL (ref 8.3–10.1)
CHLORIDE SERPL-SCNC: 106 MMOL/L (ref 100–108)
CO2 SERPL-SCNC: 26 MMOL/L (ref 21–32)
CREAT SERPL-MCNC: 0.58 MG/DL (ref 0.6–1.3)
ERYTHROCYTE [DISTWIDTH] IN BLOOD BY AUTOMATED COUNT: 15.5 % (ref 11.6–15.1)
GFR SERPL CREATININE-BSD FRML MDRD: 107 ML/MIN/1.73SQ M
GLUCOSE SERPL-MCNC: 108 MG/DL (ref 65–140)
GLUCOSE SERPL-MCNC: 128 MG/DL (ref 65–140)
GLUCOSE SERPL-MCNC: 159 MG/DL (ref 65–140)
GLUCOSE SERPL-MCNC: 160 MG/DL (ref 65–140)
GLUCOSE SERPL-MCNC: 96 MG/DL (ref 65–140)
HCT VFR BLD AUTO: 36.2 % (ref 36.5–49.3)
HGB BLD-MCNC: 12.1 G/DL (ref 12–17)
LIPASE SERPL-CCNC: 310 U/L (ref 73–393)
MCH RBC QN AUTO: 30.3 PG (ref 26.8–34.3)
MCHC RBC AUTO-ENTMCNC: 33.4 G/DL (ref 31.4–37.4)
MCV RBC AUTO: 91 FL (ref 82–98)
NRBC BLD AUTO-RTO: 0 /100 WBCS
PLATELET # BLD AUTO: 504 THOUSANDS/UL (ref 149–390)
PMV BLD AUTO: 10.8 FL (ref 8.9–12.7)
POTASSIUM SERPL-SCNC: 3.6 MMOL/L (ref 3.5–5.3)
PROT SERPL-MCNC: 5.2 G/DL (ref 6.4–8.2)
RBC # BLD AUTO: 3.99 MILLION/UL (ref 3.88–5.62)
SODIUM SERPL-SCNC: 138 MMOL/L (ref 136–145)
WBC # BLD AUTO: 17.82 THOUSAND/UL (ref 4.31–10.16)

## 2021-05-16 PROCEDURE — 82948 REAGENT STRIP/BLOOD GLUCOSE: CPT

## 2021-05-16 PROCEDURE — 99232 SBSQ HOSP IP/OBS MODERATE 35: CPT | Performed by: SURGERY

## 2021-05-16 PROCEDURE — 80053 COMPREHEN METABOLIC PANEL: CPT | Performed by: INTERNAL MEDICINE

## 2021-05-16 PROCEDURE — 99232 SBSQ HOSP IP/OBS MODERATE 35: CPT | Performed by: INTERNAL MEDICINE

## 2021-05-16 PROCEDURE — 85027 COMPLETE CBC AUTOMATED: CPT | Performed by: INTERNAL MEDICINE

## 2021-05-16 PROCEDURE — 71045 X-RAY EXAM CHEST 1 VIEW: CPT

## 2021-05-16 PROCEDURE — C9113 INJ PANTOPRAZOLE SODIUM, VIA: HCPCS | Performed by: INTERNAL MEDICINE

## 2021-05-16 PROCEDURE — 83690 ASSAY OF LIPASE: CPT | Performed by: INTERNAL MEDICINE

## 2021-05-16 RX ORDER — PANTOPRAZOLE SODIUM 40 MG/1
40 TABLET, DELAYED RELEASE ORAL
Status: DISCONTINUED | OUTPATIENT
Start: 2021-05-16 | End: 2021-05-17 | Stop reason: HOSPADM

## 2021-05-16 RX ADMIN — PANCRELIPASE 24000 UNITS: 24000; 76000; 120000 CAPSULE, DELAYED RELEASE PELLETS ORAL at 08:21

## 2021-05-16 RX ADMIN — ASPIRIN 81 MG: 81 TABLET, CHEWABLE ORAL at 08:22

## 2021-05-16 RX ADMIN — SODIUM CHLORIDE 3 G: 9 INJECTION, SOLUTION INTRAVENOUS at 20:13

## 2021-05-16 RX ADMIN — SENNOSIDES, DOCUSATE SODIUM 1 TABLET: 8.6; 5 TABLET ORAL at 08:22

## 2021-05-16 RX ADMIN — OXYCODONE HYDROCHLORIDE 5 MG: 5 TABLET ORAL at 18:54

## 2021-05-16 RX ADMIN — PANTOPRAZOLE SODIUM 40 MG: 40 INJECTION, POWDER, FOR SOLUTION INTRAVENOUS at 08:23

## 2021-05-16 RX ADMIN — OMEGA-3 FATTY ACIDS CAP 1000 MG 1000 MG: 1000 CAP at 08:21

## 2021-05-16 RX ADMIN — DICYCLOMINE HYDROCHLORIDE 10 MG: 10 CAPSULE ORAL at 11:56

## 2021-05-16 RX ADMIN — SODIUM CHLORIDE 3 G: 9 INJECTION, SOLUTION INTRAVENOUS at 08:25

## 2021-05-16 RX ADMIN — TAMSULOSIN HYDROCHLORIDE 0.4 MG: 0.4 CAPSULE ORAL at 22:48

## 2021-05-16 RX ADMIN — FLUOXETINE 20 MG: 20 CAPSULE ORAL at 08:21

## 2021-05-16 RX ADMIN — PANCRELIPASE 24000 UNITS: 24000; 76000; 120000 CAPSULE, DELAYED RELEASE PELLETS ORAL at 11:56

## 2021-05-16 RX ADMIN — SENNOSIDES, DOCUSATE SODIUM 1 TABLET: 8.6; 5 TABLET ORAL at 17:25

## 2021-05-16 RX ADMIN — Medication 1 TABLET: at 08:21

## 2021-05-16 RX ADMIN — PANTOPRAZOLE SODIUM 40 MG: 40 TABLET, DELAYED RELEASE ORAL at 17:25

## 2021-05-16 RX ADMIN — PANCRELIPASE 24000 UNITS: 24000; 76000; 120000 CAPSULE, DELAYED RELEASE PELLETS ORAL at 17:25

## 2021-05-16 RX ADMIN — DICYCLOMINE HYDROCHLORIDE 10 MG: 10 CAPSULE ORAL at 06:22

## 2021-05-16 RX ADMIN — FOLIC ACID 1 MG: 1 TABLET ORAL at 08:21

## 2021-05-16 RX ADMIN — SODIUM CHLORIDE 3 G: 9 INJECTION, SOLUTION INTRAVENOUS at 01:37

## 2021-05-16 RX ADMIN — ENOXAPARIN SODIUM 40 MG: 40 INJECTION SUBCUTANEOUS at 08:30

## 2021-05-16 RX ADMIN — DICYCLOMINE HYDROCHLORIDE 10 MG: 10 CAPSULE ORAL at 17:25

## 2021-05-16 RX ADMIN — SODIUM CHLORIDE 3 G: 9 INJECTION, SOLUTION INTRAVENOUS at 14:12

## 2021-05-16 RX ADMIN — DICYCLOMINE HYDROCHLORIDE 10 MG: 10 CAPSULE ORAL at 22:48

## 2021-05-16 NOTE — PROGRESS NOTES
[address] [facility]  Progress Note - Makenna [place] 1955, 73 y o  male MRN: [id number]  Unit/Bed#: [facility] 928-01 Encounter: [id number]  Primary Care Provider: Vangie Bashir MD   Date and time admitted to hospital: 5/10/2021 10:40 AM      * Acute cholecystitis  Assessment & Plan  HIDA scan on 5/10 revealed "Nonvisualization of the gallbladder on delayed imaging up to 4 hours  Findings suspicious for cystic duct obstruction and acute cholecystitis "  Underwent IR-guided cholecystostomy tube placement on 5/11 as ultrasound imaging noted presence of eliu cholecystic fluid - fluid culture growing Enterococcus faecalis -> appreciate Infectious Disease input with transition of antibiotic course to IV Unasyn  Blood cultures from 5/5 are negative  Procalcitonin trended up from 0 72 -> 0 82 -> 0 48 -> 0 41 yesterday  PRN pain/emesis control - supportive care otherwise  Anticipate discharge planning soon if abdominal discomfort continues to improve    Pancreatic mass  Assessment & Plan  Pancreatic mass identified a previous admission on CT scan done on 4/29 - presented w/ biliary obstruction which improved s/p biliary duct stenting- brushing unfortunately was nondiagnostic  CT imaging on 5/5 revealed a "3 8 cm obstructing mass in the region of the head of the pancreas with marked pancreatic ductal dilatation, worrisome for adenocarcinoma  Other tumors not excluded "  Subsequent right upper quadrant ultrasound on 5/6 noting a "Mild gallbladder wall thickening without additional evidence of acute cholecystitis  Pancreatic head mass again identified    Associated pancreatic ductal dilatation "  S/p endoscopic ultrasound w/ biopsies taken and celiac plexus nerve block on 5/7 - fine-needle aspiration report confirmed adenocarcinoma  Eventual plan for Whipple's procedure and hopeful chemotherapy once infection resolves noted - discussed with surgical oncology now recommending outpatient follow-up with them along with medical oncology for neoadjuvant chemotherapy and port placement  PRN pain control - supportive care otherwise    Leukocytosis  Assessment & Plan  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  Fluid collection is not yet well formed  If patient develops worsening signs of infection, follow-up imaging recommended to exclude developing abscess  Rabia Prows Rabia Prows Status post cholecystostomy tube placement with decompression of the gallbladder  Biliary stent remains in place with stable pneumobilia  Rabia Prows Rabia Prows Pancreatic head/uncinate process mass as described above, consistent with malignancy  No vascular encasement "  Discussed with IR and surgical oncology -> cholecystostomy tube in place with adequate drainage -> initially suspected possible CBD stent displacement, however, per GI, no need for urgent intervention -> may consider repeat endoscopy if pain/discomfort persists/recurs    BPH (benign prostatic hyperplasia)  Assessment & Plan  Continue Flomax    Hyperlipidemia  Assessment & Plan  Continue ASA/fish oil    Essential hypertension  Assessment & Plan  Previously off agents due to borderline hypotensive states - remains normotensive w/o medication    Hypokalemia  Assessment & Plan  Monitor/replete serum potassium  Serum magnesium normal    Diabetes mellitus type 2  Assessment & Plan  Lab Results   Component Value Date    HGBA1C 6 2 (H) 05/05/2021     On SSI coverage per Accu-Cheks  Carbohydrate restricted diet      DVT Prophylaxis:  Lovenox       Patient Centered Rounds:  I have performed bedside rounds and discussed plan of care with nursing today      Discussions with Specialists or Other Care Team Provider:  see above assessments if applicable    Education and Discussions with Family / Patient:  Patient at bedside    Time Spent for Care:  32 minutes  More than 50% of total time spent on counseling and coordination of care as described above  Current Length of Stay: 6 day(s)    Current Patient Status: Inpatient   Certification Statement:  Patient will continue to require additional hospital stay due to assessments as noted above  Code Status: Level 1 - Full Code        Subjective:     No new complaints this time  States abdominal discomfort has improved today  Denies fever/chills  Appetite is stable and continues to have bowel movements  Objective:     Vitals:   Temp (24hrs), Av 1 °F (36 7 °C), Min:97 4 °F (36 3 °C), Max:98 9 °F (37 2 °C)    Temp:  [97 4 °F (36 3 °C)-98 9 °F (37 2 °C)] 97 9 °F (36 6 °C)  HR:  [63-70] 70  Resp:  [18] 18  BP: (122-129)/(63-78) 129/78  SpO2:  [92 %-97 %] 93 %  Body mass index is 25 25 kg/m²  Input and Output Summary (last 24 hours):        Intake/Output Summary (Last 24 hours) at 2021 1640  Last data filed at 2021 1512  Gross per 24 hour   Intake 570 ml   Output 1420 ml   Net -850 ml       Physical Exam:     GENERAL:  Well-developed/nourished - no immediate distress at rest today  HEAD:  Normocephalic - atraumatic  EYES: PERRL - EOMI   MOUTH:  Mucosa moist  NECK:  Supple - full range of motion  CARDIAC:  Rate controlled - S1/S2 positive  PULMONARY:  Clear breath sounds bilaterally - nonlabored respirations  ABDOMEN:  Soft - resolving epigastric tenderness - nondistended - active bowel sounds - cholecystostomy tube in place  MUSCULOSKELETAL:  Motor strength/range of motion intact  NEUROLOGIC:  Alert/oriented at baseline  SKIN:  Chronic wrinkles/blemishes   PSYCHIATRIC:  Mood/affect pleasant      Additional Data:     Labs & Recent Cultures:    Results from last 7 days   Lab Units 21  0546  21  0631 21  5214 WBC Thousand/uL 17 82*   < > 17 06* 15 67*   HEMOGLOBIN g/dL 12 1   < > 11 8* 12 1   HEMATOCRIT % 36 2*   < > 34 9* 37 6   PLATELETS Thousands/uL 504*   < > 364 320   BANDS PCT %  --   --  4  --    NEUTROS PCT %  --   --   --  83*   LYMPHS PCT %  --   --   --  6*   LYMPHO PCT %  --   --  1*  --    MONOS PCT %  --   --   --  8   MONO PCT %  --   --  7  --    EOS PCT %  --   --  0 1    < > = values in this interval not displayed       Results from last 7 days   Lab Units 05/16/21  0546   POTASSIUM mmol/L 3 6   CHLORIDE mmol/L 106   CO2 mmol/L 26   BUN mg/dL 9   CREATININE mg/dL 0 58*   CALCIUM mg/dL 7 8*   ALK PHOS U/L 187*   ALT U/L 30   AST U/L 19         Results from last 7 days   Lab Units 05/16/21  1614 05/16/21  1134 05/16/21  0814 05/15/21  2058 05/15/21  1718 05/15/21  1157 05/15/21  0825 05/14/21  2115 05/14/21  1132 05/14/21  0753 05/13/21  2047 05/13/21  1640   POC GLUCOSE mg/dl 160* 159* 96 125 125 111 124 154* 140 139 129 135         Results from last 7 days   Lab Units 05/15/21  0610 05/14/21  0631 05/13/21  0548 05/10/21  0447   PROCALCITONIN ng/ml 0 41* 0 48* 0 82* 0 72*         Results from last 7 days   Lab Units 05/11/21  1256   GRAM STAIN RESULT  No polys seen*  2+ Gram positive cocci in pairs and chains*   BODY FLUID CULTURE, STERILE  1+ Growth of Enterococcus faecalis*         Last 24 Hours Medication List:   Current Facility-Administered Medications   Medication Dose Route Frequency Provider Last Rate    acetaminophen  650 mg Oral Q6H PRN Mevrat Maria MD      ampicillin-sulbactam  3 g Intravenous Carmelina St MD Stopped (05/16/21 1442)    aspirin  81 mg Oral Daily Ina Mejia MD      bisacodyl  10 mg Rectal Daily Mervat Maria MD      dicyclomine  10 mg Oral 4x Daily (AC & HS) Robe Evan Negrito, MD      enoxaparin  40 mg Subcutaneous Q24H Koki Cho MD      fentanyl citrate (PF)   Intravenous Code/Trauma/Sedation Med Octavio Carlton MD      fish oil  1,000 mg Oral Daily Vasquez Perez MD      FLUoxetine  20 mg Oral Daily Vasquez Perez MD      folic acid  1 mg Oral Daily Vasquez Perez MD      HYDROmorphone  0 5 mg Intravenous Q4H PRN Vasquez Perez MD      insulin lispro  1-6 Units Subcutaneous TID With Meals Malaika Castillo PA-C      lidocaine  1 patch Topical Daily Nirmal Keller DO      midazolam    Code/Trauma/Sedation Med Av Sánchez MD      multivitamin-minerals  1 tablet Oral Daily Vasquez Perez MD      oxyCODONE  10 mg Oral Q6H PRN Vasquez Perez MD      oxyCODONE  2 5 mg Oral Q6H PRN Vasquez Perez MD      oxyCODONE  5 mg Oral Q6H PRN Vasquez Perez MD      pancrelipase (Lip-Prot-Amyl)  24,000 Units Oral TID With Meals Vasquez Perez MD      pantoprazole  40 mg Oral BID AC Viola Morataya MD      polyethylene glycol  17 g Oral Daily Vasquez Perez MD      senna-docusate sodium  1 tablet Oral BID Vasquez Perez MD      sincalide  0 02 mcg/kg (Order-Specific) Intravenous Once in imaging Nirmal Keller DO      sodium chloride (PF)  3 mL Intravenous Q1H PRN Vasquez Perez MD      tamsulosin  0 4 mg Oral HS Vasquez Perez MD                  ** Please Note: This note is constructed using a voice recognition dictation system  An occasional wrong word/phrase or sound-a-like substitution may have been picked up by dictation device due to the inherent limitations of voice recognition software  Read the chart carefully and recognize, using reasonable context, where substitutions may have occurred  **

## 2021-05-16 NOTE — PROGRESS NOTES
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  GI was re-consulted who will consider repeat endoscopy if patient does not improve  Plan:  Continue lo fat diet  Continue IV abx per ID - on Unasyn  Trend WBC/Fever curve  OOB/Ambulate  DVT ppx  Remainder of care per primary team    Subjective/Objective     Subjective: No acute events overnight  States his lower abdominal pain has improved after having BMs  Tolerating diet without nausea/vomiting, states he is able to eat more now that he has been having BMs  Complains of intermittent lower abdominal cramping  No fevers, chills  Objective:     Blood pressure 122/63, pulse 69, temperature 98 9 °F (37 2 °C), resp  rate 18, height 5' 10" (1 778 m), weight 79 8 kg (176 lb), SpO2 92 %  ,Body mass index is 25 25 kg/m²  Intake/Output Summary (Last 24 hours) at 5/16/2021 0727  Last data filed at 5/16/2021 0629  Gross per 24 hour   Intake 490 ml   Output 1450 ml   Net -960 ml       Invasive Devices     Peripheral Intravenous Line            Peripheral IV 05/15/21 Dorsal (posterior); Proximal;Right Forearm less than 1 day          Drain            Open Drain Right 1220 days    Closed/Suction Drain  RUQ  10 Fr  4 days                Physical Exam:   NAD, alert and oriented x3  Normocephalic, atraumatic  MMM, EOMI, PERRLA  Norm resp effort on RA  RRR  Abd soft, mild distention, mild tenderness around perc ashli and epigastric region   Perc ashli with bilious drainage  No calf tenderness or peripheral edema  Motor/sensation intact in distal extremities  CN grossly intact  -rash/lesions    Lab, Imaging and other studies:  CBC: No results found for: WBC, HGB, HCT, MCV, PLT, ADJUSTEDWBC, MCH, MCHC, RDW, MPV, NRBC, CMP:   Lab Results   Component Value Date    SODIUM 138 05/16/2021    K 3 6 05/16/2021     05/16/2021    CO2 26 05/16/2021    BUN 9 05/16/2021    CREATININE 0 58 (L) 05/16/2021    CALCIUM 7 8 (L) 05/16/2021    AST 19 05/16/2021    ALT 30 05/16/2021    ALKPHOS 187 (H) 05/16/2021    EGFR 107 05/16/2021     VTE Pharmacologic Prophylaxis: Enoxaparin (Lovenox)  VTE Mechanical Prophylaxis: sequential compression device

## 2021-05-16 NOTE — ASSESSMENT & PLAN NOTE
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  Fluid collection is not yet well formed  If patient develops worsening signs of infection, follow-up imaging recommended to exclude developing abscess  Clayton Manifold Clayton Manifold Status post cholecystostomy tube placement with decompression of the gallbladder  Biliary stent remains in place with stable pneumobilia  Clayton Manifold Clayton Manifold Pancreatic head/uncinate process mass as described above, consistent with malignancy    No vascular encasement "  Discussed with IR and surgical oncology -> cholecystostomy tube in place with adequate drainage -> initially suspected possible CBD stent displacement, however, per GI, no need for urgent intervention -> may consider repeat endoscopy if pain/discomfort persists/recurs

## 2021-05-16 NOTE — ASSESSMENT & PLAN NOTE
HIDA scan on 5/10 revealed "Nonvisualization of the gallbladder on delayed imaging up to 4 hours   Findings suspicious for cystic duct obstruction and acute cholecystitis "  Underwent IR-guided cholecystostomy tube placement on 5/11 as ultrasound imaging noted presence of eliu cholecystic fluid - fluid culture growing Enterococcus faecalis -> appreciate Infectious Disease input with transition of antibiotic course to IV Unasyn  Blood cultures from 5/5 are negative  Procalcitonin trended up from 0 72 -> 0 82 -> 0 48 -> 0 41 yesterday  PRN pain/emesis control - supportive care otherwise  Anticipate discharge planning soon if abdominal discomfort continues to improve

## 2021-05-16 NOTE — PROGRESS NOTES
Progress Note - Infectious Disease   Jessica Driver 73 y o  male MRN: [id number]  Unit/Bed#: [facility] 928-01 Encounter: [id number]      Impression/Recommendations:  1   Acute cholecystitis   In setting of # 2/3   Status post cholecystostomy    Cultures with Enterococcus   Clinically stable without sepsis   Blood cultures negative  Rec:  ? Continue Unasyn for now  ? Serial abdominal exams     2   Mild groove pancreatitis  Possibly due to recent ERCP versus CBD stent itself  Some epigastric/RUQ pain but lipase now normal   Rec:  ? Serial abdominal exams  ? GI follow up ongoing     3  Biliary obstruction   Due to # 3   Status post recent ERCP, CBD stent   LFTs remain normal     4   Newly diagnosed pancreatic cancer   Recently diagnosed by EUS  Surgical and medical oncology follow-up ongoing     5   Leukocytosis   Likely multifactorial   Consider due to # 1 with insufficient enterococcal coverage   Consider also role of mild pancreatitis   Patient remains clinically stable without fever  Slightly worse today  Rec:  ? Continue antibiotics as above  ? Follow temperatures closely  ? Recheck CBC in a m      6   DM   A1c 6 2      Antibiotics:  Unasyn # 3  (Enterococcal antibiotics #4)    Subjective:  Patient seen on AM rounds  Feels OK  Denies fevers, chills, sweats, nausea, vomiting, or diarrhea  Mild epigastric pain  24 Hour Events:  No documented fevers, chills, sweats, nausea, vomiting, or diarrhea      Objective:  Vitals:  Temp:  [97 4 °F (36 3 °C)-98 9 °F (37 2 °C)] 97 4 °F (36 3 °C)  HR:  [63-69] 63  Resp:  [18] 18  BP: (122-129)/(63-81) 123/75  SpO2:  [92 %-97 %] 97 %  Temp (24hrs), Av 3 °F (36 8 °C), Min:97 4 °F (36 3 °C), Max:98 9 °F (37 2 °C)  Current: Temperature: (!) 97 4 °F (36 3 °C)    Physical Exam:   General:  No acute distress  Psychiatric:  Awake and alert  Pulmonary:  Normal respiratory excursion without accessory muscle use  Abdomen:  Soft, tender RUQ and midepigastrium  Extremities:  No edema  Skin:  No rashes    Lab Results:  I have personally reviewed pertinent labs  Results from last 7 days   Lab Units 05/16/21  0546 05/15/21  0610 05/14/21  0631   POTASSIUM mmol/L 3 6 3 6 3 3*   CHLORIDE mmol/L 106 106 104   CO2 mmol/L 26 28 29   BUN mg/dL 9 9 9   CREATININE mg/dL 0 58* 0 51* 0 52*   EGFR ml/min/1 73sq m 107 113 112   CALCIUM mg/dL 7 8* 7 9* 7 7*   AST U/L 19 20 20   ALT U/L 30 35 40   ALK PHOS U/L 187* 194* 168*     Results from last 7 days   Lab Units 05/16/21  0546 05/15/21  0610 05/14/21  0631   WBC Thousand/uL 17 82* 15 92* 17 06*   HEMOGLOBIN g/dL 12 1 12 5 11 8*   PLATELETS Thousands/uL 504* 459* 364     Results from last 7 days   Lab Units 05/11/21  1256   GRAM STAIN RESULT  No polys seen*  2+ Gram positive cocci in pairs and chains*   BODY FLUID CULTURE, STERILE  1+ Growth of Enterococcus faecalis*       Imaging Studies:   I have personally reviewed pertinent imaging study reports and images in PACS  EKG, Pathology, and Other Studies:   I have personally reviewed pertinent reports

## 2021-05-16 NOTE — ASSESSMENT & PLAN NOTE
Pancreatic mass identified a previous admission on CT scan done on 4/29 - presented w/ biliary obstruction which improved s/p biliary duct stenting- brushing unfortunately was nondiagnostic  CT imaging on 5/5 revealed a "3 8 cm obstructing mass in the region of the head of the pancreas with marked pancreatic ductal dilatation, worrisome for adenocarcinoma  Other tumors not excluded "  Subsequent right upper quadrant ultrasound on 5/6 noting a "Mild gallbladder wall thickening without additional evidence of acute cholecystitis  Pancreatic head mass again identified    Associated pancreatic ductal dilatation "  S/p endoscopic ultrasound w/ biopsies taken and celiac plexus nerve block on 5/7 - fine-needle aspiration report confirmed adenocarcinoma  Eventual plan for Whipple's procedure and hopeful chemotherapy once infection resolves noted - discussed with surgical oncology now recommending outpatient follow-up with them along with medical oncology for neoadjuvant chemotherapy and port placement  PRN pain control - supportive care otherwise

## 2021-05-16 NOTE — PROGRESS NOTES
The pantoprazole has been converted to Oral per [facility] IV-to-PO Auto-Conversion Protocol for Adults as approved by the Pharmacy and Therapeutics Committee  The patient met all eligible criteria:  3 Age = 25years old   2) Received at least one dose of the IV form   3) Receiving at least one other scheduled oral/enteral medication   4) Tolerating an oral/enteral diet   and did not have any exclusions:   1) Critical care patient   2) Active GI bleed (IF assessing H2RAs or PPIs)   3) Continuous tube feeding (IF assessing cipro, doxycycline, levofloxacin, minocycline, rifampin, or voriconazole)   4) Receiving PO vancomycin (IF assessing metronidazole)   5) Persistent nausea and/or vomiting   6) Ileus or gastrointestinal obstruction   7) Natanael/nasogastric tube set for continuous suction   8) Specific order not to automatically convert to PO (in the order's comments or if discussed in the most recent Infectious Disease or primary team's progress notes)       Parvez Cotton, PharmD, [address] [facility] and Internal Medicine Clinical Pharmacist  [phone] or via [facility] NormanchanceJose Daniel

## 2021-05-16 NOTE — PLAN OF CARE
Problem: Prexisting or High Potential for Compromised Skin Integrity  Goal: Skin integrity is maintained or improved  Description: INTERVENTIONS:  - Identify patients at risk for skin breakdown  - Assess and monitor skin integrity  - Assess and monitor nutrition and hydration status  - Monitor labs   - Assess for incontinence   - Turn and reposition patient  - Assist with mobility/ambulation  - Relieve pressure over bony prominences  - Avoid friction and shearing  - Provide appropriate hygiene as needed including keeping skin clean and dry  - Evaluate need for skin moisturizer/barrier cream  - Collaborate with interdisciplinary team   - Patient/family teaching  - Consider wound care consult   5/16/2021 0026 by Debi Salazar RN  Outcome: Progressing  5/16/2021 0025 by Debi Salazar RN  Outcome: Progressing     Problem: Nutrition/Hydration-ADULT  Goal: Nutrient/Hydration intake appropriate for improving, restoring or maintaining nutritional needs  Description: Monitor and assess patient's nutrition/hydration status for malnutrition  Collaborate with interdisciplinary team and initiate plan and interventions as ordered  Monitor patient's weight and dietary intake as ordered or per policy  Utilize nutrition screening tool and intervene as necessary  Determine patient's food preferences and provide high-protein, high-caloric foods as appropriate       INTERVENTIONS:  - Monitor oral intake, urinary output, labs, and treatment plans  - Assess nutrition and hydration status and recommend course of action  - Evaluate amount of meals eaten  - Assist patient with eating if necessary   - Allow adequate time for meals  - Recommend/ encourage appropriate diets, oral nutritional supplements, and vitamin/mineral supplements  - Order, calculate, and assess calorie counts as needed  - Recommend, monitor, and adjust tube feedings and TPN/PPN based on assessed needs  - Assess need for intravenous fluids  - Provide specific nutrition/hydration education as appropriate  - Include patient/family/caregiver in decisions related to nutrition  5/16/2021 0026 by Real Dunbar RN  Outcome: Progressing  5/16/2021 0025 by Real Dunbar RN  Outcome: Progressing     Problem: Potential for Falls  Goal: Patient will remain free of falls  Description: INTERVENTIONS:  - Assess patient frequently for physical needs  -  Identify cognitive and physical deficits and behaviors that affect risk of falls  -  [place] fall precautions as indicated by assessment   - Educate patient/family on patient safety including physical limitations  - Instruct patient to call for assistance with activity based on assessment  - Modify environment to reduce risk of injury  - Consider OT/PT consult to assist with strengthening/mobility  Outcome: Progressing     Problem: SAFETY ADULT  Goal: Patient will remain free of falls  Description: INTERVENTIONS:  - Assess patient frequently for physical needs  -  Identify cognitive and physical deficits and behaviors that affect risk of falls    -  [place] fall precautions as indicated by assessment   - Educate patient/family on patient safety including physical limitations  - Instruct patient to call for assistance with activity based on assessment  - Modify environment to reduce risk of injury  - Consider OT/PT consult to assist with strengthening/mobility  Outcome: Progressing

## 2021-05-17 ENCOUNTER — TELEPHONE (OUTPATIENT)
Dept: SURGICAL ONCOLOGY | Facility: CLINIC | Age: 66
End: 2021-05-17

## 2021-05-17 ENCOUNTER — PATIENT OUTREACH (OUTPATIENT)
Dept: HEMATOLOGY ONCOLOGY | Facility: CLINIC | Age: 66
End: 2021-05-17

## 2021-05-17 VITALS
RESPIRATION RATE: 18 BRPM | WEIGHT: 176 LBS | OXYGEN SATURATION: 92 % | TEMPERATURE: 97.8 F | HEART RATE: 57 BPM | DIASTOLIC BLOOD PRESSURE: 65 MMHG | HEIGHT: 70 IN | SYSTOLIC BLOOD PRESSURE: 125 MMHG | BODY MASS INDEX: 25.2 KG/M2

## 2021-05-17 LAB
ALBUMIN SERPL BCP-MCNC: 2 G/DL (ref 3.5–5)
ALP SERPL-CCNC: 181 U/L (ref 46–116)
ALT SERPL W P-5'-P-CCNC: 30 U/L (ref 12–78)
ANION GAP SERPL CALCULATED.3IONS-SCNC: 6 MMOL/L (ref 4–13)
AST SERPL W P-5'-P-CCNC: 22 U/L (ref 5–45)
BASOPHILS # BLD MANUAL: 0 THOUSAND/UL (ref 0–0.1)
BASOPHILS NFR MAR MANUAL: 0 % (ref 0–1)
BILIRUB SERPL-MCNC: 0.59 MG/DL (ref 0.2–1)
BUN SERPL-MCNC: 9 MG/DL (ref 5–25)
CALCIUM ALBUM COR SERPL-MCNC: 9.5 MG/DL (ref 8.3–10.1)
CALCIUM SERPL-MCNC: 7.9 MG/DL (ref 8.3–10.1)
CHLORIDE SERPL-SCNC: 109 MMOL/L (ref 100–108)
CO2 SERPL-SCNC: 26 MMOL/L (ref 21–32)
CREAT SERPL-MCNC: 0.57 MG/DL (ref 0.6–1.3)
EOSINOPHIL # BLD MANUAL: 0.12 THOUSAND/UL (ref 0–0.4)
EOSINOPHIL NFR BLD MANUAL: 1 % (ref 0–6)
ERYTHROCYTE [DISTWIDTH] IN BLOOD BY AUTOMATED COUNT: 15.3 % (ref 11.6–15.1)
GFR SERPL CREATININE-BSD FRML MDRD: 108 ML/MIN/1.73SQ M
GLUCOSE SERPL-MCNC: 117 MG/DL (ref 65–140)
GLUCOSE SERPL-MCNC: 119 MG/DL (ref 65–140)
GLUCOSE SERPL-MCNC: 145 MG/DL (ref 65–140)
HCT VFR BLD AUTO: 36.5 % (ref 36.5–49.3)
HGB BLD-MCNC: 12.1 G/DL (ref 12–17)
LYMPHOCYTES # BLD AUTO: 0.94 THOUSAND/UL (ref 0.6–4.47)
LYMPHOCYTES # BLD AUTO: 8 % (ref 14–44)
MCH RBC QN AUTO: 30.1 PG (ref 26.8–34.3)
MCHC RBC AUTO-ENTMCNC: 33.2 G/DL (ref 31.4–37.4)
MCV RBC AUTO: 91 FL (ref 82–98)
MONOCYTES # BLD AUTO: 0.59 THOUSAND/UL (ref 0–1.22)
MONOCYTES NFR BLD: 5 % (ref 4–12)
MYELOCYTES NFR BLD MANUAL: 2 % (ref 0–1)
NEUTROPHILS # BLD MANUAL: 9.9 THOUSAND/UL (ref 1.85–7.62)
NEUTS BAND NFR BLD MANUAL: 1 % (ref 0–8)
NEUTS SEG NFR BLD AUTO: 83 % (ref 43–75)
NRBC BLD AUTO-RTO: 0 /100 WBCS
PLATELET # BLD AUTO: 540 THOUSANDS/UL (ref 149–390)
PLATELET BLD QL SMEAR: ABNORMAL
PMV BLD AUTO: 10.3 FL (ref 8.9–12.7)
POLYCHROMASIA BLD QL SMEAR: PRESENT
POTASSIUM SERPL-SCNC: 3.8 MMOL/L (ref 3.5–5.3)
PROT SERPL-MCNC: 5.5 G/DL (ref 6.4–8.2)
RBC # BLD AUTO: 4.02 MILLION/UL (ref 3.88–5.62)
RBC MORPH BLD: PRESENT
SODIUM SERPL-SCNC: 141 MMOL/L (ref 136–145)
TOTAL CELLS COUNTED SPEC: 100
WBC # BLD AUTO: 11.79 THOUSAND/UL (ref 4.31–10.16)

## 2021-05-17 PROCEDURE — 85027 COMPLETE CBC AUTOMATED: CPT | Performed by: INTERNAL MEDICINE

## 2021-05-17 PROCEDURE — 82948 REAGENT STRIP/BLOOD GLUCOSE: CPT

## 2021-05-17 PROCEDURE — 85007 BL SMEAR W/DIFF WBC COUNT: CPT | Performed by: INTERNAL MEDICINE

## 2021-05-17 PROCEDURE — NC001 PR NO CHARGE: Performed by: STUDENT IN AN ORGANIZED HEALTH CARE EDUCATION/TRAINING PROGRAM

## 2021-05-17 PROCEDURE — 99232 SBSQ HOSP IP/OBS MODERATE 35: CPT | Performed by: INTERNAL MEDICINE

## 2021-05-17 PROCEDURE — 80053 COMPREHEN METABOLIC PANEL: CPT | Performed by: INTERNAL MEDICINE

## 2021-05-17 PROCEDURE — 99239 HOSP IP/OBS DSCHRG MGMT >30: CPT | Performed by: INTERNAL MEDICINE

## 2021-05-17 RX ORDER — AMOXICILLIN AND CLAVULANATE POTASSIUM 875; 125 MG/1; MG/1
1 TABLET, FILM COATED ORAL EVERY 12 HOURS SCHEDULED
Status: DISCONTINUED | OUTPATIENT
Start: 2021-05-17 | End: 2021-05-17 | Stop reason: HOSPADM

## 2021-05-17 RX ORDER — PANTOPRAZOLE SODIUM 40 MG/1
40 TABLET, DELAYED RELEASE ORAL
Qty: 60 TABLET | Refills: 0 | Status: SHIPPED | OUTPATIENT
Start: 2021-05-17 | End: 2021-05-27 | Stop reason: SDUPTHER

## 2021-05-17 RX ORDER — LIDOCAINE 50 MG/G
1 PATCH TOPICAL DAILY
Qty: 30 PATCH | Refills: 0 | Status: SHIPPED | OUTPATIENT
Start: 2021-05-18 | End: 2021-01-01

## 2021-05-17 RX ORDER — AMOXICILLIN AND CLAVULANATE POTASSIUM 875; 125 MG/1; MG/1
1 TABLET, FILM COATED ORAL EVERY 12 HOURS SCHEDULED
Qty: 5 TABLET | Refills: 0 | Status: SHIPPED | OUTPATIENT
Start: 2021-05-17 | End: 2021-05-20

## 2021-05-17 RX ORDER — DICYCLOMINE HYDROCHLORIDE 10 MG/1
10 CAPSULE ORAL
Qty: 30 CAPSULE | Refills: 0 | Status: SHIPPED | OUTPATIENT
Start: 2021-05-17 | End: 2021-05-28 | Stop reason: ALTCHOICE

## 2021-05-17 RX ADMIN — OMEGA-3 FATTY ACIDS CAP 1000 MG 1000 MG: 1000 CAP at 09:40

## 2021-05-17 RX ADMIN — PANCRELIPASE 24000 UNITS: 24000; 76000; 120000 CAPSULE, DELAYED RELEASE PELLETS ORAL at 12:13

## 2021-05-17 RX ADMIN — SODIUM CHLORIDE 3 G: 9 INJECTION, SOLUTION INTRAVENOUS at 08:12

## 2021-05-17 RX ADMIN — PANCRELIPASE 24000 UNITS: 24000; 76000; 120000 CAPSULE, DELAYED RELEASE PELLETS ORAL at 08:03

## 2021-05-17 RX ADMIN — FLUOXETINE 20 MG: 20 CAPSULE ORAL at 09:41

## 2021-05-17 RX ADMIN — DICYCLOMINE HYDROCHLORIDE 10 MG: 10 CAPSULE ORAL at 06:32

## 2021-05-17 RX ADMIN — DICYCLOMINE HYDROCHLORIDE 10 MG: 10 CAPSULE ORAL at 12:13

## 2021-05-17 RX ADMIN — ASPIRIN 81 MG: 81 TABLET, CHEWABLE ORAL at 09:40

## 2021-05-17 RX ADMIN — ENOXAPARIN SODIUM 40 MG: 40 INJECTION SUBCUTANEOUS at 09:41

## 2021-05-17 RX ADMIN — Medication 1 TABLET: at 09:41

## 2021-05-17 RX ADMIN — FOLIC ACID 1 MG: 1 TABLET ORAL at 09:41

## 2021-05-17 RX ADMIN — PANTOPRAZOLE SODIUM 40 MG: 40 TABLET, DELAYED RELEASE ORAL at 06:32

## 2021-05-17 RX ADMIN — POLYETHYLENE GLYCOL 3350 17 G: 17 POWDER, FOR SOLUTION ORAL at 09:45

## 2021-05-17 RX ADMIN — SODIUM CHLORIDE 3 G: 9 INJECTION, SOLUTION INTRAVENOUS at 02:21

## 2021-05-17 NOTE — PROGRESS NOTES
Progress Note - Infectious Disease   Aram Cason 73 y o  male MRN: [id number]  Unit/Bed#: [facility] 928-01 Encounter: [id number]      Impression/Recommendations:  1   Acute cholecystitis   In setting of # 2/3   Status post cholecystostomy /   Cultures with Enterococcus   Clinically stable without sepsis   Blood cultures negative  Rec:  ? Change antibiotics to Augmentin to continue for 2 more days (7 days total Enterococcal coverage)  ? Serial abdominal exams     2   Mild groove pancreatitis   Possibly due to recent ERCP versus CBD stent itself   Some epigastric/RUQ pain but lipase normal   Rec:  ? Serial abdominal exams  ? GI follow up ongoing     3   Biliary obstruction   Due to # 3   Status post recent ERCP, CBD stent    LFTs remain normal     4   Newly diagnosed pancreatic cancer   Recently diagnosed by EUS   Surgical and medical oncology follow-up ongoing     5   Leukocytosis   Likely multifactorial   Consider due to # 1 with insufficient enterococcal coverage   Consider also role of mild pancreatitis   Patient remains clinically stable without fever   Improving      6   DM   A1c 6 2      The patient is stable from an ID standpoint for D/C at any time  The above plan was discussed in detail with Dr Kartik Benton  Antibiotics:  Unasyn # 4  (Enterococcal antibiotics #5)    Subjective:  Patient seen on AM rounds  Denies fevers, chills, sweats, nausea, vomiting, or diarrhea  24 Hour Events:  No documented fevers, chills, sweats, nausea, vomiting, or diarrhea  WBC improving      Objective:  Vitals:  Temp:  [97 8 °F (36 6 °C)-98 1 °F (36 7 °C)] 97 8 °F (36 6 °C)  HR:  [57-70] 57  Resp:  [18] 18  BP: (125-131)/(65-79) 125/65  SpO2:  [92 %-93 %] 92 %  Temp (24hrs), Av 9 °F (36 6 °C), Min:97 8 °F (36 6 °C), Max:98 1 °F (36 7 °C)  Current: Temperature: 97 8 °F (36 6 °C)    Physical Exam:   General:  No acute distress  Psychiatric:  Awake and alert  Pulmonary:  Normal respiratory excursion without accessory muscle use  Abdomen:  Soft, nontender, RUQ drain with minimal bilious fluid  Extremities:  No edema  Skin:  No rashes    Lab Results:  I have personally reviewed pertinent labs  Results from last 7 days   Lab Units 05/17/21  0647 05/16/21  0546 05/15/21  0610   POTASSIUM mmol/L 3 8 3 6 3 6   CHLORIDE mmol/L 109* 106 106   CO2 mmol/L 26 26 28   BUN mg/dL 9 9 9   CREATININE mg/dL 0 57* 0 58* 0 51*   EGFR ml/min/1 73sq m 108 107 113   CALCIUM mg/dL 7 9* 7 8* 7 9*   AST U/L 22 19 20   ALT U/L 30 30 35   ALK PHOS U/L 181* 187* 194*     Results from last 7 days   Lab Units 05/17/21  0647 05/16/21  0546 05/15/21  0610   WBC Thousand/uL 11 79* 17 82* 15 92*   HEMOGLOBIN g/dL 12 1 12 1 12 5   PLATELETS Thousands/uL 540* 504* 459*     Results from last 7 days   Lab Units 05/11/21  1256   GRAM STAIN RESULT  No polys seen*  2+ Gram positive cocci in pairs and chains*   BODY FLUID CULTURE, STERILE  1+ Growth of Enterococcus faecalis*       Imaging Studies:   I have personally reviewed pertinent imaging study reports and images in PACS  EKG, Pathology, and Other Studies:   I have personally reviewed pertinent reports

## 2021-05-17 NOTE — DISCHARGE SUMMARY
Discharge Summary - [place] [address] Internal Medicine  Patient: Awa Hdz 73 y o  male   MRN: [id number]  PCP: José Quintanilla MD  Unit/Bed#: [facility] 928-01 Encounter: [id number]            Discharging Physician / Practitioner: Lashawn Oleary MD  PCP: José Quintanilla MD  Admission Date:   Admission Orders (From admission, onward)     Ordered        05/10/21 1138  Inpatient Admission  Once                   Discharge Date: 05/17/21      Reason for Admission:  Abdominal pain      Discharge Diagnoses:     Principal Problem:    Acute cholecystitis    Active Problems:    Pancreatic mass    Leukocytosis    BPH (benign prostatic hyperplasia)    Hyperlipidemia    Essential hypertension    Diabetes mellitus type 2    Resolved Problems:    Hypokalemia      Consultations During Hospital Stay:  · Surgical oncology  · Infectious disease  · Gastroenterology  · Medical oncology      Hospital Course:     * Acute cholecystitis  Assessment & Plan  HIDA scan on 5/10 revealed "Nonvisualization of the gallbladder on delayed imaging up to 4 hours   Findings suspicious for cystic duct obstruction and acute cholecystitis "  Underwent IR-guided cholecystostomy tube placement on 5/11 as ultrasound imaging noted presence of eliu cholecystic fluid - fluid culture growing Enterococcus faecalis -> appreciate Infectious Disease input with transition of antibiotic course from IV Unasyn to PO Augmentin to complete course through 5/19  Blood cultures from 5/5 are negative  Procalcitonin trended up from 0 72 -> 0 82 -> 0 48 -> 0 41   PRN pain/emesis control - supportive care otherwise  Plan for discharge home today with home health services     Pancreatic mass  Assessment & Plan  Pancreatic mass identified a previous admission on CT scan done on 4/29 - presented w/ biliary obstruction which improved s/p biliary duct stenting- brushing unfortunately was nondiagnostic  CT imaging on 5/5 revealed a "3 8 cm obstructing mass in the region of the head of the pancreas with marked pancreatic ductal dilatation, worrisome for adenocarcinoma   Other tumors not excluded "  Subsequent right upper quadrant ultrasound on 5/6 noting a "Mild gallbladder wall thickening without additional evidence of acute cholecystitis  Pancreatic head mass again identified   Associated pancreatic ductal dilatation "  S/p endoscopic ultrasound w/ biopsies taken and celiac plexus nerve block on 5/7 - fine-needle aspiration report confirmed adenocarcinoma  Eventual plan for Whipple's procedure and hopeful chemotherapy once infection resolves noted - discussed with surgical oncology with recommendation of outpatient follow-up with them along with medical oncology for neoadjuvant chemotherapy and port placement  PRN pain control - supportive care otherwise     Leukocytosis  Assessment & Plan  Likely reactive due to acute medical issue(s) - improved on day of discharge  Remains afebrile  Repeat CT imaging on 5/14 noting "Marked increase in fluid surrounding inflammatory pylorus and proximal duodenum as described above  Given history of acute cholecystitis, 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   Fluid collection is not yet well formed   If patient develops worsening signs of infection, follow-up imaging recommended to exclude developing abscess  Nazario Sharma Status post cholecystostomy tube placement with decompression of the gallbladder   Biliary stent remains in place with stable pneumobilia  Nazario Sharma Pancreatic head/uncinate process mass as described above, consistent with malignancy   No vascular encasement "  Discussed with IR and surgical oncology -> cholecystostomy tube in place with adequate drainage -> initially suspected possible CBD stent displacement, however, per GI, no need for urgent intervention      BPH (benign prostatic hyperplasia)  Assessment & Plan  Continue Flomax     Hyperlipidemia  Assessment & Plan  Continue ASA/fish oil     Essential hypertension  Assessment & Plan  Previously off agents due to borderline hypotensive states - remained normotensive w/o medication while hospitalized  Resume home regimen on discharge     Hypokalemia  Assessment & Plan  Normalized status post repletion  Serum magnesium normal     Diabetes mellitus type 2  Assessment & Plan        Lab Results   Component Value Date     HGBA1C 6 2 (H) 05/05/2021      On SSI coverage per Accu-Cheks during inpatient course - resume home regimen on discharge  Carbohydrate restricted diet encouraged      Condition at Discharge: fair       Discharge Day Visit / Exam:     Vitals: Blood Pressure: 125/65 (05/17/21 0732)  Pulse: 57 (05/17/21 0732)  Temperature: 97 8 °F (36 6 °C) (05/17/21 0732)  Temp Source: Axillary (05/12/21 1802)  Respirations: 18 (05/16/21 2131)  Height: 5' 10" (177 8 cm) (05/10/21 2227)  Weight - Scale: 79 8 kg (176 lb) (05/10/21 2227)  SpO2: 92 % (05/17/21 0732)      Physical exam - I had a face-to-face encounter with the patient on day of discharge  Discussion with Patient and/or Family:  The patient has been advised to return to the ER immediately if any symptoms recur or worsen  Discharge instructions/Information to Patient and/or Family:   See after visit summary for information provided to patient and/or family  Provisions for Follow-Up Care:  See after visit summary for information related to follow-up care and any pertinent home health orders  Disposition:   Home with home health services      Discharge Medications:  See after visit summary for reconciled discharge medications provided to patient and/or family  Discharge Statement:  I spent 38 minutes discharging the patient  This time was spent on the day of discharge  I had direct contact with the patient on the day of discharge   Greater than 50% of the total time was spent examining patient, answering all patient questions, arranging and discussing plan of care with patient as well as directly providing post-discharge instructions  Additional time then spent on discharge activities  ** Please Note: This note is constructed using a voice recognition dictation system  An occasional wrong word/phrase or sound-a-like substitution may have been picked up by dictation device due to the inherent limitations of voice recognition software  Read the chart carefully and recognize, using reasonable context, where substitutions may have occurred  **

## 2021-05-17 NOTE — UTILIZATION REVIEW
Continued Stay Review    Date: 5/16/21     Current Patient Class: IP  Current Level of Care: MS    HPI:66 y o  male initially admitted on 5/10 with pancreatic head mass, s/p ERCP with stent placement and sphincterotomy on 04/28, s/p EUS with biopsy on 05/07  Also with acute cholecystitis s/p percutaneous cholecystostomy tube placement on 5/11  Cultures + for enterococcus  Blood cultures negative  Assessment/Plan:   Improvement in lower abd pain - now only intermittent abd cramping, after having BMs  Tolerating diet, can eat more now  Abd soft, mild distention, mild tenderness around perc ashli and epigastric region  Afebrile  Remains on IV Unasyn  Slightly worse leukocytosis today  Consider due to acute cholecystitis with insufficient enterococcal coverage   Consider also role of mild pancreatitis  Procalcitonin trending downward  Eventual plan for Whipple's procedure and hopeful chemotherapy once infection resolves noted - discussed with surgical oncology now recommending outpatient follow-up with them along with medical oncology for neoadjuvant chemotherapy and port placement  PRN pain control  Vital Signs:   05/16/21 21:31:29  98 1 °F (36 7 °C)  63  18  131/79  96  92 %  --  --   05/16/21 16:08:11  97 9 °F (36 6 °C)  70  18  129/78  95  93 %  --  --   05/16/21 0820  --  --  --  --  --  --  None (Room air)  --   05/16/21 07:55:50  97 4 °F (36 3 °C)Abnormal   63  18  123/75  91  97 %  --  --   05/15/21 22:34:29  98 9 °F (37 2 °C)  69  --  122/63  83  92 %  --  --   05/15/21 2125  --  --  --  --  --  --  None (Room air)  --   05/15/21 15:49:46  98 5 °F (36 9 °C)  68  18  129/80  96  94 %  --  --   05/15/21 14:05:55  --  65  --  128/81  97  92 %  --  --   05/15/21 06:30:57  97 6 °F (36 4 °C)  75  20  107/72  84  93 %  --  --     Pertinent Labs/Diagnostic Results:     5/16 CXR - Small left effusion with mild bibasilar atelectasis        Results from last 7 days   Lab Units 05/16/21  0521 05/15/21  0610 05/14/21  0631 05/13/21  0548 05/11/21  0617   WBC Thousand/uL 17 82* 15 92* 17 06* 15 67* 14 34*   HEMOGLOBIN g/dL 12 1 12 5 11 8* 12 1 11 5*   HEMATOCRIT % 36 2* 38 0 34 9* 37 6 35 2*   PLATELETS Thousands/uL 504* 459* 364 320 228   NEUTROS ABS Thousands/µL  --   --   --  13 01* 12 02*   BANDS PCT %  --   --  4  --   --          Results from last 7 days   Lab Units 05/16/21  0546 05/15/21  0610 05/14/21  0631 05/13/21  0548 05/12/21  0452   SODIUM mmol/L 138 140 137 139 136   POTASSIUM mmol/L 3 6 3 6 3 3* 3 7 3 4*   CHLORIDE mmol/L 106 106 104 105 104   CO2 mmol/L 26 28 29 28 27   ANION GAP mmol/L 6 6 4 6 5   BUN mg/dL 9 9 9 9 14   CREATININE mg/dL 0 58* 0 51* 0 52* 0 62 0 66   EGFR ml/min/1 73sq m 107 113 112 104 101   CALCIUM mg/dL 7 8* 7 9* 7 7* 8 0* 7 8*   MAGNESIUM mg/dL  --   --   --   --  2 3     Results from last 7 days   Lab Units 05/16/21  0546 05/15/21  0610 05/14/21  0631 05/13/21  0548   AST U/L 19 20 20 32   ALT U/L 30 35 40 56   ALK PHOS U/L 187* 194* 168* 199*   TOTAL PROTEIN g/dL 5 2* 5 5* 5 0* 5 4*   ALBUMIN g/dL 1 9* 1 9* 1 8* 2 0*   TOTAL BILIRUBIN mg/dL 0 67 0 72 0 74 0 71     Results from last 7 days   Lab Units 05/16/21 2127 05/16/21  1614 05/16/21  1134 05/16/21  0814 05/15/21  2058 05/15/21  1718 05/15/21  1157 05/15/21  0825 05/14/21  2115 05/14/21  1132   POC GLUCOSE mg/dl 128 160* 159* 96 125 125 111 124 154* 140     Results from last 7 days   Lab Units 05/16/21  0546 05/15/21  0610 05/14/21  0631 05/13/21  0548 05/12/21  0452 05/11/21  0617   GLUCOSE RANDOM mg/dL 108 109 152* 108 152* 116     Results from last 7 days   Lab Units 05/15/21  0610 05/14/21  0631 05/13/21  0548   PROCALCITONIN ng/ml 0 41* 0 48* 0 82*     Results from last 7 days   Lab Units 05/16/21  0546 05/15/21  0610 05/14/21  1824   LIPASE u/L 310 318 442*     Results from last 7 days   Lab Units 05/11/21  1256   GRAM STAIN RESULT  No polys seen*  2+ Gram positive cocci in pairs and chains*   BODY FLUID CULTURE, STERILE  1+ Growth of Enterococcus faecalis*     Medications:   Scheduled Medications  ampicillin-sulbactam (UNASYN) 3 g in sodium chloride 0 9 % 100 mL IVPB q 6 hr    amoxicillin-clavulanate, 1 tablet, Oral, Q12H INGRID  aspirin, 81 mg, Oral, Daily  bisacodyl, 10 mg, Rectal, Daily  dicyclomine, 10 mg, Oral, 4x Daily (AC & HS)  enoxaparin, 40 mg, Subcutaneous, Q24H INGRID  fish oil, 1,000 mg, Oral, Daily  FLUoxetine, 20 mg, Oral, Daily  folic acid, 1 mg, Oral, Daily  insulin lispro, 1-6 Units, Subcutaneous, TID With Meals  lidocaine, 1 patch, Topical, Daily  multivitamin-minerals, 1 tablet, Oral, Daily  pancrelipase (Lip-Prot-Amyl), 24,000 Units, Oral, TID With Meals  pantoprazole, 40 mg, Oral, BID AC  polyethylene glycol, 17 g, Oral, Daily  senna-docusate sodium, 1 tablet, Oral, BID  tamsulosin, 0 4 mg, Oral, HS    Continuous IV Infusions:     PRN Meds:  acetaminophen, 650 mg, Oral, Q6H PRN  fentanyl citrate (PF), , Intravenous, Code/Trauma/Sedation Med  HYDROmorphone, 0 5 mg, Intravenous, Q4H PRN  midazolam, , , Code/Trauma/Sedation Med  oxyCODONE, 10 mg, Oral, Q6H PRN  oxyCODONE, 2 5 mg, Oral, Q6H PRN  oxyCODONE, 5 mg, Oral, Q6H PRN - x 1 5/16  sincalide, 0 02 mcg/kg (Order-Specific), Intravenous, Once in imaging    Discharge Plan: home     Network Utilization Review Department  ATTENTION: Please call with any questions or concerns to [phone] and carefully listen to the prompts so that you are directed to the right person  All voicemails are confidential   David Nunn all requests for admission clinical reviews, approved or denied determinations and any other requests to dedicated fax number below belonging to the campus where the patient is receiving treatment   List of dedicated fax numbers for the Facilities:  [address] DENIALS (Administrative/Medical Necessity) [phone]   [address] (Maternity/NICU/Pediatrics) [address] [facility] [address] [facility] [address] [address] [place] [address] [address] [address] [place] [address] [phone]

## 2021-05-17 NOTE — TELEPHONE ENCOUNTER
Intra-Department Referral    Patient Details:  Jose Price  1955  [id number]   Background Information:  [address] starts by opening a telephone encounter and gathering the following information   Who is calling to schedule and relationship? Patient   Diagnosis Malignant neoplasm of pancreas, unspecified location of malignancy   What department was patient seen in last? Surg Onc   Scheduling Information:    Preferred [address] [facility] [place]   Are there any days the patient cannot be seen? na   Miscellaneous:    After completing the above information, please route to nurse navigation, clinical trials (for re-evaluation) and [place]

## 2021-05-17 NOTE — PROGRESS NOTES
Call to Christopher Aguilar to introduce myself, no answer, left a message with my contact information asking him to return my call

## 2021-05-17 NOTE — PROGRESS NOTES
Progress Note - Surgical Oncology   Yakelin Maldonado 73 y o  male MRN: [id number]  Unit/Bed#: [facility] 928-01 Encounter: [id number]    Assessment:  73 M with pancreatic mass status post ERCP biliary decompression on 04/28, readmitted with abdominal pain, now status post endoscopic ultrasound with biopsy on 05/07 by GI   HIDA was positive for cholecystitis, patient is now s/p perc ashli tube placement by IR on 5/11  CT scan 5/14 showed marked increase in fluid surrounding inflammatory pylorus and proximal duodenum as described above  Given history of acute cholecystitis, 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  GI consulted will consider EGD if not improving  Plan:  Continue lo fat diet  Continue IV abx per ID - on Unasyn consider PO for possible dc  Trend WBC/Fever curve improving, 11 today from 17  OOB/Ambulate  DVT ppx  Remainder of care per primary team    Subjective/Objective     Subjective:   Patient seen and examined at bedside  No acute events overnight  C/o some lower abdominal pain  Denies nausea, vomiting, fever, chills  Tolerating diet  Positive flatus, positive bowel movement  Up out of bed, ambulating        Objective:     Blood pressure 131/79, pulse 57, temperature 97 8 °F (36 6 °C), resp  rate 18, height 5' 10" (1 778 m), weight 79 8 kg (176 lb), SpO2 92 %  ,Body mass index is 25 25 kg/m²  Intake/Output Summary (Last 24 hours) at 5/17/2021 0745  Last data filed at 5/17/2021 0601  Gross per 24 hour   Intake 910 ml   Output 2320 ml   Net -1410 ml       Invasive Devices     Peripheral Intravenous Line            Peripheral IV 05/15/21 Dorsal (posterior); Proximal;Right Forearm 1 day          Drain            Open Drain Right 1221 days    Closed/Suction Drain  RUQ  10 Fr  5 days                Physical Exam:   General: NAD  Head: normocephalic, atraumatic  CV: Pulse regular  Lungs: no conversational dyspnea  Abdomen: soft, non distended, mildly tender b/l LQ and epigastric region, no guarding or rebound, drain with bilious output  Extremities: VITAL, motor sensory intact  Neuro: awake, alert, answers questions appropriately      Lab, Imaging and other studies:  CBC:   Lab Results   Component Value Date    WBC 11 79 (H) 05/17/2021    HGB 12 1 05/17/2021    HCT 36 5 05/17/2021    MCV 91 05/17/2021     (H) 05/17/2021    MCH 30 1 05/17/2021    MCHC 33 2 05/17/2021    RDW 15 3 (H) 05/17/2021    MPV 10 3 05/17/2021   , CMP:   No results found for: SODIUM, K, CL, CO2, ANIONGAP, BUN, CREATININE, GLUCOSE, CALCIUM, AST, ALT, ALKPHOS, PROT, BILITOT, EGFR  VTE Pharmacologic Prophylaxis: Enoxaparin (Lovenox)  VTE Mechanical Prophylaxis: sequential compression device

## 2021-05-18 ENCOUNTER — TRANSITIONAL CARE MANAGEMENT (OUTPATIENT)
Dept: FAMILY MEDICINE CLINIC | Facility: CLINIC | Age: 66
End: 2021-05-18

## 2021-05-18 NOTE — UTILIZATION REVIEW
Notification of Discharge   This is a Notification of Discharge from our facility [address]  Please be advised that this patient has been discharge from our facility  Below you will find the admission and discharge date and time including the patients disposition  UTILIZATION REVIEW CONTACT:  Kailey Arreguin  Utilization   Network Utilization Review Department  Phone: [phone] x carefully listen to the prompts  All voicemails are confidential   Email: [email or web address]     PHYSICIAN ADVISORY SERVICES:  FOR [facility] REVIEW - MEDICAL NECESSITY DENIAL  Phone: [phone]  Fax: [phone]  Email: [email or web address]     PRESENTATION DATE: 5/10/2021 10:40 AM  OBERVATION ADMISSION DATE:   INPATIENT ADMISSION DATE: 5/10/21 1040   DISCHARGE DATE: 5/17/2021  1:01 PM  DISPOSITION: Home with [place] with [address] [place] [address] Road INFORMATION:  Send all requests for admission clinical reviews, approved or denied determinations and any other requests to dedicated fax number below belonging to the campus where the patient is receiving treatment   List of dedicated fax numbers:  [address] DENIALS (Administrative/Medical Necessity) [phone]   [address] (Maternity/NICU/Pediatrics) [phone]   [facility] [phone]   Gloria Bolden [phone]   Kelly Samuels [phone]   [facility]25  [phone]   [facility]  [phone]   220 [facility] [address]  2401 [facility] 1000 [address] [facility] [phone]

## 2021-05-19 ENCOUNTER — DOCUMENTATION (OUTPATIENT)
Dept: SOCIAL WORK | Facility: HOSPITAL | Age: 66
End: 2021-05-19

## 2021-05-19 ENCOUNTER — OFFICE VISIT (OUTPATIENT)
Dept: FAMILY MEDICINE CLINIC | Facility: CLINIC | Age: 66
End: 2021-05-19
Payer: COMMERCIAL

## 2021-05-19 VITALS
HEIGHT: 70 IN | TEMPERATURE: 98.6 F | DIASTOLIC BLOOD PRESSURE: 68 MMHG | WEIGHT: 167 LBS | BODY MASS INDEX: 23.91 KG/M2 | SYSTOLIC BLOOD PRESSURE: 120 MMHG | HEART RATE: 64 BPM

## 2021-05-19 DIAGNOSIS — K86.89 PANCREATIC MASS: ICD-10-CM

## 2021-05-19 DIAGNOSIS — R10.13 EPIGASTRIC PAIN: ICD-10-CM

## 2021-05-19 DIAGNOSIS — K81.0 ACUTE CHOLECYSTITIS: ICD-10-CM

## 2021-05-19 DIAGNOSIS — E11.9 TYPE 2 DIABETES MELLITUS WITHOUT COMPLICATION, WITHOUT LONG-TERM CURRENT USE OF INSULIN (HCC): ICD-10-CM

## 2021-05-19 DIAGNOSIS — I10 ESSENTIAL HYPERTENSION: ICD-10-CM

## 2021-05-19 DIAGNOSIS — M05.79 RHEUMATOID ARTHRITIS INVOLVING MULTIPLE SITES WITH POSITIVE RHEUMATOID FACTOR (HCC): ICD-10-CM

## 2021-05-19 DIAGNOSIS — Z76.89 ENCOUNTER FOR SUPPORT AND COORDINATION OF TRANSITION OF CARE: Primary | ICD-10-CM

## 2021-05-19 PROCEDURE — 99215 OFFICE O/P EST HI 40 MIN: CPT | Performed by: FAMILY MEDICINE

## 2021-05-19 PROCEDURE — 1111F DSCHRG MED/CURRENT MED MERGE: CPT | Performed by: FAMILY MEDICINE

## 2021-05-19 PROCEDURE — 3725F SCREEN DEPRESSION PERFORMED: CPT | Performed by: FAMILY MEDICINE

## 2021-05-19 RX ORDER — SODIUM CHLORIDE 0.9 % (FLUSH) 0.9 %
SYRINGE (ML) INJECTION
COMMUNITY
Start: 2021-05-12 | End: 2022-01-01 | Stop reason: ALTCHOICE

## 2021-05-19 NOTE — PROGRESS NOTES
Admission Report at [facility]  [facility] has admitted your patient to [address] [place] [address] service with the following disciplines:      SN  Response needed, please respond via [employer] or  or message through [facility]  Primary focus of home health care [facility]  Patient stated goals of care to get the port placed start chemo and have the tumor removed  Anticipated visit pattern and next visit date next visit 5 19 2021 expected visit pattern 2 weekly for 2 weeks 1 weekly for 6 weeks   Significant clinical findings NA  Request for additional disciplines NA  Request for medication clarification Should patient be on both Omeprazole and Pantoprazole? Should patient resume lipitor 10 mg daily? Should pt resume Shelby Palencia  Pt is also not taking senna miralax or oxycodone  Request for other order clarificationNA  Needs follow up physician appointments scheduled PCP appt 5 19 2021  Potential barriers to goal achievement Chronic disease   minimal support  Other pertinent information NA    Thank you for allowing us to participate in the care of your patient        Rafat Vazquez RN

## 2021-05-19 NOTE — PROGRESS NOTES
Assessment/Plan:    No problem-specific Assessment & Plan notes found for this encounter  Diagnoses and all orders for this visit:    Encounter for support and coordination of transition of care    Acute cholecystitis    Pancreatic mass    Epigastric pain    Rheumatoid arthritis involving multiple sites with positive rheumatoid factor (HCC)    Diabetes mellitus type 2    Essential hypertension    Other orders  -     Sodium Chloride Flush (Normal Saline Flush) 0 9 % SOLN        Discussion:  I reviewed with pt  Pt is feeling better s/p cholecystostomy tube placement, however still is weak and has a lot of discomfort at the site  No sign of wound infection noted on exam  He is taking po well and is tolerating pancreatic enzymes  I have recommended that he continue to hold Cleda Ely until he can discuss it with Onc Surg and Onc  Monitor weight  Finish course of Augmentin  Pt should heck Bgs bid several times a week, but can decrease frequency if they remain stable  He will report BGs weekly to this office  Recheck by phone in 2 and 4 weeks and f/u in office if needed  F/u with Onc, Onc Surgery and GI in the interim      Subjective:     TCM Call (since 4/20/2021)     Date and time call was made  5/18/2021  2:41 PM    Hospital care reviewed  Records reviewed    Patient was hospitialized at  [facility]        Date of Admission  05/10/21    Date of discharge  05/17/21    Diagnosis  Acute cholecystitis    Disposition  Home    Were the patients medications reviewed and updated  Yes    Current Symptoms  None      TCM Call (since 4/20/2021)     Post hospital issues  None    Should patient be enrolled in anticoag monitoring? No    Scheduled for follow up?   Yes    Did you obtain your prescribed medications  No    Do you need help managing your prescriptions or medications  No    Is transportation to your appointment needed  No    I have advised the patient to call PCP with any new or worsening symptoms  Lisa Bray Mcmanus, [place]    Living Arrangements  Spouse or Significiant other    Support System  Family    Are you recieving any outpatient services  No    Are you recieving home care services  No    Are you using any community resources  No    Current waiver services  No    Counseling  Patient           Patient ID: Aram Cason is a 73 y o  male  Here for TCM  - 74 yo male with recently diagnosed 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, was previously discharged on 5/8 after EUS and bx of lesion had been experiencing some abd discomfort prior to that admission, but pain worsened significantly after discharge  By 5/10, pain was severe and he returned to [facility] ED  Exam revealed peritoneal signs  CT showed inflammation of cardia, pylorus and pancreatic along with a small amount of free air, which raised concerns of perforated ulcer  Pt also had signs of possible GB irritation  Given his underlying malignancy, he was transferred to [facility] due to possibility of having extensive surgery  At [facility], HIDA scan done reveal delayed emptying of GB suggesting cystic duct obstruction  IR consulted and pt underwent cholecystostomy placement on 5/11  By 5/12 epigastric pain was greatly improved  Cultures grew out Enterococcus  ID was consulted and pt started on Unasyn  He slowly improved and was able to be discharged on 5/17  Pt here for TCM visit  - since discharge, he continues to note that pain is better s/p cholecystostomy tube placement  He has a lot of discomfort in the RUQ along the tube site  Drain is functioning well (100-200cc qd for the last 2d)  Pt on Augmentin at present (transitioned from Unasyn)    Appetite decent though he is still losing weight  He is taking pancreatic enzymes with meals  Pt states that he still has diarrhea  Pt denies melena/hematochezia  - pt is scheduled to see Onc Surgery next week and is to f/u with  Oncology after   At present, pt is planned to have chemo x 3m, followed by surgery then post op chemo x3 more months  I do not expect that patient will be able to work during this period  Paperwork has been submitted for FMLA  - I reviewed med list with pt  Pt has been holding Paddy Band since diagnosis - he is unsure if he should restart it given underlying CA  - I reviewed available hospital records, lab results and study reports with pt        The following portions of the patient's history were reviewed and updated as appropriate:   He  has a past medical history of Arthritis, Cellulitis, Enlarged prostate, Epidermal inclusion cyst, Erythrasma, Furuncle, Hyperlipidemia, and Hypertension  He   Patient Active Problem List    Diagnosis Date Noted    Acute cholecystitis 05/11/2021    Leukocytosis 05/11/2021    Perforation bowel ([address] ) 05/10/2021    SIRS (systemic inflammatory response syndrome) (HCC) 05/09/2021    Constipation 05/07/2021    BPH (benign prostatic hyperplasia) 05/06/2021    Epigastric pain 05/04/2021    Pancreatic mass 04/29/2021    Hypokalemia 04/27/2021    Diarrhea 04/27/2021    Diabetes mellitus type 2 03/16/2021    Multiple lipomas 08/04/2020    Urgency of urination 01/31/2018    Right arm pain 10/13/2017    Rheumatoid arthritis ([facility] ) 10/13/2017    Essential hypertension 10/13/2017    Liver mass 11/17/2016    Hyperlipidemia 02/03/2016    Anxiety disorder 08/15/2013    Esophageal reflux 08/15/2013     He  has a past surgical history that includes Scrotal surgery; pr removal of hydrocele,tunica,unilat (Right, 1/11/2018); Hydrocele excision / repair (Right, 01/11/2018); and IR cholecystostomy tube placement (5/11/2021)  He  reports that he has quit smoking  He has never used smokeless tobacco  He reports that he does not drink alcohol or use drugs    Current Outpatient Medications   Medication Sig Dispense Refill    aspirin 81 mg chewable tablet Chew 81 mg      Blood Glucose Monitoring Suppl (ONE TOUCH ULTRA 2) w/Device KIT Use daily 1 each 1    dicyclomine (BENTYL) 10 mg capsule Take 1 capsule (10 mg total) by mouth 4 (four) times a day (before meals and at bedtime) 30 capsule 0    FLUoxetine (PROzac) 20 mg capsule TAKE 1 CAPSULE BY MOUTH EVERY DAY   90 capsule 2    folic acid (FOLVITE) 1 mg tablet Take by mouth daily      glucose blood (OneTouch Ultra) test strip Use as instructed once a day 50 each 5    Lancets (onetouch ultrasoft) lancets Use as instructed once a day 50 each 5    lidocaine (LIDODERM) 5 % Apply 1 patch topically daily Remove & Discard patch within 12 hours or as directed by MD 30 patch 0    lisinopril-hydrochlorothiazide (PRINZIDE,ZESTORETIC) 10-12 5 MG per tablet TAKE ONE TABLET BY MOUTH EVERY DAY 90 tablet 3    Multiple Vitamins-Minerals (MULTIVITAMIN MEN) TABS Take 1 tablet by mouth daily      Omega-3 Fatty Acids (FISH OIL) 1,000 mg Take 1,000 mg by mouth daily      omeprazole (PriLOSEC) 20 mg delayed release capsule Take 1 capsule (20 mg total) by mouth daily before breakfast 30 capsule 0    oxyCODONE (ROXICODONE) 5 mg immediate release tablet Take 0 5 tablets (2 5 mg total) by mouth every 6 (six) hours as needed for moderate pain Or take 1 tablet (5 mg total) by mouth every 6 hours as needed for severe painMax Daily Amount: 10 mg 30 tablet 0    pancrelipase, Lip-Prot-Amyl, (CREON) 24,000 units Take 1 capsule (24,000 Units total) by mouth 3 (three) times a day with meals 90 capsule 0    pantoprazole (PROTONIX) 40 mg tablet Take 1 tablet (40 mg total) by mouth 2 (two) times a day before meals 60 tablet 0    polyethylene glycol (MIRALAX) 17 g packet Take 17 g by mouth daily as needed (Constipation)  0    senna-docusate sodium (SENOKOT S) 8 6-50 mg per tablet Take 1 tablet by mouth 2 (two) times a day  0    Sodium Chloride Flush (Normal Saline Flush) 0 9 % SOLN       sodium chloride, PF, 0 9 % 10 mL by Intracatheter route daily Intracatheter flushing daily 300 mL 3    tamsulosin (FLOMAX) 0 4 mg TAKE 1 CAPSULE BY MOUTH DAILY AT BEDTIME 90 capsule 3     No current facility-administered medications for this visit  He is allergic to fentanyl       Review of Systems   Constitutional: Positive for activity change and fatigue  Negative for appetite change, chills and fever  HENT: Negative  Eyes: Negative  Respiratory: Negative  Cardiovascular: Negative  Gastrointestinal: Positive for abdominal pain (along tube site) and diarrhea  Negative for abdominal distention, nausea, rectal pain and vomiting  Endocrine: Negative  Genitourinary: Negative  Musculoskeletal: Positive for arthralgias (chronic)  Neurological: Negative  Negative for dizziness, weakness, light-headedness, numbness and headaches  Psychiatric/Behavioral: Negative  Objective:      /68   Pulse 64   Temp 98 6 °F (37 °C)   Ht 5' 10" (1 778 m)   Wt 75 8 kg (167 lb)   BMI 23 96 kg/m²          Physical Exam  Vitals signs reviewed  Constitutional:       Appearance: He is well-developed  Comments: Thin and tired appearing male in NAD   HENT:      Head: Normocephalic and atraumatic  Right Ear: Tympanic membrane, ear canal and external ear normal       Left Ear: Tympanic membrane, ear canal and external ear normal    Eyes:      General: No scleral icterus  Extraocular Movements: Extraocular movements intact  Conjunctiva/sclera: Conjunctivae normal       Pupils: Pupils are equal, round, and reactive to light  Neck:      Musculoskeletal: Normal range of motion and neck supple  No muscular tenderness  Thyroid: No thyromegaly  Cardiovascular:      Rate and Rhythm: Normal rate and regular rhythm  Pulses: Normal pulses  Heart sounds: Normal heart sounds  No murmur  Pulmonary:      Effort: Pulmonary effort is normal       Breath sounds: Normal breath sounds  No wheezing or rales  Abdominal:      General: Bowel sounds are normal  There is no distension  Palpations: Abdomen is soft   There is no mass       Tenderness: There is abdominal tenderness (along RUQ cholecystostomy tube site)  There is no right CVA tenderness or left CVA tenderness  Musculoskeletal: Normal range of motion  General: No swelling, tenderness or deformity (mild diffuse arthritic change  No gross synovitis appreciated)  Right lower leg: No edema  Left lower leg: No edema  Lymphadenopathy:      Cervical: No cervical adenopathy  Skin:     General: Skin is warm and dry  Capillary Refill: Capillary refill takes less than 2 seconds  Neurological:      Mental Status: He is alert and oriented to person, place, and time  Cranial Nerves: No cranial nerve deficit  Sensory: No sensory deficit  Motor: No weakness        Gait: Gait normal    Psychiatric:         Mood and Affect: Mood normal       Comments: PHQ-9 Depression Screening    PHQ-9:   Frequency of the following problems over the past two weeks:      Little interest or pleasure in doing things: 0 - not at all  Feeling down, depressed, or hopeless: 0 - not at all  PHQ-2 Score: 0

## 2021-05-24 ENCOUNTER — TELEPHONE (OUTPATIENT)
Dept: SURGICAL ONCOLOGY | Facility: CLINIC | Age: 66
End: 2021-05-24

## 2021-05-24 ENCOUNTER — TELEPHONE (OUTPATIENT)
Dept: FAMILY MEDICINE CLINIC | Facility: CLINIC | Age: 66
End: 2021-05-24

## 2021-05-24 NOTE — TELEPHONE ENCOUNTER
Called pt to discuss upcoming appt with Dr Marley Whittington  Explained that she will be out of the office this week, and we would like him to see Dr Thomas Frank instead  Confirmed appts with Dr Mirtha Ohara tomorrow, Dr Thomas Frank on Wednesday, and port placement on 6/1  Per pt request, appt info emailed to him as well  Pt agreeable to plan

## 2021-05-24 NOTE — TELEPHONE ENCOUNTER
Patient called  Forms were sent last week and his insurance company wants them back ASAP  They only have him off until 06/19   He wants to know if you can get them back today

## 2021-05-25 ENCOUNTER — PATIENT OUTREACH (OUTPATIENT)
Dept: HEMATOLOGY ONCOLOGY | Facility: CLINIC | Age: 66
End: 2021-05-25

## 2021-05-25 ENCOUNTER — CONSULT (OUTPATIENT)
Dept: HEMATOLOGY ONCOLOGY | Facility: CLINIC | Age: 66
End: 2021-05-25
Payer: COMMERCIAL

## 2021-05-25 VITALS
BODY MASS INDEX: 23.19 KG/M2 | HEART RATE: 58 BPM | OXYGEN SATURATION: 97 % | RESPIRATION RATE: 16 BRPM | HEIGHT: 70 IN | DIASTOLIC BLOOD PRESSURE: 66 MMHG | SYSTOLIC BLOOD PRESSURE: 102 MMHG | WEIGHT: 162 LBS | TEMPERATURE: 97.6 F

## 2021-05-25 DIAGNOSIS — C25.9 MALIGNANT NEOPLASM OF PANCREAS, UNSPECIFIED LOCATION OF MALIGNANCY (HCC): Primary | ICD-10-CM

## 2021-05-25 DIAGNOSIS — T45.1X5A CHEMOTHERAPY INDUCED NEUTROPENIA (HCC): ICD-10-CM

## 2021-05-25 DIAGNOSIS — D70.1 CHEMOTHERAPY INDUCED NEUTROPENIA (HCC): ICD-10-CM

## 2021-05-25 DIAGNOSIS — C25.9 PANCREATIC ADENOCARCINOMA (HCC): Primary | ICD-10-CM

## 2021-05-25 DIAGNOSIS — C25.9 PANCREATIC ADENOCARCINOMA (HCC): ICD-10-CM

## 2021-05-25 PROCEDURE — 99215 OFFICE O/P EST HI 40 MIN: CPT | Performed by: INTERNAL MEDICINE

## 2021-05-25 RX ORDER — OXYCODONE HYDROCHLORIDE 5 MG/1
5 TABLET ORAL EVERY 4 HOURS PRN
Qty: 30 TABLET | Refills: 0 | Status: SHIPPED | OUTPATIENT
Start: 2021-05-25 | End: 2021-05-27 | Stop reason: SDUPTHER

## 2021-05-25 NOTE — PROGRESS NOTES
Zeferino Grijalva today after his consultation with Dr Tato Whitley, he is in a great amount of pain, referral was entered for Palliative care and I personally called them as well to expedite the appointment (he is scheduled on 5/27/21 with Dr Archie Em), we also discussed genetic testing and the importance of this due to his pancreas cancer diagnosis and he stated he has 2 children, referral was entered, he has some transportation issues so we completed the [employer] transport qualifier and that was emailed to [employer], he needs transportation to his first 3 infusion appointments because they had to be scheduled at the [facility], [employer] and received confirmation they will provide his transportation, he stated he has lost over 40lbs and has a decreased appetite, MST form complete and referral to the dietitians was made, all of his questions were answered, [facility], printed scripts for him, printed a calendar for him will give these to him tomorrow at his office visit with Dr Jay Jay Michaels as I didn't want him to wait, he was in so much pain, we reviewed when to go for blood work and it is also on his calendar, we also reviewed when his port will be placed and that he will need a ride for this due to him being put to sleep, he verbalized understanding, instructed him to call with any other questions or concerns

## 2021-05-25 NOTE — PROGRESS NOTES
Oncology Outpatient Consult Note  Rodney Hameed 73 y o  male MRN: @ Encounter: [id number]        Date:  5/25/2021      CC: What appears to be localized pancreatic cancer biopsy proven  To be adenocarcinoma      HPI:  Rodney Hameed is seen for initial consultation 5/25/2021 regarding   A newly diagnosed pancreatic cancer  The patient presented to the hospital with obstructive jaundice  He ended up having a workup along with a biopsy which revealed  A 3 8 cm obstructing mass in the region of the head of the pancreas with marked pancreatic ductal dilatation worrisome for adenocarcinoma  Biopsy was done which proved this was adenocarcinoma  The patient also had cholecystitis at the time and ended up having a cholecystostomy tube placed and was treated with antibiotics  He was seen by our colleagues in Surgical Oncology who recommended neoadjuvant chemotherapy upfront which we will initiate  He is not a candidate for  FOLFIRINOX and so will be given gemcitabine and Abraxane instead  As far symptoms are concerned he has lot of abdominal pain  He states it feels like cramping and a charley horse in his abdomen  It comes and goes and is more based on how he sits  He was prescribed oxycodone upon discharge but looking at the [place] prescribing website he has not filled this  I advised him to take oxycodone and have given him another prescription for 5 mg to be taken every 4 hours as needed  I will set him up to see palliative care  Apart from the pain he also has lot of gas and bloating  Denies any nausea  Still has an appetite  The rest of his 14 point review of systems today was negative  Previous Hematologic/ Oncologic History:    Oncology History   Pancreatic adenocarcinoma ([facility] [address] )   5/7/2021 Biopsy    Head of pancreas:  Malignant Adenocarcinoma  Test Results:    Imaging: Xr Chest Portable    Result Date: 5/16/2021  Narrative: CHEST INDICATION:   hypoxia    Newly diagnosed pancreatic cancer  COMPARISON:  Chest radiograph from 5/7/2021 and abdomen CT from 5/14/2021  EXAM PERFORMED/VIEWS:  XR CHEST PORTABLE  FINDINGS: Cardiomediastinal silhouette normal  Small left effusion  Mild bibasilar atelectasis  No pneumothorax  Osseous structures normal for age  Impression: Small left effusion with mild bibasilar atelectasis  Workstation performed: [id number]     Xr Chest Portable    Result Date: 5/8/2021  Narrative: CHEST INDICATION:  Desaturation post procedure  COMPARISON:  5/5/2021, CT chest 4/29/2021 EXAM PERFORMED/VIEWS:  XR CHEST PORTABLE FINDINGS:  The upper thoracic inlet was excluded from the field-of-view  Cardiomediastinal silhouette appears unremarkable  The lungs are clear  No pneumothorax or pleural effusion  Known right chest wall lesion on CT is not clearly visible on this exam  Osseous structures appear within normal limits for patient age  Impression: No acute cardiopulmonary disease  Workstation performed: [id number]     Xr Chest 1 View Portable    Result Date: 5/5/2021  Narrative: CHEST INDICATION:   weak  COMPARISON:  4/29/2021 and 10/13/2017 EXAM PERFORMED/VIEWS:  XR CHEST PORTABLE FINDINGS: Cardiomediastinal silhouette appears unremarkable  The lungs are clear  No pneumothorax or pleural effusion  There is some degenerative arthritis at the acromioclavicular joint  Soft tissue mass of the right chest wall seen on the CT scan is not evident on plain film  Impression: No acute cardiopulmonary disease  Workstation performed: [id number]     Ct Chest Wo Contrast    Result Date: 4/29/2021  Narrative: CT CHEST WITHOUT IV CONTRAST INDICATION:   Pancreatic adenocarcinoma, initial workup staging pancreatic mass  COMPARISON:  Chest x-ray 10/13/2017 TECHNIQUE: CT examination of the chest was performed without intravenous contrast   Axial, sagittal, and coronal 2D reformatted images were created from the source data and submitted for interpretation   Radiation dose length product (DLP) for this visit:  345 mGy-cm   This examination, like all CT scans performed in the [facility], was performed utilizing techniques to minimize radiation dose exposure, including the use of iterative reconstruction and automated exposure control  FINDINGS: LUNGS:  Lungs are clear  There is no tracheal or endobronchial lesion  PLEURA:  Unremarkable  HEART/GREAT VESSELS:  Unremarkable for patient's age  MEDIASTINUM AND WAQAS:  Unremarkable  CHEST WALL AND LOWER NECK:   Soft tissue lesion in the subcutaneous tissues of the right chest wall inferior to the right nipple measuring 3 0 x 1 4 cm on image 2/44  VISUALIZED STRUCTURES IN THE UPPER ABDOMEN:  Biliary stent with pneumobilia and multiple hypodense lesions throughout the liver previously attributed to cysts  OSSEOUS STRUCTURES:  No acute fracture or destructive osseous lesion  Impression: Soft tissue lesion in the subcutaneous tissues of the right chest wall inferior to the right nipple measuring 8 0-1 4 cm on image 2/44  Tissue sampling may be warranted  No other evidence of potential metastatic disease to the chest  Workstation performed: [id number]     Xr Abdomen 1 Vw Portable    Result Date: 5/9/2021  Narrative: ABDOMEN INDICATION:   worsening abdominal pain -- assess for gas under the diaphragm  COMPARISON:  2/12/2020 VIEWS:  AP supine Images: 2 FINDINGS: There is a common bile duct stent noted  There is a nonobstructive bowel gas pattern  No discernible free air on this semierect study  Upright or left lateral decubitus imaging is more sensitive to detect subtle free air in the appropriate setting  No pathologic calcifications or soft tissue masses  Visualized lung bases are clear  Visualized osseous structures are unremarkable for the patient's age  Impression: Common bile duct stent  Nonobstructive bowel gas pattern  No evidence for free air on this semierect examination   Workstation performed: [id number]     Nm Hepatobiliary W Rx    Result Date: 5/10/2021  Narrative: HEPATOBILIARY SCAN WITH CHOLECYSTOKININ ADMINISTRATION INDICATION: Pericholecystic inflammation COMPARISON: CT abdomen pelvis 5/9/2021, ultrasound 5/6/2021 TECHNIQUE: Patient was pretreated with 1 7 mcg CCK intravenously prior to starting the study as per protocol  Following the intravenous administration of 5 1 mCi Tc-99m labeled mebrofenin, dynamic abdominal images were obtained over a 60 minute time period  Images were performed in AP projection  FINDINGS: There is prompt, uniform accumulation with normal clearance of the radiopharmaceutical by the liver  Focal photopenia noted in the right lobe superiorly compatible with hepatic cyst as seen on prior CT  Small bowel lesion seen within the first hour  Gallbladder is not seen within the first hour  Additional imaging obtained at 75 minutes, 2 hours and 4 hours  Additional 1 1 mCi technetium mebrofenin was administered intravenously  Delayed imaging does not demonstrate gallbladder uptake  Impression: 1  Nonvisualization of the gallbladder on delayed imaging up to 4 hours  Findings suspicious for cystic duct obstruction and acute cholecystitis  The study was marked in [facility] for immediate notification  Workstation performed: [id number]     Mri Abdomen W Wo Contrast And Mrcp    Result Date: 4/28/2021  Narrative: MRI OF THE ABDOMEN WITH AND WITHOUT CONTRAST WITH MRCP INDICATION:  Newly identified pancreatic mass with biliary dilatation COMPARISON: 4/27/2021 TECHNIQUE:  The following pulse sequences were obtained:  Coronal and axial T2 with TE of 90 and 180 respectively, axial T2 with fat saturation, axial FIESTA fat-sat, axial T1-weighted in-and-out-of phase, axial DWI/ADC, pre-contrast axial T1 with fat saturation, post-contrast dynamic axial T1 with fat saturation at 20, 70, and 180 seconds, followed by coronal and 7 minute delayed axial T1 with fat saturation   3D MRCP images were obtained with radial thick slabs and projections  3D rendering was performed from the acquisition scanner  IV Contrast:  8 mL of Gadobutrol injection (SINGLE-DOSE) FINDINGS: LOWER CHEST:   Unremarkable  LIVER: Normal in size and configuration  Multiple hepatic cysts again seen, the largest of which measures 5 6 cm at the junction of segments 7 and 8   Indeterminant lesion within segment 3 again seen measuring 2 6 cm which demonstrates a continuous ring peripheral enhancement with some nodularity and progressive fill-in on delayed images  No demonstrable washout    The hepatic veins and portal veins are patent  BILE DUCTS:  Intra and extrahepatic biliary ductal dilatation  CBD measures up to 1 7 cm  No evidence of filling defect  Abrupt cut off the level of the pancreatic head  GALLBLADDER:  Normal  PANCREAS:  Hypoenhancing mass centered at the pancreatic head/uncinate process measuring 2 9 x 3 5 x 3 5 cm (series 12, image 66, series 13, image 67)  Pancreatic ductal dilatation up to 8 mm  No evidence of encasement of the portal vein and superior mesenteric artery  Additional cystic change at the uncinate process likely represent upstream ductal dilatation  ADRENAL GLANDS:  Normal  SPLEEN:  Normal  KIDNEYS/PROXIMAL URETERS:  No hydroureteronephrosis  Bilateral renal cysts  BOWEL:   No dilated loops of bowel  PERITONEUM/RETROPERITONEUM:  No ascites  LYMPH NODES:  No abdominal lymphadenopathy  VASCULAR STRUCTURES:  No aneurysm  ABDOMINAL WALL:  Unremarkable  OSSEOUS STRUCTURES:  No suspicious osseous lesion  Impression: 3 5 cm mass at the pancreatic head/uncinate process resulting in upstream dilatation of the CBD and pancreatic duct  No evidence of encasement of the portal vein or superior mesenteric artery  Findings concerning for pancreatic neoplasm such as pancreatic adenocarcinoma   Unchanged appearance of 2 6 cm atypical lesion at segment 3 of the liver consistent with atypical hemangioma or less likely low-grade neoplasm    Workstation performed: [id number]     Ir Cholecystostomy Tube Placement    Result Date: 5/12/2021  Narrative: Cholecystostomy tube placement under ultrasound and fluoro Clinical History:  Suspected acute cholecystitis  Contrast: 20 mL of iohexol (OMNIPAQUE) Fluoro time: 1 3 mins Number of Images: 6 Radiation dose: 155 mGy Concious sedation time: 35mins Technique: The patient was brought to the interventional radiology suite and placed supine on the table  The gallbladder was examined with ultrasound, and found to be markedly thick walled with a small amount of pericholecystic fluid  No sludge or stones was noted in the lumen  The gallbladder was not significantly distended  The skin of the right upper quadrant was prepped and draped in the usual sterile fashion  After local anesthesia was administered to the skin, a 19 gauge needle was advanced percutaneously under direct ultrasound guidance into the gallbladder via a transhepatic route  Cloudy bile was removed and sent for culture and sensitivity with gram stain  A heavy duty wire was advanced through the needle, and the needle was removed  After tract dilatation, a 10 Syriac all-purpose drainage catheter was advanced over the wire until the loop was formed within the gallbladder  The catheter was then locked in place  A total of 30 mL of bile was then aspirated  The catheter was sutured at the skin  Contrast was injected, confirming satisfactory location of the tube  The cystic duct was noted to slowly fill and opacify the common bile duct and intrahepatic ducts  Overall, the cystic duct did not appear normal, but diffusely small  The tube was then connected to gravity bag, and dressed sterilely  Impression: Impression: 1  Thick-walled gallbladder with pericholecystic fluid noted  No stones or sludge  2   Successful ultrasound and fluoroscopically guided placement of a percutaneous cholecystostomy tube   3   Small but patent cystic duct noted  Workstation performed: [id number]     Fl Ercp Biliary Only    Result Date: 4/28/2021  Narrative: ERCP INDICATION:  Pancreatic mass COMPARISON:  MRI 4/27/2021 IMAGES:  2 FLUOROSCOPY TIME:   1 31 seconds CONTRAST: 3 mL of iohexol (OMNIPAQUE) FINDINGS: Fluoroscopic guidance was provided for performance of ERCP  BILIARY: Side-viewing duodenal scope identified  Common bile duct was cannulated and injected  Distal common bile duct 2 cm long smooth narrowing noted with proximal dilatation 15 mm  Sphincterotomy and brushings performed  10 mm x 6 cm covered metal stent placed across the stricture  Impression: Distal common bile duct narrowing likely due to known pancreatic mass status post enterotomy, brush biopsy, and stenting  Workstation performed: [id number]     Us Right Upper Quadrant    Result Date: 5/6/2021  Narrative: RIGHT UPPER QUADRANT ULTRASOUND INDICATION:  Right upper quadrant pain  COMPARISON: Ultrasound 5/14/2019, CT 5/5/2021 TECHNIQUE:   Real-time ultrasound of the right upper quadrant was performed with a curvilinear transducer with both volumetric sweeps and still imaging techniques  FINDINGS: PANCREAS:  Dilated pancreatic duct is identified  3 6 x 2 6 x 4 6 cm pancreatic head mass lesion is present  AORTA AND IVC:  Visualized portions are normal for patient age  LIVER: Normal size  Echogenicity and contour are normal  3 3 x 1 9 x 2 6 cm hypoechoic indeterminate left lobe hepatic lesion is noted  1 2 x 1 0 x 1 1 cm hyperechoic left lobe hepatic lesion noted  Hepatic cysts are present  Normal directional flow is present within the portal vein  BILIARY: The gallbladder is normal in caliber  Mild gallbladder wall thickening measuring 4 mm noted  No pericholecystic fluid is present  No stones or sludge  No sonographic Calles's sign  No intrahepatic biliary dilatation  CBD measures 3 mm  No choledocholithiasis  KIDNEY: Right kidney measures 10 6 x 4 6 x 5 8 cm  Within normal limits  ASCITES:   None  Impression: Mild gallbladder wall thickening without additional evidence of acute cholecystitis  Pancreatic head mass again identified  Associated pancreatic ductal dilatation  Indeterminate hypoechoic lesion within the left lobe of the liver  Hyperechoic left hepatic lobe lesion possibly representing a hemangioma  Multiple hepatic cysts  Workstation performed: [id number]     Ct Abdomen Pelvis W Contrast    Result Date: 5/14/2021  Narrative: CT ABDOMEN AND PELVIS WITH IV CONTRAST INDICATION:   Abdominal infection suspected leukocytosis - recent cholecystostomy tube  COMPARISON:  CT abdomen pelvis 5/9/2021, CT 9/26/2017 TECHNIQUE:  CT examination of the abdomen and pelvis was performed  Axial, sagittal, and coronal 2D reformatted images were created from the source data and submitted for interpretation  Radiation dose length product (DLP) for this visit:  636 68 mGy-cm   This examination, like all CT scans performed in the [facility], was performed utilizing techniques to minimize radiation dose exposure, including the use of iterative  reconstruction and automated exposure control  IV Contrast:  100 mL of iohexol (OMNIPAQUE) Enteric Contrast:  Enteric contrast was not administered  FINDINGS: ABDOMEN LOWER CHEST:  Bibasilar atelectasis and small bilateral pleural effusions are present  Right chest wall nodule again noted  LIVER/BILIARY TREE:  2 5 x 2 8 cm enhancing lesion within left hepatic lobe is stable dating back to at least 9/26/2017  Scattered hepatic cysts are also stable  Pneumobilia is stable  Common bile duct stent remains in place  GALLBLADDER:  Cholecystostomy tube has been placed  SPLEEN:  Unremarkable  PANCREAS:  3 9 x 2 5 cm hypoattenuating mass involving the pancreatic head and uncinate process resulting in upstream pancreatic ductal dilatation is again noted suspicious for pancreatic malignancy    The pancreatic mass is in close proximity to the superior mesenteric vein but with fat plane still maintained  There is no encasement of the celiac axis or superior mesenteric artery  ADRENAL GLANDS:  Unremarkable  KIDNEYS/URETERS:  No hydronephrosis or urinary tract calculus  One or more sharply circumscribed subcentimeter renal hypodensities are present, too small to accurately characterize, and statistically most likely benign findings  According to recent literature (Radiology 2019) no further workup of these findings is recommended  STOMACH AND BOWEL:  Marked wall thickening and submucosal enhancement involving the pylorus and duodenum with adjacent inflammatory stranding is again seen  There is marked increased fluid surrounding the pylorus and proximal duodenum, which is not yet well formed APPENDIX:  No findings to suggest appendicitis  ABDOMINOPELVIC CAVITY:  Trace ascites is present in the pelvis  VESSELS:  Unremarkable for patient's age  PELVIS REPRODUCTIVE ORGANS:  Unremarkable for patient's age  URINARY BLADDER:  Unremarkable  ABDOMINAL WALL/INGUINAL REGIONS:  Unremarkable  OSSEOUS STRUCTURES:  No acute fracture or destructive osseous lesion  Impression: 1  Marked increase in fluid surrounding inflammatory pylorus and proximal duodenum as described above  Given history of acute cholecystitis, 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  Fluid collection is not yet well formed  If patient develops worsening signs of infection, follow-up imaging recommended to exclude developing abscess  2   Status post cholecystostomy tube placement with decompression of the gallbladder  Biliary stent remains in place with stable pneumobilia  3   Pancreatic head/uncinate process mass as described above, consistent with malignancy  No vascular encasement  The study was marked in [facility] for immediate notification   Workstation performed: [id number]     Ct Abdomen Pelvis W Contrast    Result Date: 5/9/2021  Narrative: CT ABDOMEN AND PELVIS WITH IV CONTRAST INDICATION:   Abdominal pain, acute, nonlocalized abdominal pain  COMPARISON:  Multiple priors most recently CT 5/5/2021 and ultrasound 5/6/2021 TECHNIQUE:  CT examination of the abdomen and pelvis was performed  In addition to portal venous phase postcontrast scanning through the abdomen and pelvis, delayed phase postcontrast scanning was performed through the upper abdominal viscera  Axial, sagittal, and coronal 2D reformatted images were created from the source data and submitted for interpretation  Radiation dose length product (DLP) for this visit:  716 mGy-cm   This examination, like all CT scans performed in the [facility], was performed utilizing techniques to minimize radiation dose exposure, including the use of iterative reconstruction and automated exposure control  IV Contrast:  100 mL of iohexol (OMNIPAQUE) Enteric Contrast:  Enteric contrast was not administered  FINDINGS: ABDOMEN LOWER CHEST:  No clinically significant abnormality identified in the visualized lower chest  LIVER/BILIARY TREE:  Multiple smooth marginated rounded low-attenuation lesions throughout the liver, likely representing cysts, essentially unchanged since at least 2016  Hyperenhancing rounded lesion in hepatic segment 3 measuring up to 2 9 cm, possibly representing a hemangioma is essentially unchanged since 2016  Pneumobilia related to the presence of a biliary stent again noted  GALLBLADDER:  No gallstones  Mild gallbladder wall thickening again noted  SPLEEN:  Unremarkable  PANCREAS:  Again seen is a pancreatic head/uncinate process mass measuring up to 4 3 x 2 8 cm in greatest axial dimensions by approximately 3 8 cm craniocaudal   Extensive inflammatory stranding is now noted surrounding the pancreatic head and pyloric region/proximal duodenum  There is associated wall thickening of the distal stomach and proximal duodenum  ADRENAL GLANDS:  Unremarkable   KIDNEYS/URETERS:  No hydronephrosis or urinary tract calculus  One or more sharply circumscribed subcentimeter renal hypodensities are present, too small to accurately characterize, and statistically most likely benign findings  According to recent literature (Radiology 2019) no further workup of these findings is recommended  STOMACH AND BOWEL:  See pancreas section  No abnormally dilated loops of bowel  APPENDIX:  No findings to suggest appendicitis  ABDOMINOPELVIC CAVITY:  Small volume free fluid in the pelvis is likely reactive  Small amount of free air in the gastrohepatic region (series 2, images 24 through 28)     No lymphadenopathy  VESSELS:  No aneurysm  Perisplenic varices  PELVIS REPRODUCTIVE ORGANS:  The prostate is enlarged  URINARY BLADDER:  Unremarkable  ABDOMINAL WALL/INGUINAL REGIONS:  Lobulated smoothly marginated soft tissue mass in the right anterior lower chest wall subcutaneous tissues measuring approximately 2 7 cm is essentially unchanged since at least 2016, probably representing a sebaceous cyst  OSSEOUS STRUCTURES:  No acute fracture or destructive osseous lesion  Impression: Inflammatory changes surrounding the pylorus and proximal duodenum, as well as the pancreatic head  A small amount of free air  Findings are most suspicious for a perforated gastric ulcer  Recommend correlation with endoscopy  Pancreatitis is also considered, but felt less likely given the interval development of free air in the location of the inflammatory changes  Pancreatic head mass concerning for malignancy again noted, unchanged since study of 5/5/2021  Additional findings, as described, essentially unchanged  I personally discussed this study with Dr Emily Arrington on 5/9/2021 at 3:43 PM  Workstation performed: [id number]     Ct Abdomen Pelvis With Contrast    Result Date: 5/5/2021  Narrative: CT ABDOMEN AND PELVIS WITH IV CONTRAST INDICATION:   Abdominal pain, acute, nonlocalized Worsening abdominal pain  History of pancreatic mass  COMPARISON:  9/19/2019; 4/27/2021 TECHNIQUE:  CT examination of the abdomen and pelvis was performed  Axial, sagittal, and coronal 2D reformatted images were created from the source data and submitted for interpretation  Radiation dose length product (DLP) for this visit:  382 mGy-cm   This examination, like all CT scans performed in the [facility], was performed utilizing techniques to minimize radiation dose exposure, including the use of iterative reconstruction and automated exposure control  IV Contrast:  100 mL of iohexol (OMNIPAQUE) Enteric Contrast:  Enteric contrast was not administered  FINDINGS: ABDOMEN LOWER CHEST:  Well circumscribed approximately 2 9 cm mass in the right anterior chest wall in the mid clavicular line on image 3, series 2 redemonstrated, stable from September 2019, nonspecific  LIVER/BILIARY TREE:  There is pneumobilia in the nondependent portion of the biliary tree likely related to common bile duct stent present previously  Numerous rounded hypodense lesions noted compatible with hepatic cysts  Additionally there is a uniformly enhancing mass in the left lobe of liver, image 24, series 2, measuring 2 5 cm, stable from 2019 possibly a hemangioma  GALLBLADDER:  There is circumferential mural enhancement of the wall of the gallbladder with a small amount of pericholecystic fluid  SPLEEN:  Unremarkable  PANCREAS:  A large hypodense /necrotic mass in the head/uncinate process of the pancreas is redemonstrated on image 35, series 2 measuring 3 8 cm, abutting the stent in the common bile duct  Proximally the pancreatic duct is markedly dilated, measuring up to 0 8 cm on image 28, series 2 in the neck of the pancreas  No peripancreatic inflammatory changes  The mass in the region of the uncinate process/head of the pancreas compresses the 3rd portion of the duodenum without latha invasion of the lumen  Appearance is similar to the prior study   ADRENAL GLANDS: Unremarkable  KIDNEYS/URETERS:  One or more simple renal cyst(s) is noted  Otherwise unremarkable kidneys  No hydronephrosis  STOMACH AND BOWEL:  Moderate amount of retained colonic stool in the right hemicolon, transverse colon and proximal half of the descending colon  Sigmoid colon and rectum relatively decompressed  APPENDIX:  No findings to suggest appendicitis  ABDOMINOPELVIC CAVITY:  No ascites  No pneumoperitoneum  No lymphadenopathy  VESSELS:  Atherosclerotic calcifications noted  PELVIS REPRODUCTIVE ORGANS:  Top normal to mildly enlarged prostate with dystrophic calcifications redemonstrated  URINARY BLADDER:  Unremarkable  ABDOMINAL WALL/INGUINAL REGIONS:  Unremarkable  OSSEOUS STRUCTURES:  No acute fracture or destructive osseous lesion  Impression: 1  Uniform mural hyperenhancement of the wall of the gallbladder with a small amount of pericholecystic fluid, worrisome for acute cholecystitis  Further clinical assessment advised  Ultrasound of the right upper quadrant suggested for further assessment  2   Pneumobilia with stent in the common bile duct redemonstrated  3   3 8 cm obstructing mass in the region of the head of the pancreas with marked pancreatic ductal dilatation, worrisome for adenocarcinoma  Other tumors not excluded  4   Numerous hepatic cysts redemonstrated with an enhancing lesion in the left lobe of liver possibly a hemangioma, stable  5   Moderate constipation  Workstation performed: [id number]     Ct Abdomen Pelvis With Contrast    Result Date: 4/27/2021  Narrative: CT ABDOMEN AND PELVIS WITH IV CONTRAST INDICATION:   Diarrhea Abdominal pain, weight loss, epigastric pain Abdominal discomfort, loose stools, weight loss and transaminitis over the last 3 weeks  COMPARISON:  9/19/2019 TECHNIQUE:  CT examination of the abdomen and pelvis was performed  Axial, sagittal, and coronal 2D reformatted images were created from the source data and submitted for interpretation  Radiation dose length product (DLP) for this visit:  815 mGy-cm   This examination, like all CT scans performed in the [facility], was performed utilizing techniques to minimize radiation dose exposure, including the use of iterative reconstruction and automated exposure control  IV Contrast:  100 mL of iohexol (OMNIPAQUE) Enteric Contrast:  Enteric contrast was not administered  FINDINGS: ABDOMEN LOWER CHEST:  No clinically significant abnormality identified in the visualized lower chest   Oval well-definedd soft tissue density nodule within the right chest   This measures 2 8 x 1 2 cm, similar to the prior in 2019  Correlate with physical exam findings  Unclear if this represents a subdermal nodule or asymmetric gynecomastia  LIVER/BILIARY TREE:  Multiple scattered cysts seen throughout the liver without suspicious enhancing component and without suspicious change compared to the prior  Single homogeneously enhancing lesion in the left lateral segment with relative washout on delayed imaging, contrast density follows portal venous density  Stable compared to prior  Although not pathognomonic, 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  New moderate intrahepatic biliary ductal dilatation  Common hepatic and common bile duct abnormally distended to the level of the pancreatic head  GALLBLADDER:  Abnormal distended gallbladder caliber and borderline wall thickening  No pericholecystic inflammation or calcified stones  SPLEEN:  Unremarkable  PANCREAS:  Large ill-defined hypoattenuating mass within the pancreatic head and uncinate measuring 4 2 x 2 9 cm with dilatation/obstruction of the common bile duct and pancreatic duct, likely represent pancreatic carcinoma  Regional vasculature remains  patent  No pathologic adenopathy identified  Pancreatic duct dilated within the neck at 8 mm, tapering through the body and tail  ADRENAL GLANDS:  Unremarkable  KIDNEYS/URETERS:  Unremarkable  No hydronephrosis  STOMACH AND BOWEL:  Unremarkable  APPENDIX:  No findings to suggest appendicitis  ABDOMINOPELVIC CAVITY:  No ascites  No pneumoperitoneum  No lymphadenopathy  VESSELS:  Unremarkable for patient's age  PELVIS REPRODUCTIVE ORGANS:  Unremarkable for patient's age  URINARY BLADDER:  Unremarkable  ABDOMINAL WALL/INGUINAL REGIONS:  Unremarkable  OSSEOUS STRUCTURES:  No acute fracture or destructive osseous lesion  Impression: Moderate intra and extrahepatic biliary ductal dilatation, pancreatic duct dilatation and gallbladder distention which appear secondary to a large ill-defined pancreatic head mass, pancreatic carcinoma until proven otherwise  Multiple hepatic cysts without suspicious change  Stable homogeneously enhancing left lateral segment lesion likely representing cavernous hemangioma  The study was marked in [facility] for immediate notification  Workstation performed: [id number]       Labs:   Lab Results   Component Value Date    WBC 11 79 (H) 05/17/2021    HGB 12 1 05/17/2021    HCT 36 5 05/17/2021    MCV 91 05/17/2021     (H) 05/17/2021     Lab Results   Component Value Date     09/01/2017    K 3 8 05/17/2021     (H) 05/17/2021    CO2 26 05/17/2021    BUN 9 05/17/2021    CREATININE 0 57 (L) 05/17/2021    GLUCOSE 117 (H) 09/01/2017    GLUF 147 (H) 05/05/2021    CALCIUM 7 9 (L) 05/17/2021    CORRECTEDCA 9 5 05/17/2021    AST 22 05/17/2021    ALT 30 05/17/2021    ALKPHOS 181 (H) 05/17/2021    PROT 5 8 (L) 09/01/2017    BILITOT 1 0 09/01/2017    EGFR 108 05/17/2021         Lab Results   Component Value Date    PSA 1 6 11/28/2020    PSA 1 4 11/09/2019    PSA 1 7 11/25/2017       Lab Results   Component Value Date    CEA 0 9 04/29/2021       ROS: As stated in history of present illness otherwise her 14 point review of systems today was negative      Active Problems:   Patient Active Problem List   Diagnosis    Right arm pain    Rheumatoid arthritis ([address] )    Essential hypertension    Urgency of urination    Anxiety disorder    Hyperlipidemia    Liver mass    Esophageal reflux    Multiple lipomas    Diabetes mellitus type 2    Hypokalemia    Diarrhea    Pancreatic mass    Epigastric pain    BPH (benign prostatic hyperplasia)    Constipation    SIRS (systemic inflammatory response syndrome) (HCC)    Perforation bowel (HCC)    Acute cholecystitis    Leukocytosis    Pancreatic adenocarcinoma ([facility] [address] )       Past Medical History:   Past Medical History:   Diagnosis Date    Arthritis     Cellulitis     LAST ASSESSED: 6/13/14    Enlarged prostate     Epidermal inclusion cyst     LAST ASSESSED: 10/4/13    Erythrasma     LAST ASSESSED: 9/23/13    Furuncle     LAST ASSESSED: 6/11/14    Hyperlipidemia     Hypertension        Surgical History:   Past Surgical History:   Procedure Laterality Date    HYDROCELE EXCISION / REPAIR Right 01/11/2018    SPERMATIC CORD EXCSION OF HYDROCELE; MANAGED BY: Ofelia Pitts    IR CHOLECYSTOSTOMY TUBE PLACEMENT  5/11/2021    VT REMOVAL OF HYDROCELE,TUNICA,UNILAT Right 1/11/2018    Procedure: HYDROCELECTOMY;  Surgeon: Richard Knox MD;  Location: [facility]  MAIN [facility];  Service: Urology    SCROTAL SURGERY      benign "lump"       Family History:    Family History   Problem Relation Age of Onset    Heart disease Father         CARDIAC DISORDER    Hypertension Father     Hypertension Mother     No Known Problems Maternal Aunt     No Known Problems Maternal Uncle     No Known Problems Paternal Aunt     No Known Problems Paternal Uncle     No Known Problems Paternal Grandmother     No Known Problems Paternal Grandfather     Diabetes Maternal Grandmother         MELLITUS    No Known Problems Maternal Grandfather     Hypertension Other        Cancer-related family history is not on file      Social History:   Social History     Socioeconomic History    Marital status: /Civil Union     Spouse name: Not on file    Number of children: 2    Years of education: Not on file    Highest education level: Not on file   Occupational History    Occupation: FULL-TIME EMPLOYMENT   Social Needs    Financial resource strain: Not on file    Food insecurity     Worry: Not on file     Inability: Not on file    Transportation needs     Medical: Not on file     Non-medical: Not on file   Tobacco Use    Smoking status: Former Smoker    Smokeless tobacco: Never Used    Tobacco comment: tobacco in a pipe in the early 1980's   Substance and Sexual Activity    Alcohol use: Never     Frequency: Never    Drug use: Never    Sexual activity: Never     Comment: NOT CURRENTLY SEXUALLY ACTIVE AS PER [employer]   Lifestyle    Physical activity     Days per week: Not on file     Minutes per session: Not on file    Stress: Not on file   Relationships    Social connections     Talks on phone: Not on file     Gets together: Not on file     Attends Zoroastrianism service: Not on file     Active member of club or organization: Not on file     Attends meetings of clubs or organizations: Not on file     Relationship status: Not on file    Intimate partner violence     Fear of current or ex partner: Not on file     Emotionally abused: Not on file     Physically abused: Not on file     Forced sexual activity: Not on file   Other Topics Concern    Not on file   Social History Narrative    ALWAYS USES SEAT BELT    DENIED DAILY COFFEE CONSUMPTION (__CUPS/DAY)    DENIED DAILY COLA CONSUMPTION (__CANS/DAY)    DAILY TEA CONSUMPTION (__CUPS/DAY)    DENTAL CARE, REGULARLY    EXERCISE: WALKING    HIGH SCHOOL GRADUATE    DENIED MULTIPLE ORGAN DONOR    NO LIVING WILL    PETS/ANIMALS    DENIED POWER OF  IN EXISTENCE    WATER INTAKE, ADEQUATE (PER DAY)       Current Medications:   Current Outpatient Medications   Medication Sig Dispense Refill    aspirin 81 mg chewable tablet Chew 81 mg      Blood Glucose Monitoring Suppl (ONE TOUCH ULTRA 2) w/Device KIT Use daily 1 each 1    dicyclomine (BENTYL) 10 mg capsule Take 1 capsule (10 mg total) by mouth 4 (four) times a day (before meals and at bedtime) 30 capsule 0    FLUoxetine (PROzac) 20 mg capsule TAKE 1 CAPSULE BY MOUTH EVERY DAY   90 capsule 2    folic acid (FOLVITE) 1 mg tablet Take by mouth daily      glucose blood (OneTouch Ultra) test strip Use as instructed once a day 50 each 5    Lancets (onetouch ultrasoft) lancets Use as instructed once a day 50 each 5    lidocaine (LIDODERM) 5 % Apply 1 patch topically daily Remove & Discard patch within 12 hours or as directed by MD 30 patch 0    lisinopril-hydrochlorothiazide (PRINZIDE,ZESTORETIC) 10-12 5 MG per tablet TAKE ONE TABLET BY MOUTH EVERY DAY 90 tablet 3    Multiple Vitamins-Minerals (MULTIVITAMIN MEN) TABS Take 1 tablet by mouth daily      Omega-3 Fatty Acids (FISH OIL) 1,000 mg Take 1,000 mg by mouth daily      omeprazole (PriLOSEC) 20 mg delayed release capsule Take 1 capsule (20 mg total) by mouth daily before breakfast 30 capsule 0    oxyCODONE (ROXICODONE) 5 mg immediate release tablet Take 0 5 tablets (2 5 mg total) by mouth every 6 (six) hours as needed for moderate pain Or take 1 tablet (5 mg total) by mouth every 6 hours as needed for severe painMax Daily Amount: 10 mg 30 tablet 0    pancrelipase, Lip-Prot-Amyl, (CREON) 24,000 units Take 1 capsule (24,000 Units total) by mouth 3 (three) times a day with meals 90 capsule 0    pantoprazole (PROTONIX) 40 mg tablet Take 1 tablet (40 mg total) by mouth 2 (two) times a day before meals 60 tablet 0    polyethylene glycol (MIRALAX) 17 g packet Take 17 g by mouth daily as needed (Constipation)  0    senna-docusate sodium (SENOKOT S) 8 6-50 mg per tablet Take 1 tablet by mouth 2 (two) times a day  0    Sodium Chloride Flush (Normal Saline Flush) 0 9 % SOLN       sodium chloride, PF, 0 9 % 10 mL by Intracatheter route daily Intracatheter flushing daily 300 mL 3    tamsulosin (FLOMAX) 0 4 mg TAKE 1 CAPSULE BY MOUTH  DAILY AT BEDTIME 90 capsule 3     No current facility-administered medications for this visit  Allergies: Allergies   Allergen Reactions    Fentanyl Anaphylaxis and Other (See Comments)     Oxygen drops severely         Physical Exam:    Body surface area is 1 91 meters squared  Wt Readings from Last 3 Encounters:   05/25/21 73 5 kg (162 lb)   05/19/21 75 8 kg (167 lb)   05/10/21 79 8 kg (176 lb)        Temp Readings from Last 3 Encounters:   05/25/21 97 6 °F (36 4 °C) (Temporal)   05/19/21 98 6 °F (37 °C)   05/17/21 97 8 °F (36 6 °C)        BP Readings from Last 3 Encounters:   05/25/21 102/66   05/19/21 120/68   05/17/21 125/65         Pulse Readings from Last 3 Encounters:   05/25/21 58   05/19/21 64   05/17/21 57     @LASTSAO2(3)@    Physical Exam     Constitutional   General appearance: No acute distress, well appearing and well nourished  Eyes   Conjunctiva and lids: No swelling, erythema or discharge  Pupils and irises: Equal, round and reactive to light  Ears, Nose, Mouth, and Throat   External inspection of ears and nose: Normal     Nasal mucosa, septum, and turbinates: Normal without edema or erythema  Oropharynx: Normal with no erythema, edema, exudate or lesions  Pulmonary   Respiratory effort: No increased work of breathing or signs of respiratory distress  Auscultation of lungs: Clear to auscultation  Cardiovascular   Palpation of heart: Normal PMI, no thrills  Auscultation of heart: Normal rate and rhythm, normal S1 and S2, without murmurs  Examination of extremities for edema and/or varicosities: Normal     Carotid pulses: Normal     Abdomen   Abdomen: Non-tender, no masses  Liver and spleen: No hepatomegaly or splenomegaly  Lymphatic   Palpation of lymph nodes in neck: No lymphadenopathy  Musculoskeletal   Gait and station: Normal     Digits and nails: Normal without clubbing or cyanosis      Inspection/palpation of joints, bones, and muscles: Normal     Skin Skin and subcutaneous tissue: Normal without rashes or lesions  Neurologic   Cranial nerves: Cranial nerves 2-12 intact  Sensation: No sensory loss  Psychiatric   Orientation to person, place, and time: Normal     Mood and affect: Normal           Assessment/ Plan:     The patient is a pleasant 78-year-old male with newly diagnosed adenocarcinoma of the pancreas who was referred to see us for consideration of neoadjuvant chemotherapy  He is in lot of pain today  He never filled his oxycodone script which was prescribed on discharge  I have advised him to take 5 mg of oxycodone every 4-6 hours and have also prescribed a bowel regimen  I will get him to see our colleagues in [address] soon as possible  He will have blood work drawn every week and be put on gemcitabine and Abraxane  This will be every week 3/4 weeks  We will reimage him after 4 cycles of treatment  I went over the risks benefits and alternatives of chemotherapy  I explained the possible side effects of the drugs to include but not limited to nausea, vomiting, diarrhea, abdominal pain, low blood counts, risk of infection and even death  I also explained possibility of hair loss  The patient is agreeable to proceeding as soon as possible  He will sign a consent form today  I will also put him on Neulasta growth factor support  We did discuss FOLFIRINOX but his ECOG performance status along with his symptoms argues against starting the regimen because I feel he will end up in the hospital or have side effects and need multiple dose reductions based on how he appears today  The patient is in agreement with this  He will sign consent  We will get him started as soon as possible  Goals and Barriers:  Current Goal:   Prolong Survival from   Pancreatic Cancer  Barriers: None  Patient's Capacity to Self Care:  Patient able to self care      Portions of the record may have been created with voice recognition software   Occasional wrong word or "sound a like" substitutions may have occurred due to the inherent limitations of voice recognition software   Read the chart carefully and recognize, using context, where substitutions have occurred

## 2021-05-26 ENCOUNTER — OFFICE VISIT (OUTPATIENT)
Dept: SURGICAL ONCOLOGY | Facility: CLINIC | Age: 66
End: 2021-05-26
Payer: COMMERCIAL

## 2021-05-26 ENCOUNTER — PATIENT OUTREACH (OUTPATIENT)
Dept: HEMATOLOGY ONCOLOGY | Facility: CLINIC | Age: 66
End: 2021-05-26

## 2021-05-26 ENCOUNTER — TELEPHONE (OUTPATIENT)
Dept: HEMATOLOGY ONCOLOGY | Facility: CLINIC | Age: 66
End: 2021-05-26

## 2021-05-26 ENCOUNTER — TELEPHONE (OUTPATIENT)
Dept: NUTRITION | Facility: CLINIC | Age: 66
End: 2021-05-26

## 2021-05-26 VITALS
OXYGEN SATURATION: 96 % | SYSTOLIC BLOOD PRESSURE: 126 MMHG | WEIGHT: 162 LBS | RESPIRATION RATE: 18 BRPM | HEART RATE: 77 BPM | HEIGHT: 70 IN | TEMPERATURE: 98.3 F | DIASTOLIC BLOOD PRESSURE: 70 MMHG | BODY MASS INDEX: 23.19 KG/M2

## 2021-05-26 DIAGNOSIS — C25.9 PANCREATIC ADENOCARCINOMA (HCC): Primary | ICD-10-CM

## 2021-05-26 PROCEDURE — 99214 OFFICE O/P EST MOD 30 MIN: CPT | Performed by: SURGERY

## 2021-05-26 NOTE — TELEPHONE ENCOUNTER
Received notification by MARISA Pizarro , on 5/25/21 that pt has triggered for oncology nutrition care (reason for referral: Malnutrition Screening Tool (MST) Triggers: scored a 5 indicating >/=34# (>/=15 4 kg) recent wt loss and is eating poorly due to a decreased appetite  (Date of MST: 5/25/21))  Roxy Howard and introduced self and explained the reason for today's call  Spoke with Taft Primrose today who reports he does not have an appetite, is not eating well, and has lost ~40# in 4-5 weeks  Discussed oncology nutrition services available (options for in-person and phone consultation) and the benefits of meeting for a consultation  Initial RD consultation set up for 6/7/21 at 1pm       Mailed to pts home: [address] [place] [address],[address], [facility], [facility] Diet and Nutrition book, Oncology Milkshake & Smoothie Recipes, Commercial Supplements list, and the Increasing Calories & Protein handout  Provided this RDs contact information asking that Taft Primrose reach out prn  All questions/concerns addressed at this time  PMH:  Oncology Diagnosis & Treatments:  Oncology History   Pancreatic adenocarcinoma ([facility] [address] )   5/7/2021 Biopsy    Head of pancreas:  Malignant Adenocarcinoma       6/3/2021 -  Chemotherapy    pegfilgrastim (NEULASTA ONPRO) subcutaneous injection kit 6 mg, 6 mg, Subcutaneous, Once, 0 of 6 cycles  PACLitaxel protein bound (ABRAXANE) 191 mg in IVPB 38 2 mL, 100 mg/m2 = 191 mg (80 % of original dose 125 mg/m2), Intravenous, Once, 0 of 6 cycles  Dose modification: 100 mg/m2 (original dose 125 mg/m2, Cycle 1, Reason: Other (See Comments), Comment: per protocol)  gemcitabine (GEMZAR) 1,909 9 mg in sodium chloride 0 9 % 250 mL infusion, 1,000 mg/m2, Intravenous, Once, 0 of 6 cycles       Patient Active Problem List   Diagnosis    Right arm pain    Rheumatoid arthritis ([facility] [address] )    Essential hypertension    Urgency of urination    Anxiety disorder    Hyperlipidemia    Liver mass    Esophageal reflux    Multiple lipomas    Diabetes mellitus type 2    Hypokalemia    Diarrhea    Pancreatic mass    Epigastric pain    BPH (benign prostatic hyperplasia)    Constipation    SIRS (systemic inflammatory response syndrome) (HCC)    Perforation bowel (HCC)    Acute cholecystitis    Leukocytosis    Pancreatic adenocarcinoma (HCC)    Chemotherapy induced neutropenia (HCC)     Past Medical History:   Diagnosis Date    Arthritis     Cellulitis     LAST ASSESSED: 6/13/14    Enlarged prostate     Epidermal inclusion cyst     LAST ASSESSED: 10/4/13    Erythrasma     LAST ASSESSED: 9/23/13    Furuncle     LAST ASSESSED: 6/11/14    Hyperlipidemia     Hypertension      Past Surgical History:   Procedure Laterality Date    HYDROCELE EXCISION / REPAIR Right 01/11/2018    SPERMATIC CORD EXCSION OF HYDROCELE; MANAGED BY: Duong Krishna    IR CHOLECYSTOSTOMY TUBE PLACEMENT  5/11/2021    MT REMOVAL OF HYDROCELE,TUNICA,UNILAT Right 1/11/2018    Procedure: HYDROCELECTOMY;  Surgeon: Lio Hill MD;  Location: [facility]  MAIN [facility];  Service: Urology    SCROTAL SURGERY      benign "lump"       Review of Medications:     Current Outpatient Medications:     aspirin 81 mg chewable tablet, Chew 81 mg, Disp: , Rfl:     Blood Glucose Monitoring Suppl (ONE TOUCH ULTRA 2) w/Device KIT, Use daily, Disp: 1 each, Rfl: 1    dicyclomine (BENTYL) 10 mg capsule, Take 1 capsule (10 mg total) by mouth 4 (four) times a day (before meals and at bedtime), Disp: 30 capsule, Rfl: 0    FLUoxetine (PROzac) 20 mg capsule, TAKE 1 CAPSULE BY MOUTH EVERY DAY , Disp: 90 capsule, Rfl: 2    folic acid (FOLVITE) 1 mg tablet, Take by mouth daily, Disp: , Rfl:     glucose blood (OneTouch Ultra) test strip, Use as instructed once a day, Disp: 50 each, Rfl: 5    Lancets (onetouch ultrasoft) lancets, Use as instructed once a day, Disp: 50 each, Rfl: 5    lidocaine (LIDODERM) 5 %, Apply 1 patch topically daily Remove & Discard patch within 12 hours or as directed by MD, Disp: 30 patch, Rfl: 0    lisinopril-hydrochlorothiazide (PRINZIDE,ZESTORETIC) 10-12 5 MG per tablet, TAKE ONE TABLET BY MOUTH EVERY DAY, Disp: 90 tablet, Rfl: 3    Multiple Vitamins-Minerals (MULTIVITAMIN MEN) TABS, Take 1 tablet by mouth daily, Disp: , Rfl:     Omega-3 Fatty Acids (FISH OIL) 1,000 mg, Take 1,000 mg by mouth daily, Disp: , Rfl:     omeprazole (PriLOSEC) 20 mg delayed release capsule, Take 1 capsule (20 mg total) by mouth daily before breakfast, Disp: 30 capsule, Rfl: 0    oxyCODONE (ROXICODONE) 5 mg immediate release tablet, Take 0 5 tablets (2 5 mg total) by mouth every 6 (six) hours as needed for moderate pain Or take 1 tablet (5 mg total) by mouth every 6 hours as needed for severe painMax Daily Amount: 10 mg, Disp: 30 tablet, Rfl: 0    oxyCODONE (ROXICODONE) 5 mg immediate release tablet, Take 1 tablet (5 mg total) by mouth every 4 (four) hours as needed for moderate painMax Daily Amount: 30 mg, Disp: 30 tablet, Rfl: 0    pancrelipase, Lip-Prot-Amyl, (CREON) 24,000 units, Take 1 capsule (24,000 Units total) by mouth 3 (three) times a day with meals, Disp: 90 capsule, Rfl: 0    pantoprazole (PROTONIX) 40 mg tablet, Take 1 tablet (40 mg total) by mouth 2 (two) times a day before meals, Disp: 60 tablet, Rfl: 0    polyethylene glycol (MIRALAX) 17 g packet, Take 17 g by mouth daily as needed (Constipation), Disp: , Rfl: 0    senna-docusate sodium (SENOKOT S) 8 6-50 mg per tablet, Take 1 tablet by mouth 2 (two) times a day, Disp:  , Rfl: 0    Sodium Chloride Flush (Normal Saline Flush) 0 9 % SOLN, , Disp: , Rfl:     sodium chloride, PF, 0 9 %, 10 mL by Intracatheter route daily Intracatheter flushing daily, Disp: 300 mL, Rfl: 3    tamsulosin (FLOMAX) 0 4 mg, TAKE 1 CAPSULE BY MOUTH  DAILY AT BEDTIME, Disp: 90 capsule, Rfl: 3    Most Recent Lab Results:   Lab Results   Component Value Date    WBC 11 79 (H) 05/17/2021    CHOLESTEROL 188 05/05/2021    CHOL 190 01/03/2017 TRIG 181 6 (H) 05/05/2021    HDL 36 (L) 05/05/2021    LDLCALC 116 (H) 05/05/2021    ALT 30 05/17/2021    AST 22 05/17/2021    ALB 2 0 (L) 05/17/2021     09/01/2017     01/03/2017    SODIUM 141 05/17/2021    SODIUM 138 05/16/2021    K 3 8 05/17/2021    K 3 6 05/16/2021     (H) 05/17/2021    BUN 9 05/17/2021    BUN 9 05/16/2021    CREATININE 0 57 (L) 05/17/2021    CREATININE 0 58 (L) 05/16/2021    EGFR 108 05/17/2021    TSH 1 130 11/10/2018    GLUCOSE 117 (H) 09/01/2017    POCGLU 145 (H) 05/17/2021    GLUF 147 (H) 05/05/2021    GLUF 100 (H) 07/08/2018    GLUC 117 05/17/2021    HGBA1C 6 2 (H) 05/05/2021    HGBA1C 6 1 (H) 04/24/2021    HGBA1C 6 5 (H) 02/27/2021    CALCIUM 7 9 (L) 05/17/2021    MG 2 3 05/12/2021       Anthropometric Measurements:   Ht Readings from Last 1 Encounters:   05/25/21 5' 10" (1 778 m)     -Weight History: Wt Readings from Last 15 Encounters:   05/25/21 73 5 kg (162 lb)   05/19/21 75 8 kg (167 lb)   05/10/21 79 8 kg (176 lb)   05/03/21 79 8 kg (176 lb)   04/27/21 79 9 kg (176 lb 3 2 oz)   04/05/21 84 9 kg (187 lb 3 2 oz)   03/16/21 87 1 kg (192 lb)   02/22/21 89 4 kg (197 lb)   08/04/20 91 2 kg (201 lb)   02/12/20 87 5 kg (193 lb)   11/07/19 90 4 kg (199 lb 6 4 oz)   09/19/19 90 7 kg (200 lb)   08/07/19 90 kg (198 lb 6 4 oz)   04/30/19 90 2 kg (198 lb 12 8 oz)   03/14/19 93 4 kg (206 lb)     Estimated body mass index is 23 24 kg/m² as calculated from the following:    Height as of 5/25/21: 5' 10" (1 778 m)  Weight as of 5/25/21: 73 5 kg (162 lb)

## 2021-05-26 NOTE — LETTER
May 26, 2021     Cherylene Spear, MD  [address]    Patient: Ramone Portillo   YOB: 1955   Date of Visit: 5/26/2021       Dear Dr Naranjo [place]:    Thank you for referring Ross Dubon to me for evaluation  Below are my notes for this consultation  If you have questions, please do not hesitate to call me  I look forward to following your patient along with you  Sincerely,        Gigi Cochran MD        CC: MD Ferdinand Voss MD Slater Lies, MD Marcia Coots, RN  Rema Reynolds, SADIE Cochran MD  5/26/2021  1:34 PM  Sign when Signing Visit     Surgical Oncology Follow Up       [address] [facility]  [address] [facility] [address] [address]  1955  [id number]  [address] [facility]  [facility] [place]  [address] [facility] [id number]    Chief Complaint   Patient presents with    Follow-up       Assessment/Plan:    No problem-specific Assessment & Plan notes found for this encounter  Diagnoses and all orders for this visit:    Pancreatic adenocarcinoma [facility])  -     Case request operating room: INSERTION VENOUS PORT (PORT-A-CATH); Standing  -     Case request operating room: INSERTION VENOUS PORT (PORT-A-CATH)    Other orders  -     Incentive spirometry; Standing  -     Insert and maintain IV line; Standing  -     Void On-Call to O R ; Standing  -     Place sequential compression device; Standing        Advance Care Planning/Advance Directives:  Discussed disease status, cancer treatment plans and/or cancer treatment goals with the patient  Oncology History   Pancreatic adenocarcinoma ([facility] [address] )   5/7/2021 Biopsy    Head of pancreas:  Malignant Adenocarcinoma       6/3/2021 -  Chemotherapy    pegfilgrastim (NEULASTA ONPRO) subcutaneous injection kit 6 mg, 6 mg, Subcutaneous, Once, 0 of 6 cycles  PACLitaxel protein bound (ABRAXANE) 191 mg in IVPB 38 2 mL, 100 mg/m2 = 191 mg (80 % of original dose 125 mg/m2), Intravenous, Once, 0 of 6 cycles  Dose modification: 100 mg/m2 (original dose 125 mg/m2, Cycle 1, Reason: Other (See Comments), Comment: per protocol)  gemcitabine (GEMZAR) 1,909 9 mg in sodium chloride 0 9 % 250 mL infusion, 1,000 mg/m2, Intravenous, Once, 0 of 6 cycles         History of Present Illness: HPI:  Cristina Yang is a 73year old man recently diagnosed with pancreatic cancer  The patient presented to the hospital with obstructive jaundice  He ended up having a workup along with a biopsy which revealed  A 3 8 cm obstructing mass in the region of the head of the pancreas with marked pancreatic ductal dilatation worrisome for adenocarcinoma  Biopsy was done which proved this was adenocarcinoma    -Interval History:  He will be starting  Neoadjuvant chemotherapy next week, and is here to discuss and consent for port placement in preparation for his treatment  Review of Systems:  Review of Systems   Constitutional: Positive for activity change, appetite change and fatigue  HENT: Negative  Eyes: Negative  Respiratory: Negative  Cardiovascular: Negative  Endocrine: Negative  Genitourinary: Negative  Musculoskeletal: Negative  Skin: Negative  Allergic/Immunologic: Negative  Neurological: Negative  Hematological: Negative  Psychiatric/Behavioral: Negative          Patient Active Problem List   Diagnosis    Right arm pain    Rheumatoid arthritis ([address] )    Essential hypertension    Urgency of urination    Anxiety disorder    Hyperlipidemia    Liver mass    Esophageal reflux    Multiple lipomas    Diabetes mellitus type 2    Hypokalemia    Diarrhea    Pancreatic mass    Epigastric pain    BPH (benign prostatic hyperplasia)    Constipation    SIRS (systemic inflammatory response syndrome) (HCC)    Perforation bowel (HCC)    Acute cholecystitis    Leukocytosis    Pancreatic adenocarcinoma ([facility] [address] )    Chemotherapy induced neutropenia ([facility] [address] )     Past Medical History:   Diagnosis Date    Arthritis     Cellulitis     LAST ASSESSED: 6/13/14    Enlarged prostate     Epidermal inclusion cyst     LAST ASSESSED: 10/4/13    Erythrasma     LAST ASSESSED: 9/23/13    Furuncle     LAST ASSESSED: 6/11/14    Hyperlipidemia     Hypertension      Past Surgical History:   Procedure Laterality Date    HYDROCELE EXCISION / REPAIR Right 01/11/2018    SPERMATIC CORD EXCSION OF HYDROCELE; MANAGED BY: Nkechi CONTRERAS CHOLECYSTOSTOMY TUBE PLACEMENT  5/11/2021    [place] REMOVAL OF HYDROCELE,TUNICA,UNILAT Right 1/11/2018    Procedure: HYDROCELECTOMY;  Surgeon: Mateo Sky MD;  Location: [facility];  Service: Urology    SCROTAL SURGERY      benign "lump"     Family History   Problem Relation Age of Onset    Heart disease Father         CARDIAC DISORDER    Hypertension Father     Hypertension Mother     No Known Problems Maternal Aunt     No Known Problems Maternal Uncle     No Known Problems Paternal Aunt     No Known Problems Paternal Uncle     No Known Problems Paternal Grandmother     No Known Problems Paternal Grandfather     Diabetes Maternal Grandmother         MELLITUS    No Known Problems Maternal Grandfather     Hypertension Other      Social History     Socioeconomic History    Marital status: /Civil Union     Spouse name: Not on file    Number of children: 2    Years of education: Not on file    Highest education level: Not on file   Occupational History    Occupation: FULL-TIME EMPLOYMENT   Social Needs    Financial resource strain: Not on file    Food insecurity     Worry: Not on file     Inability: Not on file    Transportation needs     Medical: Not on file     Non-medical: Not on file   Tobacco Use    Smoking status: Former Smoker    Smokeless tobacco: Never Used    Tobacco comment: tobacco in a pipe in the early 1980's   Substance and Sexual Activity    Alcohol use: Never     Frequency: Never    Drug use: Never    Sexual activity: Never     Comment: NOT CURRENTLY SEXUALLY ACTIVE AS PER ALLSCRIPTS   Lifestyle    Physical activity     Days per week: Not on file     Minutes per session: Not on file    Stress: Not on file   Relationships    Social connections     Talks on phone: Not on file     Gets together: Not on file     Attends Jewish service: Not on file     Active member of club or organization: Not on file     Attends meetings of clubs or organizations: Not on file     Relationship status: Not on file    Intimate partner violence     Fear of current or ex partner: Not on file     Emotionally abused: Not on file     Physically abused: Not on file     Forced sexual activity: Not on file   Other Topics Concern    Not on file   Social History Narrative    ALWAYS USES SEAT BELT    DENIED DAILY COFFEE CONSUMPTION (__CUPS/DAY)    DENIED DAILY COLA CONSUMPTION (__CANS/DAY)    DAILY TEA CONSUMPTION (__CUPS/DAY)    DENTAL CARE, REGULARLY    EXERCISE: WALKING    HIGH SCHOOL GRADUATE    DENIED MULTIPLE ORGAN DONOR    NO LIVING WILL    PETS/ANIMALS    DENIED POWER OF  IN EXISTENCE    WATER INTAKE, ADEQUATE (PER DAY)       Current Outpatient Medications:     aspirin 81 mg chewable tablet, Chew 81 mg, Disp: , Rfl:     Blood Glucose Monitoring Suppl (ONE TOUCH ULTRA 2) w/Device KIT, Use daily, Disp: 1 each, Rfl: 1    dicyclomine (BENTYL) 10 mg capsule, Take 1 capsule (10 mg total) by mouth 4 (four) times a day (before meals and at bedtime), Disp: 30 capsule, Rfl: 0    FLUoxetine (PROzac) 20 mg capsule, TAKE 1 CAPSULE BY MOUTH EVERY DAY , Disp: 90 capsule, Rfl: 2    folic acid (FOLVITE) 1 mg tablet, Take by mouth daily, Disp: , Rfl:     glucose blood (OneTouch Ultra) test strip, Use as instructed once a day, Disp: 50 each, Rfl: 5    Lancets (onetouch ultrasoft) lancets, Use as instructed once a day, Disp: 50 each, Rfl: 5    lidocaine (LIDODERM) 5 %, Apply 1 patch topically daily Remove & Discard patch within 12 hours or as directed by MD, Disp: 30 patch, Rfl: 0    lisinopril-hydrochlorothiazide (PRINZIDE,ZESTORETIC) 10-12 5 MG per tablet, TAKE ONE TABLET BY MOUTH EVERY DAY, Disp: 90 tablet, Rfl: 3    Multiple Vitamins-Minerals (MULTIVITAMIN MEN) TABS, Take 1 tablet by mouth daily, Disp: , Rfl:     Omega-3 Fatty Acids (FISH OIL) 1,000 mg, Take 1,000 mg by mouth daily, Disp: , Rfl:     omeprazole (PriLOSEC) 20 mg delayed release capsule, Take 1 capsule (20 mg total) by mouth daily before breakfast, Disp: 30 capsule, Rfl: 0    oxyCODONE (ROXICODONE) 5 mg immediate release tablet, Take 0 5 tablets (2 5 mg total) by mouth every 6 (six) hours as needed for moderate pain Or take 1 tablet (5 mg total) by mouth every 6 hours as needed for severe painMax Daily Amount: 10 mg, Disp: 30 tablet, Rfl: 0    oxyCODONE (ROXICODONE) 5 mg immediate release tablet, Take 1 tablet (5 mg total) by mouth every 4 (four) hours as needed for moderate painMax Daily Amount: 30 mg, Disp: 30 tablet, Rfl: 0    pancrelipase, Lip-Prot-Amyl, (CREON) 24,000 units, Take 1 capsule (24,000 Units total) by mouth 3 (three) times a day with meals, Disp: 90 capsule, Rfl: 0    pantoprazole (PROTONIX) 40 mg tablet, Take 1 tablet (40 mg total) by mouth 2 (two) times a day before meals, Disp: 60 tablet, Rfl: 0    polyethylene glycol (MIRALAX) 17 g packet, Take 17 g by mouth daily as needed (Constipation), Disp: , Rfl: 0    senna-docusate sodium (SENOKOT S) 8 6-50 mg per tablet, Take 1 tablet by mouth 2 (two) times a day, Disp:  , Rfl: 0    Sodium Chloride Flush (Normal Saline Flush) 0 9 % SOLN, , Disp: , Rfl:     sodium chloride, PF, 0 9 %, 10 mL by Intracatheter route daily Intracatheter flushing daily, Disp: 300 mL, Rfl: 3    tamsulosin (FLOMAX) 0 4 mg, TAKE 1 CAPSULE BY MOUTH  DAILY AT BEDTIME, Disp: 90 capsule, Rfl: 3  Allergies   Allergen Reactions    Fentanyl Anaphylaxis and Other (See Comments)     Oxygen drops severely     Vitals:    05/26/21 1258   BP: 126/70   Pulse: 77   Resp: 18   Temp: 98 3 °F (36 8 °C)   SpO2: 96%       Physical Exam  Constitutional:       Appearance: Normal appearance  HENT:      Head: Normocephalic and atraumatic  Right Ear: External ear normal       Left Ear: External ear normal       Nose: Nose normal    Eyes:      General: No scleral icterus  Extraocular Movements: Extraocular movements intact  Conjunctiva/sclera: Conjunctivae normal       Pupils: Pupils are equal, round, and reactive to light  Neck:      Musculoskeletal: Normal range of motion and neck supple  Cardiovascular:      Rate and Rhythm: Normal rate and regular rhythm  Heart sounds: Normal heart sounds  Pulmonary:      Effort: Pulmonary effort is normal       Breath sounds: Normal breath sounds  Abdominal:      General: Abdomen is flat  Bowel sounds are normal       Palpations: Abdomen is soft  Musculoskeletal: Normal range of motion  Skin:     General: Skin is warm  Neurological:      General: No focal deficit present  Mental Status: He is oriented to person, place, and time  Psychiatric:         Mood and Affect: Mood normal          Behavior: Behavior normal          Thought Content: Thought content normal          Judgment: Judgment normal            Results:  Labs:  Ref Range & Units 5/5/21 9:11 AM    CA 19-9 0 - 35 U/mL 424High     Comment: **Verified by repeat analysis**   Roche [employer]ostics Electrochemiluminescence Immunoassay (ECLIA)   Values obtained with different assay methods or kits cannot be   used interchangeably   Results cannot be interpreted as absolute   evidence of the presence or absence of malignant disease  Imaging  Xr Chest Portable    Result Date: 5/16/2021  Narrative: CHEST INDICATION:   hypoxia  Newly diagnosed pancreatic cancer   COMPARISON:  Chest radiograph from 5/7/2021 and abdomen CT from 5/14/2021  EXAM PERFORMED/VIEWS:  XR CHEST PORTABLE  FINDINGS: Cardiomediastinal silhouette normal  Small left effusion  Mild bibasilar atelectasis  No pneumothorax  Osseous structures normal for age  Impression: Small left effusion with mild bibasilar atelectasis  Workstation performed: [id number]     Xr Chest Portable    Result Date: 5/8/2021  Narrative: CHEST INDICATION:  Desaturation post procedure  COMPARISON:  5/5/2021, CT chest 4/29/2021 EXAM PERFORMED/VIEWS:  XR CHEST PORTABLE FINDINGS:  The upper thoracic inlet was excluded from the field-of-view  Cardiomediastinal silhouette appears unremarkable  The lungs are clear  No pneumothorax or pleural effusion  Known right chest wall lesion on CT is not clearly visible on this exam  Osseous structures appear within normal limits for patient age  Impression: No acute cardiopulmonary disease  Workstation performed: [id number]     Xr Chest 1 View Portable    Result Date: 5/5/2021  Narrative: CHEST INDICATION:   weak  COMPARISON:  4/29/2021 and 10/13/2017 EXAM PERFORMED/VIEWS:  XR CHEST PORTABLE FINDINGS: Cardiomediastinal silhouette appears unremarkable  The lungs are clear  No pneumothorax or pleural effusion  There is some degenerative arthritis at the acromioclavicular joint  Soft tissue mass of the right chest wall seen on the CT scan is not evident on plain film  Impression: No acute cardiopulmonary disease  Workstation performed: [id number]     Ct Chest Wo Contrast    Result Date: 4/29/2021  Narrative: CT CHEST WITHOUT IV CONTRAST INDICATION:   Pancreatic adenocarcinoma, initial workup staging pancreatic mass  COMPARISON:  Chest x-ray 10/13/2017 TECHNIQUE: CT examination of the chest was performed without intravenous contrast   Axial, sagittal, and coronal 2D reformatted images were created from the source data and submitted for interpretation  Radiation dose length product (DLP) for this visit:  345 mGy-cm     This examination, like all CT scans performed in the [facility], was performed utilizing techniques to minimize radiation dose exposure, including the use of iterative reconstruction and automated exposure control  FINDINGS: LUNGS:  Lungs are clear  There is no tracheal or endobronchial lesion  PLEURA:  Unremarkable  HEART/GREAT VESSELS:  Unremarkable for patient's age  MEDIASTINUM AND WAQAS:  Unremarkable  CHEST WALL AND LOWER NECK:   Soft tissue lesion in the subcutaneous tissues of the right chest wall inferior to the right nipple measuring 3 0 x 1 4 cm on image 2/44  VISUALIZED STRUCTURES IN THE UPPER ABDOMEN:  Biliary stent with pneumobilia and multiple hypodense lesions throughout the liver previously attributed to cysts  OSSEOUS STRUCTURES:  No acute fracture or destructive osseous lesion  Impression: Soft tissue lesion in the subcutaneous tissues of the right chest wall inferior to the right nipple measuring 8 0-1 4 cm on image 2/44  Tissue sampling may be warranted  No other evidence of potential metastatic disease to the chest  Workstation performed: [id number]     Xr Abdomen 1 Vw Portable    Result Date: 5/9/2021  Narrative: ABDOMEN INDICATION:   worsening abdominal pain -- assess for gas under the diaphragm  COMPARISON:  2/12/2020 VIEWS:  AP supine Images: 2 FINDINGS: There is a common bile duct stent noted  There is a nonobstructive bowel gas pattern  No discernible free air on this semierect study  Upright or left lateral decubitus imaging is more sensitive to detect subtle free air in the appropriate setting  No pathologic calcifications or soft tissue masses  Visualized lung bases are clear  Visualized osseous structures are unremarkable for the patient's age  Impression: Common bile duct stent  Nonobstructive bowel gas pattern  No evidence for free air on this semierect examination   Workstation performed: [id number]     Nm Hepatobiliary W Rx    Result Date: 5/10/2021  Narrative: HEPATOBILIARY SCAN WITH CHOLECYSTOKININ ADMINISTRATION INDICATION: Pericholecystic inflammation COMPARISON: CT abdomen pelvis 5/9/2021, ultrasound 5/6/2021 TECHNIQUE: Patient was pretreated with 1 7 mcg CCK intravenously prior to starting the study as per protocol  Following the intravenous administration of 5 1 mCi Tc-99m labeled mebrofenin, dynamic abdominal images were obtained over a 60 minute time period  Images were performed in AP projection  FINDINGS: There is prompt, uniform accumulation with normal clearance of the radiopharmaceutical by the liver  Focal photopenia noted in the right lobe superiorly compatible with hepatic cyst as seen on prior CT  Small bowel lesion seen within the first hour  Gallbladder is not seen within the first hour  Additional imaging obtained at 75 minutes, 2 hours and 4 hours  Additional 1 1 mCi technetium mebrofenin was administered intravenously  Delayed imaging does not demonstrate gallbladder uptake  Impression: 1  Nonvisualization of the gallbladder on delayed imaging up to 4 hours  Findings suspicious for cystic duct obstruction and acute cholecystitis  The study was marked in [facility] for immediate notification  Workstation performed: [id number]     Mri Abdomen W Wo Contrast And Mrcp    Result Date: 4/28/2021  Narrative: MRI OF THE ABDOMEN WITH AND WITHOUT CONTRAST WITH MRCP INDICATION:  Newly identified pancreatic mass with biliary dilatation COMPARISON: 4/27/2021 TECHNIQUE:  The following pulse sequences were obtained:  Coronal and axial T2 with TE of 90 and 180 respectively, axial T2 with fat saturation, axial FIESTA fat-sat, axial T1-weighted in-and-out-of phase, axial DWI/ADC, pre-contrast axial T1 with fat saturation, post-contrast dynamic axial T1 with fat saturation at 20, 70, and 180 seconds, followed by coronal and 7 minute delayed axial T1 with fat saturation  3D MRCP images were obtained with radial thick slabs and projections    3D rendering was performed from the acquisition scanner  IV Contrast:  8 mL of Gadobutrol injection (SINGLE-DOSE) FINDINGS: LOWER CHEST:   Unremarkable  LIVER: Normal in size and configuration  Multiple hepatic cysts again seen, the largest of which measures 5 6 cm at the junction of segments 7 and 8   Indeterminant lesion within segment 3 again seen measuring 2 6 cm which demonstrates a continuous ring peripheral enhancement with some nodularity and progressive fill-in on delayed images  No demonstrable washout    The hepatic veins and portal veins are patent  BILE DUCTS:  Intra and extrahepatic biliary ductal dilatation  CBD measures up to 1 7 cm  No evidence of filling defect  Abrupt cut off the level of the pancreatic head  GALLBLADDER:  Normal  PANCREAS:  Hypoenhancing mass centered at the pancreatic head/uncinate process measuring 2 9 x 3 5 x 3 5 cm (series 12, image 66, series 13, image 67)  Pancreatic ductal dilatation up to 8 mm  No evidence of encasement of the portal vein and superior mesenteric artery  Additional cystic change at the uncinate process likely represent upstream ductal dilatation  ADRENAL GLANDS:  Normal  SPLEEN:  Normal  KIDNEYS/PROXIMAL URETERS:  No hydroureteronephrosis  Bilateral renal cysts  BOWEL:   No dilated loops of bowel  PERITONEUM/RETROPERITONEUM:  No ascites  LYMPH NODES:  No abdominal lymphadenopathy  VASCULAR STRUCTURES:  No aneurysm  ABDOMINAL WALL:  Unremarkable  OSSEOUS STRUCTURES:  No suspicious osseous lesion  Impression: 3 5 cm mass at the pancreatic head/uncinate process resulting in upstream dilatation of the CBD and pancreatic duct  No evidence of encasement of the portal vein or superior mesenteric artery  Findings concerning for pancreatic neoplasm such as pancreatic adenocarcinoma  Unchanged appearance of 2 6 cm atypical lesion at segment 3 of the liver consistent with atypical hemangioma or less likely low-grade neoplasm    Workstation performed: [id number]      Cholecystostomy Tube Placement    Result Date: 5/12/2021  Narrative: Cholecystostomy tube placement under ultrasound and fluoro Clinical History:  Suspected acute cholecystitis  Contrast: 20 mL of iohexol (OMNIPAQUE) Fluoro time: 1 3 mins Number of Images: 6 Radiation dose: 155 mGy Concious sedation time: 35mins Technique: The patient was brought to the interventional radiology suite and placed supine on the table  The gallbladder was examined with ultrasound, and found to be markedly thick walled with a small amount of pericholecystic fluid  No sludge or stones was noted in the lumen  The gallbladder was not significantly distended  The skin of the right upper quadrant was prepped and draped in the usual sterile fashion  After local anesthesia was administered to the skin, a 19 gauge needle was advanced percutaneously under direct ultrasound guidance into the gallbladder via a transhepatic route  Cloudy bile was removed and sent for culture and sensitivity with gram stain  A heavy duty wire was advanced through the needle, and the needle was removed  After tract dilatation, a 10 Amharic all-purpose drainage catheter was advanced over the wire until the loop was formed within the gallbladder  The catheter was then locked in place  A total of 30 mL of bile was then aspirated  The catheter was sutured at the skin  Contrast was injected, confirming satisfactory location of the tube  The cystic duct was noted to slowly fill and opacify the common bile duct and intrahepatic ducts  Overall, the cystic duct did not appear normal, but diffusely small  The tube was then connected to gravity bag, and dressed sterilely  Impression: Impression: 1  Thick-walled gallbladder with pericholecystic fluid noted  No stones or sludge  2   Successful ultrasound and fluoroscopically guided placement of a percutaneous cholecystostomy tube  3   Small but patent cystic duct noted   Workstation performed: [id number]     Fl Ercp Biliary Only    Result Date: 4/28/2021  Narrative: ERCP INDICATION:  Pancreatic mass COMPARISON:  MRI 4/27/2021 IMAGES:  2 FLUOROSCOPY TIME:   1 31 seconds CONTRAST: 3 mL of iohexol (OMNIPAQUE) FINDINGS: Fluoroscopic guidance was provided for performance of ERCP  BILIARY: Side-viewing duodenal scope identified  Common bile duct was cannulated and injected  Distal common bile duct 2 cm long smooth narrowing noted with proximal dilatation 15 mm  Sphincterotomy and brushings performed  10 mm x 6 cm covered metal stent placed across the stricture  Impression: Distal common bile duct narrowing likely due to known pancreatic mass status post enterotomy, brush biopsy, and stenting  Workstation performed: [id number]     Us Right Upper Quadrant    Result Date: 5/6/2021  Narrative: RIGHT UPPER QUADRANT ULTRASOUND INDICATION:  Right upper quadrant pain  COMPARISON: Ultrasound 5/14/2019, CT 5/5/2021 TECHNIQUE:   Real-time ultrasound of the right upper quadrant was performed with a curvilinear transducer with both volumetric sweeps and still imaging techniques  FINDINGS: PANCREAS:  Dilated pancreatic duct is identified  3 6 x 2 6 x 4 6 cm pancreatic head mass lesion is present  AORTA AND IVC:  Visualized portions are normal for patient age  LIVER: Normal size  Echogenicity and contour are normal  3 3 x 1 9 x 2 6 cm hypoechoic indeterminate left lobe hepatic lesion is noted  1 2 x 1 0 x 1 1 cm hyperechoic left lobe hepatic lesion noted  Hepatic cysts are present  Normal directional flow is present within the portal vein  BILIARY: The gallbladder is normal in caliber  Mild gallbladder wall thickening measuring 4 mm noted  No pericholecystic fluid is present  No stones or sludge  No sonographic Calles's sign  No intrahepatic biliary dilatation  CBD measures 3 mm  No choledocholithiasis  KIDNEY: Right kidney measures 10 6 x 4 6 x 5 8 cm  Within normal limits  ASCITES:   None       Impression: Mild gallbladder wall thickening without additional evidence of acute cholecystitis  Pancreatic head mass again identified  Associated pancreatic ductal dilatation  Indeterminate hypoechoic lesion within the left lobe of the liver  Hyperechoic left hepatic lobe lesion possibly representing a hemangioma  Multiple hepatic cysts  Workstation performed: [id number]     Ct Abdomen Pelvis W Contrast    Result Date: 5/14/2021  Narrative: CT ABDOMEN AND PELVIS WITH IV CONTRAST INDICATION:   Abdominal infection suspected leukocytosis - recent cholecystostomy tube  COMPARISON:  CT abdomen pelvis 5/9/2021, CT 9/26/2017 TECHNIQUE:  CT examination of the abdomen and pelvis was performed  Axial, sagittal, and coronal 2D reformatted images were created from the source data and submitted for interpretation  Radiation dose length product (DLP) for this visit:  636 68 mGy-cm   This examination, like all CT scans performed in the [facility], was performed utilizing techniques to minimize radiation dose exposure, including the use of iterative  reconstruction and automated exposure control  IV Contrast:  100 mL of iohexol (OMNIPAQUE) Enteric Contrast:  Enteric contrast was not administered  FINDINGS: ABDOMEN LOWER CHEST:  Bibasilar atelectasis and small bilateral pleural effusions are present  Right chest wall nodule again noted  LIVER/BILIARY TREE:  2 5 x 2 8 cm enhancing lesion within left hepatic lobe is stable dating back to at least 9/26/2017  Scattered hepatic cysts are also stable  Pneumobilia is stable  Common bile duct stent remains in place  GALLBLADDER:  Cholecystostomy tube has been placed  SPLEEN:  Unremarkable  PANCREAS:  3 9 x 2 5 cm hypoattenuating mass involving the pancreatic head and uncinate process resulting in upstream pancreatic ductal dilatation is again noted suspicious for pancreatic malignancy  The pancreatic mass is in close proximity to the superior mesenteric vein but with fat plane still maintained  There is no encasement of the celiac axis or superior mesenteric artery  ADRENAL GLANDS:  Unremarkable  KIDNEYS/URETERS:  No hydronephrosis or urinary tract calculus  One or more sharply circumscribed subcentimeter renal hypodensities are present, too small to accurately characterize, and statistically most likely benign findings  According to recent literature (Radiology 2019) no further workup of these findings is recommended  STOMACH AND BOWEL:  Marked wall thickening and submucosal enhancement involving the pylorus and duodenum with adjacent inflammatory stranding is again seen  There is marked increased fluid surrounding the pylorus and proximal duodenum, which is not yet well formed APPENDIX:  No findings to suggest appendicitis  ABDOMINOPELVIC CAVITY:  Trace ascites is present in the pelvis  VESSELS:  Unremarkable for patient's age  PELVIS REPRODUCTIVE ORGANS:  Unremarkable for patient's age  URINARY BLADDER:  Unremarkable  ABDOMINAL WALL/INGUINAL REGIONS:  Unremarkable  OSSEOUS STRUCTURES:  No acute fracture or destructive osseous lesion  Impression: 1  Marked increase in fluid surrounding inflammatory pylorus and proximal duodenum as described above  Given history of acute cholecystitis, 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  Fluid collection is not yet well formed  If patient develops worsening signs of infection, follow-up imaging recommended to exclude developing abscess  2   Status post cholecystostomy tube placement with decompression of the gallbladder  Biliary stent remains in place with stable pneumobilia  3   Pancreatic head/uncinate process mass as described above, consistent with malignancy  No vascular encasement  The study was marked in [facility] for immediate notification   Workstation performed: [id number]     Ct Abdomen Pelvis W Contrast    Result Date: 5/9/2021  Narrative: CT ABDOMEN AND PELVIS WITH IV CONTRAST INDICATION:   Abdominal pain, acute, nonlocalized abdominal pain  COMPARISON:  Multiple priors most recently CT 5/5/2021 and ultrasound 5/6/2021 TECHNIQUE:  CT examination of the abdomen and pelvis was performed  In addition to portal venous phase postcontrast scanning through the abdomen and pelvis, delayed phase postcontrast scanning was performed through the upper abdominal viscera  Axial, sagittal, and coronal 2D reformatted images were created from the source data and submitted for interpretation  Radiation dose length product (DLP) for this visit:  716 mGy-cm   This examination, like all CT scans performed in the [facility], was performed utilizing techniques to minimize radiation dose exposure, including the use of iterative reconstruction and automated exposure control  IV Contrast:  100 mL of iohexol (OMNIPAQUE) Enteric Contrast:  Enteric contrast was not administered  FINDINGS: ABDOMEN LOWER CHEST:  No clinically significant abnormality identified in the visualized lower chest  LIVER/BILIARY TREE:  Multiple smooth marginated rounded low-attenuation lesions throughout the liver, likely representing cysts, essentially unchanged since at least 2016  Hyperenhancing rounded lesion in hepatic segment 3 measuring up to 2 9 cm, possibly representing a hemangioma is essentially unchanged since 2016  Pneumobilia related to the presence of a biliary stent again noted  GALLBLADDER:  No gallstones  Mild gallbladder wall thickening again noted  SPLEEN:  Unremarkable  PANCREAS:  Again seen is a pancreatic head/uncinate process mass measuring up to 4 3 x 2 8 cm in greatest axial dimensions by approximately 3 8 cm craniocaudal   Extensive inflammatory stranding is now noted surrounding the pancreatic head and pyloric region/proximal duodenum  There is associated wall thickening of the distal stomach and proximal duodenum  ADRENAL GLANDS:  Unremarkable  KIDNEYS/URETERS:  No hydronephrosis or urinary tract calculus    One or more sharply circumscribed subcentimeter renal hypodensities are present, too small to accurately characterize, and statistically most likely benign findings  According to recent literature (Radiology 2019) no further workup of these findings is recommended  STOMACH AND BOWEL:  See pancreas section  No abnormally dilated loops of bowel  APPENDIX:  No findings to suggest appendicitis  ABDOMINOPELVIC CAVITY:  Small volume free fluid in the pelvis is likely reactive  Small amount of free air in the gastrohepatic region (series 2, images 24 through 28)     No lymphadenopathy  VESSELS:  No aneurysm  Perisplenic varices  PELVIS REPRODUCTIVE ORGANS:  The prostate is enlarged  URINARY BLADDER:  Unremarkable  ABDOMINAL WALL/INGUINAL REGIONS:  Lobulated smoothly marginated soft tissue mass in the right anterior lower chest wall subcutaneous tissues measuring approximately 2 7 cm is essentially unchanged since at least 2016, probably representing a sebaceous cyst  OSSEOUS STRUCTURES:  No acute fracture or destructive osseous lesion  Impression: Inflammatory changes surrounding the pylorus and proximal duodenum, as well as the pancreatic head  A small amount of free air  Findings are most suspicious for a perforated gastric ulcer  Recommend correlation with endoscopy  Pancreatitis is also considered, but felt less likely given the interval development of free air in the location of the inflammatory changes  Pancreatic head mass concerning for malignancy again noted, unchanged since study of 5/5/2021  Additional findings, as described, essentially unchanged  I personally discussed this study with Dr Geoff Rivera on 5/9/2021 at 3:43 PM  Workstation performed: [id number]     Ct Abdomen Pelvis With Contrast    Result Date: 5/5/2021  Narrative: CT ABDOMEN AND PELVIS WITH IV CONTRAST INDICATION:   Abdominal pain, acute, nonlocalized Worsening abdominal pain  History of pancreatic mass   COMPARISON:  9/19/2019; 4/27/2021 TECHNIQUE:  CT examination of the abdomen and pelvis was performed  Axial, sagittal, and coronal 2D reformatted images were created from the source data and submitted for interpretation  Radiation dose length product (DLP) for this visit:  382 mGy-cm   This examination, like all CT scans performed in the [facility], was performed utilizing techniques to minimize radiation dose exposure, including the use of iterative reconstruction and automated exposure control  IV Contrast:  100 mL of iohexol (OMNIPAQUE) Enteric Contrast:  Enteric contrast was not administered  FINDINGS: ABDOMEN LOWER CHEST:  Well circumscribed approximately 2 9 cm mass in the right anterior chest wall in the mid clavicular line on image 3, series 2 redemonstrated, stable from September 2019, nonspecific  LIVER/BILIARY TREE:  There is pneumobilia in the nondependent portion of the biliary tree likely related to common bile duct stent present previously  Numerous rounded hypodense lesions noted compatible with hepatic cysts  Additionally there is a uniformly enhancing mass in the left lobe of liver, image 24, series 2, measuring 2 5 cm, stable from 2019 possibly a hemangioma  GALLBLADDER:  There is circumferential mural enhancement of the wall of the gallbladder with a small amount of pericholecystic fluid  SPLEEN:  Unremarkable  PANCREAS:  A large hypodense /necrotic mass in the head/uncinate process of the pancreas is redemonstrated on image 35, series 2 measuring 3 8 cm, abutting the stent in the common bile duct  Proximally the pancreatic duct is markedly dilated, measuring up to 0 8 cm on image 28, series 2 in the neck of the pancreas  No peripancreatic inflammatory changes  The mass in the region of the uncinate process/head of the pancreas compresses the 3rd portion of the duodenum without latha invasion of the lumen  Appearance is similar to the prior study  ADRENAL GLANDS:  Unremarkable   KIDNEYS/URETERS:  One or more simple renal cyst(s) is noted  Otherwise unremarkable kidneys  No hydronephrosis  STOMACH AND BOWEL:  Moderate amount of retained colonic stool in the right hemicolon, transverse colon and proximal half of the descending colon  Sigmoid colon and rectum relatively decompressed  APPENDIX:  No findings to suggest appendicitis  ABDOMINOPELVIC CAVITY:  No ascites  No pneumoperitoneum  No lymphadenopathy  VESSELS:  Atherosclerotic calcifications noted  PELVIS REPRODUCTIVE ORGANS:  Top normal to mildly enlarged prostate with dystrophic calcifications redemonstrated  URINARY BLADDER:  Unremarkable  ABDOMINAL WALL/INGUINAL REGIONS:  Unremarkable  OSSEOUS STRUCTURES:  No acute fracture or destructive osseous lesion  Impression: 1  Uniform mural hyperenhancement of the wall of the gallbladder with a small amount of pericholecystic fluid, worrisome for acute cholecystitis  Further clinical assessment advised  Ultrasound of the right upper quadrant suggested for further assessment  2   Pneumobilia with stent in the common bile duct redemonstrated  3   3 8 cm obstructing mass in the region of the head of the pancreas with marked pancreatic ductal dilatation, worrisome for adenocarcinoma  Other tumors not excluded  4   Numerous hepatic cysts redemonstrated with an enhancing lesion in the left lobe of liver possibly a hemangioma, stable  5   Moderate constipation  Workstation performed: [id number]     Ct Abdomen Pelvis With Contrast    Result Date: 4/27/2021  Narrative: CT ABDOMEN AND PELVIS WITH IV CONTRAST INDICATION:   Diarrhea Abdominal pain, weight loss, epigastric pain Abdominal discomfort, loose stools, weight loss and transaminitis over the last 3 weeks  COMPARISON:  9/19/2019 TECHNIQUE:  CT examination of the abdomen and pelvis was performed  Axial, sagittal, and coronal 2D reformatted images were created from the source data and submitted for interpretation   Radiation dose length product (DLP) for this visit:  815 mGy-cm    This examination, like all CT scans performed in the [facility], was performed utilizing techniques to minimize radiation dose exposure, including the use of iterative reconstruction and automated exposure control  IV Contrast:  100 mL of iohexol (OMNIPAQUE) Enteric Contrast:  Enteric contrast was not administered  FINDINGS: ABDOMEN LOWER CHEST:  No clinically significant abnormality identified in the visualized lower chest   Oval well-definedd soft tissue density nodule within the right chest   This measures 2 8 x 1 2 cm, similar to the prior in 2019  Correlate with physical exam findings  Unclear if this represents a subdermal nodule or asymmetric gynecomastia  LIVER/BILIARY TREE:  Multiple scattered cysts seen throughout the liver without suspicious enhancing component and without suspicious change compared to the prior  Single homogeneously enhancing lesion in the left lateral segment with relative washout on delayed imaging, contrast density follows portal venous density  Stable compared to prior  Although not pathognomonic, 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  New moderate intrahepatic biliary ductal dilatation  Common hepatic and common bile duct abnormally distended to the level of the pancreatic head  GALLBLADDER:  Abnormal distended gallbladder caliber and borderline wall thickening  No pericholecystic inflammation or calcified stones  SPLEEN:  Unremarkable  PANCREAS:  Large ill-defined hypoattenuating mass within the pancreatic head and uncinate measuring 4 2 x 2 9 cm with dilatation/obstruction of the common bile duct and pancreatic duct, likely represent pancreatic carcinoma  Regional vasculature remains  patent  No pathologic adenopathy identified  Pancreatic duct dilated within the neck at 8 mm, tapering through the body and tail  ADRENAL GLANDS:  Unremarkable  KIDNEYS/URETERS:  Unremarkable  No hydronephrosis  STOMACH AND BOWEL:  Unremarkable   APPENDIX:  No findings to suggest appendicitis  ABDOMINOPELVIC CAVITY:  No ascites  No pneumoperitoneum  No lymphadenopathy  VESSELS:  Unremarkable for patient's age  PELVIS REPRODUCTIVE ORGANS:  Unremarkable for patient's age  URINARY BLADDER:  Unremarkable  ABDOMINAL WALL/INGUINAL REGIONS:  Unremarkable  OSSEOUS STRUCTURES:  No acute fracture or destructive osseous lesion  Impression: Moderate intra and extrahepatic biliary ductal dilatation, pancreatic duct dilatation and gallbladder distention which appear secondary to a large ill-defined pancreatic head mass, pancreatic carcinoma until proven otherwise  Multiple hepatic cysts without suspicious change  Stable homogeneously enhancing left lateral segment lesion likely representing cavernous hemangioma  The study was marked in [facility] for immediate notification  Workstation performed: [id number]     I reviewed the above laboratory and imaging data  Discussion/Summary:  71-year-old man, history of pancreas cancer, in need of port for chemotherapy  Also no for left chest port  Risks and benefits of surgery including infection, bleeding, need for possible additional surgery, possible pneumothorax, discussed with him  All questions answered and consents signed at this visit

## 2021-05-26 NOTE — H&P (VIEW-ONLY)
Surgical Oncology Follow Up       [address]  [facility] [place]  [address] [facility] [id number]    Ramone Portillo  1955  [id number]  [address] [facility]  [facility] [place]  [address] [facility] [id number]    Chief Complaint   Patient presents with    Follow-up       Assessment/Plan:    No problem-specific Assessment & Plan notes found for this encounter  Diagnoses and all orders for this visit:    Pancreatic adenocarcinoma [facility])  -     Case request operating room: INSERTION VENOUS PORT (PORT-A-CATH); Standing  -     Case request operating room: INSERTION VENOUS PORT (PORT-A-CATH)    Other orders  -     Incentive spirometry; Standing  -     Insert and maintain IV line; Standing  -     Void On-Call to O R ; Standing  -     Place sequential compression device; Standing        Advance Care Planning/Advance Directives:  Discussed disease status, cancer treatment plans and/or cancer treatment goals with the patient  Oncology History   Pancreatic adenocarcinoma ([facility] [address] )   5/7/2021 Biopsy    Head of pancreas:  Malignant Adenocarcinoma  6/3/2021 -  Chemotherapy    pegfilgrastim (NEULASTA ONPRO) subcutaneous injection kit 6 mg, 6 mg, Subcutaneous, Once, 0 of 6 cycles  PACLitaxel protein bound (ABRAXANE) 191 mg in IVPB 38 2 mL, 100 mg/m2 = 191 mg (80 % of original dose 125 mg/m2), Intravenous, Once, 0 of 6 cycles  Dose modification: 100 mg/m2 (original dose 125 mg/m2, Cycle 1, Reason: Other (See Comments), Comment: per protocol)  gemcitabine (GEMZAR) 1,909 9 mg in sodium chloride 0 9 % 250 mL infusion, 1,000 mg/m2, Intravenous, Once, 0 of 6 cycles         History of Present Illness: HPI:  Ramone Portillo is a 73year old man recently diagnosed with pancreatic cancer  The patient presented to the hospital with obstructive jaundice    He ended up having a workup along with a biopsy which revealed  A 3 8 cm obstructing mass in the region of the head of the pancreas with marked pancreatic ductal dilatation worrisome for adenocarcinoma  Biopsy was done which proved this was adenocarcinoma    -Interval History:  He will be starting  Neoadjuvant chemotherapy next week, and is here to discuss and consent for port placement in preparation for his treatment  Review of Systems:  Review of Systems   Constitutional: Positive for activity change, appetite change and fatigue  HENT: Negative  Eyes: Negative  Respiratory: Negative  Cardiovascular: Negative  Endocrine: Negative  Genitourinary: Negative  Musculoskeletal: Negative  Skin: Negative  Allergic/Immunologic: Negative  Neurological: Negative  Hematological: Negative  Psychiatric/Behavioral: Negative          Patient Active Problem List   Diagnosis    Right arm pain    Rheumatoid arthritis ([address] )    Essential hypertension    Urgency of urination    Anxiety disorder    Hyperlipidemia    Liver mass    Esophageal reflux    Multiple lipomas    Diabetes mellitus type 2    Hypokalemia    Diarrhea    Pancreatic mass    Epigastric pain    BPH (benign prostatic hyperplasia)    Constipation    SIRS (systemic inflammatory response syndrome) (HCC)    Perforation bowel (HCC)    Acute cholecystitis    Leukocytosis    Pancreatic adenocarcinoma (HCC)    Chemotherapy induced neutropenia (HCC)     Past Medical History:   Diagnosis Date    Arthritis     Cellulitis     LAST ASSESSED: 6/13/14    Enlarged prostate     Epidermal inclusion cyst     LAST ASSESSED: 10/4/13    Erythrasma     LAST ASSESSED: 9/23/13    Furuncle     LAST ASSESSED: 6/11/14    Hyperlipidemia     Hypertension      Past Surgical History:   Procedure Laterality Date    HYDROCELE EXCISION / REPAIR Right 01/11/2018    SPERMATIC CORD EXCSION OF HYDROCELE; MANAGED BY: Rob CONTRERAS CHOLECYSTOSTOMY TUBE PLACEMENT  5/11/2021    CT REMOVAL OF HYDROCELE,TUNICA,UNILAT Right 1/11/2018    Procedure: HYDROCELECTOMY;  Surgeon: Torie Schumacher MD;  Location: [facility];  Service: Urology    SCROTAL SURGERY      benign "lump"     Family History   Problem Relation Age of Onset    Heart disease Father         CARDIAC DISORDER    Hypertension Father     Hypertension Mother     No Known Problems Maternal Aunt     No Known Problems Maternal Uncle     No Known Problems Paternal Aunt     No Known Problems Paternal Uncle     No Known Problems Paternal Grandmother     No Known Problems Paternal Grandfather     Diabetes Maternal Grandmother         MELLITUS    No Known Problems Maternal Grandfather     Hypertension Other      Social History     Socioeconomic History    Marital status: /Civil Union     Spouse name: Not on file    Number of children: 2    Years of education: Not on file    Highest education level: Not on file   Occupational History    Occupation: FULL-TIME EMPLOYMENT   Social Needs    Financial resource strain: Not on file    Food insecurity     Worry: Not on file     Inability: Not on file    Transportation needs     Medical: Not on file     Non-medical: Not on file   Tobacco Use    Smoking status: Former Smoker    Smokeless tobacco: Never Used    Tobacco comment: tobacco in a pipe in the early 1980's   Substance and Sexual Activity    Alcohol use: Never     Frequency: Never    Drug use: Never    Sexual activity: Never     Comment: NOT CURRENTLY SEXUALLY ACTIVE AS PER ALLSCRIPTS   Lifestyle    Physical activity     Days per week: Not on file     Minutes per session: Not on file    Stress: Not on file   Relationships    Social connections     Talks on phone: Not on file     Gets together: Not on file     Attends Jainism service: Not on file     Active member of club or organization: Not on file     Attends meetings of clubs or organizations: Not on file     Relationship status: Not on file    Intimate partner violence     Fear of current or ex partner: Not on file     Emotionally abused: Not on file     Physically abused: Not on file     Forced sexual activity: Not on file   Other Topics Concern    Not on file   Social History Narrative    ALWAYS USES SEAT BELT    DENIED DAILY COFFEE CONSUMPTION (__CUPS/DAY)    DENIED DAILY COLA CONSUMPTION (__CANS/DAY)    DAILY TEA CONSUMPTION (__CUPS/DAY)    DENTAL CARE, REGULARLY    EXERCISE: WALKING    HIGH SCHOOL GRADUATE    DENIED MULTIPLE ORGAN DONOR    NO LIVING WILL    PETS/ANIMALS    DENIED POWER OF  IN EXISTENCE    WATER INTAKE, ADEQUATE (PER DAY)       Current Outpatient Medications:     aspirin 81 mg chewable tablet, Chew 81 mg, Disp: , Rfl:     Blood Glucose Monitoring Suppl (ONE TOUCH ULTRA 2) w/Device KIT, Use daily, Disp: 1 each, Rfl: 1    dicyclomine (BENTYL) 10 mg capsule, Take 1 capsule (10 mg total) by mouth 4 (four) times a day (before meals and at bedtime), Disp: 30 capsule, Rfl: 0    FLUoxetine (PROzac) 20 mg capsule, TAKE 1 CAPSULE BY MOUTH EVERY DAY , Disp: 90 capsule, Rfl: 2    folic acid (FOLVITE) 1 mg tablet, Take by mouth daily, Disp: , Rfl:     glucose blood (OneTouch Ultra) test strip, Use as instructed once a day, Disp: 50 each, Rfl: 5    Lancets (onetouch ultrasoft) lancets, Use as instructed once a day, Disp: 50 each, Rfl: 5    lidocaine (LIDODERM) 5 %, Apply 1 patch topically daily Remove & Discard patch within 12 hours or as directed by MD, Disp: 30 patch, Rfl: 0    lisinopril-hydrochlorothiazide (PRINZIDE,ZESTORETIC) 10-12 5 MG per tablet, TAKE ONE TABLET BY MOUTH EVERY DAY, Disp: 90 tablet, Rfl: 3    Multiple Vitamins-Minerals (MULTIVITAMIN MEN) TABS, Take 1 tablet by mouth daily, Disp: , Rfl:     Omega-3 Fatty Acids (FISH OIL) 1,000 mg, Take 1,000 mg by mouth daily, Disp: , Rfl:     omeprazole (PriLOSEC) 20 mg delayed release capsule, Take 1 capsule (20 mg total) by mouth daily before breakfast, Disp: 30 capsule, Rfl: 0   oxyCODONE (ROXICODONE) 5 mg immediate release tablet, Take 0 5 tablets (2 5 mg total) by mouth every 6 (six) hours as needed for moderate pain Or take 1 tablet (5 mg total) by mouth every 6 hours as needed for severe painMax Daily Amount: 10 mg, Disp: 30 tablet, Rfl: 0    oxyCODONE (ROXICODONE) 5 mg immediate release tablet, Take 1 tablet (5 mg total) by mouth every 4 (four) hours as needed for moderate painMax Daily Amount: 30 mg, Disp: 30 tablet, Rfl: 0    pancrelipase, Lip-Prot-Amyl, (CREON) 24,000 units, Take 1 capsule (24,000 Units total) by mouth 3 (three) times a day with meals, Disp: 90 capsule, Rfl: 0    pantoprazole (PROTONIX) 40 mg tablet, Take 1 tablet (40 mg total) by mouth 2 (two) times a day before meals, Disp: 60 tablet, Rfl: 0    polyethylene glycol (MIRALAX) 17 g packet, Take 17 g by mouth daily as needed (Constipation), Disp: , Rfl: 0    senna-docusate sodium (SENOKOT S) 8 6-50 mg per tablet, Take 1 tablet by mouth 2 (two) times a day, Disp:  , Rfl: 0    Sodium Chloride Flush (Normal Saline Flush) 0 9 % SOLN, , Disp: , Rfl:     sodium chloride, PF, 0 9 %, 10 mL by Intracatheter route daily Intracatheter flushing daily, Disp: 300 mL, Rfl: 3    tamsulosin (FLOMAX) 0 4 mg, TAKE 1 CAPSULE BY MOUTH  DAILY AT BEDTIME, Disp: 90 capsule, Rfl: 3  Allergies   Allergen Reactions    Fentanyl Anaphylaxis and Other (See Comments)     Oxygen drops severely     Vitals:    05/26/21 1258   BP: 126/70   Pulse: 77   Resp: 18   Temp: 98 3 °F (36 8 °C)   SpO2: 96%       Physical Exam  Constitutional:       Appearance: Normal appearance  HENT:      Head: Normocephalic and atraumatic  Right Ear: External ear normal       Left Ear: External ear normal       Nose: Nose normal    Eyes:      General: No scleral icterus  Extraocular Movements: Extraocular movements intact  Conjunctiva/sclera: Conjunctivae normal       Pupils: Pupils are equal, round, and reactive to light     Neck: Musculoskeletal: Normal range of motion and neck supple  Cardiovascular:      Rate and Rhythm: Normal rate and regular rhythm  Heart sounds: Normal heart sounds  Pulmonary:      Effort: Pulmonary effort is normal       Breath sounds: Normal breath sounds  Abdominal:      General: Abdomen is flat  Bowel sounds are normal       Palpations: Abdomen is soft  Musculoskeletal: Normal range of motion  Skin:     General: Skin is warm  Neurological:      General: No focal deficit present  Mental Status: He is oriented to person, place, and time  Psychiatric:         Mood and Affect: Mood normal          Behavior: Behavior normal          Thought Content: Thought content normal          Judgment: Judgment normal            Results:  Labs:  Ref Range & Units 5/5/21 9:11 AM    CA 19-9 0 - 35 U/mL 424High     Comment: **Verified by repeat analysis**   Roche [employer]ostics Electrochemiluminescence Immunoassay (ECLIA)   Values obtained with different assay methods or kits cannot be   used interchangeably   Results cannot be interpreted as absolute   evidence of the presence or absence of malignant disease  Imaging  Xr Chest Portable    Result Date: 5/16/2021  Narrative: CHEST INDICATION:   hypoxia  Newly diagnosed pancreatic cancer  COMPARISON:  Chest radiograph from 5/7/2021 and abdomen CT from 5/14/2021  EXAM PERFORMED/VIEWS:  XR CHEST PORTABLE  FINDINGS: Cardiomediastinal silhouette normal  Small left effusion  Mild bibasilar atelectasis  No pneumothorax  Osseous structures normal for age  Impression: Small left effusion with mild bibasilar atelectasis  Workstation performed: [id number]     Xr Chest Portable    Result Date: 5/8/2021  Narrative: CHEST INDICATION:  Desaturation post procedure  COMPARISON:  5/5/2021, CT chest 4/29/2021 EXAM PERFORMED/VIEWS:  XR CHEST PORTABLE FINDINGS:  The upper thoracic inlet was excluded from the field-of-view   Cardiomediastinal silhouette appears unremarkable  The lungs are clear  No pneumothorax or pleural effusion  Known right chest wall lesion on CT is not clearly visible on this exam  Osseous structures appear within normal limits for patient age  Impression: No acute cardiopulmonary disease  Workstation performed: [id number]     Xr Chest 1 View Portable    Result Date: 5/5/2021  Narrative: CHEST INDICATION:   weak  COMPARISON:  4/29/2021 and 10/13/2017 EXAM PERFORMED/VIEWS:  XR CHEST PORTABLE FINDINGS: Cardiomediastinal silhouette appears unremarkable  The lungs are clear  No pneumothorax or pleural effusion  There is some degenerative arthritis at the acromioclavicular joint  Soft tissue mass of the right chest wall seen on the CT scan is not evident on plain film  Impression: No acute cardiopulmonary disease  Workstation performed: [id number]     Ct Chest Wo Contrast    Result Date: 4/29/2021  Narrative: CT CHEST WITHOUT IV CONTRAST INDICATION:   Pancreatic adenocarcinoma, initial workup staging pancreatic mass  COMPARISON:  Chest x-ray 10/13/2017 TECHNIQUE: CT examination of the chest was performed without intravenous contrast   Axial, sagittal, and coronal 2D reformatted images were created from the source data and submitted for interpretation  Radiation dose length product (DLP) for this visit:  345 mGy-cm   This examination, like all CT scans performed in the [facility], was performed utilizing techniques to minimize radiation dose exposure, including the use of iterative reconstruction and automated exposure control  FINDINGS: LUNGS:  Lungs are clear  There is no tracheal or endobronchial lesion  PLEURA:  Unremarkable  HEART/GREAT VESSELS:  Unremarkable for patient's age  MEDIASTINUM AND WAQAS:  Unremarkable  CHEST WALL AND LOWER NECK:   Soft tissue lesion in the subcutaneous tissues of the right chest wall inferior to the right nipple measuring 3 0 x 1 4 cm on image 2/44   VISUALIZED STRUCTURES IN THE UPPER ABDOMEN: Biliary stent with pneumobilia and multiple hypodense lesions throughout the liver previously attributed to cysts  OSSEOUS STRUCTURES:  No acute fracture or destructive osseous lesion  Impression: Soft tissue lesion in the subcutaneous tissues of the right chest wall inferior to the right nipple measuring 8 0-1 4 cm on image 2/44  Tissue sampling may be warranted  No other evidence of potential metastatic disease to the chest  Workstation performed: [id number]     Xr Abdomen 1 Vw Portable    Result Date: 5/9/2021  Narrative: ABDOMEN INDICATION:   worsening abdominal pain -- assess for gas under the diaphragm  COMPARISON:  2/12/2020 VIEWS:  AP supine Images: 2 FINDINGS: There is a common bile duct stent noted  There is a nonobstructive bowel gas pattern  No discernible free air on this semierect study  Upright or left lateral decubitus imaging is more sensitive to detect subtle free air in the appropriate setting  No pathologic calcifications or soft tissue masses  Visualized lung bases are clear  Visualized osseous structures are unremarkable for the patient's age  Impression: Common bile duct stent  Nonobstructive bowel gas pattern  No evidence for free air on this semierect examination  Workstation performed: [id number]     Nm Hepatobiliary W Rx    Result Date: 5/10/2021  Narrative: HEPATOBILIARY SCAN WITH CHOLECYSTOKININ ADMINISTRATION INDICATION: Pericholecystic inflammation COMPARISON: CT abdomen pelvis 5/9/2021, ultrasound 5/6/2021 TECHNIQUE: Patient was pretreated with 1 7 mcg CCK intravenously prior to starting the study as per protocol  Following the intravenous administration of 5 1 mCi Tc-99m labeled mebrofenin, dynamic abdominal images were obtained over a 60 minute time period  Images were performed in AP projection  FINDINGS: There is prompt, uniform accumulation with normal clearance of the radiopharmaceutical by the liver   Focal photopenia noted in the right lobe superiorly compatible with hepatic cyst as seen on prior CT  Small bowel lesion seen within the first hour  Gallbladder is not seen within the first hour  Additional imaging obtained at 75 minutes, 2 hours and 4 hours  Additional 1 1 mCi technetium mebrofenin was administered intravenously  Delayed imaging does not demonstrate gallbladder uptake  Impression: 1  Nonvisualization of the gallbladder on delayed imaging up to 4 hours  Findings suspicious for cystic duct obstruction and acute cholecystitis  The study was marked in [facility] for immediate notification  Workstation performed: [id number]     Mri Abdomen W Wo Contrast And Mrcp    Result Date: 4/28/2021  Narrative: MRI OF THE ABDOMEN WITH AND WITHOUT CONTRAST WITH MRCP INDICATION:  Newly identified pancreatic mass with biliary dilatation COMPARISON: 4/27/2021 TECHNIQUE:  The following pulse sequences were obtained:  Coronal and axial T2 with TE of 90 and 180 respectively, axial T2 with fat saturation, axial FIESTA fat-sat, axial T1-weighted in-and-out-of phase, axial DWI/ADC, pre-contrast axial T1 with fat saturation, post-contrast dynamic axial T1 with fat saturation at 20, 70, and 180 seconds, followed by coronal and 7 minute delayed axial T1 with fat saturation  3D MRCP images were obtained with radial thick slabs and projections  3D rendering was performed from the acquisition scanner  IV Contrast:  8 mL of Gadobutrol injection (SINGLE-DOSE) FINDINGS: LOWER CHEST:   Unremarkable  LIVER: Normal in size and configuration  Multiple hepatic cysts again seen, the largest of which measures 5 6 cm at the junction of segments 7 and 8   Indeterminant lesion within segment 3 again seen measuring 2 6 cm which demonstrates a continuous ring peripheral enhancement with some nodularity and progressive fill-in on delayed images  No demonstrable washout    The hepatic veins and portal veins are patent  BILE DUCTS:  Intra and extrahepatic biliary ductal dilatation    CBD measures up to 1 7 cm   No evidence of filling defect  Abrupt cut off the level of the pancreatic head  GALLBLADDER:  Normal  PANCREAS:  Hypoenhancing mass centered at the pancreatic head/uncinate process measuring 2 9 x 3 5 x 3 5 cm (series 12, image 66, series 13, image 67)  Pancreatic ductal dilatation up to 8 mm  No evidence of encasement of the portal vein and superior mesenteric artery  Additional cystic change at the uncinate process likely represent upstream ductal dilatation  ADRENAL GLANDS:  Normal  SPLEEN:  Normal  KIDNEYS/PROXIMAL URETERS:  No hydroureteronephrosis  Bilateral renal cysts  BOWEL:   No dilated loops of bowel  PERITONEUM/RETROPERITONEUM:  No ascites  LYMPH NODES:  No abdominal lymphadenopathy  VASCULAR STRUCTURES:  No aneurysm  ABDOMINAL WALL:  Unremarkable  OSSEOUS STRUCTURES:  No suspicious osseous lesion  Impression: 3 5 cm mass at the pancreatic head/uncinate process resulting in upstream dilatation of the CBD and pancreatic duct  No evidence of encasement of the portal vein or superior mesenteric artery  Findings concerning for pancreatic neoplasm such as pancreatic adenocarcinoma  Unchanged appearance of 2 6 cm atypical lesion at segment 3 of the liver consistent with atypical hemangioma or less likely low-grade neoplasm    Workstation performed: [id number]     Ir Cholecystostomy Tube Placement    Result Date: 5/12/2021  Narrative: Cholecystostomy tube placement under ultrasound and fluoro Clinical History:  Suspected acute cholecystitis  Contrast: 20 mL of iohexol (OMNIPAQUE) Fluoro time: 1 3 mins Number of Images: 6 Radiation dose: 155 mGy Concious sedation time: 35mins Technique: The patient was brought to the interventional radiology suite and placed supine on the table  The gallbladder was examined with ultrasound, and found to be markedly thick walled with a small amount of pericholecystic fluid  No sludge or stones was noted in the lumen    The gallbladder was not significantly distended  The skin of the right upper quadrant was prepped and draped in the usual sterile fashion  After local anesthesia was administered to the skin, a 19 gauge needle was advanced percutaneously under direct ultrasound guidance into the gallbladder via a transhepatic route  Cloudy bile was removed and sent for culture and sensitivity with gram stain  A heavy duty wire was advanced through the needle, and the needle was removed  After tract dilatation, a 10 [place]ian all-purpose drainage catheter was advanced over the wire until the loop was formed within the gallbladder  The catheter was then locked in place  A total of 30 mL of bile was then aspirated  The catheter was sutured at the skin  Contrast was injected, confirming satisfactory location of the tube  The cystic duct was noted to slowly fill and opacify the common bile duct and intrahepatic ducts  Overall, the cystic duct did not appear normal, but diffusely small  The tube was then connected to gravity bag, and dressed sterilely  Impression: Impression: 1  Thick-walled gallbladder with pericholecystic fluid noted  No stones or sludge  2   Successful ultrasound and fluoroscopically guided placement of a percutaneous cholecystostomy tube  3   Small but patent cystic duct noted  Workstation performed: [id number]     Fl Ercp Biliary Only    Result Date: 4/28/2021  Narrative: ERCP INDICATION:  Pancreatic mass COMPARISON:  MRI 4/27/2021 IMAGES:  2 FLUOROSCOPY TIME:   1 31 seconds CONTRAST: 3 mL of iohexol (OMNIPAQUE) FINDINGS: Fluoroscopic guidance was provided for performance of ERCP  BILIARY: Side-viewing duodenal scope identified  Common bile duct was cannulated and injected  Distal common bile duct 2 cm long smooth narrowing noted with proximal dilatation 15 mm  Sphincterotomy and brushings performed  10 mm x 6 cm covered metal stent placed across the stricture       Impression: Distal common bile duct narrowing likely due to known pancreatic mass status post enterotomy, brush biopsy, and stenting  Workstation performed: [id number]     Us Right Upper Quadrant    Result Date: 5/6/2021  Narrative: RIGHT UPPER QUADRANT ULTRASOUND INDICATION:  Right upper quadrant pain  COMPARISON: Ultrasound 5/14/2019, CT 5/5/2021 TECHNIQUE:   Real-time ultrasound of the right upper quadrant was performed with a curvilinear transducer with both volumetric sweeps and still imaging techniques  FINDINGS: PANCREAS:  Dilated pancreatic duct is identified  3 6 x 2 6 x 4 6 cm pancreatic head mass lesion is present  AORTA AND IVC:  Visualized portions are normal for patient age  LIVER: Normal size  Echogenicity and contour are normal  3 3 x 1 9 x 2 6 cm hypoechoic indeterminate left lobe hepatic lesion is noted  1 2 x 1 0 x 1 1 cm hyperechoic left lobe hepatic lesion noted  Hepatic cysts are present  Normal directional flow is present within the portal vein  BILIARY: The gallbladder is normal in caliber  Mild gallbladder wall thickening measuring 4 mm noted  No pericholecystic fluid is present  No stones or sludge  No sonographic Calles's sign  No intrahepatic biliary dilatation  CBD measures 3 mm  No choledocholithiasis  KIDNEY: Right kidney measures 10 6 x 4 6 x 5 8 cm  Within normal limits  ASCITES:   None  Impression: Mild gallbladder wall thickening without additional evidence of acute cholecystitis  Pancreatic head mass again identified  Associated pancreatic ductal dilatation  Indeterminate hypoechoic lesion within the left lobe of the liver  Hyperechoic left hepatic lobe lesion possibly representing a hemangioma  Multiple hepatic cysts  Workstation performed: [id number]     Ct Abdomen Pelvis W Contrast    Result Date: 5/14/2021  Narrative: CT ABDOMEN AND PELVIS WITH IV CONTRAST INDICATION:   Abdominal infection suspected leukocytosis - recent cholecystostomy tube   COMPARISON:  CT abdomen pelvis 5/9/2021, CT 9/26/2017 TECHNIQUE:  CT examination of the abdomen and pelvis was performed  Axial, sagittal, and coronal 2D reformatted images were created from the source data and submitted for interpretation  Radiation dose length product (DLP) for this visit:  636 68 mGy-cm   This examination, like all CT scans performed in the [facility], was performed utilizing techniques to minimize radiation dose exposure, including the use of iterative  reconstruction and automated exposure control  IV Contrast:  100 mL of iohexol (OMNIPAQUE) Enteric Contrast:  Enteric contrast was not administered  FINDINGS: ABDOMEN LOWER CHEST:  Bibasilar atelectasis and small bilateral pleural effusions are present  Right chest wall nodule again noted  LIVER/BILIARY TREE:  2 5 x 2 8 cm enhancing lesion within left hepatic lobe is stable dating back to at least 9/26/2017  Scattered hepatic cysts are also stable  Pneumobilia is stable  Common bile duct stent remains in place  GALLBLADDER:  Cholecystostomy tube has been placed  SPLEEN:  Unremarkable  PANCREAS:  3 9 x 2 5 cm hypoattenuating mass involving the pancreatic head and uncinate process resulting in upstream pancreatic ductal dilatation is again noted suspicious for pancreatic malignancy  The pancreatic mass is in close proximity to the superior mesenteric vein but with fat plane still maintained  There is no encasement of the celiac axis or superior mesenteric artery  ADRENAL GLANDS:  Unremarkable  KIDNEYS/URETERS:  No hydronephrosis or urinary tract calculus  One or more sharply circumscribed subcentimeter renal hypodensities are present, too small to accurately characterize, and statistically most likely benign findings  According to recent literature (Radiology 2019) no further workup of these findings is recommended  STOMACH AND BOWEL:  Marked wall thickening and submucosal enhancement involving the pylorus and duodenum with adjacent inflammatory stranding is again seen    There is marked increased fluid surrounding the pylorus and proximal duodenum, which is not yet well formed APPENDIX:  No findings to suggest appendicitis  ABDOMINOPELVIC CAVITY:  Trace ascites is present in the pelvis  VESSELS:  Unremarkable for patient's age  PELVIS REPRODUCTIVE ORGANS:  Unremarkable for patient's age  URINARY BLADDER:  Unremarkable  ABDOMINAL WALL/INGUINAL REGIONS:  Unremarkable  OSSEOUS STRUCTURES:  No acute fracture or destructive osseous lesion  Impression: 1  Marked increase in fluid surrounding inflammatory pylorus and proximal duodenum as described above  Given history of acute cholecystitis, 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  Fluid collection is not yet well formed  If patient develops worsening signs of infection, follow-up imaging recommended to exclude developing abscess  2   Status post cholecystostomy tube placement with decompression of the gallbladder  Biliary stent remains in place with stable pneumobilia  3   Pancreatic head/uncinate process mass as described above, consistent with malignancy  No vascular encasement  The study was marked in [facility] for immediate notification  Workstation performed: [id number]     Ct Abdomen Pelvis W Contrast    Result Date: 5/9/2021  Narrative: CT ABDOMEN AND PELVIS WITH IV CONTRAST INDICATION:   Abdominal pain, acute, nonlocalized abdominal pain  COMPARISON:  Multiple priors most recently CT 5/5/2021 and ultrasound 5/6/2021 TECHNIQUE:  CT examination of the abdomen and pelvis was performed  In addition to portal venous phase postcontrast scanning through the abdomen and pelvis, delayed phase postcontrast scanning was performed through the upper abdominal viscera  Axial, sagittal, and coronal 2D reformatted images were created from the source data and submitted for interpretation  Radiation dose length product (DLP) for this visit:  716 mGy-cm     This examination, like all CT scans performed in the [facility], was performed utilizing techniques to minimize radiation dose exposure, including the use of iterative reconstruction and automated exposure control  IV Contrast:  100 mL of iohexol (OMNIPAQUE) Enteric Contrast:  Enteric contrast was not administered  FINDINGS: ABDOMEN LOWER CHEST:  No clinically significant abnormality identified in the visualized lower chest  LIVER/BILIARY TREE:  Multiple smooth marginated rounded low-attenuation lesions throughout the liver, likely representing cysts, essentially unchanged since at least 2016  Hyperenhancing rounded lesion in hepatic segment 3 measuring up to 2 9 cm, possibly representing a hemangioma is essentially unchanged since 2016  Pneumobilia related to the presence of a biliary stent again noted  GALLBLADDER:  No gallstones  Mild gallbladder wall thickening again noted  SPLEEN:  Unremarkable  PANCREAS:  Again seen is a pancreatic head/uncinate process mass measuring up to 4 3 x 2 8 cm in greatest axial dimensions by approximately 3 8 cm craniocaudal   Extensive inflammatory stranding is now noted surrounding the pancreatic head and pyloric region/proximal duodenum  There is associated wall thickening of the distal stomach and proximal duodenum  ADRENAL GLANDS:  Unremarkable  KIDNEYS/URETERS:  No hydronephrosis or urinary tract calculus  One or more sharply circumscribed subcentimeter renal hypodensities are present, too small to accurately characterize, and statistically most likely benign findings  According to recent literature (Radiology 2019) no further workup of these findings is recommended  STOMACH AND BOWEL:  See pancreas section  No abnormally dilated loops of bowel  APPENDIX:  No findings to suggest appendicitis  ABDOMINOPELVIC CAVITY:  Small volume free fluid in the pelvis is likely reactive  Small amount of free air in the gastrohepatic region (series 2, images 24 through 28)     No lymphadenopathy  VESSELS:  No aneurysm  Perisplenic varices   PELVIS REPRODUCTIVE ORGANS:  The prostate is enlarged  URINARY BLADDER:  Unremarkable  ABDOMINAL WALL/INGUINAL REGIONS:  Lobulated smoothly marginated soft tissue mass in the right anterior lower chest wall subcutaneous tissues measuring approximately 2 7 cm is essentially unchanged since at least 2016, probably representing a sebaceous cyst  OSSEOUS STRUCTURES:  No acute fracture or destructive osseous lesion  Impression: Inflammatory changes surrounding the pylorus and proximal duodenum, as well as the pancreatic head  A small amount of free air  Findings are most suspicious for a perforated gastric ulcer  Recommend correlation with endoscopy  Pancreatitis is also considered, but felt less likely given the interval development of free air in the location of the inflammatory changes  Pancreatic head mass concerning for malignancy again noted, unchanged since study of 5/5/2021  Additional findings, as described, essentially unchanged  I personally discussed this study with Dr Geoff Rivera on 5/9/2021 at 3:43 PM  Workstation performed: [id number]     Ct Abdomen Pelvis With Contrast    Result Date: 5/5/2021  Narrative: CT ABDOMEN AND PELVIS WITH IV CONTRAST INDICATION:   Abdominal pain, acute, nonlocalized Worsening abdominal pain  History of pancreatic mass  COMPARISON:  9/19/2019; 4/27/2021 TECHNIQUE:  CT examination of the abdomen and pelvis was performed  Axial, sagittal, and coronal 2D reformatted images were created from the source data and submitted for interpretation  Radiation dose length product (DLP) for this visit:  382 mGy-cm   This examination, like all CT scans performed in the [facility], was performed utilizing techniques to minimize radiation dose exposure, including the use of iterative reconstruction and automated exposure control  IV Contrast:  100 mL of iohexol (OMNIPAQUE) Enteric Contrast:  Enteric contrast was not administered   FINDINGS: ABDOMEN LOWER CHEST:  Well circumscribed approximately 2 9 cm mass in the right anterior chest wall in the mid clavicular line on image 3, series 2 redemonstrated, stable from September 2019, nonspecific  LIVER/BILIARY TREE:  There is pneumobilia in the nondependent portion of the biliary tree likely related to common bile duct stent present previously  Numerous rounded hypodense lesions noted compatible with hepatic cysts  Additionally there is a uniformly enhancing mass in the left lobe of liver, image 24, series 2, measuring 2 5 cm, stable from 2019 possibly a hemangioma  GALLBLADDER:  There is circumferential mural enhancement of the wall of the gallbladder with a small amount of pericholecystic fluid  SPLEEN:  Unremarkable  PANCREAS:  A large hypodense /necrotic mass in the head/uncinate process of the pancreas is redemonstrated on image 35, series 2 measuring 3 8 cm, abutting the stent in the common bile duct  Proximally the pancreatic duct is markedly dilated, measuring up to 0 8 cm on image 28, series 2 in the neck of the pancreas  No peripancreatic inflammatory changes  The mass in the region of the uncinate process/head of the pancreas compresses the 3rd portion of the duodenum without latha invasion of the lumen  Appearance is similar to the prior study  ADRENAL GLANDS:  Unremarkable  KIDNEYS/URETERS:  One or more simple renal cyst(s) is noted  Otherwise unremarkable kidneys  No hydronephrosis  STOMACH AND BOWEL:  Moderate amount of retained colonic stool in the right hemicolon, transverse colon and proximal half of the descending colon  Sigmoid colon and rectum relatively decompressed  APPENDIX:  No findings to suggest appendicitis  ABDOMINOPELVIC CAVITY:  No ascites  No pneumoperitoneum  No lymphadenopathy  VESSELS:  Atherosclerotic calcifications noted  PELVIS REPRODUCTIVE ORGANS:  Top normal to mildly enlarged prostate with dystrophic calcifications redemonstrated  URINARY BLADDER:  Unremarkable   ABDOMINAL WALL/INGUINAL REGIONS: Unremarkable  OSSEOUS STRUCTURES:  No acute fracture or destructive osseous lesion  Impression: 1  Uniform mural hyperenhancement of the wall of the gallbladder with a small amount of pericholecystic fluid, worrisome for acute cholecystitis  Further clinical assessment advised  Ultrasound of the right upper quadrant suggested for further assessment  2   Pneumobilia with stent in the common bile duct redemonstrated  3   3 8 cm obstructing mass in the region of the head of the pancreas with marked pancreatic ductal dilatation, worrisome for adenocarcinoma  Other tumors not excluded  4   Numerous hepatic cysts redemonstrated with an enhancing lesion in the left lobe of liver possibly a hemangioma, stable  5   Moderate constipation  Workstation performed: [id number]     Ct Abdomen Pelvis With Contrast    Result Date: 4/27/2021  Narrative: CT ABDOMEN AND PELVIS WITH IV CONTRAST INDICATION:   Diarrhea Abdominal pain, weight loss, epigastric pain Abdominal discomfort, loose stools, weight loss and transaminitis over the last 3 weeks  COMPARISON:  9/19/2019 TECHNIQUE:  CT examination of the abdomen and pelvis was performed  Axial, sagittal, and coronal 2D reformatted images were created from the source data and submitted for interpretation  Radiation dose length product (DLP) for this visit:  815 mGy-cm   This examination, like all CT scans performed in the [facility], was performed utilizing techniques to minimize radiation dose exposure, including the use of iterative reconstruction and automated exposure control  IV Contrast:  100 mL of iohexol (OMNIPAQUE) Enteric Contrast:  Enteric contrast was not administered  FINDINGS: ABDOMEN LOWER CHEST:  No clinically significant abnormality identified in the visualized lower chest   Oval well-definedd soft tissue density nodule within the right chest   This measures 2 8 x 1 2 cm, similar to the prior in 2019  Correlate with physical exam findings  Unclear if this represents a subdermal nodule or asymmetric gynecomastia  LIVER/BILIARY TREE:  Multiple scattered cysts seen throughout the liver without suspicious enhancing component and without suspicious change compared to the prior  Single homogeneously enhancing lesion in the left lateral segment with relative washout on delayed imaging, contrast density follows portal venous density  Stable compared to prior  Although not pathognomonic, 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  New moderate intrahepatic biliary ductal dilatation  Common hepatic and common bile duct abnormally distended to the level of the pancreatic head  GALLBLADDER:  Abnormal distended gallbladder caliber and borderline wall thickening  No pericholecystic inflammation or calcified stones  SPLEEN:  Unremarkable  PANCREAS:  Large ill-defined hypoattenuating mass within the pancreatic head and uncinate measuring 4 2 x 2 9 cm with dilatation/obstruction of the common bile duct and pancreatic duct, likely represent pancreatic carcinoma  Regional vasculature remains  patent  No pathologic adenopathy identified  Pancreatic duct dilated within the neck at 8 mm, tapering through the body and tail  ADRENAL GLANDS:  Unremarkable  KIDNEYS/URETERS:  Unremarkable  No hydronephrosis  STOMACH AND BOWEL:  Unremarkable  APPENDIX:  No findings to suggest appendicitis  ABDOMINOPELVIC CAVITY:  No ascites  No pneumoperitoneum  No lymphadenopathy  VESSELS:  Unremarkable for patient's age  PELVIS REPRODUCTIVE ORGANS:  Unremarkable for patient's age  URINARY BLADDER:  Unremarkable  ABDOMINAL WALL/INGUINAL REGIONS:  Unremarkable  OSSEOUS STRUCTURES:  No acute fracture or destructive osseous lesion  Impression: Moderate intra and extrahepatic biliary ductal dilatation, pancreatic duct dilatation and gallbladder distention which appear secondary to a large ill-defined pancreatic head mass, pancreatic carcinoma until proven otherwise   Multiple hepatic cysts without suspicious change  Stable homogeneously enhancing left lateral segment lesion likely representing cavernous hemangioma  The study was marked in [facility] for immediate notification  Workstation performed: [id number]     I reviewed the above laboratory and imaging data  Discussion/Summary:  81-year-old man, history of pancreas cancer, in need of port for chemotherapy  Also no for left chest port  Risks and benefits of surgery including infection, bleeding, need for possible additional surgery, possible pneumothorax, discussed with him  All questions answered and consents signed at this visit

## 2021-05-26 NOTE — PROGRESS NOTES
Surgical Oncology Follow Up       [address]  [facility] [place] [address]    Brandon Hallman  1955  [id number]  [address] [facility]  [facility] [id number]    Chief Complaint   Patient presents with    Follow-up       Assessment/Plan:    No problem-specific Assessment & Plan notes found for this encounter  Diagnoses and all orders for this visit:    Pancreatic adenocarcinoma [facility])  -     Case request operating room: INSERTION VENOUS PORT (PORT-A-CATH); Standing  -     Case request operating room: INSERTION VENOUS PORT (PORT-A-CATH)    Other orders  -     Incentive spirometry; Standing  -     Insert and maintain IV line; Standing  -     Void On-Call to O R ; Standing  -     Place sequential compression device; Standing        Advance Care Planning/Advance Directives:  Discussed disease status, cancer treatment plans and/or cancer treatment goals with the patient  Oncology History   Pancreatic adenocarcinoma ([facility] [address] )   5/7/2021 Biopsy    Head of pancreas:  Malignant Adenocarcinoma  6/3/2021 -  Chemotherapy    pegfilgrastim (NEULASTA ONPRO) subcutaneous injection kit 6 mg, 6 mg, Subcutaneous, Once, 0 of 6 cycles  PACLitaxel protein bound (ABRAXANE) 191 mg in IVPB 38 2 mL, 100 mg/m2 = 191 mg (80 % of original dose 125 mg/m2), Intravenous, Once, 0 of 6 cycles  Dose modification: 100 mg/m2 (original dose 125 mg/m2, Cycle 1, Reason: Other (See Comments), Comment: per protocol)  gemcitabine (GEMZAR) 1,909 9 mg in sodium chloride 0 9 % 250 mL infusion, 1,000 mg/m2, Intravenous, Once, 0 of 6 cycles         History of Present Illness: HPI:  Brandon Hallman is a 73year old man recently diagnosed with pancreatic cancer  The patient presented to the hospital with obstructive jaundice    He ended up having a workup along with a biopsy which revealed  A 3 8 cm obstructing mass in the region of the head of the pancreas with marked pancreatic ductal dilatation worrisome for adenocarcinoma  Biopsy was done which proved this was adenocarcinoma    -Interval History:  He will be starting  Neoadjuvant chemotherapy next week, and is here to discuss and consent for port placement in preparation for his treatment  Review of Systems:  Review of Systems   Constitutional: Positive for activity change, appetite change and fatigue  HENT: Negative  Eyes: Negative  Respiratory: Negative  Cardiovascular: Negative  Endocrine: Negative  Genitourinary: Negative  Musculoskeletal: Negative  Skin: Negative  Allergic/Immunologic: Negative  Neurological: Negative  Hematological: Negative  Psychiatric/Behavioral: Negative          Patient Active Problem List   Diagnosis    Right arm pain    Rheumatoid arthritis ([address] )    Essential hypertension    Urgency of urination    Anxiety disorder    Hyperlipidemia    Liver mass    Esophageal reflux    Multiple lipomas    Diabetes mellitus type 2    Hypokalemia    Diarrhea    Pancreatic mass    Epigastric pain    BPH (benign prostatic hyperplasia)    Constipation    SIRS (systemic inflammatory response syndrome) (HCC)    Perforation bowel (HCC)    Acute cholecystitis    Leukocytosis    Pancreatic adenocarcinoma (HCC)    Chemotherapy induced neutropenia (HCC)     Past Medical History:   Diagnosis Date    Arthritis     Cellulitis     LAST ASSESSED: 6/13/14    Enlarged prostate     Epidermal inclusion cyst     LAST ASSESSED: 10/4/13    Erythrasma     LAST ASSESSED: 9/23/13    Furuncle     LAST ASSESSED: 6/11/14    Hyperlipidemia     Hypertension      Past Surgical History:   Procedure Laterality Date    HYDROCELE EXCISION / REPAIR Right 01/11/2018    SPERMATIC CORD EXCSION OF HYDROCELE; MANAGED BY: Jt CONTRERAS CHOLECYSTOSTOMY TUBE PLACEMENT  5/11/2021    LA REMOVAL OF HYDROCELE,TUNICA,UNILAT Right 1/11/2018    Procedure: HYDROCELECTOMY;  Surgeon: Mallika Lawson MD;  Location: [facility];  Service: Urology    SCROTAL SURGERY      benign "lump"     Family History   Problem Relation Age of Onset    Heart disease Father         CARDIAC DISORDER    Hypertension Father     Hypertension Mother     No Known Problems Maternal Aunt     No Known Problems Maternal Uncle     No Known Problems Paternal Aunt     No Known Problems Paternal Uncle     No Known Problems Paternal Grandmother     No Known Problems Paternal Grandfather     Diabetes Maternal Grandmother         MELLITUS    No Known Problems Maternal Grandfather     Hypertension Other      Social History     Socioeconomic History    Marital status: /Civil Union     Spouse name: Not on file    Number of children: 2    Years of education: Not on file    Highest education level: Not on file   Occupational History    Occupation: FULL-TIME EMPLOYMENT   Social Needs    Financial resource strain: Not on file    Food insecurity     Worry: Not on file     Inability: Not on file    Transportation needs     Medical: Not on file     Non-medical: Not on file   Tobacco Use    Smoking status: Former Smoker    Smokeless tobacco: Never Used    Tobacco comment: tobacco in a pipe in the early 1980's   Substance and Sexual Activity    Alcohol use: Never     Frequency: Never    Drug use: Never    Sexual activity: Never     Comment: NOT CURRENTLY SEXUALLY ACTIVE AS PER ALLSCRIPTS   Lifestyle    Physical activity     Days per week: Not on file     Minutes per session: Not on file    Stress: Not on file   Relationships    Social connections     Talks on phone: Not on file     Gets together: Not on file     Attends Mandaen service: Not on file     Active member of club or organization: Not on file     Attends meetings of clubs or organizations: Not on file     Relationship status: Not on file    Intimate partner violence     Fear of current or ex partner: Not on file     Emotionally abused: Not on file     Physically abused: Not on file     Forced sexual activity: Not on file   Other Topics Concern    Not on file   Social History Narrative    ALWAYS USES SEAT BELT    DENIED DAILY COFFEE CONSUMPTION (__CUPS/DAY)    DENIED DAILY COLA CONSUMPTION (__CANS/DAY)    DAILY TEA CONSUMPTION (__CUPS/DAY)    DENTAL CARE, REGULARLY    EXERCISE: WALKING    HIGH SCHOOL GRADUATE    DENIED MULTIPLE ORGAN DONOR    NO LIVING WILL    PETS/ANIMALS    DENIED POWER OF  IN EXISTENCE    WATER INTAKE, ADEQUATE (PER DAY)       Current Outpatient Medications:     aspirin 81 mg chewable tablet, Chew 81 mg, Disp: , Rfl:     Blood Glucose Monitoring Suppl (ONE TOUCH ULTRA 2) w/Device KIT, Use daily, Disp: 1 each, Rfl: 1    dicyclomine (BENTYL) 10 mg capsule, Take 1 capsule (10 mg total) by mouth 4 (four) times a day (before meals and at bedtime), Disp: 30 capsule, Rfl: 0    FLUoxetine (PROzac) 20 mg capsule, TAKE 1 CAPSULE BY MOUTH EVERY DAY , Disp: 90 capsule, Rfl: 2    folic acid (FOLVITE) 1 mg tablet, Take by mouth daily, Disp: , Rfl:     glucose blood (OneTouch Ultra) test strip, Use as instructed once a day, Disp: 50 each, Rfl: 5    Lancets (onetouch ultrasoft) lancets, Use as instructed once a day, Disp: 50 each, Rfl: 5    lidocaine (LIDODERM) 5 %, Apply 1 patch topically daily Remove & Discard patch within 12 hours or as directed by MD, Disp: 30 patch, Rfl: 0    lisinopril-hydrochlorothiazide (PRINZIDE,ZESTORETIC) 10-12 5 MG per tablet, TAKE ONE TABLET BY MOUTH EVERY DAY, Disp: 90 tablet, Rfl: 3    Multiple Vitamins-Minerals (MULTIVITAMIN MEN) TABS, Take 1 tablet by mouth daily, Disp: , Rfl:     Omega-3 Fatty Acids (FISH OIL) 1,000 mg, Take 1,000 mg by mouth daily, Disp: , Rfl:     omeprazole (PriLOSEC) 20 mg delayed release capsule, Take 1 capsule (20 mg total) by mouth daily before breakfast, Disp: 30 capsule, Rfl: 0   oxyCODONE (ROXICODONE) 5 mg immediate release tablet, Take 0 5 tablets (2 5 mg total) by mouth every 6 (six) hours as needed for moderate pain Or take 1 tablet (5 mg total) by mouth every 6 hours as needed for severe painMax Daily Amount: 10 mg, Disp: 30 tablet, Rfl: 0    oxyCODONE (ROXICODONE) 5 mg immediate release tablet, Take 1 tablet (5 mg total) by mouth every 4 (four) hours as needed for moderate painMax Daily Amount: 30 mg, Disp: 30 tablet, Rfl: 0    pancrelipase, Lip-Prot-Amyl, (CREON) 24,000 units, Take 1 capsule (24,000 Units total) by mouth 3 (three) times a day with meals, Disp: 90 capsule, Rfl: 0    pantoprazole (PROTONIX) 40 mg tablet, Take 1 tablet (40 mg total) by mouth 2 (two) times a day before meals, Disp: 60 tablet, Rfl: 0    polyethylene glycol (MIRALAX) 17 g packet, Take 17 g by mouth daily as needed (Constipation), Disp: , Rfl: 0    senna-docusate sodium (SENOKOT S) 8 6-50 mg per tablet, Take 1 tablet by mouth 2 (two) times a day, Disp:  , Rfl: 0    Sodium Chloride Flush (Normal Saline Flush) 0 9 % SOLN, , Disp: , Rfl:     sodium chloride, PF, 0 9 %, 10 mL by Intracatheter route daily Intracatheter flushing daily, Disp: 300 mL, Rfl: 3    tamsulosin (FLOMAX) 0 4 mg, TAKE 1 CAPSULE BY MOUTH  DAILY AT BEDTIME, Disp: 90 capsule, Rfl: 3  Allergies   Allergen Reactions    Fentanyl Anaphylaxis and Other (See Comments)     Oxygen drops severely     Vitals:    05/26/21 1258   BP: 126/70   Pulse: 77   Resp: 18   Temp: 98 3 °F (36 8 °C)   SpO2: 96%       Physical Exam  Constitutional:       Appearance: Normal appearance  HENT:      Head: Normocephalic and atraumatic  Right Ear: External ear normal       Left Ear: External ear normal       Nose: Nose normal    Eyes:      General: No scleral icterus  Extraocular Movements: Extraocular movements intact  Conjunctiva/sclera: Conjunctivae normal       Pupils: Pupils are equal, round, and reactive to light     Neck: Musculoskeletal: Normal range of motion and neck supple  Cardiovascular:      Rate and Rhythm: Normal rate and regular rhythm  Heart sounds: Normal heart sounds  Pulmonary:      Effort: Pulmonary effort is normal       Breath sounds: Normal breath sounds  Abdominal:      General: Abdomen is flat  Bowel sounds are normal       Palpations: Abdomen is soft  Musculoskeletal: Normal range of motion  Skin:     General: Skin is warm  Neurological:      General: No focal deficit present  Mental Status: He is oriented to person, place, and time  Psychiatric:         Mood and Affect: Mood normal          Behavior: Behavior normal          Thought Content: Thought content normal          Judgment: Judgment normal            Results:  Labs:  Ref Range & Units 5/5/21 9:11 AM    CA 19-9 0 - 35 U/mL 424High     Comment: **Verified by repeat analysis**   Roche [employer]ostics Electrochemiluminescence Immunoassay (ECLIA)   Values obtained with different assay methods or kits cannot be   used interchangeably   Results cannot be interpreted as absolute   evidence of the presence or absence of malignant disease  Imaging  Xr Chest Portable    Result Date: 5/16/2021  Narrative: CHEST INDICATION:   hypoxia  Newly diagnosed pancreatic cancer  COMPARISON:  Chest radiograph from 5/7/2021 and abdomen CT from 5/14/2021  EXAM PERFORMED/VIEWS:  XR CHEST PORTABLE  FINDINGS: Cardiomediastinal silhouette normal  Small left effusion  Mild bibasilar atelectasis  No pneumothorax  Osseous structures normal for age  Impression: Small left effusion with mild bibasilar atelectasis  Workstation performed: [id number]     Xr Chest Portable    Result Date: 5/8/2021  Narrative: CHEST INDICATION:  Desaturation post procedure  COMPARISON:  5/5/2021, CT chest 4/29/2021 EXAM PERFORMED/VIEWS:  XR CHEST PORTABLE FINDINGS:  The upper thoracic inlet was excluded from the field-of-view   Cardiomediastinal silhouette appears unremarkable  The lungs are clear  No pneumothorax or pleural effusion  Known right chest wall lesion on CT is not clearly visible on this exam  Osseous structures appear within normal limits for patient age  Impression: No acute cardiopulmonary disease  Workstation performed: [id number]     Xr Chest 1 View Portable    Result Date: 5/5/2021  Narrative: CHEST INDICATION:   weak  COMPARISON:  4/29/2021 and 10/13/2017 EXAM PERFORMED/VIEWS:  XR CHEST PORTABLE FINDINGS: Cardiomediastinal silhouette appears unremarkable  The lungs are clear  No pneumothorax or pleural effusion  There is some degenerative arthritis at the acromioclavicular joint  Soft tissue mass of the right chest wall seen on the CT scan is not evident on plain film  Impression: No acute cardiopulmonary disease  Workstation performed: [id number]     Ct Chest Wo Contrast    Result Date: 4/29/2021  Narrative: CT CHEST WITHOUT IV CONTRAST INDICATION:   Pancreatic adenocarcinoma, initial workup staging pancreatic mass  COMPARISON:  Chest x-ray 10/13/2017 TECHNIQUE: CT examination of the chest was performed without intravenous contrast   Axial, sagittal, and coronal 2D reformatted images were created from the source data and submitted for interpretation  Radiation dose length product (DLP) for this visit:  345 mGy-cm   This examination, like all CT scans performed in the [facility], was performed utilizing techniques to minimize radiation dose exposure, including the use of iterative reconstruction and automated exposure control  FINDINGS: LUNGS:  Lungs are clear  There is no tracheal or endobronchial lesion  PLEURA:  Unremarkable  HEART/GREAT VESSELS:  Unremarkable for patient's age  MEDIASTINUM AND WAQAS:  Unremarkable  CHEST WALL AND LOWER NECK:   Soft tissue lesion in the subcutaneous tissues of the right chest wall inferior to the right nipple measuring 3 0 x 1 4 cm on image 2/44   VISUALIZED STRUCTURES IN THE UPPER ABDOMEN: Biliary stent with pneumobilia and multiple hypodense lesions throughout the liver previously attributed to cysts  OSSEOUS STRUCTURES:  No acute fracture or destructive osseous lesion  Impression: Soft tissue lesion in the subcutaneous tissues of the right chest wall inferior to the right nipple measuring 8 0-1 4 cm on image 2/44  Tissue sampling may be warranted  No other evidence of potential metastatic disease to the chest  Workstation performed: [id number]     Xr Abdomen 1 Vw Portable    Result Date: 5/9/2021  Narrative: ABDOMEN INDICATION:   worsening abdominal pain -- assess for gas under the diaphragm  COMPARISON:  2/12/2020 VIEWS:  AP supine Images: 2 FINDINGS: There is a common bile duct stent noted  There is a nonobstructive bowel gas pattern  No discernible free air on this semierect study  Upright or left lateral decubitus imaging is more sensitive to detect subtle free air in the appropriate setting  No pathologic calcifications or soft tissue masses  Visualized lung bases are clear  Visualized osseous structures are unremarkable for the patient's age  Impression: Common bile duct stent  Nonobstructive bowel gas pattern  No evidence for free air on this semierect examination  Workstation performed: [id number]     Nm Hepatobiliary W Rx    Result Date: 5/10/2021  Narrative: HEPATOBILIARY SCAN WITH CHOLECYSTOKININ ADMINISTRATION INDICATION: Pericholecystic inflammation COMPARISON: CT abdomen pelvis 5/9/2021, ultrasound 5/6/2021 TECHNIQUE: Patient was pretreated with 1 7 mcg CCK intravenously prior to starting the study as per protocol  Following the intravenous administration of 5 1 mCi Tc-99m labeled mebrofenin, dynamic abdominal images were obtained over a 60 minute time period  Images were performed in AP projection  FINDINGS: There is prompt, uniform accumulation with normal clearance of the radiopharmaceutical by the liver   Focal photopenia noted in the right lobe superiorly compatible with hepatic cyst as seen on prior CT  Small bowel lesion seen within the first hour  Gallbladder is not seen within the first hour  Additional imaging obtained at 75 minutes, 2 hours and 4 hours  Additional 1 1 mCi technetium mebrofenin was administered intravenously  Delayed imaging does not demonstrate gallbladder uptake  Impression: 1  Nonvisualization of the gallbladder on delayed imaging up to 4 hours  Findings suspicious for cystic duct obstruction and acute cholecystitis  The study was marked in [facility] for immediate notification  Workstation performed: [id number]     Mri Abdomen W Wo Contrast And Mrcp    Result Date: 4/28/2021  Narrative: MRI OF THE ABDOMEN WITH AND WITHOUT CONTRAST WITH MRCP INDICATION:  Newly identified pancreatic mass with biliary dilatation COMPARISON: 4/27/2021 TECHNIQUE:  The following pulse sequences were obtained:  Coronal and axial T2 with TE of 90 and 180 respectively, axial T2 with fat saturation, axial FIESTA fat-sat, axial T1-weighted in-and-out-of phase, axial DWI/ADC, pre-contrast axial T1 with fat saturation, post-contrast dynamic axial T1 with fat saturation at 20, 70, and 180 seconds, followed by coronal and 7 minute delayed axial T1 with fat saturation  3D MRCP images were obtained with radial thick slabs and projections  3D rendering was performed from the acquisition scanner  IV Contrast:  8 mL of Gadobutrol injection (SINGLE-DOSE) FINDINGS: LOWER CHEST:   Unremarkable  LIVER: Normal in size and configuration  Multiple hepatic cysts again seen, the largest of which measures 5 6 cm at the junction of segments 7 and 8   Indeterminant lesion within segment 3 again seen measuring 2 6 cm which demonstrates a continuous ring peripheral enhancement with some nodularity and progressive fill-in on delayed images  No demonstrable washout    The hepatic veins and portal veins are patent  BILE DUCTS:  Intra and extrahepatic biliary ductal dilatation    CBD measures up to 1 7 cm   No evidence of filling defect  Abrupt cut off the level of the pancreatic head  GALLBLADDER:  Normal  PANCREAS:  Hypoenhancing mass centered at the pancreatic head/uncinate process measuring 2 9 x 3 5 x 3 5 cm (series 12, image 66, series 13, image 67)  Pancreatic ductal dilatation up to 8 mm  No evidence of encasement of the portal vein and superior mesenteric artery  Additional cystic change at the uncinate process likely represent upstream ductal dilatation  ADRENAL GLANDS:  Normal  SPLEEN:  Normal  KIDNEYS/PROXIMAL URETERS:  No hydroureteronephrosis  Bilateral renal cysts  BOWEL:   No dilated loops of bowel  PERITONEUM/RETROPERITONEUM:  No ascites  LYMPH NODES:  No abdominal lymphadenopathy  VASCULAR STRUCTURES:  No aneurysm  ABDOMINAL WALL:  Unremarkable  OSSEOUS STRUCTURES:  No suspicious osseous lesion  Impression: 3 5 cm mass at the pancreatic head/uncinate process resulting in upstream dilatation of the CBD and pancreatic duct  No evidence of encasement of the portal vein or superior mesenteric artery  Findings concerning for pancreatic neoplasm such as pancreatic adenocarcinoma  Unchanged appearance of 2 6 cm atypical lesion at segment 3 of the liver consistent with atypical hemangioma or less likely low-grade neoplasm    Workstation performed: [id number]     Ir Cholecystostomy Tube Placement    Result Date: 5/12/2021  Narrative: Cholecystostomy tube placement under ultrasound and fluoro Clinical History:  Suspected acute cholecystitis  Contrast: 20 mL of iohexol (OMNIPAQUE) Fluoro time: 1 3 mins Number of Images: 6 Radiation dose: 155 mGy Concious sedation time: 35mins Technique: The patient was brought to the interventional radiology suite and placed supine on the table  The gallbladder was examined with ultrasound, and found to be markedly thick walled with a small amount of pericholecystic fluid  No sludge or stones was noted in the lumen    The gallbladder was not significantly distended  The skin of the right upper quadrant was prepped and draped in the usual sterile fashion  After local anesthesia was administered to the skin, a 19 gauge needle was advanced percutaneously under direct ultrasound guidance into the gallbladder via a transhepatic route  Cloudy bile was removed and sent for culture and sensitivity with gram stain  A heavy duty wire was advanced through the needle, and the needle was removed  After tract dilatation, a 10 Romanian all-purpose drainage catheter was advanced over the wire until the loop was formed within the gallbladder  The catheter was then locked in place  A total of 30 mL of bile was then aspirated  The catheter was sutured at the skin  Contrast was injected, confirming satisfactory location of the tube  The cystic duct was noted to slowly fill and opacify the common bile duct and intrahepatic ducts  Overall, the cystic duct did not appear normal, but diffusely small  The tube was then connected to gravity bag, and dressed sterilely  Impression: Impression: 1  Thick-walled gallbladder with pericholecystic fluid noted  No stones or sludge  2   Successful ultrasound and fluoroscopically guided placement of a percutaneous cholecystostomy tube  3   Small but patent cystic duct noted  Workstation performed: [id number]     Fl Ercp Biliary Only    Result Date: 4/28/2021  Narrative: ERCP INDICATION:  Pancreatic mass COMPARISON:  MRI 4/27/2021 IMAGES:  2 FLUOROSCOPY TIME:   1 31 seconds CONTRAST: 3 mL of iohexol (OMNIPAQUE) FINDINGS: Fluoroscopic guidance was provided for performance of ERCP  BILIARY: Side-viewing duodenal scope identified  Common bile duct was cannulated and injected  Distal common bile duct 2 cm long smooth narrowing noted with proximal dilatation 15 mm  Sphincterotomy and brushings performed  10 mm x 6 cm covered metal stent placed across the stricture       Impression: Distal common bile duct narrowing likely due to known pancreatic mass status post enterotomy, brush biopsy, and stenting  Workstation performed: [id number]     Us Right Upper Quadrant    Result Date: 5/6/2021  Narrative: RIGHT UPPER QUADRANT ULTRASOUND INDICATION:  Right upper quadrant pain  COMPARISON: Ultrasound 5/14/2019, CT 5/5/2021 TECHNIQUE:   Real-time ultrasound of the right upper quadrant was performed with a curvilinear transducer with both volumetric sweeps and still imaging techniques  FINDINGS: PANCREAS:  Dilated pancreatic duct is identified  3 6 x 2 6 x 4 6 cm pancreatic head mass lesion is present  AORTA AND IVC:  Visualized portions are normal for patient age  LIVER: Normal size  Echogenicity and contour are normal  3 3 x 1 9 x 2 6 cm hypoechoic indeterminate left lobe hepatic lesion is noted  1 2 x 1 0 x 1 1 cm hyperechoic left lobe hepatic lesion noted  Hepatic cysts are present  Normal directional flow is present within the portal vein  BILIARY: The gallbladder is normal in caliber  Mild gallbladder wall thickening measuring 4 mm noted  No pericholecystic fluid is present  No stones or sludge  No sonographic Calles's sign  No intrahepatic biliary dilatation  CBD measures 3 mm  No choledocholithiasis  KIDNEY: Right kidney measures 10 6 x 4 6 x 5 8 cm  Within normal limits  ASCITES:   None  Impression: Mild gallbladder wall thickening without additional evidence of acute cholecystitis  Pancreatic head mass again identified  Associated pancreatic ductal dilatation  Indeterminate hypoechoic lesion within the left lobe of the liver  Hyperechoic left hepatic lobe lesion possibly representing a hemangioma  Multiple hepatic cysts  Workstation performed: [id number]     Ct Abdomen Pelvis W Contrast    Result Date: 5/14/2021  Narrative: CT ABDOMEN AND PELVIS WITH IV CONTRAST INDICATION:   Abdominal infection suspected leukocytosis - recent cholecystostomy tube   COMPARISON:  CT abdomen pelvis 5/9/2021, CT 9/26/2017 TECHNIQUE:  CT examination of the abdomen and pelvis was performed  Axial, sagittal, and coronal 2D reformatted images were created from the source data and submitted for interpretation  Radiation dose length product (DLP) for this visit:  636 68 mGy-cm   This examination, like all CT scans performed in the [facility], was performed utilizing techniques to minimize radiation dose exposure, including the use of iterative  reconstruction and automated exposure control  IV Contrast:  100 mL of iohexol (OMNIPAQUE) Enteric Contrast:  Enteric contrast was not administered  FINDINGS: ABDOMEN LOWER CHEST:  Bibasilar atelectasis and small bilateral pleural effusions are present  Right chest wall nodule again noted  LIVER/BILIARY TREE:  2 5 x 2 8 cm enhancing lesion within left hepatic lobe is stable dating back to at least 9/26/2017  Scattered hepatic cysts are also stable  Pneumobilia is stable  Common bile duct stent remains in place  GALLBLADDER:  Cholecystostomy tube has been placed  SPLEEN:  Unremarkable  PANCREAS:  3 9 x 2 5 cm hypoattenuating mass involving the pancreatic head and uncinate process resulting in upstream pancreatic ductal dilatation is again noted suspicious for pancreatic malignancy  The pancreatic mass is in close proximity to the superior mesenteric vein but with fat plane still maintained  There is no encasement of the celiac axis or superior mesenteric artery  ADRENAL GLANDS:  Unremarkable  KIDNEYS/URETERS:  No hydronephrosis or urinary tract calculus  One or more sharply circumscribed subcentimeter renal hypodensities are present, too small to accurately characterize, and statistically most likely benign findings  According to recent literature (Radiology 2019) no further workup of these findings is recommended  STOMACH AND BOWEL:  Marked wall thickening and submucosal enhancement involving the pylorus and duodenum with adjacent inflammatory stranding is again seen    There is marked increased fluid surrounding the pylorus and proximal duodenum, which is not yet well formed APPENDIX:  No findings to suggest appendicitis  ABDOMINOPELVIC CAVITY:  Trace ascites is present in the pelvis  VESSELS:  Unremarkable for patient's age  PELVIS REPRODUCTIVE ORGANS:  Unremarkable for patient's age  URINARY BLADDER:  Unremarkable  ABDOMINAL WALL/INGUINAL REGIONS:  Unremarkable  OSSEOUS STRUCTURES:  No acute fracture or destructive osseous lesion  Impression: 1  Marked increase in fluid surrounding inflammatory pylorus and proximal duodenum as described above  Given history of acute cholecystitis, 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  Fluid collection is not yet well formed  If patient develops worsening signs of infection, follow-up imaging recommended to exclude developing abscess  2   Status post cholecystostomy tube placement with decompression of the gallbladder  Biliary stent remains in place with stable pneumobilia  3   Pancreatic head/uncinate process mass as described above, consistent with malignancy  No vascular encasement  The study was marked in [facility] for immediate notification  Workstation performed: [id number]     Ct Abdomen Pelvis W Contrast    Result Date: 5/9/2021  Narrative: CT ABDOMEN AND PELVIS WITH IV CONTRAST INDICATION:   Abdominal pain, acute, nonlocalized abdominal pain  COMPARISON:  Multiple priors most recently CT 5/5/2021 and ultrasound 5/6/2021 TECHNIQUE:  CT examination of the abdomen and pelvis was performed  In addition to portal venous phase postcontrast scanning through the abdomen and pelvis, delayed phase postcontrast scanning was performed through the upper abdominal viscera  Axial, sagittal, and coronal 2D reformatted images were created from the source data and submitted for interpretation  Radiation dose length product (DLP) for this visit:  716 mGy-cm     This examination, like all CT scans performed in the [facility], was performed utilizing techniques to minimize radiation dose exposure, including the use of iterative reconstruction and automated exposure control  IV Contrast:  100 mL of iohexol (OMNIPAQUE) Enteric Contrast:  Enteric contrast was not administered  FINDINGS: ABDOMEN LOWER CHEST:  No clinically significant abnormality identified in the visualized lower chest  LIVER/BILIARY TREE:  Multiple smooth marginated rounded low-attenuation lesions throughout the liver, likely representing cysts, essentially unchanged since at least 2016  Hyperenhancing rounded lesion in hepatic segment 3 measuring up to 2 9 cm, possibly representing a hemangioma is essentially unchanged since 2016  Pneumobilia related to the presence of a biliary stent again noted  GALLBLADDER:  No gallstones  Mild gallbladder wall thickening again noted  SPLEEN:  Unremarkable  PANCREAS:  Again seen is a pancreatic head/uncinate process mass measuring up to 4 3 x 2 8 cm in greatest axial dimensions by approximately 3 8 cm craniocaudal   Extensive inflammatory stranding is now noted surrounding the pancreatic head and pyloric region/proximal duodenum  There is associated wall thickening of the distal stomach and proximal duodenum  ADRENAL GLANDS:  Unremarkable  KIDNEYS/URETERS:  No hydronephrosis or urinary tract calculus  One or more sharply circumscribed subcentimeter renal hypodensities are present, too small to accurately characterize, and statistically most likely benign findings  According to recent literature (Radiology 2019) no further workup of these findings is recommended  STOMACH AND BOWEL:  See pancreas section  No abnormally dilated loops of bowel  APPENDIX:  No findings to suggest appendicitis  ABDOMINOPELVIC CAVITY:  Small volume free fluid in the pelvis is likely reactive  Small amount of free air in the gastrohepatic region (series 2, images 24 through 28)     No lymphadenopathy  VESSELS:  No aneurysm  Perisplenic varices   PELVIS REPRODUCTIVE ORGANS:  The prostate is enlarged  URINARY BLADDER:  Unremarkable  ABDOMINAL WALL/INGUINAL REGIONS:  Lobulated smoothly marginated soft tissue mass in the right anterior lower chest wall subcutaneous tissues measuring approximately 2 7 cm is essentially unchanged since at least 2016, probably representing a sebaceous cyst  OSSEOUS STRUCTURES:  No acute fracture or destructive osseous lesion  Impression: Inflammatory changes surrounding the pylorus and proximal duodenum, as well as the pancreatic head  A small amount of free air  Findings are most suspicious for a perforated gastric ulcer  Recommend correlation with endoscopy  Pancreatitis is also considered, but felt less likely given the interval development of free air in the location of the inflammatory changes  Pancreatic head mass concerning for malignancy again noted, unchanged since study of 5/5/2021  Additional findings, as described, essentially unchanged  I personally discussed this study with Dr Tanesha Romero on 5/9/2021 at 3:43 PM  Workstation performed: [id number]     Ct Abdomen Pelvis With Contrast    Result Date: 5/5/2021  Narrative: CT ABDOMEN AND PELVIS WITH IV CONTRAST INDICATION:   Abdominal pain, acute, nonlocalized Worsening abdominal pain  History of pancreatic mass  COMPARISON:  9/19/2019; 4/27/2021 TECHNIQUE:  CT examination of the abdomen and pelvis was performed  Axial, sagittal, and coronal 2D reformatted images were created from the source data and submitted for interpretation  Radiation dose length product (DLP) for this visit:  382 mGy-cm   This examination, like all CT scans performed in the [place], was performed utilizing techniques to minimize radiation dose exposure, including the use of iterative reconstruction and automated exposure control  IV Contrast:  100 mL of iohexol (OMNIPAQUE) Enteric Contrast:  Enteric contrast was not administered   FINDINGS: ABDOMEN LOWER CHEST:  Well circumscribed approximately 2 9 cm mass in the right anterior chest wall in the mid clavicular line on image 3, series 2 redemonstrated, stable from September 2019, nonspecific  LIVER/BILIARY TREE:  There is pneumobilia in the nondependent portion of the biliary tree likely related to common bile duct stent present previously  Numerous rounded hypodense lesions noted compatible with hepatic cysts  Additionally there is a uniformly enhancing mass in the left lobe of liver, image 24, series 2, measuring 2 5 cm, stable from 2019 possibly a hemangioma  GALLBLADDER:  There is circumferential mural enhancement of the wall of the gallbladder with a small amount of pericholecystic fluid  SPLEEN:  Unremarkable  PANCREAS:  A large hypodense /necrotic mass in the head/uncinate process of the pancreas is redemonstrated on image 35, series 2 measuring 3 8 cm, abutting the stent in the common bile duct  Proximally the pancreatic duct is markedly dilated, measuring up to 0 8 cm on image 28, series 2 in the neck of the pancreas  No peripancreatic inflammatory changes  The mass in the region of the uncinate process/head of the pancreas compresses the 3rd portion of the duodenum without latha invasion of the lumen  Appearance is similar to the prior study  ADRENAL GLANDS:  Unremarkable  KIDNEYS/URETERS:  One or more simple renal cyst(s) is noted  Otherwise unremarkable kidneys  No hydronephrosis  STOMACH AND BOWEL:  Moderate amount of retained colonic stool in the right hemicolon, transverse colon and proximal half of the descending colon  Sigmoid colon and rectum relatively decompressed  APPENDIX:  No findings to suggest appendicitis  ABDOMINOPELVIC CAVITY:  No ascites  No pneumoperitoneum  No lymphadenopathy  VESSELS:  Atherosclerotic calcifications noted  PELVIS REPRODUCTIVE ORGANS:  Top normal to mildly enlarged prostate with dystrophic calcifications redemonstrated  URINARY BLADDER:  Unremarkable   ABDOMINAL WALL/INGUINAL REGIONS: Unremarkable  OSSEOUS STRUCTURES:  No acute fracture or destructive osseous lesion  Impression: 1  Uniform mural hyperenhancement of the wall of the gallbladder with a small amount of pericholecystic fluid, worrisome for acute cholecystitis  Further clinical assessment advised  Ultrasound of the right upper quadrant suggested for further assessment  2   Pneumobilia with stent in the common bile duct redemonstrated  3   3 8 cm obstructing mass in the region of the head of the pancreas with marked pancreatic ductal dilatation, worrisome for adenocarcinoma  Other tumors not excluded  4   Numerous hepatic cysts redemonstrated with an enhancing lesion in the left lobe of liver possibly a hemangioma, stable  5   Moderate constipation  Workstation performed: [id number]     Ct Abdomen Pelvis With Contrast    Result Date: 4/27/2021  Narrative: CT ABDOMEN AND PELVIS WITH IV CONTRAST INDICATION:   Diarrhea Abdominal pain, weight loss, epigastric pain Abdominal discomfort, loose stools, weight loss and transaminitis over the last 3 weeks  COMPARISON:  9/19/2019 TECHNIQUE:  CT examination of the abdomen and pelvis was performed  Axial, sagittal, and coronal 2D reformatted images were created from the source data and submitted for interpretation  Radiation dose length product (DLP) for this visit:  815 mGy-cm   This examination, like all CT scans performed in the [facility], was performed utilizing techniques to minimize radiation dose exposure, including the use of iterative reconstruction and automated exposure control  IV Contrast:  100 mL of iohexol (OMNIPAQUE) Enteric Contrast:  Enteric contrast was not administered  FINDINGS: ABDOMEN LOWER CHEST:  No clinically significant abnormality identified in the visualized lower chest   Oval well-definedd soft tissue density nodule within the right chest   This measures 2 8 x 1 2 cm, similar to the prior in 2019  Correlate with physical exam findings  Unclear if this represents a subdermal nodule or asymmetric gynecomastia  LIVER/BILIARY TREE:  Multiple scattered cysts seen throughout the liver without suspicious enhancing component and without suspicious change compared to the prior  Single homogeneously enhancing lesion in the left lateral segment with relative washout on delayed imaging, contrast density follows portal venous density  Stable compared to prior  Although not pathognomonic, 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  New moderate intrahepatic biliary ductal dilatation  Common hepatic and common bile duct abnormally distended to the level of the pancreatic head  GALLBLADDER:  Abnormal distended gallbladder caliber and borderline wall thickening  No pericholecystic inflammation or calcified stones  SPLEEN:  Unremarkable  PANCREAS:  Large ill-defined hypoattenuating mass within the pancreatic head and uncinate measuring 4 2 x 2 9 cm with dilatation/obstruction of the common bile duct and pancreatic duct, likely represent pancreatic carcinoma  Regional vasculature remains  patent  No pathologic adenopathy identified  Pancreatic duct dilated within the neck at 8 mm, tapering through the body and tail  ADRENAL GLANDS:  Unremarkable  KIDNEYS/URETERS:  Unremarkable  No hydronephrosis  STOMACH AND BOWEL:  Unremarkable  APPENDIX:  No findings to suggest appendicitis  ABDOMINOPELVIC CAVITY:  No ascites  No pneumoperitoneum  No lymphadenopathy  VESSELS:  Unremarkable for patient's age  PELVIS REPRODUCTIVE ORGANS:  Unremarkable for patient's age  URINARY BLADDER:  Unremarkable  ABDOMINAL WALL/INGUINAL REGIONS:  Unremarkable  OSSEOUS STRUCTURES:  No acute fracture or destructive osseous lesion  Impression: Moderate intra and extrahepatic biliary ductal dilatation, pancreatic duct dilatation and gallbladder distention which appear secondary to a large ill-defined pancreatic head mass, pancreatic carcinoma until proven otherwise   Multiple hepatic cysts without suspicious change  Stable homogeneously enhancing left lateral segment lesion likely representing cavernous hemangioma  The study was marked in [facility] for immediate notification  Workstation performed: [id number]     I reviewed the above laboratory and imaging data  Discussion/Summary:  71-year-old man, history of pancreas cancer, in need of port for chemotherapy  Also no for left chest port  Risks and benefits of surgery including infection, bleeding, need for possible additional surgery, possible pneumothorax, discussed with him  All questions answered and consents signed at this visit

## 2021-05-26 NOTE — TELEPHONE ENCOUNTER
Genetics New Patient Intake Form     Patient Details:     Rc Samuels     1955     [id number]     Background Information:           Who is calling to schedule?                                            self   If not self, relationship to patient? Referring Provider Alida Willams   Is the referral marked STAT No   Is patient newly diagnosed with cancer, have metastatic disease, or pending surgery? Yes   If yes, which is it?  metastatic   If the patient is pending surgery Needs to be scheduled within 48 hours   If none available, schedule patient then email Stat cancer genetics   If the patient is metastatic or newly diagnosed Needs to be scheduled within 2 weeks   If none available, schedule patient then email Stat cancer genetics   Have you had genetic testing that showed a positive genetic mutation? (If yes, schedule within 2 weeks or email STAT cancer genetics) No   Has your family member had genetic testing that resulted in a positive genetic mutation? (If yes in the last 6 months, schedule within 3 weeks )  (If yes but over 6 months, schedule as usual) No   Is this a personal or family history? personal and family   What is the type of tumor? Personal;- pancreatic  Family - possibly thyroid   Scheduling Information:   [facility] [place]   Are there any dates/time the patient cannot be seen? No   Did the patient schedule an appointment?  Yes   If yes, list appointment date and provider name Chris Hylton 6/16/2021   If no, briefly state why    Miscellaneous virtual   After completing the above information, please route to Oncology Genetics

## 2021-05-27 ENCOUNTER — CONSULT (OUTPATIENT)
Dept: PALLIATIVE MEDICINE | Facility: CLINIC | Age: 66
End: 2021-05-27
Payer: COMMERCIAL

## 2021-05-27 VITALS
HEART RATE: 93 BPM | OXYGEN SATURATION: 96 % | TEMPERATURE: 97.1 F | HEIGHT: 70 IN | DIASTOLIC BLOOD PRESSURE: 70 MMHG | BODY MASS INDEX: 23.17 KG/M2 | WEIGHT: 161.82 LBS | SYSTOLIC BLOOD PRESSURE: 102 MMHG

## 2021-05-27 DIAGNOSIS — T40.2X5A THERAPEUTIC OPIOID-INDUCED CONSTIPATION (OIC): Primary | ICD-10-CM

## 2021-05-27 DIAGNOSIS — C25.9 PANCREATIC ADENOCARCINOMA (HCC): ICD-10-CM

## 2021-05-27 DIAGNOSIS — R10.13 EPIGASTRIC PAIN: ICD-10-CM

## 2021-05-27 DIAGNOSIS — K59.00 CONSTIPATION: ICD-10-CM

## 2021-05-27 DIAGNOSIS — C25.9 MALIGNANT NEOPLASM OF PANCREAS, UNSPECIFIED LOCATION OF MALIGNANCY (HCC): ICD-10-CM

## 2021-05-27 DIAGNOSIS — K59.03 THERAPEUTIC OPIOID-INDUCED CONSTIPATION (OIC): Primary | ICD-10-CM

## 2021-05-27 DIAGNOSIS — K81.0 ACUTE CHOLECYSTITIS WITHOUT CALCULUS: ICD-10-CM

## 2021-05-27 PROCEDURE — 3078F DIAST BP <80 MM HG: CPT | Performed by: FAMILY MEDICINE

## 2021-05-27 PROCEDURE — 3008F BODY MASS INDEX DOCD: CPT | Performed by: FAMILY MEDICINE

## 2021-05-27 PROCEDURE — 3074F SYST BP LT 130 MM HG: CPT | Performed by: FAMILY MEDICINE

## 2021-05-27 PROCEDURE — 99214 OFFICE O/P EST MOD 30 MIN: CPT | Performed by: FAMILY MEDICINE

## 2021-05-27 RX ORDER — SODIUM CHLORIDE 9 MG/ML
20 INJECTION, SOLUTION INTRAVENOUS ONCE
Status: CANCELLED | OUTPATIENT
Start: 2021-06-03

## 2021-05-27 RX ORDER — PREDNISONE 10 MG/1
10 TABLET ORAL DAILY
Qty: 30 TABLET | Refills: 0 | Status: SHIPPED | OUTPATIENT
Start: 2021-05-27 | End: 2021-01-01 | Stop reason: HOSPADM

## 2021-05-27 RX ORDER — OXYCODONE HYDROCHLORIDE 5 MG/1
5 TABLET ORAL EVERY 4 HOURS PRN
Qty: 30 TABLET | Refills: 0 | Status: SHIPPED | OUTPATIENT
Start: 2021-05-27 | End: 2021-01-01 | Stop reason: SDUPTHER

## 2021-05-27 RX ORDER — PANTOPRAZOLE SODIUM 40 MG/1
40 TABLET, DELAYED RELEASE ORAL 2 TIMES DAILY
Qty: 60 TABLET | Refills: 0 | Status: SHIPPED | OUTPATIENT
Start: 2021-05-27 | End: 2021-01-01 | Stop reason: SDUPTHER

## 2021-05-27 RX ORDER — SENNA PLUS 8.6 MG/1
1 TABLET ORAL DAILY PRN
Qty: 30 TABLET | Refills: 0 | Status: SHIPPED | OUTPATIENT
Start: 2021-05-27 | End: 2021-01-01 | Stop reason: SDUPTHER

## 2021-05-27 RX ORDER — TOFACITINIB 11 MG/1
TABLET, FILM COATED, EXTENDED RELEASE ORAL
COMMUNITY
End: 2021-01-01

## 2021-05-27 RX ORDER — SODIUM CHLORIDE 9 MG/ML
20 INJECTION, SOLUTION INTRAVENOUS ONCE
Status: CANCELLED | OUTPATIENT
Start: 2021-01-01

## 2021-05-27 RX ORDER — ACETAMINOPHEN 325 MG/1
650 TABLET ORAL ONCE
Status: CANCELLED | OUTPATIENT
Start: 2021-01-01

## 2021-05-27 RX ORDER — ACETAMINOPHEN 325 MG/1
650 TABLET ORAL ONCE
Status: CANCELLED | OUTPATIENT
Start: 2021-06-03

## 2021-05-27 RX ORDER — SODIUM CHLORIDE 9 MG/ML
20 INJECTION, SOLUTION INTRAVENOUS ONCE
Status: CANCELLED | OUTPATIENT
Start: 2021-06-10

## 2021-05-27 RX ORDER — ATORVASTATIN CALCIUM 10 MG/1
10 TABLET, FILM COATED ORAL DAILY
COMMUNITY
End: 2021-01-01

## 2021-05-27 RX ORDER — ACETAMINOPHEN 325 MG/1
650 TABLET ORAL ONCE
Status: CANCELLED | OUTPATIENT
Start: 2021-06-10

## 2021-05-27 NOTE — PROGRESS NOTES
Outpatient Consultation - Palliative and Supportive Care   Kailee Beaulieu 73 y o  male [id number]    Assessment & Plan  Problem List Items Addressed This Visit     Constipation    Epigastric pain    Pancreatic adenocarcinoma (HCC)    Relevant Medications    senna (SENOKOT) 8 6 MG tablet    predniSONE 10 mg tablet      Other Visit Diagnoses     Therapeutic opioid-induced constipation (OIC)    -  Primary    Relevant Medications    senna (SENOKOT) 8 6 MG tablet    Malignant neoplasm of pancreas, unspecified location of malignancy (HCC)        Relevant Medications    oxyCODONE (ROXICODONE) 5 mg immediate release tablet    senna (SENOKOT) 8 6 MG tablet    predniSONE 10 mg tablet    Acute cholecystitis without calculus        Relevant Medications    pantoprazole (PROTONIX) 40 mg tablet        - Counseling on health screening and disease prevention, COVID-19 specific (CPT V65 49)    Medications adjusted this encounter:  Requested Prescriptions     Signed Prescriptions Disp Refills    oxyCODONE (ROXICODONE) 5 mg immediate release tablet 30 tablet 0     Sig: Take 1 tablet (5 mg total) by mouth every 4 (four) hours as needed (cancer pain)Max Daily Amount: 30 mg    senna (SENOKOT) 8 6 MG tablet 30 tablet 0     Sig: Take 1 tablet (8 6 mg total) by mouth daily as needed for constipation    pantoprazole (PROTONIX) 40 mg tablet 60 tablet 0     Sig: Take 1 tablet (40 mg total) by mouth 2 (two) times a day Before breakfast, before bed, to reduce stomach acid    predniSONE 10 mg tablet 30 tablet 0     Sig: Take 1 tablet (10 mg total) by mouth daily With breakfast, to reduce tumor inflammation and improve appetite  No orders of the defined types were placed in this encounter      Medications Discontinued During This Encounter   Medication Reason    oxyCODONE (ROXICODONE) 5 mg immediate release tablet     sodium chloride, PF, 0 9 %     pantoprazole (PROTONIX) 40 mg tablet Reorder    oxyCODONE (ROXICODONE) 5 mg immediate release tablet Reorder       PPS: [address] Kayleen Cruz was seen today for symptoms and planning cares related to above illnesses  Above orders were sent electronically, or provided in hardcopy in clinic  I have reviewed the patient's controlled substance dispensing history in the Prescription Drug Monitoring Program in compliance with the [facility] regulations before prescribing any controlled substances  We appreciate the referral, and wish for him to continue to follow with us  If there are questions or concerns, please contact us through our clinic/answering service 24 hours a day, seven days a week  Alex Dubon MD  [address] Palliative and Supportive Care  [phone]        Visit Information    Accompanied By: No one    Source of History: Self    History Limitations: None    Contacts: wife Lily Carpenter - [phone] [id number], [phone]    History of Present Illness      Haydee Auguste is a 73 y o  male who presents as a referral from Dr Mirtha Ohara of Med[facility]Onc for primary palliative diagnosis of adenocarcinoma of pancreas  Consultation is requested for: symptom management, advance care planning, emotional support in the setting of serious illness  This fellow presents to us just prior to surgical approach to his primary pancreas mass (4 x 2 5cm in panc head)  There are mets noted to liver as well  Pt has lost a great deal of weight recently, despite use of panc enzymes, which have helped reduce stool changes and nausea  Moving bowels daily  Taking oxyIR for pain; not entirely helpful  Crampy abd pain appears idiopathic: unrelated to eating or time of day  Does improve with lateral decub positioning, upright ambulation  We theorized that this is primarily a mass effect, with bowels becoming more spasmodic when contacting the mass by direct pressure from gravity    He is agreedable to low-dose steroids, used sparingly d/t concurrent severe and uncomfortable reflux, which has not improved even on high dose PPI therapy  Otherwise, he confirms a full cares plan, and seems too distracted by his pain to engage with full goals conversation      Pertinent Palliative Care Domains    Physical Symptoms: abd pain, cramping    Psychological Symptoms:mild anxiety, good insight    Social Aspects: limited supports    Spiritual Aspects: not addressed today    Historical Data  Past Medical History:   Diagnosis Date    Arthritis     Cellulitis     LAST ASSESSED: 6/13/14    Enlarged prostate     Epidermal inclusion cyst     LAST ASSESSED: 10/4/13    Erythrasma     LAST ASSESSED: 9/23/13    Furuncle     LAST ASSESSED: 6/11/14    Hyperlipidemia     Hypertension      Past Surgical History:   Procedure Laterality Date    HYDROCELE EXCISION / REPAIR Right 01/11/2018    SPERMATIC CORD EXCSION OF HYDROCELE; MANAGED BY: Belia Musa    IR CHOLECYSTOSTOMY TUBE PLACEMENT  5/11/2021    RI REMOVAL OF HYDROCELE,TUNICA,UNILAT Right 1/11/2018    Procedure: HYDROCELECTOMY;  Surgeon: David Rossi MD;  [facility];  Service: Urology    SCROTAL SURGERY      benign "lump"     Social History     Socioeconomic History    Marital status: /Civil Union     Spouse name: Not on file    Number of children: 2    Years of education: Not on file    Highest education level: Not on file   Occupational History    Occupation: FULL-TIME EMPLOYMENT   Social Needs    Financial resource strain: Not on file    Food insecurity     Worry: Not on file     Inability: Not on file    Transportation needs     Medical: Not on file     Non-medical: Not on file   Tobacco Use    Smoking status: Former Smoker    Smokeless tobacco: Never Used    Tobacco comment: tobacco in a pipe in the early 1980's   Substance and Sexual Activity    Alcohol use: Never     Frequency: Never    Drug use: Never    Sexual activity: Never     Comment: NOT CURRENTLY SEXUALLY ACTIVE AS PER [employer]   Lifestyle    Physical activity     Days per week: Not on file     Minutes per session: Not on file    Stress: Not on file   Relationships    Social connections     Talks on phone: Not on file     Gets together: Not on file     Attends Gnosticist service: Not on file     Active member of club or organization: Not on file     Attends meetings of clubs or organizations: Not on file     Relationship status: Not on file    Intimate partner violence     Fear of current or ex partner: Not on file     Emotionally abused: Not on file     Physically abused: Not on file     Forced sexual activity: Not on file   Other Topics Concern    Not on file   Social History Narrative    ALWAYS USES SEAT BELT    DENIED [facility] (__CUPS/DAY)    DENIED DAILY COLA CONSUMPTION (__CANS/DAY)    DAILY TEA CONSUMPTION (__CUPS/DAY)    DENTAL CARE, REGULARLY    EXERCISE: WALKING    HIGH SCHOOL GRADUATE    DENIED MULTIPLE ORGAN DONOR    NO LIVING WILL    PETS/ANIMALS    DENIED POWER OF  IN EXISTENCE    WATER INTAKE, ADEQUATE (PER DAY)     Family History   Problem Relation Age of Onset    Heart disease Father         CARDIAC DISORDER    Hypertension Father     Hypertension Mother     No Known Problems Maternal Aunt     No Known Problems Maternal Uncle     No Known Problems Paternal Aunt     No Known Problems Paternal Uncle     No Known Problems Paternal Grandmother     No Known Problems Paternal Grandfather     Diabetes Maternal Grandmother         MELLITUS    No Known Problems Maternal Grandfather     Hypertension Other      Allergies   Allergen Reactions    Fentanyl Anaphylaxis and Other (See Comments)     Oxygen drops severely     Current Outpatient Medications   Medication Sig Dispense Refill    aspirin 81 mg chewable tablet Chew 81 mg      atorvastatin (LIPITOR) 10 mg tablet Take 10 mg by mouth daily      Blood Glucose Monitoring Suppl (ONE TOUCH ULTRA 2) w/Device KIT Use daily 1 each 1    dicyclomine (BENTYL) 10 mg capsule Take 1 capsule (10 mg total) by mouth 4 (four) times a day (before meals and at bedtime) 30 capsule 0    FLUoxetine (PROzac) 20 mg capsule TAKE 1 CAPSULE BY MOUTH EVERY DAY  90 capsule 2    folic acid (FOLVITE) 1 mg tablet Take by mouth daily      glucose blood (OneTouch Ultra) test strip Use as instructed once a day 50 each 5    Lancets (onetouch ultrasoft) lancets Use as instructed once a day 50 each 5    lisinopril-hydrochlorothiazide (PRINZIDE,ZESTORETIC) 10-12 5 MG per tablet TAKE ONE TABLET BY MOUTH EVERY DAY 90 tablet 3    Multiple Vitamins-Minerals (MULTIVITAMIN MEN) TABS Take 1 tablet by mouth daily      Omega-3 Fatty Acids (FISH OIL) 1,000 mg Take 1,000 mg by mouth daily      pancrelipase, Lip-Prot-Amyl, (CREON) 24,000 units Take 1 capsule (24,000 Units total) by mouth 3 (three) times a day with meals 90 capsule 0    pantoprazole (PROTONIX) 40 mg tablet Take 1 tablet (40 mg total) by mouth 2 (two) times a day Before breakfast, before bed, to reduce stomach acid 60 tablet 0    polyethylene glycol (MIRALAX) 17 g packet Take 17 g by mouth daily as needed (Constipation)  0    tamsulosin (FLOMAX) 0 4 mg TAKE 1 CAPSULE BY MOUTH  DAILY AT BEDTIME 90 capsule 3    Tofacitinib Citrate ER (Xeljanz XR) 11 MG TB24 Take by mouth      lidocaine (LIDODERM) 5 % Apply 1 patch topically daily Remove & Discard patch within 12 hours or as directed by MD (Patient not taking: Reported on 5/27/2021) 30 patch 0    omeprazole (PriLOSEC) 20 mg delayed release capsule Take 1 capsule (20 mg total) by mouth daily before breakfast (Patient not taking: Reported on 5/27/2021) 30 capsule 0    oxyCODONE (ROXICODONE) 5 mg immediate release tablet Take 1 tablet (5 mg total) by mouth every 4 (four) hours as needed (cancer pain)Max Daily Amount: 30 mg 30 tablet 0    predniSONE 10 mg tablet Take 1 tablet (10 mg total) by mouth daily With breakfast, to reduce tumor inflammation and improve appetite   30 tablet 0    senna (SENOKOT) 8 6 MG tablet Take 1 tablet (8 6 mg total) by mouth daily as needed for constipation 30 tablet 0    senna-docusate sodium (SENOKOT S) 8 6-50 mg per tablet Take 1 tablet by mouth 2 (two) times a day (Patient not taking: Reported on 5/27/2021)  0    Sodium Chloride Flush (Normal Saline Flush) 0 9 % SOLN        No current facility-administered medications for this visit  Review of Systems   Constitution: Positive for decreased appetite and weight loss  Negative for weight gain  HENT: Negative for hoarse voice and nosebleeds  Eyes: Negative for vision loss in left eye and vision loss in right eye  Cardiovascular: Negative for chest pain and dyspnea on exertion  Respiratory: Negative for cough and shortness of breath  Endocrine: Negative for polydipsia, polyphagia and polyuria  Skin: Negative for flushing and itching  Musculoskeletal: Negative for falls  Gastrointestinal: Positive for nausea and vomiting  Negative for anorexia and jaundice  Genitourinary: Negative for frequency  Neurological: Negative for dizziness  Psychiatric/Behavioral: Negative for depression and memory loss  The patient is not nervous/anxious  Vital Signs    /70 (BP Location: Right arm, Patient Position: Sitting, Cuff Size: Standard)   Pulse 93   Temp (!) 97 1 °F (36 2 °C) (Temporal)   Ht 5' 10" (1 778 m)   Wt 73 4 kg (161 lb 13 1 oz)   SpO2 96%   BMI 23 22 kg/m²     Physical Exam and Objective Data  Physical Exam  Constitutional:       General: He is not in acute distress  Appearance: He is ill-appearing  He is not diaphoretic  Comments: Frail   HENT:      Head: Normocephalic and atraumatic  Right Ear: External ear normal       Left Ear: External ear normal    Eyes:      General:         Right eye: No discharge  Left eye: No discharge  Conjunctiva/sclera: Conjunctivae normal       Pupils: Pupils are equal, round, and reactive to light  Neck:      Trachea: No tracheal deviation  Cardiovascular:      Rate and Rhythm: Regular rhythm  Tachycardia present  Pulmonary:      Effort: Pulmonary effort is normal  No respiratory distress  Breath sounds: No stridor  Abdominal:      General: There is no distension  Palpations: Abdomen is soft  Comments: scaphoid   Skin:     General: Skin is warm and dry  Coloration: Skin is jaundiced  Findings: No erythema or rash  Neurological:      General: No focal deficit present  Mental Status: He is alert and oriented to person, place, and time  Mental status is at baseline  Cranial Nerves: No cranial nerve deficit  Psychiatric:         Mood and Affect: Mood normal          Behavior: Behavior normal          Thought Content: Thought content normal          Judgment: Judgment normal            Radiology and Laboratory:  I personally reviewed and interpreted the following results: reviewed cyto results; lab trends for CMP and blood counts    60+ minutes was spent face to face with Haydee Dials with greater than 50% of the time spent in counseling or coordination of care including discussions of etiology of diagnosis, prognosis of diagnosis, diagnostic results, impression, and recommendations, risks and benefits of treatment, follow up requirements and patient and family counseling/involvement in care  Additional time was also spent in discussing coronavirus vaccine indications, availability, and logistical challenges  All of the patient's questions were answered during this discussion

## 2021-05-28 ENCOUNTER — DOCUMENTATION (OUTPATIENT)
Dept: HEMATOLOGY ONCOLOGY | Facility: CLINIC | Age: 66
End: 2021-05-28

## 2021-05-28 DIAGNOSIS — C25.9 PANCREATIC ADENOCARCINOMA (HCC): Primary | ICD-10-CM

## 2021-05-28 RX ORDER — PROCHLORPERAZINE MALEATE 10 MG
10 TABLET ORAL EVERY 6 HOURS PRN
Qty: 45 TABLET | Refills: 3 | Status: SHIPPED | OUTPATIENT
Start: 2021-05-28

## 2021-05-28 NOTE — PROGRESS NOTES
Outpatient Oncology Nutrition Consultation   Type of Consult: Initial Consult  Care Location: Office Visit    Reason for referral: Received notification by MARISA Cordon , on 5/25/21 that pt has triggered for oncology nutrition care (reason for referral: Malnutrition Screening Tool (MST) Triggers: scored a 5 indicating >/=34# (>/=15 4 kg) recent wt loss and is eating poorly due to a decreased appetite  (Date of MST: 5/25/21))  Nutrition Assessment:   Oncology Diagnosis & Treatments: Pancreatic Adenocarcinoma  · Started neoadjuvant chemotherapy (gemzar, abraxane) on 6/3/21  · Following with Palliative Care, has significant pain  Oncology History   Pancreatic adenocarcinoma ([facility] )   5/7/2021 Biopsy    Head of pancreas:  Malignant Adenocarcinoma       6/3/2021 -  Chemotherapy    pegfilgrastim (NEULASTA ONPRO) subcutaneous injection kit 6 mg, 6 mg, Subcutaneous, Once, 1 of 6 cycles  PACLitaxel protein bound (ABRAXANE) 191 mg in IVPB 38 2 mL, 100 mg/m2 = 191 mg (80 % of original dose 125 mg/m2), Intravenous, Once, 1 of 6 cycles  Dose modification: 100 mg/m2 (original dose 125 mg/m2, Cycle 1, Reason: Other (Must fill in a comment), Comment: per protocol)  Administration: 191 mg (6/3/2021)  gemcitabine (GEMZAR) 1,909 9 mg in sodium chloride 0 9 % 250 mL infusion, 1,000 mg/m2 = 1,909 9 mg, Intravenous, Once, 1 of 6 cycles  Administration: 1,909 9 mg (6/3/2021)       Past Medical & Surgical Hx:   Patient Active Problem List   Diagnosis    Right arm pain    Rheumatoid arthritis ([facility] )    Essential hypertension    Urgency of urination    Anxiety disorder    Hyperlipidemia    Liver mass    Esophageal reflux    Multiple lipomas    Diabetes mellitus type 2    Hypokalemia    Diarrhea    Pancreatic mass    Epigastric pain    BPH (benign prostatic hyperplasia)    Constipation    SIRS (systemic inflammatory response syndrome) (HCC)    Perforation bowel (HCC)    Acute cholecystitis    Leukocytosis    Pancreatic adenocarcinoma ([facility] )    Chemotherapy induced neutropenia ([facility] )     Past Medical History:   Diagnosis Date    Anxiety     Arthritis     Cancer ([facility] )     pancreatic    Cellulitis     LAST ASSESSED: 6/13/14    Enlarged prostate     Epidermal inclusion cyst     LAST ASSESSED: 10/4/13    Erythrasma     LAST ASSESSED: 9/23/13    Furuncle     LAST ASSESSED: 6/11/14    GERD (gastroesophageal reflux disease)     Hyperlipidemia     Hypertension     RA (rheumatoid arthritis) ([place] [address] )      Past Surgical History:   Procedure Laterality Date    COLONOSCOPY      HYDROCELE EXCISION / REPAIR Right 01/11/2018    SPERMATIC CORD EXCSION OF HYDROCELE; MANAGED BY: Pollo Driscoll    IR CHOLECYSTOSTOMY TUBE PLACEMENT  5/11/2021    DC REMOVAL OF HYDROCELE,TUNICA,UNILAT Right 1/11/2018    Procedure: HYDROCELECTOMY;  Surgeon: Trenton Ho MD;  Location: [facility];  Service: Urology    SCROTAL SURGERY      benign "lump"    TUNNELED VENOUS PORT PLACEMENT Left 6/1/2021    Procedure: INSERTION VENOUS PORT (PORT-A-CATH); Surgeon: Salo Gardner MD;  Location: [facility];  Service: Surgical Oncology       Review of Medications:   Vitamins, Supplements and Herbals: Med List Reviewed & pt is only taking: MVI, Fish Oil, Folic Acid (per MD) and Discussed the potential interactions of high dose antioxidants with cancer tx and recommended exercising caution when taking these supplements  Also discussed absorption issues with pancreatic cancer  Pt to consider continuing MVI vs  stopping it      Current Outpatient Medications:     aspirin 81 mg chewable tablet, Chew 81 mg, Disp: , Rfl:     atorvastatin (LIPITOR) 10 mg tablet, Take 10 mg by mouth daily, Disp: , Rfl:     Blood Glucose Monitoring Suppl (ONE TOUCH ULTRA 2) w/Device KIT, Use daily, Disp: 1 each, Rfl: 1    FLUoxetine (PROzac) 20 mg capsule, TAKE 1 CAPSULE BY MOUTH EVERY DAY , Disp: 90 capsule, Rfl: 2    folic acid (FOLVITE) 1 mg tablet, Take by mouth daily, Disp: , Rfl:     glucose blood (OneTouch Ultra) test strip, Use as instructed once a day, Disp: 50 each, Rfl: 5    Lancets (onetouch ultrasoft) lancets, Use as instructed once a day, Disp: 50 each, Rfl: 5    lidocaine (LIDODERM) 5 %, Apply 1 patch topically daily Remove & Discard patch within 12 hours or as directed by MD (Patient not taking: Reported on 5/27/2021), Disp: 30 patch, Rfl: 0    lisinopril-hydrochlorothiazide (PRINZIDE,ZESTORETIC) 10-12 5 MG per tablet, TAKE ONE TABLET BY MOUTH EVERY DAY, Disp: 90 tablet, Rfl: 3    Multiple Vitamins-Minerals (MULTIVITAMIN MEN) TABS, Take 1 tablet by mouth daily, Disp: , Rfl:     Omega-3 Fatty Acids (FISH OIL) 1,000 mg, Take 1,000 mg by mouth daily, Disp: , Rfl:     omeprazole (PriLOSEC) 20 mg delayed release capsule, Take 1 capsule (20 mg total) by mouth daily before breakfast, Disp: 30 capsule, Rfl: 0    oxyCODONE (ROXICODONE) 5 mg immediate release tablet, Take 1 tablet (5 mg total) by mouth every 4 (four) hours as needed (cancer pain)Max Daily Amount: 30 mg, Disp: 30 tablet, Rfl: 0    pancrelipase, Lip-Prot-Amyl, (CREON) 24,000 units, Take 1 capsule (24,000 Units total) by mouth 3 (three) times a day with meals, Disp: 90 capsule, Rfl: 0    pantoprazole (PROTONIX) 40 mg tablet, Take 1 tablet (40 mg total) by mouth 2 (two) times a day Before breakfast, before bed, to reduce stomach acid, Disp: 60 tablet, Rfl: 0    polyethylene glycol (MIRALAX) 17 g packet, Take 17 g by mouth daily as needed (Constipation), Disp: , Rfl: 0    predniSONE 10 mg tablet, Take 1 tablet (10 mg total) by mouth daily With breakfast, to reduce tumor inflammation and improve appetite , Disp: 30 tablet, Rfl: 0    prochlorperazine (COMPAZINE) 10 mg tablet, Take 1 tablet (10 mg total) by mouth every 6 (six) hours as needed for nausea or vomiting, Disp: 45 tablet, Rfl: 3    senna (SENOKOT) 8 6 MG tablet, Take 1 tablet (8 6 mg total) by mouth daily as needed for constipation, Disp: 30 tablet, Rfl: 0    senna-docusate sodium (SENOKOT S) 8 6-50 mg per tablet, Take 1 tablet by mouth 2 (two) times a day, Disp:  , Rfl: 0    Sodium Chloride Flush (Normal Saline Flush) 0 9 % SOLN, Gall bladder drain, Disp: , Rfl:     tamsulosin (FLOMAX) 0 4 mg, TAKE 1 CAPSULE BY MOUTH  DAILY AT BEDTIME, Disp: 90 capsule, Rfl: 3    Tofacitinib Citrate ER (Xeljanz XR) 11 MG TB24, Take by mouth, Disp: , Rfl:     Most Recent Lab Results: Has not checked BG recently    Lab Results   Component Value Date    WBC 7 8 05/28/2021    NEUTROABS 5 6 05/28/2021    CHOLESTEROL 188 05/05/2021    CHOL 190 01/03/2017    TRIG 181 6 (H) 05/05/2021    HDL 36 (L) 05/05/2021    LDLCALC 116 (H) 05/05/2021    ALT 39 05/28/2021    AST 33 05/28/2021    ALB 3 7 (L) 05/28/2021     09/01/2017     01/03/2017    SODIUM 140 05/28/2021    SODIUM 141 05/17/2021    K 4 8 05/28/2021    K 3 8 05/17/2021     05/28/2021    BUN 16 05/28/2021    BUN 9 05/17/2021    CREATININE 0 80 05/28/2021    CREATININE 0 57 (L) 05/17/2021    EGFR 108 05/17/2021    TSH 1 130 11/10/2018    GLUCOSE 117 (H) 09/01/2017    POCGLU 145 (H) 05/17/2021    GLUF 147 (H) 05/05/2021    GLUF 100 (H) 07/08/2018    GLUC 136 (H) 05/28/2021    HGBA1C 6 2 (H) 05/05/2021    HGBA1C 6 1 (H) 04/24/2021    HGBA1C 6 5 (H) 02/27/2021    CALCIUM 7 9 (L) 05/17/2021    MG 2 3 05/12/2021       Anthropometric Measurements:   Height: 70"  Ht Readings from Last 1 Encounters:   06/03/21 5' 9 61" (1 768 m)     Wt Readings from Last 20 Encounters:   06/07/21 69 9 kg (154 lb)   06/03/21 73 2 kg (161 lb 6 oz)   06/01/21 73 kg (161 lb)   05/27/21 73 4 kg (161 lb 13 1 oz)   05/26/21 73 5 kg (162 lb)   05/25/21 73 5 kg (162 lb)   05/19/21 75 8 kg (167 lb)   05/10/21 79 8 kg (176 lb)   05/03/21 79 8 kg (176 lb)   04/27/21 79 9 kg (176 lb 3 2 oz)   04/05/21 84 9 kg (187 lb 3 2 oz)   03/16/21 87 1 kg (192 lb)   02/22/21 89 4 kg (197 lb)   08/04/20 91 2 kg (201 lb)   02/12/20 87 5 kg (193 lb) 11/07/19 90 4 kg (199 lb 6 4 oz)   09/19/19 90 7 kg (200 lb)   08/07/19 90 kg (198 lb 6 4 oz)   04/30/19 90 2 kg (198 lb 12 8 oz)   03/14/19 93 4 kg (206 lb)       Weight History:    Usual Weight: 200#   Comments: reports he has lost ~50# in 5 weeks        Oncology Nutrition-Anthropometrics      Nutrition from 6/7/2021 in [place] [address] [facility]   Patient age (years):  73 years   Patient (male) height (in):  79 in   Current weight (lbs):  154 lbs   Current weight to be used for anthropometric calculations (kg)  70 kg   BMI:  22 1   IBW male  166 lb   IBW (kg) male  75 5 kg   IBW % (male)  92 8 %   Adjusted BW (male):  163 lbs   Adjusted BW in kg (male):  74 1 kg   % weight change after 1 week:  (!) -4 3 %   Weight change after 1 week (lbs)  -7 lbs   % weight change after 1 month:  (!) -12 5 %   Weight change after 1 month (lbs)  -22 lbs   % weight change after 3 months:  (!) -19 8 %   Weight change after 3 months (lbs)  -38 lbs          Nutrition-Focused Physical Findings: moderate muscle depletion ([place]) - hollowing/scooping/depression and moderate body fat depletion ([place]) - hollowing/depression/dark circles    Food/Nutrition-Related History & Client/Social History:    Current Nutrition Impact Symptoms:  [x] Nausea - mild  -PERT has helped [x] Reduced Appetite   -started on steroid per [facility] 5/28/21 [x] Acid Reflux - "a little bit once in awhile"  -on Protonix   [] Vomiting  [x] Unintended Wt Loss  [] Malabsorption    [] Diarrhea   -has formed BM's daily  -PERT has helped [] Unintended Wt Gain  [] Dumping Syndrome    [x] Constipation   -started on Senokot pe [facility] 5/27/21 [] Thick Mucous/Secretions  [x] Abdominal Pain - s/p ashli tube   [] Dysgeusia (Altered Taste)  [x] Xerostomia (Dry Mouth) - tries to drink water throughout the day  -discussed Biotene and bs/sw rinses [] Gas    [] Dysosmia (Altered Smell)  [] Thrush  [] Difficulty Chewing    [] Oral Mucositis (Sore Mouth) [x] Fatigue - especially after chemo [] Hyperglycemia    [] Odynophagia  [] Esophagitis  [x] Other: Pain; following with [facility], pt very uncomfortable during today's visit   [] Dysphagia  [] Early Satiety  [] No Problems Eating      Food Allergies & Intolerances: no   Likes peanuts but not peanut butter  Not eating peanuts because he can't chew them well right now  Current Diet: Regular Diet, No Restrictions   · Iggy Waters has seen an outpatient CDE, RD on 21 and it was recommended that he have 45-60 grams of cho with 3 meals and 15 g with snacks if chosen  Pt cancelled his follow up appt because he said he doesn't have DM and he has cancer  Current Nutrition Intake: Less than usual   Appetite: Poor  Nutrition Route: PO  Activity level: limited by pain and fatigue    Typical Day Diet Recall:   Breakfast: breakfast bowl with 1 egg, peppers, potatoes, sausage, cheese;  Ensure  Snack: sometimes has Buddhism apple cake, cleopatra crackers  Lunch: leftovers: porkloin, corn and mashed potatoes  Snack: sometimes ice cream  Dinner: 6 oz porkloin and 1cup corn with butter  Snack: pc of Buddhism apple cake, 1 5 cups ice cream    Beverages: water (16 9 oz x3-4), sweetened iced tea (10 oz x2-3), Ensure (8 oz x1), chocolate 2% milk (8 oz x1)  Supplements:    Ensure Original (8 oz, 220 kcal, 9 g pro) - QD      Oncology Nutrition-Estimated Needs      Nutrition from 2021 in [facility]   Weight type used  Actual weight   Weight in kilograms (kg) used for estimated needs  70 kg   Energy needs formula:   35-40 kcal/kg   Energy needs based on 35 kcal/k kcal   Energy needs based on 40 kcal/k kcal   Protein needs formula:  1 5-2 g/kg   Protein needs based on 1 5 g/kg  105 g   Protein needs based on 2 g/kg  140 g   Fluid needs formula:  30-35 mL/kg   Fluid needs based on 30 mL/kg  2100 mL   Fluid needs in ounces  71 oz   Fluid needs based on 35 mL/kg  2450 mL   Fluid needs in kathryn  83 oz           Discussion & Intervention:   Lynsey Arrington was evaluated today for an initial RD consultation regarding wt loss, poor po intake and nutrition impact sx management  Lynsey Arrington is currently undergoing tx for pancreatic cancer  Today's visit was somewhat limited by the significant amount of pain that Lynsey Arrington was in  He reports a ~50# wt loss in the past 5 weeks, although Epic wts reveal this has been since February 2021  He has a very poor appetite, xerostomia, fatigue, and abdominal pain (which he relates to his ashli tube)  He has been supplementing with Ensure Original once daily and has 2 cases left at home that his family bought for him  We talked about alternate high calorie/protein supplements and homemade versions which he is open to once his supply of Ensure runs out  He has been eating 3 meals and occasional snacks each day  He is hydrating well  He denies diarrhea and constipation  He has mild nausea  He is on PERT which he says helps somewhat  He says he did receive the package of information that I mailed to him but he only glanced at it; familiarized him with the resources provided today  Reviewed 24 hour recall, which revealed an inadequate po intake, and discussed ways to increase kcal, protein, and fluid intakes and optimize nutrient intake  Also reviewed the importance of wt management throughout the tx process and the role of a high kcal/ high protein diet in managing wt and overall health    Based on today's assessment, discussion included: MNT for: pancreatic cancer, fatigue, pain, reduced appetite, wt loss, malnutrition, a high kcal/protein diet & food choices to include at all meals & snacks (Examples of high kcal foods: cheese, full-fat dairy products, nut butter, plant-based fats, coconut oil/milk, avocado, butter, cream soup, etc  Examples of high protein foods: eggs, chicken, fish, beans/legumes, nuts/nut butters, bone broth, etc ) , fortifying foods for added kcal and protein (examples include: adding cheese to foods such as eggs, mashed potatoes, casseroles, etc ; Making oatmeal with whole milk rather than water; Making fortified mashed potatoes with cream, butter, dry milk powder, plain [place] yogurt, and cheese ), adequate hydration & fluid choices, sipping on calorie containing beverages (examples include: adding whole milk or cream to coffee, oral nutrition supplements, juice, electrolyte replacement beverages, milk, etc ), eating smaller more frequent meals every 2-3 hours (5-6 small meals/day), having consistent and planned snacks between meals, eating when feeling most hungry, choosing higher kcal/protein versions of supplements and increasing oral nutrition supplements, recipe suggestions/resources, trying new recipes, & cooking at home more often and adding extra fat to foods while cooking such as butter, plant-based oil, coconut oil/milk, avocado, nut butters, etc    Moving forward, Ace Nuñez was encouraged to increase kcal, protein, and fluid intakes  Materials Provided: Ensure Coupons and samples of Ensure Enlive and Ensure Complete  5/26/21: Mailed to pts home: [address] [place] [address],[facility], Oncology Milkshake & Smoothie Recipes, Commercial Supplements list, and the Increasing Calories & Protein handout  All questions and concerns addressed during todays visit  Tianatorrie Christiansonabiodun has RD contact information  Nutrition Diagnosis:    Inadequate Energy Intake related to physiological causes, disease state and treatment related issues as evidenced by food recall, wt loss and discussion with pt and/or family   Increased Nutrient Needs (kcal & pro) related to increased demand for nutrients and disease state as evidenced by cancer dx and pt undergoing tx for cancer     Patient has clinical indicators (or ASPEN criteria) consistent with severe protein-calorie malnutrition in the context of Chronic Illness as evidenced by >5% wt loss in 1 month, >7 5% wt loss in 3 months, </=75% energy intake vs  Estimated needs for >/=1 month, moderate muscle depletion ([place]) - hollowing/scooping/depression and moderate body fat depletion ([place]) - hollowing/depression/dark circles  Monitoring & Evaluation:   Goals:  · weight maintenance/stabilization  · adequate nutrition impact symptom management  · pt to meet >/=75% estimated nutrition needs daily    · Progress Towards Goals: Initiated    Nutrition Rx & Recommendations:  · Diet: High Calorie, High Protein (for high calorie foods see pages 52-53, and for high protein foods see pages 49-51 in your Eating Hints book)  · Eat slowly and chew food thoroughly before swallowing  · Nutrition Supplements: Try for 3 per day, Try Ensure Complete or Ensure Enlive  May use homemade shakes/smoothies as desired  If using a pre-made shake/bar, choose ones with >300 calories and >10 grams protein (ex  Ensure Enlive, Ensure Plus, Boost Plus, Boost Very High Calorie, etc )  · Small, frequent meals/snacks may be easier to tolerate than 3 large daily meals  Aim for 5-6 small meals per day (every 2-3 hours)  · Include protein at all meals/snacks  · Refer to Eating Hints book for other meal/snack ideas and symptom management    · For weight loss: monitor your weight at home at least 2x/week, record your weight, start by adding 250-500 extra calories per day, eat 5-6 small scheduled meals every 2-3 hrs, choose foods that are high in protein and calories (see pages 49-53 in your Eating Hints book), drink liquids with calories for example: milkshakes/smoothies/juice/soup/whole milk/chocolate milk, cook with protein fortified milk (see recipe on page 36 in your Eating Hints book), consider ready-to-drink oral nutrition supplements such as Ensure Plus, Ensure Enlive, Boost Plus, or Boost Very High Calorie, avoid "diet" and "light" foods when possible, avoid drinking too much with meals, contact your dietitian with any continued weight loss over the course of 1 week  For more info see pages 35-37 in your Eating Hints book  · Weigh yourself regularly  If you notice weight loss, make an effort to increase your daily food/calorie intake  If you continue to notice loss after these efforts, reach out to your dietitian to establish a plan to stabilize weight  · Try Biotene mouthwash or baking soda salt water rinses (page 18 of your Eating Hints book) for dry mouth  · Try to choose calorie containing fluids: whole Fairlife milk (higher protein milk), Fairlife chocolate milk, juice, diluted juice, milkshakes, smoothies, oral nutrition supplements, etc   · Fortify your foods to add extra calories and protein: add cheese to eggs and veggies, choose whole milk dairy products, add extra oils and butter to foods, etc   · Snack ideas, aim for 3 snacks per day: ice cream, cheese and crackers, oral nutrition supplement, chocolate milk with cleopatra crackers, hard boiled eggs, ham and cheese rolls ups, etc   · Blend your Ensure Original with Ice Cream to fortify it until you can purchase a higher calorie and protein option such as Ensure Enlive or Ensure Complete      Follow Up Plan: 7/8/21 during chemo  Recommend Referral to Other Providers: none at this time

## 2021-05-28 NOTE — PRE-PROCEDURE INSTRUCTIONS
Pre-Surgery Instructions:   Medication Instructions    aspirin 81 mg chewable tablet Instructed patient per Anesthesia Guidelines   atorvastatin (LIPITOR) 10 mg tablet Instructed patient per Anesthesia Guidelines   FLUoxetine (PROzac) 20 mg capsule Instructed patient per Anesthesia Guidelines   folic acid (FOLVITE) 1 mg tablet Instructed patient per Anesthesia Guidelines   lisinopril-hydrochlorothiazide (PRINZIDE,ZESTORETIC) 10-12 5 MG per tablet Instructed patient per Anesthesia Guidelines   Multiple Vitamins-Minerals (MULTIVITAMIN MEN) TABS Instructed patient per Anesthesia Guidelines   Omega-3 Fatty Acids (FISH OIL) 1,000 mg Instructed patient per Anesthesia Guidelines   oxyCODONE (ROXICODONE) 5 mg immediate release tablet Instructed patient per Anesthesia Guidelines   pancrelipase, Lip-Prot-Amyl, (CREON) 24,000 units Instructed patient per Anesthesia Guidelines   pantoprazole (PROTONIX) 40 mg tablet Instructed patient per Anesthesia Guidelines   polyethylene glycol (MIRALAX) 17 g packet Instructed patient per Anesthesia Guidelines   predniSONE 10 mg tablet Instructed patient per Anesthesia Guidelines   senna (SENOKOT) 8 6 MG tablet Instructed patient per Anesthesia Guidelines   Sodium Chloride Flush (Normal Saline Flush) 0 9 % SOLN Instructed patient per Anesthesia Guidelines   tamsulosin (FLOMAX) 0 4 mg Instructed patient per Anesthesia Guidelines  Instructed to take Fluoxetine, Pantoprazole, Atorvastatin, and Prednisone with sip of water the morning of surgery and may take an Oxycodone prn  Patient reports was instructed by surgeon to stop aspirin and fish oil 5 days before surgery  No NSAIDs 3 days before surgery

## 2021-05-28 NOTE — PROGRESS NOTES
Called and introduced myself to both the patient and his wife Dav Krishna  I explained my role in financial counseling  Dav Krishna did express that her and her  are struggling financially, and are concerned about what bills may start to pile up  Annettejody Tomi stated that she spoke with the insurance company and was told that once they reach their OOP of $5,000 that the bills would stop  Dav Krishna was trying to come up with the $5,000 all at once to get that out of the way  I advised against that, and explained that we can help offset this  The assistance that we obtain would not affect the insurance accumulations  Provided Dav Krishna with my direct contact information, encouraged her to call me with any questions or concerns  Will continue to follow patient

## 2021-05-31 ENCOUNTER — ANESTHESIA EVENT (OUTPATIENT)
Dept: PERIOP | Facility: HOSPITAL | Age: 66
End: 2021-05-31
Payer: COMMERCIAL

## 2021-05-31 NOTE — ANESTHESIA PREPROCEDURE EVALUATION
Procedure:  INSERTION VENOUS PORT (PORT-A-CATH) (Left Chest)    Relevant Problems   CARDIO   (+) Essential hypertension   (+) Hyperlipidemia      ENDO   (+) Diabetes mellitus type 2      GI/HEPATIC   (+) Esophageal reflux   (+) Pancreatic adenocarcinoma (HCC)      /RENAL   (+) BPH (benign prostatic hyperplasia)      MUSCULOSKELETAL   (+) Rheumatoid arthritis (HCC)      NEURO/PSYCH   (+) Anxiety disorder   Poorly Controlled GERD  Pancreatic mass   NPO verified         5/6/21 ECG  Normal sinus rhythm  Left axis deviation  Abnormal ECG      Results for Jethro Nguyễn (MRN [id number]) as of 5/31/2021 19:41   Ref  Range 5/17/2021 06:47   WBC Latest Ref Range: 4 31 - 10 16 Thousand/uL 11 79 (H)   Red Blood Cell Count Latest Ref Range: 3 88 - 5 62 Million/uL 4 02   Hemoglobin Latest Ref Range: 12 0 - 17 0 g/dL 12 1   HCT Latest Ref Range: 36 5 - 49 3 % 36 5   MCV Latest Ref Range: 82 - 98 fL 91   MCH Latest Ref Range: 26 8 - 34 3 pg 30 1   MCHC Latest Ref Range: 31 4 - 37 4 g/dL 33 2   RDW Latest Ref Range: 11 6 - 15 1 % 15 3 (H)   Platelet Count Latest Ref Range: 149 - 390 Thousands/uL 540 (H)   MPV Latest Ref Range: 8 9 - 12 7 fL 10 3   Platelet Estimate Latest Ref Range: Adequate  Increased (A)     Physical Exam    Airway    Mallampati score: II  TM Distance: >3 FB  Neck ROM: full     Dental   Comment: Multiple broken teeth, denies any loose  ,     Cardiovascular      Pulmonary      Other Findings        Anesthesia Plan  ASA Score- 3     Anesthesia Type- general with ASA Monitors  Additional Monitors:   Airway Plan: ETT  Plan Factors-Exercise tolerance (METS): >4 METS  Chart reviewed  EKG reviewed  Imaging results reviewed  Existing labs reviewed  Patient summary reviewed  Induction- intravenous and rapid sequence induction  Postoperative Plan- Plan for postoperative opioid use  Planned trial extubation    Informed Consent- Anesthetic plan and risks discussed with patient    I personally reviewed this patient with the CRNA  Discussed and agreed on the Anesthesia Plan with the CRNA  Aron Gutierres

## 2021-06-01 ENCOUNTER — APPOINTMENT (OUTPATIENT)
Dept: RADIOLOGY | Facility: HOSPITAL | Age: 66
End: 2021-06-01
Payer: COMMERCIAL

## 2021-06-01 ENCOUNTER — HOSPITAL ENCOUNTER (OUTPATIENT)
Dept: RADIOLOGY | Facility: HOSPITAL | Age: 66
Setting detail: OUTPATIENT SURGERY
Discharge: HOME | End: 2021-06-01
Payer: COMMERCIAL

## 2021-06-01 ENCOUNTER — HOSPITAL ENCOUNTER (OUTPATIENT)
Facility: HOSPITAL | Age: 66
Setting detail: OUTPATIENT SURGERY
Discharge: HOME | End: 2021-06-01
Attending: SURGERY | Admitting: SURGERY
Payer: COMMERCIAL

## 2021-06-01 ENCOUNTER — ANESTHESIA (OUTPATIENT)
Dept: PERIOP | Facility: HOSPITAL | Age: 66
End: 2021-06-01
Payer: COMMERCIAL

## 2021-06-01 VITALS
SYSTOLIC BLOOD PRESSURE: 111 MMHG | OXYGEN SATURATION: 94 % | BODY MASS INDEX: 23.05 KG/M2 | RESPIRATION RATE: 18 BRPM | HEIGHT: 70 IN | WEIGHT: 161 LBS | HEART RATE: 77 BPM | TEMPERATURE: 97.3 F | DIASTOLIC BLOOD PRESSURE: 72 MMHG

## 2021-06-01 DIAGNOSIS — C25.9 PANCREATIC ADENOCARCINOMA (HCC): ICD-10-CM

## 2021-06-01 LAB
ALBUMIN SERPL-MCNC: 3.7 G/DL (ref 3.8–4.8)
ALBUMIN/GLOB SERPL: 1.3 {RATIO} (ref 1.2–2.2)
ALP SERPL-CCNC: 238 IU/L (ref 48–121)
ALT SERPL-CCNC: 39 IU/L (ref 0–44)
AST SERPL-CCNC: 33 IU/L (ref 0–40)
BASOPHILS # BLD AUTO: 0 X10E3/UL (ref 0–0.2)
BASOPHILS NFR BLD AUTO: 0 %
BILIRUB SERPL-MCNC: 0.5 MG/DL (ref 0–1.2)
BUN SERPL-MCNC: 16 MG/DL (ref 8–27)
BUN/CREAT SERPL: 20 (ref 10–24)
CALCIUM SERPL-MCNC: 9.6 MG/DL (ref 8.6–10.2)
CANCER AG19-9 SERPL-ACNC: 630 U/ML (ref 0–35)
CHLORIDE SERPL-SCNC: 102 MMOL/L (ref 96–106)
CO2 SERPL-SCNC: 25 MMOL/L (ref 20–29)
CREAT SERPL-MCNC: 0.8 MG/DL (ref 0.76–1.27)
EOSINOPHIL # BLD AUTO: 0.2 X10E3/UL (ref 0–0.4)
EOSINOPHIL NFR BLD AUTO: 3 %
ERYTHROCYTE [DISTWIDTH] IN BLOOD BY AUTOMATED COUNT: 14.5 % (ref 11.6–15.4)
GLOBULIN SER-MCNC: 2.9 G/DL (ref 1.5–4.5)
GLUCOSE SERPL-MCNC: 136 MG/DL (ref 65–99)
HCT VFR BLD AUTO: 39.2 % (ref 37.5–51)
HGB BLD-MCNC: 13.1 G/DL (ref 13–17.7)
IMM GRANULOCYTES # BLD: 0 X10E3/UL (ref 0–0.1)
IMM GRANULOCYTES NFR BLD: 0 %
LYMPHOCYTES # BLD AUTO: 1 X10E3/UL (ref 0.7–3.1)
LYMPHOCYTES NFR BLD AUTO: 13 %
MCH RBC QN AUTO: 29.9 PG (ref 26.6–33)
MCHC RBC AUTO-ENTMCNC: 33.4 G/DL (ref 31.5–35.7)
MCV RBC AUTO: 90 FL (ref 79–97)
MONOCYTES # BLD AUTO: 0.9 X10E3/UL (ref 0.1–0.9)
MONOCYTES NFR BLD AUTO: 12 %
NEUTROPHILS # BLD AUTO: 5.6 X10E3/UL (ref 1.4–7)
NEUTROPHILS NFR BLD AUTO: 72 %
PLATELET # BLD AUTO: 484 X10E3/UL (ref 150–450)
POTASSIUM SERPL-SCNC: 4.8 MMOL/L (ref 3.5–5.2)
PROT SERPL-MCNC: 6.6 G/DL (ref 6–8.5)
RBC # BLD AUTO: 4.38 X10E6/UL (ref 4.14–5.8)
SL AMB EGFR AFRICAN AMERICAN: 108 ML/MIN/1.73
SL AMB EGFR NON AFRICAN AMERICAN: 94 ML/MIN/1.73
SODIUM SERPL-SCNC: 140 MMOL/L (ref 134–144)
WBC # BLD AUTO: 7.8 X10E3/UL (ref 3.4–10.8)

## 2021-06-01 PROCEDURE — 77001 FLUOROGUIDE FOR VEIN DEVICE: CPT

## 2021-06-01 PROCEDURE — 36571 INSERT PICVAD CATH: CPT | Performed by: SURGERY

## 2021-06-01 PROCEDURE — 77001 FLUOROGUIDE FOR VEIN DEVICE: CPT | Performed by: SURGERY

## 2021-06-01 PROCEDURE — C1788 PORT, INDWELLING, IMP: HCPCS | Performed by: SURGERY

## 2021-06-01 DEVICE — PORT HEMODIAL INTERMED PROFILE 8FR KIT: Type: IMPLANTABLE DEVICE | Site: CHEST | Status: FUNCTIONAL

## 2021-06-01 RX ORDER — FENTANYL CITRATE/PF 50 MCG/ML
50 SYRINGE (ML) INJECTION
Status: DISCONTINUED | OUTPATIENT
Start: 2021-06-01 | End: 2021-06-01 | Stop reason: HOSPADM

## 2021-06-01 RX ORDER — BUPIVACAINE HYDROCHLORIDE 2.5 MG/ML
INJECTION, SOLUTION EPIDURAL; INFILTRATION; INTRACAUDAL AS NEEDED
Status: DISCONTINUED | OUTPATIENT
Start: 2021-06-01 | End: 2021-06-01 | Stop reason: HOSPADM

## 2021-06-01 RX ORDER — ALBUTEROL SULFATE 2.5 MG/3ML
2.5 SOLUTION RESPIRATORY (INHALATION) ONCE AS NEEDED
Status: DISCONTINUED | OUTPATIENT
Start: 2021-06-01 | End: 2021-06-01 | Stop reason: HOSPADM

## 2021-06-01 RX ORDER — CEFAZOLIN SODIUM 1 G/3ML
INJECTION, POWDER, FOR SOLUTION INTRAMUSCULAR; INTRAVENOUS AS NEEDED
Status: DISCONTINUED | OUTPATIENT
Start: 2021-06-01 | End: 2021-06-01

## 2021-06-01 RX ORDER — PROPOFOL 10 MG/ML
INJECTION, EMULSION INTRAVENOUS AS NEEDED
Status: DISCONTINUED | OUTPATIENT
Start: 2021-06-01 | End: 2021-06-01

## 2021-06-01 RX ORDER — LIDOCAINE HYDROCHLORIDE 10 MG/ML
0.5 INJECTION, SOLUTION EPIDURAL; INFILTRATION; INTRACAUDAL; PERINEURAL ONCE AS NEEDED
Status: DISCONTINUED | OUTPATIENT
Start: 2021-06-01 | End: 2021-06-01 | Stop reason: HOSPADM

## 2021-06-01 RX ORDER — ONDANSETRON 2 MG/ML
INJECTION INTRAMUSCULAR; INTRAVENOUS AS NEEDED
Status: DISCONTINUED | OUTPATIENT
Start: 2021-06-01 | End: 2021-06-01

## 2021-06-01 RX ORDER — SODIUM CHLORIDE, SODIUM LACTATE, POTASSIUM CHLORIDE, CALCIUM CHLORIDE 600; 310; 30; 20 MG/100ML; MG/100ML; MG/100ML; MG/100ML
125 INJECTION, SOLUTION INTRAVENOUS CONTINUOUS
Status: DISCONTINUED | OUTPATIENT
Start: 2021-06-01 | End: 2021-06-01 | Stop reason: HOSPADM

## 2021-06-01 RX ORDER — LIDOCAINE HYDROCHLORIDE 10 MG/ML
INJECTION, SOLUTION EPIDURAL; INFILTRATION; INTRACAUDAL; PERINEURAL AS NEEDED
Status: DISCONTINUED | OUTPATIENT
Start: 2021-06-01 | End: 2021-06-01 | Stop reason: HOSPADM

## 2021-06-01 RX ORDER — DEXAMETHASONE SODIUM PHOSPHATE 10 MG/ML
INJECTION, SOLUTION INTRAMUSCULAR; INTRAVENOUS AS NEEDED
Status: DISCONTINUED | OUTPATIENT
Start: 2021-06-01 | End: 2021-06-01

## 2021-06-01 RX ORDER — SUCCINYLCHOLINE/SOD CL,ISO/PF 100 MG/5ML
SYRINGE (ML) INTRAVENOUS AS NEEDED
Status: DISCONTINUED | OUTPATIENT
Start: 2021-06-01 | End: 2021-06-01

## 2021-06-01 RX ORDER — GLYCOPYRROLATE 0.2 MG/ML
INJECTION INTRAMUSCULAR; INTRAVENOUS AS NEEDED
Status: DISCONTINUED | OUTPATIENT
Start: 2021-06-01 | End: 2021-06-01

## 2021-06-01 RX ORDER — ONDANSETRON 2 MG/ML
4 INJECTION INTRAMUSCULAR; INTRAVENOUS ONCE AS NEEDED
Status: DISCONTINUED | OUTPATIENT
Start: 2021-06-01 | End: 2021-06-01 | Stop reason: HOSPADM

## 2021-06-01 RX ORDER — LIDOCAINE HYDROCHLORIDE 10 MG/ML
INJECTION, SOLUTION EPIDURAL; INFILTRATION; INTRACAUDAL; PERINEURAL AS NEEDED
Status: DISCONTINUED | OUTPATIENT
Start: 2021-06-01 | End: 2021-06-01

## 2021-06-01 RX ORDER — HYDROMORPHONE HCL/PF 1 MG/ML
0.5 SYRINGE (ML) INJECTION
Status: DISCONTINUED | OUTPATIENT
Start: 2021-06-01 | End: 2021-06-01 | Stop reason: HOSPADM

## 2021-06-01 RX ORDER — SODIUM CHLORIDE, SODIUM LACTATE, POTASSIUM CHLORIDE, CALCIUM CHLORIDE 600; 310; 30; 20 MG/100ML; MG/100ML; MG/100ML; MG/100ML
INJECTION, SOLUTION INTRAVENOUS CONTINUOUS PRN
Status: DISCONTINUED | OUTPATIENT
Start: 2021-06-01 | End: 2021-06-01

## 2021-06-01 RX ORDER — FENTANYL CITRATE 50 UG/ML
INJECTION, SOLUTION INTRAMUSCULAR; INTRAVENOUS AS NEEDED
Status: DISCONTINUED | OUTPATIENT
Start: 2021-06-01 | End: 2021-06-01

## 2021-06-01 RX ADMIN — FENTANYL CITRATE 50 MCG: 50 INJECTION INTRAMUSCULAR; INTRAVENOUS at 11:45

## 2021-06-01 RX ADMIN — PHENYLEPHRINE HYDROCHLORIDE 200 MCG: 10 INJECTION INTRAVENOUS at 11:04

## 2021-06-01 RX ADMIN — PROPOFOL 100 MG: 10 INJECTION, EMULSION INTRAVENOUS at 10:42

## 2021-06-01 RX ADMIN — DEXAMETHASONE SODIUM PHOSPHATE 10 MG: 10 INJECTION, SOLUTION INTRAMUSCULAR; INTRAVENOUS at 10:42

## 2021-06-01 RX ADMIN — GLYCOPYRROLATE 0.1 MG: 0.2 INJECTION, SOLUTION INTRAMUSCULAR; INTRAVENOUS at 10:42

## 2021-06-01 RX ADMIN — LIDOCAINE HYDROCHLORIDE 50 MG: 10 INJECTION, SOLUTION EPIDURAL; INFILTRATION; INTRACAUDAL; PERINEURAL at 10:42

## 2021-06-01 RX ADMIN — Medication 100 MG: at 10:42

## 2021-06-01 RX ADMIN — PHENYLEPHRINE HYDROCHLORIDE 200 MCG: 10 INJECTION INTRAVENOUS at 10:55

## 2021-06-01 RX ADMIN — SODIUM CHLORIDE, SODIUM LACTATE, POTASSIUM CHLORIDE, AND CALCIUM CHLORIDE: .6; .31; .03; .02 INJECTION, SOLUTION INTRAVENOUS at 10:35

## 2021-06-01 RX ADMIN — CEFAZOLIN 1000 MG: 1 INJECTION, POWDER, FOR SOLUTION INTRAMUSCULAR; INTRAVENOUS at 10:40

## 2021-06-01 RX ADMIN — PHENYLEPHRINE HYDROCHLORIDE 200 MCG: 10 INJECTION INTRAVENOUS at 10:46

## 2021-06-01 RX ADMIN — FENTANYL CITRATE 50 MCG: 50 INJECTION INTRAMUSCULAR; INTRAVENOUS at 10:42

## 2021-06-01 RX ADMIN — ONDANSETRON 4 MG: 2 INJECTION INTRAMUSCULAR; INTRAVENOUS at 10:42

## 2021-06-01 NOTE — ANESTHESIA POSTPROCEDURE EVALUATION
Post-Op Assessment Note    CV Status:  Stable  Pain Score: 0    Pain management: adequate     Mental Status:  Arousable   Hydration Status:  Stable   PONV Controlled:  None   Airway Patency:  Patent      Post Op Vitals Reviewed: Yes      Staff: Anesthesiologist, CRNA         No complications documented      /61 (06/01/21 1155)    Temp 98 °F (36 7 °C) (06/01/21 1155)    Pulse 85 (06/01/21 1155)   Resp 16 (06/01/21 1155)    SpO2 96 % (06/01/21 1155)

## 2021-06-01 NOTE — OP NOTE
OPERATIVE REPORT  PATIENT NAME: Rodney Hameed    :  1955  MRN: [id number]  Pt Location: [facility] OR ROOM 03    SURGERY DATE: 2021    Surgeon(s) and Role:     * Emilio Kelley MD - Primary     * Ashkan Weiss MD - Assisting    Preop Diagnosis:  Pancreatic adenocarcinoma ([address] ) [C25 9]    Post-Op Diagnosis Codes:     * Pancreatic adenocarcinoma ([address] ) [C25 9]    Procedure(s) (LRB):  INSERTION VENOUS PORT (PORT-A-CATH) (Left)    Specimen(s):  * No specimens in log *    Estimated Blood Loss:   Minimal    Drains:  Closed/Suction Drain  RUQ  10 Fr  (Active)   Dressing Status Other (Comment) 21 1039   Status Other (Comment) 21 1039   Number of days: 21       Open Drain Right (Active)   Number of days: 6403       Anesthesia Type:   General    Operative Indications:  Pancreatic adenocarcinoma ([address] ) [C25 9]      Operative Findings:  n/a    Complications:   None    Procedure and Technique:  The patient was brought into the operating room and identified by a proper timeout  Following this, he was intuabated by the anesthesia team   A shoulder roll was positioned between the scapulae  The patient was then put in Tredelenburg position  The patient was then prepped and draped with the left chest and neck exposed  The left deltopectoral groove was palpated  It was then anesthetized with 30 cc of lidocaine  Following this, a left cephalic vein cutdown was performed  3-0 silk sutures were placed on the proximal and distal aspects of the cephalic vein  The more distal suture was tied  A venotomy was then created in the vein, and a pre-flushed catheter threaded through the venotomy  Fluoroscopic guidance was used to position the catheter at the SVC/right atrial junction  The catheter was then cut that the correct length and attached to a pre-flushed port  Their proximal silk suture was tied and cinched around the catheter to secure in place      The port pocket was created along the inferomedial aspect of the incision  The port was then placed in the pocket and anchored using 3-0 prolene sutures  The system was flushed with heparinized saline  Once we were sure of hemostasis, closure was performed using 3-0 Vicryl to close the subcutaneous fat, followed by 3-0 Vicryl to close the dermis in interrupted fashion, followed by 4-0 Monocryl placed in running subcuticular fashion to close the skin  Benzoin and Steri-Strips were applied followed by a 4 x 4 dressing and Tegaderm gauze  The patient tolerated the procedure well without any difficulties     I was present for the entire procedure    Patient Disposition:  PACU     SIGNATURE: Adan Carter MD  DATE: June 1, 2021  TIME: 11:32 AM

## 2021-06-01 NOTE — DISCHARGE INSTRUCTIONS
The gauze dressing should remain intact until your first chemo infusion appointment ([facility])  The white steri strips over your incision will peel off on their own  It is okay to get them wet, just pat dry gently       Return to the hospital or call the clinic if you experience fever>101,  persistent nausea and vomiting, increasing pain or increased redness around your incision, or purulent drainage from your incision

## 2021-06-03 ENCOUNTER — HOSPITAL ENCOUNTER (OUTPATIENT)
Dept: INFUSION CENTER | Facility: HOSPITAL | Age: 66
Discharge: HOME | End: 2021-06-03
Attending: INTERNAL MEDICINE
Payer: COMMERCIAL

## 2021-06-03 VITALS
WEIGHT: 161.38 LBS | DIASTOLIC BLOOD PRESSURE: 87 MMHG | HEIGHT: 70 IN | SYSTOLIC BLOOD PRESSURE: 139 MMHG | RESPIRATION RATE: 20 BRPM | TEMPERATURE: 97.3 F | HEART RATE: 70 BPM | BODY MASS INDEX: 23.1 KG/M2

## 2021-06-03 DIAGNOSIS — T45.1X5A CHEMOTHERAPY INDUCED NEUTROPENIA (HCC): ICD-10-CM

## 2021-06-03 DIAGNOSIS — C25.9 PANCREATIC ADENOCARCINOMA (HCC): Primary | ICD-10-CM

## 2021-06-03 DIAGNOSIS — D70.1 CHEMOTHERAPY INDUCED NEUTROPENIA (HCC): ICD-10-CM

## 2021-06-03 PROCEDURE — 96413 CHEMO IV INFUSION 1 HR: CPT

## 2021-06-03 PROCEDURE — 96367 TX/PROPH/DG ADDL SEQ IV INF: CPT

## 2021-06-03 PROCEDURE — 96417 CHEMO IV INFUS EACH ADDL SEQ: CPT

## 2021-06-03 RX ORDER — SODIUM CHLORIDE 9 MG/ML
20 INJECTION, SOLUTION INTRAVENOUS ONCE
Status: COMPLETED | OUTPATIENT
Start: 2021-06-03 | End: 2021-06-03

## 2021-06-03 RX ORDER — ACETAMINOPHEN 325 MG/1
650 TABLET ORAL ONCE
Status: COMPLETED | OUTPATIENT
Start: 2021-06-03 | End: 2021-06-03

## 2021-06-03 RX ADMIN — GEMCITABINE 1909.9 MG: 38 INJECTION, SOLUTION INTRAVENOUS at 11:38

## 2021-06-03 RX ADMIN — DEXAMETHASONE SODIUM PHOSPHATE: 10 INJECTION, SOLUTION INTRAMUSCULAR; INTRAVENOUS at 09:20

## 2021-06-03 RX ADMIN — SODIUM CHLORIDE 20 ML/HR: 0.9 INJECTION, SOLUTION INTRAVENOUS at 09:15

## 2021-06-03 RX ADMIN — Medication 191 MG: at 10:28

## 2021-06-03 RX ADMIN — ACETAMINOPHEN 650 MG: 325 TABLET ORAL at 09:22

## 2021-06-03 NOTE — PLAN OF CARE
Problem: Potential for Falls  Goal: Patient will remain free of falls  Description: INTERVENTIONS:  - Assess patient frequently for physical needs  -  Identify cognitive and physical deficits and behaviors that affect risk of falls  -  [place] fall precautions as indicated by assessment   - Educate patient/family on patient safety including physical limitations  - Instruct patient to call for assistance with activity based on assessment  - Modify environment to reduce risk of injury  - Consider OT/PT consult to assist with strengthening/mobility  Outcome: Progressing     Problem: Knowledge Deficit  Goal: Patient/family/caregiver demonstrates understanding of disease process, treatment plan, medications, and discharge instructions  Description: Complete learning assessment and assess knowledge base    Interventions:  - Provide teaching at level of understanding  - Provide teaching via preferred learning methods  Outcome: Progressing

## 2021-06-03 NOTE — PROGRESS NOTES
Pt tolerated infusion of Abraxane and Gemzar without difficulty  No s/s reaction noted  Port flushed and deaccessed per protocol  Next appt confirmed and AVS provided  Escorted to lobby in w/c for [facility] transport

## 2021-06-04 ENCOUNTER — TELEPHONE (OUTPATIENT)
Dept: NUTRITION | Facility: CLINIC | Age: 66
End: 2021-06-04

## 2021-06-04 ENCOUNTER — TELEPHONE (OUTPATIENT)
Dept: HEMATOLOGY ONCOLOGY | Facility: CLINIC | Age: 66
End: 2021-06-04

## 2021-06-04 ENCOUNTER — TELEMEDICINE (OUTPATIENT)
Dept: PALLIATIVE MEDICINE | Facility: CLINIC | Age: 66
End: 2021-06-04

## 2021-06-04 DIAGNOSIS — Z71.89 COUNSELING AND COORDINATION OF CARE: Primary | ICD-10-CM

## 2021-06-04 PROCEDURE — 99024 POSTOP FOLLOW-UP VISIT: CPT

## 2021-06-04 NOTE — TELEPHONE ENCOUNTER
Tejinder Lee and completed the COVID-19 screening for Monday's appointment  Screening was negative    Confirmed appointment with Charisse Lema

## 2021-06-04 NOTE — TELEPHONE ENCOUNTER
Patient called to see if he can shower  Only discharge instructions from port placement were to keep dressing on until seen in clinic  Patient had his first chemotherapy yesterday using port  Patient is questioning if he can take a shower  Patient advised that he can take a shower  Patient advised not to scrub over port site, just let the soap and water run over site and pat dry at present time since port was just placed on 6/1/21  Patient advised of above  Patient verbalized understanding of above  Will notify Dr Elizabeth Grimaldo RN as Arely Owusu

## 2021-06-04 NOTE — PROGRESS NOTES
Palliative Outpatient Assessment of Need    MSW completed an assessment of need which was completed with patient via phone    Family dynamics: Supportive  Relationship status:   Duration of relationship: 19 years  Name of significant other: Shelby Dear and Ages: 2 sons and 1 step daughter  Pets: 1 dog  Other important family information:   Living situation: Lives with Spouse    Patient's primary caregiver: Self  Any limitations of caregiver: Pt is very fatigued, but managing  Environmental concerns or barriers:    history: N/A  Employment history/source of income: Works for [employer] : Currently on short term disability    Spirituality/ Sikhism: Believes   Patient's strengths, social supports, and resources: Pt has strong support from his family  His sister lives across the street as well  His son started a food chain, where family, friends and neighbors are taking turns dropping off food each day  Cultural information:   Mental Health current or previous: Was doing well, but the reality of the chemo and his cancer is setting in  At this time he is able to manage  Substance use or history: N/A  Sleep: Varies  Exercise: As tolerated  Diet/nutrition: Has had weight loss-eating has decreased  Spoke about supplementing with Boost or Ensure  Pt states he has Ensure  Durable Medical Equipment needs: None at this time-Has a walker  Transportation: [facility]/family  Financial concerns: Spoke with financial counselor already  Advanced Directive: None- educated on 5 Wishes and mailed a copy  Other medical or social work providers involved: none  Patient/caregiver current level of coping: Pt is coping ok at this time  I encouraged him to call if he needs additional emotional support  Patient/family concerns and areas of need:None at this time   Patient's Interests: Pt really enjoys camping, he has a camper    *All questions may not be answered due to constraints    Follow-up discussions may need to occur

## 2021-06-07 ENCOUNTER — NUTRITION (OUTPATIENT)
Dept: NUTRITION | Facility: CLINIC | Age: 66
End: 2021-06-07

## 2021-06-07 ENCOUNTER — TELEPHONE (OUTPATIENT)
Dept: PALLIATIVE MEDICINE | Facility: CLINIC | Age: 66
End: 2021-06-07

## 2021-06-07 ENCOUNTER — PATIENT OUTREACH (OUTPATIENT)
Dept: HEMATOLOGY ONCOLOGY | Facility: CLINIC | Age: 66
End: 2021-06-07

## 2021-06-07 VITALS — BODY MASS INDEX: 22.35 KG/M2 | WEIGHT: 154 LBS

## 2021-06-07 DIAGNOSIS — Z71.3 NUTRITIONAL COUNSELING: Primary | ICD-10-CM

## 2021-06-07 NOTE — PROGRESS NOTES
Received a call from Kaylie Saldaña, he was asking about his upcoming appointments, he has multiple appointments scheduled and was asking if any can be cancelled, I am unable to comment on his PCP appointment as I am unaware of why it was made but he can call the office to inquire about that, we reviewed that the appointment with genetics is a virtual call so he does not have to go anywhere for that appointment, it is just a phone call so that is a little better for him, GI assessment complete, we discussed his pain as he had his consultation with palliative care, he is still having pain and he stated that the pain meds that he was prescribed help a little, asked if he let the palliative care office know, he did not, he said he would tell them at his next appointment on 6/23/21, told him he should not wait that long if he is in pain, he has trouble sleeping at night due to the pain, told him I would reach out to the palliative care team and let them know about his pain so they can help him with it, he was appreciative, message sent to palliative care team, instructed Kaylie Saldaña to call with any other questions or concerns

## 2021-06-07 NOTE — PROGRESS NOTES
This nurse called pt at 415 pm  Introduced myself to pt and reason for call  Pt verbalized his pain is about the same as it was at last office visit with Dr Anton Bird  Tightening in the stomach and where the tube is placed for drainage  Reviewed adjustment to frequency of Oxycodone 5 mg tablet from 0 5 to 1 tablet every 6 hours to 1 tab every 4 hours as needed  Pt admitts to using 1 tablet at HS to "help me sleep"  2 days ago he did take 1 tablet during the day and reported reduced discomfort  Instructed pt on taking as needed and to tryu and avoid "chasing the pain "    Pt also reports spouse is currently in [address]      Pt very polite but did not offer too much information  Would benefit from call from Dr Anton Bird     Next appointment 06/23/21      Thank you

## 2021-06-07 NOTE — TELEPHONE ENCOUNTER
See encounter note fernando figueroa    This nurse called pt at 415 pm  Introduced myself to pt and reason for call  Pt verbalized his pain is about the same as it was at last office visit with Dr Leigh Birch  Tightening in the stomach and where the tube is placed for drainage  Reviewed adjustment to frequency of Oxycodone 5 mg tablet from 0 5 to 1 tablet every 6 hours to 1 tab every 4 hours as needed  Pt admitts to using 1 tablet at HS to "help me sleep"  2 days ago he did take 1 tablet during the day and reported reduced discomfort  Instructed pt on taking as needed and to tryu and avoid "chasing the pain "     Pt also reprots spouse is currently in [address]     Pt very polite but did not offer too much information       Would benefit from call from Dr Leigh Birch     Next appointment 06/23/21        Thank you

## 2021-06-07 NOTE — PATIENT INSTRUCTIONS
Nutrition Rx & Recommendations:  · Diet: High Calorie, High Protein (for high calorie foods see pages 52-53, and for high protein foods see pages 49-51 in your Eating Hints book)  · Eat slowly and chew food thoroughly before swallowing  · Nutrition Supplements: Try for 3 per day, Try Ensure Complete or Ensure Enlive  May use homemade shakes/smoothies as desired  If using a pre-made shake/bar, choose ones with >300 calories and >10 grams protein (ex  Ensure Enlive, Ensure Plus, Boost Plus, Boost Very High Calorie, etc )  · Small, frequent meals/snacks may be easier to tolerate than 3 large daily meals  Aim for 5-6 small meals per day (every 2-3 hours)  · Include protein at all meals/snacks  · Refer to Eating Hints book for other meal/snack ideas and symptom management  · For weight loss: monitor your weight at home at least 2x/week, record your weight, start by adding 250-500 extra calories per day, eat 5-6 small scheduled meals every 2-3 hrs, choose foods that are high in protein and calories (see pages 49-53 in your Eating Hints book), drink liquids with calories for example: milkshakes/smoothies/juice/soup/whole milk/chocolate milk, cook with protein fortified milk (see recipe on page 36 in your Eating Hints book), consider ready-to-drink oral nutrition supplements such as Ensure Plus, Ensure Enlive, Boost Plus, or Boost Very High Calorie, avoid "diet" and "light" foods when possible, avoid drinking too much with meals, contact your dietitian with any continued weight loss over the course of 1 week  For more info see pages 35-37 in your Eating Hints book  · Weigh yourself regularly  If you notice weight loss, make an effort to increase your daily food/calorie intake  If you continue to notice loss after these efforts, reach out to your dietitian to establish a plan to stabilize weight  · Try Biotene mouthwash or baking soda salt water rinses (page 18 of your Eating Hints book) for dry mouth    · Try to choose calorie containing fluids: whole Fairlife milk (higher protein milk), Fairlife chocolate milk, juice, diluted juice, milkshakes, smoothies, oral nutrition supplements, etc   · Fortify your foods to add extra calories and protein: add cheese to eggs and veggies, choose whole milk dairy products, add extra oils and butter to foods, etc   · Snack ideas, aim for 3 snacks per day: ice cream, cheese and crackers, oral nutrition supplement, chocolate milk with cleopatra crackers, hard boiled eggs, ham and cheese rolls ups, etc   · Blend your Ensure Original with Ice Cream to fortify it until you can purchase a higher calorie and protein option such as Ensure Enlive or Ensure Complete      Follow Up Plan: 7/8/21 during chemo  Recommend Referral to Other Providers: none at this time

## 2021-06-08 LAB
ALBUMIN SERPL-MCNC: 3.7 G/DL (ref 3.8–4.8)
ALBUMIN/GLOB SERPL: 1.4 {RATIO} (ref 1.2–2.2)
ALP SERPL-CCNC: 252 IU/L (ref 48–121)
ALT SERPL-CCNC: 25 IU/L (ref 0–44)
AST SERPL-CCNC: 21 IU/L (ref 0–40)
BASOPHILS # BLD AUTO: 0 X10E3/UL (ref 0–0.2)
BASOPHILS NFR BLD AUTO: 0 %
BILIRUB SERPL-MCNC: 0.5 MG/DL (ref 0–1.2)
BUN SERPL-MCNC: 22 MG/DL (ref 8–27)
BUN/CREAT SERPL: 25 (ref 10–24)
CALCIUM SERPL-MCNC: 9.2 MG/DL (ref 8.6–10.2)
CHLORIDE SERPL-SCNC: 96 MMOL/L (ref 96–106)
CO2 SERPL-SCNC: 25 MMOL/L (ref 20–29)
CREAT SERPL-MCNC: 0.88 MG/DL (ref 0.76–1.27)
EOSINOPHIL # BLD AUTO: 0.1 X10E3/UL (ref 0–0.4)
EOSINOPHIL NFR BLD AUTO: 1 %
ERYTHROCYTE [DISTWIDTH] IN BLOOD BY AUTOMATED COUNT: 13.5 % (ref 11.6–15.4)
GLOBULIN SER-MCNC: 2.7 G/DL (ref 1.5–4.5)
GLUCOSE SERPL-MCNC: 404 MG/DL (ref 65–99)
HCT VFR BLD AUTO: 38.3 % (ref 37.5–51)
HGB BLD-MCNC: 12.9 G/DL (ref 13–17.7)
IMM GRANULOCYTES # BLD: 0.1 X10E3/UL (ref 0–0.1)
IMM GRANULOCYTES NFR BLD: 1 %
LYMPHOCYTES # BLD AUTO: 0.9 X10E3/UL (ref 0.7–3.1)
LYMPHOCYTES NFR BLD AUTO: 10 %
MCH RBC QN AUTO: 30.2 PG (ref 26.6–33)
MCHC RBC AUTO-ENTMCNC: 33.7 G/DL (ref 31.5–35.7)
MCV RBC AUTO: 90 FL (ref 79–97)
MONOCYTES # BLD AUTO: 0.2 X10E3/UL (ref 0.1–0.9)
MONOCYTES NFR BLD AUTO: 2 %
NEUTROPHILS # BLD AUTO: 7.9 X10E3/UL (ref 1.4–7)
NEUTROPHILS NFR BLD AUTO: 86 %
PLATELET # BLD AUTO: 337 X10E3/UL (ref 150–450)
POTASSIUM SERPL-SCNC: 4.3 MMOL/L (ref 3.5–5.2)
PROT SERPL-MCNC: 6.4 G/DL (ref 6–8.5)
RBC # BLD AUTO: 4.27 X10E6/UL (ref 4.14–5.8)
SL AMB EGFR AFRICAN AMERICAN: 104 ML/MIN/1.73
SL AMB EGFR NON AFRICAN AMERICAN: 90 ML/MIN/1.73
SODIUM SERPL-SCNC: 134 MMOL/L (ref 134–144)
WBC # BLD AUTO: 9.1 X10E3/UL (ref 3.4–10.8)

## 2021-06-10 ENCOUNTER — HOSPITAL ENCOUNTER (OUTPATIENT)
Dept: INFUSION CENTER | Facility: HOSPITAL | Age: 66
Discharge: HOME | End: 2021-06-10
Attending: INTERNAL MEDICINE
Payer: COMMERCIAL

## 2021-06-10 VITALS
RESPIRATION RATE: 18 BRPM | TEMPERATURE: 97.3 F | HEART RATE: 86 BPM | BODY MASS INDEX: 22.03 KG/M2 | WEIGHT: 153.88 LBS | DIASTOLIC BLOOD PRESSURE: 67 MMHG | HEIGHT: 70 IN | SYSTOLIC BLOOD PRESSURE: 118 MMHG

## 2021-06-10 DIAGNOSIS — D70.1 CHEMOTHERAPY INDUCED NEUTROPENIA (HCC): ICD-10-CM

## 2021-06-10 DIAGNOSIS — T45.1X5A CHEMOTHERAPY INDUCED NEUTROPENIA (HCC): ICD-10-CM

## 2021-06-10 DIAGNOSIS — C25.9 PANCREATIC ADENOCARCINOMA (HCC): Primary | ICD-10-CM

## 2021-06-10 PROCEDURE — 96413 CHEMO IV INFUSION 1 HR: CPT

## 2021-06-10 PROCEDURE — 96417 CHEMO IV INFUS EACH ADDL SEQ: CPT

## 2021-06-10 PROCEDURE — 96367 TX/PROPH/DG ADDL SEQ IV INF: CPT

## 2021-06-10 RX ORDER — ACETAMINOPHEN 325 MG/1
650 TABLET ORAL ONCE
Status: COMPLETED | OUTPATIENT
Start: 2021-06-10 | End: 2021-06-10

## 2021-06-10 RX ORDER — SODIUM CHLORIDE 9 MG/ML
20 INJECTION, SOLUTION INTRAVENOUS ONCE
Status: COMPLETED | OUTPATIENT
Start: 2021-06-10 | End: 2021-06-10

## 2021-06-10 RX ADMIN — SODIUM CHLORIDE 20 ML/HR: 0.9 INJECTION, SOLUTION INTRAVENOUS at 09:56

## 2021-06-10 RX ADMIN — Medication 191 MG: at 10:44

## 2021-06-10 RX ADMIN — DEXAMETHASONE SODIUM PHOSPHATE: 10 INJECTION, SOLUTION INTRAMUSCULAR; INTRAVENOUS at 09:57

## 2021-06-10 RX ADMIN — GEMCITABINE 1909.9 MG: 38 INJECTION, SOLUTION INTRAVENOUS at 11:51

## 2021-06-10 RX ADMIN — ACETAMINOPHEN 650 MG: 325 TABLET ORAL at 09:58

## 2021-06-10 NOTE — PLAN OF CARE
Problem: Potential for Falls  Goal: Patient will remain free of falls  Description: INTERVENTIONS:  - Assess patient frequently for physical needs  -  Identify cognitive and physical deficits and behaviors that affect risk of falls  -  [place] fall precautions as indicated by assessment   - Educate patient/family on patient safety including physical limitations  - Instruct patient to call for assistance with activity based on assessment  - Modify environment to reduce risk of injury  - Consider OT/PT consult to assist with strengthening/mobility  Outcome: Progressing     Problem: Knowledge Deficit  Goal: Patient/family/caregiver demonstrates understanding of disease process, treatment plan, medications, and discharge instructions  Description: Complete learning assessment and assess knowledge base    Interventions:  - Provide teaching at level of understanding  - Provide teaching via preferred learning methods  Outcome: Progressing

## 2021-06-10 NOTE — PROGRESS NOTES
Juanito Nieves RN notified of patient's elevated blood sugar from 6-7-21 which was 404, out of his normal ranges from the past which are in the 100's

## 2021-06-10 NOTE — PROGRESS NOTES
Patient tolerated chemotherapy infusion well with no adverse effects  Next appointment verified, AVS provided

## 2021-06-11 ENCOUNTER — TELEPHONE (OUTPATIENT)
Dept: PALLIATIVE MEDICINE | Facility: CLINIC | Age: 66
End: 2021-06-11

## 2021-06-11 ENCOUNTER — PATIENT OUTREACH (OUTPATIENT)
Dept: HEMATOLOGY ONCOLOGY | Facility: CLINIC | Age: 66
End: 2021-06-11

## 2021-06-11 DIAGNOSIS — R06.6 INTRACTABLE HICCUPS: Primary | ICD-10-CM

## 2021-06-11 RX ORDER — BACLOFEN 5 MG/1
5 TABLET ORAL 2 TIMES DAILY PRN
Qty: 30 TABLET | Refills: 0 | Status: SHIPPED | OUTPATIENT
Start: 2021-06-11 | End: 2021-01-01

## 2021-06-11 NOTE — TELEPHONE ENCOUNTER
Pt called office c/o pain  Pt was asked the following questions to get a better understanding of their concern  WHERE is the pain:  Abdomen Right greater than left    WHAT kind of pain? (Burning/Sharp/Aching/Dull): Aching   Pt began with Intermittent Hiccups yesterday afternoon causing the pain in his abdomen  What relieves the pain? Oxycodone 5 mg    HOW are you taking your pain medication:  every 4 hours  Last dose 0700 today  Instructed to take dose when call completed (1150 am)     Re Hiccups  Started 06/10  States he is able to sleep  But when he stands or sits up it begins and becomes very painful  During 10 minute conversation with this pooja pt Hiccups were frequent beginning of conversation but began to subside somewhat by end of conversation  Denies N/V, able to eat and drink without fear of choking/aspiration  Last BM 06/10  Moves bowels daily      Please advise    Next appointment 06/23

## 2021-06-11 NOTE — TELEPHONE ENCOUNTER
6/11/2021 3:02 PM -  Trial of baclofen; steroids can't be increased d/t pt's history of significant reflux and pain  Opioids not likely helpful to treat hiccups

## 2021-06-11 NOTE — PROGRESS NOTES
Received a voicemail message from Perry Peguero, he stated he was up all night with the hiccups and on the message he had the hiccups, he was wondering if there was anything he could get to help with those? He also stated he was still having pain in his stomach, can someone please reach out to him to help address the pain and hiccups please? I know his pain meds were just adjusted but maybe he will need to have another office visit? Please advise, thanks!

## 2021-06-11 NOTE — TELEPHONE ENCOUNTER
David Richards called and I did share with him Dr Huynh Hamper advice  Patient will go to [facility] and  the Baclofen and give us an update Monday morning

## 2021-06-14 PROBLEM — E87.1 HYPONATREMIA: Status: ACTIVE | Noted: 2021-01-01

## 2021-06-14 PROBLEM — G89.3 CANCER RELATED PAIN: Status: ACTIVE | Noted: 2021-01-01

## 2021-06-14 NOTE — Clinical Note
Teammates, pt did endorse some foul drainage from a tube site, but virtual visit not helpful to clarify if this was infection or some other complication  Pls be mindful of possible intraabdominal process(es) when you see him

## 2021-06-14 NOTE — ED PROVIDER NOTES
History  Chief Complaint   Patient presents with    Abdominal Pain     Patient reports a hx of pancreatic CA; states that today he had a very sharp pain in his RLQ that has continued; feelings of nausea     Patient is a 44-year-old male, with a history significant for pancreatic cancer (on chemotherapy) and RA (unmedicated since his pancreatic cancer diagnosis five weeks ago), who presents to the ED today due to atraumatic, constant course, sudden onset, 10 of 10 intensity, sharp in character, nonradiating, right lower quadrant pain  No clear exacerbating or remitting factors  Patient denies fever, chest pain, shortness of breath, vomiting, diarrhea, blood in stool, blood in urine  Prior to the onset of his pain, patient was tolerating p o  today  - No language barrier    - History obtained from patient  - There are no limitations to the history obtained  - Previous charting was reviewed          Prior to Admission Medications   Prescriptions Last Dose Informant Patient Reported? Taking? Blood Glucose Monitoring Suppl (ONE TOUCH ULTRA 2) w/Device KIT  Self No Yes   Sig: Use daily   FLUoxetine (PROzac) 20 mg capsule  Self No Yes   Sig: TAKE 1 CAPSULE BY MOUTH EVERY DAY     Lancets (onetouch ultrasoft) lancets  Self No Yes   Sig: Use as instructed once a day   Multiple Vitamins-Minerals (MULTIVITAMIN MEN) TABS  Self Yes Yes   Sig: Take 1 tablet by mouth daily   Omega-3 Fatty Acids (FISH OIL) 1,000 mg  Self Yes Yes   Sig: Take 1,000 mg by mouth daily   Sodium Chloride Flush (Normal Saline Flush) 0 9 % SOLN  Self Yes Yes   Sig: Gall bladder drain   Tofacitinib Citrate ER (Xeljanz XR) 11 MG TB24 Not Taking at Unknown time  Yes No   Sig: Take by mouth   aspirin 81 mg chewable tablet  Self Yes Yes   Sig: Chew 81 mg   atorvastatin (LIPITOR) 10 mg tablet   Yes Yes   Sig: Take 10 mg by mouth daily   diazepam (VALIUM) 2 mg tablet   No Yes   Sig: Take 1 tablet (2 mg total) by mouth daily as needed (hiccups)   folic acid (FOLVITE) 1 mg tablet  Self Yes Yes   Sig: Take by mouth daily   glucose blood (OneTouch Ultra) test strip  Self No Yes   Sig: Use as instructed once a day   lidocaine (LIDODERM) 5 % Not Taking at Unknown time Self No No   Sig: Apply 1 patch topically daily Remove & Discard patch within 12 hours or as directed by MD   Patient not taking: Reported on 5/27/2021   lisinopril-hydrochlorothiazide (PRINZIDE,ZESTORETIC) 10-12 5 MG per tablet   No Yes   Sig: Take 1 tablet by mouth 3 (three) times a week Monday Wednesday Friday   omeprazole (PriLOSEC) 20 mg delayed release capsule  Self No Yes   Sig: Take 1 capsule (20 mg total) by mouth daily before breakfast   oxyCODONE (ROXICODONE) 5 mg immediate release tablet   No Yes   Sig: Take 1 tablet (5 mg total) by mouth every 4 (four) hours as needed (cancer pain)Max Daily Amount: 30 mg   pancrelipase, Lip-Prot-Amyl, (CREON) 24,000 units   No Yes   Sig: Take 1 capsule (24,000 Units total) by mouth 3 (three) times a day with meals   pantoprazole (PROTONIX) 40 mg tablet   No Yes   Sig: Take 1 tablet (40 mg total) by mouth 2 (two) times a day Before breakfast, before bed, to reduce stomach acid   polyethylene glycol (MIRALAX) 17 g packet  Self No Yes   Sig: Take 17 g by mouth daily as needed (Constipation)   predniSONE 10 mg tablet   No Yes   Sig: Take 1 tablet (10 mg total) by mouth daily With breakfast, to reduce tumor inflammation and improve appetite     prochlorperazine (COMPAZINE) 10 mg tablet   No Yes   Sig: Take 1 tablet (10 mg total) by mouth every 6 (six) hours as needed for nausea or vomiting   senna (SENOKOT) 8 6 MG tablet   No Yes   Sig: Take 1 tablet (8 6 mg total) by mouth daily as needed for constipation   tamsulosin (FLOMAX) 0 4 mg  Self No Yes   Sig: TAKE 1 CAPSULE BY MOUTH  DAILY AT BEDTIME      Facility-Administered Medications: None       Past Medical History:   Diagnosis Date    Anxiety     Arthritis     Cancer ([facility] [address] )     pancreatic    Cellulitis LAST ASSESSED: 6/13/14    Enlarged prostate     Epidermal inclusion cyst     LAST ASSESSED: 10/4/13    Erythrasma     LAST ASSESSED: 9/23/13    Furuncle     LAST ASSESSED: 6/11/14    GERD (gastroesophageal reflux disease)     Hyperlipidemia     Hypertension     RA [facility]rheumatoid arthritis) ([facility] [address] )        Past Surgical History:   Procedure Laterality Date    COLONOSCOPY      FL GUIDED CENTRAL VENOUS ACCESS DEVICE INSERTION  6/1/2021    HYDROCELE EXCISION / REPAIR Right 01/11/2018    SPERMATIC CORD EXCSION OF HYDROCELE; MANAGED BY: Rosa Whitt    IR CHOLECYSTOSTOMY TUBE PLACEMENT  5/11/2021    NH REMOVAL OF HYDROCELE,TUNICA,UNILAT Right 1/11/2018    Procedure: HYDROCELECTOMY;  Surgeon: Chon Garcia MD;  Location: [facility];  Service: Urology    SCROTAL SURGERY      benign "lump"    TUNNELED VENOUS PORT PLACEMENT Left 6/1/2021    Procedure: INSERTION VENOUS PORT (PORT-A-CATH); Surgeon: Hammad Cali MD;  [facility]: [facility];  Service: Surgical Oncology       Family History   Problem Relation Age of Onset    Heart disease Father         CARDIAC DISORDER    Hypertension Father     Hypertension Mother     No Known Problems Maternal Aunt     No Known Problems Maternal Uncle     No Known Problems Paternal Aunt     No Known Problems Paternal Uncle     No Known Problems Paternal Grandmother     No Known Problems Paternal Grandfather     Diabetes Maternal Grandmother         MELLITUS    No Known Problems Maternal Grandfather     Hypertension Other      I have reviewed and agree with the history as documented      E-Cigarette/Vaping    E-Cigarette Use Never User      E-Cigarette/Vaping Substances    Nicotine No     THC No     CBD No      Social History     Tobacco Use    Smoking status: Former Smoker    Smokeless tobacco: Never Used    Tobacco comment: tobacco in a pipe in the early 1980's   Vaping Use    Vaping Use: Never used   Substance Use Topics    Alcohol use: Never    Drug use: Never        Review of Systems   Constitutional: Negative for fever  HENT: Negative for trouble swallowing  Eyes: Negative for visual disturbance  Respiratory: Negative for shortness of breath  Cardiovascular: Negative for chest pain  Gastrointestinal: Positive for abdominal pain  Negative for blood in stool, diarrhea, nausea and vomiting  Endocrine: Negative for polyuria  Genitourinary: Negative for dysuria and hematuria  Musculoskeletal: Negative for gait problem  Skin: Negative for rash  Allergic/Immunologic: Positive for environmental allergies  Neurological: Positive for weakness (Generalized)  Negative for numbness  Hematological: Negative for adenopathy  Psychiatric/Behavioral: Negative for confusion  Physical Exam  ED Triage Vitals [06/14/21 1759]   Temperature Pulse Respirations Blood Pressure SpO2   98 1 °F (36 7 °C) 83 16 127/68 97 %      Temp Source Heart Rate Source Patient Position - Orthostatic VS BP Location FiO2 (%)   Oral Monitor Lying Right arm --      Pain Score       Worst Possible Pain             Orthostatic Vital Signs  Vitals:    06/14/21 1759 06/14/21 1830 06/14/21 1930 06/14/21 2100   BP: 127/68 124/69 127/66 126/67   Pulse: 83 88 92 96   Patient Position - Orthostatic VS: Lying Lying Lying Lying       Physical Exam  Vitals and nursing note reviewed  Constitutional:       General: He is in acute distress (Secondary to pain)  Appearance: He is not toxic-appearing  HENT:      Head: Normocephalic  Right Ear: External ear normal       Left Ear: External ear normal       Nose: Nose normal  No rhinorrhea  Mouth/Throat:      Mouth: Mucous membranes are moist       Pharynx: Oropharynx is clear  No oropharyngeal exudate or posterior oropharyngeal erythema  Eyes:      General: No scleral icterus  Right eye: No discharge  Left eye: No discharge        Conjunctiva/sclera: Conjunctivae normal       Pupils: Pupils are equal, round, and reactive to light  Cardiovascular:      Rate and Rhythm: Normal rate and regular rhythm  Pulses: Normal pulses  Heart sounds: Normal heart sounds  No murmur heard  No friction rub  No gallop  Comments: 2+ Radial  Pulmonary:      Effort: Pulmonary effort is normal  No respiratory distress  Breath sounds: Normal breath sounds  No stridor  No wheezing, rhonchi or rales  Abdominal:      General: Bowel sounds are normal  There is no distension  Tenderness: There is generalized abdominal tenderness and tenderness in the right lower quadrant  There is guarding  There is no right CVA tenderness, left CVA tenderness or rebound  Positive signs include Rovsing's sign and McBurney's sign  Negative signs include Calles's sign  Comments: Perc-choly drain in place  Draining bilious and non-bloody fluid  Musculoskeletal:         General: No tenderness  Cervical back: Neck supple  No tenderness  Right lower leg: No edema  Left lower leg: No edema  Lymphadenopathy:      Cervical: No cervical adenopathy  Skin:     General: Skin is warm and dry  Capillary Refill: Capillary refill takes less than 2 seconds  Neurological:      Mental Status: He is alert  Comments: Patient is speaking clearly in complete sentences  Patient is answering appropriately and able follow commands  Patient is moving all four extremities spontaneously  No facial droop  Tongue midline     Psychiatric:         Mood and Affect: Mood normal          Behavior: Behavior normal          ED Medications  Medications   HYDROmorphone (DILAUDID) injection 1 mg (1 mg Intravenous Given 6/14/21 1822)   diazepam (VALIUM) injection 1 mg (1 mg Intravenous Given 6/14/21 2049)   iohexol (OMNIPAQUE) 350 MG/ML injection (SINGLE-DOSE) 100 mL (100 mL Intravenous Given 6/14/21 2024)       Diagnostic Studies  Results Reviewed     Procedure Component Value Units Date/Time    CBC and differential [[id number]] Collected: 06/14/21 1822    Lab Status: Final result Specimen: Blood from Arm, Left Updated: 06/14/21 1917     WBC 4 37 Thousand/uL      RBC 4 20 Million/uL      Hemoglobin 12 3 g/dL      Hematocrit 37 0 %      MCV 88 fL      MCH 29 3 pg      MCHC 33 2 g/dL      RDW 14 1 %      MPV 11 2 fL      Platelets 633 Thousands/uL      nRBC 0 /100 WBCs     Narrative: This is an appended report  These results have been appended to a previously verified report      Manual Differential(PHLEBS Do Not Order) [[id number]]  (Abnormal) Collected: 06/14/21 1822    Lab Status: Final result Specimen: Blood from Arm, Left Updated: 06/14/21 1917     Segmented % 91 %      Lymphocytes % 4 %      Monocytes % 3 %      Eosinophils, % 1 %      Basophils % 0 %      Atypical Lymphocytes % 1 %      Absolute Neutrophils 3 98 Thousand/uL      Lymphocytes Absolute 0 17 Thousand/uL      Monocytes Absolute 0 13 Thousand/uL      Eosinophils Absolute 0 04 Thousand/uL      Basophils Absolute 0 00 Thousand/uL      Total Counted --     RBC Morphology Present     Polychromasia Present     Platelet Estimate Adequate    Comprehensive metabolic panel [[id number]]  (Abnormal) Collected: 06/14/21 1822    Lab Status: Final result Specimen: Blood from Arm, Left Updated: 06/14/21 1852     Sodium 129 mmol/L      Potassium 4 2 mmol/L      Chloride 92 mmol/L      CO2 24 mmol/L      ANION GAP 13 mmol/L      BUN 20 mg/dL      Creatinine 0 84 mg/dL      Glucose 422 mg/dL      Calcium 9 2 mg/dL      Corrected Calcium 10 2 mg/dL      AST 12 U/L      ALT 40 U/L      Alkaline Phosphatase 200 U/L      Total Protein 6 6 g/dL      Albumin 2 7 g/dL      Total Bilirubin 1 04 mg/dL      eGFR 92 ml/min/1 73sq m     Narrative:      [facility] guidelines for Chronic Kidney Disease (CKD):     Stage 1 with normal or high GFR (GFR > 90 mL/min/1 73 square meters)    Stage 2 Mild CKD (GFR = 60-89 mL/min/1 73 square meters)    Stage 3A Moderate CKD (GFR = 45-59 mL/min/1 73 square meters)    Stage 3B Moderate CKD (GFR = 30-44 mL/min/1 73 square meters)    Stage 4 Severe CKD (GFR = 15-29 mL/min/1 73 square meters)    Stage 5 End Stage CKD (GFR <15 mL/min/1 73 square meters)  Note: GFR calculation is accurate only with a steady state creatinine    Lipase [[id number]]  (Abnormal) Collected: 06/14/21 1822    Lab Status: Final result Specimen: Blood from Arm, Left Updated: 06/14/21 1852     Lipase 72 u/L                  CT abdomen pelvis with contrast   Final Result by Yamileth Nguyễn MD (06/14 2118)      Unchanged pancreatic head mass  Unchanged 28 mm enhancing left hepatic lesion  Fluid surrounding the pylorus and duodenum is persistent but improved  There is a collection measuring 6 2 x 2 8 x 3 2 cm ,#2/30 #601/43  located lateral to the first portion of the duodenum with a small focus of internal gas  New edema throughout the right mid abdominal omentum #2/41 possibly reactive to the adjacent collection described above  No discrete omental mass  The study was marked in [facility] for immediate notification  Workstation performed: [id number]               Procedures  Procedures      ED Course  ED Course as of Jun 14 2229   Mon Jun 14, 2021   1838 EKG: Normal rate, irregular rhythm, PVC, normal axis, no ST elevation      2000 Patient complaining of hiccups  He has diazepam prescribed on an ambulatory basis for this issue  As scan results are pending, will prescribe IV        2005 Patient appears more comfortable at this time       2229 Patient is admitted to [facility]                  Identification of Seniors at Risk      Most Recent Value   (ISAR) Identification of Seniors at Risk   Before the illness or injury that brought you to the Emergency, did you need someone to help you on a regular basis?   0 Filed at: 06/14/2021 1800   In the last 24 hours, have you needed more help than usual?  0 Filed at: 06/14/2021 1800   Have you been hospitalized for one or more nights during the past 6 months? 0 Filed at: 06/14/2021 1800   In general, do you see well?  0 Filed at: 06/14/2021 1800   In general, do you have serious problems with your memory? 0 Filed at: 06/14/2021 1800   Do you take more than three different medications every day? 1 Filed at: 06/14/2021 1800   ISAR Score  1 Filed at: 06/14/2021 1800                    SBIRT 20yo+      Most Recent Value   SBIRT (25 yo +)   In order to provide better care to our patients, we are screening all of our patients for alcohol and drug use  Would it be okay to ask you these screening questions? No Filed at: 06/14/2021 1801                [facility]  Number of Diagnoses or Management Options  Diagnosis management comments: Patient is a 56-year-old male, with a history significant for pancreatic cancer (on chemotherapy) and RA (unmedicated since his pancreatic cancer diagnosis five weeks ago), who presents to the ED today due to atraumatic, constant course, sudden onset, 10 of 10 intensity, sharp in character, nonradiating, right lower quadrant pain  No clear exacerbating or remitting factors  Patient denies fever, chest pain, shortness of breath, vomiting, diarrhea, blood in stool, blood in urine  Patient is currently afebrile and hemodynamically stable  His physical exam is notable for uncomfortable appearance, generalized abdominal pain with a focal point at the RLQ, guarding but no rebound  This presentation is concerning for: appendicitis, colitis, cancer related pain  Will investigate with CBC, CMP, Lipase, ECG, CT abdomen pelvis  Will manage with dilaudid for pain and further base on workup         Disposition  Final diagnoses:   Hyponatremia   Abdominal pain   Nausea   Hyperglycemia   Edema     Time reflects when diagnosis was documented in both MDM as applicable and the Disposition within this note     Time User Action Codes Description Comment    6/14/2021  7:09 PM Hess Mercy A Add [E87 1] Hyponatremia 6/14/2021  7:09 PM Loco Barahona A Add [R10 9] Abdominal pain     6/14/2021  7:09 PM Eloina Meenakshi A Add [R11 0] Nausea     6/14/2021  7:09 PM Eloina Meenakshi A Add [R73 9] Hyperglycemia     6/14/2021  9:49 PM Eloina Meenakshi A Add [R60 9] Edema       ED Disposition     ED Disposition Condition Date/Time Comment    Admit Stable Mon Jun 14, 2021 10:29 PM Case was discussed with LOYDA and the patient's admission status was agreed to be Admission Status: observation status to the service of Dr Curtis Cam   Follow-up Information    None         Patient's Medications   Discharge Prescriptions    No medications on file     No discharge procedures on file  PDMP Review       Value Time User    PDMP Reviewed  Yes 6/14/2021  1:12 PM Mary Ann Altamirano MD           ED Provider  Attending physically available and evaluated El Gentile I managed the patient along with the ED Attending      Electronically Signed by         Caitlin Corona MD  06/14/21 9616

## 2021-06-14 NOTE — TELEPHONE ENCOUNTER
Will hope to see pt at 1pm virtual   If signs of abscess are borne out on our virutal exam, pt will likely need urgent ED eval and perhaps antibiosis

## 2021-06-14 NOTE — TELEPHONE ENCOUNTER
Pt called office with update on hiccups  Per pt still has hiccups off and on  Taking baclofen 2 x day over weekend  Does not feel they offered any help  Reports his pain is holding at 5 to 6 /10 as the hiccups are aggravating the pain in abdomen  Taking Oxycodone every 4 hours as needed averaging 4 tabs /24 hours  States "sometimes it feels like its pushing up in my abdomen  It feels like I want to vomit but I don't"    Pt also reports possible purulent drainage from his Cholectyostomy Tube  Pt states that the tube is near a area of fluid  ? Abscess  ( see CT 5/14)   Pt will be calling oncologist re same  Per encounters , pt did not follow up with GI but has appointment 6/22 in IR   Pt is available for a virtual visit today if needed  Please advise

## 2021-06-15 PROBLEM — E11.65 TYPE 2 DIABETES MELLITUS WITH HYPERGLYCEMIA, WITHOUT LONG-TERM CURRENT USE OF INSULIN (HCC): Status: ACTIVE | Noted: 2021-03-16

## 2021-06-15 PROBLEM — R10.9 ABDOMINAL PAIN: Status: ACTIVE | Noted: 2021-01-01

## 2021-06-15 NOTE — ASSESSMENT & PLAN NOTE
· Sodium 129 on admission, suspect in setting of poor oral intake secondary to abdominal pain  · Will provide IV fluids overnight, recheck BMP in a m

## 2021-06-15 NOTE — ASSESSMENT & PLAN NOTE
Lab Results   Component Value Date    HGBA1C 6 2 (H) 05/05/2021       No results for input(s): POCGLU in the last 72 hours      Blood Sugar Average: Last 72 hrs:  ·  Appears to be diet controlled as outpatient  · Hyperglycemic currently   · Note chronic prednisone use   · SSI with Accu-Cheks  · Diabetic diet   · Initiate hypoglycemia protocol

## 2021-06-15 NOTE — CONSULTS
Consultation - Palliative and Supportive Care   Manual Rick 73 y o  male [phone]    Patient Active Problem List   Diagnosis    Right arm pain    Rheumatoid arthritis ([facility] [address] )    Essential hypertension    Urgency of urination    Anxiety disorder    Hyperlipidemia    Liver mass    Esophageal reflux    Multiple lipomas    Type 2 diabetes mellitus with hyperglycemia, without long-term current use of insulin (HCC)    Hypokalemia    Diarrhea    Pancreatic mass    Epigastric pain    BPH (benign prostatic hyperplasia)    Constipation    SIRS (systemic inflammatory response syndrome) (HCC)    Perforation bowel (HCC)    Acute cholecystitis    Leukocytosis    Pancreatic adenocarcinoma (HCC)    Chemotherapy induced neutropenia (HCC)    Cancer related pain    Hyponatremia    Abdominal pain     Active issues specifically addressed today include:   · Pancreatic adenocarcinoma  · Cancer related pain  · Intractable hiccups  · Xerostomia  · Opioid induced constipation  · Nausea and vomiting  · Palliative care patient    Plan:  1  Symptom management    - Intractable hiccups likely multifactorial for this patient with pancreatic adenocarcinoma, could possibly be related to gastric distension from fluid collection surrounding pylorus/duodenum  - Will do trial of metochlopramide 10mg TID and consider adding CCB such as nifedipine tomorrow if no improvement    - Can order EKG to check QTC, last EKG on chart from 2017 showed QTC wnl   - Controlling hiccups likely to help with management of pain  - Pt appeared drowsy after receiving Oxycodone 10mg   Will do Oxycodone 7 5mg q4h PRN for moderate/severe pain and discontinuing Oxycodone 5mg-10mg q4h PRN for moderate/severe    - Continue Hydromorphone IV 0 5mg q4h for breakthrough pain   - Scheduled Sennakote with Miralax PRN to prevent OIC   - Start Mouth Kote PRN for xerostomia   - Will discuss plan with attending and will place appropriate orders afterwards    2  Goals    -Confirms level 1 code status   -Palliative will follow for ongoing care discussions as situation evolves     Code Status: Level 1- Full Code   Decisional apparatus:  Patient is competent on my exam today  If competence is lost, patient's substitute decision maker would default to spouse by [place] 169  Advance Directive / Living Will / POLST:  Not on file     I have reviewed the patient's controlled substance dispensing history in the Prescription Drug Monitoring Program in compliance with the [facility] regulations before prescribing any controlled substances  We appreciate the invitation to be involved in this patient's care  We will follow pt during hospital stay  Please do not hesitate to reach our on call provider through our clinic answering service at  should you have acute symptom control concerns  Maciej Wick MD  Palliative and Supportive Care  Clinic/Answering Service: [phone]  You can find me on [employer]! IDENTIFICATION:        Inpatient consult to Palliative Care     Performed by  Maciej Wick MD     Authorized by Milta Halsted, DO            Physician Requesting Consult: Milta Halsted, DO    Reason for Consult / Principal Problem: Cancer related pain and intractable hiccups    HISTORY OF PRESENT ILLNESS:       Alva Iglesias is a 73 y o  male  recently diagnosed pancreatic adenocarcinoma in 83/2382 complicated by development of groove pancreatitis and acute cholecystitis s/p percutaneous cholecystostomy tube placement on 05/11/2021  He subsequently had port placement on 06/01/20221 with 2 doses of chemotherapy provided so far  Followed by Dr Sharon Boxer from Heme/Onc  Next chemotherapy scheduled for this Thursday 6/17/2021  Patient is known to the palliative care team  He last had a virtual visit with Dr Paige Zarco on 6/14/2021   Pt had reported increased abdominal pain that comes in waves, worsened by episodes of  hiccups that had not improved with baclofen therapy  At previous visit, pt's pain regimen was increased to Oxycodone 5mg q4h PRN and pt was switched from Baclofen to Valium  Pt presented to [facility] ED with continued abdominal pain, worse in the RLQ, and intractable hiccups  CT abd/pelv with contrast did show fluid near pylorus and duodenum that appears to have improved from prior imaging, but also a fluid collection lateral to the duodenum with small gas present within the collection  Surg/onc reviewed imaging and at this time do not think this collection represents infected necrotizing pancreatitis requiring intervention; recommended optimization of pain control and hiccups  Pt received a dose of IV valium 1mg and IV dilaudid 1mg on admission  Overnight, he required another dose of IV dilaudid 0 5mg and oxycodone 10mg twice  This morning he continues to experience abdominal pain, mostly in the RLQ, when he has hiccups  Reports that the oxycodone does help him with the pain allowing him to sleep, but that the pain returns in waves and is worsened by hiccups which come and go  Review of Systems   Constitutional: Positive for decreased appetite and malaise/fatigue  Negative for fever  Cardiovascular: Negative for chest pain and syncope  Respiratory: Negative for cough and shortness of breath  Skin: Negative for rash  Musculoskeletal: Negative for back pain, joint pain and muscle weakness  Gastrointestinal: Positive for abdominal pain, heartburn and jaundice  Negative for constipation, diarrhea, nausea and vomiting  Neurological: Negative for headaches, light-headedness and seizures  Psychiatric/Behavioral: Negative for depression  The patient does not have insomnia          Past Medical History:   Diagnosis Date    Anxiety     Arthritis     Cancer ([facility] [address] )     pancreatic    Cellulitis     LAST ASSESSED: 6/13/14    Enlarged prostate     Epidermal inclusion cyst     LAST ASSESSED: 10/4/13    Erythrasma     LAST ASSESSED: 9/23/13    Furuncle     LAST ASSESSED: 6/11/14    GERD (gastroesophageal reflux disease)     Hyperlipidemia     Hypertension     RA (rheumatoid arthritis) ([facility] [address] )      Past Surgical History:   Procedure Laterality Date    COLONOSCOPY      FL GUIDED CENTRAL VENOUS ACCESS DEVICE INSERTION  6/1/2021    HYDROCELE EXCISION / REPAIR Right 01/11/2018    SPERMATIC CORD EXCSION OF HYDROCELE; MANAGED BY: Ino Craig    IR CHOLECYSTOSTOMY TUBE PLACEMENT  5/11/2021    RI REMOVAL OF HYDROCELE,TUNICA,UNILAT Right 1/11/2018    Procedure: HYDROCELECTOMY;  Surgeon: Mary Womack MD;  Location: [facility]  MAIN OR;  Service: Urology    SCROTAL SURGERY      benign "lump"    TUNNELED VENOUS PORT PLACEMENT Left 6/1/2021    Procedure: INSERTION VENOUS PORT (PORT-A-CATH);   Surgeon: Daily Weber MD;  Location:  MAIN OR;  Service: Surgical Oncology     Social History     Socioeconomic History    Marital status: /Civil Union     Spouse name: Not on file    Number of children: 2    Years of education: Not on file    Highest education level: Not on file   Occupational History    Occupation: FULL-TIME EMPLOYMENT   Tobacco Use    Smoking status: Former Smoker    Smokeless tobacco: Never Used    Tobacco comment: tobacco in a pipe in the early 1980's   Vaping Use    Vaping Use: Never used   Substance and Sexual Activity    Alcohol use: Never    Drug use: Never    Sexual activity: Never     Comment: NOT CURRENTLY SEXUALLY ACTIVE AS PER [employer]   Other Topics Concern    Not on file   Social History Narrative    ALWAYS USES SEAT BELT    DENIED DAILY COFFEE CONSUMPTION (__CUPS/DAY)    DENIED DAILY COLA CONSUMPTION (__CANS/DAY)    DAILY TEA CONSUMPTION (__CUPS/DAY)    DENTAL CARE, REGULARLY    EXERCISE: WALKING    HIGH SCHOOL GRADUATE    DENIED MULTIPLE ORGAN DONOR    NO LIVING WILL    PETS/ANIMALS    DENIED POWER OF  IN EXISTENCE    WATER INTAKE, ADEQUATE (PER DAY)     Social Determinants of Health Financial Resource Strain:     Difficulty of Paying Living Expenses:    Food Insecurity:     Worried About Running Out of Food in the Last Year:     [address] in the Last Year:    Transportation Needs:     Lack of Transportation (Medical):  Lack of Transportation (Non-Medical):    Physical Activity:     Days of Exercise per Week:     Minutes of Exercise per Session:    Stress:     Feeling of Stress :    Social Connections:     Frequency of Communication with Friends and Family:     Frequency of Social Gatherings with Friends and Family:     Attends Scientologist Services:     Active Member of Clubs or Organizations:     Attends Club or Organization Meetings:     Marital Status:    Intimate Partner Violence:     Fear of Current or Ex-Partner:     Emotionally Abused:     Physically Abused:     Sexually Abused:      Family History   Problem Relation Age of Onset    Heart disease Father         CARDIAC DISORDER    Hypertension Father     Hypertension Mother     No Known Problems Maternal Aunt     No Known Problems Maternal Uncle     No Known Problems Paternal Aunt     No Known Problems Paternal Uncle     No Known Problems Paternal Grandmother     No Known Problems Paternal Grandfather     Diabetes Maternal Grandmother         MELLITUS    No Known Problems Maternal Grandfather     Hypertension Other        MEDICATIONS / ALLERGIES:    all current active meds have been reviewed    Allergies   Allergen Reactions    Fentanyl Anaphylaxis and Other (See Comments)     Oxygen drops severely       OBJECTIVE:    Physical Exam  Physical Exam  Constitutional:       General: He is not in acute distress  Appearance: He is ill-appearing  He is not diaphoretic  Comments: Occasional hiccups exacerbating pain   HENT:      Head: Normocephalic and atraumatic  Mouth/Throat:      Mouth: Mucous membranes are dry  Eyes:      General: No scleral icterus  Right eye: No discharge  Left eye: No discharge  Conjunctiva/sclera: Conjunctivae normal    Cardiovascular:      Rate and Rhythm: Normal rate and regular rhythm  Heart sounds: Normal heart sounds  Pulmonary:      Effort: Pulmonary effort is normal  No respiratory distress  Breath sounds: Normal breath sounds  Abdominal:      Comments: tenderness with light palpation particularly in right lower quadrant, cholecystostomy tube in place draining bilious fluid without surrounding erythema or tenderness    Musculoskeletal:      Right lower leg: No edema  Left lower leg: No edema  Skin:     General: Skin is warm  Neurological:      General: No focal deficit present  Mental Status: He is oriented to person, place, and time  Mental status is at baseline  Comments: Sleeping, easily arouses to voice but falling asleep during exam         Lab Results: I have personally reviewed pertinent labs  Imaging Studies: reviewed  EKG, Pathology, and Other Studies: reviewed    Counseling / Coordination of Care    Total floor / unit time spent today 45 minutes  Greater than 50% of total time was spent with the patient and / or family counseling and / or coordination of care  A description of the counseling / coordination of care: symptom assessment and management, medication review and adjustment, psychosocial support, chart review, imaging review, lab review

## 2021-06-15 NOTE — ASSESSMENT & PLAN NOTE
· Presented with h/o pancreatic cancer presents with increased abdominal pain  · CT abdomen/pelvis with improvement in previously known peripancreatic collection but with air bubble noted  · Surgical oncology input noted, okay for diet and no current indication for emergent surgical intervention  · Patient has been noting increased pain and intractable hiccups  · yK Toth consult and recommendations   · Continue serial abdominal exams

## 2021-06-15 NOTE — ASSESSMENT & PLAN NOTE
· Blood pressure acceptable, continue Prinzide 10/12 5 3 times weekly  · Monitor blood pressure per protocol

## 2021-06-15 NOTE — UTILIZATION REVIEW
Initial Clinical Review    Admission: Date/Time/Statement:   Admission Orders: Observation 6/14 @ 2229 and changed to Inpatient on 6/15 @ 1425  Pt requiring continued stay for Persistent abdominal pain, Pancreatic CA on chemo  Ordered        06/15/21 1425  Inpatient Admission  Once         06/14/21 2229  Place in Observation  Once                   Orders Placed This Encounter   Procedures    Inpatient Admission     Standing Status:   Standing     Number of Occurrences:   1     Order Specific Question:   Level of Care     Answer:   Med Surg [16]     Order Specific Question:   Estimated length of stay     Answer:   More than 2 Midnights     Order Specific Question:   Certification     Answer:   I certify that inpatient services are medically necessary for this patient for a duration of greater than two midnights  See H&P and MD Progress Notes for additional information about the patient's course of treatment  ED Arrival Information     Expected Arrival Acuity    - 6/14/2021 17:55 Urgent         Means of arrival Escorted by Service Admission type    Ambulance [facility] Urgent         Arrival complaint    abdominal pain        Chief Complaint   Patient presents with    Abdominal Pain     Patient reports a hx of pancreatic CA; states that today he had a very sharp pain in his RLQ that has continued; feelings of nausea     Initial Presentation:   71y Male to ED present for abdominal pain  PMH for Pancreatic adenocarcinoma diagnosed 05/2021 with known metastasis to liver complicated by development of acute cholecystitis s/p percutaneous cholecystostomy tube placement 05/11/2021; subsequently had port placement 06/01/2021 with 2 doses neoadjuvant chemotherapy  Port placement 06/01/2021 with 2 doses neoadjuvant chemotherapy provided   Note with intermittent trouble with hiccups  described as intractable despite attempts at treatment by palliative care with baclofen and Valium; additionally noted episodes of abdominal pain coming in waves particularly associated with the hiccups  In ED right lower quadrant with nausea with vomiting  CT abdomen/pelvis in ED which actually revealed improved findings from prior; was noted with hyponatremia on initial labs  Admit Observation level of care for Abdominal pain, Hyponatremia and Acute cholecystitis  CT abdomen/pelvis with improvement in previously known peripancreatic collection but with air bubble noted  Serial abdominal exams  Na 129 on admit, maybe related to poor po intake secondary to abdominal pain  IVFss  Recheck BMP in am  Diagnosed 05/2021, s/p port placement and receiving neoadjuvant chemotherapy with eventual plan for Whipple procedure  Surgical oncology consult  Continue cholecystostomy tube care/drainage to gravity  6/14 Oncology-Surgical cons; Pancreatic CA currently on chemotherapy who p/w abdominal pain in setting of intractable hiccups  Serial abdominal exams  Pain control  Trend labs, recheck tomorrow  Continue Perc ashli to gravity bag      6/15 Per Oncology-Surgical; Serial abdominal exams  Trend hepatic function panel  Pain control  Perc ashli to gravity bag         Date: 6/15  Day 2:   Progress notes; Still with complaint of abdominal pain, mostly in RLQ  Reports episode of vomiting this morning  Only able to drink milk  States hiccups come and go  Ashli drain noted with bilious fluid output, generalized tenderness but worse in RLQ  Palliative Care consult  Continue serial abdominal exams  Monitor BMP  Na improved to 135  Continue IVFs until improvement of po intake  Palliative Care cons; Symptom management  Intractable hiccups likely multifactorial for this patient with pancreatic adenocarcinoma, could possibly be related to gastric distension from fluid collection surrounding pylorus/duodenum  Do trial of metochlopramide 10mg TID and consider adding CCB such as nifedipine tomorrow if no improvement   EKG to check QTC, last EKG on chart from 2017 showed QTC wnl  Controlling hiccups likely to help with management of pain  Pt appeared drowsy after receiving Oxycodone 10mg  Will do Oxycodone 7 5mg q4h PRN for moderate/severe pain and discontinuing Oxycodone 5mg-10mg q4h  Continue Hydromorphone IV 0 5mg q4h for breakthrough pain    ED Triage Vitals [06/14/21 1759]   Temperature Pulse Respirations Blood Pressure SpO2   98 1 °F (36 7 °C) 83 16 127/68 97 %      Temp Source Heart Rate Source Patient Position - Orthostatic VS BP Location FiO2 (%)   Oral Monitor Lying Right arm --      Pain Score       Worst Possible Pain          Wt Readings from Last 1 Encounters:   06/14/21 69 4 kg (153 lb)     Additional Vital Signs:   06/15/21 0800  97 8 °F (36 6 °C)  93  16  108/70  84  94 %  None (Room air)  Lying   06/15/21 0517  --  109  Abnormal   16  116/64  --  95 %  None (Room air)  --   06/14/21 2300  --  104  --  132/68  94  --  --  --   06/14/21 2230  --  94  --  136/71  93  --  --  --   06/14/21 2100  --  96  16  126/67  89  93 %  None (Room air)  Lying   06/14/21 1930  --  92  --  127/66  89  92 %  --  Lying   06/14/21 1830  --  88  16  124/69  91  95 %  None (Room air)       Pertinent Labs/Diagnostic Test Results:   6/14 CT Abd/Pelvis - Unchanged pancreatic head mass  Unchanged 28 mm enhancing left hepatic lesion  Fluid surrounding the pylorus and duodenum is persistent but improved  There is a collection measuring 6 2 x 2 8 x 3 2 cm ,#2/30 #601/43  located lateral to the first portion of the duodenum with a small focus of internal gas  New edema throughout the right mid abdominal omentum #2/41 possibly reactive to the adjacent collection described above   No discrete omental mass          Results from last 7 days   Lab Units 06/15/21  0438 06/14/21  1822   WBC Thousand/uL 5 60 4 37   HEMOGLOBIN g/dL 11 2* 12 3   HEMATOCRIT % 34 5* 37 0   PLATELETS Thousands/uL 245 293         Results from last 7 days   Lab Units 06/15/21  7692 06/14/21  1822   SODIUM mmol/L 130* 129*   POTASSIUM mmol/L 4 1 4 2   CHLORIDE mmol/L 96* 92*   CO2 mmol/L 27 24   ANION GAP mmol/L 7 13   BUN mg/dL 20 20   CREATININE mg/dL 0 68 0 84   EGFR ml/min/1 73sq m 100 92   CALCIUM mg/dL 9 0 9 2   MAGNESIUM mg/dL 1 9  --    PHOSPHORUS mg/dL 4 0  --      Results from last 7 days   Lab Units 06/15/21  0438 06/14/21  1822   AST U/L 14 12   ALT U/L 33 40   ALK PHOS U/L 184* 200*   TOTAL PROTEIN g/dL 6 0* 6 6   ALBUMIN g/dL 2 4* 2 7*   TOTAL BILIRUBIN mg/dL 0 78 1 04*   BILIRUBIN DIRECT mg/dL 0 33*  --      Results from last 7 days   Lab Units 06/15/21  1129   POC GLUCOSE mg/dl 311*     Results from last 7 days   Lab Units 06/15/21  0438 06/14/21  1822   GLUCOSE RANDOM mg/dL 329* 422*       Results from last 7 days   Lab Units 06/14/21  1822   LIPASE u/L 72*       ED Treatment:   Medication Administration from 06/14/2021 1755 to 06/15/2021 0743       Date/Time Order Dose Route Action     06/14/2021 1822 HYDROmorphone (DILAUDID) injection 1 mg 1 mg Intravenous Given     06/14/2021 2049 diazepam (VALIUM) injection 1 mg 1 mg Intravenous Given     06/14/2021 2024 iohexol (OMNIPAQUE) 350 MG/ML injection (SINGLE-DOSE) 100 mL 100 mL Intravenous Given     06/15/2021 0434 oxyCODONE (ROXICODONE) immediate release tablet 10 mg 10 mg Oral Given     06/14/2021 2337 oxyCODONE (ROXICODONE) immediate release tablet 10 mg 10 mg Oral Given     06/15/2021 0117 HYDROmorphone (DILAUDID) injection 0 5 mg 0 5 mg Intravenous Given     06/14/2021 2337 ondansetron (ZOFRAN) injection 4 mg 4 mg Intravenous Given     06/15/2021 0434 pantoprazole (PROTONIX) EC tablet 40 mg 40 mg Oral Given     06/15/2021 0117 sodium chloride 0 9 % infusion 100 mL/hr Intravenous New Bag        Past Medical History:   Diagnosis Date    Anxiety     Arthritis     Cancer ([facility] [address] )     pancreatic    Cellulitis     LAST ASSESSED: 6/13/14    Enlarged prostate     Epidermal inclusion cyst     LAST ASSESSED: 10/4/13    Erythrasma LAST ASSESSED: 9/23/13    Furuncle     LAST ASSESSED: 6/11/14    GERD (gastroesophageal reflux disease)     Hyperlipidemia     Hypertension     RA (rheumatoid arthritis) ([facility] [address]      Present on Admission:   Type 2 diabetes mellitus with hyperglycemia, without long-term current use of insulin (HCC)   Essential hypertension   Pancreatic adenocarcinoma (HCC)   Cancer related pain   Hyponatremia   Abdominal pain   Acute cholecystitis      Admitting Diagnosis: Edema [R60 9]  Hyponatremia [E87 1]  Nausea [R11 0]  Abdominal pain [R10 9]  Hyperglycemia [R73 9]  Pancreatic adenocarcinoma (HCC) [C25 9]  Cancer related pain [G89 3]  Age/Sex: 73 y o  male     Admission Orders:  Scheduled Medications:  aspirin, 81 mg, Oral, Daily  atorvastatin, 10 mg, Oral, After Dinner  enoxaparin, 40 mg, Subcutaneous, Daily  fish oil, 1,000 mg, Oral, Daily  FLUoxetine, 20 mg, Oral, Daily  folic acid, 1 mg, Oral, Daily  insulin lispro, 1-5 Units, Subcutaneous, TID AC  insulin lispro, 1-5 Units, Subcutaneous, HS  [START ON 6/16/2021] lisinopril-hydrochlorothiazide (PRINZIDE 10/12  5) combo dose, , Oral, Once per day on Mon Wed Fri  multivitamin-minerals, 1 tablet, Oral, Daily  pancrelipase (Lip-Prot-Amyl), 24,000 Units, Oral, TID With Meals  pantoprazole, 40 mg, Oral, BID  predniSONE, 10 mg, Oral, Daily  senna, 1 tablet, Oral, HS  tamsulosin, 0 4 mg, Oral, HS      Continuous IV Infusions:  sodium chloride, 100 mL/hr, Intravenous, Continuous      PRN Meds:  diazepam, 2 mg, Oral, Daily PRN  HYDROmorphone, 0 5 mg, Intravenous, Q4H PRN  ondansetron, 4 mg, Intravenous, Q4H PRN  oxyCODONE, 10 mg, Oral, Q4H PRN  oxyCODONE, 5 mg, Oral, Q4H PRN  polyethylene glycol, 17 g, Oral, Daily PRN  saliva substitute, 5 spray, Mouth/Throat, 4x Daily PRN        IP CONSULT TO SURGICAL ONCOLOGY  IP CONSULT TO PALLIATIVE CARE    Network Utilization Review Department  ATTENTION: Please call with any questions or concerns to [phone] and carefully listen to the prompts so that you are directed to the right person  All voicemails are confidential   [place] Doing all requests for admission clinical reviews, approved or denied determinations and any other requests to dedicated fax number below belonging to the campus where the patient is receiving treatment   List of dedicated fax numbers for the Facilities:  [address] DENIALS (Administrative/Medical Necessity) [phone]   [address] (Maternity/NICU/Pediatrics) [phone]   [address] [place] [phone] [address] [facility] [place] [address] [phone]

## 2021-06-15 NOTE — ASSESSMENT & PLAN NOTE
· With known history of pancreatic adenocarcinoma, patient noting increasing episode of pain  · Workup of abdominal pain as above  · For now will initiate adult acute pain management order set and consult palliative Care for further inputs (patient follows with Dr Simeon Haji as outpatient)

## 2021-06-15 NOTE — PROGRESS NOTES
Progress Note - Surgical Oncology  Aram Cason 73 y o  male MRN: [id number]  Unit/Bed#: ED 29 Encounter: [id number]    Assessment:  66M with pancreatic CA currently on chemotherapy who p/w abdominal pain in setting of intractable hiccups  Plan:  - diet as tolerated  - serial abdominal exams  - trend hepatic function panel  - PRN analgesia, palliative f/u  - perc ashli to gravity bag      Subjective/Objective   Subjective: pain overall improved, still with hiccups    Objective:    Blood pressure 116/64, pulse (!) 109, temperature 98 1 °F (36 7 °C), temperature source Oral, resp  rate 16, height 5' 9 5" (1 765 m), weight 69 4 kg (153 lb), SpO2 95 %  ,Body mass index is 22 27 kg/m²  No intake/output data recorded      Invasive Devices     Central Venous Catheter Line            Port A Cath 06/01/21 Left Chest 13 days          Peripheral Intravenous Line            Peripheral IV 06/14/21 Distal;Left;Upper;Ventral (anterior) Arm <1 day          Drain            Open Drain Right 1250 days    Closed/Suction Drain  RUQ  10 Fr  34 days                Physical Exam:   NAD, alert and oriented x3  Normocephalic, atraumatic  MMM, EOMI, PERRLA  Norm resp effort on RA  RRR  Abd soft, mild tender RLQ, ND  No calf tenderness or peripheral edema  Motor/sensation intact in distal extremities  CN grossly intact  -rash/lesions      Lab, Imaging and other studies:  Lab Results   Component Value Date    WBC 5 60 06/15/2021    HGB 11 2 (L) 06/15/2021    HCT 34 5 (L) 06/15/2021    MCV 91 06/15/2021     06/15/2021      Lab Results   Component Value Date    GLUCOSE 117 (H) 09/01/2017    CALCIUM 9 0 06/15/2021     09/01/2017    K 4 1 06/15/2021    CO2 27 06/15/2021    CL 96 (L) 06/15/2021    BUN 20 06/15/2021    CREATININE 0 68 06/15/2021       VTE Pharmacologic Prophylaxis: Sequential compression device (Venodyne)   VTE Mechanical Prophylaxis: sequential compression device

## 2021-06-15 NOTE — PROGRESS NOTES
[facility]  Post-Admission Follow-up Note    Niel Lanes 1955, 73 y o  male MRN: [id number]  Unit/Bed#: [facility] 308-01 Encounter: [id number]  Primary Care Provider: Gaurav Haney MD   Date and time admitted to hospital: 6/14/2021  5:56 PM    [facility] Internal Medicine Post Admit Check:     Date of visit: 06/15/21    The patient was admitted earlier to the [facility] Internal Medicine Service  Please see initial intake history and physical for detailed admission history  This is a supplemental update following a post admit checkup  Subjective: patient still with complaint of abdominal pain, mostly in RLQ  Reports episode of vomiting this morning  Only able to drink milk  States hiccups come and go, none currently  Discussed keeping overnight pending improvement in pain control and oral intake       Exam:   General: lethargic, easily arouses to voice but falling asleep during encounter   Heart: RRR, no murmur   Lungs: CTA bilaterally   Abdomen: ashli drain noted with bilious fluid output, generalized tenderness but worse in RLQ    Assessment and Plan:    * Abdominal pain  Assessment & Plan  · Presented with h/o pancreatic cancer presents with increased abdominal pain  · CT abdomen/pelvis with improvement in previously known peripancreatic collection but with air bubble noted  · Surgical oncology input noted, okay for diet and no current indication for emergent surgical intervention  · Patient has been noting increased pain and intractable hiccups  · Appreciate Palliative Care consult and recommendations   · Continue serial abdominal exams    Hyponatremia  Assessment & Plan  · Multifactorial with poor oral intake secondary to abdominal pain as well as hyperglycemia contributing   · Sodium 130 today but corrects to 135   · Continue IVF until improvement in oral intake   · Monitor BMP    Cancer related pain  Assessment & Plan  · With known history of pancreatic adenocarcinoma, patient noting increasing episode of pain  · Workup of abdominal pain as above  · For now will initiate adult acute pain management order set and consult palliative Care for further inputs (patient follows with Dr Rachid Pillai as outpatient)    Acute cholecystitis  Assessment & Plan  · S/p cholecystostomy tube placement and has completed antibiotic course for this  · Surgical oncology input noted  · Continue cholecystostomy tube care/drainage to gravity    Pancreatic adenocarcinoma [facility])  Assessment & Plan  · Diagnosed 05/2021, s/p port placement and receiving neoadjuvant chemotherapy with eventual plan for Whipple procedure  · Surgical oncology input noted    Essential hypertension  Assessment & Plan  · Blood pressure acceptable, continue Prinzide 10/12 5 3 times weekly  · Monitor blood pressure per protocol    Type 2 diabetes mellitus with hyperglycemia, without long-term current use of insulin ([facility]  Assessment & Plan  Lab Results   Component Value Date    HGBA1C 6 2 (H) 05/05/2021       No results for input(s): POCGLU in the last 72 hours      Blood Sugar Average: Last 72 hrs:  ·  Appears to be diet controlled as outpatient  · Hyperglycemic currently   · Note chronic prednisone use   · SSI with Accu-Cheks  · Diabetic diet   · Initiate hypoglycemia protocol      Gia Dowd PA-C

## 2021-06-15 NOTE — ASSESSMENT & PLAN NOTE
· With known history of pancreatic adenocarcinoma, patient noting increasing episode of pain  · Workup of abdominal pain as above  · For now will initiate adult acute pain management order set and consult palliative Care for further inputs (patient follows with Dr Kriss Diana as outpatient)

## 2021-06-15 NOTE — ASSESSMENT & PLAN NOTE
· S/p cholecystostomy tube placement and has completed antibiotic course for this  · Surgical oncology input noted  · Continue cholecystostomy tube care/drainage to gravity

## 2021-06-15 NOTE — ASSESSMENT & PLAN NOTE
Lab Results   Component Value Date    HGBA1C 6 2 (H) 05/05/2021       No results for input(s): POCGLU in the last 72 hours      Blood Sugar Average: Last 72 hrs:  ·  well controlled as outpatient  · SSI with Accu-Cheks  · Initiate hypoglycemia protocol

## 2021-06-15 NOTE — H&P
[address] [facility]  1955, 73 y o  male MRN: [id number]  Unit/Bed#: QCF Encounter: [id number]  Primary Care Provider: Suzan Xiong MD   Date and time admitted to hospital: 6/14/2021  5:56 PM    * Abdominal pain  Assessment & Plan  · Presented with increased abdominal pain, remaining present currently but improved from presentation  · CT abdomen/pelvis with improvement in previously known peripancreatic collection but with air bubble noted  · Surgical oncology input noted, okay for diet and no current indication for emergent surgical intervention  · Patient has been noting increased pain and hiccups, will appreciate palliative Care input as below  · Continue serial abdominal exams    Cancer related pain  Assessment & Plan  · With known history of pancreatic adenocarcinoma, patient noting increasing episode of pain  · Workup of abdominal pain as above  · For now will initiate adult acute pain management order set and consult palliative Care for further inputs (patient follows with Dr [place] as outpatient)    Hyponatremia  Assessment & Plan  · Sodium 129 on admission, suspect in setting of poor oral intake secondary to abdominal pain  · Will provide IV fluids overnight, recheck BMP in a m  Pancreatic adenocarcinoma [facility])  Assessment & Plan  · Diagnosed 05/2021, s/p port placement and receiving neoadjuvant chemotherapy with eventual plan for Whipple procedure  · Surgical oncology input noted    Acute cholecystitis  Assessment & Plan  · S/p cholecystostomy tube placement and has completed antibiotic course for this  · Surgical oncology input noted  · Continue cholecystostomy tube care/drainage to gravity    Diabetes mellitus type 2  Assessment & Plan  Lab Results   Component Value Date    HGBA1C 6 2 (H) 05/05/2021       No results for input(s): POCGLU in the last 72 hours      Blood Sugar Average: Last 72 hrs:  ·  well controlled as outpatient  · SSI with Accu-Cheks  · Initiate hypoglycemia protocol    Essential hypertension  Assessment & Plan  · Blood pressure acceptable, continue Prinzide 10/12 5 3 times weekly  · Monitor blood pressure per protocol        VTE Prophylaxis: Enoxaparin (Lovenox)  / sequential compression device   Code Status: Level 1 - Full Code  POLST: POLST form is not discussed and not completed at this time  Anticipated Length of Stay:  Patient will be admitted on an Observation basis with an anticipated length of stay of  less than 2 midnights  Justification for Hospital Stay: Please see detailed plans noted above  Chief Complaint:     Abdominal pain  History of Present Illness:  Joaquín Morales is a 73 y o  male who presented for evaluation of abdominal pain  Has past medical history significant for pancreatic adenocarcinoma diagnosed 05/2021 with known metastasis to liver complicated by development of acute cholecystitis s/p percutaneous cholecystostomy tube placement 05/11/2021; subsequently had port placement 06/01/2021 with 2 doses neoadjuvant chemotherapy provided  Was having intermittent trouble with hiccups described as intractable despite attempts at treatment by palliative care with baclofen and Valium; additionally noted episodes of abdominal pain coming in waves particularly associated with the hiccups  On ED presentation he noted the development of sharp, sudden onset 10/10 abdominal pain particularly in the right lower quadrant associated with episode of nausea with vomiting which prompted presentation  He underwent a CT abdomen/pelvis in ED which actually revealed improved findings from prior; was noted with hyponatremia on initial labs  His abdominal pain persisted, however and is admitted as observation for further management of abdominal pain and intractable hiccups      Currently, patient is lying in bed in no acute distress but uncomfortable appearing; states his pain is persisting but improved from initial presentation, currently 6-7/10  Does endorse difficulty swallowing foods secondary to dry mouth, and admits he has had trouble keeping up with appetite secondary to this  Review of Systems:    Constitutional:  Denies fever or chills   Eyes:  Denies change in visual acuity   HENT:  Denies nasal congestion or sore throat but endorses dry mouth  Respiratory:  Denies cough or shortness of breath   Cardiovascular:  Denies chest pain or edema   GI:  Denies  bloody stools or diarrhea but endorses abdominal pain, nausea, vomiting  :  Denies dysuria   Musculoskeletal:  Denies back pain or joint pain   Integument:  Denies rash   Neurologic:  Denies headache, focal weakness or sensory changes   Endocrine:  Denies polyuria or polydipsia   Lymphatic:  Denies swollen glands   Psychiatric:  Denies depression or anxiety     Past Medical and Surgical History:   Past Medical History:   Diagnosis Date    Anxiety     Arthritis     Cancer ([facility] )     pancreatic    Cellulitis     LAST ASSESSED: 6/13/14    Enlarged prostate     Epidermal inclusion cyst     LAST ASSESSED: 10/4/13    Erythrasma     LAST ASSESSED: 9/23/13    Furuncle     LAST ASSESSED: 6/11/14    GERD (gastroesophageal reflux disease)     Hyperlipidemia     Hypertension     RA (rheumatoid arthritis) ([facility] )      Past Surgical History:   Procedure Laterality Date    COLONOSCOPY      FL GUIDED CENTRAL VENOUS ACCESS DEVICE INSERTION  6/1/2021    HYDROCELE EXCISION / REPAIR Right 01/11/2018    SPERMATIC CORD EXCSION OF HYDROCELE; MANAGED BY: Belia Musa    IR CHOLECYSTOSTOMY TUBE PLACEMENT  5/11/2021    MN REMOVAL OF HYDROCELE,TUNICA,UNILAT Right 1/11/2018    Procedure: HYDROCELECTOMY;  Surgeon: David Rossi MD;  Location: [facility];  Service: Urology    SCROTAL SURGERY      benign "lump"    TUNNELED VENOUS PORT PLACEMENT Left 6/1/2021    Procedure: INSERTION VENOUS PORT (PORT-A-CATH);   Surgeon: Song Calhoun MD;  Location: [facility];  Service: Surgical Oncology       Meds/Allergies:    Current Facility-Administered Medications:     aspirin chewable tablet 81 mg, 81 mg, Oral, Daily, Mathew Hdz DO    atorvastatin (LIPITOR) tablet 10 mg, 10 mg, Oral, After Dinner, Mathew Hdz DO    diazepam (VALIUM) tablet 2 mg, 2 mg, Oral, Daily PRN, Mathew Hdz,     enoxaparin (LOVENOX) subcutaneous injection 40 mg, 40 mg, Subcutaneous, Daily, Mathew Hdz,     fish oil capsule 1,000 mg, 1,000 mg, Oral, Daily, Mathew Hdz, DO    FLUoxetine (PROzac) capsule 20 mg, 20 mg, Oral, Daily, Mathew Hdz DO    folic acid (FOLVITE) tablet 1 mg, 1 mg, Oral, Daily, Mathew Hdz DO    HYDROmorphone (DILAUDID) injection 0 5 mg, 0 5 mg, Intravenous, Q4H PRN, Mathew Hdz DO, 0 5 mg at 06/15/21 0117    [START ON 6/16/2021] lisinopril-hydrochlorothiazide (PRINZIDE 10/12  5) combo dose, , Oral, Once per day on Mon Wed Fri, aMthew Hdz DO    multivitamin-minerals (CENTRUM) tablet 1 tablet, 1 tablet, Oral, Daily, Mathew Hdz DO    ondansetron [facility]) injection 4 mg, 4 mg, Intravenous, Q4H PRN, Mathew Hdz, DO, 4 mg at 06/14/21 2337    oxyCODONE (ROXICODONE) immediate release tablet 10 mg, 10 mg, Oral, Q4H PRN, Mathew Hdz, DO, 10 mg at 06/14/21 2337    oxyCODONE (ROXICODONE) IR tablet 5 mg, 5 mg, Oral, Q4H PRN, Mathew Hdz DO    pancrelipase (Lip-Prot-Amyl) (CREON) delayed release capsule 24,000 Units, 24,000 Units, Oral, TID With Meals, Mathew Hdz DO    pantoprazole (PROTONIX) EC tablet 40 mg, 40 mg, Oral, BID, Mathew Hdz DO    polyethylene glycol (MIRALAX) packet 17 g, 17 g, Oral, Daily PRN, Mathew Hdz, DO    predniSONE tablet 10 mg, 10 mg, Oral, Daily, Mathew Hdz, DO    senna (SENOKOT) tablet 8 6 mg, 1 tablet, Oral, Daily PRN, Mathew Hdz, DO    sodium chloride 0 9 % infusion, 100 mL/hr, Intravenous, Continuous, Mathew Hdz DO, Last Rate: 100 mL/hr at 06/15/21 0117, 100 mL/hr at 06/15/21 0117    tamsulosin (FLOMAX) capsule 0 4 mg, 0 4 mg, Oral, HS, Fernie Dux, DO    Current Outpatient Medications:     aspirin 81 mg chewable tablet, Chew 81 mg, Disp: , Rfl:     atorvastatin (LIPITOR) 10 mg tablet, Take 10 mg by mouth daily, Disp: , Rfl:     Blood Glucose Monitoring Suppl (ONE TOUCH ULTRA 2) w/Device KIT, Use daily, Disp: 1 each, Rfl: 1    diazepam (VALIUM) 2 mg tablet, Take 1 tablet (2 mg total) by mouth daily as needed (hiccups), Disp: 15 tablet, Rfl: 0    FLUoxetine (PROzac) 20 mg capsule, TAKE 1 CAPSULE BY MOUTH EVERY DAY , Disp: 90 capsule, Rfl: 2    folic acid (FOLVITE) 1 mg tablet, Take by mouth daily, Disp: , Rfl:     glucose blood (OneTouch Ultra) test strip, Use as instructed once a day, Disp: 50 each, Rfl: 5    Lancets (onetouch ultrasoft) lancets, Use as instructed once a day, Disp: 50 each, Rfl: 5    lisinopril-hydrochlorothiazide (PRINZIDE,ZESTORETIC) 10-12 5 MG per tablet, Take 1 tablet by mouth 3 (three) times a week Monday Wednesday Friday, Disp: 90 tablet, Rfl: 3    Multiple Vitamins-Minerals (MULTIVITAMIN MEN) TABS, Take 1 tablet by mouth daily, Disp: , Rfl:     Omega-3 Fatty Acids (FISH OIL) 1,000 mg, Take 1,000 mg by mouth daily, Disp: , Rfl:     omeprazole (PriLOSEC) 20 mg delayed release capsule, Take 1 capsule (20 mg total) by mouth daily before breakfast, Disp: 30 capsule, Rfl: 0    oxyCODONE (ROXICODONE) 5 mg immediate release tablet, Take 1 tablet (5 mg total) by mouth every 4 (four) hours as needed (cancer pain)Max Daily Amount: 30 mg, Disp: 120 tablet, Rfl: 0    pancrelipase, Lip-Prot-Amyl, (CREON) 24,000 units, Take 1 capsule (24,000 Units total) by mouth 3 (three) times a day with meals, Disp: 90 capsule, Rfl: 0    pantoprazole (PROTONIX) 40 mg tablet, Take 1 tablet (40 mg total) by mouth 2 (two) times a day Before breakfast, before bed, to reduce stomach acid, Disp: 60 tablet, Rfl: 0    polyethylene glycol (MIRALAX) 17 g packet, Take 17 g by mouth daily as needed (Constipation), Disp: , Rfl: 0    predniSONE 10 mg tablet, Take 1 tablet (10 mg total) by mouth daily With breakfast, to reduce tumor inflammation and improve appetite , Disp: 30 tablet, Rfl: 0    prochlorperazine (COMPAZINE) 10 mg tablet, Take 1 tablet (10 mg total) by mouth every 6 (six) hours as needed for nausea or vomiting, Disp: 45 tablet, Rfl: 3    senna (SENOKOT) 8 6 MG tablet, Take 1 tablet (8 6 mg total) by mouth daily as needed for constipation, Disp: 30 tablet, Rfl: 0    Sodium Chloride Flush (Normal Saline Flush) 0 9 % SOLN, Gall bladder drain, Disp: , Rfl:     tamsulosin (FLOMAX) 0 4 mg, TAKE 1 CAPSULE BY MOUTH  DAILY AT BEDTIME, Disp: 90 capsule, Rfl: 3    lidocaine (LIDODERM) 5 %, Apply 1 patch topically daily Remove & Discard patch within 12 hours or as directed by MD (Patient not taking: Reported on 5/27/2021), Disp: 30 patch, Rfl: 0    Tofacitinib Citrate ER (Xeljanz XR) 11 MG TB24, Take by mouth, Disp: , Rfl:       Allergies:    Allergies   Allergen Reactions    Fentanyl Anaphylaxis and Other (See Comments)     Oxygen drops severely     History:  Marital Status: /Civil Union     Substance Use History:   Social History     Substance and Sexual Activity   Alcohol Use Never     Social History     Tobacco Use   Smoking Status Former Smoker   Smokeless Tobacco Never Used   Tobacco Comment    tobacco in a pipe in the early 1980's     Social History     Substance and Sexual Activity   Drug Use Never       Family History:  Family History   Problem Relation Age of Onset    Heart disease Father         CARDIAC DISORDER    Hypertension Father     Hypertension Mother     No Known Problems Maternal Aunt     No Known Problems Maternal Uncle     No Known Problems Paternal Aunt     No Known Problems Paternal Uncle     No Known Problems Paternal Grandmother     No Known Problems Paternal Grandfather     Diabetes Maternal Grandmother         MELLITUS    No Known Problems Maternal Grandfather     Hypertension Other        Physical Exam:     Vitals:   Blood Pressure: 132/68 (06/14/21 2300)  Pulse: 104 (06/14/21 2300)  Temperature: 98 1 °F (36 7 °C) (06/14/21 1759)  Temp Source: Oral (06/14/21 1759)  Respirations: 16 (06/14/21 2100)  Height: 5' 9 5" (176 5 cm) (06/14/21 1759)  Weight - Scale: 69 4 kg (153 lb) (06/14/21 1759)  SpO2: 93 % (06/14/21 2100)    Constitutional:  Well developed, well nourished, no acute distress but uncomfortable appearing, non-toxic appearance   Eyes:  PERRL, conjunctiva normal   HENT:  Atraumatic, external ears normal, nose normal, oropharynx moist, no pharyngeal exudates  Neck- normal range of motion, no tenderness, supple   Respiratory:  No respiratory distress, normal breath sounds, no rales, no wheezing   Cardiovascular:  Normal rate, normal rhythm, no murmurs, no gallops, no rubs   GI:  Soft, nondistended, normal bowel sounds, mild tenderness to palpation particularly and right lower quadrant, no organomegaly, no mass, no rebound, no guarding, cholecystostomy tube in place draining bilious fluid without surrounding erythema or tenderness   :  No costovertebral angle tenderness   Musculoskeletal:  No edema, no tenderness, no deformities  Back- no tenderness  Integument:  Well hydrated, no rash   Lymphatic:  No lymphadenopathy noted   Neurologic:  Alert &awake, communicative, CN 2-12 normal, normal motor function, normal sensory function, no focal deficits noted   Psychiatric:  Speech and behavior appropriate       Lab Results: I have personally reviewed pertinent reports        Results from last 7 days   Lab Units 06/14/21  1822   WBC Thousand/uL 4 37   HEMOGLOBIN g/dL 12 3   HEMATOCRIT % 37 0   PLATELETS Thousands/uL 293   LYMPHO PCT % 4*   MONO PCT % 3*   EOS PCT % 1     Results from last 7 days   Lab Units 06/14/21  1822   POTASSIUM mmol/L 4 2   CHLORIDE mmol/L 92*   CO2 mmol/L 24   BUN mg/dL 20   CREATININE mg/dL 0 84   CALCIUM mg/dL 9 2 ALK PHOS U/L 200*   ALT U/L 40   AST U/L 12           EKG:  Sinus rhythm with occasional PVCs HR 85, prolonged QTc 480ms     Imaging: I have personally reviewed pertinent reports  XR chest portable    Result Date: 6/1/2021  Narrative: CHEST INDICATION:   s/p port  COMPARISON:  Chest radiograph from 5/16/2021, abdomen CT from 5/14/2021, chest CT from 4/29/2021  EXAM PERFORMED/VIEWS:  XR CHEST PORTABLE  FINDINGS:  Left port in lower SVC  Cardiomediastinal silhouette normal  Lungs clear  No effusion or pneumothorax  Osseous structures normal for age  Impression: No acute cardiopulmonary disease  Left port in lower SVC with no pneumothorax  Workstation performed: [id number]     XR chest portable    Result Date: 5/16/2021  Narrative: CHEST INDICATION:   hypoxia  Newly diagnosed pancreatic cancer  COMPARISON:  Chest radiograph from 5/7/2021 and abdomen CT from 5/14/2021  EXAM PERFORMED/VIEWS:  XR CHEST PORTABLE  FINDINGS: Cardiomediastinal silhouette normal  Small left effusion  Mild bibasilar atelectasis  No pneumothorax  Osseous structures normal for age  Impression: Small left effusion with mild bibasilar atelectasis  Workstation performed: [id number]     FL guided central venous access device insertion    Result Date: 6/11/2021  Narrative: C-arm - left chest port INDICATION: C25 9: Malignant neoplasm of pancreas, unspecified  Procedure guidance  COMPARISON:  None IMAGES:  2 FLUOROSCOPY TIME:   10 seconds TECHNIQUE: FINDINGS: Fluoroscopy was provided for procedure guidance  Left chest port placement with tip overlying the caval atrial junction  Endotracheal tube tip noted at the mid trachea above the veto  Impression: Fluoroscopy provided for procedure guidance  Please see separate procedure note for details   Workstation performed: [id number]     CT abdomen pelvis with contrast    Result Date: 6/14/2021  Narrative: CT ABDOMEN AND PELVIS WITH IV CONTRAST INDICATION:   RLQ abdominal pain, appendicitis suspected (Age => 15y) RLQ pain with guarding  Concern for appendicitis/colitis  Hx Pancreatic cancer/RA  COMPARISON:  CT abdomen pelvis 5/14/2021 TECHNIQUE:  CT examination of the abdomen and pelvis was performed  Axial, sagittal, and coronal 2D reformatted images were created from the source data and submitted for interpretation  Radiation dose length product (DLP) for this visit:  461 71 mGy-cm   This examination, like all CT scans performed in the [facility], was performed utilizing techniques to minimize radiation dose exposure, including the use of iterative  reconstruction and automated exposure control  IV Contrast:  100 mL of iohexol (OMNIPAQUE) Enteric Contrast:  Enteric contrast was not administered  FINDINGS: ABDOMEN LOWER CHEST:  No clinically significant abnormality identified in the visualized lower chest  LIVER/BILIARY TREE:  Unchanged enhancing left hepatic lesion #2/22  Other hypodense, nonenhancing lesions scattered throughout the liver are unchanged  Reidentified prominently left-sided pneumobilia  A CBD stent is in place  GALLBLADDER:  Percutaneous cholecystostomy tube remains in place  SPLEEN:  Unremarkable  PANCREAS:  Enlarged pancreatic head with a vague pancreatic head mass measuring approximately 32 x 22 mm unchanged in appearance from the prior study  The margins are not well-defined  Unchanged pancreatic ductal dilation  ADRENAL GLANDS:  Unremarkable  KIDNEYS/URETERS:  No hydronephrosis  Unchanged renal cysts  STOMACH AND BOWEL:  No bowel obstruction  Much improved wall thickening and enhancement of the pylorus and duodenum  Fluid surrounding the proximal duodenum is persistent but improved in quantity  APPENDIX:  Noninflamed appendix  ABDOMINOPELVIC CAVITY:  No ascites  No pneumoperitoneum  No lymphadenopathy  As described above, fluid surrounding the pylorus and duodenum is persistent but improved   There is a collection measuring 6 2 x 2 8 x 3 2 cm, #2/30 #601/43 located lateral to the first portion of the duodenum with a small focus of internal gas  New edema throughout the right mid abdominal omentum #2/41 possibly reactive to the adjacent collection described above  No discrete omental mass  VESSELS:  Unremarkable for patient's age  PELVIS REPRODUCTIVE ORGANS:  Prostamegaly  URINARY BLADDER:  Unremarkable  ABDOMINAL WALL/INGUINAL REGIONS:  Unremarkable  OSSEOUS STRUCTURES:  No acute fracture or destructive osseous lesion  Impression: Unchanged pancreatic head mass  Unchanged 28 mm enhancing left hepatic lesion  Fluid surrounding the pylorus and duodenum is persistent but improved  There is a collection measuring 6 2 x 2 8 x 3 2 cm ,#2/30 #601/43  located lateral to the first portion of the duodenum with a small focus of internal gas  New edema throughout the right mid abdominal omentum #2/41 possibly reactive to the adjacent collection described above  No discrete omental mass  The study was marked in [facility] for immediate notification  Workstation performed: [id number]         ** Please Note: Dragon 360 Dictation voice to text software was used in the creation of this document   **

## 2021-06-15 NOTE — ASSESSMENT & PLAN NOTE
· Presented with increased abdominal pain, remaining present currently but improved from presentation  · CT abdomen/pelvis with improvement in previously known peripancreatic collection but with air bubble noted  · Surgical oncology input noted, okay for diet and no current indication for emergent surgical intervention  · Patient has been noting increased pain and hiccups, will appreciate palliative Care input as below  · Continue serial abdominal exams

## 2021-06-15 NOTE — PROGRESS NOTES
Received a call from [facility] step daughter, Vamshi Yañez, she was letting me know that he is currently admitted to the hospital for pain, she said he was confused when she arrived to see him and he had taken his medication incorrectly, she has color coded his medication and made a chart for him as well as divided his meds up into pill boxes for him, he did not know how to correctly take everything, she also was inquiring about a phone number to call for problems after hours, provided her with that phone number, she was asking about his infusion appointment this week, told her I would send a message to Dr Librado Malik and his team and if he is still admitted, he will not have treatment this week, and it will be at their discretion on whether he will be treated or just proceed with the office visit with Dr Librado Malik next week, she was appreciative of the help, she also stated Colleen Preston wife is in the hospital as well

## 2021-06-15 NOTE — TELEPHONE ENCOUNTER
Patient called into [facility] to inquire about when he would be discharged he asked if I would be able to pass the message to [address] [place] [address] [place]  He needs to confirm if he would be able to attend his infusion appointment on Thursday or if he would be able to get it done today during his stay  Patient can be reached at his mobile #[phone]       Patient also confirmed his televisit appointment with Wm Borja tomorrow at 9:30 am

## 2021-06-15 NOTE — CONSULTS
Consultation - Surgical Oncology   Petty Jarquin 73 y o  male MRN: [id number]  Unit/Bed#: [facility] Encounter: [id number]      Assessment/Plan      Assessment:  66M with pancreatic CA currently on chemotherapy who p/w abdominal pain in setting of intractable hiccups  Plan:  - admit to medicine  - serial abdominal exams  - OK for diet  - PRN analgesia with palliative care f/u  - trend labs, recheck tomorrow  - continue perc ashli to gravity bag      History of Present Illness   Physician Requesting Consult: Mónica Stearns, DO  Reason for Consult / Principal Problem: abdominal pain    HPI: Petty Jarquin is a 73y o  year old male who presents with abdominal pain  He states abdominal pain comes in waves with his hiccups, which have been intractable unless he is sleeping, and are generalized throughout his abdomen with a focus in the RLQ  He denies nausea and vomiting and has been tolerating a normal diet, although he does complain of severe xerostomia  He states he has been having normal bowel movements without issues  He states his perc ashli tube has been draining without issue although he does complain of a scab around the tube  PMH is significant for 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 s/p ERCP w metal stent placement 4/28, cholecystitis s/p perc ashli 5/11 (IR), and likely groove pancreatitis last month during admission  We placed a port on 6/1 and he has since gotten 2 doses of his chemotherapy, on 6/3 and 6/10  He was seen by palliative care yesterday during a telemedicine visit who recommended he go to ER if he had worsening of symptoms; they did however alter some of his medications (added diazepam for diaphragmatic spasms and d/c'd baclofen)  CTAP obtained on admission demonstrated decrease in peripancreatic collection although with an air bubble in the collection  Consults    Review of Systems   Constitutional: Negative for chills and fever     HENT: Positive for trouble swallowing (2/2 xerostomia)  Negative for ear pain, sore throat and voice change  Eyes: Negative for pain and visual disturbance  Respiratory: Negative for cough and shortness of breath  Cardiovascular: Negative for chest pain and palpitations  Gastrointestinal: Positive for abdominal pain (with hiccups)  Negative for abdominal distention, blood in stool, constipation, diarrhea, nausea and vomiting  Genitourinary: Negative for dysuria and hematuria  Musculoskeletal: Negative for arthralgias and back pain  Skin: Negative for color change and rash  Neurological: Negative for seizures and syncope  All other systems reviewed and are negative  Historical Information   Past Medical History:   Diagnosis Date    Anxiety     Arthritis     Cancer ([place] [id number] )     pancreatic    Cellulitis     LAST ASSESSED: 6/13/14    Enlarged prostate     Epidermal inclusion cyst     LAST ASSESSED: 10/4/13    Erythrasma     LAST ASSESSED: 9/23/13    Furuncle     LAST ASSESSED: 6/11/14    GERD (gastroesophageal reflux disease)     Hyperlipidemia     Hypertension     RA (rheumatoid arthritis) ([place] [id number] )      Past Surgical History:   Procedure Laterality Date    COLONOSCOPY      FL GUIDED CENTRAL VENOUS ACCESS DEVICE INSERTION  6/1/2021    HYDROCELE EXCISION / REPAIR Right 01/11/2018    SPERMATIC CORD EXCSION OF HYDROCELE; MANAGED BY: Ino Craig    IR CHOLECYSTOSTOMY TUBE PLACEMENT  5/11/2021    WA REMOVAL OF HYDROCELE,TUNICA,UNILAT Right 1/11/2018    Procedure: HYDROCELECTOMY;  Surgeon: Mary Womack MD;  Location: [facility]  MAIN [facility];  Service: Urology    SCROTAL SURGERY      benign "lump"    TUNNELED VENOUS PORT PLACEMENT Left 6/1/2021    Procedure: INSERTION VENOUS PORT (PORT-A-CATH);   Surgeon: Daily Weber MD;  Location:  MAIN OR;  Service: Surgical Oncology     Social History   Social History     Substance and Sexual Activity   Alcohol Use Never     Social History     Substance and Sexual Activity   Drug Use Never E-Cigarette/Vaping    E-Cigarette Use Never User      E-Cigarette/Vaping Substances    Nicotine No     THC No     CBD No      Social History     Tobacco Use   Smoking Status Former Smoker   Smokeless Tobacco Never Used   Tobacco Comment    tobacco in a pipe in the early 18's     Family History   Problem Relation Age of Onset    Heart disease Father         CARDIAC DISORDER    Hypertension Father     Hypertension Mother     No Known Problems Maternal Aunt     No Known Problems Maternal Uncle     No Known Problems Paternal Aunt     No Known Problems Paternal Uncle     No Known Problems Paternal Grandmother     No Known Problems Paternal Grandfather     Diabetes Maternal Grandmother         MELLITUS    No Known Problems Maternal Grandfather     Hypertension Other        Meds/Allergies   all current active meds have been reviewed    Allergies   Allergen Reactions    Fentanyl Anaphylaxis and Other (See Comments)     Oxygen drops severely       Objective   No intake or output data in the 24 hours ending 06/14/21 2320    Invasive Devices     Central Venous Catheter Line            Port A Cath 06/01/21 Left Chest 13 days          Peripheral Intravenous Line            Peripheral IV 06/14/21 Distal;Left;Upper;Ventral (anterior) Arm <1 day          Drain            Open Drain Right 1250 days    Closed/Suction Drain  RUQ  10 Fr  34 days                Physical Exam  Vitals and nursing note reviewed  Constitutional:       General: He is not in acute distress  Appearance: He is well-developed  He is not ill-appearing  Comments: hiccuping   HENT:      Head: Normocephalic and atraumatic  Eyes:      Conjunctiva/sclera: Conjunctivae normal    Cardiovascular:      Rate and Rhythm: Normal rate and regular rhythm  Heart sounds: No murmur heard  Pulmonary:      Effort: Pulmonary effort is normal  No respiratory distress  Breath sounds: Normal breath sounds     Abdominal:      General: There is no distension  Palpations: Abdomen is soft  Tenderness: There is abdominal tenderness (mildly tender RLQ)  There is no guarding or rebound  Hernia: No hernia is present  Musculoskeletal:      Cervical back: Neck supple  Skin:     General: Skin is warm and dry  Neurological:      Mental Status: He is alert  Lab Results:   CBC:   Lab Results   Component Value Date    WBC 4 37 06/14/2021    HGB 12 3 06/14/2021    HCT 37 0 06/14/2021    MCV 88 06/14/2021     06/14/2021    MCH 29 3 06/14/2021    MCHC 33 2 06/14/2021    RDW 14 1 06/14/2021    MPV 11 2 06/14/2021    NRBC 0 06/14/2021   , CMP:   Lab Results   Component Value Date    SODIUM 129 (L) 06/14/2021    K 4 2 06/14/2021    CL 92 (L) 06/14/2021    CO2 24 06/14/2021    BUN 20 06/14/2021    CREATININE 0 84 06/14/2021    CALCIUM 9 2 06/14/2021    AST 12 06/14/2021    ALT 40 06/14/2021    ALKPHOS 200 (H) 06/14/2021    EGFR 92 06/14/2021     Imaging Studies: I have personally reviewed pertinent reports  CT abdomen pelvis with contrast  Narrative: CT ABDOMEN AND PELVIS WITH IV CONTRAST    INDICATION:   RLQ abdominal pain, appendicitis suspected (Age => 15y)  RLQ pain with guarding  Concern for appendicitis/colitis  Hx Pancreatic cancer/RA  COMPARISON:  CT abdomen pelvis 5/14/2021    TECHNIQUE:  CT examination of the abdomen and pelvis was performed  Axial, sagittal, and coronal 2D reformatted images were created from the source data and submitted for interpretation  Radiation dose length product (DLP) for this visit:  461 71 mGy-cm   This examination, like all CT scans performed in the [facility], was performed utilizing techniques to minimize radiation dose exposure, including the use of iterative   reconstruction and automated exposure control  IV Contrast:  100 mL of iohexol (OMNIPAQUE)  Enteric Contrast:  Enteric contrast was not administered      FINDINGS:    ABDOMEN    LOWER CHEST:  No clinically significant abnormality identified in the visualized lower chest     LIVER/BILIARY TREE:  Unchanged enhancing left hepatic lesion #2/22  Other hypodense, nonenhancing lesions scattered throughout the liver are unchanged  Reidentified prominently left-sided pneumobilia  A CBD stent is in place  GALLBLADDER:  Percutaneous cholecystostomy tube remains in place  SPLEEN:  Unremarkable  PANCREAS:  Enlarged pancreatic head with a vague pancreatic head mass measuring approximately 32 x 22 mm unchanged in appearance from the prior study  The margins are not well-defined  Unchanged pancreatic ductal dilation  ADRENAL GLANDS:  Unremarkable  KIDNEYS/URETERS:  No hydronephrosis  Unchanged renal cysts  STOMACH AND BOWEL:  No bowel obstruction  Much improved wall thickening and enhancement of the pylorus and duodenum  Fluid surrounding the proximal duodenum is persistent but improved in quantity  APPENDIX:  Noninflamed appendix  ABDOMINOPELVIC CAVITY:  No ascites  No pneumoperitoneum  No lymphadenopathy  As described above, fluid surrounding the pylorus and duodenum is persistent but improved  There is a collection measuring 6 2 x 2 8 x 3 2 cm,  #2/30 #601/43 located lateral to the first portion of the duodenum with a small focus of internal gas  New edema throughout the right mid abdominal omentum #2/41 possibly reactive to the adjacent collection described above  No discrete omental mass  VESSELS:  Unremarkable for patient's age  PELVIS    REPRODUCTIVE ORGANS:  Prostamegaly  URINARY BLADDER:  Unremarkable  ABDOMINAL WALL/INGUINAL REGIONS:  Unremarkable  OSSEOUS STRUCTURES:  No acute fracture or destructive osseous lesion  Impression: Unchanged pancreatic head mass  Unchanged 28 mm enhancing left hepatic lesion  Fluid surrounding the pylorus and duodenum is persistent but improved   There is a collection measuring 6 2 x 2 8 x 3 2 cm ,#2/30 #601/43  located lateral to the first portion of the duodenum with a small focus of internal gas  New edema throughout the right mid abdominal omentum #2/41 possibly reactive to the adjacent collection described above  No discrete omental mass  The study was marked in [facility] for immediate notification  Workstation performed: [id number]      Pathology, and Other Studies: none    Code Status: Prior  Advance Directive and Living Will:      Power of :    POLST:      Counseling / Coordination of Care  Total floor / unit time spent today 35 minutes  Greater than 50% of total time was spent with the patient and / or family counseling and / or coordination of care   A description of the counseling / coordination of care: discussion with ED colleagues

## 2021-06-15 NOTE — PLAN OF CARE
Problem: Potential for Falls  Goal: Patient will remain free of falls  Description: INTERVENTIONS:  - Educate patient/family on patient safety including physical limitations  - Instruct patient to call for assistance with activity   - Consult OT/PT to assist with strengthening/mobility   - Keep Call bell within reach  - Keep bed low and locked with side rails adjusted as appropriate  - Keep care items and personal belongings within reach  - Initiate and maintain comfort rounds  - Make Fall Risk Sign visible to staff  - Offer Toileting every Hours, in advance of need  - Initiate/Maintain alarm  - Obtain necessary fall risk management equipment:   - Apply yellow socks and bracelet for high fall risk patients  - Consider moving patient to room near nurses station  Outcome: Progressing

## 2021-06-15 NOTE — ASSESSMENT & PLAN NOTE
· Diagnosed 05/2021, s/p port placement and receiving neoadjuvant chemotherapy with eventual plan for Whipple procedure  · Surgical oncology input noted

## 2021-06-16 NOTE — TELEPHONE ENCOUNTER
I called Yakelin Ferrari for our scheduled Genetics consult on Wednesday 6/16 at 9:30 am   He is currently in the hospital and not feeling well  He asked that we re-schedule his appointment  Yakelin Ferrari asked that we reach out to him early next week to see how he is feeling and to re-schedule his genetics consult

## 2021-06-16 NOTE — PROGRESS NOTES
Progress note - Palliative and Supportive Care   Pari  73 y o  male [id number]    Patient Active Problem List   Diagnosis    Right arm pain    Rheumatoid arthritis ([facility] [address] )    Essential hypertension    Urgency of urination    Anxiety disorder    Hyperlipidemia    Liver mass    Esophageal reflux    Multiple lipomas    Type 2 diabetes mellitus with hyperglycemia, without long-term current use of insulin (HCC)    Hypokalemia    Diarrhea    Pancreatic mass    Epigastric pain    BPH (benign prostatic hyperplasia)    Constipation    SIRS (systemic inflammatory response syndrome) (HCC)    Perforation bowel (HCC)    Acute cholecystitis    Leukocytosis    Pancreatic adenocarcinoma (HCC)    Chemotherapy induced neutropenia (HCC)    Cancer related pain    Hyponatremia    Abdominal pain     Active issues specifically addressed today include:  · Pancreatic adenocarcinoma  · Cancer related pain  · Intractable hiccups  · Xerostomia  · Opioid induced constipation  · Nausea and vomiting  · Palliative care patient    Plan:  1  Symptom management    - Continue trial of metochlopramide 10mg TID for intractable hiccups   - Will also add gabapentin 100mg TID for hiccups, can consider titrating up if patient tolerates it well              - Controlling hiccups likely to help with management of pain  - Continue Oxycodone 7 5mg q4h PRN for moderate/severe pain               - Continue Hydromorphone IV 0 5mg q4h for breakthrough pain              - Continue scheduled Sennakote with Miralax PRN to prevent OIC              - Continue Mouth Kote PRN for xerostomia              - Will discuss plan with attending and will place appropriate orders     2   Goals    - Level 1 code status with no limits   - Palliative will follow during hospitalization and in the outpatient setting for ongoing care discussions as situation evolves     Code Status: Level 1- Full Code   Decisional apparatus:  Patient is competent on my exam today  If competence is lost, patient's substitute decision maker would default to spouse [place], however, Franklin Magana has stated he would like his 2 children and step daughter to lead the decision making  Advance Directive / Living Will / POLST:  No    Interval history:       Pt seen and examined at bedside  No family present at time of evaluation  Pt reports he is glad he was able to tolerate diet and ate all of his breakfast without issues  States the Jesus Donald does help him keeping his mouth moist and allowing him to eat more comfortable  For his pain, pt required PO oxycodone 7 5mg overnight and another dose of PO oxycodone 7 5mg this morning, no IV dilaudid  The oxycodone does help with the pain for about an hr before he has hiccups making the pain worse  He think if his hiccups can be controlled, his abdominal pain would be more tolerable  No nausea, vomiting  Last BM 2 days ago  MEDICATIONS / ALLERGIES:     all current active meds have been reviewed    Allergies   Allergen Reactions    Fentanyl Anaphylaxis and Other (See Comments)     Oxygen drops severely       OBJECTIVE:    Physical Exam  Physical Exam  Constitutional:       General: He is not in acute distress  Appearance: He is ill-appearing  He is not diaphoretic  Comments: Occasional hiccups exacerbating pain   HENT:      Head: Normocephalic and atraumatic  Mouth/Throat:      Mouth: Mucous membranes are moist    Eyes:      General: No scleral icterus  Right eye: No discharge  Left eye: No discharge  Conjunctiva/sclera: Conjunctivae normal    Cardiovascular:      Rate and Rhythm: Normal rate and regular rhythm  Heart sounds: Normal heart sounds  Pulmonary:      Effort: Pulmonary effort is normal  No respiratory distress  Breath sounds: Normal breath sounds     Abdominal:      Comments: tenderness with light palpation, cholecystostomy tube in place draining bilious fluid without surrounding erythema or tenderness    Musculoskeletal:      Right lower leg: No edema  Left lower leg: No edema  Skin:     General: Skin is warm  Neurological:      General: No focal deficit present  Mental Status: He is oriented to person, place, and time  Mental status is at baseline  Comments: Awake and alert         Lab Results: I have personally reviewed pertinent labs  Imaging Studies: reviewed   EKG, Pathology, and Other Studies: reviewed    Counseling / Coordination of Care    Total floor / unit time spent today 30 minutes  Greater than 50% of total time was spent with the patient and / or family counseling and / or coordination of care   A description of the counseling / coordination of care: symptom assessment and management, medication review and adjustment, psychosocial support, chart review, imaging review, lab review

## 2021-06-16 NOTE — ASSESSMENT & PLAN NOTE
Lab Results   Component Value Date    HGBA1C 6 2 (H) 05/05/2021       Recent Labs     06/15/21  1604 06/15/21  1637 06/15/21  2226 06/16/21  0619   POCGLU 274* 268* 220* 138       Blood Sugar Average: Last 72 hrs:  · Appears to be diet controlled as outpatient  · Previously with hyperglycemia   · Note chronic prednisone use   · Continue SSI with Accu-Cheks  · Diabetic diet   · Hypoglycemia protocol

## 2021-06-16 NOTE — ASSESSMENT & PLAN NOTE
· Presented with h/o pancreatic cancer presents with increased abdominal pain  · CT abdomen/pelvis with improvement in previously known peripancreatic collection but with air bubble noted  · Surgical oncology input noted, okay for diet and no current indication for emergent surgical intervention  · Patient has been noting increased pain along with intractable hiccups  · Appreciate Palliative Care consult and recommendations   · Started Reglan 10 mg TID - considering addition of CCB if no improvement   · Oxycodone 7 5 mg q4h prn moderate and severe pain, IV dilaudid 0 5 mg q4h prn breakthrough pain   · Mouth Kote PRN for xerostomia  · Continue serial abdominal exams  · Discontinue IVF given improvement in oral intake

## 2021-06-16 NOTE — PROGRESS NOTES
Progress Note - Surgical Oncology  Graham Gotti 73 y o  male MRN: [id number]  Unit/Bed#: [facility] 317-01 Encounter: [id number]    Assessment:  66M with pancreatic CA currently on chemotherapy who p/w abdominal pain in setting of intractable hiccups  Drain: 95 cc    Plan:   Diet as tolerated   Follow up palliative care  o Recommendations appreciated for intractable hiccups and pain control   I/Os- please record drain output   Please [employer] On Call [facility] Resident if any questions    Subjective/Objective     Subjective:   No acute events overnight  Tolerating diet  Pain well controlled    Pertinent review of systems as above  All other review of systems negative  Objective:    Blood pressure 119/68, pulse 73, temperature 97 9 °F (36 6 °C), temperature source Oral, resp  rate 17, height 5' 9 5" (1 765 m), weight 69 4 kg (153 lb), SpO2 97 %  ,Body mass index is 22 27 kg/m²  Intake/Output Summary (Last 24 hours) at 6/16/2021 0753  Last data filed at 6/16/2021 0535  Gross per 24 hour   Intake 3523 34 ml   Output 795 ml   Net 2728 34 ml       Invasive Devices     Central Venous Catheter Line            Port A Cath 06/01/21 Left Chest 14 days          Peripheral Intravenous Line            Peripheral IV 06/15/21 Left;Dorsal (posterior) Wrist <1 day          Drain            Open Drain Right 1251 days    Closed/Suction Drain  RUQ  10 Fr  35 days                Physical Exam:   Gen:  NAD  HEENT: NCAT  MMM  CV: well perfused  Lungs: Normal respiratory effort  Abd: soft, nt/nd,   Skin: warm/ dry  Extremities: no peripheral edema, no clubbing or cyanosis  Neuro:  AxO x3      Results from last 7 days   Lab Units 06/16/21  0531 06/15/21  0438 06/14/21  1822   WBC Thousand/uL 5 63 5 60 4 37   HEMOGLOBIN g/dL 11 0* 11 2* 12 3   HEMATOCRIT % 33 9* 34 5* 37 0   PLATELETS Thousands/uL 216 245 293     Results from last 7 days   Lab Units 06/16/21  0531 06/15/21  0438 06/14/21  1822   POTASSIUM mmol/L 4 0 4 1 4 2 CHLORIDE mmol/L 102 96* 92*   CO2 mmol/L 28 27 24   BUN mg/dL 14 20 20   CREATININE mg/dL 0 43* 0 68 0 84   CALCIUM mg/dL 8 6 9 0 9 2            I have personally reviewed pertinent films in PACS  Medications:   Scheduled Meds:  Current Facility-Administered Medications   Medication Dose Route Frequency Provider Last Rate    aspirin  81 mg Oral Daily Luz Liming, DO      atorvastatin  10 mg Oral After Mliss Frock, DO      enoxaparin  40 mg Subcutaneous Daily Luz Liming, DO      fish oil  1,000 mg Oral Daily Luz Liming, DO      FLUoxetine  20 mg Oral Daily Luz Liming, DO      folic acid  1 mg Oral Daily Luz Liming, DO      HYDROmorphone  0 5 mg Intravenous Q4H PRN Luz Liming, DO      insulin lispro  1-5 Units Subcutaneous HS Ellyn Dowd PA-C      insulin lispro  1-5 Units Subcutaneous TID AC Luz Liming, DO      lisinopril-hydrochlorothiazide [place] 10/12  5) combo dose   Oral Once per day on Mon Wed Fri Luz Liming, DO      metoclopramide  10 mg Oral TID [facility] Frank Ruelas MD      multivitamin-minerals  1 tablet Oral Daily Luz Liming, DO      ondansetron  4 mg Intravenous Q4H PRN Luz Liming, DO      oxyCODONE  7 5 mg Oral Q4H PRN Frank Ruelas MD      pancrelipase (Lip-Prot-Amyl)  24,000 Units Oral TID With Meals Luz Liming, DO      pantoprazole  40 mg Oral BID Luz Liming, DO      polyethylene glycol  17 g Oral Daily PRN Frank Ruelas MD      predniSONE  10 mg Oral Daily Luz Liming, DO      saliva substitute  5 spray Mouth/Throat 4x Daily PRN Frank Ruelas MD      senna  1 tablet Oral HS Frank Ruelas MD      sodium chloride  100 mL/hr Intravenous Continuous Luz Liming,  mL/hr (06/15/21 2217)    tamsulosin  0 4 mg Oral HS Luz Liming, DO       Continuous Infusions:sodium chloride, 100 mL/hr, Last Rate: 100 mL/hr (06/15/21 2217)      PRN Meds:  HYDROmorphone, 0 5 mg, Q4H PRN  ondansetron, 4 mg, Q4H PRN  oxyCODONE, 7 5 mg, Q4H PRN  polyethylene glycol, 17 g, Daily PRN  saliva substitute, 5 spray, 4x Daily PRN

## 2021-06-16 NOTE — ASSESSMENT & PLAN NOTE
· With known history of pancreatic adenocarcinoma, patient noting increasing episode of pain  · Workup of abdominal pain as above  · Management as per Palliative care service (patient follows with Dr Rebeca Pulliam as outpatient)

## 2021-06-16 NOTE — PROGRESS NOTES
Received a call from [place], he is still currently admitted in [facility], he was just letting me know, he stated it is unknown as to when he will be discharged, told him I will cancel his infusion appointment for tomorrow as well as his [facility] transport, told him we will keep his appointment with Dr Mario Gabriel next week to re-evaluate his plan and all of his other appointments will remain the same, he was appreciative, he stated he is unsure as to what the plan is in the hospital for now, he stated he is still in pain, he also stated his family is encouraging him to obtain a second opinion, told him that is his choice and if he wishes to do so, we encourage him to do that, he is unsure at this time, told him to call me when he is discharged or if he has any other questions or concerns, email sent to [facility] to cancel transport and infusion called to cancel appointment tomorrow as well

## 2021-06-16 NOTE — PROGRESS NOTES
[facility]  Progress Note - Makenna [place] 1955, 73 y o  male MRN: [id number]  Unit/Bed#: [facility] 317-01 Encounter: [id number]  Primary Care Provider: Vangie Bashir MD   Date and time admitted to hospital: 6/14/2021  5:56 PM    * Abdominal pain  Assessment & Plan  · Presented with h/o pancreatic cancer presents with increased abdominal pain  · CT abdomen/pelvis with improvement in previously known peripancreatic collection but with air bubble noted  · Surgical oncology input noted, okay for diet and no current indication for emergent surgical intervention  · Patient has been noting increased pain along with intractable hiccups  · Appreciate Palliative Care consult and recommendations   · Started Reglan 10 mg TID - considering addition of CCB if no improvement   · Oxycodone 7 5 mg q4h prn moderate and severe pain, IV dilaudid 0 5 mg q4h prn breakthrough pain   · Mouth Kote PRN for xerostomia  · Continue serial abdominal exams  · Discontinue IVF given improvement in oral intake     Cancer related pain  Assessment & Plan  · With known history of pancreatic adenocarcinoma, patient noting increasing episode of pain  · Workup of abdominal pain as above  · Management as per Palliative care service (patient follows with Dr Marianna Campbell as outpatient)    Acute cholecystitis  Assessment & Plan  · S/p cholecystostomy tube placement and has completed antibiotic course for this  · Surgical oncology input noted  · Continue cholecystostomy tube care/drainage to gravity    Pancreatic adenocarcinoma [facility])  Assessment & Plan  · Diagnosed 05/2021, s/p port placement and receiving neoadjuvant chemotherapy with eventual plan for Whipple procedure  · Surgical oncology input noted    Essential hypertension  Assessment & Plan  · Blood pressure acceptable, continue Prinzide 10/12 5 3 times weekly  · Monitor blood pressure per protocol    Type 2 diabetes mellitus, without long-term current use of insulin [facility])  Assessment & Plan  Lab Results   Component Value Date    HGBA1C 6 2 (H) 2021       Recent Labs     06/15/21  1604 06/15/21  1637 06/15/21  2226 21  0619   POCGLU 274* 268* 220* 138       Blood Sugar Average: Last 72 hrs:  · Appears to be diet controlled as outpatient  · Previously with hyperglycemia   · Note chronic prednisone use   · Continue SSI with Accu-Cheks  · Diabetic diet   · Hypoglycemia protocol      VTE Pharmacologic Prophylaxis: VTE Score: 4 Moderate Risk (Score 3-4) - Pharmacological DVT Prophylaxis Ordered: enoxaparin (Lovenox)  Patient Centered Rounds: I performed bedside rounds with nursing staff today  Discussions with Specialists or Other Care Team Provider: primary RN, case management     Education and Discussions with Family / Patient: Updated  (son) via phone  Time Spent for Care: 20 minutes  More than 50% of total time spent on counseling and coordination of care as described above  Current Length of Stay: 1 day(s)  Current Patient Status: Inpatient   Certification Statement: The patient will continue to require additional inpatient hospital stay due to symptom management   Discharge Plan: Anticipate discharge tomorrow to home  Code Status: Level 1 - Full Code    Subjective:   Patient reports feeling better today  States he ate all of his breakfast without issues  States he still has abdominal pain constantly with intermittent exacerbation but relieved with pain medication  Currently with hiccups and reports they still come intermittently  Objective:     Vitals:   Temp (24hrs), Av 1 °F (36 7 °C), Min:97 9 °F (36 6 °C), Max:98 3 °F (36 8 °C)    Temp:  [97 9 °F (36 6 °C)-98 3 °F (36 8 °C)] 98 3 °F (36 8 °C)  HR:  [73-81] 81  Resp:  [16-20] 20  BP: (115-119)/(65-74) 115/65  SpO2:  [94 %-97 %] 96 %  Body mass index is 22 27 kg/m²  Input and Output Summary (last 24 hours):      Intake/Output Summary (Last 24 hours) at 2021 [address] filed at 6/16/2021 0535  Gross per 24 hour   Intake 3043 34 ml   Output 795 ml   Net 2248 34 ml       Physical Exam:   Physical Exam  Vitals and nursing note reviewed  Constitutional:       General: He is not in acute distress  Comments: hiccupping during encounter   Cardiovascular:      Rate and Rhythm: Normal rate and regular rhythm  Pulses: Normal pulses  Heart sounds: No murmur heard  Pulmonary:      Effort: Pulmonary effort is normal  No respiratory distress  Breath sounds: No wheezing or rales  Abdominal:      General: Abdomen is flat  There is no distension  Palpations: Abdomen is soft  Tenderness: There is abdominal tenderness  Comments: Margaret drain with bilious output   Musculoskeletal:      Right lower leg: No edema  Left lower leg: No edema  Skin:     General: Skin is warm and dry  Coloration: Skin is not pale  Findings: No erythema  Neurological:      General: No focal deficit present  Mental Status: He is alert  Mental status is at baseline            Additional Data:     Labs:  Results from last 7 days   Lab Units 06/16/21  0531   WBC Thousand/uL 5 63   HEMOGLOBIN g/dL 11 0*   HEMATOCRIT % 33 9*   PLATELETS Thousands/uL 216   NEUTROS PCT % 77*   LYMPHS PCT % 15   MONOS PCT % 7   EOS PCT % 0     Results from last 7 days   Lab Units 06/16/21  0531   SODIUM mmol/L 136   POTASSIUM mmol/L 4 0   CHLORIDE mmol/L 102   CO2 mmol/L 28   BUN mg/dL 14   CREATININE mg/dL 0 43*   ANION GAP mmol/L 6   CALCIUM mg/dL 8 6   ALBUMIN g/dL 2 1*   TOTAL BILIRUBIN mg/dL 0 54   ALK PHOS U/L 153*   ALT U/L 29   AST U/L 17   GLUCOSE RANDOM mg/dL 168*         Results from last 7 days   Lab Units 06/16/21  0619 06/15/21  2226 06/15/21  1637 06/15/21  1604 06/15/21  1129   POC GLUCOSE mg/dl 138 220* 268* 274* 311*               Lines/Drains:  Invasive Devices     Central Venous Catheter Line            Port A Cath 06/01/21 Left Chest 14 days Peripheral Intravenous Line            Peripheral IV 06/15/21 Left;Dorsal (posterior) Wrist <1 day          Drain            Open Drain Right 1252 days    Closed/Suction Drain  RUQ  10 Fr  35 days                Central Line:  Goal for removal: N/A - Chronic PICC             Imaging: No pertinent imaging reviewed  Recent Cultures (last 7 days):         Last 24 Hours Medication List:   Current Facility-Administered Medications   Medication Dose Route Frequency Provider Last Rate    aspirin  81 mg Oral Daily Chad Neris, DO      atorvastatin  10 mg Oral After Merpablo Fang, DO      enoxaparin  40 mg Subcutaneous Daily Chad Neris, DO      fish oil  1,000 mg Oral Daily Chad Neris, DO      FLUoxetine  20 mg Oral Daily Chad Neris, DO      folic acid  1 mg Oral Daily Chad Neris, DO      HYDROmorphone  0 5 mg Intravenous Q4H PRN Chad Neris, DO      insulin lispro  1-5 Units Subcutaneous HS Ellyn Dowd PA-C      insulin lispro  1-5 Units Subcutaneous TID AC Chad Neris, DO      lisinopril-hydrochlorothiazide [facility] 10/12  5) combo dose   Oral Once per day on Mon Wed Fri Chad Neris, DO      metoclopramide  10 mg Oral TID [facility] Sam Kellogg MD      multivitamin-minerals  1 tablet Oral Daily Chad Neris, DO      ondansetron  4 mg Intravenous Q4H PRN Chad Neris, DO      oxyCODONE  7 5 mg Oral Q4H PRN Sam Kellogg MD      pancrelipase (Lip-Prot-Amyl)  24,000 Units Oral TID With Meals Chad Neris, DO      pantoprazole  40 mg Oral BID Chad Neris, DO      polyethylene glycol  17 g Oral Daily PRN Sam Kellogg MD      predniSONE  10 mg Oral Daily Chad Neris, DO      saliva substitute  5 spray Mouth/Throat 4x Daily PRN Sam Kellogg MD      senna  1 tablet Oral HS Sam Kellogg MD      sodium chloride  100 mL/hr Intravenous Continuous Chad Neris,  mL/hr (06/15/21 1267)    tamsulosin  0 4 mg Oral HS Chad Neris, DO          Today, Patient Was Seen By: Yoni Iglesias PA-C    **Please Note: This note may have been constructed using a voice recognition system  **

## 2021-06-17 NOTE — PROGRESS NOTES
Progress note - Palliative and Supportive Care   Phill Carcamo 73 y o  male [id number]    Patient Active Problem List   Diagnosis    Right arm pain    Rheumatoid arthritis ([address] )    Essential hypertension    Urgency of urination    Anxiety disorder    Hyperlipidemia    Liver mass    Esophageal reflux    Multiple lipomas    Type 2 diabetes mellitus, without long-term current use of insulin (HCC)    Hypokalemia    Diarrhea    Pancreatic mass    Epigastric pain    BPH (benign prostatic hyperplasia)    Constipation    SIRS (systemic inflammatory response syndrome) (HCC)    Perforation bowel (HCC)    Acute cholecystitis    Leukocytosis    Pancreatic adenocarcinoma (HCC)    Chemotherapy induced neutropenia (HCC)    Cancer related pain    Abdominal pain     Active issues specifically addressed today include:  · Pancreatic adenocarcinoma  · Cancer related pain  · Intractable hiccups  · Xerostomia  · Opioid induced constipation  · Nausea and vomiting  · Palliative care patient    Plan:  1  Symptom management    - No significant improvement in hiccups so far    - Will discontinue metoclopramide 10mg TID    - Will start chlorpromazine likely 25mg TID tomorrow after approx 24 hrs from last dose of metoclopramide to avoid EPS               - Will increase gabapentin to 200mg TID               - Controlling hiccups likely to help with management of pain                - Continue Oxycodone 7 5mg q4h PRN for moderate/severe pain               - Continue Hydromorphone IV 0 5mg q4h for breakthrough pain              - Continue scheduled Sennakote with Miralax PRN to prevent OIC              - Continue Mouth Kote PRN for xerostomia              - Will discuss plan with attending and will place appropriate orders     2   Goals    - Level 1 code status with no limits              - Palliative will follow during hospitalization and in the outpatient setting for ongoing care discussions as situation evolves     Code Status: Level 1- Full Code   Decisional apparatus:  Patient is competent on my exam today  If competence is lost, patient's substitute decision maker would default to spouse [place], however, Dianna Last has stated he would like his 2 children and step daughter to lead the decision making  Advance Directive / Living Will / POLST: No     Interval history:      Still having hiccups causing him significant abdominal pain  States he hasn't noticed a change in the severity or frequency of hiccups  No reflux symptoms at this time  No N/V  He has been able to tolerate diet as long as it is not dry food  Last BM yesterday  MEDICATIONS / ALLERGIES:     all current active meds have been reviewed    Allergies   Allergen Reactions    Fentanyl Anaphylaxis and Other (See Comments)     Oxygen drops severely       OBJECTIVE:    Physical Exam  Physical Exam  Constitutional:       General: He is not in acute distress  Appearance: He is ill-appearing  He is not diaphoretic  Comments: Occasional hiccups exacerbating pain   HENT:      Head: Normocephalic and atraumatic  Mouth/Throat:      Mouth: Mucous membranes are moist    Eyes:      General: No scleral icterus  Right eye: No discharge  Left eye: No discharge  Conjunctiva/sclera: Conjunctivae normal    Cardiovascular:      Rate and Rhythm: Normal rate and regular rhythm  Heart sounds: Normal heart sounds  Pulmonary:      Effort: Pulmonary effort is normal  No respiratory distress  Breath sounds: Normal breath sounds  Abdominal:      Comments: tenderness with light palpation, cholecystostomy tube in place draining bilious fluid without surrounding erythema or tenderness    Musculoskeletal:      Right lower leg: No edema  Left lower leg: No edema  Skin:     General: Skin is warm  Neurological:      General: No focal deficit present        Mental Status: He is oriented to person, place, and time  Mental status is at baseline  Comments: Awake and alert         Lab Results: I have personally reviewed pertinent labs  Imaging Studies: reviewed  EKG, Pathology, and Other Studies: reviewed    Counseling / Coordination of Care    Total floor / unit time spent today 35+ minutes  Greater than 50% of total time was spent with the patient and / or family counseling and / or coordination of care   A description of the counseling / coordination of care: symptom assessment and management, medication review and adjustment, psychosocial support, chart review, imaging review, lab review

## 2021-06-17 NOTE — PROGRESS NOTES
[facility]  Progress Note - Esperanza Lyon 1955, 73 y o  male MRN: [id number]  Unit/Bed#: [facility] 317-01 Encounter: [id number]  Primary Care Provider: Bernabe Butt MD   Date and time admitted to hospital: 6/14/2021  5:56 PM    * Abdominal pain  Assessment & Plan  · Presented with h/o pancreatic cancer presents with increased abdominal pain  · CT abdomen/pelvis with improvement in previously known peripancreatic collection but with air bubble noted  · Surgical oncology input noted, okay for diet and no current indication for emergent surgical intervention  · Patient has been noting increased pain along with intractable hiccups  · Appreciate ongoing Palliative Care recommendations   · Started Reglan 10 mg TID - now discontinued and plan to start chlorpromazine likely 25 mg TID tomorrow   · Started gabapentin, increased to 200 mg TID today   · Oxycodone 7 5 mg q4h prn moderate and severe pain, IV dilaudid 0 5 mg q4h prn breakthrough pain   · Mouth Kote PRN for xerostomia   · Continue serial abdominal exams  · Discontinued IVF given improvement in oral intake     Cancer related pain  Assessment & Plan  · With known history of pancreatic adenocarcinoma, patient noting increasing episode of pain  · Workup of abdominal pain as above  · Management as per Palliative care service (patient follows with Dr Ashraf Seen as outpatient)    Acute cholecystitis  Assessment & Plan  · S/p cholecystostomy tube placement and has completed antibiotic course for this  · Surgical oncology input noted  · Continue cholecystostomy tube care/drainage to gravity    Pancreatic adenocarcinoma [facility])  Assessment & Plan  · Diagnosed 05/2021, s/p port placement and receiving neoadjuvant chemotherapy with eventual plan for Whipple procedure  · Surgical oncology input noted    Essential hypertension  Assessment & Plan  · Blood pressure acceptable, continue Prinzide 10/12 5 3 times weekly  · Monitor blood pressure per protocol    Type 2 diabetes mellitus, without long-term current use of insulin [facility])  Assessment & Plan  Lab Results   Component Value Date    HGBA1C 6 2 (H) 2021       Recent Labs     21  1703 21  0648 21  1112   POCGLU 364* 240* 185* 279*       Blood Sugar Average: Last 72 hrs:  · Appears to be diet controlled as outpatient  · Previously with hyperglycemia   · Note chronic prednisone use   · Continue SSI with Accu-Cheks  · Diabetic diet   · Hypoglycemia protocol      VTE Pharmacologic Prophylaxis: VTE Score: 4 Moderate Risk (Score 3-4) - Pharmacological DVT Prophylaxis Ordered: enoxaparin (Lovenox)  Patient Centered Rounds: I performed bedside rounds with nursing staff today  Discussions with Specialists or Other Care Team Provider: primary RN, palliative care, case management     Education and Discussions with Family / Patient: Attempted to update  (son) via phone  Left voicemail  Time Spent for Care: 15 minutes  More than 50% of total time spent on counseling and coordination of care as described above  Current Length of Stay: 2 day(s)  Current Patient Status: Inpatient   Certification Statement: The patient will continue to require additional inpatient hospital stay due to symptom management   Discharge Plan: Anticipate discharge in 24-48 hrs to home  Code Status: Level 1 - Full Code    Subjective:   Patient reports no significant improvement in symptoms since admission  Still with constant abdominal pain that intermittently gets severe  Still with intermittent hiccups which exacerbates abdominal pain  Reports he is eating and drinking without difficulty, denies nausea or vomiting       Objective:     Vitals:   Temp (24hrs), Av 7 °F (36 5 °C), Min:97 2 °F (36 2 °C), Max:98 °F (36 7 °C)    Temp:  [97 2 °F (36 2 °C)-98 °F (36 7 °C)] 97 9 °F (36 6 °C)  HR:  [57-90] 90  Resp:  [16] 16  BP: (116-126)/(61-70) 126/61  SpO2:  [66 %-98 %] 93 %  Body mass index is 22 28 kg/m²  Input and Output Summary (last 24 hours): Intake/Output Summary (Last 24 hours) at 6/17/2021 1342  Last data filed at 6/17/2021 0747  Gross per 24 hour   Intake 238 ml   Output 900 ml   Net -662 ml       Physical Exam:   Physical Exam  Vitals and nursing note reviewed  Constitutional:       General: He is not in acute distress  Cardiovascular:      Rate and Rhythm: Regular rhythm  Tachycardia present  Pulses: Normal pulses  Heart sounds: No murmur heard  Comments: HR 100s  Pulmonary:      Effort: Pulmonary effort is normal  No respiratory distress  Breath sounds: No wheezing or rales  Abdominal:      General: Abdomen is flat  There is no distension  Palpations: Abdomen is soft  Tenderness: There is no abdominal tenderness  Comments: Margaret drain with bilious output   Musculoskeletal:      Right lower leg: No edema  Left lower leg: No edema  Skin:     General: Skin is warm and dry  Coloration: Skin is not pale  Findings: No erythema  Neurological:      Mental Status: He is alert and oriented to person, place, and time  Mental status is at baseline            Additional Data:     Labs:  Results from last 7 days   Lab Units 06/16/21  0531   WBC Thousand/uL 5 63   HEMOGLOBIN g/dL 11 0*   HEMATOCRIT % 33 9*   PLATELETS Thousands/uL 216   NEUTROS PCT % 77*   LYMPHS PCT % 15   MONOS PCT % 7   EOS PCT % 0     Results from last 7 days   Lab Units 06/16/21  0531   SODIUM mmol/L 136   POTASSIUM mmol/L 4 0   CHLORIDE mmol/L 102   CO2 mmol/L 28   BUN mg/dL 14   CREATININE mg/dL 0 43*   ANION GAP mmol/L 6   CALCIUM mg/dL 8 6   ALBUMIN g/dL 2 1*   TOTAL BILIRUBIN mg/dL 0 54   ALK PHOS U/L 153*   ALT U/L 29   AST U/L 17   GLUCOSE RANDOM mg/dL 168*         Results from last 7 days   Lab Units 06/17/21  1112 06/17/21  0648 06/16/21  2053 06/16/21  1703 06/16/21  1050 06/16/21  0619 06/15/21  2226 06/15/21  1637 06/15/21  1604 06/15/21  1129   POC GLUCOSE mg/dl 279* 185* 240* 364* 240* 138 220* 268* 274* 311*               Lines/Drains:  Invasive Devices     Central Venous Catheter Line            Port A Cath 06/01/21 Left Chest 16 days          Peripheral Intravenous Line            Peripheral IV 06/15/21 Left;Dorsal (posterior) Wrist 1 day          Drain            Open Drain Right 1253 days    Closed/Suction Drain  RUQ  10 Fr  37 days                Central Line:  Goal for removal: N/A - Chronic PICC             Imaging: No pertinent imaging reviewed  Recent Cultures (last 7 days):         Last 24 Hours Medication List:   Current Facility-Administered Medications   Medication Dose Route Frequency Provider Last Rate    aluminum-magnesium hydroxide-simethicone  30 mL Oral Q4H PRN Pearl Lorenzo MD      aspirin  81 mg Oral Daily Mare Olds, DO      atorvastatin  10 mg Oral After Elkwood Midget, DO      enoxaparin  40 mg Subcutaneous Daily Mare Olds, DO      fish oil  1,000 mg Oral Daily Mare Olds, DO      FLUoxetine  20 mg Oral Daily Mare Olds, DO      folic acid  1 mg Oral Daily Mare Olds, DO      gabapentin  200 mg Oral TID Pearl Lorenzo MD      HYDROmorphone  0 5 mg Intravenous Q4H PRN Mare Olds, DO      HYDROmorphone  0 2 mg Intravenous Once Calliia AMBER Manley PA-C      insulin lispro  1-5 Units Subcutaneous HS Ellyn Dowd PA-C      insulin lispro  1-5 Units Subcutaneous TID AC Mare Olds, DO      lisinopril-hydrochlorothiazide [facility] 10/12  5) combo dose   Oral Once per day on Mon Wed Fri Mare Olds, DO      multivitamin-minerals  1 tablet Oral Daily Mare Olds, DO      ondansetron  4 mg Intravenous Q4H PRN Mare Olds, DO      oxyCODONE  7 5 mg Oral Q4H PRN Pearl Lorenzo MD      pancrelipase (Lip-Prot-Amyl)  24,000 Units Oral TID With Meals Mare Olds, DO      pantoprazole  40 mg Oral BID Mare Olds, DO      polyethylene glycol  17 g Oral Daily PRN Roni Magaña MD      predniSONE  10 mg Oral Daily Josué Cameron DO      saliva substitute  5 spray Mouth/Throat 4x Daily PRN Roni Magaña MD      senna  1 tablet Oral HS Roni Magaña MD      tamsulosin  0 4 mg Oral HS Josué Cameron DO          Today, Patient Was Seen By: Alberto Michael PA-C    **Please Note: This note may have been constructed using a voice recognition system  **

## 2021-06-17 NOTE — ED ATTENDING ATTESTATION
6/14/2021  IJovita MD, saw and evaluated the patient  I have discussed the patient with the resident/non-physician practitioner and agree with the resident's/non-physician practitioner's findings, Plan of Care, and MDM as documented in the resident's/non-physician practitioner's note, except where noted  All available labs and Radiology studies were reviewed  I was present for key portions of any procedure(s) performed by the resident/non-physician practitioner and I was immediately available to provide assistance  At this point I agree with the current assessment done in the Emergency Department  I have conducted an independent evaluation of this patient a history and physical is as follows:    ED Course    78-year-old male history of pancreatic cancer on chemotherapy and RA does ED with right lower quadrant abdominal pain abrupt in onset denies fevers chills chest pain shortness of breath nausea vomiting diarrhea      Vitals reviewed    Plan to check screening lab CTAP pain control anticipate admission        Critical Care Time  Procedures

## 2021-06-17 NOTE — ASSESSMENT & PLAN NOTE
· Presented with h/o pancreatic cancer presents with increased abdominal pain  · CT abdomen/pelvis with improvement in previously known peripancreatic collection but with air bubble noted  · Surgical oncology input noted, okay for diet and no current indication for emergent surgical intervention  · Patient has been noting increased pain along with intractable hiccups  · Appreciate ongoing Palliative Care recommendations   · Started Reglan 10 mg TID - now discontinued and plan to start chlorpromazine likely 25 mg TID tomorrow   · Started gabapentin, increased to 200 mg TID today   · Oxycodone 7 5 mg q4h prn moderate and severe pain, IV dilaudid 0 5 mg q4h prn breakthrough pain   · Mouth Kote PRN for xerostomia   · Continue serial abdominal exams  · Discontinued IVF given improvement in oral intake

## 2021-06-17 NOTE — TELEPHONE ENCOUNTER
Received call from patient son asking him if he is on the communication consent form, I advised him that he is not  He requested to have the number to the nurses stations  I advised him I do not have access to that, he should contact  Patient wife for more information

## 2021-06-17 NOTE — CASE MANAGEMENT
Not a bundle  Pt is a less than 30 day readmission  Met with pt & explained CM role  Pt lives with wife in 2 sty home with 4 mark  Reports [employer], works &  drives  DME rw, cane  No vna  No h/o rhb  Denies any MH,D&A tx  Uses [facility] in [place]  Emergency contact, wife Rosie Lauren [phone]  Family will transport home  CM reviewed d/c planning process including the following: identifying help at home, patient preference for d/c planning needs, Discharge Lounge, [facility] Meds to Bed program, availability of treatment team to discuss questions or concerns patient and/or family may have regarding understanding medications and recognizing signs and symptoms once discharged  CM also encouraged patient to follow up with all recommended appointments after discharge  Patient advised of importance for patient and family to participate in managing patients medical well being

## 2021-06-17 NOTE — ASSESSMENT & PLAN NOTE
Lab Results   Component Value Date    HGBA1C 6 2 (H) 05/05/2021       Recent Labs     06/16/21  1703 06/16/21  2053 06/17/21  0648 06/17/21  1112   POCGLU 364* 240* 185* 279*       Blood Sugar Average: Last 72 hrs:  · Appears to be diet controlled as outpatient  · Previously with hyperglycemia   · Note chronic prednisone use   · Continue SSI with Accu-Cheks  · Diabetic diet   · Hypoglycemia protocol

## 2021-06-18 NOTE — ASSESSMENT & PLAN NOTE
· With known history of pancreatic adenocarcinoma, patient noting increasing episode of pain  · Workup of abdominal pain as above  · Management as per Palliative care service (patient follows with Dr Britt Foster as outpatient)  ·

## 2021-06-18 NOTE — PROGRESS NOTES
Progress note - Palliative and Supportive Care   Sherif Reveles 73 y o  male [id number]    Patient Active Problem List   Diagnosis    Right arm pain    Rheumatoid arthritis ([address] )    Essential hypertension    Urgency of urination    Anxiety disorder    Hyperlipidemia    Liver mass    Esophageal reflux    Multiple lipomas    Type 2 diabetes mellitus, without long-term current use of insulin (HCC)    Hypokalemia    Diarrhea    Pancreatic mass    Epigastric pain    BPH (benign prostatic hyperplasia)    Constipation    SIRS (systemic inflammatory response syndrome) (HCC)    Perforation bowel (HCC)    Acute cholecystitis    Leukocytosis    Pancreatic adenocarcinoma (HCC)    Chemotherapy induced neutropenia (HCC)    Cancer related pain    Abdominal pain     Active issues specifically addressed today include:   · Hiccups  · Cancer associated pain  · Pancreatic adenocarcinoma  · Palliative care encounter    Plan:  1  Symptom management -   Hiccups- resolved   - gabapentin 200mg TID (may consider increase to 300mg TID on Monday 6/21 if indicated)   - prednisone increased to 10mg TID   - consider chlorpromazine 25mg once if needed for intractable/recurrent hiccups  May redose with 25mg if hiccups are refractory  Pain   - oxycodone 7 5-10mg PO Q4H PRN moderate-severe pain   - hydromorphone 0 5mg Q4H PRN breakthrough pain   - senna QD and miralax QD PRN to prevent OIC    2  Goals - level 1 full code   - Ongoing disease directed cares without limits at this time   - Palliative care available to revisit goals of care if indicated, but otherwise will plan to see on an outpatient basis  Code Status: full - Level 1   Decisional apparatus:  Patient is competent on my exam today  If competence is lost, patient's substitute decision maker would default to spouse by [place]  However, patient expresses desire 2 children to act as HCRs     Advance Directive / Living Will / POLST:  None, would benefit from 5 wishes    Interval history:       No events overnight  Patient reports his hiccups have not returned at all today  He is very pleased by this, getting ready to get up and shower  However, expresses concern regarding persistent RUQ pain (same quality as prior)  He used 2 PRN doses of oxycodone and 1 PRN dose of dilaudid in the past 24h  Agreeable to trying 10mg dose of oxycodone again  MEDICATIONS / ALLERGIES:     all current active meds have been reviewed    Allergies   Allergen Reactions    Fentanyl Anaphylaxis and Other (See Comments)     Oxygen drops severely       OBJECTIVE:    Physical Exam  Physical Exam  HENT:      Head: Normocephalic and atraumatic  Eyes:      Conjunctiva/sclera: Conjunctivae normal    Pulmonary:      Effort: Pulmonary effort is normal  No respiratory distress  Abdominal:      General: There is no distension  Tenderness: There is no guarding  Skin:     General: Skin is warm and dry  Neurological:      General: No focal deficit present  Mental Status: He is alert  Psychiatric:         Mood and Affect: Mood normal          Behavior: Behavior normal          Thought Content: Thought content normal          Judgment: Judgment normal          Lab Results:   I have personally reviewed pertinent labs  , CBC:   Lab Results   Component Value Date    WBC 8 87 06/18/2021    HGB 11 9 (L) 06/18/2021    HCT 35 3 (L) 06/18/2021    MCV 88 06/18/2021     06/18/2021    MCH 29 8 06/18/2021    MCHC 33 7 06/18/2021    RDW 14 7 06/18/2021    MPV 10 9 06/18/2021   , CMP:   Lab Results   Component Value Date    SODIUM 136 06/18/2021    K 3 5 06/18/2021     06/18/2021    CO2 28 06/18/2021    BUN 13 06/18/2021    CREATININE 0 43 (L) 06/18/2021    CALCIUM 8 6 06/18/2021    AST 6 06/18/2021    ALT 23 06/18/2021    ALKPHOS 187 (H) 06/18/2021    EGFR 121 06/18/2021     Imaging Studies: reviewed pertinent studies  EKG, Pathology, and Other Studies: reviewed pertinent studies    Counseling / Coordination of Care    Total floor / unit time spent today 25+ minutes  Greater than 50% of total time was spent with the patient and / or family counseling and / or coordination of care   A description of the counseling / coordination of care: time spent assessing patient, communicating with RN, and surgical oncology team

## 2021-06-18 NOTE — ASSESSMENT & PLAN NOTE
· Presented with h/o pancreatic cancer presents with increased abdominal pain  · CT abdomen/pelvis with improvement in previously known peripancreatic collection but with air bubble noted  · Surgical oncology input noted, okay for diet and no current indication for emergent surgical intervention  · Patient has been noting increased pain along with intractable hiccups  · Appreciate ongoing Palliative Care recommendations   · Started Reglan 10 mg TID - now discontinued and plan to start chlorpromazine likely 25 mg TID tomorrow   · Started gabapentin, increased to 200 mg TID, per palliative would consider TID on Monday if indicated  · Oxycodone 7 5 -10 mg q4h prn moderate and severe pain, IV dilaudid 0 5 mg q4h prn breakthrough pain   · Mouth Kote PRN for xerostomia   · Continue serial abdominal exams  · Discontinued IVF given improvement in oral intake

## 2021-06-18 NOTE — ASSESSMENT & PLAN NOTE
Lab Results   Component Value Date    HGBA1C 6 2 (H) 05/05/2021       Recent Labs     06/17/21  2054 06/18/21  0604 06/18/21  1121 06/18/21  1552   POCGLU 269* 195* 319* 435*       Blood Sugar Average: Last 72 hrs:  · Appears to be diet controlled as outpatient  · Previously with hyperglycemia   · - with plan to increase steroid to tid will need to trend glucose   · - added tid with meals will increase to 8 units   · Note chronic prednisone use   · Continue SSI with Accu-Cheks  · Diabetic diet   · Hypoglycemia protocol

## 2021-06-18 NOTE — PROGRESS NOTES
[facility]  Progress Note - Cristina Yang 1955, 73 y o  male MRN: [id number]  Unit/Bed#: [facility] 317-01 Encounter: [id number]  Primary Care Provider: Aleksandar Lu MD   Date and time admitted to hospital: 6/14/2021  5:56 PM    Cancer related pain  Assessment & Plan  · With known history of pancreatic adenocarcinoma, patient noting increasing episode of pain  · Workup of abdominal pain as above  · Management as per Palliative care service (patient follows with Dr Andres Mora as outpatient)  ·     Pancreatic adenocarcinoma [facility])  Assessment & Plan  · Diagnosed 05/2021, s/p port placement and receiving neoadjuvant chemotherapy with eventual plan for Whipple procedure  · Surgical oncology input noted    Acute cholecystitis  Assessment & Plan  · S/p cholecystostomy tube placement and has completed antibiotic course for this  · Surgical oncology input noted  · Continue cholecystostomy tube care/drainage to gravity      Type 2 diabetes mellitus, without long-term current use of insulin [facility])  Assessment & Plan  Lab Results   Component Value Date    HGBA1C 6 2 (H) 05/05/2021       Recent Labs     06/17/21  2054 06/18/21  0604 06/18/21  1121 06/18/21  1552   POCGLU 269* 195* 319* 435*       Blood Sugar Average: Last 72 hrs:  · Appears to be diet controlled as outpatient  · Previously with hyperglycemia   · - with plan to increase steroid to tid will need to trend glucose   · - added tid with meals will increase to 8 units   · Note chronic prednisone use   · Continue SSI with Accu-Cheks  · Diabetic diet   · Hypoglycemia protocol    Essential hypertension  Assessment & Plan  · Blood pressure acceptable, continue Prinzide 10/12 5 3 times weekly  · Monitor blood pressure per protocol    * Abdominal pain  Assessment & Plan  · Presented with h/o pancreatic cancer presents with increased abdominal pain  · CT abdomen/pelvis with improvement in previously known peripancreatic collection but with air bubble noted  · Surgical oncology input noted, okay for diet and no current indication for emergent surgical intervention  · Patient has been noting increased pain along with intractable hiccups  · Appreciate ongoing Palliative Care recommendations   · Started Reglan 10 mg TID - now discontinued and plan to start chlorpromazine likely 25 mg TID tomorrow   · Started gabapentin, increased to 200 mg TID, per palliative would consider TID on Monday if indicated  · Oxycodone 7 5 -10 mg q4h prn moderate and severe pain, IV dilaudid 0 5 mg q4h prn breakthrough pain   · Mouth Kote PRN for xerostomia   · Continue serial abdominal exams  · Discontinued IVF given improvement in oral intake           VTE Pharmacologic Prophylaxis: VTE Score: 4 High Risk (Score >/= 5) - Pharmacological DVT Prophylaxis Ordered: enoxaparin (Lovenox)  Sequential Compression Devices Ordered  Patient Centered Rounds: I performed bedside rounds with nursing staff today  Discussions with Specialists or Other Care Team Provider: nursing     Education and Discussions with Family / Patient: Updated  (son) via phone  updated Shayna Jennings today pts son spoke about living will as pt requesting sons to be a part of decision making as wife gets very anxious and feels it would help his wife     Time Spent for Care: 45 minutes  More than 50% of total time spent on counseling and coordination of care as described above  Current Length of Stay: 3 day(s)  Current Patient Status: Inpatient   Certification Statement: The patient will continue to require additional inpatient hospital stay due to pain control and blood sugar control   Discharge Plan: Anticipate discharge in 24-48 hrs to home  Code Status: Level 1 - Full Code    Subjective:   Pt is doing better today  He report hiccups are much better  Still has abdominal pain in left lower quadrant   No n/v/d did have bm   He states he is eating    He does request that we update his contacts as he would like to be able to enable the children to call if needed     Objective:     Vitals:   Temp (24hrs), Av 6 °F (37 °C), Min:98 3 °F (36 8 °C), Max:99 °F (37 2 °C)    Temp:  [98 3 °F (36 8 °C)-99 °F (37 2 °C)] 98 3 °F (36 8 °C)  HR:  [73-95] 81  Resp:  [18] 18  BP: (122-128)/(68-74) 123/73  SpO2:  [92 %-94 %] 92 %  Body mass index is 22 28 kg/m²  Input and Output Summary (last 24 hours): Intake/Output Summary (Last 24 hours) at 2021 1859  Last data filed at 2021 1700  Gross per 24 hour   Intake 835 ml   Output 1750 ml   Net -915 ml       Physical Exam:   Physical Exam  Constitutional:       General: He is not in acute distress  Appearance: He is ill-appearing  He is not toxic-appearing or diaphoretic  HENT:      Head: Normocephalic and atraumatic  Right Ear: There is no impacted cerumen  Nose: No congestion  Mouth/Throat:      Pharynx: Oropharynx is clear  Eyes:      General:         Right eye: No discharge  Left eye: No discharge  Conjunctiva/sclera: Conjunctivae normal    Cardiovascular:      Rate and Rhythm: Normal rate  Heart sounds: No murmur heard  No friction rub  No gallop  Pulmonary:      Effort: No respiratory distress  Breath sounds: No stridor  No wheezing, rhonchi or rales  Chest:      Chest wall: No tenderness  Abdominal:      General: There is no distension  Palpations: There is no mass  Tenderness: There is abdominal tenderness  There is no right CVA tenderness, left CVA tenderness, guarding or rebound  Hernia: No hernia is present  Musculoskeletal:         General: No swelling, tenderness, deformity or signs of injury  Right lower leg: No edema  Left lower leg: No edema  Skin:     Coloration: Skin is not jaundiced or pale  Findings: No bruising, erythema, lesion or rash  Neurological:      Mental Status: He is alert and oriented to person, place, and time  Psychiatric:         Behavior: Behavior normal           Additional Data:     Labs:  Results from last 7 days   Lab Units 06/18/21  0509 06/16/21  0531   WBC Thousand/uL 8 87 5 63   HEMOGLOBIN g/dL 11 9* 11 0*   HEMATOCRIT % 35 3* 33 9*   PLATELETS Thousands/uL 276 216   NEUTROS PCT %  --  77*   LYMPHS PCT %  --  15   MONOS PCT %  --  7   EOS PCT %  --  0     Results from last 7 days   Lab Units 06/18/21  0509   SODIUM mmol/L 136   POTASSIUM mmol/L 3 5   CHLORIDE mmol/L 102   CO2 mmol/L 28   BUN mg/dL 13   CREATININE mg/dL 0 43*   ANION GAP mmol/L 6   CALCIUM mg/dL 8 6   ALBUMIN g/dL 2 1*   TOTAL BILIRUBIN mg/dL 0 46   ALK PHOS U/L 187*   ALT U/L 23   AST U/L 6   GLUCOSE RANDOM mg/dL 204*         Results from last 7 days   Lab Units 06/18/21  1845 06/18/21  1552 06/18/21  1121 06/18/21  0604 06/17/21  2054 06/17/21  1559 06/17/21  1112 06/17/21  0648 06/16/21  2053 06/16/21  1703 06/16/21  1050 06/16/21  0619   POC GLUCOSE mg/dl 296* 435* 319* 195* 269* 355* 279* 185* 240* 364* 240* 138               Lines/Drains:  Invasive Devices     Central Venous Catheter Line            Port A Cath 06/01/21 Left Chest 17 days          Peripheral Intravenous Line            Peripheral IV 06/15/21 Left;Dorsal (posterior) Wrist 3 days          Drain            Open Drain Right 1254 days    Closed/Suction Drain  RUQ  10 Fr  38 days                Central Line:  Goal for removal: Will discontinue when meds requiring line are completed               Imaging: Reviewed radiology reports from this admission including: abdominal/pelvic CT    Recent Cultures (last 7 days):         Last 24 Hours Medication List:   Current Facility-Administered Medications   Medication Dose Route Frequency Provider Last Rate    aluminum-magnesium hydroxide-simethicone  30 mL Oral Q4H PRN Roni Magaña MD      aspirin  81 mg Oral Daily Josué Cameron,       atorvastatin  10 mg Oral After Moe Khan,       enoxaparin  40 mg Subcutaneous Daily Donald Wes, DO      fish oil  1,000 mg Oral Daily Donald Wes, DO      FLUoxetine  20 mg Oral Daily Donald Wes, DO      folic acid  1 mg Oral Daily Donald Wes, DO      gabapentin  200 mg Oral TID Destinee Orona MD      HYDROmorphone  0 5 mg Intravenous Q4H PRN Donald Harvey, DO      HYDROmorphone  0 2 mg Intravenous Once Toya Gist D TIMOTHY Manley      insulin lispro  1-5 Units Subcutaneous HS Ellyn Dowd PA-C      insulin lispro  1-5 Units Subcutaneous TID AC Donald Harvey, DO      [START ON 6/19/2021] insulin lispro  8 Units Subcutaneous TID With Meals CAROL Gonzales      lisinopril-hydrochlorothiazide [facility] 10/12  5) combo dose   Oral Once per day on Mon Wed Fri Donald Harvey, DO      multivitamin-minerals  1 tablet Oral Daily Donald Harvey, DO      ondansetron  4 mg Intravenous Q4H PRN Donald Harvey, DO      oxyCODONE  10 mg Oral Q4H PRN Milagros Polanco PA-C      oxyCODONE  7 5 mg Oral Q4H PRN Destinee Orona MD      pancrelipase (Lip-Prot-Amyl)  24,000 Units Oral TID With Meals Donald Harvey, DO      pantoprazole  40 mg Oral BID Donald Harvey, DO      polyethylene glycol  17 g Oral Daily PRN Destinee Orona MD      predniSONE  10 mg Oral After Katie Caldera MD      predniSONE  20 mg Oral Daily Dom Chowdary MD      saliva substitute  5 spray Mouth/Throat 4x Daily PRN Destinee Orona MD      senna  1 tablet Oral HS Destinee Orona MD      tamsulosin  0 4 mg Oral HS Donald Harvey DO          Today, Patient Was Seen By: CAROL Gonzales    **Please Note: This note may have been constructed using a voice recognition system  **

## 2021-06-18 NOTE — PROGRESS NOTES
Progress Note - Surgical Oncology   Rc Samuels 73 y o  male MRN: [id number]  Unit/Bed#: [facility] 317-01 Encounter: [id number]    Assessment:  73 M with pancreas cancer on chemotherapy with abdominal pain    Plan:  Diet as tolerated  Bowel regimen  Pain and symptomatic control per palliative care  Supportive care per primary  DVT prophylaxis    Subjective/Objective     Subjective: No acute events overnight  Tolerating some diet but complaining of not much appetite  Objective:    Blood pressure 122/74, pulse 73, temperature 98 4 °F (36 9 °C), temperature source Oral, resp  rate 18, height 5' 9 49" (1 765 m), weight 69 4 kg (153 lb), SpO2 94 %  ,Body mass index is 22 28 kg/m²  Intake/Output Summary (Last 24 hours) at 6/18/2021 0627  Last data filed at 6/18/2021 0514  Gross per 24 hour   Intake 248 ml   Output 1415 ml   Net -1167 ml       Invasive Devices     Central Venous Catheter Line            Port A Cath 06/01/21 Left Chest 16 days          Peripheral Intravenous Line            Peripheral IV 06/15/21 Left;Dorsal (posterior) Wrist 2 days          Drain            Open Drain Right 1253 days    Closed/Suction Drain  RUQ  10 Fr   37 days                Physical Exam:   General: NAD, AAOx3  Eyes: PERRL  ENT: moist mucous membranes  Neck: supple  CV: RRR +S1/S2  Chest: respirations non-labored  Abdomen: soft, mildly tender R abdomen, non-distended  Cholecystostomy tube in place  Extremities: atraumatic, no edema      Results from last 7 days   Lab Units 06/18/21  0509 06/16/21  0531 06/15/21  0438   WBC Thousand/uL 8 87 5 63 5 60   HEMOGLOBIN g/dL 11 9* 11 0* 11 2*   HEMATOCRIT % 35 3* 33 9* 34 5*   PLATELETS Thousands/uL 276 216 245     Results from last 7 days   Lab Units 06/16/21  0531 06/15/21  0438 06/14/21  1822   POTASSIUM mmol/L 4 0 4 1 4 2   CHLORIDE mmol/L 102 96* 92*   CO2 mmol/L 28 27 24   BUN mg/dL 14 20 20   CREATININE mg/dL 0 43* 0 68 0 84   CALCIUM mg/dL 8 6 9 0 9 2

## 2021-06-19 NOTE — PROGRESS NOTES
[address] [facility]  Progress Note - Gabino Oreilly 1955, 73 y o  male MRN: [id number]  Unit/Bed#: [facility] 317-01 Encounter: [id number]  Primary Care Provider: Randolph Hartley MD   Date and time admitted to hospital: 6/14/2021  5:56 PM    * Abdominal pain  Assessment & Plan  · Presented with h/o pancreatic cancer presents with increased abdominal pain  · CT abdomen/pelvis with improvement in previously known peripancreatic collection but with air bubble noted  · Surgical oncology input noted, okay for diet and no current indication for emergent surgical intervention  · Patient has been noting increased pain along with intractable hiccups  · Appreciate ongoing Palliative Care recommendations   · Palliative increased prednisone from 10mg to 10mg TID with increasing blood sugars (but improving pain control)  , (need to keep BS controlled for potential surgery in near future) A1c 6 2  · Started gabapentin, increased to 200 mg TID, per palliative would consider  300 mg TID on Monday if indicated  · Oxycodone 7 5 -10 mg q4h prn moderate and severe pain, IV dilaudid 0 5 mg q4h prn breakthrough pain   · Mouth Kote PRN for xerostomia   · Continue serial abdominal exams  · Discontinued IVF given improvement in oral intake     Type 2 diabetes mellitus, without long-term current use of insulin [facility])  Assessment & Plan  Lab Results   Component Value Date    HGBA1C 6 2 (H) 05/05/2021       Recent Labs     06/18/21  1552 06/18/21  1845 06/18/21  2112 06/19/21  0639   POCGLU 435* 296* 245* 203*       Blood Sugar Average: Last 72 hrs:  · Appears to be diet controlled as outpatient with A1c of 6 2   · Previously with hyperglycemia   · - Palliative increased steroid to tid will need to trend glucose   · - added tid with meals will increase from 8 units to 10 units today   · Note chronic prednisone use of 10mg daily   · Continue SSI with Accu-Cheks  · Diabetic diet   · Hypoglycemia protocol  · Will consult endocrinology dt pts pancreatic adenocarcinoma and additional steroid dosing with plan for surgery in near future    Cancer related pain  Assessment & Plan  · With known history of pancreatic adenocarcinoma, patient noting increasing episode of pain  · Workup of abdominal pain as above  · Management as per Palliative care service (patient follows with Dr Paige Zarco as outpatient)      Pancreatic adenocarcinoma [facility])  Assessment & Plan  · Diagnosed 05/2021, s/p port placement and receiving neoadjuvant chemotherapy with eventual plan for Whipple procedure  · Surgical oncology input noted    Acute cholecystitis  Assessment & Plan  · S/p cholecystostomy tube placement and has completed antibiotic course for this  · Surgical oncology input noted  · Continue cholecystostomy tube care/drainage to gravity  · - may shower but need to cover drain and have pt seated on shower chair       Essential hypertension  Assessment & Plan  · Blood pressure acceptable, continue Prinzide 10/12 5 3 times weekly  · Monitor blood pressure per protocol        VTE Pharmacologic Prophylaxis: VTE Score: 4 High Risk (Score >/= 5) - Pharmacological DVT Prophylaxis Ordered: enoxaparin (Lovenox)  Sequential Compression Devices Ordered  Patient Centered Rounds: I performed bedside rounds with nursing staff today  Discussions with Specialists or Other Care Team Provider: nursing / endocrinology     Education and Discussions with Family / Patient: will call christophe Arrington little later today to update   Time Spent for Care: 45 minutes  More than 50% of total time spent on counseling and coordination of care as described above  Current Length of Stay: 4 day(s)  Current Patient Status: Inpatient   Certification Statement: The patient will continue to require additional inpatient hospital stay due to ongoing need for better blood sugar control and pain control   Discharge Plan: Anticipate discharge in 24-48 hrs to home  hopefully sooner if blood sugars more controlled  Will need diabetic education for discharge plan     Code Status: Level 1 - Full Code    Subjective:   Pt still with some discomfort in right lower quadrant below ashli bag  Pt is tender and states when getting up or straining to move increased pain not so bad when walking  He is eating ok he feels  He has no nausea  He does not use any meds at home for diabetes and understands that his blood sugars have been elevated   Objective:     Vitals:   Temp (24hrs), Av 2 °F (36 8 °C), Min:97 9 °F (36 6 °C), Max:98 3 °F (36 8 °C)    Temp:  [97 9 °F (36 6 °C)-98 3 °F (36 8 °C)] 97 9 °F (36 6 °C)  HR:  [63-81] 63  Resp:  [15-18] 16  BP: (110-127)/(65-75) 127/75  SpO2:  [92 %-96 %] 95 %  Body mass index is 22 28 kg/m²  Input and Output Summary (last 24 hours): Intake/Output Summary (Last 24 hours) at 2021 1117  Last data filed at 2021 0700  Gross per 24 hour   Intake 480 ml   Output 750 ml   Net -270 ml       Physical Exam:   Physical Exam  Constitutional:       General: He is not in acute distress  Appearance: He is ill-appearing  He is not toxic-appearing or diaphoretic  HENT:      Head: Normocephalic and atraumatic  Nose: No congestion  Mouth/Throat:      Pharynx: No oropharyngeal exudate  Eyes:      Conjunctiva/sclera: Conjunctivae normal    Cardiovascular:      Rate and Rhythm: Normal rate  Heart sounds: No murmur heard  No friction rub  No gallop  Pulmonary:      Effort: No respiratory distress  Breath sounds: No stridor  No wheezing, rhonchi or rales  Chest:      Chest wall: No tenderness  Abdominal:      Palpations: Abdomen is soft  Tenderness: There is abdominal tenderness (right lower quadrant )  Comments: Ashli bag with bile marin brown drainage    Musculoskeletal:         General: No swelling, tenderness, deformity or signs of injury  Right lower leg: No edema        Left lower leg: No edema    Skin:     Coloration: Skin is pale  Skin is not jaundiced  Findings: No bruising, erythema, lesion or rash  Neurological:      Mental Status: He is alert and oriented to person, place, and time  Psychiatric:         Behavior: Behavior normal           Additional Data:     Labs:  Results from last 7 days   Lab Units 06/18/21  0509 06/16/21  0531   WBC Thousand/uL 8 87 5 63   HEMOGLOBIN g/dL 11 9* 11 0*   HEMATOCRIT % 35 3* 33 9*   PLATELETS Thousands/uL 276 216   NEUTROS PCT %  --  77*   LYMPHS PCT %  --  15   MONOS PCT %  --  7   EOS PCT %  --  0     Results from last 7 days   Lab Units 06/18/21  0509   SODIUM mmol/L 136   POTASSIUM mmol/L 3 5   CHLORIDE mmol/L 102   CO2 mmol/L 28   BUN mg/dL 13   CREATININE mg/dL 0 43*   ANION GAP mmol/L 6   CALCIUM mg/dL 8 6   ALBUMIN g/dL 2 1*   TOTAL BILIRUBIN mg/dL 0 46   ALK PHOS U/L 187*   ALT U/L 23   AST U/L 6   GLUCOSE RANDOM mg/dL 204*         Results from last 7 days   Lab Units 06/19/21  1106 06/19/21  0639 06/18/21  2112 06/18/21  1845 06/18/21  1552 06/18/21  1121 06/18/21  0604 06/17/21  2054 06/17/21  1559 06/17/21  1112 06/17/21  0648 06/16/21  2053   POC GLUCOSE mg/dl 384* 203* 245* 296* 435* 319* 195* 269* 355* 279* 185* 240*               Lines/Drains:  Invasive Devices     Central Venous Catheter Line            Port A Cath 06/01/21 Left Chest 18 days          Peripheral Intravenous Line            Peripheral IV 06/15/21 Left;Dorsal (posterior) Wrist 3 days          Drain            Open Drain Right 1255 days    Closed/Suction Drain  RUQ  10 Fr  38 days                Central Line:  Goal for removal: Will discontinue when meds requiring line are completed  Imaging: No pertinent imaging reviewed      Recent Cultures (last 7 days):         Last 24 Hours Medication List:   Current Facility-Administered Medications   Medication Dose Route Frequency Provider Last Rate    aluminum-magnesium hydroxide-simethicone  30 mL Oral Q4H CARMELA Henry Robert Blanca MD      aspirin  81 mg Oral Daily Tiffany Oyster, DO      atorvastatin  10 mg Oral After Mayank Salter, DO      enoxaparin  40 mg Subcutaneous Daily Tiffany Oyster, DO      fish oil  1,000 mg Oral Daily Tiffany Oyster, DO      FLUoxetine  20 mg Oral Daily Tiffany Oyster, DO      folic acid  1 mg Oral Daily Tiffany Oyster, DO      gabapentin  200 mg Oral TID Adele Sousa MD      HYDROmorphone  0 5 mg Intravenous Q4H PRN Tiffany Oyster, DO      HYDROmorphone  0 2 mg Intravenous Once Kathrin Manley PA-C      insulin lispro  1-5 Units Subcutaneous HS Ellyn Dowd, PARANDELL      insulin lispro  1-5 Units Subcutaneous TID AC Tiffany Oyster, DO      insulin lispro  10 Units Subcutaneous TID With Meals CAROL Todd      lisinopril-hydrochlorothiazide [facility] 10/12  5) combo dose   Oral Once per day on Mon Wed Fri Tiffany Oyster, DO      multivitamin-minerals  1 tablet Oral Daily Tiffany Oyster, DO      ondansetron  4 mg Intravenous Q4H PRN Tiffany Oyster, DO      oxyCODONE  10 mg Oral Q4H PRN Deondre Sherman PA-C      oxyCODONE  7 5 mg Oral Q4H PRN Adele Sousa MD      pancrelipase (Lip-Prot-Amyl)  24,000 Units Oral TID With Meals Tiffany Oyster, DO      pantoprazole  40 mg Oral BID Tiffany Oyster, DO      polyethylene glycol  17 g Oral Daily PRN Adele Sousa MD      predniSONE  10 mg Oral After Diamond Morgan MD      predniSONE  20 mg Oral Daily Angélica Nino MD      saliva substitute  5 spray Mouth/Throat 4x Daily PRN Adele Sousa MD      senna  1 tablet Oral HS Adele Sousa MD      tamsulosin  0 4 mg Oral HS Tiffany Oyster, DO          Today, Patient Was Seen By: CAROL Todd    **Please Note: This note may have been constructed using a voice recognition system  **

## 2021-06-19 NOTE — PROGRESS NOTES
Progress Note - Palliative & Supportive Care  Petty Jarquin  73 y o   male  [facility] 317/[facility]HP 317-01   MRN: [id number]  Encounter: [id number]     Assessment  Patient Active Problem List   Diagnosis    Right arm pain    Rheumatoid arthritis ([address] )    Essential hypertension    Urgency of urination    Anxiety disorder    Hyperlipidemia    Liver mass    Esophageal reflux    Multiple lipomas    Type 2 diabetes mellitus, without long-term current use of insulin (HCC)    Hypokalemia    Diarrhea    Pancreatic mass    Epigastric pain    BPH (benign prostatic hyperplasia)    Constipation    SIRS (systemic inflammatory response syndrome) (HCC)    Perforation bowel (HCC)    Acute cholecystitis    Leukocytosis    Pancreatic adenocarcinoma (HCC)    Chemotherapy induced neutropenia (HCC)    Cancer related pain    Abdominal pain   Active issues specifically addressed today include:   · Hiccups  · Cancer associated pain  · Pancreatic adenocarcinoma  Palliative care encounter        Goals of Care  Level 1 code status    Plan  Symptom Management    Hiccups- resolved              - gabapentin 200mg TID (may consider increase to 300mg TID on Monday 6/21 if indicated)              - prednisone increased to 10mg TID                 Pain              - oxycodone 7 5-10mg PO Q4H PRN moderate-severe pain              - hydromorphone 0 5mg Q4H PRN breakthrough pain              - senna QD and miralax QD PRN to prevent OIC    24 History  Chart reviewed before visit  No overnight events  On interview, Gail Acosta reports that he feels overall improved  He has not had hiccups for 3 days and feels his pain is tolerable  He continues to have right-sided abdominal pain,  with movement and deep breathing, however is hesitant to take more pain medication  He believes his pain level is acceptable on the current regimen  He denies nausea and vomiting  He has increased thirst today but is tolerating ice water well      [facility] will continue to follow with [place] [facility] for supportive cares and symptom management as his clinical course progresses  Current pain location(s): R lower abdomen  Pain Scale:   4  PRN Use of Pain Medications: 2 x ocycodone  24 hour Total OME: 22 5      Review of Systems: Pertinent items are noted in HPI      Medications    Current Facility-Administered Medications:     aluminum-magnesium hydroxide-simethicone (MYLANTA) oral suspension 30 mL, 30 mL, Oral, Q4H PRN, Cheng Puente MD    aspirin chewable tablet 81 mg, 81 mg, Oral, Daily, Fernie Dux, DO, 81 mg at 06/18/21 0802    atorvastatin (LIPITOR) tablet 10 mg, 10 mg, Oral, After Rufina Tate, DO, 10 mg at 06/18/21 1741    enoxaparin (LOVENOX) subcutaneous injection 40 mg, 40 mg, Subcutaneous, Daily, Fernie Dux, DO, 40 mg at 06/18/21 0803    fish oil capsule 1,000 mg, 1,000 mg, Oral, Daily, Fernie Dux, DO    FLUoxetine (PROzac) capsule 20 mg, 20 mg, Oral, Daily, Fernie Dux, DO, 20 mg at 68/04/36 5947    folic acid (FOLVITE) tablet 1 mg, 1 mg, Oral, Daily, Fernie Dux, DO, 1 mg at 06/18/21 0803    gabapentin (NEURONTIN) capsule 200 mg, 200 mg, Oral, TID, Cheng Puente MD, 200 mg at 06/18/21 2044    HYDROmorphone (DILAUDID) injection 0 5 mg, 0 5 mg, Intravenous, Q4H PRN, Fernie Dux, DO, 0 5 mg at 06/17/21 1644    HYDROmorphone HCl (DILAUDID) injection 0 2 mg, 0 2 mg, Intravenous, Once, Calliia AMBER Manley PA-C    insulin lispro (HumaLOG) 100 units/mL subcutaneous injection 1-5 Units, 1-5 Units, Subcutaneous, HS, Ellyn Dowd PA-C, 2 Units at 06/18/21 2156    insulin lispro (HumaLOG) 100 units/mL subcutaneous injection 1-5 Units, 1-5 Units, Subcutaneous, TID AC, 5 Units at 06/18/21 1553 **AND** Fingerstick Glucose (POCT), , , 4x Daily AC and at bedtime, Fernie Suerox, DO    insulin lispro (HumaLOG) 100 units/mL subcutaneous injection 10 Units, 10 Units, Subcutaneous, TID With Meals, Joshua Gupta, CAROL   lisinopril-hydrochlorothiazide (PRINZIDE 10/12  5) combo dose, , Oral, Once per day on Mon Wed Fri, Jessica Nunez DO    multivitamin-minerals (CENTRUM) tablet 1 tablet, 1 tablet, Oral, Daily, Jessica Nunez DO    ondansetron [facility]) injection 4 mg, 4 mg, Intravenous, Q4H PRN, Jessica Nunez DO, 4 mg at 06/14/21 2337    oxyCODONE (ROXICODONE) immediate release tablet 10 mg, 10 mg, Oral, Q4H PRN, Kristina Jade PA-C    oxyCODONE (ROXICODONE) IR tablet 7 5 mg, 7 5 mg, Oral, Q4H PRN, Callum Christensen MD, 7 5 mg at 06/18/21 2045    pancrelipase (Lip-Prot-Amyl) (CREON) delayed release capsule 24,000 Units, 24,000 Units, Oral, TID With Meals, Jessica Nunez DO, 24,000 Units at 06/18/21 1602    pantoprazole (PROTONIX) EC tablet 40 mg, 40 mg, Oral, BID, Jessica Mayra, DO, 40 mg at 06/19/21 0640    polyethylene glycol (MIRALAX) packet 17 g, 17 g, Oral, Daily PRN, Callum Christensen MD    predniSONE tablet 10 mg, 10 mg, Oral, After Sally Bowers MD, 10 mg at 06/18/21 1308    predniSONE tablet 20 mg, 20 mg, Oral, Daily, Mary Ann Altamirano MD, 20 mg at 06/18/21 0804    saliva substitute (MOUTH KOTE) mucosal solution 5 spray, 5 spray, Mouth/Throat, 4x Daily PRN, Callum Christensen MD, 5 spray at 06/15/21 1627    senna (SENOKOT) tablet 8 6 mg, 1 tablet, Oral, HS, Callum Christensen MD, 8 6 mg at 06/18/21 2156    tamsulosin (FLOMAX) capsule 0 4 mg, 0 4 mg, Oral, HS, Jessica Nunez, DO, 0 4 mg at 06/18/21 2155    Objective  /75 (BP Location: Left arm)   Pulse 63   Temp 97 9 °F (36 6 °C) (Oral)   Resp 16   Ht 5' 9 49" (1 765 m)   Wt 69 4 kg (153 lb) Comment: per rn documentation on 6/14  SpO2 95%   BMI 22 28 kg/m²   Physical Exam:   Constitutional: Appears chronically ill  In no acute distress  Head: Normocephalic and atraumatic  Eyes: EOM are normal  No ocular discharge  No scleral icterus  Neck: no visible adenopathy or masses  Respiratory: Effort normal  No stridor  No respiratory distress  Gastrointestinal: Tender right and left lower quadrants  Musculoskeletal: No edema  Neurological: Alert, oriented and appropriately conversant  Skin: Dry, no diaphoresis  Pallor is present  Psychiatric: Displays a normal mood and affect  Behavior, judgement and thought content appear normal      Lab Results: I have personally reviewed pertinent labs  , CBC: No results found for: WBC, HGB, HCT, MCV, PLT, ADJUSTEDWBC, MCH, MCHC, RDW, MPV, NRBC, CMP: No results found for: SODIUM, K, CL, CO2, ANIONGAP, BUN, CREATININE, GLUCOSE, CALCIUM, AST, ALT, ALKPHOS, PROT, BILITOT, EGFR    Counseling / Coordination of Care  Total floor / unit time spent today 25 minutes  Greater than 50% of total time was spent with the patient and / or family counseling and / or coordinating of care  A description of the counseling / coordination of care: I determined goals of care, discussed palliative care and symptom management determined competency and POA/HCA, determined social/family support, provided psychosocial support  Reviewed with MARISA Petit, [address] [place] [address]

## 2021-06-19 NOTE — ASSESSMENT & PLAN NOTE
· S/p cholecystostomy tube placement and has completed antibiotic course for this  · Surgical oncology input noted  · Continue cholecystostomy tube care/drainage to gravity  · - may shower but need to cover drain and have pt seated on shower chair

## 2021-06-19 NOTE — PROGRESS NOTES
Progress Note - Surgical Oncology   Jamila Gray 73 y o  male MRN: [id number]  Unit/Bed#: [facility] 317-01 Encounter: [id number]    Assessment:  73 M with pancreatic cancer on chemotherapy with abdominal pain      Plan:  Diet as tolerated  Bowel regimen  Pain and symptomatic control per palliative care  Supportive care per primary  DVT prophylaxis    Subjective/Objective     Subjective: No acute events overnight  Tolerating some diet but complaining of not much appetite  Objective:    Blood pressure 127/75, pulse 63, temperature 97 9 °F (36 6 °C), temperature source Oral, resp  rate 16, height 5' 9 49" (1 765 m), weight 69 4 kg (153 lb), SpO2 95 %  ,Body mass index is 22 28 kg/m²  Intake/Output Summary (Last 24 hours) at 6/19/2021 0802  Last data filed at 6/18/2021 2300  Gross per 24 hour   Intake 535 ml   Output 1000 ml   Net -465 ml       Invasive Devices     Central Venous Catheter Line            Port A Cath 06/01/21 Left Chest 17 days          Peripheral Intravenous Line            Peripheral IV 06/15/21 Left;Dorsal (posterior) Wrist 3 days          Drain            Open Drain Right 1254 days    Closed/Suction Drain  RUQ  10 Fr  38 days                Physical Exam:   Gen:  NAD  HEENT: NCAT  MMM  CV: well perfused, pulses palpable  Lungs: Normal respiratory effort  Abd: soft, nt/nd perc ashli in place with bilious output  Skin: warm/ dry  Extremities: no peripheral edema, no cyanosis  Neuro:  AxO x3        Results from last 7 days   Lab Units 06/18/21  0509 06/16/21  0531 06/15/21  0438   WBC Thousand/uL 8 87 5 63 5 60   HEMOGLOBIN g/dL 11 9* 11 0* 11 2*   HEMATOCRIT % 35 3* 33 9* 34 5*   PLATELETS Thousands/uL 276 216 245     Results from last 7 days   Lab Units 06/18/21  0509 06/16/21  0531 06/15/21  0438   POTASSIUM mmol/L 3 5 4 0 4 1   CHLORIDE mmol/L 102 102 96*   CO2 mmol/L 28 28 27   BUN mg/dL 13 14 20   CREATININE mg/dL 0 43* 0 43* 0 68   CALCIUM mg/dL 8 6 8 6 9 0

## 2021-06-19 NOTE — ASSESSMENT & PLAN NOTE
· With known history of pancreatic adenocarcinoma, patient noting increasing episode of pain  · Workup of abdominal pain as above  · Management as per Palliative care service (patient follows with Dr Jose E Stock as outpatient)

## 2021-06-19 NOTE — ASSESSMENT & PLAN NOTE
Lab Results   Component Value Date    HGBA1C 6 2 (H) 05/05/2021       Recent Labs     06/18/21  1552 06/18/21  1845 06/18/21  2112 06/19/21  0639   POCGLU 435* 296* 245* 203*       Blood Sugar Average: Last 72 hrs:  · Appears to be diet controlled as outpatient with A1c of 6 2   · Previously with hyperglycemia   · - Palliative increased steroid to tid will need to trend glucose   · - added tid with meals will increase from 8 units to 10 units today   · Note chronic prednisone use of 10mg daily   · Continue SSI with Accu-Cheks  · Diabetic diet   · Hypoglycemia protocol  · Will consult endocrinology dt pts pancreatic adenocarcinoma and additional steroid dosing with plan for surgery in near future

## 2021-06-19 NOTE — ASSESSMENT & PLAN NOTE
· Presented with h/o pancreatic cancer presents with increased abdominal pain  · CT abdomen/pelvis with improvement in previously known peripancreatic collection but with air bubble noted  · Surgical oncology input noted, okay for diet and no current indication for emergent surgical intervention  · Patient has been noting increased pain along with intractable hiccups  · Appreciate ongoing Palliative Care recommendations   · Palliative increased prednisone from 10mg to 10mg TID with increasing blood sugars (but improving pain control)  , (need to keep BS controlled for potential surgery in near future) A1c 6 2  · Started gabapentin, increased to 200 mg TID, per palliative would consider  300 mg TID on Monday if indicated  · Oxycodone 7 5 -10 mg q4h prn moderate and severe pain, IV dilaudid 0 5 mg q4h prn breakthrough pain   · Mouth Kote PRN for xerostomia   · Continue serial abdominal exams  · Discontinued IVF given improvement in oral intake

## 2021-06-20 NOTE — CONSULTS
Consultation - Sherif Reveles 73 y o  male MRN: [id number]    Unit/Bed#: [facility] 317-01 Encounter: [id number]      Assessment/Plan     Assessment: This is a 73 y o  male with pancreatic cancer, diabetes mellitus type 2, hypertension was hospitalized for acute cholecystitis  Plan:  Diabetes mellitus type 2 with hyperglycemia due to steroid use - A1c 6 2%  - currently on prednisone 20 mg AM and 10 mg in the afternoon for hiccups  - will check fasting c-peptide tomorrow morning with fasting blood sugar to assess his endogenous production of insulin  - adjust insulin regimen to Lantus to 20 units, Humalog 10 units before meals + ISS  - monitor blood sugars    Discussed prednisone plan with palliative care  He will be d/mika and stay on current dose of prednisone for hiccups  Will decide on final d/c regimen based on his c-peptide results tomorrow  CC: Diabetes Consult    History of Present Illness     HPI:  This is a 73 y o  male with pancreatic cancer, diabetes mellitus type 2, hypertension was hospitalized for acute cholecystitis  Patient diagnosed with diabetes in 3/2021 about the same time when he was diagnosed with pancreatic cancer  He is not on any medications for diabetes  He denies any complications such as neuropathy, retinopathy, nephropathy, history of stroke or MI  He denies any polyuria, polydipsia, numbness and tingling in feet, admits to blurry vision  He has glucometer at home but is not using it because has difficulties getting blood from his finger sticks  He has family history of diabetes in his grandmother  Patient was started on prednisone for hiccups per palliative care  He was started on Lantus 15 units, 5 units of Humalog for hyperglycemia due to steroid use, this dose was increased today to 25 units of lantus in the morning and 8 units before meals               Inpatient consult to Endocrinology     Performed by  Maverick Bobo MD     Authorized by CAROL Vazquez Review of Systems   Constitutional: Negative for fatigue and unexpected weight change  HENT: Negative for congestion, postnasal drip and sore throat  Eyes: Negative for pain and redness  Respiratory: Negative for cough, chest tightness, shortness of breath and wheezing  Cardiovascular: Negative for chest pain, palpitations and leg swelling  Gastrointestinal: Positive for abdominal pain  Negative for constipation, diarrhea, nausea and vomiting  Endocrine: Negative for polydipsia and polyuria  Genitourinary: Negative for dysuria  Musculoskeletal: Negative for arthralgias and back pain  Neurological: Negative for dizziness, light-headedness and headaches  Psychiatric/Behavioral: Negative for agitation  The patient is not nervous/anxious  All other systems reviewed and are negative  Historical Information   Past Medical History:   Diagnosis Date    Anxiety     Arthritis     Cancer ([facility] )     pancreatic    Cellulitis     LAST ASSESSED: 6/13/14    Enlarged prostate     Epidermal inclusion cyst     LAST ASSESSED: 10/4/13    Erythrasma     LAST ASSESSED: 9/23/13    Furuncle     LAST ASSESSED: 6/11/14    GERD (gastroesophageal reflux disease)     Hyperlipidemia     Hypertension     RA (rheumatoid arthritis) ([facility] )      Past Surgical History:   Procedure Laterality Date    COLONOSCOPY      FL GUIDED CENTRAL VENOUS ACCESS DEVICE INSERTION  6/1/2021    HYDROCELE EXCISION / REPAIR Right 01/11/2018    SPERMATIC CORD EXCSION OF HYDROCELE; MANAGED BY: Manas Keller    IR CHOLECYSTOSTOMY TUBE PLACEMENT  5/11/2021    NH REMOVAL OF HYDROCELE,TUNICA,UNILAT Right 1/11/2018    Procedure: HYDROCELECTOMY;  Surgeon: Darell Beckwith MD;  Location: [facility];  Service: Urology    SCROTAL SURGERY      benign "lump"    TUNNELED VENOUS PORT PLACEMENT Left 6/1/2021    Procedure: INSERTION VENOUS PORT (PORT-A-CATH);   Surgeon: Dileep Huerat MD;  Location: [facility];  Service: Surgical Oncology     Social History   Social History     Substance and Sexual Activity   Alcohol Use Never     Social History     Substance and Sexual Activity   Drug Use Never     Social History     Tobacco Use   Smoking Status Former Smoker   Smokeless Tobacco Never Used   Tobacco Comment    tobacco in a pipe in the early 1980's     Family History:   Family History   Problem Relation Age of Onset    Heart disease Father         CARDIAC DISORDER    Hypertension Father     Hypertension Mother     No Known Problems Maternal Aunt     No Known Problems Maternal Uncle     No Known Problems Paternal Aunt     No Known Problems Paternal Uncle     No Known Problems Paternal Grandmother     No Known Problems Paternal Grandfather     Diabetes Maternal Grandmother         MELLITUS    No Known Problems Maternal Grandfather     Hypertension Other        Meds/Allergies   Current Facility-Administered Medications   Medication Dose Route Frequency Provider Last Rate Last Admin    aluminum-magnesium hydroxide-simethicone (MYLANTA) oral suspension 30 mL  30 mL Oral Q4H PRN Ryland Morillo MD        aspirin chewable tablet 81 mg  81 mg Oral Daily [place] Shaila, DO   81 mg at 06/20/21 0818    atorvastatin (LIPITOR) tablet 10 mg  10 mg Oral After Billee Night, DO   10 mg at 06/19/21 1743    enoxaparin (LOVENOX) subcutaneous injection 40 mg  40 mg Subcutaneous Daily [place] Mantee, DO   40 mg at 06/20/21 0819    fish oil capsule 1,000 mg  1,000 mg Oral Daily [place] Shaila, DO        FLUoxetine (PROzac) capsule 20 mg  20 mg Oral Daily [place] Shaila, DO   20 mg at 60/46/35 2436    folic acid (FOLVITE) tablet 1 mg  1 mg Oral Daily [place] Shaila, DO   1 mg at 06/20/21 0818    gabapentin (NEURONTIN) capsule 200 mg  200 mg Oral TID Ryland Morillo MD   200 mg at 06/20/21 0817    HYDROmorphone (DILAUDID) injection 0 5 mg  0 5 mg Intravenous Q4H PRN [place] Mantee, DO   0 5 mg at 06/17/21 1644    HYDROmorphone HCl (DILAUDID) injection 0 2 mg  0 2 mg Intravenous Once Kathrin Manley PA-C        insulin glargine (LANTUS) subcutaneous injection 25 Units 0 25 mL  25 Units Subcutaneous QAM Gene Copeland MD        insulin lispro (HumaLOG) 100 units/mL subcutaneous injection 1-5 Units  1-5 Units Subcutaneous HS Ellyn Dowd PA-C   2 Units at 06/19/21 2227    insulin lispro (HumaLOG) 100 units/mL subcutaneous injection 1-5 Units  1-5 Units Subcutaneous TID AC Jessica Nunez DO   2 Units at 06/20/21 0815    insulin lispro (HumaLOG) 100 units/mL subcutaneous injection 8 Units  8 Units Subcutaneous TID With Meals Gene Copeland MD        lisinopril-hydrochlorothiazide (PRINZIDE 10/12  5) combo dose   Oral Once per day on Mon Wed Fri Jessica Nunez DO        multivitamin-minerals (CENTRUM) tablet 1 tablet  1 tablet Oral Daily Jessica Nunez DO        ondansetron [facility]) injection 4 mg  4 mg Intravenous Q4H PRN Jessica Nunez DO   4 mg at 06/14/21 2337    oxyCODONE (ROXICODONE) immediate release tablet 10 mg  10 mg Oral Q4H PRN Kristina Jade PA-C   10 mg at 06/19/21 1018    oxyCODONE (ROXICODONE) IR tablet 7 5 mg  7 5 mg Oral Q4H PRN Callum Christensen MD   7 5 mg at 06/18/21 2045    pancrelipase (Lip-Prot-Amyl) (CREON) delayed release capsule 24,000 Units  24,000 Units Oral TID With Meals Jessica Nunez DO   24,000 Units at 06/20/21 0819    pantoprazole (PROTONIX) EC tablet 40 mg  40 mg Oral BID Jessica Nunez DO   40 mg at 06/20/21 0253    polyethylene glycol (MIRALAX) packet 17 g  17 g Oral Daily PRN Callum Christensen MD        predniSONE tablet 10 mg  10 mg Oral After Radha Gómze MD   10 mg at 06/19/21 1405    predniSONE tablet 20 mg  20 mg Oral Daily Mary Ann Altamirano MD   20 mg at 06/20/21 0820    saliva substitute (MOUTH KOTE) mucosal solution 5 spray  5 spray Mouth/Throat 4x Daily PRN Callum Christensen MD   5 spray at 06/15/21 1627    senna (SENOKOT) tablet 8 6 mg  1 tablet Oral HS Jun Lawler MD Sydni   8 6 mg at 06/19/21 2227    tamsulosin (FLOMAX) capsule 0 4 mg  0 4 mg Oral HS Verletty Lis, DO   0 4 mg at 06/19/21 2227     Allergies   Allergen Reactions    Fentanyl Anaphylaxis and Other (See Comments)     Oxygen drops severely       Objective   Vitals: Blood pressure 128/72, pulse 58, temperature 98 2 °F (36 8 °C), temperature source Oral, resp  rate 17, height 5' 9 49" (1 765 m), weight 69 4 kg (153 lb), SpO2 96 %  Intake/Output Summary (Last 24 hours) at 6/20/2021 3695  Last data filed at 6/20/2021 1918  Gross per 24 hour   Intake 970 ml   Output 1730 ml   Net -760 ml     Invasive Devices     Central Venous Catheter Line            Port A Cath 06/01/21 Left Chest 18 days          Peripheral Intravenous Line            Peripheral IV 06/19/21 Left;Proximal;Ventral (anterior) Forearm <1 day          Drain            Open Drain Right 1256 days    Closed/Suction Drain  RUQ  10 Fr  39 days                Physical Exam  Constitutional:       Appearance: He is well-developed  HENT:      Head: Normocephalic and atraumatic  Mouth/Throat:      Pharynx: No oropharyngeal exudate  Eyes:      Conjunctiva/sclera: Conjunctivae normal       Pupils: Pupils are equal, round, and reactive to light  Cardiovascular:      Rate and Rhythm: Normal rate and regular rhythm  Heart sounds: No murmur heard  Pulmonary:      Effort: Pulmonary effort is normal  No respiratory distress  Breath sounds: Normal breath sounds  Abdominal:      General: There is no distension  Palpations: Abdomen is soft  Tenderness: There is no abdominal tenderness  Musculoskeletal:      Cervical back: Neck supple  Skin:     General: Skin is warm and dry  Findings: No erythema  Neurological:      Mental Status: He is alert  Psychiatric:         Mood and Affect: Mood normal          The history was obtained from the review of the chart, patient      Lab Results:       Lab Results   Component Value Date    WBC 8 87 06/18/2021    HGB 11 9 (L) 06/18/2021    HCT 35 3 (L) 06/18/2021    MCV 88 06/18/2021     06/18/2021     Lab Results   Component Value Date/Time    BUN 13 06/18/2021 05:09 AM    BUN 22 06/07/2021 07:19 AM     09/01/2017 07:17 AM    K 3 5 06/18/2021 05:09 AM    K 4 3 06/07/2021 07:19 AM     06/18/2021 05:09 AM    CL 96 06/07/2021 07:19 AM    CO2 28 06/18/2021 05:09 AM    CO2 25 06/07/2021 07:19 AM    CREATININE 0 43 (L) 06/18/2021 05:09 AM    CREATININE 1 11 09/01/2017 07:17 AM    AST 6 06/18/2021 05:09 AM    AST 21 06/07/2021 07:19 AM    ALT 23 06/18/2021 05:09 AM    ALT 25 06/07/2021 07:19 AM    ALB 2 1 (L) 06/18/2021 05:09 AM    ALB 3 9 09/01/2017 07:17 AM    GLOB 2 7 06/07/2021 07:19 AM     No results for input(s): CHOL, HDL, LDL, TRIG, VLDL in the last 72 hours  No results found for: Linda Wright  POC Glucose (mg/dl)   Date Value   06/20/2021 226 (H)   06/19/2021 259 (H)   06/19/2021 340 (H)   06/19/2021 384 (H)   06/19/2021 203 (H)   06/18/2021 245 (H)   06/18/2021 296 (H)   06/18/2021 435 (H)   06/18/2021 319 (H)   06/18/2021 195 (H)       Imaging Studies: I have personally reviewed pertinent reports  Portions of the record may have been created with voice recognition software

## 2021-06-20 NOTE — ASSESSMENT & PLAN NOTE
· With known history of pancreatic adenocarcinoma, patient noting increasing episode of pain  · Workup of abdominal pain as above  · Management as per Palliative care service (patient follows with Dr [place] as outpatient)  · Pt will continue with 10mg tid steroids for hiccups

## 2021-06-20 NOTE — ASSESSMENT & PLAN NOTE
Lab Results   Component Value Date    HGBA1C 6 2 (H) 05/05/2021       Recent Labs     06/19/21  2057 06/20/21  0610 06/20/21  1048 06/20/21  1608   POCGLU 259* 226* 253* 262*       Blood Sugar Average: Last 72 hrs:  · Appears to be diet controlled as outpatient with A1c of 6 2   · Previously with hyperglycemia   · - Palliative increased steroid to tid will need to trend glucose   · - discussed with endo today      · - increased the pts insulin   · - pending c peptide and will then determine if pt will be dcd on metformin and lantus vs just metformin, question pancreatic function as pt reports diabetes started around time of diagnosis   · Note chronic prednisone use of 10mg daily   · Continue SSI with Accu-Cheks  · Diabetic diet   · Hypoglycemia protocol  · Will consult endocrinology dt pts pancreatic adenocarcinoma and additional steroid dosing with plan for surgery in near future

## 2021-06-20 NOTE — PROGRESS NOTES
[address] [facility]  Progress Note - Cristina Yang 1955, 73 y o  male MRN: [id number]  Unit/Bed#: [facility] 317-01 Encounter: [id number]  Primary Care Provider: Samia Bacon MD   Date and time admitted to hospital: 6/14/2021  5:56 PM    * Abdominal pain  Assessment & Plan  · Presented with h/o pancreatic cancer presents with increased abdominal pain  · CT abdomen/pelvis with improvement in previously known peripancreatic collection but with air bubble noted  · Surgical oncology input noted, okay for diet and no current indication for emergent surgical intervention  · Patient has been noting increased pain along with intractable hiccups  · Appreciate ongoing Palliative Care recommendations   · Palliative increased prednisone from 10mg to 10mg TID with increasing blood sugars (but improving pain control)  , (need to keep BS controlled for potential surgery in near future) A1c 6 2  · Started gabapentin, increased to 200 mg TID, per palliative would consider  300 mg TID on Monday if indicated  · Oxycodone 7 5 -10 mg q4h prn moderate and severe pain, IV dilaudid 0 5 mg q4h prn breakthrough pain   · Mouth Kote PRN for xerostomia   · Continue serial abdominal exams  · Discontinued IVF given improvement in oral intake     Type 2 diabetes mellitus, without long-term current use of insulin [facility])  Assessment & Plan  Lab Results   Component Value Date    HGBA1C 6 2 (H) 05/05/2021       Recent Labs     06/19/21  2057 06/20/21  0610 06/20/21  1048 06/20/21  1608   POCGLU 259* 226* 253* 262*       Blood Sugar Average: Last 72 hrs:  · Appears to be diet controlled as outpatient with A1c of 6 2   · Previously with hyperglycemia   · - Palliative increased steroid to tid will need to trend glucose   · - discussed with endo today      · - increased the pts insulin   · - pending c peptide and will then determine if pt will be dcd on metformin and lantus vs just metformin, question pancreatic function as pt reports diabetes started around time of diagnosis   · Note chronic prednisone use of 10mg daily   · Continue SSI with Accu-Cheks  · Diabetic diet   · Hypoglycemia protocol  · Will consult endocrinology dt pts pancreatic adenocarcinoma and additional steroid dosing with plan for surgery in near future    Cancer related pain  Assessment & Plan  · With known history of pancreatic adenocarcinoma, patient noting increasing episode of pain  · Workup of abdominal pain as above  · Management as per Palliative care service (patient follows with Dr Martha Beck as outpatient)  · Pt will continue with 10mg tid steroids for hiccups       Pancreatic adenocarcinoma [facility])  Assessment & Plan  · Diagnosed 05/2021, s/p port placement and receiving neoadjuvant chemotherapy with eventual plan for Whipple procedure  · Surgical oncology input noted    Acute cholecystitis  Assessment & Plan  · S/p cholecystostomy tube placement and has completed antibiotic course for this  · Surgical oncology input noted  · Continue cholecystostomy tube care/drainage to gravity  · - may shower but need to cover drain and have pt seated on shower chair       Essential hypertension  Assessment & Plan  · Blood pressure acceptable, continue Prinzide 10/12 5 3 times weekly  · Monitor blood pressure per protocol        VTE Pharmacologic Prophylaxis: VTE Score: 4 High Risk (Score >/= 5) - Pharmacological DVT Prophylaxis Ordered: enoxaparin (Lovenox)  Sequential Compression Devices Ordered  Patient Centered Rounds: I performed bedside rounds with nursing staff today  Discussions with Specialists or Other Care Team Provider: nursing     Education and Discussions with Family / Patient: Did not call christophe Jennings today as Dr Norma Pulliam from surgical oncology just called pts son and went through pts whole diagnosis and plan of care   Time Spent for Care: 45 minutes   More than 50% of total time spent on counseling and coordination of care as described above     Current Length of Stay: 5 day(s)  Current Patient Status: Inpatient   Certification Statement: The patient will continue to require additional inpatient hospital stay due to pending lab work and final plan of care in regard to the diabetes   Discharge Plan: Anticipate discharge tomorrow to home  with diabetic supplies once final choice of med determined     Code Status: Level 1 - Full Code    Subjective:   Pt is doing better today pain more controlled today   He has some pain not as much did talk with with endocrinology and spoke with dr higgins this am who called the pts son at bedside this made the pt feel happy that he understood full prognosis     Objective:     Vitals:   Temp (24hrs), Av 2 °F (36 8 °C), Min:98 °F (36 7 °C), Max:98 3 °F (36 8 °C)    Temp:  [98 °F (36 7 °C)-98 3 °F (36 8 °C)] 98 3 °F (36 8 °C)  HR:  [58-72] 72  Resp:  [12-18] 18  BP: (111-128)/(60-72) 111/60  SpO2:  [94 %-96 %] 96 %  Body mass index is 22 28 kg/m²  Input and Output Summary (last 24 hours): Intake/Output Summary (Last 24 hours) at 2021 194  Last data filed at 2021 1700  Gross per 24 hour   Intake 480 ml   Output 1400 ml   Net -920 ml       Physical Exam:   Physical Exam  Constitutional:       General: He is not in acute distress  Appearance: He is not ill-appearing, toxic-appearing or diaphoretic  HENT:      Head: Normocephalic  Right Ear: There is no impacted cerumen  Nose: No congestion  Mouth/Throat:      Pharynx: Oropharynx is clear  Eyes:      General:         Right eye: No discharge  Left eye: No discharge  Conjunctiva/sclera: Conjunctivae normal    Cardiovascular:      Rate and Rhythm: Normal rate  Heart sounds: No murmur heard  No friction rub  No gallop  Pulmonary:      Effort: No respiratory distress  Breath sounds: No stridor  No wheezing, rhonchi or rales  Chest:      Chest wall: No tenderness     Abdominal:      General: There is no distension  Palpations: There is no mass  Tenderness: There is no abdominal tenderness  There is no right CVA tenderness, left CVA tenderness, guarding or rebound  Hernia: No hernia is present  Comments: Right upper quadrant biliary drain    Musculoskeletal:         General: No swelling, tenderness, deformity or signs of injury  Right lower leg: No edema  Left lower leg: No edema  Skin:     Coloration: Skin is not jaundiced or pale  Findings: No bruising, erythema, lesion or rash  Neurological:      Mental Status: He is alert and oriented to person, place, and time  Additional Data:     Labs:  Results from last 7 days   Lab Units 06/18/21  0509 06/16/21  0531   WBC Thousand/uL 8 87 5 63   HEMOGLOBIN g/dL 11 9* 11 0*   HEMATOCRIT % 35 3* 33 9*   PLATELETS Thousands/uL 276 216   NEUTROS PCT %  --  77*   LYMPHS PCT %  --  15   MONOS PCT %  --  7   EOS PCT %  --  0     Results from last 7 days   Lab Units 06/18/21  0509   SODIUM mmol/L 136   POTASSIUM mmol/L 3 5   CHLORIDE mmol/L 102   CO2 mmol/L 28   BUN mg/dL 13   CREATININE mg/dL 0 43*   ANION GAP mmol/L 6   CALCIUM mg/dL 8 6   ALBUMIN g/dL 2 1*   TOTAL BILIRUBIN mg/dL 0 46   ALK PHOS U/L 187*   ALT U/L 23   AST U/L 6   GLUCOSE RANDOM mg/dL 204*         Results from last 7 days   Lab Units 06/20/21  1608 06/20/21  1048 06/20/21  0610 06/19/21  2057 06/19/21  1611 06/19/21  1106 06/19/21  0639 06/18/21  2112 06/18/21  1845 06/18/21  1552 06/18/21  1121 06/18/21  0604   POC GLUCOSE mg/dl 262* 253* 226* 259* 340* 384* 203* 245* 296* 435* 319* 195*               Lines/Drains:  Invasive Devices     Central Venous Catheter Line            Port A Cath 06/01/21 Left Chest 19 days          Peripheral Intravenous Line            Peripheral IV 06/19/21 Left;Proximal;Ventral (anterior) Forearm <1 day          Drain            Open Drain Right 1256 days    Closed/Suction Drain  RUQ  10 Fr   40 days                Central Line:  Goal for removal: No longer needed  Will place order to discontinue  Will discontinue when meds requiring line are completed  Imaging: No pertinent imaging reviewed  Recent Cultures (last 7 days):         Last 24 Hours Medication List:   Current Facility-Administered Medications   Medication Dose Route Frequency Provider Last Rate    aluminum-magnesium hydroxide-simethicone  30 mL Oral Q4H PRN Chichi Saeed MD      aspirin  81 mg Oral Daily Jack Nava, DO      atorvastatin  10 mg Oral After Nell Plana, DO      enoxaparin  40 mg Subcutaneous Daily Jack Nava, DO      fish oil  1,000 mg Oral Daily Jack Nvaa, DO      FLUoxetine  20 mg Oral Daily Jack Nava, DO      folic acid  1 mg Oral Daily Jack Nava, DO      gabapentin  200 mg Oral TID Chichi Saeed MD      HYDROmorphone  0 5 mg Intravenous Q4H PRN Jack Nava, DO      HYDROmorphone  0 2 mg Intravenous Once Kathrin Manley PA-C      [START ON 6/21/2021] insulin glargine  20 Units Subcutaneous QAM Sony Mascorro MD      insulin lispro  1-5 Units Subcutaneous HS Ellyn Dowd PA-C      insulin lispro  1-5 Units Subcutaneous TID AC Jack Nava, DO      insulin lispro  10 Units Subcutaneous TID With Meals Sony Mascorro MD      lisinopril-hydrochlorothiazide [facility] 10/12  5) combo dose   Oral Once per day on Mon Wed Fri Jack Nava, DO      multivitamin-minerals  1 tablet Oral Daily Jack Nava, DO      ondansetron  4 mg Intravenous Q4H PRN Jack Nava, DO      oxyCODONE  10 mg Oral Q4H PRN Ghulam Henry PA-C      oxyCODONE  7 5 mg Oral Q4H PRN Chichi Saeed MD      pancrelipase (Lip-Prot-Amyl)  24,000 Units Oral TID With Meals Jack Nava, DO      pantoprazole  40 mg Oral BID Jack Nava, DO      polyethylene glycol  17 g Oral Daily PRN Chichi Saeed MD      predniSONE  10 mg Oral After Alesha Low MD      predniSONE  20 mg Oral Daily Aguila Mathis MD  saliva substitute  5 spray Mouth/Throat 4x Daily PRN Rajesh Juarez MD      senna  1 tablet Oral HS Rajesh Juarez MD      tamsulosin  0 4 mg Oral HS Francisca Marcial DO          Today, Patient Was Seen By: CAROL Molina    **Please Note: This note may have been constructed using a voice recognition system  **

## 2021-06-20 NOTE — PLAN OF CARE
Problem: Potential for Falls  Goal: Patient will remain free of falls  Description: INTERVENTIONS:  - Educate patient/family on patient safety including physical limitations  - Instruct patient to call for assistance with activity   - Consult OT/PT to assist with strengthening/mobility   - Keep Call bell within reach  - Keep bed low and locked with side rails adjusted as appropriate  - Keep care items and personal belongings within reach  - Initiate and maintain comfort rounds  - Make Fall Risk Sign visible to staff  - Offer Toileting every 2 Hours, in advance of need  - Initiate/Maintain bed alarm  - Apply yellow socks and bracelet for high fall risk patients  - Consider moving patient to room near nurses station  6/20/2021 1434 by Rafi Martinez RN  Outcome: Progressing  6/20/2021 1210 by Rafi Martinez RN  Outcome: Progressing     Problem: Nutrition/Hydration-ADULT  Goal: Nutrient/Hydration intake appropriate for improving, restoring or maintaining nutritional needs  Description: Monitor and assess patient's nutrition/hydration status for malnutrition  Collaborate with interdisciplinary team and initiate plan and interventions as ordered  Monitor patient's weight and dietary intake as ordered or per policy  Utilize nutrition screening tool and intervene as necessary  Determine patient's food preferences and provide high-protein, high-caloric foods as appropriate       INTERVENTIONS:  - Monitor oral intake, urinary output, labs, and treatment plans  - Assess nutrition and hydration status and recommend course of action  - Evaluate amount of meals eaten  - Assist patient with eating if necessary   - Allow adequate time for meals  - Recommend/ encourage appropriate diets, oral nutritional supplements, and vitamin/mineral supplements  - Order, calculate, and assess calorie counts as needed  - Recommend, monitor, and adjust tube feedings and TPN/PPN based on assessed needs  - Assess need for intravenous fluids  - Provide specific nutrition/hydration education as appropriate  - Include patient/family/caregiver in decisions related to nutrition  6/20/2021 1434 by Ora Proctor RN  Outcome: Progressing  6/20/2021 1210 by Ora Proctor RN  Outcome: Progressing     Problem: MOBILITY - ADULT  Goal: Maintain or return to baseline ADL function  Description: INTERVENTIONS:  -  Assess patient's ability to carry out ADLs; assess patient's baseline for ADL function and identify physical deficits which impact ability to perform ADLs (bathing, care of mouth/teeth, toileting, grooming, dressing, etc )  - Assess/evaluate cause of self-care deficits   - Assess range of motion  - Assess patient's mobility; develop plan if impaired  - Assess patient's need for assistive devices and provide as appropriate  - Encourage maximum independence but intervene and supervise when necessary  - Involve family in performance of ADLs  - Assess for home care needs following discharge   - Consider OT consult to assist with ADL evaluation and planning for discharge  - Provide patient education as appropriate  6/20/2021 1434 by Ora Proctor RN  Outcome: Progressing  6/20/2021 1210 by Ora Proctor RN  Outcome: Progressing  Goal: Maintains/Returns to pre admission functional level  Description: INTERVENTIONS:  - Perform BMAT or MOVE assessment daily    - Set and communicate daily mobility goal to care team and patient/family/caregiver  - Collaborate with rehabilitation services on mobility goals if consulted  - Perform Range of Motion 2 times a day  - Reposition patient every 2 hours    - Dangle patient 2 times a day  - Stand patient 2 times a day  - Ambulate patient 2 times a day  - Out of bed to chair 2 times a day   - Out of bed for meals 2 times a day  - Out of bed for toileting  - Record patient progress and toleration of activity level   6/20/2021 1434 by Ora Proctor RN  Outcome: Progressing  6/20/2021 1210 by Ora Proctor RN  Outcome: Progressing

## 2021-06-20 NOTE — PLAN OF CARE
Problem: Potential for Falls  Goal: Patient will remain free of falls  Description: INTERVENTIONS:  - Educate patient/family on patient safety including physical limitations  - Instruct patient to call for assistance with activity   - Consult OT/PT to assist with strengthening/mobility   - Keep Call bell within reach  - Keep bed low and locked with side rails adjusted as appropriate  - Keep care items and personal belongings within reach  - Initiate and maintain comfort rounds  - Make Fall Risk Sign visible to staff  - Offer Toileting every 2 Hours, in advance of need  - Initiate/Maintain bed alarm  - Apply yellow socks and bracelet for high fall risk patients  - Consider moving patient to room near nurses station  Outcome: Progressing     Problem: Nutrition/Hydration-ADULT  Goal: Nutrient/Hydration intake appropriate for improving, restoring or maintaining nutritional needs  Description: Monitor and assess patient's nutrition/hydration status for malnutrition  Collaborate with interdisciplinary team and initiate plan and interventions as ordered  Monitor patient's weight and dietary intake as ordered or per policy  Utilize nutrition screening tool and intervene as necessary  Determine patient's food preferences and provide high-protein, high-caloric foods as appropriate       INTERVENTIONS:  - Monitor oral intake, urinary output, labs, and treatment plans  - Assess nutrition and hydration status and recommend course of action  - Evaluate amount of meals eaten  - Assist patient with eating if necessary   - Allow adequate time for meals  - Recommend/ encourage appropriate diets, oral nutritional supplements, and vitamin/mineral supplements  - Order, calculate, and assess calorie counts as needed  - Recommend, monitor, and adjust tube feedings and TPN/PPN based on assessed needs  - Assess need for intravenous fluids  - Provide specific nutrition/hydration education as appropriate  - Include patient/family/caregiver in decisions related to nutrition  Outcome: Progressing     Problem: MOBILITY - ADULT  Goal: Maintain or return to baseline ADL function  Description: INTERVENTIONS:  -  Assess patient's ability to carry out ADLs; assess patient's baseline for ADL function and identify physical deficits which impact ability to perform ADLs (bathing, care of mouth/teeth, toileting, grooming, dressing, etc )  - Assess/evaluate cause of self-care deficits   - Assess range of motion  - Assess patient's mobility; develop plan if impaired  - Assess patient's need for assistive devices and provide as appropriate  - Encourage maximum independence but intervene and supervise when necessary  - Involve family in performance of ADLs  - Assess for home care needs following discharge   - Consider OT consult to assist with ADL evaluation and planning for discharge  - Provide patient education as appropriate  Outcome: Progressing  Goal: Maintains/Returns to pre admission functional level  Description: INTERVENTIONS:  - Perform BMAT or MOVE assessment daily    - Set and communicate daily mobility goal to care team and patient/family/caregiver  - Collaborate with rehabilitation services on mobility goals if consulted  - Perform Range of Motion 2 times a day  - Reposition patient every 2 hours    - Dangle patient 2 times a day  - Stand patient 2 times a day  - Ambulate patient 2 times a day  - Out of bed to chair 2 times a day   - Out of bed for meals 2 times a day  - Out of bed for toileting  - Record patient progress and toleration of activity level   Outcome: Progressing

## 2021-06-20 NOTE — PROGRESS NOTES
Progress Note - Oncology Surgery   Yakelin Maldoando 73 y o  male MRN: [id number]  Unit/Bed#: [facility] 317-01 Encounter: [id number]    Assessment:  73 M with pancreatic cancer on chemotherapy with abdominal pain        Plan:  Diet as tolerated  Bowel regimen  Pain and symptomatic control per palliative care  Supportive care per primary  DVT prophylaxis    Subjective/Objective     Subjective:   Pain is controlled, tolerating diet    Objective:    Blood pressure 122/70, pulse 61, temperature 98 °F (36 7 °C), temperature source Oral, resp  rate 12, height 5' 9 49" (1 765 m), weight 69 4 kg (153 lb), SpO2 94 %  ,Body mass index is 22 28 kg/m²        Intake/Output Summary (Last 24 hours) at 6/20/2021 0653  Last data filed at 6/20/2021 5693  Gross per 24 hour   Intake 1210 ml   Output 1955 ml   Net -745 ml       Invasive Devices     Central Venous Catheter Line            Port A Cath 06/01/21 Left Chest 18 days          Peripheral Intravenous Line            Peripheral IV 06/19/21 Left;Proximal;Ventral (anterior) Forearm <1 day          Drain            Open Drain Right 1255 days    Closed/Suction Drain  RUQ  10 Fr  39 days                Physical Exam:   Gen:  NAD  CV:  warm, well-perfused  Lungs: nl effort  Abd:  soft, NT/ND  Ext:  no CCE  Neuro: A&Ox3     Results from last 7 days   Lab Units 06/18/21  0509 06/16/21  0531 06/15/21  0438   WBC Thousand/uL 8 87 5 63 5 60   HEMOGLOBIN g/dL 11 9* 11 0* 11 2*   HEMATOCRIT % 35 3* 33 9* 34 5*   PLATELETS Thousands/uL 276 216 245     Results from last 7 days   Lab Units 06/18/21  0509 06/16/21  0531 06/15/21  0438   POTASSIUM mmol/L 3 5 4 0 4 1   CHLORIDE mmol/L 102 102 96*   CO2 mmol/L 28 28 27   BUN mg/dL 13 14 20   CREATININE mg/dL 0 43* 0 43* 0 68   CALCIUM mg/dL 8 6 8 6 9 0

## 2021-06-21 NOTE — PROGRESS NOTES
Received a voicemail message from Prince wheat over the weekend, he was asking how to add his son to his communication consent so that he can speak to Dr Saenz He to obtain some information while he is in the hospital?  Can someone help with this? I am not sure of this process as he may not be physically able to sign a new consent form so is there another way?   Thanks

## 2021-06-21 NOTE — ASSESSMENT & PLAN NOTE
· With known history of pancreatic adenocarcinoma, patient noting increasing episode of pain  · Workup of abdominal pain as above  · Management as per Palliative care service (patient follows with Dr Yue Flannery as outpatient)  · Pain better controlled today, outpt f/u with palliative

## 2021-06-21 NOTE — ASSESSMENT & PLAN NOTE
Lab Results   Component Value Date    HGBA1C 6 2 (H) 05/05/2021       Recent Labs     06/20/21  2115 06/21/21  0557 06/21/21  1052 06/21/21  1619   POCGLU 337* 159* 151* 320*       Blood Sugar Average: Last 72 hrs:  · Appears to be diet controlled as outpatient with A1c of 6 2   · Previously with hyperglycemia   · Note chronic prednisone use of 10mg daily - increased to 20 mg qAM and 10 mg in afternoon   · D/w endo  C-peptide ordered, though this will take few days to come back    In the meantime plan to discharge on insulin - lantus 20 units qam and humalog 10 unit TID with meals   · Outpt f/u with endocrinology

## 2021-06-21 NOTE — PROGRESS NOTES
Progress Note - Oncology Surgery   Toney Julio 73 y o  male MRN: [id number]  Unit/Bed#: [facility] 317-01 Encounter: [id number]    Assessment:  73 M with pancreatic cancer on chemotherapy     Plan:  Diet as tolerated  Bowel regimen  Follow up outpatient  Pain and symptomatic control per palliative care  Supportive care per primary  - discharge when stable blood glucose  DVT prophylaxis    Subjective/Objective     Subjective:   NAEO  Pain controlled  Difficulties with sleep due to steroid regimen  Objective:    Blood pressure 127/74, pulse 58, temperature 98 °F (36 7 °C), temperature source Oral, resp  rate 18, height 5' 9 49" (1 765 m), weight 69 4 kg (153 lb), SpO2 97 %  ,Body mass index is 22 28 kg/m²  Intake/Output Summary (Last 24 hours) at 6/21/2021 0110  Last data filed at 6/20/2021 1700  Gross per 24 hour   Intake 480 ml   Output 900 ml   Net -420 ml       Invasive Devices     Central Venous Catheter Line            Port A Cath 06/01/21 Left Chest 19 days          Peripheral Intravenous Line            Peripheral IV 06/19/21 Left;Proximal;Ventral (anterior) Forearm 1 day          Drain            Open Drain Right 1256 days    Closed/Suction Drain  RUQ  10 Fr  40 days              Physical Exam:   Gen:  NAD  HEENT: NCAT  MMM  CV: well perfused, pulses palpable  Lungs: Normal respiratory effort  Abd: soft, nt/nd, perc ashli in place  Skin: warm/ dry  Extremities: no peripheral edema, no cyanosis  Neuro:  AxO x3      Results from last 7 days   Lab Units 06/18/21  0509 06/16/21  0531 06/15/21  0438   WBC Thousand/uL 8 87 5 63 5 60   HEMOGLOBIN g/dL 11 9* 11 0* 11 2*   HEMATOCRIT % 35 3* 33 9* 34 5*   PLATELETS Thousands/uL 276 216 245     Results from last 7 days   Lab Units 06/18/21  0509 06/16/21  0531 06/15/21  0438   POTASSIUM mmol/L 3 5 4 0 4 1   CHLORIDE mmol/L 102 102 96*   CO2 mmol/L 28 28 27   BUN mg/dL 13 14 20   CREATININE mg/dL 0 43* 0 43* 0 68   CALCIUM mg/dL 8 6 8 6 9 0

## 2021-06-21 NOTE — CASE MANAGEMENT
CM received call from  BODØ at [facility]  AND [facility]  She said pt was open to them for VN  CM made her aware discharge is written  She said they will contact pts PCP for orders to resume services and no need to place referral in [address]  Elsie Apodaca made aware and ok with this  CM made Dariel Heimlich from [facility]  [facility] aware Bhavana Simmons said pt going home on insulin and nursing did teaching and he is comfortable using insulin pens

## 2021-06-21 NOTE — PROGRESS NOTES
If you can speak to him over the phone and get a verbal, I will then print out the comm consent we have on file and add the son and just sign and date and write "verbal consent" - that will at least suffice until we can get him to sign a new one

## 2021-06-21 NOTE — APP STUDENT NOTE
ERIK STUDENT  Inpatient Progress Note for TRAINING ONLY  Not Part of Legal Medical Record       Progress Note - Rodney Hameed 73 y o  male MRN: [id number]    Unit/Bed#: [facility] 317-01 Encounter: [id number]      Assessment:  Abdominal pain  Type 2 diabetes mellitus, without long-term current use of insulin  Cancer related pain  Pancreatic adenocarcinoma  Acute cholecystitis   Essential hypertension     Plan:  Abdominal pain  1  Presented with history of pancreatic cancer and presents with increased abdominal pain   2  CT abdomen/pelvis with improvement in previously known peripancreatic collection but with air bubble noted  3  Surgical oncology input noted, okay for diet and no current indication for emergent surgical intervention   4  Patient has been noting increased pain along with intractable hiccups  5  Appreciate ongoing Palliative care recommendations   6  Palliative increased prednisone from 10 mg to 10 mg TID with increasing blood sugars (but improving pain control), need to keep blood sugar controlled for potential surgery in the near future  A1C 6 2  7  Started gabapentin, increased to 200mg TID, per palliative consider 300 mg TID if indicated  8  Oxycodone 7 5-10 mg q4h prn moderate to severe pain, IV dilaudid 0 5 mg q4h prn breakthrough pain   9  Moth Kote prn for xerostomia  10  Continue serial abdominal exams   11  Discontinued IVF given improvement in oral intake    Type 2 diabetes mellitus, without long-term current use of insulin  1  Appears diet controlled with outpatient A1C of 6 2  2  Palliative increased steroid to TID, will need to trend glucose  3  Pending c peptide to determine if patient will be discharged on metformin and lantus or just metformin, question pancreatic function as patient reports diabetes started around the time of diagnosis  4  Chronic prednisone use of 10 mg daily   5  Continue SSI with accu-checks  6  Diabetic diet   7  Hypoglycemia protocol  8   Appreciate endocrine consult Cancer related pain  1  With known history of pancreatic adenocarcinoma, patient noting increasing episodes of pain   2  Workup of abdominal pain as above  3  Management as per palliative care service (patient follows with Dr Yulia Hobbs outpatient)  4  Patient will continue with 10 mg TID steroids for hiccups     Pancreatic adenocarcinoma  1  Diagnosed 5/21, s/p port placement and receiving neoadjuvant chemotherapy with eventual plan for Whipple procedure  2  Surgical oncology input appreciated    Acute cholecystitis   1  S/p cholecystostomy tube placement and has completed antibiotic course  2  Surgical oncology input noted]  3  Continue cholecystostomy care/drainage to gravity  4  May shower but need to cover drain and have patient seated on shower chair    Essential hypertension   1  Blood pressure acceptable, continue Prinzide 10/12 5 3x weekly  2  Monitor blood pressure per protocol     Subjective:   Patient seen lying comfortably in bed  He states he did not get much sleep as his prednisone dose was later in the evening  His right lower quadrant pain is a 2/10 and is controlled on his pain regimen  He denies chest pain, shortness of breath, nausea and vomiting  He states he is ambulating well  He has no acute concerns today  Objective:     Vitals: Blood pressure 107/59, pulse 61, temperature 97 7 °F (36 5 °C), temperature source Oral, resp  rate 18, height 5' 9 49" (1 765 m), weight 69 4 kg (153 lb), SpO2 95 %  ,Body mass index is 22 28 kg/m²        Intake/Output Summary (Last 24 hours) at 6/21/2021 7075  Last data filed at 6/21/2021 0700  Gross per 24 hour   Intake 720 ml   Output 400 ml   Net 320 ml       Physical Exam: General appearance: alert and oriented, in no acute distress  Lungs: clear to auscultation bilaterally  Heart: regular rate and rhythm, S1, S2 normal, no murmur, click, rub or gallop  Abdomen: soft, non-tender; bowel sounds normal; no masses,  no organomegaly and right upper quadrant biliary drain present  Extremities: extremities normal, warm and well-perfused; no cyanosis, clubbing, or edema  Skin: Skin color, texture, turgor normal  No rashes or lesions     Invasive Devices     Central Venous Catheter Line            Port A Cath 06/01/21 Left Chest 19 days          Peripheral Intravenous Line            Peripheral IV 06/19/21 Left;Proximal;Ventral (anterior) Forearm 1 day          Drain            Open Drain Right 1256 days    Closed/Suction Drain  RUQ  10 Fr  40 days                Lab, Imaging and other studies: I have personally reviewed pertinent reports      VTE Pharmacologic Prophylaxis: Enoxaparin (Lovenox)  VTE Mechanical Prophylaxis: sequential compression device

## 2021-06-21 NOTE — DISCHARGE SUMMARY
[address] [facility]  Discharge- Phill Carcamo 1955, 73 y o  male MRN: [id number]  Unit/Bed#: [facility] 317-01 Encounter: [id number]  Primary Care Provider: Maryuri Vega MD   Date and time admitted to hospital: 6/14/2021  5:56 PM    * Abdominal pain  Assessment & Plan  · Presented with h/o pancreatic cancer presents with increased abdominal pain  · CT abdomen/pelvis with improvement in previously known peripancreatic collection but with air bubble noted  · Surgical oncology input noted, okay for diet and no current indication for emergent surgical intervention  · Patient has been noting increased pain along with intractable hiccups  · Appreciate ongoing Palliative Care recommendations   · Palliative increased prednisone from 10mg to 20 qAM and 10 q afternoon with increasing blood sugars (but improving pain control)  , (need to keep BS controlled for potential surgery in near future) A1c 6 2 see related plan - discharging on insulin  · Continue serial abdominal exams  · Discontinued IVF given improvement in oral intake   · Outpt f/u with palliative, pt reports to better control of sx at time of discharge     Cancer related pain  Assessment & Plan  · With known history of pancreatic adenocarcinoma, patient noting increasing episode of pain  · Workup of abdominal pain as above  · Management as per Palliative care service (patient follows with Dr Yao Galvan as outpatient)  · Pain better controlled today, outpt f/u with palliative     Pancreatic adenocarcinoma [facility])  Assessment & Plan  · Diagnosed 05/2021, s/p port placement and receiving neoadjuvant chemotherapy with eventual plan for Whipple procedure  · Surgical oncology input noted - outpt f/u     Acute cholecystitis  Assessment & Plan  · S/p cholecystostomy tube placement and has completed antibiotic course for this  · Surgical oncology input noted  · Continue cholecystostomy tube care/drainage to gravity  · - may shower but need to cover drain and have pt seated on shower chair     Type 2 diabetes mellitus, without long-term current use of insulin [facility])  Assessment & Plan  Lab Results   Component Value Date    HGBA1C 6 2 (H) 05/05/2021       Recent Labs     06/20/21  2115 06/21/21  0557 06/21/21  1052 06/21/21  1619   POCGLU 337* 159* 151* 320*       Blood Sugar Average: Last 72 hrs:  · Appears to be diet controlled as outpatient with A1c of 6 2   · Previously with hyperglycemia   · Note chronic prednisone use of 10mg daily - increased to 20 mg qAM and 10 mg in afternoon   · D/w endo  C-peptide ordered, though this will take few days to come back  In the meantime plan to discharge on insulin - lantus 20 units qam and humalog 10 unit TID with meals   · Outpt f/u with endocrinology     Essential hypertension  Assessment & Plan  · Blood pressure acceptable, continue Prinzide 10/12 5 3 times weekly  · Monitor blood pressure per protocol      Medical Problems     Resolved Problems  Date Reviewed: 6/21/2021    None              Discharging Physician / Practitioner: Jennifer Brady PA-C  PCP: Zenaida Land MD  Admission Date:   Admission Orders (From admission, onward)     Ordered        06/15/21 1425  Inpatient Admission  Once         06/14/21 2229  Place in Observation  Once                   Discharge Date: 06/21/21    Consultations During Hospital Stay:  · Surgical oncology  · Palliative care  · Endocrinology     Procedures Performed:   · None    Significant Findings / Test Results:   · CT a/p: Unchanged pancreatic head mass and 28 mm left hepatic lesion  Fluid surrounding pylorus and duodenum is persistent but improved  There is collection measuring 6 2 x 2 8 x 3 2 cm, located lateral to first portion of duodenum with small focus of internal gas  New edema throughout right mid abdominal omentum, possibly reactive to adjacent collection described above  No discrete omental mass        Incidental Findings:   · None Test Results Pending at Discharge (will require follow up):   · C-peptide      Outpatient Tests Requested:  · None    Complications:  None    Reason for Admission: abdominal pain     Hospital Course: Rc Samuels is a 73 y o  male patient who originally presented to the hospital on 6/14/2021 due to abdominal pain and hiccups  CT a/p showed improved findings from prior  He was seen by surgical oncology and did not feel there were any significant findings on CT or exam and recommended med mgmt/pain control and no surgical intervention  He has a perc ashli tube due to hx of cholecystitis  This is to remain at this time  He was seen palliative care and his pain regimen was adjusted  His abdominal pain has improved at this time  His prednisone dose was increased, and thus he is hyperglycemic  Endocrinology was consulted and C-peptide was ordered  D/w endocrinology today, this can take 4-5 days to result so in the interim can plan to discharge the patient on insulin as he is being discharged on steroids  Insulin teaching has been done with nursing and pt reports he is okay with insulin cost presently  He will need to f/u with endocrinology, surgical oncology, palliative care, and oncology  See plan above and chart for full details of hospital stay  Please see above list of diagnoses and related plan for additional information  Condition at Discharge: good    Discharge Day Visit / Exam:   Subjective: The patient has no acute complaints today, abdominal pain is improved, he is eager to go home  Vitals: Blood Pressure: 111/66 (06/21/21 1624)  Pulse: 79 (06/21/21 1624)  Temperature: 97 9 °F (36 6 °C) (06/21/21 1624)  Temp Source: Oral (06/21/21 1624)  Respirations: 18 (06/21/21 1624)  Height: 5' 9 49" (176 5 cm) (06/16/21 2126)  Weight - Scale: 69 4 kg (153 lb) (per rn documentation on 6/14) (06/16/21 2126)  SpO2: 95 % (06/21/21 1624)  Exam:   Physical Exam  Vitals reviewed     Constitutional: General: He is not in acute distress  Appearance: He is not ill-appearing or toxic-appearing  HENT:      Head: Normocephalic and atraumatic  Eyes:      General: No scleral icterus  Extraocular Movements: Extraocular movements intact  Cardiovascular:      Rate and Rhythm: Normal rate and regular rhythm  Pulmonary:      Effort: Pulmonary effort is normal  No respiratory distress  Abdominal:      General: Bowel sounds are normal  There is no distension  Palpations: Abdomen is soft  Comments: Perc ashli tube   Musculoskeletal:         General: Normal range of motion  Cervical back: Normal range of motion  Neurological:      General: No focal deficit present  Mental Status: He is alert and oriented to person, place, and time  Psychiatric:         Mood and Affect: Mood normal          Behavior: Behavior normal          Discussion with Family: Patient declined call to   Discharge instructions/Information to patient and family:   See after visit summary for information provided to patient and family  Provisions for Follow-Up Care:  See after visit summary for information related to follow-up care and any pertinent home health orders  Disposition:   Home    Planned Readmission: no     Discharge Statement:  I spent 48 minutes discharging the patient  This time was spent on the day of discharge  I had direct contact with the patient on the day of discharge  Greater than 50% of the total time was spent examining patient, answering all patient questions, arranging and discussing plan of care with patient as well as directly providing post-discharge instructions  Additional time then spent on discharge activities  Discharge Medications:  See after visit summary for reconciled discharge medications provided to patient and/or family        **Please Note: This note may have been constructed using a voice recognition system**

## 2021-06-21 NOTE — DISCHARGE INSTRUCTIONS
Take prednisone 20 mg every morning and 10 mg every afternoon after lunch  Start taking insulin to manage your blood sugars/diabetes  Take Lantus 20 units every morning  Take 10 units of humalog three times daily with your meals  Monitor your blood sugars at meal time and at bed time  If your blood sugar is too low, hold your meal time insulin  Please follow up with endocrinology on discharge for further management of your diabetes and insulin  Follow up with your primary care provider, oncology, and surgical oncology on discharge  Insulin Pens   WHAT YOU NEED TO KNOW:   An insulin pen is a device used to inject insulin  There are many types of insulin pens available  Most are disposable  A disposable pen contains a prefilled amount of insulin  When this type of pen is empty, it is thrown away  A few types are reusable pens  A reusable pen contains an insulin cartridge that can be replaced  When the cartridge is empty, it is thrown away  Then a new, prefilled cartridge is put in  Always use a new needle every time you inject insulin  DISCHARGE INSTRUCTIONS:   Call your doctor if:   · You feel or see hard lumps in your skin where you inject your insulin  · You think you gave yourself too much or not enough insulin  · Your injections are very painful  · You see blood or clear fluid on your injection site more than once after you inject insulin  · You have questions about how to give the injection  · You cannot afford to buy your diabetes supplies  · You have questions or concerns about your condition or care  How to get the insulin ready to use:   · Check the label and color of the insulin  Check that you have the correct type and strength of insulin  Also check the expiration date on the label  Use a new cartridge or pen if the expiration date has passed  Use a new cartridge or pen if the insulin does not look right   Follow the pen 's instructions for inserting a new cartridge into a reusable pen  · Mix cloudy insulin  Sometimes cloudy insulin separates  The insulin will remain cloudy after mixing, but it will all be cloudy  Gently roll the pen back and forth between the palms of your hands  Repeat this 10 times  Do not shake the pen  This can make the insulin clump together  Next, gently tip the pen up and down 10 times  Do not use the insulin if there are clumps in it after you mix it  How to get the pen ready to use:  No matter what type of pen you use, the steps for using it are the same  · Remove a new pen from the refrigerator 30 minutes before you use it  Insulin should be injected at room temperature  · Wash your hands  Use soap and water or an alcohol-based hand rub  This will help decrease your risk for an infection  · Remove the cap from the pen  Wipe the needle attachment area with an alcohol swab  · Attach a new needle to the pen  Remove the tab from the needle  Do not remove the outer cap on the needle  Push the needle straight onto the pen  Turn the needle clockwise until you cannot turn it more  Make sure the needle is straight  · Remove the needle caps  Remove the outer cap and save it  Remove the inner cap and throw it away  · Remove air from the pen  Air may cause pain during injection  Turn the dial to 2 units  For most insulin pens, you will hear a click for each unit of insulin that you dial  Hold the pen and point the needle up  Gently tap the pen to move air bubbles to the top of the pen  Press the injection button  You should see a drop of insulin on the tip of the pen  If you do not see a drop, change the needle and repeat this step  If you do not see a drop after you repeat this step 3 times, use a new pen  · Select the correct dose on the pen  Turn the dial to the number of units you need to inject  The pointer on the side of your pen should line up with your dose   The dial can be turned in either direction to choose the correct dose  You cannot choose a dose larger than the number of units left in the pen  Use another pen if there is not enough insulin  Instead you can inject part of your dose with the insulin that is left  Next, you can use a new pen to inject the rest of your dose  Where to inject insulin:   · You can inject insulin into your abdomen, upper arm, buttocks, hip, and the front or side of the thigh  Insulin works fastest when it is injected into the abdomen  Do not inject insulin within 2 inches of your belly button or into any stretch marks  · Do not inject insulin into areas where you have a wound or bruising  Insulin injected into wounds or bruises may not get into your body correctly  Do not inject insulin through your clothes  Injecting through clothes can contaminate the needle and may cause an infection  · Use a different area within the site each time you inject insulin  For example, inject insulin into different areas in your abdomen  Insulin injected into the same area can cause lumps, swelling, or thickened skin  How to inject insulin with a pen:   · Clean the skin where you will inject the insulin  You can use an alcohol pad or a cotton swab dipped in alcohol  Let the area dry before you inject  This will decrease pain  · Grab a fold of your skin  Gently pinch the skin and fat between your thumb and first finger  · Insert the needle straight into your skin  Do not hold the syringe at an angle  Make sure the needle is all the way into the skin  Let go of the pinched tissue  · Push the injection button to inject the insulin  Continue to press on the injection button  Keep the needle in place for 10 seconds  · Pull the needle out  Replace the needle cap  Press on your injection site for 5 to 10 seconds  Do not rub  This will keep insulin from leaking out  · Remove the needle from the pen  Twist the capped needle counter clockwise   Place the needle in a heavy-duty laundry detergent bottle or a metal coffee can  The container should have a cap or lid that fits securely  · Replace the pen cap  Store the pen as directed  What to do with used needles:  Ask your local waste authority if you need to follow certain rules for getting rid of your needles  Bring your used needles home with you when you travel  Pack them in a plastic or metal container with a secure lid  How to store an insulin pen:  Do not store your pen with a needle attached  Follow the storage directions on the label or package insert that came with the insulin  Unopened pens can be stored in the refrigerator until you are ready to use them  Most insulin pens can be opened and kept at room temperature  Store your pen in a cool, dry place  Do not keep your pen in direct sunlight or in your car  Throw away pens that have been frozen or exposed to temperatures above 85°F (30°C)  If you travel, keep the pen in a cool pack  Follow up with your healthcare provider as directed:  Write down your questions so you remember to ask them during your visits  © Copyright [address] Information is for End User's use only and may not be sold, redistributed or otherwise used for commercial purposes  All illustrations and images included in CareNotes® are the copyrighted property of [employer] D A M [employer] Inc  or [address] [place] [address]  The above information is an  only  It is not intended as medical advice for individual conditions or treatments  Talk to your doctor, nurse or pharmacist before following any medical regimen to see if it is safe and effective for you  What is Insulin   WHAT YOU NEED TO KNOW:   What is insulin? Insulin is a hormone made by the pancreas  Insulin helps remove sugar from your blood and takes it to other parts of your body  This helps lower your blood sugar levels  You may need to take insulin if your pancreas is not making enough   You may also need to take insulin if your body cannot use insulin correctly  What are the different types of insulin? Several types of insulin are used to lower your blood sugar level  The type tells you how fast the insulin starts to work or how long it lasts in your body  Your healthcare provider will help you find the insulin that is right for you:  · Rapid-acting  insulin starts to work within 15 minutes after you use it  Take it just before or just after you eat  · Short-acting, or regular,  insulin starts to work within 30 minutes after you use it  Take this insulin 30 to 45 minutes before you eat  · Intermediate-acting  insulin starts to work 2 to 4 hours after you use it  It reaches the highest level in your blood about 6 to 8 hours after you use it  This type of insulin helps control your blood sugar level between meals  It is commonly used in the morning, at bedtime, or both  · Long-acting  insulin starts to work 2 to 4 hours after you use it  It can last 24 hours or more in your body  This type of insulin is often taken in the morning or at bedtime  It helps control your blood sugar throughout the day  · Premixed  insulin is a mixture of 2 types of insulin  It usually includes one type to help control your blood sugar at meals  The other type helps control your blood sugar between meals and while you sleep  How do I use insulin safely? Some people may need to inject insulin one time each day  Others may need to inject insulin more often  This depends on the type of insulin you use, what you eat, and your activity level  You may also need different amounts of insulin during illness or with certain medicine changes or health conditions  · Prepare and give insulin as directed  Always check the label on the bottle to be sure you are using the right insulin  Do not use the same spot on your body each time   Give yourself insulin based on your blood sugar level and directions from your healthcare provider or diabetes specialist  Do not inject insulin within 2 inches of your belly button or into any stretch marks  Do not inject insulin through clothing  This can contaminate the needle and may cause infection  · Check your blood sugar level as directed  Some types of insulin can cause your blood sugar level to decrease quickly  Ask your healthcare provider when to check your blood sugar levels during the day  Check it any time you have symptoms of high or low blood sugar levels  · Know what your blood sugar levels should be  If you check your blood sugar level before a meal , it should be between 80 and 130 mg/dL  If you check your blood sugar level 1 to 2 hours after a meal , it should be less than 180 mg/dL  Ask your healthcare provider if these are good goals for you  · Write down your blood sugar level results  Bring the results to your follow-up appointments  Your healthcare provider may use the results to make changes to your insulin type or dose, eating plan, or exercise plan  How is insulin measured and given? · A needle and syringe  is used to inject insulin under the skin  Insulin needles come in different sizes  Do not share needles or syringes with others  Ask your provider or diabetes specialist what needle is best for you  Use the correct size insulin syringe to make sure you get the right dose of insulin  · An insulin pen  is used to inject insulin under the skin  It looks like a regular pen but has a short needle on one end  An insulin pen has enough insulin in it for a few injections  Some pens have a cartridge of insulin that you change when it is empty  Other pens should be thrown away when the case is empty  Do not share insulin pens with others  Change the needle with each dose  · Insulin jet injectors  use strong air pressure to spray insulin through the skin  No needles are used with injectors  Ask your provider or diabetes specialist how to safely use your injector      · An insulin injection port  is a tube on your skin  Insulin is injected through the port  The port can stay in place for several days  · An insulin pump  is worn outside of your body  The pump gives insulin through a small tube attached to a needle under your skin  A cartridge of insulin is placed in the pump  The pump can give you a continuous amount of insulin throughout the day and night  It can also be programmed to give you a dose of insulin with meals  · Inhaled insulin  is powdered insulin that you breathe in from an inhaler device into your mouth  This insulin is only used for adults  How should I store and throw away insulin? Follow the storage directions on the label or package insert that came with the insulin  Do not use your insulin if there are clumps or color changes  Cloudy insulin that has small, white, particles that will not mix should be thrown away  Clear insulin that looks cloudy should be thrown away  · Always check the expiration date on your insulin bottle  Do not use insulin beyond its expiration date  · Do not store insulin in the freezer, direct sunlight, or the glove compartment of a car  Throw away insulin that has been frozen or exposed to very warm temperatures (above 85°F)  · You can store opened or unopened insulin in the refrigerator or at room temperature  If you store your insulin at room temperature, keep it in a cool, dry place  · If you travel, keep the insulin in a cool pack  This will help make sure the temperature of the insulin stays below 86°F (30°C)  Do not leave insulin in direct sunlight  · Always throw away your used needles in a hard-sided container with a lid  Some examples include a metal coffee can or laundry detergent bottle  Always keep your insulin bottles or pens out of the reach of children  When should I contact my healthcare provider? You have questions or concerns about your condition or care    CARE AGREEMENT:   You have the right to help plan your care  Learn about your health condition and how it may be treated  Discuss treatment options with your healthcare providers to decide what care you want to receive  You always have the right to refuse treatment  The above information is an  only  It is not intended as medical advice for individual conditions or treatments  Talk to your doctor, nurse or pharmacist before following any medical regimen to see if it is safe and effective for you  © Copyright [address] Information is for End User's use only and may not be sold, redistributed or otherwise used for commercial purposes   All illustrations and images included in CareNotes® are the copyrighted property of [employer] M [employer]  or [address] [place] [address]

## 2021-06-21 NOTE — ASSESSMENT & PLAN NOTE
· Diagnosed 05/2021, s/p port placement and receiving neoadjuvant chemotherapy with eventual plan for Whipple procedure  · Surgical oncology input noted - outpt f/u

## 2021-06-21 NOTE — UTILIZATION REVIEW
Continued Stay Review    Date: 6/21/21                        Current Patient Class: inpatient  Current Level of Care: [facility]     HPI:66 y o  male initially admitted on 6/14/21 due to persistent abd pain, pancreatic CA on chemo    Assessment/Plan:Per Oncology Surgery Progress Note: Pt with pancreatic CA on chemotherapy, plan is diet as ruthie, bowel regimen, pain and symptomatic control per palliative care, dvt prophylaxis, discharge when stable blood gluce      Vital Signs:   Date/Time  Temp  Pulse  Resp  BP  MAP (mmHg)  SpO2  O2 Device   06/21/21 0700  97 7 °F (36 5 °C)  61  18  107/59  --  95 %  None (Room air)   06/20/21 2300  98 °F (36 7 °C)  58  18  127/74  --  97 %  None (Room air)   06/20/21 1500  98 3 °F (36 8 °C)  72  18  111/60  --  96 %  None (Room air)   06/20/21 0700  98 2 °F (36 8 °C)  58  17  128/72  92  96 %  None (Room air)   06/19/21 2225  98 °F (36 7 °C)  61  12  122/70  90  94 %  None (Room air)   06/19/21 1500  98 °F (36 7 °C)  70  16  113/65  --  95 %  --     Pertinent Labs/Diagnostic Results:       Results from last 7 days   Lab Units 06/21/21  0519 06/18/21  0509 06/16/21  0531 06/15/21  0438 06/14/21  1822   WBC Thousand/uL 12 42* 8 87 5 63 5 60 4 37   HEMOGLOBIN g/dL 11 5* 11 9* 11 0* 11 2* 12 3   HEMATOCRIT % 35 3* 35 3* 33 9* 34 5* 37 0   PLATELETS Thousands/uL 484* 276 216 245 293   NEUTROS ABS Thousands/µL  --   --  4 30  --   --    BANDS PCT % 11*  --   --   --   --      Results from last 7 days   Lab Units 06/21/21  0519 06/18/21  0509 06/16/21  0531 06/15/21  0438 06/14/21  1822   SODIUM mmol/L 140 136 136 130* 129*   POTASSIUM mmol/L 3 3* 3 5 4 0 4 1 4 2   CHLORIDE mmol/L 105 102 102 96* 92*   CO2 mmol/L 28 28 28 27 24   ANION GAP mmol/L 7 6 6 7 13   BUN mg/dL 14 13 14 20 20   CREATININE mg/dL 0 50* 0 43* 0 43* 0 68 0 84   EGFR ml/min/1 73sq m 114 121 121 100 92   CALCIUM mg/dL 8 5 8 6 8 6 9 0 9 2   MAGNESIUM mg/dL  --   --   --  1 9  --    PHOSPHORUS mg/dL  --   --   --  4 0  -- Results from last 7 days   Lab Units 06/21/21  0519 06/18/21  0509 06/16/21  0531 06/15/21  0438 06/14/21  1822   AST U/L 16 6 17 14 12   ALT U/L 28 23 29 33 40   ALK PHOS U/L 178* 187* 153* 184* 200*   TOTAL PROTEIN g/dL 5 5* 5 6* 5 3* 6 0* 6 6   ALBUMIN g/dL 2 2* 2 1* 2 1* 2 4* 2 7*   TOTAL BILIRUBIN mg/dL 0 30 0 46 0 54 0 78 1 04*   BILIRUBIN DIRECT mg/dL  --   --   --  0 33*  --      Results from last 7 days   Lab Units 06/21/21  1052 06/21/21  0557 06/20/21  2115 06/20/21  1608 06/20/21  1048 06/20/21  0610 06/19/21  2057 06/19/21  1611 06/19/21  1106 06/19/21  0639 06/18/21  2112 06/18/21  1845   POC GLUCOSE mg/dl 151* 159* 337* 262* 253* 226* 259* 340* 384* 203* 245* 296*     Results from last 7 days   Lab Units 06/21/21  0519 06/18/21  0509 06/16/21  0531 06/15/21  0438 06/14/21  1822   GLUCOSE RANDOM mg/dL 173* 204* 168* 329* 422*       Results from last 7 days   Lab Units 06/14/21  1822   LIPASE u/L 72*     Medications:   Scheduled Medications:  aspirin, 81 mg, Oral, Daily  atorvastatin, 10 mg, Oral, After Dinner  enoxaparin, 40 mg, Subcutaneous, Daily  fish oil, 1,000 mg, Oral, Daily  FLUoxetine, 20 mg, Oral, Daily  folic acid, 1 mg, Oral, Daily  gabapentin, 200 mg, Oral, TID  HYDROmorphone, 0 2 mg, Intravenous, Once  insulin glargine, 20 Units, Subcutaneous, QAM  insulin lispro, 1-5 Units, Subcutaneous, HS  insulin lispro, 1-5 Units, Subcutaneous, TID AC  insulin lispro, 10 Units, Subcutaneous, TID With Meals  lisinopril-hydrochlorothiazide (PRINZIDE 10/12  5) combo dose, , Oral, Once per day on Mon Wed Fri  multivitamin-minerals, 1 tablet, Oral, Daily  pancrelipase (Lip-Prot-Amyl), 24,000 Units, Oral, TID With Meals  pantoprazole, 40 mg, Oral, BID  predniSONE, 10 mg, Oral, After Lunch  predniSONE, 20 mg, Oral, Daily  senna, 1 tablet, Oral, HS  tamsulosin, 0 4 mg, Oral, HS      Continuous IV Infusions:     PRN Meds:  aluminum-magnesium hydroxide-simethicone, 30 mL, Oral, Q4H PRN  HYDROmorphone, 0 5 mg, Intravenous, Q4H PRN  ondansetron, 4 mg, Intravenous, Q4H PRN x1 thus far  oxyCODONE, 10 mg, Oral, Q4H PRN x3 thus far   oxyCODONE, 7 5 mg, Oral, Q4H PRN x9 thus far  OxyCODONE, 5 mg, Oral, Q4H PRN x1  polyethylene glycol, 17 g, Oral, Daily PRN  saliva substitute, 5 spray, Mouth/Throat, 4x Daily PRN        Discharge Plan: D    Network Utilization Review Department  ATTENTION: Please call with any questions or concerns to [phone] and carefully listen to the prompts so that you are directed to the right person  All voicemails are confidential   Alysha Skinner all requests for admission clinical reviews, approved or denied determinations and any other requests to dedicated fax number below belonging to the campus where the patient is receiving treatment   List of dedicated fax numbers for the Facilities:  [address] DENIALS (Administrative/Medical Necessity) [phone]   [address] (Maternity/NICU/Pediatrics) [phone]   [address] [place] [address] [address] [place] [address] [id number] [place] [address] Meenakshi Biggs [address] [facility] [place] [address] [phone]

## 2021-06-21 NOTE — ASSESSMENT & PLAN NOTE
· Presented with h/o pancreatic cancer presents with increased abdominal pain  · CT abdomen/pelvis with improvement in previously known peripancreatic collection but with air bubble noted  · Surgical oncology input noted, okay for diet and no current indication for emergent surgical intervention  · Patient has been noting increased pain along with intractable hiccups  · Appreciate ongoing Palliative Care recommendations   · Palliative increased prednisone from 10mg to 20 qAM and 10 q afternoon with increasing blood sugars (but improving pain control)  , (need to keep BS controlled for potential surgery in near future) A1c 6 2 see related plan - discharging on insulin  · Continue serial abdominal exams  · Discontinued IVF given improvement in oral intake   · Outpt f/u with palliative, pt reports to better control of sx at time of discharge

## 2021-06-21 NOTE — PROGRESS NOTES
Progress note - Palliative and Supportive Care   Brandon Hallman 73 y o  male [id number]    Patient Active Problem List   Diagnosis    Right arm pain    Rheumatoid arthritis ([address] )    Essential hypertension    Urgency of urination    Anxiety disorder    Hyperlipidemia    Liver mass    Esophageal reflux    Multiple lipomas    Type 2 diabetes mellitus, without long-term current use of insulin (HCC)    Hypokalemia    Diarrhea    Pancreatic mass    Epigastric pain    BPH (benign prostatic hyperplasia)    Constipation    SIRS (systemic inflammatory response syndrome) (HCC)    Perforation bowel (HCC)    Acute cholecystitis    Leukocytosis    Pancreatic adenocarcinoma (HCC)    Chemotherapy induced neutropenia (HCC)    Cancer related pain    Abdominal pain     Active issues specifically addressed today include:   - cancer related pain  - palliative care encounter  - goals of care planning  - poor sleep  - intractable hiccups    Plan:  1  Symptom management -   Hiccups- resolved   - gabapentin 200mg TID   - prednisone 20mg QAM, 10mg after lunch    Cancer associated pain   - oxycodone 7 5-10mg PO Q4H PRN moderate-severe pain   - hydromorphone 0 5mg IV Q4H PRN BT pain   - senna QD and miralax QD PRN to prevent OIC   - (patient has sufficient oxycodone prescription filled 6/14)  Poor sleep   - Counseled on timing of prednisone use, instructed patient to call if persistent upon returning home    2  Goals - level 1 full code   - Ongoing disease directed cares, hopeful to d/c home today  - Patient is agreeable to ongoing outpatient follow-up with palliative medicine  Specifically to complete 5 wishes document so that he can name his children as decision makers  Code Status: full - Level 1   Decisional apparatus:  Patient is competent on my exam today  If competence is lost, patient's substitute decision maker would default to spouse by [place]   However, patient expresses he would like to document sons Jimmy Hollingsworth and Corbin) along with stepdaughter (Melinda) as decision makers  Advance Directive / Living Will / POLST:  None, 5 wishes provided  Palliative care will sign-off and see on an as needed basis over remainder of hospitalization  Will follow-up closely outpatient  Interval history:       No events overnight  Hiccups have not recurred, pain is well controlled  He is looking forward to being discharged home in the near future  Poor sleep intermittently while admitted  Attributes this to late prednisone dosing despite it being scheduled after lunch  Instructed on having an early rather than late lunch at home, also he was sleeping in until 11am which may also contribute to poor sleep  MEDICATIONS / ALLERGIES:     all current active meds have been reviewed    Allergies   Allergen Reactions    Fentanyl Anaphylaxis and Other (See Comments)     Oxygen drops severely       OBJECTIVE:    Physical Exam  Physical Exam  HENT:      Head: Normocephalic and atraumatic  Cardiovascular:      Rate and Rhythm: Normal rate  Pulmonary:      Effort: Pulmonary effort is normal  No respiratory distress  Abdominal:      General: There is no distension  Tenderness: There is no guarding  Musculoskeletal:         General: No swelling  Skin:     General: Skin is warm and dry  Neurological:      General: No focal deficit present  Mental Status: He is alert and oriented to person, place, and time  Mental status is at baseline  Psychiatric:         Mood and Affect: Mood normal          Behavior: Behavior normal          Thought Content: Thought content normal          Judgment: Judgment normal          Lab Results:   I have personally reviewed pertinent labs  , CBC:   Lab Results   Component Value Date    WBC 12 42 (H) 06/21/2021    HGB 11 5 (L) 06/21/2021    HCT 35 3 (L) 06/21/2021    MCV 90 06/21/2021     (H) 06/21/2021    MCH 29 4 06/21/2021    MCHC 32 6 06/21/2021    RDW 15 2 (H) 06/21/2021    MPV 10 4 06/21/2021    NRBC 0 06/21/2021   , CMP:   Lab Results   Component Value Date    SODIUM 140 06/21/2021    K 3 3 (L) 06/21/2021     06/21/2021    CO2 28 06/21/2021    BUN 14 06/21/2021    CREATININE 0 50 (L) 06/21/2021    CALCIUM 8 5 06/21/2021    AST 16 06/21/2021    ALT 28 06/21/2021    ALKPHOS 178 (H) 06/21/2021    EGFR 114 06/21/2021     Imaging Studies: reviewed pertinent studies  EKG, Pathology, and Other Studies: reviewed pertinent studies    Counseling / Coordination of Care    Total floor / unit time spent today 25+ minutes  Greater than 50% of total time was spent with the patient and / or family counseling and / or coordination of care  A description of the counseling / coordination of care: time spent assessing patient, communicating with RN, primary team, coordinating OP follow-up

## 2021-06-22 NOTE — DISCHARGE INSTRUCTIONS
·   How to prevent problems with your nephrostomy tube:   · Change bandages, directed  This helps to prevent infection  Throw away or clean your drainage bag as directed by your healthcare provider  · Wipe the connecting ends of the drainage bag with alcohol before you reconnect the bag to the tube  This helps prevent infection  Keep the tube taped to your skin and connected to a drainage bag placed below the level of your kidneys  This helps prevent urine from backing up into your kidneys  You may wear a small drainage bag strapped to your leg to let you move around more easily  · Check the catheter to be sure it is in place after you change your clothes or do other activities  Do not wear tight clothing over the tube  Place the tubing over your thigh rather than under it when you are sitting down  Be sure that nothing is pulling on the nephrostomy tube when you move around  · Change positions if you see little or no urine in your drainage bag  Check to see if the urine tube is twisted or bent  Be sure that you are not sitting or lying on the tube  If there are no kinks and there is little or no urine in the drainage bag, tell your healthcare provider  · Flush out the tube as directed  Some tubes get flushed one time a day with 10 mls of NSS You will be given a prescription for the flushes  To flush the nephrostomy tube, clean both connections with alcohol swap  Twist off the drainage bag tube and twist the saline syringe into the nephrostomy tube and flush briskly  Remove the syringe and twist the drainage bag tube back into the nephrostomy tube  · Keep the site covered while you shower  Tape a piece of clear adhesive plastic over the dressing to keep it dry while you shower  Do not take tub baths  Tube Capping Trial        If your tube is capped and has no drainage bag, the urine will flow through the ureter         and into the bladder for you to urinate normally   This is called a capping trial                    Continue to flush your tube one time per day  You will have an extra drainage bag to       keep at home in case the capping trial fails  Call the IR department if you experience        any of the following: Pain, fever greater than 101  Persistent nausea or vomiting  The following pharmacies carry the flush syringes  [facility]                     [facility]       [address] [facility]                    [address] [facility]  Phone [phone]            Phone [phone]  [address] [facility]                      [address]                                 [phone]  Phone [phone]            Phone [phone]    [facility]                                                                         [facility] [phone]  [address]   Phone [phone]

## 2021-06-22 NOTE — SEDATION DOCUMENTATION
Margaret tube checked in IR by Dr Karol Camargo  Tube to be capped and flushed daily with 5ml NSS  Capping instructions reviewed with patient by Dr Anju Amin and RN  IR schedulers to call patient for next appt  Discharged ambulatory without questions or complaints

## 2021-06-23 NOTE — TELEPHONE ENCOUNTER
I called patient to reschedule his appointment, patient was scheduled for Tuesday, June 29 at 9:30 am with Geovany Suazo via [place]

## 2021-06-23 NOTE — TELEPHONE ENCOUNTER
----- Message from Carla Sumner, [facility] sent at 6/16/2021  9:40 AM EDT -----  Hi All,    I reached out to Michelle Frances and he asked if we can re-schedule his appointment  Dr Tato Whitley this is just an FYI  Sharion Argue can you reach out to Michelle Frances on Monday      Wilmar Montoya

## 2021-06-23 NOTE — UTILIZATION REVIEW
Notification of Discharge   This is a Notification of Discharge from our facility [address]  Please be advised that this patient has been discharge from our facility  Below you will find the admission and discharge date and time including the patients disposition  UTILIZATION REVIEW CONTACT:  June Pulse  Utilization   Network Utilization Review Department  Phone: [phone] x carefully listen to the prompts  All voicemails are confidential   Email: [email or web address]     PHYSICIAN ADVISORY SERVICES:  FOR [id number] REVIEW - MEDICAL NECESSITY DENIAL  Phone: [phone]  Fax: [phone]  Email: [email or web address]     PRESENTATION DATE: 6/14/2021  5:56 PM  OBERVATION ADMISSION DATE:   INPATIENT ADMISSION DATE: 6/15/21  2:25 PM   DISCHARGE DATE: 6/21/2021  5:30 PM  DISPOSITION: Home with [place] with [address] [place] [address] Road INFORMATION:  Send all requests for admission clinical reviews, approved or denied determinations and any other requests to dedicated fax number below belonging to the campus where the patient is receiving treatment   List of dedicated fax numbers:  [address] [place] [address] DENIALS (Administrative/Medical Necessity) [phone]   [address] (Maternity/NICU/Pediatrics) [phone]   Armando Landa [phone]   Sisi Bowen [phone]   Mckenna Hung [phone]   [address] [phone]   [facility]  [phone]   [address] [facility] [address]  [address] [facility] [address] [facility] [phone]

## 2021-06-24 NOTE — PROGRESS NOTES
Patient's spouse Jf Melvin called with concerns about a bill that was received for $700 dollars  She stated that her  has suffered a loss in income, and she now has her own health issues contributing to their financial hardship right now  Reviewed balance information with Lulu  Provided her with billing department number to discuss hardship application  Will continue to follow

## 2021-06-24 NOTE — PROGRESS NOTES
Hematology/Oncology Outpatient Follow- up Note  Ramone Portillo 73 y o  male MRN: @ Encounter: [id number]        Date:  6/24/2021    Presenting Complaint/Diagnosis : What appears to be localized pancreatic cancer biopsy proven  To be adenocarcinoma    HPI:    Ramone Portillo is seen for initial consultation 5/25/2021 regarding   A newly diagnosed pancreatic cancer  The patient presented to the hospital with obstructive jaundice  He ended up having a workup along with a biopsy which revealed  A 3 8 cm obstructing mass in the region of the head of the pancreas with marked pancreatic ductal dilatation worrisome for adenocarcinoma  Biopsy was done which proved this was adenocarcinoma  The patient also had cholecystitis at the time and ended up having a cholecystostomy tube placed and was treated with antibiotics  He was seen by our colleagues in Surgical Oncology who recommended neoadjuvant chemotherapy upfront which we will initiate  He is not a candidate for  FOLFIRINOX and so will be given gemcitabine and Abraxane instead  As far symptoms are concerned he has lot of abdominal pain  He states it feels like cramping and a charley horse in his abdomen  It comes and goes and is more based on how he sits  He was prescribed oxycodone upon discharge but looking at the [place] prescribing website he has not filled this  I advised him to take oxycodone and have given him another prescription for 5 mg to be taken every 4 hours as needed  Previous Hematologic/ Oncologic History:    Oncology History   Pancreatic adenocarcinoma ([facility] [address] )   5/7/2021 Biopsy    Head of pancreas:  Malignant Adenocarcinoma       6/3/2021 -  Chemotherapy    pegfilgrastim (NEULASTA ONPRO), 6 mg, Subcutaneous, Once, 0 of 5 cycles  paclitaxel protein-bound (ABRAXANE) IVPB, 100 mg/m2 = 191 mg (80 % of original dose 125 mg/m2), Intravenous, Once, 1 of 6 cycles  Dose modification: 100 mg/m2 (original dose 125 mg/m2, Cycle 1, Reason: Other (Must fill in a comment), Comment: per protocol)  Administration: 191 mg (6/3/2021), 191 mg (6/10/2021)  gemcitabine (GEMZAR) infusion, 1,000 mg/m2 = 1,909 9 mg, Intravenous, Once, 1 of 6 cycles  Administration: 1,909 9 mg (6/3/2021), 1,909 9 mg (6/10/2021)         Current Hematologic/ Oncologic Treatment:     gemcitabine and Abraxane 3/4 weeks with Neulasta growth factor support  Cycle 2  Is on the 1st of July  Interval History:      The patient returns for follow-up visit  He was in the hospital for pain  He also had cholecystitis for which a percutaneous cholecystostomy tube   Is in place in the patient has completed antibiotics  He states he is feeling better than he did previously  He still has some heaviness in his abdomen but denies any nausea denies any vomiting denies any diarrhea currently  He states he feels better than when we initially met and his pain is now controlled  He is trying to make an effort to eat  His main complaint is fatigue  He states he does nap a lot and is trying to stay active also  His ECOG performance status is a 1  The rest of his 14 point review of systems today was negative  Test Results:    Imaging: XR chest portable    Result Date: 6/1/2021  Narrative: CHEST INDICATION:   s/p port  COMPARISON:  Chest radiograph from 5/16/2021, abdomen CT from 5/14/2021, chest CT from 4/29/2021  EXAM PERFORMED/VIEWS:  XR CHEST PORTABLE  FINDINGS:  Left port in lower SVC  Cardiomediastinal silhouette normal  Lungs clear  No effusion or pneumothorax  Osseous structures normal for age  Impression: No acute cardiopulmonary disease  Left port in lower SVC with no pneumothorax  Workstation performed: [id number]     FL guided central venous access device insertion    Result Date: 6/11/2021  Narrative: C-arm - left chest port INDICATION: C25 9: Malignant neoplasm of pancreas, unspecified  Procedure guidance   COMPARISON:  None IMAGES:  2 FLUOROSCOPY TIME:   10 seconds TECHNIQUE: FINDINGS: Fluoroscopy was provided for procedure guidance  Left chest port placement with tip overlying the caval atrial junction  Endotracheal tube tip noted at the mid trachea above the veto  Impression: Fluoroscopy provided for procedure guidance  Please see separate procedure note for details  Workstation performed: [id number]     CT abdomen pelvis with contrast    Result Date: 6/14/2021  Narrative: CT ABDOMEN AND PELVIS WITH IV CONTRAST INDICATION:   RLQ abdominal pain, appendicitis suspected (Age => 15y) RLQ pain with guarding  Concern for appendicitis/colitis  Hx Pancreatic cancer/RA  COMPARISON:  CT abdomen pelvis 5/14/2021 TECHNIQUE:  CT examination of the abdomen and pelvis was performed  Axial, sagittal, and coronal 2D reformatted images were created from the source data and submitted for interpretation  Radiation dose length product (DLP) for this visit:  461 71 mGy-cm   This examination, like all CT scans performed in the [facility], was performed utilizing techniques to minimize radiation dose exposure, including the use of iterative  reconstruction and automated exposure control  IV Contrast:  100 mL of iohexol (OMNIPAQUE) Enteric Contrast:  Enteric contrast was not administered  FINDINGS: ABDOMEN LOWER CHEST:  No clinically significant abnormality identified in the visualized lower chest  LIVER/BILIARY TREE:  Unchanged enhancing left hepatic lesion #2/22  Other hypodense, nonenhancing lesions scattered throughout the liver are unchanged  Reidentified prominently left-sided pneumobilia  A CBD stent is in place  GALLBLADDER:  Percutaneous cholecystostomy tube remains in place  SPLEEN:  Unremarkable  PANCREAS:  Enlarged pancreatic head with a vague pancreatic head mass measuring approximately 32 x 22 mm unchanged in appearance from the prior study  The margins are not well-defined  Unchanged pancreatic ductal dilation  ADRENAL GLANDS:  Unremarkable  KIDNEYS/URETERS:  No hydronephrosis  Unchanged renal cysts  STOMACH AND BOWEL:  No bowel obstruction  Much improved wall thickening and enhancement of the pylorus and duodenum  Fluid surrounding the proximal duodenum is persistent but improved in quantity  APPENDIX:  Noninflamed appendix  ABDOMINOPELVIC CAVITY:  No ascites  No pneumoperitoneum  No lymphadenopathy  As described above, fluid surrounding the pylorus and duodenum is persistent but improved  There is a collection measuring 6 2 x 2 8 x 3 2 cm,  #2/30 #601/43 located lateral to the first portion of the duodenum with a small focus of internal gas  New edema throughout the right mid abdominal omentum #2/41 possibly reactive to the adjacent collection described above  No discrete omental mass  VESSELS:  Unremarkable for patient's age  PELVIS REPRODUCTIVE ORGANS:  Prostamegaly  URINARY BLADDER:  Unremarkable  ABDOMINAL WALL/INGUINAL REGIONS:  Unremarkable  OSSEOUS STRUCTURES:  No acute fracture or destructive osseous lesion  Impression: Unchanged pancreatic head mass  Unchanged 28 mm enhancing left hepatic lesion  Fluid surrounding the pylorus and duodenum is persistent but improved  There is a collection measuring 6 2 x 2 8 x 3 2 cm ,#2/30 #601/43  located lateral to the first portion of the duodenum with a small focus of internal gas  New edema throughout the right mid abdominal omentum #2/41 possibly reactive to the adjacent collection described above  No discrete omental mass  The study was marked in [facility] for immediate notification  Workstation performed: [id number]     IR cholecystostomy tube check/change/reposition/reinsertion/upsize    Result Date: 6/23/2021  Narrative: Drainage catheter evaluation Indication: Cholecystostomy tube placed May 11, 2021  Follow-up  Procedure: The cholecystostomy drainage catheter was hand injected with contrast and fluoroscopic spot images were obtained  The drain was capped   Contrast: 5 cc Omnipaque-300 Fluoroscopy time: 0 7 minutes Images: 6 Findings: The catheter remains in satisfactory position   Cystic duct is patent  Small amount of debris is seen in the common duct but there is no significant dilatation  Common bile duct stent is patent and there is drainage into the duodenum  No stones are definitively seen by fluoroscopy  Drain was capped  Impression: Impression: Patent cystic duct  Drain was capped  Plan: Patient may undergo cholecystectomy along with surgical tumor resection at some point though he is currently undergoing chemotherapy  Will discuss with surgical oncology whether we should consider removing his cholecystostomy tube in the interim if  he tolerates capping trial  Workstation performed: [id number]       Labs:   Lab Results   Component Value Date    WBC 12 42 (H) 06/21/2021    HGB 11 5 (L) 06/21/2021    HCT 35 3 (L) 06/21/2021    MCV 90 06/21/2021     (H) 06/21/2021     Lab Results   Component Value Date     09/01/2017    K 3 3 (L) 06/21/2021     06/21/2021    CO2 28 06/21/2021    BUN 14 06/21/2021    CREATININE 0 50 (L) 06/21/2021    GLUCOSE 117 (H) 09/01/2017    GLUF 147 (H) 05/05/2021    CALCIUM 8 5 06/21/2021    CORRECTEDCA 9 9 06/21/2021    AST 16 06/21/2021    ALT 28 06/21/2021    ALKPHOS 178 (H) 06/21/2021    PROT 5 8 (L) 09/01/2017    BILITOT 1 0 09/01/2017    EGFR 114 06/21/2021       Lab Results   Component Value Date    PSA 1 6 11/28/2020    PSA 1 4 11/09/2019    PSA 1 7 11/25/2017       Lab Results   Component Value Date    CEA 0 9 04/29/2021     ROS: As stated in the history of present illness otherwise his 14 point review of systems today was negative        Active Problems:   Patient Active Problem List   Diagnosis    Right arm pain    Rheumatoid arthritis ([address] )    Essential hypertension    Urgency of urination    Anxiety disorder    Hyperlipidemia    Liver mass    Esophageal reflux    Multiple lipomas    Type 2 diabetes mellitus, without long-term current use of insulin (HCC)    Hypokalemia    Diarrhea    Pancreatic mass    Epigastric pain    BPH (benign prostatic hyperplasia)    Constipation    SIRS (systemic inflammatory response syndrome) (HCC)    Perforation bowel (HCC)    Acute cholecystitis    Leukocytosis    Pancreatic adenocarcinoma (HCC)    Chemotherapy induced neutropenia (HCC)    Cancer related pain    Abdominal pain       Past Medical History:   Past Medical History:   Diagnosis Date    Anxiety     Arthritis     Cancer ([place] [address] )     pancreatic    Cellulitis     LAST ASSESSED: 6/13/14    Enlarged prostate     Epidermal inclusion cyst     LAST ASSESSED: 10/4/13    Erythrasma     LAST ASSESSED: 9/23/13    Furuncle     LAST ASSESSED: 6/11/14    GERD (gastroesophageal reflux disease)     Hyperlipidemia     Hypertension     RA (rheumatoid arthritis) ([place] [address] )        Surgical History:   Past Surgical History:   Procedure Laterality Date    COLONOSCOPY      FL GUIDED CENTRAL VENOUS ACCESS DEVICE INSERTION  6/1/2021    HYDROCELE EXCISION / REPAIR Right 01/11/2018    SPERMATIC CORD EXCSION OF HYDROCELE; MANAGED BY: Ashley Naylor    IR CHOLECYSTOSTOMY TUBE CHECK/CHANGE/REPOSITION/REINSERTION/UPSIZE  6/22/2021    IR CHOLECYSTOSTOMY TUBE PLACEMENT  5/11/2021    NE REMOVAL OF HYDROCELE,TUNICA,UNILAT Right 1/11/2018    Procedure: HYDROCELECTOMY;  Surgeon: Chika Bauer MD;  Location: [facility];  Service: Urology    SCROTAL SURGERY      benign "lump"    TUNNELED VENOUS PORT PLACEMENT Left 6/1/2021    Procedure: INSERTION VENOUS PORT (PORT-A-CATH);   Surgeon: Emilio Kelley MD;  Location: [facility];  Service: Surgical Oncology       Family History:    Family History   Problem Relation Age of Onset    Heart disease Father         CARDIAC DISORDER    Hypertension Father     Hypertension Mother     No Known Problems Maternal Aunt     No Known Problems Maternal Uncle     No Known Problems Paternal Aunt     No Known Problems Paternal Uncle     No Known Problems Paternal Grandmother     No Known Problems Paternal Grandfather     Diabetes Maternal Grandmother         MELLITUS    No Known Problems Maternal Grandfather     Hypertension Other        Cancer-related family history is not on file  Social History:   Social History     Socioeconomic History    Marital status: /Civil Union     Spouse name: Not on file    Number of children: 2    Years of education: Not on file    Highest education level: Not on file   Occupational History    Occupation: FULL-TIME EMPLOYMENT   Tobacco Use    Smoking status: Former Smoker    Smokeless tobacco: Never Used    Tobacco comment: tobacco in a pipe in the early 1980's   Vaping Use    Vaping Use: Never used   Substance and Sexual Activity    Alcohol use: Never    Drug use: Never    Sexual activity: Never     Comment: NOT CURRENTLY SEXUALLY ACTIVE AS PER [employer]   Other Topics Concern    Not on file   Social History Narrative    ALWAYS USES SEAT BELT    DENIED DAILY COFFEE CONSUMPTION (__CUPS/DAY)    DENIED DAILY COLA CONSUMPTION (__CANS/DAY)    DAILY TEA CONSUMPTION (__CUPS/DAY)    DENTAL CARE, REGULARLY    EXERCISE: WALKING    HIGH SCHOOL GRADUATE    DENIED MULTIPLE ORGAN DONOR    NO LIVING WILL    PETS/ANIMALS    DENIED POWER OF  IN EXISTENCE    WATER INTAKE, ADEQUATE (PER DAY)     Social Determinants of Health     Financial Resource Strain:     Difficulty of Paying Living Expenses:    Food Insecurity:     Worried About Running Out of Food in the Last Year:     Ran Out of Food in the Last Year:    Transportation Needs:     Lack of Transportation (Medical):      Lack of Transportation (Non-Medical):    Physical Activity:     Days of Exercise per Week:     Minutes of Exercise per Session:    Stress:     Feeling of Stress :    Social Connections:     Frequency of Communication with Friends and Family:     Frequency of Social Gatherings with Friends and Family:  Attends Yarsani Services:     Active Member of Clubs or Organizations:     Attends Club or Organization Meetings:     Marital Status:    Intimate Partner Violence:     Fear of Current or Ex-Partner:     Emotionally Abused:     Physically Abused:     Sexually Abused:        Current Medications:   Current Outpatient Medications   Medication Sig Dispense Refill    Alcohol Swabs 70 % PADS May substitute brand based on insurance coverage  Check glucose ACHS  200 each 0    aspirin 81 mg chewable tablet Chew 81 mg      atorvastatin (LIPITOR) 10 mg tablet Take 10 mg by mouth daily      Blood Glucose Monitoring Suppl (ONE TOUCH ULTRA 2) w/Device KIT Use daily 1 each 1    diazepam (VALIUM) 2 mg tablet Take 1 tablet (2 mg total) by mouth daily as needed (hiccups) 15 tablet 0    FLUoxetine (PROzac) 20 mg capsule TAKE 1 CAPSULE BY MOUTH EVERY DAY  90 capsule 2    folic acid (FOLVITE) 1 mg tablet Take by mouth daily      gabapentin (NEURONTIN) 100 mg capsule Take 2 capsules (200 mg total) by mouth 3 (three) times a day 190 capsule 0    glucose blood (OneTouch Ultra) test strip Use as instructed once a day 50 each 5    insulin glargine (Lantus SoloStar) 100 units/mL injection pen Inject 20 Units under the skin daily 15 mL 0    insulin lispro (HumaLOG KwikPen) 100 units/mL injection pen Inject 10 Units under the skin 3 (three) times a day with meals 15 mL 0    Insulin Pen Needle (BD Pen Needle Cinda 2nd Gen) 32G X 4 MM MISC For use with insulin pen  Pharmacy may dispense brand covered by insurance  100 each 0    Insulin Pen Needle (BD Pen Needle Cinda 2nd Gen) 32G X 4 MM MISC For use with insulin pen  Pharmacy may dispense brand covered by insurance   100 each 0    Lancets (onetouch ultrasoft) lancets Use as instructed once a day 50 each 5    lidocaine (LIDODERM) 5 % Apply 1 patch topically daily Remove & Discard patch within 12 hours or as directed by MD 30 patch 0    lisinopril-hydrochlorothiazide (PRINZIDE,ZESTORETIC) 10-12 5 MG per tablet Take 1 tablet by mouth 3 (three) times a week Monday Wednesday Friday 90 tablet 3    Multiple Vitamins-Minerals (MULTIVITAMIN MEN) TABS Take 1 tablet by mouth daily      Omega-3 Fatty Acids (FISH OIL) 1,000 mg Take 1,000 mg by mouth daily      omeprazole (PriLOSEC) 20 mg delayed release capsule Take 1 capsule (20 mg total) by mouth daily before breakfast 30 capsule 0    oxyCODONE (ROXICODONE) 5 mg immediate release tablet Take 1 tablet (5 mg total) by mouth every 4 (four) hours as needed (cancer pain)Max Daily Amount: 30 mg 120 tablet 0    pancrelipase, Lip-Prot-Amyl, (CREON) 24,000 units Take 1 capsule (24,000 Units total) by mouth 3 (three) times a day with meals 90 capsule 0    pantoprazole (PROTONIX) 40 mg tablet Take 1 tablet (40 mg total) by mouth 2 (two) times a day Before breakfast, before bed, to reduce stomach acid 60 tablet 0    polyethylene glycol (MIRALAX) 17 g packet Take 17 g by mouth daily as needed (Constipation)  0    predniSONE 20 mg tablet Take 1 tablet (20 mg total) by mouth daily Take 20 mg by mouth every morning and 10 mg by mouth every afternoon after lunch 60 tablet 0    prochlorperazine (COMPAZINE) 10 mg tablet Take 1 tablet (10 mg total) by mouth every 6 (six) hours as needed for nausea or vomiting 45 tablet 3    senna (SENOKOT) 8 6 MG tablet Take 1 tablet (8 6 mg total) by mouth daily as needed for constipation 30 tablet 0    Sodium Chloride Flush (Normal Saline Flush) 0 9 % SOLN Gall bladder drain      tamsulosin (FLOMAX) 0 4 mg TAKE 1 CAPSULE BY MOUTH  DAILY AT BEDTIME 90 capsule 3    Tofacitinib Citrate ER (Xeljanz XR) 11 MG TB24 Take by mouth       No current facility-administered medications for this visit  Allergies: Allergies   Allergen Reactions    Fentanyl Anaphylaxis and Other (See Comments)     Oxygen drops severely       Physical Exam:    Body surface area is 1 87 meters squared      Wt Readings from Last 3 Encounters:   06/24/21 70 3 kg (155 lb)   06/16/21 69 4 kg (153 lb)   06/10/21 69 8 kg (153 lb 14 1 oz)        Temp Readings from Last 3 Encounters:   06/24/21 (!) 97 4 °F (36 3 °C) (Temporal)   06/21/21 97 9 °F (36 6 °C) (Oral)   06/10/21 (!) 97 3 °F (36 3 °C) (Temporal)        BP Readings from Last 3 Encounters:   06/24/21 112/62   06/21/21 111/66   06/10/21 118/67         Pulse Readings from Last 3 Encounters:   06/24/21 65   06/21/21 79   06/10/21 86         Physical Exam     Constitutional   General appearance: No acute distress, well appearing and well nourished  Eyes   Conjunctiva and lids: No swelling, erythema or discharge  Pupils and irises: Equal, round and reactive to light  Ears, Nose, Mouth, and Throat   External inspection of ears and nose: Normal     Nasal mucosa, septum, and turbinates: Normal without edema or erythema  Oropharynx: Normal with no erythema, edema, exudate or lesions  Pulmonary   Respiratory effort: No increased work of breathing or signs of respiratory distress  Auscultation of lungs: Clear to auscultation  Cardiovascular   Palpation of heart: Normal PMI, no thrills  Auscultation of heart: Normal rate and rhythm, normal S1 and S2, without murmurs  Examination of extremities for edema and/or varicosities: Normal     Carotid pulses: Normal     Abdomen   Abdomen: Non-tender, no masses  Tube in place  Liver and spleen: No hepatomegaly or splenomegaly  Lymphatic   Palpation of lymph nodes in neck: No lymphadenopathy  Musculoskeletal   Gait and station: Normal     Digits and nails: Normal without clubbing or cyanosis  Inspection/palpation of joints, bones, and muscles: Normal     Skin   Skin and subcutaneous tissue: Normal without rashes or lesions  Neurologic   Cranial nerves: Cranial nerves 2-12 intact  Sensation: No sensory loss      Psychiatric   Orientation to person, place, and time: Normal     Mood and affect: Normal         Assessment / Plan:       The patient is a pleasant 69-year-old male with newly diagnosed adenocarcinoma of the pancreas who was referred to see us for consideration of neoadjuvant chemotherapy  We decided to start gemcitabine and Abraxane based on his performance status and disease  He got 1 cycle  He was recently in the hospital for abdominal pain and has a coli cystostomy tube in place for cholecystitis  He has finished up antibiotics  He will now proceed with his next cycle of chemotherapy  He tolerated it reasonably well with fatigue as only side effect  His blood counts held up reasonably well also  We will continue him on this  I will see him back in a month  The plan is to give him at least 3-4 cycles and then repeat imaging  I will see him back in 3-4 weeks  Until then if he has any questions he will call our office  He will continue to follow up with palliative care for supportive care and pain management  He states he is more comfortable now with just a dull heaviness in his abdomen instead of pain  Goals and Barriers:  Current Goal:  Prolong Survival from   Pancreatic cancer  Barriers: None  Patient's Capacity to Self Care:  Patient able to self care  Portions of the record may have been created with voice recognition software   Occasional wrong word or "sound a like" substitutions may have occurred due to the inherent limitations of voice recognition software   Read the chart carefully and recognize, using context, where substitutions have occurred

## 2021-06-25 PROBLEM — K86.89 PANCREATIC MASS: Status: RESOLVED | Noted: 2021-04-29 | Resolved: 2021-01-01

## 2021-06-25 PROBLEM — R65.10 SIRS (SYSTEMIC INFLAMMATORY RESPONSE SYNDROME) (HCC): Status: RESOLVED | Noted: 2021-05-09 | Resolved: 2021-01-01

## 2021-06-25 PROBLEM — K59.00 CONSTIPATION: Status: RESOLVED | Noted: 2021-05-07 | Resolved: 2021-01-01

## 2021-06-25 PROBLEM — R10.30 LOWER ABDOMINAL PAIN: Status: ACTIVE | Noted: 2021-01-01

## 2021-06-25 PROBLEM — K63.1 PERFORATION BOWEL (HCC): Status: RESOLVED | Noted: 2021-05-10 | Resolved: 2021-01-01

## 2021-06-25 PROBLEM — R10.13 EPIGASTRIC PAIN: Status: RESOLVED | Noted: 2021-05-04 | Resolved: 2021-01-01

## 2021-06-25 PROBLEM — R19.7 DIARRHEA: Status: RESOLVED | Noted: 2021-04-27 | Resolved: 2021-01-01

## 2021-06-25 NOTE — PROGRESS NOTES
Assessment/Plan:    No problem-specific Assessment & Plan notes found for this encounter  Diagnoses and all orders for this visit:    Encounter for support and coordination of transition of care    Lower abdominal pain    Pancreatic adenocarcinoma ([facility] )    Type 2 diabetes mellitus without complication, without long-term current use of insulin (HCC)    Essential hypertension    Rheumatoid arthritis involving multiple sites with positive rheumatoid factor ([facility] )    Acute cholecystitis         discussion:  I reviewed with patient  Abdominal pain is better under present treatment  Elevation in prednisone dose has resulted in increased blood sugars which could play a role with his ability to tolerate and he will from proposed were pole procedure tentatively expected to occur in September  Patient also appears to be mildly anemic though CBC done several days ago was without hemoglobin in the mid 11s  He is eating well and not having any issues with bowel movements  Hiccups have resolved  Patient will continue present medications as directed  He will follow-up with Oncology, palliative Medicine, an oncologic surgery  Over reach out to Endocrinology to discuss his elevated blood sugars and need for adjustment of insulin therapy  Patient will follow-up with me in 2-3 months-earlier  If worse    Patient call for problems or concerns in the interim    Subjective:     TCM Call (since 5/25/2021)     Date and time call was made  6/22/2021  9:12 AM    Hospital care reviewed  Records reviewed    Patient was hospitialized at  [place] [id number]        Date of Admission  06/14/21    Date of discharge  06/21/21    Diagnosis  Abdominal pain    Disposition  Home    Were the patients medications reviewed and updated  No    Current Symptoms  Fatigue; Middle abdominal pain    Middle abdominal pain severity  Moderate    Middle abdominal pain onset  Ongoing      TCM Call (since 5/25/2021)     Post hospital issues  None Should patient be enrolled in anticoag monitoring? No    Scheduled for follow up? Yes    Did you obtain your prescribed medications  Yes    Do you need help managing your prescriptions or medications  No    Is transportation to your appointment needed  No    I have advised the patient to call PCP with any new or worsening symptoms  EDDIE Valdivia     Living Arrangements  Spouse or Significiant other    Are you recieving any outpatient services  No    Are you recieving home care services  Yes    Types of home care services  Nurse visit    Are you using any community resources  No    Current waiver services  No    Have you fallen in the last 12 months  No    Interperter language line needed  No    Counseling  Patient           Patient ID: Sydney Conrad is a 73 y o  male  Here for TCM visit  - 74 yo male with recent finding of pancreatic head mass during work up for acute cholecystitis, started neoadjuvant chemotherapy s/p port placement on 6/1  Pt received 2 doses but started to hav episodes of hiccups and low abd pain  Paliative med tried several treatments without improvement  On 6/14, pt developed 10/10 RLQ with N/V  Pt went to [facility] ED where he was found to have significant pain  CT of abd did not reveal acute changes  Initial labs revealed mild hyponatremia  B/c of severe pain, pt was admitted  Paliative med was consulted as was oncology  Pt slowly improved as various meds were tried and adjusted  Paliative med increased pred dose to 20mg qd which raised BGs and Endo was consulted  Pt was started on insulin (basal and meal time)  By 6/21, pt had improved and was able to be discharged to home  Pt here for TCM  - since discharge, pt states that he still has low abd pain but it is not severe  He denies N/V or diarrhea/constipation  BGs have been very labile - yesterday he had a fasting BG of 399  Hiccups have resolved for now   Weight stable and pt states that he is eating well without postprandial pain  Pt does note increased leg weakness and has some issues going up stairs or getting into his truck  He denies any worsening joint or back pain  - cholecystostomy tube has been plugged for several days  He denies any RUQ pain, increased jaundice, change in BMs (lighter in color or "greasier"), or other GI problems  - I reviewed available hospital records, lab results and study reports with pt                    The following portions of the patient's history were reviewed and updated as appropriate:   He  has a past medical history of Anxiety, Arthritis, Cancer ([place] [address] ), Cellulitis, Enlarged prostate, Epidermal inclusion cyst, Erythrasma, Furuncle, GERD (gastroesophageal reflux disease), Hyperlipidemia, Hypertension, and RA (rheumatoid arthritis) ([place] [address] )  He   Patient Active Problem List    Diagnosis Date Noted    Lower abdominal pain 06/15/2021    Cancer related pain 06/14/2021    Pancreatic adenocarcinoma ([place] [address] ) 05/25/2021    Chemotherapy induced neutropenia ([place] [address] ) 05/25/2021    Acute cholecystitis 05/11/2021    Leukocytosis 05/11/2021    BPH (benign prostatic hyperplasia) 05/06/2021    Hypokalemia 04/27/2021    Type 2 diabetes mellitus, without long-term current use of insulin ([place] [address] ) 03/16/2021    Multiple lipomas 08/04/2020    Urgency of urination 01/31/2018    Right arm pain 10/13/2017    Rheumatoid arthritis ([facility] ) 10/13/2017    Essential hypertension 10/13/2017    Liver mass 11/17/2016    Hyperlipidemia 02/03/2016    Anxiety disorder 08/15/2013    Esophageal reflux 08/15/2013     He  has a past surgical history that includes Scrotal surgery; pr removal of hydrocele,tunica,unilat (Right, 1/11/2018); Hydrocele excision / repair (Right, 01/11/2018); IR cholecystostomy tube placement (5/11/2021); Colonoscopy;  Tunneled venous port placement (Left, 6/1/2021); FL guided central venous access device insertion (6/1/2021); and IR cholecystostomy tube check/change/reposition/reinsertion/upsize (6/22/2021)  He  reports that he has quit smoking  He has never used smokeless tobacco  He reports that he does not drink alcohol and does not use drugs  Current Outpatient Medications   Medication Sig Dispense Refill    Alcohol Swabs 70 % PADS May substitute brand based on insurance coverage  Check glucose ACHS  200 each 0    aspirin 81 mg chewable tablet Chew 81 mg      atorvastatin (LIPITOR) 10 mg tablet Take 10 mg by mouth daily      Blood Glucose Monitoring Suppl (ONE TOUCH ULTRA 2) w/Device KIT Use daily 1 each 1    diazepam (VALIUM) 2 mg tablet Take 1 tablet (2 mg total) by mouth daily as needed (hiccups) 15 tablet 0    FLUoxetine (PROzac) 20 mg capsule TAKE 1 CAPSULE BY MOUTH EVERY DAY  90 capsule 2    folic acid (FOLVITE) 1 mg tablet Take by mouth daily      gabapentin (NEURONTIN) 100 mg capsule Take 2 capsules (200 mg total) by mouth 3 (three) times a day 190 capsule 0    glucose blood (OneTouch Ultra) test strip Use as instructed once a day 50 each 5    insulin glargine (Lantus SoloStar) 100 units/mL injection pen Inject 20 Units under the skin daily 15 mL 0    insulin lispro (HumaLOG KwikPen) 100 units/mL injection pen Inject 10 Units under the skin 3 (three) times a day with meals 15 mL 0    Insulin Pen Needle (BD Pen Needle Cinda 2nd Gen) 32G X 4 MM MISC For use with insulin pen  Pharmacy may dispense brand covered by insurance  100 each 0    Insulin Pen Needle (BD Pen Needle Cinda 2nd Gen) 32G X 4 MM MISC For use with insulin pen  Pharmacy may dispense brand covered by insurance   100 each 0    Lancets (onetouch ultrasoft) lancets Use as instructed once a day 50 each 5    lidocaine (LIDODERM) 5 % Apply 1 patch topically daily Remove & Discard patch within 12 hours or as directed by MD 30 patch 0    lisinopril-hydrochlorothiazide (PRINZIDE,ZESTORETIC) 10-12 5 MG per tablet Take 1 tablet by mouth 3 (three) times a week Monday Wednesday Friday 90 tablet 3    Multiple Vitamins-Minerals (MULTIVITAMIN MEN) TABS Take 1 tablet by mouth daily      Omega-3 Fatty Acids (FISH OIL) 1,000 mg Take 1,000 mg by mouth daily      omeprazole (PriLOSEC) 20 mg delayed release capsule Take 1 capsule (20 mg total) by mouth daily before breakfast 30 capsule 0    oxyCODONE (ROXICODONE) 5 mg immediate release tablet Take 1 tablet (5 mg total) by mouth every 4 (four) hours as needed (cancer pain)Max Daily Amount: 30 mg 120 tablet 0    pancrelipase, Lip-Prot-Amyl, (CREON) 24,000 units Take 1 capsule (24,000 Units total) by mouth 3 (three) times a day with meals 90 capsule 0    pantoprazole (PROTONIX) 40 mg tablet Take 1 tablet (40 mg total) by mouth 2 (two) times a day Before breakfast, before bed, to reduce stomach acid 60 tablet 0    polyethylene glycol (MIRALAX) 17 g packet Take 17 g by mouth daily as needed (Constipation)  0    predniSONE 20 mg tablet Take 1 tablet (20 mg total) by mouth daily Take 20 mg by mouth every morning and 10 mg by mouth every afternoon after lunch 60 tablet 0    prochlorperazine (COMPAZINE) 10 mg tablet Take 1 tablet (10 mg total) by mouth every 6 (six) hours as needed for nausea or vomiting 45 tablet 3    senna (SENOKOT) 8 6 MG tablet Take 1 tablet (8 6 mg total) by mouth daily as needed for constipation 30 tablet 0    Sodium Chloride Flush (Normal Saline Flush) 0 9 % SOLN Gall bladder drain      tamsulosin (FLOMAX) 0 4 mg TAKE 1 CAPSULE BY MOUTH  DAILY AT BEDTIME 90 capsule 3    Tofacitinib Citrate ER (Xeljanz XR) 11 MG TB24 Take by mouth       No current facility-administered medications for this visit  He is allergic to fentanyl       Review of Systems   Constitutional: Positive for fatigue (persistent)  Negative for appetite change, diaphoresis, fever and unexpected weight change  HENT: Negative  Eyes: Negative  Respiratory: Negative  Cardiovascular: Negative      Gastrointestinal: Positive for abdominal pain (improved but not resolved)  Negative for blood in stool, constipation, diarrhea, nausea and vomiting  Endocrine: Negative  Genitourinary: Negative  Musculoskeletal: Positive for gait problem (due to leg weakness)  Negative for arthralgias (greatly improved (?due to pred use?)), back pain and myalgias  Skin: Negative  Allergic/Immunologic: Negative  Neurological: Positive for weakness (leg)  Negative for dizziness, light-headedness, numbness and headaches  Hematological: Negative  Psychiatric/Behavioral: Negative  Objective:      BP 98/62   Pulse (!) 106   Temp 98 1 °F (36 7 °C)   Ht 5' 9" (1 753 m)   Wt 71 2 kg (157 lb)   SpO2 99%   BMI 23 18 kg/m²          Physical Exam  Vitals reviewed  Constitutional:       Appearance: He is well-developed  Comments: Tired appearing male in NAD   HENT:      Head: Normocephalic and atraumatic  Right Ear: Tympanic membrane, ear canal and external ear normal       Left Ear: Tympanic membrane, ear canal and external ear normal       Mouth/Throat:      Mouth: Mucous membranes are moist    Eyes:      General: No scleral icterus  Extraocular Movements: Extraocular movements intact  Pupils: Pupils are equal, round, and reactive to light  Comments: Mild pallor  No icterus   Neck:      Thyroid: No thyromegaly  Vascular: No carotid bruit  Cardiovascular:      Rate and Rhythm: Normal rate and regular rhythm  Pulses: Normal pulses  Heart sounds: Normal heart sounds  No murmur heard  Pulmonary:      Effort: Pulmonary effort is normal       Breath sounds: Normal breath sounds  No wheezing or rales  Abdominal:      General: Bowel sounds are normal  There is no distension  Palpations: Abdomen is soft  There is no mass  Tenderness: There is no abdominal tenderness  There is no right CVA tenderness or left CVA tenderness  Comments: RUQ cholecystostomy tube in place and plugged      Musculoskeletal: General: Deformity (mild diffuse arthritic change  No gross synovitis appreciated) present  No swelling or tenderness  Normal range of motion  Cervical back: Normal range of motion and neck supple  No tenderness  No muscular tenderness  Lymphadenopathy:      Cervical: No cervical adenopathy  Skin:     General: Skin is warm and dry  Capillary Refill: Capillary refill takes less than 2 seconds  Coloration: Skin is not jaundiced  Neurological:      Mental Status: He is alert and oriented to person, place, and time  Cranial Nerves: No cranial nerve deficit  Sensory: No sensory deficit  Motor: No weakness (mild leg symmetrically)  Gait: Gait abnormal (mildly slowed gait ?due to leg weakness)     Psychiatric:         Mood and Affect: Mood normal       Comments: PHQ-9 Depression Screening    PHQ-9:   Frequency of the following problems over the past two weeks:      Little interest or pleasure in doing things: 0 - not at all  Feeling down, depressed, or hopeless: 0 - not at all  PHQ-2 Score: 0

## 2021-06-29 NOTE — LETTER
2021     [place] [address]    Patient: Aram Cason  YOB: 1955  Date of Visit: 2021      Dear Dr Clive Dunne:    Thank you for referring Corbett Lundborg to me for evaluation  Below are my notes for this consultation  If you have questions, please do not hesitate to call me  I look forward to following your patient along with you  Sincerely,        Tati Ramos        CC: Zenaida Land MD        Pre-Test Genetic Counseling Consult Note    Patient Name: Aram Cason   /Age: / y o  Referring Provider: Allison Geiger MD    Date of Service: 2021  Genetic Counselor: Tati Ramos MS, [address] [facility]  Interpretation Services: None  Location: Telephone consult   Length of Visit: 61 minutes      Cathy Harden was referred to the [address] [place] [address] and Genetic Assessment Program due to his recent diagnosis of pancreatic cancer  he presents today to discuss the possibility of a hereditary cancer syndrome, options for genetic testing, and implications for him and his family           Cancer History and Treatment:   Oncology History  Pancreatic adenocarcinoma ([facility] )   2021 Biopsy     Head of pancreas:  Malignant Adenocarcinoma       6/3/2021 -  Chemotherapy     pegfilgrastim (NEULASTA ONPRO), 6 mg, Subcutaneous, Once, 0 of 5 cycles  paclitaxel protein-bound (ABRAXANE) IVPB, 100 mg/m2 = 191 mg (80 % of original dose 125 mg/m2), Intravenous, Once, 1 of 6 cycles  Dose modification: 100 mg/m2 (original dose 125 mg/m2, Cycle 1, Reason: Other (Must fill in a comment), Comment: per protocol)  Administration: 191 mg (6/3/2021), 191 mg (6/10/2021)  gemcitabine (GEMZAR) infusion, 1,000 mg/m2 = 1,909 9 mg, Intravenous, Once, 1 of 6 cycles  Administration: 1,909 9 mg (6/3/2021), 1,909 9 mg (6/10/2021)        Screening Hx:   Colon:  Colonoscopy: 2013  0 polyps reported    Skin:  Skin cancer screening: has seen derm in the past (20y)- had a couple of spots checked    Prostate:  Prostate screening on 11/28/20, normal     Other screening: none    Medical and Surgical History  Pertinent surgical history:   Past Surgical History:   Procedure Laterality Date    COLONOSCOPY      FL GUIDED CENTRAL VENOUS ACCESS DEVICE INSERTION  6/1/2021    HYDROCELE EXCISION / REPAIR Right 01/11/2018    SPERMATIC CORD EXCSION OF HYDROCELE; MANAGED BY: Zack Bowen    IR CHOLECYSTOSTOMY TUBE CHECK/CHANGE/REPOSITION/REINSERTION/UPSIZE  6/22/2021    IR CHOLECYSTOSTOMY TUBE PLACEMENT  5/11/2021    ND REMOVAL OF HYDROCELE,TUNICA,UNILAT Right 1/11/2018    Procedure: HYDROCELECTOMY;  Surgeon: Marina Ambrosio MD;  Location: [facility];  Service: Urology    SCROTAL SURGERY      benign "lump"    TUNNELED VENOUS PORT PLACEMENT Left 6/1/2021    Procedure: INSERTION VENOUS PORT (PORT-A-CATH); Surgeon: Blake Covington MD;  Location: [facility];  Service: Surgical Oncology      Pertinent medical history:  Past Medical History:   Diagnosis Date    Anxiety     Arthritis     Cancer ([place] [address] )     pancreatic    Cellulitis     LAST ASSESSED: 6/13/14    Enlarged prostate     Epidermal inclusion cyst     LAST ASSESSED: 10/4/13    Erythrasma     LAST ASSESSED: 9/23/13    Furuncle     LAST ASSESSED: 6/11/14    GERD (gastroesophageal reflux disease)     Hyperlipidemia     Hypertension     RA (rheumatoid arthritis) ([facility] )          Other History:  Height:   Ht Readings from Last 1 Encounters:   06/25/21 5' 9" (1 753 m)     Weight:   Wt Readings from Last 1 Encounters:   06/25/21 71 2 kg (157 lb)       Relevant Family History     Reported Ancestry: [place], [place], no David Piña has 2 sons, they are 39 and 44    Adi Billings has 3 siblings  Their history includes:  Benson's sister was diagnosed with thyroid cancer at 39  Maternal Family History:  [facility] nephew was diagnosed with thyroid cancer at 25  There is no other maternal family history of cancer      Paternal Family History: There is no reported paternal family history of cancer  Please refer to the scanned pedigree in the Media Tab for a complete family history     *All history is reported as provided by the patient; records are not available for review, except where indicated  Assessment:  We discussed sporadic, familial and hereditary cancer  We also discussed the many factors that influence our risk for cancer such as age, environmental exposures, lifestyle choices and family history  We reviewed the indications suggestive of a hereditary predisposition to cancer  Genetic testing is indicated for Anel Estrada based on the following criteria: Meets NCCN V1 2020 Testing Criteria for Pancreatic Cancer Susceptibility Genes: Personal history of pancreatic cancer  The risks, benefits, and limitations of genetic testing were reviewed with the patient, as well as genetic discrimination laws, and possible test results (positive, negative, variants of uncertain significance) and their clinical implications  If positive for a mutation, options for managing cancer risk including increased surveillance, chemoprevention, and in some cases prophylactic surgery were discussed  Anel Estrada was informed that if a hereditary cancer syndrome was identified in him, first degree relatives (parents, siblings, and children) have a chance of also inheriting the condition  Genetic testing would allow for predictive genetic testing in other relatives, who may also be at risk depending on their degree of relation  Plan: Patient decided to proceed with testing and provided consent      Summary:     Sample Collection:  A blood kit was mailed home to the patient    Genetic Testing Preformed: [facility] Core Cancer Panel (36 genes): APC, JASMIN, AXIN2 BARD1, BRCA1, BRCA2, BRIP1, BMPR1A, CDH1, CDK4, CDKN2A, CHEK2, DICER1, EPCAM, GREM1, HOXB13, MLH1, MSH2, MSH3, MSH6, MUTYH, NBN, NF1, NTHL1, PALB2, PMS2, POLD1, POLE, PTEN, RAD51C, RAD51D, RECQL SMAD4, SMARCA4, STK11, TP53    Results take approximately 2-3 weeks to complete once test is started  We will contact Christopher Aguilar once results are available  Additional recommendations for surveillance/medical management will be made pending genetic test results

## 2021-06-29 NOTE — PROGRESS NOTES
Pre-Test Genetic Counseling Consult Note    Patient Name: Ramone Portillo   /Age: /70 y o  Referring Provider: Freddy Palacios MD    Date of Service: 2021  Genetic Counselor: Viola Chen MS, [facility] [place]  Interpretation Services: None  Location: Telephone consult   Length of Visit: 61 minutes      Taft Primrose was referred to the [address] [place] [address] and Genetic Assessment Program due to his recent diagnosis of pancreatic cancer  he presents today to discuss the possibility of a hereditary cancer syndrome, options for genetic testing, and implications for him and his family           Cancer History and Treatment:   Oncology History  Pancreatic adenocarcinoma ([facility] [address] )   2021 Biopsy     Head of pancreas:  Malignant Adenocarcinoma       6/3/2021 -  Chemotherapy     pegfilgrastim (NEULASTA ONPRO), 6 mg, Subcutaneous, Once, 0 of 5 cycles  paclitaxel protein-bound (ABRAXANE) IVPB, 100 mg/m2 = 191 mg (80 % of original dose 125 mg/m2), Intravenous, Once, 1 of 6 cycles  Dose modification: 100 mg/m2 (original dose 125 mg/m2, Cycle 1, Reason: Other (Must fill in a comment), Comment: per protocol)  Administration: 191 mg (6/3/2021), 191 mg (6/10/2021)  gemcitabine (GEMZAR) infusion, 1,000 mg/m2 = 1,909 9 mg, Intravenous, Once, 1 of 6 cycles  Administration: 1,909 9 mg (6/3/2021), 1,909 9 mg (6/10/2021)        Screening Hx:   Colon:  Colonoscopy: 2013  0 polyps reported    Skin:  Skin cancer screening: has seen derm in the past (20y)- had a couple of spots checked    Prostate:  Prostate screening on 20, normal     Other screening: none    Medical and Surgical History  Pertinent surgical history:   Past Surgical History:   Procedure Laterality Date    COLONOSCOPY      FL GUIDED CENTRAL VENOUS ACCESS DEVICE INSERTION  2021    HYDROCELE EXCISION / REPAIR Right 2018    SPERMATIC CORD EXCSION OF HYDROCELE; MANAGED BY: Luigi CONTRERAS CHOLECYSTOSTOMY TUBE CHECK/CHANGE/REPOSITION/REINSERTION/UPSIZE 6/22/2021    IR CHOLECYSTOSTOMY TUBE PLACEMENT  5/11/2021    HI REMOVAL OF HYDROCELE,TUNICA,UNILAT Right 1/11/2018    Procedure: HYDROCELECTOMY;  Surgeon: Earnest White MD;  Location: [facility];  Service: Urology    SCROTAL SURGERY      benign "lump"    TUNNELED VENOUS PORT PLACEMENT Left 6/1/2021    Procedure: INSERTION VENOUS PORT (PORT-A-CATH); Surgeon: Wily Tripathi MD;  Location:  MAIN OR;  Service: Surgical Oncology      Pertinent medical history:  Past Medical History:   Diagnosis Date    Anxiety     Arthritis     Cancer ([facility] )     pancreatic    Cellulitis     LAST ASSESSED: 6/13/14    Enlarged prostate     Epidermal inclusion cyst     LAST ASSESSED: 10/4/13    Erythrasma     LAST ASSESSED: 9/23/13    Furuncle     LAST ASSESSED: 6/11/14    GERD (gastroesophageal reflux disease)     Hyperlipidemia     Hypertension     RA (rheumatoid arthritis) ([place] [address] )          Other History:  Height:   Ht Readings from Last 1 Encounters:   06/25/21 5' 9" (1 753 m)     Weight:   Wt Readings from Last 1 Encounters:   06/25/21 71 2 kg (157 lb)       Relevant Family History     Reported Ancestry: [place], [place], [facility] has 2 sons, they are 39 and 44    Aayush Rochester has 3 siblings  Their history includes:  Benson's sister was diagnosed with thyroid cancer at 39  Maternal Family History:  Pierre Hagan nephew was diagnosed with thyroid cancer at 25  There is no other maternal family history of cancer  Paternal Family History: There is no reported paternal family history of cancer  Please refer to the scanned pedigree in the Media Tab for a complete family history     *All history is reported as provided by the patient; records are not available for review, except where indicated  Assessment:  We discussed sporadic, familial and hereditary cancer  We also discussed the many factors that influence our risk for cancer such as age, environmental exposures, lifestyle choices and family history  We reviewed the indications suggestive of a hereditary predisposition to cancer  Genetic testing is indicated for Christopher Aguilar based on the following criteria: Meets NCCN V1 2020 Testing Criteria for Pancreatic Cancer Susceptibility Genes: Personal history of pancreatic cancer  The risks, benefits, and limitations of genetic testing were reviewed with the patient, as well as genetic discrimination laws, and possible test results (positive, negative, variants of uncertain significance) and their clinical implications  If positive for a mutation, options for managing cancer risk including increased surveillance, chemoprevention, and in some cases prophylactic surgery were discussed  Christopher Aguilar was informed that if a hereditary cancer syndrome was identified in him, first degree relatives (parents, siblings, and children) have a chance of also inheriting the condition  Genetic testing would allow for predictive genetic testing in other relatives, who may also be at risk depending on their degree of relation  Plan: Patient decided to proceed with testing and provided consent  Summary:     Sample Collection:  A blood kit was mailed home to the patient    Genetic Testing Preformed: [facility] Core Cancer Panel (36 genes): APC, JASMIN, AXIN2 BARD1, BRCA1, BRCA2, BRIP1, BMPR1A, CDH1, CDK4, CDKN2A, CHEK2, DICER1, EPCAM, GREM1, HOXB13, MLH1, MSH2, MSH3, MSH6, MUTYH, NBN, NF1, NTHL1, PALB2, PMS2, POLD1, POLE, PTEN, RAD51C, RAD51D, RECQL SMAD4, SMARCA4, STK11, TP53    Results take approximately 2-3 weeks to complete once test is started  We will contact Christopher Aguilar once results are available  Additional recommendations for surveillance/medical management will be made pending genetic test results

## 2021-07-06 NOTE — PROGRESS NOTES
MSw met with patient in the clinicto discuss advance directives  5 Wishes Document reviewed with patient  Opportunity for questions provided and answered  5 wishes document signed by patient and 2 witnesses  Copies made and provided to patient/family  Copy provided to MMAs to be entered in patient's chart

## 2021-07-06 NOTE — PROGRESS NOTES
Outpatient Follow-Up - Palliative and Supportive Care   Helena Shah 73 y o  male [id number]    Assessment & Plan  Problem List Items Addressed This Visit     Cancer related pain    Relevant Medications    predniSONE 20 mg tablet    predniSONE 5 mg tablet    Metastatic pancreatic cancer    Relevant Medications    predniSONE 20 mg tablet    predniSONE 5 mg tablet    senna (SENOKOT) 8 6 MG tablet      Other Visit Diagnoses     Acute cholecystitis without calculus        Relevant Medications    pantoprazole (PROTONIX) 40 mg tablet    Malignant neoplasm of pancreas, unspecified location of malignancy (HCC)        Relevant Medications    senna (SENOKOT) 8 6 MG tablet    Therapeutic opioid-induced constipation (OIC)        Relevant Medications    senna (SENOKOT) 8 6 MG tablet        - Counseling on health screening and disease prevention, COVID-19 specific (CPT V65 49)    Medications adjusted this encounter:  Requested Prescriptions     Signed Prescriptions Disp Refills    predniSONE 20 mg tablet 30 tablet 0     Sig: Take 1 tablet (20 mg total) by mouth daily Take 20 mg by mouth every morning and 10 mg by mouth every afternoon after lunch     Patient taking differently: Take 20 mg by mouth daily Take 20 mg by mouth every morning    predniSONE 5 mg tablet 30 tablet 0     Sig: Take 1 tablet (5 mg total) by mouth daily Take with 20mg prednisone in morning as needed for hiccups  Max dose 25mg/day   pantoprazole (PROTONIX) 40 mg tablet 60 tablet 1     Sig: Take 1 tablet (40 mg total) by mouth 2 (two) times a day Before breakfast, before bed, to reduce stomach acid    senna (SENOKOT) 8 6 MG tablet 45 tablet 0     Sig: Take 1-2 tablets (8 6-17 2 mg total) by mouth daily as needed for constipation     No orders of the defined types were placed in this encounter      Medications Discontinued During This Encounter   Medication Reason    predniSONE 20 mg tablet Reorder    senna (SENOKOT) 8 6 MG tablet Reorder    pantoprazole (PROTONIX) 40 mg tablet Reorder         Leonie Smith was seen today for symptoms and planning cares related to above illnesses  I have reviewed the patient's controlled substance dispensing history in the Prescription Drug Monitoring Program in compliance with the [facility] regulations before prescribing any controlled substances  They are invited to continue to follow with us  If there are questions or concerns, please contact us through our clinic/answering service 24 hours a day, seven days a week  Blaire Ruiz MD  [address] Palliative and Supportive Care        Visit Information    Accompanied By: No one    Source of History: Self    History Limitations: None    Contacts: wife Ashley Frankel    History of Present Illness      Leonie Smith is a 73 y o  male who presents in follow up of symptoms related to adenocarcinoma of the pancreas  He follows with Dr Zainab Flores of Med[facility]Onc  Pertinent issues include: symptom management, depression or anxiety, nausea, hiccups      Since last visit has been to hospital and sent home  Intractable hiccups were improoved with new medication/dose of steroids  Appetiete and pain improved  A chance at getting antineoplastic therapy evovles  Has needed adjustment in insulin therapy given higher dose steroids; PCP and Endo have begun to cooperate on this matter  Medically, baclofen completely unhelpful for hiccups  Importantly, he has noted rather severe dry mouth and even visual changes since this process with hiccups started  We advised the following:               - STOP baclofen; not helpful               - DECREASE frequency of lisinopril; may be worsening intravascular depletion               - DECREASE compazine usage; this has been somewhat helpful for nausea, but has been asso with visual changes               = continue with oxyIR in small doses PRN               + Trial diazepam for diaphragmatic spasm    Pt is advised that this may be a soporific substance       From our consult on 5/27:   " [ ] This fellow presents to us just prior to surgical approach to his primary pancreas mass (4 x 2 5cm in panc head)   There are mets noted to liver as well   Pt has lost a great deal of weight recently, despite use of panc enzymes, which have helped reduce stool changes and nausea      Moving bowels daily   Taking oxyIR for pain; not entirely helpful   Crampy abd pain appears idiopathic: unrelated to eating or time of day   Does improve with lateral decub positioning, upright ambulation   We theorized that this is primarily a mass effect, with bowels becoming more spasmodic when contacting the mass by direct pressure from gravity   He is agreedable to low-dose steroids, used sparingly d/t concurrent severe and uncomfortable reflux, which has not improved even on high dose PPI therapy     Otherwise, he confirms a full cares plan, and seems too distracted by his pain to engage with full goals conversation "    Past medical, surgical, social, and family histories are reviewed and pertinent updates are made  Review of Systems   Constitutional: Positive for decreased appetite and weight loss  Negative for weight gain  HENT: Negative for hoarse voice and nosebleeds  Eyes: Negative for vision loss in left eye and vision loss in right eye  Cardiovascular: Negative for chest pain and dyspnea on exertion  Respiratory: Negative for cough and shortness of breath  Endocrine: Negative for polydipsia, polyphagia and polyuria  Skin: Negative for flushing and itching  Musculoskeletal: Negative for falls  Gastrointestinal: Positive for nausea  Negative for anorexia, jaundice and vomiting  Genitourinary: Negative for frequency  Neurological: Negative for dizziness  Psychiatric/Behavioral: Negative for depression and memory loss  The patient is not nervous/anxious            Vital Signs    BP (!) 80/50   Pulse 96   Temp (!) 97 4 °F (36 3 °C) (Temporal)   Resp 16   Ht 5' 9" (1 753 m)   Wt 69 6 kg (153 lb 7 oz)   SpO2 96%   BMI 22 66 kg/m²     Physical Exam and Objective Data  Physical Exam  Constitutional:       General: He is not in acute distress  Appearance: He is ill-appearing  He is not diaphoretic  Comments: Frail   HENT:      Head: Normocephalic and atraumatic  Right Ear: External ear normal       Left Ear: External ear normal    Eyes:      General:         Right eye: No discharge  Left eye: No discharge  Conjunctiva/sclera: Conjunctivae normal       Pupils: Pupils are equal, round, and reactive to light  Neck:      Trachea: No tracheal deviation  Cardiovascular:      Rate and Rhythm: Normal rate and regular rhythm  Pulmonary:      Effort: Pulmonary effort is normal  No respiratory distress  Breath sounds: No stridor  Abdominal:      General: There is no distension  Palpations: Abdomen is soft  Comments: scaphoid   Skin:     General: Skin is warm and dry  Coloration: Skin is pale  Findings: No erythema or rash  Neurological:      General: No focal deficit present  Mental Status: He is alert and oriented to person, place, and time  Mental status is at baseline  Cranial Nerves: No cranial nerve deficit  Psychiatric:         Mood and Affect: Mood normal          Behavior: Behavior normal          Thought Content: Thought content normal          Judgment: Judgment normal            Radiology and Laboratory:  I personally reviewed and interpreted the following results: none new    30+ minutes was spent face to face with Opal Allen with greater than 50% of the time spent in counseling or coordination of care including discussions of etiology of diagnosis, instructions for disease self management and patient and family counseling/involvement in care   Additional time was also spent in discussing coronavirus vaccine indications, availability, and logistical challenges  All of the patient's or agent's questions were answered during this discussion

## 2021-07-06 NOTE — PROGRESS NOTES
Outpatient Oncology Nutrition Consultation   Type of Consult: Follow Up  Care Location: [facility]  Nutrition Assessment:   Oncology Diagnosis & Treatments: Pancreatic Adenocarcinoma  · Started neoadjuvant chemotherapy (gemzar, abraxane) on 6/3/21  · Following with Palliative Care  · Recent hospitalization for pain and cholecystitis for which a percutaneous cholecystostomy tube  is in place  Oncology History   Pancreatic adenocarcinoma ([facility] )   5/7/2021 Biopsy    Head of pancreas:  Malignant Adenocarcinoma       6/3/2021 -  Chemotherapy    pegfilgrastim (NEULASTA ONPRO), 6 mg, Subcutaneous, Once, 1 of 5 cycles  paclitaxel protein-bound (ABRAXANE) IVPB, 100 mg/m2 = 191 mg (80 % of original dose 125 mg/m2), Intravenous, Once, 2 of 6 cycles  Dose modification: 100 mg/m2 (original dose 125 mg/m2, Cycle 1, Reason: Other (Must fill in a comment), Comment: per protocol)  Administration: 191 mg (6/3/2021), 191 mg (6/10/2021), 191 mg (7/1/2021)  gemcitabine (GEMZAR) infusion, 1,000 mg/m2 = 1,909 9 mg, Intravenous, Once, 2 of 6 cycles  Administration: 1,909 9 mg (6/3/2021), 1,909 9 mg (6/10/2021), 1,909 9 mg (7/1/2021)       Past Medical & Surgical Hx:   Patient Active Problem List   Diagnosis    Right arm pain    Rheumatoid arthritis ([facility] )    Essential hypertension    Urgency of urination    Anxiety disorder    Hyperlipidemia    Liver mass    Esophageal reflux    Multiple lipomas    Type 2 diabetes mellitus, without long-term current use of insulin (HCC)    Hypokalemia    BPH (benign prostatic hyperplasia)    Acute cholecystitis    Leukocytosis    Pancreatic adenocarcinoma (HCC)    Chemotherapy induced neutropenia (HCC)    Cancer related pain    Lower abdominal pain     Past Medical History:   Diagnosis Date    Anxiety     Arthritis     Cancer ([facility] )     pancreatic    Cellulitis     LAST ASSESSED: 6/13/14    Enlarged prostate     Epidermal inclusion cyst     LAST ASSESSED: 10/4/13    Erythrasma LAST ASSESSED: 9/23/13    Furuncle     LAST ASSESSED: 6/11/14    GERD (gastroesophageal reflux disease)     Hyperlipidemia     Hypertension     RA (rheumatoid arthritis) ([facility] )      Past Surgical History:   Procedure Laterality Date    COLONOSCOPY      FL GUIDED CENTRAL VENOUS ACCESS DEVICE INSERTION  6/1/2021    HYDROCELE EXCISION / REPAIR Right 01/11/2018    SPERMATIC CORD EXCSION OF HYDROCELE; MANAGED BY: Sumit Dejesus    IR CHOLECYSTOSTOMY TUBE CHECK/CHANGE/REPOSITION/REINSERTION/UPSIZE  6/22/2021    IR CHOLECYSTOSTOMY TUBE PLACEMENT  5/11/2021    WA REMOVAL OF HYDROCELE,TUNICA,UNILAT Right 1/11/2018    Procedure: HYDROCELECTOMY;  Surgeon: Ami Fothergill, MD;  Location: [facility];  Service: Urology    SCROTAL SURGERY      benign "lump"    TUNNELED VENOUS PORT PLACEMENT Left 6/1/2021    Procedure: INSERTION VENOUS PORT (PORT-A-CATH); Surgeon: Johny Meadows MD;  Location: [facility];  Service: Surgical Oncology       Review of Medications:   Vitamins, Supplements and Herbals: Med List Reviewed & pt is only taking: Folic Acid (per MD)    Current Outpatient Medications:     Alcohol Swabs 70 % PADS, May substitute brand based on insurance coverage  Check glucose ACHS  , Disp: 200 each, Rfl: 0    aspirin 81 mg chewable tablet, Chew 81 mg, Disp: , Rfl:     atorvastatin (LIPITOR) 10 mg tablet, Take 10 mg by mouth daily, Disp: , Rfl:     Blood Glucose Monitoring Suppl (ONE TOUCH ULTRA 2) w/Device KIT, Use daily, Disp: 1 each, Rfl: 1    diazepam (VALIUM) 2 mg tablet, Take 1 tablet (2 mg total) by mouth daily as needed (hiccups), Disp: 15 tablet, Rfl: 0    FLUoxetine (PROzac) 20 mg capsule, TAKE 1 CAPSULE BY MOUTH EVERY DAY , Disp: 90 capsule, Rfl: 2    folic acid (FOLVITE) 1 mg tablet, Take by mouth daily, Disp: , Rfl:     gabapentin (NEURONTIN) 100 mg capsule, Take 2 capsules (200 mg total) by mouth 3 (three) times a day, Disp: 190 capsule, Rfl: 0    glucose blood (OneTouch Ultra) test strip, Use as instructed once a day, Disp: 50 each, Rfl: 5    insulin glargine (Lantus SoloStar) 100 units/mL injection pen, Inject 20 Units under the skin daily, Disp: 15 mL, Rfl: 0    insulin lispro (HumaLOG KwikPen) 100 units/mL injection pen, Inject 10 Units under the skin 3 (three) times a day with meals, Disp: 15 mL, Rfl: 0    Insulin Pen Needle (BD Pen Needle Cidna 2nd Gen) 32G X 4 MM MISC, For use with insulin pen  Pharmacy may dispense brand covered by insurance , Disp: 100 each, Rfl: 0    Insulin Pen Needle (BD Pen Needle Cinda 2nd Gen) 32G X 4 MM MISC, For use with insulin pen   Pharmacy may dispense brand covered by insurance , Disp: 100 each, Rfl: 0    Lancets (onetouch ultrasoft) lancets, Use as instructed once a day, Disp: 50 each, Rfl: 5    lidocaine (LIDODERM) 5 %, Apply 1 patch topically daily Remove & Discard patch within 12 hours or as directed by MD (Patient not taking: Reported on 7/6/2021), Disp: 30 patch, Rfl: 0    lisinopril-hydrochlorothiazide (PRINZIDE,ZESTORETIC) 10-12 5 MG per tablet, Take 1 tablet by mouth 3 (three) times a week Monday Wednesday Friday, Disp: 90 tablet, Rfl: 3    Multiple Vitamins-Minerals (MULTIVITAMIN MEN) TABS, Take 1 tablet by mouth daily (Patient not taking: Reported on 7/6/2021), Disp: , Rfl:     Omega-3 Fatty Acids (FISH OIL) 1,000 mg, Take 1,000 mg by mouth daily (Patient not taking: Reported on 7/6/2021), Disp: , Rfl:     omeprazole (PriLOSEC) 20 mg delayed release capsule, Take 1 capsule (20 mg total) by mouth daily before breakfast, Disp: 30 capsule, Rfl: 0    oxyCODONE (ROXICODONE) 5 mg immediate release tablet, Take 1 tablet (5 mg total) by mouth every 4 (four) hours as needed (cancer pain)Max Daily Amount: 30 mg, Disp: 120 tablet, Rfl: 0    pancrelipase, Lip-Prot-Amyl, (CREON) 24,000 units, Take 1 capsule (24,000 Units total) by mouth 3 (three) times a day with meals, Disp: 90 capsule, Rfl: 0    pantoprazole (PROTONIX) 40 mg tablet, Take 1 tablet (40 mg total) by mouth 2 (two) times a day Before breakfast, before bed, to reduce stomach acid, Disp: 60 tablet, Rfl: 1    polyethylene glycol (MIRALAX) 17 g packet, Take 17 g by mouth daily as needed (Constipation) (Patient not taking: Reported on 7/6/2021), Disp: , Rfl: 0    predniSONE 20 mg tablet, Take 1 tablet (20 mg total) by mouth daily Take 20 mg by mouth every morning and 10 mg by mouth every afternoon after lunch, Disp: 30 tablet, Rfl: 0    predniSONE 5 mg tablet, Take 1 tablet (5 mg total) by mouth daily Take with 20mg prednisone in morning as needed for hiccups  Max dose 25mg/day , Disp: 30 tablet, Rfl: 0    prochlorperazine (COMPAZINE) 10 mg tablet, Take 1 tablet (10 mg total) by mouth every 6 (six) hours as needed for nausea or vomiting, Disp: 45 tablet, Rfl: 3    senna (SENOKOT) 8 6 MG tablet, Take 1-2 tablets (8 6-17 2 mg total) by mouth daily as needed for constipation, Disp: 45 tablet, Rfl: 0    Sodium Chloride Flush (Normal Saline Flush) 0 9 % SOLN, Gall bladder drain, Disp: , Rfl:     tamsulosin (FLOMAX) 0 4 mg, TAKE 1 CAPSULE BY MOUTH  DAILY AT BEDTIME, Disp: 90 capsule, Rfl: 3    Tofacitinib Citrate ER (Xeljanz XR) 11 MG TB24, Take by mouth (Patient not taking: Reported on 7/6/2021), Disp: , Rfl:   No current facility-administered medications for this visit  Facility-Administered Medications Ordered in Other Visits:     gemcitabine (GEMZAR) 1,909 9 mg in sodium chloride 0 9 % 250 mL infusion, 1,000 mg/m2 (Treatment Plan Recorded), Intravenous, Once, Ashlee Sousa MD    ondansetron (ZOFRAN) 16 mg, dexamethasone (DECADRON) 10 mg in sodium chloride 0 9 % 50 mL IVPB, , Intravenous, Once, Ashlee Sousa MD, Last Rate: 150 mL/hr at 07/08/21 0831, New Bag at 07/08/21 0831    PACLitaxel protein bound (ABRAXANE) 191 mg in IVPB 38 2 mL, 100 mg/m2 (Treatment Plan Recorded), Intravenous, Once, Ashlee Sousa MD    Most Recent Lab Results: Checking BG TID  BG has been ~100-200's    Steroids recently increased  Now on insulin  Lab Results   Component Value Date    WBC 14 03 (H) 07/06/2021    NEUTROABS 10 73 (H) 07/06/2021    CHOLESTEROL 188 05/05/2021    CHOL 190 01/03/2017    TRIG 181 6 (H) 05/05/2021    HDL 36 (L) 05/05/2021    LDLCALC 116 (H) 05/05/2021    ALT 40 07/06/2021    AST 25 07/06/2021    ALB 3 3 (L) 07/06/2021     09/01/2017     01/03/2017    SODIUM 141 07/06/2021    SODIUM 139 06/28/2021    K 3 4 (L) 07/06/2021    K 4 4 06/28/2021     07/06/2021    BUN 23 (H) 07/06/2021    BUN 17 06/28/2021    CREATININE 0 84 07/06/2021    CREATININE 0 65 (L) 06/28/2021    EGFR 92 07/06/2021    PHOS 4 0 06/15/2021    TSH 1 130 11/10/2018    GLUCOSE 117 (H) 09/01/2017    POCGLU 320 (H) 06/21/2021    GLUF 57 (L) 07/06/2021    GLUF 147 (H) 05/05/2021    GLUC 80 06/28/2021    HGBA1C 6 2 (H) 05/05/2021    HGBA1C 6 1 (H) 04/24/2021    HGBA1C 6 5 (H) 02/27/2021    CALCIUM 8 8 07/06/2021    MG 1 9 06/15/2021       Anthropometric Measurements:   Height: 70"  Ht Readings from Last 1 Encounters:   07/08/21 5' 9 02" (1 753 m)     Wt Readings from Last 20 Encounters:   07/08/21 70 1 kg (154 lb 8 oz)   07/06/21 69 6 kg (153 lb 7 oz)   07/01/21 70 5 kg (155 lb 8 oz)   06/25/21 71 2 kg (157 lb)   06/24/21 70 3 kg (155 lb)   06/16/21 69 4 kg (153 lb)   06/10/21 69 8 kg (153 lb 14 1 oz)   06/07/21 69 9 kg (154 lb)   06/03/21 73 2 kg (161 lb 6 oz)   06/01/21 73 kg (161 lb)   05/27/21 73 4 kg (161 lb 13 1 oz)   05/26/21 73 5 kg (162 lb)   05/25/21 73 5 kg (162 lb)   05/19/21 75 8 kg (167 lb)   05/10/21 79 8 kg (176 lb)   05/03/21 79 8 kg (176 lb)   04/27/21 79 9 kg (176 lb 3 2 oz)   04/05/21 84 9 kg (187 lb 3 2 oz)   03/16/21 87 1 kg (192 lb)   02/22/21 89 4 kg (197 lb)     Weight History:    Usual Weight: 200#   Comments: reports he has lost ~50# in 5 weeks        Oncology Nutrition-Anthropometrics      Nutrition from 7/8/2021 in [facility] Oncology Dietitian Services [facility]   Patient age (years):  73 years  73 years   Patient (male) height (in):  79 in  79 in   Current weight (lbs):  154 5 lbs  154 lbs   Current weight to be used for anthropometric calculations (kg)  70 2 kg  70 kg   BMI:  22 2  22 1   IBW male  166 lb  166 lb   IBW (kg) male  75 5 kg  75 5 kg   IBW % (male)  93 1 %  92 8 %   Adjusted BW (male):  163 1 lbs  163 lbs   Adjusted BW in kg (male):  74 1 kg  74 1 kg   % weight change after 1 week:  -0 6 %  (!) -4 3 %   Weight change after 1 week (lbs)  -1 lbs  -7 lbs   % weight change after 1 month:  0 3 %  (!) -12 5 %   Weight change after 1 month (lbs)  0 5 lbs  -22 lbs   % weight change after 3 months:  (!) -17 5 %  (!) -19 8 %   Weight change after 3 months (lbs)  -32 7 lbs  -38 lbs          Nutrition-Focused Physical Findings: mild  muscle depletion ([place]) - hollowing/scooping/depression and mild  body fat depletion ([place]) - hollowing/depression/dark circles    Food/Nutrition-Related History & Client/Social History:    Current Nutrition Impact Symptoms:  [] Nausea - improved  -PERT has helped [x] Reduced Appetite - improved  -steroids recently increased during hospitalization [] Acid Reflux - no issues reported  -on Protonix   [] Vomiting  [x] Unintended Wt Loss  [] Malabsorption    [] Diarrhea   -PERT has helped [] Unintended Wt Gain  [] Dumping Syndrome    [] Constipation - BM's WNL now  -started on Senokot per [facility] [] Thick Mucous/Secretions  [] Abdominal Pain - not pain, but "heaviness"    [] Dysgeusia (Altered Taste)  [] Xerostomia (Dry Mouth) - "not bad"  -uses dry mouth spray  -reviewed Biotene and bs/sw rinses prn [] Gas    [] Dysosmia (Altered Smell)  [] Thrush  [] Difficulty Chewing    [] Oral Mucositis (Sore Mouth)  [x] Fatigue - still gets tired, has been more active [] Hyperglycemia    [] Odynophagia  [] Esophagitis  [] Other:    [] Dysphagia  [] Early Satiety  [] No Problems Eating      Food Allergies & Intolerances: no   Likes peanuts but not peanut butter  Not eating peanuts because he can't chew them well right now  Current Diet: Regular Diet, No Restrictions   · Bianca Marie has seen an outpatient CDE, RD on 21 and it was recommended that he have 45-60 grams of cho with 3 meals and 15 g with snacks if chosen  Pt cancelled his follow up appt because he said he doesn't have DM and he has cancer  Current Nutrition Intake:  Increased since last visit  Appetite: Good  Nutrition Route: PO  Activity level: improving, limited by pain and fatigue    Typical Day Diet Recall:   Snack: Ensure  Breakfast: 3 egg omelet with onions, sausage/valladares/[place]izen [place] valladares, mozzarella cheese, tomato/basil/garlic spaghetti sauce and an english muffin with butter and jelly  Snack: n/a  Lunch: 1 tuna fish sandwich with eggs and onions  Snack: sometimes has crackers, string cheese  Dinner: 5-6" smoked sausage, 1 5 cups mashed potatoes, 1 cup corn with butter/salt/pepper  Snack: watermelon    Beverages: water (16 9 oz x3-4), sweetened iced tea (10 oz x1-2), Ensure (8 oz x2), chocolate 2% milk (8 oz x1), cranberry juice (8oz x1)  Supplements: prefers chocolate, tried samples of Enlive & Complete but did not switch because he still has a lot of Original leftover   Ensure Original (8 oz, 220 kcal, 9 g pro) - at least BID      Oncology Nutrition-Estimated Needs      Nutrition from 2021 in [facility] includes the [facility] Oncology Dietitian Services   Weight type used  Actual weight   Weight in kilograms (kg) used for estimated needs  70 kg   Energy needs formula:   35-40 kcal/kg   Energy needs based on 35 kcal/k kcal   Energy needs based on 40 kcal/k kcal   Protein needs formula:  1 5-2 g/kg   Protein needs based on 1 5 g/kg  105 g   Protein needs based on 2 g/kg  140 g   Fluid needs formula:  30-35 mL/kg   Fluid needs based on 30 mL/kg  2100 mL   Fluid needs in ounces  71 oz   Fluid needs based on 35 mL/kg  2450 mL   Fluid needs in ounces  83 oz           Discussion & Intervention:   Rosio Lu was evaluated today for an RD follow up regarding wt loss, poor po intake and nutrition impact sx management  Rosio Lu is currently undergoing tx for pancreatic cancer  Rosio Lu is doing much better today  His pain his much better controlled and his appetite is now good  He says he is "eating like a horse "  He has increased his Ensure intake to at least BID but has not switched to the higher kcal/protein versions yet because he still has leftover Original   We reviewed how to fortify his Ensure Original   His steroids were recently increased and his BG has been in the 100's and 200's  His nausea, abdominal pain, xerostomia, constipation, fatigue, and GERD have improved since last visit  His weight is starting to stabilize  Reviewed 24 hour recall, which revealed an inadequate po intake, and discussed ways to increase kcal, protein, and fluid intakes and optimize nutrient intake  Also reviewed the importance of wt management throughout the tx process and the role of a high kcal/ high protein diet in managing wt and overall health    Based on today's assessment, discussion included: MNT for: pancreatic cancer, wt loss, a high kcal/protein diet & food choices to include at all meals & snacks (Examples of high kcal foods: cheese, full-fat dairy products, nut butter, plant-based fats, coconut oil/milk, avocado, butter, cream soup, etc  Examples of high protein foods: eggs, chicken, fish, beans/legumes, nuts/nut butters, bone broth, etc ) , fortifying foods for added kcal and protein (examples include: adding cheese to foods such as eggs, mashed potatoes, casseroles, etc ; Making oatmeal with whole milk rather than water; Making fortified mashed potatoes with cream, butter, dry milk powder, plain [place] yogurt, and cheese ), adequate hydration & fluid choices, sipping on calorie containing beverages (examples include: adding whole milk or cream to coffee, oral nutrition supplements, juice, electrolyte replacement beverages, milk, etc ), eating smaller more frequent meals every 2-3 hours (5-6 small meals/day), having consistent and planned snacks between meals, eating when feeling most hungry, choosing higher kcal/protein versions of supplements and increasing oral nutrition supplements, recipe suggestions/resources, trying new recipes, & cooking at home more often and adding extra fat to foods while cooking such as butter, plant-based oil, coconut oil/milk, avocado, nut butters, etc    Moving forward, Richie Hampton was encouraged to increase kcal, protein, and fluid intakes  He was provided with a written list of today's recommendations as he states his memory is not the best   He seemed more open and willing to try some of the nutrition interventions offered today as he was much more comfortable during today's visit  Materials Provided: samples of Ensure Enlive and Ensure Complete  All questions and concerns addressed during todays visit  Richie Hampton has RD contact information  Nutrition Diagnosis:    Inadequate Energy Intake related to physiological causes, disease state and treatment related issues as evidenced by food recall, wt loss and discussion with pt and/or family   Increased Nutrient Needs (kcal & pro) related to increased demand for nutrients and disease state as evidenced by cancer dx and pt undergoing tx for cancer  Monitoring & Evaluation:   Goals:  · weight maintenance/stabilization  · adequate nutrition impact symptom management  · pt to meet >/=75% estimated nutrition needs daily    · Progress Towards Goals: Progressing    Nutrition Rx & Recommendations:  · Diet: High Calorie, High Protein (for high calorie foods see pages 52-53, and for high protein foods see pages 49-51 in your Eating Hints book)  · Eat slowly and chew food thoroughly before swallowing    · Nutrition Supplements: Try for 3 per day, Try Ensure Complete or Ensure Enlive  May use homemade shakes/smoothies as desired  If using a pre-made shake/bar, choose ones with >300 calories and >10 grams protein (ex  Ensure Enlive, Ensure Plus, Boost Plus, Boost Very High Calorie, etc )  · Small, frequent meals/snacks may be easier to tolerate than 3 large daily meals  Aim for 5-6 small meals per day (every 2-3 hours)  · Include protein at all meals/snacks  · Refer to Eating Hints book for other meal/snack ideas and symptom management  · For weight loss: monitor your weight at home at least 2x/week, record your weight, start by adding 250-500 extra calories per day, eat 5-6 small scheduled meals every 2-3 hrs, choose foods that are high in protein and calories (see pages 49-53 in your Eating Hints book), drink liquids with calories for example: milkshakes/smoothies/juice/soup/whole milk/chocolate milk, cook with protein fortified milk (see recipe on page 36 in your Eating Hints book), consider ready-to-drink oral nutrition supplements such as Ensure Plus, Ensure Enlive, Boost Plus, or Boost Very High Calorie, avoid "diet" and "light" foods when possible, avoid drinking too much with meals, contact your dietitian with any continued weight loss over the course of 1 week  For more info see pages 35-37 in your Eating Hints book  · Weigh yourself regularly  If you notice weight loss, make an effort to increase your daily food/calorie intake  If you continue to notice loss after these efforts, reach out to your dietitian to establish a plan to stabilize weight     · Try to choose calorie containing fluids: whole Fairlife milk (higher protein milk), Fairlife chocolate milk, juice, diluted juice, milkshakes, smoothies, oral nutrition supplements, etc   · Fortify your foods to add extra calories and protein: add cheese to eggs and veggies, choose whole milk dairy products, add extra oils and butter to foods, etc   · Snack ideas, aim for 3 snacks per day: ice cream, cheese and crackers, oral nutrition supplement, chocolate milk with cleopatra crackers, hard boiled eggs, ham and cheese rolls ups, etc   · Blend your Ensure Original with Ice Cream to fortify it until you can purchase a higher calorie and protein option such as Ensure Enlive or Ensure Complete      Follow Up Plan: 7/29/21 during chemo  Recommend Referral to Other Providers: none at this time

## 2021-07-08 NOTE — NURSING NOTE
Patient arrives to infusion center for D8 C2 Abraxane and Gemzar  Patient denies any acute complaints, states he feels well  Reports he is "gassy," states his bowel movements have been regular, intermittent abdominal "heaviness " Patient reports on Tuesday was utilizing the bathroom and fell off the toilet, did see Dr Lorie Preston regarding this  Patient states he was not bearing down during the fall  Denies headache, dizziness, lightheadedness s/p fall  VSS, labs reviewed from 7/6 and within parameters for treatment today  Port accessed without issue, excellent blood return noted, flushed well, gauze dressing place  Patient resting on recliner chair, call bell within reach

## 2021-07-08 NOTE — NURSING NOTE
Patient completed treatment today without issue  Port remains with excellent blood return, flushed well  Port deaccessed as per hospital protocol, bandaid in place  Patient aware of next appt, already has AVS, confirmed that the AVS is up to date  Patient AAOx4 and ambulatory upon DC without gait disturbance

## 2021-07-08 NOTE — PATIENT INSTRUCTIONS
Nutrition Rx & Recommendations:  · Diet: High Calorie, High Protein (for high calorie foods see pages 52-53, and for high protein foods see pages 49-51 in your Eating Hints book)  · Eat slowly and chew food thoroughly before swallowing  · Nutrition Supplements: Try for 3 per day, Try Ensure Complete or Ensure Enlive  May use homemade shakes/smoothies as desired  If using a pre-made shake/bar, choose ones with >300 calories and >10 grams protein (ex  Ensure Enlive, Ensure Plus, Boost Plus, Boost Very High Calorie, etc )  · Small, frequent meals/snacks may be easier to tolerate than 3 large daily meals  Aim for 5-6 small meals per day (every 2-3 hours)  · Include protein at all meals/snacks  · Refer to Eating Hints book for other meal/snack ideas and symptom management  · For weight loss: monitor your weight at home at least 2x/week, record your weight, start by adding 250-500 extra calories per day, eat 5-6 small scheduled meals every 2-3 hrs, choose foods that are high in protein and calories (see pages 49-53 in your Eating Hints book), drink liquids with calories for example: milkshakes/smoothies/juice/soup/whole milk/chocolate milk, cook with protein fortified milk (see recipe on page 36 in your Eating Hints book), consider ready-to-drink oral nutrition supplements such as Ensure Plus, Ensure Enlive, Boost Plus, or Boost Very High Calorie, avoid "diet" and "light" foods when possible, avoid drinking too much with meals, contact your dietitian with any continued weight loss over the course of 1 week  For more info see pages 35-37 in your Eating Hints book  · Weigh yourself regularly  If you notice weight loss, make an effort to increase your daily food/calorie intake  If you continue to notice loss after these efforts, reach out to your dietitian to establish a plan to stabilize weight     · Try to choose calorie containing fluids: whole Fairlife milk (higher protein milk), Fairlife chocolate milk, juice, diluted juice, milkshakes, smoothies, oral nutrition supplements, etc   · Fortify your foods to add extra calories and protein: add cheese to eggs and veggies, choose whole milk dairy products, add extra oils and butter to foods, etc   · Snack ideas, aim for 3 snacks per day: ice cream, cheese and crackers, oral nutrition supplement, chocolate milk with cleopatra crackers, hard boiled eggs, ham and cheese rolls ups, etc   · Blend your Ensure Original with Ice Cream to fortify it until you can purchase a higher calorie and protein option such as Ensure Enlive or Ensure Complete      Follow Up Plan: 7/29/21 during chemo  Recommend Referral to Other Providers: none at this time

## 2021-07-12 NOTE — TELEPHONE ENCOUNTER
Spoke to patient and he gave verbal understanding  He will bring in his BS readings at his visit but call if he has any concerns

## 2021-07-12 NOTE — TELEPHONE ENCOUNTER
Patient called in stating that he has had episodes of almost passing out  Patient did not take his blood sugar when this occurred  He was seen in the hospital and has a follow up with Dr Josephus Primrose on 7/21/21

## 2021-07-12 NOTE — TELEPHONE ENCOUNTER
Reviewed blood sugars, they are in  range   please inform patient to reduce Lantus to 22 units, as fasting blood sugars are sometimes in 80 range  continue Humalog 12 units with meals  he should keep a record of blood sugars and bring for his appointment      Butler Lanes, MD

## 2021-07-12 NOTE — TELEPHONE ENCOUNTER
Patient son requesting to speak with Dr Igor Rosa regarding some questions that he has after speaking with Dr Lucia Virginia  He states Dr Igor Rosa can call him back at his convenience - within the next 1-2 days    Will send to [place] to give msg to Dr Igor Rosa and have him call the patients son    Kady Jaylon - [phone]

## 2021-07-12 NOTE — TELEPHONE ENCOUNTER
Pt was seen by Endo team in hospital, was started on insulin regimen     He should check blood sugars with meals at at bedtime and send log to us in next few days to assess his glycemic control   Thanks     Adele Greenwood MD

## 2021-07-13 PROBLEM — E11.43 CONTROLLED DIABETES MELLITUS WITH DIABETIC AUTONOMIC NEUROPATHY, WITH LONG-TERM CURRENT USE OF INSULIN (HCC): Status: ACTIVE | Noted: 2021-03-16

## 2021-07-13 PROBLEM — Z79.4 CONTROLLED DIABETES MELLITUS WITH DIABETIC AUTONOMIC NEUROPATHY, WITH LONG-TERM CURRENT USE OF INSULIN (HCC): Status: ACTIVE | Noted: 2021-03-16

## 2021-07-13 PROBLEM — A41.9 SEPSIS (HCC): Status: ACTIVE | Noted: 2021-01-01

## 2021-07-13 PROBLEM — D61.810 PANCYTOPENIA DUE TO CHEMOTHERAPY (HCC): Status: ACTIVE | Noted: 2021-01-01

## 2021-07-13 PROBLEM — R53.1 GENERALIZED WEAKNESS: Status: ACTIVE | Noted: 2021-01-01

## 2021-07-13 NOTE — H&P
[address] [facility] 1955, 73 y o  male MRN: [id number]  Unit/Bed#: S -01 Encounter: [id number]  Primary Care Provider: Mynor Serrano MD   Date and time admitted to hospital: 7/13/2021  9:40 AM      * Sepsis of unknown etiology  Assessment & Plan  Presents with a high-grade fever coupled with tachycardia/leukopenia - lactic acid normal and procalcitonin pending  Urinalysis negative for infectious etiology - blood cultures collected in the ED - CXR negative for acute cardiopulmonary etiology - CT of abdomen/pelvis without obvious source of infection  Notably, patient has a cholecystostomy tube in place from May 2021 for cholecystitis at the time s/p antibiotic course - also has a left chest wall Chemoport in place  Given empiric IV Vancomycin/Cefepime in the ED -> Cefepime transitioned to Unasyn per Infectious Disease due to recent cultures a few months back (grew Enterococcus faecalis at the time)  Discussed with Infectious Disease -> consider translocation into the GI tract from recent chemotherapy and/or new metastatic sites in liver  Monitor vitals and maintain hemodynamics - continue IV fluids    Metastatic pancreatic cancer  Assessment & Plan  Follows with medical/surgical oncology - s/p a cycle of neoadjuvant chemotherapy (Gemcitabine/Abraxane) last week  Known to palliative care -> consider inpatient consultation of pain uncontrolled  CT imaging in the ED today noting "New 3 5 x 1 8 cm low-attenuating lesion in the right hepatic lobe and increased size of a 1 9 x 1 3 cm low-attenuating lesion in the left hepatic lobe, concerning for metastatic disease  Stable pancreatic head neoplasm   Resolution of the previously reported paraduodenal collection and omental edema/infiltration " -> will appreciate medical oncology evaluation concerning new findings    Generalized weakness  Assessment & Plan  Multifactorial secondary to sepsis coupled with metastatic cancer (see plan for individual assessments)  PT/OT as tolerated    Recent cholecystitis  Assessment & Plan  Hospitalized in May 2021 s/p cholecystostomy tube placement on 5/11/21 - completed a course of antibiotics at the time  PRN pain control - supportive care otherwise    Insulin-dependent diabetes mellitus with neuropathy  Assessment & Plan  Lab Results   Component Value Date    HGBA1C 6 2 (H) 05/05/2021     Continue basal/ prandial insulin with additional SSI coverage per Accu-Cheks  Carbohydrate restricted diet  On Gabapentin for neuropathy    Pancytopenia due to chemotherapy  Assessment & Plan  Monitor CBC and transfuse if necessary  Monitor for neutropenia    Essential hypertension  Assessment & Plan  C/w Prinzide 3x/weekly  Low-sodium diet    GERD  Assessment & Plan  C/w PPI regimen    Hyperlipidemia  Assessment & Plan  C/w ASA    Anxiety disorder  Assessment & Plan  C/w Valium/Prozac      DVT Prophylaxis:  Lovenox    Code Status:  Full code    Discussion with:  Patient at bedside    Anticipated Length of Stay:  Patient will be admitted on an Inpatient basis with an anticipated length of stay of greater than2 midnights  Justification for Hospital Stay: Sepsis of unknown etiology in a patient with metastatic cancer requiring intravenous antibiotics and infectious workup  Total Time for Visit, including Counseling / Coordination of Care: 72 minutes  Greater than 50% of this total time spent on direct patient counseling and coordination of care  Chief Complaint:  Weakness and fever      History of Present Illness:    Reji Caballero is a 73 y o  male who presents with complaints of worsening weakness/fatigue and fever since last night  His past medical history is notable for metastatic pancreatic cancer along with an episode of acute cholecystitis diagnosed in May 2021 status post cholecystostomy tube placement    He is currently followed by oncology and has undergone neoadjuvant chemotherapy for the aforementioned cancer  Blood cultures were drawn in the ED  He is slightly leukopenic on labs, however, not neutropenic currently  He has been started on empiric intravenous antibiotics  Urinalysis and CT imaging are fairly unremarkable for obvious source of infection  At the time of my encounter, patient was resting in bed having a meal  Does note intermittent abdominal fullness/ discomfort but otherwise denies acute complaints at this time  Review of Systems:    Review of Systems - A thorough 12 point review systems was conducted  Pertinent positives and negatives are mentioned in the history of present illness  Past Medical and Surgical History:     Past Medical History:   Diagnosis Date    Anxiety     Arthritis     Cancer ([facility] )     pancreatic    Cellulitis     LAST ASSESSED: 6/13/14    Enlarged prostate     Epidermal inclusion cyst     LAST ASSESSED: 10/4/13    Erythrasma     LAST ASSESSED: 9/23/13    Furuncle     LAST ASSESSED: 6/11/14    GERD (gastroesophageal reflux disease)     Hyperlipidemia     Hypertension     RA (rheumatoid arthritis) ([place] [id number] )        Past Surgical History:   Procedure Laterality Date    COLONOSCOPY      FL GUIDED CENTRAL VENOUS ACCESS DEVICE INSERTION  6/1/2021    HYDROCELE EXCISION / REPAIR Right 01/11/2018    SPERMATIC CORD EXCSION OF HYDROCELE; MANAGED BY: Stephanie Schmidt    IR CHOLECYSTOSTOMY TUBE CHECK/CHANGE/REPOSITION/REINSERTION/UPSIZE  6/22/2021    IR CHOLECYSTOSTOMY TUBE PLACEMENT  5/11/2021    MO REMOVAL OF HYDROCELE,TUNICA,UNILAT Right 1/11/2018    Procedure: HYDROCELECTOMY;  Surgeon: Rosa Deng MD;  Location: [facility];  Service: Urology    SCROTAL SURGERY      benign "lump"    TUNNELED VENOUS PORT PLACEMENT Left 6/1/2021    Procedure: INSERTION VENOUS PORT (PORT-A-CATH);   Surgeon: Danial Castellano MD;  Location: [facility];  Service: Surgical Oncology         Medications & Allergies:    Prior to Admission medications    Medication Sig Start Date End Date Taking? Authorizing Provider   aspirin 81 mg chewable tablet Chew 81 mg   Yes Historical Provider, MD   Blood Glucose Monitoring Suppl (ONE TOUCH ULTRA 2) w/Device KIT Use daily 3/17/21  Yes Eddie Galvin MD   diazepam (VALIUM) 2 mg tablet Take 1 tablet (2 mg total) by mouth daily as needed (hiccups) 6/14/21  Yes Mallory Pat MD   FLUoxetine (PROzac) 20 mg capsule TAKE 1 CAPSULE BY MOUTH EVERY DAY  10/13/20  Yes Eddie Galvin MD   folic acid (FOLVITE) 1 mg tablet Take by mouth daily   Yes Historical Provider, MD   gabapentin (NEURONTIN) 100 mg capsule Take 2 capsules (200 mg total) by mouth 3 (three) times a day 6/21/21  Yes Dimitris Lares PA-C   glucose blood (OneTouch Ultra) test strip Use as instructed once a day 3/17/21  Yes Eddie Glavin MD   insulin glargine (Lantus SoloStar) 100 units/mL injection pen Inject 20 Units under the skin daily 6/21/21  Yes Dimitris Lares PA-C   insulin lispro (HumaLOG KwikPen) 100 units/mL injection pen Inject 10 Units under the skin 3 (three) times a day with meals 6/21/21  Yes Mellissa Larkin PA-C   Insulin Pen Needle (BD Pen Needle Cinda 2nd Gen) 32G X 4 MM MISC For use with insulin pen  Pharmacy may dispense brand covered by insurance  6/21/21  Yes Mellissa Larkin PA-C   Insulin Pen Needle (BD Pen Needle Cinda 2nd Gen) 32G X 4 MM MISC For use with insulin pen  Pharmacy may dispense brand covered by insurance   6/21/21  Yes Dimitris Lares PA-C   Lancets (onetouch ultrasoft) lancets Use as instructed once a day 3/17/21  Yes Eddie Galvin MD   oxyCODONE (ROXICODONE) 5 mg immediate release tablet Take 1 tablet (5 mg total) by mouth every 4 (four) hours as needed (cancer pain)Max Daily Amount: 30 mg 6/14/21  Yes Mallory Pat MD   pancrelipase, Lip-Prot-Amyl, (CREON) 24,000 units Take 1 capsule (24,000 Units total) by mouth 3 (three) times a day with meals 6/14/21  Yes Mallory Pat MD   pantoprazole (PROTONIX) 40 mg tablet Take 1 tablet (40 mg total) by mouth 2 (two) times a day Before breakfast, before bed, to reduce stomach acid 7/6/21  Yes Elizabeth Rondon MD   predniSONE 20 mg tablet Take 1 tablet (20 mg total) by mouth daily Take 20 mg by mouth every morning and 10 mg by mouth every afternoon after lunch  Patient taking differently: Take 20 mg by mouth daily Take 20 mg by mouth every morning 7/6/21  Yes Elizabeth Rondon MD   predniSONE 5 mg tablet Take 1 tablet (5 mg total) by mouth daily Take with 20mg prednisone in morning as needed for hiccups  Max dose 25mg/day  7/6/21  Yes Elizabeth Rondon MD   prochlorperazine (COMPAZINE) 10 mg tablet Take 1 tablet (10 mg total) by mouth every 6 (six) hours as needed for nausea or vomiting 5/28/21  Yes Britney Hoffmann MD   senna (SENOKOT) 8 6 MG tablet Take 1-2 tablets (8 6-17 2 mg total) by mouth daily as needed for constipation 7/6/21  Yes Elizabeth Rondon MD   Alcohol Swabs 70 % PADS May substitute brand based on insurance coverage  Check glucose ACHS   6/21/21   Mellissa Larkin PA-C   lisinopril-hydrochlorothiazide (PRINZIDE,ZESTORETIC) 10-12 5 MG per tablet Take 1 tablet by mouth 3 (three) times a week Monday Wednesday Friday 6/14/21   Elizabeth Rondon MD   Sodium Chloride Flush (Normal Saline Flush) 0 9 % SOLN Gall bladder drain 5/12/21   Historical Provider, MD   atorvastatin (LIPITOR) 10 mg tablet Take 10 mg by mouth daily  7/13/21  Historical Provider, MD   lidocaine (LIDODERM) 5 % Apply 1 patch topically daily Remove & Discard patch within 12 hours or as directed by MD  Patient not taking: Reported on 7/6/2021 5/18/21 7/13/21  Manolo Gray MD   Multiple Vitamins-Minerals (MULTIVITAMIN MEN) TABS Take 1 tablet by mouth daily  Patient not taking: Reported on 7/6/2021 7/13/21  Historical Provider, MD   Omega-3 Fatty Acids (FISH OIL) 1,000 mg Take 1,000 mg by mouth daily  Patient not taking: Reported on 7/6/2021 7/13/21  Historical Provider, MD   omeprazole (PriLOSEC) 20 mg delayed release capsule Take 1 capsule (20 mg total) by mouth daily before breakfast 5/3/21 7/13/21  Celia Petersen MD   polyethylene glycol (MIRALAX) 17 g packet Take 17 g by mouth daily as needed (Constipation)  Patient not taking: Reported on 7/6/2021 5/8/21 7/13/21  Marion Hung MD   tamsulosin (FLOMAX) 0 4 mg TAKE 1 CAPSULE BY MOUTH  DAILY AT BEDTIME 6/10/20 7/13/21  Farrukh Martinez MD   Tofacitinib Citrate ER (Xeljanz XR) 11 MG TB24 Take by mouth  Patient not taking: Reported on 7/6/2021 7/13/21  Historical Provider, MD         Allergies: Allergies   Allergen Reactions    Fentanyl Anaphylaxis and Other (See Comments)     Oxygen drops severely         Social History:    Substance Use History:   Social History     Substance and Sexual Activity   Alcohol Use Never     Social History     Tobacco Use   Smoking Status Former Smoker   Smokeless Tobacco Never Used   Tobacco Comment    tobacco in a pipe in the early 1980's     Social History     Substance and Sexual Activity   Drug Use Never         Family History:    Per medical chart, significant for hypertension and mother father, heart disease in father, and diabetes mellitus in maternal grandmother        Physical Exam:     Vitals:   Blood Pressure: 98/60 (07/13/21 1554)  Pulse: 69 (07/13/21 1554)  Temperature: (!) 97 4 °F (36 3 °C) (07/13/21 1554)  Temp Source: Oral (07/13/21 1554)  Respirations: 18 (07/13/21 1554)  Height: 5' 9" (175 3 cm) (07/13/21 0943)  Weight - Scale: 76 4 kg (168 lb 6 9 oz) (07/13/21 0943)  SpO2: 94 % (07/13/21 1554)      GENERAL:   Mildly weak/fatigued   HEAD:  Normocephalic - atraumatic  EYES: PERRL - EOMI   MOUTH:  Mucosa moist  NECK:  Supple - full range of motion  CARDIAC:  Rate controlled currently - S1/S2 positive  PULMONARY:  Clear breath sounds bilaterally - nonlabored respirations - left chest wall Chemoport in place  ABDOMEN:  Soft - nontender/nondistended currently - active bowel sounds - cholecystostomy tube in place w/o surrounding leakage or erythema  MUSCULOSKELETAL:  Motor strength/range of motion intact  NEUROLOGIC:  Alert/oriented at baseline  SKIN:  Chronic wrinkles/blemishes   PSYCHIATRIC:  Mood/affect stable      Additional Data:     Labs & Recent Cultures:    Results from last 7 days   Lab Units 07/13/21  0946   WBC Thousand/uL 3 07*   HEMOGLOBIN g/dL 10 5*   HEMATOCRIT % 32 4*   PLATELETS Thousands/uL 107*   BANDS PCT % 16*   LYMPHO PCT % 12*   MONO PCT % 0*   EOS PCT % 1     Results from last 7 days   Lab Units 07/13/21  0947   SODIUM mmol/L 140   POTASSIUM mmol/L 3 1*   CHLORIDE mmol/L 106   CO2 mmol/L 23   BUN mg/dL 23   CREATININE mg/dL 0 88   ANION GAP mmol/L 11   CALCIUM mg/dL 7 3*   ALBUMIN g/dL 2 5*   TOTAL BILIRUBIN mg/dL 0 66   ALK PHOS U/L 112   ALT U/L 34   AST U/L 18   GLUCOSE RANDOM mg/dL 77     Results from last 7 days   Lab Units 07/13/21  0947   INR  1 25*     Results from last 7 days   Lab Units 07/13/21  1650 07/13/21  1601   POC GLUCOSE mg/dl 122 67         Results from last 7 days   Lab Units 07/13/21  1003 07/13/21  0947   LACTIC ACID mmol/L  --  1 8   PROCALCITONIN ng/ml 1 50*  --          Results from last 7 days   Lab Units 07/13/21  0947   BLOOD CULTURE  Received in Microbiology Lab  Culture in Progress  Received in Microbiology Lab  Culture in Progress  Imaging:     CT abdomen pelvis with contrast    Result Date: 7/13/2021  Narrative: CT ABDOMEN AND PELVIS WITH IV CONTRAST INDICATION:   Abdominal pain, fever Known pancreatic cancer, increasing abdominal pain, tender throughout the upper abdomen, febrile, sepsis  COMPARISON:  6/14/2021 TECHNIQUE:  CT examination of the abdomen and pelvis was performed  Axial, sagittal, and coronal 2D reformatted images were created from the source data and submitted for interpretation  Radiation dose length product (DLP) for this visit:  477 mGy-cm     This examination, like all CT scans performed in the [facility], was performed utilizing techniques to minimize radiation dose exposure, including the use of iterative reconstruction and automated exposure control  IV Contrast:  100 mL of iohexol (OMNIPAQUE) Enteric Contrast:  Enteric contrast was not administered  FINDINGS: ABDOMEN LOWER CHEST:  No clinically significant abnormality identified in the visualized lower chest   Stable small pericardial effusion, partially imaged  LIVER/BILIARY TREE:  New 3 5 x 1 8 cm ill-defined low-attenuating lesion in hepatic segment 8 (image #2/24)  Increased size of a 1 9 x 1 3 cm low-attenuating lesion in hepatic segment 3 (image #2/24)  Numerous cysts again seen throughout the liver  Unchanged enhancing left hepatic lobe lesion (image #2/27)  Metallic common bile duct stent again noted, with associated pneumobilia  No intrahepatic biliary ductal dilation  GALLBLADDER:  Percutaneous cholecystostomy tube  SPLEEN:  Unremarkable  PANCREAS:  No significant interval change in size of the heterogeneously enhancing, ill-defined pancreatic head neoplasm, again measuring 5 1 x 2 7 cm when re-measured in a similar fashion (image #2/37)  Unchanged dilation of the main duct distal to the  mass and associated parenchymal atrophy  ADRENAL GLANDS:  Unremarkable  KIDNEYS/URETERS:  No hydronephrosis or urinary tract calculus  One or more sharply circumscribed subcentimeter renal hypodensities are present, too small to accurately characterize, and statistically most likely benign findings  According to recent literature (Radiology 2019) no further workup of these findings is recommended  STOMACH AND BOWEL: Previously reported paraduodenal focal fluid collection has resolved  No bowel obstruction  APPENDIX:  No findings to suggest appendicitis  ABDOMINOPELVIC CAVITY:  Resolved omental edema/infiltration  No ascites  No pneumoperitoneum  No lymphadenopathy  VESSELS:  Unremarkable for patient's age   PELVIS REPRODUCTIVE ORGANS:  Enlarged prostate gland again noted  URINARY BLADDER:  Unremarkable  ABDOMINAL WALL/INGUINAL REGIONS:  Unchanged subcutaneous lesion in the right anterior chest wall, again measuring 3 2 x 1 2 cm (image #2/6), likely sebaceous cyst   Unchanged fat-containing left inguinal hernia  OSSEOUS STRUCTURES:  No acute fracture or destructive osseous lesion  Impression: No definite imaging explanation for acute epigastric/abdominal pain  New 3 5 x 1 8 cm low-attenuating lesion in the right hepatic lobe and increased size of a 1 9 x 1 3 cm low-attenuating lesion in the left hepatic lobe, concerning for metastatic disease  Stable pancreatic head neoplasm  Resolution of the previously reported paraduodenal collection and omental edema/infiltration  Workstation performed: [id number]     CT abdomen pelvis with contrast    Result Date: 6/14/2021  Narrative: CT ABDOMEN AND PELVIS WITH IV CONTRAST INDICATION:   RLQ abdominal pain, appendicitis suspected (Age => 15y) RLQ pain with guarding  Concern for appendicitis/colitis  Hx Pancreatic cancer/RA  COMPARISON:  CT abdomen pelvis 5/14/2021 TECHNIQUE:  CT examination of the abdomen and pelvis was performed  Axial, sagittal, and coronal 2D reformatted images were created from the source data and submitted for interpretation  Radiation dose length product (DLP) for this visit:  461 71 mGy-cm   This examination, like all CT scans performed in the [place], was performed utilizing techniques to minimize radiation dose exposure, including the use of iterative  reconstruction and automated exposure control  IV Contrast:  100 mL of iohexol (OMNIPAQUE) Enteric Contrast:  Enteric contrast was not administered  FINDINGS: ABDOMEN LOWER CHEST:  No clinically significant abnormality identified in the visualized lower chest  LIVER/BILIARY TREE:  Unchanged enhancing left hepatic lesion #2/22  Other hypodense, nonenhancing lesions scattered throughout the liver are unchanged  Reidentified prominently left-sided pneumobilia  A CBD stent is in place  GALLBLADDER:  Percutaneous cholecystostomy tube remains in place  SPLEEN:  Unremarkable  PANCREAS:  Enlarged pancreatic head with a vague pancreatic head mass measuring approximately 32 x 22 mm unchanged in appearance from the prior study  The margins are not well-defined  Unchanged pancreatic ductal dilation  ADRENAL GLANDS:  Unremarkable  KIDNEYS/URETERS:  No hydronephrosis  Unchanged renal cysts  STOMACH AND BOWEL:  No bowel obstruction  Much improved wall thickening and enhancement of the pylorus and duodenum  Fluid surrounding the proximal duodenum is persistent but improved in quantity  APPENDIX:  Noninflamed appendix  ABDOMINOPELVIC CAVITY:  No ascites  No pneumoperitoneum  No lymphadenopathy  As described above, fluid surrounding the pylorus and duodenum is persistent but improved  There is a collection measuring 6 2 x 2 8 x 3 2 cm,  #2/30 #601/43 located lateral to the first portion of the duodenum with a small focus of internal gas  New edema throughout the right mid abdominal omentum #2/41 possibly reactive to the adjacent collection described above  No discrete omental mass  VESSELS:  Unremarkable for patient's age  PELVIS REPRODUCTIVE ORGANS:  Prostamegaly  URINARY BLADDER:  Unremarkable  ABDOMINAL WALL/INGUINAL REGIONS:  Unremarkable  OSSEOUS STRUCTURES:  No acute fracture or destructive osseous lesion  Impression: Unchanged pancreatic head mass  Unchanged 28 mm enhancing left hepatic lesion  Fluid surrounding the pylorus and duodenum is persistent but improved  There is a collection measuring 6 2 x 2 8 x 3 2 cm ,#2/30 #601/43  located lateral to the first portion of the duodenum with a small focus of internal gas  New edema throughout the right mid abdominal omentum #2/41 possibly reactive to the adjacent collection described above  No discrete omental mass  The study was marked in [facility] for immediate notification  Workstation performed: [id number]     IR cholecystostomy tube check/change/reposition/reinsertion/upsize    Result Date: 6/23/2021  Narrative: Drainage catheter evaluation Indication: Cholecystostomy tube placed May 11, 2021  Follow-up  Procedure: The cholecystostomy drainage catheter was hand injected with contrast and fluoroscopic spot images were obtained  The drain was capped  Contrast: 5 cc Omnipaque-300 Fluoroscopy time: 0 7 minutes Images: 6 Findings: The catheter remains in satisfactory position   Cystic duct is patent  Small amount of debris is seen in the common duct but there is no significant dilatation  Common bile duct stent is patent and there is drainage into the duodenum  No stones are definitively seen by fluoroscopy  Drain was capped  Impression: Impression: Patent cystic duct  Drain was capped  Plan: Patient may undergo cholecystectomy along with surgical tumor resection at some point though he is currently undergoing chemotherapy  Will discuss with surgical oncology whether we should consider removing his cholecystostomy tube in the interim if  he tolerates capping trial  Workstation performed: [id number]                 ** Please Note: This note is constructed using a voice recognition dictation system  An occasional wrong word/phrase or sound-a-like substitution may have been picked up by dictation device due to the inherent limitations of voice recognition software  Read the chart carefully and recognize, using reasonable context, where substitutions may have occurred  **

## 2021-07-13 NOTE — ASSESSMENT & PLAN NOTE
Lab Results   Component Value Date    HGBA1C 6 2 (H) 05/05/2021     Continue basal/ prandial insulin with additional SSI coverage per Accu-Cheks  Carbohydrate restricted diet  On Gabapentin for neuropathy

## 2021-07-13 NOTE — PROGRESS NOTES
Vancomycin Assessment    Malick Pillai is a 73 y o  male who is currently receiving vancomycin 15mg/kg/dose q12h for other sepsis   Relevant clinical data and objective history reviewed:  Creatinine   Date Value Ref Range Status   07/13/2021 0 88 0 60 - 1 30 mg/dL Final     Comment:     Standardized to IDMS reference method   07/06/2021 0 84 0 50 - 1 20 mg/dL Final     Comment:     Standardized to IDMS reference method   06/28/2021 0 65 (L) 0 76 - 1 27 mg/dL Final   06/21/2021 0 50 (L) 0 60 - 1 30 mg/dL Final     Comment:     Standardized to IDMS reference method   09/01/2017 1 11 0 76 - 1 27 mg/dL Final   01/03/2017 0 94 0 76 - 1 27 mg/dL Final   11/26/2016 0 96 0 76 - 1 27 mg/dL Final     /60 (BP Location: Right arm)   Pulse 94   Temp 97 6 °F (36 4 °C) (Oral)   Resp 20   Ht 5' 9" (1 753 m)   Wt 76 4 kg (168 lb 6 9 oz)   SpO2 91%   BMI 24 87 kg/m²   No intake/output data recorded  Lab Results   Component Value Date/Time    BUN 23 07/13/2021 09:47 AM    BUN 17 06/28/2021 09:46 AM    WBC 3 07 (L) 07/13/2021 09:46 AM    WBC 7 8 09/01/2017 07:17 AM    HGB 10 5 (L) 07/13/2021 09:46 AM    HGB 15 0 09/01/2017 07:17 AM    HCT 32 4 (L) 07/13/2021 09:46 AM    HCT 43 0 09/01/2017 07:17 AM    MCV 91 07/13/2021 09:46 AM    MCV 94 09/01/2017 07:17 AM     (L) 07/13/2021 09:46 AM     09/01/2017 07:17 AM     Temp Readings from Last 3 Encounters:   07/13/21 97 6 °F (36 4 °C) (Oral)   07/08/21 97 8 °F (36 6 °C) (Temporal)   07/06/21 (!) 97 4 °F (36 3 °C) (Temporal)     Vancomycin Days of Therapy: 1    Assessment/Plan  The patient is currently on vancomycin utilizing scheduled dosing based on actual body weight  Baseline risks associated with therapy include: concomitant nephrotoxic medications and advanced age  The patient is currently receiving 15mg/kg/dose q12h and after clinical evaluation will be changed to 17 5mg/kg/dose q12h    Pharmacy will also follow closely for s/sx of nephrotoxicity, infusion reactions, and appropriateness of therapy  BMP and CBC will be ordered per protocol  Plan for trough as patient approaches steady state, prior to the 5th  dose at approximately 10am on 07/15  Due to infection severity, will target a trough of 15-20 (appropriate for most indications)   Pharmacy will continue to follow the patients culture results and clinical progress daily      Marquez Cao, Pharmacist

## 2021-07-13 NOTE — ASSESSMENT & PLAN NOTE
Hospitalized in May 2021 s/p cholecystostomy tube placement on 5/11/21 - completed a course of antibiotics at the time  PRN pain control - supportive care otherwise

## 2021-07-13 NOTE — CONSULTS
Consultation - Infectious Disease   Estrella Miranda 73 y o  male MRN: [id number]  Unit/Bed#: S -01 Encounter: [id number]      IMPRESSION & RECOMMENDATIONS:   1  Sepsis  POA with high fever and tachycardia  WBC 3 07, ANC 2670 at present  Admission blood cultures pending  S/p chemo for #2 5 days ago  Patient reports flushing Perc Cholecystomy 7/12/21 am without issue   -Empiric Vancomycin/Cefepime on board   -Deescalate to Unasyn 3 g IV q 6 hours  -follow-up cultures and adjust antibiotics as needed  -monitor temperature and hemodynamics  -serial exam  -recheck CBC and BMP in a m  2  Recently diagnosed pancreatic cancer  Diagnosed by EUS May 2021  Patient received chemotherapy 7/8/21 with D8 C2 Abraxane and Gemzar via Port  CT showing 2 liver lesions concerning for metastatic disease  -antibiotic as above  -monitor temperature and hemodynamics  -serial exam  -close oncology follow-up ongoing  -recheck CBC and BMP      3  Recent Acute cholecystitis  In setting of # 2/4  Status post cholecystostomy 5/12  Cultures with Enterococcus and patient completed Augmentin through 5/19/21 for 7 days total of enterococcal coverage     4   Recent Biliary obstruction  Due to #2  Status post recent ERCP, CBD stent 4/28  LFTs remain normal  CT with metallic CBD stent and Perc Cholecytostomy tube in place  2 new lesions on liver    5  Port placed 6/1/21 for chemo    -antibiotics as above  -follow up blood cultures  Antibiotics:  Vancomycin/Cefepime D1    I have discussed the above management plan in detail with patient, RN, and the primary service  Extensive review of the medical records in epic including review of the notes, radiographs, and laboratory results     HISTORY OF PRESENT ILLNESS:  Reason for Consult: Sepsis    HPI: Estrella Miranda is a 73y o  year old male recently diagnosed pancreatic cancer May 2021, biliary obstruction s/p metallic CBD stent and cholecystostomy tube placement 5/12/21    GB fluid grew Enterococcas faecalis and patient completed a 1 week course of Augmentin through 5/19/21  Today patient presented to the ER with fever and rigors starting this am  He spiked to 102 7 in the ER with tachycardia  CT A/P with contrast shows 2 hepatic lesions and stable pancreatic head mass  Blood cultures were obtained and patient admitted on Vancomycin/Cefepime  Infectious Disease consultation is now being requested regarding evaluation and management of sepsis  Patient reports flushing capped Perc Cholecystomy 7/12/21 am without issue  This has been done weekly since capped 6/22/21  Port has been used weekly for chemo since 6/3/21  Patient reports lower abdominal heaviness, pressure comes and goes  No N/V/D, no dysuria, CP, cough, SOB  REVIEW OF SYSTEMS:  A complete review of systems is negative other than that noted in the HPI      PAST MEDICAL HISTORY:  Past Medical History:   Diagnosis Date    Anxiety     Arthritis     Cancer ([facility] )     pancreatic    Cellulitis     LAST ASSESSED: 6/13/14    Enlarged prostate     Epidermal inclusion cyst     LAST ASSESSED: 10/4/13    Erythrasma     LAST ASSESSED: 9/23/13    Furuncle     LAST ASSESSED: 6/11/14    GERD (gastroesophageal reflux disease)     Hyperlipidemia     Hypertension     RA (rheumatoid arthritis) ([facility] )      Past Surgical History:   Procedure Laterality Date    COLONOSCOPY      FL GUIDED CENTRAL VENOUS ACCESS DEVICE INSERTION  6/1/2021    HYDROCELE EXCISION / REPAIR Right 01/11/2018    SPERMATIC CORD EXCSION OF HYDROCELE; MANAGED BY: Madan Voss    IR CHOLECYSTOSTOMY TUBE CHECK/CHANGE/REPOSITION/REINSERTION/UPSIZE  6/22/2021    IR CHOLECYSTOSTOMY TUBE PLACEMENT  5/11/2021    [place] REMOVAL OF HYDROCELE,TUNICA,UNILAT Right 1/11/2018    Procedure: HYDROCELECTOMY;  Surgeon: Sapphire Xiong MD;  Location: [facility];  Service: Urology    SCROTAL SURGERY      benign "lump"    TUNNELED VENOUS PORT PLACEMENT Left 6/1/2021    Procedure: INSERTION VENOUS PORT (PORT-A-CATH); Surgeon: Yue Brock MD;  Location: [facility];  Service: Surgical Oncology       FAMILY HISTORY:  Non-contributory    SOCIAL HISTORY:  Social History   Social History     Substance and Sexual Activity   Alcohol Use Never     Social History     Substance and Sexual Activity   Drug Use Never     Social History     Tobacco Use   Smoking Status Former Smoker   Smokeless Tobacco Never Used   Tobacco Comment    tobacco in a pipe in the early 's       ALLERGIES:  Allergies   Allergen Reactions    Fentanyl Anaphylaxis and Other (See Comments)     Oxygen drops severely       MEDICATIONS:  All current active medications have been reviewed  PHYSICAL EXAM:  Temp:  [97 4 °F (36 3 °C)-102 7 °F (39 3 °C)] 97 4 °F (36 3 °C)  HR:  [] 69  Resp:  [16-20] 18  BP: ()/(55-63) 98/60  SpO2:  [91 %-96 %] 94 %  Temp (24hrs), Av 2 °F (37 3 °C), Min:97 4 °F (36 3 °C), Max:102 7 °F (39 3 °C)  Current: Temperature: (!) 97 4 °F (36 3 °C)    Intake/Output Summary (Last 24 hours) at 2021 1600  Last data filed at 2021 1139  Gross per 24 hour   Intake 2000 ml   Output --   Net 2000 ml       General Appearance:  73year old male, pleasant, resting in bed, sluggish, but in no distress   Head:  Normocephalic, without obvious abnormality, atraumatic   Eyes:  Conjunctiva pink and sclera anicteric, both eyes   Nose: Nares normal, mucosa normal, no drainage   Throat: Oropharynx moist without lesions   Neck: Supple, symmetrical, no adenopathy, no tenderness/mass/nodules   Back:   Symmetric, no curvature, ROM normal, no CVA tenderness   Lungs:   Clear to auscultation bilaterally, respirations unlabored   Chest Wall:  No tenderness or deformity   Left chest wall Port accessed and nontender   Heart:  RRR; no murmur, rub or gallop   Abdomen:   Soft, no focal tenderness at RUQ capped perc cholecystostomy tube, no leaking or erythema, positive bowel sounds, no palpable generalized abd tenderness   Extremities: No cyanosis, clubbing or edema, scratches of legs  Skin: No rashes or lesions  No draining wounds noted  Lymph nodes: Cervical, supraclavicular nodes normal   Neurologic: Alert and oriented times 3, extremity strength 5/5 and symmetric       LABS, IMAGING, & OTHER STUDIES:  Lab Results:  I have personally reviewed pertinent labs  Results from last 7 days   Lab Units 07/13/21  0946   WBC Thousand/uL 3 07*   HEMOGLOBIN g/dL 10 5*   PLATELETS Thousands/uL 107*     Results from last 7 days   Lab Units 07/13/21  0947   SODIUM mmol/L 140   POTASSIUM mmol/L 3 1*   CHLORIDE mmol/L 106   CO2 mmol/L 23   BUN mg/dL 23   CREATININE mg/dL 0 88   EGFR ml/min/1 73sq m 90   CALCIUM mg/dL 7 3*   AST U/L 18   ALT U/L 34   ALK PHOS U/L 112         Results from last 7 days   Lab Units 07/13/21  1003   PROCALCITONIN ng/ml 1 50*                   Imaging Studies:   7/13/21 CT A/P with contrast compared to 6/14/21 film: no significant change in pancreatic head neoplasm size measuring 5 1 x 2 7 cm  New right and left hepatic lobe lesions concerning for metastatic disease  7/13/21 PCXR: clear lungs    Other Studies:   I have personally reviewed pertinent reports

## 2021-07-13 NOTE — ASSESSMENT & PLAN NOTE
Multifactorial secondary to sepsis coupled with metastatic cancer (see plan for individual assessments)  PT/OT as tolerated

## 2021-07-13 NOTE — ASSESSMENT & PLAN NOTE
Follows with medical/surgical oncology - s/p a cycle of neoadjuvant chemotherapy (Gemcitabine/Abraxane) last week  Known to palliative care -> consider inpatient consultation of pain uncontrolled  CT imaging in the ED today noting "New 3 5 x 1 8 cm low-attenuating lesion in the right hepatic lobe and increased size of a 1 9 x 1 3 cm low-attenuating lesion in the left hepatic lobe, concerning for metastatic disease  Stable pancreatic head neoplasm   Resolution of the previously reported paraduodenal collection and omental edema/infiltration " -> will appreciate medical oncology evaluation concerning new findings

## 2021-07-13 NOTE — TELEPHONE ENCOUNTER
I called and spoke with the son  He explained to me that he wanted to be a extra pair of ears and has some questions in regards to his dad's treatment and health  I advised him that it would be beneficial to have the patient call him while in the [facility] so that he can hear everything and ask the questions that he needs to ask  Usman Rouse agreed and said he would talk to his dad to make this happen  The patient is currently admitted but has an appointment with Dr Savana Krishnamurthy next week on 7/22

## 2021-07-13 NOTE — ASSESSMENT & PLAN NOTE
Presents with a high-grade fever coupled with tachycardia/leukopenia - lactic acid normal and procalcitonin pending  Urinalysis negative for infectious etiology - blood cultures collected in the ED - CXR negative for acute cardiopulmonary etiology - CT of abdomen/pelvis without obvious source of infection  Notably, patient has a cholecystostomy tube in place from May 2021 for cholecystitis at the time s/p antibiotic course - also has a left chest wall Chemoport in place  Given empiric IV Vancomycin/Cefepime in the ED -> Cefepime transitioned to Unasyn per Infectious Disease due to recent cultures a few months back (grew Enterococcus faecalis at the time)  Discussed with Infectious Disease -> consider translocation into the GI tract from recent chemotherapy and/or new metastatic sites in liver  Monitor vitals and maintain hemodynamics - continue IV fluids

## 2021-07-13 NOTE — ED PROVIDER NOTES
History  Chief Complaint   Patient presents with    Fever - 9 weeks to 74 years     per pt started with fever last night       History provided by:  Patient and EMS personnel  Fever - 9 weeks to 74 years  Max temp prior to arrival:  102  Temp source:  Oral  Severity:  Moderate  Onset quality:  Gradual  Duration:  1 day  Timing:  Constant  Progression:  Worsening  Chronicity:  New  Relieved by:  None tried  Worsened by:  Nothing  Ineffective treatments:  None tried  Associated symptoms: chills    Associated symptoms: no chest pain, no confusion, no congestion, no cough, no diarrhea, no dysuria, no headaches, no nausea, no rash, no sore throat and no vomiting    Associated symptoms comment:  Abdominal pain, generalized worse in the upper quadrants  Risk factors comment:  Pancreatic cancer      Prior to Admission Medications   Prescriptions Last Dose Informant Patient Reported? Taking? Alcohol Swabs 70 % PADS  Self No No   Sig: May substitute brand based on insurance coverage  Check glucose ACHS  Blood Glucose Monitoring Suppl (ONE TOUCH ULTRA 2) w/Device KIT  Self No Yes   Sig: Use daily   FLUoxetine (PROzac) 20 mg capsule  Self No Yes   Sig: TAKE 1 CAPSULE BY MOUTH EVERY DAY  Insulin Pen Needle (BD Pen Needle Cinda 2nd Gen) 32G X 4 MM MISC  Self No Yes   Sig: For use with insulin pen  Pharmacy may dispense brand covered by insurance  Insulin Pen Needle (BD Pen Needle Cinda 2nd Gen) 32G X 4 MM MISC  Self No Yes   Sig: For use with insulin pen  Pharmacy may dispense brand covered by insurance     Lancets (onetouch ultrasoft) lancets  Self No Yes   Sig: Use as instructed once a day   Sodium Chloride Flush (Normal Saline Flush) 0 9 % SOLN  Self Yes No   Sig: Gall bladder drain   aspirin 81 mg chewable tablet  Self Yes Yes   Sig: Chew 81 mg   diazepam (VALIUM) 2 mg tablet  Self No Yes   Sig: Take 1 tablet (2 mg total) by mouth daily as needed (hiccups)   folic acid (FOLVITE) 1 mg tablet  Self Yes Yes   Sig: Take by mouth daily   gabapentin (NEURONTIN) 100 mg capsule  Self No Yes   Sig: Take 2 capsules (200 mg total) by mouth 3 (three) times a day   glucose blood (OneTouch Ultra) test strip  Self No Yes   Sig: Use as instructed once a day   insulin glargine (Lantus SoloStar) 100 units/mL injection pen  Self No Yes   Sig: Inject 20 Units under the skin daily   insulin lispro (HumaLOG KwikPen) 100 units/mL injection pen  Self No Yes   Sig: Inject 10 Units under the skin 3 (three) times a day with meals   lisinopril-hydrochlorothiazide (PRINZIDE,ZESTORETIC) 10-12 5 MG per tablet  Self No No   Sig: Take 1 tablet by mouth 3 (three) times a week Monday Wednesday Friday   oxyCODONE (ROXICODONE) 5 mg immediate release tablet  Self No Yes   Sig: Take 1 tablet (5 mg total) by mouth every 4 (four) hours as needed (cancer pain)Max Daily Amount: 30 mg   pancrelipase, Lip-Prot-Amyl, (CREON) 24,000 units  Self No Yes   Sig: Take 1 capsule (24,000 Units total) by mouth 3 (three) times a day with meals   pantoprazole (PROTONIX) 40 mg tablet   No Yes   Sig: Take 1 tablet (40 mg total) by mouth 2 (two) times a day Before breakfast, before bed, to reduce stomach acid   predniSONE 20 mg tablet  Self No Yes   Sig: Take 1 tablet (20 mg total) by mouth daily Take 20 mg by mouth every morning and 10 mg by mouth every afternoon after lunch   Patient taking differently: Take 20 mg by mouth daily Take 20 mg by mouth every morning   predniSONE 5 mg tablet   No Yes   Sig: Take 1 tablet (5 mg total) by mouth daily Take with 20mg prednisone in morning as needed for hiccups  Max dose 25mg/day     prochlorperazine (COMPAZINE) 10 mg tablet  Self No Yes   Sig: Take 1 tablet (10 mg total) by mouth every 6 (six) hours as needed for nausea or vomiting   senna (SENOKOT) 8 6 MG tablet   No Yes   Sig: Take 1-2 tablets (8 6-17 2 mg total) by mouth daily as needed for constipation      Facility-Administered Medications: None       Past Medical History:   Diagnosis Date  Anxiety     Arthritis     Cancer ([place] [address] )     pancreatic    Cellulitis     LAST ASSESSED: 6/13/14    Enlarged prostate     Epidermal inclusion cyst     LAST ASSESSED: 10/4/13    Erythrasma     LAST ASSESSED: 9/23/13    Furuncle     LAST ASSESSED: 6/11/14    GERD (gastroesophageal reflux disease)     Hyperlipidemia     Hypertension     RA [facility] arthritis) ([facility] )        Past Surgical History:   Procedure Laterality Date    COLONOSCOPY      FL GUIDED CENTRAL VENOUS ACCESS DEVICE INSERTION  6/1/2021    HYDROCELE EXCISION / REPAIR Right 01/11/2018    SPERMATIC CORD EXCSION OF HYDROCELE; MANAGED BY: Madan Voss    IR CHOLECYSTOSTOMY TUBE CHECK/CHANGE/REPOSITION/REINSERTION/UPSIZE  6/22/2021    IR CHOLECYSTOSTOMY TUBE PLACEMENT  5/11/2021    [place] REMOVAL OF HYDROCELE,TUNICA,UNILAT Right 1/11/2018    Procedure: HYDROCELECTOMY;  Surgeon: Sapphire Xiong MD;  Location: [facility];  Service: Urology    SCROTAL SURGERY      benign "lump"    TUNNELED VENOUS PORT PLACEMENT Left 6/1/2021    Procedure: INSERTION VENOUS PORT (PORT-A-CATH); Surgeon: Gillian Estrella MD;  Location: [facility];  Service: Surgical Oncology       Family History   Problem Relation Age of Onset    Heart disease Father         CARDIAC DISORDER    Hypertension Father     Hypertension Mother     No Known Problems Maternal Aunt     No Known Problems Maternal Uncle     No Known Problems Paternal Aunt     No Known Problems Paternal Uncle     No Known Problems Paternal Grandmother     No Known Problems Paternal Grandfather     Diabetes Maternal Grandmother         MELLITUS    No Known Problems Maternal Grandfather     Hypertension Other      I have reviewed and agree with the history as documented      E-Cigarette/Vaping    E-Cigarette Use Never User      E-Cigarette/Vaping Substances    Nicotine No     THC No     CBD No      Social History     Tobacco Use    Smoking status: Former Smoker    Smokeless tobacco: Never Used    Tobacco comment: tobacco in a pipe in the early 1980's   Vaping Use    Vaping Use: Never used   Substance Use Topics    Alcohol use: Never    Drug use: Never       Review of Systems   Constitutional: Positive for activity change, chills and fever  Negative for diaphoresis  HENT: Negative for congestion, sinus pressure and sore throat  Eyes: Negative for pain and visual disturbance  Respiratory: Negative for cough, chest tightness, shortness of breath, wheezing and stridor  Cardiovascular: Negative for chest pain and palpitations  Gastrointestinal: Negative for abdominal distention, abdominal pain, constipation, diarrhea, nausea and vomiting  Genitourinary: Negative for dysuria and frequency  Musculoskeletal: Negative for neck pain and neck stiffness  Skin: Negative for rash  Neurological: Negative for dizziness, speech difficulty, light-headedness, numbness and headaches  Psychiatric/Behavioral: Negative for confusion  Physical Exam  Physical Exam  Vitals reviewed  Constitutional:       General: He is in acute distress  Appearance: He is well-developed  He is not diaphoretic  HENT:      Head: Normocephalic and atraumatic  Right Ear: External ear normal       Left Ear: External ear normal       Nose: Nose normal    Eyes:      General:         Right eye: No discharge  Left eye: No discharge  Pupils: Pupils are equal, round, and reactive to light  Neck:      Trachea: No tracheal deviation  Cardiovascular:      Rate and Rhythm: Normal rate and regular rhythm  Heart sounds: Normal heart sounds  No murmur heard  Pulmonary:      Effort: Pulmonary effort is normal  No respiratory distress  Breath sounds: Normal breath sounds  No stridor  Abdominal:      General: There is no distension  Palpations: Abdomen is soft  Tenderness: There is abdominal tenderness  There is no guarding or rebound        Comments: Cholecystostomy tube Musculoskeletal:         General: Normal range of motion  Cervical back: Normal range of motion and neck supple  Skin:     General: Skin is warm and dry  Coloration: Skin is not pale  Findings: No erythema  Neurological:      General: No focal deficit present  Mental Status: He is alert and oriented to person, place, and time           Vital Signs  ED Triage Vitals   Temperature Pulse Respirations Blood Pressure SpO2   07/13/21 0943 07/13/21 0943 07/13/21 0943 07/13/21 0943 07/13/21 0943   (!) 102 7 °F (39 3 °C) 63 16 105/55 94 %      Temp Source Heart Rate Source Patient Position - Orthostatic VS BP Location FiO2 (%)   07/13/21 0943 07/13/21 1045 07/13/21 1045 07/13/21 1045 --   Oral Monitor Lying Right arm       Pain Score       07/13/21 0943       3           Vitals:    07/13/21 1045 07/13/21 1100 07/13/21 1130 07/13/21 1215   BP: 125/58 104/59 129/63 123/60   Pulse: (!) 114 (!) 120 (!) 106 104   Patient Position - Orthostatic VS: Lying  Lying          Visual Acuity      ED Medications  Medications   sodium chloride 0 9 % bolus 1,000 mL (0 mL Intravenous Stopped 7/13/21 1139)   cefepime (MAXIPIME) 2 g/50 mL dextrose IVPB (0 mg Intravenous Stopped 7/13/21 1051)   vancomycin (VANCOCIN) 1500 mg in sodium chloride 0 9% 250 mL IVPB (1,500 mg Intravenous New Bag 7/13/21 1027)   sodium chloride 0 9 % bolus 1,000 mL (0 mL Intravenous Stopped 7/13/21 1032)   lactated ringers bolus 1,000 mL (1,000 mL Intravenous New Bag 7/13/21 1139)   acetaminophen (TYLENOL) tablet 975 mg (975 mg Oral Given 7/13/21 1004)   morphine (PF) 10 mg/mL injection 6 mg (6 mg Intravenous Given 7/13/21 1006)   iohexol (OMNIPAQUE) 350 MG/ML injection (SINGLE-DOSE) 100 mL (100 mL Intravenous Given 7/13/21 1111)       Diagnostic Studies  Results Reviewed     Procedure Component Value Units Date/Time    Urine Microscopic [[id number]]  (Normal) Collected: 07/13/21 1102    Lab Status: Final result Specimen: Urine, Clean Catch Updated: 07/13/21 1152     RBC, UA 0-1 /hpf      WBC, UA 0-1 /hpf      Epithelial Cells Occasional /hpf      Bacteria, UA Occasional /hpf     Manual Differential(PHLEBS Do Not Order) [[id number]]  (Abnormal) Collected: 07/13/21 0946    Lab Status: Final result Specimen: Blood from Arm, Left Updated: 07/13/21 1128     Segmented % 71 %      Bands % 16 %      Lymphocytes % 12 %      Monocytes % 0 %      Eosinophils, % 1 %      Basophils % 0 %      Absolute Neutrophils 2 67 Thousand/uL      Lymphocytes Absolute 0 37 Thousand/uL      Monocytes Absolute 0 00 Thousand/uL      Eosinophils Absolute 0 03 Thousand/uL      Basophils Absolute 0 00 Thousand/uL      Total Counted 100     Anisocytosis Present     Hypochromia Present     Polychromasia Present     Platelet Estimate Borderline    CBC and differential [[id number]]  (Abnormal) Collected: 07/13/21 0946    Lab Status: Final result Specimen: Blood from Arm, Left Updated: 07/13/21 1128     WBC 3 07 Thousand/uL      RBC 3 56 Million/uL      Hemoglobin 10 5 g/dL      Hematocrit 32 4 %      MCV 91 fL      MCH 29 5 pg      MCHC 32 4 g/dL      RDW 16 5 %      MPV 10 2 fL      Platelets 449 Thousands/uL      nRBC 2 /100 WBCs     Narrative: This is an appended report  These results have been appended to a previously verified report      UA w Reflex to Microscopic w Reflex to Culture [[id number]]  (Abnormal) Collected: 07/13/21 1102    Lab Status: Final result Specimen: Urine, Clean Catch Updated: 07/13/21 1120     Color, UA Light Yellow     Clarity, UA Clear     Specific Gravity, UA 1 015     pH, UA 6 0     Leukocytes, UA Negative     Nitrite, UA Negative     Protein, UA Negative mg/dl      Glucose, UA Negative mg/dl      Ketones, UA Negative mg/dl      Urobilinogen, UA 0 2 E U /dl      Bilirubin, UA Negative     Blood, UA Trace-Intact    Protime-INR [[id number]]  (Abnormal) Collected: 07/13/21 0947    Lab Status: Final result Specimen: Blood from Arm, Left Updated: 07/13/21 1053 Protime 15 8 seconds      INR 1 25    APTT [[id number]]  (Normal) Collected: 07/13/21 0947    Lab Status: Final result Specimen: Blood from Arm, Left Updated: 07/13/21 1053     PTT 25 seconds     Lactic acid [[id number]]  (Normal) Collected: 07/13/21 0947    Lab Status: Final result Specimen: Blood from Arm, Left Updated: 07/13/21 1048     LACTIC ACID 1 8 mmol/L     Narrative:      Result may be elevated if tourniquet was used during collection  Comprehensive metabolic panel [[id number]]  (Abnormal) Collected: 07/13/21 0947    Lab Status: Final result Specimen: Blood from Arm, Left Updated: 07/13/21 1035     Sodium 140 mmol/L      Potassium 3 1 mmol/L      Chloride 106 mmol/L      CO2 23 mmol/L      ANION GAP 11 mmol/L      BUN 23 mg/dL      Creatinine 0 88 mg/dL      Glucose 77 mg/dL      Calcium 7 3 mg/dL      Corrected Calcium 8 5 mg/dL      AST 18 U/L      ALT 34 U/L      Alkaline Phosphatase 112 U/L      Total Protein 5 8 g/dL      Albumin 2 5 g/dL      Total Bilirubin 0 66 mg/dL      eGFR 90 ml/min/1 73sq m     Narrative:      [place] guidelines for Chronic Kidney Disease (CKD):     Stage 1 with normal or high GFR (GFR > 90 mL/min/1 73 square meters)    Stage 2 Mild CKD (GFR = 60-89 mL/min/1 73 square meters)    Stage 3A Moderate CKD (GFR = 45-59 mL/min/1 73 square meters)    Stage 3B Moderate CKD (GFR = 30-44 mL/min/1 73 square meters)    Stage 4 Severe CKD (GFR = 15-29 mL/min/1 73 square meters)    Stage 5 End Stage CKD (GFR <15 mL/min/1 73 square meters)  Note: GFR calculation is accurate only with a steady state creatinine    Troponin I [[id number]]  (Normal) Collected: 07/13/21 0947    Lab Status: Final result Specimen: Blood from Arm, Left Updated: 07/13/21 1034     Troponin I <0 02 ng/mL     Procalcitonin with AM Reflex [[id number]] Collected: 07/13/21 1003    Lab Status:  In process Specimen: Blood from Arm, Left Updated: 07/13/21 1011    Blood culture #2 [[id number]] Collected: 07/13/21 0947    Lab Status: In process Specimen: Blood from Arm, Right Updated: 07/13/21 1006    Blood culture #1 [[id number]] Collected: 07/13/21 0947    Lab Status: In process Specimen: Blood from Arm, Left Updated: 07/13/21 1006                 CT abdomen pelvis with contrast   Final Result by Nubia Goodwin MD (07/13 1246)      No definite imaging explanation for acute epigastric/abdominal pain  New 3 5 x 1 8 cm low-attenuating lesion in the right hepatic lobe and increased size of a 1 9 x 1 3 cm low-attenuating lesion in the left hepatic lobe, concerning for metastatic disease  Stable pancreatic head neoplasm  Resolution of the previously reported paraduodenal collection and omental edema/infiltration  Workstation performed: [id number]         XR chest 1 view portable   ED Interpretation by Kareen Washington DO (07/13 1023)   No acute cardiopulmonary pathology                 Procedures  Procedures         ED Course                         Initial Sepsis Screening     Row Name 07/13/21 1254 07/13/21 0949             Is the patient's history suggestive of a new or worsening infection? (!) Yes (Proceed)  -DA  (!) Yes (Proceed)  -DA       Suspected source of infection  suspect infection, source unknown  -DA  acute abdominal infection  -DA       Are two or more of the following signs & symptoms of infection both present and new to the patient? (!) Yes (Proceed)  -DA  (!) Yes (Proceed)  -DA       Indicate SIRS criteria  Hyperthemia > 38 3C (100 9F); Tachycardia > 90 bpm  -DA  Hyperthemia > 38 3C (100 9F); Tachycardia > 90 bpm  -DA       If the answer is yes to both questions, suspicion of sepsis is present  --  --       If severe sepsis is present AND tissue hypoperfusion perists in the hour after fluid resuscitation or lactate > 4, the patient meets criteria for SEPTIC SHOCK  --  --       Are any of the following organ dysfunction criteria present within 6 hours of suspected infection and SIRS criteria that are NOT considered to be chronic conditions? No  -DA  --       Organ dysfunction  --  --       Date of presentation of severe sepsis  --  --       Time of presentation of severe sepsis  --  --       Tissue hypoperfusion persists in the hour after crystalloid fluid administration, evidenced, by either:  --  --       Was hypotension present within one hour of the conclusion of crystalloid fluid administration?  --  --       Date of presentation of septic shock  --  --       Time of presentation of septic shock  --  --         User Key  (r) = Recorded By, (t) = Taken By, (c) = Cosigned By    Initials Name Provider Type    GILBERTO Pope DO Physician          SBIRT 20yo+      Most Recent Value   SBIRT (25 yo +)   In order to provide better care to our patients, we are screening all of our patients for alcohol and drug use  Would it be okay to ask you these screening questions? Yes Filed at: 07/13/2021 1033   Initial Alcohol Screen: US AUDIT-C    1  How often do you have a drink containing alcohol?  0 Filed at: 07/13/2021 1033   2  How many drinks containing alcohol do you have on a typical day you are drinking? 0 Filed at: 07/13/2021 1033   3b  FEMALE Any Age, or MALE 65+: How often do you have 4 or more drinks on one occassion? 0 Filed at: 07/13/2021 1033   Audit-C Score  0 Filed at: 07/13/2021 1033   RITO: How many times in the past year have you    Used an illegal drug or used a prescription medication for non-medical reasons?   Never Filed at: 07/13/2021 1033                    MDM  Number of Diagnoses or Management Options  Ambulatory dysfunction: new and requires workup  Generalized weakness: new and requires workup  Malignant neoplasm of pancreas, unspecified location of malignancy [facility]): new and requires workup  SIRS (systemic inflammatory response syndrome) ([facility] [address] ): new and requires workup  Diagnosis management comments:       Initial ED assessment:  28-year-old male presents with fever, abdominal pain, history of pancreatic cancer    Initial DDx includes but is not limited to:   Pancreatitis, cholecystitis, choledocholithiasis, colitis, UTI, pneumonia    Initial ED plan:   Blood work, CT, IV fluids, IV antibiotics, likely admission  , patient's blood pressure is on the lower side, 105/55 initially, will access patient's port case patient need central access for vasopressors if needed  Final ED summary/disposition:   After evaluation and workup in the emergency department, no clear source of infection  , will admit to hospital further workup evaluation         Amount and/or Complexity of Data Reviewed  Clinical lab tests: ordered and reviewed  Tests in the radiology section of CPT®: ordered and reviewed  Decide to obtain previous medical records or to obtain history from someone other than the patient: yes  Obtain history from someone other than the patient: yes  Review and summarize past medical records: yes  Independent visualization of images, tracings, or specimens: yes        Disposition  Final diagnoses:   SIRS (systemic inflammatory response syndrome) ([facility] [place] [address] )   Malignant neoplasm of pancreas, unspecified location of malignancy ([facility] [place] [address] )   Generalized weakness   Ambulatory dysfunction     Time reflects when diagnosis was documented in both MDM as applicable and the Disposition within this note     Time User Action Codes Description Comment    7/13/2021 12:55 PM Justin OSWALD Add [R65 10] SIRS (systemic inflammatory response syndrome) ([facility] [place] [address] )     7/13/2021 12:55 PM Josiah Stock Add [C25 9] Malignant neoplasm of pancreas, unspecified location of malignancy ([facility] [place] [address] )     7/13/2021 12:55 PM Justin OSWALD Add [R53 1] Generalized weakness     7/13/2021 12:55 PM Josiah Stock Add [R26 2] Ambulatory dysfunction       ED Disposition     ED Disposition Condition Date/Time Comment    Admit Stable Tue Jul 13, 2021 12:55 PM Case was discussed with Dr Georgina Stone and the patient's admission status was agreed to be Admission Status: inpatient status to the service of Dr Anai Chapman   Follow-up Information    None         Patient's Medications   Discharge Prescriptions    No medications on file     No discharge procedures on file      PDMP Review       Value Time User    PDMP Reviewed  Yes 6/21/2021  2:46 PM David Kruger PA-C          ED Provider  Electronically Signed by           Thierry Ayala DO  07/13/21 4511

## 2021-07-14 PROBLEM — E11.649 TYPE 2 DIABETES MELLITUS WITH HYPOGLYCEMIA (HCC): Status: ACTIVE | Noted: 2021-03-16

## 2021-07-14 NOTE — PROGRESS NOTES
[facility]  Progress Note - Bryon Ala 1955, 73 y o  male MRN: [id number]  Unit/Bed#: S -59 Encounter: [id number]  Primary Care Provider: Burt Bustos MD   Date and time admitted to hospital: 7/13/2021  9:40 AM    * Sepsis of unknown etiology  Assessment & Plan  · Presented with a high-grade fever coupled with tachycardia/leukopenia - lactic acid normal and procalcitonin elevated at 2 72  · Urinalysis negative for infectious etiology - blood cultures collected in the ED pending - CXR negative for acute cardiopulmonary etiology - CT of abdomen/pelvis without obvious source of infection  · Notably, patient has a cholecystostomy tube in place from May 2021 for cholecystitis at the time s/p antibiotic course - also has a left chest wall Chemoport in place  · Empiric IV Vancomycin/Cefepime givenin the ED -> transitioned to Unasyn per Infectious Disease due to recent cultures a few months back (grew Enterococcus faecalis at the time)  · Appreciate consultation from Infectious Disease -> consider translocation into the GI tract from recent chemotherapy and/or new metastatic sites in liver  Also await blood cultures as patient could have occult bacteremia  · Monitor vitals and maintain hemodynamics - continue IV fluids    Pancytopenia due to chemotherapy  Assessment & Plan  Monitor CBC and transfuse if necessary  Monitor for neutropenia    Metastatic pancreatic cancer  Assessment & Plan  Follows with medical/surgical oncology - s/p a cycle of neoadjuvant chemotherapy (Gemcitabine/Abraxane) last week  Known to palliative care -> consider inpatient consultation if pain uncontrolled  CT imaging in the ED today noting "New 3 5 x 1 8 cm low-attenuating lesion in the right hepatic lobe and increased size of a 1 9 x 1 3 cm low-attenuating lesion in the left hepatic lobe, concerning for metastatic disease  Stable pancreatic head neoplasm   Resolution of the previously reported paraduodenal collection and omental edema/infiltration " -> will appreciate medical oncology evaluation concerning new findings    Hypokalemia  Assessment & Plan  · Monitor and replete electrolytes as needed    Type 2 diabetes mellitus with hypoglycemia [facility])  Assessment & Plan  Lab Results   Component Value Date    HGBA1C 6 2 (H) 05/05/2021   · Last A1c reflects excellent control  Monitor on Accu-Cheks but decrease Lantus from 20 units to 15 units and hold mealtime insulin due to hypoglycemia today  Can continue sliding scale coverage  · On Gabapentin for neuropathy    Recent cholecystitis  Assessment & Plan  Hospitalized in May 2021 s/p cholecystostomy tube placement on 5/11/21 - completed a course of antibiotics at the time  PRN pain control - supportive care otherwise    Generalized weakness  Assessment & Plan  Multifactorial secondary to sepsis coupled with metastatic cancer (see plan for individual assessments)  PT/OT as tolerated    VTE Pharmacologic Prophylaxis:   Pharmacologic: Enoxaparin (Lovenox)  Mechanical VTE Prophylaxis in Place: Yes    Patient Centered Rounds: I have performed bedside rounds with nursing staff today  Discussions with Specialists or Other Care Team Provider:  Case management    Education and Discussions with Family / Patient:  Called patient's son, left message    Time Spent for Care: 30 minutes  More than 50% of total time spent on counseling and coordination of care as described above  Addendum 3:30 p m  Came back and reassessed patient and spoke with him now that he is awake and alert  He agrees that he feels weak but offers no other acute complaints or concerns    Reviewed the chart and updated him on the plan    Current Length of Stay: 1 day(s)    Current Patient Status: Inpatient   Certification Statement: The patient will continue to require additional inpatient hospital stay due to Sepsis workup    Discharge Plan:  Not stable for discharge need PT and OT given weakness    Code Status: Level 1 - Full Code    Subjective:   History obtained from nursing staff who reports patient was complaining of feeling very weak this morning and also had some lower abdominal discomfort  Nursing also reported the patient did eat breakfast but discussed with me the fact that his blood sugar was low upon initially waking up this morning  Upon my attempted evaluation patient was very sleepy  Objective:     Vitals:   Temp (24hrs), Av 5 °F (36 9 °C), Min:97 4 °F (36 3 °C), Max:99 6 °F (37 6 °C)    Temp:  [97 4 °F (36 3 °C)-99 6 °F (37 6 °C)] 99 6 °F (37 6 °C)  HR:  [] 94  Resp:  [18-20] 18  BP: ()/(56-67) 113/57  SpO2:  [91 %-99 %] 91 %  Body mass index is 24 87 kg/m²  Input and Output Summary (last 24 hours): Intake/Output Summary (Last 24 hours) at 2021 1130  Last data filed at 2021 0915  Gross per 24 hour   Intake 1010 ml   Output 1375 ml   Net -365 ml       Physical Exam:     Physical Exam  Vitals reviewed  Constitutional:       General: He is not in acute distress  Appearance: He is not toxic-appearing or diaphoretic  Comments: Patient seen sleeping comfortably in bed   Cardiovascular:      Rate and Rhythm: Normal rate and regular rhythm  Heart sounds: No murmur heard  Pulmonary:      Effort: No respiratory distress  Breath sounds: No stridor  No wheezing or rhonchi  Comments: Port in chest wall  Abdominal:      General: There is no distension  Palpations: Abdomen is soft  Tenderness: There is no abdominal tenderness  There is no guarding  Musculoskeletal:         General: No swelling  Right lower leg: No edema  Left lower leg: No edema  Skin:     General: Skin is warm and dry  Coloration: Skin is not jaundiced or pale  Findings: No bruising, erythema, lesion or rash           Additional Data:     Labs:    Results from last 7 days   Lab Units 21  0539   WBC Thousand/uL 2 82* HEMOGLOBIN g/dL 9 9*   HEMATOCRIT % 31 8*   PLATELETS Thousands/uL 88*   BANDS PCT % 4   LYMPHO PCT % 10*   MONO PCT % 2*   EOS PCT % 1     Results from last 7 days   Lab Units 07/14/21  0539   SODIUM mmol/L 142   POTASSIUM mmol/L 3 3*   CHLORIDE mmol/L 107   CO2 mmol/L 25   BUN mg/dL 11   CREATININE mg/dL 0 75   ANION GAP mmol/L 10   CALCIUM mg/dL 7 8*   ALBUMIN g/dL 2 0*   TOTAL BILIRUBIN mg/dL 0 67   ALK PHOS U/L 98   ALT U/L 28   AST U/L 15   GLUCOSE RANDOM mg/dL 57*     Results from last 7 days   Lab Units 07/13/21  0947   INR  1 25*     Results from last 7 days   Lab Units 07/14/21  1058 07/14/21  0805 07/13/21  2105 07/13/21  1650 07/13/21  1601   POC GLUCOSE mg/dl 109 135 200* 122 67         Results from last 7 days   Lab Units 07/14/21  0547 07/13/21  1003 07/13/21  0947   LACTIC ACID mmol/L  --   --  1 8   PROCALCITONIN ng/ml 2 72* 1 50*  --        * I Have Reviewed All Lab Data Listed Above  * Additional Pertinent Lab Tests Reviewed: Pau Kelley Admission Reviewed    Imaging:    Imaging Reports Reviewed Today Include:   Imaging Personally Reviewed by Myself Includes:      Recent Cultures (last 7 days):     Results from last 7 days   Lab Units 07/13/21  0947   BLOOD CULTURE  Received in Microbiology Lab  Culture in Progress  Received in Microbiology Lab  Culture in Progress         Last 24 Hours Medication List:   Current Facility-Administered Medications   Medication Dose Route Frequency Provider Last Rate    acetaminophen  650 mg Oral Q6H PRN Aaron Granger MD      ampicillin-sulbactam  3 g Intravenous Q6H Hetal Jones MD 3 g (07/14/21 0650)    aspirin  81 mg Oral Daily Aaron Granger MD      diazepam  2 mg Oral Daily PRN Aaron Granger MD      enoxaparin  40 mg Subcutaneous Q24H [address] Aaron Granger MD      FLUoxetine  20 mg Oral Daily Aaron Granger MD      folic acid  1 mg Oral Daily Aaron Granger MD      gabapentin  200 mg Oral TID Aaron Granger MD      HYDROmorphone  0 5 mg Intravenous Q3H PRN Kunal Rincon MD      insulin glargine  15 Units Subcutaneous HS Kena Brewster PA-C      insulin lispro  1-6 Units Subcutaneous 4x Daily (AC & HS) Kunal Rincon MD      ondansetron  4 mg Intravenous Q4H PRN MD Priya Alvarez oxyCODONE  5 mg Oral Q4H PRN Kunal Rincon MD      pancrelipase (Lip-Prot-Amyl)  24,000 Units Oral TID With Meals Kunal Rincon MD      pantoprazole  40 mg Oral BID MD Priya Alvarez Silence predniSONE  20 mg Oral Daily Kunal Rincon MD      predniSONE  5 mg Oral Daily Kunal Rincon MD      senna-docusate sodium  1 tablet Oral HS Kunal Rincon MD      sodium chloride (PF)  5 mL Intravenous Q12H [facility] Kunal Rincon MD      sodium chloride  100 mL/hr Intravenous Continuous Kunal Rincon  mL/hr (07/13/21 1652)        Today, Patient Was Seen By: Antonina Hoyt PA-C    ** Please Note: Dictation voice to text software may have been used in the creation of this document   **

## 2021-07-14 NOTE — ASSESSMENT & PLAN NOTE
· Presented with a high-grade fever coupled with tachycardia/leukopenia - lactic acid normal and procalcitonin elevated at 2 72  · Urinalysis negative for infectious etiology - blood cultures collected in the ED pending - CXR negative for acute cardiopulmonary etiology - CT of abdomen/pelvis without obvious source of infection  · Notably, patient has a cholecystostomy tube in place from May 2021 for cholecystitis at the time s/p antibiotic course - also has a left chest wall Chemoport in place  · Empiric IV Vancomycin/Cefepime givenin the ED -> transitioned to Unasyn per Infectious Disease due to recent cultures a few months back (grew Enterococcus faecalis at the time)  · Appreciate consultation from Infectious Disease -> consider translocation into the GI tract from recent chemotherapy and/or new metastatic sites in liver    Also await blood cultures as patient could have occult bacteremia  · Monitor vitals and maintain hemodynamics - continue IV fluids

## 2021-07-14 NOTE — CONSULTS
Medical Oncology/Hematology Consult Note  Rene Hager, male, 73 y o , 1955,  S /S -01, [id number]     Reason for admission:  Weakness and fatigue with fever  Reason for consultation:  Metastatic pancreatic adenocarcinoma      ASSESSMENT AND PLAN:     1  Sepsis, metastatic pancreatic cancer   Sepsis POA, unknown etiology, tmax 102 7F   Currently started on empiric IV vanco and cefepime, infectious disease on board and change cefepime to Unasyn from recent cultures few months prior growing Enterococcus faecalis   Metastatic pancreatic cancer following with primary oncologist Dr Sage Sierra Currently completed cycle 1 and days 2/3 of cycle 2 of 3 weeks on, one week off gemcitabine and Abraxane (28 day cycles)  Next treatment day was scheduled for 7/15   CT abdomen pelvis with contrast on 07/13 demonstrated new 3 5 x 1 8 cm right hepatic lobe lesion and increased size of 1 9 x 1 3 cm left hepatic lobe lesion, stable pancreatic head neoplasm when compared to study from 6/14   Discussed findings with patient and stated that when he follows up with Dr Galileo Hanks he will further discuss treatment plans moving forward   Chemotherapy currently on hold, next appointment with NP Mary Thomas on 7/22    2  Pancytopenia  · Secondary to cytotoxic chemotherapy agents, likely component of infection and some iron deficiency with iron saturation of 6%  · ANC 2 45 today, not currently neutropenic  · Hemolysis smear negative, LDH 20, folate greater than 20, B12 mildly deficient 238, fibrinogen 412, haptoglobin pending, PT INR both slightly elevated, PTT WNL  · Will order B12 oral supplementation, component of iron foot deficiency to be reviewed as outpatient as patient is currently septic  · Continue to monitor     Patient understands and is in agreement with this plan  Thank you for the opportunity to participate in this patient's care      Interval History: Overall patient complains of generalized weakness that has been persistent for weeks including prior to starting chemotherapy  Currently asymptomatic otherwise  Tmax on admission 102 7, family inquiring about sources of infection and what to avoid - discussed wearing a mask in public, question of if patient should avoid cleaning up fecal matter from new puppy, proper hand hygiene and avoidance of sick contacts  ECO    History of present illness:  Patient is a 73year old male with past medical history significant for metastatic pancreatic cancer acute cholecystitis diagnosis may 2021 s/p cholecystectomy tube placement  Ct AP w/contrast 21:   IMPRESSION:     No definite imaging explanation for acute epigastric/abdominal pain      New 3 5 x 1 8 cm low-attenuating lesion in the right hepatic lobe and increased size of a 1 9 x 1 3 cm low-attenuating lesion in the left hepatic lobe, concerning for metastatic disease      Stable pancreatic head neoplasm      Resolution of the previously reported paraduodenal collection and omental edema/infiltration  XR chest 21:  No acute cardiopulmonary disease  The lungs are clear  No pneumothorax or pleural effusion  Review of Systems:   Review of Systems   Constitutional: Positive for chills and fatigue  Negative for appetite change and fever  HENT: Negative for ear pain and sore throat  Eyes: Negative for pain and visual disturbance  Respiratory: Negative for cough and shortness of breath  Cardiovascular: Negative for chest pain and palpitations  Gastrointestinal: Negative for abdominal pain, blood in stool, constipation, diarrhea, nausea and vomiting  Genitourinary: Negative for dysuria and hematuria  Musculoskeletal: Negative for arthralgias and back pain  Skin: Negative for color change and rash  Neurological: Positive for weakness (generalized)  Negative for seizures, syncope and headaches  All other systems reviewed and are negative          PHYSICAL EXAM:    /57 (BP Location: Left arm)   Pulse 94   Temp 99 6 °F (37 6 °C) (Oral)   Resp 18   Ht 5' 9" (1 753 m)   Wt 76 4 kg (168 lb 6 9 oz)   SpO2 91%   BMI 24 87 kg/m²     Physical Exam  Vitals and nursing note reviewed  Constitutional:       General: He is not in acute distress  Appearance: He is normal weight  He is not ill-appearing  HENT:      Head: Normocephalic and atraumatic  Eyes:      General: No scleral icterus  Cardiovascular:      Rate and Rhythm: Normal rate and regular rhythm  Heart sounds: Normal heart sounds  No murmur heard  Pulmonary:      Effort: Pulmonary effort is normal       Breath sounds: Normal breath sounds  No wheezing or rhonchi  Abdominal:      General: Bowel sounds are normal       Palpations: Abdomen is soft  There is no mass  Tenderness: There is abdominal tenderness (epigastric, ruq)  There is no guarding  Comments: Cholecystectomy drain present  No splenomegaly appreciated  Musculoskeletal:      Right lower leg: No edema  Left lower leg: No edema  Lymphadenopathy:      Cervical: No cervical adenopathy  Skin:     General: Skin is warm and dry  Coloration: Skin is not jaundiced  Findings: No rash  Neurological:      Mental Status: He is alert and oriented to person, place, and time           LABS:     Recent Results (from the past 48 hour(s))   CBC and differential    Collection Time: 07/13/21  9:46 AM   Result Value Ref Range    WBC 3 07 (L) 4 31 - 10 16 Thousand/uL    RBC 3 56 (L) 3 88 - 5 62 Million/uL    Hemoglobin 10 5 (L) 12 0 - 17 0 g/dL    Hematocrit 32 4 (L) 36 5 - 49 3 %    MCV 91 82 - 98 fL    MCH 29 5 26 8 - 34 3 pg    MCHC 32 4 31 4 - 37 4 g/dL    RDW 16 5 (H) 11 6 - 15 1 %    MPV 10 2 8 9 - 12 7 fL    Platelets 277 (L) 973 - 390 Thousands/uL    nRBC 2 /100 WBCs   Manual Differential(PHLEBS Do Not Order)    Collection Time: 07/13/21  9:46 AM   Result Value Ref Range    Segmented % 71 43 - 75 %    Bands % 16 (H) 0 - 8 %    Lymphocytes % 12 (L) 14 - 44 %    Monocytes % 0 (L) 4 - 12 %    Eosinophils, % 1 0 - 6 %    Basophils % 0 0 - 1 %    Absolute Neutrophils 2 67 1 85 - 7 62 Thousand/uL    Lymphocytes Absolute 0 37 (L) 0 60 - 4 47 Thousand/uL    Monocytes Absolute 0 00 0 00 - 1 22 Thousand/uL    Eosinophils Absolute 0 03 0 00 - 0 40 Thousand/uL    Basophils Absolute 0 00 0 00 - 0 10 Thousand/uL    Total Counted 100     Anisocytosis Present     Hypochromia Present     Polychromasia Present     Platelet Estimate Borderline (A) Adequate   Protime-INR    Collection Time: 07/13/21  9:47 AM   Result Value Ref Range    Protime 15 8 (H) 11 6 - 14 5 seconds    INR 1 25 (H) 0 84 - 1 19   APTT    Collection Time: 07/13/21  9:47 AM   Result Value Ref Range    PTT 25 23 - 37 seconds   Comprehensive metabolic panel    Collection Time: 07/13/21  9:47 AM   Result Value Ref Range    Sodium 140 136 - 145 mmol/L    Potassium 3 1 (L) 3 5 - 5 3 mmol/L    Chloride 106 100 - 108 mmol/L    CO2 23 21 - 32 mmol/L    ANION GAP 11 4 - 13 mmol/L    BUN 23 5 - 25 mg/dL    Creatinine 0 88 0 60 - 1 30 mg/dL    Glucose 77 65 - 140 mg/dL    Calcium 7 3 (L) 8 3 - 10 1 mg/dL    Corrected Calcium 8 5 8 3 - 10 1 mg/dL    AST 18 5 - 45 U/L    ALT 34 12 - 78 U/L    Alkaline Phosphatase 112 46 - 116 U/L    Total Protein 5 8 (L) 6 4 - 8 2 g/dL    Albumin 2 5 (L) 3 5 - 5 0 g/dL    Total Bilirubin 0 66 0 20 - 1 00 mg/dL    eGFR 90 ml/min/1 73sq m   Lactic acid    Collection Time: 07/13/21  9:47 AM   Result Value Ref Range    LACTIC ACID 1 8 0 5 - 2 0 mmol/L   Troponin I    Collection Time: 07/13/21  9:47 AM   Result Value Ref Range    Troponin I <0 02 <=0 04 ng/mL   Blood culture #1    Collection Time: 07/13/21  9:47 AM    Specimen: Arm, Left; Blood   Result Value Ref Range    Blood Culture Received in Microbiology Lab  Culture in Progress      Blood culture #2    Collection Time: 07/13/21  9:47 AM    Specimen: Arm, Right; Blood   Result Value Ref Range    Blood Culture Received in Microbiology Lab  Culture in Progress      Fibrinogen    Collection Time: 07/13/21  9:47 AM   Result Value Ref Range    Fibrinogen 412 227 - 495 mg/dL   Procalcitonin with AM Reflex    Collection Time: 07/13/21 10:03 AM   Result Value Ref Range    Procalcitonin 1 50 (H) <=0 25 ng/ml   Vitamin B12    Collection Time: 07/13/21 10:03 AM   Result Value Ref Range    Vitamin B-12 238 100 - 900 pg/mL   Folate    Collection Time: 07/13/21 10:03 AM   Result Value Ref Range    Folate >20 0 (H) 3 1 - 17 5 ng/mL   LD,Blood    Collection Time: 07/13/21 10:03 AM   Result Value Ref Range     81 - 234 U/L   Iron Saturation %    Collection Time: 07/13/21 10:03 AM   Result Value Ref Range    Iron Saturation 6 %    TIBC 248 (L) 250 - 450 ug/dL    Iron 16 (L) 65 - 175 ug/dL   Ferritin    Collection Time: 07/13/21 10:03 AM   Result Value Ref Range    Ferritin 625 (H) 8 - 388 ng/mL   UA w Reflex to Microscopic w Reflex to Culture    Collection Time: 07/13/21 11:02 AM    Specimen: Urine, Clean Catch   Result Value Ref Range    Color, UA Light Yellow     Clarity, UA Clear     Specific [place], UA 1 015 1 003 - 1 030    pH, UA 6 0 4 5, 5 0, 5 5, 6 0, 6 5, 7 0, 7 5, 8 0    Leukocytes, UA Negative Negative    Nitrite, UA Negative Negative    Protein, UA Negative Negative mg/dl    Glucose, UA Negative Negative mg/dl    Ketones, UA Negative Negative mg/dl    Urobilinogen, UA 0 2 0 2, 1 0 E U /dl E U /dl    Bilirubin, UA Negative Negative    Blood, UA Trace-Intact (A) Negative   Urine Microscopic    Collection Time: 07/13/21 11:02 AM   Result Value Ref Range    RBC, UA 0-1 None Seen, 0-1, 1-2, 2-4, 0-5 /hpf    WBC, UA 0-1 None Seen, 0-1, 1-2, 0-5, 2-4 /hpf    Epithelial Cells Occasional None Seen, Occasional /hpf    Bacteria, UA Occasional None Seen, Occasional /hpf   Fingerstick Glucose (POCT)    Collection Time: 07/13/21  4:01 PM   Result Value Ref Range    POC Glucose 67 65 - 140 mg/dl   Fingerstick Glucose (POCT)    Collection Time: 07/13/21  4:50 PM   Result Value Ref Range    POC Glucose 122 65 - 140 mg/dl   Hemolysis Smear    Collection Time: 07/13/21  5:41 PM   Result Value Ref Range    Hemolysis Smear No Schistocytes or Helmet Cells noted    Fingerstick Glucose (POCT)    Collection Time: 07/13/21  9:05 PM   Result Value Ref Range    POC Glucose 200 (H) 65 - 140 mg/dl   Comprehensive metabolic panel    Collection Time: 07/14/21  5:39 AM   Result Value Ref Range    Sodium 142 136 - 145 mmol/L    Potassium 3 3 (L) 3 5 - 5 3 mmol/L    Chloride 107 100 - 108 mmol/L    CO2 25 21 - 32 mmol/L    ANION GAP 10 4 - 13 mmol/L    BUN 11 5 - 25 mg/dL    Creatinine 0 75 0 60 - 1 30 mg/dL    Glucose 57 (L) 65 - 140 mg/dL    Calcium 7 8 (L) 8 3 - 10 1 mg/dL    Corrected Calcium 9 4 8 3 - 10 1 mg/dL    AST 15 5 - 45 U/L    ALT 28 12 - 78 U/L    Alkaline Phosphatase 98 46 - 116 U/L    Total Protein 5 1 (L) 6 4 - 8 2 g/dL    Albumin 2 0 (L) 3 5 - 5 0 g/dL    Total Bilirubin 0 67 0 20 - 1 00 mg/dL    eGFR 96 ml/min/1 73sq m   CBC and differential    Collection Time: 07/14/21  5:39 AM   Result Value Ref Range    WBC 2 82 (L) 4 31 - 10 16 Thousand/uL    RBC 3 42 (L) 3 88 - 5 62 Million/uL    Hemoglobin 9 9 (L) 12 0 - 17 0 g/dL    Hematocrit 31 8 (L) 36 5 - 49 3 %    MCV 93 82 - 98 fL    MCH 28 9 26 8 - 34 3 pg    MCHC 31 1 (L) 31 4 - 37 4 g/dL    RDW 16 8 (H) 11 6 - 15 1 %    MPV 10 0 8 9 - 12 7 fL    Platelets 88 (L) 485 - 390 Thousands/uL    nRBC 0 /100 WBCs   Manual Differential(PHLEBS Do Not Order)    Collection Time: 07/14/21  5:39 AM   Result Value Ref Range    Segmented % 83 (H) 43 - 75 %    Bands % 4 0 - 8 %    Lymphocytes % 10 (L) 14 - 44 %    Monocytes % 2 (L) 4 - 12 %    Eosinophils, % 1 0 - 6 %    Basophils % 0 0 - 1 %    Absolute Neutrophils 2 45 1 85 - 7 62 Thousand/uL    Lymphocytes Absolute 0 28 (L) 0 60 - 4 47 Thousand/uL    Monocytes Absolute 0 06 0 00 - 1 22 Thousand/uL    Eosinophils Absolute 0 03 0 00 - 0 40 Thousand/uL    Basophils Absolute 0  00 0 00 - 0 10 Thousand/uL    Total Counted 100     Anisocytosis Present     Polychromasia Present     Platelet Estimate Decreased (A) Adequate   Fingerstick Glucose (POCT)    Collection Time: 07/14/21  8:05 AM   Result Value Ref Range    POC Glucose 135 65 - 140 mg/dl       XR chest 1 view portable    Result Date: 7/13/2021  Narrative: CHEST INDICATION:   Fever  COMPARISON:  6/1/2021; 5/16/2020 EXAM PERFORMED/VIEWS:  XR CHEST PORTABLE FINDINGS:  Left chest wall [id number] redemonstrated  Heart shadow appears unremarkable  Atherosclerotic vascular calcifications are noted  The lungs are clear  No pneumothorax or pleural effusion  Osseous structures appear within normal limits for patient age  Impression: No acute cardiopulmonary disease  Workstation performed: [id number]     CT abdomen pelvis with contrast    Result Date: 7/13/2021  Narrative: CT ABDOMEN AND PELVIS WITH IV CONTRAST INDICATION:   Abdominal pain, fever Known pancreatic cancer, increasing abdominal pain, tender throughout the upper abdomen, febrile, sepsis  COMPARISON:  6/14/2021 TECHNIQUE:  CT examination of the abdomen and pelvis was performed  Axial, sagittal, and coronal 2D reformatted images were created from the source data and submitted for interpretation  Radiation dose length product (DLP) for this visit:  477 mGy-cm   This examination, like all CT scans performed in the [facility], was performed utilizing techniques to minimize radiation dose exposure, including the use of iterative reconstruction and automated exposure control  IV Contrast:  100 mL of iohexol (OMNIPAQUE) Enteric Contrast:  Enteric contrast was not administered  FINDINGS: ABDOMEN LOWER CHEST:  No clinically significant abnormality identified in the visualized lower chest   Stable small pericardial effusion, partially imaged  LIVER/BILIARY TREE:  New 3 5 x 1 8 cm ill-defined low-attenuating lesion in hepatic segment 8 (image #2/24)   Increased size of a 1 9 x 1 3 cm low-attenuating lesion in hepatic segment 3 (image #2/24)  Numerous cysts again seen throughout the liver  Unchanged enhancing left hepatic lobe lesion (image #2/27)  Metallic common bile duct stent again noted, with associated pneumobilia  No intrahepatic biliary ductal dilation  GALLBLADDER:  Percutaneous cholecystostomy tube  SPLEEN:  Unremarkable  PANCREAS:  No significant interval change in size of the heterogeneously enhancing, ill-defined pancreatic head neoplasm, again measuring 5 1 x 2 7 cm when re-measured in a similar fashion (image #2/37)  Unchanged dilation of the main duct distal to the  mass and associated parenchymal atrophy  ADRENAL GLANDS:  Unremarkable  KIDNEYS/URETERS:  No hydronephrosis or urinary tract calculus  One or more sharply circumscribed subcentimeter renal hypodensities are present, too small to accurately characterize, and statistically most likely benign findings  According to recent literature (Radiology 2019) no further workup of these findings is recommended  STOMACH AND BOWEL: Previously reported paraduodenal focal fluid collection has resolved  No bowel obstruction  APPENDIX:  No findings to suggest appendicitis  ABDOMINOPELVIC CAVITY:  Resolved omental edema/infiltration  No ascites  No pneumoperitoneum  No lymphadenopathy  VESSELS:  Unremarkable for patient's age  PELVIS REPRODUCTIVE ORGANS:  Enlarged prostate gland again noted  URINARY BLADDER:  Unremarkable  ABDOMINAL WALL/INGUINAL REGIONS:  Unchanged subcutaneous lesion in the right anterior chest wall, again measuring 3 2 x 1 2 cm (image #2/6), likely sebaceous cyst   Unchanged fat-containing left inguinal hernia  OSSEOUS STRUCTURES:  No acute fracture or destructive osseous lesion  Impression: No definite imaging explanation for acute epigastric/abdominal pain   New 3 5 x 1 8 cm low-attenuating lesion in the right hepatic lobe and increased size of a 1 9 x 1 3 cm low-attenuating lesion in the left hepatic lobe, concerning for metastatic disease  Stable pancreatic head neoplasm  Resolution of the previously reported paraduodenal collection and omental edema/infiltration  Workstation performed: [id number]     CT abdomen pelvis with contrast    Result Date: 6/14/2021  Narrative: CT ABDOMEN AND PELVIS WITH IV CONTRAST INDICATION:   RLQ abdominal pain, appendicitis suspected (Age => 15y) RLQ pain with guarding  Concern for appendicitis/colitis  Hx Pancreatic cancer/RA  COMPARISON:  CT abdomen pelvis 5/14/2021 TECHNIQUE:  CT examination of the abdomen and pelvis was performed  Axial, sagittal, and coronal 2D reformatted images were created from the source data and submitted for interpretation  Radiation dose length product (DLP) for this visit:  461 71 mGy-cm   This examination, like all CT scans performed in the [facility], was performed utilizing techniques to minimize radiation dose exposure, including the use of iterative  reconstruction and automated exposure control  IV Contrast:  100 mL of iohexol (OMNIPAQUE) Enteric Contrast:  Enteric contrast was not administered  FINDINGS: ABDOMEN LOWER CHEST:  No clinically significant abnormality identified in the visualized lower chest  LIVER/BILIARY TREE:  Unchanged enhancing left hepatic lesion #2/22  Other hypodense, nonenhancing lesions scattered throughout the liver are unchanged  Reidentified prominently left-sided pneumobilia  A CBD stent is in place  GALLBLADDER:  Percutaneous cholecystostomy tube remains in place  SPLEEN:  Unremarkable  PANCREAS:  Enlarged pancreatic head with a vague pancreatic head mass measuring approximately 32 x 22 mm unchanged in appearance from the prior study  The margins are not well-defined  Unchanged pancreatic ductal dilation  ADRENAL GLANDS:  Unremarkable  KIDNEYS/URETERS:  No hydronephrosis  Unchanged renal cysts  STOMACH AND BOWEL:  No bowel obstruction   Much improved wall thickening and enhancement of the pylorus and duodenum  Fluid surrounding the proximal duodenum is persistent but improved in quantity  APPENDIX:  Noninflamed appendix  ABDOMINOPELVIC CAVITY:  No ascites  No pneumoperitoneum  No lymphadenopathy  As described above, fluid surrounding the pylorus and duodenum is persistent but improved  There is a collection measuring 6 2 x 2 8 x 3 2 cm,  #2/30 #601/43 located lateral to the first portion of the duodenum with a small focus of internal gas  New edema throughout the right mid abdominal omentum #2/41 possibly reactive to the adjacent collection described above  No discrete omental mass  VESSELS:  Unremarkable for patient's age  PELVIS REPRODUCTIVE ORGANS:  Prostamegaly  URINARY BLADDER:  Unremarkable  ABDOMINAL WALL/INGUINAL REGIONS:  Unremarkable  OSSEOUS STRUCTURES:  No acute fracture or destructive osseous lesion  Impression: Unchanged pancreatic head mass  Unchanged 28 mm enhancing left hepatic lesion  Fluid surrounding the pylorus and duodenum is persistent but improved  There is a collection measuring 6 2 x 2 8 x 3 2 cm ,#2/30 #601/43  located lateral to the first portion of the duodenum with a small focus of internal gas  New edema throughout the right mid abdominal omentum #2/41 possibly reactive to the adjacent collection described above  No discrete omental mass  The study was marked in [facility] for immediate notification  Workstation performed: [id number]     IR cholecystostomy tube check/change/reposition/reinsertion/upsize    Result Date: 6/23/2021  Narrative: Drainage catheter evaluation Indication: Cholecystostomy tube placed May 11, 2021  Follow-up  Procedure: The cholecystostomy drainage catheter was hand injected with contrast and fluoroscopic spot images were obtained  The drain was capped  Contrast: 5 cc Omnipaque-300 Fluoroscopy time: 0 7 minutes Images: 6 Findings: The catheter remains in satisfactory position   Cystic duct is patent   Small amount of debris is seen in the common duct but there is no significant dilatation  Common bile duct stent is patent and there is drainage into the duodenum  No stones are definitively seen by fluoroscopy  Drain was capped  Impression: Impression: Patent cystic duct  Drain was capped  Plan: Patient may undergo cholecystectomy along with surgical tumor resection at some point though he is currently undergoing chemotherapy  Will discuss with surgical oncology whether we should consider removing his cholecystostomy tube in the interim if  he tolerates capping trial  Workstation performed: [id number]         HISTORY:    Past Medical History:   Diagnosis Date    Anxiety     Arthritis     Cancer ([facility] )     pancreatic    Cellulitis     LAST ASSESSED: 6/13/14    Enlarged prostate     Epidermal inclusion cyst     LAST ASSESSED: 10/4/13    Erythrasma     LAST ASSESSED: 9/23/13    Furuncle     LAST ASSESSED: 6/11/14    GERD (gastroesophageal reflux disease)     Hyperlipidemia     Hypertension     RA (rheumatoid arthritis) ([place] [id number] )        Past Surgical History:   Procedure Laterality Date    COLONOSCOPY      FL GUIDED CENTRAL VENOUS ACCESS DEVICE INSERTION  6/1/2021    HYDROCELE EXCISION / REPAIR Right 01/11/2018    SPERMATIC CORD EXCSION OF HYDROCELE; MANAGED BY: Bonny American    IR CHOLECYSTOSTOMY TUBE CHECK/CHANGE/REPOSITION/REINSERTION/UPSIZE  6/22/2021    IR CHOLECYSTOSTOMY TUBE PLACEMENT  5/11/2021    OR REMOVAL OF HYDROCELE,TUNICA,UNILAT Right 1/11/2018    Procedure: HYDROCELECTOMY;  Surgeon: Damien Pablo MD;  Location: [facility];  Service: Urology    SCROTAL SURGERY      benign "lump"    TUNNELED VENOUS PORT PLACEMENT Left 6/1/2021    Procedure: INSERTION VENOUS PORT (PORT-A-CATH);   Surgeon: Mitchell Enriquez MD;  Location: [facility];  Service: Surgical Oncology       Family History   Problem Relation Age of Onset    Heart disease Father         CARDIAC DISORDER    Hypertension Father     Hypertension Mother     No Known Problems Maternal Aunt     No Known Problems Maternal Uncle     No Known Problems Paternal Aunt     No Known Problems Paternal Uncle     No Known Problems Paternal Grandmother     No Known Problems Paternal Grandfather     Diabetes Maternal Grandmother         MELLITUS    No Known Problems Maternal Grandfather     Hypertension Other        Social History     Socioeconomic History    Marital status: /Civil Union     Spouse name: None    Number of children: 2    Years of education: None    Highest education level: None   Occupational History    Occupation: FULL-TIME EMPLOYMENT   Tobacco Use    Smoking status: Former Smoker    Smokeless tobacco: Never Used    Tobacco comment: tobacco in a pipe in the early 1980's   Vaping Use    Vaping Use: Never used   Substance and Sexual Activity    Alcohol use: Never    Drug use: Never    Sexual activity: Never     Comment: NOT CURRENTLY SEXUALLY ACTIVE AS PER [employer]   Other Topics Concern    None   Social History Narrative    ALWAYS USES SEAT BELT    DENIED DAILY COFFEE CONSUMPTION (__CUPS/DAY)    DENIED DAILY COLA CONSUMPTION (__CANS/DAY)    DAILY TEA CONSUMPTION (__CUPS/DAY)    DENTAL CARE, REGULARLY    EXERCISE: WALKING    HIGH SCHOOL GRADUATE    DENIED MULTIPLE ORGAN DONOR    NO LIVING WILL    PETS/ANIMALS    DENIED POWER OF  IN EXISTENCE    WATER INTAKE, ADEQUATE (PER DAY)     Social Determinants of Health     Financial Resource Strain:     Difficulty of Paying Living Expenses:    Food Insecurity:     Worried About Running Out of Food in the Last Year:     Ran Out of Food in the Last Year:    Transportation Needs:     Lack of Transportation (Medical):      Lack of Transportation (Non-Medical):    Physical Activity:     Days of Exercise per Week:     Minutes of Exercise per Session:    Stress:     Feeling of Stress :    Social Connections:     Frequency of Communication with Friends and Family:     Frequency of Social Gatherings with Friends and Family:     Attends [employer] Services:     Active Member of Clubs or Organizations:     Attends Club or Organization Meetings:     Marital Status:    Intimate Partner Violence:     Fear of Current or Ex-Partner:     Emotionally Abused:     Physically Abused:     Sexually Abused:          Current Facility-Administered Medications:     acetaminophen (TYLENOL) tablet 650 mg, 650 mg, Oral, Q6H PRN, Marisa Hardin MD    ampicillin-sulbactam (UNASYN) 3 g in sodium chloride 0 9 % 100 mL IVPB, 3 g, Intravenous, Q6H, Lindy Viera MD, Last Rate: 200 mL/hr at 07/14/21 0650, 3 g at 07/14/21 0650    aspirin chewable tablet 81 mg, 81 mg, Oral, Daily, Marisa Hardin MD, 81 mg at 07/14/21 6474    diazepam (VALIUM) tablet 2 mg, 2 mg, Oral, Daily PRN, Marisa Hardin MD    enoxaparin (LOVENOX) subcutaneous injection 40 mg, 40 mg, Subcutaneous, Q24H [address], Marisa Hardin MD, 40 mg at 07/13/21 1651    FLUoxetine (PROzac) capsule 20 mg, 20 mg, Oral, Daily, Marisa Hardin MD, 20 mg at 67/68/31 2192    folic acid (FOLVITE) tablet 1 mg, 1 mg, Oral, Daily, Marsia Hardin MD, 1 mg at 07/14/21 0838    gabapentin (NEURONTIN) capsule 200 mg, 200 mg, Oral, TID, Marisa Hardin MD, 200 mg at 07/14/21 0837    HYDROmorphone (DILAUDID) injection 0 5 mg, 0 5 mg, Intravenous, Q3H PRN, Marisa Hardin MD    insulin glargine (LANTUS) subcutaneous injection 15 Units 0 15 mL, 15 Units, Subcutaneous, HS, Kena Brewster PA-C    insulin lispro (HumaLOG) 100 units/mL subcutaneous injection 1-6 Units, 1-6 Units, Subcutaneous, 4x Daily (AC & HS), 2 Units at 07/13/21 2211 **AND** Fingerstick Glucose (POCT), , , 4x Daily AC and at bedtime, Marisa Hardin MD    insulin lispro (HumaLOG) 100 units/mL subcutaneous injection 10 Units, 10 Units, Subcutaneous, TID With Meals, Marisa Hradin MD    ondansetron (ZOFRAN) injection 4 mg, 4 mg, Intravenous, Q4H PRN, Marisa Hardin MD    oxyCODONE (ROXICODONE) IR tablet 5 mg, 5 mg, Oral, Q4H PRN, Hilario Lefort, MD    pancrelipase (Lip-Prot-Amyl) (CREON) delayed release capsule 24,000 Units, 24,000 Units, Oral, TID With Meals, Hilario Lefort, MD, 24,000 Units at 07/14/21 0839    pantoprazole (PROTONIX) EC tablet 40 mg, 40 mg, Oral, BID, Hilario Lefort, MD, 40 mg at 07/14/21 0494    predniSONE tablet 20 mg, 20 mg, Oral, Daily, Hilario Lefort, MD, 20 mg at 07/14/21 1754    predniSONE tablet 5 mg, 5 mg, Oral, Daily, Hilario Lefort, MD, 5 mg at 07/14/21 0838    senna-docusate sodium (SENOKOT S) 8 6-50 mg per tablet 1 tablet, 1 tablet, Oral, HS, Hilario Lefort, MD, 1 tablet at 07/13/21 2210    sodium chloride (PF) 0 9 % injection 5 mL, 5 mL, Intravenous, Q12H [address], Hilario Lefort, MD, 5 mL at 07/14/21 0844    sodium chloride 0 9 % infusion, 100 mL/hr, Intravenous, Continuous, Hilario Lefort, MD, Last Rate: 100 mL/hr at 07/13/21 1652, 100 mL/hr at 07/13/21 1652    Medications Prior to Admission   Medication    aspirin 81 mg chewable tablet    Blood Glucose Monitoring Suppl (ONE TOUCH ULTRA 2) w/Device KIT    diazepam (VALIUM) 2 mg tablet    FLUoxetine (PROzac) 20 mg capsule    folic acid (FOLVITE) 1 mg tablet    gabapentin (NEURONTIN) 100 mg capsule    glucose blood (OneTouch Ultra) test strip    insulin glargine (Lantus SoloStar) 100 units/mL injection pen    insulin lispro (HumaLOG KwikPen) 100 units/mL injection pen    Insulin Pen Needle (BD Pen Needle Cinda 2nd Gen) 32G X 4 MM MISC    Insulin Pen Needle (BD Pen Needle Cinda 2nd Gen) 32G X 4 MM MISC    Lancets (onetouch ultrasoft) lancets    oxyCODONE (ROXICODONE) 5 mg immediate release tablet    pancrelipase, Lip-Prot-Amyl, (CREON) 24,000 units    pantoprazole (PROTONIX) 40 mg tablet    predniSONE 20 mg tablet    predniSONE 5 mg tablet    prochlorperazine (COMPAZINE) 10 mg tablet    senna (SENOKOT) 8 6 MG tablet    Alcohol Swabs 70 % PADS    lisinopril-hydrochlorothiazide (PRINZIDE,ZESTORETIC) 10-12 5 MG per tablet    Sodium Chloride Flush (Normal Saline Flush) 0 9 % SOLN       Allergies   Allergen Reactions    Fentanyl Anaphylaxis and Other (See Comments)     Oxygen drops severely       Labs and pertinent reports reviewed  This note has been generated by voice recognition software system  Therefore, there may be spelling, grammar, and or syntax errors  Please contact if questions arise

## 2021-07-14 NOTE — UTILIZATION REVIEW
Initial Clinical Review    Admission: Date/Time/Statement:   Admission Orders (From admission, onward)     Ordered        07/13/21 1302  INPATIENT ADMISSION  Once                   Orders Placed This Encounter   Procedures    INPATIENT ADMISSION     Standing Status:   Standing     Number of Occurrences:   1     Order Specific Question:   Level of Care     Answer:   Med Surg [16]     Order Specific Question:   Estimated length of stay     Answer:   More than 2 Midnights     Order Specific Question:   Certification     Answer:   I certify that inpatient services are medically necessary for this patient for a duration of greater than two midnights  See H&P and MD Progress Notes for additional information about the patient's course of treatment  ED Arrival Information     Expected Arrival Acuity    - 7/13/2021 09:40 Emergent         Means of arrival Escorted by Service Admission type    [facility]         Arrival complaint    fever        Chief Complaint   Patient presents with    Fever - 9 weeks to 74 years     per pt started with fever last night       Initial Presentation:  this is a 73year old male from home to ED via ems admitted inpatient due to sepsis of unknown etiology  Has metastatic pancreatic cancer and on treatment, recent treatment cholecystitis in May and ashli tube placed  Presented due to fever, abdominal pain  starting about 1 day prior to arrival, has ongoing weakness  On exam abdominal tenderness  cholecystostomy tube  Bands 16%  Wbc 3 07  procalcitonin 1 50  H&H 10 5/32  4  K 3 1 ct abdomen showed new hepatic lesion, pancreatic neoplasm  In the ED given 2 liter IVF bolus  Blood cultures done  Given Cefepime, vancomycin,  and morphine  Plan is change antibiotic to Unasyn, pain control, follow cultures   Oncology and ID consulted   IVF in progress     Per ID 7/13/2021:   Patient septic, has pancreatic cancer and on chemo via port, has history of cholecystitis with ashli tube and CBD stent  On chronic steroids  Differential is : occult bacteremia, progressing tumor/tumor fever, port infection vs less likely tick borne infection  To change antibiotic ot Unasyn  Date: 7/14/2021   Day 2:  Patient has generalized weakness  Has lower abdominal discomfort  Upon awakening glucose low  Breakfast tolerated  On exam port in chest wall  Wbc 2 82   K 3 3  Continue antibiotics, replete K  Continue IVF    7/14/2201 per Oncology - Patient septic, has metastatic pancreatic cancer on chemo, pancytopenia due to chemo, infection and has iron deficiency  Chemo placed on hold,  Add B12 supplement       ED Triage Vitals   Temperature Pulse Respirations Blood Pressure SpO2   07/13/21 0943 07/13/21 0943 07/13/21 0943 07/13/21 0943 07/13/21 0943   (!) 102 7 °F (39 3 °C) 63 16 105/55 94 %      Temp Source Heart Rate Source Patient Position - Orthostatic VS BP Location FiO2 (%)   07/13/21 0943 07/13/21 1045 07/13/21 1045 07/13/21 1045 --   Oral Monitor Lying Right arm       Pain Score       07/13/21 0943       3          Wt Readings from Last 1 Encounters:   07/13/21 76 4 kg (168 lb 6 9 oz)     Additional Vital Signs:   07/14/21 0700  99 6 °F (37 6 °C)  94  18  113/57  76  91 %  None (Room air)  Lying   07/13/21 2203  98 5 °F (36 9 °C)  66  18  109/67  82  99 %  None (Room air)  Lying   07/13/21 1554  97 4 °F (36 3 °C)Abnormal   69  18  98/60  74  94 %  None (Room air)  Lying   07/13/21 1445  --  84  --  112/56  76  93 %  --  --   07/13/21 1330  --  94  20  119/60  86  91 %  None (Room air)  Lying   07/13/21 1215  --  104  --  123/60  85  96 %  --  --   07/13/21 1130  97 6 °F (36 4 °C)  106Abnormal   20  129/63  90  92 %  None (Room air)  Lying   07/13/21 1100  --  120Abnormal   --  104/59  75  94 %  --  --   07/13/21 1045  --  114Abnormal   18  125/58  83  94 %  None (Room air)         Pertinent Labs/Diagnostic Test Results:   7/13/2021 CxR - No acute cardiopulmonary disease  7/13/2021 ct abdomen - No definite imaging explanation for acute epigastric/abdominal pain  New 3 5 x 1 8 cm low-attenuating lesion in the right hepatic lobe and increased size of a 1 9 x 1 3 cm low-attenuating lesion in the left hepatic lobe, concerning for metastatic disease  Stable pancreatic head neoplasm    Resolution of the previously reported paraduodenal collection and omental edema/infiltration    Results from last 7 days   Lab Units 07/14/21  0539 07/13/21  0946   WBC Thousand/uL 2 82* 3 07*   HEMOGLOBIN g/dL 9 9* 10 5*   HEMATOCRIT % 31 8* 32 4*   PLATELETS Thousands/uL 88* 107*   BANDS PCT % 4 16*     Results from last 7 days   Lab Units 07/14/21  0539 07/13/21  0947   SODIUM mmol/L 142 140   POTASSIUM mmol/L 3 3* 3 1*   CHLORIDE mmol/L 107 106   CO2 mmol/L 25 23   ANION GAP mmol/L 10 11   BUN mg/dL 11 23   CREATININE mg/dL 0 75 0 88   EGFR ml/min/1 73sq m 96 90   CALCIUM mg/dL 7 8* 7 3*     Results from last 7 days   Lab Units 07/14/21  0539 07/13/21  0947   AST U/L 15 18   ALT U/L 28 34   ALK PHOS U/L 98 112   TOTAL PROTEIN g/dL 5 1* 5 8*   ALBUMIN g/dL 2 0* 2 5*   TOTAL BILIRUBIN mg/dL 0 67 0 66     Results from last 7 days   Lab Units 07/14/21  1058 07/14/21  0805 07/13/21  2105 07/13/21  1650 07/13/21  1601   POC GLUCOSE mg/dl 109 135 200* 122 67     Results from last 7 days   Lab Units 07/14/21  0539 07/13/21  0947   GLUCOSE RANDOM mg/dL 57* 77     Results from last 7 days   Lab Units 07/13/21  0947   TROPONIN I ng/mL <0 02     Results from last 7 days   Lab Units 07/13/21  0947   PROTIME seconds 15 8*   INR  1 25*   PTT seconds 25     Results from last 7 days   Lab Units 07/14/21  0547 07/13/21  1003   PROCALCITONIN ng/ml 2 72* 1 50*     Results from last 7 days   Lab Units 07/13/21  0947   LACTIC ACID mmol/L 1 8     Results from last 7 days   Lab Units 07/13/21  1003   FERRITIN ng/mL 625*     Results from last 7 days   Lab Units 07/13/21  1102   CLARITY UA  Clear COLOR UA  Light Yellow   SPEC GRAV UA  1 015   PH UA  6 0   GLUCOSE UA mg/dl Negative   KETONES UA mg/dl Negative   BLOOD UA  Trace-Intact*   PROTEIN UA mg/dl Negative   NITRITE UA  Negative   BILIRUBIN UA  Negative   UROBILINOGEN UA E U /dl 0 2   LEUKOCYTES UA  Negative   WBC UA /hpf 0-1   RBC UA /hpf 0-1   BACTERIA UA /hpf Occasional   EPITHELIAL CELLS WET PREP /hpf Occasional     Results from last 7 days   Lab Units 07/13/21  0947   BLOOD CULTURE  Received in Microbiology Lab  Culture in Progress  Received in Microbiology Lab  Culture in Progress       Results from last 7 days   Lab Units 07/14/21  0539 07/13/21  0946   TOTAL COUNTED  100 100     ED Treatment:   Medication Administration from 07/13/2021 0940 to 07/13/2021 1550       Date/Time Order Dose Route Action Comments     07/13/2021 0951 sodium chloride 0 9 % bolus 1,000 mL 1,000 mL Intravenous New Bag      07/13/2021 1023 cefepime (MAXIPIME) 2 g/50 mL dextrose IVPB 2,000 mg Intravenous New Bag      07/13/2021 1027 vancomycin (VANCOCIN) 1500 mg in sodium chloride 0 9% 250 mL IVPB 1,500 mg Intravenous New Bag      07/13/2021 0952 sodium chloride 0 9 % bolus 1,000 mL 1,000 mL Intravenous New Bag      07/13/2021 1139 lactated ringers bolus 1,000 mL 1,000 mL Intravenous New Bag      07/13/2021 1004 acetaminophen (TYLENOL) tablet 975 mg 975 mg Oral Given      07/13/2021 1006 morphine (PF) 10 mg/mL injection 6 mg 6 mg Intravenous Given         Past Medical History:   Diagnosis Date    Anxiety     Arthritis     Cancer ([facility] )     pancreatic    Cellulitis     LAST ASSESSED: 6/13/14    Enlarged prostate     Epidermal inclusion cyst     LAST ASSESSED: 10/4/13    Erythrasma     LAST ASSESSED: 9/23/13    Furuncle     LAST ASSESSED: 6/11/14    GERD (gastroesophageal reflux disease)     Hyperlipidemia     Hypertension     RA (rheumatoid arthritis) ([facility] )      Present on Admission:   Metastatic pancreatic cancer   Recent cholecystitis   Hypokalemia      Admitting Diagnosis: Liver mass [R16 0]  Fever [R50 9]  SIRS (systemic inflammatory response syndrome) (HCC) [R65 10]  Generalized weakness [R53 1]  Sepsis ([facility] ) [A41 9]  Ambulatory dysfunction [R26 2]  Malignant neoplasm of pancreas, unspecified location of malignancy ([facility] ) [C25 9]  Age/Sex: 73 y o  male  Admission Orders:  7/13/2021 1302 inpatient   Scheduled Medications:  ampicillin-sulbactam, 3 g, Intravenous, Q6H  aspirin, 81 mg, Oral, Daily  cyanocobalamin, 1,000 mcg, Oral, Daily  enoxaparin, 40 mg, Subcutaneous, Q24H INGRID  FLUoxetine, 20 mg, Oral, Daily  folic acid, 1 mg, Oral, Daily  gabapentin, 200 mg, Oral, TID  insulin glargine, 15 Units, Subcutaneous, HS  insulin lispro, 1-6 Units, Subcutaneous, 4x Daily (AC & HS)  pancrelipase (Lip-Prot-Amyl), 24,000 Units, Oral, TID With Meals  pantoprazole, 40 mg, Oral, BID  predniSONE, 20 mg, Oral, Daily  predniSONE, 5 mg, Oral, Daily  senna-docusate sodium, 1 tablet, Oral, HS  sodium chloride (PF), 5 mL, Intravenous, Q12H INGRID    cefepime (MAXIPIME) 2 g/50 mL dextrose IVPB   Dose: 2,000 mg  Freq: Every 8 hours Route: IV  Last Dose: 2,000 mg (07/13/21 1703)  Start: 07/13/21 1800 End: 07/13/21 1707    potassium chloride (K-DUR,KLOR-CON) CR tablet 40 mEq   Dose: 40 mEq  Freq: Once Route: PO  Start: 07/14/21 0800 End: 07/14/21 0838    Continuous IV Infusions:  sodium chloride, 100 mL/hr, Intravenous, Continuous    PRN Meds: not used   acetaminophen, 650 mg, Oral, Q6H PRN  diazepam, 2 mg, Oral, Daily PRN  HYDROmorphone, 0 5 mg, Intravenous, Q3H PRN  ondansetron, 4 mg, Intravenous, Q4H PRN  oxyCODONE, 5 mg, Oral, Q4H PRN      IP CONSULT TO INFECTIOUS DISEASES  IP CONSULT TO ONCOLOGY    Network Utilization Review Department  ATTENTION: Please call with any questions or concerns to [phone] and carefully listen to the prompts so that you are directed to the right person   All voicemails are confidential   Zane Fields all requests for admission clinical reviews, approved or denied determinations and any other requests to dedicated fax number below belonging to the campus where the patient is receiving treatment   List of dedicated fax numbers for the Facilities:  [address] DENIALS (Administrative/Medical Necessity) [phone]   [address] (Maternity/NICU/Pediatrics) [phone]   [address] [place] [address] [address] [place] [address] [address] [facility] [place] [address] [phone]

## 2021-07-14 NOTE — PROGRESS NOTES
Progress Note - Infectious Disease   Rajinder Silverio 73 y o  male MRN: [id number]  Unit/Bed#: S -01 Encounter: [id number]      Impression/Plan:  1  Sepsis  POA with high fever and tachycardia and mild pancytopenia at present  Admission blood cultures pending negative to date  S/p chemo for #20 6 days ago  Patient reports flushing Perc Cholecystomy daily without issue  Low grade temp and chills this am  -Continue Unasyn 3 g IV q 6 hours for now   -follow-up cultures and adjust antibiotics as needed  -monitor temperature and hemodynamics  -serial exam  -recheck CBC and BMP in a m      2  Recently diagnosed pancreatic cancer   Diagnosed by EUS May 2021  Patient received chemotherapy 7/8/21 with D8 C2 Abraxane and Gemzar via Port  CT showing 2 liver lesions concerning for metastatic disease  -antibiotic as above  -monitor temperature and hemodynamics  -serial exam  -close oncology follow-up ongoing  -recheck CBC and BMP      3  Recent Acute cholecystitis   In setting of # 2/4   Status post cholecystostomy 5/12   Cultures with Enterococcus and patient completed Augmentin through 5/19/21 for 7 days total of enterococcal coverage     4   Recent Biliary obstruction   Due to #2   Status post recent ERCP, CBD stent 4/28  LFTs remain normal  CT with metallic CBD stent and Perc Cholecytostomy tube in place  2 new lesions on liver     5  Port placed 6/1/21 for chemo    -antibiotics as above  -follow up blood cultures      Antibiotics:  Unasyn abx D2     Above impression and plan discussed in detail with patient, daughter in law at bedside, RN, and primary care team     Subjective:  Patient had low grade temp and chills this am that resolved with blanket, no sweats; no nausea, vomiting, diarrhea with breakfast; no cough, shortness of breath; no pain  Just feels overall weak  He appears to be tolerating antibiotic      Objective:  Vitals:  Temp:  [97 4 °F (36 3 °C)-99 6 °F (37 6 °C)] 99 6 °F (37 6 °C)  HR:  [66-94] 94  Resp: [18-20] 18  BP: ()/(56-67) 113/57  SpO2:  [91 %-99 %] 91 %  Temp (24hrs), Av 5 °F (36 9 °C), Min:97 4 °F (36 3 °C), Max:99 6 °F (37 6 °C)  Current: Temperature: 99 6 °F (37 6 °C)    Physical Exam:   General Appearance:  Alert, interactive, nontoxic, sluggish, no acute distress  Throat: Oropharynx moist without lesions  Lungs:   Clear to auscultation bilaterally; no wheezes, rhonchi or rales; respirations unlabored   Heart:  RRR; no murmur   Abdomen:   Soft, non-tender at capped perc cholecystomy tube, protuberant, positive bowel sounds  Extremities: No clubbing, cyanosis or edema   : No mathews, no SPT   Skin: No new rashes or lesions  Left chest PORT accessed and nontender  No draining wounds noted  Labs, Imaging, & Other studies:   All pertinent labs and imaging studies were personally reviewed  Results from last 7 days   Lab Units 21  0539 21  0946   WBC Thousand/uL 2 82* 3 07*   HEMOGLOBIN g/dL 9 9* 10 5*   PLATELETS Thousands/uL 88* 107*     Results from last 7 days   Lab Units 21  0539 21  0947   SODIUM mmol/L 142 140   POTASSIUM mmol/L 3 3* 3 1*   CHLORIDE mmol/L 107 106   CO2 mmol/L 25 23   BUN mg/dL 11 23   CREATININE mg/dL 0 75 0 88   EGFR ml/min/1 73sq m 96 90   CALCIUM mg/dL 7 8* 7 3*   AST U/L 15 18   ALT U/L 28 34   ALK PHOS U/L 98 112     Results from last 7 days   Lab Units 21  0947   BLOOD CULTURE  Received in Microbiology Lab  Culture in Progress  Received in Microbiology Lab  Culture in Progress       Results from last 7 days   Lab Units 21  0547 21  1003   PROCALCITONIN ng/ml 2 72* 1 50*         Results from last 7 days   Lab Units 21  1003   FERRITIN ng/mL 625*

## 2021-07-14 NOTE — PLAN OF CARE
Problem: OCCUPATIONAL THERAPY ADULT  Goal: Performs self-care activities at highest level of function for planned discharge setting  See evaluation for individualized goals  Description: Treatment Interventions: ADL retraining, Functional transfer training, UE strengthening/ROM, Endurance training, Patient/family training, Equipment evaluation/education, Compensatory technique education, Continued evaluation, Energy conservation, Activityengagement  Equipment Recommended: Bedside commode, Shower/Tub chair with back ($), Reacher ($)       See flowsheet documentation for full assessment, interventions and recommendations  7/14/2021 1638 by Patricia Sawyer, OT  Note: Limitation: Decreased ADL status, Decreased Safe judgement during ADL, Decreased UE strength, Decreased endurance, Decreased high-level ADLs     Assessment: Pt is a 73 y o  male  [facility] on 7/13/2021 d/t fever  Pt previously admitted to [address] [place] [address] from 6/14/2021-6/21/2021 for abdominal pain  OT orders are documented  The pt's PMH impacting occupational performance includes anxiety, pancreatic cancer, HTN, RA, L tunneled venous port placement, DM2, acute cholecystitis, osteoporosis, cholecystostomy (5/12/21)  PTA pt lived in [facility] w/ wife  Pt reports he has a tub/shower unit w/ shower chair, standard toilet w/ safety rails  PTA pt I w/ ADLs, IADLs, functional mobility  Pt does not use AD at baseline  Deficit areas limiting the patient currently includes increased fatigue, decreased sitting tolerance/endurance, decreased standing tolerance/endurance, decreased safety awareness and limited insight into deficits  Personal factors impacting pt currently includes decreased activity endurance, generalized weakness  Currently the pt is engaging in eating tasks supine in bed w/ mod I  Pt demonstrated grooming tasks w/ mod I seated EOB  Pt engaged in UBD w/ mod I  Pt demonstrated bed mobility supine > sit w/ S   Pt engaged in functional transfers sit <> stand w/ S  Demonstrated functional mobility w/ close sup  Pt is currently functioning below baseline level of I and would benefit from occupational therapy services while admitted in acute care  When pt is medically stable, recommendation for discharge is home w/ home health services  OT will follow while in acute care  The patient's raw score on the AM-PAC Daily Activity inpatient short form is 22, standardized score is 47 1, greater than 39 4  Patients at this level are likely to benefit from discharge to home  Please refer to the recommendation of the Occupational Therapist for safe discharge planning  OT Discharge Recommendation: Home with home health rehabilitation       7/14/2021 1638 by Damaris Mckeon OT  Note: Limitation: Decreased ADL status, Decreased Safe judgement during ADL, Decreased UE strength, Decreased endurance, Decreased high-level ADLs     Assessment: Pt is a 73 y o  male  Pt admitted [facility] on 7/13/2021 d/t fever  Pt previously admitted to [facility] from 6/14/2021-6/21/2021 for abdominal pain  OT orders are documented  The pt's PMH impacting occupational performance includes anxiety, pancreatic cancer, HTN, RA, L tunneled venous port placement, DM2, acute cholecystitis, osteoporosis, cholecystostomy (5/12/21)  PTA pt lived in [address] [place] [address] w/ wife  Pt reports he has a tub/shower unit w/ shower chair, standard toilet w/ safety rails  PTA pt I w/ ADLs, IADLs, functional mobility  Pt does not use AD at baseline  Deficit areas limiting the patient currently includes increased fatigue, decreased sitting tolerance/endurance, decreased standing tolerance/endurance, decreased safety awareness and limited insight into deficits  Personal factors impacting pt currently includes decreased activity endurance, generalized weakness  Currently the pt is engaging in eating tasks supine in bed w/ mod I  Pt demonstrated grooming tasks w/ mod I seated EOB  Pt engaged in UBD w/ mod I   Pt demonstrated bed mobility supine > sit w/ S  Pt engaged in functional transfers sit <> stand w/ S  Demonstrated functional mobility w/ close sup  Pt is currently functioning below baseline level of I and would benefit from occupational therapy services while admitted in acute care  When pt is medically stable, recommendation for discharge is home w/ home health services  OT will follow while in acute care  The patient's raw score on the AM-PAC Daily Activity inpatient short form is 22, standardized score is 47 1, greater than 39 4  Patients at this level are likely to benefit from discharge to home  Please refer to the recommendation of the Occupational Therapist for safe discharge planning       OT Discharge Recommendation: Home with home health rehabilitation

## 2021-07-14 NOTE — PLAN OF CARE
Problem: Potential for Falls  Goal: Patient will remain free of falls  Description: INTERVENTIONS:  - Educate patient/family on patient safety including physical limitations  - Instruct patient to call for assistance with activity   - Consult OT/PT to assist with strengthening/mobility   - Keep Call bell within reach  - Keep bed low and locked with side rails adjusted as appropriate  - Keep care items and personal belongings within reach  - Initiate and maintain comfort rounds  - Make Fall Risk Sign visible to staff  - Offer Toileting every 2 Hours, in advance of need  - Initiate/Maintain alarm  - Obtain necessary fall risk management equipment:   - Apply yellow socks and bracelet for high fall risk patients  - Consider moving patient to room near nurses station  Outcome: Progressing     Problem: Nutrition/Hydration-ADULT  Goal: Nutrient/Hydration intake appropriate for improving, restoring or maintaining nutritional needs  Description: Monitor and assess patient's nutrition/hydration status for malnutrition  Collaborate with interdisciplinary team and initiate plan and interventions as ordered  Monitor patient's weight and dietary intake as ordered or per policy  Utilize nutrition screening tool and intervene as necessary  Determine patient's food preferences and provide high-protein, high-caloric foods as appropriate       INTERVENTIONS:  - Monitor oral intake, urinary output, labs, and treatment plans  - Assess nutrition and hydration status and recommend course of action  - Evaluate amount of meals eaten  - Assist patient with eating if necessary   - Allow adequate time for meals  - Recommend/ encourage appropriate diets, oral nutritional supplements, and vitamin/mineral supplements  - Order, calculate, and assess calorie counts as needed  - Recommend, monitor, and adjust tube feedings and TPN/PPN based on assessed needs  - Assess need for intravenous fluids  - Provide specific nutrition/hydration education as appropriate  - Include patient/family/caregiver in decisions related to nutrition  Outcome: Progressing     Problem: MOBILITY - ADULT  Goal: Maintain or return to baseline ADL function  Description: INTERVENTIONS:  -  Assess patient's ability to carry out ADLs; assess patient's baseline for ADL function and identify physical deficits which impact ability to perform ADLs (bathing, care of mouth/teeth, toileting, grooming, dressing, etc )  - Assess/evaluate cause of self-care deficits   - Assess range of motion  - Assess patient's mobility; develop plan if impaired  - Assess patient's need for assistive devices and provide as appropriate  - Encourage maximum independence but intervene and supervise when necessary  - Involve family in performance of ADLs  - Assess for home care needs following discharge   - Consider OT consult to assist with ADL evaluation and planning for discharge  - Provide patient education as appropriate  Outcome: Progressing  Goal: Maintains/Returns to pre admission functional level  Description: INTERVENTIONS:  - Perform BMAT or MOVE assessment daily    - Set and communicate daily mobility goal to care team and patient/family/caregiver  - Collaborate with rehabilitation services on mobility goals if consulted  - Perform Range of Motion 3 times a day  - Reposition patient every 2 hours    - Dangle patient 3 times a day  - Stand patient 3 times a day  - Ambulate patient 3 times a day  - Out of bed to chair 3 times a day   - Out of bed for meals 3 times a day  - Out of bed for toileting  - Record patient progress and toleration of activity level   Outcome: Progressing

## 2021-07-14 NOTE — ASSESSMENT & PLAN NOTE
Lab Results   Component Value Date    HGBA1C 6 2 (H) 05/05/2021   · Last A1c reflects excellent control  Monitor on Accu-Cheks but decrease Lantus from 20 units to 15 units and hold mealtime insulin due to hypoglycemia today    Can continue sliding scale coverage  · On Gabapentin for neuropathy

## 2021-07-14 NOTE — MALNUTRITION/BMI
This medical record reflects one or more clinical indicators suggestive of malnutrition and/or morbid obesity  Malnutrition Findings:   Adult Malnutrition type: Acute illness (in the setting of chronic illness)  Adult Degree of Malnutrition: Other severe protein calorie malnutrition (related to inadequate oral intake, catabolic illness)  Malnutrition Characteristics: Fat loss, Muscle loss, Weight loss (as evidenced by temporal indentation,  loss of subcutaneous fat and muscle extremities, 22% weight decrease x 4 months  Currently treated with oral supplementation )    BMI Findings: Body mass index is 24 87 kg/m²  See Nutrition note dated 7/14/2021 for additional details  Completed nutrition assessment is viewable in the nutrition documentation

## 2021-07-14 NOTE — CONSULTS
Vancomycin IV Pharmacy-to-Dose Consultation    Miley Dougherty is a 73 y o  male who was receiving Vancomycin IV with management by the Pharmacy Consult service for treatment of other sepsis  The patient's Vancomycin therapy has been completed / discontinued  Thank you for allowing us to take part in this patient's care  Pharmacy will sign-off now; please call or re-consult if there are any questions          Katey Gonzalez, PharmD  Pharmacist

## 2021-07-14 NOTE — ASSESSMENT & PLAN NOTE
Follows with medical/surgical oncology - s/p a cycle of neoadjuvant chemotherapy (Gemcitabine/Abraxane) last week  Known to palliative care -> consider inpatient consultation if pain uncontrolled  CT imaging in the ED today noting "New 3 5 x 1 8 cm low-attenuating lesion in the right hepatic lobe and increased size of a 1 9 x 1 3 cm low-attenuating lesion in the left hepatic lobe, concerning for metastatic disease  Stable pancreatic head neoplasm   Resolution of the previously reported paraduodenal collection and omental edema/infiltration " -> will appreciate medical oncology evaluation concerning new findings

## 2021-07-14 NOTE — OCCUPATIONAL THERAPY NOTE
Occupational Therapy Evaluation     Patient Name: John Valdez  CWTQI'H Date: 7/14/2021  Problem List  Principal Problem:    Sepsis of unknown etiology  Active Problems:    Type 2 diabetes mellitus with hypoglycemia ([place] [address] )    Hypokalemia    Recent cholecystitis    Metastatic pancreatic cancer    Generalized weakness    Pancytopenia due to chemotherapy    Past Medical History  Past Medical History:   Diagnosis Date    Anxiety     Arthritis     Cancer ([facility] )     pancreatic    Cellulitis     LAST ASSESSED: 6/13/14    Enlarged prostate     Epidermal inclusion cyst     LAST ASSESSED: 10/4/13    Erythrasma     LAST ASSESSED: 9/23/13    Furuncle     LAST ASSESSED: 6/11/14    GERD (gastroesophageal reflux disease)     Hyperlipidemia     Hypertension     RA (rheumatoid arthritis) ([place] [address] )      Past Surgical History  Past Surgical History:   Procedure Laterality Date    COLONOSCOPY      FL GUIDED CENTRAL VENOUS ACCESS DEVICE INSERTION  6/1/2021    HYDROCELE EXCISION / REPAIR Right 01/11/2018    SPERMATIC CORD EXCSION OF HYDROCELE; MANAGED BY: Telma Gutierrez    IR CHOLECYSTOSTOMY TUBE CHECK/CHANGE/REPOSITION/REINSERTION/UPSIZE  6/22/2021    IR CHOLECYSTOSTOMY TUBE PLACEMENT  5/11/2021    FL REMOVAL OF HYDROCELE,TUNICA,UNILAT Right 1/11/2018    Procedure: HYDROCELECTOMY;  Surgeon: Sully Washington MD;  [facility];  Service: Urology    SCROTAL SURGERY      benign "lump"    TUNNELED VENOUS PORT PLACEMENT Left 6/1/2021    Procedure: INSERTION VENOUS PORT (PORT-A-CATH); Surgeon: Topher Gomez MD;  [facility];  Service: Surgical Oncology         07/14/21 1309   OT Last Visit   OT Visit Date 07/14/21  (Wednesday)   Note Type   Note type Evaluation   Restrictions/Precautions   Weight Bearing Precautions Per Order No   Other Precautions Chair Alarm; Bed Alarm;Multiple lines; Fall Risk   Pain Assessment   Pain Assessment Tool Pain Assessment not indicated - pt denies pain  (Discomfort in lower abdomen)   Home Living   Type of [address]; Work area in [address]  unit   [address] [place];Grab bars around toilet  (safety rails around toilet)   [address]   (no  AD)   Additional Comments uses electrical scooters while shopping   Prior Function   Level of [place] Independent with ADLs and functional mobility   Lives With Spouse  (Currently in hospital also)   Receives Help From Family  (Children live close by)   [address] in the last 6 months 1 to 4  (3; one in basement, other 2 while on toilet)   Vocational Retired   Comments Pt reports I w/ ADLs, IADLs  Pt reports he cleans, cooks, and does the laundry  Pt explained he is in the basement alot working on shelving  Pt expressed he does not having difficulty w/ managing steps, at baseline does not use AD   Lifestyle   Autonomy Pt I w/ ADLs, IADLs, functional mobility   Reciprocal Relationships Lives w/ wife, has several children and grandchildren   Service to Others Pt was a  for [employer] services   Intrinsic Gratification Pt likes to work in basement and [place]   Psychosocial   Psychosocial (WDL) WDL   Subjective   Subjective "I love camping because if you sit at home all weekend you find something at home you have to do   Meanwhile I can sit in front of the fire from Friday night to Monday morning when I have to go back home "   ADL   Where Assessed Edge of bed   Eating Assistance 6  Modified independent   Eating Deficit Setup   Grooming Assistance 6  Modified Independent   Grooming Deficit Increased time to complete;Setup  (EOB)   UB Bathing Assistance Unable to assess   LB Bathing Assistance Unable to assess   UB Dressing Assistance 6  Modified independent   UB Dressing Deficit Increased time to complete   LB Dressing Assistance 4  Minimal Assistance   LB Dressing Deficit Setup;Supervision/safety; Increased time to complete   Toileting Assistance    (Denied need to void)   Additional Comments Upon entering, pt completed lunch w/ mod I  Pt demonstrated UBD w/ mod I seated EOB  Anticipate pt will complete LBD w/ min A for pulling clothing over hips  Bed Mobility   Supine to Sit 5  Supervision   Additional items Assist x 1;HOB elevated; Impulsive;Verbal cues   Additional Comments Upon arrival, pt supine in bed w/ HOB elevated  End session, pt seated in chair  Transfers   Sit to Stand 5  Supervision   Additional items Assist x 1; Increased time required;Armrests; Bedrails   Stand to Sit 5  Supervision   Additional items Assist x 1; Increased time required;Verbal cues;Armrests   Toilet transfer Unable to assess   Additional Comments Pt demonstrated STS w/ S, pt did not report dizziness, lightheadedness w/ positional change   Functional Mobility   Functional Mobility 5  Supervision  (close)   Additional Comments pt demonstrated functional mobility w/ close sup  Pt did not use AD, no LOB or complaints of lightheadedness or dizziness   Additional items   (no AD)   Balance   Static Sitting Good   Static Standing Fair +   Ambulatory Fair +   Activity Tolerance   Activity Tolerance Patient limited by fatigue   Medical Staff Made Aware care coordinated w/ PT, [address]   Nurse Made Aware Per RNJohann pt appropriate to see   RUE Assessment   RUE Assessment WFL   RUE Strength   RUE Overall Strength Within Functional Limits - able to perform ADL tasks with strength   LUE Assessment   LUE Assessment WFL   LUE Strength   LUE Overall Strength Within Functional Limits - able to perform ADL tasks with strength   Hand Function   Gross Motor Coordination Functional   Fine Motor Coordination Functional   Sensation   Light Touch No apparent deficits   Cognition   Overall Cognitive Status WFL   Arousal/Participation Alert; Cooperative   Attention Within functional limits   Orientation Level Oriented to person;Oriented to place   Memory Within functional limits   Following Commands Follows one step commands without difficulty   Comments Pt identified w/ full name,   Pt agreeable to session, pleasant and cooperative  Pt expressed his wife was in the hospital and could recall what room/reason for admission  Pt was able to report social history/ home set up, reported where he likes to [place]  Assessment   Limitation Decreased ADL status; Decreased Safe judgement during ADL;Decreased UE strength;Decreased endurance;Decreased high-level ADLs   Assessment Pt is a 73 y o  male  [facility] d/t fever  Pt previously admitted to [facility] from 2021-20[facility] for abdominal pain  OT orders are documented  The pt's PMH impacting occupational performance includes anxiety, pancreatic cancer, HTN, RA, L tunneled venous port placement, DM2, acute cholecystitis, osteoporosis, cholecystostomy (21)  PTA pt lived in [address] [place] [address] w/ wife  Pt reports he has a tub/shower unit w/ shower chair, standard toilet w/ safety rails  PTA pt I w/ ADLs, IADLs, functional mobility  Pt does not use AD at baseline  Deficit areas limiting the patient currently includes increased fatigue, decreased sitting tolerance/endurance, decreased standing tolerance/endurance, decreased safety awareness and limited insight into deficits  Personal factors impacting pt currently includes decreased activity endurance, generalized weakness  Currently the pt is engaging in eating tasks supine in bed w/ mod I  Pt demonstrated grooming tasks w/ mod I seated EOB  Pt engaged in UBD w/ mod I  Pt demonstrated bed mobility supine > sit w/ S  Pt engaged in functional transfers sit <> stand w/ S  Demonstrated functional mobility w/ close sup  Pt is currently functioning below baseline level of I and would benefit from occupational therapy services while admitted in acute care   When pt is medically stable, recommendation for discharge is home w/ home health services  OT will follow while in acute care  The patient's raw score on the AM-PAC Daily Activity inpatient short form is 22, standardized score is 47 1, greater than 39 4  Patients at this level are likely to benefit from discharge to home  Please refer to the recommendation of the Occupational Therapist for safe discharge planning  Goals   Patient Goals to go camping   Plan   Treatment Interventions ADL retraining;Functional transfer training;UE strengthening/ROM; Endurance training;Patient/family training;Equipment evaluation/education; Compensatory technique education;Continued evaluation; Energy conservation; Activityengagement   Goal Expiration Date 07/19/21   OT Frequency 2-3x/wk   Additional Treatment Session   Start Time 5774   End Time 8064   Treatment Assessment Pt seen for skilled OT session from [id number]  Pt cooperative and agreeable to session  Pt demonstrated functional transfers STS w/ S, functional mobility using no AD w/ close supervision from chair <> bathroom  Pt engaged in oral hygiene standing at sink w/ close supervision and chair placement behind him in case he needed a break  Pt completed oral hygiene task w/ no breaks, standing for 3-5 minutes  Pt demonstrated fair+ standing balance while completing ADL task  During session activity tolerance/ endurance was challenged, continued assessment of functional mobility and standing tolerance/endurance  Throughout tx session, pt did not report fatigue, lightheaded, or dizziness  End session, pt seated in chair w/ call bell, personal phone within reach, chair alarm activated, and needs met  Pt declined hospital phone to be near him  OT will continue to follow while in acute care  Continue to recommend home w/ home health services at this time  Additional Treatment Day 1   Recommendation   OT Discharge Recommendation Home with home health rehabilitation   Equipment Recommended Bedside commode; Shower/Tub chair with back ($);Reacher ($)   Commode Type Standard   AM-PAC Daily Activity Inpatient   Lower Body Dressing 3   Bathing 3   Toileting 4   Upper Body Dressing 4   Grooming 4   Eating 4   Daily Activity Raw Score 22   Daily Activity Standardized Score (Calc for Raw Score >=11) 47  1   AM-PAC Applied Cognition Inpatient   Following a Speech/Presentation 3   Understanding Ordinary Conversation 4   Taking Medications 3   Remembering Where Things Are Placed or Put Away 3   Remembering List of 4-5 Errands 3   Taking Care of Complicated Tasks 3   Applied Cognition Raw Score 19   Applied Cognition Standardized Score 39 77   Barthel Index   Feeding 10   Bathing 0   Grooming Score 5   Dressing Score 5   Bladder Score 10   Bowels Score 10   Toilet Use Score 5   Transfers (Bed/Chair) Score 10   Mobility (Level Surface) Score 0   Stairs Score 0   Barthel Index Score 55           Goals:  Bed mobility:    -Pt will demonstrate bed mobility supine <> sit w/ mod I to return home, max I w/ ADLs    Functional Mobility/Transfers:    -Pt will engage in functional mobility of household distances using least restrictive device w/ mod I to reduce caregiver burden, max I w/ ADLs    -Pt will actively engage and initiate functional transfers to chair, bed, and toilet w/ mod I to increase occupational performance    -Pt will actively attend and engage in education of pacing and energy conservation w/ 2 verbal prompts or cues to reduce risk of readmission, decrease risk of falls    ADL tasks:    -Pt will demonstrate grooming tasks standing at sink w/ mod I to max I w/ ADLs    -Pt will engage in LBD w/ mod I using LHAE as needed to reduce caregiver burden, increase occupational performance    -Pt will complete toileting w/ mod I to return home    Cognition:    -Pt will consistently follow and accurately follow multi-step directions w/o additional vcs or prompts to optimize ADL performance      Ubaldo Villar , OTS

## 2021-07-14 NOTE — PLAN OF CARE
Problem: PHYSICAL THERAPY ADULT  Goal: Performs mobility at highest level of function for planned discharge setting  See evaluation for individualized goals  Description: Treatment/Interventions: Functional transfer training, LE strengthening/ROM, Therapeutic exercise, Endurance training, Equipment eval/education, Bed mobility, Gait training, Patient/family training, Cognitive reorientation, Elevations, Spoke to nursing, Spoke to case management, OT  Equipment Recommended:  (? cane PRN)       See flowsheet documentation for full assessment, interventions and recommendations  Note: Prognosis: Fair  Problem List: Decreased strength, Decreased endurance, Impaired balance, Decreased mobility (gait deviations)  Assessment:  Pt is a 73 y o  male seen for PT evaluation s/p [facility] w/ Sepsis ([facility] )  Order placed for PT  Prior to admission patient lived in a 2 story home with wife, independent with functional mobility including going down to the basement, but did have 3 reported falls in the last 6 months  Upon evaluation: Pt did not require physical assistance for bed mobility, transfers, nor ambulation the room  Pt's clinical presentation is currently unstable/unpredictable given the functional mobility deficits above, especially (but not limited to) weakness, gait deviations and decreased functional mobility tolerance, coupled with fall risks including hx of falls and impaired balance, and combined with medical complications of tachycardia, abnormal H&H, abnormal WBCs, abnormal potassium values and Fever, abnormal platelets, readmission  Pt to benefit from continued skilled PT tx while in hospital and upon DC to address deficits as defined above and maximize level of functional mobility  Recommend intermittent trials with single-point cane in next 1-2 sessions, high-level balance activities, stairs performance     Barriers to Discharge: Inaccessible home environment        PT Discharge Recommendation:  (home +/- PT)          See flowsheet documentation for full assessment

## 2021-07-15 PROBLEM — E43 SEVERE PROTEIN-CALORIE MALNUTRITION (HCC): Status: ACTIVE | Noted: 2021-01-01

## 2021-07-15 NOTE — ASSESSMENT & PLAN NOTE
Malnutrition Findings:   Adult Malnutrition type: Acute illness (in the setting of chronic illness)  Adult Degree of Malnutrition: Other severe protein calorie malnutrition (related to inadequate oral intake, catabolic illness)    BMI Findings: Body mass index is 24 87 kg/m²

## 2021-07-15 NOTE — CASE MANAGEMENT
LOS: 2 days  Readmission: Yes  Risk of Readmission: Red  Bundle: No    CM met with pt and his sister at bedside  CM introduced self/role with dcp  Pt reside with his wife in a 2 story home with 3-4 GREG  Pt reports independent with ADLS and ambulation PTA  Owns a SPC and RW  Pt reports he no longer drives and family assists with transportation  Pt was working but unsure if he will return  [facility]  No hx of STR, MH, or drug/alcohol abuse  [facility]  Family to transport home  Pt requested to resume services with  [facility] at d/c  Aware pt will receive RN and CM will request PT  Pt reports he returned to the hospital due to a 105 temp  Pt reports he understood who to call if he had questions  Was able to attend f/u appointments and had no issues with his medication  [facility]  CM reviewed d/c planning process including the following: identifying help at home, patient preference for d/c planning needs, Discharge Lounge, [facility] Meds to Bed program, availability of treatment team to discuss questions or concerns patient and/or family may have regarding understanding medications and recognizing signs and symptoms once discharged  CM also encouraged patient to follow up with all recommended appointments after discharge  Patient advised of importance for patient and family to participate in managing patients medical well being

## 2021-07-15 NOTE — PROGRESS NOTES
Progress Note - Infectious Disease   Sabrina Ham 73 y o  male MRN: [id number]  Unit/Bed#: S -01 Encounter: [id number]      Impression/Plan:  1  Sepsis  POA with high fever and tachycardia and mild pancytopenia improving  Admission blood cultures now 1 of 2 positive for Gram-positive cocci in chains  S/p chemo for #2 7days ago via port  Patient reports port and Perc Cholecystomy  are flushing well without issue  No fever chills overnight   -discontinue Unasyn   -switch to IV vancomycin and cefazolin pending blood culture identification and sensitivities  -repeat blood cultures in a m   -check 2D echo  -monitor temperature and hemodynamics  -serial exam  -recheck CBC and BMP in a m      2  Gram positive bacteremia  Admission blood cultures with 1 of 2 sets growing Gram-positive cocci in chains   -switch to IV vancomycin and cefazolin pending blood culture identification and sensitivities  -repeat blood cultures in a m   -check 2D echo    3  Recently diagnosed pancreatic cancer   Diagnosed by EUS May 2021  Patient received chemotherapy 7/8/21 with D8 C2 Abraxane and Gemzar via Port  CT showing 2 liver lesions concerning for metastatic disease  -antibiotic as above  -monitor temperature and hemodynamics  -serial exam  -close oncology follow-up ongoing  -recheck CBC and BMP      4  Recent Acute cholecystitis   In setting of # 2/4   Status post cholecystostomy 5/12   Cultures with Enterococcus and patient completed Augmentin through 5/19/21 for 7 days total of enterococcal coverage     5   Recent Biliary obstruction   Due to #2   Status post recent ERCP, CBD stent 4/28  LFTs remain normal  CT with metallic CBD stent and Perc Cholecytostomy tube in place  2 new lesions on liver     6   Port placed 6/1/21 for chemo    -antibiotics as above  -follow up pending blood cultures   -may need to be removed     Antibiotics:  Vancomycin/Cefazolin D1  abx D3     Above impression and plan discussed in detail with patient, MARISA, and LOYDA Rosas      Subjective:  Patient denies fever, chills, sweats overnight; no nausea, vomiting, diarrhea with breakfast; no cough, shortness of breath; no pain  Feeling less weak  Continues with lower abdominal pressure  He reports his percutaneous colostomy tube and port or flushing well  He appears to be tolerating antibiotic  Objective:  Vitals:  Temp:  [98 5 °F (36 9 °C)-98 7 °F (37 1 °C)] 98 6 °F (37 °C)  HR:  [70-82] 82  Resp:  [18] 18  BP: (117-136)/(72-74) 123/74  SpO2:  [92 %-97 %] 92 %  Temp (24hrs), Av 6 °F (37 °C), Min:98 5 °F (36 9 °C), Max:98 7 °F (37 1 °C)  Current: Temperature: 98 6 °F (37 °C)    Physical Exam:   General Appearance:  Alert, interactive, nontoxic, no acute distress, resting in bed   Throat: Oropharynx moist without lesions  Lungs:   Clear to auscultation bilaterally; no wheezes, rhonchi or rales; respirations unlabored   Heart:  RRR; no murmur   Abdomen:   Soft, non-tender at capped percutaneous cholecystostomy tube, protuberant positive bowel sounds  Extremities: No clubbing, cyanosis or edema   : No Mccann, no SPT   Skin: No new rashes or lesions  Left chest port accessed and nontender  No draining wounds noted         Labs, Imaging, & Other studies:   All pertinent labs and imaging studies were personally reviewed  Results from last 7 days   Lab Units 07/15/21  0510 21  0539 21  0946   WBC Thousand/uL 4 25* 2 82* 3 07*   HEMOGLOBIN g/dL 9 4* 9 9* 10 5*   PLATELETS Thousands/uL 99* 88* 107*     Results from last 7 days   Lab Units 07/15/21  0510 21  0539 21  0947   SODIUM mmol/L 144 142 140   POTASSIUM mmol/L 3 3* 3 3* 3 1*   CHLORIDE mmol/L 109* 107 106   CO2 mmol/L 26 25 23   BUN mg/dL 13 11 23   CREATININE mg/dL 0 65 0 75 0 88   EGFR ml/min/1 73sq m 102 96 90   CALCIUM mg/dL 7 8* 7 8* 7 3*   AST U/L 11 15 18   ALT U/L 25 28 34   ALK PHOS U/L 94 98 112     Results from last 7 days   Lab Units 21  0947   BLOOD CULTURE  No Growth at 24 hrs     GRAM STAIN RESULT  Gram positive cocci in chains*     Results from last 7 days   Lab Units 07/14/21  0547 07/13/21  1003   PROCALCITONIN ng/ml 2 72* 1 50*         Results from last 7 days   Lab Units 07/13/21  1003   FERRITIN ng/mL 625*

## 2021-07-15 NOTE — ASSESSMENT & PLAN NOTE
· Presented with a high-grade fever coupled with tachycardia/leukopenia - lactic acid normal and procalcitonin elevated at 2 72  · Urinalysis negative for infectious etiology  CXR negative for acute cardiopulmonary etiology  CT of abdomen/pelvis without obvious source of infection  · Blood cultures 1 of 2 positive GPC, awaiting final  Maintain port for now  · Notably, patient has a cholecystostomy tube in place from May 2021 for cholecystitis at the time s/p antibiotic course  · Empiric IV Vancomycin/Cefepime given in the ED -> transitioned to Unasyn per Infectious Disease due to recent cultures a few months back (grew Enterococcus faecalis at the time)  · Appreciate consultation from Infectious Disease -> consider translocation into the GI tract from recent chemotherapy and/or new metastatic sites in liver      · Can stop IVF now in order to promote better sleep at night and eliminate frequent urination

## 2021-07-15 NOTE — ASSESSMENT & PLAN NOTE
Appreciate oncology input-- Monitor CBC and transfuse if necessary   Currently stable  Monitor for neutropenia

## 2021-07-15 NOTE — ASSESSMENT & PLAN NOTE
Lab Results   Component Value Date    HGBA1C 6 2 (H) 05/05/2021   · Last A1c reflects excellent control  Monitor on Accu-Cheks but decrease Lantus to 10 units and hold mealtime insulin due to hypoglycemia    Can continue sliding scale coverage  · On Gabapentin for neuropathy

## 2021-07-15 NOTE — PROGRESS NOTES
[facility]  Progress Note - Sisi Benjamin 1955, 73 y o  male MRN: [id number]  Unit/Bed#: S -61 Encounter: [id number]  Primary Care Provider: Britta Rhoades MD   Date and time admitted to hospital: 7/13/2021  9:40 AM    * Sepsis of unknown etiology  Assessment & Plan  · Presented with a high-grade fever coupled with tachycardia/leukopenia - lactic acid normal and procalcitonin elevated at 2 72  · Urinalysis negative for infectious etiology  CXR negative for acute cardiopulmonary etiology  CT of abdomen/pelvis without obvious source of infection  · Blood cultures 1 of 2 positive GPC, awaiting final  Maintain port for now  · Notably, patient has a cholecystostomy tube in place from May 2021 for cholecystitis at the time s/p antibiotic course  · Empiric IV Vancomycin/Cefepime given in the ED -> transitioned to Unasyn per Infectious Disease due to recent cultures a few months back (grew Enterococcus faecalis at the time)  · Appreciate consultation from Infectious Disease -> consider translocation into the GI tract from recent chemotherapy and/or new metastatic sites in liver  · Can stop IVF now in order to promote better sleep at night and eliminate frequent urination    Pancytopenia due to chemotherapy  Assessment & Plan  Appreciate oncology input-- Monitor CBC and transfuse if necessary  Currently stable  Monitor for neutropenia    Metastatic pancreatic cancer  Assessment & Plan  Follows with medical/surgical oncology - s/p a cycle of neoadjuvant chemotherapy (Gemcitabine/Abraxane) last week  Known to palliative care -> consider inpatient consultation if pain uncontrolled  CT imaging in the ED today noting "New 3 5 x 1 8 cm low-attenuating lesion in the right hepatic lobe and increased size of a 1 9 x 1 3 cm low-attenuating lesion in the left hepatic lobe, concerning for metastatic disease  Stable pancreatic head neoplasm   Resolution of the previously reported paraduodenal collection and omental edema/infiltration " -> appreciate medical oncology evaluation     Hypokalemia  Assessment & Plan  · Monitor and replete electrolytes as needed, additional K supplement needed again today    Type 2 diabetes mellitus with hypoglycemia [facility])  Assessment & Plan  Lab Results   Component Value Date    HGBA1C 6 2 (H) 05/05/2021   · Last A1c reflects excellent control  Monitor on Accu-Cheks but decrease Lantus to 10 units and hold mealtime insulin due to hypoglycemia  Can continue sliding scale coverage  · On Gabapentin for neuropathy    Severe protein-calorie malnutrition (HCC)  Assessment & Plan  Malnutrition Findings:   Adult Malnutrition type: Acute illness (in the setting of chronic illness)  Adult Degree of Malnutrition: Other severe protein calorie malnutrition (related to inadequate oral intake, catabolic illness)    BMI Findings: Body mass index is 24 87 kg/m²  Recent cholecystitis  Assessment & Plan  Hospitalized in May 2021 s/p cholecystostomy tube placement on 5/11/21 - completed a course of antibiotics at the time  PRN pain control - supportive care otherwise    Generalized weakness  Assessment & Plan  Multifactorial secondary to sepsis coupled with metastatic cancer (see plan for individual assessments)  PT/OT as tolerated    VTE Pharmacologic Prophylaxis:   Pharmacologic: Enoxaparin (Lovenox)  Mechanical VTE Prophylaxis in Place: Yes    Patient Centered Rounds: I have performed bedside rounds with nursing staff today  Discussions with Specialists or Other Care Team Provider: rounded with ID, spoke with case mgmt    Education and Discussions with Family / Patient: called son again and left voicemail    Time Spent for Care: 30 minutes  More than 50% of total time spent on counseling and coordination of care as described above      Current Length of Stay: 2 day(s)    Current Patient Status: Inpatient   Certification Statement: The patient will continue to require additional inpatient hospital stay due to sepsis, awaiting blood cultures    Discharge Plan: not stable for dc of yet, will depend on antbx needs    Code Status: Level 1 - Full Code    Subjective:   Patient denies any abdominal pain, just reports mild discomfort/pressure  No shortness of breath  No fever or chills  States that overall he feels a bit better and not as weak as he was before  He did not sleep well last night because he was peeing a lot and we agreed his IV fluids could be discontinued at this time especially since he is eating and drinking well  He is passing urine and having bowel movements  Objective:     Vitals:   Temp (24hrs), Av 6 °F (37 °C), Min:98 5 °F (36 9 °C), Max:98 7 °F (37 1 °C)    Temp:  [98 5 °F (36 9 °C)-98 7 °F (37 1 °C)] 98 6 °F (37 °C)  HR:  [70-82] 82  Resp:  [18] 18  BP: (117-136)/(72-74) 123/74  SpO2:  [92 %-97 %] 92 %  Body mass index is 24 87 kg/m²  Input and Output Summary (last 24 hours): Intake/Output Summary (Last 24 hours) at 7/15/2021 1239  Last data filed at 7/15/2021 1148  Gross per 24 hour   Intake 3503 34 ml   Output 3400 ml   Net 103 34 ml       Physical Exam:     Physical Exam  Vitals reviewed  Constitutional:       General: He is not in acute distress  Appearance: Normal appearance  He is not ill-appearing, toxic-appearing or diaphoretic  Comments: Well-appearing nontoxic and seen sitting up in bed, appears improved compared to yesterday   Eyes:      General: No scleral icterus  Right eye: No discharge  Left eye: No discharge  Conjunctiva/sclera: Conjunctivae normal    Cardiovascular:      Rate and Rhythm: Normal rate and regular rhythm  Heart sounds: No murmur heard  Pulmonary:      Effort: Pulmonary effort is normal  No respiratory distress  Breath sounds: No stridor  No wheezing, rhonchi or rales  Abdominal:      General: Bowel sounds are normal  There is no distension        Palpations: Abdomen is soft  Tenderness: There is no abdominal tenderness  There is no guarding  Musculoskeletal:         General: No swelling, tenderness, deformity or signs of injury  Right lower leg: No edema  Left lower leg: No edema  Skin:     General: Skin is warm and dry  Coloration: Skin is not jaundiced or pale  Findings: No bruising, erythema, lesion or rash  Neurological:      General: No focal deficit present  Mental Status: He is alert  Mental status is at baseline  Comments: Awake alert interactive good historian   Psychiatric:         Mood and Affect: Mood normal          Behavior: Behavior normal          Thought Content: Thought content normal          Judgment: Judgment normal        Additional Data:     Labs:    Results from last 7 days   Lab Units 07/15/21  0510   WBC Thousand/uL 4 25*   HEMOGLOBIN g/dL 9 4*   HEMATOCRIT % 29 4*   PLATELETS Thousands/uL 99*   BANDS PCT % 1   LYMPHO PCT % 17   MONO PCT % 5   EOS PCT % 2     Results from last 7 days   Lab Units 07/15/21  0510   SODIUM mmol/L 144   POTASSIUM mmol/L 3 3*   CHLORIDE mmol/L 109*   CO2 mmol/L 26   BUN mg/dL 13   CREATININE mg/dL 0 65   ANION GAP mmol/L 9   CALCIUM mg/dL 7 8*   ALBUMIN g/dL 1 9*   TOTAL BILIRUBIN mg/dL 0 46   ALK PHOS U/L 94   ALT U/L 25   AST U/L 11   GLUCOSE RANDOM mg/dL 81     Results from last 7 days   Lab Units 07/13/21  0947   INR  1 25*     Results from last 7 days   Lab Units 07/15/21  1056 07/15/21  0729 07/14/21  2124 07/14/21  1630 07/14/21  1058 07/14/21  0805 07/13/21  2105 07/13/21  1650 07/13/21  1601   POC GLUCOSE mg/dl 229* 71 281* 248* 109 135 200* 122 67         Results from last 7 days   Lab Units 07/14/21  0547 07/13/21  1003 07/13/21  0947   LACTIC ACID mmol/L  --   --  1 8   PROCALCITONIN ng/ml 2 72* 1 50*  --      * I Have Reviewed All Lab Data Listed Above  * Additional Pertinent Lab Tests Reviewed:  [address]au [address] Admission Reviewed    Imaging:    Imaging Reports Reviewed Today Include:   Imaging Personally Reviewed by Myself Includes:      Recent Cultures (last 7 days):     Results from last 7 days   Lab Units 07/13/21  0947   BLOOD CULTURE  No Growth at 24 hrs  GRAM STAIN RESULT  Gram positive cocci in chains*       Last 24 Hours Medication List:   Current Facility-Administered Medications   Medication Dose Route Frequency Provider Last Rate    acetaminophen  650 mg Oral Q6H PRN Ramy Lamar MD      aspirin  81 mg Oral Daily Ramy Lamar MD      cefazolin  2,000 mg Intravenous Q8H Nicolás Zacarias MD      cyanocobalamin  1,000 mcg Oral Daily Kenia Nobles PA-C      diazepam  2 mg Oral Daily PRN Ramy Lamar MD      enoxaparin  40 mg Subcutaneous Q24H [address] Ramy Lamar MD      FLUoxetine  20 mg Oral Daily Ramy Lamar MD      folic acid  1 mg Oral Daily Ramy Lamar MD      gabapentin  200 mg Oral TID Ramy Lamar MD      HYDROmorphone  0 5 mg Intravenous Q3H PRN MD Nikolai Hansen insulin glargine  10 Units Subcutaneous HS Kena Brewster PA-C      insulin lispro  1-6 Units Subcutaneous 4x Daily (AC & HS) Ramy Lamar MD      melatonin  3 mg Oral HS Kena Brewster PA-C      ondansetron  4 mg Intravenous Q4H PRN MD Nikolai Hansen oxyCODONE  5 mg Oral Q4H PRN Ramy Laamr MD      pancrelipase (Lip-Prot-Amyl)  24,000 Units Oral TID With Meals Ramy Lamar MD      pantoprazole  40 mg Oral BID MD Nikolai Hansen predniSONE  20 mg Oral Daily Ramy Lamar MD      predniSONE  5 mg Oral Daily Ramy Lamar MD      senna-docusate sodium  1 tablet Oral HS Ramy Lamar MD      sodium chloride (PF)  5 mL Intravenous Q12H [address] Ramy Lamar MD      vancomycin  15 mg/kg Intravenous Q12H Nicolás Zacarias MD          Today, Patient Was Seen By: Rey Terry PA-C    ** Please Note: Dictation voice to text software may have been used in the creation of this document   **

## 2021-07-15 NOTE — PROGRESS NOTES
Vancomycin Assessment    Sam Pierre is a 73 y o  male who is currently receiving vancomycin 15mg/kg/dose q12h for bacteremia sepsis   Relevant clinical data and objective history reviewed:  Creatinine   Date Value Ref Range Status   07/15/2021 0 65 0 60 - 1 30 mg/dL Final     Comment:     Standardized to IDMS reference method   07/14/2021 0 75 0 60 - 1 30 mg/dL Final     Comment:     Standardized to IDMS reference method   07/13/2021 0 88 0 60 - 1 30 mg/dL Final     Comment:     Standardized to IDMS reference method   09/01/2017 1 11 0 76 - 1 27 mg/dL Final   01/03/2017 0 94 0 76 - 1 27 mg/dL Final   11/26/2016 0 96 0 76 - 1 27 mg/dL Final     /74 (BP Location: Left arm)   Pulse 82   Temp 98 6 °F (37 °C) (Oral)   Resp 18   Ht 5' 9" (1 753 m)   Wt 76 4 kg (168 lb 6 9 oz)   SpO2 92%   BMI 24 87 kg/m²   I/O last 3 completed shifts: In: 2101 7 [I V :1996 7; NG/GT:5; IV Piggyback:100]  Out: 3875 [Urine:3875]  Lab Results   Component Value Date/Time    BUN 13 07/15/2021 05:10 AM    BUN 17 06/28/2021 09:46 AM    WBC 4 25 (L) 07/15/2021 05:10 AM    WBC 7 8 09/01/2017 07:17 AM    HGB 9 4 (L) 07/15/2021 05:10 AM    HGB 15 0 09/01/2017 07:17 AM    HCT 29 4 (L) 07/15/2021 05:10 AM    HCT 43 0 09/01/2017 07:17 AM    MCV 90 07/15/2021 05:10 AM    MCV 94 09/01/2017 07:17 AM    PLT 99 (L) 07/15/2021 05:10 AM     09/01/2017 07:17 AM     Temp Readings from Last 3 Encounters:   07/15/21 98 6 °F (37 °C) (Oral)   07/08/21 97 8 °F (36 6 °C) (Temporal)   07/06/21 (!) 97 4 °F (36 3 °C) (Temporal)     Vancomycin Days of Therapy: 1    Assessment/Plan  The patient is currently on vancomycin utilizing scheduled dosing based on actual body weight  Baseline risks associated with therapy include: concomitant nephrotoxic medications and advanced age  The patient is currently receiving 15mg/kg/dose q12h and after clinical evaluation will be changed to 20mg/kg/dose q12h    Pharmacy will also follow closely for s/sx of nephrotoxicity, infusion reactions, and appropriateness of therapy  BMP and CBC will be ordered per protocol  Plan for trough as patient approaches steady state, prior to the 5th  dose at approximately 1230 on 07/17  Due to infection severity, will target a trough of 15-20 (appropriate for most indications)   Pharmacy will continue to follow the patients culture results and clinical progress daily      Corbin Alfredo, Pharmacist

## 2021-07-15 NOTE — ASSESSMENT & PLAN NOTE
Follows with medical/surgical oncology - s/p a cycle of neoadjuvant chemotherapy (Gemcitabine/Abraxane) last week  Known to palliative care -> consider inpatient consultation if pain uncontrolled  CT imaging in the ED today noting "New 3 5 x 1 8 cm low-attenuating lesion in the right hepatic lobe and increased size of a 1 9 x 1 3 cm low-attenuating lesion in the left hepatic lobe, concerning for metastatic disease  Stable pancreatic head neoplasm   Resolution of the previously reported paraduodenal collection and omental edema/infiltration " -> appreciate medical oncology evaluation

## 2021-07-15 NOTE — UTILIZATION REVIEW
Continued Stay Review    Date: 7/15/2021                         Current Patient Class:  Inpatient   Current Level of Care: med surg     HPI:66 y o  male initially admitted on 7/13/2021 inpatient due to sepsis of unknown etiology  Has metastatic pancreatic cancer and on treatment, recent treatment cholecystitis in May and ashli tube placed    Assessment/Plan: 7/15/2021:  Patient has abdominal discomfort, pressure  Feels less weak  Poor sleep  Wbc 4 24   K 3 3  Blood cultures + 1/2 for gram positive cocci in chains  Plan is dc Unasyn  switch to IV vancomycin and cefazolin pending blood culture identification and sensitivities  Repeat blood cultures  Check echo  Replete K   Dc IVF        Vital Signs:   07/15/21 1501  99 2 °F (37 3 °C)  80  18  136/77  100  95 %  None (Room air)  Lying   07/15/21 0700  98 6 °F (37 °C)  82  18  123/74  93  92 %  None (Room air)         Pertinent Labs/Diagnostic Results:   Results from last 7 days   Lab Units 07/15/21  0510 07/14/21  0539 07/13/21  0946   WBC Thousand/uL 4 25* 2 82* 3 07*   HEMOGLOBIN g/dL 9 4* 9 9* 10 5*   HEMATOCRIT % 29 4* 31 8* 32 4*   PLATELETS Thousands/uL 99* 88* 107*   BANDS PCT % 1 4 16*     Results from last 7 days   Lab Units 07/15/21  0510 07/14/21  0539 07/13/21  0947   SODIUM mmol/L 144 142 140   POTASSIUM mmol/L 3 3* 3 3* 3 1*   CHLORIDE mmol/L 109* 107 106   CO2 mmol/L 26 25 23   ANION GAP mmol/L 9 10 11   BUN mg/dL 13 11 23   CREATININE mg/dL 0 65 0 75 0 88   EGFR ml/min/1 73sq m 102 96 90   CALCIUM mg/dL 7 8* 7 8* 7 3*     Results from last 7 days   Lab Units 07/15/21  0510 07/14/21  0539 07/13/21  0947   AST U/L 11 15 18   ALT U/L 25 28 34   ALK PHOS U/L 94 98 112   TOTAL PROTEIN g/dL 5 1* 5 1* 5 8*   ALBUMIN g/dL 1 9* 2 0* 2 5*   TOTAL BILIRUBIN mg/dL 0 46 0 67 0 66     Results from last 7 days   Lab Units 07/15/21  1056 07/15/21  0729 07/14/21  2124 07/14/21  1630 07/14/21  1058 07/14/21  0805 07/13/21  2105 07/13/21  1650 07/13/21  1601 POC GLUCOSE mg/dl 229* 71 281* 248* 109 135 200* 122 67     Results from last 7 days   Lab Units 07/15/21  0510 07/14/21  0539 07/13/21  0947   GLUCOSE RANDOM mg/dL 81 57* 77     Results from last 7 days   Lab Units 07/13/21  0947   TROPONIN I ng/mL <0 02     Results from last 7 days   Lab Units 07/13/21  0947   PROTIME seconds 15 8*   INR  1 25*   PTT seconds 25     Results from last 7 days   Lab Units 07/14/21  0547 07/13/21  1003   PROCALCITONIN ng/ml 2 72* 1 50*     Results from last 7 days   Lab Units 07/13/21  0947   LACTIC ACID mmol/L 1 8     Results from last 7 days   Lab Units 07/13/21  1003   FERRITIN ng/mL 625*     Results from last 7 days   Lab Units 07/13/21  1102   CLARITY UA  Clear   COLOR UA  Light Yellow   SPEC GRAV UA  1 015   PH UA  6 0   GLUCOSE UA mg/dl Negative   KETONES UA mg/dl Negative   BLOOD UA  Trace-Intact*   PROTEIN UA mg/dl Negative   NITRITE UA  Negative   BILIRUBIN UA  Negative   UROBILINOGEN UA E U /dl 0 2   LEUKOCYTES UA  Negative   WBC UA /hpf 0-1   RBC UA /hpf 0-1   BACTERIA UA /hpf Occasional   EPITHELIAL CELLS WET PREP /hpf Occasional     Results from last 7 days   Lab Units 07/13/21  0947   BLOOD CULTURE  No Growth at 24 hrs     GRAM STAIN RESULT  Gram positive cocci in chains*     Results from last 7 days   Lab Units 07/15/21  0510 07/14/21  0539 07/13/21  0946   TOTAL COUNTED  100 100 100         Medications:   Scheduled Medications:  aspirin, 81 mg, Oral, Daily  cefazolin, 2,000 mg, Intravenous, Q8H  cyanocobalamin, 1,000 mcg, Oral, Daily  enoxaparin, 40 mg, Subcutaneous, Q24H INGRID  FLUoxetine, 20 mg, Oral, Daily  folic acid, 1 mg, Oral, Daily  gabapentin, 200 mg, Oral, TID  insulin glargine, 10 Units, Subcutaneous, HS  insulin lispro, 1-6 Units, Subcutaneous, 4x Daily (AC & HS)  melatonin, 3 mg, Oral, HS  pancrelipase (Lip-Prot-Amyl), 24,000 Units, Oral, TID With Meals  pantoprazole, 40 mg, Oral, BID  predniSONE, 20 mg, Oral, Daily  predniSONE, 5 mg, Oral, Daily  senna-docusate sodium, 1 tablet, Oral, HS  sodium chloride (PF), 5 mL, Intravenous, Q12H INGRID  vancomycin, 20 mg/kg, Intravenous, Q12H      Continuous IV Infusions: dc 7/15/     PRN Meds: not used   acetaminophen, 650 mg, Oral, Q6H PRN  diazepam, 2 mg, Oral, Daily PRN  HYDROmorphone, 0 5 mg, Intravenous, Q3H PRN  ondansetron, 4 mg, Intravenous, Q4H PRN  oxyCODONE, 5 mg, Oral, Q4H PRN        Discharge Plan: to be determined     Network Utilization Review Department  ATTENTION: Please call with any questions or concerns to [phone] and carefully listen to the prompts so that you are directed to the right person  All voicemails are confidential   Madison Ortiz all requests for admission clinical reviews, approved or denied determinations and any other requests to dedicated fax number below belonging to the campus where the patient is receiving treatment   List of dedicated fax numbers for the Facilities:  [address] DENIALS (Administrative/Medical Necessity) [phone]   [address] (Maternity/NICU/Pediatrics) [phone]   [phone] [place] [address] [address] [facility] [phone]

## 2021-07-16 PROBLEM — D69.6 THROMBOCYTOPENIA (HCC): Status: ACTIVE | Noted: 2021-01-01

## 2021-07-16 PROBLEM — D64.9 ANEMIA: Status: ACTIVE | Noted: 2021-01-01

## 2021-07-16 NOTE — ASSESSMENT & PLAN NOTE
Lab Results   Component Value Date    HGBA1C 6 2 (H) 05/05/2021   · Last A1c reflects excellent control  · Glucose trend reviewed- lowest blood sugar levels around 70s to 80s only noted in the early mornings    The rest of her other blood sugars in the 200s  · Nighttime Lantus was decreased previously from 20 units to 10 units -- still in the 80s in the early mornings with this dose, will back off further to 5 units lantus  · Patient on 10 units Humalog with meals -- restart at 5 units TID with meals for now  · Q i d  Accu-Cheks  · SSI  · Hypoglycemia protocol

## 2021-07-16 NOTE — PROGRESS NOTES
Vancomycin IV Pharmacy-to-Dose Consultation    Ramona Hampton is a 73 y o  male who is currently receiving Vancomycin IV with management by the Pharmacy Consult service  Assessment/Plan:  The patient was reviewed  Renal function is stable and no signs or symptoms of nephrotoxicity and/or infusion reactions were documented in the chart  Based on todays assessment, continue current vancomycin (day # 2) dosing of 1500 mg IV q12h , with a plan for trough to be drawn at 1230 on 07/17  We will continue to follow the patients culture results and clinical progress daily      Coreen Martinez, Pharmacist

## 2021-07-16 NOTE — ASSESSMENT & PLAN NOTE
· POA, fever, tachycardia, leukopenia -- currently afebrile, tachycardia and leukpenia now improved  · Urinalysis, chest x-ray unremarkable   CT of abdomen/pelvis without obvious source of infection  · Blood cultures obtained on 7/13 2 of 2 showed Gram-positive cocci in chains (the report has been corrected because initially 1 of the cultures was reported as gram negative rods, I double-checked with microbiology lab as well); repeat blood cultures obtained on 07/16/2021, will follow up  · Notably, patient has a cholecystostomy tube in place from May 2021 for cholecystitis at the time s/p antibiotic course  · Currently on cefazolin and vancomycin  · Source in with infection unclear at this time, consider translocation into the GI tract from recent chemotherapy and/or new metastatic sites in liver; also considering the possibility of the port as the source of infection, possibility that port may need to be removed   · May need a JAMAL

## 2021-07-16 NOTE — PROGRESS NOTES
Progress Note - Infectious Disease   Truett Avers 73 y o  male MRN: [id number]  Unit/Bed#: S -01 Encounter: [id number]      Impression/Plan:  1  Sepsis  POA with high fever and tachycardia and mild pancytopenia improving  Admission blood cultures now 2 of 2 positive for Gram-positive cocci in chains  S/p chemo for #2 7days ago via port  Patient reports port and Perc Cholecystomy are flushing well without issue  No fever chills overnight   -continues IV vancomycin and cefazolin pending blood culture identification and sensitivities  -follow up repeat blood cultures drawn this am  -monitor temperature and hemodynamics  -serial exam  -recheck CBC and BMP in a m      2  Gram positive bacteremia  Admission blood cultures with 2 of 2 sets growing Gram-positive cocci in chains  7/15/21 TTE negative for vegetation  -continues IV vancomycin and cefazolin pending blood culture identification and sensitivities  -follow up repeat blood cultures     3  Recently diagnosed pancreatic cancer   Diagnosed by EUS May 2021  Patient received chemotherapy 7/8/21 with D8 C2 Abraxane and Gemzar via Port  CT showing 2 liver lesions concerning for metastatic disease  -antibiotic as above  -monitor temperature and hemodynamics  -serial exam  -close oncology follow-up ongoing  -recheck CBC and BMP      4  Recent Acute cholecystitis   In setting of # 2/4   Status post cholecystostomy 5/12   Cultures with Enterococcus and patient completed Augmentin through 5/19/21 for 7 days total of enterococcal coverage     5   Recent Biliary obstruction   Due to #2   Status post recent ERCP, CBD stent 4/28  LFTs remain normal  CT with metallic CBD stent and Perc Cholecytostomy tube in place  2 new lesions on liver     6   Port placed 6/1/21 for chemo    -antibiotics as above  -follow up pending blood cultures   -may need to consider removal     Antibiotics:  Vancomycin/Cefazolin D2  abx D4     Above impression and plan discussed in detail with patient, RN, and primary care team      Subjective:  Patient denies fever, chills, sweats overnight; no nausea, vomiting, diarrhea with breakfast; no cough, shortness of breath; no pain  Feeling less weak  Continues with lower abdominal pressure  He reports his percutaneous colostomy tube and port are flushing well  He appears to be tolerating antibiotic  Objective:  Vitals:  Temp:  [98 3 °F (36 8 °C)-99 8 °F (37 7 °C)] 98 3 °F (36 8 °C)  HR:  [74-76] 75  Resp:  [16-18] 18  BP: (127-133)/(67-72) 128/72  SpO2:  [93 %-96 %] 93 %  Temp (24hrs), Av °F (37 2 °C), Min:98 3 °F (36 8 °C), Max:99 8 °F (37 7 °C)  Current: Temperature: 98 3 °F (36 8 °C)    Physical Exam:   General Appearance:  Alert, interactive, nontoxic, no acute distress  Throat: Oropharynx moist without lesions  Lungs:   Clear to auscultation bilaterally; no wheezes, rhonchi or rales; respirations unlabored   Heart:  RRR; no murmur   Abdomen:   Soft, non-tender, capped percutaneous cholecystostomy tube intact, positive bowel sounds  Extremities: No clubbing, cyanosis or edema   : No Mccann, no SPT   Skin: No new rashes or lesions  Left chest port accessed and nontender         Labs, Imaging, & Other studies:   All pertinent labs and imaging studies were personally reviewed  Results from last 7 days   Lab Units 21  0611 07/15/21  0510 21  0539   WBC Thousand/uL 5 68 4 25* 2 82*   HEMOGLOBIN g/dL 9 8* 9 4* 9 9*   PLATELETS Thousands/uL 136* 99* 88*     Results from last 7 days   Lab Units 21  0611 07/15/21  0510 21  0539   SODIUM mmol/L 142 144 142   POTASSIUM mmol/L 3 4* 3 3* 3 3*   CHLORIDE mmol/L 107 109* 107   CO2 mmol/L 26 26 25   BUN mg/dL 14 13 11   CREATININE mg/dL 0 67 0 65 0 75   EGFR ml/min/1 73sq m 101 102 96   CALCIUM mg/dL 8 0* 7 8* 7 8*   AST U/L 11 11 15   ALT U/L 22 25 28   ALK PHOS U/L 98 94 98     Results from last 7 days   Lab Units 21  0709 21  0708 21  0947   BLOOD CULTURE Received in Microbiology Lab  Culture in Progress  Received in Microbiology Lab  Culture in Progress    --    GRAM STAIN RESULT   --   --  Gram negative rods*  Gram positive cocci in chains*  Gram positive cocci in chains*     Results from last 7 days   Lab Units 07/14/21  0547 07/13/21  1003   PROCALCITONIN ng/ml 2 72* 1 50*         Results from last 7 days   Lab Units 07/13/21  1003   FERRITIN ng/mL 625*

## 2021-07-16 NOTE — PLAN OF CARE
Problem: Potential for Falls  Goal: Patient will remain free of falls  Description: INTERVENTIONS:  - Educate patient/family on patient safety including physical limitations  - Instruct patient to call for assistance with activity   - Consult OT/PT to assist with strengthening/mobility   - Keep Call bell within reach  - Keep bed low and locked with side rails adjusted as appropriate  - Keep care items and personal belongings within reach  - Initiate and maintain comfort rounds  - Consider moving patient to room near nurses station  Outcome: Progressing     Problem: Nutrition/Hydration-ADULT  Goal: Nutrient/Hydration intake appropriate for improving, restoring or maintaining nutritional needs  Description: Monitor and assess patient's nutrition/hydration status for malnutrition  Collaborate with interdisciplinary team and initiate plan and interventions as ordered  Monitor patient's weight and dietary intake as ordered or per policy  Utilize nutrition screening tool and intervene as necessary  Determine patient's food preferences and provide high-protein, high-caloric foods as appropriate       INTERVENTIONS:  - Monitor oral intake, urinary output, labs, and treatment plans  - Assess nutrition and hydration status and recommend course of action  - Evaluate amount of meals eaten  - Assist patient with eating if necessary   - Allow adequate time for meals  - Recommend/ encourage appropriate diets, oral nutritional supplements, and vitamin/mineral supplements  - Order, calculate, and assess calorie counts as needed  - Assess need for intravenous fluids  - Provide specific nutrition/hydration education as appropriate  - Include patient/family/caregiver in decisions related to nutrition  Outcome: Progressing     Problem: MOBILITY - ADULT  Goal: Maintain or return to baseline ADL function  Description: INTERVENTIONS:  -  Assess patient's ability to carry out ADLs; assess patient's baseline for ADL function and identify physical deficits which impact ability to perform ADLs (bathing, care of mouth/teeth, toileting, grooming, dressing, etc )  - Assess/evaluate cause of self-care deficits   - Assess range of motion  - Assess patient's mobility; develop plan if impaired  - Assess patient's need for assistive devices and provide as appropriate  - Encourage maximum independence but intervene and supervise when necessary  - Involve family in performance of ADLs  - Assess for home care needs following discharge   - Consider OT consult to assist with ADL evaluation and planning for discharge  - Provide patient education as appropriate  Outcome: Progressing  Goal: Maintains/Returns to pre admission functional level  Description: INTERVENTIONS:  - Perform BMAT or MOVE assessment daily    - Set and communicate daily mobility goal to care team and patient/family/caregiver     - Collaborate with rehabilitation services on mobility goals if consulted  - Record patient progress and toleration of activity level   Outcome: Progressing

## 2021-07-16 NOTE — ASSESSMENT & PLAN NOTE
Lab Results   Component Value Date    HGBA1C 6 2 (H) 05/05/2021   · Last A1c reflects excellent control  · Glucose trend reviewed- lowest blood sugar levels around 70s to 80s only noted in the early mornings  The rest of her other blood sugars in the 200s  · Nighttime Lantus was decreased previously from 20 units to 10 units -- can continue on this for now however I would like to restart him on his other scheduled mealtime insulin  · Patient on 10 units Humalog with meals -- restart 5 units with breakfast, lunch and dinner    · Q i d  Accu-Cheks  · SSI  · Hypoglycemia protocol

## 2021-07-16 NOTE — PLAN OF CARE
Problem: PHYSICAL THERAPY ADULT  Goal: Performs mobility at highest level of function for planned discharge setting  See evaluation for individualized goals  Description: Treatment/Interventions: Functional transfer training, LE strengthening/ROM, Therapeutic exercise, Endurance training, Equipment eval/education, Bed mobility, Gait training, Patient/family training, Cognitive reorientation, Elevations, Spoke to nursing, Spoke to case management, OT  Equipment Recommended:  (? cane PRN)       See flowsheet documentation for full assessment, interventions and recommendations  Outcome: Adequate for Discharge  Note: Prognosis: Fair  Problem List: Decreased strength, Decreased endurance, Impaired balance, Decreased mobility (gait deviations)  Assessment: Pt did make substantial mobility progress this session compared to initial evaluation  Patient requires less assistance for bed mobility, transfers  Patient is now ambulating without an assistive device and less reaching for upper extremity (more light touch) during gait trials  Patient is ambulating distances in hallway and was able to perform stairs without physical assistance  Most of initial evaluation goals met except for tug balance testing, however based on observation during session, do not anticipate patient needs to perform tug testing  No further IP PT indicated at this time, recommend restorative ambulation while in the hospital   Barriers to Discharge: Inaccessible home environment        PT Discharge Recommendation: No rehabilitation needs          See flowsheet documentation for full assessment

## 2021-07-16 NOTE — ASSESSMENT & PLAN NOTE
· POA, fever, tachycardia, leukopenia  · Urinalysis, chest x-ray unremarkable   CT of abdomen/pelvis without obvious source of infection  · Blood cultures obtained on 7/13 2 of 2 showed Gram-positive cocci in chains; repeat blood cultures obtained this a m  07/16/2021  · Notably, patient has a cholecystostomy tube in place from May 2021 for cholecystitis at the time s/p antibiotic course  · Currently on cefazolin and vancomycin  · Source in with infection unclear at this time, consider translocation into the GI tract from recent chemotherapy and/or new metastatic sites in liver; also considering the possibility of the port as the source of infection

## 2021-07-16 NOTE — PHYSICAL THERAPY NOTE
PHYSICAL THERAPY TREATMENT NOTE  NAME:  Guillermo Choi  DATE: 07/16/21    Length Of Stay: 3  Performed at least 2 patient identifiers during session: Name and Birthday    TREATMENT:      07/16/21 1419   PT Last Visit   PT Visit Date 07/16/21   Note Type   Note Type Treatment   Pain Assessment   Pain Assessment Tool 0-10   Pain Score 5   Pain Location/Orientation Location: Head   Restrictions/Precautions   Weight Bearing Precautions Per Order No   Other Precautions Chair Alarm; Fall Risk;Pain;Bed Alarm   General   Chart Reviewed Yes   Response to Previous Treatment Patient reporting fatigue but able to participate  Family/Caregiver Present No  (Wife is now DC, but Pt's mom is in ED)   Cognition   Overall Cognitive Status WFL   Arousal/Participation Responsive   Attention Within functional limits   Following Commands Follows one step commands without difficulty   Subjective   Subjective Patient reports he has been ambulating to the bathroom without difficulty  Bed Mobility   Supine to Sit 6  Modified independent   Additional items Assist x 1;HOB elevated; Bedrails   Sit to Supine 6  Modified independent   Additional items Assist x 1;Bedrails;HOB elevated   Transfers   Sit to Stand 6  Modified independent   Additional items Increased time required   Stand to Sit 6  Modified independent   Additional items Increased time required   Ambulation/Elevation   Gait pattern Excessively slow  (Intermittent reaching for light touch support)   Gait Assistance 5  Supervision   Assistive Device None  (Declined cane)   Distance 600'+60'  (Patient able to walk and talk during session)   Stair Management Assistance 5  Supervision   Additional items Assist x 1   Stair Management Technique Step to pattern; Alternating pattern; One rail R;Foreward   Number of Stairs 12   Balance   Static Sitting Normal   Static Standing Good   Ambulatory Fair +   Endurance Deficit   Endurance Deficit No   Endurance Deficit Description Denies any lightheadedness, fatigue   Activity Tolerance   Activity Tolerance Patient limited by pain   Nurse Made Aware Spoke to [facility]   Exercises   Balance training  No on corrected losses of balance with head turning left right, up down, nor losses of balance with walking while talk   Assessment   Prognosis Fair   Problem List Decreased strength;Decreased endurance; Impaired balance;Decreased mobility  (gait deviations)   Assessment Pt did make substantial mobility progress this session compared to initial evaluation  Patient requires less assistance for bed mobility, transfers  Patient is now ambulating without an assistive device and less reaching for upper extremity (more light touch) during gait trials  Patient is ambulating distances in hallway and was able to perform stairs without physical assistance  Most of initial evaluation goals met except for tug balance testing, however based on observation during session, do not anticipate patient needs to perform tug testing  No further IP PT indicated at this time, recommend restorative ambulation while in the hospital    Barriers to Discharge Inaccessible home environment   Goals   Patient Goals to go home   STG Expiration Date 07/24/21   Plan   Treatment/Interventions Elevations; Functional transfer training;LE strengthening/ROM; Patient/family training;Gait training;Bed mobility; Therapeutic exercise; Endurance training   Progress Progressing toward goals   PT Frequency DC from IPPT   Recommendation   PT Discharge Recommendation No rehabilitation needs   Equipment Recommended   (no cane)   AM-PAC Basic Mobility Inpatient   Turning in Bed Without Bedrails 4   Lying on Back to Sitting on Edge of Flat Bed 4   Moving Bed to Chair 4   Standing Up From Chair 4   Walk in Room 3   Climb 3-5 Stairs 3   Basic Mobility Inpatient Raw Score 22   Basic Mobility Standardized Score 47 4       Graciela Benitez, PT, DPT

## 2021-07-16 NOTE — PROGRESS NOTES
[facility]  Progress Note - Sinai Fitzgerald 1955, 73 y o  male MRN: [id number]  Unit/Bed#: S -13 Encounter: [id number]  Primary Care Provider: Trina Guerin MD   Date and time admitted to hospital: 7/13/2021  9:40 AM    * Sepsis of unknown etiology  Assessment & Plan  · POA, fever, tachycardia, leukopenia  · Urinalysis, chest x-ray unremarkable  CT of abdomen/pelvis without obvious source of infection  · Blood cultures obtained on 7/13 2 of 2 showed Gram-positive cocci in chains; repeat blood cultures obtained this a m  07/16/2021  · Notably, patient has a cholecystostomy tube in place from May 2021 for cholecystitis at the time s/p antibiotic course  · Currently on cefazolin and vancomycin  · Source in with infection unclear at this time, consider translocation into the GI tract from recent chemotherapy and/or new metastatic sites in liver; also considering the possibility of the port as the source of infection    Metastatic pancreatic cancer  Assessment & Plan  Follows with medical/surgical oncology - s/p a cycle of neoadjuvant chemotherapy (Gemcitabine/Abraxane) last week  Known to palliative care -> consider inpatient consultation if pain uncontrolled  CT imaging in the ED today noting "New 3 5 x 1 8 cm low-attenuating lesion in the right hepatic lobe and increased size of a 1 9 x 1 3 cm low-attenuating lesion in the left hepatic lobe, concerning for metastatic disease  Stable pancreatic head neoplasm  Resolution of the previously reported paraduodenal collection and omental edema/infiltration " -> appreciate medical oncology evaluation     Hypokalemia  Assessment & Plan  · Monitor and replete electrolytes as needed, additional K supplement needed again today    Type 2 diabetes mellitus with hypoglycemia [facility])  Assessment & Plan  Lab Results   Component Value Date    HGBA1C 6 2 (H) 05/05/2021   · Last A1c reflects excellent control     · Glucose trend reviewed- lowest blood sugar levels around 70s to 80s only noted in the early mornings  The rest of her other blood sugars in the 200s  · Nighttime Lantus was decreased previously from 20 units to 10 units -- can continue on this for now however I would like to restart him on his scheduled mealtime insulin  · Patient on 10 units Humalog with meals -- restart at 5 units TID with meals for now  · Q i d  Accu-Cheks  · SSI  · Hypoglycemia protocol    Pancytopenia due to chemotherapy  Assessment & Plan  Appreciate oncology input-- Monitor CBC and transfuse if necessary  Currently stable  Monitor for neutropenia    Generalized weakness  Assessment & Plan  Multifactorial secondary to sepsis coupled with metastatic cancer (see plan for individual assessments)  PT/OT as tolerated    Recent cholecystitis  Assessment & Plan  Hospitalized in May 2021 s/p cholecystostomy tube placement on 21 - completed a course of antibiotics at the time  PRN pain control - supportive care otherwise          VTE Pharmacologic Prophylaxis: VTE Score: 6 High Risk (Score >/= 5) - Pharmacological DVT Prophylaxis Ordered: enoxaparin (Lovenox)  Sequential Compression Devices Ordered  Patient Centered Rounds: I performed bedside rounds with nursing staff today  Discussions with Specialists or Other Care Team Provider:  Reviewed note by Infectious Disease    Education and Discussions with Family / Patient: Attempted to update  (son) via phone  Unable to contact  Current Length of Stay: 3 day(s)  Current Patient Status: Inpatient   Discharge Plan: Anticipate discharge in 48 hrs to home  Code Status: Level 1 - Full Code    Subjective:   Patient reports that he feels a little bit better today compared to when he came in on admission on Tuesday  Reports he has abdominal discomfort which is chronic for him  Denies other complaints      Objective:     Vitals:   Temp (24hrs), Av 3 °F (37 4 °C), Min:99 °F (37 2 °C), Max:99 8 °F (37 7 °C)    Temp:  [99 °F (37 2 °C)-99 8 °F (37 7 °C)] 99 8 °F (37 7 °C)  HR:  [74-80] 74  Resp:  [16-18] 18  BP: (127-136)/(67-77) 127/67  SpO2:  [95 %-96 %] 96 %  Body mass index is 24 87 kg/m²  Input and Output Summary (last 24 hours): Intake/Output Summary (Last 24 hours) at 7/16/2021 1442  Last data filed at 7/16/2021 0835  Gross per 24 hour   Intake 5 ml   Output 1075 ml   Net -1070 ml       Physical Exam:   Physical Exam  Vitals reviewed  Constitutional:       General: He is not in acute distress  Appearance: He is not ill-appearing  HENT:      Nose: No congestion  Eyes:      General: No scleral icterus  Cardiovascular:      Rate and Rhythm: Normal rate and regular rhythm  Heart sounds: No murmur heard  Pulmonary:      Effort: Pulmonary effort is normal  No respiratory distress  Breath sounds: No wheezing or rales  Abdominal:      General: Bowel sounds are normal       Palpations: Abdomen is soft  Tenderness: There is no abdominal tenderness  Comments: No tenderness noted in all quadrants on my exam   Musculoskeletal:      Right lower leg: No edema  Left lower leg: No edema  Skin:     General: Skin is warm  Capillary Refill: Capillary refill takes less than 2 seconds  Neurological:      General: No focal deficit present  Mental Status: He is alert  Mental status is at baseline  Psychiatric:         Mood and Affect: Mood normal          Thought Content:  Thought content normal               Additional Data:     Labs:  Results from last 7 days   Lab Units 07/16/21  0611   WBC Thousand/uL 5 68   HEMOGLOBIN g/dL 9 8*   HEMATOCRIT % 31 0*   PLATELETS Thousands/uL 136*   BANDS PCT % 4   LYMPHO PCT % 19   MONO PCT % 13*   EOS PCT % 1     Results from last 7 days   Lab Units 07/16/21  0611   SODIUM mmol/L 142   POTASSIUM mmol/L 3 4*   CHLORIDE mmol/L 107   CO2 mmol/L 26   BUN mg/dL 14   CREATININE mg/dL 0 67   ANION GAP mmol/L 9   CALCIUM mg/dL 8 0* ALBUMIN g/dL 2 0*   TOTAL BILIRUBIN mg/dL 0 43   ALK PHOS U/L 98   ALT U/L 22   AST U/L 11   GLUCOSE RANDOM mg/dL 80     Results from last 7 days   Lab Units 07/13/21  0947   INR  1 25*     Results from last 7 days   Lab Units 07/16/21  1112 07/16/21  0733 07/15/21  2015 07/15/21  1634 07/15/21  1056 07/15/21  0729 07/14/21  2124 07/14/21  1630 07/14/21  1058 07/14/21  0805 07/13/21  2105 07/13/21  1650   POC GLUCOSE mg/dl 204* 80 250* 237* 229* 71 281* 248* 109 135 200* 122         Results from last 7 days   Lab Units 07/14/21  0547 07/13/21  1003 07/13/21  0947   LACTIC ACID mmol/L  --   --  1 8   PROCALCITONIN ng/ml 2 72* 1 50*  --        Lines/Drains:  Invasive Devices     Central Venous Catheter Line            Port A Cath 06/01/21 Left Chest 45 days          Peripheral Intravenous Line            Peripheral IV 07/13/21 Distal;Left;Ventral (anterior) Wrist 3 days    Peripheral IV 07/13/21 Left Antecubital 3 days          Drain            Closed/Suction Drain Lateral RLQ 66 days                Central Line:  Goal for removal: Decision would need to be made whether to remove the port as this is 1 possible source for his current infection             Imaging: Reviewed radiology reports from this admission including: abdominal/pelvic CT    Recent Cultures (last 7 days):   Results from last 7 days   Lab Units 07/16/21  0709 07/16/21  0708 07/13/21  0947   BLOOD CULTURE  Received in Microbiology Lab  Culture in Progress  Received in Microbiology Lab  Culture in Progress    --    GRAM STAIN RESULT   --   --  Gram negative rods*  Gram positive cocci in chains*  Gram positive cocci in chains*       Last 24 Hours Medication List:   Current Facility-Administered Medications   Medication Dose Route Frequency Provider Last Rate    acetaminophen  650 mg Oral Q6H PRN Maria Eugenia Gonzalez MD      aspirin  81 mg Oral Daily Maria Eugenia Gonzalez MD      cefazolin  2,000 mg Intravenous Q8H Jhoana Davis MD 2,000 mg (07/16/21 3770)   Irma Braxton cyanocobalamin  1,000 mcg Oral Daily Sloane Narayan PA-C      diazepam  2 mg Oral Daily PRN Karina Dorsey MD      enoxaparin  40 mg Subcutaneous Q24H [facility] Karina Dorsey MD      FLUoxetine  20 mg Oral Daily Karina Dorsey MD      folic acid  1 mg Oral Daily Karina Dorsey MD      gabapentin  200 mg Oral TID Karina Dorsey MD      HYDROmorphone  0 5 mg Intravenous Q3H PRN Karina Dorsey MD      insulin glargine  10 Units Subcutaneous HS Kena Brewster PA-C      insulin lispro  1-6 Units Subcutaneous 4x Daily (AC & HS) Karina Dorsey MD      insulin lispro  5 Units Subcutaneous TID With Meals Sayra Ayala MD      melatonin  3 mg Oral HS Kena Brewster PA-C      ondansetron  4 mg Intravenous Q4H PRN MD Anayeli Terrazas oxyCODONE  5 mg Oral Q4H PRN Karina Dorsey MD      pancrelipase (Lip-Prot-Amyl)  24,000 Units Oral TID With Meals Karina Dorsey MD      pantoprazole  40 mg Oral BID Karina Dorsey MD      predniSONE  20 mg Oral Daily Karina Dorsey MD      predniSONE  5 mg Oral Daily Karina Dorsey MD      sodium chloride (PF)  5 mL Intravenous Q12H [facility] Karina Dorsey MD      vancomycin  20 mg/kg Intravenous Q12H Rudy Fay MD 1,500 mg (07/16/21 1313)        Today, Patient Was Seen By: Sayra Ayala MD    **Please Note: This note may have been constructed using a voice recognition system  **

## 2021-07-16 NOTE — UTILIZATION REVIEW
Continued Stay Review    Date: 7/16/2021                         Current Patient Class: inpatient  Current Level of Care: med surg     HPI:66 y o  male initially admitted on 7/13/2021 inpatient due to sepsis of unknown etiology   Has metastatic pancreatic cancer and on treatment, recent treatment cholecystitis in May and ashli tube placed  Found to have gram positive bacteremia  Assessment/Plan:  7/16/2021:  Feels slightly better than on admission  Has ongoing chronic abdominal discomfort  On exam  Abdomen without tenderness  Wbc 5 68    K 3 4  Glucose > 200 except in early morning  Blood cultures positive for gram negative rods  To continue antibiotics and pain control  Repeat blood cultures done in am   Mealtime insulin restarted  Continues on decreased lantus  Vital Signs:  07/16/21 0730  99 8 °F (37 7 °C)  74  18  127/67  87  96 %  None (Room air)  Lying   07/15/21 2310  99 °F (37 2 °C)  76  16  133/71  --  95 %  None (Room air)  Lying   07/15/21 1501  99 2 °F (37 3 °C)  80  18  136/77  100  95 %  None (Room air)  Lying   07/15/21 0700  98 6 °F (37 °C)  82  18  123/74  93  92 %  None (Room air)  Lying   07/14/21 2234  98 7 °F (37 1 °C)  70  18  136/72  99  97 %  None (Room air)  Lying   07/14/21 1500  98 5 °F (36 9 °C)  78  18  117/72  89  95 %  None (Room air)         Pertinent Labs/Diagnostic Results:   7/15/2021 Echo LEFT VENTRICLE:The ventricle was mildly dilated  Systolic function was at the lower limits of normal by visual assessment  Ejection fraction was estimated to be 50 %  There were no regional wall motion abnormalities   LEFT ATRIUM:The atrium was mildly dilated    MITRAL VALVE:There was mild regurgitation  Neda Copa was mild regurgitation    TRICUSPID VALVE:There was mild regurgitation   Alvia Guy was trace regurgitation   PERICARDIUM:A trace pericardial effusion was identified    Results from last 7 days   Lab Units 07/16/21  0611 07/15/21  0510 07/14/21  0539 07/13/21  0946   WBC Thousand/uL 5 68 4 25* 2 82* 3 07*   HEMOGLOBIN g/dL 9 8* 9 4* 9 9* 10 5*   HEMATOCRIT % 31 0* 29 4* 31 8* 32 4*   PLATELETS Thousands/uL 136* 99* 88* 107*   BANDS PCT % 4 1 4 16*     Results from last 7 days   Lab Units 07/16/21  0611 07/15/21  0510 07/14/21  0539 07/13/21  0947   SODIUM mmol/L 142 144 142 140   POTASSIUM mmol/L 3 4* 3 3* 3 3* 3 1*   CHLORIDE mmol/L 107 109* 107 106   CO2 mmol/L 26 26 25 23   ANION GAP mmol/L 9 9 10 11   BUN mg/dL 14 13 11 23   CREATININE mg/dL 0 67 0 65 0 75 0 88   EGFR ml/min/1 73sq m 101 102 96 90   CALCIUM mg/dL 8 0* 7 8* 7 8* 7 3*     Results from last 7 days   Lab Units 07/16/21  0611 07/15/21  0510 07/14/21  0539 07/13/21  0947   AST U/L 11 11 15 18   ALT U/L 22 25 28 34   ALK PHOS U/L 98 94 98 112   TOTAL PROTEIN g/dL 5 3* 5 1* 5 1* 5 8*   ALBUMIN g/dL 2 0* 1 9* 2 0* 2 5*   TOTAL BILIRUBIN mg/dL 0 43 0 46 0 67 0 66     Results from last 7 days   Lab Units 07/16/21  1112 07/16/21  0733 07/15/21  2015 07/15/21  1634 07/15/21  1056 07/15/21  0729 07/14/21  2124 07/14/21  1630 07/14/21  1058 07/14/21  0805 07/13/21  2105 07/13/21  1650   POC GLUCOSE mg/dl 204* 80 250* 237* 229* 71 281* 248* 109 135 200* 122     Results from last 7 days   Lab Units 07/16/21  0611 07/15/21  0510 07/14/21  0539 07/13/21  0947   GLUCOSE RANDOM mg/dL 80 81 57* 77     Results from last 7 days   Lab Units 07/13/21  0947   TROPONIN I ng/mL <0 02     Results from last 7 days   Lab Units 07/13/21  0947   PROTIME seconds 15 8*   INR  1 25*   PTT seconds 25     Results from last 7 days   Lab Units 07/14/21  0547 07/13/21  1003   PROCALCITONIN ng/ml 2 72* 1 50*     Results from last 7 days   Lab Units 07/13/21  0947   LACTIC ACID mmol/L 1 8     Results from last 7 days   Lab Units 07/13/21  1003   FERRITIN ng/mL 625*     Results from last 7 days   Lab Units 07/13/21  1102   CLARITY UA  Clear   COLOR UA  Light Yellow   SPEC GRAV UA  1 015   PH UA  6 0   GLUCOSE UA mg/dl Negative   KETONES UA mg/dl Negative   BLOOD UA  Trace-Intact*   PROTEIN UA mg/dl Negative   NITRITE UA  Negative   BILIRUBIN UA  Negative   UROBILINOGEN UA E U /dl 0 2   LEUKOCYTES UA  Negative   WBC UA /hpf 0-1   RBC UA /hpf 0-1   BACTERIA UA /hpf Occasional   EPITHELIAL CELLS WET PREP /hpf Occasional     Results from last 7 days   Lab Units 07/16/21  0708 07/13/21  0947   BLOOD CULTURE  Received in Microbiology Lab  Culture in Progress  --    GRAM STAIN RESULT   --  Gram negative rods*  Gram positive cocci in chains*  Gram positive cocci in chains*     Results from last 7 days   Lab Units 07/16/21  0611 07/15/21  0510 07/14/21  0539 07/13/21  0946   TOTAL COUNTED  100 100 100 100       Medications:   Scheduled Medications:  aspirin, 81 mg, Oral, Daily  cefazolin, 2,000 mg, Intravenous, Q8H  cyanocobalamin, 1,000 mcg, Oral, Daily  enoxaparin, 40 mg, Subcutaneous, Q24H INGRID  FLUoxetine, 20 mg, Oral, Daily  folic acid, 1 mg, Oral, Daily  gabapentin, 200 mg, Oral, TID  insulin glargine, 10 Units, Subcutaneous, HS  insulin lispro, 1-6 Units, Subcutaneous, 4x Daily (AC & HS)  melatonin, 3 mg, Oral, HS  pancrelipase (Lip-Prot-Amyl), 24,000 Units, Oral, TID With Meals  pantoprazole, 40 mg, Oral, BID  predniSONE, 20 mg, Oral, Daily  predniSONE, 5 mg, Oral, Daily  sodium chloride (PF), 5 mL, Intravenous, Q12H INGRID  vancomycin, 20 mg/kg, Intravenous, Q12H      Continuous IV Infusions: dc 7/15/2021      PRN Meds:  acetaminophen, 650 mg, Oral, Q6H PRN - used x 1 7/16  diazepam, 2 mg, Oral, Daily PRN  HYDROmorphone, 0 5 mg, Intravenous, Q3H PRN  ondansetron, 4 mg, Intravenous, Q4H PRN  oxyCODONE, 5 mg, Oral, Q4H PRN        Discharge Plan: to be determined  Network Utilization Review Department  ATTENTION: Please call with any questions or concerns to [phone] and carefully listen to the prompts so that you are directed to the right person   All voicemails are confidential   [facility] all requests for admission clinical reviews, approved or denied determinations and any other requests to dedicated fax number below belonging to the campus where the patient is receiving treatment   List of dedicated fax numbers for the Facilities:  [address] [place] [address] DENIALS (Administrative/Medical Necessity) [phone]   [address] (Maternity/NICU/Pediatrics) [phone]   [address] [place] [address] [address] [place] [address] [id number] [place] [address] [address] [facility] [place] [address] [phone]

## 2021-07-17 PROBLEM — E87.6 HYPOKALEMIA: Status: RESOLVED | Noted: 2021-04-27 | Resolved: 2021-01-01

## 2021-07-17 NOTE — PROGRESS NOTES
Vancomycin IV Pharmacy-to-Dose Consultation    Haider Monte is a 73 y o  male who is currently receiving Vancomycin IV with management by the Pharmacy Consult service  Assessment/Plan:  The patient was reviewed  Renal function is stable and no signs or symptoms of nephrotoxicity and/or infusion reactions were documented in the chart  Vancomycin trough prior 1pm dose today came back as 12 4 with extrapolated trough being ~8 and is sub-therapeutic  Based on todays assessment, changed vancomycin dosing to 1500 mg IV q8h with a plan for trough to be drawn at 1330 on 07/18  We will continue to follow the patients culture results and clinical progress daily      Jerry Mckeon, Pharmacist

## 2021-07-17 NOTE — ASSESSMENT & PLAN NOTE
· POA, fever, tachycardia, leukopenia -- currently afebrile, tachycardia and leukpenia now improved  · Urinalysis, chest x-ray unremarkable  CT of abdomen/pelvis without obvious source of infection  · Blood cultures obtained on 7/13 2 of 2 showed Gram-positive cocci in chains (the report has been corrected because initially 1 of the cultures was reported as gram negative rods, there are no gram negative rods I double-checked with microbiology lab as well); repeat blood cultures obtained on 07/16/2021, will follow up  · Notably, patient has a cholecystostomy tube in place from May 2021 for cholecystitis at the time s/p antibiotic course  · Currently on cefazolin and vancomycin  · Source in with infection unclear at this time, consider translocation into the GI tract from recent chemotherapy and/or new metastatic sites in liver; also considering the possibility of the port as the source of infection  Port may need to be removed if repeat blood culture is positive

## 2021-07-17 NOTE — PROGRESS NOTES
[facility]  Progress Note - Sabrina Ham 1955, 73 y o  male MRN: [id number]  Unit/Bed#: S -10 Encounter: [id number]  Primary Care Provider: Shara Michael MD   Date and time admitted to hospital: 7/13/2021  9:40 AM    * Sepsis of unknown etiology  Assessment & Plan  · POA, fever, tachycardia, leukopenia -- currently afebrile, tachycardia and leukpenia now improved  · Urinalysis, chest x-ray unremarkable  CT of abdomen/pelvis without obvious source of infection  · Blood cultures obtained on 7/13 2 of 2 showed Gram-positive cocci in chains (the report has been corrected because initially 1 of the cultures was reported as gram negative rods, I double-checked with microbiology lab as well); repeat blood cultures obtained on 07/16/2021, will follow up  · Notably, patient has a cholecystostomy tube in place from May 2021 for cholecystitis at the time s/p antibiotic course  · Currently on cefazolin and vancomycin  · Source in with infection unclear at this time, consider translocation into the GI tract from recent chemotherapy and/or new metastatic sites in liver; also considering the possibility of the port as the source of infection, possibility that port may need to be removed   · May need a JAMAL    Metastatic pancreatic cancer  Assessment & Plan  Follows with medical/surgical oncology - s/p a cycle of neoadjuvant chemotherapy (Gemcitabine/Abraxane) last week  Known to palliative care -> consider inpatient consultation if pain uncontrolled  CT imaging in the ED today noting "New 3 5 x 1 8 cm low-attenuating lesion in the right hepatic lobe and increased size of a 1 9 x 1 3 cm low-attenuating lesion in the left hepatic lobe, concerning for metastatic disease  Stable pancreatic head neoplasm   Resolution of the previously reported paraduodenal collection and omental edema/infiltration " -> appreciate medical oncology evaluation     Hypokalemia-resolved as of 7/17/2021  Assessment & Plan  · Monitor and replete electrolytes as needed, additional K supplement needed again today    Type 2 diabetes mellitus with hypoglycemia [facility])  Assessment & Plan  Lab Results   Component Value Date    HGBA1C 6 2 (H) 05/05/2021   · Last A1c reflects excellent control  · Glucose trend reviewed- lowest blood sugar levels around 70s to 80s only noted in the early mornings  The rest of her other blood sugars in the 200s  · Nighttime Lantus was decreased previously from 20 units to 10 units -- still in the 80s in the early mornings with this dose, will back off further to 5 units lantus  · Patient on 10 units Humalog with meals -- restart at 5 units TID with meals for now  · Q i d  Accu-Cheks  · SSI  · Hypoglycemia protocol    Pancytopenia due to chemotherapy  Assessment & Plan  Appreciate oncology input-- Monitor CBC and transfuse if necessary  Currently stable  Monitor for neutropenia    Generalized weakness  Assessment & Plan  Multifactorial secondary to sepsis coupled with metastatic cancer (see plan for individual assessments)  PT/OT as tolerated    Recent cholecystitis  Assessment & Plan  Hospitalized in May 2021 s/p cholecystostomy tube placement on 5/11/21 - completed a course of antibiotics at the time  PRN pain control - supportive care otherwise      VTE Pharmacologic Prophylaxis: VTE Score: 6 High Risk (Score >/= 5) - Pharmacological DVT Prophylaxis Ordered: enoxaparin (Lovenox)  Sequential Compression Devices Ordered  Patient Centered Rounds: I performed bedside rounds with nursing staff today  Discussions with Specialists or Other Care Team Provider:  Reviewed note by Infectious Disease    Education and Discussions with Family / Patient: Updated  (son) via phone  Current Length of Stay: 4 day(s)  Current Patient Status: Inpatient   Discharge Plan: Anticipate discharge in 48 hrs to home      Code Status: Level 1 - Full Code    Subjective:   He reports that he feels okay, denies any new complaints today  He has abdominal discomfort which he reports is chronic  Objective:     Vitals:   Temp (24hrs), Av 6 °F (37 °C), Min:98 3 °F (36 8 °C), Max:98 9 °F (37 2 °C)    Temp:  [98 3 °F (36 8 °C)-98 9 °F (37 2 °C)] 98 5 °F (36 9 °C)  HR:  [70-85] 85  Resp:  [16-18] 16  BP: (128-134)/(72-76) 130/75  SpO2:  [92 %-93 %] 92 %  Body mass index is 24 87 kg/m²  Input and Output Summary (last 24 hours): Intake/Output Summary (Last 24 hours) at 2021 1259  Last data filed at 2021 0550  Gross per 24 hour   Intake 55 ml   Output 2500 ml   Net -2445 ml       Physical Exam:   Physical Exam  Vitals reviewed  Constitutional:       General: He is not in acute distress  Appearance: He is not ill-appearing  HENT:      Nose: No congestion  Eyes:      General: No scleral icterus  Cardiovascular:      Rate and Rhythm: Normal rate  Heart sounds: No murmur heard  Pulmonary:      Effort: Pulmonary effort is normal  No respiratory distress  Breath sounds: No wheezing or rales  Abdominal:      General: Bowel sounds are normal  There is no distension  Palpations: Abdomen is soft  Tenderness: There is no abdominal tenderness  Musculoskeletal:      Right lower leg: No edema  Left lower leg: No edema  Skin:     General: Skin is warm and dry  Capillary Refill: Capillary refill takes less than 2 seconds  Neurological:      General: No focal deficit present  Mental Status: He is alert and oriented to person, place, and time     Psychiatric:         Mood and Affect: Mood normal           Additional Data:     Labs:  Results from last 7 days   Lab Units 21  0545   WBC Thousand/uL 6 88   HEMOGLOBIN g/dL 10 0*   HEMATOCRIT % 31 9*   PLATELETS Thousands/uL 195   BANDS PCT % 10*   LYMPHO PCT % 19   MONO PCT % 6   EOS PCT % 1     Results from last 7 days   Lab Units 21  0545 21  0611   SODIUM mmol/L 143 142   POTASSIUM mmol/L 3 6 3 4*   CHLORIDE mmol/L 107 107   CO2 mmol/L 28 26   BUN mg/dL 14 14   CREATININE mg/dL 0 72 0 67   ANION GAP mmol/L 8 9   CALCIUM mg/dL 8 1* 8 0*   ALBUMIN g/dL  --  2 0*   TOTAL BILIRUBIN mg/dL  --  0 43   ALK PHOS U/L  --  98   ALT U/L  --  22   AST U/L  --  11   GLUCOSE RANDOM mg/dL 83 80     Results from last 7 days   Lab Units 07/13/21  0947   INR  1 25*     Results from last 7 days   Lab Units 07/17/21  1120 07/17/21  0734 07/16/21  2106 07/16/21  1601 07/16/21  1112 07/16/21  0733 07/15/21  2015 07/15/21  1634 07/15/21  1056 07/15/21  0729 07/14/21  2124 07/14/21  1630   POC GLUCOSE mg/dl 178* 87 212* 209* 204* 80 250* 237* 229* 71 281* 248*         Results from last 7 days   Lab Units 07/14/21  0547 07/13/21  1003 07/13/21  0947   LACTIC ACID mmol/L  --   --  1 8   PROCALCITONIN ng/ml 2 72* 1 50*  --        Lines/Drains:  Invasive Devices     Central Venous Catheter Line            Port A Cath 06/01/21 Left Chest 46 days          Drain            Closed/Suction Drain Lateral RLQ 67 days                Central Line:  Goal for removal: Will determine if port would need to be removed             Imaging: Reviewed radiology reports from this admission including: abdominal/pelvic CT    Recent Cultures (last 7 days):   Results from last 7 days   Lab Units 07/16/21  0709 07/16/21  0708 07/13/21  0947   BLOOD CULTURE  Received in Microbiology Lab  Culture in Progress   No Growth at 24 hrs   --    GRAM STAIN RESULT   --   --  Gram negative rods*  Gram positive cocci in chains*  Gram positive cocci in chains*       Last 24 Hours Medication List:   Current Facility-Administered Medications   Medication Dose Route Frequency Provider Last Rate    acetaminophen  650 mg Oral Q6H PRN Vahid Feliz MD      aspirin  81 mg Oral Daily Vahid Feliz MD      cefazolin  2,000 mg Intravenous Q8H Marcia May MD 2,000 mg (07/17/21 0540)    cyanocobalamin  1,000 mcg Oral Daily Tawanna Dia PA-C      diazepam 2 mg Oral Daily PRN Ana Caldera MD      enoxaparin  40 mg Subcutaneous Q24H [address] Ana Caldera MD      FLUoxetine  20 mg Oral Daily Ana Caldera MD      folic acid  1 mg Oral Daily Ana Caldera MD      gabapentin  200 mg Oral TID Ana Caldera MD      HYDROmorphone  0 5 mg Intravenous Q3H PRN Ana Caldera MD      insulin glargine  5 Units Subcutaneous HS Iva Zhang MD      insulin lispro  1-6 Units Subcutaneous 4x Daily (AC & HS) Ana Caldera MD      insulin lispro  5 Units Subcutaneous TID With Meals Iva Zhang MD      melatonin  3 mg Oral HS Kena Brewster PA-C      ondansetron  4 mg Intravenous Q4H PRN Ana Caldera MD     [employer] oxyCODONE  5 mg Oral Q4H PRN Ana Caldera MD      pancrelipase (Lip-Prot-Amyl)  24,000 Units Oral TID With Meals Ana Caldera MD      pantoprazole  40 mg Oral BID Ana Caldera MD      predniSONE  20 mg Oral Daily Ana Caldera MD      predniSONE  5 mg Oral Daily Ana Caldera MD      sodium chloride (PF)  5 mL Intravenous Q12H Hugo Reza MD      vancomycin  20 mg/kg Intravenous Q12H Karla Kumar MD 1,500 mg (07/16/21 1313)        Today, Patient Was Seen By: Iva Zhang MD    **Please Note: This note may have been constructed using a voice recognition system  **

## 2021-07-17 NOTE — PROGRESS NOTES
Chief Complaint   Patient presents with    Thyroid Problem Progress Note - Infectious Disease   Sam Ignacio 73 y o  male MRN: [id number]  Unit/Bed#: S -01 Encounter: [id number]      Impression/Recommendations:  1  Sepsis  POA with high fever and tachycardia and mild pancytopenia improving  Admission blood cultures now 2 of 2 positive for Gram-positive cocci in chains   S/p chemo for #2 7days ago via port  Patient reports port and Perc Cholecystomy are flushing well without issue   Improving  -continue IV vancomycin and cefazolin pending blood culture identification and sensitivities  -monitor temperature and hemodynamics  -serial exam  -recheck CBC and BMP in a m      2  Gram positive bacteremia   Admission blood cultures with 2 of 2 sets growing Gram-positive cocci in chains  Unclear source  Consider endocarditis, port infection, liver abscess  7/15/21 TTE negative for vegetation  -continues IV vancomycin and cefazolin pending blood culture identification and sensitivities  -follow up repeat blood cultures   -may need JAMAL  -may need port removal     3  Recently diagnosed pancreatic cancer   Diagnosed by EUS May 2021  Patient received chemotherapy 7/8/21 with D8 C2 Abraxane and Gemzar via Port  CT showing 2 liver lesions concerning for metastatic disease  Consider abscess although non-enhancing  -antibiotic as above  -monitor temperature and hemodynamics  -serial exam  -close oncology follow-up ongoing  -recheck CBC and BMP      4  Recent Acute cholecystitis   In setting of # 2/4   Status post cholecystostomy 5/12   Cultures with Enterococcus and patient completed Augmentin through 5/19/21 for 7 days total of enterococcal coverage     5   Recent Biliary obstruction   Due to #2   Status post recent ERCP, CBD stent 4/28  LFTs remain normal  CT with metallic CBD stent and Perc Cholecytostomy tube in place  2 new lesions on liver     6   Port placed 6/1/21 for chemo    -antibiotics as above  -follow up pending blood cultures   -may need to consider removal     Antibiotics:  Vancomycin/Cefazolin #3  Antibiotics #5    Subjective:  Patient seen on AM rounds  Denies pain  Denies fevers, chills, sweats, nausea, vomiting, or diarrhea  24 Hour Events:  No documented fevers, chills, sweats, nausea, vomiting, or diarrhea  Objective:  Vitals:  Temp:  [98 3 °F (36 8 °C)-98 9 °F (37 2 °C)] 98 5 °F (36 9 °C)  HR:  [70-85] 85  Resp:  [16-18] 16  BP: (128-134)/(72-76) 130/75  SpO2:  [92 %-93 %] 92 %  Temp (24hrs), Av 6 °F (37 °C), Min:98 3 °F (36 8 °C), Max:98 9 °F (37 2 °C)  Current: Temperature: 98 5 °F (36 9 °C)    Physical Exam:   General:  No acute distress  Psychiatric:  Awake and alert  Pulmonary:  Normal respiratory excursion without accessory muscle use  Abdomen:  Soft, nontender  Extremities:  No edema  Skin:  No rashes    Lab Results:  I have personally reviewed pertinent labs  Results from last 7 days   Lab Units 21  0545 21  0611 07/15/21  0510 21  0539   POTASSIUM mmol/L 3 6 3 4* 3 3* 3 3*   CHLORIDE mmol/L 107 107 109* 107   CO2 mmol/L 28 26 26 25   BUN mg/dL 14 14 13 11   CREATININE mg/dL 0 72 0 67 0 65 0 75   EGFR ml/min/1 73sq m 98 101 102 96   CALCIUM mg/dL 8 1* 8 0* 7 8* 7 8*   AST U/L  --  11 11 15   ALT U/L  --  22 25 28   ALK PHOS U/L  --  98 94 98     Results from last 7 days   Lab Units 21  0545 21  0611 07/15/21  0510   WBC Thousand/uL 6 88 5 68 4 25*   HEMOGLOBIN g/dL 10 0* 9 8* 9 4*   PLATELETS Thousands/uL 195 136* 99*     Results from last 7 days   Lab Units 21  0709 21  0708 21  0947   BLOOD CULTURE  Received in Microbiology Lab  Culture in Progress  Received in Microbiology Lab  Culture in Progress  --    GRAM STAIN RESULT   --   --  Gram negative rods*  Gram positive cocci in chains*  Gram positive cocci in chains*       Imaging Studies:   I have personally reviewed pertinent imaging study reports and images in PACS      EKG, Pathology, and Other Studies:   I have personally reviewed pertinent reports

## 2021-07-18 NOTE — ASSESSMENT & PLAN NOTE
Lab Results   Component Value Date    HGBA1C 6 2 (H) 05/05/2021   · Last A1c reflects excellent control  · Glucose trend reviewed- lowest blood sugar levels around 70s to 80s only noted in the early mornings    The rest of her other blood sugars in the 200s  · Nighttime Lantus was decreased previously from 20 units to 10 units -- still in the 80s in the early mornings with this dose, will back off further to 5 units lantus  · Patient on 10 units Humalog with meals -- continue 5 units TID with meals for now  · Q i d  Accu-Cheks  · SSI  · Hypoglycemia protocol

## 2021-07-18 NOTE — PROGRESS NOTES
[facility]  Progress Note - Rajinder Silverio 1955, 73 y o  male MRN: [id number]  Unit/Bed#: S -41 Encounter: [id number]  Primary Care Provider: Nicolás Haney MD   Date and time admitted to hospital: 7/13/2021  9:40 AM    * Sepsis of unknown etiology  Assessment & Plan  · POA, fever, tachycardia, leukopenia -- currently afebrile, tachycardia and leukpenia now improved  · Urinalysis, chest x-ray unremarkable  CT of abdomen/pelvis without obvious source of infection  · Blood cultures obtained on 7/13 2 of 2 showed Gram-positive cocci in chains (the report has been corrected because initially 1 of the cultures was reported as gram negative rods, there are no gram negative rods I double-checked with microbiology lab as well); repeat blood cultures obtained on 07/16/2021, will follow up  · Notably, patient has a cholecystostomy tube in place from May 2021 for cholecystitis at the time s/p antibiotic course  · Currently on cefazolin and vancomycin  · Source in with infection unclear at this time, consider translocation into the GI tract from recent chemotherapy and/or new metastatic sites in liver; also considering the possibility of the port as the source of infection  Port may need to be removed if repeat blood culture is positive  Severe protein-calorie malnutrition ([facility] [address] )  Assessment & Plan  Malnutrition Findings:   Adult Malnutrition type: Acute illness (in the setting of chronic illness)  Adult Degree of Malnutrition: Other severe protein calorie malnutrition (related to inadequate oral intake, catabolic illness)    BMI Findings: Body mass index is 24 87 kg/m²  Pancytopenia due to chemotherapy  Assessment & Plan  Appreciate oncology input-- Monitor CBC and transfuse if necessary   Currently stable  Monitor for neutropenia    Generalized weakness  Assessment & Plan  Multifactorial secondary to sepsis coupled with metastatic cancer (see plan for individual assessments)  PT/OT as tolerated    Metastatic pancreatic cancer  Assessment & Plan  Follows with medical/surgical oncology - s/p a cycle of neoadjuvant chemotherapy (Gemcitabine/Abraxane) last week  Known to palliative care -> consider inpatient consultation if pain uncontrolled  CT imaging in the ED today noting "New 3 5 x 1 8 cm low-attenuating lesion in the right hepatic lobe and increased size of a 1 9 x 1 3 cm low-attenuating lesion in the left hepatic lobe, concerning for metastatic disease  Stable pancreatic head neoplasm  Resolution of the previously reported paraduodenal collection and omental edema/infiltration " -> appreciate medical oncology evaluation     Recent cholecystitis  Assessment & Plan  Hospitalized in May 2021 s/p cholecystostomy tube placement on 5/11/21 - completed a course of antibiotics at the time  PRN pain control - supportive care otherwise    Type 2 diabetes mellitus with hypoglycemia [facility])  Assessment & Plan  Lab Results   Component Value Date    HGBA1C 6 2 (H) 05/05/2021   · Last A1c reflects excellent control  · Glucose trend reviewed- lowest blood sugar levels around 70s to 80s only noted in the early mornings  The rest of her other blood sugars in the 200s  · Nighttime Lantus was decreased previously from 20 units to 10 units -- still in the 80s in the early mornings with this dose, will back off further to 5 units lantus  · Patient on 10 units Humalog with meals -- continue 5 units TID with meals for now  · Q i d  Accu-Cheks  · SSI  · Hypoglycemia protocol      VTE Pharmacologic Prophylaxis: VTE Score: 6 High Risk (Score >/= 5) - Pharmacological DVT Prophylaxis Ordered: enoxaparin (Lovenox)  Sequential Compression Devices Ordered  Patient Centered Rounds: I performed bedside rounds with nursing staff today  Discussions with Specialists or Other Care Team Provider: ID    Education and Discussions with Family / Patient: Updated  (son) via phone      Current Length of Stay: 5 day(s)  Current Patient Status: Inpatient   Discharge Plan: Anticipate discharge in 48 hrs to home  Code Status: Level 1 - Full Code    Subjective:   He stated feeling well and denied fever, chills, cough, dysuria, chest pain, abdominal pain, N/V or diarrhea  Objective:     Vitals:   Temp (24hrs), Av 4 °F (36 9 °C), Min:98 °F (36 7 °C), Max:99 °F (37 2 °C)    Temp:  [98 °F (36 7 °C)-99 °F (37 2 °C)] 99 °F (37 2 °C)  HR:  [55-84] 84  Resp:  [16-18] 16  BP: ()/(62-73) 131/73  SpO2:  [93 %-94 %] 94 %  Body mass index is 24 87 kg/m²  Input and Output Summary (last 24 hours): Intake/Output Summary (Last 24 hours) at 2021 1242  Last data filed at 2021 0911  Gross per 24 hour   Intake --   Output 2850 ml   Net -2850 ml       Physical Exam:   Physical Exam  Vitals reviewed  Constitutional:       General: He is not in acute distress  Appearance: He is not ill-appearing  HENT:      Nose: No congestion  Eyes:      General: No scleral icterus  Cardiovascular:      Rate and Rhythm: Normal rate  Heart sounds: No murmur heard  Pulmonary:      Effort: Pulmonary effort is normal  No respiratory distress  Breath sounds: No wheezing or rales  Abdominal:      General: Bowel sounds are normal  There is no distension  Palpations: Abdomen is soft  Tenderness: There is no abdominal tenderness  Musculoskeletal:      Right lower leg: No edema  Left lower leg: No edema  Skin:     General: Skin is warm and dry  Capillary Refill: Capillary refill takes less than 2 seconds  Neurological:      General: No focal deficit present  Mental Status: He is alert and oriented to person, place, and time     Psychiatric:         Mood and Affect: Mood normal           Additional Data:     Labs:  Results from last 7 days   Lab Units 21  0504   WBC Thousand/uL 8 16   HEMOGLOBIN g/dL 10 1*   HEMATOCRIT % 32 5*   PLATELETS Thousands/uL 277   BANDS PCT % 12*   LYMPHO PCT % 17   MONO PCT % 2*   EOS PCT % 1     Results from last 7 days   Lab Units 07/17/21  0545 07/16/21  0611   SODIUM mmol/L 143 142   POTASSIUM mmol/L 3 6 3 4*   CHLORIDE mmol/L 107 107   CO2 mmol/L 28 26   BUN mg/dL 14 14   CREATININE mg/dL 0 72 0 67   ANION GAP mmol/L 8 9   CALCIUM mg/dL 8 1* 8 0*   ALBUMIN g/dL  --  2 0*   TOTAL BILIRUBIN mg/dL  --  0 43   ALK PHOS U/L  --  98   ALT U/L  --  22   AST U/L  --  11   GLUCOSE RANDOM mg/dL 83 80     Results from last 7 days   Lab Units 07/13/21  0947   INR  1 25*     Results from last 7 days   Lab Units 07/18/21  1133 07/18/21  0738 07/17/21  2103 07/17/21  1541 07/17/21  1120 07/17/21  0734 07/16/21  2106 07/16/21  1601 07/16/21  1112 07/16/21  0733 07/15/21  2015 07/15/21  1634   POC GLUCOSE mg/dl 144* 92 214* 213* 178* 87 212* 209* 204* 80 250* 237*         Results from last 7 days   Lab Units 07/14/21  0547 07/13/21  1003 07/13/21  0947   LACTIC ACID mmol/L  --   --  1 8   PROCALCITONIN ng/ml 2 72* 1 50*  --        Lines/Drains:  Invasive Devices     Central Venous Catheter Line            Port A Cath 06/01/21 Left Chest 47 days          Drain            Closed/Suction Drain Lateral RLQ 68 days                Central Line:  Goal for removal: N/A - Discharging with PICC for IV ABX/medications             Imaging: No pertinent imaging reviewed  Recent Cultures (last 7 days):   Results from last 7 days   Lab Units 07/16/21  0709 07/16/21  0708 07/13/21  0947   BLOOD CULTURE  No Growth at 24 hrs  No Growth at 48 hrs   Prevotella buccae*  Gamma Hemolytic Streptococcus NOT Enterococcus   GRAM STAIN RESULT   --   --  Gram negative rods*  Gram positive cocci in chains*  Gram positive cocci in chains*       Last 24 Hours Medication List:   Current Facility-Administered Medications   Medication Dose Route Frequency Provider Last Rate    acetaminophen  650 mg Oral Q6H PRN Kelly [place], MD      ampicillin-sulbactam  3 g Intravenous Q6H Nyla Hernandez MD Lianna      aspirin  81 mg Oral Daily Marisa Hardin MD      butalbital-acetaminophen-caffeine  1 tablet Oral Q4H PRN Damian Viveros MD      cyanocobalamin  1,000 mcg Oral Daily Mima Smith PA-C      diazepam  2 mg Oral Daily PRN Marisa Hardin MD      enoxaparin  40 mg Subcutaneous Q24H [address] Marisa Hardin MD      FLUoxetine  20 mg Oral Daily Marisa Hardin MD      folic acid  1 mg Oral Daily Marisa Hardin MD      gabapentin  200 mg Oral TID Marisa Hardin MD      HYDROmorphone  0 5 mg Intravenous Q3H PRN Marisa Hardin MD      insulin glargine  5 Units Subcutaneous HS MD Alexander Stein insulin lispro  1-6 Units Subcutaneous 4x Daily (AC & HS) Marisa Hardin MD      insulin lispro  5 Units Subcutaneous TID With Meals Parmjit Camp MD      melatonin  3 mg Oral HS Kena Brewster PA-C      ondansetron  4 mg Intravenous Q4H PRN MD Alexander Will oxyCODONE  5 mg Oral Q4H PRN Marisa Hardin MD      pancrelipase (Lip-Prot-Amyl)  24,000 Units Oral TID With Meals Marisa Hardin MD      pantoprazole  40 mg Oral BID Marisa Hardin MD      predniSONE  20 mg Oral Daily Marisa Hardin MD      predniSONE  5 mg Oral Daily Marisa Hardin MD      sodium chloride (PF)  5 mL Intravenous Q12H [address] Marisa Hardin MD      vancomycin  1,500 mg Intravenous Q8H Lindy Viera MD 1,500 mg (07/17/21 8108)        Today, Patient Was Seen By: Darell Olivares MD    **Please Note: This note may have been constructed using a voice recognition system  **

## 2021-07-18 NOTE — PROGRESS NOTES
Vancomycin IV Pharmacy-to-Dose Consultation    Jacki Noonan is a 73 y o  male who is currently receiving Vancomycin IV with management by the Pharmacy Consult service  Assessment/Plan:  The patient was reviewed  Renal function is stable and no signs or symptoms of nephrotoxicity and/or infusion reactions were documented in the chart  Based on todays assessment, continue current vancomycin dosing of 1500mg IV q8 hours, with a plan for trough to be drawn at 7/22 at 1330  We will continue to follow the patients culture results and clinical progress daily      Angela Grey, Pharmacist

## 2021-07-18 NOTE — PROGRESS NOTES
Progress Note - Infectious Disease   Ludwin Clinton 73 y o  male MRN: [id number]  Unit/Bed#: S -01 Encounter: [id number]      Impression/Recommendations:  1  Sepsis  POA with high fever and tachycardia and mild pancytopenia improving  Due to #2  No other appreciable source   Patient reports port and Perc Cholecystomy are flushing well without issue   Improving  -continue antibiotics as below  -follow up blood cultures as below  -monitor temperature and hemodynamics  -serial exam  -recheck CBC and BMP in a m      2  Polymicrobial bacteremia   Gamma strep, prevotella  Unclear source  Consider relation to recent cholecystitis given prior Enterococcus from biliary cultures, although that was 2 months ago  Consider endocarditis, port infection  Reviewed CT with Radiology and does not appear to have a latha liver abscess     7/15/21 TTE negative for vegetation  Repeat blood cultures 7/16 negative   -continue IV vancomycin  -change cefazolin to Unasyn  -follow-up final blood culture ID/susceptibility and tailor antibiotics as indicated  -follow up repeat blood cultures   -would check JAMAL  -may need port removal if repeat blood cultures are positive     3  Recently diagnosed pancreatic cancer   Diagnosed by EUS May 2021  Patient received chemotherapy 7/8/21 with D8 C2 Abraxane and Gemzar via Port  CT showing 2 liver lesions concerning for metastatic disease   -antibiotic as above  -monitor temperature and hemodynamics  -serial exam  -close oncology follow-up ongoing  -recheck CBC and BMP      4  Recent Acute cholecystitis   In setting of # 2/4   Status post cholecystostomy 5/12   Cultures with Enterococcus and patient completed Augmentin through 5/19/21 for 7 days total of enterococcal coverage      5   Recent Biliary obstruction   Due to #2   Status post recent ERCP, CBD stent 4/28  LFTs remain normal  CT with metallic CBD stent and Perc Cholecytostomy tube in place  2 new lesions on liver     6   Port placed 21 for chemo    -antibiotics as above  -follow up pending blood cultures   -may need to consider removal as above     The above plan was discussed in detail with Dr Antonio Peck, LOYDA    Antibiotics:  Vancomycin/Cefazolin #4  Antibiotics #6    Subjective:  Patient seen on AM rounds  Feels better  Has lower abdominal discomfort which is chronic  Denies fevers, chills, sweats, nausea, vomiting, or diarrhea  24 Hour Events:  No documented fevers, chills, sweats, nausea, vomiting, or diarrhea  Objective:  Vitals:  Temp:  [98 °F (36 7 °C)-99 °F (37 2 °C)] 99 °F (37 2 °C)  HR:  [55-84] 84  Resp:  [16-18] 16  BP: ()/(62-73) 131/73  SpO2:  [93 %-94 %] 94 %  Temp (24hrs), Av 4 °F (36 9 °C), Min:98 °F (36 7 °C), Max:99 °F (37 2 °C)  Current: Temperature: 99 °F (37 2 °C)    Physical Exam:   General:  No acute distress  Eyes:  Normal lids and conjunctivae  ENT:  Normal external ears and nose  Neck:  Neck symmetric with midline trachea  Pulmonary:  Normal respiratory effort without accessory muscle use  Cardiovascular:  Regular rate and rhythm; no peripheral edema  Gastrointestinal:  No tenderness or distention  Musculoskeletal:  No digital clubbing or cyanosis  Skin:  No visible rashes; No palpable nodules  Neurologic:  Sensation grossly intact to light touch  Psychiatric:  Alert and oriented; Normal mood    Lab Results:  I have personally reviewed pertinent labs    Results from last 7 days   Lab Units 21  0545 21  0611 07/15/21  0510 21  0539   POTASSIUM mmol/L 3 6 3 4* 3 3* 3 3*   CHLORIDE mmol/L 107 107 109* 107   CO2 mmol/L 28 26 26 25   BUN mg/dL 14 14 13 11   CREATININE mg/dL 0 72 0 67 0 65 0 75   EGFR ml/min/1 73sq m 98 101 102 96   CALCIUM mg/dL 8 1* 8 0* 7 8* 7 8*   AST U/L  --  11 11 15   ALT U/L  --  22 25 28   ALK PHOS U/L  --  98 94 98     Results from last 7 days   Lab Units 21  0504 21  0545 21  0611   WBC Thousand/uL 8 16 6 88 5 68   HEMOGLOBIN g/dL 10 1* 10 0* 9 8*   PLATELETS Thousands/uL 277 195 136*     Results from last 7 days   Lab Units 07/16/21  0709 07/16/21  0708 07/13/21  0947   BLOOD CULTURE  No Growth at 24 hrs  No Growth at 48 hrs  Prevotella buccae*  Gamma Hemolytic Streptococcus NOT Enterococcus   GRAM STAIN RESULT   --   --  Gram negative rods*  Gram positive cocci in chains*  Gram positive cocci in chains*       Imaging Studies:   I have personally reviewed pertinent imaging study reports and images in PACS  CT A/P reviewed personally with Radiology  New right hepatic lobe hypodensity not previously seen but could be due to technique  Is not a drainable or enhancing collection  Other hypodensities state as far back as 2016 and are stable  EKG, Pathology, and Other Studies:   I have personally reviewed pertinent reports

## 2021-07-19 NOTE — ASSESSMENT & PLAN NOTE
Follows with medical/surgical oncology - s/p a cycle of neoadjuvant chemotherapy (Gemcitabine/Abraxane) a week prior to admission  Known to palliative care -> consider inpatient consultation if pain uncontrolled  CT imaging in the ED today noting "New 3 5 x 1 8 cm low-attenuating lesion in the right hepatic lobe and increased size of a 1 9 x 1 3 cm low-attenuating lesion in the left hepatic lobe, concerning for metastatic disease  Stable pancreatic head neoplasm   Resolution of the previously reported paraduodenal collection and omental edema/infiltration " -> appreciate medical oncology evaluation

## 2021-07-19 NOTE — PROGRESS NOTES
[facility]  Progress Note - [facility] 1955, 73 y o  male MRN: [id number]  Unit/Bed#: S -34 Encounter: [id number]  Primary Care Provider: Laura Hdz MD   Date and time admitted to hospital: 7/13/2021  9:40 AM    * Sepsis of unknown etiology  Assessment & Plan  · POA, fever, tachycardia, leukopenia -- currently afebrile, tachycardia and leukpenia now improved  · Urinalysis, chest x-ray unremarkable  CT of abdomen/pelvis without obvious source of infection  · Blood cultures obtained on 7/13 2 of 2 showed Gram-positive cocci in chains, prevotella; repeat blood cultures obtained on 07/16 showed no growth so far after 48 hours  · Notably, patient has a cholecystostomy tube in place from May 2021 for cholecystitis at the time s/p antibiotic course  · Antibiotics switched to Unasyn  · JAMAL today  · Source in with infection unclear at this time, consider translocation into the GI tract from recent chemotherapy and/or new metastatic sites in liver; also considering the possibility of the port as the source of infection  Port may need to be removed if repeat blood culture is positive  Metastatic pancreatic cancer  Assessment & Plan  Follows with medical/surgical oncology - s/p a cycle of neoadjuvant chemotherapy (Gemcitabine/Abraxane) a week prior to admission  Known to palliative care -> consider inpatient consultation if pain uncontrolled  CT imaging in the ED today noting "New 3 5 x 1 8 cm low-attenuating lesion in the right hepatic lobe and increased size of a 1 9 x 1 3 cm low-attenuating lesion in the left hepatic lobe, concerning for metastatic disease  Stable pancreatic head neoplasm   Resolution of the previously reported paraduodenal collection and omental edema/infiltration " -> appreciate medical oncology evaluation     Type 2 diabetes mellitus with hypoglycemia [facility])  Assessment & Plan  Lab Results   Component Value Date    HGBA1C 6 2 (H) 05/05/2021 · Last A1c reflects excellent control  · Patient on 20 units Lantus at home, currently on 5 units Lantus as he is noted to be on the lower side of normal in terms of blood glucose in the early mornings  · Patient on 10 units Humalog with meals -- continue 5 units TID with meals for now  · Q i d  Accu-Cheks  · SSI  · Hypoglycemia protocol    Pancytopenia due to chemotherapy  Assessment & Plan  Appreciate oncology input-- Monitor CBC and transfuse if necessary  Currently stable  Monitor for neutropenia    Generalized weakness  Assessment & Plan  Multifactorial secondary to sepsis coupled with metastatic cancer (see plan for individual assessments)  PT/OT as tolerated    Recent cholecystitis  Assessment & Plan  Hospitalized in May 2021 s/p cholecystostomy tube placement on 21 - completed a course of antibiotics at the time  PRN pain control - supportive care otherwise        VTE Pharmacologic Prophylaxis: VTE Score: 6 High Risk (Score >/= 5) - Pharmacological DVT Prophylaxis Ordered: enoxaparin (Lovenox)  Sequential Compression Devices Ordered  Patient Centered Rounds: I performed bedside rounds with nursing staff today  Discussions with Specialists or Other Care Team Provider:  Reviewed infectious disease recommendations    Education and Discussions with Family / Patient: Updated  (son) via phone  Current Length of Stay: 6 day(s)  Current Patient Status: Inpatient   Discharge Plan: Anticipate discharge in 24-48 hrs to home  Code Status: Level 1 - Full Code    Subjective:   Patient reports he feels well but just tired overall  Objective:     Vitals:   Temp (24hrs), Av 9 °F (37 2 °C), Min:98 3 °F (36 8 °C), Max:99 7 °F (37 6 °C)    Temp:  [98 3 °F (36 8 °C)-99 7 °F (37 6 °C)] 98 3 °F (36 8 °C)  HR:  [72-87] 85  Resp:  [18] 18  BP: (125-145)/(69-82) 125/70  SpO2:  [90 %-94 %] 94 %  Body mass index is 24 81 kg/m²  Input and Output Summary (last 24 hours):      Intake/Output Summary (Last 24 hours) at 7/19/2021 1213  Last data filed at 7/19/2021 0710  Gross per 24 hour   Intake --   Output 2100 ml   Net -2100 ml       Physical Exam:   Physical Exam  Vitals reviewed  Constitutional:       General: He is not in acute distress  Appearance: He is not ill-appearing  HENT:      Nose: No congestion  Eyes:      General: No scleral icterus  Cardiovascular:      Rate and Rhythm: Normal rate and regular rhythm  Heart sounds: No murmur heard  Pulmonary:      Effort: Pulmonary effort is normal  No respiratory distress  Breath sounds: No wheezing or rales  Abdominal:      Palpations: Abdomen is soft  Tenderness: There is no abdominal tenderness  Musculoskeletal:      Right lower leg: No edema  Left lower leg: No edema  Skin:     General: Skin is warm and dry  Capillary Refill: Capillary refill takes less than 2 seconds  Findings: No lesion  Neurological:      General: No focal deficit present  Mental Status: He is alert              Additional Data:     Labs:  Results from last 7 days   Lab Units 07/19/21  0601   WBC Thousand/uL 9 34   HEMOGLOBIN g/dL 10 0*   HEMATOCRIT % 32 1*   PLATELETS Thousands/uL 391*   BANDS PCT % 4   LYMPHO PCT % 12*   MONO PCT % 1*   EOS PCT % 1     Results from last 7 days   Lab Units 07/19/21  0828 07/16/21  0611   SODIUM mmol/L 141 142   POTASSIUM mmol/L 3 3* 3 4*   CHLORIDE mmol/L 104 107   CO2 mmol/L 27 26   BUN mg/dL 14 14   CREATININE mg/dL 0 67 0 67   ANION GAP mmol/L 10 9   CALCIUM mg/dL 8 0* 8 0*   ALBUMIN g/dL  --  2 0*   TOTAL BILIRUBIN mg/dL  --  0 43   ALK PHOS U/L  --  98   ALT U/L  --  22   AST U/L  --  11   GLUCOSE RANDOM mg/dL 106 80     Results from last 7 days   Lab Units 07/13/21  0947   INR  1 25*     Results from last 7 days   Lab Units 07/19/21  1058 07/19/21  0713 07/18/21  2019 07/18/21  1605 07/18/21  1133 07/18/21  0738 07/17/21  2103 07/17/21  1541 07/17/21  1120 07/17/21  0734 07/16/21  2106 07/16/21  1601   POC GLUCOSE mg/dl 110 105 233* 257* 144* 92 214* 213* 178* 87 212* 209*         Results from last 7 days   Lab Units 07/14/21  0547 07/13/21  1003 07/13/21  0947   LACTIC ACID mmol/L  --   --  1 8   PROCALCITONIN ng/ml 2 72* 1 50*  --        Lines/Drains:  Invasive Devices     Central Venous Catheter Line            Port A Cath 06/01/21 Left Chest 48 days          Drain            Closed/Suction Drain Lateral RLQ 69 days                Central Line:  Goal for removal: If repeat blood cultures grow bacteria, would need to consider port removal             Imaging: Reviewed radiology reports from this admission including: abdominal/pelvic CT    Recent Cultures (last 7 days):   Results from last 7 days   Lab Units 07/16/21  0709 07/16/21  0708 07/13/21  0947   BLOOD CULTURE  No Growth at 48 hrs  No Growth at 72 hrs   Prevotella buccae*  Gamma Hemolytic Streptococcus NOT Enterococcus   GRAM STAIN RESULT   --   --  Gram negative rods*  Gram positive cocci in chains*  Gram positive cocci in chains*       Last 24 Hours Medication List:   Current Facility-Administered Medications   Medication Dose Route Frequency Provider Last Rate    acetaminophen  650 mg Oral Q6H PRN Vallorie Fabry, MD      ampicillin-sulbactam  3 g Intravenous Q6H Amy Tavarez MD 3 g (07/19/21 0600)    aspirin  81 mg Oral Daily Vallorie Fabry, MD      butalbital-acetaminophen-caffeine  1 tablet Oral Q4H PRN Damian Duffy MD      cyanocobalamin  1,000 mcg Oral Daily Kena Rodríguez PA-C      diazepam  2 mg Oral Daily PRN Vallorie Fabry, MD      enoxaparin  40 mg Subcutaneous Q24H [address] Vallorie Fabry, MD      FLUoxetine  20 mg Oral Daily Vallorie Fabry, MD      folic acid  1 mg Oral Daily Vallorie Fabry, MD      gabapentin  200 mg Oral TID Vallorie Fabry, MD      HYDROmorphone  0 5 mg Intravenous Q3H PRN Vallorie Fabry, MD      insulin glargine  5 Units Subcutaneous HS Swapnil Hernandez MD      insulin lispro 1-6 Units Subcutaneous 4x Daily (AC & HS) Randy Dailey MD      insulin lispro  5 Units Subcutaneous TID With Meals Ary Cole MD      melatonin  3 mg Oral HS Kena Brewster PA-C      ondansetron  4 mg Intravenous Q4H PRN Randy Dailey MD     [employer] oxyCODONE  5 mg Oral Q4H PRN Randy Dailey MD      pancrelipase (Lip-Prot-Amyl)  24,000 Units Oral TID With Meals Randy Dailey MD      pantoprazole  40 mg Oral BID Randy Dailey MD      potassium chloride  20 mEq Intravenous Once Ary Cole MD      predniSONE  20 mg Oral Daily Randy Dailey MD      predniSONE  5 mg Oral Daily Randy Dailey MD      sodium chloride (PF)  5 mL Intravenous Q12H [address] Randy Dailey MD      vancomycin  1,500 mg Intravenous Q8H Lane Servin MD 1,500 mg (07/17/21 2217)     Facility-Administered Medications Ordered in Other Encounters   Medication Dose Route Frequency Provider Last Rate    Ketamine HCl   Intravenous PRN Lashay Limbo, CRNA      lactated ringers   Intravenous Continuous PRN Lashay Limbo, CRNA      lidocaine (PF)   Intravenous PRN Lashay Limbo, CRNA      propofol   Intravenous Continuous PRN Lashay Limbo, CRNA 110 mcg/kg/min (07/19/21 1208)    propofol   Intravenous PRN Lashay Limbo, CRNA          Today, Patient Was Seen By: Ary Cole MD    **Please Note: This note may have been constructed using a voice recognition system  **

## 2021-07-19 NOTE — ANESTHESIA PREPROCEDURE EVALUATION
Procedure:  JAMAL    Relevant Problems   CARDIO   (+) Essential hypertension   (+) Hyperlipidemia      ENDO   (+) Type 2 diabetes mellitus with hypoglycemia (HCC)      GI/HEPATIC   (+) GERD   (+) Metastatic pancreatic cancer      /RENAL   (+) BPH (benign prostatic hyperplasia)      MUSCULOSKELETAL   (+) Rheumatoid arthritis (HCC)      NEURO/PSYCH   (+) Anxiety disorder      ECHO 7/15/21:    SUMMARY     LEFT VENTRICLE:  The ventricle was mildly dilated  Systolic function was at the lower limits of normal by visual assessment  Ejection fraction was estimated to be 50 %  There were no regional wall motion abnormalities      LEFT ATRIUM:  The atrium was mildly dilated      MITRAL VALVE:  There was mild regurgitation      AORTIC VALVE:  There was mild regurgitation      TRICUSPID VALVE:  There was mild regurgitation      PULMONIC VALVE:  There was trace regurgitation      PERICARDIUM:  A trace pericardial effusion was identified  Physical Exam    Airway    Mallampati score: II  TM Distance: >3 FB  Neck ROM: full     Dental   No notable dental hx     Cardiovascular  Cardiovascular exam normal    Pulmonary  Pulmonary exam normal     Other Findings        Anesthesia Plan  ASA Score- 3     Anesthesia Type- IV sedation with anesthesia with ASA Monitors  Additional Monitors:   Airway Plan:           Plan Factors-Exercise tolerance (METS): >4 METS  Chart reviewed  Existing labs reviewed  Patient summary reviewed  Patient is not a current smoker  Patient did not smoke on day of surgery  Induction- intravenous  Postoperative Plan-     Informed Consent- Anesthetic plan and risks discussed with patient  I personally reviewed this patient with the CRNA  Discussed and agreed on the Anesthesia Plan with the CRNA  Myranda Partida

## 2021-07-19 NOTE — ASSESSMENT & PLAN NOTE
· POA, fever, tachycardia, leukopenia -- currently afebrile, tachycardia and leukpenia now improved  · Urinalysis, chest x-ray unremarkable  CT of abdomen/pelvis without obvious source of infection  · Blood cultures obtained on 7/13 2 of 2 showed Gram-positive cocci in chains, prevotella; repeat blood cultures obtained on 07/16 showed no growth so far after 48 hours  · Notably, patient has a cholecystostomy tube in place from May 2021 for cholecystitis at the time s/p antibiotic course  · Antibiotics switched to Unasyn  · JAMAL today  · Source in with infection unclear at this time, consider translocation into the GI tract from recent chemotherapy and/or new metastatic sites in liver; also considering the possibility of the port as the source of infection  Port may need to be removed if repeat blood culture is positive

## 2021-07-19 NOTE — PROGRESS NOTES
Vancomycin IV Pharmacy-to-Dose Consultation    Kalie Colmenares is a 73 y o  male who is currently receiving Vancomycin IV with management by the Pharmacy Consult service for the treatment of bacteremia  Assessment/Plan:    The patient's chart was reviewed  Renal function is stable  There are no signs or symptoms of nephrotoxicity and/or infusion reactions documented  Based on today's assessment, will continue current vancomycin dosing of 1500mg IV every 8 hours, with a plan for trough to be drawn at 1330 on 7/22  We will continue to follow the patient's culture results and clinical progress daily        Michael Bhatti, PharmD   Pharmacist

## 2021-07-19 NOTE — PROGRESS NOTES
Progress Note - Infectious Disease   Ludwin Clinton 73 y o  male MRN: [id number]  Unit/Bed#: S -01 Encounter: [id number]      Impression/Plan:  1  Sepsis  POA with high fever and tachycardia and mild pancytopenia improving  Due to #2  No other appreciable source   Patient reports port and Perc Cholecystomy are flushing well without issue   Improving  -continue antibiotics as below  -follow up blood cultures as below  -monitor temperature and hemodynamics  -serial exam  -recheck CBC and BMP in a m      2  Polymicrobial bacteremia   Strep constellatus, prevotella   Unclear source   Consider relation to recent cholecystitis given prior Enterococcus from biliary cultures, although that was 2 months ago  Consider endocarditis, port infection  CT reviewed with Radiology and no latha liver abscess seen   7/15/21 TTE negative for vegetation  Repeat blood cultures 7/16 negative  Also patient reported discomfort at dental crown, consider oral source  -continue IV vancomycin and Unasyn  -follow-up final blood culture Strep susceptibility and tailor antibiotics as indicated  -follow up repeat blood cultures   -follow up JAMAL  -may maintain port removal if repeat blood cultures remain negative  -Recommend OMFS consultation regarding recent symptomatic dental crown     3  Recently diagnosed pancreatic cancer   Diagnosed by EUS May 2021  Patient received chemotherapy 7/8/21 with D8 C2 Abraxane and Gemzar via Port  CT showing 2 liver lesions concerning for metastatic disease   -antibiotic as above  -monitor temperature and hemodynamics  -serial exam  -close oncology follow-up ongoing  -recheck CBC and BMP      4  Recent Acute cholecystitis   In setting of # 2/4   Status post cholecystostomy 5/12   Cultures with Enterococcus and patient completed Augmentin through 5/19/21 for 7 days total of enterococcal coverage      5   Recent Biliary obstruction   Due to #2   Status post recent ERCP, CBD stent 4/28   LFTs remain normal  CT with metallic CBD stent and Perc Cholecytostomy tube in place  2 new lesions on liver     6  Port placed 21 for chemo    -antibiotics as above  -follow up pending blood cultures   -may need to consider removal as above     Above impression and plan discussed in detail with patient, RN, and primary care team      Antibiotics:  Vancomycin D6/Unasyn D4  Antibiotics #7     Subjective:  Patient has no high fever, chills, sweats; no nausea, vomiting, diarrhea; no cough, shortness of breath; no worsening lower abd pressure "heaviness "   Patient reports recent dental crown discomfort but doesn't have the money for repair; this started prior to pancreatic ca diagnosis  Objective:  Vitals:  Temp:  [97 2 °F (36 2 °C)-99 7 °F (37 6 °C)] 99 1 °F (37 3 °C)  HR:  [68-87] 68  Resp:  [16-20] 20  BP: ()/(51-82) 111/64  SpO2:  [88 %-94 %] 90 %  Temp (24hrs), Av 5 °F (36 9 °C), Min:97 2 °F (36 2 °C), Max:99 7 °F (37 6 °C)  Current: Temperature: 99 1 °F (37 3 °C)    Physical Exam:   General Appearance:  Alert, interactive, nontoxic, no acute distress  Throat: Oropharynx moist without lesions  Poor dentition  Lungs:   Clear to auscultation bilaterally; no wheezes, rhonchi or rales; respirations unlabored   Heart:  RRR; no murmur   Abdomen:   Soft, non-tender at capped RUQ cholecystostomy tube site, non-distended, positive bowel sounds  Extremities: No clubbing, cyanosis or edema   : No mathews, no SPT   Skin: No new rashes or lesions  Left chest port site nontender          Labs, Imaging, & Other studies:   All pertinent labs and imaging studies were personally reviewed  Results from last 7 days   Lab Units 21  0601 21  0504 21  0545   WBC Thousand/uL 9 34 8 16 6 88   HEMOGLOBIN g/dL 10 0* 10 1* 10 0*   PLATELETS Thousands/uL 391* 277 195     Results from last 7 days   Lab Units 21  0828 21  0545 21  0611 07/15/21  0510 21  0539   SODIUM mmol/L 141 143 142 144 142   POTASSIUM mmol/L 3 3* 3 6 3 4* 3 3* 3 3*   CHLORIDE mmol/L 104 107 107 109* 107   CO2 mmol/L 27 28 26 26 25   BUN mg/dL 14 14 14 13 11   CREATININE mg/dL 0 67 0 72 0 67 0 65 0 75   EGFR ml/min/1 73sq m 101 98 101 102 96   CALCIUM mg/dL 8 0* 8 1* 8 0* 7 8* 7 8*   AST U/L  --   --  11 11 15   ALT U/L  --   --  22 25 28   ALK PHOS U/L  --   --  98 94 98     Results from last 7 days   Lab Units 07/16/21  0709 07/16/21  0708 07/13/21  0947   BLOOD CULTURE  No Growth at 72 hrs  No Growth at 72 hrs   Streptococcus constellatus*  Prevotella buccae*   GRAM STAIN RESULT   --   --  Gram positive cocci in chains*  Gram negative rods*  Gram positive cocci in chains*     Results from last 7 days   Lab Units 07/14/21  0547 07/13/21  1003   PROCALCITONIN ng/ml 2 72* 1 50*         Results from last 7 days   Lab Units 07/13/21  1003   FERRITIN ng/mL 625*

## 2021-07-19 NOTE — QUICK NOTE
Mr Maciej Alejandro presented to [facility] ER 7/13/2021 with complaint of worsening weakness, fatigue and fever  He has PMH of metastatic pancreatic cancer, cholecystitis s/p cholecystostomy tube placed 5/2021, hypertension, hyperlipidemia and type II diabtes  On arrival to ER, sepsis protocol was initiated  Patient gram positive bacteremia and AJMAL was requested  Reviewed JAMAL procedure including risk and benefits of JAMAL  Patient agreeable

## 2021-07-19 NOTE — ASSESSMENT & PLAN NOTE
Lab Results   Component Value Date    HGBA1C 6 2 (H) 05/05/2021   · Last A1c reflects excellent control     · Patient on 20 units Lantus at home, currently on 5 units Lantus as he is noted to be on the lower side of normal in terms of blood glucose in the early mornings  · Patient on 10 units Humalog with meals -- continue 5 units TID with meals for now  · Q i d  Accu-Cheks  · SSI  · Hypoglycemia protocol

## 2021-07-19 NOTE — ANESTHESIA POSTPROCEDURE EVALUATION
Post-Op Assessment Note    CV Status:  Stable  Pain Score: 0    Pain management: adequate     Mental Status:  Alert and awake   Hydration Status:  Euvolemic   PONV Controlled:  Controlled   Airway Patency:  Patent      Post Op Vitals Reviewed: Yes      Staff: CRNA   Comments: arousable, following commands and verbally responsive  Denies any discomfort at this time  RRR, Nonlabored, VSS  No complications documented      BP   87/51   Temp      Pulse  71   Resp   18   SpO2   94%

## 2021-07-19 NOTE — TELEPHONE ENCOUNTER
----- Message from Salvador Rhoades sent at 7/16/2021 12:17 PM EDT -----  Regarding: mail letter and results

## 2021-07-20 PROBLEM — R78.81 BACTEREMIA: Status: ACTIVE | Noted: 2021-01-01

## 2021-07-20 NOTE — ASSESSMENT & PLAN NOTE
· Blood cultures obtained on 7/13 2 of 2 showed Gram-positive cocci in chains, prevotella; repeat blood cultures obtained on 07/16 showed no growth so far after 72hrs  · JAMAL done yesterday was negative for vegetations  · Source of with infection unclear at this time, consider translocation into the GI tract from recent chemotherapy and/or new metastatic sites in liver; also considering the possibility of the port as the source of infection  There is also a question of whether patient needs to get his tooth extracted, he has a crown that has been bothering him for a while and this could also be a potential source for his poly bacteremia    OMFS consulted, CT facial bones w contrast pending

## 2021-07-20 NOTE — PROGRESS NOTES
Progress Note - Infectious Disease   Jacki Noonan 73 y o  male MRN: [id number]  Unit/Bed#: S -01 Encounter: [id number]      Impression/Plan:  1  Sepsis  POA with high fever and tachycardia and mild pancytopenia improving   Due to #2   No other appreciable source   Patient reports port and Perc Cholecystomy are flushing well without issue  Patient with temp to 102 F last night, defervesced with Tylenol  Patient had no associated chills, sweats, rash, or diarrhea  May represent tumor or drug fever  Eos 1  -adjust antibiotics as below  -follow up blood cultures as below  -monitor temperature and hemodynamics  -serial exam  -recheck CBC and BMP in a m      2  Polymicrobial bacteremia   Strep constellatus pansensitive, prevotella   Unclear source   Consider relation to recent cholecystitis given prior Enterococcus from biliary cultures, although that was 2 months ago  CT reviewed with Radiology and no latha liver abscess seen   7/15/21 TTE and 7/19 JAMAL negative for vegetation   Repeat blood cultures 7/16 negative day #4  Also patient reported discomfort at dental crown gum line, consider oral source   -discontinue IV vancomycin and unasyn  -Switch to IV Ceftriaxone 2 g IV q 24 hours through 7/29 and Flagyl 500 mg PO q 8 hours through 7/24/21  -follow up repeat blood cultures finals  -maintain port with repeat blood cultures negative thus far  -Will check surveillance blood cultures 1 week (8/5/21) after completion of 2 week course of antibiotic therapy from negative blood cultures planned through 7/29/21   Office notified and placing orders   -Follow up OMFS evaluation  -F/U CT facial bones     3  Recently diagnosed pancreatic cancer   Diagnosed by EUS May 2021  Patient received chemotherapy 7/8/21 with D8 C2 Abraxane and Gemzar via Port  CT showing 2 liver lesions concerning for metastatic disease   -antibiotic as above  -monitor temperature and hemodynamics  -serial exam  -close oncology follow-up ongoing     4  Recent Acute cholecystitis   In setting of # 2/4   Status post cholecystostomy    Cultures with Enterococcus and patient completed Augmentin through 21 for 7 days total of enterococcal coverage      5   Recent Biliary obstruction   Due to #2   Status post recent ERCP, CBD stent   LFTs remain normal  CT with metallic CBD stent and Perc Cholecytostomy tube in place  2 new lesions on liver     6  Port placed 21 for chemo    -antibiotics as above  -follow up pending blood cultures   -may need to consider removal as above     Above impression and plan discussed in detail with patient, RN, , and primary care team   I personally spent over half of a total 40 minutes in counseling and discussion with the patient and coordination of care as described above       Antibiotics:  Unasyn D5  Antibiotics D5 from first negative blood cultures     Subjective:  Patient had temp to 102 F last night, defervesced with Tylenol  He reports no associated chills, sweats; no nausea, vomiting, diarrhea; no cough, shortness of breath; and improving lower abd pressure "heaviness "   Patient reports dental crown discomfort when brushing gumline  OMFS consultant visit expected this afternoon after 2pm CT facial bones    Objective:  Vitals:  Temp:  [98 2 °F (36 8 °C)-102 °F (38 9 °C)] 98 2 °F (36 8 °C)  HR:  [60-83] 82  Resp:  [18-20] 18  BP: (111-147)/(64-75) 147/75  SpO2:  [90 %-92 %] 92 %  Temp (24hrs), Av 7 °F (37 6 °C), Min:98 2 °F (36 8 °C), Max:102 °F (38 9 °C)  Current: Temperature: 98 2 °F (36 8 °C)    Physical Exam:   General Appearance:  Alert, interactive, nontoxic, no acute distress  Throat: Oropharynx moist without lesions  Poor dentition  Lungs:   Clear to auscultation bilaterally; no wheezes, rhonchi or rales; respirations unlabored   Heart:  RRR; no murmur   Abdomen:   Soft, non-tender at capped RUQ cholecystostomy tube, non-distended, positive bowel sounds       Extremities: No clubbing, cyanosis or edema   : No Mccann, no SPT   Skin: No new rashes or lesions  Left chest port site nontender  Labs, Imaging, & Other studies:   All pertinent labs and imaging studies were personally reviewed  Results from last 7 days   Lab Units 07/20/21  0505 07/19/21  0601 07/18/21  0504   WBC Thousand/uL 9 25 9 34 8 16   HEMOGLOBIN g/dL 10 2* 10 0* 10 1*   PLATELETS Thousands/uL 444* 391* 277     Results from last 7 days   Lab Units 07/20/21  0505 07/19/21  0828 07/17/21  0545 07/16/21  0611 07/15/21  0510   SODIUM mmol/L 139 141 143 142 144   POTASSIUM mmol/L 3 4* 3 3* 3 6 3 4* 3 3*   CHLORIDE mmol/L 105 104 107 107 109*   CO2 mmol/L 25 27 28 26 26   BUN mg/dL 15 14 14 14 13   CREATININE mg/dL 0 67 0 67 0 72 0 67 0 65   EGFR ml/min/1 73sq m 101 101 98 101 102   CALCIUM mg/dL 8 0* 8 0* 8 1* 8 0* 7 8*   AST U/L 14  --   --  11 11   ALT U/L 17  --   --  22 25   ALK PHOS U/L 115  --   --  98 94     Results from last 7 days   Lab Units 07/16/21  0709 07/16/21  0708   BLOOD CULTURE  No Growth After 4 Days  No Growth After 4 Days       Results from last 7 days   Lab Units 07/14/21  0547   PROCALCITONIN ng/ml 2 72*

## 2021-07-20 NOTE — PLAN OF CARE
Problem: Potential for Falls  Goal: Patient will remain free of falls  Description: INTERVENTIONS:  - Educate patient/family on patient safety including physical limitations  - Instruct patient to call for assistance with activity   - Consult OT/PT to assist with strengthening/mobility   - Keep Call bell within reach  - Keep bed low and locked with side rails adjusted as appropriate  - Keep care items and personal belongings within reach  - Initiate and maintain comfort rounds  - Make Fall Risk Sign visible to staff  - Offer Toileting every 2 Hours, in advance of need  - Initiate/Maintain alarm  - Obtain necessary fall risk management equipment  - Apply yellow socks and bracelet for high fall risk patients  - Consider moving patient to room near nurses station  Outcome: Progressing     Problem: Nutrition/Hydration-ADULT  Goal: Nutrient/Hydration intake appropriate for improving, restoring or maintaining nutritional needs  Description: Monitor and assess patient's nutrition/hydration status for malnutrition  Collaborate with interdisciplinary team and initiate plan and interventions as ordered  Monitor patient's weight and dietary intake as ordered or per policy  Utilize nutrition screening tool and intervene as necessary  Determine patient's food preferences and provide high-protein, high-caloric foods as appropriate       INTERVENTIONS:  - Monitor oral intake, urinary output, labs, and treatment plans  - Assess nutrition and hydration status and recommend course of action  - Evaluate amount of meals eaten  - Assist patient with eating if necessary   - Allow adequate time for meals  - Recommend/ encourage appropriate diets, oral nutritional supplements, and vitamin/mineral supplements  - Order, calculate, and assess calorie counts as needed  - Recommend, monitor, and adjust tube feedings and TPN/PPN based on assessed needs  - Assess need for intravenous fluids  - Provide specific nutrition/hydration education as appropriate  - Include patient/family/caregiver in decisions related to nutrition  Outcome: Progressing     Problem: MOBILITY - ADULT  Goal: Maintain or return to baseline ADL function  Description: INTERVENTIONS:  -  Assess patient's ability to carry out ADLs; assess patient's baseline for ADL function and identify physical deficits which impact ability to perform ADLs (bathing, care of mouth/teeth, toileting, grooming, dressing, etc )  - Assess/evaluate cause of self-care deficits   - Assess range of motion  - Assess patient's mobility; develop plan if impaired  - Assess patient's need for assistive devices and provide as appropriate  - Encourage maximum independence but intervene and supervise when necessary  - Involve family in performance of ADLs  - Assess for home care needs following discharge   - Consider OT consult to assist with ADL evaluation and planning for discharge  - Provide patient education as appropriate  Outcome: Progressing  Goal: Maintains/Returns to pre admission functional level  Description: INTERVENTIONS:  - Perform BMAT or MOVE assessment daily    - Set and communicate daily mobility goal to care team and patient/family/caregiver  - Collaborate with rehabilitation services on mobility goals if consulted  - Perform Range of Motion 3 times a day  - Reposition patient every 2 hours    - Dangle patient 3 times a day  - Stand patient 3 times a day  - Ambulate patient 3 times a day  - Out of bed to chair 3 times a day   - Out of bed for meals 3 times a day  - Out of bed for toileting  - Record patient progress and toleration of activity level   Outcome: Progressing     Problem: HEMATOLOGIC - ADULT  Goal: Maintains hematologic stability  Description: INTERVENTIONS  - Assess for signs and symptoms of bleeding or hemorrhage  - Monitor labs  - Administer supportive blood products/factors as ordered and appropriate  Outcome: Progressing     Problem: INFECTION - ADULT  Goal: Absence or prevention of progression during hospitalization  Description: INTERVENTIONS:  - Assess and monitor for signs and symptoms of infection  - Monitor lab/diagnostic results  - Monitor all insertion sites, i e  indwelling lines, tubes, and drains  - Monitor endotracheal if appropriate and nasal secretions for changes in amount and color  - [place] appropriate cooling/warming therapies per order  - Administer medications as ordered  - Instruct and encourage patient and family to use good hand hygiene technique  - Identify and instruct in appropriate isolation precautions for identified infection/condition  Outcome: Progressing

## 2021-07-20 NOTE — PROGRESS NOTES
Vancomycin IV Pharmacy-to-Dose Consultation    Delon Jasso is a 73 y o  male who is currently receiving Vancomycin IV with management by the Pharmacy Consult service for the treatment of bacteremia sepsis  Assessment/Plan:    The patient's chart was reviewed  Renal function is stable  There are no signs or symptoms of nephrotoxicity and/or infusion reactions documented  Based on today's assessment, will continue current vancomycin dosing of 1500mg IV every 8 hours, with a plan for trough to be drawn at 1330 on 7/22  We will continue to follow the patient's culture results and clinical progress daily        Amberly Espino, PharmD   Pharmacist

## 2021-07-20 NOTE — CONSULTS
Oral and Maxillofacial Surgery Consult    Pt seen 07/20/21 7:47 PM     Assessment  73 y o  male  evaluated for odontogenic source of bacteremia  Patient has no recent history of odontogenic abscess or acuity of oral sources of infection  CT Face shows periapical pathology associated with teeth #14,15  Although, very unlikely chronic periapical pathology source of bacteremia, it cannot be definitively ruled out  Plan  - OR planning: patient can have teeth extracted as outpatient (#14,15) if to be discharged in 24-48 hours  Tentatively plan for extraction of necessary teeth in OR under GA 7/22/2021  - Analgesia prn  - Continue IV abx per ID     D/w OMFS attg on call, Dr Elisa Cuello     HPI: 73 y o  male w complex medical history, recently admitted for bacteremia and intermittently spiking fevers  Consult requested by [facility] for evaluation of potential odontogenic sources of infection  Pt has no complaints of dental pain  Pt does receive regular dental care  He has two upper left molars with crowns and previous root canals that he was advised need eventual treatment vs extraction  Denies fever, chills, nausea, odynophagia, dysphagia, dyspnea, shortness of breath  PMH:   Past Medical History:   Diagnosis Date    Anxiety     Arthritis     Cancer ([facility] [address] )     pancreatic    Cellulitis     LAST ASSESSED: 6/13/14    Enlarged prostate     Epidermal inclusion cyst     LAST ASSESSED: 10/4/13    Erythrasma     LAST ASSESSED: 9/23/13    Furuncle     LAST ASSESSED: 6/11/14    GERD (gastroesophageal reflux disease)     Hyperlipidemia     Hypertension     RA [facility]rheumatoid arthritis) ([facility])         Allergies:    Allergies   Allergen Reactions    Fentanyl Anaphylaxis and Other (See Comments)     Oxygen drops severely       Meds:     Current Facility-Administered Medications:     acetaminophen (TYLENOL) tablet 650 mg, 650 mg, Oral, Q6H PRN, Vallorie Fabry, MD, 650 mg at 07/19/21 6929    aspirin chewable tablet 81 mg, 81 mg, Oral, Daily, Hilario Lefort, MD, 81 mg at 07/20/21 0900    butalbital-acetaminophen-caffeine (FIORICET,ESGIC) -40 mg per tablet 1 tablet, 1 tablet, Oral, Q4H PRN, Damian Tsang MD, 1 tablet at 07/18/21 1107    cefTRIAXone (ROCEPHIN) 2,000 mg in dextrose 5 % 50 mL IVPB, 2,000 mg, Intravenous, Q24H, Alycia Marie PA-C    cyanocobalamin (VITAMIN B-12) tablet 1,000 mcg, 1,000 mcg, Oral, Daily, Kena Soria PA-C, 1,000 mcg at 07/20/21 0900    diazepam (VALIUM) tablet 2 mg, 2 mg, Oral, Daily PRN, Hilario Lefort, MD    enoxaparin (LOVENOX) subcutaneous injection 40 mg, 40 mg, Subcutaneous, Q24H [address], Hilario Lefort, MD, 40 mg at 07/20/21 1310    FLUoxetine (PROzac) capsule 20 mg, 20 mg, Oral, Daily, Hilario Lefort, MD, 20 mg at 09/57/37 7948    folic acid (FOLVITE) tablet 1 mg, 1 mg, Oral, Daily, Hilario Lefort, MD, 1 mg at 07/20/21 0900    gabapentin (NEURONTIN) capsule 200 mg, 200 mg, Oral, TID, Hilario Lefort, MD, 200 mg at 07/20/21 1930    HYDROmorphone (DILAUDID) injection 0 5 mg, 0 5 mg, Intravenous, Q3H PRN, Hilario Lefort, MD    insulin glargine (LANTUS) subcutaneous injection 5 Units 0 05 mL, 5 Units, Subcutaneous, HS, Shelby Arriaza MD, 5 Units at 07/19/21 2216    insulin lispro (HumaLOG) 100 units/mL subcutaneous injection 1-6 Units, 1-6 Units, Subcutaneous, 4x Daily (AC & HS), 2 Units at 07/20/21 1853 **AND** Fingerstick Glucose (POCT), , , 4x Daily AC and at bedtime, Hilario Lefort, MD    insulin lispro (HumaLOG) 100 units/mL subcutaneous injection 5 Units, 5 Units, Subcutaneous, TID With Meals, Shelby Arriaza MD, 5 Units at 07/20/21 1853    melatonin tablet 3 mg, 3 mg, Oral, HS, Kena Brewster PA-C, 3 mg at 07/19/21 2216    metroNIDAZOLE (FLAGYL) tablet 500 mg, 500 mg, Oral, Q8H [address], Yumiko Bolaños PA-C, 500 mg at 07/20/21 1930    ondansetron (ZOFRAN) injection 4 mg, 4 mg, Intravenous, Q4H PRN, Hilario Lefort, MD    oxyCODONE (ROXICODONE) IR tablet 5 mg, 5 mg, Oral, Q4H PRN, Karey Castro MD    pancrelipase (Lip-Prot-Amyl) (CREON) delayed release capsule 24,000 Units, 24,000 Units, Oral, TID With Meals, Karey Castro MD, 24,000 Units at 07/20/21 1930    pantoprazole (PROTONIX) EC tablet 40 mg, 40 mg, Oral, BID, Karey Castro MD, 40 mg at 07/20/21 0900    predniSONE tablet 20 mg, 20 mg, Oral, Daily, Karey Castro MD, 20 mg at 07/20/21 0900    predniSONE tablet 5 mg, 5 mg, Oral, Daily, Karey Castro MD, 5 mg at 07/20/21 0900    sodium chloride (PF) 0 9 % injection 5 mL, 5 mL, Intravenous, Q12H [facility], Karey Castro MD, 5 mL at 07/20/21 0901    PSH:   Past Surgical History:   Procedure Laterality Date    COLONOSCOPY      FL GUIDED CENTRAL VENOUS ACCESS DEVICE INSERTION  6/1/2021    HYDROCELE EXCISION / REPAIR Right 01/11/2018    SPERMATIC CORD EXCSION OF HYDROCELE; MANAGED BY: Elena Martinez    IR CHOLECYSTOSTOMY TUBE CHECK/CHANGE/REPOSITION/REINSERTION/UPSIZE  6/22/2021    IR CHOLECYSTOSTOMY TUBE PLACEMENT  5/11/2021    RI REMOVAL OF HYDROCELE,TUNICA,UNILAT Right 1/11/2018    Procedure: HYDROCELECTOMY;  Surgeon: Matheus Veronica MD;  Location: [facility];  Service: Urology    SCROTAL SURGERY      benign "lump"    TUNNELED VENOUS PORT PLACEMENT Left 6/1/2021    Procedure: INSERTION VENOUS PORT (PORT-A-CATH); Surgeon: Christen Romano MD;  Location:  MAIN OR;  Service: Surgical Oncology      Family History   Problem Relation Age of Onset    Heart disease Father         CARDIAC DISORDER    Hypertension Father     Hypertension Mother     No Known Problems Maternal Aunt     No Known Problems Maternal Uncle     No Known Problems Paternal Aunt     No Known Problems Paternal Uncle     No Known Problems Paternal Grandmother     No Known Problems Paternal Grandfather     Diabetes Maternal Grandmother         MELLITUS    No Known Problems Maternal Grandfather     Hypertension Other         Review of Systems   Constitutional: Positive for diaphoresis and fever  Negative for chills  HENT: Positive for dental problem  Negative for drooling, facial swelling and sore throat  Eyes: Negative for photophobia and visual disturbance  Respiratory: Negative for chest tightness and shortness of breath  All other systems reviewed and are negative  Temp:  [98 1 °F (36 7 °C)-102 °F (38 9 °C)] 98 1 °F (36 7 °C)  HR:  [66-83] 66  Resp:  [18] 18  BP: (117-147)/(71-75) 117/71  SpO2:  [90 %-94 %] 94 %       Intake/Output Summary (Last 24 hours) at 7/20/2021 1947  Last data filed at 7/20/2021 1001  Gross per 24 hour   Intake 5 ml   Output 1575 ml   Net -1570 ml        Physical Exam:  GE: AAOx3  NAD  HEENT:    Head: No facial swelling  No trismus  No tenderness to palpation  No LAD  Inferior border of the mandible is palpable  Mouth: ~36mm LASHAY  Dentition largely intact  No percussion sensitivity nor vestibular fullness  No vestibular swelling  No purulence  No sinus tract  FOM soft/non-tender/non-elevated  Uvula midline  Resp: no respiratory distress on RA  CVS: RRR    Lab Results:   CBC:   Lab Results   Component Value Date    WBC 9 25 07/20/2021    HGB 10 2 (L) 07/20/2021    HCT 32 8 (L) 07/20/2021    MCV 90 07/20/2021     (H) 07/20/2021    MCH 27 9 07/20/2021    MCHC 31 1 (L) 07/20/2021    RDW 17 5 (H) 07/20/2021    MPV 10 1 07/20/2021    NRBC 0 07/20/2021   , CMP:   Lab Results   Component Value Date    SODIUM 139 07/20/2021    K 3 4 (L) 07/20/2021     07/20/2021    CO2 25 07/20/2021    BUN 15 07/20/2021    CREATININE 0 67 07/20/2021    CALCIUM 8 0 (L) 07/20/2021    AST 14 07/20/2021    ALT 17 07/20/2021    ALKPHOS 115 07/20/2021    EGFR 101 07/20/2021   , Coagulation: No results found for: PT, INR, APTT  Imaging: I have personally reviewed pertinent reports  EKG, Pathology, and Other Studies: I have personally reviewed pertinent reports

## 2021-07-20 NOTE — CASE MANAGEMENT
JUAN M spoke with Destinee Reich with ID  Destinee Reich spoke with pt and preference would be to complete home IV abx if affordable  Pt other option would be OP infusion  Script provided by ID  CM sent script to [facility]

## 2021-07-20 NOTE — ASSESSMENT & PLAN NOTE
Appreciate oncology input-- Monitor CBC and transfuse if necessary   Currently stable  Monitor for neutropenia 96

## 2021-07-20 NOTE — UTILIZATION REVIEW
Continued Stay Review    Date: 7/19/2021       For 7/17 - 7/20                Current Patient Class: IP Current Level of Care: M/S    HPI:66 y o  male initially admitted on 7/13 to Inpatient  due to sepsis of unknown etiology   Has metastatic pancreatic cancer and on treatment, recent treatment cholecystitis in May and ashli tube placed  Found to have gram positive bacteremia  Assessment/Plan:   7/17 Reports chronic abdominal discomfort  Unclear source of bacteria -consider endocarditis, port infection, liver abscess  May need JAMAL port removal   Per ID - continue IV Vanco and cefazolin    7/18 Per ID - Polymicrobial bacteremia   Gamma strep, prevotella   Unclear source   Consider relation to recent cholecystitis given prior Enterococcus from biliary cultures, although that was 2 months ago  Reviewed CT with Radiology and does not appear to have a latha liver abscess      Repeat blood cultures 7/16 negative  Switched to ampicillin from cefazolin  F/U final blood culture ID/susceptibility and tailor antibiotics as indicated    7/19 Pt was seen by Cardio for JAMAL (see report  below)    Feels fatigued  Per ID: On further questioning the patient does admit to having intermittent tooth pain and has not had the ability to afford dental care  JAMAL negative     At this time will maintain port given repeat cultures are negative  Given reports of tooth pain and results of culture would recommend OMFS evaluation for possible extraction  Continue patient on vancomycin and Unasyn for now  Will likely transition to ceftriaxone with Flagyl and tentative plan will be for 2 weeks of antibiotic therapy from negative culture  Would plan for surveillance blood culture 1 week after completing antibiotic  Continue to trend fever curve/vitals  KCL 20 IV x 1    7/20 T max 102 overnight  defervesced with Tylenol  Patient had no associated chills, sweats, rash, or diarrhea  May represent tumor fever  Kdur 40 po x 1 for K 3 4  CBC, BMP in am  IS q1h w/a  OMFS consultant visit expected this afternoon    Vital Signs:   07/20/21 0801  98 2 °F (36 8 °C)  82  18  147/75 103 92 % -- -- None (Room air) Sitting   07/19/21 2231  102 °F (38 9 °C)Abnormal   83  18  138/74 98 90 % -- -- None (Room air) Lying   07/19/21 1631  99 5 °F (37 5 °C)  60  18  116/69 87 91 % -- 2 L/min Nasal cannula Lying   07/19/21 1336  99 1 °F (37 3 °C)  68  20  111/64 81 90 % -- -- Nasal cannula Lying   07/19/21 1300  97 2 °F (36 2 °C)  68  20  118/66 -- 94 % -- 2 L/min Nasal cannula --   07/19/21 1250  --  --  --  -- -- 92 % -- 2 L/min Nasal cannula --   07/19/21 1245  --  69  20  112/58 -- 88 %Abnormal  -- -- None (Room air) --   07/19/21 1230  --  74  18  92/55 -- 94 % 4 L/min -- Simple mask --   07/19/21 1225  97 4 °F (36 3 °C)  84  16  87/51Abnormal  -- 94 % 4 L/min -- Simple mask --   07/19/21 1107  --  --  --  -- -- 94 % -- 2 L/min Nasal cannula --   07/19/21 1046  98 3 °F (36 8 °C)  85  18  125/70 -- 94 % -- -- None (Room air) --   07/19/21 0710  99 7 °F (37 6 °C)  87  18  130/69 93 90 % -- -- None (Room air) Sitting   07/18/21 2233  98 9 °F (37 2 °C)  82  18  145/82 107 91 % -- -- None (Room air) Lying   07/18/21 1603  98 8 °F (37 1 °C)  72  18  135/76 97 94 % -- -- None (Room air) Lying   07/18/21 0734  99 °F (37 2 °C)  84  16  131/73 96 94 % -- -- None (Room air) Lying       Pertinent Labs/Diagnostic Results:     7/19 JAMAL:   LEFT VENTRICLE:  Systolic function was at the lower limits of normal  Ejection fraction was estimated to be 50 %  There was mild diffuse hypokinesis  LEFT ATRIUM:  The atrium was dilated  ATRIAL SEPTUM:  No defect or patent foramen ovale was identified by color Doppler  AORTIC VALVE:  There was mild regurgitation  TRICUSPID VALVE:  There was mild regurgitation    7/20 CT Facila Bones - pending read    Results from last 7 days   Lab Units 07/20/21  0505 07/19/21  0601 07/18/21  0504 07/17/21  0545 07/16/21  0611   WBC Thousand/uL 9 25 9 34 8  16 6 88 5 68   HEMOGLOBIN g/dL 10 2* 10 0* 10 1* 10 0* 9 8*   HEMATOCRIT % 32 8* 32 1* 32 5* 31 9* 31 0*   PLATELETS Thousands/uL 444* 391* 277 195 136*   NEUTROS ABS Thousands/µL 7 04  --   --   --   --    BANDS PCT %  --  4 12* 10* 4     Results from last 7 days   Lab Units 07/20/21  0505 07/19/21  0828 07/17/21  0545 07/16/21  0611 07/15/21  0510   SODIUM mmol/L 139 141 143 142 144   POTASSIUM mmol/L 3 4* 3 3* 3 6 3 4* 3 3*   CHLORIDE mmol/L 105 104 107 107 109*   CO2 mmol/L 25 27 28 26 26   ANION GAP mmol/L 9 10 8 9 9   BUN mg/dL 15 14 14 14 13   CREATININE mg/dL 0 67 0 67 0 72 0 67 0 65   EGFR ml/min/1 73sq m 101 101 98 101 102   CALCIUM mg/dL 8 0* 8 0* 8 1* 8 0* 7 8*     Results from last 7 days   Lab Units 07/20/21  0505 07/16/21  0611 07/15/21  0510 07/14/21  0539 07/13/21  0947   AST U/L 14 11 11 15 18   ALT U/L 17 22 25 28 34   ALK PHOS U/L 115 98 94 98 112   TOTAL PROTEIN g/dL 5 2* 5 3* 5 1* 5 1* 5 8*   ALBUMIN g/dL 2 0* 2 0* 1 9* 2 0* 2 5*   TOTAL BILIRUBIN mg/dL 0 57 0 43 0 46 0 67 0 66     Results from last 7 days   Lab Units 07/20/21  0602 07/19/21  2113 07/19/21  1632 07/19/21  1342 07/19/21  1058 07/19/21  0713 07/18/21  2019 07/18/21  1605 07/18/21  1133 07/18/21  0738 07/17/21  2103 07/17/21  1541   POC GLUCOSE mg/dl 109 110 247* 106 110 105 233* 257* 144* 92 214* 213*     Results from last 7 days   Lab Units 07/20/21  0505 07/19/21  0828 07/17/21  0545 07/16/21  0611 07/15/21  0510 07/14/21  0539 07/13/21  0947   GLUCOSE RANDOM mg/dL 100 106 83 80 81 57* 77     Results from last 7 days   Lab Units 07/13/21  0947   TROPONIN I ng/mL <0 02     Results from last 7 days   Lab Units 07/13/21  0947   PROTIME seconds 15 8*   INR  1 25*   PTT seconds 25     Results from last 7 days   Lab Units 07/14/21  0547 07/13/21  1003   PROCALCITONIN ng/ml 2 72* 1 50*     Results from last 7 days   Lab Units 07/13/21  0947   LACTIC ACID mmol/L 1 8     Results from last 7 days   Lab Units 07/13/21  1003   FERRITIN ng/mL 625*     Results from last 7 days   Lab Units 07/13/21  1102   CLARITY UA  Clear   COLOR UA  Light Yellow   SPEC GRAV UA  1 015   PH UA  6 0   GLUCOSE UA mg/dl Negative   KETONES UA mg/dl Negative   BLOOD UA  Trace-Intact*   PROTEIN UA mg/dl Negative   NITRITE UA  Negative   BILIRUBIN UA  Negative   UROBILINOGEN UA E U /dl 0 2   LEUKOCYTES UA  Negative   WBC UA /hpf 0-1   RBC UA /hpf 0-1   BACTERIA UA /hpf Occasional   EPITHELIAL CELLS WET PREP /hpf Occasional     Results from last 7 days   Lab Units 07/16/21  0709 07/16/21  0708 07/13/21  0947   BLOOD CULTURE  No Growth at 72 hrs  No Growth at 72 hrs   Streptococcus constellatus*  Prevotella buccae*   GRAM STAIN RESULT   --   --  Gram positive cocci in chains*  Gram negative rods*  Gram positive cocci in chains*       Medications:   Scheduled Medications:  ampicillin-sulbactam, 3 g, Intravenous, Q6H  aspirin, 81 mg, Oral, Daily  cyanocobalamin, 1,000 mcg, Oral, Daily  enoxaparin, 40 mg, Subcutaneous, Q24H INGRID  FLUoxetine, 20 mg, Oral, Daily  folic acid, 1 mg, Oral, Daily  gabapentin, 200 mg, Oral, TID  insulin glargine, 5 Units, Subcutaneous, HS  insulin lispro, 1-6 Units, Subcutaneous, 4x Daily (AC & HS)  insulin lispro, 5 Units, Subcutaneous, TID With Meals  melatonin, 3 mg, Oral, HS  pancrelipase (Lip-Prot-Amyl), 24,000 Units, Oral, TID With Meals  pantoprazole, 40 mg, Oral, BID  predniSONE, 20 mg, Oral, Daily  predniSONE, 5 mg, Oral, Daily  sodium chloride (PF), 5 mL, Intravenous, Q12H INGRID  vancomycin, 1,500 mg, Intravenous, Q8H    Continuous IV Infusions:     PRN Meds:  acetaminophen, 650 mg, Oral, Q6H PRN x 1 7/19  butalbital-acetaminophen-caffeine, 1 tablet, Oral, Q4H PRN  X 1 7/18  diazepam, 2 mg, Oral, Daily PRN  HYDROmorphone, 0 5 mg, Intravenous, Q3H PRN  ondansetron, 4 mg, Intravenous, Q4H PRN  oxyCODONE, 5 mg, Oral, Q4H PRN    Discharge Plan: D    Network Utilization Review Department  ATTENTION: Please call with any questions or concerns to [phone] and carefully listen to the prompts so that you are directed to the right person  All voicemails are confidential   Rad Sykes all requests for admission clinical reviews, approved or denied determinations and any other requests to dedicated fax number below belonging to the campus where the patient is receiving treatment   List of dedicated fax numbers for the Facilities:  [address] DENIALS (Administrative/Medical Necessity) [phone]   [address] (Maternity/NICU/Pediatrics) [phone]   [address] [address] [place] [address] [address] [facility] [place] [id number] [phone]

## 2021-07-20 NOTE — TELEPHONE ENCOUNTER
Lm for Lulu to call back  Faxing form- does she want a copy? Also, Dr Nikunj Mcintosh gave him until Feb 2022 leave

## 2021-07-20 NOTE — CONSULTS
Vancomycin IV Pharmacy-to-Dose Consultation    Estrella Miranda is a 73 y o  male who was receiving Vancomycin IV with management by the Pharmacy Consult service for treatment of bacteremia sepsis  The patient's Vancomycin therapy has been completed / discontinued  Thank you for allowing us to take part in this patient's care  Pharmacy will sign-off now; please call or re-consult if there are any questions          Dai Banks, PharmD  Pharmacist

## 2021-07-20 NOTE — ASSESSMENT & PLAN NOTE
· POA, fever, tachycardia, leukopenia -- tachycardia and leukopenia resolved however patient had 1 febrile episode in last 24h  · Urinalysis, chest x-ray unremarkable   CT of abdomen/pelvis without obvious source of infection  · Blood cultures obtained on 7/13 2 of 2 showed Gram-positive cocci in chains, prevotella; repeat blood cultures obtained on 07/16 showed no growth so far after 72hrs  · Notably, patient has a cholecystostomy tube in place from May 2021 for cholecystitis at the time s/p antibiotic course  · Currently on Unasyn and vancomycin  · See rest of plan below under bacteremia  · Monitor temperature cover, WBC count

## 2021-07-20 NOTE — ASSESSMENT & PLAN NOTE
Malnutrition Findings:   Adult Malnutrition type: Acute illness (in the setting of chronic illness)  Adult Degree of Malnutrition: Other severe protein calorie malnutrition (related to inadequate oral intake, catabolic illness)    BMI Findings: Body mass index is 24 81 kg/m²

## 2021-07-20 NOTE — PROGRESS NOTES
[facility]  Progress Note - Rene Hager 1955, 73 y o  male MRN: [id number]  Unit/Bed#: S -80 Encounter: [id number]  Primary Care Provider: Duong Akins MD   Date and time admitted to hospital: 7/13/2021  9:40 AM    * Sepsis of unknown etiology  Assessment & Plan  · POA, fever, tachycardia, leukopenia -- tachycardia and leukopenia resolved however patient had 1 febrile episode in last 24h  · Urinalysis, chest x-ray unremarkable  CT of abdomen/pelvis without obvious source of infection  · Blood cultures obtained on 7/13 2 of 2 showed Gram-positive cocci in chains, prevotella; repeat blood cultures obtained on 07/16 showed no growth so far after 72hrs  · Notably, patient has a cholecystostomy tube in place from May 2021 for cholecystitis at the time s/p antibiotic course  · Currently on Unasyn and vancomycin  · See rest of plan below under bacteremia  · Monitor temperature cover, WBC count    Polymicrobial bacteremia  Assessment & Plan  · Blood cultures obtained on 7/13 2 of 2 showed Gram-positive cocci in chains, prevotella; repeat blood cultures obtained on 07/16 showed no growth so far after 72hrs  · JAMAL done yesterday was negative for vegetations  · Source of with infection unclear at this time, consider translocation into the GI tract from recent chemotherapy and/or new metastatic sites in liver; also considering the possibility of the port as the source of infection  There is also a question of whether patient needs to get his tooth extracted, he has a crown that has been bothering him for a while and this could also be a potential source for his poly bacteremia    OMFS consulted, CT facial bones w contrast pending    Metastatic pancreatic cancer  Assessment & Plan  Follows with medical/surgical oncology - s/p a cycle of neoadjuvant chemotherapy (Gemcitabine/Abraxane) a week prior to admission  Known to palliative care -> consider inpatient consultation if pain uncontrolled  CT imaging in the ED today noting "New 3 5 x 1 8 cm low-attenuating lesion in the right hepatic lobe and increased size of a 1 9 x 1 3 cm low-attenuating lesion in the left hepatic lobe, concerning for metastatic disease  Stable pancreatic head neoplasm  Resolution of the previously reported paraduodenal collection and omental edema/infiltration " -> appreciate medical oncology evaluation     Type 2 diabetes mellitus with hypoglycemia [facility])  Assessment & Plan  Lab Results   Component Value Date    HGBA1C 6 2 (H) 05/05/2021   · Last A1c reflects excellent control  · Patient on 20 units Lantus at home, currently on 5 units Lantus as he is noted to be on the lower side of normal in terms of blood glucose in the early mornings  · Patient on 10 units Humalog with meals -- continue 5 units TID with meals for now  · Q i d  Accu-Cheks  · SSI  · Hypoglycemia protocol    Pancytopenia due to chemotherapy  Assessment & Plan  Appreciate oncology input-- Monitor CBC and transfuse if necessary  Currently stable  Monitor for neutropenia    Severe protein-calorie malnutrition (HCC)  Assessment & Plan  Malnutrition Findings:   Adult Malnutrition type: Acute illness (in the setting of chronic illness)  Adult Degree of Malnutrition: Other severe protein calorie malnutrition (related to inadequate oral intake, catabolic illness)    BMI Findings: Body mass index is 24 81 kg/m²  Generalized weakness  Assessment & Plan  Multifactorial secondary to sepsis coupled with metastatic cancer (see plan for individual assessments)  PT/OT as tolerated    Recent cholecystitis  Assessment & Plan  Hospitalized in May 2021 s/p cholecystostomy tube placement on 5/11/21 - completed a course of antibiotics at the time  PRN pain control - supportive care otherwise        VTE Pharmacologic Prophylaxis: VTE Score: 6 High Risk (Score >/= 5) - Pharmacological DVT Prophylaxis Ordered: enoxaparin (Lovenox)   Sequential Compression Devices Ordered  Patient Centered Rounds: I performed bedside rounds with nursing staff today  Discussions with Specialists or Other Care Team Provider: ID; spoke with OMFS (they will see patient today)    Education and Discussions with Family / Patient: Updated  (son) via phone  Current Length of Stay: 7 day(s)  Current Patient Status: Inpatient   Discharge Plan: Anticipate discharge in 48 hrs to home  Code Status: Level 1 - Full Code    Subjective:   Patient reports he feels okay, denies other complains  He says he no longer has the abdominal discomfort  Chest reports that he feels tired overall  Objective:     Vitals:   Temp (24hrs), Av 9 °F (37 7 °C), Min:98 2 °F (36 8 °C), Max:102 °F (38 9 °C)    Temp:  [98 2 °F (36 8 °C)-102 °F (38 9 °C)] 98 2 °F (36 8 °C)  HR:  [60-83] 82  Resp:  [18] 18  BP: (116-147)/(69-75) 147/75  SpO2:  [90 %-92 %] 92 %  Body mass index is 24 81 kg/m²  Input and Output Summary (last 24 hours): Intake/Output Summary (Last 24 hours) at 2021 1447  Last data filed at 2021 1001  Gross per 24 hour   Intake 5 ml   Output 1575 ml   Net -1570 ml       Physical Exam:   Physical Exam  Vitals reviewed  Constitutional:       General: He is not in acute distress  Appearance: He is not ill-appearing or toxic-appearing  HENT:      Nose: No congestion  Eyes:      General: No scleral icterus  Cardiovascular:      Rate and Rhythm: Normal rate and regular rhythm  Heart sounds: No murmur heard  Pulmonary:      Effort: Pulmonary effort is normal  No respiratory distress  Breath sounds: No wheezing or rales  Abdominal:      General: Bowel sounds are normal       Palpations: Abdomen is soft  Tenderness: There is no abdominal tenderness  Musculoskeletal:      Right lower leg: No edema  Left lower leg: No edema  Skin:     Capillary Refill: Capillary refill takes less than 2 seconds  Coloration: Skin is not jaundiced  Neurological:      General: No focal deficit present  Mental Status: He is alert and oriented to person, place, and time  Psychiatric:         Mood and Affect: Mood normal          Thought Content: Thought content normal               Additional Data:     Labs:  Results from last 7 days   Lab Units 07/20/21  0505 07/19/21  0601   WBC Thousand/uL 9 25 9 34   HEMOGLOBIN g/dL 10 2* 10 0*   HEMATOCRIT % 32 8* 32 1*   PLATELETS Thousands/uL 444* 391*   BANDS PCT %  --  4   NEUTROS PCT % 74  --    LYMPHS PCT % 16  --    LYMPHO PCT %  --  12*   MONOS PCT % 4  --    MONO PCT %  --  1*   EOS PCT % 1 1     Results from last 7 days   Lab Units 07/20/21  0505   SODIUM mmol/L 139   POTASSIUM mmol/L 3 4*   CHLORIDE mmol/L 105   CO2 mmol/L 25   BUN mg/dL 15   CREATININE mg/dL 0 67   ANION GAP mmol/L 9   CALCIUM mg/dL 8 0*   ALBUMIN g/dL 2 0*   TOTAL BILIRUBIN mg/dL 0 57   ALK PHOS U/L 115   ALT U/L 17   AST U/L 14   GLUCOSE RANDOM mg/dL 100         Results from last 7 days   Lab Units 07/20/21  1118 07/20/21  0602 07/19/21  2113 07/19/21  1632 07/19/21  1342 07/19/21  1058 07/19/21  0713 07/18/21  2019 07/18/21  1605 07/18/21  1133 07/18/21  0738 07/17/21  2103   POC GLUCOSE mg/dl 214* 109 110 247* 106 110 105 233* 257* 144* 92 214*         Results from last 7 days   Lab Units 07/14/21  0547   PROCALCITONIN ng/ml 2 72*       Lines/Drains:  Invasive Devices     Central Venous Catheter Line            Port A Cath 06/01/21 Left Chest 49 days          Drain            Closed/Suction Drain Lateral RLQ 70 days                Central Line:  Goal for removal: chronic port             Imaging: Reviewed radiology reports from this admission including: abdominal/pelvic CT    Recent Cultures (last 7 days):   Results from last 7 days   Lab Units 07/16/21  0709 07/16/21  0708   BLOOD CULTURE  No Growth After 4 Days  No Growth After 4 Days         Last 24 Hours Medication List:   Current Facility-Administered Medications   Medication Dose Route Frequency Provider Last Rate    acetaminophen  650 mg Oral Q6H PRN Bobbi Kat MD      ampicillin-sulbactam  3 g Intravenous Q6H Yulisa Gerardo MD 3 g (07/20/21 1309)    aspirin  81 mg Oral Daily Bobbi Kat MD      butalbital-acetaminophen-caffeine  1 tablet Oral Q4H PRN Damian Pandey MD      cyanocobalamin  1,000 mcg Oral Daily Kena Moctezuma PA-C      diazepam  2 mg Oral Daily PRN Bobbi Kat MD      enoxaparin  40 mg Subcutaneous Q24H [facility] Bobbi Kat MD      FLUoxetine  20 mg Oral Daily Bobbi Kat MD      folic acid  1 mg Oral Daily Bobbi Kat MD      gabapentin  200 mg Oral TID Bobbi Kat MD      HYDROmorphone  0 5 mg Intravenous Q3H PRN Bobbi Kat MD      insulin glargine  5 Units Subcutaneous HS MD Raghav Downs insulin lispro  1-6 Units Subcutaneous 4x Daily (AC & HS) Bobbi Kat MD      insulin lispro  5 Units Subcutaneous TID With Meals Michael Quijano MD      melatonin  3 mg Oral HS Kena Brewster PA-C      ondansetron  4 mg Intravenous Q4H PRN MD Raghav Alfredo oxyCODONE  5 mg Oral Q4H PRN Bobbi Kat MD      pancrelipase (Lip-Prot-Amyl)  24,000 Units Oral TID With Meals Bobbi Kat MD      pantoprazole  40 mg Oral BID MD Raghav Alfredo predniSONE  20 mg Oral Daily Bobbi Kat MD      predniSONE  5 mg Oral Daily Bobbi Kat MD      sodium chloride (PF)  5 mL Intravenous Q12H [facility] Bobbi Kat MD          Today, Patient Was Seen By: Michael Quijano MD    **Please Note: This note may have been constructed using a voice recognition system  **

## 2021-07-21 NOTE — UTILIZATION REVIEW
Continued Stay Review    Date: 7/21/21                          Current Patient Class: IP  Current Level of Care: MS    HPI:66 y o  male with hx of pancreatic CA , on chronic steroids initially admitted on 7/13/21 with sepsis, abdominal pain  Pancytopenia 2/2 chemo   Blood cultures showed 1/2 GPC in chains and GNR-, ABX switched to vanco and cefazolin 7/15, switched to Unasyn from cefazolin 7/18   Repeat BC neg  Assessment/Plan: 7/20 Unasyn and vanco d/c'ed   Pt started on po Flagyl and ceftriaxone IV  OMFS consulted 7/20 to r/o dental source  Per OMFS- There is no obvious clinic evidence of infection however CT facial bones reveals periapical pathology associated with teeth 14 and 15  Although I do not think that these teeth are the source of the bacteremia I can not rule it out definitively either  My recommendation would be to extract teeth 14 and 15 while he is inpatient  Right now looks like we should be able to get him set up for extractions on Thursday 7/22/2021 7/21-   OMFS -  to OR 7/21 pm for dental extractions, pt made NPO ; then changed back to 7/22 for OR teeth extractions  Diet restarted and pt will be NPO after MN tonight  ID-No further fevers noted  WBC's WNL  Port site appears stable  source of patient's bacteremia remains unknown  Ceftriaxone to continue through 7/29 and oral Flagyl to continue through 7/24  Patient is cleared from ID standpoint for PICC line  Medicine- no abdominal discomfort  Will continue IV antibiotics at this time  Repeat blood cultures continue to be negative  Monitor CBC and temps  K=3 4, repleted   IPCM working on home infusion with [facility] services, tentatively  for Friday     Vital Signs: /72 (BP Location: Left arm)   Pulse 68   Temp 98 3 °F (36 8 °C) (Oral)   Resp 18   Ht 5' 9" (1 753 m)   Wt 76 2 kg (168 lb)   SpO2 95%   BMI 24 81 kg/m²       Pertinent Labs/Diagnostic Results:   7/20 CT facial bones-No erosive findings to suggest a source of bacteremia        Note is made of a prominent radicular or periapical cyst associated with the left posterior parietal maxillary molars with smooth bony remodeling and associated retention cyst in the left maxillary sinus  No regional soft tissue inflammatory changes are   noted with this abnormality          Results from last 7 days   Lab Units 07/21/21  0533 07/20/21  0505 07/19/21  0601 07/18/21  0504 07/17/21  0545   WBC Thousand/uL 8 81 9 25 9 34 8 16 6 88   HEMOGLOBIN g/dL 9 8* 10 2* 10 0* 10 1* 10 0*   HEMATOCRIT % 30 9* 32 8* 32 1* 32 5* 31 9*   PLATELETS Thousands/uL 531* 444* 391* 277 195   NEUTROS ABS Thousands/µL  --  7 04  --   --   --    BANDS PCT % 15*  --  4 12* 10*         Results from last 7 days   Lab Units 07/21/21  0533 07/20/21  0505 07/19/21  0828 07/17/21  0545 07/16/21  0611   SODIUM mmol/L 143 139 141 143 142   POTASSIUM mmol/L 3 4* 3 4* 3 3* 3 6 3 4*   CHLORIDE mmol/L 106 105 104 107 107   CO2 mmol/L 28 25 27 28 26   ANION GAP mmol/L 9 9 10 8 9   BUN mg/dL 13 15 14 14 14   CREATININE mg/dL 0 74 0 67 0 67 0 72 0 67   EGFR ml/min/1 73sq m 97 101 101 98 101   CALCIUM mg/dL 8 3 8 0* 8 0* 8 1* 8 0*     Results from last 7 days   Lab Units 07/20/21  0505 07/16/21  0611 07/15/21  0510   AST U/L 14 11 11   ALT U/L 17 22 25   ALK PHOS U/L 115 98 94   TOTAL PROTEIN g/dL 5 2* 5 3* 5 1*   ALBUMIN g/dL 2 0* 2 0* 1 9*   TOTAL BILIRUBIN mg/dL 0 57 0 43 0 46     Results from last 7 days   Lab Units 07/21/21  1601 07/21/21  1110 07/21/21  0734 07/20/21  2025 07/20/21  1608 07/20/21  1118 07/20/21  0602 07/19/21  2113 07/19/21  1632 07/19/21  1342 07/19/21  1058 07/19/21  0713   POC GLUCOSE mg/dl 263* 184* 122 217* 214* 214* 109 110 247* 106 110 105     Results from last 7 days   Lab Units 07/21/21  0533 07/20/21  0505 07/19/21  0828 07/17/21  0545 07/16/21  0611 07/15/21  0510   GLUCOSE RANDOM mg/dL 129 100 106 83 80 81                                   Results from last 7 days   Lab Units 07/16/21  0709 07/16/21  0708   BLOOD CULTURE  No Growth After 5 Days  No Growth After 5 Days  Results from last 7 days   Lab Units 07/21/21  0533 07/19/21  0601 07/18/21  0504 07/17/21  0545 07/16/21  0611 07/15/21  0510   TOTAL COUNTED  100 100 100 100 100 100             Medications:   Scheduled Medications:  aspirin, 81 mg, Oral, Daily  cefTRIAXone, 2,000 mg, Intravenous, Q24H  cyanocobalamin, 1,000 mcg, Oral, Daily  enoxaparin, 40 mg, Subcutaneous, Q24H INGRID  FLUoxetine, 20 mg, Oral, Daily  folic acid, 1 mg, Oral, Daily  gabapentin, 200 mg, Oral, TID  insulin glargine, 5 Units, Subcutaneous, HS  insulin lispro, 1-6 Units, Subcutaneous, 4x Daily (AC & HS)  insulin lispro, 5 Units, Subcutaneous, TID With Meals  melatonin, 3 mg, Oral, HS  metroNIDAZOLE, 500 mg, Oral, Q8H INGRID  pancrelipase (Lip-Prot-Amyl), 24,000 Units, Oral, TID With Meals  pantoprazole, 40 mg, Oral, BID  predniSONE, 20 mg, Oral, Daily  predniSONE, 5 mg, Oral, Daily  sodium chloride (PF), 5 mL, Intravenous, Q12H INGRID  potassium chloride (K-DUR,KLOR-CON) CR tablet 20 mEq   Dose: 20 mEq  Freq: Once Route: PO x1 7/21    Continuous IV Infusions:     PRN Meds:  acetaminophen, 650 mg, Oral, Q6H PRN  butalbital-acetaminophen-caffeine, 1 tablet, Oral, Q4H PRN  diazepam, 2 mg, Oral, Daily PRN  HYDROmorphone, 0 5 mg, Intravenous, Q3H PRN  ondansetron, 4 mg, Intravenous, Q4H PRN  oxyCODONE, 5 mg, Oral, Q4H PRN        Discharge Plan: TBD    Network Utilization Review Department  ATTENTION: Please call with any questions or concerns to [phone] and carefully listen to the prompts so that you are directed to the right person  All voicemails are confidential   Romo Fetch all requests for admission clinical reviews, approved or denied determinations and any other requests to dedicated fax number below belonging to the campus where the patient is receiving treatment   List of dedicated fax numbers for the Facilities:  FACILITY NAME UR FAX NUMBER   ADMISSION DENIALS (Administrative/Medical Necessity) [phone]   [address] (Maternity/NICU/Pediatrics) 261 [facility],7Th Floor [facility] [address] [place] [address]  [phone]   Milagros Naylor [address] [address] [place] [address] Meenakshi Biggs [address] [facility] [address] [phone]

## 2021-07-21 NOTE — TELEPHONE ENCOUNTER
Appointment Confirmation     Appointment with  Dr Kriss Julien   Appointment date & time 08/05 at 11:40am   Location [facility]   Patient verbilized Understanding  yes

## 2021-07-21 NOTE — ASSESSMENT & PLAN NOTE
· Blood cultures obtained on 7/13 2 of 2 showed Gram-positive cocci in chains, prevotella; repeat blood cultures obtained on 07/16 showed no growth so far after 72hrs  · JAMAL done on 07/19/2021 which was negative for vegetations  · Source of with infection unclear at this time, consider translocation into the GI tract from recent chemotherapy and/or new metastatic sites in liver; also considering the possibility of the port as the source of infection  There is also a question of dental source of infection  OMFS consulted  CT facial bones reveals periapical pathology associated with teeth  For tooth extraction later today

## 2021-07-21 NOTE — CASE MANAGEMENT
[facility] informed CM that pt is covered at 100%  CM sent script to [facility]  [facility] able to provide RN services for dose on Friday  They have RN available for 2 pm on Friday  ID aware and will change time of dose  [facility] also confirmed they are able to access PORT for IV abx  CM met with pt and reviewed above  Plan for d/c tomorrow after 2pm dose  RN visit for Friday at 2pm      [facility] - request made to deliver meds

## 2021-07-21 NOTE — ASSESSMENT & PLAN NOTE
· POA, fever, tachycardia, leukopenia -- tachycardia and leukopenia resolved however patient had 1 febrile episode in last 48 hours  · Urinalysis, chest x-ray unremarkable   CT of abdomen/pelvis without obvious source of infection  · Blood cultures obtained on 7/13 2 of 2 showed Gram-positive cocci in chains, prevotella, Streptococcus Consta lattice; repeat blood cultures obtained on 07/16 showed no growth so far after 4 days  · Notably, patient has a cholecystostomy tube in place from May 2021 for cholecystitis at the time s/p antibiotic course  · Currently on ceftriaxone and Flagyl (continue ceftriaxone until 7/29, Flagyl until 7/24); per ID, able to get medications through his port on discharge  · See rest of plan below under bacteremia  · Monitor temperature cover, WBC count

## 2021-07-21 NOTE — PROGRESS NOTES
[facility]  Progress Note - Rowe Hammans 1955, 73 y o  male MRN: [id number]  Unit/Bed#: S -77 Encounter: [id number]  Primary Care Provider: Juana Astudillo MD   Date and time admitted to hospital: 7/13/2021  9:40 AM    * Sepsis of unknown etiology  Assessment & Plan  · POA, fever, tachycardia, leukopenia -- tachycardia and leukopenia resolved however patient had 1 febrile episode in last 48 hours  · Urinalysis, chest x-ray unremarkable  CT of abdomen/pelvis without obvious source of infection  · Blood cultures obtained on 7/13 2 of 2 showed Gram-positive cocci in chains, prevotella, Streptococcus Consta lattice; repeat blood cultures obtained on 07/16 showed no growth so far after 4 days  · Notably, patient has a cholecystostomy tube in place from May 2021 for cholecystitis at the time s/p antibiotic course  · Currently on ceftriaxone and Flagyl (continue ceftriaxone until 7/29, Flagyl until 7/24); per ID, able to get medications through his port on discharge  · See rest of plan below under bacteremia  · Monitor temperature cover, WBC count    Polymicrobial bacteremia  Assessment & Plan  · Blood cultures obtained on 7/13 2 of 2 showed Gram-positive cocci in chains, prevotella; repeat blood cultures obtained on 07/16 showed no growth so far after 72hrs  · JAMAL done on 07/19/2021 which was negative for vegetations  · Source of with infection unclear at this time, consider translocation into the GI tract from recent chemotherapy and/or new metastatic sites in liver; also considering the possibility of the port as the source of infection  There is also a question of dental source of infection  OMFS consulted  CT facial bones reveals periapical pathology associated with teeth  For tooth extraction later today      Metastatic pancreatic cancer  Assessment & Plan  Follows with medical/surgical oncology - s/p a cycle of neoadjuvant chemotherapy (Gemcitabine/Abraxane) a week prior to admission  Known to palliative care -> consider inpatient consultation if pain uncontrolled  CT imaging in the ED today noting "New 3 5 x 1 8 cm low-attenuating lesion in the right hepatic lobe and increased size of a 1 9 x 1 3 cm low-attenuating lesion in the left hepatic lobe, concerning for metastatic disease  Stable pancreatic head neoplasm  Resolution of the previously reported paraduodenal collection and omental edema/infiltration " -> appreciate medical oncology evaluation     Hypokalemia  Assessment & Plan  · K 3 4 today, repleted    Type 2 diabetes mellitus with hypoglycemia [facility])  Assessment & Plan  Lab Results   Component Value Date    HGBA1C 6 2 (H) 05/05/2021   · Last A1c reflects excellent control  · Patient on 20 units Lantus at home, currently on 5 units Lantus as he is noted to be on the lower side of normal in terms of blood glucose in the early mornings  · Patient on 10 units Humalog with meals -- continue 5 units TID with meals for now  · Q i d  Accu-Cheks  · SSI  · Hypoglycemia protocol    Severe protein-calorie malnutrition ([facility] [address] )  Assessment & Plan  Malnutrition Findings:   Adult Malnutrition type: Acute illness (in the setting of chronic illness)  Adult Degree of Malnutrition: Other severe protein calorie malnutrition (related to inadequate oral intake, catabolic illness)    BMI Findings: Body mass index is 24 81 kg/m²  Generalized weakness  Assessment & Plan  Multifactorial secondary to sepsis coupled with metastatic cancer (see plan for individual assessments)  PT/OT as tolerated        VTE Pharmacologic Prophylaxis: VTE Score: 6 High Risk (Score >/= 5) - Pharmacological DVT Prophylaxis Ordered: enoxaparin (Lovenox)  Sequential Compression Devices Ordered  Patient Centered Rounds: I performed bedside rounds with nursing staff today    Discussions with Specialists or Other Care Team Provider:  Infectious Disease, OMFS    Education and Discussions with Family / Patient: Updated  (son) via phone  Current Length of Stay: 8 day(s)  Current Patient Status: Inpatient   Discharge Plan: Anticipate discharge in 24-48 hrs to home  Code Status: Level 1 - Full Code    Subjective:   Patient reports he feels overall okay today, was seen ambulating outside his room with no issues  Objective:     Vitals:   Temp (24hrs), Av 9 °F (36 6 °C), Min:97 5 °F (36 4 °C), Max:98 1 °F (36 7 °C)    Temp:  [97 5 °F (36 4 °C)-98 1 °F (36 7 °C)] 97 5 °F (36 4 °C)  HR:  [62-77] 77  Resp:  [18] 18  BP: (110-127)/(67-73) 110/67  SpO2:  [91 %-94 %] 91 %  Body mass index is 24 81 kg/m²  Input and Output Summary (last 24 hours): Intake/Output Summary (Last 24 hours) at 2021 1300  Last data filed at 2021 0901  Gross per 24 hour   Intake --   Output 1150 ml   Net -1150 ml       Physical Exam:   Physical Exam  Vitals reviewed  Constitutional:       General: He is not in acute distress  Appearance: He is not ill-appearing  HENT:      Nose: No congestion  Eyes:      General: No scleral icterus  Comments: Pale palpebral conjunctivae   Cardiovascular:      Rate and Rhythm: Normal rate  Heart sounds: No murmur heard  Pulmonary:      Effort: Pulmonary effort is normal  No respiratory distress  Breath sounds: No wheezing or rales  Abdominal:      General: Bowel sounds are normal  There is no distension  Tenderness: There is no abdominal tenderness  Musculoskeletal:      Right lower leg: No edema  Left lower leg: No edema  Skin:     General: Skin is warm and dry  Neurological:      General: No focal deficit present  Mental Status: He is alert and oriented to person, place, and time     Psychiatric:         Mood and Affect: Mood normal               Additional Data:     Labs:  Results from last 7 days   Lab Units 21  0533 21  0505 21  0601   WBC Thousand/uL 8 81 9 25   < >   HEMOGLOBIN g/dL 9 8* 10 2*   < > HEMATOCRIT % 30 9* 32 8*   < >   PLATELETS Thousands/uL 531* 444*   < >   BANDS PCT % 15*  --   --    NEUTROS PCT %  --  74  --    LYMPHS PCT %  --  16  --    LYMPHO PCT % 8*  --   --    MONOS PCT %  --  4  --    MONO PCT % 6  --   --    EOS PCT % 0 1  --     < > = values in this interval not displayed  Results from last 7 days   Lab Units 07/21/21  0533 07/20/21  0505   SODIUM mmol/L 143 139   POTASSIUM mmol/L 3 4* 3 4*   CHLORIDE mmol/L 106 105   CO2 mmol/L 28 25   BUN mg/dL 13 15   CREATININE mg/dL 0 74 0 67   ANION GAP mmol/L 9 9   CALCIUM mg/dL 8 3 8 0*   ALBUMIN g/dL  --  2 0*   TOTAL BILIRUBIN mg/dL  --  0 57   ALK PHOS U/L  --  115   ALT U/L  --  17   AST U/L  --  14   GLUCOSE RANDOM mg/dL 129 100         Results from last 7 days   Lab Units 07/21/21  1110 07/21/21  0734 07/20/21  2025 07/20/21  1608 07/20/21  1118 07/20/21  0602 07/19/21  2113 07/19/21  1632 07/19/21  1342 07/19/21  1058 07/19/21  0713 07/18/21  2019   POC GLUCOSE mg/dl 184* 122 217* 214* 214* 109 110 247* 106 110 105 233*               Lines/Drains:  Invasive Devices     Central Venous Catheter Line            Port A Cath 06/01/21 Left Chest 50 days          Drain            Closed/Suction Drain Lateral RLQ 71 days                Central Line:  Goal for removal: Port-A-Cath chronic             Imaging: Reviewed radiology reports from this admission including: CT facial bones with contrast    Recent Cultures (last 7 days):   Results from last 7 days   Lab Units 07/16/21  0709 07/16/21  0708   BLOOD CULTURE  No Growth After 4 Days  No Growth After 5 Days         Last 24 Hours Medication List:   Current Facility-Administered Medications   Medication Dose Route Frequency Provider Last Rate    acetaminophen  650 mg Oral Q6H PRN Karina Dorsey MD      aspirin  81 mg Oral Daily Karina Dorsey MD      butalbital-acetaminophen-caffeine  1 tablet Oral Q4H PRN Damian Funes MD      cefTRIAXone  2,000 mg Intravenous Q24H Cassie Pires, PA-C 2,000 mg (07/20/21 2011)    cyanocobalamin  1,000 mcg Oral Daily Kena Grimes PA-C      diazepam  2 mg Oral Daily PRN Cordell Morgan MD      enoxaparin  40 mg Subcutaneous Q24H [address] Cordell Morgan MD      FLUoxetine  20 mg Oral Daily Cordell Morgan MD      folic acid  1 mg Oral Daily Cordell Morgan MD      gabapentin  200 mg Oral TID Cordell Morgan MD      HYDROmorphone  0 5 mg Intravenous Q3H PRN Cordell Morgan MD      insulin glargine  5 Units Subcutaneous HS Fred Solis MD      insulin lispro  1-6 Units Subcutaneous 4x Daily (AC & HS) Cordell Morgan MD      insulin lispro  5 Units Subcutaneous TID With Meals Fred Solis MD      melatonin  3 mg Oral HS Kena Brewster PA-C      metroNIDAZOLE  500 mg Oral [facility] PA-C      ondansetron  4 mg Intravenous Q4H PRN Cordell Morgan MD     [facility] oxyCODONE  5 mg Oral Q4H PRN Cordell Morgan MD      pancrelipase (Lip-Prot-Amyl)  24,000 Units Oral TID With Meals Cordell Morgan MD      pantoprazole  40 mg Oral BID Cordell Morgan MD     [facility] predniSONE  20 mg Oral Daily Cordell Morgan MD      predniSONE  5 mg Oral Daily Cordell Morgan MD      sodium chloride (PF)  5 mL Intravenous Q12H [address] Cordell Morgan MD          Today, Patient Was Seen By: Fred Solis MD    **Please Note: This note may have been constructed using a voice recognition system  **

## 2021-07-21 NOTE — UTILIZATION REVIEW
Continued Stay Review    Date: 7/16/2021                         Current Patient Class: inpatient  Current Level of Care: med surg     HPI:66 y o  male initially admitted on 7/13/2021 inpatient due to sepsis of unknown etiology   Has metastatic pancreatic cancer and on treatment, recent treatment cholecystitis in May and ashli tube placed  Found to have gram positive bacteremia  Assessment/Plan:  7/16/2021:  Feels slightly better than on admission  Has ongoing chronic abdominal discomfort  On exam  Abdomen without tenderness  Wbc 5 68    K 3 4  Glucose > 200 except in early morning  Blood cultures positive for gram negative rods  To continue antibiotics and pain control  Repeat blood cultures done in am   Mealtime insulin restarted  Continues on decreased lantus  Vital Signs:  07/16/21 0730  99 8 °F (37 7 °C)  74  18  127/67  87  96 %  None (Room air)  Lying   07/15/21 2310  99 °F (37 2 °C)  76  16  133/71  --  95 %  None (Room air)  Lying   07/15/21 1501  99 2 °F (37 3 °C)  80  18  136/77  100  95 %  None (Room air)  Lying   07/15/21 0700  98 6 °F (37 °C)  82  18  123/74  93  92 %  None (Room air)  Lying   07/14/21 2234  98 7 °F (37 1 °C)  70  18  136/72  99  97 %  None (Room air)  Lying   07/14/21 1500  98 5 °F (36 9 °C)  78  18  117/72  89  95 %  None (Room air)         Pertinent Labs/Diagnostic Results:   7/15/2021 Echo LEFT VENTRICLE:The ventricle was mildly dilated  Systolic function was at the lower limits of normal by visual assessment  Ejection fraction was estimated to be 50 %  There were no regional wall motion abnormalities   LEFT ATRIUM:The atrium was mildly dilated    MITRAL VALVE:There was mild regurgitation  Sesarne Dull was mild regurgitation    TRICUSPID VALVE:There was mild regurgitation   Robert Martin was trace regurgitation   PERICARDIUM:A trace pericardial effusion was identified    Results from last 7 days   Lab Units 07/21/21  0888 07/20/21  4211 07/19/21  0601 07/18/21  0504 07/17/21  0545   WBC Thousand/uL 8 81 9 25 9 34 8 16 6 88   HEMOGLOBIN g/dL 9 8* 10 2* 10 0* 10 1* 10 0*   HEMATOCRIT % 30 9* 32 8* 32 1* 32 5* 31 9*   PLATELETS Thousands/uL 531* 444* 391* 277 195   NEUTROS ABS Thousands/µL  --  7 04  --   --   --    BANDS PCT %  --   --  4 12* 10*     Results from last 7 days   Lab Units 07/21/21  0533 07/20/21  0505 07/19/21  0828 07/17/21  0545 07/16/21  0611   SODIUM mmol/L 143 139 141 143 142   POTASSIUM mmol/L 3 4* 3 4* 3 3* 3 6 3 4*   CHLORIDE mmol/L 106 105 104 107 107   CO2 mmol/L 28 25 27 28 26   ANION GAP mmol/L 9 9 10 8 9   BUN mg/dL 13 15 14 14 14   CREATININE mg/dL 0 74 0 67 0 67 0 72 0 67   EGFR ml/min/1 73sq m 97 101 101 98 101   CALCIUM mg/dL 8 3 8 0* 8 0* 8 1* 8 0*     Results from last 7 days   Lab Units 07/20/21  0505 07/16/21  0611 07/15/21  0510   AST U/L 14 11 11   ALT U/L 17 22 25   ALK PHOS U/L 115 98 94   TOTAL PROTEIN g/dL 5 2* 5 3* 5 1*   ALBUMIN g/dL 2 0* 2 0* 1 9*   TOTAL BILIRUBIN mg/dL 0 57 0 43 0 46     Results from last 7 days   Lab Units 07/21/21  1110 07/21/21  0734 07/20/21  2025 07/20/21  1608 07/20/21  1118 07/20/21  0602 07/19/21  2113 07/19/21  1632 07/19/21  1342 07/19/21  1058 07/19/21  0713 07/18/21  2019   POC GLUCOSE mg/dl 184* 122 217* 214* 214* 109 110 247* 106 110 105 233*     Results from last 7 days   Lab Units 07/21/21  0533 07/20/21  0505 07/19/21  0828 07/17/21  0545 07/16/21  0611 07/15/21  0510   GLUCOSE RANDOM mg/dL 129 100 106 83 80 81                             Results from last 7 days   Lab Units 07/16/21  0709 07/16/21  0708   BLOOD CULTURE  No Growth After 4 Days  No Growth After 5 Days       Results from last 7 days   Lab Units 07/19/21  0601 07/18/21  0504 07/17/21  0545 07/16/21  0611 07/15/21  0510   TOTAL COUNTED  100 100 100 100 100       Medications:   Scheduled Medications:  aspirin, 81 mg, Oral, Daily  cefazolin, 2,000 mg, Intravenous, Q8H  cyanocobalamin, 1,000 mcg, Oral, Daily  enoxaparin, 40 mg, Subcutaneous, Q24H INGRID  FLUoxetine, 20 mg, Oral, Daily  folic acid, 1 mg, Oral, Daily  gabapentin, 200 mg, Oral, TID  insulin glargine, 10 Units, Subcutaneous, HS  insulin lispro, 1-6 Units, Subcutaneous, 4x Daily (AC & HS)  melatonin, 3 mg, Oral, HS  pancrelipase (Lip-Prot-Amyl), 24,000 Units, Oral, TID With Meals  pantoprazole, 40 mg, Oral, BID  predniSONE, 20 mg, Oral, Daily  predniSONE, 5 mg, Oral, Daily  sodium chloride (PF), 5 mL, Intravenous, Q12H INGRID  vancomycin, 20 mg/kg, Intravenous, Q12H      Continuous IV Infusions: dc 7/15/2021      PRN Meds:  acetaminophen, 650 mg, Oral, Q6H PRN - used x 1 7/16  diazepam, 2 mg, Oral, Daily PRN  HYDROmorphone, 0 5 mg, Intravenous, Q3H PRN  ondansetron, 4 mg, Intravenous, Q4H PRN  oxyCODONE, 5 mg, Oral, Q4H PRN        Discharge Plan: to be determined  Network Utilization Review Department  ATTENTION: Please call with any questions or concerns to [phone] and carefully listen to the prompts so that you are directed to the right person  All voicemails are confidential   Dixie Guerra all requests for admission clinical reviews, approved or denied determinations and any other requests to dedicated fax number below belonging to the campus where the patient is receiving treatment   List of dedicated fax numbers for the Facilities:  [address] DENIALS (Administrative/Medical Necessity) [phone]   [address] (Maternity/NICU/Pediatrics) [phone]   [address] [address] [facility] [phone] [address] [address] [place] [address] [place] [address] [phone]

## 2021-07-21 NOTE — PLAN OF CARE
Problem: Potential for Falls  Goal: Patient will remain free of falls  Description: INTERVENTIONS:  - Educate patient/family on patient safety including physical limitations  - Instruct patient to call for assistance with activity   - Consult OT/PT to assist with strengthening/mobility   - Keep Call bell within reach  - Keep bed low and locked with side rails adjusted as appropriate  - Keep care items and personal belongings within reach  - Initiate and maintain comfort rounds  - Make Fall Risk Sign visible to staff  - Offer Toileting   - Obtain necessary fall risk management equipment  - Apply yellow socks and bracelet for high fall risk patients  - Consider moving patient to room near nurses station  Outcome: Progressing     Problem: Nutrition/Hydration-ADULT  Goal: Nutrient/Hydration intake appropriate for improving, restoring or maintaining nutritional needs  Description: Monitor and assess patient's nutrition/hydration status for malnutrition  Collaborate with interdisciplinary team and initiate plan and interventions as ordered  Monitor patient's weight and dietary intake as ordered or per policy  Utilize nutrition screening tool and intervene as necessary  Determine patient's food preferences and provide high-protein, high-caloric foods as appropriate       INTERVENTIONS:  - Monitor oral intake, urinary output, labs, and treatment plans  - Assess nutrition and hydration status and recommend course of action  - Evaluate amount of meals eaten  - Assist patient with eating if necessary   - Allow adequate time for meals  - Recommend/ encourage appropriate diets, oral nutritional supplements, and vitamin/mineral supplements  - Order, calculate, and assess calorie counts as needed  - Recommend, monitor, and adjust tube feedings and TPN/PPN based on assessed needs  - Assess need for intravenous fluids  - Provide specific nutrition/hydration education as appropriate  - Include patient/family/caregiver in decisions related to nutrition  Outcome: Progressing     Problem: MOBILITY - ADULT  Goal: Maintain or return to baseline ADL function  Description: INTERVENTIONS:  -  Assess patient's ability to carry out ADLs; assess patient's baseline for ADL function and identify physical deficits which impact ability to perform ADLs (bathing, care of mouth/teeth, toileting, grooming, dressing, etc )  - Assess/evaluate cause of self-care deficits   - Assess range of motion  - Assess patient's mobility; develop plan if impaired  - Assess patient's need for assistive devices and provide as appropriate  - Encourage maximum independence but intervene and supervise when necessary  - Involve family in performance of ADLs  - Assess for home care needs following discharge   - Consider OT consult to assist with ADL evaluation and planning for discharge  - Provide patient education as appropriate  Outcome: Progressing  Goal: Maintains/Returns to pre admission functional level  Description: INTERVENTIONS:  - Perform BMAT or MOVE assessment daily    - Set and communicate daily mobility goal to care team and patient/family/caregiver     - Collaborate with rehabilitation services on mobility goals if consulted  - Out of bed for toileting  - Record patient progress and toleration of activity level   Outcome: Progressing     Problem: HEMATOLOGIC - ADULT  Goal: Maintains hematologic stability  Description: INTERVENTIONS  - Assess for signs and symptoms of bleeding or hemorrhage  - Monitor labs  - Administer supportive blood products/factors as ordered and appropriate  Outcome: Progressing     Problem: INFECTION - ADULT  Goal: Absence or prevention of progression during hospitalization  Description: INTERVENTIONS:  - Assess and monitor for signs and symptoms of infection  - Monitor lab/diagnostic results  - Monitor all insertion sites, i e  indwelling lines, tubes, and drains  - Monitor endotracheal if appropriate and nasal secretions for changes in amount and color  - [place] appropriate cooling/warming therapies per order  - Administer medications as ordered  - Instruct and encourage patient and family to use good hand hygiene technique  - Identify and instruct in appropriate isolation precautions for identified infection/condition  Outcome: Progressing

## 2021-07-21 NOTE — PROGRESS NOTES
Progress Note - Infectious Disease   Rowe Hammans 73 y o  male MRN: [id number]  Unit/Bed#: S -01 Encounter: [id number]      Impression/Plan:  1  Sepsis  POA with high fever and tachycardia and mild pancytopenia improving   Due to #2   No other appreciable source   Patient reports port and Perc Cholecystomy are flushing well without issue  Patient with temp to 102 F spike 7/19/21, defervesced with Tylenol  Patient had no associated chills, sweats, rash, or diarrhea  May represent tumor or drug fever  Eos 1  -Antibiotics as below  -follow up blood cultures as below  -monitor temperature and hemodynamics  -serial exam  -recheck CBC and BMP in a m      2  Polymicrobial bacteremia   Strep constellatus pansensitive, prevotella   Unclear source   Consider relation to recent cholecystitis given prior Enterococcus from biliary cultures, although that was 2 months ago  CT reviewed with Radiology and no latha liver abscess seen   7/15/21 TTE and 7/19 JAMAL negative for vegetation   Repeat blood cultures 7/16 negative day #4  Also patient reported discomfort at dental crown gum line, consider oral source  CT facial bones with periapical cystic changes around teeth 14/15   -Continue IV Ceftriaxone 2 g IV q 24 hours through 7/29 and Flagyl 500 mg PO q 8 hours through 7/24/21  -follow up repeat blood cultures finals  -maintain port with repeat blood cultures negative thus far  -Will check surveillance blood cultures 1 week (8/5/21) after completion of 2 week course of antibiotic therapy from negative blood cultures planned through 7/29/21  Office notified and orders placed  -OMFS scheduling patient for teeth 14/15 extraction inpatient possibly this afternoon   -Patient now NPO   -Disposition planning for home in progress upon discharge   Patient has 100% coverage for IV Ceftriaxone upon discharge and VNA can see him Friday 2 pm      3  Recently diagnosed pancreatic cancer   Diagnosed by EUS May 2021  Patient received chemotherapy 21 with D8 C2 Abraxane and Gemzar via Port  CT showing 2 liver lesions concerning for metastatic disease   -antibiotic as above  -monitor temperature and hemodynamics  -serial exam  -close oncology follow-up ongoing     4  Recent Acute cholecystitis   In setting of # 2/4   Status post cholecystostomy    Cultures with Enterococcus and patient completed Augmentin through 21 for 7 days total of enterococcal coverage      5   Recent Biliary obstruction   Due to #2   Status post recent ERCP, CBD stent   LFTs remain normal  CT with metallic CBD stent and Perc Cholecytostomy tube in place  2 new lesions on liver     6  Port placed 21 for chemo    -antibiotics as above  -follow up pending blood cultures   -may need to consider removal as above     Above impression and plan discussed in detail with patient, RN, , and primary care team        Antibiotics:  Ceftriaxone D2  Antibiotics D6 from first negative blood cultures     Subjective:  Patient had no fever, chills, sweats overnight; no nausea, vomiting, diarrhea; no cough, shortness of breath; no lower abd pressure "heaviness "   Patient reports dental crown discomfort when brushing gumline  OMFS made patient NPO after breakfast for possible extractions today  Objective:  Vitals:  Temp:  [97 5 °F (36 4 °C)-98 1 °F (36 7 °C)] 97 5 °F (36 4 °C)  HR:  [62-77] 77  Resp:  [18] 18  BP: (110-127)/(67-73) 110/67  SpO2:  [91 %-94 %] 91 %  Temp (24hrs), Av 9 °F (36 6 °C), Min:97 5 °F (36 4 °C), Max:98 1 °F (36 7 °C)  Current: Temperature: 97 5 °F (36 4 °C)    Physical Exam:   General Appearance:  Alert, interactive, nontoxic, no acute distress  Throat: Oropharynx moist without lesions  Poor dentition   Lungs:   Clear to auscultation bilaterally; no wheezes, rhonchi or rales; respirations unlabored   Heart:  RRR; no murmur   Abdomen:   Soft, non-tender, non-distended, RUQ perc cholecytostomy tube capped, positive bowel sounds  Extremities: No clubbing, cyanosis or edema   : No mathews, no SPT   Skin: No new rashes or lesions  Left chest Port accessed and nontender  Labs, Imaging, & Other studies:   All pertinent labs and imaging studies were personally reviewed  Results from last 7 days   Lab Units 07/21/21  0533 07/20/21  0505 07/19/21  0601   WBC Thousand/uL 8 81 9 25 9 34   HEMOGLOBIN g/dL 9 8* 10 2* 10 0*   PLATELETS Thousands/uL 531* 444* 391*     Results from last 7 days   Lab Units 07/21/21  0533 07/20/21  0505 07/19/21  0828 07/16/21  0611 07/15/21  0510   SODIUM mmol/L 143 139 141 142 144   POTASSIUM mmol/L 3 4* 3 4* 3 3* 3 4* 3 3*   CHLORIDE mmol/L 106 105 104 107 109*   CO2 mmol/L 28 25 27 26 26   BUN mg/dL 13 15 14 14 13   CREATININE mg/dL 0 74 0 67 0 67 0 67 0 65   EGFR ml/min/1 73sq m 97 101 101 101 102   CALCIUM mg/dL 8 3 8 0* 8 0* 8 0* 7 8*   AST U/L  --  14  --  11 11   ALT U/L  --  17  --  22 25   ALK PHOS U/L  --  115  --  98 94     Results from last 7 days   Lab Units 07/16/21  0709 07/16/21  0708   BLOOD CULTURE  No Growth After 5 Days  No Growth After 5 Days

## 2021-07-21 NOTE — TELEPHONE ENCOUNTER
Patient's son, Sesar Pat , is calling in requesting to speak to [facility] since his father's appointment has been canceled due to being in the hospital  He can be reached back at [phone]

## 2021-07-21 NOTE — QUICK NOTE
Informed by FS team that tooth extraction will be tomorrow instead of today  Diet started  Will place NPO after midnight tonight

## 2021-07-22 PROBLEM — E87.6 HYPOKALEMIA: Status: RESOLVED | Noted: 2021-04-27 | Resolved: 2021-01-01

## 2021-07-22 NOTE — QUICK NOTE
Discussed case with patient's son Coco Mary per request of son and request of patient  Reviewed latest CT results and reiterated that the thought process at this time is patient has not received enough therapy to determine accurately the efficacy of gemcitabine and abraxane regimen  Appointment with Dr Hailey Oliveira as outpatient on 8/5 to discuss therapy moving forward and patient will be present with his son at this appointment  Infusion is scheduled for 8/5 afternoon after office visit if patient decides at this time to proceed with treatment

## 2021-07-22 NOTE — INTERIM OP NOTE
EXTRACTION TEETH 14 & 15, ALVEOPLASTY, EXCISION OF CYST LEFT MAXILLA  Postoperative Note  PATIENT NAME: Helena Shah  : 1955  MRN: [id number]  AN OR ROOM 04    Surgery Date: 2021    Preop Diagnosis:  SIRS (systemic inflammatory response syndrome) ([facility]) [R65 10]    Post-Op Diagnosis Codes:     * SIRS (systemic inflammatory response syndrome) ([facility]) [R65 10]    Procedure(s) (LRB):  EXTRACTION TEETH 14 & 15, ALVEOPLASTY, EXCISION OF CYST LEFT MAXILLA (Left)    Surgeon(s) and Role:     * Sophie Lewis DDS - Primary    Specimens:  ID Type Source Tests Collected by Time Destination   1 : cyst left maxilla Tissue Cyst TISSUE EXAM Sophie Lewis DDS 2021 1518        Estimated Blood Loss:   Minimal    Anesthesia Type:   General     Findings:   Non-restorable teeth #'s 14 and 15    Cyst associated with DOPHZ#92  Complications:   None    SIGNATURE: Sophie Lewis DDS   DATE: 2021   TIME: 3:28 PM · Continue acid suppressant therapy

## 2021-07-22 NOTE — OP NOTE
OPERATIVE REPORT  PATIENT NAME: Helena Shah    :  1955  MRN: [id number]  Pt Location: AN OR ROOM 04    SURGERY DATE: 2021    Surgeon(s) and Role:     Kyaw Mason DDS - Primary    Preop Diagnosis:  SIRS (systemic inflammatory response syndrome) ([facility]) [R65 10]    Post-Op Diagnosis Codes:     * SIRS (systemic inflammatory response syndrome) (HCC) [R65 10]    Procedure(s) (LRB):  EXTRACTION TEETH 14 & 15, ALVEOPLASTY, EXCISION OF CYST LEFT MAXILLA (Left)    Specimen(s):  ID Type Source Tests Collected by Time Destination   1 : cyst left maxilla Tissue Cyst TISSUE EXAM Sophie Lewis DDS 2021 1518        Estimated Blood Loss:   Minimal    Drains:  Closed/Suction Drain Lateral RLQ (Active)   Site Description Unable to view 21 0725   Dressing Status Clean;Dry; Intact 21 0725   Drainage Appearance None 21 0725   Intake (mL) 5 mL 21 1449   Number of days: 72       Anesthesia Type:   General    Operative Indications:  SIRS (systemic inflammatory response syndrome) ([facility]) [R65 10]  Sepsis with possible dental origin    Operative Findings:  Cyst associated with tooth #20    Complications:   None    Procedure and Technique:  The patient was greeted in the holding area  All questions were answered and consent was reviewed and signed  The patient was transported to the OR and placed in the supine position  Intravenous sedation was initiated  The patient was prepped and draped in the usual fashion  The oral cavity was suctioned and a throat screen was placed  10cc of lidocaine/marcaine was administered for local infiltration and V2 block  Full thickness flaps were elevated in the left maxilla  Teeths #'s 14 and 15 were sectioned, elevated, and extracted without complication  The sockets were debrided  An apical cyst was removed from the #15 site  No sinus perforation was noted  The specimen was submitted for histopathology    The bony alveolus was cotoured with a bone file and rongeur  The sites were irrigated  Gelfoam was placed to add in hemostasis  The wound was closed using a running, locking, chromic gut suture  The oropharynx was suctioned after the throat pack was removed  Sedation was discontinued and the patient was transported to recovery in stable condition  I was present for the entire case    No qualified resident was available to assist     Patient Disposition:  PACU in stable condition    SIGNATURE: Bryant Ashton DDS  DATE: July 22, 2021  TIME: 3:30 PM

## 2021-07-22 NOTE — PROGRESS NOTES
Progress Note - Infectious Disease   Delon Jasso 73 y o  male MRN: [id number]  Unit/Bed#: OR POOL Encounter: [id number]      Impression/Plan:  1  Sepsis  POA with high fever and tachycardia and mild pancytopenia improving   Due to #2   No other appreciable source   Patient reports port and Perc Cholecystomy are flushing well without issue  Patient with temp to 102 F spike 7/19/21, defervesced with Tylenol  Patient had no associated chills, sweats, rash, or diarrhea  May represent tumor or drug fever  Eos 1  -Antibiotics as below  -follow up blood cultures as below  -monitor temperature and hemodynamics  -serial exam  -recheck CBC and BMP in a m      2  Polymicrobial bacteremia   Strep constellatus pansensitive, prevotella   Unclear source   Consider relation to recent cholecystitis given prior Enterococcus from biliary cultures, although that was 2 months ago  CT reviewed with Radiology and no latha liver abscess seen   7/15/21 TTE and 7/19 JAMAL negative for vegetation   Repeat blood cultures 7/16 negative day #4  Also patient reported discomfort at dental crown gum line, consider oral source  CT facial bones with periapical cystic changes around teeth 14/15   -Continue IV Ceftriaxone 2 g IV q 24 hours through 7/29 and Flagyl 500 mg PO q 8 hours through 7/24/21  -follow up repeat blood cultures finals  -maintain port with repeat blood cultures negative thus far  -Will check surveillance blood cultures 1 week (8/5/21) after completion of 2 week course of antibiotic therapy from negative blood cultures planned through 7/29/21  Office notified and orders placed  -OMFS scheduling patient for teeth 14/15 extraction inpatient this afternoon   -Patient now NPO   -Disposition planning for home in progress upon discharge   Patient has 100% coverage for IV Ceftriaxone upon discharge and VNA can see him Friday 2 pm      3  Recently diagnosed pancreatic cancer   Diagnosed by EUS May 2021  Patient received chemotherapy 21 with D8 C2 Abraxane and Gemzar via Port  CT showing 2 liver lesions concerning for metastatic disease   -antibiotic as above  -monitor temperature and hemodynamics  -serial exam  -close oncology follow-up ongoing     4  Recent Acute cholecystitis   In setting of # 2/4   Status post cholecystostomy    Cultures with Enterococcus and patient completed Augmentin through 21 for 7 days total of enterococcal coverage      5   Recent Biliary obstruction   Due to #2   Status post recent ERCP, CBD stent   LFTs remain normal  CT with metallic CBD stent and Perc Cholecytostomy tube in place  2 new lesions on liver     6  Port placed 21 for chemo    -antibiotics as above  -follow up surveillance blood cultures as above     Above impression and plan discussed in detail with patient, Felipe Mo      Antibiotics:  Hortencia Mellow from first negative blood cultures     Subjective:  Patient had no fever, chills, sweats overnight; no nausea, vomiting, diarrhea; no cough, shortness of breath; no lower abd pressure "heaviness "   Patient NPO for dental extractions today  Objective:  Vitals:  Temp:  [97 2 °F (36 2 °C)-98 6 °F (37 °C)] 97 2 °F (36 2 °C)  HR:  [59-82] 78  Resp:  [14-18] 15  BP: (110-135)/(57-74) 111/57  SpO2:  [90 %-94 %] 93 %  Temp (24hrs), Av 9 °F (36 6 °C), Min:97 2 °F (36 2 °C), Max:98 6 °F (37 °C)  Current: Temperature: (!) 97 2 °F (36 2 °C)    Physical Exam:   General Appearance:  Alert, interactive, nontoxic, no acute distress  Throat: Oropharynx moist without lesions  Poor dentition   Lungs:   respirations unlabored   Heart:  RR   Abdomen:   Soft, non-tender, protuberant, RUQ perc ashli tube capped    Extremities: No clubbing, cyanosis or edema   : Melinda urine in urinal   Skin: No new rashes or lesions   Left chest port accessed       Labs, Imaging, & Other studies:   All pertinent labs and imaging studies were personally reviewed  Results from last 7 days   Lab Units 07/22/21  0635 07/21/21  0533 07/20/21  0505   WBC Thousand/uL 8 99 8 81 9 25   HEMOGLOBIN g/dL 10 1* 9 8* 10 2*   PLATELETS Thousands/uL 600* 531* 444*     Results from last 7 days   Lab Units 07/22/21  0635 07/21/21  0533 07/20/21  0505 07/16/21  0611   SODIUM mmol/L 142 143 139 142   POTASSIUM mmol/L 3 8 3 4* 3 4* 3 4*   CHLORIDE mmol/L 106 106 105 107   CO2 mmol/L 28 28 25 26   BUN mg/dL 14 13 15 14   CREATININE mg/dL 0 73 0 74 0 67 0 67   EGFR ml/min/1 73sq m 97 97 101 101   CALCIUM mg/dL 8 1* 8 3 8 0* 8 0*   AST U/L  --   --  14 11   ALT U/L  --   --  17 22   ALK PHOS U/L  --   --  115 98     Results from last 7 days   Lab Units 07/16/21  0709 07/16/21  0708   BLOOD CULTURE  No Growth After 5 Days  No Growth After 5 Days

## 2021-07-22 NOTE — ASSESSMENT & PLAN NOTE
Multifactorial secondary to sepsis coupled with metastatic cancer (see plan for individual assessments)  No issues with ambulation

## 2021-07-22 NOTE — ASSESSMENT & PLAN NOTE
· Blood cultures obtained on 7/13 2 of 2 showed Gram-positive cocci in chains, prevotella; repeat blood cultures obtained on 07/16 showed no growth so far after 72hrs  · JAAML done on 07/19/2021 which was negative for vegetations  · Source of with infection unclear at this time, consider translocation into the GI tract from recent chemotherapy and/or new metastatic sites in liver There is also a question of dental source of infection  OMFS consulted  CT facial bones reveals periapical pathology associated with teeth  For tooth extraction later today

## 2021-07-22 NOTE — PROGRESS NOTES
[facility]  Progress Note - Miley Dougherty 1955, 73 y o  male MRN: [id number]  Unit/Bed#: S -75 Encounter: [id number]  Primary Care Provider: Gutierrez Wolfe MD   Date and time admitted to hospital: 7/13/2021  9:40 AM    * Sepsis of unknown etiology  Assessment & Plan  · POA, fever, tachycardia, leukopenia -- tachycardia and leukopenia resolved, no febrile episodes in the last 72 hours  · Urinalysis, chest x-ray unremarkable  CT of abdomen/pelvis without obvious source of infection  · Blood cultures obtained on 7/13 2 of 2 showed Gram-positive cocci in chains, prevotella, Streptococcus Consta lattice; repeat blood cultures obtained on 07/16 showed no growth so far after 5 days  · Notably, patient has a cholecystostomy tube in place from May 2021 for cholecystitis at the time s/p antibiotic course  · Currently on ceftriaxone and Flagyl (continue IV ceftriaxone until 7/29, oral Flagyl until 7/24); per ID, able to get IV ceftriaxone through his port on discharge  · See rest of plan below under bacteremia  · Monitor temperature cover, WBC count    Polymicrobial bacteremia  Assessment & Plan  · Blood cultures obtained on 7/13 2 of 2 showed Gram-positive cocci in chains, prevotella; repeat blood cultures obtained on 07/16 showed no growth so far after 72hrs  · JAMAL done on 07/19/2021 which was negative for vegetations  · Source of with infection unclear at this time, consider translocation into the GI tract from recent chemotherapy and/or new metastatic sites in liver There is also a question of dental source of infection  OMFS consulted  CT facial bones reveals periapical pathology associated with teeth  For tooth extraction later today      Metastatic pancreatic cancer  Assessment & Plan  Follows with medical/surgical oncology - s/p a cycle of neoadjuvant chemotherapy (Gemcitabine/Abraxane) a week prior to admission  Known to palliative care -> consider inpatient consultation if pain uncontrolled  CT imaging in the ED today noting "New 3 5 x 1 8 cm low-attenuating lesion in the right hepatic lobe and increased size of a 1 9 x 1 3 cm low-attenuating lesion in the left hepatic lobe, concerning for metastatic disease  Stable pancreatic head neoplasm  Resolution of the previously reported paraduodenal collection and omental edema/infiltration " -> appreciate medical oncology evaluation     Type 2 diabetes mellitus with hypoglycemia [facility])  Assessment & Plan  Lab Results   Component Value Date    HGBA1C 6 2 (H) 05/05/2021   · Last A1c reflects excellent control  · Patient on 20 units Lantus at home, currently on 5 units Lantus as he is noted to be on the lower side of normal in terms of blood glucose in the early mornings  · Patient on 10 units Humalog with meals -- continue 5 units TID with meals for now  · Q i d  Accu-Cheks  · SSI  · Hypoglycemia protocol    Severe protein-calorie malnutrition ([facility] [address] )  Assessment & Plan  Malnutrition Findings:   Adult Malnutrition type: Acute illness (in the setting of chronic illness)  Adult Degree of Malnutrition: Other severe protein calorie malnutrition (related to inadequate oral intake, catabolic illness)    BMI Findings: Body mass index is 24 81 kg/m²  Nutrition services input appreciated      Generalized weakness  Assessment & Plan  Multifactorial secondary to sepsis coupled with metastatic cancer (see plan for individual assessments)  No issues with ambulation    Recent cholecystitis  Assessment & Plan  Hospitalized in May 2021 s/p cholecystostomy tube placement on 5/11/21 - completed a course of antibiotics at the time  PRN pain control - supportive care otherwise      VTE Pharmacologic Prophylaxis: VTE Score: 6 High Risk (Score >/= 5) - Pharmacological DVT Prophylaxis Ordered: enoxaparin (Lovenox)  Sequential Compression Devices Ordered      Patient Centered Rounds: I performed bedside rounds with nursing staff today   Discussions with Specialists or Other Care Team Provider:  Infectious Disease, reviewed note by Oncology as well    Education and Discussions with Family / Patient: Updated  (daughter in law) via phone  Current Length of Stay: 9 day(s)  Current Patient Status: Inpatient   Discharge Plan: Anticipate discharge tomorrow to home with home services  Code Status: Level 1 - Full Code    Subjective:   Patient reports that he feels well today  Denies any complaints  Objective:     Vitals:   Temp (24hrs), Av 3 °F (36 8 °C), Min:98 °F (36 7 °C), Max:98 6 °F (37 °C)    Temp:  [98 °F (36 7 °C)-98 6 °F (37 °C)] 98 °F (36 7 °C)  HR:  [59-68] 60  Resp:  [18] 18  BP: (107-135)/(66-72) 117/66  SpO2:  [90 %-95 %] 94 %  Body mass index is 24 81 kg/m²  Input and Output Summary (last 24 hours): Intake/Output Summary (Last 24 hours) at 2021 1221  Last data filed at 2021 2254  Gross per 24 hour   Intake 10 ml   Output 950 ml   Net -940 ml       Physical Exam:   Physical Exam  Vitals reviewed  Constitutional:       General: He is not in acute distress  Appearance: He is not ill-appearing  HENT:      Nose: No congestion  Eyes:      General: No scleral icterus  Cardiovascular:      Rate and Rhythm: Normal rate and regular rhythm  Heart sounds: No murmur heard  Pulmonary:      Effort: Pulmonary effort is normal  No respiratory distress  Breath sounds: No wheezing or rales  Abdominal:      General: Bowel sounds are normal  There is no distension  Tenderness: There is no abdominal tenderness  Musculoskeletal:      Right lower leg: No edema  Left lower leg: No edema  Skin:     General: Skin is warm  Capillary Refill: Capillary refill takes less than 2 seconds  Coloration: Skin is pale  Neurological:      General: No focal deficit present  Mental Status: He is alert and oriented to person, place, and time     Psychiatric:         Mood and Affect: Mood normal          Thought Content: Thought content normal             Additional Data:     Labs:  Results from last 7 days   Lab Units 07/22/21  0635 07/21/21  0533 07/20/21  0505 07/19/21  0601   WBC Thousand/uL 8 99 8 81 9 25   < >   HEMOGLOBIN g/dL 10 1* 9 8* 10 2*   < >   HEMATOCRIT % 32 8* 30 9* 32 8*   < >   PLATELETS Thousands/uL 600* 531* 444*   < >   BANDS PCT %  --  15*  --   --    NEUTROS PCT %  --   --  74  --    LYMPHS PCT %  --   --  16  --    LYMPHO PCT %  --  8*  --   --    MONOS PCT %  --   --  4  --    MONO PCT %  --  6  --   --    EOS PCT %  --  0 1  --     < > = values in this interval not displayed  Results from last 7 days   Lab Units 07/22/21  0635 07/20/21  0505   SODIUM mmol/L 142 139   POTASSIUM mmol/L 3 8 3 4*   CHLORIDE mmol/L 106 105   CO2 mmol/L 28 25   BUN mg/dL 14 15   CREATININE mg/dL 0 73 0 67   ANION GAP mmol/L 8 9   CALCIUM mg/dL 8 1* 8 0*   ALBUMIN g/dL  --  2 0*   TOTAL BILIRUBIN mg/dL  --  0 57   ALK PHOS U/L  --  115   ALT U/L  --  17   AST U/L  --  14   GLUCOSE RANDOM mg/dL 93 100         Results from last 7 days   Lab Units 07/22/21  1107 07/22/21  0806 07/21/21  2102 07/21/21  1601 07/21/21  1110 07/21/21  0734 07/20/21  2025 07/20/21  1608 07/20/21  1118 07/20/21  0602 07/19/21  2113 07/19/21  1632   POC GLUCOSE mg/dl 100 99 229* 263* 184* 122 217* 214* 214* 109 110 247*               Lines/Drains:  Invasive Devices     Central Venous Catheter Line            Port A Cath 06/01/21 Left Chest 51 days          Drain            Closed/Suction Drain Lateral RLQ 72 days                Central Line:  Goal for removal: Chronic port             Imaging: Reviewed radiology reports from this admission including: CT facial bones with contrast    Recent Cultures (last 7 days):   Results from last 7 days   Lab Units 07/16/21  0709 07/16/21  0708   BLOOD CULTURE  No Growth After 5 Days  No Growth After 5 Days         Last 24 Hours Medication List:   Current Facility-Administered Medications   Medication Dose Route Frequency Provider Last Rate    acetaminophen  650 mg Oral Q6H PRN Manolo Gray MD      aspirin  81 mg Oral Daily Manolo Gray MD      butalbital-acetaminophen-caffeine  1 tablet Oral Q4H PRN Damian Lauren MD      cefTRIAXone  2,000 mg Intravenous Q24H Monet Begum PA-C 2,000 mg (07/21/21 1449)    cyanocobalamin  1,000 mcg Oral Daily Kena Woody PA-C      diazepam  2 mg Oral Daily PRN Manolo Gray MD      enoxaparin  40 mg Subcutaneous Q24H [facility] Manolo Gray MD      FLUoxetine  20 mg Oral Daily Manolo Gray MD      folic acid  1 mg Oral Daily Manolo Gray MD      gabapentin  200 mg Oral TID Manolo Gray MD      HYDROmorphone  0 5 mg Intravenous Q3H PRN Manolo Gray MD      insulin glargine  5 Units Subcutaneous HS Racquel Kendrick MD      insulin lispro  1-6 Units Subcutaneous 4x Daily (AC & HS) Manolo Gray MD      insulin lispro  5 Units Subcutaneous TID With Meals Racquel Kendrick MD      melatonin  3 mg Oral HS Kena Brewster PA-C      metroNIDAZOLE  500 mg Oral [facility] Monet Begum PA-C      ondansetron  4 mg Intravenous Q4H PRN MD Anders Wilks oxyCODONE  5 mg Oral Q4H PRN Manolo Gray MD      pancrelipase (Lip-Prot-Amyl)  24,000 Units Oral TID With Meals Manolo Gray MD      pantoprazole  40 mg Oral BID MD Anders Wilks predniSONE  20 mg Oral Daily Manolo Gray MD      predniSONE  5 mg Oral Daily Manolo Gray MD      sodium chloride (PF)  5 mL Intravenous Q12H [facility] Manolo Gray MD          Today, Patient Was Seen By: Racquel Kendrick MD    **Please Note: This note may have been constructed using a voice recognition system  **

## 2021-07-22 NOTE — ANESTHESIA POSTPROCEDURE EVALUATION
Post-Op Assessment Note    CV Status:  Stable  Pain Score: 0    Pain management: adequate     Mental Status:  Alert and awake   Hydration Status:  Euvolemic   PONV Controlled:  Controlled   Airway Patency:  Patent and adequate      Post Op Vitals Reviewed: Yes      Staff: CRNA         No complications documented      /61 (07/22/21 1530)    Temp (!) 97 2 °F (36 2 °C) (07/22/21 1530)    Pulse  83   Resp 14 (07/22/21 1530)    SpO2  95

## 2021-07-22 NOTE — UTILIZATION REVIEW
Continued Stay Review    Date: 7/22/21                          Current Patient Class: IP  Current Level of Care: MS    HPI:66 y o  male with metastatic pancreatic CA initially admitted on 7/13/21 with sepsis    Assessment/Plan: 7/22  Pt afebrile past 72 hrs  Blood cultures no growth x 5 days  Pt currently on IV ceftriaxone until 7/29, oral Flagyl until 7/24  Pts skin pale  Pt NPO after MN for teeth extraction today in OR- tentative time of approx 1500  Per medical oncology-Pt and son will discuss with pulmonologist at 8/5 appt  therapy moving forward    At this time is patient has not received enough therapy to determine accurately the efficacy of gemcitabine and abraxane regimen      Vital Signs: /66 (BP Location: Left arm)   Pulse 60   Temp 98 °F (36 7 °C) (Oral)   Resp 18   Ht 5' 9" (1 753 m)   Wt 76 2 kg (168 lb)   SpO2 94%   BMI 24 81 kg/m²       Pertinent Labs/Diagnostic Results:       Results from last 7 days   Lab Units 07/22/21  0635 07/21/21  0533 07/20/21  0505 07/19/21  0601 07/18/21  0504   WBC Thousand/uL 8 99 8 81 9 25 9 34 8 16   HEMOGLOBIN g/dL 10 1* 9 8* 10 2* 10 0* 10 1*   HEMATOCRIT % 32 8* 30 9* 32 8* 32 1* 32 5*   PLATELETS Thousands/uL 600* 531* 444* 391* 277   NEUTROS ABS Thousands/µL  --   --  7 04  --   --    BANDS PCT %  --  15*  --  4 12*         Results from last 7 days   Lab Units 07/22/21  0635 07/21/21  0533 07/20/21  0505 07/19/21  0828 07/17/21  0545   SODIUM mmol/L 142 143 139 141 143   POTASSIUM mmol/L 3 8 3 4* 3 4* 3 3* 3 6   CHLORIDE mmol/L 106 106 105 104 107   CO2 mmol/L 28 28 25 27 28   ANION GAP mmol/L 8 9 9 10 8   BUN mg/dL 14 13 15 14 14   CREATININE mg/dL 0 73 0 74 0 67 0 67 0 72   EGFR ml/min/1 73sq m 97 97 101 101 98   CALCIUM mg/dL 8 1* 8 3 8 0* 8 0* 8 1*     Results from last 7 days   Lab Units 07/20/21  0505 07/16/21  0611   AST U/L 14 11   ALT U/L 17 22   ALK PHOS U/L 115 98   TOTAL PROTEIN g/dL 5 2* 5 3*   ALBUMIN g/dL 2 0* 2 0*   TOTAL BILIRUBIN mg/dL 0 57 0 43     Results from last 7 days   Lab Units 07/22/21  1107 07/22/21  0806 07/21/21  2102 07/21/21  1601 07/21/21  1110 07/21/21  0734 07/20/21  2025 07/20/21  1608 07/20/21  1118 07/20/21  0602 07/19/21  2113 07/19/21  1632   POC GLUCOSE mg/dl 100 99 229* 263* 184* 122 217* 214* 214* 109 110 247*     Results from last 7 days   Lab Units 07/22/21  0635 07/21/21  0533 07/20/21  0505 07/19/21  0828 07/17/21  0545 07/16/21  0611   GLUCOSE RANDOM mg/dL 93 129 100 106 83 80       Results from last 7 days   Lab Units 07/16/21  0709 07/16/21  0708   BLOOD CULTURE  No Growth After 5 Days  No Growth After 5 Days       Results from last 7 days   Lab Units 07/21/21  0533 07/19/21  0601 07/18/21  0504 07/17/21  0545 07/16/21  0611   TOTAL COUNTED  100 100 100 100 100             Medications:   Scheduled Medications:  aspirin, 81 mg, Oral, Daily  cefTRIAXone, 2,000 mg, Intravenous, Q24H  cyanocobalamin, 1,000 mcg, Oral, Daily  enoxaparin, 40 mg, Subcutaneous, Q24H INGRID  FLUoxetine, 20 mg, Oral, Daily  folic acid, 1 mg, Oral, Daily  gabapentin, 200 mg, Oral, TID  insulin glargine, 5 Units, Subcutaneous, HS  insulin lispro, 1-6 Units, Subcutaneous, 4x Daily (AC & HS)  insulin lispro, 5 Units, Subcutaneous, TID With Meals  melatonin, 3 mg, Oral, HS  metroNIDAZOLE, 500 mg, Oral, Q8H INGRID  pancrelipase (Lip-Prot-Amyl), 24,000 Units, Oral, TID With Meals  pantoprazole, 40 mg, Oral, BID  predniSONE, 20 mg, Oral, Daily  predniSONE, 5 mg, Oral, Daily  sodium chloride (PF), 5 mL, Intravenous, Q12H INGIRD      Continuous IV Infusions:     PRN Meds:  acetaminophen, 650 mg, Oral, Q6H PRN  butalbital-acetaminophen-caffeine, 1 tablet, Oral, Q4H PRN  diazepam, 2 mg, Oral, Daily PRN  HYDROmorphone, 0 5 mg, Intravenous, Q3H PRN  ondansetron, 4 mg, Intravenous, Q4H PRN  oxyCODONE, 5 mg, Oral, Q4H PRN        Discharge Plan: D    Network Utilization Review Department  ATTENTION: Please call with any questions or concerns to [phone] and carefully listen to the prompts so that you are directed to the right person  All voicemails are confidential   Demetrius Yarbroughnadeem all requests for admission clinical reviews, approved or denied determinations and any other requests to dedicated fax number below belonging to the campus where the patient is receiving treatment   List of dedicated fax numbers for the Facilities:  [address] DENIALS (Administrative/Medical Necessity) [phone]   [address] (Maternity/NICU/Pediatrics) [phone]   [address] [place] [phone] [address] [facility] [phone]

## 2021-07-22 NOTE — CASE MANAGEMENT
CM rounded with resident, Dr Nirav Vincent  Plan for d/c tomorrow with home IV abx  Dr Nirav Vincent aware pt will need to be home prior to 2 pm for his home dose  CM spoke with pt nurse who is aware of above  Nurse reports iv abx was delivered today and placed in refrigerator

## 2021-07-22 NOTE — ANESTHESIA PREPROCEDURE EVALUATION
Procedure:  EXTRACTION TEETH MULTIPLE (Left Mouth)    Relevant Problems   CARDIO   (+) Essential hypertension   (+) Hyperlipidemia      ENDO   (+) Type 2 diabetes mellitus with hypoglycemia (HCC)      GI/HEPATIC   (+) GERD   (+) Metastatic pancreatic cancer      /RENAL   (+) BPH (benign prostatic hyperplasia)      MUSCULOSKELETAL   (+) Rheumatoid arthritis (HCC)      NEURO/PSYCH   (+) Anxiety disorder      JAMAL 7/19/21:  LEFT VENTRICLE: Size was normal  Systolic function was at the lower limits of normal  Ejection fraction was estimated to be 50 %  There was mild diffuse hypokinesis  Wall thickness was normal      RIGHT VENTRICLE: The size was normal  Systolic function was normal  Wall thickness was normal      LEFT ATRIUM: The atrium was dilated  No thrombus was identified      ATRIAL SEPTUM: No defect or patent foramen ovale was identified by color Doppler      RIGHT ATRIUM: Size was normal  No thrombus was identified      MITRAL VALVE: Valve structure was normal  There was normal leaflet separation  There was no echocardiographic evidence of vegetation  DOPPLER: There was trace regurgitation      AORTIC VALVE: The valve was trileaflet  Leaflets exhibited normal thickness and normal cuspal separation  There was no echocardiographic evidence of vegetation  DOPPLER: Transaortic velocity was within the normal range  There was no  evidence for stenosis  There was mild regurgitation      TRICUSPID VALVE: The valve structure was normal  There was normal leaflet separation  There was no echocardiographic evidence of vegetation  DOPPLER: There was mild regurgitation      PULMONIC VALVE: Leaflets exhibited normal thickness, no calcification, and normal cuspal separation  There was no echocardiographic evidence of vegetation  DOPPLER: There was trace regurgitation      PERICARDIUM: There was no pericardial effusion  The pericardium was normal in appearance      AORTA: The root exhibited normal size  There was no atheroma  There was no evidence for dissection  There was no evidence for aneurysm  Physical Exam    Airway    Mallampati score: II  TM Distance: >3 FB  Neck ROM: full     Dental   Comment: Multiple chipped and capped teeth,     Cardiovascular  Rhythm: regular, Rate: normal, Cardiovascular exam normal    Pulmonary  Pulmonary exam normal Breath sounds clear to auscultation,     Other Findings        Anesthesia Plan  ASA Score- 3     Anesthesia Type- IV sedation with anesthesia with ASA Monitors  Additional Monitors:   Airway Plan: ETT  Plan Factors-Exercise tolerance (METS): >4 METS  Chart reviewed  EKG reviewed  Existing labs reviewed  Patient summary reviewed  Patient is not a current smoker  Patient did not smoke on day of surgery  Induction- intravenous  Postoperative Plan- Plan for postoperative opioid use  Planned trial extubation    Informed Consent- Anesthetic plan and risks discussed with patient  I personally reviewed this patient with the CRNA  Discussed and agreed on the Anesthesia Plan with the CRNA  Inessa Lang

## 2021-07-22 NOTE — PROGRESS NOTES
Pt currently NPO for OR, teeth extraction  Advance diet as medically appropriate to CCD 2, dental soft diet  Pt reports enjoying ensure clear and magic cup though magic cup BID is too much, RD does not have protocol, recommend decreasing magic cup to daily, resume supplements once diet advances  Will follow up

## 2021-07-22 NOTE — ASSESSMENT & PLAN NOTE
Malnutrition Findings:   Adult Malnutrition type: Acute illness (in the setting of chronic illness)  Adult Degree of Malnutrition: Other severe protein calorie malnutrition (related to inadequate oral intake, catabolic illness)    BMI Findings: Body mass index is 24 81 kg/m²     Nutrition services input appreciated

## 2021-07-22 NOTE — PROGRESS NOTES
Medical Oncology/Hematology Progress Note  Sabrina Ham, male, 73 y o , 1955,  S /S -01, [id number]     Reason for admission:  Weakness and fatigue with fever  Reason for consultation:  Metastatic pancreatic adenocarcinoma      ASSESSMENT AND PLAN:     1  Sepsis, metastatic pancreatic cancer   Sepsis POA, unknown etiology, tmax 102 7F   Currently started on empiric IV vanco and cefepime, infectious disease on board and change cefepime to Unasyn from recent cultures few months prior growing Enterococcus faecalis   Metastatic pancreatic cancer following with primary oncologist Dr Elder Yarbrough Currently completed cycle 1 and days 2/3 of cycle 2 of 3 weeks on, one week off gemcitabine and Abraxane (28 day cycles)  Next treatment day was scheduled for 7/15   CT abdomen pelvis with contrast on 07/13 demonstrated new 3 5 x 1 8 cm right hepatic lobe lesion and increased size of 1 9 x 1 3 cm left hepatic lobe lesion, stable pancreatic head neoplasm when compared to study from 6/14   Discussed findings with patient and stated that when he follows up with Dr Kitty Medel he will further discuss treatment plans moving forward   Chemotherapy currently on hold   Recent evidence of tooth abscess, procedure scheduled for later today and will be on IV abx via picc line until 7/29   Outpatient appointment rescheduled to 8/05 with infusion scheduled on 8/5 after office visit   Please call with any further questions    2   Pancytopenia  · Secondary to cytotoxic chemotherapy agents, likely component of infection and some iron deficiency with iron saturation of 6%  · ANC 2 45 today, not currently neutropenic  · Hemolysis smear negative, LDH 20, folate greater than 20, B12 mildly deficient 238, fibrinogen 412, haptoglobin pending, PT INR both slightly elevated, PTT WNL  · Will order B12 oral supplementation, component of iron foot deficiency to be reviewed as outpatient as patient is currently septic  · Continue to monitor · WBC improved today 8 81, Hgb 9 8, Plt elevated today at 531 -  will continue to monitor while admitted    Patient understands and is in agreement with this plan  Thank you for the opportunity to participate in this patient's care  Interval History: Overall patient complains of generalized weakness that has been persistent for weeks including prior to starting chemotherapy  Currently asymptomatic otherwise  Tmax on admission 102 7  Scheduled for procedure later today for tooth abscess  ECO    History of present illness:  Patient is a 73year old male with past medical history significant for metastatic pancreatic cancer acute cholecystitis diagnosis may 2021 s/p cholecystectomy tube placement  Ct AP w/contrast 21:   IMPRESSION:     No definite imaging explanation for acute epigastric/abdominal pain      New 3 5 x 1 8 cm low-attenuating lesion in the right hepatic lobe and increased size of a 1 9 x 1 3 cm low-attenuating lesion in the left hepatic lobe, concerning for metastatic disease      Stable pancreatic head neoplasm      Resolution of the previously reported paraduodenal collection and omental edema/infiltration  XR chest 21:  No acute cardiopulmonary disease  The lungs are clear  No pneumothorax or pleural effusion  Review of Systems:   Review of Systems   Constitutional: Positive for chills and fatigue  Negative for appetite change and fever  HENT: Negative for ear pain and sore throat  Eyes: Negative for pain and visual disturbance  Respiratory: Negative for cough and shortness of breath  Cardiovascular: Negative for chest pain and palpitations  Gastrointestinal: Negative for abdominal pain, blood in stool, constipation, diarrhea, nausea and vomiting  Genitourinary: Negative for dysuria and hematuria  Musculoskeletal: Negative for arthralgias and back pain  Skin: Negative for color change and rash  Neurological: Positive for weakness (generalized)  Negative for seizures, syncope and headaches  All other systems reviewed and are negative  PHYSICAL EXAM:    /72 (BP Location: Left arm)   Pulse 68   Temp 98 3 °F (36 8 °C) (Oral)   Resp 18   Ht 5' 9" (1 753 m)   Wt 76 2 kg (168 lb)   SpO2 95%   BMI 24 81 kg/m²     Physical Exam  Vitals and nursing note reviewed  Constitutional:       General: He is not in acute distress  Appearance: He is normal weight  He is not ill-appearing  HENT:      Head: Normocephalic and atraumatic  Eyes:      General: No scleral icterus  Cardiovascular:      Rate and Rhythm: Normal rate and regular rhythm  Heart sounds: Normal heart sounds  No murmur heard  Pulmonary:      Effort: Pulmonary effort is normal       Breath sounds: Normal breath sounds  No wheezing or rhonchi  Abdominal:      General: Bowel sounds are normal       Palpations: Abdomen is soft  There is no mass  Tenderness: There is abdominal tenderness (epigastric, ruq)  There is no guarding  Comments: Cholecystectomy drain present  No splenomegaly appreciated  Musculoskeletal:      Right lower leg: No edema  Left lower leg: No edema  Lymphadenopathy:      Cervical: No cervical adenopathy  Skin:     General: Skin is warm and dry  Coloration: Skin is not jaundiced  Findings: No rash  Neurological:      Mental Status: He is alert and oriented to person, place, and time           LABS:     Recent Results (from the past 48 hour(s))   Fingerstick Glucose (POCT)    Collection Time: 07/19/21  9:13 PM   Result Value Ref Range    POC Glucose 110 65 - 140 mg/dl   CBC and differential    Collection Time: 07/20/21  5:05 AM   Result Value Ref Range    WBC 9 25 4 31 - 10 16 Thousand/uL    RBC 3 65 (L) 3 88 - 5 62 Million/uL    Hemoglobin 10 2 (L) 12 0 - 17 0 g/dL    Hematocrit 32 8 (L) 36 5 - 49 3 %    MCV 90 82 - 98 fL    MCH 27 9 26 8 - 34 3 pg    MCHC 31 1 (L) 31 4 - 37 4 g/dL    RDW 17 5 (H) 11 6 - 15 1 %    MPV 10 1 8 9 - 12 7 fL    Platelets 109 (H) 713 - 390 Thousands/uL    nRBC 0 /100 WBCs    Neutrophils Relative 74 43 - 75 %    Immat GRANS % 4 (H) 0 - 2 %    Lymphocytes Relative 16 14 - 44 %    Monocytes Relative 4 4 - 12 %    Eosinophils Relative 1 0 - 6 %    Basophils Relative 1 0 - 1 %    Neutrophils Absolute 7 04 1 85 - 7 62 Thousands/µL    Immature Grans Absolute 0 32 (H) 0 00 - 0 20 Thousand/uL    Lymphocytes Absolute 1 43 0 60 - 4 47 Thousands/µL    Monocytes Absolute 0 35 0 17 - 1 22 Thousand/µL    Eosinophils Absolute 0 06 0 00 - 0 61 Thousand/µL    Basophils Absolute 0 05 0 00 - 0 10 Thousands/µL   Comprehensive metabolic panel    Collection Time: 07/20/21  5:05 AM   Result Value Ref Range    Sodium 139 136 - 145 mmol/L    Potassium 3 4 (L) 3 5 - 5 3 mmol/L    Chloride 105 100 - 108 mmol/L    CO2 25 21 - 32 mmol/L    ANION GAP 9 4 - 13 mmol/L    BUN 15 5 - 25 mg/dL    Creatinine 0 67 0 60 - 1 30 mg/dL    Glucose 100 65 - 140 mg/dL    Calcium 8 0 (L) 8 3 - 10 1 mg/dL    Corrected Calcium 9 6 8 3 - 10 1 mg/dL    AST 14 5 - 45 U/L    ALT 17 12 - 78 U/L    Alkaline Phosphatase 115 46 - 116 U/L    Total Protein 5 2 (L) 6 4 - 8 2 g/dL    Albumin 2 0 (L) 3 5 - 5 0 g/dL    Total Bilirubin 0 57 0 20 - 1 00 mg/dL    eGFR 101 ml/min/1 73sq m   Fingerstick Glucose (POCT)    Collection Time: 07/20/21  6:02 AM   Result Value Ref Range    POC Glucose 109 65 - 140 mg/dl   Fingerstick Glucose (POCT)    Collection Time: 07/20/21 11:18 AM   Result Value Ref Range    POC Glucose 214 (H) 65 - 140 mg/dl   Fingerstick Glucose (POCT)    Collection Time: 07/20/21  4:08 PM   Result Value Ref Range    POC Glucose 214 (H) 65 - 140 mg/dl   Fingerstick Glucose (POCT)    Collection Time: 07/20/21  8:25 PM   Result Value Ref Range    POC Glucose 217 (H) 65 - 140 mg/dl   CBC and differential    Collection Time: 07/21/21  5:33 AM   Result Value Ref Range    WBC 8 81 4 31 - 10 16 Thousand/uL    RBC 3 40 (L) 3 88 - 5 62 Million/uL    Hemoglobin 9 8 (L) 12 0 - 17 0 g/dL    Hematocrit 30 9 (L) 36 5 - 49 3 %    MCV 91 82 - 98 fL    MCH 28 8 26 8 - 34 3 pg    MCHC 31 7 31 4 - 37 4 g/dL    RDW 17 3 (H) 11 6 - 15 1 %    MPV 10 2 8 9 - 12 7 fL    Platelets 335 (H) 366 - 390 Thousands/uL    nRBC 0 /100 WBCs   Basic metabolic panel    Collection Time: 07/21/21  5:33 AM   Result Value Ref Range    Sodium 143 136 - 145 mmol/L    Potassium 3 4 (L) 3 5 - 5 3 mmol/L    Chloride 106 100 - 108 mmol/L    CO2 28 21 - 32 mmol/L    ANION GAP 9 4 - 13 mmol/L    BUN 13 5 - 25 mg/dL    Creatinine 0 74 0 60 - 1 30 mg/dL    Glucose 129 65 - 140 mg/dL    Calcium 8 3 8 3 - 10 1 mg/dL    eGFR 97 ml/min/1 73sq m   Manual Differential(PHLEBS Do Not Order)    Collection Time: 07/21/21  5:33 AM   Result Value Ref Range    Segmented % 67 43 - 75 %    Bands % 15 (H) 0 - 8 %    Lymphocytes % 8 (L) 14 - 44 %    Monocytes % 6 4 - 12 %    Eosinophils, % 0 0 - 6 %    Basophils % 1 0 - 1 %    Metamyelocytes% 1 0 - 1 %    Atypical Lymphocytes % 2 (H) <=0 %    Absolute Neutrophils 7 22 1 85 - 7 62 Thousand/uL    Lymphocytes Absolute 0 70 0 60 - 4 47 Thousand/uL    Monocytes Absolute 0 53 0 00 - 1 22 Thousand/uL    Eosinophils Absolute 0 00 0 00 - 0 40 Thousand/uL    Basophils Absolute 0 09 0 00 - 0 10 Thousand/uL    Total Counted 100     nRBC 1 0 - 2 /100 WBC    RBC Morphology Present     Anisocytosis Present     Polychromasia Present     Platelet Estimate Increased (A) Adequate   Fingerstick Glucose (POCT)    Collection Time: 07/21/21  7:34 AM   Result Value Ref Range    POC Glucose 122 65 - 140 mg/dl   Fingerstick Glucose (POCT)    Collection Time: 07/21/21 11:10 AM   Result Value Ref Range    POC Glucose 184 (H) 65 - 140 mg/dl   Fingerstick Glucose (POCT)    Collection Time: 07/21/21  4:01 PM   Result Value Ref Range    POC Glucose 263 (H) 65 - 140 mg/dl       XR chest 1 view portable    Result Date: 7/13/2021  Narrative: CHEST INDICATION:   Fever   COMPARISON:  6/1/2021; 5/16/2020 EXAM PERFORMED/VIEWS:  XR CHEST PORTABLE FINDINGS:  Left chest wall [id number] redemonstrated  Heart shadow appears unremarkable  Atherosclerotic vascular calcifications are noted  The lungs are clear  No pneumothorax or pleural effusion  Osseous structures appear within normal limits for patient age  Impression: No acute cardiopulmonary disease  Workstation performed: [id number]     CT abdomen pelvis with contrast    Result Date: 7/13/2021  Narrative: CT ABDOMEN AND PELVIS WITH IV CONTRAST INDICATION:   Abdominal pain, fever Known pancreatic cancer, increasing abdominal pain, tender throughout the upper abdomen, febrile, sepsis  COMPARISON:  6/14/2021 TECHNIQUE:  CT examination of the abdomen and pelvis was performed  Axial, sagittal, and coronal 2D reformatted images were created from the source data and submitted for interpretation  Radiation dose length product (DLP) for this visit:  477 mGy-cm   This examination, like all CT scans performed in the [facility], was performed utilizing techniques to minimize radiation dose exposure, including the use of iterative reconstruction and automated exposure control  IV Contrast:  100 mL of iohexol (OMNIPAQUE) Enteric Contrast:  Enteric contrast was not administered  FINDINGS: ABDOMEN LOWER CHEST:  No clinically significant abnormality identified in the visualized lower chest   Stable small pericardial effusion, partially imaged  LIVER/BILIARY TREE:  New 3 5 x 1 8 cm ill-defined low-attenuating lesion in hepatic segment 8 (image #2/24)  Increased size of a 1 9 x 1 3 cm low-attenuating lesion in hepatic segment 3 (image #2/24)  Numerous cysts again seen throughout the liver  Unchanged enhancing left hepatic lobe lesion (image #2/27)  Metallic common bile duct stent again noted, with associated pneumobilia  No intrahepatic biliary ductal dilation  GALLBLADDER:  Percutaneous cholecystostomy tube  SPLEEN:  Unremarkable   PANCREAS:  No significant interval change in size of the heterogeneously enhancing, ill-defined pancreatic head neoplasm, again measuring 5 1 x 2 7 cm when re-measured in a similar fashion (image #2/37)  Unchanged dilation of the main duct distal to the  mass and associated parenchymal atrophy  ADRENAL GLANDS:  Unremarkable  KIDNEYS/URETERS:  No hydronephrosis or urinary tract calculus  One or more sharply circumscribed subcentimeter renal hypodensities are present, too small to accurately characterize, and statistically most likely benign findings  According to recent literature (Radiology 2019) no further workup of these findings is recommended  STOMACH AND BOWEL: Previously reported paraduodenal focal fluid collection has resolved  No bowel obstruction  APPENDIX:  No findings to suggest appendicitis  ABDOMINOPELVIC CAVITY:  Resolved omental edema/infiltration  No ascites  No pneumoperitoneum  No lymphadenopathy  VESSELS:  Unremarkable for patient's age  PELVIS REPRODUCTIVE ORGANS:  Enlarged prostate gland again noted  URINARY BLADDER:  Unremarkable  ABDOMINAL WALL/INGUINAL REGIONS:  Unchanged subcutaneous lesion in the right anterior chest wall, again measuring 3 2 x 1 2 cm (image #2/6), likely sebaceous cyst   Unchanged fat-containing left inguinal hernia  OSSEOUS STRUCTURES:  No acute fracture or destructive osseous lesion  Impression: No definite imaging explanation for acute epigastric/abdominal pain  New 3 5 x 1 8 cm low-attenuating lesion in the right hepatic lobe and increased size of a 1 9 x 1 3 cm low-attenuating lesion in the left hepatic lobe, concerning for metastatic disease  Stable pancreatic head neoplasm  Resolution of the previously reported paraduodenal collection and omental edema/infiltration   Workstation performed: [id number]     CT abdomen pelvis with contrast    Result Date: 6/14/2021  Narrative: CT ABDOMEN AND PELVIS WITH IV CONTRAST INDICATION:   RLQ abdominal pain, appendicitis suspected (Age => 15y) RLQ pain with guarding  Concern for appendicitis/colitis  Hx Pancreatic cancer/RA  COMPARISON:  CT abdomen pelvis 5/14/2021 TECHNIQUE:  CT examination of the abdomen and pelvis was performed  Axial, sagittal, and coronal 2D reformatted images were created from the source data and submitted for interpretation  Radiation dose length product (DLP) for this visit:  461 71 mGy-cm   This examination, like all CT scans performed in the [facility], was performed utilizing techniques to minimize radiation dose exposure, including the use of iterative  reconstruction and automated exposure control  IV Contrast:  100 mL of iohexol (OMNIPAQUE) Enteric Contrast:  Enteric contrast was not administered  FINDINGS: ABDOMEN LOWER CHEST:  No clinically significant abnormality identified in the visualized lower chest  LIVER/BILIARY TREE:  Unchanged enhancing left hepatic lesion #2/22  Other hypodense, nonenhancing lesions scattered throughout the liver are unchanged  Reidentified prominently left-sided pneumobilia  A CBD stent is in place  GALLBLADDER:  Percutaneous cholecystostomy tube remains in place  SPLEEN:  Unremarkable  PANCREAS:  Enlarged pancreatic head with a vague pancreatic head mass measuring approximately 32 x 22 mm unchanged in appearance from the prior study  The margins are not well-defined  Unchanged pancreatic ductal dilation  ADRENAL GLANDS:  Unremarkable  KIDNEYS/URETERS:  No hydronephrosis  Unchanged renal cysts  STOMACH AND BOWEL:  No bowel obstruction  Much improved wall thickening and enhancement of the pylorus and duodenum  Fluid surrounding the proximal duodenum is persistent but improved in quantity  APPENDIX:  Noninflamed appendix  ABDOMINOPELVIC CAVITY:  No ascites  No pneumoperitoneum  No lymphadenopathy  As described above, fluid surrounding the pylorus and duodenum is persistent but improved   There is a collection measuring 6 2 x 2 8 x 3 2 cm,  #2/30 #601/43 located lateral to the first portion of the duodenum with a small focus of internal gas  New edema throughout the right mid abdominal omentum #2/41 possibly reactive to the adjacent collection described above  No discrete omental mass  VESSELS:  Unremarkable for patient's age  PELVIS REPRODUCTIVE ORGANS:  Prostamegaly  URINARY BLADDER:  Unremarkable  ABDOMINAL WALL/INGUINAL REGIONS:  Unremarkable  OSSEOUS STRUCTURES:  No acute fracture or destructive osseous lesion  Impression: Unchanged pancreatic head mass  Unchanged 28 mm enhancing left hepatic lesion  Fluid surrounding the pylorus and duodenum is persistent but improved  There is a collection measuring 6 2 x 2 8 x 3 2 cm ,#2/30 #601/43  located lateral to the first portion of the duodenum with a small focus of internal gas  New edema throughout the right mid abdominal omentum #2/41 possibly reactive to the adjacent collection described above  No discrete omental mass  The study was marked in [facility] for immediate notification  Workstation performed: [id number]     IR cholecystostomy tube check/change/reposition/reinsertion/upsize    Result Date: 6/23/2021  Narrative: Drainage catheter evaluation Indication: Cholecystostomy tube placed May 11, 2021  Follow-up  Procedure: The cholecystostomy drainage catheter was hand injected with contrast and fluoroscopic spot images were obtained  The drain was capped  Contrast: 5 cc Omnipaque-300 Fluoroscopy time: 0 7 minutes Images: 6 Findings: The catheter remains in satisfactory position   Cystic duct is patent  Small amount of debris is seen in the common duct but there is no significant dilatation  Common bile duct stent is patent and there is drainage into the duodenum  No stones are definitively seen by fluoroscopy  Drain was capped  Impression: Impression: Patent cystic duct  Drain was capped  Plan: Patient may undergo cholecystectomy along with surgical tumor resection at some point though he is currently undergoing chemotherapy   Will discuss with surgical oncology whether we should consider removing his cholecystostomy tube in the interim if  he tolerates capping trial  Workstation performed: [id number]         HISTORY:    Past Medical History:   Diagnosis Date    Anxiety     Arthritis     Cancer ([facility] )     pancreatic    Cellulitis     LAST ASSESSED: 6/13/14    Enlarged prostate     Epidermal inclusion cyst     LAST ASSESSED: 10/4/13    Erythrasma     LAST ASSESSED: 9/23/13    Furuncle     LAST ASSESSED: 6/11/14    GERD (gastroesophageal reflux disease)     Hyperlipidemia     Hypertension     RA (rheumatoid arthritis) ([place] [address] )        Past Surgical History:   Procedure Laterality Date    COLONOSCOPY      FL GUIDED CENTRAL VENOUS ACCESS DEVICE INSERTION  6/1/2021    HYDROCELE EXCISION / REPAIR Right 01/11/2018    SPERMATIC CORD EXCSION OF HYDROCELE; MANAGED BY: Angie Robles    IR CHOLECYSTOSTOMY TUBE CHECK/CHANGE/REPOSITION/REINSERTION/UPSIZE  6/22/2021    IR CHOLECYSTOSTOMY TUBE PLACEMENT  5/11/2021    NC REMOVAL OF HYDROCELE,TUNICA,UNILAT Right 1/11/2018    Procedure: HYDROCELECTOMY;  Surgeon: Jai Sol MD;  Location: [facility];  Service: Urology    SCROTAL SURGERY      benign "lump"    TUNNELED VENOUS PORT PLACEMENT Left 6/1/2021    Procedure: INSERTION VENOUS PORT (PORT-A-CATH);   Surgeon: Ellie Krueger MD;  Location:  MAIN OR;  Service: Surgical Oncology       Family History   Problem Relation Age of Onset    Heart disease Father         CARDIAC DISORDER    Hypertension Father     Hypertension Mother     No Known Problems Maternal Aunt     No Known Problems Maternal Uncle     No Known Problems Paternal Aunt     No Known Problems Paternal Uncle     No Known Problems Paternal Grandmother     No Known Problems Paternal Grandfather     Diabetes Maternal Grandmother         MELLITUS    No Known Problems Maternal Grandfather     Hypertension Other        Social History     Socioeconomic History    Marital status: /Civil Union     Spouse name: None    Number of children: 2    Years of education: None    Highest education level: None   Occupational History    Occupation: FULL-TIME EMPLOYMENT   Tobacco Use    Smoking status: Former Smoker    Smokeless tobacco: Never Used    Tobacco comment: tobacco in a pipe in the early 1980's   Vaping Use    Vaping Use: Never used   Substance and Sexual Activity    Alcohol use: Never    Drug use: Never    Sexual activity: Never     Comment: NOT CURRENTLY SEXUALLY ACTIVE AS PER [employer]   Other Topics Concern    None   Social History Narrative    ALWAYS USES SEAT BELT    DENIED DAILY COFFEE CONSUMPTION (__CUPS/DAY)    DENIED DAILY COLA CONSUMPTION (__CANS/DAY)    DAILY TEA CONSUMPTION (__CUPS/DAY)    DENTAL CARE, REGULARLY    EXERCISE: WALKING    HIGH SCHOOL GRADUATE    DENIED MULTIPLE ORGAN DONOR    NO LIVING WILL    PETS/ANIMALS    DENIED POWER OF  IN EXISTENCE    WATER INTAKE, ADEQUATE (PER DAY)     Social Determinants of Health     Financial Resource Strain:     Difficulty of Paying Living Expenses:    Food Insecurity:     Worried About Running Out of Food in the Last Year:     Ran Out of Food in the Last Year:    Transportation Needs:     Lack of Transportation (Medical):      Lack of Transportation (Non-Medical):    Physical Activity:     Days of Exercise per Week:     Minutes of Exercise per Session:    Stress:     Feeling of Stress :    Social Connections:     Frequency of Communication with Friends and Family:     Frequency of Social Gatherings with Friends and Family:     Attends Shinto Services:     Active Member of Clubs or Organizations:     Attends Club or Organization Meetings:     Marital Status:    Intimate Partner Violence:     Fear of Current or Ex-Partner:     Emotionally Abused:     Physically Abused:     Sexually Abused:          Current Facility-Administered Medications:     acetaminophen (TYLENOL) tablet PA-C, 500 mg at 07/21/21 1449    ondansetron (ZOFRAN) injection 4 mg, 4 mg, Intravenous, Q4H PRN, Nieves Whitaker MD    oxyCODONE (ROXICODONE) IR tablet 5 mg, 5 mg, Oral, Q4H PRN, Nieves Whitaker MD    pancrelipase (Lip-Prot-Amyl) (CREON) delayed release capsule 24,000 Units, 24,000 Units, Oral, TID With Meals, Nieves Whitaker MD, 24,000 Units at 07/21/21 1813    pantoprazole (PROTONIX) EC tablet 40 mg, 40 mg, Oral, BID, Nieves Whitaker MD, 40 mg at 07/21/21 4106    predniSONE tablet 20 mg, 20 mg, Oral, Daily, Nieves Whitaker MD, 20 mg at 07/21/21 0902    predniSONE tablet 5 mg, 5 mg, Oral, Daily, Nieves Whitaker MD, 5 mg at 07/21/21 3221    sodium chloride (PF) 0 9 % injection 5 mL, 5 mL, Intravenous, Q12H [facility], Nieves Whitaker MD, 5 mL at 07/21/21 8590    Medications Prior to Admission   Medication    aspirin 81 mg chewable tablet    Blood Glucose Monitoring Suppl (ONE TOUCH ULTRA 2) w/Device KIT    diazepam (VALIUM) 2 mg tablet    FLUoxetine (PROzac) 20 mg capsule    folic acid (FOLVITE) 1 mg tablet    gabapentin (NEURONTIN) 100 mg capsule    glucose blood (OneTouch Ultra) test strip    insulin glargine (Lantus SoloStar) 100 units/mL injection pen    insulin lispro (HumaLOG KwikPen) 100 units/mL injection pen    Insulin Pen Needle (BD Pen Needle Cinda 2nd Gen) 32G X 4 MM MISC    Insulin Pen Needle (BD Pen Needle Cinda 2nd Gen) 32G X 4 MM MISC    Lancets (onetouch ultrasoft) lancets    oxyCODONE (ROXICODONE) 5 mg immediate release tablet    pancrelipase, Lip-Prot-Amyl, (CREON) 24,000 units    pantoprazole (PROTONIX) 40 mg tablet    predniSONE 20 mg tablet    predniSONE 5 mg tablet    prochlorperazine (COMPAZINE) 10 mg tablet    senna (SENOKOT) 8 6 MG tablet    Alcohol Swabs 70 % PADS    lisinopril-hydrochlorothiazide (PRINZIDE,ZESTORETIC) 10-12 5 MG per tablet    Sodium Chloride Flush (Normal Saline Flush) 0 9 % SOLN       Allergies   Allergen Reactions    Fentanyl Anaphylaxis and Other (See Comments) Oxygen drops severely       Labs and pertinent reports reviewed  This note has been generated by voice recognition software system  Therefore, there may be spelling, grammar, and or syntax errors  Please contact if questions arise

## 2021-07-22 NOTE — ASSESSMENT & PLAN NOTE
· POA, fever, tachycardia, leukopenia -- tachycardia and leukopenia resolved, no febrile episodes in the last 72 hours  · Urinalysis, chest x-ray unremarkable   CT of abdomen/pelvis without obvious source of infection  · Blood cultures obtained on 7/13 2 of 2 showed Gram-positive cocci in chains, prevotella, Streptococcus Consta lattice; repeat blood cultures obtained on 07/16 showed no growth so far after 5 days  · Notably, patient has a cholecystostomy tube in place from May 2021 for cholecystitis at the time s/p antibiotic course  · Currently on ceftriaxone and Flagyl (continue IV ceftriaxone until 7/29, oral Flagyl until 7/24); per ID, able to get IV ceftriaxone through his port on discharge  · See rest of plan below under bacteremia  · Monitor temperature cover, WBC count

## 2021-07-23 NOTE — UTILIZATION REVIEW
Please note that this 7/16  was sent on 7/20 @ 353pm EST after we recvd the first fax  Continued Stay Review    Date: 7/16/2021                         Current Patient Class: inpatient  Current Level of Care: med surg     HPI:66 y o  male initially admitted on 7/13/2021 inpatient due to sepsis of unknown etiology   Has metastatic pancreatic cancer and on treatment, recent treatment cholecystitis in May and ashli tube placed  Found to have gram positive bacteremia  Assessment/Plan:  7/16/2021:  Feels slightly better than on admission  Has ongoing chronic abdominal discomfort  On exam  Abdomen without tenderness  Wbc 5 68    K 3 4  Glucose > 200 except in early morning  Blood cultures positive for gram negative rods  To continue antibiotics and pain control  Repeat blood cultures done in am   Mealtime insulin restarted  Continues on decreased lantus  Vital Signs:  07/16/21 0730  99 8 °F (37 7 °C)  74  18  127/67  87  96 %  None (Room air)  Lying   07/15/21 2310  99 °F (37 2 °C)  76  16  133/71  --  95 %  None (Room air)  Lying   07/15/21 1501  99 2 °F (37 3 °C)  80  18  136/77  100  95 %  None (Room air)  Lying   07/15/21 0700  98 6 °F (37 °C)  82  18  123/74  93  92 %  None (Room air)  Lying   07/14/21 2234  98 7 °F (37 1 °C)  70  18  136/72  99  97 %  None (Room air)  Lying   07/14/21 1500  98 5 °F (36 9 °C)  78  18  117/72  89  95 %  None (Room air)         Pertinent Labs/Diagnostic Results:   7/15/2021 Echo LEFT VENTRICLE:The ventricle was mildly dilated  Systolic function was at the lower limits of normal by visual assessment  Ejection fraction was estimated to be 50 %  There were no regional wall motion abnormalities   LEFT ATRIUM:The atrium was mildly dilated    MITRAL VALVE:There was mild regurgitation  Glenda High was mild regurgitation    TRICUSPID VALVE:There was mild regurgitation   Sonia Skill was trace regurgitation     PERICARDIUM:A trace pericardial effusion was identified    Results from last 7 days   Lab Units 07/22/21  0635 07/21/21  0533 07/20/21  0505 07/19/21  0601 07/18/21  0504   WBC Thousand/uL 8 99 8 81 9 25 9 34 8 16   HEMOGLOBIN g/dL 10 1* 9 8* 10 2* 10 0* 10 1*   HEMATOCRIT % 32 8* 30 9* 32 8* 32 1* 32 5*   PLATELETS Thousands/uL 600* 531* 444* 391* 277   NEUTROS ABS Thousands/µL  --   --  7 04  --   --    BANDS PCT % 11* 15*  --  4 12*     Results from last 7 days   Lab Units 07/22/21  0635 07/21/21  0533 07/20/21  0505 07/19/21  0828 07/17/21  0545   SODIUM mmol/L 142 143 139 141 143   POTASSIUM mmol/L 3 8 3 4* 3 4* 3 3* 3 6   CHLORIDE mmol/L 106 106 105 104 107   CO2 mmol/L 28 28 25 27 28   ANION GAP mmol/L 8 9 9 10 8   BUN mg/dL 14 13 15 14 14   CREATININE mg/dL 0 73 0 74 0 67 0 67 0 72   EGFR ml/min/1 73sq m 97 97 101 101 98   CALCIUM mg/dL 8 1* 8 3 8 0* 8 0* 8 1*     Results from last 7 days   Lab Units 07/20/21  0505   AST U/L 14   ALT U/L 17   ALK PHOS U/L 115   TOTAL PROTEIN g/dL 5 2*   ALBUMIN g/dL 2 0*   TOTAL BILIRUBIN mg/dL 0 57     Results from last 7 days   Lab Units 07/23/21  0717 07/22/21  2052 07/22/21  1640 07/22/21  1542 07/22/21  1443 07/22/21  1107 07/22/21  0806 07/21/21  2102 07/21/21  1601 07/21/21  1110 07/21/21  0734 07/20/21  2025   POC GLUCOSE mg/dl 131 250* 159* 165* 147* 100 99 229* 263* 184* 122 217*     Results from last 7 days   Lab Units 07/22/21  0635 07/21/21  0533 07/20/21  0505 07/19/21  0828 07/17/21  0545   GLUCOSE RANDOM mg/dL 93 129 100 106 83                                 Results from last 7 days   Lab Units 07/22/21  0635 07/21/21  0533 07/19/21  0601 07/18/21  0504 07/17/21  0545   TOTAL COUNTED  100 100 100 100 100       Medications:   Scheduled Medications:  aspirin, 81 mg, Oral, Daily  cefazolin, 2,000 mg, Intravenous, Q8H  cyanocobalamin, 1,000 mcg, Oral, Daily  enoxaparin, 40 mg, Subcutaneous, Q24H INGRID  FLUoxetine, 20 mg, Oral, Daily  folic acid, 1 mg, Oral, Daily  gabapentin, 200 mg, Oral, TID  insulin glargine, 10 Units, Subcutaneous, HS  insulin lispro, 1-6 Units, Subcutaneous, 4x Daily (AC & HS)  melatonin, 3 mg, Oral, HS  pancrelipase (Lip-Prot-Amyl), 24,000 Units, Oral, TID With Meals  pantoprazole, 40 mg, Oral, BID  predniSONE, 20 mg, Oral, Daily  predniSONE, 5 mg, Oral, Daily  sodium chloride (PF), 5 mL, Intravenous, Q12H INGRID  vancomycin, 20 mg/kg, Intravenous, Q12H      Continuous IV Infusions: dc 7/15/2021      PRN Meds:  acetaminophen, 650 mg, Oral, Q6H PRN - used x 1 7/16  diazepam, 2 mg, Oral, Daily PRN  HYDROmorphone, 0 5 mg, Intravenous, Q3H PRN  ondansetron, 4 mg, Intravenous, Q4H PRN  oxyCODONE, 5 mg, Oral, Q4H PRN        Discharge Plan: to be determined  Network Utilization Review Department  ATTENTION: Please call with any questions or concerns to [phone] and carefully listen to the prompts so that you are directed to the right person  All voicemails are confidential   Danyel Epp all requests for admission clinical reviews, approved or denied determinations and any other requests to dedicated fax number below belonging to the campus where the patient is receiving treatment   List of dedicated fax numbers for the Facilities:  [address] [place] [address] DENIALS (Administrative/Medical Necessity) [phone]   [address] (Maternity/NICU/Pediatrics) [phone]   [address] [place] [address] [address] [place] [phone] [place] [address] [address] [facility] [phone]

## 2021-07-23 NOTE — DISCHARGE SUMMARY
[facility]  Discharge- Sam Ignacio 1955, 73 y o  male MRN: [id number]  Unit/Bed#: S -96 Encounter: [id number]  Primary Care Provider: Eddie Galvin MD   Date and time admitted to hospital: 7/13/2021  9:40 AM    * Sepsis of unknown etiology  Assessment & Plan  · POA, fever, tachycardia, leukopenia --now resolved  · Urinalysis, chest x-ray unremarkable  CT of abdomen/pelvis without obvious source of infection  · Blood cultures obtained on 7/13 2 of 2 showed Gram-positive cocci in chains, prevotella, Streptococcus Consta lattice; repeat blood cultures obtained on 07/16 showed no growth far after 5 days  · Notably, patient has a cholecystostomy tube in place from May 2021 for cholecystitis at the time s/p antibiotic course  · Currently on ceftriaxone and Flagyl (continue IV ceftriaxone until 7/29, oral Flagyl until 7/24); per ID, able to get IV ceftriaxone through his port on discharge  · See rest of plan below under bacteremia    Polymicrobial bacteremia  Assessment & Plan  · Blood cultures obtained on 7/13 2 of 2 showed Gram-positive cocci in chains, prevotella; repeat blood cultures obtained on 07/16 showed no growth so far after 72hrs  · JAMAL done on 07/19/2021 which was negative for vegetations  · Source of with infection unclear at this time, consider translocation into the GI tract from recent chemotherapy and/or new metastatic sites in liver There is also a question of dental source of infection  OMFS consulted  CT facial bones reveals periapical pathology associated with teeth      · Postop day 1 s/p extraction of teeth 14 and 15, alveoplasty, excision of cyst in the left maxilla (Per OMFS, prn followup)  · Surveillance blood cultures 1 week after completion of antibiotics, expected on 08/05/2021     Metastatic pancreatic cancer  Assessment & Plan  Follows with medical/surgical oncology - s/p a cycle of neoadjuvant chemotherapy (Gemcitabine/Abraxane) a week prior to admission  Known to palliative care -> consider inpatient consultation if pain uncontrolled  CT imaging in the ED today noting "New 3 5 x 1 8 cm low-attenuating lesion in the right hepatic lobe and increased size of a 1 9 x 1 3 cm low-attenuating lesion in the left hepatic lobe, concerning for metastatic disease  Stable pancreatic head neoplasm  Resolution of the previously reported paraduodenal collection and omental edema/infiltration " -> appreciate medical oncology evaluation   Has follow-up appointment with Oncology on 08/05/2021    Type 2 diabetes mellitus with hypoglycemia [facility])  Assessment & Plan  Lab Results   Component Value Date    HGBA1C 6 2 (H) 05/05/2021   · Last A1c reflects excellent control  · Patient on 20 units Lantus at home, currently on 5 units Lantus as he is noted to be on the lower side of normal in terms of blood glucose in the early mornings  · On discharge will continue on 10 units Lantus at bedtime and Humalog 5 units t i d   With meals  · Q i d  Accu-Cheks  · On SSI prior to admission - continue this    Generalized weakness  Assessment & Plan  Multifactorial secondary to sepsis coupled with metastatic cancer (see plan for individual assessments)  No issues with ambulation    Cancer related pain  Assessment & Plan  · Follows with Dr Rosetta Mortensen --on 25 mg prednisone daily      Medical Problems     Resolved Problems  Date Reviewed: 7/22/2021        Resolved    Hypokalemia 7/22/2021     Resolved by  Maya Ruiz MD              Discharging Resident: Maya Ruiz MD  Discharging Attending: Calderon Hightower MD  PCP: Lorenza Freedman MD  Admission Date:   Admission Orders (From admission, onward)     Ordered        07/13/21 [address] [place] [address]  Once                   Discharge Date: 07/23/21    Consultations During Hospital Stay:  · Infectious disease  · Cardiology for JAMAL  · OMFS    Procedures Performed:   · Extraction teeth 14 and 15, alveoplasty, excision of cyst in the left maxilla  · JAMAL showed no vegetations    Significant Findings / Test Results:   CT facial bones w contrast   Final Result by Odessa Hebert MD (07/21 9667)      No erosive findings to suggest a source of bacteremia  Note is made of a prominent radicular or periapical cyst associated with the left posterior parietal maxillary molars with smooth bony remodeling and associated retention cyst in the left maxillary sinus  No regional soft tissue inflammatory changes are    noted with this abnormality  Additional chronic findings as described above  Workstation performed: [id number]         CT abdomen pelvis with contrast   Final Result by Hector Mata MD (07/13 2806)      No definite imaging explanation for acute epigastric/abdominal pain  New 3 5 x 1 8 cm low-attenuating lesion in the right hepatic lobe and increased size of a 1 9 x 1 3 cm low-attenuating lesion in the left hepatic lobe, concerning for metastatic disease  Stable pancreatic head neoplasm  Resolution of the previously reported paraduodenal collection and omental edema/infiltration  Workstation performed: [id number]         XR chest 1 view portable   ED Interpretation by Deloris Parra DO (07/13 1023)   No acute cardiopulmonary pathology      Final Result by Rosa Zuniga MD (07/13 1504)      No acute cardiopulmonary disease  Workstation performed: [id number]           · Blood culture 1 obtained on 7/13: streptococcus constellatus  · Blood culture 2 obtained on 7/13: prevotella buccae  · Blood cultures obtained on 07/16/2021:  No growth after 5 days    Incidental Findings:   · None    Test Results Pending at Discharge (will require follow up): · Biopsy of cyst in left maxilla     Outpatient Tests Requested:  · Blood cultures on 73/73/6861    Complications:  None    Reason for Admission:  Generalized weakness    Hospital Course:    Helena Shah is a 73 y o  male patient who originally presented to the hospital on 7/13/2021 due to generalized weakness  On admission, patient had leukopenia, fever and tachycardia  Source unknown  Blood cultures were drawn  Patient started on antibiotics  Infectious Disease consulted  Imaging was done which showed suspicious lesions in the liver, there was a suspicion for the infection to be due to translocation from the gut or as a result of these new metastatic lesions  Blood cultures showed growth of multiple bacteria (see above)  There was also a question of whether the port was a source of infection  Repeat blood cultures were negative  Robert Skillern was done which was unremarkable for any vegetations  Patient was also mentioning about his teeth being an issue in the past few weeks, oral maxillofacial surgery was consulted  CT of the facial bones with contrast was done with suspicion for dental infection as a source for his bacteremia  Patient underwent teeth extraction of tooth 14 and 15 as well as excision of a cyst on his left maxilla  Patient will be continued on IV ceftriaxone until 07/29/2021 as well as oral Flagyl until 07/24/2021 with plan for surveillance blood cultures 1 week after completion of antibiotics  He will also be following up with Oncology in the outpatient setting and has a follow-up appointment set up for 08/05/2021  He will also follow-up with his primary care doctor as soon as possible  Please see above list of diagnoses and related plan for additional information  Condition at Discharge: stable    Discharge Day Visit / Exam:   Subjective:  Patient reports that he feels well today, denies any complaints  Delmus Bleak to go home    Vitals: Blood Pressure: 113/71 (07/23/21 1114)  Pulse: (!) 53 (07/23/21 1114)  Temperature: 98 °F (36 7 °C) (07/23/21 1114)  Temp Source: Oral (07/23/21 1114)  Respirations: 17 (07/23/21 1114)  Height: 5' 9" (175 3 cm) (07/19/21 1046)  Weight - Scale: 76 2 kg (168 lb) (07/19/21 1046)  SpO2: 93 % (07/23/21 1114)     Exam:   Physical Exam  Vitals reviewed  Constitutional:       General: He is not in acute distress  Appearance: He is not ill-appearing or toxic-appearing  HENT:      Nose: No congestion  Eyes:      General: No scleral icterus  Cardiovascular:      Rate and Rhythm: Normal rate  Heart sounds: No murmur heard  Pulmonary:      Effort: Pulmonary effort is normal  No respiratory distress  Breath sounds: No wheezing or rales  Abdominal:      General: Bowel sounds are normal       Palpations: Abdomen is soft  Tenderness: There is no abdominal tenderness  Musculoskeletal:      Right lower leg: No edema  Left lower leg: No edema  Skin:     General: Skin is warm and dry  Capillary Refill: Capillary refill takes less than 2 seconds  Neurological:      General: No focal deficit present  Mental Status: He is alert and oriented to person, place, and time  Psychiatric:         Mood and Affect: Mood normal          Thought Content: Thought content normal             Discussion with Family: Updated  (son) via phone  Discharge instructions/Information to patient and family:   See after visit summary for information provided to patient and family  Provisions for Follow-Up Care:  See after visit summary for information related to follow-up care and any pertinent home health orders  Disposition:   Home with [facility] (Reminder: Complete face to face encounter)    Planned Readmission:  None    Discharge Medications:  See after visit summary for reconciled discharge medications provided to patient and/or family        **Please Note: This note may have been constructed using a voice recognition system**

## 2021-07-23 NOTE — ASSESSMENT & PLAN NOTE
· POA, fever, tachycardia, leukopenia --now resolved  · Urinalysis, chest x-ray unremarkable   CT of abdomen/pelvis without obvious source of infection  · Blood cultures obtained on 7/13 2 of 2 showed Gram-positive cocci in chains, prevotella, Streptococcus Consta lattice; repeat blood cultures obtained on 07/16 showed no growth far after 5 days  · Notably, patient has a cholecystostomy tube in place from May 2021 for cholecystitis at the time s/p antibiotic course  · Currently on ceftriaxone and Flagyl (continue IV ceftriaxone until 7/29, oral Flagyl until 7/24); per ID, able to get IV ceftriaxone through his port on discharge  · See rest of plan below under bacteremia

## 2021-07-23 NOTE — ASSESSMENT & PLAN NOTE
Follows with medical/surgical oncology - s/p a cycle of neoadjuvant chemotherapy (Gemcitabine/Abraxane) a week prior to admission  Known to palliative care -> consider inpatient consultation if pain uncontrolled  CT imaging in the ED today noting "New 3 5 x 1 8 cm low-attenuating lesion in the right hepatic lobe and increased size of a 1 9 x 1 3 cm low-attenuating lesion in the left hepatic lobe, concerning for metastatic disease  Stable pancreatic head neoplasm   Resolution of the previously reported paraduodenal collection and omental edema/infiltration " -> appreciate medical oncology evaluation   Has follow-up appointment with Oncology on 08/05/2021

## 2021-07-23 NOTE — PROGRESS NOTES
Progress Note - Infectious Disease   Jones Cover 73 y o  male MRN: [id number]  Unit/Bed#: S -01 Encounter: [id number]      Impression/Plan:  1  Sepsis  POA with high fever and tachycardia and mild pancytopenia improving   Due to #2   No other appreciable source   Patient reports port and Perc Cholecystomy are flushing well without issue  Patient with temp to 102 F spike 7/19/21, defervesced with Tylenol  Patient had no associated chills, sweats, rash, or diarrhea  May represent tumor or drug fever  Eos 1  -Antibiotics as below  -monitor temperature and hemodynamics     2  Polymicrobial bacteremia   Strep constellatus pansensitive, prevotella   Unclear source   Consider relation to recent cholecystitis given prior Enterococcus from biliary cultures, although that was 2 months ago  CT reviewed with Radiology and no latha liver abscess seen   7/15/21 TTE and 7/19 JAMAL negative for vegetation   Repeat blood cultures 7/16 negative day #4  Also patient reported discomfort at dental crown gum line, consider oral source  CT facial bones with periapical cystic changes around teeth 14/15  S/p teeth 14/15 extractions  -Continue IV Ceftriaxone 2 g IV q 24 hours through 7/29 and Flagyl 500 mg PO q 8 hours through 7/24/21  -maintain port with repeat blood cultures negative   -Will check surveillance blood cultures 1 week (8/5/21) after completion of 2 week course of antibiotic therapy from negative blood cultures planned through 7/29/21  Office orders placed   -Disposition planning for home in progress after today's antibiotic dose here    [facility] will see patient for his Saturday 2 pm dose      3  Recently diagnosed pancreatic cancer   Diagnosed by EUS May 2021  Patient received chemotherapy 7/8/21 with D8 C2 Abraxane and Gemzar via Port  CT showing 2 liver lesions concerning for metastatic disease   -antibiotic as above  -monitor temperature and hemodynamics  -serial exam  -close oncology follow-up ongoing     4  Recent Acute cholecystitis   In setting of # 2/4   Status post cholecystostomy    Cultures with Enterococcus and patient completed Augmentin through 21 for 7 days total of enterococcal coverage      5   Recent Biliary obstruction   Due to #2   Status post recent ERCP, CBD stent   LFTs remain normal  CT with metallic CBD stent and Perc Cholecytostomy tube in place  2 new lesions on liver     6  Port placed 21 for chemo    -antibiotics as above  -follow up surveillance blood cultures as above     Above impression and plan discussed in detail with patient, RN, , and primary care team      Antibiotics:  Ceftriaxone/Flagyl D4  Antibiotics D8 from first negative blood cultures     Subjective:  Patient had no fever, chills, sweats overnight; no nausea, vomiting, diarrhea; no cough, shortness of breath; no lower abd pressure "heaviness "   Patient denies dental extraction site pain  Objective:  Vitals:  Temp:  [97 2 °F (36 2 °C)-98 6 °F (37 °C)] 98 °F (36 7 °C)  HR:  [53-86] 53  Resp:  [14-18] 17  BP: (101-133)/(57-75) 113/71  SpO2:  [90 %-97 %] 93 %  Temp (24hrs), Av 8 °F (36 6 °C), Min:97 2 °F (36 2 °C), Max:98 6 °F (37 °C)  Current: Temperature: 98 °F (36 7 °C)    Physical Exam:   General Appearance:  Alert, interactive, nontoxic, no acute distress  Throat: Oropharynx moist without lesions  Teeth 14/15 extractioned with sutures in place and no tenderness or active bleeding   Lungs:   Clear to auscultation bilaterally; no wheezes, rhonchi or rales; respirations unlabored   Heart:  RRR; no murmur   Abdomen:   Soft, non-tender, non-distended, RUQ perc ashli tube capped, positive bowel sounds  Extremities: No clubbing, cyanosis or edema   : No mathews, no SPT   Skin: No new rashes or lesions  Left chest port accessed and nontender          Labs, Imaging, & Other studies:   All pertinent labs and imaging studies were personally reviewed  Results from last 7 days   Lab Units 07/22/21  0635 07/21/21  0533 07/20/21  0505   WBC Thousand/uL 8 99 8 81 9 25   HEMOGLOBIN g/dL 10 1* 9 8* 10 2*   PLATELETS Thousands/uL 600* 531* 444*     Results from last 7 days   Lab Units 07/22/21  0635 07/21/21  0533 07/20/21  0505   SODIUM mmol/L 142 143 139   POTASSIUM mmol/L 3 8 3 4* 3 4*   CHLORIDE mmol/L 106 106 105   CO2 mmol/L 28 28 25   BUN mg/dL 14 13 15   CREATININE mg/dL 0 73 0 74 0 67   EGFR ml/min/1 73sq m 97 97 101   CALCIUM mg/dL 8 1* 8 3 8 0*   AST U/L  --   --  14   ALT U/L  --   --  17   ALK PHOS U/L  --   --  115

## 2021-07-23 NOTE — DISCHARGE INSTR - AVS FIRST PAGE
Dear Delon Jasso,     It was our pleasure to care for you here at [facility]  It is our hope that we were always able to exceed the expected standards for your care during your stay  You were hospitalized due to generalized weakness  You were cared for on the 4th floor by Brayan Bower MD under the service of uLcy Angel MD with the [address] [place] [address] [facility] who covers for your primary care physician (PCP), Jensen Pinon MD, while you were hospitalized  If you have any questions or concerns related to this hospitalization, you may contact us at [phone]  For follow up as well as any medication refills, we recommend that you follow up with your primary care physician  A registered nurse will reach out to you by phone within a few days after your discharge to answer any additional questions that you may have after going home  However, at this time we provide for you here, the most important instructions / recommendations at discharge:     · Notable Medication Adjustments -   ·  Lantus 10 units daily at bedtime  · Humalog 5 units with meals; continue your sliding scale insulin that you have been following prior to admission  · Testing Required after Discharge -   · Blood cultures 1 week after completion of antibiotics -- obtain blood cultures on 8/5/2021  · Important follow up information -   · Please set up an appointment with your primary care doctor as soon as possible within 1 week of discharge  · You have an appointment with your oncologist on August 5, 2021  · Other Instructions -   · Please continue taking metronidazole (Flagyl) 500 mg every 8 hours for 2 more days only  · Ceftriaxone IV 2000 mg every 24 hours until 07/29/2021     · Please review this entire after visit summary as additional general instructions including medication list, appointments, activity, diet, any pertinent wound care, and other additional recommendations from your care team that may be provided for you        Sincerely,     Brayan Bower MD

## 2021-07-23 NOTE — CASE MANAGEMENT
[facility] requesting f/u with RN tomorrow 7/24 @ 2 pm since pt did not d/c yesterday  Plan would be for pt to receive dose here today and d/c home  Letitia confirmed they are able to use pt port for IV abx  CM spoke with SLIM and with pt nurse  Both aware of change  Pt will receive 2 pm dose today and then d/c home  F/u ELENA VNA tomorrow 7/24 @ 2 pm      CM met with pt at bedside  He is aware of change in plan  Confirmed IV abx medication was delivered to him yesterday  Will d/c home today after 2pm dose  Family to transport  IMM reviewed  Signature obtained  Copy provided to pt and original placed in medical records bin

## 2021-07-23 NOTE — ASSESSMENT & PLAN NOTE
· Blood cultures obtained on 7/13 2 of 2 showed Gram-positive cocci in chains, prevotella; repeat blood cultures obtained on 07/16 showed no growth so far after 72hrs  · JAMAL done on 07/19/2021 which was negative for vegetations  · Source of with infection unclear at this time, consider translocation into the GI tract from recent chemotherapy and/or new metastatic sites in liver There is also a question of dental source of infection  OMFS consulted  CT facial bones reveals periapical pathology associated with teeth      · Postop day 1 s/p extraction of teeth 14 and 15, alveoplasty, excision of cyst in the left maxilla (Per OMFS, prn followup)  · Surveillance blood cultures 1 week after completion of antibiotics, expected on 08/05/2021

## 2021-07-23 NOTE — ASSESSMENT & PLAN NOTE
Lab Results   Component Value Date    HGBA1C 6 2 (H) 05/05/2021   · Last A1c reflects excellent control  · Patient on 20 units Lantus at home, currently on 5 units Lantus as he is noted to be on the lower side of normal in terms of blood glucose in the early mornings  · On discharge will continue on 10 units Lantus at bedtime and Humalog 5 units t i d   With meals  · Q i d  Accu-Cheks  · On SSI prior to admission - continue this

## 2021-07-23 NOTE — H&P (VIEW-ONLY)
Progress Note - Infectious Disease   Camilla Jennings 73 y o  male MRN: [id number]  Unit/Bed#: S -01 Encounter: [id number]      Impression/Plan:  1  Sepsis  POA with high fever and tachycardia and mild pancytopenia improving   Due to #2   No other appreciable source   Patient reports port and Perc Cholecystomy are flushing well without issue  Patient with temp to 102 F spike 7/19/21, defervesced with Tylenol  Patient had no associated chills, sweats, rash, or diarrhea  May represent tumor or drug fever  Eos 1  -Antibiotics as below  -monitor temperature and hemodynamics     2  Polymicrobial bacteremia   Strep constellatus pansensitive, prevotella   Unclear source   Consider relation to recent cholecystitis given prior Enterococcus from biliary cultures, although that was 2 months ago  CT reviewed with Radiology and no latha liver abscess seen   7/15/21 TTE and 7/19 JAMAL negative for vegetation   Repeat blood cultures 7/16 negative day #4  Also patient reported discomfort at dental crown gum line, consider oral source  CT facial bones with periapical cystic changes around teeth 14/15  S/p teeth 14/15 extractions  -Continue IV Ceftriaxone 2 g IV q 24 hours through 7/29 and Flagyl 500 mg PO q 8 hours through 7/24/21  -maintain port with repeat blood cultures negative   -Will check surveillance blood cultures 1 week (8/5/21) after completion of 2 week course of antibiotic therapy from negative blood cultures planned through 7/29/21  Office orders placed   -Disposition planning for home in progress after today's antibiotic dose here    VNA will see patient for his Saturday 2 pm dose      3  Recently diagnosed pancreatic cancer   Diagnosed by EUS May 2021  Patient received chemotherapy 7/8/21 with D8 C2 Abraxane and Gemzar via Port  CT showing 2 liver lesions concerning for metastatic disease   -antibiotic as above  -monitor temperature and hemodynamics  -serial exam  -close oncology follow-up ongoing     4  Recent Acute cholecystitis   In setting of # 2/4   Status post cholecystostomy    Cultures with Enterococcus and patient completed Augmentin through 21 for 7 days total of enterococcal coverage      5   Recent Biliary obstruction   Due to #2   Status post recent ERCP, CBD stent   LFTs remain normal  CT with metallic CBD stent and Perc Cholecytostomy tube in place  2 new lesions on liver     6  Port placed 21 for chemo    -antibiotics as above  -follow up surveillance blood cultures as above     Above impression and plan discussed in detail with patient, RN, , and primary care team      Antibiotics:  Ceftriaxone/Flagyl D4  Antibiotics D8 from first negative blood cultures     Subjective:  Patient had no fever, chills, sweats overnight; no nausea, vomiting, diarrhea; no cough, shortness of breath; no lower abd pressure "heaviness "   Patient denies dental extraction site pain  Objective:  Vitals:  Temp:  [97 2 °F (36 2 °C)-98 6 °F (37 °C)] 98 °F (36 7 °C)  HR:  [53-86] 53  Resp:  [14-18] 17  BP: (101-133)/(57-75) 113/71  SpO2:  [90 %-97 %] 93 %  Temp (24hrs), Av 8 °F (36 6 °C), Min:97 2 °F (36 2 °C), Max:98 6 °F (37 °C)  Current: Temperature: 98 °F (36 7 °C)    Physical Exam:   General Appearance:  Alert, interactive, nontoxic, no acute distress  Throat: Oropharynx moist without lesions  Teeth 14/15 extractioned with sutures in place and no tenderness or active bleeding   Lungs:   Clear to auscultation bilaterally; no wheezes, rhonchi or rales; respirations unlabored   Heart:  RRR; no murmur   Abdomen:   Soft, non-tender, non-distended, RUQ perc ashli tube capped, positive bowel sounds  Extremities: No clubbing, cyanosis or edema   : No mathews, no SPT   Skin: No new rashes or lesions  Left chest port accessed and nontender          Labs, Imaging, & Other studies:   All pertinent labs and imaging studies were personally reviewed  Results from last 7 days   Lab Units 07/22/21  0635 07/21/21  0533 07/20/21  0505   WBC Thousand/uL 8 99 8 81 9 25   HEMOGLOBIN g/dL 10 1* 9 8* 10 2*   PLATELETS Thousands/uL 600* 531* 444*     Results from last 7 days   Lab Units 07/22/21  0635 07/21/21  0533 07/20/21  0505   SODIUM mmol/L 142 143 139   POTASSIUM mmol/L 3 8 3 4* 3 4*   CHLORIDE mmol/L 106 106 105   CO2 mmol/L 28 28 25   BUN mg/dL 14 13 15   CREATININE mg/dL 0 73 0 74 0 67   EGFR ml/min/1 73sq m 97 97 101   CALCIUM mg/dL 8 1* 8 3 8 0*   AST U/L  --   --  14   ALT U/L  --   --  17   ALK PHOS U/L  --   --  115

## 2021-07-26 NOTE — UTILIZATION REVIEW
Notification of Discharge   This is a Notification of Discharge from our facility [address]  Please be advised that this patient has been discharge from our facility  Below you will find the admission and discharge date and time including the patients disposition  UTILIZATION REVIEW CONTACT:  Darlean Lefort  Utilization   Network Utilization Review Department  Phone: [phone] x carefully listen to the prompts  All voicemails are confidential   Email: [email or web address]     PHYSICIAN ADVISORY SERVICES:  FOR [id number] REVIEW - MEDICAL NECESSITY DENIAL  Phone: [phone]  Fax: [phone]  Email: [email or web address]     PRESENTATION DATE: 7/13/2021  9:40 AM  OBERVATION ADMISSION DATE:   INPATIENT ADMISSION DATE: 7/13/21  1:02 PM   DISCHARGE DATE: 7/23/2021  3:43 PM  DISPOSITION: Home with [place] with [address] INFORMATION:  Send all requests for admission clinical reviews, approved or denied determinations and any other requests to dedicated fax number below belonging to the campus where the patient is receiving treatment   List of dedicated fax numbers:  [address] DENIALS (Administrative/Medical Necessity) [phone]   [address] [place] [address] (Maternity/NICU/Pediatrics) [phone]   [facility] [phone]   [facility] [phone]   [facility] [phone]   [facility]anda [facility] [address] [facility] [phone]   [facility]  [phone]   [address] [facility] [address] [facility]  [address] [facility] [address] [facility] [phone]

## 2021-07-29 NOTE — TELEPHONE ENCOUNTER
Pt asking for script for unifine pen tips to be sent to [facility] in [place], [address]     Thank you

## 2021-07-29 NOTE — TELEPHONE ENCOUNTER
Pt called states Analia has been trying to get a refill  Advised we have not received anything yet but PCP would have to refill due to pt not being seen by our office yet   Pt verbalized he understood

## 2021-07-29 NOTE — TELEPHONE ENCOUNTER
Alysia Diaz was scheduled for an RD follow up today (7/29/2021) during his infusion and was unable to attend his appointment (infusion was cancelled)  Called and spoke with pt today  He says his antibiotics are finished today and he is scheduled to see Dr Diya Hansen on 8/5 to discuss resuming tx  Spoke about rescheduling his missed follow up and decided with pt that I will reach out on 8/6 after his doctors appt to plan a date to reschedule his RD follow up  Alysia Diaz verbalized understanding  No questions or concerns at this time

## 2021-08-02 NOTE — PROGRESS NOTES
Assessment/Plan:    Dental infection  As cause of sepsis  Appears to have resolved  For repeat culture later this week  Recheck 3m - earlier prn    Metastatic pancreatic cancer  New hepatic lesion though no growth of primary lesion  Pt to f/u with Onc on 8/5  Type 2 diabetes mellitus with hypoglycemia (HCC)    Lab Results   Component Value Date    HGBA1C 6 2 (H) 05/05/2021     Now with apparent poor control  Will increase base dose of lispro and continue sliding scale  No change in basal insulin given reasonble fasting control  Will reach out to Endo to see if we can get him in for recheck   F/u 3m    Essential hypertension  BP stable> Continue to monitor    Cancer related pain  Presently stable  Continue to monitor  Recheck 3m    Chemotherapy induced neutropenia ([facility] [address] )  Resolved by time of discharge  Had repeat labs done this morning - await results  F/u with Onc    Sepsis without acute organ dysfunction (HCC)  Resolved  Recheck blood culture later this week       Diagnoses and all orders for this visit:    Encounter for support and coordination of transition of care    Sepsis without acute organ dysfunction, due to unspecified organism [facility])    Dental infection    Chemotherapy induced neutropenia ([facility] [address] )    Metastatic pancreatic cancer    Type 2 diabetes mellitus with hypoglycemia without coma, with long-term current use of insulin (HCC)    Essential hypertension    Cancer related pain  -     gabapentin (NEURONTIN) 100 mg capsule;  Take 2 capsules (200 mg total) by mouth 3 (three) times a day          Subjective:     TCM Call (since 7/4/2021)     Date and time call was made  7/26/2021  9:19 AM    Hospital care reviewed  Records reviewed    Patient was hospitialized at  [address] [place] [address]        Date of Admission  07/13/21    Date of discharge  07/23/21    Diagnosis  Sepsis of unknown etiology    Disposition  Home    Were the patients medications reviewed and updated  No    Current Symptoms  None      TCM Call (since 7/4/2021)     Post hospital issues  None    Should patient be enrolled in anticoag monitoring? No    Scheduled for follow up? Yes    Did you obtain your prescribed medications  Yes    Do you need help managing your prescriptions or medications  No    Is transportation to your appointment needed  No    I have advised the patient to call PCP with any new or worsening symptoms  EDDIE Cain     Living Arrangements  Spouse or Significiant other    Are you recieving any outpatient services  No    Are you recieving home care services  Yes    Types of home care services  Nurse visit    Are you using any community resources  No    Current waiver services  No    Have you fallen in the last 12 months  No    Interperter language line needed  No    Counseling  Patient           Patient ID: Sabrina Ham is a 73 y o  male  Pt here for TCM visit  - 74 yo male with hx of pancreatic CA, presently on chemotherapy, presented to the [facility] ED on 7/13 with worsening weakness and fever  In the ED, pt was found to be febrile and be leukopenic  Pt met criteria for sepsis and was admitted  Pt was cultured, abx started and ID was consulted  Initial cultures grew multiple bacteria, suggesting possible port infection, though repeat cultures were negative  Imaging showed some liver lesions and persistent, unchanged pancreatic mass  Pt mentioned having some recent dental issues  CT of face revealed abscess associated with posterior upper molars  Oral surgery consulted and pt underwent extraction of #14 and 15 teeth, as well as excision of a maxillary cyst  Pt improved and was able to be discharged on 7/23 with plan to finish oral Flagyl on 7/24 and IV Rocephin (given via port) on 7/29  Pt was to have repeat Cx 1 week after finishing abx  Pt here for TCM visit    - sincde discharge, pt states that he has some leg weakness and foot tingling  Pt believes that it is deconditioning  He started home PT today   He denies increased back pain, change in bowel/bladder function or other issues  - since discharge, home BGs have been very labile  Mornings typically 100-140, though lunch/dinner range from   Presently taking Lantus 10u qd with a lispro 5u plus a sliding scale  Pt was to have an appt with Endo, which was to occur on a date during his admission  Pt has not been called to reschedule yet  - pt scheduled to see his Oncologist on 8/5 to discuss further treatment  - I reviewed available hospital records, lab reports and study results with pt and family                             The following portions of the patient's history were reviewed and updated as appropriate:   He  has a past medical history of Anxiety, Arthritis, Cancer ([address] ), Cellulitis, Diabetes mellitus ([address] ), Enlarged prostate, Epidermal inclusion cyst, Erythrasma, Furuncle, GERD (gastroesophageal reflux disease), Hyperlipidemia, Hypertension, and RA (rheumatoid arthritis) ([address] )    He   Patient Active Problem List    Diagnosis Date Noted    Dental infection 08/04/2021    Polymicrobial bacteremia 07/20/2021    Severe protein-calorie malnutrition ([address] ) 07/15/2021    Sepsis without acute organ dysfunction ([address] ) 07/13/2021    Generalized weakness 07/13/2021    Pancytopenia due to chemotherapy 07/13/2021    Lower abdominal pain 06/15/2021    Cancer related pain 06/14/2021    Metastatic pancreatic cancer 05/25/2021    Chemotherapy induced neutropenia ([address] ) 05/25/2021    Recent cholecystitis 05/11/2021    Leukocytosis 05/11/2021    BPH (benign prostatic hyperplasia) 05/06/2021    Type 2 diabetes mellitus with hypoglycemia ([address] ) 03/16/2021    Multiple lipomas 08/04/2020    Urgency of urination 01/31/2018    Right arm pain 10/13/2017    Rheumatoid arthritis ([address] ) 10/13/2017    Essential hypertension 10/13/2017    Liver mass 11/17/2016    Hyperlipidemia 02/03/2016    Anxiety disorder 08/15/2013    GERD 08/15/2013     He  has a past surgical history that includes Scrotal surgery; pr removal of hydrocele,tunica,unilat (Right, 1/11/2018); Hydrocele excision / repair (Right, 01/11/2018); IR cholecystostomy tube placement (5/11/2021); Colonoscopy; Tunneled venous port placement (Left, 6/1/2021); FL guided central venous access device insertion (6/1/2021); IR cholecystostomy tube check/change/reposition/reinsertion/upsize (6/22/2021); and Multiple tooth extractions (Left, 7/22/2021)  He  reports that he has quit smoking  He has never used smokeless tobacco  He reports that he does not drink alcohol and does not use drugs  Current Outpatient Medications   Medication Sig Dispense Refill    Alcohol Swabs 70 % PADS May substitute brand based on insurance coverage  Check glucose ACHS  200 each 0    aspirin 81 mg chewable tablet Chew 81 mg      Blood Glucose Monitoring Suppl (ONE TOUCH ULTRA 2) w/Device KIT Use daily 1 each 1    cyanocobalamin (VITAMIN B-12) 1000 MCG tablet Take 1 tablet (1,000 mcg total) by mouth daily 30 tablet 0    FLUoxetine (PROzac) 20 mg capsule TAKE 1 CAPSULE BY MOUTH EVERY DAY  90 capsule 2    folic acid (FOLVITE) 1 mg tablet Take by mouth daily      gabapentin (NEURONTIN) 100 mg capsule Take 2 capsules (200 mg total) by mouth 3 (three) times a day 190 capsule 0    glucose blood (OneTouch Ultra) test strip Use as instructed once a day 50 each 5    insulin glargine (Lantus SoloStar) 100 units/mL injection pen Inject 10 Units under the skin daily at bedtime 15 mL 0    insulin lispro (HumaLOG KwikPen) 100 units/mL injection pen Inject 5 Units under the skin 3 (three) times a day with meals 15 mL 0    Insulin Pen Needle (BD Pen Needle Cinda 2nd Gen) 32G X 4 MM MISC For use with insulin pen  Pharmacy may dispense brand covered by insurance   100 each 3    Lancets (onetouch ultrasoft) lancets Use as instructed once a day 50 each 5    lisinopril-hydrochlorothiazide (PRINZIDE,ZESTORETIC) 10-12 5 MG per tablet Take 1 tablet by mouth 3 (three) times a week Monday Wednesday Friday 90 tablet 3    oxyCODONE (ROXICODONE) 5 mg immediate release tablet Take 1 tablet (5 mg total) by mouth every 4 (four) hours as needed (cancer pain)Max Daily Amount: 30 mg (Patient not taking: Reported on 8/3/2021) 120 tablet 0    pantoprazole (PROTONIX) 40 mg tablet Take 1 tablet (40 mg total) by mouth 2 (two) times a day Before breakfast, before bed, to reduce stomach acid 60 tablet 1    prochlorperazine (COMPAZINE) 10 mg tablet Take 1 tablet (10 mg total) by mouth every 6 (six) hours as needed for nausea or vomiting 45 tablet 3    Sodium Chloride Flush (Normal Saline Flush) 0 9 % SOLN Gall bladder drain      pancrelipase, Lip-Prot-Amyl, (CREON) 24,000 units Take 1 capsule (24,000 Units total) by mouth 3 (three) times a day with meals Or snacks 270 capsule 0    predniSONE 20 mg tablet Take 0 5 tablets (10 mg total) by mouth daily with breakfast 15 tablet      No current facility-administered medications for this visit  He is allergic to fentanyl       Review of Systems   Constitutional: Positive for activity change and fatigue  Negative for appetite change and unexpected weight change  HENT: Negative  Eyes: Negative  Respiratory: Negative  Cardiovascular: Negative  Gastrointestinal: Positive for abdominal pain (mild intermittent epigastric discomfort)  Endocrine: Negative  Genitourinary: Negative  Musculoskeletal: Negative for arthralgias, back pain, gait problem, joint swelling and myalgias  Skin: Negative  Neurological: Positive for weakness and numbness  Negative for dizziness, light-headedness and headaches  Psychiatric/Behavioral: Negative  Objective:      /64   Pulse 68   Temp 98 2 °F (36 8 °C)   Ht 5' 9" (1 753 m)   Wt 73 kg (161 lb)   BMI 23 78 kg/m²          Physical Exam  Vitals reviewed  Constitutional:       Appearance: He is well-developed        Comments: Mildly tired appearing male in NAD   HENT: Head: Normocephalic and atraumatic  Right Ear: Tympanic membrane, ear canal and external ear normal       Left Ear: Tympanic membrane, ear canal and external ear normal    Eyes:      General: No scleral icterus  Extraocular Movements: Extraocular movements intact  Conjunctiva/sclera: Conjunctivae normal       Pupils: Pupils are equal, round, and reactive to light  Neck:      Thyroid: No thyromegaly  Vascular: No carotid bruit  Cardiovascular:      Rate and Rhythm: Normal rate and regular rhythm  Pulses: Normal pulses  Heart sounds: Normal heart sounds  No murmur heard  Pulmonary:      Effort: Pulmonary effort is normal       Breath sounds: Normal breath sounds  No wheezing or rales  Abdominal:      General: Bowel sounds are normal  There is no distension  Palpations: Abdomen is soft  There is no mass  Tenderness: There is no abdominal tenderness  Musculoskeletal:         General: Deformity (mild arthritic changes in hands without synovitis) present  No swelling or tenderness  Normal range of motion  Cervical back: Normal range of motion and neck supple  No tenderness  No muscular tenderness  Right lower leg: No edema  Left lower leg: No edema  Lymphadenopathy:      Cervical: No cervical adenopathy  Skin:     General: Skin is warm and dry  Capillary Refill: Capillary refill takes less than 2 seconds  Neurological:      Mental Status: He is alert and oriented to person, place, and time  Cranial Nerves: No cranial nerve deficit  Sensory: No sensory deficit  Motor: No weakness  Gait: Gait normal       Deep Tendon Reflexes: Reflexes normal    Psychiatric:         Mood and Affect: Mood normal          Behavior: Behavior normal          Thought Content:  Thought content normal          Judgment: Judgment normal

## 2021-08-03 NOTE — PROGRESS NOTES
Outpatient Follow-Up - Palliative and Supportive Care   Ramona Hampton 73 y o  male [id number]    Assessment & Plan  Problem List Items Addressed This Visit     None      Visit Diagnoses     Intractable hiccups    -  Primary    Relevant Medications    predniSONE 20 mg tablet    Pancreatic mass        Relevant Medications    pancrelipase, Lip-Prot-Amyl, (CREON) 24,000 units    Pancreatic insufficiency        Relevant Medications    pancrelipase, Lip-Prot-Amyl, (CREON) 24,000 units        - Counseling on health screening and disease prevention, COVID-19 specific (CPT V65 49)    Medications adjusted this encounter:  Requested Prescriptions     Signed Prescriptions Disp Refills    predniSONE 20 mg tablet 15 tablet      Sig: Take 0 5 tablets (10 mg total) by mouth daily with breakfast    pancrelipase, Lip-Prot-Amyl, (CREON) 24,000 units 270 capsule 0     Sig: Take 1 capsule (24,000 Units total) by mouth 3 (three) times a day with meals Or snacks     No orders of the defined types were placed in this encounter  Medications Discontinued During This Encounter   Medication Reason    diazepam (VALIUM) 2 mg tablet     senna (SENOKOT) 8 6 MG tablet     pancrelipase, Lip-Prot-Amyl, (CREON) 24,000 units Reorder   - Trial reduced steroids to help alleviate 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  - Will re-increase steroids if hiccups recur, tapering more gently after next visit      Ramona Hampton was seen today for symptoms and planning cares related to above illnesses  I have reviewed the patient's controlled substance dispensing history in the Prescription Drug Monitoring Program in compliance with the [facility] regulations before prescribing any controlled substances  They are invited to continue to follow with us  If there are questions or concerns, please contact us through our clinic/answering service 24 hours a day, seven days a week      Sri Carrillo MD  [facility] Palliative and Supportive [place] [phone]      Visit Information    Accompanied By: No one    Source of History: Self    History Limitations: None    Contacts: Son Noreen De Oliveira - [phone], [phone]    History of Present Illness      Rene Hager is a 73 y o  male who presents in follow up of symptoms related to adenocarcinoma of the pancreas  He follows with Dr Galileo Hanks of Med/Onc  Pertinent issues include: symptom management, depression or anxiety, nausea, hiccups      Since last visit, he was readmitted to hospital for sepsis  This seemed to be centered on infected teeth in the maxilla, which were excised during hospital stay, with improvement rapidly in sepsis criteria  Since D/C, he has noted leg pain and ongoing very brittle DM control  Reviewed with him and wife today the challenges with steroids; how they may be both helpful and harmful for him at this time  Agreeable to taper  Medically, baclofen unhelpful for hiccups  Importantly, he has noted rather severe dry mouth and even visual changes since this process with hiccups started  Intractable hiccups were improoved with new medication/dose of steroids  Appetiete and pain improved  Has needed adjustment in insulin therapy given higher dose steroids; PCP and Endo have begun to cooperate on this matter  Medically, compazine usage; this has been somewhat helpful for nausea, but has been asso with visual changes  Diazepam, baclofen not helpful for hiccups                 = continue with oxyIR in small doses PRN        Moving bowels daily   Taking oxyIR for pain; not entirely helpful   Crampy abd pain appears idiopathic: unrelated to eating or time of day   Does improve with lateral decub positioning, upright ambulation   We theorized that this is primarily a mass effect, with bowels becoming more spasmodic when contacting the mass by direct pressure from gravity   He is agreedable to low-dose steroids, used sparingly d/t concurrent severe and uncomfortable reflux, which has not improved even on high dose PPI therapy     Otherwise, he confirms a full cares plan, and seems too distracted by his pain to engage with full goals conversation "    Past medical, surgical, social, and family histories are reviewed and pertinent updates are made  Review of Systems   Constitutional: Positive for decreased appetite and weight loss  Negative for weight gain  HENT: Negative for hoarse voice and nosebleeds  Eyes: Negative for vision loss in left eye and vision loss in right eye  Cardiovascular: Negative for chest pain and dyspnea on exertion  Respiratory: Negative for cough and shortness of breath  Endocrine: Negative for polydipsia, polyphagia and polyuria  Skin: Negative for flushing and itching  Musculoskeletal: Negative for falls  Gastrointestinal: Negative for anorexia, jaundice, nausea and vomiting  Genitourinary: Negative for frequency  Neurological: Positive for focal weakness (proximal muscles)  Negative for dizziness  Psychiatric/Behavioral: Negative for depression and memory loss  The patient is not nervous/anxious  Vital Signs    /75 (BP Location: Left arm, Patient Position: Sitting, Cuff Size: Standard)   Pulse 76   Temp (!) 96 9 °F (36 1 °C) (Temporal)   Resp 18   Wt 73 kg (160 lb 15 oz)   SpO2 98%   BMI 23 77 kg/m²     Physical Exam and Objective Data  Physical Exam  Constitutional:       General: He is not in acute distress  Appearance: He is ill-appearing  He is not toxic-appearing or diaphoretic  Comments: slender   HENT:      Head: Normocephalic and atraumatic  Right Ear: External ear normal       Left Ear: External ear normal    Eyes:      General:         Right eye: No discharge  Left eye: No discharge  Conjunctiva/sclera: Conjunctivae normal       Pupils: Pupils are equal, round, and reactive to light  Neck:      Trachea: No tracheal deviation     Cardiovascular:      Rate and Rhythm: Normal rate and regular rhythm  Pulmonary:      Effort: Pulmonary effort is normal  No respiratory distress  Breath sounds: No stridor  Abdominal:      General: There is no distension  Palpations: Abdomen is soft  Comments: scaphoid   Skin:     General: Skin is warm and dry  Coloration: Skin is not pale  Findings: No erythema or rash  Neurological:      General: No focal deficit present  Mental Status: He is alert and oriented to person, place, and time  Mental status is at baseline  Cranial Nerves: No cranial nerve deficit  Psychiatric:         Mood and Affect: Mood normal          Behavior: Behavior normal          Thought Content: Thought content normal          Judgment: Judgment normal            Radiology and Laboratory:  I personally reviewed and interpreted the following results: none new    30+ minutes was spent face to face with Miley Dougherty with greater than 50% of the time spent in counseling or coordination of care including: chart review; symptom pursuit; supportive listening; anticipatory guidance  Additional time was also spent in discussing coronavirus vaccine indications, availability, and logistical challenges  All of the patient's or agent's questions were answered during this discussion

## 2021-08-04 PROBLEM — K04.7 DENTAL INFECTION: Status: ACTIVE | Noted: 2021-01-01

## 2021-08-04 NOTE — ASSESSMENT & PLAN NOTE
Lab Results   Component Value Date    HGBA1C 6 2 (H) 05/05/2021     Now with apparent poor control  Will increase base dose of lispro and continue sliding scale  No change in basal insulin given reasonble fasting control  Will reach out to Endo to see if we can get him in for recheck    F/u 3m

## 2021-08-04 NOTE — ASSESSMENT & PLAN NOTE
As cause of sepsis  Appears to have resolved  For repeat culture later this week   Recheck 3m - earlier prn

## 2021-08-05 NOTE — PROGRESS NOTES
I reviewed patient's liver biopsy request   Patient has a history of pancreatic neoplasm on chemotherapy and he had a CT scan of the abdomen performed on 07/13/2021 which showed a new hypoenhancing lesion in the central aspect of hepatic segment 6 (series 2, image number 24)  There were also multiple cysts as well as an enhancing lesion in segment 3 that had been stable since 2017  From a technical standpoint, the new segment 6 lesion may or may not be accessible percutaneously for biopsy although this is difficult to tell without personally scanning the liver with ultrasound and CT scan  I spoke to the patient over the phone and informed him that this is a technically challenging biopsy and the biopsy is important to him from a treatment standpoint and therefore we will bring him in to scan with ultrasound and CT and determine the feasibility of this biopsy  I emphasized the fact that it is quite possible after scanning that we determine this is not technically possible  He expressed understanding and is willing to come in to have us try  Of note, when he had the CT scan on 07/13/2021 he was febrile and septic  Therefore, differential diagnosis includes phlegmon/abscess; other etiologies are not excluded  If touch prep does not definitely reveal neoplastic cells, may consider sending tissue for cultures as well  If on the day of biopsy, this lesion appears smaller on preprocedural ultrasound and/or CT, this favors an infectious/inflammatory process and percutaneous biopsy may not be needed  If this is the case, we will contact Dr Austen Montiel to discuss before starting the biopsy

## 2021-08-05 NOTE — PROGRESS NOTES
Patient is here for chemo  He offers no complaints at this time   Labs reviewed and meet parameters for treatment today

## 2021-08-05 NOTE — PROGRESS NOTES
Hematology/Oncology Outpatient Follow- up Note  Estrella Miranda 73 y o  male MRN: @ Encounter: [id number]        Date:  8/5/2021    Presenting Complaint/Diagnosis :     What appears to be localized pancreatic cancer biopsy proven to be adenocarcinoma  HPI:      Padma Masterson seen for initial consultation 10/23/200 regarding   A newly diagnosed pancreatic cancer   The patient presented to the hospital with obstructive jaundice   He ended up having a workup along with a biopsy which revealed  A 3 8 cm obstructing mass in the region of the head of the pancreas with marked pancreatic ductal dilatation worrisome for adenocarcinoma   Biopsy was done which proved this was adenocarcinoma   The patient also had cholecystitis at the time and ended up having a cholecystostomy tube placed and was treated with antibiotics  Thania Erazo was seen by our colleagues in Surgical Oncology who recommended neoadjuvant chemotherapy upfront which we will initiate  Thania Erazo is not a candidate for  FOLFIRINOX and so will be given gemcitabine and Abraxane instead  Previous Hematologic/ Oncologic History:    Oncology History   Metastatic pancreatic cancer   5/7/2021 Biopsy    Head of pancreas:  Malignant Adenocarcinoma       6/3/2021 -  Chemotherapy    pegfilgrastim (NEULASTA ONPRO), 6 mg, Subcutaneous, Once, 1 of 4 cycles  paclitaxel protein-bound (ABRAXANE) IVPB, 100 mg/m2 = 191 mg (80 % of original dose 125 mg/m2), Intravenous, Once, 3 of 6 cycles  Dose modification: 100 mg/m2 (original dose 125 mg/m2, Cycle 1, Reason: Other (Must fill in a comment), Comment: per protocol)  Administration: 191 mg (6/3/2021), 191 mg (6/10/2021), 191 mg (7/1/2021), 191 mg (7/8/2021)  gemcitabine (GEMZAR) infusion, 1,000 mg/m2 = 1,909 9 mg, Intravenous, Once, 3 of 6 cycles  Administration: 1,909 9 mg (6/3/2021), 1,909 9 mg (6/10/2021), 1,909 9 mg (7/1/2021), 1,909 9 mg (7/8/2021)         Current Hematologic/ Oncologic Treatment:      Gemcitabine and Abraxane 3/4 weeks with Neulasta growth factor support  The patient has had 2 cycles albeit incomplete I e  4 doses of chemotherapy  Interval History:      The patient returns for follow-up visit  He was admitted again for an infection  It was thought it was from a maxillary cyst which was removed  He was treated with antibiotics which has been completed  He is now restarting his chemotherapy today  His imaging was concerning for 2 lesions in the liver which appeared to be metastatic  Since he was getting this in the neoadjuvant setting we will set him up for a biopsy of these lesions and send the tissue off for molecular testing  Denies any nausea or vomiting today  Otherwise is doing well  Apart from fatigue he states he tolerated his chemotherapy reasonably well  The rest of his 14 point review of systems today was negative  Test Results:    Imaging: Echo complete with contrast if indicated    Result Date: 2021  Narrative: [address] [place] [address], [address] [place] [address] [phone] Transthoracic Echocardiogram 2D, M-mode, Doppler, and Color Doppler Study date:  15-Jul-2021 Patient: Blaine Street MR number: [id number] Account number: [de-identified] : 1955 Age: 73 years Gender: Male Status: Inpatient Location: Bedside Height: 69 in Weight: 168 lb BP: 123/ 74 mmHg Indications: Bacteremia Diagnoses: R78 81 - Bacteremia Sonographer:  FRANCESCO Dunbar Primary Physician:  Nisha Thomas MD Referring Physician:  Crissy Mondragon MD Group:  [facility] Interpreting Physician:  Delaney Rao MD SUMMARY LEFT VENTRICLE: The ventricle was mildly dilated  Systolic function was at the lower limits of normal by visual assessment  Ejection fraction was estimated to be 50 %  There were no regional wall motion abnormalities  LEFT ATRIUM: The atrium was mildly dilated  MITRAL VALVE: There was mild regurgitation  AORTIC VALVE: There was mild regurgitation  TRICUSPID VALVE: There was mild regurgitation  PULMONIC VALVE: There was trace regurgitation  PERICARDIUM: A trace pericardial effusion was identified  HISTORY: PRIOR HISTORY: DM2, HTN, Pancreatic CA, Chemo, HLD PROCEDURE: The procedure was performed at the bedside  This was a routine study  The transthoracic approach was used  The study included complete 2D imaging, M-mode, complete spectral Doppler, and color Doppler  The heart rate was 79 bpm, at the start of the study  Images were obtained from the parasternal, apical, subcostal, and suprasternal notch acoustic windows  Image quality was adequate  LEFT VENTRICLE: The ventricle was mildly dilated  Systolic function was at the lower limits of normal by visual assessment  Ejection fraction was estimated to be 50 %  There were no regional wall motion abnormalities  Wall thickness was normal  DOPPLER: The ratio of early ventricular filling to atrial contraction velocities was within the normal range  Left ventricular diastolic function parameters were normal  RIGHT VENTRICLE: The size was normal  Systolic function was normal  DOPPLER: Systolic pressure was not estimated  LEFT ATRIUM: The atrium was mildly dilated  RIGHT ATRIUM: Size was normal  MITRAL VALVE: Valve structure was normal  There was mild calcification  There was normal leaflet separation  DOPPLER: The transmitral velocity was within the normal range  There was no evidence for stenosis  There was mild regurgitation  AORTIC VALVE: The valve was trileaflet  Leaflets exhibited normal thickness, mild calcification, and normal cuspal separation  DOPPLER: Transaortic velocity was within the normal range  There was no evidence for stenosis  There was mild regurgitation  TRICUSPID VALVE: The valve structure was normal  There was normal leaflet separation  DOPPLER: The transtricuspid velocity was within the normal range  There was no evidence for stenosis  There was mild regurgitation   PULMONIC VALVE: Leaflets exhibited normal thickness, no calcification, and normal cuspal separation  DOPPLER: The transpulmonic velocity was within the normal range  There was trace regurgitation  PERICARDIUM: A trace pericardial effusion was identified  AORTA: The root exhibited normal size  SYSTEMIC VEINS: IVC: The inferior vena cava was normal in size and course  Respirophasic changes were normal  SYSTEM MEASUREMENT TABLES 2D %FS: 31 19 % Ao Diam: 3 61 cm EDV(Teich): 181 8 ml EF(Teich): 58 06 % ESV(Teich): 76 25 ml IVSd: 1 05 cm LA Area: 22 91 cm2 LA Diam: 4 16 cm LVEDV MOD A4C: 141 02 ml LVEF MOD A4C: 52 85 % LVESV MOD A4C: 66 49 ml LVIDd: 6 03 cm LVIDs: 4 15 cm LVLd A4C: 8 92 cm LVLs A4C: 7 68 cm LVPWd: 1 03 cm RA Area: 14 88 cm2 RVIDd: 3 62 cm SV MOD A4C: 74 53 ml SV(Teich): 105 55 ml CW AR Dec Martin: 3 m/s2 AR Dec Time: 1494 55 ms AR PHT: 433 42 ms AR Vmax: 4 49 m/s AR maxP 61 mmHg TR MaxP 12 mmHg TR Vmax: 2 07 m/s MM TAPSE: 2 02 cm PW MV A Riky: 0 67 m/s MV Dec Martin: 6 28 m/s2 MV DecT: 143 39 ms MV E Riky: 0 76 m/s MV E/A Ratio: 1 13 [facility] Accredited [facility] Laboratory Prepared and electronically signed by Vaibhav Wills MD Signed 2021 11:06:52     XR chest 1 view portable    Result Date: 2021  Narrative: CHEST INDICATION:   Fever  COMPARISON:  2021; 2020 EXAM PERFORMED/VIEWS:  XR CHEST PORTABLE FINDINGS:  Left chest wall [id number] redemonstrated  Heart shadow appears unremarkable  Atherosclerotic vascular calcifications are noted  The lungs are clear  No pneumothorax or pleural effusion  Osseous structures appear within normal limits for patient age  Impression: No acute cardiopulmonary disease  Workstation performed: [id number]     CT facial bones w contrast    Result Date: 2021  Narrative: CT FACIAL SOFT TISSUES WITH INTRAVENOUS CONTRAST INDICATION:   Evaluate for possible teeth extraction (considering this as source for mixed bacteremia)  COMPARISON: None  TECHNIQUE:  Axial images through the facial soft tissues were performed  Reformatted images were created in multiple planes  Radiation dose length product (DLP) for this visit:  603 mGy-cm   This examination, like all CT scans performed in the [facility], was performed utilizing techniques to minimize radiation dose exposure, including the use of iterative reconstruction and automated exposure control  IV Contrast:  85 mL of iohexol (OMNIPAQUE) IMAGE QUALITY:  Diagnostic  FINDINGS: SOFT TISSUES: No discrete soft tissue mass  No signs of soft tissue infection  No abscess formation  No hematoma  DENTITION: There are multiple dental fillings which are resulting in streak artifact limiting optimal assessment of the indentation  A dental implant is also noted in the left lower mandible  There is an absent, (likely extracted remotely),  molar tooth in the left upper row with smooth margin of the maxilla  However, the left posterior to most molar teeth are associated with a large periapical abscess projecting into the base of the left maxillary sinus where an inflammatory retention cyst is noted  The margins of the maxillary bone remain smooth at this site as well and there is no surrounding soft tissue phlegmonous change along the gingival surface  FACIAL BONES: There is no facial bone fracture identified  There is a ill-defined sclerotic focus in the right mandible at the expected site of the right posterior most lower tooth  This may represent a maldeveloped tooth or other benign lesion  There  is no erosion of the lingual or buccal cortices of the mandible or maxilla  Punctate sclerotic lesion within the right mandibular condyle likely represents a bone island  No destructive lesions  ORBITS: Normal globes  Preseptal and retrobulbar soft tissues are unremarkable  SINUSES: Bilateral maxillary sinus retention cyst   Scant mucosal thickening in the left ethmoid air cells    S-shaped nasal septal deviation with right-sided spurring  Miscellaneous: Multilevel cervical spondylosis with moderate spinal stenosis at C4-C5 and C5 upon C6  Impression: No erosive findings to suggest a source of bacteremia  Note is made of a prominent radicular or periapical cyst associated with the left posterior parietal maxillary molars with smooth bony remodeling and associated retention cyst in the left maxillary sinus  No regional soft tissue inflammatory changes are  noted with this abnormality  Additional chronic findings as described above  Workstation performed: [id number]     JAMAL    Result Date: 2021  Narrative: [address] [place] [address], [address] [place] [address] [phone] Transthoracic Echocardiogram 2D Study date:  2021 Patient: Rc Harrington MR number: [id number] Account number: [de-identified] : 1955 Age: 73 years Gender: Male Status: Inpatient Location: Cath lab Height: 69 in Weight: 168 lb BP: 130/ 69 mmHg Indications: CVA Diagnoses: I63 9 - Cerebral infarction, unspecified Sonographer:  FRANCESCO Mao Primary Physician:  Quincy No MD Referring Physician:  Marcellus Fernández MD Group:  [facility] Interpreting Physician:  DO KEVIN Ann LEFT VENTRICLE: Systolic function was at the lower limits of normal  Ejection fraction was estimated to be 50 %  There was mild diffuse hypokinesis  LEFT ATRIUM: The atrium was dilated  ATRIAL SEPTUM: No defect or patent foramen ovale was identified by color Doppler  AORTIC VALVE: There was mild regurgitation  TRICUSPID VALVE: There was mild regurgitation  HISTORY: PRIOR HISTORY: HLD, HTN, DMII, pancreatic CA PROCEDURE: The procedure was performed in the catheterization laboratory  This was a routine study  The risks and alternatives of the procedure were explained to the patient and informed consent was obtained  The transthoracic approach was used  The study included complete 2D imaging  The heart rate was 74 bpm, at the start of the study  Intubated with ease  One intubation attempt(s)  There was no blood detected on the probe  Image quality was good  MEDICATIONS: Anesthesia  LEFT VENTRICLE: Size was normal  Systolic function was at the lower limits of normal  Ejection fraction was estimated to be 50 %  There was mild diffuse hypokinesis  Wall thickness was normal  RIGHT VENTRICLE: The size was normal  Systolic function was normal  Wall thickness was normal  LEFT ATRIUM: The atrium was dilated  No thrombus was identified  ATRIAL SEPTUM: No defect or patent foramen ovale was identified by color Doppler  RIGHT ATRIUM: Size was normal  No thrombus was identified  MITRAL VALVE: Valve structure was normal  There was normal leaflet separation  There was no echocardiographic evidence of vegetation  DOPPLER: There was trace regurgitation  AORTIC VALVE: The valve was trileaflet  Leaflets exhibited normal thickness and normal cuspal separation  There was no echocardiographic evidence of vegetation  DOPPLER: Transaortic velocity was within the normal range  There was no evidence for stenosis  There was mild regurgitation  TRICUSPID VALVE: The valve structure was normal  There was normal leaflet separation  There was no echocardiographic evidence of vegetation  DOPPLER: There was mild regurgitation  PULMONIC VALVE: Leaflets exhibited normal thickness, no calcification, and normal cuspal separation  There was no echocardiographic evidence of vegetation  DOPPLER: There was trace regurgitation  PERICARDIUM: There was no pericardial effusion  The pericardium was normal in appearance  AORTA: The root exhibited normal size  There was no atheroma  There was no evidence for dissection  There was no evidence for aneurysm   [address] Accredited Echocardiography Laboratory Prepared and electronically signed by Jeremi Villavicencio DO Signed 19-Jul-2021 18:01:27     CT abdomen pelvis with contrast    Result Date: 7/13/2021  Narrative: CT ABDOMEN AND PELVIS WITH IV CONTRAST INDICATION:   Abdominal pain, fever Known pancreatic cancer, increasing abdominal pain, tender throughout the upper abdomen, febrile, sepsis  COMPARISON:  6/14/2021 TECHNIQUE:  CT examination of the abdomen and pelvis was performed  Axial, sagittal, and coronal 2D reformatted images were created from the source data and submitted for interpretation  Radiation dose length product (DLP) for this visit:  477 mGy-cm   This examination, like all CT scans performed in the [facility], was performed utilizing techniques to minimize radiation dose exposure, including the use of iterative reconstruction and automated exposure control  IV Contrast:  100 mL of iohexol (OMNIPAQUE) Enteric Contrast:  Enteric contrast was not administered  FINDINGS: ABDOMEN LOWER CHEST:  No clinically significant abnormality identified in the visualized lower chest   Stable small pericardial effusion, partially imaged  LIVER/BILIARY TREE:  New 3 5 x 1 8 cm ill-defined low-attenuating lesion in hepatic segment 8 (image #2/24)  Increased size of a 1 9 x 1 3 cm low-attenuating lesion in hepatic segment 3 (image #2/24)  Numerous cysts again seen throughout the liver  Unchanged enhancing left hepatic lobe lesion (image #2/27)  Metallic common bile duct stent again noted, with associated pneumobilia  No intrahepatic biliary ductal dilation  GALLBLADDER:  Percutaneous cholecystostomy tube  SPLEEN:  Unremarkable  PANCREAS:  No significant interval change in size of the heterogeneously enhancing, ill-defined pancreatic head neoplasm, again measuring 5 1 x 2 7 cm when re-measured in a similar fashion (image #2/37)  Unchanged dilation of the main duct distal to the  mass and associated parenchymal atrophy  ADRENAL GLANDS:  Unremarkable  KIDNEYS/URETERS:  No hydronephrosis or urinary tract calculus    One or more sharply circumscribed subcentimeter renal hypodensities are present, too small to accurately characterize, and statistically most likely benign findings  According to recent literature (Radiology 2019) no further workup of these findings is recommended  STOMACH AND BOWEL: Previously reported paraduodenal focal fluid collection has resolved  No bowel obstruction  APPENDIX:  No findings to suggest appendicitis  ABDOMINOPELVIC CAVITY:  Resolved omental edema/infiltration  No ascites  No pneumoperitoneum  No lymphadenopathy  VESSELS:  Unremarkable for patient's age  PELVIS REPRODUCTIVE ORGANS:  Enlarged prostate gland again noted  URINARY BLADDER:  Unremarkable  ABDOMINAL WALL/INGUINAL REGIONS:  Unchanged subcutaneous lesion in the right anterior chest wall, again measuring 3 2 x 1 2 cm (image #2/6), likely sebaceous cyst   Unchanged fat-containing left inguinal hernia  OSSEOUS STRUCTURES:  No acute fracture or destructive osseous lesion  Impression: No definite imaging explanation for acute epigastric/abdominal pain  New 3 5 x 1 8 cm low-attenuating lesion in the right hepatic lobe and increased size of a 1 9 x 1 3 cm low-attenuating lesion in the left hepatic lobe, concerning for metastatic disease  Stable pancreatic head neoplasm  Resolution of the previously reported paraduodenal collection and omental edema/infiltration   Workstation performed: [id number]       Labs:   Lab Results   Component Value Date    WBC 9 72 08/02/2021    HGB 12 4 08/02/2021    HCT 39 2 08/02/2021    MCV 92 08/02/2021     08/02/2021     Lab Results   Component Value Date     09/01/2017    K 3 8 08/02/2021     08/02/2021    CO2 30 08/02/2021    BUN 24 (H) 08/02/2021    CREATININE 0 68 08/02/2021    GLUCOSE 117 (H) 09/01/2017    GLUF 74 08/02/2021    CALCIUM 8 9 08/02/2021    CORRECTEDCA 9 6 07/20/2021    AST 19 08/02/2021    ALT 28 08/02/2021    ALKPHOS 100 8 08/02/2021    PROT 5 8 (L) 09/01/2017    BILITOT 1 0 09/01/2017    EGFR 100 08/02/2021       Lab Results   Component Value Date    PSA 1 6 11/28/2020    PSA 1 4 11/09/2019    PSA 1 7 11/25/2017       Lab Results   Component Value Date    CEA 0 9 04/29/2021       Lab Results   Component Value Date    IRON 16 (L) 07/13/2021    TIBC 248 (L) 07/13/2021    FERRITIN 625 (H) 07/13/2021       Lab Results   Component Value Date    UCTSAPFW17 238 07/13/2021         ROS: As stated in the history of present illness otherwise his 14 point review of systems today was negative        Active Problems:   Patient Active Problem List   Diagnosis    Right arm pain    Rheumatoid arthritis ([facility] )    Essential hypertension    Urgency of urination    Anxiety disorder    Hyperlipidemia    Liver mass    GERD    Multiple lipomas    Type 2 diabetes mellitus with hypoglycemia (HCC)    BPH (benign prostatic hyperplasia)    Recent cholecystitis    Leukocytosis    Metastatic pancreatic cancer    Chemotherapy induced neutropenia ([facility] )    Cancer related pain    Lower abdominal pain    Sepsis without acute organ dysfunction (HCC)    Generalized weakness    Pancytopenia due to chemotherapy    Severe protein-calorie malnutrition (HCC)    Polymicrobial bacteremia    Dental infection       Past Medical History:   Past Medical History:   Diagnosis Date    Anxiety     Arthritis     Cancer ([facility] )     pancreatic    Cellulitis     LAST ASSESSED: 6/13/14    Diabetes mellitus ([facility] )     Enlarged prostate     Epidermal inclusion cyst     LAST ASSESSED: 10/4/13    Erythrasma     LAST ASSESSED: 9/23/13    Furuncle     LAST ASSESSED: 6/11/14    GERD (gastroesophageal reflux disease)     Hyperlipidemia     Hypertension     RA (rheumatoid arthritis) ([facility] )        Surgical History:   Past Surgical History:   Procedure Laterality Date    COLONOSCOPY      FL GUIDED CENTRAL VENOUS ACCESS DEVICE INSERTION  6/1/2021    HYDROCELE EXCISION / REPAIR Right 01/11/2018    SPERMATIC CORD EXCSION OF HYDROCELE; MANAGED BY: [address] [place] [address],[address]    IR CHOLECYSTOSTOMY TUBE CHECK/CHANGE/REPOSITION/REINSERTION/UPSIZE  6/22/2021    IR CHOLECYSTOSTOMY TUBE PLACEMENT  5/11/2021    MULTIPLE TOOTH EXTRACTIONS Left 7/22/2021    Procedure: EXTRACTION TEETH 14 & 15, ALVEOPLASTY, EXCISION OF CYST LEFT MAXILLA; Surgeon: Frederick Levy DDS;  Location: [facility];  Service: Maxillofacial    OR REMOVAL OF HYDROCELE,TUNICA,UNILAT Right 1/11/2018    Procedure: HYDROCELECTOMY;  Surgeon: Alonso Benavidez MD;  Location: [facility];  Service: Urology    SCROTAL SURGERY      benign "lump"    TUNNELED VENOUS PORT PLACEMENT Left 6/1/2021    Procedure: INSERTION VENOUS PORT (PORT-A-CATH); Surgeon: Sonia Magaña MD;  Location[facility];  Service: Surgical Oncology       Family History:    Family History   Problem Relation Age of Onset    Heart disease Father         CARDIAC DISORDER    Hypertension Father     Hypertension Mother     No Known Problems Maternal Aunt     No Known Problems Maternal Uncle     No Known Problems Paternal Aunt     No Known Problems Paternal Uncle     No Known Problems Paternal Grandmother     No Known Problems Paternal Grandfather     Diabetes Maternal Grandmother         MELLITUS    No Known Problems Maternal Grandfather     Hypertension Other        Cancer-related family history is not on file      Social History:   Social History     Socioeconomic History    Marital status: /Civil Union     Spouse name: Not on file    Number of children: 2    Years of education: Not on file    Highest education level: Not on file   Occupational History    Occupation: FULL-TIME EMPLOYMENT   Tobacco Use    Smoking status: Former Smoker    Smokeless tobacco: Never Used    Tobacco comment: tobacco in a pipe in the early 1980's   Vaping Use    Vaping Use: Never used   Substance and Sexual Activity    Alcohol use: Never    Drug use: Never    Sexual activity: Never     Comment: NOT CURRENTLY SEXUALLY ACTIVE AS PER [employer]   Other Topics Concern    Not on file   Social History Narrative    ALWAYS USES SEAT BELT    DENIED DAILY COFFEE CONSUMPTION (__CUPS/DAY)    DENIED DAILY COLA CONSUMPTION (__CANS/DAY)    DAILY TEA CONSUMPTION (__CUPS/DAY)    DENTAL CARE, REGULARLY    EXERCISE: WALKING    HIGH SCHOOL GRADUATE    DENIED MULTIPLE ORGAN DONOR    NO LIVING WILL    PETS/ANIMALS    DENIED POWER OF  IN EXISTENCE    WATER INTAKE, ADEQUATE (PER DAY)     Social Determinants of Health     Financial Resource Strain:     Difficulty of Paying Living Expenses:    Food Insecurity:     Worried About Running Out of Food in the Last Year:     Ran Out of Food in the Last Year:    Transportation Needs:     Lack of Transportation (Medical):  Lack of Transportation (Non-Medical):    Physical Activity:     Days of Exercise per Week:     Minutes of Exercise per Session:    Stress:     Feeling of Stress :    Social Connections:     Frequency of Communication with Friends and Family:     Frequency of Social Gatherings with Friends and Family:     Attends Anabaptist Services:     Active Member of Clubs or Organizations:     Attends Club or Organization Meetings:     Marital Status:    Intimate Partner Violence:     Fear of Current or Ex-Partner:     Emotionally Abused:     Physically Abused:     Sexually Abused:        Current Medications:   Current Outpatient Medications   Medication Sig Dispense Refill    Alcohol Swabs 70 % PADS May substitute brand based on insurance coverage  Check glucose ACHS  200 each 0    aspirin 81 mg chewable tablet Chew 81 mg      Blood Glucose Monitoring Suppl (ONE TOUCH ULTRA 2) w/Device KIT Use daily 1 each 1    cyanocobalamin (VITAMIN B-12) 1000 MCG tablet Take 1 tablet (1,000 mcg total) by mouth daily 30 tablet 0    FLUoxetine (PROzac) 20 mg capsule TAKE 1 CAPSULE BY MOUTH EVERY DAY   90 capsule 2    folic acid (FOLVITE) 1 mg tablet Take by mouth daily      gabapentin (NEURONTIN) 100 mg capsule Take 2 capsules (200 mg total) by mouth 3 (three) times a day 190 capsule 0    glucose blood (OneTouch Ultra) test strip Use as instructed once a day 50 each 5    insulin glargine (Lantus SoloStar) 100 units/mL injection pen Inject 10 Units under the skin daily at bedtime 15 mL 0    insulin lispro (HumaLOG KwikPen) 100 units/mL injection pen Inject 5 Units under the skin 3 (three) times a day with meals 15 mL 0    Insulin Pen Needle (BD Pen Needle Cinda 2nd Gen) 32G X 4 MM MISC For use with insulin pen  Pharmacy may dispense brand covered by insurance  100 each 3    Lancets (onetouch ultrasoft) lancets Use as instructed once a day 50 each 5    lisinopril-hydrochlorothiazide (PRINZIDE,ZESTORETIC) 10-12 5 MG per tablet Take 1 tablet by mouth 3 (three) times a week Monday Wednesday Friday 90 tablet 3    oxyCODONE (ROXICODONE) 5 mg immediate release tablet Take 1 tablet (5 mg total) by mouth every 4 (four) hours as needed (cancer pain)Max Daily Amount: 30 mg 120 tablet 0    pancrelipase, Lip-Prot-Amyl, (CREON) 24,000 units Take 1 capsule (24,000 Units total) by mouth 3 (three) times a day with meals Or snacks 270 capsule 0    pantoprazole (PROTONIX) 40 mg tablet Take 1 tablet (40 mg total) by mouth 2 (two) times a day Before breakfast, before bed, to reduce stomach acid 60 tablet 1    predniSONE 20 mg tablet Take 0 5 tablets (10 mg total) by mouth daily with breakfast 15 tablet     prochlorperazine (COMPAZINE) 10 mg tablet Take 1 tablet (10 mg total) by mouth every 6 (six) hours as needed for nausea or vomiting 45 tablet 3    Sodium Chloride Flush (Normal Saline Flush) 0 9 % SOLN Gall bladder drain       No current facility-administered medications for this visit  Allergies: Allergies   Allergen Reactions    Fentanyl Anaphylaxis and Other (See Comments)     Oxygen drops severely       Physical Exam:    Body surface area is 1 88 meters squared      Wt Readings from Last 3 Encounters:   08/05/21 72 6 kg (160 lb)   08/03/21 73 kg (160 lb 15 oz)   08/02/21 73 kg (161 lb)        Temp Readings from Last 3 Encounters:   08/05/21 97 5 °F (36 4 °C) (Temporal)   08/03/21 (!) 96 9 °F (36 1 °C) (Temporal)   08/02/21 98 2 °F (36 8 °C)        BP Readings from Last 3 Encounters:   08/05/21 124/68   08/03/21 120/75   08/02/21 116/64         Pulse Readings from Last 3 Encounters:   08/05/21 57   08/03/21 76   08/02/21 68         Physical Exam     Constitutional   General appearance: No acute distress, well appearing and well nourished  Eyes   Conjunctiva and lids: No swelling, erythema or discharge  Pupils and irises: Equal, round and reactive to light  Ears, Nose, Mouth, and Throat   External inspection of ears and nose: Normal     Nasal mucosa, septum, and turbinates: Normal without edema or erythema  Oropharynx: Normal with no erythema, edema, exudate or lesions  Pulmonary   Respiratory effort: No increased work of breathing or signs of respiratory distress  Auscultation of lungs: Clear to auscultation  Cardiovascular   Palpation of heart: Normal PMI, no thrills  Auscultation of heart: Normal rate and rhythm, normal S1 and S2, without murmurs  Examination of extremities for edema and/or varicosities: Normal     Carotid pulses: Normal     Abdomen   Abdomen: Non-tender, no masses  Liver and spleen: No hepatomegaly or splenomegaly  Lymphatic   Palpation of lymph nodes in neck: No lymphadenopathy  Musculoskeletal   Gait and station: Normal     Digits and nails: Normal without clubbing or cyanosis  Inspection/palpation of joints, bones, and muscles: Normal     Skin   Skin and subcutaneous tissue: Normal without rashes or lesions  Neurologic   Cranial nerves: Cranial nerves 2-12 intact  Sensation: No sensory loss      Psychiatric   Orientation to person, place, and time: Normal     Mood and affect: Normal         Assessment / Plan:      The patient is a pleasant 62-year-old male with newly diagnosed adenocarcinoma of the pancreas who was referred to see us for consideration of neoadjuvant chemotherapy  We decided to start gemcitabine and Abraxane based on his performance status and disease  He has had 2 cycles  He was in the hospital again for sepsis and had a maxillary cyst drained  Imaging done at the time revealed a liver lesion which is concerning for metastatic disease  There is a 2nd lesion which has also increased in size at 1 9 x 1 3 cm in hepatic segment 3  The new lesion was 3 5 x 1 8 cm ill-defined low attenuating lesion in hepatic segment 8  We will arrange for a biopsy  We will continue his current  Chemotherapy as day 1 of cycle 5 is today  I will see him back in 3 weeks with results of the biopsy  Goals and Barriers:  Current Goal:  Prolong Survival from   Pancreatic cancer  Barriers: None  Patient's Capacity to Self Care:  Patient able to self care  Portions of the record may have been created with voice recognition software   Occasional wrong word or "sound a like" substitutions may have occurred due to the inherent limitations of voice recognition software   Read the chart carefully and recognize, using context, where substitutions have occurred

## 2021-08-06 NOTE — TELEPHONE ENCOUNTER
Outreach to pt today to schedule RD follow up after he met with Dr Renetta Villafana yesterday and resumed tx  No answer  Left a voice message requesting a call back to coordinate appt

## 2021-08-06 NOTE — TELEPHONE ENCOUNTER
Can the quantity be adjusted to 3x daily testing, the script is written for once and he runs out faster

## 2021-08-10 NOTE — PROGRESS NOTES
Return call to Lars Vega, he was looking for the phone number for [facility], number provided, he stated he is doing well right now, he is awaiting his liver biopsy on Monday, no further needs at this time, instructed him to call should he need anything

## 2021-08-10 NOTE — PRE-PROCEDURE INSTRUCTIONS
Spoke with pt and reviewed his postprocedure instructions, pt confirmed understanding, all questions answered  ASA hold already stated  Allergy to fentanyl discussed

## 2021-08-11 NOTE — LETTER
August 13, 2021     Mohit Gaspar MD  [address]    Patient: Yanely Rosales   YOB: 1955   Date of Visit: 8/11/2021       Dear Dr Bhanu Julio:    Thank you for referring Charlaluis Suarez to me for evaluation  Below are my notes for this consultation  If you have questions, please do not hesitate to call me  I look forward to following your patient along with you  Sincerely,        Molina Arshad MD        CC: No Recipients  Molina Arshad MD  8/13/2021  2:37 PM  Signed   Yanely Rosales 73 y o  male MRN: [id number]    Encounter: [id number]      Assessment/Plan     Assessment: This is a 73y o -year-old male with diabetes with hyperglycemia  Plan:    Diagnoses and all orders for this visit:    Type 2 diabetes mellitus with hyperglycemia, with long-term current use of insulin ([address] )  Lab Results   Component Value Date    HGBA1C 7 0 (A) 08/11/2021   A1c is 7%, fasting blood sugars are elevated, as well as blood sugars are elevated during the day in 200 range  increase Lantus to 12 units at bedtime, increase Humalog to 10 units with meals plus scale  keep carbohydrate consistent with meals  discussed hypoglycemia symptoms and treatment  Will refer to diabetes education and nutrition therapy  discussed importance of taking short-acting insulin 15 minutes before meals  also discussed the option of continues glucose monitor sensor, patient is agreeable to use dex com sensor  Educated pt about sliding scale as well as  Right injection technique, to take injections in belly  around  Belly button  Discussed the plan with PCP via tiger text     -     insulin glargine (Lantus SoloStar) 100 units/mL injection pen; Inject 12 Units under the skin daily at bedtime  --     insulin lispro (HumaLOG KwikPen) 100 units/mL injection pen;  Take 10 units with breakfast, lunch and dinner +scale  -     Ambulatory referral to Diabetic Education; Future    Essential hypertension   blood pressure well controlled   continue current management  -     POCT hemoglobin A1c  -     Hemoglobin A1C; Future  -     Basic metabolic panel; Future  -     Microalbumin / creatinine urine ratio; Future    Insulin dependent type 2 diabetes mellitus ([address] )   discussed based on his history of pancreatic cancer he is dependent on insulin, his  C-peptide was low normal       INSULIN DOSAGE INSTRUCTIONS    Name: Ricardo Valle                        : 1955  MRN #: [id number]    Your Current Insulin  and dose is: Before Breakfast Before Lunch Before Evening Meal Bedtime   Humalog Insulin     10 units      10 units    10 units     Regular, Apidra, Humalog orNovolog Sliding Scale:   <80              151-200 + 1 +1 +1    201-250 + 2 +2 +2 +   251-300 + 3 +3 +3 +   301-350 + 4 +4 +4 +   >350 + 5 +5 +5 +       Lantus Insulin      12 units      Additional Instructions:   Please test your blood sugar:  _4_ Times per day  X_ Before Breakfast                _ Alternate Testing  X_ Before Lunch                _ 2 Hours After  Meal  X_ Before Evening Meal               _ 3 a m   x_ Before Bedtime Snack     Target Blood sugar range 80- 180 mg/dl   Call if your   blood sugar is less than _60_ or greater than _400__  Today's Date: 2021       Hypoglycemia instructions   Ricardo Valle  2021  [id number]    Low Blood Sugar    Steps to treat low blood sugar  1  Test blood sugar if you have symptoms of low blood sugar:   Low Blood Sugar Symptoms:  o Sweaty  o Dizzy  o Rapid heartbeat  o Shaky    o Bad mood  o Hungry      2  Treat blood sugar less than 70 with 15 grams of fast-acting carbohydrate:   Examples of 15 grams Fast-Acting Carbohydrate:  o 4 oz juice  o 4 oz regular soda  o 3-4 glucose tablets (chew)  o 3-4 hard candies (chew)              3    Wait 15 minutes and test your blood sugar again           4   If blood sugar is less than 100, repeat steps 2-3       5  When your blood sugar is 100 or more, eat a snack if it will be longer than one hour until your next meal  The snack should be 15 grams of carbohydrate and a protein:   Examples of snacks:  o ½ sandwich  o 6 crackers with cheese  o Piece of fruit with cheese or peanut butter  o 6 crackers with peanut butter            The patient, was counseled regarding diagnostic results,-- instructions for management,-- risk factor reductions,--impressions,-- importance of compliance with treatment  Medication SE Review and Pt Understands Tx: Possible side effects of new medications were reviewed with the patient today  The treatment plan was reviewed with the patient  I have spent 45  minutes with Patient  today in which greater than 50% of this time was spent in counseling/coordination of care regarding Diagnostic results, Prognosis, Risks and benefits of tx options, Intructions for management, Importance of tx compliance, Risk factor reductions and Impressions,  And coordination of care  CC: Diabetes    History of Present Illness     HPI:    Grace Tariq is 73 Year old gentleman with medical history of hypertension, hyperlipidemia recently diagnosed with metastatic pancreatic cancer undergoing chemotherapy, was diagnosed with diabetes recently and was started on insulin regimen  he is taking Lantus 10 units at bedtime, Humalog 7 units with meals  he was diagnosed with diabetes in June 2021, and was started on insulin regimen   he checks blood sugars 3 times daily, his blood sugars are usually in 180-220 mg/dL range  he denies recent admission for hyperglycemia or hypoglycemia  he is currently not taking any steroids  he was also started on pancrelipase 1 capsule, 3 times daily with meals   his also taking gabapentin 2 capsules 3 times daily for pain       for hypertension  Lab Results   Component Value Date    HGBA1C 7 0 (A) 08/11/2021     Results for Malina Guzmán (MRN [id number]) as of 8/11/2021 15:38   Ref  Range 5/5/2021 09:11   Cholesterol Latest Ref Range: <=200 mg/dL 188   Triglycerides Latest Ref Range: <=150 mg/dL 181 6 (H)   HDL Latest Ref Range: >=40 mg/dL 36 (L)   Non-HDL Cholesterol Latest Units: mg/dl 152   LDL Calculated Latest Ref Range: 0 - 100 mg/dL 116 (H)       Review of Systems   Constitutional: Negative for activity change, diaphoresis, fatigue, fever and unexpected weight change  HENT: Negative  Eyes: Negative for visual disturbance  Respiratory: Negative for cough, chest tightness and shortness of breath  Cardiovascular: Negative for chest pain, palpitations and leg swelling  Gastrointestinal: Negative for abdominal pain, constipation, diarrhea, nausea and vomiting  Endocrine: Positive for polyphagia  Negative for cold intolerance, heat intolerance, polydipsia and polyuria  Genitourinary: Negative for dysuria, enuresis, frequency and urgency  Musculoskeletal: Negative for arthralgias and myalgias  Skin: Negative for pallor, rash and wound  Allergic/Immunologic: Negative  Neurological: Negative for dizziness, tremors, weakness and numbness  Hematological: Negative  Psychiatric/Behavioral: Negative  The patient is not nervous/anxious          Historical Information   Past Medical History:   Diagnosis Date    Anxiety     Arthritis     Cancer ([facility] )     pancreatic    Cellulitis     LAST ASSESSED: 6/13/14    [facility]     Enlarged prostate     Epidermal inclusion cyst     LAST ASSESSED: 10/4/13    Erythrasma     LAST ASSESSED: 9/23/13    Furuncle     LAST ASSESSED: 6/11/14    GERD (gastroesophageal reflux disease)     Hyperlipidemia     Hypertension     RA (rheumatoid arthritis) ([facility] )      Past Surgical History:   Procedure Laterality Date    COLONOSCOPY      FL GUIDED CENTRAL VENOUS ACCESS DEVICE INSERTION  6/1/2021    HYDROCELE EXCISION / REPAIR Right 01/11/2018    SPERMATIC CORD EXCSION OF HYDROCELE; MANAGED BY: GABBIE CARBAJAL    IR CHOLECYSTOSTOMY TUBE CHECK/CHANGE/REPOSITION/REINSERTION/UPSIZE  6/22/2021    IR CHOLECYSTOSTOMY TUBE PLACEMENT  5/11/2021    MULTIPLE TOOTH EXTRACTIONS Left 7/22/2021    Procedure: EXTRACTION TEETH 14 & 15, ALVEOPLASTY, EXCISION OF CYST LEFT MAXILLA; Surgeon: Katie Eric DDS;  [facility];  Service: Maxillofacial    NC REMOVAL OF HYDROCELE,TUNICA,UNILAT Right 1/11/2018    Procedure: HYDROCELECTOMY;  Surgeon: Candance Netter, MD;  Location[facility];  Service: Urology    SCROTAL SURGERY      benign "lump"    TUNNELED VENOUS PORT PLACEMENT Left 6/1/2021    Procedure: INSERTION VENOUS PORT (PORT-A-CATH); Surgeon: May Kicthen MD;  [facility];  Service: Surgical Oncology     Social History   Social History     Substance and Sexual Activity   Alcohol Use Never     Social History     Substance and Sexual Activity   Drug Use Never     Social History     Tobacco Use   Smoking Status Former Smoker   Smokeless Tobacco Never Used   Tobacco Comment    tobacco in a pipe in the early 1980's     Family History:   Family History   Problem Relation Age of Onset    Heart disease Father         CARDIAC DISORDER    Hypertension Father     Hypertension Mother     No Known Problems Maternal Aunt     No Known Problems Maternal Uncle     No Known Problems Paternal Aunt     No Known Problems Paternal Uncle     No Known Problems Paternal Grandmother     No Known Problems Paternal Grandfather     Diabetes Maternal Grandmother         MELLITUS    No Known Problems Maternal Grandfather     Hypertension Other        Meds/Allergies   Current Outpatient Medications   Medication Sig Dispense Refill    Alcohol Swabs 70 % PADS May substitute brand based on insurance coverage  Check glucose ACHS   200 each 0    Blood Glucose Monitoring Suppl (ONE TOUCH ULTRA 2) w/Device KIT Use daily 1 each 1    cyanocobalamin (VITAMIN B-12) 1000 MCG tablet Take 1 tablet (1,000 mcg total) by mouth daily 30 tablet 0    FLUoxetine (PROzac) 20 mg capsule TAKE 1 CAPSULE BY MOUTH EVERY DAY  90 capsule 2    folic acid (FOLVITE) 1 mg tablet Take by mouth daily      gabapentin (NEURONTIN) 100 mg capsule Take 2 capsules (200 mg total) by mouth 3 (three) times a day 190 capsule 0    glucose blood (OneTouch Ultra) test strip Use as instructed tid 100 each 5    insulin glargine (Lantus SoloStar) 100 units/mL injection pen Inject 12 Units under the skin daily at bedtime 15 mL 0    insulin lispro (HumaLOG KwikPen) 100 units/mL injection pen Take 10 units with breakfast, lunch and dinner +scale 15 mL 5    Insulin Pen Needle (BD Pen Needle Cinda 2nd Gen) 32G X 4 MM MISC For use with insulin pen  Pharmacy may dispense brand covered by insurance   100 each 3    Lancets (onetouch ultrasoft) lancets Use as instructed once a day 50 each 5    lisinopril-hydrochlorothiazide (PRINZIDE,ZESTORETIC) 10-12 5 MG per tablet Take 1 tablet by mouth 3 (three) times a week Monday Wednesday Friday 90 tablet 3    oxyCODONE (ROXICODONE) 5 mg immediate release tablet Take 1 tablet (5 mg total) by mouth every 4 (four) hours as needed (cancer pain)Max Daily Amount: 30 mg 120 tablet 0    pancrelipase, Lip-Prot-Amyl, (CREON) 24,000 units Take 1 capsule (24,000 Units total) by mouth 3 (three) times a day with meals Or snacks 270 capsule 0    pantoprazole (PROTONIX) 40 mg tablet Take 1 tablet (40 mg total) by mouth 2 (two) times a day Before breakfast, before bed, to reduce stomach acid 60 tablet 1    predniSONE 20 mg tablet Take 0 5 tablets (10 mg total) by mouth daily with breakfast 15 tablet     prochlorperazine (COMPAZINE) 10 mg tablet Take 1 tablet (10 mg total) by mouth every 6 (six) hours as needed for nausea or vomiting 45 tablet 3    aspirin 81 mg chewable tablet Chew 81 mg (Patient not taking: Reported on 8/11/2021)      Sodium Chloride Flush (Normal Saline Flush) 0 9 % SOLN Gall bladder drain (Patient not taking: Reported on 8/11/2021)       No current facility-administered medications for this visit  Allergies   Allergen Reactions    Fentanyl Anaphylaxis and Other (See Comments)     Oxygen drops severely       Objective   Vitals: Blood pressure 122/76, pulse 82, temperature 97 6 °F (36 4 °C), weight 74 6 kg (164 lb 8 oz)  Physical Exam  Vitals reviewed  Constitutional:       General: He is not in acute distress  Appearance: Normal appearance  He is well-developed  HENT:      Head: Normocephalic and atraumatic  Eyes:      Pupils: Pupils are equal, round, and reactive to light  Neck:      Thyroid: No thyromegaly  Cardiovascular:      Rate and Rhythm: Normal rate and regular rhythm  Pulses: Normal pulses  Heart sounds: Normal heart sounds  Pulmonary:      Effort: Pulmonary effort is normal  No respiratory distress  Breath sounds: Normal breath sounds  Abdominal:      General: Bowel sounds are normal       Palpations: Abdomen is soft  Musculoskeletal:         General: No deformity  Normal range of motion  Cervical back: Normal range of motion and neck supple  Skin:     General: Skin is warm and dry  Findings: No erythema  Neurological:      General: No focal deficit present  Mental Status: He is alert and oriented to person, place, and time  Psychiatric:         Mood and Affect: Mood normal          Behavior: Behavior normal          The history was obtained from the review of the chart, patient      Lab Results:   Lab Results   Component Value Date/Time    Hemoglobin A1C 7 0 (A) 08/11/2021 03:01 PM    Hemoglobin A1C 6 2 (H) 05/05/2021 09:11 AM    Hemoglobin A1C 6 1 (H) 04/24/2021 07:11 AM    Hemoglobin A1C 6 5 (H) 02/27/2021 08:28 AM    WBC 6 42 08/09/2021 09:36 AM    WBC 9 72 08/02/2021 08:03 AM    WBC 8 99 07/22/2021 06:35 AM    Hemoglobin 11 9 (L) 08/09/2021 09:36 AM    Hemoglobin 12 4 08/02/2021 08:03 AM    Hemoglobin 10 1 (L) 07/22/2021 06:35 AM    Hematocrit 37 4 08/09/2021 09:36 AM    Hematocrit 39 2 08/02/2021 08:03 AM    Hematocrit 32 8 (L) 07/22/2021 06:35 AM    MCV 92 08/09/2021 09:36 AM    MCV 92 08/02/2021 08:03 AM    MCV 92 07/22/2021 06:35 AM    Platelets 810 (L) 53/81/5108 09:36 AM    Platelets 623 39/29/8034 08:03 AM    Platelets 277 (H) 62/62/4892 06:35 AM    BUN 19 08/09/2021 09:36 AM    BUN 24 (H) 08/02/2021 08:03 AM    BUN 14 07/22/2021 06:35 AM    BUN 17 06/28/2021 09:46 AM    BUN 22 06/07/2021 07:19 AM    BUN 16 05/28/2021 08:30 AM    Potassium 3 3 (L) 08/09/2021 09:36 AM    Potassium 3 8 08/02/2021 08:03 AM    Potassium 3 8 07/22/2021 06:35 AM    Potassium 4 4 06/28/2021 09:46 AM    Potassium 4 3 06/07/2021 07:19 AM    Potassium 4 8 05/28/2021 08:30 AM    Chloride 106 08/09/2021 09:36 AM    Chloride 105 08/02/2021 08:03 AM    Chloride 106 07/22/2021 06:35 AM    Chloride 101 06/28/2021 09:46 AM    Chloride 96 06/07/2021 07:19 AM    Chloride 102 05/28/2021 08:30 AM    CO2 28 08/09/2021 09:36 AM    CO2 30 08/02/2021 08:03 AM    CO2 28 07/22/2021 06:35 AM    CO2 25 06/28/2021 09:46 AM    CO2 25 06/07/2021 07:19 AM    CO2 25 05/28/2021 08:30 AM    Creatinine 0 59 08/09/2021 09:36 AM    Creatinine 0 68 08/02/2021 08:03 AM    Creatinine 0 73 07/22/2021 06:35 AM    AST 15 08/09/2021 09:36 AM    AST 19 08/02/2021 08:03 AM    AST 14 07/20/2021 05:05 AM    AST 30 06/28/2021 09:46 AM    AST 21 06/07/2021 07:19 AM    AST 33 05/28/2021 08:30 AM    ALT 26 08/09/2021 09:36 AM    ALT 28 08/02/2021 08:03 AM    ALT 17 07/20/2021 05:05 AM    ALT 32 06/28/2021 09:46 AM    ALT 25 06/07/2021 07:19 AM    ALT 39 05/28/2021 08:30 AM    Albumin 3 5 08/09/2021 09:36 AM    Albumin 3 5 08/02/2021 08:03 AM    Albumin 2 0 (L) 07/20/2021 05:05 AM    Globulin, Total 2 4 06/28/2021 09:46 AM    Globulin, Total 2 7 06/07/2021 07:19 AM    Globulin, Total 2 9 05/28/2021 08:30 AM    HDL 65 04/24/2021 07:11 AM    HDL 38 (L) 11/28/2020 07:01 AM    HDL, Direct 36 (L) 05/05/2021 09:11 AM Triglycerides 181 6 (H) 05/05/2021 09:11 AM    Triglycerides 67 04/24/2021 07:11 AM    Triglycerides 134 11/28/2020 07:01 AM           Imaging Studies: I have personally reviewed pertinent reports  Portions of the record may have been created with voice recognition software  Occasional wrong word or "sound a like" substitutions may have occurred due to the inherent limitations of voice recognition software  Read the chart carefully and recognize, using context, where substitutions have occurred

## 2021-08-11 NOTE — TELEPHONE ENCOUNTER
Pt informed  Thanks me for my call  Forwarded to Oncology  ----- Message from Dontrell Medrano MD sent at 8/10/2021  5:00 PM EDT -----  Please call patient with negative culture results  We maintained his port on admission as there was other potential sources of his bacteremia  Please also forward to oncology  Thanks

## 2021-08-11 NOTE — PROGRESS NOTES
Jacki Noonan 73 y o  male MRN: [id number]    Encounter: [id number]      Assessment/Plan     Assessment: This is a 73y o -year-old male with diabetes with hyperglycemia  Plan:    Diagnoses and all orders for this visit:    Type 2 diabetes mellitus with hyperglycemia, with long-term current use of insulin ([facility] [address] )  Lab Results   Component Value Date    HGBA1C 7 0 (A) 2021   A1c is 7%, fasting blood sugars are elevated, as well as blood sugars are elevated during the day in 200 range  increase Lantus to 12 units at bedtime, increase Humalog to 10 units with meals plus scale  keep carbohydrate consistent with meals  discussed hypoglycemia symptoms and treatment  Will refer to diabetes education and nutrition therapy  discussed importance of taking short-acting insulin 15 minutes before meals  also discussed the option of continues glucose monitor sensor, patient is agreeable to use dex com sensor  Educated pt about sliding scale as well as  Right injection technique, to take injections in belly  around  Belly button  Discussed the plan with PCP via tiger text     -     insulin glargine (Lantus SoloStar) 100 units/mL injection pen; Inject 12 Units under the skin daily at bedtime  --     insulin lispro (HumaLOG KwikPen) 100 units/mL injection pen; Take 10 units with breakfast, lunch and dinner +scale  -     Ambulatory referral to Diabetic Education; Future    Essential hypertension   blood pressure well controlled   continue current management  -     POCT hemoglobin A1c  -     Hemoglobin A1C; Future  -     Basic metabolic panel; Future  -     Microalbumin / creatinine urine ratio;  Future    Insulin dependent type 2 diabetes mellitus ([facility] [address] )   discussed based on his history of pancreatic cancer he is dependent on insulin, his  C-peptide was low normal       INSULIN DOSAGE INSTRUCTIONS    Name: Jacki Noonan                        : 1955  MRN #: [id number]    Your Current Insulin  and dose is: Before Breakfast Before Lunch Before Evening Meal Bedtime   Humalog Insulin     10 units      10 units    10 units     Regular, Apidra, Humalog orNovolog Sliding Scale:   <80              151-200 + 1 +1 +1    201-250 + 2 +2 +2 +   251-300 + 3 +3 +3 +   301-350 + 4 +4 +4 +   >350 + 5 +5 +5 +       Lantus Insulin      12 units      Additional Instructions:   Please test your blood sugar:  _4_ Times per day  X_ Before Breakfast                _ Alternate Testing  X_ Before Lunch                _ 2 Hours After  Meal  X_ Before Evening Meal               _ 3 a m   x_ Before Bedtime Snack     Target Blood sugar range 80- 180 mg/dl   Call if your   blood sugar is less than _60_ or greater than _400__  Today's Date: 8/11/2021       Hypoglycemia instructions   Kalie Colmenares  8/11/2021  [id number]    Low Blood Sugar    Steps to treat low blood sugar  1  Test blood sugar if you have symptoms of low blood sugar:   Low Blood Sugar Symptoms:  o Sweaty  o Dizzy  o Rapid heartbeat  o Shaky    o Bad mood  o Hungry      2  Treat blood sugar less than 70 with 15 grams of fast-acting carbohydrate:   Examples of 15 grams Fast-Acting Carbohydrate:  o 4 oz juice  o 4 oz regular soda  o 3-4 glucose tablets (chew)  o 3-4 hard candies (chew)              3    Wait 15 minutes and test your blood sugar again           4  If blood sugar is less than 100, repeat steps 2-3       5  When your blood sugar is 100 or more, eat a snack if it will be longer than one hour until your next meal  The snack should be 15 grams of carbohydrate and a protein:   Examples of snacks:  o ½ sandwich  o 6 crackers with cheese  o Piece of fruit with cheese or peanut butter  o 6 crackers with peanut butter            The patient, was counseled regarding diagnostic results,-- instructions for management,-- risk factor reductions,--impressions,-- importance of compliance with treatment     Medication SE Review and Pt Understands Tx: Possible side effects of new medications were reviewed with the patient today  The treatment plan was reviewed with the patient  I have spent 45  minutes with Patient  today in which greater than 50% of this time was spent in counseling/coordination of care regarding Diagnostic results, Prognosis, Risks and benefits of tx options, Intructions for management, Importance of tx compliance, Risk factor reductions and Impressions,  And coordination of care  CC: Diabetes    History of Present Illness     HPI:    Yanely Rosales is 73 Year old gentleman with medical history of hypertension, hyperlipidemia recently diagnosed with metastatic pancreatic cancer undergoing chemotherapy, was diagnosed with diabetes recently and was started on insulin regimen  he is taking Lantus 10 units at bedtime, Humalog 7 units with meals  he was diagnosed with diabetes in June 2021, and was started on insulin regimen   he checks blood sugars 3 times daily, his blood sugars are usually in 180-220 mg/dL range  he denies recent admission for hyperglycemia or hypoglycemia  he is currently not taking any steroids  he was also started on pancrelipase 1 capsule, 3 times daily with meals   his also taking gabapentin 2 capsules 3 times daily for pain  for hypertension  Lab Results   Component Value Date    HGBA1C 7 0 (A) 08/11/2021     Results for Christopher Barnard (MRN [id number]) as of 8/11/2021 15:38   Ref  Range 5/5/2021 09:11   Cholesterol Latest Ref Range: <=200 mg/dL 188   Triglycerides Latest Ref Range: <=150 mg/dL 181 6 (H)   HDL Latest Ref Range: >=40 mg/dL 36 (L)   Non-HDL Cholesterol Latest Units: mg/dl 152   LDL Calculated Latest Ref Range: 0 - 100 mg/dL 116 (H)       Review of Systems   Constitutional: Negative for activity change, diaphoresis, fatigue, fever and unexpected weight change  HENT: Negative  Eyes: Negative for visual disturbance     Respiratory: Negative for cough, chest tightness and shortness of breath  Cardiovascular: Negative for chest pain, palpitations and leg swelling  Gastrointestinal: Negative for abdominal pain, constipation, diarrhea, nausea and vomiting  Endocrine: Positive for polyphagia  Negative for cold intolerance, heat intolerance, polydipsia and polyuria  Genitourinary: Negative for dysuria, enuresis, frequency and urgency  Musculoskeletal: Negative for arthralgias and myalgias  Skin: Negative for pallor, rash and wound  Allergic/Immunologic: Negative  Neurological: Negative for dizziness, tremors, weakness and numbness  Hematological: Negative  Psychiatric/Behavioral: Negative  The patient is not nervous/anxious  Historical Information   Past Medical History:   Diagnosis Date    Anxiety     Arthritis     Cancer ([place] [address] )     pancreatic    Cellulitis     LAST ASSESSED: 6/13/14    Diabetes mellitus ([place] [address] )     Enlarged prostate     Epidermal inclusion cyst     LAST ASSESSED: 10/4/13    Erythrasma     LAST ASSESSED: 9/23/13    Furuncle     LAST ASSESSED: 6/11/14    GERD (gastroesophageal reflux disease)     Hyperlipidemia     Hypertension     RA (rheumatoid arthritis) ([place] [address] )      Past Surgical History:   Procedure Laterality Date    COLONOSCOPY      FL GUIDED CENTRAL VENOUS ACCESS DEVICE INSERTION  6/1/2021    HYDROCELE EXCISION / REPAIR Right 01/11/2018    SPERMATIC CORD EXCSION OF HYDROCELE; MANAGED BY: GABBIE CARBAJAL    IR CHOLECYSTOSTOMY TUBE CHECK/CHANGE/REPOSITION/REINSERTION/UPSIZE  6/22/2021    IR CHOLECYSTOSTOMY TUBE PLACEMENT  5/11/2021    MULTIPLE TOOTH EXTRACTIONS Left 7/22/2021    Procedure: EXTRACTION TEETH 14 & 15, ALVEOPLASTY, EXCISION OF CYST LEFT MAXILLA;   Surgeon: Nino Kim DDS;  Location: [facility];  Service: Maxillofacial    NM REMOVAL OF HYDROCELE,TUNICA,UNILAT Right 1/11/2018    Procedure: HYDROCELECTOMY;  Surgeon: Lisa Esqueda MD;  Location: [facility];  Service: Urology    SCROTAL SURGERY      benign "lump"    TUNNELED VENOUS PORT PLACEMENT Left 6/1/2021    Procedure: INSERTION VENOUS PORT (PORT-A-CATH); Surgeon: Param Leal MD;  Location: [facility];  Service: Surgical Oncology     Social History   Social History     Substance and Sexual Activity   Alcohol Use Never     Social History     Substance and Sexual Activity   Drug Use Never     Social History     Tobacco Use   Smoking Status Former Smoker   Smokeless Tobacco Never Used   Tobacco Comment    tobacco in a pipe in the early 1980's     Family History:   Family History   Problem Relation Age of Onset    Heart disease Father         CARDIAC DISORDER    Hypertension Father     Hypertension Mother     No Known Problems Maternal Aunt     No Known Problems Maternal Uncle     No Known Problems Paternal Aunt     No Known Problems Paternal Uncle     No Known Problems Paternal Grandmother     No Known Problems Paternal Grandfather     Diabetes Maternal Grandmother         MELLITUS    No Known Problems Maternal Grandfather     Hypertension Other        Meds/Allergies   Current Outpatient Medications   Medication Sig Dispense Refill    Alcohol Swabs 70 % PADS May substitute brand based on insurance coverage  Check glucose ACHS  200 each 0    Blood Glucose Monitoring Suppl (ONE TOUCH ULTRA 2) w/Device KIT Use daily 1 each 1    cyanocobalamin (VITAMIN B-12) 1000 MCG tablet Take 1 tablet (1,000 mcg total) by mouth daily 30 tablet 0    FLUoxetine (PROzac) 20 mg capsule TAKE 1 CAPSULE BY MOUTH EVERY DAY   90 capsule 2    folic acid (FOLVITE) 1 mg tablet Take by mouth daily      gabapentin (NEURONTIN) 100 mg capsule Take 2 capsules (200 mg total) by mouth 3 (three) times a day 190 capsule 0    glucose blood (OneTouch Ultra) test strip Use as instructed tid 100 each 5    insulin glargine (Lantus SoloStar) 100 units/mL injection pen Inject 12 Units under the skin daily at bedtime 15 mL 0    insulin lispro (HumaLOG KwikPen) 100 units/mL injection pen Take 10 units with breakfast, lunch and dinner +scale 15 mL 5    Insulin Pen Needle (BD Pen Needle Cinda 2nd Gen) 32G X 4 MM MISC For use with insulin pen  Pharmacy may dispense brand covered by insurance  100 each 3    Lancets (onetouch ultrasoft) lancets Use as instructed once a day 50 each 5    lisinopril-hydrochlorothiazide (PRINZIDE,ZESTORETIC) 10-12 5 MG per tablet Take 1 tablet by mouth 3 (three) times a week Monday Wednesday Friday 90 tablet 3    oxyCODONE (ROXICODONE) 5 mg immediate release tablet Take 1 tablet (5 mg total) by mouth every 4 (four) hours as needed (cancer pain)Max Daily Amount: 30 mg 120 tablet 0    pancrelipase, Lip-Prot-Amyl, (CREON) 24,000 units Take 1 capsule (24,000 Units total) by mouth 3 (three) times a day with meals Or snacks 270 capsule 0    pantoprazole (PROTONIX) 40 mg tablet Take 1 tablet (40 mg total) by mouth 2 (two) times a day Before breakfast, before bed, to reduce stomach acid 60 tablet 1    predniSONE 20 mg tablet Take 0 5 tablets (10 mg total) by mouth daily with breakfast 15 tablet     prochlorperazine (COMPAZINE) 10 mg tablet Take 1 tablet (10 mg total) by mouth every 6 (six) hours as needed for nausea or vomiting 45 tablet 3    aspirin 81 mg chewable tablet Chew 81 mg (Patient not taking: Reported on 8/11/2021)      Sodium Chloride Flush (Normal Saline Flush) 0 9 % SOLN Gall bladder drain (Patient not taking: Reported on 8/11/2021)       No current facility-administered medications for this visit  Allergies   Allergen Reactions    Fentanyl Anaphylaxis and Other (See Comments)     Oxygen drops severely       Objective   Vitals: Blood pressure 122/76, pulse 82, temperature 97 6 °F (36 4 °C), weight 74 6 kg (164 lb 8 oz)  Physical Exam  Vitals reviewed  Constitutional:       General: He is not in acute distress  Appearance: Normal appearance  He is well-developed  HENT:      Head: Normocephalic and atraumatic     Eyes:      Pupils: Pupils are equal, round, and reactive to light  Neck:      Thyroid: No thyromegaly  Cardiovascular:      Rate and Rhythm: Normal rate and regular rhythm  Pulses: Normal pulses  Heart sounds: Normal heart sounds  Pulmonary:      Effort: Pulmonary effort is normal  No respiratory distress  Breath sounds: Normal breath sounds  Abdominal:      General: Bowel sounds are normal       Palpations: Abdomen is soft  Musculoskeletal:         General: No deformity  Normal range of motion  Cervical back: Normal range of motion and neck supple  Skin:     General: Skin is warm and dry  Findings: No erythema  Neurological:      General: No focal deficit present  Mental Status: He is alert and oriented to person, place, and time  Psychiatric:         Mood and Affect: Mood normal          Behavior: Behavior normal          The history was obtained from the review of the chart, patient      Lab Results:   Lab Results   Component Value Date/Time    Hemoglobin A1C 7 0 (A) 08/11/2021 03:01 PM    Hemoglobin A1C 6 2 (H) 05/05/2021 09:11 AM    Hemoglobin A1C 6 1 (H) 04/24/2021 07:11 AM    Hemoglobin A1C 6 5 (H) 02/27/2021 08:28 AM    WBC 6 42 08/09/2021 09:36 AM    WBC 9 72 08/02/2021 08:03 AM    WBC 8 99 07/22/2021 06:35 AM    Hemoglobin 11 9 (L) 08/09/2021 09:36 AM    Hemoglobin 12 4 08/02/2021 08:03 AM    Hemoglobin 10 1 (L) 07/22/2021 06:35 AM    Hematocrit 37 4 08/09/2021 09:36 AM    Hematocrit 39 2 08/02/2021 08:03 AM    Hematocrit 32 8 (L) 07/22/2021 06:35 AM    MCV 92 08/09/2021 09:36 AM    MCV 92 08/02/2021 08:03 AM    MCV 92 07/22/2021 06:35 AM    Platelets 496 (L) 66/21/2593 09:36 AM    Platelets 142 59/22/2877 08:03 AM    Platelets 813 (H) 54/25/5469 06:35 AM    BUN 19 08/09/2021 09:36 AM    BUN 24 (H) 08/02/2021 08:03 AM    BUN 14 07/22/2021 06:35 AM    BUN 17 06/28/2021 09:46 AM    BUN 22 06/07/2021 07:19 AM    BUN 16 05/28/2021 08:30 AM    Potassium 3 3 (L) 08/09/2021 09:36 AM    Potassium 3 8 08/02/2021 08:03 AM    Potassium 3 8 07/22/2021 06:35 AM    Potassium 4 4 06/28/2021 09:46 AM    Potassium 4 3 06/07/2021 07:19 AM    Potassium 4 8 05/28/2021 08:30 AM    Chloride 106 08/09/2021 09:36 AM    Chloride 105 08/02/2021 08:03 AM    Chloride 106 07/22/2021 06:35 AM    Chloride 101 06/28/2021 09:46 AM    Chloride 96 06/07/2021 07:19 AM    Chloride 102 05/28/2021 08:30 AM    CO2 28 08/09/2021 09:36 AM    CO2 30 08/02/2021 08:03 AM    CO2 28 07/22/2021 06:35 AM    CO2 25 06/28/2021 09:46 AM    CO2 25 06/07/2021 07:19 AM    CO2 25 05/28/2021 08:30 AM    Creatinine 0 59 08/09/2021 09:36 AM    Creatinine 0 68 08/02/2021 08:03 AM    Creatinine 0 73 07/22/2021 06:35 AM    AST 15 08/09/2021 09:36 AM    AST 19 08/02/2021 08:03 AM    AST 14 07/20/2021 05:05 AM    AST 30 06/28/2021 09:46 AM    AST 21 06/07/2021 07:19 AM    AST 33 05/28/2021 08:30 AM    ALT 26 08/09/2021 09:36 AM    ALT 28 08/02/2021 08:03 AM    ALT 17 07/20/2021 05:05 AM    ALT 32 06/28/2021 09:46 AM    ALT 25 06/07/2021 07:19 AM    ALT 39 05/28/2021 08:30 AM    Albumin 3 5 08/09/2021 09:36 AM    Albumin 3 5 08/02/2021 08:03 AM    Albumin 2 0 (L) 07/20/2021 05:05 AM    Globulin, Total 2 4 06/28/2021 09:46 AM    Globulin, Total 2 7 06/07/2021 07:19 AM    Globulin, Total 2 9 05/28/2021 08:30 AM    HDL 65 04/24/2021 07:11 AM    HDL 38 (L) 11/28/2020 07:01 AM    HDL, Direct 36 (L) 05/05/2021 09:11 AM    Triglycerides 181 6 (H) 05/05/2021 09:11 AM    Triglycerides 67 04/24/2021 07:11 AM    Triglycerides 134 11/28/2020 07:01 AM           Imaging Studies: I have personally reviewed pertinent reports  Portions of the record may have been created with voice recognition software  Occasional wrong word or "sound a like" substitutions may have occurred due to the inherent limitations of voice recognition software  Read the chart carefully and recognize, using context, where substitutions have occurred

## 2021-08-11 NOTE — PATIENT INSTRUCTIONS
INSULIN DOSAGE INSTRUCTIONS    Name: Hola Millard                        : 1955  MRN #: [id number]    Your Current Insulin  and dose is: Before Breakfast Before Lunch Before Evening Meal Bedtime   Humalog Insulin     10 units      10 units    10 units     Regular, Apidra, Humalog orNovolog Sliding Scale:   <80              151-200 + 1 +1 +1    201-250 + 2 +2 +2 +   251-300 + 3 +3 +3 +   301-350 + 4 +4 +4 +   >350 + 5 +5 +5 +       Lantus Insulin      12 units      Additional Instructions:   Please test your blood sugar:  _4_ Times per day  X_ Before Breakfast                _ Alternate Testing  X_ Before Lunch                _ 2 Hours After  Meal  X_ Before Evening Meal               _ 3 a m   x_ Before Bedtime Snack     Target Blood sugar range 80- 180 mg/dl   Call if your   blood sugar is less than _60_ or greater than _400__  Today's Date: 2021       Hypoglycemia instructions   Hola Millard  2021  [id number]    Low Blood Sugar    Steps to treat low blood sugar  1  Test blood sugar if you have symptoms of low blood sugar:   Low Blood Sugar Symptoms:  o Sweaty  o Dizzy  o Rapid heartbeat  o Shaky    o Bad mood  o Hungry      2  Treat blood sugar less than 70 with 15 grams of fast-acting carbohydrate:   Examples of 15 grams Fast-Acting Carbohydrate:  o 4 oz juice  o 4 oz regular soda  o 3-4 glucose tablets (chew)  o 3-4 hard candies (chew)              3    Wait 15 minutes and test your blood sugar again           4   If blood sugar is less than 100, repeat steps 2-3       5  When your blood sugar is 100 or more, eat a snack if it will be longer than one hour until your next meal  The snack should be 15 grams of carbohydrate and a protein:   Examples of snacks:  o ½ sandwich  o 6 crackers with cheese  o Piece of fruit with cheese or peanut butter  o 6 crackers with peanut butter

## 2021-08-12 NOTE — PROGRESS NOTES
Pt received chemo infusion w/out any adverse effects, labs w/in parameters  Offers no complaints   Declined AVS

## 2021-08-12 NOTE — PROGRESS NOTES
Brief visit with pt during his infusion today after not receiving a call back to reschedule his missed follow up appt  Usman Padma states he is doing well at this time, he reports that he has gained weight  He was not interested in setting up a follow up at this time but was agreeable to contacting me on a prn basis  Discussed the indications that would warrant a phone call  Pt then shared that his endocrinologist wants him to follow up with his diabetes RD, Haley Kendrick, who he will be seeing tomorrow    Will remain available per pt request

## 2021-08-13 NOTE — PROGRESS NOTES
Insulin Instruction  Met with Jacki Noonan for initial insulin education  Jennifer Tello is currently taking the following diabetes medications: Lantus 12 units qhs; Humalog 10 units tid ac plus scale  Patient instructed on: Insulin type; timing of insulin; site selection and rotation; proper technique of injection and insulin administration, safe needle disposal; medication storage; side effects and precautions of insulin  Comments: Jnenifer Tello has good understanding of insulin usage and demonstrated use of injection technique in the office  Patient instruction completed on Hypoglycemia: definition/risk, causes/symptoms, treatment, prevention of hypoglycemia, when to notify physician and EMS  Comments: Jennifer Tello has good understanding of hypoglycemia and it's treatment  Patient instruction completed on Hyperglycemia: definition, causes/symptoms, treatment, prevention of hyperglycemia, when to notify physician and EMS  Comments: Jennifer Tello has good understanding of hyperglycemia and treatment  Diabetes Education Record: Jennifer Tello was given the following education material: Fast 15 List, Hypoglycemia Fact Sheet, Hyperglycemia Fact Sheet, [facility], Injection Rotation from [employer], Lantus instructions from company, Humalog instructions from company      Patient response to instruction  Comprehension: good  Motivation: good  Expected Compliance: good  Readiness: action  Barriers to Learning: none known     Begin Time: 2:19 pm  End Time: 2:51 pm  Referring Provider: Heather Saenz MD    Thank you for referring your patient to [facility], it was a pleasure working with them today  Please feel free to call with any questions or concerns      Roberto Boyd, [address] [facility] [address]

## 2021-08-16 NOTE — BRIEF OP NOTE (RAD/CATH)
INTERVENTIONAL RADIOLOGY PROCEDURE NOTE    Date: 8/16/2021    Procedure: IR BIOPSY LIVER MASS    Preoperative diagnosis:   1  Metastatic pancreatic cancer         Postoperative diagnosis: Same  Surgeon: Audrey Ramos MD     Assistant: None  No qualified resident was available  Blood loss: minimal    Specimens:   Five 18g core biopsies from a segment 3 lesion  Two touch prep samples were deemed adequate with evidence of malignancy per pathology  Three other 18g samples were placed in formalin  Findings:     A segment 6 lesion was nearly completely resolved on today's periprocedural CT scan  This is most consistent with resolving abscess  A known segment 3 lesion is more prominent  This lesion was biopsied under US guidance as above  Complications: None immediate      Anesthesia: conscious sedation

## 2021-08-16 NOTE — INTERVAL H&P NOTE
Update: (This section must be completed if the H&P was completed greater than 24 hrs to procedure or admission)    H&P reviewed  After examining the patient, I find no changed to the H&P since it had been written  /73 (BP Location: Left arm)   Pulse 85   Resp 18   SpO2 96%      Patient pancreatic cancer and new segment 6 liver mass identified on CT from 7 13 21  Differential includes infection vs malignancy  Plan for CT guided Biopsy  Location of lesion may not be suitable for biopsy, this will be assessed at the time of eliu-procedure CT  Patient re-evaluated   Accept as history and physical     Mariangel Beaver MD/August 16, 2021/1:46 PM

## 2021-08-16 NOTE — DISCHARGE INSTRUCTIONS
Percutaneous Liver Biopsy   WHAT YOU NEED TO KNOW:   A PLB is a procedure to remove a sample of tissue from your liver  The sample can be sent to a lab and tested for liver disease, cancer, or infection  After the procedure you may have pain and bruising at the biopsy site  You may also have pain in your right shoulder  These symptoms should get better in 48 to 72 hours  DISCHARGE INSTRUCTIONS:     [address] and [place] patients,  Contact Interventional Radiology at [phone] PATIENTS: Contact Interventional Radiology at [phone]   [facility] PATIENTS: Contact Interventional Radiology at [phone] if:    · Fever greater than 101 or chills  · You have severe pain in your abdomen  · Your abdomen is larger than usual and feels hard  · Your neck is more swollen and you have trouble swallowing  · You feel weak or dizzy  · Your heart is beating faster than usual    · Your pain does not get better after you take pain medicine  · Your wound is red, swollen, or draining pus  · You have nausea or are vomiting  · Your skin is itchy, swollen, or you have a rash  · You have questions or concerns about your condition or care  Medicines:   · Acetaminophen decreases pain and fever  It is available without a doctor's order  Acetaminophen can cause liver damage if not taken correctly  · Take your home medicine as directed  · Resume your normal diet  Small sips of flat soda will help with mild nausea  Self-care:   · Rest as directed  Do not play sports, exercise, or lift anything heavier than 5 pounds for up to 1 week  · Apply firm, steady pressure if bleeding occurs  A small amount of bleeding from your wound is possible  Apply pressure with a clean gauze or towel for 5 to 10 minutes  Call 911 if bleeding becomes heavy or does not stop  · Ask your healthcare provider when to take your blood thinner or antiplatelet medicine   You may need to wait 24 to 72 hours to take your medicine  This will prevent bleeding  Follow up with your healthcare provider as directed: Write down your questions so you remember to ask them during your visits  Procedural Sedation   WHAT YOU NEED TO KNOW:   Procedural sedation is medicine used during procedures to help you feel relaxed and calm  You will remember little to none of the procedure  After sedation you may feel tired, weak, or unsteady on your feet  You may also have trouble concentrating or short-term memory loss  These symptoms should go away in 24 hours or less  DISCHARGE INSTRUCTIONS:   Call 911 or have someone else call for any of the following:   · You have sudden trouble breathing      · You cannot be woken  ·    Contact Interventional Radiology at [phone] PATIENTS: Contact Interventional Radiology at [phone] [facility] PATIENTS: Contact Interventional Radiology at [phone]) if any of the following occur:      · You have a severe headache or dizziness      · Your heart is beating faster than usual     · You have a fever or chills      · Your skin is itchy, swollen, or you have a rash      · You have nausea or are vomiting for more than 8 hours after the procedure       · You have questions or concerns about your condition or care  Self-care:   · Have someone stay with you for 24 hours  This person can drive you to errands and help you do things around the house  This person can also watch for problems       · Rest and do quiet activities for 24 hours  Do not exercise, ride a bike, or play sports  Stand up slowly to prevent dizziness and falls  Take short walks around the house with another person  Slowly return to your usual activities the next day       · Do not drive or use dangerous machines or tools for 24 hours  You may injure yourself or others  Examples include a lawnmower, saw, or drill   Do not return to work for 24 hours if you use dangerous machines or tools for work       · Do not make important decisions for 24 hours  For example, do not sign important papers or invest money       · Drink liquids as directed  Liquids help flush the sedation medicine out of your body  Ask how much liquid to drink each day and which liquids are best for you       · Eat small, frequent meals to prevent nausea and vomiting  Start with clear liquids such as juice or broth  If you do not vomit after clear liquids, you can eat your usual foods       · Do not drink alcohol or take medicines that make you drowsy  This includes medicines that help you sleep and anxiety medicines  Ask your healthcare provider if it is safe for you to take pain medicine  Follow up with your healthcare provider as directed: Write down your questions so you remember to ask them during your visits

## 2021-08-16 NOTE — SEDATION DOCUMENTATION
Liver Mass biopsy Completed by Dr Rachna Arauz  D-Stat in needle tract  Band aid on skin  Denies pain or nausea at this time  Report given to Short Stay 218 RN

## 2021-08-19 NOTE — PROGRESS NOTES
Pt received chemo infusion w/out any adverse effects, labs w/in parameters  Pt viewed video on neulasta on-pro & placed on ARMIDA  Verbalized understanding, offers no complaints   AVS given

## 2021-08-23 PROBLEM — R07.89 OTHER CHEST PAIN: Status: ACTIVE | Noted: 2021-01-01

## 2021-08-23 PROBLEM — N17.9 AKI (ACUTE KIDNEY INJURY) (HCC): Status: ACTIVE | Noted: 2021-01-01

## 2021-08-23 PROBLEM — C25.9 PANCREATIC CANCER METASTASIZED TO LIVER (HCC): Status: ACTIVE | Noted: 2021-01-01

## 2021-08-23 PROBLEM — C78.7 PANCREATIC CANCER METASTASIZED TO LIVER (HCC): Status: ACTIVE | Noted: 2021-01-01

## 2021-08-23 NOTE — ASSESSMENT & PLAN NOTE
Lucas = 3  · Improved with nitro & asa en route to ED  Recurred in ED with somewhat improvement with dilaudid  · Troponins negative  · EKG in the ED non-ischemic   · Also history of GERD & cancer pain with new liver mets  mylanta prn & geriatric pain protocol  · Telemetry monitoring until stress test results  · Stress test completed today, 8/24  · Pending read  · If normal, will be cleared for ERCP from a cardiac standpoint  · Cardiology following

## 2021-08-23 NOTE — CONSULTS
Consultation - Cardiology   Riddhi Tsang 73 y o  male MRN: [id number]  Unit/Bed#: ED 27 Encounter: [id number]        Inpatient consult to Cardiology  Consult performed by: Emmanuel Antonio MD  Consult ordered by: Nohelia Cuevas PA-C            Assessment/Plan   Principal Problem:    Other chest pain  Active Problems:    Essential hypertension    GERD    Recent cholecystitis    Leukocytosis    Pancreatic cancer metastasized to liver ([facility] [address] )    [facility]STIAN[facility] ([facility]) ([facility] )      · Atypical chest pain    - Patient had atypical chest pain and is being evaluated for that  - PMH of hyperlipidemia, hypertension, pancreatic cancer  - Pain started suddenly while sitting, didn't get worse with exertion, no diaphoresis noted, got better with nitroglycerin given on the route to the hospital  - Currently patient doesn't mention any chest pain, shortness of breath or diaphoresis  - On ECG: sinus rhythm with occasional ventricular complexes, left axis deviation  - On Echo from 07/19/21, mild regurgitation of aortic valve and tricuspid valve was noted, trace regurgitation of  left ventricle showed systolic function at lower limit of normal with EF 50% and mild diffuse hypokinesis  - Troponin x3 negative  - Patient is asymptomatic currently    Plan:    - Stress test would help us better understand the origin of this episode  - Cardiac diet ordered  - No caffeine, chocolate 24 hours before performing stress test  - Hold nitroglycerin 24 hours before the stress test    Will discuss with attending physician for further necessary recommendations or changes       History of Present Illness     Physician Requesting Consult: Katie Hensley DO    Reason for Consult / Principal Problem:   Chief Complaint   Patient presents with    Chest Pain     Pt arrives in ED via EMS from home for chest pain starting at 1700 tonight   Took 4 ASA 81mg and SL nitro x1       HPI: Riddhi Tsang is a 73y o  year old male with the PMH of hypertension, Type 2 DM, hyperlipidemia, pancreatic cancer (undergoing chemotherapy) who presents after the episode of chest pain  He mentions pain to be sharp in nature and in the center of the chest, didn't get worse with activity according to him, was between 8/10 - 10/10 in intensity  He denies any episodes of shortness of breath, any prior similar episodes or any cardiac history  Patient started feeling better on the way to the hospital using nitroglycerin  This looks like atypical chest pain  At this point for better understanding of the episode, stress test would help  See assessment and plan above for further information  Review of Systems   Constitutional: Negative for chills, diaphoresis and unexpected weight change  HENT: Negative for congestion, facial swelling and nosebleeds  Eyes: Negative for discharge and visual disturbance  Respiratory: Negative for apnea, shortness of breath and wheezing  Cardiovascular: Positive for chest pain (currently resolved)  Negative for palpitations and leg swelling  Gastrointestinal: Negative for constipation, diarrhea, nausea and vomiting  Endocrine: Negative for cold intolerance and polyuria  Genitourinary: Negative for difficulty urinating and flank pain  Musculoskeletal: Negative for neck pain and neck stiffness  Neurological: Negative for dizziness, speech difficulty and headaches  Psychiatric/Behavioral: Negative for agitation, behavioral problems and confusion          Historical Information   Past Medical History:   Diagnosis Date    Anxiety     Arthritis     Cancer ([facility] )     pancreatic    Cellulitis     LAST ASSESSED: 6/13/14    Diabetes mellitus ([facility] )     Enlarged prostate     Epidermal inclusion cyst     LAST ASSESSED: 10/4/13    Erythrasma     LAST ASSESSED: 9/23/13    Furuncle     LAST ASSESSED: 6/11/14    GERD (gastroesophageal reflux disease)     Hyperlipidemia     Hypertension     RA (rheumatoid arthritis) ([facility])      Past Surgical History:   Procedure Laterality Date    COLONOSCOPY      FL GUIDED CENTRAL VENOUS ACCESS DEVICE INSERTION  6/1/2021    HYDROCELE EXCISION / REPAIR Right 01/11/2018    SPERMATIC CORD EXCSION OF HYDROCELE; MANAGED BY: GABBIE CARBAJAL    IR BIOPSY LIVER MASS  8/16/2021    IR CHOLECYSTOSTOMY TUBE CHECK/CHANGE/REPOSITION/REINSERTION/UPSIZE  6/22/2021    IR CHOLECYSTOSTOMY TUBE CHECK/CHANGE/REPOSITION/REINSERTION/UPSIZE  8/23/2021    IR CHOLECYSTOSTOMY TUBE PLACEMENT  5/11/2021    MULTIPLE TOOTH EXTRACTIONS Left 7/22/2021    Procedure: EXTRACTION TEETH 14 & 15, ALVEOPLASTY, EXCISION OF CYST LEFT MAXILLA; Surgeon: Lelia Olson DDS;  Location: [facility];  Service: Maxillofacial    VT REMOVAL OF HYDROCELE,TUNICA,UNILAT Right 1/11/2018    Procedure: HYDROCELECTOMY;  Surgeon: William Plata MD;  Location[facility];  Service: Urology    SCROTAL SURGERY      benign "lump"    TUNNELED VENOUS PORT PLACEMENT Left 6/1/2021    Procedure: INSERTION VENOUS PORT (PORT-A-CATH); Surgeon: Yahaira Garcia MD;  Location: [facility];  Service: Surgical Oncology     Social History     Substance and Sexual Activity   Alcohol Use Never     Social History     Substance and Sexual Activity   Drug Use Never     Social History     Tobacco Use   Smoking Status Former Smoker   Smokeless Tobacco Never Used   Tobacco Comment    tobacco in a pipe in the early 1980's     Family History   Problem Relation Age of Onset    Heart disease Father         CARDIAC DISORDER    Hypertension Father     Hypertension Mother     No Known Problems Maternal Aunt     No Known Problems Maternal Uncle     No Known Problems Paternal Aunt     No Known Problems Paternal Uncle     No Known Problems Paternal Grandmother     No Known Problems Paternal Grandfather     Diabetes Maternal Grandmother         MELLITUS    No Known Problems Maternal Grandfather     Hypertension Other        Allergies:   Allergies   Allergen Reactions    Fentanyl Anaphylaxis and Other (See Comments)     Oxygen drops severely       Medications:     Current Facility-Administered Medications:     acetaminophen (TYLENOL) tablet 975 mg, 975 mg, Oral, Q8H [address], Jossie Ornelas PA-C, 975 mg at 08/23/21 0705    aluminum-magnesium hydroxide-simethicone (MYLANTA) oral suspension 30 mL, 30 mL, Oral, Q4H PRN, Jossie Ornelas PA-C    aspirin chewable tablet 81 mg, 81 mg, Oral, Daily, Jossie Ornelas PA-C, 81 mg at 08/23/21 0805    atorvastatin (LIPITOR) tablet 20 mg, 20 mg, Oral, Daily With Charisma Moreno PA-C    FLUoxetine (PROzac) capsule 20 mg, 20 mg, Oral, Daily, Jossie Ornelas PA-C, 20 mg at 70/03/19 2376    folic acid (FOLVITE) tablet 1 mg, 1 mg, Oral, Daily, Jossie Ornelas PA-C, 1 mg at 08/23/21 0805    heparin (porcine) subcutaneous injection 5,000 Units, 5,000 Units, Subcutaneous, Q8H INGRID, Jossie Ornelas PA-C    hydrALAZINE (APRESOLINE) injection 10 mg, 10 mg, Intravenous, Q6H PRN, Jossie Ornelas PA-C    HYDROmorphone (DILAUDID) injection 0 2 mg, 0 2 mg, Intravenous, Q4H PRN, Jossie Ornelas PA-C    insulin glargine (LANTUS) subcutaneous injection 12 Units 0 12 mL, 12 Units, Subcutaneous, HS, Jossie Ornelas PA-C    insulin lispro (HumaLOG) 100 units/mL subcutaneous injection 10 Units, 10 Units, Subcutaneous, TID With Meals, Jossie Ornelas PA-C, 10 Units at 08/23/21 1155    insulin lispro (HumaLOG) 100 units/mL subcutaneous injection 2-12 Units, 2-12 Units, Subcutaneous, HS, Jossie Ornelas PA-C    naloxone (NARCAN) 0 04 mg/mL syringe 0 04 mg, 0 04 mg, Intravenous, Q1MIN PRN, Jossie Ornelas PA-C    nitroglycerin (NITROSTAT) SL tablet 0 4 mg, 0 4 mg, Sublingual, Q5 Min PRN, Jossie Ornelas PA-C    ondansetron (ZOFRAN) injection 4 mg, 4 mg, Intravenous, Q4H PRN, Jossie Ornelas PA-C    oxyCODONE (ROXICODONE) immediate release tablet 10 mg, 10 mg, Oral, Q4H PRN, Jossie Ornelas PA-C    oxyCODONE (ROXICODONE) IR tablet 5 mg, 5 mg, Oral, Q4H PRN, Jossie Ornelas PA-C    pancrelipase (Lip-Prot-Amyl) (CREON) delayed release capsule 24,000 Units, 24,000 Units, Oral, TID With Meals, KRISS Groves-C, 24,000 Units at 08/23/21 1155    pantoprazole (PROTONIX) EC tablet 40 mg, 40 mg, Oral, BID AC, KRISS Groves-C, 40 mg at 08/23/21 0705    predniSONE tablet 10 mg, 10 mg, Oral, Daily With Breakfast, ALVA GrovesC, 10 mg at 08/23/21 0725    prochlorperazine (COMPAZINE) tablet 10 mg, 10 mg, Oral, Q6H PRN, KRISS Groves-C    senna-docusate sodium (SENOKOT S) 8 6-50 mg per tablet 1 tablet, 1 tablet, Oral, HS, KRISS Groves-YESENIA    sodium chloride 0 9 % infusion, 75 mL/hr, Intravenous, Continuous, KRISS Groves-C, Last Rate: 75 mL/hr at 08/23/21 1009, 75 mL/hr at 08/23/21 1009    tamsulosin (FLOMAX) capsule 0 4 mg, 0 4 mg, Oral, Daily With Dinner, Jossie Ornelas PA-C    Current Outpatient Medications:     Alcohol Swabs 70 % PADS, May substitute brand based on insurance coverage  Check glucose ACHS  , Disp: 200 each, Rfl: 0    aspirin 81 mg chewable tablet, Chew 81 mg , Disp: , Rfl:     Blood Glucose Monitoring Suppl (ONE TOUCH ULTRA 2) w/Device KIT, Use daily, Disp: 1 each, Rfl: 1    cyanocobalamin (VITAMIN B-12) 1000 MCG tablet, Take 1 tablet (1,000 mcg total) by mouth daily, Disp: 30 tablet, Rfl: 0    FLUoxetine (PROzac) 20 mg capsule, TAKE ONE CAPSULE BY MOUTH EVERY DAY, Disp: 90 capsule, Rfl: 2    folic acid (FOLVITE) 1 mg tablet, Take by mouth daily, Disp: , Rfl:     gabapentin (NEURONTIN) 100 mg capsule, Take 2 capsules (200 mg total) by mouth 3 (three) times a day, Disp: 190 capsule, Rfl: 0    glucose blood (OneTouch Ultra) test strip, Use as instructed tid, Disp: 100 each, Rfl: 5    insulin glargine (Lantus SoloStar) 100 units/mL injection pen, Inject 12 Units under the skin daily at bedtime, Disp: 15 mL, Rfl: 0    insulin lispro (HumaLOG KwikPen) 100 units/mL injection pen, Take 10 units with breakfast, lunch and dinner +scale, Disp: 15 mL, Rfl: 5    Insulin Pen Needle (BD Pen Needle Cinda 2nd Gen) 32G X 4 MM MISC, For use with insulin pen  Pharmacy may dispense brand covered by insurance , Disp: 100 each, Rfl: 3    Lancets (onetouch ultrasoft) lancets, Use as instructed once a day, Disp: 50 each, Rfl: 5    lisinopril-hydrochlorothiazide (PRINZIDE,ZESTORETIC) 10-12 5 MG per tablet, Take 1 tablet by mouth 3 (three) times a week Monday Wednesday Friday, Disp: 90 tablet, Rfl: 3    oxyCODONE (ROXICODONE) 5 mg immediate release tablet, Take 1 tablet (5 mg total) by mouth every 4 (four) hours as needed (cancer pain)Max Daily Amount: 30 mg, Disp: 120 tablet, Rfl: 0    pancrelipase, Lip-Prot-Amyl, (CREON) 24,000 units, Take 1 capsule (24,000 Units total) by mouth 3 (three) times a day with meals Or snacks, Disp: 270 capsule, Rfl: 0    pantoprazole (PROTONIX) 40 mg tablet, Take 1 tablet (40 mg total) by mouth 2 (two) times a day Before breakfast, before bed, to reduce stomach acid, Disp: 60 tablet, Rfl: 1    predniSONE 20 mg tablet, Take 0 5 tablets (10 mg total) by mouth daily with breakfast, Disp: 15 tablet, Rfl:     prochlorperazine (COMPAZINE) 10 mg tablet, Take 1 tablet (10 mg total) by mouth every 6 (six) hours as needed for nausea or vomiting, Disp: 45 tablet, Rfl: 3    Sodium Chloride Flush (Normal Saline Flush) 0 9 % SOLN, Gall bladder drain (Patient not taking: Reported on 8/11/2021), Disp: , Rfl:      Objective:   Vitals:   Vitals:    08/23/21 1200   BP: 145/83   Pulse: 87   Resp: 18   Temp:    SpO2: 94%     Body mass index is 24 35 kg/m²    Orthostatic Blood Pressures      Most Recent Value   Blood Pressure  145/83 filed at 08/23/2021 1200   Patient Position - Orthostatic VS  Lying filed at 08/23/2021 6093        Systolic (46LRH), GXE:716 , Min:101 , SHW:559     Diastolic (80EGI), UZE:14, Min:59, Max:83      Intake/Output Summary (Last 24 hours) at 8/23/2021 1400  Last data filed at 8/23/2021 7275  Gross per 24 hour   Intake 500 ml   Output 550 ml   Net -50 ml     Weight (last 2 days)     Date/Time   Weight    08/23/21 0033   74 8 (994) Invasive Devices     Central Venous Catheter Line            Port A Cath 06/01/21 Left Chest 83 days          Peripheral Intravenous Line            Peripheral IV 08/16/21 Right Antecubital 7 days    Peripheral IV 08/23/21 Distal;Left;Upper;Ventral (anterior) Arm <1 day          Drain            Closed/Suction Drain Lateral  days                Physical Exam  Constitutional:       General: He is not in acute distress  Appearance: He is normal weight  He is not ill-appearing  HENT:      Head: Normocephalic and atraumatic  Nose: No congestion or rhinorrhea  Mouth/Throat:      Mouth: Mucous membranes are moist       Pharynx: No oropharyngeal exudate or posterior oropharyngeal erythema  Eyes:      General: No scleral icterus  Right eye: No discharge  Left eye: No discharge  Cardiovascular:      Rate and Rhythm: Normal rate  Pulses: Normal pulses  Heart sounds: No murmur heard  Pulmonary:      Effort: Pulmonary effort is normal  No respiratory distress  Breath sounds: No wheezing or rales  Abdominal:      General: There is no distension  Tenderness: There is no abdominal tenderness  There is no guarding  Musculoskeletal:         General: No swelling  Cervical back: No rigidity or tenderness  Skin:     Coloration: Skin is not jaundiced  Findings: No erythema  Neurological:      Mental Status: He is alert  Psychiatric:         Mood and Affect: Mood normal          Thought Content:  Thought content normal          Labs:     Results from last 7 days   Lab Units 08/23/21  1008 08/23/21  0642 08/23/21  0058   TROPONIN I ng/mL <0 02 <0 02 <0 02     CBC with diff:   Results from last 7 days   Lab Units 08/23/21  0058 08/17/21  1017   WBC Thousand/uL 24 72* 3 66*   HEMOGLOBIN g/dL 11 5* 12 5   HEMATOCRIT % 35 9* 39 0   MCV fL 94 91   PLATELETS Thousands/uL 257 142*   MCH pg 30 1 29 3   MCHC g/dL 32 0 32 1   RDW % 17 5* 17 4*   MPV fL 11 3 10 3     CMP:  Results from last 7 days   Lab Units 21  0058 21  1017   POTASSIUM mmol/L 3 2* 3 8   CHLORIDE mmol/L 106 104   CO2 mmol/L 24 27   BUN mg/dL 19 13   CREATININE mg/dL 1 17 0 71   CALCIUM mg/dL 8 4 9 2   AST U/L 16 27   ALT U/L 46 40   ALK PHOS U/L 135* 88 0   EGFR ml/min/1 73sq m 65 98     NT-proBNP: No results found for: NTBNP   Magnesium:    Coags:    TSH:     Hemoglobin A1C:    Lipid Profile:   Lab Results   Component Value Date    CHOL 190 2017    CHOL 235 (H) 2016    CHOL 229 (H) 2016     Lab Results   Component Value Date    HDL 36 (L) 2021    HDL 65 2021    HDL 38 (L) 2020     Lab Results   Component Value Date    LDLCALC 116 (H) 2021    LDLCALC 94 2021    LDLCALC 114 (H) 2020     Lab Results   Component Value Date    TRIG 181 6 (H) 2021    TRIG 67 2021    TRIG 134 2020       Imaging & Testing   Cardiac testing:     EKG reviewed personally: unchanged from previous tracings, normal sinus rhythm, with occasional PVCs  Telemetry reviewed personally: No significant arrhythmias seen on telemetry review      Results for orders placed during the hospital encounter of 21    Echo complete with contrast if indicated    Narrative  [place] [address], [address] [place] [address]  [phone]    Transthoracic Echocardiogram  2D, M-mode, Doppler, and Color Doppler    Study date:  15-Jul-2021    Patient: Genaro Frazier  MR number: [id number]  Account number: [de-identified]  : 1955  Age: 73 years  Gender: Male  Status: Inpatient  Location: Bedside  Height: 69 in  Weight: 168 lb  BP: 123/ 74 mmHg    Indications: Bacteremia    Diagnoses: R78 81 - Bacteremia    Sonographer:  FRANCESCO Denise  Primary Physician:  Mynor Serrano MD  Referring Physician:  Nicolás Zacarias MD  Group:  [facility]  Interpreting Physician:  Karlos Jha MD    SUMMARY    LEFT VENTRICLE:  The ventricle was mildly dilated  Systolic function was at the lower limits of normal by visual assessment  Ejection fraction was estimated to be 50 %  There were no regional wall motion abnormalities  LEFT ATRIUM:  The atrium was mildly dilated  MITRAL VALVE:  There was mild regurgitation  AORTIC VALVE:  There was mild regurgitation  TRICUSPID VALVE:  There was mild regurgitation  PULMONIC VALVE:  There was trace regurgitation  PERICARDIUM:  A trace pericardial effusion was identified  HISTORY: PRIOR HISTORY: DM2, HTN, Pancreatic CA, Chemo, HLD    PROCEDURE: The procedure was performed at the bedside  This was a routine study  The transthoracic approach was used  The study included complete 2D imaging, M-mode, complete spectral Doppler, and color Doppler  The heart rate was 79 bpm,  at the start of the study  Images were obtained from the parasternal, apical, subcostal, and suprasternal notch acoustic windows  Image quality was adequate  LEFT VENTRICLE: The ventricle was mildly dilated  Systolic function was at the lower limits of normal by visual assessment  Ejection fraction was estimated to be 50 %  There were no regional wall motion abnormalities  Wall thickness was  normal  DOPPLER: The ratio of early ventricular filling to atrial contraction velocities was within the normal range  Left ventricular diastolic function parameters were normal     RIGHT VENTRICLE: The size was normal  Systolic function was normal  DOPPLER: Systolic pressure was not estimated  LEFT ATRIUM: The atrium was mildly dilated  RIGHT ATRIUM: Size was normal     MITRAL VALVE: Valve structure was normal  There was mild calcification  There was normal leaflet separation  DOPPLER: The transmitral velocity was within the normal range  There was no evidence for stenosis  There was mild regurgitation  AORTIC VALVE: The valve was trileaflet   Leaflets exhibited normal thickness, mild calcification, and normal cuspal separation  DOPPLER: Transaortic velocity was within the normal range  There was no evidence for stenosis  There was mild  regurgitation  TRICUSPID VALVE: The valve structure was normal  There was normal leaflet separation  DOPPLER: The transtricuspid velocity was within the normal range  There was no evidence for stenosis  There was mild regurgitation  PULMONIC VALVE: Leaflets exhibited normal thickness, no calcification, and normal cuspal separation  DOPPLER: The transpulmonic velocity was within the normal range  There was trace regurgitation  PERICARDIUM: A trace pericardial effusion was identified  AORTA: The root exhibited normal size  SYSTEMIC VEINS: IVC: The inferior vena cava was normal in size and course   Respirophasic changes were normal     SYSTEM MEASUREMENT TABLES    2D  %FS: 31 19 %  Ao Diam: 3 61 cm  EDV(Teich): 181 8 ml  EF(Teich): 58 06 %  ESV(Teich): 76 25 ml  IVSd: 1 05 cm  LA Area: 22 91 cm2  LA Diam: 4 16 cm  LVEDV MOD A4C: 141 02 ml  LVEF MOD A4C: 52 85 %  LVESV MOD A4C: 66 49 ml  LVIDd: 6 03 cm  LVIDs: 4 15 cm  LVLd A4C: 8 92 cm  LVLs A4C: 7 68 cm  LVPWd: 1 03 cm  RA Area: 14 88 cm2  RVIDd: 3 62 cm  SV MOD A4C: 74 53 ml  SV(Teich): 105 55 ml    CW  AR Dec [place]: 3 m/s2  AR Dec Time: 1494 55 ms  AR PHT: 433 42 ms  AR Vmax: 4 49 m/s  AR maxP 61 mmHg  TR MaxP 12 mmHg  TR Vmax: 2 07 m/s    MM  TAPSE: 2 02 cm    PW  MV A Riky: 0 67 m/s  MV Dec [place]: 6 28 m/s2  MV DecT: 143 39 ms  MV E Riky: 0 76 m/s  MV E/A Ratio: 1 13    [employer] Accredited Echocardiography Laboratory    Prepared and electronically signed by    Chadd Posada MD  Signed 2021 11:06:52    Results for orders placed during the hospital encounter of 21    JAMAL    Narrative  [address] [place] [address], [address] [place] [address]  [phone]    Transthoracic Echocardiogram  2D    Study date:  2021    Patient: Kezia Vázquez  MR number: [id number]  Account number: [de-identified]  : 1955  Age: 73 years  Gender: Male  Status: Inpatient  Location: Cath lab  Height: 69 in  Weight: 168 lb  BP: 130/ 69 mmHg    Indications: CVA    Diagnoses: I63 9 - Cerebral infarction, unspecified    Sonographer:  FRANCESCO Dickens  Primary Physician:  Nisha Thomas MD  Referring Physician:  Robb Desai MD  Group:  [facility]  Interpreting Physician:  Harlan Barnett DO    SUMMARY    LEFT VENTRICLE:  Systolic function was at the lower limits of normal  Ejection fraction was estimated to be 50 %  There was mild diffuse hypokinesis  LEFT ATRIUM:  The atrium was dilated  ATRIAL SEPTUM:  No defect or patent foramen ovale was identified by color Doppler  AORTIC VALVE:  There was mild regurgitation  TRICUSPID VALVE:  There was mild regurgitation  HISTORY: PRIOR HISTORY: HLD, HTN, DMII, pancreatic CA    PROCEDURE: The procedure was performed in the catheterization laboratory  This was a routine study  The risks and alternatives of the procedure were explained to the patient and informed consent was obtained  The transthoracic approach was  used  The study included complete 2D imaging  The heart rate was 74 bpm, at the start of the study  Intubated with ease  One intubation attempt(s)  There was no blood detected on the probe  Image quality was good  MEDICATIONS: Anesthesia  LEFT VENTRICLE: Size was normal  Systolic function was at the lower limits of normal  Ejection fraction was estimated to be 50 %  There was mild diffuse hypokinesis  Wall thickness was normal     RIGHT VENTRICLE: The size was normal  Systolic function was normal  Wall thickness was normal     LEFT ATRIUM: The atrium was dilated  No thrombus was identified  ATRIAL SEPTUM: No defect or patent foramen ovale was identified by color Doppler  RIGHT ATRIUM: Size was normal  No thrombus was identified      MITRAL VALVE: Valve structure was normal  There was normal leaflet separation  There was no echocardiographic evidence of vegetation  DOPPLER: There was trace regurgitation  AORTIC VALVE: The valve was trileaflet  Leaflets exhibited normal thickness and normal cuspal separation  There was no echocardiographic evidence of vegetation  DOPPLER: Transaortic velocity was within the normal range  There was no  evidence for stenosis  There was mild regurgitation  TRICUSPID VALVE: The valve structure was normal  There was normal leaflet separation  There was no echocardiographic evidence of vegetation  DOPPLER: There was mild regurgitation  PULMONIC VALVE: Leaflets exhibited normal thickness, no calcification, and normal cuspal separation  There was no echocardiographic evidence of vegetation  DOPPLER: There was trace regurgitation  PERICARDIUM: There was no pericardial effusion  The pericardium was normal in appearance  AORTA: The root exhibited normal size  There was no atheroma  There was no evidence for dissection  There was no evidence for aneurysm  [address] Accredited Echocardiography Laboratory    Prepared and electronically signed by    Megan Arellano DO  Signed 19-Jul-2021 18:01:27    No results found for this or any previous visit  No results found for this or any previous visit  Imaging:   I have personally reviewed pertinent reports  XR chest 1 view portable    Result Date: 8/23/2021  Narrative: CHEST INDICATION:   chest pain  COMPARISON:  July 13, 2021 EXAM PERFORMED/VIEWS:  XR CHEST PORTABLE FINDINGS:  Left-sided infusion port catheter device is stable in position  Cardiomediastinal silhouette appears unremarkable  The lungs are clear  No pneumothorax or pleural effusion  There is some arthritis of the acromioclavicular joints  Impression: No acute findings   Workstation performed: [id number]     IR biopsy liver mass    Result Date: 8/16/2021  Narrative: PROCEDURE: Multiphase CT abdomen/pelvis  US guided liver lesion biopsy  STAFF: VAMSHI Diaz  DOSE LENGTH PRODUCT: 933 99 mGy-cm  This examination, like all CT scans performed in the [facility], was performed utilizing techniques to minimize radiation dose exposure, including the use of iterative reconstruction and automated exposure control  NUMBER OF IMAGES: Multiple  COMPLICATIONS: None  MEDICATIONS: Moderate sedation with fentanyl and versed per nursing protocol  Moderate sedation was monitored by radiology nursing staff  Monitoring of conscious sedation totaled 45 minutes  INDICATION: Pancreatic cancer  Concern for liver metastasis vs abscess  PROCEDURE: Patient has a known segment 6 hypodense lesion, with differential diagnosis including pancreatic cancer metastasis versus abscess  Initial multiphase (noncontrast, arterial, portal venous) CT scan demonstrated near complete resolution of this lesion  The lesion was therefore thought to represent an abscess that has been successfully treated with antibiotics  No biopsy of this lesion was performed  However, the CT scan demonstrated a lesion that has increased in size within the anterior aspect of hepatic segment 3, with indeterminate characteristics  The lesion was easily identified on ultrasound, images were sent to PACS  Skin and underlying subcutaneous tissues were anesthetized with lidocaine  Under real-time ultrasound guidance, a 17-gauge trocar needle was advanced into the anterior aspect of the lesion  A total of 5 core biopsies were obtained  Two of the samples were used for touch prep, confirming adequacy  The next 3 samples were placed in formalin  D-Stat was used to promote hemostasis upon trochar removal  The patient tolerated the procedure well without immediate consultation  FINDINGS: 1  A known segment 6 lesion was nearly completely resolved on today's periprocedural CT scan  This is most consistent with resolving abscess   2  A known segment 3 lesion has increased in size  This lesion was biopsied under US guidance as above  Impression: CT scan of the abdomen, followed by US guided biopsy of a segment 3 hepatic lesion  Workstation performed: [id number]     CTA ED chest PE Study    Result Date: 8/23/2021  Narrative: CTA - CHEST WITH IV CONTRAST - PULMONARY ANGIOGRAM INDICATION:   PE suspected, intermediate prob, positive D-dimer chest pain  COMPARISON: CT abdomen and pelvis on 7/13/2021  TECHNIQUE: CTA examination of the chest was performed using angiographic technique according to a protocol specifically tailored to evaluate for pulmonary embolism  Axial, sagittal, and coronal 2D reformatted images were created from the source data and  submitted for interpretation  In addition, coronal 3D MIP postprocessing was performed on the acquisition scanner  Radiation dose length product (DLP) for this visit:  509 mGy-cm   This examination, like all CT scans performed in the [place], was performed utilizing techniques to minimize radiation dose exposure, including the use of iterative reconstruction and automated exposure control  IV Contrast:  85 mL of iohexol (OMNIPAQUE)  FINDINGS: PULMONARY ARTERIAL TREE:  No large central pulmonary embolus is seen  The lobar, segmental, and subsegmental branches are suboptimally evaluated due to poor contrast opacification, streak artifact, and motion artifact  LUNGS:  Mild streaky densities in the lung bases, left greater than right, compatible with atelectasis and/or infiltrates  There is no tracheal or endobronchial lesion  PLEURA:  Unremarkable  HEART/GREAT VESSELS:  Heart is normal size  Small pericardial effusion, decreased since the prior study  Left anterior chest wall Mediport with its tip in the superior vena cava  MEDIASTINUM AND WAQAS:  Unremarkable   CHEST WALL AND LOWER NECK:   Unchanged superficial subcutaneous lesion in the right anterior chest wall measuring 3 2 x 1 2 cm (series 2, image 184)  VISUALIZED STRUCTURES IN THE UPPER ABDOMEN:  Metallic common bile duct stent and right-sided percutaneous drainage catheter noted on the  views  Slight progression of pneumobilia when compared to the prior study  Multiple hepatic cysts are partially imaged  OSSEOUS STRUCTURES:  No acute fracture or destructive osseous lesion  Impression: 1  No evidence of large central pulmonary embolism  The lobar, segmental, and subsegmental branches are suboptimally evaluated due to limitations of the study  2   Mild streaky densities in the lung bases are nonspecific could reflect infiltrates and/or atelectasis  3   Interval decrease in small pericardial effusion  4   Metallic common bile duct stent is redemonstrated with associated pneumobilia  The extent of pneumobilia appears to have slightly progressed when compared to the prior study  Follow-up is recommended  Workstation performed: [id number]      bedside procedure    Result Date: 8/23/2021  Narrative: 1 2 [id number]  2 446 161 [id number] 1    IR cholecystostomy tube check/change/reposition/reinsertion/upsize    Result Date: 8/23/2021  Narrative: Cholecystostomy catheter check   Clinical History: Pneumobilia  Fluoro time: 1 2 minutes Technique: The patient was brought to the interventional radiology suite and identified verbally and by wristband  The patient was placed supine on the table and a  view was obtained of indwelling catheter within the gallbladder   After review of the patient's prior imaging studies, the indwelling catheter was injected with a small amount of contrast    Contrast injected through the cholecystostomy tube opacified cystic duct  Central and intrahepatic biliary duct system was opacified with mild central duct prominence  Metallic stent was not opacified initially with delayed images demonstrating contrast tracking around the stent and a minimally through the stent into the duodenum    This as a worsened appearance since prior study of 6/22/2021  Postprocedure images demonstrate residual contrast in the biliary system centrally  The catheter has been capped since 6/22/2021  Patient has been flushing it regularly  Given the history of cholecystostomy tube placement for prior stent occlusion on 5/11/2021   [facility] consult is recommended for possible intervention  Catheter currently again capped and findings discussed with Dr Arnett  Would await GI consult for further management  Patient tolerated the procedure well with no immediate post procedural complication  Impression: Impression: Cholecystogram demonstrates mostly occluded metallic stent with debris of contrast into the duodenum  Nondraining contrast seen in the biliary system post procedure  GI consult recommended  Catheter currently again capped and findings discussed with Dr Arnett  Would await GI consult for further management  Workstation performed: [id number]         Portions of the record may have been created with voice recognition software  Occasional wrong word or "sound a like" substitutions may have occurred due to the inherent limitations of voice recognition software  Please read the chart carefully and recognize, using context, where substitutions have occurred  Please call the Ryne Axon of the note for any clarifications

## 2021-08-23 NOTE — ASSESSMENT & PLAN NOTE
BUN   Date Value Ref Range Status   08/23/2021 19 5 - 25 mg/dL Final   08/17/2021 13 6 - 20 mg/dL Final   08/09/2021 19 6 - 20 mg/dL Final     Creatinine   Date Value Ref Range Status   08/23/2021 1 17 0 60 - 1 30 mg/dL Final     Comment:     Standardized to IDMS reference method   08/17/2021 0 71 0 50 - 1 20 mg/dL Final     Comment:     Standardized to IDMS reference method   08/09/2021 0 59 0 50 - 1 20 mg/dL Final     Comment:     Standardized to IDMS reference method     · Was 1 17 upon admission, now 0 8  · Baseline Cr 0 5 - 0 7  · Hold nephrotoxin including lisinopril -hctz

## 2021-08-23 NOTE — ASSESSMENT & PLAN NOTE
WBC   Date Value Ref Range Status   08/23/2021 24 72 (H) 4 31 - 10 16 Thousand/uL Final   08/17/2021 3 66 (L) 4 31 - 10 16 Thousand/uL Final   08/09/2021 6 42 4 31 - 10 16 Thousand/uL Final     · 2/2 Neulasta dose 8/19

## 2021-08-23 NOTE — ED ATTENDING ATTESTATION
8/23/2021  IJonatan MD, saw and evaluated the patient  I have discussed the patient with the resident/non-physician practitioner and agree with the resident's/non-physician practitioner's findings, Plan of Care, and MDM as documented in the resident's/non-physician practitioner's note, except where noted  All available labs and Radiology studies were reviewed  I was present for key portions of any procedure(s) performed by the resident/non-physician practitioner and I was immediately available to provide assistance  At this point I agree with the current assessment done in the Emergency Department  I have conducted an independent evaluation of this patient a history and physical is as follows:    ED Course         Critical Care Time  Procedures    Patient is a 73 yom who presents with chest pain  History of cancer  Patient with recent bile duct stent placement  Noted chest pain at around 0530 today  Sharp  No sweats  Tried nitro  No vomiting  No ripping or tearing nature to the pain  Exam benign  Currently getting chemo     MDM pleasant well appearing 73 yom, will admit for cp obs

## 2021-08-23 NOTE — SEDATION DOCUMENTATION
Cholecystostomy tube check completed  Tube is to remain capped per Dr Rg Layton  New dressing applied

## 2021-08-23 NOTE — ASSESSMENT & PLAN NOTE
· Takes lisinopril -Hctz on M,W,F  · Will hold since there is CHRISTIANO  · Hydralazine 10 mg iv prn sbp > 170 mmhg

## 2021-08-23 NOTE — ASSESSMENT & PLAN NOTE
Lucas = 3  · Improved with nitro & asa en route to ED  Recurred in ED with improvement with dilaudid  · Troponin negative, trend for 3 with EKGs   · EKG in the ED non-ischemic   · Also history of GERD & cancer pain with new liver mets  mylanta prn & geriatric pain protocol  · Telemetry  · Stress test ordered  · Patient has never seen cardiologist, will consult cards

## 2021-08-23 NOTE — CONSULTS
e-Consult ([facility])  - Interventional Radiology  Riddhi Tsang 73 y o  male MRN: [id number]  Unit/Bed#: ED 27 Encounter: [id number]    IR has been consulted to evaluate the patient, determine the appropriate procedure, and whether or not a procedure can and should be performed regarding the care of Riddhi Tsang  We were consulted by Hospitalist concerning pneumobilia and ashli tube, and to possibly perform a ashli tube check if medically appropriate for the patient  Consults  08/23/21      Assessment/Recommendation:   73year old male with metastatic pancreatic cancer, bile duct stenting, cholecystitis s/p cholecystostomy tube placement on 5/11/2021  Pneumobilia noted on CT is due to bile duct stent  - Plan for routine 3 month ashli tube exchange  - Previous notes mention possible ashli tube capping trial with removal if patient tolerates capping trial   - Pneumobilia is likely related to bile duct stent  Total time spent in review of data, discussion with requesting provider and rendering advice was 10 min  Thank you for allowing Interventional Radiology to participate in the care of Riddhi Tsang  Please don't hesitate to call or TigerText us with any questions       Yuliya Astudillo MD

## 2021-08-23 NOTE — H&P
[address] 1955, 73 y o  male MRN: [id number]  Unit/Bed#: ED 27 Encounter: [id number]  Primary Care Provider: Nisha Thomas MD   Date and time admitted to hospital: 8/23/2021 12:32 AM    * Other chest pain  Assessment & Plan  Lucas = 3  · Improved with nitro & asa en route to ED  Recurred in ED with somewhat improvement with dilaudid  · Troponin negative, trend for 3 with EKGs   · EKG in the ED non-ischemic   · Also history of GERD & cancer pain with new liver mets  mylanta prn & geriatric pain protocol  · Telemetry  · Stress test ordered  · Patient has never seen cardiologist, will consult cards  CHRISTIANO (acute kidney injury) [facility])  Assessment & Plan  BUN   Date Value Ref Range Status   08/23/2021 19 5 - 25 mg/dL Final   08/17/2021 13 6 - 20 mg/dL Final   08/09/2021 19 6 - 20 mg/dL Final     Creatinine   Date Value Ref Range Status   08/23/2021 1 17 0 60 - 1 30 mg/dL Final     Comment:     Standardized to IDMS reference method   08/17/2021 0 71 0 50 - 1 20 mg/dL Final     Comment:     Standardized to IDMS reference method   08/09/2021 0 59 0 50 - 1 20 mg/dL Final     Comment:     Standardized to IDMS reference method     · Baseline Cr 0 5 - 0 7  · Gentle IVF at this time  · Hold nephrotoxin including lisinopril -hctz      Pancreatic cancer metastasized to liver [facility])  Assessment & Plan  · Continue pancrelipase from home, continue prednisone from home  · Receives chemotherapy 3x month (last date 8/19/2021) next date is 9/2/2021  · Neulasta received on 8/19  · Geriatric pain protocol    Leukocytosis  Assessment & Plan  WBC   Date Value Ref Range Status   08/23/2021 24 72 (H) 4 31 - 10 16 Thousand/uL Final   08/17/2021 3 66 (L) 4 31 - 10 16 Thousand/uL Final   08/09/2021 6 42 4 31 - 10 16 Thousand/uL Final     · 2/2 Neulasta dose 8/19    Recent cholecystitis  Assessment & Plan  · May 2021 with cholecystostomy in place  · LFts are acceptable today on CMP with the following:  AST 16  ALT 46  Alk phos 135  T bili 0 44  Lo fat diet  CT abd & pelvis -  Metallic common bile duct stent is redemonstrated with associated pneumobilia  The extent of pneumobilia appears to have slightly progressed when compared to the prior study  Follow-up is recommended  IR consulted  GERD  Assessment & Plan  · protonix from home, add mylanta prn    Essential hypertension  Assessment & Plan  · Takes lisinopril -Hctz on M,W,F  · Will hold since there is CHRISTIANO  · Hydralazine 10 mg iv prn sbp > 170 mmhg    VTE Pharmacologic Prophylaxis: VTE Score: 4 Moderate Risk (Score 3-4) - Pharmacological DVT Prophylaxis Ordered: enoxaparin (Lovenox)  Code Status: Level 1 - full code  Discussion with family: Patient declined call to   Anticipated Length of Stay: Patient will be admitted on an observation basis with an anticipated length of stay of less than 2 midnights secondary to cardiac acs r/o, cardiology consult, ir consult  Total Time for Visit, including Counseling / Coordination of Care: 45 minutes Greater than 50% of this total time spent on direct patient counseling and coordination of care  Chief Complaint: chest pain    History of Present Illness:  Jones Altman is a 73 y o  male with a PMH of hypertension, hyperlipidemia, metastatic pancreatic cancer to the liver complicated by diabetes necessitating insulin, RA, BPH, GERD who presents with chest pain  Patient states that his chest pain began in the center of his chest spreading across the front of the chest, while he was at rest around 5:30 the evening of 8/22  Prior to that he had eaten a dinner consisting of 2 pork chops buttered noodles and broccoli  Since it started the chest pain has not gone away until he took a nitro in the ambulance on the way to the emergency room    The chest pain waxes and wanes in intensity, and sometimes the intensity will be concentrated to the center of his chest, it is never associated with shortness of breath, diaphoresis, clamminess, fatigue  He is not nauseous there is no abdominal pain or vomiting  Patient has never fall chest pain like this before, he has no personal cardiac history, his father had a heart attack in his 62s  Review of Systems:  Review of Systems   Constitutional: Negative for chills, diaphoresis, fatigue and fever  HENT: Negative  Eyes: Negative  Respiratory: Negative for cough, shortness of breath, wheezing and stridor  Cardiovascular: Positive for chest pain  Negative for palpitations and leg swelling  Gastrointestinal: Negative for abdominal pain, diarrhea, nausea and vomiting  Genitourinary: Negative  Musculoskeletal: Negative  Skin: Negative  Neurological: Negative  Past Medical and Surgical History:   Past Medical History:   Diagnosis Date    Anxiety     Arthritis     Cancer ([place] [address] )     pancreatic    Cellulitis     LAST ASSESSED: 6/13/14    Diabetes mellitus ([place] [address] )     Enlarged prostate     Epidermal inclusion cyst     LAST ASSESSED: 10/4/13    Erythrasma     LAST ASSESSED: 9/23/13    Furuncle     LAST ASSESSED: 6/11/14    GERD (gastroesophageal reflux disease)     Hyperlipidemia     Hypertension     RA (rheumatoid arthritis) ([place] [address] )        Past Surgical History:   Procedure Laterality Date    COLONOSCOPY      FL GUIDED CENTRAL VENOUS ACCESS DEVICE INSERTION  6/1/2021    HYDROCELE EXCISION / REPAIR Right 01/11/2018    SPERMATIC CORD EXCSION OF HYDROCELE; MANAGED BY: GABBIE CARBAJAL    IR BIOPSY LIVER MASS  8/16/2021    IR CHOLECYSTOSTOMY TUBE CHECK/CHANGE/REPOSITION/REINSERTION/UPSIZE  6/22/2021    IR CHOLECYSTOSTOMY TUBE PLACEMENT  5/11/2021    MULTIPLE TOOTH EXTRACTIONS Left 7/22/2021    Procedure: EXTRACTION TEETH 14 & 15, ALVEOPLASTY, EXCISION OF CYST LEFT MAXILLA;   Surgeon: Jensen Billingsley DDS;  Location: [facility];  Service: Maxillofacial    SD REMOVAL OF HYDROCELE,TUNICA,UNILAT Right 1/11/2018 Procedure: HYDROCELECTOMY;  Surgeon: Nery Roger MD;  Location: [facility];  Service: Urology    SCROTAL SURGERY      benign "lump"    TUNNELED VENOUS PORT PLACEMENT Left 6/1/2021    Procedure: INSERTION VENOUS PORT (PORT-A-CATH); Surgeon: Darnell Varela MD;  Location: [facility];  Service: Surgical Oncology       Meds/Allergies:  Prior to Admission medications    Medication Sig Start Date End Date Taking? Authorizing Provider   Alcohol Swabs 70 % PADS May substitute brand based on insurance coverage  Check glucose ACHS  6/21/21   Mellissa Larkin PA-C   aspirin 81 mg chewable tablet Chew 81 mg     Historical Provider, MD   Blood Glucose Monitoring Suppl (ONE TOUCH ULTRA 2) w/Device KIT Use daily 3/17/21   Nirmal Yuan MD   cyanocobalamin (VITAMIN B-12) 1000 MCG tablet Take 1 tablet (1,000 mcg total) by mouth daily 7/24/21   Kenan Rodriguez MD   FLUoxetine (PROzac) 20 mg capsule TAKE 1 CAPSULE BY MOUTH EVERY DAY  10/13/20   Nirmal Yuan MD   folic acid (FOLVITE) 1 mg tablet Take by mouth daily    Historical Provider, MD   gabapentin (NEURONTIN) 100 mg capsule Take 2 capsules (200 mg total) by mouth 3 (three) times a day 8/2/21   Nirmal Yuan MD   glucose blood (OneTouch Ultra) test strip Use as instructed tid 8/9/21   Nirmal Yuan MD   insulin glargine (Lantus SoloStar) 100 units/mL injection pen Inject 12 Units under the skin daily at bedtime 8/11/21   Delmy Suazo MD   insulin lispro (HumaLOG KwikPen) 100 units/mL injection pen Take 10 units with breakfast, lunch and dinner +scale 8/11/21   Delmy Suazo MD   Insulin Pen Needle (BD Pen Needle Cinda 2nd Gen) 32G X 4 MM MISC For use with insulin pen  Pharmacy may dispense brand covered by insurance   7/30/21   Nirmal Yuan MD   Lancets (onetouch ultrasoft) lancets Use as instructed once a day 3/17/21   Nirmal Yuan MD   lisinopril-hydrochlorothiazide (PRINZIDE,ZESTORETIC) 10-12 5 MG per tablet Take 1 tablet by mouth 3 (three) times a week Monday Wednesday Friday 6/14/21   Ramiro Gaviria MD   oxyCODONE (ROXICODONE) 5 mg immediate release tablet Take 1 tablet (5 mg total) by mouth every 4 (four) hours as needed (cancer pain)Max Daily Amount: 30 mg 6/14/21   Ramiro Gaviria MD   pancrelipase, Lip-Prot-Amyl, (CREON) 24,000 units Take 1 capsule (24,000 Units total) by mouth 3 (three) times a day with meals Or snacks 8/3/21   Ramiro Gaviria MD   pantoprazole (PROTONIX) 40 mg tablet Take 1 tablet (40 mg total) by mouth 2 (two) times a day Before breakfast, before bed, to reduce stomach acid 7/6/21   Ramiro Gaviria MD   predniSONE 20 mg tablet Take 0 5 tablets (10 mg total) by mouth daily with breakfast 8/3/21   Ramiro Gaviria MD   prochlorperazine (COMPAZINE) 10 mg tablet Take 1 tablet (10 mg total) by mouth every 6 (six) hours as needed for nausea or vomiting 5/28/21   Rhianna Paulino MD   Sodium Chloride Flush (Normal Saline Flush) 0 9 % SOLN Gall bladder drain  Patient not taking: Reported on 8/11/2021 5/12/21   Historical Provider, MD     I have reviewed home medications with patient personally  Allergies:    Allergies   Allergen Reactions    Fentanyl Anaphylaxis and Other (See Comments)     Oxygen drops severely       Social History:  Marital Status: /Civil Union   Occupation: none  Patient Pre-hospital Living Situation: Home  Patient Pre-hospital Level of Mobility: walks  Patient Pre-hospital Diet Restrictions: none  Substance Use History:   Social History     Substance and Sexual Activity   Alcohol Use Never     Social History     Tobacco Use   Smoking Status Former Smoker   Smokeless Tobacco Never Used   Tobacco Comment    tobacco in a pipe in the early 1980's     Social History     Substance and Sexual Activity   Drug Use Never       Family History:  Family History   Problem Relation Age of Onset    Heart disease Father         CARDIAC DISORDER    Hypertension Father     Hypertension Mother     No Known Problems Maternal Aunt     No Known Problems Maternal Uncle     No Known Problems Paternal Aunt     No Known Problems Paternal Uncle     No Known Problems Paternal Grandmother     No Known Problems Paternal Grandfather     Diabetes Maternal Grandmother         MELLITUS    No Known Problems Maternal Grandfather     Hypertension Other        Physical Exam:     Vitals:   Blood Pressure: 133/74 (08/23/21 0730)  Pulse: 80 (08/23/21 0730)  Temperature: 98 9 °F (37 2 °C) (08/23/21 0033)  Temp Source: Oral (08/23/21 0033)  Respirations: 18 (08/23/21 0730)  Weight - Scale: 74 8 kg (165 lb) (08/23/21 0033)  SpO2: 93 % (08/23/21 0730)    Physical Exam  Vitals and nursing note reviewed  Constitutional:       Appearance: He is well-developed  He is not ill-appearing or diaphoretic  Comments: Some guarding and moving slowly during the physical exam because it exacerbates the chest pain per pt   HENT:      Head: Normocephalic and atraumatic  Nose: Nose normal       Mouth/Throat:      Mouth: Mucous membranes are moist    Eyes:      Extraocular Movements: Extraocular movements intact  Conjunctiva/sclera: Conjunctivae normal    Cardiovascular:      Rate and Rhythm: Normal rate and regular rhythm  Heart sounds: Normal heart sounds  No murmur heard  Comments: Port left chest  Pulmonary:      Effort: Pulmonary effort is normal  No respiratory distress  Breath sounds: Normal breath sounds  No wheezing, rhonchi or rales  Chest:      Chest wall: No tenderness  Abdominal:      General: Bowel sounds are normal  There is no distension  Palpations: Abdomen is soft  Tenderness: There is no abdominal tenderness  There is no guarding or rebound  Comments: Margaret drain with mild surrounding erythema and some yellow crusted discharge at the site  Musculoskeletal:         General: No swelling or tenderness   Normal range of motion  Cervical back: Neck supple  Skin:     General: Skin is warm and dry  Comments: brusies in multiple stages of healing in the abdomen from insulin sticks  Neurological:      General: No focal deficit present  Mental Status: He is alert  Psychiatric:         Mood and Affect: Mood normal          Behavior: Behavior normal           Additional Data:     Lab Results:  Results from last 7 days   Lab Units 08/23/21  0058 08/17/21  1017   WBC Thousand/uL 24 72* 3 66*   HEMOGLOBIN g/dL 11 5* 12 5   HEMATOCRIT % 35 9* 39 0   PLATELETS Thousands/uL 257 142*   BANDS PCT % 20*  --    NEUTROS PCT %  --  62   LYMPHS PCT %  --  31   LYMPHO PCT % 12*  --    MONOS PCT %  --  5   MONO PCT % 2*  --    EOS PCT % 1 1     Results from last 7 days   Lab Units 08/23/21  0058   SODIUM mmol/L 143   POTASSIUM mmol/L 3 2*   CHLORIDE mmol/L 106   CO2 mmol/L 24   BUN mg/dL 19   CREATININE mg/dL 1 17   ANION GAP mmol/L 13   CALCIUM mg/dL 8 4   ALBUMIN g/dL 3 0*   TOTAL BILIRUBIN mg/dL 0 44   ALK PHOS U/L 135*   ALT U/L 46   AST U/L 16   GLUCOSE RANDOM mg/dL 96         Results from last 7 days   Lab Units 08/23/21  0728   POC GLUCOSE mg/dl 127               Imaging: Reviewed radiology reports from this admission including: chest xray and CT PE  CTA ED chest PE Study   Final Result by Vladimir Mckeon MD (08/23 0529)      1  No evidence of large central pulmonary embolism  The lobar, segmental, and subsegmental branches are suboptimally evaluated due to limitations of the study  2   Mild streaky densities in the lung bases are nonspecific could reflect infiltrates and/or atelectasis  3   Interval decrease in small pericardial effusion  4   Metallic common bile duct stent is redemonstrated with associated pneumobilia  The extent of pneumobilia appears to have slightly progressed when compared to the prior study  Follow-up is recommended                    Workstation performed: [id number] XR chest 1 view portable    (Results Pending)   IR cholecystostomy tube check/change/reposition/reinsertion/upsize    (Results Pending)       EKG and Other Studies Reviewed on Admission:   · EKG: NSR  HR 90, PVCs  ** Please Note: This note has been constructed using a voice recognition system   **

## 2021-08-23 NOTE — CONSULTS
Consultation - [facility]  Sinai Fitzgerald 73 y o  male MRN: [id number]  Unit/Bed#: ED 27 Encounter: [id number]        Inpatient consult to gastroenterology  Consult performed by: Tabby Jimenez PA-C  Consult ordered by: Thien Grey DO          Reason for Consult / Principal Problem:  Suspected occlusion of metal biliary stent; pancreatic cancer    HPI: Sinai Fitzgerald is a 73y o  year old male with history of diabetes, pancreatic cancer currently undergoing chemotherapy who presented to the emergency room late last night/early this morning with complaint of acute onset of chest pain earlier in the evening, starting while at rest     He had undergone ERCP on 4/28/2021 by Dr Ilir Ludwig with placement of metallic wall stent; EUS with pancreatic biopsy on 5/7/2021 confirmed adenocarcinoma  He also has a cholecystostomy tube, placed on 5/11, but has been capped since 6/22  He underwent CT angiogram to rule out PE on this presentation, showing no obvious PE, there was noted to be pneumobilia with slight progression compared to previous CT on 7/13  IR performed tube check for his cholecystostomy tube, showing small amount of contrast tracking around the stent and a minimal amount passing through the stent into the duodenum  His LFTs actually appear relatively normal with only mildly elevated alkaline phosphatase of 135; normal transaminases and normal bilirubin of 0 44  Patient denies any jaundice, icterus, or darkened urine  Denies any fevers or chills, any nausea or vomiting  REVIEW OF SYSTEMS:    CONSTITUTIONAL: Denies any fever, chills, or rigors  Good appetite, and no recent weight loss  HEENT: No earache or tinnitus  Denies hearing loss or visual disturbances  CARDIOVASCULAR: No chest pain or palpitations  RESPIRATORY: Denies any cough, hemoptysis, shortness of breath or dyspnea on exertion  GASTROINTESTINAL: As noted in the History of Present Illness     GENITOURINARY: No problems with urination  Denies any hematuria or dysuria  NEUROLOGIC: No dizziness or vertigo, denies headaches  MUSCULOSKELETAL: Denies any muscle or joint pain  SKIN: Denies skin rashes or itching  ENDOCRINE: Denies excessive thirst  Denies intolerance to heat or cold  PSYCHOSOCIAL: Denies depression or anxiety  Denies any recent memory loss  Historical Information   Past Medical History:   Diagnosis Date    Anxiety     Arthritis     Cancer ([place] [address] )     pancreatic    Cellulitis     LAST ASSESSED: 6/13/14    Diabetes mellitus ([place] [address] )     Enlarged prostate     Epidermal inclusion cyst     LAST ASSESSED: 10/4/13    Erythrasma     LAST ASSESSED: 9/23/13    Furuncle     LAST ASSESSED: 6/11/14    GERD (gastroesophageal reflux disease)     Hyperlipidemia     Hypertension     RA (rheumatoid arthritis) ([place] [address] )      Past Surgical History:   Procedure Laterality Date    COLONOSCOPY      FL GUIDED CENTRAL VENOUS ACCESS DEVICE INSERTION  6/1/2021    HYDROCELE EXCISION / REPAIR Right 01/11/2018    SPERMATIC CORD EXCSION OF HYDROCELE; MANAGED BY: GABBIE CARBAJAL    IR BIOPSY LIVER MASS  8/16/2021    IR CHOLECYSTOSTOMY TUBE CHECK/CHANGE/REPOSITION/REINSERTION/UPSIZE  6/22/2021    IR CHOLECYSTOSTOMY TUBE CHECK/CHANGE/REPOSITION/REINSERTION/UPSIZE  8/23/2021    IR CHOLECYSTOSTOMY TUBE PLACEMENT  5/11/2021    MULTIPLE TOOTH EXTRACTIONS Left 7/22/2021    Procedure: EXTRACTION TEETH 14 & 15, ALVEOPLASTY, EXCISION OF CYST LEFT MAXILLA; Surgeon: Lidia Balderrama DDS;  Location: [facility];  Service: Maxillofacial    IA REMOVAL OF HYDROCELE,TUNICA,UNILAT Right 1/11/2018    Procedure: HYDROCELECTOMY;  Surgeon: Ami Fothergill, MD;  Location: [facility];  Service: Urology    SCROTAL SURGERY      benign "lump"    TUNNELED VENOUS PORT PLACEMENT Left 6/1/2021    Procedure: INSERTION VENOUS PORT (PORT-A-CATH);   Surgeon: Johny Meadows MD;  [facility]: [facility];  Service: Surgical Oncology     Social History Social History     Substance and Sexual Activity   Alcohol Use Never     Social History     Substance and Sexual Activity   Drug Use Never     Social History     Tobacco Use   Smoking Status Former Smoker   Smokeless Tobacco Never Used   Tobacco Comment    tobacco in a pipe in the early 1980's     Family History   Problem Relation Age of Onset    Heart disease Father         CARDIAC DISORDER    Hypertension Father     Hypertension Mother     No Known Problems Maternal Aunt     No Known Problems Maternal Uncle     No Known Problems Paternal Aunt     No Known Problems Paternal Uncle     No Known Problems Paternal Grandmother     No Known Problems Paternal Grandfather     Diabetes Maternal Grandmother         MELLITUS    No Known Problems Maternal Grandfather     Hypertension Other        Meds/Allergies     (Not in a hospital admission)    Current Facility-Administered Medications   Medication Dose Route Frequency    acetaminophen (TYLENOL) tablet 975 mg  975 mg Oral Q8H [address]    aluminum-magnesium hydroxide-simethicone (MYLANTA) oral suspension 30 mL  30 mL Oral Q4H PRN    aspirin chewable tablet 81 mg  81 mg Oral Daily    atorvastatin (LIPITOR) tablet 20 mg  20 mg Oral Daily With Dinner    FLUoxetine (PROzac) capsule 20 mg  20 mg Oral Daily    folic acid (FOLVITE) tablet 1 mg  1 mg Oral Daily    heparin (porcine) subcutaneous injection 5,000 Units  5,000 Units Subcutaneous Q8H [address]    hydrALAZINE (APRESOLINE) injection 10 mg  10 mg Intravenous Q6H PRN    HYDROmorphone (DILAUDID) injection 0 2 mg  0 2 mg Intravenous Q4H PRN    insulin glargine (LANTUS) subcutaneous injection 12 Units 0 12 mL  12 Units Subcutaneous HS    insulin lispro (HumaLOG) 100 units/mL subcutaneous injection 10 Units  10 Units Subcutaneous TID With Meals    insulin lispro (HumaLOG) 100 units/mL subcutaneous injection 2-12 Units  2-12 Units Subcutaneous HS    naloxone (NARCAN) 0 04 mg/mL syringe 0 04 mg  0 04 mg Intravenous Q1MIN PRN    nitroglycerin (NITROSTAT) SL tablet 0 4 mg  0 4 mg Sublingual Q5 Min PRN    ondansetron (ZOFRAN) injection 4 mg  4 mg Intravenous Q4H PRN    oxyCODONE (ROXICODONE) immediate release tablet 10 mg  10 mg Oral Q4H PRN    oxyCODONE (ROXICODONE) IR tablet 5 mg  5 mg Oral Q4H PRN    pancrelipase (Lip-Prot-Amyl) (CREON) delayed release capsule 24,000 Units  24,000 Units Oral TID With Meals    pantoprazole (PROTONIX) EC tablet 40 mg  40 mg Oral BID AC    predniSONE tablet 10 mg  10 mg Oral Daily With Breakfast    prochlorperazine (COMPAZINE) tablet 10 mg  10 mg Oral Q6H PRN    senna-docusate sodium (SENOKOT S) 8 6-50 mg per tablet 1 tablet  1 tablet Oral HS    sodium chloride 0 9 % infusion  75 mL/hr Intravenous Continuous    tamsulosin (FLOMAX) capsule 0 4 mg  0 4 mg Oral Daily With Dinner       Allergies   Allergen Reactions    Fentanyl Anaphylaxis and Other (See Comments)     Oxygen drops severely           Objective     Blood pressure 145/83, pulse 87, temperature 98 9 °F (37 2 °C), temperature source Oral, resp  rate 18, weight 74 8 kg (165 lb), SpO2 94 %  Intake/Output Summary (Last 24 hours) at 8/23/2021 1332  Last data filed at 8/23/2021 2065  Gross per 24 hour   Intake 500 ml   Output 550 ml   Net -50 ml         PHYSICAL EXAM     General Appearance:   Alert, cooperative, no distress, appears stated age    HEENT:   Normocephalic, atraumatic, anicteric      Neck:  Supple, symmetrical, trachea midline, no adenopathy;    thyroid: no enlargement/tenderness/nodules; no carotid  bruit or JVD    Lungs:   Clear to auscultation bilaterally; no rales, rhonchi or wheezing; respirations unlabored    Heart[de-identified]   S1 and S2 normal; regular rate and rhythm; no murmur, rub, or gallop     Abdomen:   Soft, non-tender, non-distended; normal bowel sounds; no masses, no organomegaly    Genitalia:   Deferred    Rectal:   Deferred    Extremities:  No cyanosis, clubbing or edema    Pulses:  2+ and symmetric all extremities    Skin:  Skin color, texture, turgor normal, no rashes or lesions    Lymph nodes:  No palpable cervical, axillary or inguinal lymphadenopathy        Lab Results:   Admission on 08/23/2021   Component Date Value    WBC 08/23/2021 24 72*    RBC 08/23/2021 3 82*    Hemoglobin 08/23/2021 11 5*    Hematocrit 08/23/2021 35 9*    MCV 08/23/2021 94     MCH 08/23/2021 30 1     MCHC 08/23/2021 32 0     RDW 08/23/2021 17 5*    MPV 08/23/2021 11 3     Platelets 15/30/5947 257     Sodium 08/23/2021 143     Potassium 08/23/2021 3 2*    Chloride 08/23/2021 106     CO2 08/23/2021 24     ANION GAP 08/23/2021 13     BUN 08/23/2021 19     Creatinine 08/23/2021 1 17     Glucose 08/23/2021 96     Calcium 08/23/2021 8 4     Corrected Calcium 08/23/2021 9 2     AST 08/23/2021 16     ALT 08/23/2021 46     Alkaline Phosphatase 08/23/2021 135*    Total Protein 08/23/2021 6 3*    Albumin 08/23/2021 3 0*    Total Bilirubin 08/23/2021 0 44     eGFR 08/23/2021 65     Troponin I 08/23/2021 <0 02     Lipase 08/23/2021 33*    D-Dimer, Quant 08/23/2021 3 19*    Segmented % 08/23/2021 63     Bands % 08/23/2021 20*    Lymphocytes % 08/23/2021 12*    Monocytes % 08/23/2021 2*    Eosinophils, % 08/23/2021 1     Basophils % 08/23/2021 0     Metamyelocytes% 08/23/2021 1     Atypical Lymphocytes % 08/23/2021 1*    Absolute Neutrophils 08/23/2021 20 52*    Lymphocytes Absolute 08/23/2021 2 97     Monocytes Absolute 08/23/2021 0 49     Eosinophils Absolute 08/23/2021 0 25     Basophils Absolute 08/23/2021 0 00     Dohle Bodies 08/23/2021 Present     Toxic Granulation 08/23/2021 Present     RBC Morphology 08/23/2021 Present     Anisocytosis 08/23/2021 Present     Hypochromia 08/23/2021 Present     Ovalocytes 08/23/2021 Present     Poikilocytes 08/23/2021 Present     Platelet Estimate 10/88/4328 Adequate     Large Platelet 54/17/5151 Present     Troponin I 08/23/2021 <0 02     POC Glucose 08/23/2021 127     Troponin I 08/23/2021 <0 02     POC Glucose 08/23/2021 172*       Imaging Studies: I have personally reviewed pertinent reports  Cholecystostomy catheter check       Clinical History: Pneumobilia      Fluoro time: 1 2 minutes     Technique: The patient was brought to the interventional radiology suite and identified verbally and by wristband  The patient was placed supine on the table and a  view was obtained of indwelling catheter within the gallbladder        After review of the patient's prior imaging studies, the indwelling catheter was injected with a small amount of contrast         Contrast injected through the cholecystostomy tube opacified cystic duct  Central and intrahepatic biliary duct system was opacified with mild central duct prominence  Metallic stent was not opacified initially with delayed images demonstrating contrast tracking around the stent and a minimally through the stent into the duodenum  This as a worsened appearance since prior study of 6/22/2021  Postprocedure images demonstrate residual contrast in the biliary system centrally      The catheter has been capped since 6/22/2021  Patient has been flushing it regularly  Given the history of cholecystostomy tube placement for prior stent occlusion on 5/11/2021   [facility] consult is recommended for possible intervention      Catheter currently again capped and findings discussed with Dr Arnett  Would await GI consult for further management      Patient tolerated the procedure well with no immediate post procedural complication      IMPRESSION:  Impression: Cholecystogram demonstrates mostly occluded metallic stent with debris of contrast into the duodenum  Nondraining contrast seen in the biliary system post procedure      GI consult recommended  Catheter currently again capped and findings discussed with Dr Arnett  Would await GI consult for further management        ASSESSMENT/PLAN:     1   Imaging findings from cholecystostomy tube check suggestive of partial occlusion of biliary wallstent, originally placed at the end of April for obstructive jaundice secondary to pancreatic cancer  He does not appear jaundiced or with significantly elevated liver enzymes at this time, there did appear to be some passage of contrast through the stent    - will discuss with attending regarding feasibility of placing secondary stent via ERCP to help maintain patency of the 1st stent; fortunately the patient does not appear cholangitic or obstructed at this time so this is not emergently indicated  - monitor liver enzymes daily, along with skin color, urine output  - I did discuss with patient about risks and benefits of ERCP procedure  - cardiology input appreciated, continue workup for chest pain   - continue Protonix    The patient was seen and examined by Dr Lina Borja, all key medical decisions were made with Dr Lina Borja  Thank you for allowing us to participate in the care of this pleasant patient  We will follow up with you closely

## 2021-08-23 NOTE — BRIEF OP NOTE (RAD/CATH)
INTERVENTIONAL RADIOLOGY PROCEDURE NOTE    Date: 8/23/2021    Procedure: Cholecystogram    Preoperative diagnosis:   1  Chest pain, unspecified    2  Pancreatic cancer ([address] )    3  Pneumobilia    4  Hypokalemia    5  Other chest pain         Postoperative diagnosis: Same  Surgeon: Yue Aguirre MD     Assistant: None  No qualified resident was available  Blood loss:  None    Specimens:  None     Findings:  Cholecystogram through the existing tube which was capped since June performed  Initial filling of central and intrahepatic biliary duct seen with dilatation of the central ducts only  Contrast appears to trickle around the stent with opacification of the duodenal loop  The stent is not opacified with contrast with a linear track of contrast identified worsened from prior study  Previous study demonstrated centrally patent stent  Delayed images demonstrate retention of contrast in the biliary system  Findings suggestive for occluded metallic stent  Cholecystostomy tube was placed due to occluded stent on May 11, 2021      Given this finding GI consult is recommended for possible intervention  Cholecystostomy tube can be removed upon GI recommendation  Currently catheter is capped  Findings discussed with Dr Paulette Spring  Complications: None immediate      Anesthesia: none

## 2021-08-23 NOTE — ASSESSMENT & PLAN NOTE
· Continue pancrelipase from home, continue prednisone from home  · Receives chemotherapy 3x month (last date 8/19/2021) next date is 9/2/2021  · Neulasta received on 8/19  · Geriatric pain protocol

## 2021-08-23 NOTE — ED PROVIDER NOTES
History  Chief Complaint   Patient presents with    Chest Pain     Pt arrives in ED via EMS from home for chest pain starting at 1700 tonight  Took 4 ASA 81mg and SL nitro x3    28-year-old male with past medical history of pancreatic cancer (currently undergoing chemotherapy), diabetes type 2, hypertension, hyperlipidemia presents for evaluation of chest pain that started around 530 pm today while at rest   Patient reports pain was located in the central chest with no radiation, no associated nausea/ vomiting, CP did not worsen with exertion and was not associated with diaphoresis  However patient reports chest pain started while at rest and after getting up felt generalized fatigue  Pt was given x1 NTG tablet and 325 ASA while en route via EMS with improvement of CP  Patient denies any history of MI for similar chest pain symptoms  He reports diffuse lower abdominal pain, however denies shortness of breath, fevers, N/V/D, cough/cold symptoms, back pain, pain extremities or any other symptoms  Patient undergoes chemotherapy on Thursdays 3 times a month with 1 week of no chemotherapy  No other concerns  Prior to Admission Medications   Prescriptions Last Dose Informant Patient Reported? Taking? Alcohol Swabs 70 % PADS  Self No No   Sig: May substitute brand based on insurance coverage  Check glucose ACHS  Blood Glucose Monitoring Suppl (ONE TOUCH ULTRA 2) w/Device KIT  Self No No   Sig: Use daily   FLUoxetine (PROzac) 20 mg capsule  Self No No   Sig: TAKE 1 CAPSULE BY MOUTH EVERY DAY  Insulin Pen Needle (BD Pen Needle Cinda 2nd Gen) 32G X 4 MM MISC  Self No No   Sig: For use with insulin pen  Pharmacy may dispense brand covered by insurance     Lancets (onetouch ultrasoft) lancets  Self No No   Sig: Use as instructed once a day   Sodium Chloride Flush (Normal Saline Flush) 0 9 % SOLN  Self Yes No   Sig: Gall bladder drain   Patient not taking: Reported on 8/11/2021   aspirin 81 mg chewable tablet Self Yes No   Sig: Chew 81 mg    cyanocobalamin (VITAMIN B-12) 1000 MCG tablet  Self No No   Sig: Take 1 tablet (1,000 mcg total) by mouth daily   folic acid (FOLVITE) 1 mg tablet  Self Yes No   Sig: Take by mouth daily   gabapentin (NEURONTIN) 100 mg capsule   No No   Sig: Take 2 capsules (200 mg total) by mouth 3 (three) times a day   glucose blood (OneTouch Ultra) test strip   No No   Sig: Use as instructed tid   insulin glargine (Lantus SoloStar) 100 units/mL injection pen   No No   Sig: Inject 12 Units under the skin daily at bedtime   insulin lispro (HumaLOG KwikPen) 100 units/mL injection pen   No No   Sig: Take 10 units with breakfast, lunch and dinner +scale   lisinopril-hydrochlorothiazide (PRINZIDE,ZESTORETIC) 10-12 5 MG per tablet  Self No No   Sig: Take 1 tablet by mouth 3 (three) times a week Monday Wednesday Friday   oxyCODONE (ROXICODONE) 5 mg immediate release tablet  Self No No   Sig: Take 1 tablet (5 mg total) by mouth every 4 (four) hours as needed (cancer pain)Max Daily Amount: 30 mg   pancrelipase, Lip-Prot-Amyl, (CREON) 24,000 units   No No   Sig: Take 1 capsule (24,000 Units total) by mouth 3 (three) times a day with meals Or snacks   pantoprazole (PROTONIX) 40 mg tablet  Self No No   Sig: Take 1 tablet (40 mg total) by mouth 2 (two) times a day Before breakfast, before bed, to reduce stomach acid   predniSONE 20 mg tablet   No No   Sig: Take 0 5 tablets (10 mg total) by mouth daily with breakfast   prochlorperazine (COMPAZINE) 10 mg tablet  Self No No   Sig: Take 1 tablet (10 mg total) by mouth every 6 (six) hours as needed for nausea or vomiting      Facility-Administered Medications: None       Past Medical History:   Diagnosis Date    Anxiety     Arthritis     Cancer ([facility] )     pancreatic    Cellulitis     LAST ASSESSED: 6/13/14    Diabetes mellitus ([facility] )     Enlarged prostate     Epidermal inclusion cyst     LAST ASSESSED: 10/4/13    Erythrasma     LAST ASSESSED: 9/23/13    Furuncle     LAST ASSESSED: 6/11/14    GERD (gastroesophageal reflux disease)     Hyperlipidemia     Hypertension     RA (rheumatoid arthritis) (HCC)        Past Surgical History:   Procedure Laterality Date    COLONOSCOPY      FL GUIDED CENTRAL VENOUS ACCESS DEVICE INSERTION  6/1/2021    HYDROCELE EXCISION / REPAIR Right 01/11/2018    SPERMATIC CORD EXCSION OF HYDROCELE; MANAGED BY: GABBIE CARBAJAL    IR BIOPSY LIVER MASS  8/16/2021    IR CHOLECYSTOSTOMY TUBE CHECK/CHANGE/REPOSITION/REINSERTION/UPSIZE  6/22/2021    IR CHOLECYSTOSTOMY TUBE PLACEMENT  5/11/2021    MULTIPLE TOOTH EXTRACTIONS Left 7/22/2021    Procedure: EXTRACTION TEETH 14 & 15, ALVEOPLASTY, EXCISION OF CYST LEFT MAXILLA; Surgeon: Mamta Cormier DDS;  [facility];  Service: Maxillofacial    WI REMOVAL OF HYDROCELE,TUNICA,UNILAT Right 1/11/2018    Procedure: HYDROCELECTOMY;  Surgeon: Rowena Krause MD;  [facility];  Service: Urology    SCROTAL SURGERY      benign "lump"    TUNNELED VENOUS PORT PLACEMENT Left 6/1/2021    Procedure: INSERTION VENOUS PORT (PORT-A-CATH); Surgeon: Hellen Owens MD;  [facility];  Service: Surgical Oncology       Family History   Problem Relation Age of Onset    Heart disease Father         CARDIAC DISORDER    Hypertension Father     Hypertension Mother     No Known Problems Maternal Aunt     No Known Problems Maternal Uncle     No Known Problems Paternal Aunt     No Known Problems Paternal Uncle     No Known Problems Paternal Grandmother     No Known Problems Paternal Grandfather     Diabetes Maternal Grandmother         MELLITUS    No Known Problems Maternal Grandfather     Hypertension Other      I have reviewed and agree with the history as documented      E-Cigarette/Vaping    E-Cigarette Use Never User      E-Cigarette/Vaping Substances    Nicotine No     THC No     CBD No      Social History     Tobacco Use    Smoking status: Former Smoker    Smokeless tobacco: Never Used    Tobacco comment: tobacco in a pipe in the early 1980's   Vaping Use    Vaping Use: Never used   Substance Use Topics    Alcohol use: Never    Drug use: Never        Review of Systems   Constitutional: Negative  Negative for chills, diaphoresis and fever  HENT: Negative  Eyes: Negative  Respiratory: Negative for cough and shortness of breath  Cardiovascular: Positive for chest pain  Negative for palpitations  Gastrointestinal: Positive for abdominal pain  Negative for diarrhea, nausea and vomiting  Endocrine: Negative  Genitourinary: Negative  Negative for dysuria  Musculoskeletal: Negative  Allergic/Immunologic: Negative  Neurological: Negative  Hematological: Negative  Psychiatric/Behavioral: Negative  Physical Exam  ED Triage Vitals [08/23/21 0033]   Temperature Pulse Respirations Blood Pressure SpO2   98 9 °F (37 2 °C) 76 18 118/72 93 %      Temp Source Heart Rate Source Patient Position - Orthostatic VS BP Location FiO2 (%)   Oral Monitor Sitting Right arm --      Pain Score       1             Orthostatic Vital Signs  Vitals:    08/23/21 0330 08/23/21 0430 08/23/21 0500 08/23/21 0530   BP: 122/66 129/67 117/65 116/66   Pulse: 82 80 80 74   Patient Position - Orthostatic VS: Lying Lying Lying Lying       Physical Exam  Vitals and nursing note reviewed  Constitutional:       General: He is not in acute distress  Appearance: He is well-developed  He is not ill-appearing, toxic-appearing or diaphoretic  HENT:      Head: Normocephalic and atraumatic  Eyes:      Extraocular Movements: Extraocular movements intact  Comments: Mild conjunctival pallor bilaterally   Cardiovascular:      Rate and Rhythm: Normal rate and regular rhythm  Heart sounds: Normal heart sounds  Heart sounds not distant  No murmur heard  Pulmonary:      Effort: Pulmonary effort is normal  No tachypnea or respiratory distress        Breath sounds: Normal breath sounds  Chest:      Chest wall: No mass, deformity, tenderness or crepitus  Abdominal:      General: Bowel sounds are normal       Palpations: Abdomen is soft  Tenderness: There is abdominal tenderness  Comments: Mild epigastric tenderness to palpation, no pulsatile mass    cholecystostomy tube placed in R abdominal wall for cholecystitis May 2021 appears CDI with no overt signs of infection such as pustulant drainage around tube, no increased warmth or erythema    Musculoskeletal:         General: Normal range of motion  Cervical back: Normal range of motion  Right lower leg: No tenderness  No edema  Left lower leg: No tenderness  No edema  Skin:     General: Skin is warm and dry  Neurological:      Mental Status: He is alert and oriented to person, place, and time     Psychiatric:         Mood and Affect: Mood normal          Behavior: Behavior normal          ED Medications  Medications   HYDROmorphone (DILAUDID) injection 0 2 mg (has no administration in time range)   potassium chloride (K-DUR,KLOR-CON) CR tablet 20 mEq (has no administration in time range)   potassium chloride (K-DUR,KLOR-CON) CR tablet 40 mEq (has no administration in time range)   HYDROmorphone (DILAUDID) injection 0 2 mg (0 2 mg Intravenous Given 8/23/21 0330)   sodium chloride 0 9 % bolus 500 mL (500 mL Intravenous New Bag 8/23/21 0330)   iohexol (OMNIPAQUE) 350 MG/ML injection (SINGLE-DOSE) 85 mL (85 mL Intravenous Given 8/23/21 0354)       Diagnostic Studies  Results Reviewed     Procedure Component Value Units Date/Time    Troponin I [[id number]]     Lab Status: No result Specimen: Blood     Manual Differential(PHLEBS Do Not Order) [[id number]]  (Abnormal) Collected: 08/23/21 0058    Lab Status: Final result Specimen: Blood from Arm, Left Updated: 08/23/21 0150     Segmented % 63 %      Bands % 20 %      Lymphocytes % 12 %      Monocytes % 2 %      Eosinophils, % 1 %      Basophils % 0 % Metamyelocytes% 1 %      Atypical Lymphocytes % 1 %      Absolute Neutrophils 20 52 Thousand/uL      Lymphocytes Absolute 2 97 Thousand/uL      Monocytes Absolute 0 49 Thousand/uL      Eosinophils Absolute 0 25 Thousand/uL      Basophils Absolute 0 00 Thousand/uL      Total Counted --     Dohle Bodies Present     Toxic Granulation Present     RBC Morphology Present     Anisocytosis Present     Hypochromia Present     Ovalocytes Present     Poikilocytes Present     Platelet Estimate Adequate     Large Platelet Present    CBC and differential [[id number]]  (Abnormal) Collected: 08/23/21 0058    Lab Status: Final result Specimen: Blood from Arm, Left Updated: 08/23/21 0150     WBC 24 72 Thousand/uL      RBC 3 82 Million/uL      Hemoglobin 11 5 g/dL      Hematocrit 35 9 %      MCV 94 fL      MCH 30 1 pg      MCHC 32 0 g/dL      RDW 17 5 %      MPV 11 3 fL      Platelets 780 Thousands/uL     Narrative: This is an appended report  These results have been appended to a previously verified report      D-Dimer [[id number]]  (Abnormal) Collected: 08/23/21 0121    Lab Status: Final result Specimen: Blood from Arm, Left Updated: 08/23/21 0147     D-Dimer, Quant 3 19 ug/ml FEU     Troponin I [[id number]]  (Normal) Collected: 08/23/21 0058    Lab Status: Final result Specimen: Blood from Arm, Left Updated: 08/23/21 0125     Troponin I <0 02 ng/mL     Comprehensive metabolic panel [[id number]]  (Abnormal) Collected: 08/23/21 0058    Lab Status: Final result Specimen: Blood from Arm, Left Updated: 08/23/21 0122     Sodium 143 mmol/L      Potassium 3 2 mmol/L      Chloride 106 mmol/L      CO2 24 mmol/L      ANION GAP 13 mmol/L      BUN 19 mg/dL      Creatinine 1 17 mg/dL      Glucose 96 mg/dL      Calcium 8 4 mg/dL      Corrected Calcium 9 2 mg/dL      AST 16 U/L      ALT 46 U/L      Alkaline Phosphatase 135 U/L      Total Protein 6 3 g/dL      Albumin 3 0 g/dL      Total Bilirubin 0 44 mg/dL      eGFR 65 ml/min/1 73sq m Narrative:      National Kid[employer] Disease Foundation guidelines for Chronic Kidney Disease (CKD):     Stage 1 with normal or high GFR (GFR > 90 mL/min/1 73 square meters)    Stage 2 Mild CKD (GFR = 60-89 mL/min/1 73 square meters)    Stage 3A Moderate CKD (GFR = 45-59 mL/min/1 73 square meters)    Stage 3B Moderate CKD (GFR = 30-44 mL/min/1 73 square meters)    Stage 4 Severe CKD (GFR = 15-29 mL/min/1 73 square meters)    Stage 5 End Stage CKD (GFR <15 mL/min/1 73 square meters)  Note: GFR calculation is accurate only with a steady state creatinine    Lipase [[id number]]  (Abnormal) Collected: 08/23/21 0058    Lab Status: Final result Specimen: Blood from Arm, Left Updated: 08/23/21 0122     Lipase 33 u/L                  CTA ED chest PE Study   Final Result by Kaylyn Vernon MD (08/23 0529)      1  No evidence of large central pulmonary embolism  The lobar, segmental, and subsegmental branches are suboptimally evaluated due to limitations of the study  2   Mild streaky densities in the lung bases are nonspecific could reflect infiltrates and/or atelectasis  3   Interval decrease in small pericardial effusion  4   Metallic common bile duct stent is redemonstrated with associated pneumobilia  The extent of pneumobilia appears to have slightly progressed when compared to the prior study  Follow-up is recommended                    Workstation performed: [id number]         XR chest 1 view portable    (Results Pending)         Procedures  ECG 12 Lead Documentation Only    Date/Time: 8/23/2021 12:36 AM  Performed by: Huma Rahman MD  Authorized by: Huma Rahman MD     Indications / Diagnosis:  Chest pain  ECG reviewed by me, the ED Provider: yes    Patient location:  ED  Previous ECG:     Previous ECG:  Compared to current    Comparison ECG info:  7/13/2021    Similarity:  No change    Comparison to cardiac monitor: Yes    Interpretation:     Interpretation: abnormal    Rate:     ECG rate:  90 ECG rate assessment: normal    Rhythm:     Rhythm: sinus rhythm    Ectopy:     Ectopy: PVCs      PVCs:  Multifocal  QRS:     QRS axis:  Normal  Conduction:     Conduction: normal    ST segments:     ST segments:  Normal  T waves:     T waves: normal    Comments:      Prolonged QT interval, no acute ST elevations or depressions  PVCs in leads 2, AVF, V3          ED Course  ED Course as of Aug 23 0620   Mon Aug 23, 2021   4687 Pt reports pain is returning at this time  Informed pt of positive D dimer and plans for CTA PE protocol with contrast  Pt understands and all questions answered  No other concerns  5836 Pt was given Neulasta (bone marrow stimulant) therapy on 8/20/21 by heme/onc    WBC(!): 24 72   0505 Pt is sleeping comfortably at this time         1766 Hypochromia: Present             HEART Risk Score      Most Recent Value   Heart Score Risk Calculator   History  2 Filed at: 08/23/2021 0122   ECG  0 Filed at: 08/23/2021 0122   Age  2 Filed at: 08/23/2021 0122   Risk Factors  2 Filed at: 08/23/2021 0122   Troponin  0 Filed at: 08/23/2021 0122   HEART Score  6 Filed at: 08/23/2021 0122              PERC Rule for PE      Most Recent Value   PERC Rule for PE   Age >=50  1 Filed at: 08/23/2021 0116   HR >=100  0 Filed at: 08/23/2021 0116   O2 Sat on room air < 95%  0 Filed at: 08/23/2021 0116   History of PE or DVT  0 Filed at: 08/23/2021 0116   Recent trauma or surgery  1 Filed at: 08/23/2021 0116   Hemoptysis  0 Filed at: 08/23/2021 0116   Exogenous estrogen  0 Filed at: 08/23/2021 0116   Unilateral leg swelling  0 Filed at: 08/23/2021 0116   PERC Rule for PE Results  2 Filed at: 08/23/2021 0116                  Wells' Criteria for PE      Most Recent Value   Wells' Criteria for PE   Clinical signs and symptoms of DVT  0 Filed at: 08/23/2021 1941   PE is primary diagnosis or equally likely  0 Filed at: 08/23/2021 0121   HR >100  0 Filed at: 08/23/2021 0121   Immobilization at least 3 days or Surgery in the previous 4 weeks  1 5 Filed at: 08/23/2021 0121   Previous, objectively diagnosed PE or DVT  0 Filed at: 08/23/2021 0121   Hemoptysis  0 Filed at: 08/23/2021 0121   Malignancy with treatment within 6 months or palliative  1 Filed at: 08/23/2021 4617   Wells' Criteria Total  2 5 Filed at: 08/23/2021 0121            MDM  Number of Diagnoses or Management Options  Chest pain, unspecified  Hypokalemia  Pancreatic cancer ([facility] )  Pneumobilia  Diagnosis management comments: 71-year-old male with past medical history of pancreatic cancer (currently undergoing chemotherapy), diabetes type 2, hypertension, hyperlipidemia presents for evaluation of chest pain that started around 530 pm today while at rest  Pt remained stable throughout ED course  Initial BP which was in the 565V systolic was improved with total 1 5 L NS  Pt was given x2 doses of 0 2 mg Dilaudid with improvement of Sx  DDx included but not limited to ACS, PE, musculoskeletal chest wall pain  Work up in ED included x1 troponin negative, no acute ischemic changes on EKG  Lipase negative  CBC showing elevated WBC- pt received neulasta (bone marrow stimulant) on 8/19 so WBC were elevated today  otherwise CBC near baseline  CMP- elevation in ALP which is likely ^2 recent chemo; hypokalemia at 3 2- repleted in ED  D dimer  elevated- to r/o elevation from chemo, CTA PE study was obtained showing negative for PE, however revelead worsening pneumobilia compared to previous  Pt remained comfortable and NAD throughout ED course  Pt to be admitted for ACS r/o (HEART score 6) under care of Dr Mohan Montes  Pt understands and agrees to plan  All questions answered  No other concerns         Amount and/or Complexity of Data Reviewed  Clinical lab tests: reviewed and ordered  Tests in the radiology section of CPT®: reviewed and ordered  Tests in the medicine section of CPT®: reviewed and ordered    Risk of Complications, Morbidity, and/or Mortality  Presenting problems: high  Diagnostic procedures: high  Management options: high    Patient Progress  Patient progress: stable      Disposition  Final diagnoses:   Chest pain, unspecified   Pancreatic cancer ([facility] )   Pneumobilia   Hypokalemia     Time reflects when diagnosis was documented in both MDM as applicable and the Disposition within this note     Time User Action Codes Description Comment    8/23/2021  6:09 AM Tevin Hallmark Add [R07 9] Chest pain, unspecified     8/23/2021  6:09 AM Mindy Melendrez Add [C25 9] Pancreatic cancer ([facility] )     8/23/2021  6:09 AM Tevin Hallmark Add [K83 8] Pneumobilia     8/23/2021  6:09 AM Tevin Hallmark Add [E87 6] Hypokalemia       ED Disposition     ED Disposition Condition Date/Time Comment    Admit Stable Mon Aug 23, 2021  6:09 AM Case was discussed with LOYDA Linares and the patient's admission status was agreed to be observation to the service of Dr Paulette Spring  Follow-up Information    None         Patient's Medications   Discharge Prescriptions    No medications on file     No discharge procedures on file  PDMP Review       Value Time User    PDMP Reviewed  Yes 6/21/2021  2:46 PM Christopher Hobbs PA-C           ED Provider  Attending physically available and evaluated Valentine Ball  I managed the patient along with the ED Attending      Electronically Signed by         Glenda Shah MD  08/23/21 9899       Glenda Shah MD  08/23/21 3012       Glenda Shah MD  08/23/21 4166

## 2021-08-23 NOTE — ASSESSMENT & PLAN NOTE
· May 2021 with cholecystostomy in place  · LFts are acceptable today on CMP with the following:  AST 16  ALT 46  Alk phos 135  T bili 0 44  Lo fat diet  CT abd & pelvis -  Metallic common bile duct stent is redemonstrated with associated pneumobilia  The extent of pneumobilia appears to have slightly progressed when compared to the prior study  Follow-up is recommended  IR consulted

## 2021-08-24 PROBLEM — N17.9 AKI (ACUTE KIDNEY INJURY) (HCC): Status: RESOLVED | Noted: 2021-01-01 | Resolved: 2021-01-01

## 2021-08-24 PROBLEM — K83.8 PNEUMOBILIA: Status: ACTIVE | Noted: 2021-01-01

## 2021-08-24 NOTE — PROGRESS NOTES
[facility]  Progress Note - Zack Dyer 1955, 73 y o  male MRN: [id number]  Unit/Bed#: ED 27 Encounter: [id number]  Primary Care Provider: Ez Cox MD   Date and time admitted to hospital: 8/23/2021 12:32 AM    * Pneumobilia due to Occluded CBD stent  Assessment & Plan  · CT scan with evidence of pneumobilia, which has slightly progressed when compared to previous study  · IR was consulted, performed cholecystogram which showed trickle of contrast around the stent with opacification of the duodenal loop, concerning for occlusion of CBD metallic stent  · GI following  · Monitor LFTs  · Will keep NPO been night for possible repeat ERCP/stent placement    Other chest pain  Assessment & Plan  Lucas = 3  · Improved with nitro & asa en route to ED  Recurred in ED with somewhat improvement with dilaudid  · Troponins negative  · EKG in the ED non-ischemic   · Also history of GERD & cancer pain with new liver mets  mylanta prn & geriatric pain protocol  · Telemetry monitoring until stress test results  · Stress test completed today, 8/24  · Pending read  · If normal, will be cleared for ERCP from a cardiac standpoint  · Cardiology following  Pancreatic cancer metastasized to liver [facility])  Assessment & Plan  · Continue pancrelipase from home, continue prednisone from home  · Receives chemotherapy 3x month (last date 8/19/2021) next date is 9/2/2021  · Neulasta received on 8/19  · Geriatric pain protocol    Leukocytosis  Assessment & Plan  WBC   Date Value Ref Range Status   08/23/2021 24 72 (H) 4 31 - 10 16 Thousand/uL Final   08/17/2021 3 66 (L) 4 31 - 10 16 Thousand/uL Final   08/09/2021 6 42 4 31 - 10 16 Thousand/uL Final     · 2/2 Neulasta dose 8/19    Recent cholecystitis  Assessment & Plan  · May 2021 with cholecystostomy in place  · Trend LFTs  · Lo fat diet      GERD  Assessment & Plan  · Continue protonix from home, add mylanta prn    Essential hypertension  Assessment & Plan  · Takes lisinopril -Hctz on M,W,F  · Will hold  · Hydralazine 10 mg iv prn sbp > 170 mmhg    CHRISTIANO (acute kidney injury) (HCC)-resolved as of 2021  Assessment & Plan  BUN   Date Value Ref Range Status   2021 19 5 - 25 mg/dL Final   2021 13 6 - 20 mg/dL Final   2021 19 6 - 20 mg/dL Final     Creatinine   Date Value Ref Range Status   2021 1 17 0 60 - 1 30 mg/dL Final     Comment:     Standardized to IDMS reference method   2021 0 71 0 50 - 1 20 mg/dL Final     Comment:     Standardized to IDMS reference method   2021 0 59 0 50 - 1 20 mg/dL Final     Comment:     Standardized to IDMS reference method     · Was 1 17 upon admission, now 0 8  · Baseline Cr 0 5 - 0 7  · Hold nephrotoxin including lisinopril -hctz  VTE Pharmacologic Prophylaxis:   Pharmacologic: Heparin  Mechanical VTE Prophylaxis in Place: Yes    Patient Centered Rounds: I have performed bedside rounds with nursing staff today  Discussions with Specialists or Other Care Team Provider: CM, nursing     Education and Discussions with Family / Patient: patient at bedside     Time Spent for Care: 30 minutes  More than 50% of total time spent on counseling and coordination of care as described above  Current Length of Stay: 0 day(s)    Current Patient Status: Inpatient   Certification Statement: The patient, admitted on an observation basis, will now require > 2 midnight hospital stay due to ERCP tomorrow for stent placement    Discharge Plan: likely 48 hr    Code Status: Prior      Subjective:   Patient examined at bedside  No recurrence of chest pain overnight  No events on telemetry  Awaiting results of stress testing today      Objective:     Vitals:   Temp (24hrs), Av 6 °F (37 °C), Min:98 5 °F (36 9 °C), Max:98 6 °F (37 °C)    Temp:  [98 5 °F (36 9 °C)-98 6 °F (37 °C)] 98 5 °F (36 9 °C)  HR:  [72-84] 84  Resp:  [16-18] 16  BP: (132-152)/(68-98) 152/70  SpO2:  [92 %-100 %] 92 %  Body mass index is 24 35 kg/m²  Input and Output Summary (last 24 hours): Intake/Output Summary (Last 24 hours) at 8/24/2021 1246  Last data filed at 8/24/2021 1229  Gross per 24 hour   Intake --   Output 450 ml   Net -450 ml       Physical Exam:     Physical Exam  Vitals and nursing note reviewed  Constitutional:       General: He is not in acute distress  Appearance: Normal appearance  HENT:      Head: Normocephalic  Mouth/Throat:      Mouth: Mucous membranes are moist    Eyes:      Pupils: Pupils are equal, round, and reactive to light  Cardiovascular:      Rate and Rhythm: Normal rate and regular rhythm  Heart sounds: No murmur heard  Pulmonary:      Effort: Pulmonary effort is normal  No respiratory distress  Breath sounds: Normal breath sounds  No wheezing, rhonchi or rales  Abdominal:      General: Bowel sounds are normal  There is no distension  Palpations: Abdomen is soft  Tenderness: There is no abdominal tenderness  There is no guarding  Musculoskeletal:         General: No deformity  Cervical back: Normal range of motion  Right lower leg: No edema  Left lower leg: No edema  Skin:     Capillary Refill: Capillary refill takes less than 2 seconds  Neurological:      General: No focal deficit present  Mental Status: He is alert and oriented to person, place, and time  Mental status is at baseline           Additional Data:     Labs:    Results from last 7 days   Lab Units 08/24/21  0412   WBC Thousand/uL 33 28*   HEMOGLOBIN g/dL 11 9*   HEMATOCRIT % 37 0   PLATELETS Thousands/uL 265   BANDS PCT % 26*   LYMPHO PCT % 6*   MONO PCT % 10   EOS PCT % 0     Results from last 7 days   Lab Units 08/24/21  0412   SODIUM mmol/L 143   POTASSIUM mmol/L 3 6   CHLORIDE mmol/L 108   CO2 mmol/L 26   BUN mg/dL 12   CREATININE mg/dL 0 83   ANION GAP mmol/L 9   CALCIUM mg/dL 8 9   ALBUMIN g/dL 3 0*   TOTAL BILIRUBIN mg/dL 0 20   ALK PHOS U/L 165*   ALT U/L 44   AST U/L 30   GLUCOSE RANDOM mg/dL 111         Results from last 7 days   Lab Units 08/24/21  1226 08/24/21  0953 08/23/21  2107 08/23/21  2038 08/23/21  1638 08/23/21  1156 08/23/21  0728   POC GLUCOSE mg/dl 187* 208* 93 101 127 172* 127                   * I Have Reviewed All Lab Data Listed Above  * Additional Pertinent Lab Tests Reviewed:  All Labs Within Last 24 Hours Reviewed    Imaging:    Imaging Reports Reviewed Today Include: none  Imaging Personally Reviewed by Myself Includes:  none    Recent Cultures (last 7 days):           Last 24 Hours Medication List:   Current Facility-Administered Medications   Medication Dose Route Frequency Provider Last Rate    acetaminophen  975 mg Oral Q8H [address] Jossie Demo, PA-C      aluminum-magnesium hydroxide-simethicone  30 mL Oral Q4H PRN Jossie Margaretho, PA-C      aspirin  81 mg Oral Daily Jossie Demo, PA-C      atorvastatin  20 mg Oral Daily With [employer], PA-C      FLUoxetine  20 mg Oral Daily Jossie Margaretho, PA-C      folic acid  1 mg Oral Daily Jossie Margaretho, PA-C      heparin (porcine)  5,000 Units Subcutaneous [place], [place]      hydrALAZINE  10 mg Intravenous Q6H PRN Jossie Demo, PA-C      HYDROmorphone  0 2 mg Intravenous Q4H PRN Jossei Demo, PA-C      insulin glargine  12 Units Subcutaneous HS Jossie Demo, PA-C      insulin lispro  10 Units Subcutaneous TID With Meals Jossie Johnson, PA-C      insulin lispro  2-12 Units Subcutaneous HS Jossie Demo, PA-C      naloxone  0 04 mg Intravenous Q1MIN PRN Jossie Demo, PA-C      ondansetron  4 mg Intravenous Q4H PRN Jossie Demo, PA-C      oxyCODONE  10 mg Oral Q4H PRN Jossie Demo, PA-C      oxyCODONE  5 mg Oral Q4H PRN Jossie Demo, PA-C      pancrelipase (Lip-Prot-Amyl)  24,000 Units Oral TID With Meals Jossie Ornelas, PA-C      pantoprazole  40 mg Oral BID AC Jossie Ornelas, PA-C      predniSONE  10 mg Oral Daily With Breakfast Jossie Ornelas PA-C      prochlorperazine  10 mg Oral Q6H PRN Rosario Baires PA-C      senna-docusate sodium  1 tablet Oral HS Jossie Ornelas PA-C      sodium chloride  75 mL/hr Intravenous Continuous Jossie Ornelas PA-C 75 mL/hr (08/24/21 0957)    tamsulosin  0 4 mg Oral Daily With Dinner Emmanuelle Collazo PA-C          Today, Patient Was Seen By: Willam Mccall PA-C    ** Please Note: Dictation voice to text software may have been used in the creation of this document   **

## 2021-08-24 NOTE — PROGRESS NOTES
Progress Note - Sisi Benjamin 73 y o  male MRN: [id number]    Unit/Bed#: ED 27 Encounter: [id number]        Subjective:   Denies any darkening of urine, pruritus, abdominal pain or nausea    Objective:     Vitals: Blood pressure 140/84, pulse 90, temperature 98 5 °F (36 9 °C), temperature source Oral, resp  rate 16, weight 74 8 kg (165 lb), SpO2 93 %  ,Body mass index is 24 35 kg/m²  Intake/Output Summary (Last 24 hours) at 8/24/2021 1656  Last data filed at 8/24/2021 1405  Gross per 24 hour   Intake --   Output 850 ml   Net -850 ml       Physical Exam:   General appearance: alert, appears stated age and cooperative  Lungs: clear to auscultation bilaterally, no labored breathing/accessory muscle use  Heart: regular rate and rhythm, S1, S2 normal, no murmur, click, rub or gallop  Abdomen: soft, non-tender; bowel sounds normal; no masses,  no organomegaly  Extremities: no edema    Invasive Devices     Central Venous Catheter Line            Port A Cath 06/01/21 Left Chest 84 days          Peripheral Intravenous Line            Peripheral IV 08/16/21 Right Antecubital 8 days    Peripheral IV 08/23/21 Distal;Left;Upper;Ventral (anterior) Arm 1 day          Drain            Closed/Suction Drain Lateral  days                Lab, Imaging and other studies: I have personally reviewed pertinent reports      Admission on 08/23/2021   Component Date Value    WBC 08/23/2021 24 72*    RBC 08/23/2021 3 82*    Hemoglobin 08/23/2021 11 5*    Hematocrit 08/23/2021 35 9*    MCV 08/23/2021 94     MCH 08/23/2021 30 1     MCHC 08/23/2021 32 0     RDW 08/23/2021 17 5*    MPV 08/23/2021 11 3     Platelets 31/40/9838 257     Sodium 08/23/2021 143     Potassium 08/23/2021 3 2*    Chloride 08/23/2021 106     CO2 08/23/2021 24     ANION GAP 08/23/2021 13     BUN 08/23/2021 19     Creatinine 08/23/2021 1 17     Glucose 08/23/2021 96     Calcium 08/23/2021 8 4     Corrected Calcium 08/23/2021 9 2     AST 08/23/2021 16     ALT 08/23/2021 46     Alkaline Phosphatase 08/23/2021 135*    Total Protein 08/23/2021 6 3*    Albumin 08/23/2021 3 0*    Total Bilirubin 08/23/2021 0 44     eGFR 08/23/2021 65     Troponin I 08/23/2021 <0 02     Lipase 08/23/2021 33*    D-Dimer, Quant 08/23/2021 3 19*    Segmented % 08/23/2021 63     Bands % 08/23/2021 20*    Lymphocytes % 08/23/2021 12*    Monocytes % 08/23/2021 2*    Eosinophils, % 08/23/2021 1     Basophils % 08/23/2021 0     Metamyelocytes% 08/23/2021 1     Atypical Lymphocytes % 08/23/2021 1*    Absolute Neutrophils 08/23/2021 20 52*    Lymphocytes Absolute 08/23/2021 2 97     Monocytes Absolute 08/23/2021 0 49     Eosinophils Absolute 08/23/2021 0 25     Basophils Absolute 08/23/2021 0 00     Dohle Bodies 08/23/2021 Present     Toxic Granulation 08/23/2021 Present     RBC Morphology 08/23/2021 Present     Anisocytosis 08/23/2021 Present     Hypochromia 08/23/2021 Present     Ovalocytes 08/23/2021 Present     Poikilocytes 08/23/2021 Present     Platelet Estimate 57/70/4914 Adequate     Large Platelet 68/02/0372 Present     Troponin I 08/23/2021 <0 02     POC Glucose 08/23/2021 127     Troponin I 08/23/2021 <0 02     POC Glucose 08/23/2021 172*    Ventricular Rate 08/23/2021 90     Atrial Rate 08/23/2021 90     [place] Interval 08/23/2021 184     QRSD Interval 08/23/2021 78     QT Interval 08/23/2021 394     QTC Interval 08/23/2021 481     P Axis 08/23/2021 49     QRS Axis 08/23/2021 -27     T Wave Axis 08/23/2021 65     Ventricular Rate 08/23/2021 74     Atrial Rate 08/23/2021 74     [place] Interval 08/23/2021 152     QRSD Interval 08/23/2021 100     QT Interval 08/23/2021 410     QTC Interval 08/23/2021 455     P Axis 08/23/2021 36     QRS Axis 08/23/2021 -31     T Wave Axis 08/23/2021 51     POC Glucose 08/23/2021 127     POC Glucose 08/23/2021 101     POC Glucose 08/23/2021 93     Sodium 08/24/2021 143     Potassium 08/24/2021 3 6     Chloride 08/24/2021 108     CO2 08/24/2021 26     ANION GAP 08/24/2021 9     BUN 08/24/2021 12     Creatinine 08/24/2021 0 83     Glucose 08/24/2021 111     Calcium 08/24/2021 8 9     eGFR 08/24/2021 92     WBC 08/24/2021 33 28*    RBC 08/24/2021 3 93     Hemoglobin 08/24/2021 11 9*    Hematocrit 08/24/2021 37 0     MCV 08/24/2021 94     MCH 08/24/2021 30 3     MCHC 08/24/2021 32 2     RDW 08/24/2021 17 9*    MPV 08/24/2021 10 8     Platelets 05/46/1688 265     Segmented % 08/24/2021 49     Bands % 08/24/2021 26*    Lymphocytes % 08/24/2021 6*    Monocytes % 08/24/2021 10     Eosinophils, % 08/24/2021 0     Basophils % 08/24/2021 0     Metamyelocytes% 08/24/2021 6*    Myelocytes % 08/24/2021 3*    Absolute Neutrophils 08/24/2021 24 96*    Lymphocytes Absolute 08/24/2021 2 00     Monocytes Absolute 08/24/2021 3 33*    Eosinophils Absolute 08/24/2021 0 00     Basophils Absolute 08/24/2021 0 00     Toxic Granulation 08/24/2021 Present     Anisocytosis 08/24/2021 Present     Poikilocytes 08/24/2021 Present     Polychromasia 08/24/2021 Present     Platelet Estimate 47/29/2523 Adequate     Total Bilirubin 08/24/2021 0 20     Bilirubin, Direct 08/24/2021 0 12     Alkaline Phosphatase 08/24/2021 165*    AST 08/24/2021 30     ALT 08/24/2021 44     Total Protein 08/24/2021 6 0*    Albumin 08/24/2021 3 0*    POC Glucose 08/24/2021 208*    POC Glucose 08/24/2021 187*    Protocol Name 08/24/2021 [facility]     Time In Exercise Phase 08/24/2021 00:03:00     MAX   SYSTOLIC BP 34/59/7436 784     Max Diastolic Bp 62/61/9691 74     Max Heart Rate 08/24/2021 142     Max Predicted Heart Rate 08/24/2021 155     Reason for Termination 08/24/2021 Protocol Complete     Test Indication 08/24/2021                      Value:CHEST PAIN  CHEST PAIN      Target Hr Formular 08/24/2021 (220 - Age)*100%     Chest Pain Statement 08/24/2021 none     POC Glucose 08/24/2021 226* Results for Malina Guzmán (MRN [id number]) as of 8/24/2021 16:56   Ref   Range 8/24/2021 04:12 8/24/2021 09:53 8/24/2021 12:26 8/24/2021 15:46   POC Glucose Latest Ref Range: 65 - 140 mg/dl  208 (H) 187 (H) 226 (H)   Sodium Latest Ref Range: 136 - 145 mmol/L 143      Potassium Latest Ref Range: 3 5 - 5 3 mmol/L 3 6      Chloride Latest Ref Range: 100 - 108 mmol/L 108      CO2 Latest Ref Range: 21 - 32 mmol/L 26      Anion Gap Latest Ref Range: 4 - 13 mmol/L 9      BUN Latest Ref Range: 5 - 25 mg/dL 12      Creatinine Latest Ref Range: 0 60 - 1 30 mg/dL 0 83      Glucose, Random Latest Ref Range: 65 - 140 mg/dL 111      Calcium Latest Ref Range: 8 3 - 10 1 mg/dL 8 9      AST Latest Ref Range: 5 - 45 U/L 30      ALT Latest Ref Range: 12 - 78 U/L 44      Alkaline Phosphatase Latest Ref Range: 46 - 116 U/L 165 (H)      Total Protein Latest Ref Range: 6 4 - 8 2 g/dL 6 0 (L)      Albumin Latest Ref Range: 3 5 - 5 0 g/dL 3 0 (L)      TOTAL BILIRUBIN Latest Ref Range: 0 20 - 1 00 mg/dL 0 20      eGFR Latest Units: ml/min/1 73sq m 92      BILIRUBIN DIRECT Latest Ref Range: 0 00 - 0 20 mg/dL 0 12      WBC Latest Ref Range: 4 31 - 10 16 Thousand/uL 33 28 (HH)      Red Blood Cell Count Latest Ref Range: 3 88 - 5 62 Million/uL 3 93      Hemoglobin Latest Ref Range: 12 0 - 17 0 g/dL 11 9 (L)      HCT Latest Ref Range: 36 5 - 49 3 % 37 0      MCV Latest Ref Range: 82 - 98 fL 94      MCH Latest Ref Range: 26 8 - 34 3 pg 30 3      MCHC Latest Ref Range: 31 4 - 37 4 g/dL 32 2      RDW Latest Ref Range: 11 6 - 15 1 % 17 9 (H)      Platelet Count Latest Ref Range: 149 - 390 Thousands/uL 265      MPV Latest Ref Range: 8 9 - 12 7 fL 10 8      Platelet Estimate Latest Ref Range: Adequate  Adequate      Segs Relative Latest Ref Range: 43 - 75 % 49      Abs Neutrophils Latest Ref Range: 1 85 - 7 62 Thousand/uL 24 96 (H)      Bands Relative Latest Ref Range: 0 - 8 % 26 (H)      Lymphocytes % Latest Ref Range: 14 - 44 % 6 (L) Monocytes Latest Ref Range: 4 - 12 % 10      Eosinophils Latest Ref Range: 0 - 6 % 0      Basophils Relative Latest Ref Range: 0 - 1 % 0      Myelocytes % Latest Ref Range: 0 - 1 % 3 (H)      Metamyelocytes% Latest Ref Range: 0 - 1 % 6 (H)      Absolute Eosinophils Latest Ref Range: 0 00 - 0 40 Thousand/uL 0 00      Basophils Absolute Latest Ref Range: 0 00 - 0 10 Thousand/uL 0 00      Polychromasia Unknown Present      Toxic Granulation Unknown Present      Anisocytosis Unknown Present      Poikilocytes Unknown Present      LYMPHOCYTES ABSOLUTE Latest Ref Range: 0 60 - 4 47 Thousand/uL 2 00      Monocytes Absolute Latest Ref Range: 0 00 - 1 22 Thousand/uL 3 33 (H)          Assessment/Plan:    1  Pneumobilia in the setting of CBD stent; metal wallstent placed at the end of April for obstructive jaundice secondary to pancreatic cancer  Pneumobilia would generally be expected if the stent is patent, and tube check of his cholecystostomy tube demonstrated passage of some contrast through the stent into the duodenum  It is likely that he will require placement of a 2nd stent for prevention of biliary obstruction at some point in the future, but at this time there is no evidence of obstruction of his stent given his imaging findings, and also his normal liver enzymes        - can plan non urgently for ERCP and placement of secondary CBD stent, reassess liver enzymes tomorrow and will discuss again with advanced endoscopist on-call; NPO after midnight, but this can be planned as an outpatient if he continues without any evidence of obstructive jaundice/cholangitis  - may have diet as tolerated in the meantime

## 2021-08-24 NOTE — ASSESSMENT & PLAN NOTE
· CT scan with evidence of pneumobilia, which has slightly progressed when compared to previous study  · IR was consulted, performed cholecystogram which showed trickle of contrast around the stent with opacification of the duodenal loop, concerning for occlusion of CBD metallic stent  · GI following  · Monitor LFTs    · Will keep NPO been night for possible repeat ERCP/stent placement

## 2021-08-24 NOTE — UTILIZATION REVIEW
Initial Clinical Review    Admission: Date/Time/Statement: 8/23/2021 0609 observation AND CHANGED 8/24/2021 1208 INPATIENT RE:   PATIENT WITH PNEUMOBILIA DUE TO OCCLUDED CBD STENT AND ANTICIPATE WILL NEED REPEAT ERCP/STENT PLACEMENT, MONITOR LFT  Start   Ordered   08/24/21 1207  Inpatient Admission Once     Transfer Service: Hospitalist       Question Answer Comment   Level of Care Med Surg    Estimated length of stay More than 2 Midnights    Certification I certify that inpatient services are medically necessary for this patient for a duration of greater than two midnights  See H&P and MD Progress Notes for additional information about the patient's course of treatment  08/24/21 1208       ED Arrival Information     Expected Arrival Acuity    - 8/23/2021 00:31 Urgent         Means of arrival Escorted by Service Admission type    Ambulance [facility] Hospitalist Urgent         Arrival complaint    Chest Pain        Chief Complaint   Patient presents with    Chest Pain     Pt arrives in ED via EMS from home for chest pain starting at 1700 tonight  Took 4 ASA 81mg and SL nitro x1       Initial Presentation: this is a 73year old male from home to ED via ems admitted to observation [address]  due to chest pain/CHRISTIANO/Pneumobilia with Occluded CBD stent with recent cholecystitis  Has pancreatic cancer with liver mets on chemo  presented due to chest pain starting at 1730 on 8/22, felt fatigue  Per ems given ntg and asa en route with improvement  Patient has diffuse lower abdominal pain  On exam mild epigastric tenderness  cholecystostomy tube Right abdominal wall C/D/I  Troponin < 0 02  Wbc 24 72 with last neulasta on 8/19      D dimer 3  19   K 3 2  Bun 19, creatinine 1 17 with baseline of 0 5 - 0 7   cta chest showed metallic CBD stent with pneumobilia which has progressed  In the ED given 1 5 Liters of IVF, 2 doses of IV Dilaudid  Plan is consult cardiology, IR and GI  Trend troponin, telemetry, IVF and trend BMP, hold lisiniopril and HCTZ  Monitor LFT, pain control    8/23/2021 IR - cholecystogram - Cholecystogram demonstrates mostly occluded metallic stent with debris of contrast into the duodenum  Nondraining contrast seen in the biliary system post procedure  GI consult recommended  Catheter currently again capped    8/23/2021 per GI:  Patient with pancreatic cancer with placement of cholecystomy tube and placement of internally draining wall flex stent with near occulusion  Has no signs of jaundice or cholangitis and recommend to trend LFTs, complete cardiac work up  May need eventual ERCP with placement of second stent  8/23/2021 per cardiology - patient has atypical chest pain and recommend nuclear stress test, continue asa and statin  BP controlled  8/23/2012 ECG Sinus rhythm with occasional Premature ventricular complexes  Abnormal ECG  When compared with ECG of 13-JUL-2021 09:56,  Premature ventricular complexes are now Present    8/23/2021 ecg Sinus rhythm with occasional Premature ventricular complexes  Left axis deviation  Abnormal ECG  When compared with ECG of 23-AUG-2021 00:36, (unconfirmed)  No significant change was found    8/24/2021 CHANGED TO INPATIENT:   Patient with pneumobilia due to occluded CBD stent  Overnight no chest pain  Chest pain is atypical   Stress didn't show ischemia  Doubt anginal    Anticipate will need ERCP then at least 48 hours of continued monitoring  To remain NPO, continue IVF  8/25/2021: abdominal and chest pain resolved  Diet tolerated  Decision made that since pain controlled for outpatient ERCP and second stent placement        ED Triage Vitals [08/23/21 0033]   Temperature Pulse Respirations Blood Pressure SpO2   98 9 °F (37 2 °C) 76 18 118/72 93 %      Temp Source Heart Rate Source Patient Position - Orthostatic VS BP Location FiO2 (%)   Oral Monitor Sitting Right arm --      Pain Score       1          Wt Readings from Last 1 Encounters:   08/23/21 74 8 kg (165 lb)     Additional Vital Signs:   08/25/21 0713  98 °F (36 7 °C)  79  18  142/81  107  95 %  None (Room air)  Lying   08/25/21 0610  98 4 °F (36 9 °C)  65  15  145/90  --  91 %  None (Room air)  Lying   08/24/21 2300  98 2 °F (36 8 °C)  89  18  153/64  --  96 %  None (Room air)  Lying   08/24/21 2037  98 2 °F (36 8 °C)  97  18  150/82  --  94 %  None (Room air       08/24/21 0615  --  74  18  139/72  --  98 %  None (Room air)  Lying   08/24/21 0300  --  79  17  145/72  --  100 %  None (Room air)  Lying   08/23/21 2148  98 6 °F (37 °C)  84  16  151/68  --  100 %  None (Room air)  --   08/23/21 1807  --  72  16  132/98  --  95 %  None (Room air)  Lying   08/23/21 1524  --  76  17  132/79  --  95 %  None (Room air)  --   08/23/21 1200  --  87  18  145/83  --  94 %  None (Room air)  --   08/23/21 0730  --  80  18  133/74  98  93 %  --  --   08/23/21 0700  --  78  18  127/73  94  92 %  --  --   08/23/21 0630  --  78  18  121/72  91  93 %  None (Room air)  Lying   08/23/21 0530  --  74  18  116/66  85  91 %  None (Room air)         Pertinent Labs/Diagnostic Test Results:   8/23/2021 CxR - No acute findings  8/23/2021 Cta chest 1  No evidence of large central pulmonary embolism  The lobar, segmental, and subsegmental branches are suboptimally evaluated due to limitations of the study  2   Mild streaky densities in the lung bases are nonspecific could reflect infiltrates and/or atelectasis  3   Interval decrease in small pericardial effusion  4   Metallic common bile duct stent is redemonstrated with associated pneumobilia  The extent of pneumobilia appears to have slightly progressed when compared to the prior study    8/23/2021 IR cholecystostomy tube check Cholecystogram demonstrates mostly occluded metallic stent with debris of contrast into the duodenum  Nondraining contrast seen in the biliary system post procedure      8/23/2021 ecg Sinus rhythm with occasional Premature ventricular complexes  Abnormal ECG  When compared with ECG of 13-JUL-2021 09:56,  Premature ventricular complexes are now Present    8/23/2021 ecg Sinus rhythm with occasional Premature ventricular complexes  Left axis deviation  Abnormal ECG  When compared with ECG of 23-AUG-2021 00:36, (unconfirmed)  No significant change was found    8/24/2021 NM myocardial perfusion spect - Normal study after pharmacologic vasodilation without reproduction of symptoms  There was image artifact, without diagnostic evidence for perfusion abnormality   Left ventricular systolic function was normal   Results from last 7 days   Lab Units 08/25/21  0442 08/24/21  0412 08/23/21  0058   WBC Thousand/uL 37 82* 33 28* 24 72*   HEMOGLOBIN g/dL 12 0 11 9* 11 5*   HEMATOCRIT % 38 1 37 0 35 9*   PLATELETS Thousands/uL 264 265 257   BANDS PCT % 28* 26* 20*     Results from last 7 days   Lab Units 08/25/21  0442 08/24/21  0412 08/23/21  0058   SODIUM mmol/L 145 143 143   POTASSIUM mmol/L 3 3* 3 6 3 2*   CHLORIDE mmol/L 108 108 106   CO2 mmol/L 26 26 24   ANION GAP mmol/L 11 9 13   BUN mg/dL 14 12 19   CREATININE mg/dL 0 85 0 83 1 17   EGFR ml/min/1 73sq m 91 92 65   CALCIUM mg/dL 8 7 8 9 8 4     Results from last 7 days   Lab Units 08/25/21  0442 08/24/21  0412 08/23/21  0058   AST U/L 22 30 16   ALT U/L 36 44 46   ALK PHOS U/L 207* 165* 135*   TOTAL PROTEIN g/dL 6 1* 6 0* 6 3*   ALBUMIN g/dL 3 0* 3 0* 3 0*   TOTAL BILIRUBIN mg/dL 0 29 0 20 0 44   BILIRUBIN DIRECT mg/dL  --  0 12  --      Results from last 7 days   Lab Units 08/25/21  0955 08/25/21  0611 08/24/21  2113 08/24/21  1546 08/24/21  1226 08/24/21  0953 08/23/21  2107 08/23/21  2038 08/23/21  1638 08/23/21  1156 08/23/21  0728   POC GLUCOSE mg/dl 105 107 132 226* 187* 208* 93 101 127 172* 127     Results from last 7 days   Lab Units 08/25/21  0442 08/24/21  0412 08/23/21  0058   GLUCOSE RANDOM mg/dL 107 111 96     Results from last 7 days   Lab Units 08/23/21  1008 08/23/21  0642 08/23/21  0058   TROPONIN I ng/mL <0 02 <0 02 <0 02     Results from last 7 days   Lab Units 08/23/21  0121   D-DIMER QUANTITATIVE ug/ml FEU 3 19*     Results from last 7 days   Lab Units 08/23/21  0058   LIPASE u/L 33*       ED Treatment:   Medication Administration from 08/23/2021 0031 to 08/24/2021 0736       Date/Time Order Dose Route Action Comments     08/23/2021 0330 HYDROmorphone (DILAUDID) injection 0 2 mg 0 2 mg Intravenous Given      08/23/2021 0330 sodium chloride 0 9 % bolus 500 mL 500 mL Intravenous New Bag      08/23/2021 0634 HYDROmorphone (DILAUDID) injection 0 2 mg 0 2 mg Intravenous Given      08/23/2021 0634 potassium chloride (K-DUR,KLOR-CON) CR tablet 40 mEq 40 mEq Oral Given      08/23/2021 0805 aspirin chewable tablet 81 mg 81 mg Oral Given      08/23/2021 0805 FLUoxetine (PROzac) capsule 20 mg 20 mg Oral Given      33/93/7580 5424 folic acid (FOLVITE) tablet 1 mg 1 mg Oral Given      08/23/2021 0805 gabapentin (NEURONTIN) capsule 200 mg 200 mg Oral Given      08/23/2021 2108 insulin glargine (LANTUS) subcutaneous injection 12 Units 0 12 mL 12 Units Subcutaneous Given      08/23/2021 1804 insulin lispro (HumaLOG) 100 units/mL subcutaneous injection 10 Units 10 Units Subcutaneous Given      08/23/2021 1155 insulin lispro (HumaLOG) 100 units/mL subcutaneous injection 10 Units 10 Units Subcutaneous Given      08/23/2021 0805 insulin lispro (HumaLOG) 100 units/mL subcutaneous injection 10 Units 10 Units Subcutaneous Given      08/23/2021 1804 pancrelipase (Lip-Prot-Amyl) (CREON) delayed release capsule 24,000 Units 24,000 Units Oral Given      08/23/2021 1155 pancrelipase (Lip-Prot-Amyl) (CREON) delayed release capsule 24,000 Units 24,000 Units Oral Given      08/23/2021 0805 pancrelipase (Lip-Prot-Amyl) (CREON) delayed release capsule 24,000 Units 24,000 Units Oral Given      08/24/2021 0629 pantoprazole (PROTONIX) EC tablet 40 mg 40 mg Oral Given      08/23/2021 2108 pantoprazole (PROTONIX) EC tablet 40 mg 40 mg Oral Given      08/23/2021 0705 pantoprazole (PROTONIX) EC tablet 40 mg 40 mg Oral Given      08/23/2021 0725 predniSONE tablet 10 mg 10 mg Oral Given      08/23/2021 2107 acetaminophen (TYLENOL) tablet 975 mg 975 mg Oral Given      08/23/2021 1521 acetaminophen (TYLENOL) tablet 975 mg 975 mg Oral Given      08/23/2021 0705 acetaminophen (TYLENOL) tablet 975 mg 975 mg Oral Given      08/23/2021 2108 senna-docusate sodium (SENOKOT S) 8 6-50 mg per tablet 1 tablet 1 tablet Oral Given      08/23/2021 1635 atorvastatin (LIPITOR) tablet 20 mg 20 mg Oral Given      08/23/2021 1635 tamsulosin (FLOMAX) capsule 0 4 mg 0 4 mg Oral Given      08/23/2021 0809 enoxaparin (LOVENOX) subcutaneous injection 40 mg 40 mg Subcutaneous Given      08/24/2021 0501 heparin (porcine) subcutaneous injection 5,000 Units 5,000 Units Subcutaneous Given      08/23/2021 2101 heparin (porcine) subcutaneous injection 5,000 Units 5,000 Units Subcutaneous Given      08/23/2021 1522 heparin (porcine) subcutaneous injection 5,000 Units 5,000 Units Subcutaneous Given      08/23/2021 1009 sodium chloride 0 9 % infusion 75 mL/hr Intravenous New Bag         Past Medical History:   Diagnosis Date    Anxiety     Arthritis     Cancer ([facility] )     pancreatic    Cellulitis     LAST ASSESSED: 6/13/14    Diabetes mellitus ([facility] )     Enlarged prostate     Epidermal inclusion cyst     LAST ASSESSED: 10/4/13    Erythrasma     LAST ASSESSED: 9/23/13    Furuncle     LAST ASSESSED: 6/11/14    GERD (gastroesophageal reflux disease)     Hyperlipidemia     Hypertension     RA (rheumatoid arthritis) ([facility] )      Present on Admission:   Essential hypertension   GERD   Recent cholecystitis   Pancreatic cancer metastasized to liver (HCC)   Leukocytosis   Pneumobilia due to Occluded CBD stent      Admitting Diagnosis: Chest pain, unspecified [R07 9]  Other chest pain [R07 89]  Hypokalemia [E87 6]  Pancreatic cancer ([facility] ) [C25 9]  Chest pain [R07 9]  Pneumobilia [K83 8]  Age/Sex: 73 y o  male  Admission Orders:  Scheduled Medications:  acetaminophen, 975 mg, Oral, Q8H [address]  aspirin, 81 mg, Oral, Daily  atorvastatin, 20 mg, Oral, Daily With Dinner  FLUoxetine, 20 mg, Oral, Daily  folic acid, 1 mg, Oral, Daily  heparin (porcine), 5,000 Units, Subcutaneous, Q8H INGRID  insulin glargine, 12 Units, Subcutaneous, HS  insulin lispro, 10 Units, Subcutaneous, TID With Meals  insulin lispro, 2-12 Units, Subcutaneous, HS  pancrelipase (Lip-Prot-Amyl), 24,000 Units, Oral, TID With Meals  pantoprazole, 40 mg, Oral, BID AC  predniSONE, 10 mg, Oral, Daily With Breakfast  senna-docusate sodium, 1 tablet, Oral, HS  tamsulosin, 0 4 mg, Oral, Daily With Dinner      Continuous IV Infusions:  sodium chloride, 75 mL/hr, Intravenous, Continuous      PRN Meds:not used   aluminum-magnesium hydroxide-simethicone, 30 mL, Oral, Q4H PRN  hydrALAZINE, 10 mg, Intravenous, Q6H PRN  HYDROmorphone, 0 2 mg, Intravenous, Q4H PRN  naloxone, 0 04 mg, Intravenous, Q1MIN PRN  ondansetron, 4 mg, Intravenous, Q4H PRN  oxyCODONE, 10 mg, Oral, Q4H PRN  oxyCODONE, 5 mg, Oral, Q4H PRN  prochlorperazine, 10 mg, Oral, Q6H PRN    Drain care - perc ashli drain  Telemetry     INPATIENT CONSULT TO IR  IP CONSULT TO CARDIOLOGY  IP CONSULT TO GASTROENTEROLOGY    Network Utilization Review Department  ATTENTION: Please call with any questions or concerns to [phone] and carefully listen to the prompts so that you are directed to the right person  All voicemails are confidential   Doris Dull all requests for admission clinical reviews, approved or denied determinations and any other requests to dedicated fax number below belonging to the campus where the patient is receiving treatment   List of dedicated fax numbers for the Facilities:  FACILITY NAME UR FAX NUMBER   ADMISSION DENIALS (Administrative/Medical Necessity) [phone]   [address] (Maternity/NICU/Pediatrics) [address] [facility] [place] [address] [facility] [id number] [place] [address] Meenakshi Big[address] [facility] [address] [place] [address] [phone]

## 2021-08-24 NOTE — PROGRESS NOTES
Cardiology Progress Note - Ricardo Valle 73 y o  male MRN: [id number]    Unit/Bed#: ED 27 Encounter: [id number]        Assessment/Recommendations:    Principal Problem:    Pneumobilia due to Occluded CBD stent  Active Problems:    Essential hypertension    GERD    Recent cholecystitis    Leukocytosis    Pancreatic cancer metastasized to liver [facility])    Other chest pain      · Atypical chest pain     - Patient had atypical chest pain and is being evaluated for that  - PMH of hyperlipidemia, hypertension, pancreatic cancer  - Pain started suddenly while sitting, didn't get worse with exertion, no diaphoresis noted, got better with nitroglycerin given on the route to the hospital  - Currently patient doesn't mention any chest pain, shortness of breath or diaphoresis  - On ECG: sinus rhythm with occasional ventricular complexes, left axis deviation  - On Echo from 07/19/21, mild regurgitation of aortic valve and tricuspid valve was noted, trace regurgitation of  left ventricle showed systolic function at lower limit of normal with EF 50% and mild diffuse hypokinesis  - Troponin x3 negative  - Patient is still asymptomatic currently      - Stress test didn't show any significant changes or issues  - It looks less likely that the pain was anginal            Currently there are no other immediate recommendations from Cardiology, please reach out to us in case of further questions  Will discuss with attending physician  Subjective: Ricardo Valle is a 73y o  year old male with the PMH of hypertension, Type 2 DM, hyperlipidemia, pancreatic cancer (undergoing chemotherapy) who presents after the episode of chest pain  He mentions pain to be sharp in nature and in the center of the chest, didn't get worse with activity according to him, was between 8/10 - 10/10 in intensity  He denies any episodes of shortness of breath, any prior similar episodes or any cardiac history   Patient started feeling better on the way to the hospital using nitroglycerin  He subsequently had recurrence of pain in the ED on admission day, for which he received dilaudid  He didn't have any recurrent episode of chest pain since then  Stress test was done and doesn't indicate anginal episodes  Overnight events reviewed  Review of Systems   Constitutional: Negative for chills, diaphoresis and unexpected weight change  HENT: Negative for congestion, facial swelling and nosebleeds  Eyes: Negative for discharge and visual disturbance  Respiratory: Negative for apnea, chest tightness and shortness of breath  Cardiovascular: Negative for chest pain and leg swelling  Gastrointestinal: Negative for abdominal distention and constipation  Genitourinary: Negative for decreased urine volume and difficulty urinating  Musculoskeletal: Negative for arthralgias, joint swelling, neck pain and neck stiffness  Neurological: Negative for dizziness, light-headedness and headaches  Telemetry Review: No significant arrhythmias seen on telemetry review       Cardiac testing:       Current Facility-Administered Medications:     acetaminophen (TYLENOL) tablet 975 mg, 975 mg, Oral, Q8H [address], Jossie Ornelas PA-C, 975 mg at 08/23/21 2107    aluminum-magnesium hydroxide-simethicone (MYLANTA) oral suspension 30 mL, 30 mL, Oral, Q4H PRN, Jossie Ornelas PA-C    aspirin chewable tablet 81 mg, 81 mg, Oral, Daily, Jossie Ornelas PA-C, 81 mg at 08/24/21 1003    atorvastatin (LIPITOR) tablet 20 mg, 20 mg, Oral, Daily With Inez Jean PA-C, 20 mg at 08/23/21 1635    FLUoxetine (PROzac) capsule 20 mg, 20 mg, Oral, Daily, Jossie Ornelas PA-C, 20 mg at 70/58/76 8768    folic acid (FOLVITE) tablet 1 mg, 1 mg, Oral, Daily, Jossie Ornelas PA-C, 1 mg at 08/24/21 1003    heparin (porcine) subcutaneous injection 5,000 Units, 5,000 Units, Subcutaneous, Q8H [address], Jossie Margaretho, PA-C, 5,000 Units at 08/24/21 1506    hydrALAZINE (APRESOLINE) injection 10 mg, 10 mg, Intravenous, Q6H PRN, ALVA GrovesC    HYDROmorphone (DILAUDID) injection 0 2 mg, 0 2 mg, Intravenous, Q4H PRN, Jossie Ornelas PA-C    insulin glargine (LANTUS) subcutaneous injection 12 Units 0 12 mL, 12 Units, Subcutaneous, HS, Jossie Ornelas PA-C, 12 Units at 08/23/21 2108    insulin lispro (HumaLOG) 100 units/mL subcutaneous injection 10 Units, 10 Units, Subcutaneous, TID With Meals, Jossie Ornelas PA-C, 10 Units at 08/24/21 1359    insulin lispro (HumaLOG) 100 units/mL subcutaneous injection 2-12 Units, 2-12 Units, Subcutaneous, HS, Jossie Ornelas PA-C    naloxone (NARCAN) 0 04 mg/mL syringe 0 04 mg, 0 04 mg, Intravenous, Q1MIN PRN, Jossie Ornelas PA-C    ondansetron (ZOFRAN) injection 4 mg, 4 mg, Intravenous, Q4H PRN, Jossie Ornelas PA-C    oxyCODONE (ROXICODONE) immediate release tablet 10 mg, 10 mg, Oral, Q4H PRN, Jossie Ornelas PA-C    oxyCODONE (ROXICODONE) IR tablet 5 mg, 5 mg, Oral, Q4H PRN, Jossie Ornelas PA-C    pancrelipase (Lip-Prot-Amyl) (CREON) delayed release capsule 24,000 Units, 24,000 Units, Oral, TID With Meals, Jossie Ornelas PA-C, 24,000 Units at 08/24/21 1400    pantoprazole (PROTONIX) EC tablet 40 mg, 40 mg, Oral, BID AC, KRISS Groves-C, 40 mg at 08/24/21 8803    predniSONE tablet 10 mg, 10 mg, Oral, Daily With Breakfast, Jossie Ornelas PA-C, 10 mg at 08/24/21 1002    prochlorperazine (COMPAZINE) tablet 10 mg, 10 mg, Oral, Q6H PRN, Jossie Ornelas PA-C    senna-docusate sodium (SENOKOT S) 8 6-50 mg per tablet 1 tablet, 1 tablet, Oral, HS, Jossie Ornelas PA-C, 1 tablet at 08/23/21 2108    sodium chloride 0 9 % infusion, 75 mL/hr, Intravenous, Continuous, Jossie Ornelas PA-C, Last Rate: 75 mL/hr at 08/24/21 0957, 75 mL/hr at 08/24/21 0957    tamsulosin (FLOMAX) capsule 0 4 mg, 0 4 mg, Oral, Daily With Dinner, Jossie Ornelas PA-C, 0 4 mg at 08/23/21 1635    Current Outpatient Medications:     Alcohol Swabs 70 % PADS, May substitute brand based on insurance coverage  Check glucose ACHS  , Disp: 200 each, Rfl: 0    aspirin 81 mg chewable tablet, Chew 81 mg , Disp: , Rfl:     Blood Glucose Monitoring Suppl (ONE TOUCH ULTRA 2) w/Device KIT, Use daily, Disp: 1 each, Rfl: 1    FLUoxetine (PROzac) 20 mg capsule, TAKE ONE CAPSULE BY MOUTH EVERY DAY, Disp: 90 capsule, Rfl: 2    folic acid (FOLVITE) 1 mg tablet, Take by mouth daily, Disp: , Rfl:     gabapentin (NEURONTIN) 100 mg capsule, Take 2 capsules (200 mg total) by mouth 3 (three) times a day, Disp: 190 capsule, Rfl: 0    glucose blood (OneTouch Ultra) test strip, Use as instructed tid, Disp: 100 each, Rfl: 5    insulin glargine (Lantus SoloStar) 100 units/mL injection pen, Inject 12 Units under the skin daily at bedtime, Disp: 15 mL, Rfl: 0    insulin lispro (HumaLOG KwikPen) 100 units/mL injection pen, Take 10 units with breakfast, lunch and dinner +scale, Disp: 15 mL, Rfl: 5    Insulin Pen Needle (BD Pen Needle Cinda 2nd Gen) 32G X 4 MM MISC, For use with insulin pen   Pharmacy may dispense brand covered by insurance , Disp: 100 each, Rfl: 3    Lancets (onetouch ultrasoft) lancets, Use as instructed once a day, Disp: 50 each, Rfl: 5    lisinopril-hydrochlorothiazide (PRINZIDE,ZESTORETIC) 10-12 5 MG per tablet, Take 1 tablet by mouth 3 (three) times a week Monday Wednesday Friday, Disp: 90 tablet, Rfl: 3    oxyCODONE (ROXICODONE) 5 mg immediate release tablet, Take 1 tablet (5 mg total) by mouth every 4 (four) hours as needed (cancer pain)Max Daily Amount: 30 mg, Disp: 120 tablet, Rfl: 0    pancrelipase, Lip-Prot-Amyl, (CREON) 24,000 units, Take 1 capsule (24,000 Units total) by mouth 3 (three) times a day with meals Or snacks, Disp: 270 capsule, Rfl: 0    pantoprazole (PROTONIX) 40 mg tablet, Take 1 tablet (40 mg total) by mouth 2 (two) times a day Before breakfast, before bed, to reduce stomach acid, Disp: 60 tablet, Rfl: 1    predniSONE 20 mg tablet, Take 0 5 tablets (10 mg total) by mouth daily with breakfast, Disp: 15 tablet, Rfl:     prochlorperazine (COMPAZINE) 10 mg tablet, Take 1 tablet (10 mg total) by mouth every 6 (six) hours as needed for nausea or vomiting, Disp: 45 tablet, Rfl: 3    cyanocobalamin (VITAMIN B-12) 1000 MCG tablet, Take 1 tablet (1,000 mcg total) by mouth daily, Disp: 30 tablet, Rfl: 5    Sodium Chloride Flush (Normal Saline Flush) 0 9 % SOLN, Gall bladder drain (Patient not taking: Reported on 8/11/2021), Disp: , Rfl:      Objective:     Vitals:   Blood pressure 140/84, pulse 90, temperature 98 5 °F (36 9 °C), temperature source Oral, resp  rate 16, weight 74 8 kg (165 lb), SpO2 93 %  Body mass index is 24 35 kg/m²  Orthostatic Blood Pressures      Most Recent Value   Blood Pressure  140/84 filed at 08/24/2021 1506   Patient Position - Orthostatic VS  Lying filed at 08/24/2021 2340         Systolic (54UJX), JVT:343 , Min:132 , KBL:553     Diastolic (99HUL), SLD:77, Min:68, Max:98      Intake/Output Summary (Last 24 hours) at 8/24/2021 1538  Last data filed at 8/24/2021 1405  Gross per 24 hour   Intake --   Output 850 ml   Net -850 ml     Weight (last 2 days)     Date/Time   Weight    08/23/21 0033   74 8 (165)              Physical Exam  Constitutional:       General: He is not in acute distress  Appearance: He is normal weight  He is not ill-appearing  HENT:      Head: Normocephalic  Nose: No congestion or rhinorrhea  Eyes:      General: No scleral icterus  Cardiovascular:      Rate and Rhythm: Normal rate  Pulses: Normal pulses  Heart sounds: No murmur heard  Pulmonary:      Effort: Pulmonary effort is normal  No respiratory distress  Abdominal:      General: There is no distension  Tenderness: There is no abdominal tenderness  There is no guarding  Musculoskeletal:         General: No swelling  Normal range of motion  Cervical back: Normal range of motion  No rigidity  Right lower leg: No edema  Left lower leg: No edema  Neurological:      Mental Status: He is alert     Psychiatric:         Mood and Affect: Mood normal            Labs & Results:    Results from last 7 days   Lab Units 08/23/21  1008 08/23/21  0642 08/23/21  0058   TROPONIN I ng/mL <0 02 <0 02 <0 02     Results from last 7 days   Lab Units 08/24/21  0412 08/23/21  0058   WBC Thousand/uL 33 28* 24 72*   HEMOGLOBIN g/dL 11 9* 11 5*   HEMATOCRIT % 37 0 35 9*   PLATELETS Thousands/uL 265 257         Results from last 7 days   Lab Units 08/24/21  0412 08/23/21  0058   POTASSIUM mmol/L 3 6 3 2*   CHLORIDE mmol/L 108 106   CO2 mmol/L 26 24   BUN mg/dL 12 19   CREATININE mg/dL 0 83 1 17   CALCIUM mg/dL 8 9 8 4   ALK PHOS U/L 165* 135*   ALT U/L 44 46   AST U/L 30 16             No results for input(s): NTBNP in the last 72 hours  Available cardiology studies, imaging and lab results independently reviewed

## 2021-08-25 PROBLEM — R07.89 OTHER CHEST PAIN: Status: RESOLVED | Noted: 2021-01-01 | Resolved: 2021-01-01

## 2021-08-25 NOTE — CASE MANAGEMENT
How Severe Are Your Spot(S)?: mild
Pt will be discharging home today and ALEXANDER with [facility] has been requested  Cm requested the [facility] place an order for [facility] 
What Type Of Note Output Would You Prefer (Optional)?: Bullet Format
What Is The Reason For Today's Visit?: Full Body Skin Examination
What Is The Reason For Today's Visit? (Being Monitored For X): concerning skin lesions on an annual basis

## 2021-08-25 NOTE — PLAN OF CARE
Problem: PAIN - ADULT  Goal: Verbalizes/displays adequate comfort level or baseline comfort level  Description: Interventions:  - Encourage patient to monitor pain and request assistance  - Assess pain using appropriate pain scale  - Administer analgesics based on type and severity of pain and evaluate response  - Implement non-pharmacological measures as appropriate and evaluate response  - Consider cultural and social influences on pain and pain management  - Notify physician/advanced practitioner if interventions unsuccessful or patient reports new pain  Outcome: Adequate for Discharge     Problem: INFECTION - ADULT  Goal: Absence or prevention of progression during hospitalization  Description: INTERVENTIONS:  - Assess and monitor for signs and symptoms of infection  - Monitor lab/diagnostic results  - Monitor all insertion sites, i e  indwelling lines, tubes, and drains  - Monitor endotracheal if appropriate and nasal secretions for changes in amount and color  - [place] appropriate cooling/warming therapies per order  - Administer medications as ordered  - Instruct and encourage patient and family to use good hand hygiene technique  - Identify and instruct in appropriate isolation precautions for identified infection/condition  Outcome: Adequate for Discharge  Goal: Absence of fever/infection during neutropenic period  Description: INTERVENTIONS:  - Monitor WBC    Outcome: Adequate for Discharge     Problem: SAFETY ADULT  Goal: Patient will remain free of falls  Description: INTERVENTIONS:  - Educate patient/family on patient safety including physical limitations  - Instruct patient to call for assistance with activity   - Consult OT/PT to assist with strengthening/mobility   - Keep Call bell within reach  - Keep bed low and locked with side rails adjusted as appropriate  - Keep care items and personal belongings within reach  - Initiate and maintain comfort rounds  - Make Fall Risk Sign visible to staff  - Offer Toileting every 2 Hours, in advance of need  - Initiate/Maintain alarm  - Obtain necessary fall risk management equipment  - Apply yellow socks and bracelet for high fall risk patients  - Consider moving patient to room near nurses station  Outcome: Adequate for Discharge  Goal: Maintain or return to baseline ADL function  Description: INTERVENTIONS:  -  Assess patient's ability to carry out ADLs; assess patient's baseline for ADL function and identify physical deficits which impact ability to perform ADLs (bathing, care of mouth/teeth, toileting, grooming, dressing, etc )  - Assess/evaluate cause of self-care deficits   - Assess range of motion  - Assess patient's mobility; develop plan if impaired  - Assess patient's need for assistive devices and provide as appropriate  - Encourage maximum independence but intervene and supervise when necessary  - Involve family in performance of ADLs  - Assess for home care needs following discharge   - Consider OT consult to assist with ADL evaluation and planning for discharge  - Provide patient education as appropriate  Outcome: Adequate for Discharge  Goal: Maintains/Returns to pre admission functional level  Description: INTERVENTIONS:  - Perform BMAT or MOVE assessment daily    - Set and communicate daily mobility goal to care team and patient/family/caregiver  - Collaborate with rehabilitation services on mobility goals if consulted  - Perform Range of Motion 3 times a day  - Reposition patient every 2 hours    - Dangle patient 3 times a day  - Stand patient 3 times a day  - Ambulate patient 3 times a day  - Out of bed to chair 3 times a day   - Out of bed for meals 3 times a day  - Out of bed for toileting  - Record patient progress and toleration of activity level   Outcome: Adequate for Discharge     Problem: DISCHARGE PLANNING  Goal: Discharge to home or other facility with appropriate resources  Description: INTERVENTIONS:  - Identify barriers to discharge w/patient and caregiver  - Arrange for needed discharge resources and transportation as appropriate  - Identify discharge learning needs (meds, wound care, etc )  - Arrange for interpretive services to assist at discharge as needed  - Refer to Case Management Department for coordinating discharge planning if the patient needs post-hospital services based on physician/advanced practitioner order or complex needs related to functional status, cognitive ability, or social support system  Outcome: Adequate for Discharge     Problem: Knowledge Deficit  Goal: Patient/family/caregiver demonstrates understanding of disease process, treatment plan, medications, and discharge instructions  Description: Complete learning assessment and assess knowledge base    Interventions:  - Provide teaching at level of understanding  - Provide teaching via preferred learning methods  Outcome: Adequate for Discharge     Problem: CARDIOVASCULAR - ADULT  Goal: Maintains optimal cardiac output and hemodynamic stability  Description: INTERVENTIONS:  - Monitor I/O, vital signs and rhythm  - Monitor for S/S and trends of decreased cardiac output  - Administer and titrate ordered vasoactive medications to optimize hemodynamic stability  - Assess quality of pulses, skin color and temperature  - Assess for signs of decreased coronary artery perfusion  - Instruct patient to report change in severity of symptoms  Outcome: Adequate for Discharge  Goal: Absence of cardiac dysrhythmias or at baseline rhythm  Description: INTERVENTIONS:  - Continuous cardiac monitoring, vital signs, obtain 12 lead EKG if ordered  - Administer antiarrhythmic and heart rate control medications as ordered  - Monitor electrolytes and administer replacement therapy as ordered  Outcome: Adequate for Discharge     Problem: RESPIRATORY - ADULT  Goal: Achieves optimal ventilation and oxygenation  Description: INTERVENTIONS:  - Assess for changes in respiratory status  - Assess for changes in mentation and behavior  - Position to facilitate oxygenation and minimize respiratory effort  - Oxygen administered by appropriate delivery if ordered  - Initiate smoking cessation education as indicated  - Encourage broncho-pulmonary hygiene including cough, deep breathe, Incentive Spirometry  - Assess the need for suctioning and aspirate as needed  - Assess and instruct to report SOB or any respiratory difficulty  - Respiratory Therapy support as indicated  Outcome: Adequate for Discharge     Problem: GASTROINTESTINAL - ADULT  Goal: Minimal or absence of nausea and/or vomiting  Description: INTERVENTIONS:  - Administer IV fluids if ordered to ensure adequate hydration  - Maintain NPO status until nausea and vomiting are resolved  - Nasogastric tube if ordered  - Administer ordered antiemetic medications as needed  - Provide nonpharmacologic comfort measures as appropriate  - Advance diet as tolerated, if ordered  - Consider nutrition services referral to assist patient with adequate nutrition and appropriate food choices  Outcome: Adequate for Discharge  Goal: Maintains or returns to baseline bowel function  Description: INTERVENTIONS:  - Assess bowel function  - Encourage oral fluids to ensure adequate hydration  - Administer IV fluids if ordered to ensure adequate hydration  - Administer ordered medications as needed  - Encourage mobilization and activity  - Consider nutritional services referral to assist patient with adequate nutrition and appropriate food choices  Outcome: Adequate for Discharge  Goal: Maintains adequate nutritional intake  Description: INTERVENTIONS:  - Monitor percentage of each meal consumed  - Identify factors contributing to decreased intake, treat as appropriate  - Assist with meals as needed  - Monitor I&O, weight, and lab values if indicated  - Obtain nutrition services referral as needed  Outcome: Adequate for Discharge  Goal: Oral mucous membranes remain intact  Description: INTERVENTIONS  - Assess oral mucosa and hygiene practices  - Implement preventative oral hygiene regimen  - Implement oral medicated treatments as ordered  - Initiate Nutrition services referral as needed  Outcome: Adequate for Discharge     Problem: METABOLIC, FLUID AND ELECTROLYTES - ADULT  Goal: Electrolytes maintained within normal limits  Description: INTERVENTIONS:  - Monitor labs and assess patient for signs and symptoms of electrolyte imbalances  - Administer electrolyte replacement as ordered  - Monitor response to electrolyte replacements, including repeat lab results as appropriate  - Instruct patient on fluid and nutrition as appropriate  Outcome: Adequate for Discharge  Goal: Fluid balance maintained  Description: INTERVENTIONS:  - Monitor labs   - Monitor I/O and WT  - Instruct patient on fluid and nutrition as appropriate  - Assess for signs & symptoms of volume excess or deficit  Outcome: Adequate for Discharge  Goal: Glucose maintained within target range  Description: INTERVENTIONS:  - Monitor Blood Glucose as ordered  - Assess for signs and symptoms of hyperglycemia and hypoglycemia  - Administer ordered medications to maintain glucose within target range  - Assess nutritional intake and initiate nutrition service referral as needed  Outcome: Adequate for Discharge     Problem: HEMATOLOGIC - ADULT  Goal: Maintains hematologic stability  Description: INTERVENTIONS  - Assess for signs and symptoms of bleeding or hemorrhage  - Monitor labs  - Administer supportive blood products/factors as ordered and appropriate  Outcome: Adequate for Discharge     Problem: Potential for Falls  Goal: Patient will remain free of falls  Description: INTERVENTIONS:  - Educate patient/family on patient safety including physical limitations  - Instruct patient to call for assistance with activity   - Consult OT/PT to assist with strengthening/mobility   - Keep Call bell within reach  - Keep bed low and locked with side rails adjusted as appropriate  - Keep care items and personal belongings within reach  - Initiate and maintain comfort rounds  - Make Fall Risk Sign visible to staff  - Offer Toileting every 2 Hours, in advance of need  - Initiate/Maintain alarm  - Obtain necessary fall risk management equipment  - Apply yellow socks and bracelet for high fall risk patients  - Consider moving patient to room near nurses station  Outcome: Adequate for Discharge

## 2021-08-25 NOTE — ASSESSMENT & PLAN NOTE
WBC   Date Value Ref Range Status   08/25/2021 37 82 (HH) 4 31 - 10 16 Thousand/uL Final     Comment: This result has been called to [facility] by Mary Jorge on 08/25/2021 05:42:55, and has been read back  08/24/2021 33 28 (HH) 4 31 - 10 16 Thousand/uL Final     Comment: This result has been called to mercedes horowitz by [facility] on 08/24/2021 04:38:57, and has been read back      08/23/2021 24 72 (H) 4 31 - 10 16 Thousand/uL Final     · 2/2 Neulasta dose 8/19

## 2021-08-25 NOTE — ASSESSMENT & PLAN NOTE
· Continue pancrelipase from home, continue prednisone from home  · Receives chemotherapy 3x month (last date 8/19/2021) next date is 9/2/2021  · Neulasta received on 8/19

## 2021-08-25 NOTE — ASSESSMENT & PLAN NOTE
· CT scan with evidence of pneumobilia, which has slightly progressed when compared to previous study  · IR was consulted, performed cholecystogram which showed trickle of contrast around the stent with opacification of the duodenal loop, concerning for occlusion of CBD metallic stent  · GI following  · LFTs are stable  · Given stability of LFTs and pain control  Patient is deemed stable to be discharged with outpatient GI follow-up for repeat ERCP and 2nd stent placement

## 2021-08-25 NOTE — ASSESSMENT & PLAN NOTE
Lucas = 3  · Improved with nitro & asa en route to ED  · Pain is likely GI related  · Troponins negative  · EKG in the ED non-ischemic   · Also history of GERD & cancer pain with new liver mets  mylanta prn & geriatric pain protocol  · Stress test completed today, 8/24  · No ischemia noted on stress testing

## 2021-08-25 NOTE — DISCHARGE INSTR - AVS FIRST PAGE
Dear Sabrina Ham,     It was our pleasure to care for you here at [facility]  It is our hope that we were always able to exceed the expected standards for your care during your stay  You were hospitalized due to upper abdominal pain  You were cared for on the 4th floor by Elis Epstein PA-C under the service of Corinna George MD with the [facility] who covers for your primary care physician (PCP), Shara Michael MD, while you were hospitalized  If you have any questions or concerns related to this hospitalization, you may contact us at [phone]  For follow up as well as any medication refills, we recommend that you follow up with your primary care physician  A registered nurse will reach out to you by phone within a few days after your discharge to answer any additional questions that you may have after going home  However, at this time we provide for you here, the most important instructions / recommendations at discharge:     · Notable Medication Adjustments -   · None  · Testing Required after Discharge -   · As we discussed you will need an outpatient ERCP to place another stent  · Important follow up information -   · The GI team will arrange outpatient follow-up for you  · Other Instructions -   · Return to the emergency department if your pain comes back and is severe, if you have discoloration and yellowing of your skin, or if you have profuse diarrhea, nausea, vomiting  · Please review this entire after visit summary as additional general instructions including medication list, appointments, activity, diet, any pertinent wound care, and other additional recommendations from your care team that may be provided for you        Sincerely,     Elis Epstein PA-C

## 2021-08-25 NOTE — PROGRESS NOTES
Progress Note - Sinai Fitzgerald 73 y o  male MRN: [id number]    Unit/Bed#: S -01 Encounter: [id number]        Subjective:   Patient reports feeling well, denies any recurrences of abdominal pain or chest pain, any fevers or chills  Objective:     Vitals: Blood pressure 142/81, pulse 79, temperature 98 °F (36 7 °C), temperature source Oral, resp  rate 18, weight 74 8 kg (165 lb), SpO2 95 %  ,Body mass index is 24 35 kg/m²  Intake/Output Summary (Last 24 hours) at 8/25/2021 1153  Last data filed at 8/25/2021 0440  Gross per 24 hour   Intake 850 ml   Output 1550 ml   Net -700 ml       Physical Exam:   General appearance: alert, appears stated age and cooperative  Lungs: clear to auscultation bilaterally, no labored breathing/accessory muscle use  Heart: regular rate and rhythm, S1, S2 normal, no murmur, click, rub or gallop  Abdomen: soft, non-tender; bowel sounds normal; no masses,  no organomegaly  Extremities: no edema    Invasive Devices     Central Venous Catheter Line            Port A Cath 06/01/21 Left Chest 85 days          Drain            Closed/Suction Drain Lateral  days                Lab, Imaging and other studies: I have personally reviewed pertinent reports      Admission on 08/23/2021   Component Date Value    WBC 08/23/2021 24 72*    RBC 08/23/2021 3 82*    Hemoglobin 08/23/2021 11 5*    Hematocrit 08/23/2021 35 9*    MCV 08/23/2021 94     MCH 08/23/2021 30 1     MCHC 08/23/2021 32 0     RDW 08/23/2021 17 5*    MPV 08/23/2021 11 3     Platelets 60/72/1528 257     Sodium 08/23/2021 143     Potassium 08/23/2021 3 2*    Chloride 08/23/2021 106     CO2 08/23/2021 24     ANION GAP 08/23/2021 13     BUN 08/23/2021 19     Creatinine 08/23/2021 1 17     Glucose 08/23/2021 96     Calcium 08/23/2021 8 4     Corrected Calcium 08/23/2021 9 2     AST 08/23/2021 16     ALT 08/23/2021 46     Alkaline Phosphatase 08/23/2021 135*    Total Protein 08/23/2021 6 3*    Albumin 08/23/2021 3 0*    Total Bilirubin 08/23/2021 0 44     eGFR 08/23/2021 65     Troponin I 08/23/2021 <0 02     Lipase 08/23/2021 33*    D-Dimer, Quant 08/23/2021 3 19*    Segmented % 08/23/2021 63     Bands % 08/23/2021 20*    Lymphocytes % 08/23/2021 12*    Monocytes % 08/23/2021 2*    Eosinophils, % 08/23/2021 1     Basophils % 08/23/2021 0     Metamyelocytes% 08/23/2021 1     Atypical Lymphocytes % 08/23/2021 1*    Absolute Neutrophils 08/23/2021 20 52*    Lymphocytes Absolute 08/23/2021 2 97     Monocytes Absolute 08/23/2021 0 49     Eosinophils Absolute 08/23/2021 0 25     Basophils Absolute 08/23/2021 0 00     Dohle Bodies 08/23/2021 Present     Toxic Granulation 08/23/2021 Present     RBC Morphology 08/23/2021 Present     Anisocytosis 08/23/2021 Present     Hypochromia 08/23/2021 Present     Ovalocytes 08/23/2021 Present     Poikilocytes 08/23/2021 Present     Platelet Estimate 09/52/5879 Adequate     Large Platelet 38/49/8616 Present     Troponin I 08/23/2021 <0 02     POC Glucose 08/23/2021 127     Troponin I 08/23/2021 <0 02     POC Glucose 08/23/2021 172*    Ventricular Rate 08/23/2021 90     Atrial Rate 08/23/2021 90     OH Interval 08/23/2021 184     QRSD Interval 08/23/2021 78     QT Interval 08/23/2021 394     QTC Interval 08/23/2021 481     P Axis 08/23/2021 49     QRS Axis 08/23/2021 -27     T Wave Axis 08/23/2021 65     Ventricular Rate 08/23/2021 74     Atrial Rate 08/23/2021 74     OH Interval 08/23/2021 152     QRSD Interval 08/23/2021 100     QT Interval 08/23/2021 410     QTC Interval 08/23/2021 455     P Axis 08/23/2021 36     QRS Axis 08/23/2021 -31     T Wave Axis 08/23/2021 51     POC Glucose 08/23/2021 127     POC Glucose 08/23/2021 101     POC Glucose 08/23/2021 93     Sodium 08/24/2021 143     Potassium 08/24/2021 3 6     Chloride 08/24/2021 108     CO2 08/24/2021 26     ANION GAP 08/24/2021 9     BUN 08/24/2021 12     Creatinine 08/24/2021 0 83     Glucose 08/24/2021 111     Calcium 08/24/2021 8 9     eGFR 08/24/2021 92     WBC 08/24/2021 33 28*    RBC 08/24/2021 3 93     Hemoglobin 08/24/2021 11 9*    Hematocrit 08/24/2021 37 0     MCV 08/24/2021 94     MCH 08/24/2021 30 3     MCHC 08/24/2021 32 2     RDW 08/24/2021 17 9*    MPV 08/24/2021 10 8     Platelets 78/14/9668 265     Segmented % 08/24/2021 49     Bands % 08/24/2021 26*    Lymphocytes % 08/24/2021 6*    Monocytes % 08/24/2021 10     Eosinophils, % 08/24/2021 0     Basophils % 08/24/2021 0     Metamyelocytes% 08/24/2021 6*    Myelocytes % 08/24/2021 3*    Absolute Neutrophils 08/24/2021 24 96*    Lymphocytes Absolute 08/24/2021 2 00     Monocytes Absolute 08/24/2021 3 33*    Eosinophils Absolute 08/24/2021 0 00     Basophils Absolute 08/24/2021 0 00     Toxic Granulation 08/24/2021 Present     Anisocytosis 08/24/2021 Present     Poikilocytes 08/24/2021 Present     Polychromasia 08/24/2021 Present     Platelet Estimate 39/78/0957 Adequate     Total Bilirubin 08/24/2021 0 20     Bilirubin, Direct 08/24/2021 0 12     Alkaline Phosphatase 08/24/2021 165*    AST 08/24/2021 30     ALT 08/24/2021 44     Total Protein 08/24/2021 6 0*    Albumin 08/24/2021 3 0*    POC Glucose 08/24/2021 208*    POC Glucose 08/24/2021 187*    Protocol Name 08/24/2021 [facility] WALK     Time In Exercise Phase 08/24/2021 00:03:00     MAX   SYSTOLIC BP 90/63/1173 495     Max Diastolic Bp 14/48/0107 74     Max Heart Rate 08/24/2021 142     Max Predicted Heart Rate 08/24/2021 155     Reason for Termination 08/24/2021 Protocol Complete     Test Indication 08/24/2021                      Value:CHEST PAIN  CHEST PAIN      Target Hr Formular 08/24/2021 (220 - Age)*100%     Chest Pain Statement 08/24/2021 none     POC Glucose 08/24/2021 226*    POC Glucose 08/24/2021 132     Sodium 08/25/2021 145     Potassium 08/25/2021 3 3*    Chloride 08/25/2021 108     CO2 08/25/2021 26     ANION GAP 08/25/2021 11     BUN 08/25/2021 14     Creatinine 08/25/2021 0 85     Glucose 08/25/2021 107     Calcium 08/25/2021 8 7     Corrected Calcium 08/25/2021 9 5     AST 08/25/2021 22     ALT 08/25/2021 36     Alkaline Phosphatase 08/25/2021 207*    Total Protein 08/25/2021 6 1*    Albumin 08/25/2021 3 0*    Total Bilirubin 08/25/2021 0 29     eGFR 08/25/2021 91     WBC 08/25/2021 37 82*    RBC 08/25/2021 4 04     Hemoglobin 08/25/2021 12 0     Hematocrit 08/25/2021 38 1     MCV 08/25/2021 94     MCH 08/25/2021 29 7     MCHC 08/25/2021 31 5     RDW 08/25/2021 18 4*    MPV 08/25/2021 11 2     Platelets 33/21/7364 264     POC Glucose 08/25/2021 107     Segmented % 08/25/2021 53     Bands % 08/25/2021 28*    Lymphocytes % 08/25/2021 5*    Monocytes % 08/25/2021 4     Eosinophils, % 08/25/2021 0     Basophils % 08/25/2021 0     Metamyelocytes% 08/25/2021 8*    Myelocytes % 08/25/2021 2*    Absolute Neutrophils 08/25/2021 30 63*    Lymphocytes Absolute 08/25/2021 1 89     Monocytes Absolute 08/25/2021 1 51*    Eosinophils Absolute 08/25/2021 0 00     Basophils Absolute 08/25/2021 0 00     nRBC 08/25/2021 1     Anisocytosis 08/25/2021 Present     Macrocytes 08/25/2021 Present     Poikilocytes 08/25/2021 Present     Platelet Estimate 79/99/3910 Adequate     POC Glucose 08/25/2021 105      Results for Joe Guzmán (MRN [id number]) as of 8/25/2021 11:53   Ref   Range 8/24/2021 21:13 8/25/2021 04:42 8/25/2021 06:11 8/25/2021 09:55   POC Glucose Latest Ref Range: 65 - 140 mg/dl 132  107 105   Sodium Latest Ref Range: 136 - 145 mmol/L  145     Potassium Latest Ref Range: 3 5 - 5 3 mmol/L  3 3 (L)     Chloride Latest Ref Range: 100 - 108 mmol/L  108     CO2 Latest Ref Range: 21 - 32 mmol/L  26     Anion Gap Latest Ref Range: 4 - 13 mmol/L  11     BUN Latest Ref Range: 5 - 25 mg/dL  14     Creatinine Latest Ref Range: 0 60 - 1 30 mg/dL  0 85     Glucose, Random Latest Ref Range: 65 - 140 mg/dL  107     Calcium Latest Ref Range: 8 3 - 10 1 mg/dL  8 7     CORRECTED CALCIUM Latest Ref Range: 8 3 - 10 1 mg/dL  9 5     AST Latest Ref Range: 5 - 45 U/L  22     ALT Latest Ref Range: 12 - 78 U/L  36     Alkaline Phosphatase Latest Ref Range: 46 - 116 U/L  207 (H)     Total Protein Latest Ref Range: 6 4 - 8 2 g/dL  6 1 (L)     Albumin Latest Ref Range: 3 5 - 5 0 g/dL  3 0 (L)     TOTAL BILIRUBIN Latest Ref Range: 0 20 - 1 00 mg/dL  0 29     eGFR Latest Units: ml/min/1 73sq m  91     WBC Latest Ref Range: 4 31 - 10 16 Thousand/uL  37 82 (HH)     Red Blood Cell Count Latest Ref Range: 3 88 - 5 62 Million/uL  4 04     Hemoglobin Latest Ref Range: 12 0 - 17 0 g/dL  12 0     HCT Latest Ref Range: 36 5 - 49 3 %  38 1     MCV Latest Ref Range: 82 - 98 fL  94     MCH Latest Ref Range: 26 8 - 34 3 pg  29 7     MCHC Latest Ref Range: 31 4 - 37 4 g/dL  31 5     RDW Latest Ref Range: 11 6 - 15 1 %  18 4 (H)     Platelet Count Latest Ref Range: 149 - 390 Thousands/uL  264     MPV Latest Ref Range: 8 9 - 12 7 fL  11 2     Platelet Estimate Latest Ref Range: Adequate   Adequate     Segs Relative Latest Ref Range: 43 - 75 %  53     Abs Neutrophils Latest Ref Range: 1 85 - 7 62 Thousand/uL  30 63 (H)     Bands Relative Latest Ref Range: 0 - 8 %  28 (H)     Lymphocytes % Latest Ref Range: 14 - 44 %  5 (L)     Monocytes Latest Ref Range: 4 - 12 %  4     Eosinophils Latest Ref Range: 0 - 6 %  0     Basophils Relative Latest Ref Range: 0 - 1 %  0     Myelocytes % Latest Ref Range: 0 - 1 %  2 (H)     Metamyelocytes% Latest Ref Range: 0 - 1 %  8 (H)     Absolute Eosinophils Latest Ref Range: 0 00 - 0 40 Thousand/uL  0 00     Basophils Absolute Latest Ref Range: 0 00 - 0 10 Thousand/uL  0 00     NRBC's Latest Ref Range: 0 - 2 /100 WBC  1     Anisocytosis Unknown  Present     Macrocytes Unknown  Present     Poikilocytes Unknown  Present     LYMPHOCYTES ABSOLUTE Latest Ref Range: 0 60 - 4 47 Thousand/uL 1 89     Monocytes Absolute Latest Ref Range: 0 00 - 1 22 Thousand/uL  1 51 (H)         Assessment/Plan:    1  Initial question for occluded biliary stent in the setting of pancreatic cancer, for which patient is receiving chemotherapy  Cholecystostomy tube check by IR revealed relatively scant passage of contrast through the stent although some contrast was noted to pass into the duodenum, and there was also pneumobilia which would imply patency of the stent as well  His liver enzymes including bilirubin appear to have been normal as well  His white blood cell count has been elevated but this appears attributable to Neulasta which was recently administered within the last week; he shows no other clinical signs of cholangitis and his abdominal exam is benign as well      - I discussed this patient's case with two of our advanced endoscopists (Dr Shanti Chen, Dr Cristo Sullivan); we agree that there is no urgent indication for replacement of his biliary stent or placement of new biliary stent; accordingly ERCP can be nonurgently arranged as an outpatient, or sooner if patient does develop signs of cholangitis or apparent biliary obstruction in the interim  - I discussed the above with [facility] resident as well  - continue to monitor liver enzymes  - diet as tolerated for now  - continue follow-up with Oncology

## 2021-08-25 NOTE — DISCHARGE SUMMARY
[facility]  Discharge- Reji Caballero 1955, 73 y o  male MRN: [id number]  Unit/Bed#: S -01 Encounter: [id number]  Primary Care Provider: Quincy No MD   Date and time admitted to hospital: 8/23/2021 12:32 AM    * Pneumobilia due to Occluded CBD stent  Assessment & Plan  · CT scan with evidence of pneumobilia, which has slightly progressed when compared to previous study  · IR was consulted, performed cholecystogram which showed trickle of contrast around the stent with opacification of the duodenal loop, concerning for occlusion of CBD metallic stent  · GI following  · LFTs are stable  · Given stability of LFTs and pain control  Patient is deemed stable to be discharged with outpatient GI follow-up for repeat ERCP and 2nd stent placement  Other chest pain-resolved as of 8/25/2021  Assessment & Plan  Lucas = 3  · Improved with nitro & asa en route to ED  · Pain is likely GI related  · Troponins negative  · EKG in the ED non-ischemic   · Also history of GERD & cancer pain with new liver mets  mylanta prn & geriatric pain protocol  · Stress test completed today, 8/24  · No ischemia noted on stress testing  Pancreatic cancer metastasized to liver [facility])  Assessment & Plan  · Continue pancrelipase from home, continue prednisone from home  · Receives chemotherapy 3x month (last date 8/19/2021) next date is 9/2/2021  · Neulasta received on 8/19    Leukocytosis  Assessment & Plan  WBC   Date Value Ref Range Status   08/25/2021 37 82 (HH) 4 31 - 10 16 Thousand/uL Final     Comment: This result has been called to [facility] by Samantha Britton on 08/25/2021 05:42:55, and has been read back  08/24/2021 33 28 (HH) 4 31 - 10 16 Thousand/uL Final     Comment: This result has been called to mercedes horowitz by [facility] on 08/24/2021 04:38:57, and has been read back      08/23/2021 24 72 (H) 4 31 - 10 16 Thousand/uL Final     · 2/2 Neulasta dose 8/19    Recent cholecystitis  Assessment & Plan  · May 2021 with cholecystostomy in place  · Trend LFTs  · Lo fat diet  · Outpatient follow-up    GERD  Assessment & Plan  · Continue protonix    Essential hypertension  Assessment & Plan  · Takes lisinopril-HCTZ on M,W,F  · Monitor BP    Discharging Physician / Practitioner: Rose Galicia PA-C  PCP: Lorenza Freedman MD  Admission Date:   Admission Orders (From admission, onward)     Ordered        08/24/21 1208  Inpatient Admission  Once         08/23/21 0609  Place in Observation  Once                   Discharge Date: 08/25/21    Medical Problems     Resolved Problems  Date Reviewed: 8/25/2021        Resolved    Other chest pain 8/25/2021     Resolved by  Trever Copeland PA-C    CHRISTIANO (acute kidney injury) ([facility] [address] ) 8/24/2021     Resolved by  Trever Copeland PA-C                Consultations During Hospital Stay:  · GI  · Cardiology   · IR    Procedures Performed:   · Cholecystogram - mostly occluded metallic stent with debris of contrast into the duodenum  Nondraining contrast seen in the biliary system post procedure  · Pharmacologic stress testing- no evidence of ischemia  EF 52%  Significant Findings / Test Results:   · CTA chest- no PE, metallic common bile duct stent with pneumobilia  · LFT stable throughout entire hospitalization    Incidental Findings:   · None      Test Results Pending at Discharge (will require follow up): · None      Outpatient Tests Requested:  · ERCP as outpatient    Complications:  none    Reason for Admission: chest pain, pneumobilia     Hospital Course: Jones Altman is a 73 y o  male patient who originally presented to the hospital on 8/23/2021 due to reports of chest pain  Reported the pain began in the center of his chest, occurring after dinner  He took a nitro in the ambulance which reportedly made the pain better    The chest pain waxed and waned, in the center of his chest   Did not have any nausea, vomiting, shortness of breath, diaphoresis, fatigue  No personal history of coronary artery disease  The patient was seen in consultation by Cardiology and a stress test was ordered, which was normal as above  He had normal troponins  Chest pain was likely due to GI etiology  CTA as above, and he was seen in consultation by Interventional Radiology  His LFTs were normal   He was seen by GI who recommended trending his LFTs, and with resolution of pain and symptomatology, recommended outpatient ERCP for placement of a 2nd stent  Patient was clinically stable throughout his stay with no recurrence of pain  Please see above list of diagnoses and related plan for additional information  Condition at Discharge: stable     Discharge Day Visit / Exam:     Subjective:  Denies chest pain  No events per nursing  Tolerating diet  Vitals: Blood Pressure: 142/81 (08/25/21 0713)  Pulse: 79 (08/25/21 0713)  Temperature: 98 °F (36 7 °C) (08/25/21 0713)  Temp Source: Oral (08/25/21 0713)  Respirations: 18 (08/25/21 0713)  Weight - Scale: 74 8 kg (165 lb) (08/23/21 0033)  SpO2: 95 % (08/25/21 0713)  Exam:   Physical Exam  Vitals and nursing note reviewed  Constitutional:       General: He is not in acute distress  Appearance: Normal appearance  HENT:      Head: Normocephalic  Mouth/Throat:      Mouth: Mucous membranes are moist    Eyes:      Pupils: Pupils are equal, round, and reactive to light  Cardiovascular:      Rate and Rhythm: Normal rate and regular rhythm  Heart sounds: No murmur heard  Pulmonary:      Effort: Pulmonary effort is normal  No respiratory distress  Breath sounds: Normal breath sounds  No wheezing, rhonchi or rales  Abdominal:      General: Bowel sounds are normal  There is no distension  Palpations: Abdomen is soft  Tenderness: There is no abdominal tenderness  There is no guarding  Musculoskeletal:         General: No deformity        Cervical back: Normal range of motion  Right lower leg: No edema  Left lower leg: No edema  Skin:     Capillary Refill: Capillary refill takes less than 2 seconds  Neurological:      General: No focal deficit present  Mental Status: He is alert and oriented to person, place, and time  Mental status is at baseline  Discussion with Family: patient at bedside     Discharge instructions/Information to patient and family:   See after visit summary for information provided to patient and family  Provisions for Follow-Up Care:  See after visit summary for information related to follow-up care and any pertinent home health orders  Disposition:     Home    For Discharges to [facility] SNF:   · Not Applicable to this Patient - Not Applicable to this Patient    Planned Readmission: none     Discharge Statement:  I spent 45 minutes discharging the patient  This time was spent on the day of discharge  I had direct contact with the patient on the day of discharge  Greater than 50% of the total time was spent examining patient, answering all patient questions, arranging and discussing plan of care with patient as well as directly providing post-discharge instructions  Additional time then spent on discharge activities  Discharge Medications:  See after visit summary for reconciled discharge medications provided to patient and family        ** Please Note: This note has been constructed using a voice recognition system **

## 2021-08-30 NOTE — PROGRESS NOTES
Assessment/Plan:    Metastatic pancreatic cancer  Large met improved, though smaller one has increased in size  Continue present care  F/u with Onc  Recheck 1m    Pneumobilia due to Occluded CBD stent  Pt without jaundice or pain  Will reach out to Gi to facilitate eval and further treatment    Type 2 diabetes mellitus with hypoglycemia (HCC)    Lab Results   Component Value Date    HGBA1C 7 0 (A) 08/11/2021   BGs have been stable  Continue present care  Recheck 1m    Essential hypertension  Well controlled  Cont present treatment  Monitor labs  Recheck 6m      Chest pain  Unclear cause  NTG helped though it can help with esophageal spasm as well  Cardiac w/u was unremarkable  Continue to monitor  Pt to go back to ED if chest pain returns  Recheck 1m       Diagnoses and all orders for this visit:    Encounter for support and coordination of transition of care    Chest pain, unspecified type    Metastatic pancreatic cancer    Pneumobilia due to Occluded CBD stent    Type 2 diabetes mellitus without complication, without long-term current use of insulin (HCC)  -     Lancets (onetouch ultrasoft) lancets; Use as instructed 3x a day    Cancer related pain  -     gabapentin (NEURONTIN) 100 mg capsule; Take 2 capsules (200 mg total) by mouth 3 (three) times a day    Essential hypertension          Subjective:     TCM Call (since 7/31/2021)     Date and time call was made  8/26/2021  8:56 AM    Hospital care reviewed  Records reviewed    Patient was hospitialized at  [facility]        Date of Admission  08/23/21    Date of discharge  08/25/21    Diagnosis  Pneumobilia due to Occluded CBD stent    Disposition  Home    Were the patients medications reviewed and updated  No    Current Symptoms  None      TCM Call (since 7/31/2021)     Post hospital issues  None    Should patient be enrolled in anticoag monitoring? No    Scheduled for follow up?   Yes    Did you obtain your prescribed medications  Yes    Do you need help managing your prescriptions or medications  No    Is transportation to your appointment needed  No    I have advised the patient to call PCP with any new or worsening symptoms  Angeles Manuel, RMA     Living Arrangements  Spouse or Significiant other    Are you recieving any outpatient services  No    Are you recieving home care services  No    Are you using any community resources  No    Current waiver services  No    Have you fallen in the last 12 months  No    Interperter language line needed  No    Counseling  Patient           Patient ID: Ramona Hampton is a 73 y o  male  Here for TCM visit  - pt was in regular state of health until the evening of 98/22 when her developed upper chest pain that did not improve with oxycodone  Pain became "10/10" in severity and pt called the squad  Pt given SL NTG with complete resolution of symptoms within a minute or so  Symptoms did return, however, by the time they got to [facility] ED  Initial ECG and troponins in the ED were unremarkable  Pneumobilia was noted on scans  Pt admitted, and Cardio and IR were consulted  Stress myoview was done and was negative (pt exercised for 3m, achieving HR of 142)  IR eval revealed partial obstruction of his biliary stent  GI was consulted who recommended out patient follow up for possible ERCP and replacement of the stent  Pt remained asymptomatic and was able to be discharged on 8/25  Pt here for TCM visit  - since discharge, pt states that he remains asymptomatic  He denies CP, abd pain, SOB or other symptoms  He is not taking any pain meds at present and is continuing on his chemo regimen  Recent IR liver bx showed that the larger lesion has just about resolved with chemo, though the smaller lesion has grown slightly  Bx did reveal it to be a metastatic lesion  Pt to see Oncology on 9/8    Pt is compliant with all meds and denies any low Bg symptoms  - I reviewed available hospital records, lab results and study reports with pt          The following portions of the patient's history were reviewed and updated as appropriate:   He  has a past medical history of Anxiety, Arthritis, Cancer ([facility] ), Cellulitis, Diabetes mellitus ([facility] ), Enlarged prostate, Epidermal inclusion cyst, Erythrasma, Furuncle, GERD (gastroesophageal reflux disease), Hyperlipidemia, Hypertension, and RA (rheumatoid arthritis) ([facility] )  He   Patient Active Problem List    Diagnosis Date Noted    Pneumobilia due to Occluded CBD stent 08/24/2021    Chest pain 08/23/2021    Dental infection 08/04/2021    Polymicrobial bacteremia 07/20/2021    Severe protein-calorie malnutrition ([facility] ) 07/15/2021    [facility]eps[facility] 07/13/2021    Generalized weakness 07/13/2021    Pancytopenia due to chemotherapy 07/13/2021    Lower abdominal pain 06/15/2021    Pancreatic cancer metastasized to liver ([facility] ) 06/14/2021    Metastatic pancreatic cancer 05/25/2021    Chemotherapy induced neutropenia ([facility] ) 05/25/2021    Recent cholecystitis 05/11/2021    Leukocytosis 05/11/2021    BPH (benign prostatic hyperplasia) 05/06/2021    Type 2 diabetes mellitus with hypoglycemia ([facility] ) 03/16/2021    Multiple lipomas 08/04/2020    Urgency of urination 01/31/2018    Right arm pain 10/13/2017    Rheumatoid arthritis ([facility] ) 10/13/2017    Essential hypertension 10/13/2017    Liver mass 11/17/2016    Hyperlipidemia 02/03/2016    Anxiety disorder 08/15/2013    GERD 08/15/2013     He  has a past surgical history that includes Scrotal surgery; pr removal of hydrocele,tunica,unilat (Right, 1/11/2018); Hydrocele excision / repair (Right, 01/11/2018); IR cholecystostomy tube placement (5/11/2021); Colonoscopy; Tunneled venous port placement (Left, 6/1/2021); FL guided central venous access device insertion (6/1/2021); IR cholecystostomy tube check/change/reposition/reinsertion/upsize (6/22/2021); Multiple tooth extractions (Left, 7/22/2021);  IR biopsy liver mass (8/16/2021); and IR cholecystostomy tube check/change/reposition/reinsertion/upsize (8/23/2021)  He  reports that he has quit smoking  He has never used smokeless tobacco  He reports that he does not drink alcohol and does not use drugs  Current Outpatient Medications   Medication Sig Dispense Refill    Alcohol Swabs 70 % PADS May substitute brand based on insurance coverage  Check glucose ACHS  200 each 0    aspirin 81 mg chewable tablet Chew 81 mg       Blood Glucose Monitoring Suppl (ONE TOUCH ULTRA 2) w/Device KIT Use daily 1 each 1    cyanocobalamin (VITAMIN B-12) 1000 MCG tablet Take 1 tablet (1,000 mcg total) by mouth daily 30 tablet 5    FLUoxetine (PROzac) 20 mg capsule TAKE ONE CAPSULE BY MOUTH EVERY DAY 90 capsule 2    folic acid (FOLVITE) 1 mg tablet Take by mouth daily      gabapentin (NEURONTIN) 100 mg capsule Take 2 capsules (200 mg total) by mouth 3 (three) times a day 180 capsule 3    glucose blood (OneTouch Ultra) test strip Use as instructed tid 100 each 5    insulin glargine (Lantus SoloStar) 100 units/mL injection pen Inject 12 Units under the skin daily at bedtime 15 mL 0    insulin lispro (HumaLOG KwikPen) 100 units/mL injection pen Take 10 units with breakfast, lunch and dinner +scale 15 mL 5    Insulin Pen Needle (BD Pen Needle Cinda 2nd Gen) 32G X 4 MM MISC For use with insulin pen  Pharmacy may dispense brand covered by insurance   100 each 3    Lancets (onetouch ultrasoft) lancets Use as instructed 3x a day 100 each 5    lisinopril-hydrochlorothiazide (PRINZIDE,ZESTORETIC) 10-12 5 MG per tablet Take 1 tablet by mouth 3 (three) times a week Monday Wednesday Friday 90 tablet 3    pancrelipase, Lip-Prot-Amyl, (CREON) 24,000 units Take 1 capsule (24,000 Units total) by mouth 3 (three) times a day with meals Or snacks 270 capsule 0    pantoprazole (PROTONIX) 40 mg tablet Take 1 tablet (40 mg total) by mouth 2 (two) times a day Before breakfast, before bed, to reduce stomach acid 60 tablet 1    predniSONE 20 mg tablet Take 0 5 tablets (10 mg total) by mouth daily with breakfast 15 tablet     prochlorperazine (COMPAZINE) 10 mg tablet Take 1 tablet (10 mg total) by mouth every 6 (six) hours as needed for nausea or vomiting 45 tablet 3    Sodium Chloride Flush (Normal Saline Flush) 0 9 % SOLN Gall bladder drain       No current facility-administered medications for this visit  He is allergic to fentanyl       Review of Systems   Constitutional: Positive for fatigue (mild, intermittent)  HENT: Negative  Eyes: Negative  Respiratory: Negative  Cardiovascular: Negative  Negative for chest pain (resolved)  Gastrointestinal: Negative  Endocrine: Negative  Genitourinary: Negative  Musculoskeletal: Negative  Skin: Negative  Allergic/Immunologic: Negative  Neurological: Negative  Hematological: Negative  Psychiatric/Behavioral: Negative  Objective:      /80   Pulse 80   Temp 98 °F (36 7 °C)   Ht 5' 9" (1 753 m)   Wt 76 2 kg (168 lb)   BMI 24 81 kg/m²          Physical Exam  Vitals reviewed  Constitutional:       Appearance: He is well-developed  HENT:      Head: Normocephalic and atraumatic  Right Ear: Tympanic membrane, ear canal and external ear normal       Left Ear: Tympanic membrane, ear canal and external ear normal       Mouth/Throat:      Mouth: Mucous membranes are moist    Eyes:      General: No scleral icterus  Extraocular Movements: Extraocular movements intact  Conjunctiva/sclera: Conjunctivae normal       Pupils: Pupils are equal, round, and reactive to light  Comments: No conjunctival pallor   Neck:      Thyroid: No thyromegaly  Vascular: No carotid bruit  Cardiovascular:      Rate and Rhythm: Normal rate and regular rhythm  Pulses: Normal pulses  Heart sounds: Normal heart sounds  No murmur heard       Pulmonary:      Effort: Pulmonary effort is normal       Breath sounds: Normal breath sounds  No wheezing or rales  Abdominal:      General: Bowel sounds are normal  There is no distension  Palpations: Abdomen is soft  There is no mass  Tenderness: There is no abdominal tenderness  Comments: Percutaneous GB tube in place   Musculoskeletal:         General: No swelling or tenderness  Normal range of motion  Cervical back: Normal range of motion and neck supple  No tenderness  No muscular tenderness  Right lower leg: No edema  Left lower leg: No edema  Lymphadenopathy:      Cervical: No cervical adenopathy  Skin:     General: Skin is warm and dry  Capillary Refill: Capillary refill takes less than 2 seconds  Coloration: Skin is not jaundiced  Neurological:      Mental Status: He is alert and oriented to person, place, and time  Cranial Nerves: No cranial nerve deficit  Sensory: No sensory deficit  Motor: No weakness        Gait: Gait normal    Psychiatric:         Mood and Affect: Mood normal

## 2021-08-31 PROBLEM — R07.9 CHEST PAIN: Status: ACTIVE | Noted: 2021-01-01

## 2021-08-31 NOTE — ASSESSMENT & PLAN NOTE
Unclear cause  NTG helped though it can help with esophageal spasm as well  Cardiac w/u was unremarkable  Continue to monitor  Pt to go back to ED if chest pain returns   Recheck 1m

## 2021-08-31 NOTE — ASSESSMENT & PLAN NOTE
Lab Results   Component Value Date    HGBA1C 7 0 (A) 08/11/2021   BGs have been stable  Continue present care   Recheck 1m

## 2021-08-31 NOTE — ASSESSMENT & PLAN NOTE
Large met improved, though smaller one has increased in size  Continue present care  F/u with Onc   Recheck 1m

## 2021-08-31 NOTE — PROGRESS NOTES
Outpatient Follow-Up - Palliative and Supportive Care   Miley Dougherty 73 y o  male [id number]    Assessment & Plan  Problem List Items Addressed This Visit     None      Visit Diagnoses     Intractable hiccups        Relevant Medications    predniSONE 10 mg tablet        - Counseling on health screening and disease prevention, COVID-19 specific (CPT V65 49)    Medications adjusted this encounter:  Requested Prescriptions     Signed Prescriptions Disp Refills    predniSONE 10 mg tablet 30 tablet 0     Sig: Take 1 tablet (10 mg total) by mouth daily with breakfast     No orders of the defined types were placed in this encounter  Medications Discontinued During This Encounter   Medication Reason    predniSONE 20 mg tablet Reorder   - Maintain steroids at 10mg dose  Pt feels stronger and has good appetite      Miley Dougherty was seen today for symptoms and planning cares related to above illnesses  I have reviewed the patient's controlled substance dispensing history in the Prescription Drug Monitoring Program in compliance with the [facility] regulations before prescribing any controlled substances  They are invited to continue to follow with us  If there are questions or concerns, please contact us through our clinic/answering service 24 hours a day, seven days a week  Liberty Chan MD  [facility] Palliative and Supportive Care  [phone]      Visit Information    Accompanied By: No one    Source of History: Self    History Limitations: None    Contacts: Son Rebecca Durbin - ,     History of Present Illness      Miley Dougherty is a 73 y o  male who presents in follow up of symptoms related to adenocarcinoma of the pancreas  He follows with Dr Rafa Glaser of Med/Onc  Pertinent issues include: symptom management, depression or anxiety, nausea, hiccups      Since last visit, he has gained wt, improved function, and not suffered steroid withdrawal symptoms going from 20mg to 10mg  His DM control is improved  He maintains a good appetite and continues to use Creon  His strenght is remarkably better: spent all day walking about a car show this weekend, and is now back to driving himself to regular appts  He will see Dr Moreno Harding soon, and is somewhat apprehensive about possibly restarting/ changing chemo plan in response to positive liver bx results  Medically, baclofen unhelpful for hiccups  Importantly, he has noted rather severe dry mouth and even visual changes since this process with hiccups started  Intractable hiccups were improoved with new medication/dose of steroids  Appetiete and pain improved  Has needed adjustment in insulin therapy given higher dose steroids; PCP and Endo have begun to cooperate on this matter  Medically, compazine usage; this has been somewhat helpful for nausea, but has been asso with visual changes  Diazepam, baclofen not helpful for hiccups  = continue with oxyIR in small doses PRN        Moving bowels daily   Taking oxyIR for pain; not entirely helpful   Crampy abd pain appears idiopathic: unrelated to eating or time of day   Does improve with lateral decub positioning, upright ambulation   We theorized that this is primarily a mass effect, with bowels becoming more spasmodic when contacting the mass by direct pressure from gravity   He is agreedable to low-dose steroids, used sparingly d/t concurrent severe and uncomfortable reflux, which has not improved even on high dose PPI therapy     Otherwise, he confirms a full cares plan, and seems too distracted by his pain to engage with full goals conversation "    Past medical, surgical, social, and family histories are reviewed and pertinent updates are made  Review of Systems   Constitutional: Negative for decreased appetite, weight gain and weight loss  HENT: Negative for hoarse voice and nosebleeds      Eyes: Negative for vision loss in left eye and vision loss in right eye    Cardiovascular: Negative for chest pain and dyspnea on exertion  Respiratory: Negative for cough and shortness of breath  Endocrine: Negative for polydipsia, polyphagia and polyuria  Skin: Negative for flushing and itching  Musculoskeletal: Negative for falls  Gastrointestinal: Negative for anorexia, jaundice, nausea and vomiting  Genitourinary: Negative for frequency  Neurological: Negative for dizziness  Psychiatric/Behavioral: Negative for depression and memory loss  The patient is not nervous/anxious  Vital Signs    /60 (BP Location: Right arm, Cuff Size: Standard)   Pulse (!) 109   Temp (!) 97 2 °F (36 2 °C) (Temporal)   Resp 14   Wt 77 kg (169 lb 12 1 oz)   SpO2 97%   BMI 25 07 kg/m²     Physical Exam and Objective Data  Physical Exam  Constitutional:       General: He is not in acute distress  Appearance: He is well-developed  He is not diaphoretic  HENT:      Head: Normocephalic and atraumatic  Right Ear: External ear normal       Left Ear: External ear normal    Eyes:      General:         Right eye: No discharge  Left eye: No discharge  Conjunctiva/sclera: Conjunctivae normal       Pupils: Pupils are equal, round, and reactive to light  Neck:      Trachea: No tracheal deviation  Pulmonary:      Effort: Pulmonary effort is normal  No respiratory distress  Breath sounds: No stridor  Abdominal:      Palpations: Abdomen is soft  Musculoskeletal:         General: No deformity  Skin:     Findings: No erythema or rash  Neurological:      Mental Status: He is alert and oriented to person, place, and time  Cranial Nerves: No cranial nerve deficit  Psychiatric:         Thought Content:  Thought content normal          Judgment: Judgment normal            Radiology and Laboratory:  I personally reviewed and interpreted the following results: none new    30+ minutes was spent face to face with Hola Millard with greater than 50% of the time spent in counseling or coordination of care including: chart review; symptom pursuit; supportive listening; anticipatory guidance     Additional time was also spent in discussing coronavirus vaccine indications, availability, and logistical challenges  All of the patient's or agent's questions were answered during this discussion

## 2021-09-01 NOTE — PATIENT INSTRUCTIONS
45 grams of carbohydrate per meal  15 grams of carbohydrate per snack    3 meals per day, 4-5 hours apart  2 snacks per day, at least 2 hours apart from meals    Replace chocolate milk with 2% milk, sweet iced tea with crystal light, and cranberry juice with Ocean Spray Diet  Increase intake of non-starchy vegetables and start to incorporate some high fiber fruit such as pears as some of your carbohydrate choices    Please complete 3 day food record prior to follow-up appointment in 6 weeks

## 2021-09-01 NOTE — PROGRESS NOTES
Medical Nutrition Therapy        Assessment    Visit Type: Initial visit for MNT    Chief complaint T2DM    HPI: 28-year-old male recently diagnosed with T2DM  Has also been undergoing chemotherapy for metastatic pancreatic cancer  Most recent HbA1c 7 0%  SMBG log provided by patient today and scanned under media  PEG his variable ranging 78- 346 mg/dL, however, readings over last week appear to have improved with no readings above 259 mg/dL and majority of readings less than 200 mg/dL  Bensons diet history reveals excess carbohydrate intake, and excess intake of juice and sugar sweetened beverages  He regularly consumes chocolate milk, cranberry juice, and sweetened iced tea  He also has inadequate intake of vegetables, high-fiber fruit, and whole grains  Currently meals range from  70 to  90 grams of carbohydrate  His carbohydrate intake his elevated largely due to his beverage consumption as mentioned above  Explained basic pathophysiology of diabetes and impact of diet on blood glucose levels  Together we discussed what foods contain CHO, reading a food label, timing of meals and snacks, serving sizes, the role of fiber in glycemic control, and the importance of consistent carbohydrate intake in the appropriate amounts  Used the portion booklet to teach Windy Grimes more about food groups and basic carbohydrate counting  Created an individualized meal plan for Windy Grimes with 3 meals and 2 snacks providing 45-60 g carb per meal and 15 g carb per snack  Briefly we discussed reducing intake of saturated fat as this is negatively impacting his LDL  Will discuss more specifics in regards to his fat and protein intake at follow-up appointment in 6 weeks Put together sample meals for [facility] reference  Windy Grimes demonstrated good understanding and will call with any questions prior to follow-up appointment in 6 weeks      Ht Readings from Last 1 Encounters:   09/01/21 5' 9" (1 753 m)     Wt Readings from Last 2 Encounters:   09/01/21 78 1 kg (172 lb 3 2 oz)   08/31/21 77 kg (169 lb 12 1 oz)     Weight Change: Yes slowly gaining weight back  Reports 5 lb gain within the past week  Medical Diagnosis/ICD10: E11 65, Z79 4 (ICD-10-CM) - Type 2 diabetes mellitus with hyperglycemia, with long-term current use of insulin    Barriers to Learning: no barriers    Do you follow any special diet presently?: Yes - trying to follow reccomendation given previously for 45 g CHO per meal  Who shops: spouse  Who cooks: patient and spouse    Food Log: Completed via the method of food recall    Breakfast:wakes 7 am - 8 am  Eats pretty much right away  2 eggs and hash browns (2) , 1/2 english muffin  Chocolate milk 12 oz 2% Eliana syrup)  Morning Snack:none  Lunch: 12-1 pm  hamburger on 2 slices bread (white bread), glass of chocolate milk (12 oz)  Afternoon Snack: sometimes, ham and cheese roll ups  Dinner:5:30-6:30 pm  Leftover chicken, snap peas, stove top (1 cup)  Iced tea (sweetened 12 oz)    Evening Snack: 9-9;30 pm ice cream (2 cups)  Beverages: chocolate milk, ice tea, cranberry juice usually between breakfast and lunch or in the afternoon, water  Eating out/Take out:Friday nights gets delivery (onion rings, cheese steaksks with sauce and onions)  Exercise ADLs    Calorie needs   1850 kcals/day Carbs:  45-60g/meal,  15g/snack         Nutrition Diagnosis:  Excess carbohydrate intake  related to Food and nutrition related knowledge deficit concerning appropriate amount of carbohydrate intake as  evidenced by Estimated carbohydrate intake that is consistently more than recommended amounts    Intervention: plate method, reduced fat intake, increased fiber intake, label reading, carbohydrate counting, increased plant based foods, meal planning, individualized meal plan and food diary     Treatment Goals: Patient understands education and recommendations, Patient will monitor portion control, Patient will increase their intake of plant based foods and Patient will count carbohydrates    Monitoring and evaluation:    Term code indicator  FH 1 6 3 Carbohydrate Intake Criteria: 45-60 g of carbohydrate per meal, 15 g of carbohydrate per snack  Term code indicator  FH 4 4 Mealtime Behavior Criteria: Three meals per day, 4-5 hours apart  Two snacks per day, at least 2 hours apart from meals  Patients Response to Instruction:  Viviana Roberts  Expected Compliancegood    Thank you for coming to the [facility] for education today  Please feel free to call with any questions or concerns  Start:  2:10 p m  Stop:   3:12 p m    Referred by: MD Raisa Stevens, [address] [place] [address]  [place] [address]

## 2021-09-02 NOTE — PROGRESS NOTES
Patient tolerated infusion without incident  Next appointment verified  AVS printed and reviewed with patient

## 2021-09-02 NOTE — PROGRESS NOTES
Patient presents to infusion center for abraxane/gemzar without complaints  Labs reviewed and are within parameters for treatment  Port accessed without difficulty  Call bell provided

## 2021-09-08 NOTE — PROGRESS NOTES
Hematology/Oncology Outpatient Follow- up Note  Camilla Jennings 73 y o  male MRN: @ Encounter: [id number]        Date:  9/8/2021    Presenting Complaint/Diagnosis : What appears to be localized pancreatic cancer biopsy proven to be adenocarcinoma  HPI:      Mariajose Carpenter seen for initial consultation 10/23/200 regarding   A newly diagnosed pancreatic cancer   The patient presented to the hospital with obstructive jaundice   He ended up having a workup along with a biopsy which revealed  A 3 8 cm obstructing mass in the region of the head of the pancreas with marked pancreatic ductal dilatation worrisome for adenocarcinoma   Biopsy was done which proved this was adenocarcinoma   The patient also had cholecystitis at the time and ended up having a cholecystostomy tube placed and was treated with antibiotics  Maty Herring was seen by our colleagues in Surgical Oncology who recommended neoadjuvant chemotherapy upfront which we will initiate  Maty Herring is not a candidate for  FOLFIRINOX and so will be given gemcitabine and Abraxane instead  Previous Hematologic/ Oncologic History:    Oncology History   Metastatic pancreatic cancer   5/7/2021 Biopsy    Head of pancreas:  Malignant Adenocarcinoma       6/3/2021 -  Chemotherapy    pegfilgrastim (Elif Peasant), 6 mg, Subcutaneous, Once, 2 of 4 cycles  Administration: 6 mg (8/19/2021)  paclitaxel protein-bound (ABRAXANE) IVPB, 100 mg/m2 = 191 mg (80 % of original dose 125 mg/m2), Intravenous, Once, 4 of 6 cycles  Dose modification: 100 mg/m2 (original dose 125 mg/m2, Cycle 1, Reason: Other (Must fill in a comment), Comment: per protocol)  Administration: 191 mg (6/3/2021), 191 mg (6/10/2021), 191 mg (7/1/2021), 191 mg (7/8/2021), 191 mg (8/5/2021), 191 mg (8/12/2021), 191 mg (8/19/2021), 191 mg (9/2/2021)  gemcitabine (GEMZAR) infusion, 1,000 mg/m2 = 1,909 9 mg, Intravenous, Once, 4 of 6 cycles  Administration: 1,909 9 mg (6/3/2021), 1,909 9 mg (6/10/2021), 1,909 9 mg (7/1/2021), 1,909 9 mg (7/8/2021), 1,909 9 mg (8/5/2021), 1,909 9 mg (8/12/2021), 1,909 9 mg (8/19/2021), 1,909 9 mg (9/2/2021)         Current Hematologic/ Oncologic Treatment:     gemcitabine Abraxane  This will be discontinued and changed to modified FOLFOX 6 based on progression in the liver  The patient will get Neulasta growth factor support  Interval History:      The patient returns for follow-up visit  Since he last saw me he his imaging had revealed what appeared to be liver metastases so we did a biopsy which was biopsy proven to be adenocarcinoma consistent with a GI primary I e  he now has metastatic disease and is no longer candidate for surgery  I explained this to him  I explained he progressed on gemcitabine and Abraxane I e  3 cycles so we should consider switching him to modified FOLFOX 6  He agrees with this  Denies any nausea denies any vomiting denies any diarrhea  Is otherwise doing well  The rest of his 14 point review of systems today was negative  Test Results:    Imaging: XR chest 1 view portable    Result Date: 8/23/2021  Narrative: CHEST INDICATION:   chest pain  COMPARISON:  July 13, 2021 EXAM PERFORMED/VIEWS:  XR CHEST PORTABLE FINDINGS:  Left-sided infusion port catheter device is stable in position  Cardiomediastinal silhouette appears unremarkable  The lungs are clear  No pneumothorax or pleural effusion  There is some arthritis of the acromioclavicular joints  Impression: No acute findings  Workstation performed: [id number]     Stress strip    Result Date: 8/24/2021  Narrative: Confirmed by Malka Nagel ([id number]),  [address] [place] [address] [facility] ([facility]) on 8/24/2021 2:04:42 PM    IR biopsy liver mass    Result Date: 8/16/2021  Narrative: PROCEDURE: Multiphase CT abdomen/pelvis  US guided liver lesion biopsy  STAFF: VAMSHI Ma  DOSE LENGTH PRODUCT: 933 99 mGy-cm   This examination, like all CT scans performed in the [facility], was performed utilizing techniques to minimize radiation dose exposure, including the use of iterative reconstruction and automated exposure control  NUMBER OF IMAGES: Multiple  COMPLICATIONS: None  MEDICATIONS: Moderate sedation with fentanyl and versed per nursing protocol  Moderate sedation was monitored by radiology nursing staff  Monitoring of conscious sedation totaled 45 minutes  INDICATION: Pancreatic cancer  Concern for liver metastasis vs abscess  PROCEDURE: Patient has a known segment 6 hypodense lesion, with differential diagnosis including pancreatic cancer metastasis versus abscess  Initial multiphase (noncontrast, arterial, portal venous) CT scan demonstrated near complete resolution of this lesion  The lesion was therefore thought to represent an abscess that has been successfully treated with antibiotics  No biopsy of this lesion was performed  However, the CT scan demonstrated a lesion that has increased in size within the anterior aspect of hepatic segment 3, with indeterminate characteristics  The lesion was easily identified on ultrasound, images were sent to PACS  Skin and underlying subcutaneous tissues were anesthetized with lidocaine  Under real-time ultrasound guidance, a 17-gauge trocar needle was advanced into the anterior aspect of the lesion  A total of 5 core biopsies were obtained  Two of the samples were used for touch prep, confirming adequacy  The next 3 samples were placed in formalin  D-Stat was used to promote hemostasis upon trochar removal  The patient tolerated the procedure well without immediate consultation  FINDINGS: 1  A known segment 6 lesion was nearly completely resolved on today's periprocedural CT scan  This is most consistent with resolving abscess  2  A known segment 3 lesion has increased in size  This lesion was biopsied under US guidance as above  Impression: CT scan of the abdomen, followed by US guided biopsy of a segment 3 hepatic lesion   Workstation performed: [id number]     CTA ED chest PE Study    Result Date: 8/23/2021  Narrative: CTA - CHEST WITH IV CONTRAST - PULMONARY ANGIOGRAM INDICATION:   PE suspected, intermediate prob, positive D-dimer chest pain  COMPARISON: CT abdomen and pelvis on 7/13/2021  TECHNIQUE: CTA examination of the chest was performed using angiographic technique according to a protocol specifically tailored to evaluate for pulmonary embolism  Axial, sagittal, and coronal 2D reformatted images were created from the source data and  submitted for interpretation  In addition, coronal 3D MIP postprocessing was performed on the acquisition scanner  Radiation dose length product (DLP) for this visit:  509 mGy-cm   This examination, like all CT scans performed in the [facility], was performed utilizing techniques to minimize radiation dose exposure, including the use of iterative reconstruction and automated exposure control  IV Contrast:  85 mL of iohexol (OMNIPAQUE)  FINDINGS: PULMONARY ARTERIAL TREE:  No large central pulmonary embolus is seen  The lobar, segmental, and subsegmental branches are suboptimally evaluated due to poor contrast opacification, streak artifact, and motion artifact  LUNGS:  Mild streaky densities in the lung bases, left greater than right, compatible with atelectasis and/or infiltrates  There is no tracheal or endobronchial lesion  PLEURA:  Unremarkable  HEART/GREAT VESSELS:  Heart is normal size  Small pericardial effusion, decreased since the prior study  Left anterior chest wall Mediport with its tip in the superior vena cava  MEDIASTINUM AND WAQAS:  Unremarkable  CHEST WALL AND LOWER NECK:   Unchanged superficial subcutaneous lesion in the right anterior chest wall measuring 3 2 x 1 2 cm (series 2, image 184)  VISUALIZED STRUCTURES IN THE UPPER ABDOMEN:  Metallic common bile duct stent and right-sided percutaneous drainage catheter noted on the  views    Slight progression of pneumobilia when compared to the prior study  Multiple hepatic cysts are partially imaged  OSSEOUS STRUCTURES:  No acute fracture or destructive osseous lesion  Impression: 1  No evidence of large central pulmonary embolism  The lobar, segmental, and subsegmental branches are suboptimally evaluated due to limitations of the study  2   Mild streaky densities in the lung bases are nonspecific could reflect infiltrates and/or atelectasis  3   Interval decrease in small pericardial effusion  4   Metallic common bile duct stent is redemonstrated with associated pneumobilia  The extent of pneumobilia appears to have slightly progressed when compared to the prior study  Follow-up is recommended  Workstation performed: [id number]     US bedside procedure    Result Date: 8/23/2021  Narrative: 1 2 840 [id number]  2 446 161 [id number] 1    IR cholecystostomy tube check/change/reposition/reinsertion/upsize    Result Date: 8/23/2021  Narrative: Cholecystostomy catheter check   Clinical History: Pneumobilia  Fluoro time: 1 2 minutes Technique: The patient was brought to the interventional radiology suite and identified verbally and by wristband  The patient was placed supine on the table and a  view was obtained of indwelling catheter within the gallbladder   After review of the patient's prior imaging studies, the indwelling catheter was injected with a small amount of contrast    Contrast injected through the cholecystostomy tube opacified cystic duct  Central and intrahepatic biliary duct system was opacified with mild central duct prominence  Metallic stent was not opacified initially with delayed images demonstrating contrast tracking around the stent and a minimally through the stent into the duodenum  This as a worsened appearance since prior study of 6/22/2021  Postprocedure images demonstrate residual contrast in the biliary system centrally  The catheter has been capped since 6/22/2021    Patient has been flushing it regularly  Given the history of cholecystostomy tube placement for prior stent occlusion on 2021   Georgeol Peterson GI consult is recommended for possible intervention  Catheter currently again capped and findings discussed with Dr Arnett  Would await GI consult for further management  Patient tolerated the procedure well with no immediate post procedural complication  Impression: Impression: Cholecystogram demonstrates mostly occluded metallic stent with debris of contrast into the duodenum  Nondraining contrast seen in the biliary system post procedure  GI consult recommended  Catheter currently again capped and findings discussed with Dr Arnett  Would await GI consult for further management  Workstation performed: [id number]     NM myocardial perfusion spect (rx stress and/or rest)    Result Date: 2021  Narrative: [address] [place] [address], [address] [place] [address] [phone] Stress SPECT Myocardial Perfusion Imaging after Regadenoson Study date:  23-Aug-2021 Patient: Iglesia Jiang MR number: [id number] Account number: [de-identified] : 1955 Age: 73 years Gender: Male Study date: 23-Aug-2021 Status: Inpatient Location: Height: 59 in Weight: 165 lb BP: / Indications: Detection of Coronary artery disease  Diagnosis: R07 9 - Chest pain, unspecified Primary Physician:  Laura Hdz MD RN:  Rodrick Tom Referring Physician:  Meagan Bennett MD Technician:  Tab Harrison Group:  [facility] Report Prepared By[de-identified]  Rodrick Tom Interpreting Physician:  Pricila Low MD IMPRESSIONS: Normal study after pharmacologic vasodilation without reproduction of symptoms  There was image artifact, without diagnostic evidence for perfusion abnormality  Left ventricular systolic function was normal  SUMMARY STRESS RESULTS: There was no chest pain during stress   ECG CONCLUSIONS: The stress ECG was negative for ischemia and normal  PERFUSION IMAGING: There was a small, mildly severe, fixed myocardial perfusion defect of the basal inferior wall likely due to GI activity  GATED SPECT: The calculated left ventricular ejection fraction was 52 %  Left ventricular ejection fraction was within normal limits by visual estimate  There was no diagnostic evidence for left ventricular regional abnormality  HISTORY: The patient is a 73year old  male  Chest pain status: chest pain  Coronary artery disease risk factors: dyslipidemia and hypertension  Cardiovascular history: none significant  Co-morbidity: chemotherapy recipient  Medications: aspirin and a lipid lowering agent  PHYSICAL EXAM: Baseline physical exam screening: normal lung exam  REST ECG: Normal sinus rhythm  The ECG showed occasional premature ventricular contractions  PROCEDURE: The study was performed in the the Stress lab  A regadenoson infusion pharmacologic stress test was performed  SPECT myocardial perfusion study performed during stress  Systolic blood pressure was 126 mmHg, at the start of the study  Diastolic blood pressure was 68 mmHg, at the start of the study  The heart rate was 88 bpm, at the start of the study  IV double checked  The patient also performed low level exercise  MEDICATIONS GIVEN: No medications or fluids given  STRESS RESULTS: Duration of pharmacologic stress was 3 min  Functional capacity could not be adequately assessed  Maximal heart rate during stress was 142 bpm  The rate-pressure product for the peak heart rate and blood pressure was 95698  There was no chest pain during stress  The stress test was terminated due to protocol completion  Pre oxygen saturation: 98 %  Peak oxygen saturation: 98 %  ECG CONCLUSIONS: The stress ECG was negative for ischemia and normal  Arrhythmia during stress: isolated atrial premature beats, isolated premature ventricular beats, and pairs of premature ventricular beats   ISOTOPE ADMINISTRATION: Resting isotope administration Stress isotope administration Agent Tetrofosmin Tetrofosmin Dose 11 mCi 33 mCi Date 24-Aug-2021 24-Aug-2021 Injection time 07:28 08:42 Imaging time 08:11 09:23 Injection-image interval 43 min 41 min The radiopharmaceutical was injected at the peak effect of pharmacologic stress  MYOCARDIAL PERFUSION IMAGING: The image quality was good  Rotating projection images reveal mild diaphragmatic attenuation and moderate subdiaphragmatic activity  Left ventricular size was normal  The TID ratio was 0 89  !!ERROR!! End tag does not match the start tag 'DIV'  Error at or before end tag   -   There was a small, mildly severe, fixed myocardial perfusion defect of the basal inferior wall likely due to GI activity  GATED SPECT: The calculated left ventricular ejection fraction was 52 %  Left ventricular ejection fraction was within normal limits by visual estimate  There was no diagnostic evidence for left ventricular regional abnormality   Prepared and electronically signed by Nicolas Jeronimo MD Signed 24-Aug-2021 14:28:49       Labs:   Lab Results   Component Value Date    WBC 5 26 09/07/2021    HGB 12 4 09/07/2021    HCT 39 1 09/07/2021    MCV 94 09/07/2021     09/07/2021     Lab Results   Component Value Date     09/01/2017    K 3 5 09/07/2021     09/07/2021    CO2 27 09/07/2021    BUN 14 09/07/2021    CREATININE 0 70 09/07/2021    GLUCOSE 117 (H) 09/01/2017    GLUF 130 (H) 09/07/2021    CALCIUM 9 1 09/07/2021    CORRECTEDCA 9 5 08/25/2021    AST 23 09/07/2021    ALT 30 09/07/2021    ALKPHOS 110 0 09/07/2021    PROT 5 8 (L) 09/01/2017    BILITOT 1 0 09/01/2017    EGFR 99 09/07/2021       Lab Results   Component Value Date    PSA 1 6 11/28/2020    PSA 1 4 11/09/2019    PSA 1 7 11/25/2017       Lab Results   Component Value Date    CEA 0 9 04/29/2021       Lab Results   Component Value Date    IRON 16 (L) 07/13/2021    TIBC 248 (L) 07/13/2021    FERRITIN 625 (H) 07/13/2021       Lab Results   Component Value Date    [id number] 361 07/13/2021         ROS: As stated in the history of present illness otherwise his 14 point review of systems today was negative        Active Problems:   Patient Active Problem List   Diagnosis    Right arm pain    [facility] ([facility] )    Essential hypertension    Urgency of urination    Anxiety disorder    Hyperlipidemia    Liver mass    GERD    Multiple lipomas    Type 2 diabetes mellitus with hypoglycemia (HCC)    BPH (benign prostatic hyperplasia)    Recent cholecystitis    Leukocytosis    Metastatic pancreatic cancer    Chemotherapy induced neutropenia (HCC)    Pancreatic cancer metastasized to liver (HCC)    Lower abdominal pain    Sepsis without acute organ dysfunction (HCC)    Generalized weakness    Pancytopenia due to chemotherapy    Severe protein-calorie malnutrition (HCC)    Polymicrobial bacteremia    Dental infection    Chest pain    Pneumobilia due to Occluded CBD stent       Past Medical History:   Past Medical History:   Diagnosis Date    Anxiety     Arthritis     Cancer ([facility] )     pancreatic    Cellulitis     LAST ASSESSED: 6/13/14    Diabetes mellitus ([facility] )     Enlarged prostate     Epidermal inclusion cyst     LAST ASSESSED: 10/4/13    Erythrasma     LAST ASSESSED: 9/23/13    Furuncle     LAST ASSESSED: 6/11/14    GERD (gastroesophageal reflux disease)     Hyperlipidemia     Hypertension     RA (rheumatoid arthritis) ([facility] )        Surgical History:   Past Surgical History:   Procedure Laterality Date    COLONOSCOPY      FL GUIDED CENTRAL VENOUS ACCESS DEVICE INSERTION  6/1/2021    HYDROCELE EXCISION / REPAIR Right 01/11/2018    SPERMATIC CORD EXCSION OF HYDROCELE; MANAGED BY: GABBIE CARBAJAL    IR BIOPSY LIVER MASS  8/16/2021    IR CHOLECYSTOSTOMY TUBE CHECK/CHANGE/REPOSITION/REINSERTION/UPSIZE  6/22/2021    IR CHOLECYSTOSTOMY TUBE CHECK/CHANGE/REPOSITION/REINSERTION/UPSIZE  8/23/2021    IR CHOLECYSTOSTOMY TUBE PLACEMENT 5/11/2021    MULTIPLE TOOTH EXTRACTIONS Left 7/22/2021    Procedure: EXTRACTION TEETH 14 & 15, ALVEOPLASTY, EXCISION OF CYST LEFT MAXILLA; Surgeon: Dontrell Juares DDS;  Location[facility];  Service: Maxillofacial    RI REMOVAL OF HYDROCELE,TUNICA,UNILAT Right 1/11/2018    Procedure: HYDROCELECTOMY;  Surgeon: Bright Dawson MD;  [facility];  Service: Urology    SCROTAL SURGERY      benign "lump"    TUNNELED VENOUS PORT PLACEMENT Left 6/1/2021    Procedure: INSERTION VENOUS PORT (PORT-A-CATH);   Surgeon: Dimple Varela MD;  [facility];  Service: Surgical Oncology       Family History:    Family History   Problem Relation Age of Onset    Heart disease Father         CARDIAC DISORDER    Hypertension Father     Hypertension Mother     No Known Problems Maternal Aunt     No Known Problems Maternal Uncle     No Known Problems Paternal Aunt     No Known Problems Paternal Uncle     No Known Problems Paternal Grandmother     No Known Problems Paternal Grandfather     Diabetes Maternal Grandmother         MELLITUS    No Known Problems Maternal Grandfather     Hypertension Other        Social History:   Social History     Socioeconomic History    Marital status: /Civil Union     Spouse name: Not on file    Number of children: 2    Years of education: Not on file    Highest education level: Not on file   Occupational History    Occupation: FULL-TIME EMPLOYMENT   Tobacco Use    Smoking status: Former Smoker    Smokeless tobacco: Never Used    Tobacco comment: tobacco in a pipe in the early 1980's   Vaping Use    Vaping Use: Never used   Substance and Sexual Activity    Alcohol use: Never    Drug use: Never    Sexual activity: Not Currently     Comment: NOT CURRENTLY SEXUALLY ACTIVE AS PER [employer]TS   Other Topics Concern    Not on file   Social History Narrative    ALWAYS USES SEAT BELT    DENIED DAILY COFFEE CONSUMPTION (__CUPS/DAY)    DENIED DAILY COLA CONSUMPTION (__CANS/DAY)    DAILY TEA CONSUMPTION (__CUPS/DAY)    DENTAL CARE, REGULARLY    EXERCISE: WALKING    HIGH SCHOOL GRADUATE    DENIED MULTIPLE ORGAN DONOR    NO LIVING WILL    PETS/ANIMALS    DENIED POWER OF  IN EXISTENCE    WATER INTAKE, ADEQUATE (PER DAY)     Social Determinants of Health     Financial Resource Strain:     Difficulty of Paying Living Expenses:    Food Insecurity:     Worried About Running Out of Food in the Last Year:     Ran Out of Food in the Last Year:    Transportation Needs:     Lack of Transportation (Medical):  Lack of Transportation (Non-Medical):    Physical Activity:     Days of Exercise per Week:     Minutes of Exercise per Session:    Stress:     Feeling of Stress :    Social Connections:     Frequency of Communication with Friends and Family:     Frequency of Social Gatherings with Friends and Family:     Attends L[employer] Services:     Active Member of Clubs or Organizations:     Attends Club or Organization Meetings:     Marital Status:    Intimate Partner Violence:     Fear of Current or Ex-Partner:     Emotionally Abused:     Physically Abused:     Sexually Abused:        Current Medications:   Current Outpatient Medications   Medication Sig Dispense Refill    Alcohol Swabs 70 % PADS May substitute brand based on insurance coverage  Check glucose ACHS   200 each 0    aspirin 81 mg chewable tablet Chew 81 mg       Blood Glucose Monitoring Suppl (ONE TOUCH ULTRA 2) w/Device KIT Use daily 1 each 1    cyanocobalamin (VITAMIN B-12) 1000 MCG tablet Take 1 tablet (1,000 mcg total) by mouth daily 30 tablet 5    FLUoxetine (PROzac) 20 mg capsule TAKE ONE CAPSULE BY MOUTH EVERY DAY 90 capsule 2    folic acid (FOLVITE) 1 mg tablet Take by mouth daily      gabapentin (NEURONTIN) 100 mg capsule Take 2 capsules (200 mg total) by mouth 3 (three) times a day 180 capsule 3    glucose blood (OneTouch Ultra) test strip Use as instructed tid 100 each 5    insulin glargine (Lantus SoloStar) 100 units/mL injection pen Inject 12 Units under the skin daily at bedtime 15 mL 0    insulin lispro (HumaLOG KwikPen) 100 units/mL injection pen Take 10 units with breakfast, lunch and dinner +scale 15 mL 5    Insulin Pen Needle (BD Pen Needle Cinda 2nd Gen) 32G X 4 MM MISC For use with insulin pen  Pharmacy may dispense brand covered by insurance  100 each 3    Lancets (onetouch ultrasoft) lancets Use as instructed 3x a day 100 each 5    lisinopril-hydrochlorothiazide (PRINZIDE,ZESTORETIC) 10-12 5 MG per tablet Take 1 tablet by mouth 3 (three) times a week Monday Wednesday Friday 90 tablet 3    pancrelipase, Lip-Prot-Amyl, (CREON) 24,000 units Take 1 capsule (24,000 Units total) by mouth 3 (three) times a day with meals Or snacks 270 capsule 0    pantoprazole (PROTONIX) 40 mg tablet Take 1 tablet (40 mg total) by mouth 2 (two) times a day Before breakfast, before bed, to reduce stomach acid 60 tablet 1    predniSONE 10 mg tablet Take 1 tablet (10 mg total) by mouth daily with breakfast 30 tablet 0    prochlorperazine (COMPAZINE) 10 mg tablet Take 1 tablet (10 mg total) by mouth every 6 (six) hours as needed for nausea or vomiting 45 tablet 3    Sodium Chloride Flush (Normal Saline Flush) 0 9 % SOLN Gall bladder drain       No current facility-administered medications for this visit  Allergies: Allergies   Allergen Reactions    Fentanyl Anaphylaxis and Other (See Comments)     Oxygen drops severely       Physical Exam:    Body surface area is 1 93 meters squared      Wt Readings from Last 3 Encounters:   09/08/21 77 6 kg (171 lb)   09/02/21 76 4 kg (168 lb 8 oz)   09/01/21 78 1 kg (172 lb 3 2 oz)        Temp Readings from Last 3 Encounters:   09/08/21 98 2 °F (36 8 °C) (Temporal)   09/02/21 (!) 97 4 °F (36 3 °C) (Tympanic)   08/31/21 (!) 97 2 °F (36 2 °C) (Temporal)        BP Readings from Last 3 Encounters:   09/08/21 112/76   09/02/21 140/82   08/31/21 120/60 Pulse Readings from Last 3 Encounters:   09/08/21 94   09/02/21 72   08/31/21 (!) 109     Physical Exam     Constitutional   General appearance: No acute distress, well appearing and well nourished  Eyes   Conjunctiva and lids: No swelling, erythema or discharge  Pupils and irises: Equal, round and reactive to light  Ears, Nose, Mouth, and Throat   External inspection of ears and nose: Normal     Nasal mucosa, septum, and turbinates: Normal without edema or erythema  Oropharynx: Normal with no erythema, edema, exudate or lesions  Pulmonary   Respiratory effort: No increased work of breathing or signs of respiratory distress  Auscultation of lungs: Clear to auscultation  Cardiovascular   Palpation of heart: Normal PMI, no thrills  Auscultation of heart: Normal rate and rhythm, normal S1 and S2, without murmurs  Examination of extremities for edema and/or varicosities: Normal     Carotid pulses: Normal     Abdomen   Abdomen: Non-tender, no masses  Liver and spleen: No hepatomegaly or splenomegaly  Lymphatic   Palpation of lymph nodes in neck: No lymphadenopathy  Musculoskeletal   Gait and station: Normal     Digits and nails: Normal without clubbing or cyanosis  Inspection/palpation of joints, bones, and muscles: Normal     Skin   Skin and subcutaneous tissue: Normal without rashes or lesions  Neurologic   Cranial nerves: Cranial nerves 2-12 intact  Sensation: No sensory loss  Psychiatric   Orientation to person, place, and time: Normal     Mood and affect: Normal         Assessment / Plan:    The patient is a pleasant 43-year-old male with newly diagnosed adenocarcinoma of the pancreas who was referred to see us for consideration of neoadjuvant chemotherapy   We decided to start gemcitabine and Abraxane based on his performance status and disease      He had approximately 3 cycles of treatment and was found to have liver lesions    At this point I would favor switching him to modified FOLFOX 6  I went over the risks benefits and alternatives of 5 fluorouracil along with Leucovorin and Oxaliplatin  I explained the potential side effects to include but not limited to nausea, vomiting, diarrhea, mouth sores, neuropathy, cold sensitivity, low blood counts, risk of infection and even death  I explained jaw claudication he in all the symptoms of oxaliplatin  He is agreeable to proceeding  He will sign a consent form today  He will start within the next 2 weeks  If he has any questions he will call our office  Goals and Barriers:  Current Goal:  Prolong Survival from   Modified FOLFOX 6  Barriers: None  Patient's Capacity to Self Care:  Patient able to self care  Portions of the record may have been created with voice recognition software   Occasional wrong word or "sound a like" substitutions may have occurred due to the inherent limitations of voice recognition software   Read the chart carefully and recognize, using context, where substitutions have occurred

## 2021-09-10 NOTE — TELEPHONE ENCOUNTER
Patient's wife called  Patient's atorvastatin was discontinued at the 04/27/21 visit  He has been taking it all along   Wife is not sure if he was supposed to dc med, but if not he needs a new RX sent to [facility] for a 90 day supply

## 2021-09-10 NOTE — TELEPHONE ENCOUNTER
Call from son Juan Ann lives 2 hours away    Reviewed the information from OV :  The patient is a pleasant 43-year-old male with newly diagnosed adenocarcinoma of the pancreas who was referred to see us for consideration of neoadjuvant chemotherapy   We decided to start gemcitabine and Abraxane based on his performance status and disease      He had approximately 3 cycles of treatment and was found to have liver lesions  At this point I would favor switching him to modified FOLFOX 6  I went over the risks benefits and alternatives of 5 fluorouracil along with Leucovorin and Oxaliplatin  I explained the potential side effects to include but not limited to nausea, vomiting, diarrhea, mouth sores, neuropathy, cold sensitivity, low blood counts, risk of infection and even death  I explained jaw claudication he in all the symptoms of oxaliplatin  He is agreeable to proceeding  He will sign a consent form today  He will start within the next 2 weeks  If he has any questions he will call our office  Emotional support given to son    Will consult  as well

## 2021-09-17 NOTE — PROGRESS NOTES
Assessment/Plan:    Metastatic pancreatic cancer  One liver lesion increased in size, necessitating change in chemo  Pt without new symptoms - will start checm next week  Continue to observe  F/u with Onc  Will extend patients work release until 3/1/22  F/u 3m    Pneumobilia due to Occluded CBD stent  Pt improved  GI did not feel that tube needed to be replaced as of yet  Continue to observe  F/u 3m or prn  F/u with GI in the interim    Type 2 diabetes mellitus with hypoglycemia (HCC)    Lab Results   Component Value Date    HGBA1C 7 0 (A) 08/11/2021     A1C remains controlled  Apptite stable  Continue to monitor Recheck 3m    Essential hypertension  Stable  Continue present care  Monitor labs  Recheck 3m       Diagnoses and all orders for this visit:    Metastatic pancreatic cancer    Type 2 diabetes mellitus with hypoglycemia without coma, with long-term current use of insulin ([facility] )    Essential hypertension    Immunization due  -     PNEUMOCOCCAL POLYSACCHARIDE VACCINE 23-VALENT =>3YO SQ IM  -     influenza vaccine, high-dose, PF 0 7 mL (FLUZONE HIGH-DOSE)    Pneumobilia due to Occluded CBD stent          Subjective:      Patient ID: Estrella Miranda is a 73 y o  male  f/u multiple med issues  - pt feels "all right"  Still gets easily fatigued but otherwise is stable  To start a new chemo regimen due to one lesion increasing in size  Abd pain improved  No change in BMs  Chemo will consist of FOLFOX6 (5 fluorouracil along with Leucovorin and Oxaliplatin)  Pt is aware of side effects and infusion plan  - pt compliant with meds  Home BG typically 105-120 qAM   Pt is now following up with Endo and nutritionist   Last A1C = 7 0  - pt has not had any more abd or chest discomfort  GI did not feel that he needed to change his biliary stent yet     - no new extremity complaints        The following portions of the patient's history were reviewed and updated as appropriate:   He  has a past medical history of Anxiety, Arthritis, Cancer ([facility] ), Cellulitis, Diabetes mellitus ([facility] ), Enlarged prostate, Epidermal inclusion cyst, Erythrasma, Furuncle, GERD (gastroesophageal reflux disease), Hyperlipidemia, Hypertension, and RA (rheumatoid arthritis) ([facility] )  He   Patient Active Problem List    Diagnosis Date Noted    Pneumobilia due to Occluded CBD stent 08/24/2021    Chest pain 08/23/2021    Dental infection 08/04/2021    Polymicrobial bacteremia 07/20/2021    Severe protein-calorie malnutrition ([facility] ) 07/15/2021    Sepsis without acute organ dysfunction ([facility] ) 07/13/2021    Generalized weakness 07/13/2021    Pancytopenia due to chemotherapy 07/13/2021    Lower abdominal pain 06/15/2021    Pancreatic cancer metastasized to liver ([facility] ) 06/14/2021    Metastatic pancreatic cancer 05/25/2021    Chemotherapy induced neutropenia ([place] [address] ) 05/25/2021    Recent cholecystitis 05/11/2021    Leukocytosis 05/11/2021    BPH (benign prostatic hyperplasia) 05/06/2021    Type 2 diabetes mellitus with hypoglycemia ([facility] ) 03/16/2021    Multiple lipomas 08/04/2020    Urgency of urination 01/31/2018    Right arm pain 10/13/2017    Rheumatoid arthritis ([facility] ) 10/13/2017    Essential hypertension 10/13/2017    Liver mass 11/17/2016    Hyperlipidemia 02/03/2016    Anxiety disorder 08/15/2013    GERD 08/15/2013     He  has a past surgical history that includes Scrotal surgery; pr removal of hydrocele,tunica,unilat (Right, 1/11/2018); Hydrocele excision / repair (Right, 01/11/2018); IR cholecystostomy tube placement (5/11/2021); Colonoscopy; Tunneled venous port placement (Left, 6/1/2021); FL guided central venous access device insertion (6/1/2021); IR cholecystostomy tube check/change/reposition/reinsertion/upsize (6/22/2021); Multiple tooth extractions (Left, 7/22/2021); IR biopsy liver mass (8/16/2021); and IR cholecystostomy tube check/change/reposition/reinsertion/upsize (8/23/2021)  He  reports that he has quit smoking   He has never used smokeless tobacco  He reports that he does not drink alcohol and does not use drugs  Current Outpatient Medications   Medication Sig Dispense Refill    Alcohol Swabs 70 % PADS May substitute brand based on insurance coverage  Check glucose ACHS  200 each 0    aspirin 81 mg chewable tablet Chew 81 mg       atorvastatin (LIPITOR) 20 mg tablet Take 1 tablet (20 mg total) by mouth daily 90 tablet 1    Blood Glucose Monitoring Suppl (ONE TOUCH ULTRA 2) w/Device KIT Use daily 1 each 1    cyanocobalamin (VITAMIN B-12) 1000 MCG tablet Take 1 tablet (1,000 mcg total) by mouth daily 30 tablet 5    [START ON 9/22/2021] fluorouracil 4,630 mg in CADD infusion pump Infuse 4,630 mg (1,200 mg/m2/day x 1 93 m2 (Treatment Plan Recorded BSA)) into a venous catheter over 46 hours for 2 days 1 each 0    FLUoxetine (PROzac) 20 mg capsule TAKE ONE CAPSULE BY MOUTH EVERY DAY 90 capsule 2    folic acid (FOLVITE) 1 mg tablet Take by mouth daily      gabapentin (NEURONTIN) 100 mg capsule Take 2 capsules (200 mg total) by mouth 3 (three) times a day 180 capsule 3    glucose blood (OneTouch Ultra) test strip Use as instructed tid 100 each 5    insulin glargine (Lantus SoloStar) 100 units/mL injection pen Inject 12 Units under the skin daily at bedtime 15 mL 0    insulin lispro (HumaLOG KwikPen) 100 units/mL injection pen Take 10 units with breakfast, lunch and dinner +scale 15 mL 5    Insulin Pen Needle (BD Pen Needle Cinda 2nd Gen) 32G X 4 MM MISC For use with insulin pen  Pharmacy may dispense brand covered by insurance   100 each 3    Lancets (onetouch ultrasoft) lancets Use as instructed 3x a day 100 each 5    lisinopril-hydrochlorothiazide (PRINZIDE,ZESTORETIC) 10-12 5 MG per tablet Take 1 tablet by mouth 3 (three) times a week Monday Wednesday Friday 90 tablet 3    pancrelipase, Lip-Prot-Amyl, (CREON) 24,000 units Take 1 capsule (24,000 Units total) by mouth 3 (three) times a day with meals Or snacks 270 capsule 0    pantoprazole (PROTONIX) 40 mg tablet Take 1 tablet (40 mg total) by mouth 2 (two) times a day Before breakfast, before bed, to reduce stomach acid 60 tablet 1    predniSONE 10 mg tablet Take 1 tablet (10 mg total) by mouth daily with breakfast 30 tablet 0    prochlorperazine (COMPAZINE) 10 mg tablet Take 1 tablet (10 mg total) by mouth every 6 (six) hours as needed for nausea or vomiting 45 tablet 3    Sodium Chloride Flush (Normal Saline Flush) 0 9 % SOLN Gall bladder drain      tamsulosin (FLOMAX) 0 4 mg TAKE 1 CAPSULE BY MOUTH  DAILY AT BEDTIME 90 capsule 3     No current facility-administered medications for this visit  He is allergic to fentanyl       Review of Systems   Constitutional: Positive for fatigue (mild, intermittent)  Negative for activity change, appetite change, chills and fever  HENT: Negative  Eyes: Negative  Respiratory: Negative  Cardiovascular: Negative  Negative for chest pain  Gastrointestinal: Negative  Abd discomfort improved   Endocrine: Negative  Genitourinary: Negative  Musculoskeletal: Negative  Skin: Negative  Allergic/Immunologic: Negative  Neurological: Positive for weakness  Negative for dizziness, light-headedness and numbness  Hematological: Negative  Psychiatric/Behavioral: Negative  Objective:      /80   Pulse 83   Temp (!) 97 2 °F (36 2 °C)   Resp 18   Ht 5' 9" (1 753 m)   Wt 79 4 kg (175 lb)   SpO2 97%   BMI 25 84 kg/m²          Physical Exam  Vitals reviewed  Constitutional:       Appearance: He is well-developed  HENT:      Head: Normocephalic and atraumatic  Right Ear: Tympanic membrane, ear canal and external ear normal       Left Ear: Tympanic membrane, ear canal and external ear normal       Mouth/Throat:      Mouth: Mucous membranes are moist    Eyes:      General: No scleral icterus  Extraocular Movements: Extraocular movements intact        Conjunctiva/sclera: Conjunctivae normal       Pupils: Pupils are equal, round, and reactive to light  Comments: No conjunctival pallor   Neck:      Thyroid: No thyromegaly  Vascular: No carotid bruit  Cardiovascular:      Rate and Rhythm: Normal rate and regular rhythm  Pulses: Normal pulses  Heart sounds: Normal heart sounds  No murmur heard  Pulmonary:      Effort: Pulmonary effort is normal       Breath sounds: Normal breath sounds  No wheezing or rales  Abdominal:      General: Bowel sounds are normal  There is no distension  Palpations: Abdomen is soft  There is no mass  Tenderness: There is no abdominal tenderness  Comments: Percutaneous GB tube site clean/dry  Musculoskeletal:         General: No swelling or tenderness  Normal range of motion  Cervical back: Normal range of motion and neck supple  No tenderness  No muscular tenderness  Right lower leg: No edema  Left lower leg: No edema  Lymphadenopathy:      Cervical: No cervical adenopathy  Skin:     General: Skin is warm and dry  Capillary Refill: Capillary refill takes less than 2 seconds  Coloration: Skin is not jaundiced  Findings: No lesion or rash  Neurological:      Mental Status: He is alert and oriented to person, place, and time  Cranial Nerves: No cranial nerve deficit  Sensory: No sensory deficit  Motor: No weakness        Gait: Gait normal    Psychiatric:         Mood and Affect: Mood normal

## 2021-09-19 NOTE — ASSESSMENT & PLAN NOTE
One liver lesion increased in size, necessitating change in chemo  Pt without new symptoms - will start checm next week  Continue to observe  F/u with Onc  Will extend patients work release until 3/1/22   F/u 3m

## 2021-09-19 NOTE — ASSESSMENT & PLAN NOTE
Lab Results   Component Value Date    HGBA1C 7 0 (A) 08/11/2021     A1C remains controlled  Apptite stable   Continue to monitor Recheck 3m

## 2021-09-19 NOTE — ASSESSMENT & PLAN NOTE
Pt improved  GI did not feel that tube needed to be replaced as of yet  Continue to observe  F/u 3m or prn   F/u with GI in the interim

## 2021-09-22 NOTE — PATIENT INSTRUCTIONS
Today you received the chemotherapy known as Oxaliplatin  This medication can cause (and usually does) cold sensitivity and peripheral neuropathy (numbness/tingling)  It is recommended that you wear a pair of gloves when going into the fridge/freezer, because the cold sensitivity can be very uncomfortable/sometimes painful  Try to avoid cold drinks, especially with ice in them - it can cause discomfort in your throat  Usually this resolves in a couple of days - it lasts a different amount of time for each patient  The neuropathy may not start until you have a few cycles of this chemotherapy regiment - please let us and Dr Bearden Avers office know of any intolerable side effects  The pump you are going home with has a medication known as 5 FU in it  This pump was connected by us in the infusion center and will run over 46 hours  YOU WILL RETURN ON Friday 9/24/2022 AT 10:00 AM  On Friday we will attach the Neulasta Onpro to you as well

## 2021-09-22 NOTE — PROGRESS NOTES
Patient arrives to infusion center for D1 C1 Oxali/leucovorin, 5 FU push and 5 FU CADD  Patient offers no acute complaints at this time  Labs reviewed from 9/17/21 and within parameters for treatment  VSS, port accessed without difficulty  Port with excellent, brisk blood return, port flushed well, CHG dressing applied in sterile fashion  Patient resting on recliner chair, call bell within reach

## 2021-09-22 NOTE — PROGRESS NOTES
Patient completed treatment today without incident  Port remained with excellent blood return throughout the duration of the 5 FU push  Port flushed well  CHG dressing remains dry and intact  CADD pump connected, all connections secured, CADD double checked with Catalino Roy RN  Pump green light flashing, infusing and running  1st time CADD pump education provided to for patient - spill kit, extra batteries, extra tape and [facility] information folder provided to patient  Patient states he does have Zofran at home if needed  Patient aware of disconnect date and time for CADD D/C and that he will have Neulasta Onpro applied  CADD disconnect date and time printed on AVS for patient  Patient states he has had Neulasta Onpro in the past  AVS printed and provided to patient  Patient AAox4 and ambulatory upon DC without any gait disturbance

## 2021-09-22 NOTE — PROGRESS NOTES
Biopsychosocial and Barriers Assessment    Cancer Diagnosis: metastatic pancreatic cancer  Home/Cell Phone: URP:[phone]  Emergency Contact: son: Alysia Diaz  [phone]  Marital Status:   Interpretation concerns, speaks another language, preferred language:english  Cultural concerns: n/a  Ability to read or write: yes     Caregiver/Support: self  Children: 2 adult sons and 1 adult step daughter  Child/Elder care: n/aa    Housing: own   Home Setup: 2 story  Lives With:  spouse  Daily Living Activities: self  Durable Medical Equipment: n/a  Ambulation: no issues    Preferred Pharmacy: [facility]  High co-pays with insurance: n/a  High co-pays with medication coverage:n/a  No medication coverage: has coverage    Primary Care Provider: Britney Hutchinson MD  [address]: currently  Hx of Short term rehab: [address] Hx: n/a  Substance Abuse Hx: na/   Employment: [employer] currently on [employer] Insurance Status/Location: N/A  Ability to pay bills: struggling at this time  POA/LW/AD: 5 wishes  Transportation Plan/Concerns: Utilizing [employer]      What do you know about your Cancer Diagnosis    What has your doctor told you about your cancer diagnosis: Pt stated he feels confident in what his physician has told him    What has your doctor told you about your cancer treatment:Pt stated he feel confident in what his physician has told him  What specific concerns do you have about your diagnosis and treatment: No current concerns  Have you been made aware of any hair loss associated with treatment: n/a    Additional Comments: Pt is a pleasant gentleman and was very receptive and open to speaking with this LSW  Pt stated his wife is very concerned regarding the bills and he states this at times frustrates him as he can do nothing at this point  Pt is in contact with our financial counselors and has applied for hardship assistance    Pt stated his relationship with his spouse has always been contentious  Pt stated he relies on his children for support  LSW will contact financial counselors to assess where the hardship process is at this time  LSW encouraged pt to reach out with concerns  LSW supplied contact information and possible resources for pt and spouse

## 2021-09-23 NOTE — TELEPHONE ENCOUNTER
Patient is questioning why there was a increase in his Lipitor   He said he normally takes 10 mg   Not 20 mg     Please advise

## 2021-09-23 NOTE — TELEPHONE ENCOUNTER
B/c his LDL levels were not at goal on 10mg   Med was held for a while when he has having increased abd pain, but now that he is back on it, the slightly higher dose is more appropriate

## 2021-09-24 NOTE — PROGRESS NOTES
LSW received information regarding hardship application  LSW attempted to contact pt, no answer  LSW left detailed message with contact information for business office regarding hardship application  LSW will contact pt at a alter date for updates

## 2021-09-24 NOTE — PROGRESS NOTES
Pt presented for CADD D/C offering no complaints  CADD was at [facility] on d/c, port was flushed per protocol  Nuelasta onpro was placed on pts R Arm and blinking green at discharge  pt was given AVS and discharged in stable condition

## 2021-09-28 NOTE — PROGRESS NOTES
Outpatient Follow-Up - Palliative and Supportive Care   Sisi Benjamin 73 y o  male [id number]    Assessment & Plan  Problem List Items Addressed This Visit     None      Visit Diagnoses     Intractable hiccups        Relevant Medications    predniSONE 10 mg tablet        - Counseling on health screening and disease prevention, COVID-19 specific (CPT V65 49)    Medications adjusted this encounter:  Requested Prescriptions     Signed Prescriptions Disp Refills    predniSONE 10 mg tablet 90 tablet 0     Sig: Take 1 tablet (10 mg total) by mouth daily with breakfast     No orders of the defined types were placed in this encounter  Medications Discontinued During This Encounter   Medication Reason    predniSONE 10 mg tablet Reorder      - Maintain steroids at 10mg dose  Pt feels stronger and has good appetite  - Pt would prefer to try OTC Imodium for loose stools on occasion, OTC Bengay for neck pain, OTC Nyquil for sleep  - Pt encouraged to call us if OTCs fail to help his symptoms  Sisi Benjamin was seen today for symptoms and planning cares related to above illnesses  I have reviewed the patient's controlled substance dispensing history in the Prescription Drug Monitoring Program in compliance with the [facility] regulations before prescribing any controlled substances  They are invited to continue to follow with us  If there are questions or concerns, please contact us through our clinic/answering service 24 hours a day, seven days a week  Marc Jaimes MD  [facility]        Visit Information    Accompanied By: No one    Source of History: Self    History Limitations: None    Contacts: Son Yazmin kinds - [phone], [phone]    History of Present Illness      Sisi Benjamin is a 73 y o  male who presents in follow up of symptoms related to adenocarcinoma of the pancreas  He follows with Dr Geovanni Ruiz of Med/Onc    Pertinent issues include: symptom management, depression or anxiety, nausea, hiccups      Since last visit, he has felt unwell just this week or so  He did start a new chemo regimen on Wednesday, and is wondering if this is part of it  Fatigue, fuzzy thinking with exhaustion, and diarrhea with stool urgency just yesterday x2  Today, stool is firm and well controlled  Still feels exhausted  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, and is changing his behaviors to accommodate this side effect  Has not suffered steroid withdrawal symptoms going from 20mg to 10mg  His DM control is improved  He maintains a good appetite and continues to use Creon  His strength is remarkably better: spent all day walking about a car show this weekend, and is now back to driving himself to regular appts  He will see Dr Igor Rosa soon, and is somewhat apprehensive about possibly restarting/ changing chemo plan in response to positive liver bx results  Medically, baclofen unhelpful for hiccups  Importantly, he has noted rather severe dry mouth and even visual changes since this process with hiccups started  Intractable hiccups were improoved with new medication/dose of steroids  Appetiete and pain improved  Has needed adjustment in insulin therapy given higher dose steroids; PCP and Endo have begun to cooperate on this matter  Medically, compazine usage; this has been somewhat helpful for nausea, but has been asso with visual changes  Diazepam, baclofen not helpful for hiccups                 = continue with oxyIR in small doses PRN        Moving bowels daily   Taking oxyIR for pain; not entirely helpful   Crampy abd pain appears idiopathic: unrelated to eating or time of day   Does improve with lateral decub positioning, upright ambulation   We theorized that this is primarily a mass effect, with bowels becoming more spasmodic when contacting the mass by direct pressure from gravity   He is agreedable to low-dose steroids, used sparingly d/t concurrent severe and uncomfortable reflux, which has not improved even on high dose PPI therapy     Otherwise, he confirms a full cares plan, and seems too distracted by his pain to engage with full goals conversation "    Past medical, surgical, social, and family histories are reviewed and pertinent updates are made  Review of Systems   Constitutional: Positive for decreased appetite, malaise/fatigue and weight loss  Negative for weight gain  HENT: Negative for hoarse voice and nosebleeds  Eyes: Negative for vision loss in left eye and vision loss in right eye  Cardiovascular: Negative for chest pain and dyspnea on exertion  Respiratory: Negative for cough and shortness of breath  Endocrine: Negative for polydipsia, polyphagia and polyuria  Skin: Negative for flushing and itching  Musculoskeletal: Negative for falls  Gastrointestinal: Positive for nausea  Negative for anorexia, jaundice and vomiting  Genitourinary: Negative for frequency  Neurological: Positive for weakness  Negative for dizziness and focal weakness  Psychiatric/Behavioral: Negative for depression and memory loss  The patient is not nervous/anxious  Vital Signs    /80 (BP Location: Left arm, Cuff Size: Standard)   Pulse 74   Temp (!) 97 2 °F (36 2 °C) (Temporal)   Resp 16   Wt 78 7 kg (173 lb 8 oz)   SpO2 97%   BMI 25 62 kg/m²     Physical Exam and Objective Data  Physical Exam  Constitutional:       General: He is not in acute distress  Appearance: He is well-developed  He is not ill-appearing, toxic-appearing or diaphoretic  HENT:      Head: Normocephalic and atraumatic  Right Ear: External ear normal       Left Ear: External ear normal    Eyes:      General:         Right eye: No discharge  Left eye: No discharge  Conjunctiva/sclera: Conjunctivae normal       Pupils: Pupils are equal, round, and reactive to light     Neck:      Trachea: No tracheal deviation  Cardiovascular:      Rate and Rhythm: Normal rate  Pulmonary:      Effort: Pulmonary effort is normal  No respiratory distress  Breath sounds: No stridor  Abdominal:      General: There is no distension  Palpations: Abdomen is soft  Musculoskeletal:         General: No deformity  Skin:     Coloration: Skin is not pale  Findings: Bruising (scattered small ecchymoses) present  No erythema or rash  Neurological:      General: No focal deficit present  Mental Status: He is alert and oriented to person, place, and time  Mental status is at baseline  Cranial Nerves: No cranial nerve deficit  Psychiatric:         Mood and Affect: Mood normal          Behavior: Behavior normal          Thought Content: Thought content normal          Judgment: Judgment normal            Radiology and Laboratory:  I personally reviewed and interpreted the following results: pt reports his sugars are all below 160, and never below 80    30+ minutes was spent face to face with Helena Shah with greater than 50% of the time spent in counseling or coordination of care including: chart review; symptom pursuit; supportive listening; anticipatory guidance     All of the patient's or agent's questions were answered during this discussion

## 2021-10-29 PROBLEM — R20.0 NUMBNESS AND TINGLING OF FOOT: Status: ACTIVE | Noted: 2021-01-01

## 2021-10-29 PROBLEM — R20.2 NUMBNESS AND TINGLING OF FOOT: Status: ACTIVE | Noted: 2021-01-01

## 2021-10-31 PROBLEM — R20.0 NUMBNESS IN FEET: Status: RESOLVED | Noted: 2021-01-01 | Resolved: 2021-01-01

## 2021-10-31 PROBLEM — R20.0 NUMBNESS IN FEET: Status: ACTIVE | Noted: 2021-01-01

## 2021-11-23 PROBLEM — K55.069 MESENTERIC VEIN THROMBOSIS (HCC): Status: ACTIVE | Noted: 2021-01-01

## 2021-11-23 PROBLEM — I26.99 PULMONARY EMBOLISM (HCC): Status: ACTIVE | Noted: 2021-01-01

## 2021-12-05 PROBLEM — M12.819 ROTATOR CUFF TEAR ARTHROPATHY: Status: ACTIVE | Noted: 2021-01-01

## 2021-12-05 PROBLEM — M75.100 ROTATOR CUFF TEAR ARTHROPATHY: Status: ACTIVE | Noted: 2021-01-01

## 2021-12-05 PROBLEM — M79.601 RIGHT ARM PAIN: Status: RESOLVED | Noted: 2017-10-13 | Resolved: 2021-01-01

## 2021-12-05 PROBLEM — R74.01 ELEVATED TRANSAMINASE LEVEL: Status: ACTIVE | Noted: 2021-01-01

## 2021-12-05 PROBLEM — M19.90 IDIOPATHIC OSTEOARTHRITIS: Status: ACTIVE | Noted: 2021-01-01

## 2021-12-05 PROBLEM — M77.8 TENDINITIS OF RIGHT SHOULDER: Status: ACTIVE | Noted: 2021-01-01

## 2021-12-09 PROBLEM — R06.02 SHORTNESS OF BREATH: Status: ACTIVE | Noted: 2021-01-01

## 2021-12-10 PROBLEM — R78.81 POSITIVE BLOOD CULTURE: Status: ACTIVE | Noted: 2021-01-01

## 2021-12-12 PROBLEM — E46 UNSPECIFIED PROTEIN-CALORIE MALNUTRITION (HCC): Status: ACTIVE | Noted: 2021-01-01

## 2021-12-13 PROBLEM — Z51.5 PALLIATIVE CARE PATIENT: Status: ACTIVE | Noted: 2021-01-01

## 2021-12-13 PROBLEM — G89.3 CANCER RELATED PAIN: Status: ACTIVE | Noted: 2021-01-01

## 2021-12-19 PROBLEM — R53.83 LETHARGY: Status: ACTIVE | Noted: 2021-01-01

## 2021-12-19 PROBLEM — R41.82 ALTERED MENTAL STATUS: Status: ACTIVE | Noted: 2021-01-01

## 2021-12-19 PROBLEM — A41.9 SEPSIS (HCC): Status: ACTIVE | Noted: 2021-01-01

## 2021-12-19 PROBLEM — U07.1 COVID-19 VIRUS INFECTION: Status: ACTIVE | Noted: 2021-01-01

## 2021-12-20 PROBLEM — R79.89 ELEVATED SERUM CREATININE: Status: ACTIVE | Noted: 2021-01-01

## 2021-12-21 PROBLEM — A41.9 SEPSIS (HCC): Status: RESOLVED | Noted: 2021-01-01 | Resolved: 2021-01-01

## 2021-12-21 PROBLEM — R79.89 ELEVATED SERUM CREATININE: Status: RESOLVED | Noted: 2021-01-01 | Resolved: 2021-01-01

## 2021-12-21 PROBLEM — G93.41 METABOLIC ENCEPHALOPATHY: Status: ACTIVE | Noted: 2021-01-01

## 2021-12-21 PROBLEM — R53.83 LETHARGY: Status: RESOLVED | Noted: 2021-01-01 | Resolved: 2021-01-01

## 2021-12-29 NOTE — PROGRESS NOTES
Pt to clinic for Irinotecan Liposome infusion and 5 FU CADD  Pt stated he experienced some slight diarrhea since hospital d/c, [facility] [place] [facility]  Pt educated on diarrhea as a potential side effect or Irinotecan Liposome infusion  Pt resting comfortably at this time, will continue to monitor

## 2021-12-29 NOTE — PROGRESS NOTES
Pt c/o ONE episode of diarrhea today  Pt educated on Imodium for diarrhea  Pt verbally understands  Pt tolerated infusion without incident  Pt's CADD attached and running, green light blinking  Pt aware of when to return for CADD d/c  Pt offers no complaints at this time, AVS provided

## 2021-12-31 NOTE — PROGRESS NOTES
Patient arrives to infusion center for CADD pump DC and Onpro application  Patient reports abdominal "heaviness," which is not new but is unrelieved with opiates, Tylenol and Gas-X  Patient states the heaviness causes lack of appetite, is uninterested in eating, generally feels "wiped ' When he woke up this morning he stated he had some shakiness  VSS, patient is afebrile  Patient reports he will call Dr Rebeca Pulliam to discuss his symptoms  Inbasket sent to Dr Jose Ray office to make aware of persisting symptoms - infusion RN suggested possibly hydration/zofran/pepcid with [address] if Dr Chuck Pallas thought that would be beneficial  Noted that patient does see Dr Chuck Pallas 1/4/22 and is encouraged to discuss his concerns at that appointment as well  CADD pump reservoir volume noted to be 0mL  CADD pump disconnected without issue  Excellent blood return noted from port, flushed easily without resistance  Port deaccessed without incident, bandaid in place  Neulasta onpro applied to patient RIGHT arm, green light flashing  Patient aware of start and removal time tomorrow, booklet with times provided to patient  Patient aware of next appt, declines AVS  [facility] contacted to set up LYFT for patient, patient aware  Patient AAOx4 and ambulatory upon DC

## 2021-12-31 NOTE — PLAN OF CARE
Problem: Knowledge Deficit  Goal: Patient/family/caregiver demonstrates understanding of disease process, treatment plan, medications, and discharge instructions  Description: Complete learning assessment and assess knowledge base    Interventions:  - Provide teaching at level of understanding  - Provide teaching via preferred learning methods  Reactivated

## 2022-01-01 ENCOUNTER — HOSPITAL ENCOUNTER (OUTPATIENT)
Dept: INTERVENTIONAL RADIOLOGY/VASCULAR | Facility: HOSPITAL | Age: 67
Discharge: HOME | End: 2022-01-19
Attending: RADIOLOGY | Admitting: RADIOLOGY
Payer: MEDICARE

## 2022-01-01 ENCOUNTER — HOME CARE VISIT (OUTPATIENT)
Dept: HOME HOSPICE | Facility: HOSPICE | Age: 67
End: 2022-01-01
Payer: MEDICARE

## 2022-01-01 ENCOUNTER — APPOINTMENT (OUTPATIENT)
Dept: LAB | Facility: HOSPITAL | Age: 67
End: 2022-01-01
Payer: MEDICARE

## 2022-01-01 ENCOUNTER — TELEPHONE (OUTPATIENT)
Dept: PALLIATIVE MEDICINE | Facility: CLINIC | Age: 67
End: 2022-01-01

## 2022-01-01 ENCOUNTER — TELEPHONE (OUTPATIENT)
Dept: HEMATOLOGY ONCOLOGY | Facility: CLINIC | Age: 67
End: 2022-01-01

## 2022-01-01 ENCOUNTER — HOSPITAL ENCOUNTER (EMERGENCY)
Facility: HOSPITAL | Age: 67
Discharge: HOME | End: 2022-01-20
Attending: EMERGENCY MEDICINE
Payer: MEDICARE

## 2022-01-01 ENCOUNTER — HOME CARE VISIT (OUTPATIENT)
Dept: HOME HEALTH SERVICES | Facility: HOME HEALTHCARE | Age: 67
End: 2022-01-01
Payer: MEDICARE

## 2022-01-01 ENCOUNTER — APPOINTMENT (OUTPATIENT)
Dept: LAB | Facility: CLINIC | Age: 67
End: 2022-01-01
Payer: MEDICARE

## 2022-01-01 ENCOUNTER — HOSPITAL ENCOUNTER (EMERGENCY)
Facility: HOSPITAL | Age: 67
Discharge: HOME | End: 2022-04-04
Attending: EMERGENCY MEDICINE
Payer: MEDICARE

## 2022-01-01 ENCOUNTER — TELEPHONE (OUTPATIENT)
Dept: FAMILY MEDICINE CLINIC | Facility: CLINIC | Age: 67
End: 2022-01-01

## 2022-01-01 ENCOUNTER — HOSPITAL ENCOUNTER (EMERGENCY)
Facility: HOSPITAL | Age: 67
Discharge: HOME | End: 2022-04-16
Attending: EMERGENCY MEDICINE
Payer: MEDICARE

## 2022-01-01 ENCOUNTER — HOSPITAL ENCOUNTER (OUTPATIENT)
Dept: INFUSION CENTER | Facility: CLINIC | Age: 67
Discharge: HOME | End: 2022-04-06
Payer: MEDICARE

## 2022-01-01 ENCOUNTER — SOCIAL WORK (OUTPATIENT)
Dept: PALLIATIVE MEDICINE | Facility: CLINIC | Age: 67
End: 2022-01-01
Payer: MEDICARE

## 2022-01-01 ENCOUNTER — PATIENT OUTREACH (OUTPATIENT)
Dept: CASE MANAGEMENT | Facility: HOSPITAL | Age: 67
End: 2022-01-01

## 2022-01-01 ENCOUNTER — HOSPITAL ENCOUNTER (OUTPATIENT)
Dept: INFUSION CENTER | Facility: HOSPITAL | Age: 67
Discharge: HOME | End: 2022-02-02
Attending: INTERNAL MEDICINE
Payer: MEDICARE

## 2022-01-01 ENCOUNTER — TELEPHONE (OUTPATIENT)
Dept: HEMATOLOGY ONCOLOGY | Facility: HOSPITAL | Age: 67
End: 2022-01-01

## 2022-01-01 ENCOUNTER — OFFICE VISIT (OUTPATIENT)
Dept: HEMATOLOGY ONCOLOGY | Facility: CLINIC | Age: 67
End: 2022-01-01
Payer: MEDICARE

## 2022-01-01 ENCOUNTER — TELEPHONE (OUTPATIENT)
Dept: INFUSION CENTER | Facility: CLINIC | Age: 67
End: 2022-01-01

## 2022-01-01 ENCOUNTER — OFFICE VISIT (OUTPATIENT)
Dept: PALLIATIVE MEDICINE | Facility: CLINIC | Age: 67
End: 2022-01-01
Payer: MEDICARE

## 2022-01-01 ENCOUNTER — TELEPHONE (OUTPATIENT)
Dept: GYNECOLOGIC ONCOLOGY | Facility: CLINIC | Age: 67
End: 2022-01-01

## 2022-01-01 ENCOUNTER — HOSPITAL ENCOUNTER (OUTPATIENT)
Dept: CT IMAGING | Facility: HOSPITAL | Age: 67
Discharge: HOME | End: 2022-04-23
Attending: INTERNAL MEDICINE
Payer: MEDICARE

## 2022-01-01 ENCOUNTER — HOSPITAL ENCOUNTER (OUTPATIENT)
Dept: INFUSION CENTER | Facility: CLINIC | Age: 67
Discharge: HOME | End: 2022-04-20

## 2022-01-01 ENCOUNTER — OFFICE VISIT (OUTPATIENT)
Dept: FAMILY MEDICINE CLINIC | Facility: CLINIC | Age: 67
End: 2022-01-01
Payer: MEDICARE

## 2022-01-01 ENCOUNTER — TRANSITIONAL CARE MANAGEMENT (OUTPATIENT)
Dept: FAMILY MEDICINE CLINIC | Facility: CLINIC | Age: 67
End: 2022-01-01

## 2022-01-01 ENCOUNTER — APPOINTMENT (EMERGENCY)
Dept: CT IMAGING | Facility: HOSPITAL | Age: 67
End: 2022-01-01
Payer: MEDICARE

## 2022-01-01 ENCOUNTER — HOSPITAL ENCOUNTER (OUTPATIENT)
Dept: INFUSION CENTER | Facility: CLINIC | Age: 67
End: 2022-01-01

## 2022-01-01 ENCOUNTER — HOSPITAL ENCOUNTER (OUTPATIENT)
Dept: INFUSION CENTER | Facility: CLINIC | Age: 67
Discharge: HOME | End: 2022-03-23
Payer: MEDICARE

## 2022-01-01 ENCOUNTER — HOSPITAL ENCOUNTER (EMERGENCY)
Facility: HOSPITAL | Age: 67
Discharge: HOME | End: 2022-01-15
Attending: EMERGENCY MEDICINE | Admitting: EMERGENCY MEDICINE
Payer: MEDICARE

## 2022-01-01 ENCOUNTER — HOSPITAL ENCOUNTER (OUTPATIENT)
Dept: INFUSION CENTER | Facility: CLINIC | Age: 67
Discharge: HOME | End: 2022-03-09
Payer: MEDICARE

## 2022-01-01 ENCOUNTER — HOSPITAL ENCOUNTER (OUTPATIENT)
Dept: INFUSION CENTER | Facility: CLINIC | Age: 67
Discharge: HOME | End: 2022-01-12
Payer: MEDICARE

## 2022-01-01 ENCOUNTER — PATIENT OUTREACH (OUTPATIENT)
Dept: HEMATOLOGY ONCOLOGY | Facility: CLINIC | Age: 67
End: 2022-01-01

## 2022-01-01 ENCOUNTER — TELEPHONE (OUTPATIENT)
Dept: OTHER | Facility: OTHER | Age: 67
End: 2022-01-01

## 2022-01-01 ENCOUNTER — HOSPITAL ENCOUNTER (OUTPATIENT)
Dept: INFUSION CENTER | Facility: CLINIC | Age: 67
Discharge: HOME | End: 2022-02-04
Payer: MEDICARE

## 2022-01-01 ENCOUNTER — HOSPITAL ENCOUNTER (INPATIENT)
Facility: HOSPITAL | Age: 67
LOS: 3 days | Discharge: HOME | DRG: 393 | End: 2022-05-01
Attending: EMERGENCY MEDICINE | Admitting: INTERNAL MEDICINE
Payer: MEDICARE

## 2022-01-01 ENCOUNTER — HOSPITAL ENCOUNTER (OUTPATIENT)
Dept: INFUSION CENTER | Facility: CLINIC | Age: 67
Discharge: HOME | DRG: 393 | End: 2022-04-27
Payer: MEDICARE

## 2022-01-01 ENCOUNTER — PREP FOR PROCEDURE (OUTPATIENT)
Dept: INTERVENTIONAL RADIOLOGY/VASCULAR | Facility: CLINIC | Age: 67
End: 2022-01-01

## 2022-01-01 ENCOUNTER — HOSPITAL ENCOUNTER (OUTPATIENT)
Dept: INFUSION CENTER | Facility: CLINIC | Age: 67
Discharge: HOME | DRG: 393 | End: 2022-04-25
Payer: MEDICARE

## 2022-01-01 ENCOUNTER — HOSPITAL ENCOUNTER (OUTPATIENT)
Dept: INFUSION CENTER | Facility: CLINIC | Age: 67
Discharge: HOME | End: 2022-02-23
Payer: MEDICARE

## 2022-01-01 ENCOUNTER — OFFICE VISIT (OUTPATIENT)
Dept: DIABETES SERVICES | Facility: CLINIC | Age: 67
End: 2022-01-01
Payer: MEDICARE

## 2022-01-01 ENCOUNTER — HOSPITAL ENCOUNTER (OUTPATIENT)
Dept: INFUSION CENTER | Facility: CLINIC | Age: 67
Discharge: HOME | End: 2022-03-11
Payer: MEDICARE

## 2022-01-01 ENCOUNTER — HOSPITAL ENCOUNTER (OUTPATIENT)
Dept: RADIOLOGY | Facility: HOSPITAL | Age: 67
Discharge: HOME | End: 2022-05-17
Attending: OTOLARYNGOLOGY
Payer: MEDICARE

## 2022-01-01 ENCOUNTER — HOSPITAL ENCOUNTER (OUTPATIENT)
Dept: INFUSION CENTER | Facility: CLINIC | Age: 67
Discharge: HOME | End: 2022-01-26

## 2022-01-01 ENCOUNTER — HOSPICE ADMISSION (OUTPATIENT)
Dept: HOME HOSPICE | Facility: HOSPICE | Age: 67
End: 2022-01-01
Payer: MEDICARE

## 2022-01-01 ENCOUNTER — HOSPITAL ENCOUNTER (OUTPATIENT)
Dept: INFUSION CENTER | Facility: CLINIC | Age: 67
Discharge: HOME | End: 2022-01-14
Payer: MEDICARE

## 2022-01-01 ENCOUNTER — HOSPITAL ENCOUNTER (OUTPATIENT)
Dept: INFUSION CENTER | Facility: CLINIC | Age: 67
Discharge: HOME | End: 2022-04-08
Payer: MEDICARE

## 2022-01-01 ENCOUNTER — HOSPITAL ENCOUNTER (OUTPATIENT)
Dept: INFUSION CENTER | Facility: CLINIC | Age: 67
Discharge: HOME | End: 2022-03-25
Payer: MEDICARE

## 2022-01-01 ENCOUNTER — HOSPITAL ENCOUNTER (OUTPATIENT)
Dept: INFUSION CENTER | Facility: CLINIC | Age: 67
Discharge: HOME | End: 2022-02-25
Payer: MEDICARE

## 2022-01-01 VITALS
TEMPERATURE: 98.6 F | HEIGHT: 67 IN | SYSTOLIC BLOOD PRESSURE: 118 MMHG | RESPIRATION RATE: 18 BRPM | HEART RATE: 71 BPM | BODY MASS INDEX: 21.73 KG/M2 | DIASTOLIC BLOOD PRESSURE: 70 MMHG | WEIGHT: 138.45 LBS | OXYGEN SATURATION: 96 %

## 2022-01-01 VITALS
DIASTOLIC BLOOD PRESSURE: 76 MMHG | SYSTOLIC BLOOD PRESSURE: 129 MMHG | OXYGEN SATURATION: 100 % | HEIGHT: 69 IN | TEMPERATURE: 99.1 F | BODY MASS INDEX: 23.25 KG/M2 | WEIGHT: 156.97 LBS | HEART RATE: 98 BPM

## 2022-01-01 VITALS
RESPIRATION RATE: 18 BRPM | OXYGEN SATURATION: 99 % | DIASTOLIC BLOOD PRESSURE: 70 MMHG | TEMPERATURE: 98.1 F | SYSTOLIC BLOOD PRESSURE: 125 MMHG | HEART RATE: 78 BPM

## 2022-01-01 VITALS
BODY MASS INDEX: 22.96 KG/M2 | SYSTOLIC BLOOD PRESSURE: 109 MMHG | OXYGEN SATURATION: 96 % | RESPIRATION RATE: 16 BRPM | WEIGHT: 160 LBS | HEART RATE: 88 BPM | DIASTOLIC BLOOD PRESSURE: 65 MMHG | TEMPERATURE: 98.9 F

## 2022-01-01 VITALS
WEIGHT: 142 LBS | DIASTOLIC BLOOD PRESSURE: 62 MMHG | TEMPERATURE: 96.1 F | SYSTOLIC BLOOD PRESSURE: 92 MMHG | OXYGEN SATURATION: 97 % | BODY MASS INDEX: 22.29 KG/M2 | HEART RATE: 43 BPM | HEIGHT: 67 IN | RESPIRATION RATE: 18 BRPM

## 2022-01-01 VITALS
BODY MASS INDEX: 23.64 KG/M2 | HEART RATE: 75 BPM | RESPIRATION RATE: 16 BRPM | TEMPERATURE: 96.7 F | OXYGEN SATURATION: 98 % | DIASTOLIC BLOOD PRESSURE: 76 MMHG | WEIGHT: 156 LBS | HEIGHT: 68 IN | SYSTOLIC BLOOD PRESSURE: 112 MMHG

## 2022-01-01 VITALS — RESPIRATION RATE: 16 BRPM | DIASTOLIC BLOOD PRESSURE: 40 MMHG | SYSTOLIC BLOOD PRESSURE: 80 MMHG | HEART RATE: 80 BPM

## 2022-01-01 VITALS
TEMPERATURE: 97.8 F | RESPIRATION RATE: 16 BRPM | HEART RATE: 75 BPM | HEIGHT: 70 IN | OXYGEN SATURATION: 96 % | SYSTOLIC BLOOD PRESSURE: 118 MMHG | BODY MASS INDEX: 22.9 KG/M2 | DIASTOLIC BLOOD PRESSURE: 77 MMHG | WEIGHT: 160 LBS

## 2022-01-01 VITALS
TEMPERATURE: 97 F | OXYGEN SATURATION: 98 % | BODY MASS INDEX: 21.82 KG/M2 | WEIGHT: 139 LBS | SYSTOLIC BLOOD PRESSURE: 100 MMHG | HEART RATE: 63 BPM | RESPIRATION RATE: 18 BRPM | HEIGHT: 67 IN | DIASTOLIC BLOOD PRESSURE: 58 MMHG

## 2022-01-01 VITALS
SYSTOLIC BLOOD PRESSURE: 120 MMHG | WEIGHT: 134 LBS | RESPIRATION RATE: 16 BRPM | HEART RATE: 76 BPM | HEIGHT: 67 IN | DIASTOLIC BLOOD PRESSURE: 68 MMHG | WEIGHT: 153 LBS | HEIGHT: 70 IN | BODY MASS INDEX: 21.9 KG/M2 | SYSTOLIC BLOOD PRESSURE: 100 MMHG | DIASTOLIC BLOOD PRESSURE: 68 MMHG | HEART RATE: 62 BPM | BODY MASS INDEX: 21.03 KG/M2 | TEMPERATURE: 98 F

## 2022-01-01 VITALS
SYSTOLIC BLOOD PRESSURE: 106 MMHG | BODY MASS INDEX: 23.77 KG/M2 | TEMPERATURE: 96.7 F | DIASTOLIC BLOOD PRESSURE: 60 MMHG | HEIGHT: 70 IN | WEIGHT: 166 LBS | HEART RATE: 87 BPM | RESPIRATION RATE: 18 BRPM | OXYGEN SATURATION: 96 %

## 2022-01-01 VITALS
OXYGEN SATURATION: 96 % | BODY MASS INDEX: 24.39 KG/M2 | SYSTOLIC BLOOD PRESSURE: 119 MMHG | HEIGHT: 67 IN | RESPIRATION RATE: 18 BRPM | HEART RATE: 64 BPM | DIASTOLIC BLOOD PRESSURE: 74 MMHG | TEMPERATURE: 96.6 F | WEIGHT: 155.42 LBS

## 2022-01-01 VITALS
DIASTOLIC BLOOD PRESSURE: 70 MMHG | SYSTOLIC BLOOD PRESSURE: 120 MMHG | HEART RATE: 74 BPM | RESPIRATION RATE: 14 BRPM | TEMPERATURE: 98 F | BODY MASS INDEX: 23.62 KG/M2 | HEIGHT: 70 IN | WEIGHT: 165 LBS | OXYGEN SATURATION: 98 %

## 2022-01-01 VITALS
WEIGHT: 148.5 LBS | OXYGEN SATURATION: 98 % | BODY MASS INDEX: 23.31 KG/M2 | HEIGHT: 67 IN | RESPIRATION RATE: 18 BRPM | TEMPERATURE: 97.9 F | SYSTOLIC BLOOD PRESSURE: 118 MMHG | HEART RATE: 59 BPM | DIASTOLIC BLOOD PRESSURE: 64 MMHG

## 2022-01-01 VITALS
OXYGEN SATURATION: 97 % | HEART RATE: 89 BPM | TEMPERATURE: 96.7 F | WEIGHT: 158 LBS | BODY MASS INDEX: 24.8 KG/M2 | RESPIRATION RATE: 16 BRPM | SYSTOLIC BLOOD PRESSURE: 122 MMHG | HEIGHT: 67 IN | DIASTOLIC BLOOD PRESSURE: 72 MMHG

## 2022-01-01 VITALS
TEMPERATURE: 97.8 F | HEART RATE: 105 BPM | BODY MASS INDEX: 23.79 KG/M2 | HEIGHT: 68 IN | RESPIRATION RATE: 18 BRPM | OXYGEN SATURATION: 97 % | DIASTOLIC BLOOD PRESSURE: 90 MMHG | WEIGHT: 157 LBS | SYSTOLIC BLOOD PRESSURE: 126 MMHG

## 2022-01-01 VITALS
BODY MASS INDEX: 23.34 KG/M2 | SYSTOLIC BLOOD PRESSURE: 102 MMHG | HEART RATE: 108 BPM | TEMPERATURE: 98 F | HEIGHT: 68 IN | RESPIRATION RATE: 18 BRPM | DIASTOLIC BLOOD PRESSURE: 62 MMHG | OXYGEN SATURATION: 96 % | WEIGHT: 154 LBS

## 2022-01-01 VITALS
HEIGHT: 67 IN | BODY MASS INDEX: 21.66 KG/M2 | DIASTOLIC BLOOD PRESSURE: 64 MMHG | OXYGEN SATURATION: 97 % | WEIGHT: 138 LBS | SYSTOLIC BLOOD PRESSURE: 100 MMHG | RESPIRATION RATE: 14 BRPM | TEMPERATURE: 97.8 F | HEART RATE: 86 BPM

## 2022-01-01 VITALS
SYSTOLIC BLOOD PRESSURE: 110 MMHG | WEIGHT: 158 LBS | DIASTOLIC BLOOD PRESSURE: 70 MMHG | TEMPERATURE: 97.2 F | HEART RATE: 82 BPM | OXYGEN SATURATION: 97 % | BODY MASS INDEX: 24.8 KG/M2 | HEIGHT: 67 IN | RESPIRATION RATE: 18 BRPM

## 2022-01-01 VITALS
DIASTOLIC BLOOD PRESSURE: 62 MMHG | TEMPERATURE: 98.4 F | RESPIRATION RATE: 18 BRPM | OXYGEN SATURATION: 100 % | BODY MASS INDEX: 23.87 KG/M2 | HEART RATE: 94 BPM | HEIGHT: 68 IN | SYSTOLIC BLOOD PRESSURE: 122 MMHG | WEIGHT: 157.5 LBS

## 2022-01-01 VITALS
HEIGHT: 67 IN | TEMPERATURE: 98.3 F | DIASTOLIC BLOOD PRESSURE: 64 MMHG | SYSTOLIC BLOOD PRESSURE: 102 MMHG | BODY MASS INDEX: 21.03 KG/M2 | HEART RATE: 78 BPM | WEIGHT: 134 LBS

## 2022-01-01 VITALS
BODY MASS INDEX: 23.25 KG/M2 | DIASTOLIC BLOOD PRESSURE: 76 MMHG | WEIGHT: 156.97 LBS | OXYGEN SATURATION: 100 % | HEART RATE: 98 BPM | SYSTOLIC BLOOD PRESSURE: 129 MMHG | HEIGHT: 69 IN | RESPIRATION RATE: 18 BRPM | TEMPERATURE: 99.1 F

## 2022-01-01 VITALS
HEIGHT: 70 IN | WEIGHT: 160 LBS | DIASTOLIC BLOOD PRESSURE: 69 MMHG | BODY MASS INDEX: 22.9 KG/M2 | TEMPERATURE: 97.6 F | SYSTOLIC BLOOD PRESSURE: 118 MMHG | RESPIRATION RATE: 18 BRPM | OXYGEN SATURATION: 97 % | HEART RATE: 74 BPM

## 2022-01-01 VITALS
WEIGHT: 155.4 LBS | DIASTOLIC BLOOD PRESSURE: 62 MMHG | TEMPERATURE: 98.8 F | HEART RATE: 99 BPM | OXYGEN SATURATION: 97 % | SYSTOLIC BLOOD PRESSURE: 106 MMHG | HEIGHT: 68 IN | BODY MASS INDEX: 23.55 KG/M2

## 2022-01-01 VITALS — BODY MASS INDEX: 23.82 KG/M2 | WEIGHT: 157.2 LBS | HEIGHT: 68 IN

## 2022-01-01 VITALS
SYSTOLIC BLOOD PRESSURE: 90 MMHG | RESPIRATION RATE: 16 BRPM | DIASTOLIC BLOOD PRESSURE: 42 MMHG | TEMPERATURE: 97.1 F | HEART RATE: 66 BPM

## 2022-01-01 VITALS — RESPIRATION RATE: 8 BRPM

## 2022-01-01 DIAGNOSIS — C25.9 PANCREATIC ADENOCARCINOMA (HCC): ICD-10-CM

## 2022-01-01 DIAGNOSIS — C25.9 PANCREATIC ADENOCARCINOMA (HCC): Primary | ICD-10-CM

## 2022-01-01 DIAGNOSIS — E11.9 TYPE 2 DIABETES MELLITUS WITHOUT COMPLICATION, WITH LONG-TERM CURRENT USE OF INSULIN (HCC): ICD-10-CM

## 2022-01-01 DIAGNOSIS — D70.1 CHEMOTHERAPY INDUCED NEUTROPENIA (HCC): ICD-10-CM

## 2022-01-01 DIAGNOSIS — G89.3 CANCER RELATED PAIN: ICD-10-CM

## 2022-01-01 DIAGNOSIS — C25.9 PANCREATIC CANCER (HCC): ICD-10-CM

## 2022-01-01 DIAGNOSIS — Z76.89 ENCOUNTER FOR SUPPORT AND COORDINATION OF TRANSITION OF CARE: Primary | ICD-10-CM

## 2022-01-01 DIAGNOSIS — T45.1X5A CHEMOTHERAPY INDUCED NEUTROPENIA (HCC): ICD-10-CM

## 2022-01-01 DIAGNOSIS — Z71.89 ADVANCED CARE PLANNING/COUNSELING DISCUSSION: ICD-10-CM

## 2022-01-01 DIAGNOSIS — C25.9 PANCREATIC CANCER METASTASIZED TO LIVER (HCC): ICD-10-CM

## 2022-01-01 DIAGNOSIS — M19.90 IDIOPATHIC OSTEOARTHRITIS: ICD-10-CM

## 2022-01-01 DIAGNOSIS — Z51.5 PALLIATIVE CARE PATIENT: ICD-10-CM

## 2022-01-01 DIAGNOSIS — E46 PROTEIN-CALORIE MALNUTRITION, UNSPECIFIED SEVERITY (HCC): ICD-10-CM

## 2022-01-01 DIAGNOSIS — C78.7 PANCREATIC CANCER METASTASIZED TO LIVER (HCC): ICD-10-CM

## 2022-01-01 DIAGNOSIS — F41.9 ANXIETY DISORDER, UNSPECIFIED TYPE: ICD-10-CM

## 2022-01-01 DIAGNOSIS — R10.84 GENERALIZED ABDOMINAL PAIN: ICD-10-CM

## 2022-01-01 DIAGNOSIS — Z71.89 COUNSELING AND COORDINATION OF CARE: Primary | ICD-10-CM

## 2022-01-01 DIAGNOSIS — C78.7 PANCREATIC CANCER METASTASIZED TO LIVER (HCC): Primary | ICD-10-CM

## 2022-01-01 DIAGNOSIS — K52.1 CHEMOTHERAPY INDUCED DIARRHEA: ICD-10-CM

## 2022-01-01 DIAGNOSIS — Z00.00 WELCOME TO MEDICARE PREVENTIVE VISIT: Primary | ICD-10-CM

## 2022-01-01 DIAGNOSIS — E11.65 TYPE 2 DIABETES MELLITUS WITH HYPERGLYCEMIA, WITH LONG-TERM CURRENT USE OF INSULIN (HCC): ICD-10-CM

## 2022-01-01 DIAGNOSIS — Z79.4 TYPE 2 DIABETES MELLITUS WITH HYPOGLYCEMIA WITHOUT COMA, WITH LONG-TERM CURRENT USE OF INSULIN (HCC): ICD-10-CM

## 2022-01-01 DIAGNOSIS — R11.0 NAUSEA: ICD-10-CM

## 2022-01-01 DIAGNOSIS — I10 ESSENTIAL HYPERTENSION: ICD-10-CM

## 2022-01-01 DIAGNOSIS — R13.10 DYSPHAGIA, UNSPECIFIED TYPE: ICD-10-CM

## 2022-01-01 DIAGNOSIS — K86.89 PANCREATIC MASS: ICD-10-CM

## 2022-01-01 DIAGNOSIS — M79.644 PAIN IN FINGER OF BOTH HANDS: ICD-10-CM

## 2022-01-01 DIAGNOSIS — E86.0 DEHYDRATION: ICD-10-CM

## 2022-01-01 DIAGNOSIS — T45.1X5A CHEMOTHERAPY INDUCED DIARRHEA: ICD-10-CM

## 2022-01-01 DIAGNOSIS — E78.2 MIXED HYPERLIPIDEMIA: ICD-10-CM

## 2022-01-01 DIAGNOSIS — C25.9 PANCREATIC CANCER METASTASIZED TO LIVER (HCC): Primary | ICD-10-CM

## 2022-01-01 DIAGNOSIS — M05.79 RHEUMATOID ARTHRITIS INVOLVING MULTIPLE SITES WITH POSITIVE RHEUMATOID FACTOR (HCC): ICD-10-CM

## 2022-01-01 DIAGNOSIS — K81.0 ACUTE CHOLECYSTITIS: ICD-10-CM

## 2022-01-01 DIAGNOSIS — R19.7 DIARRHEA: Primary | ICD-10-CM

## 2022-01-01 DIAGNOSIS — Z79.4 TYPE 2 DIABETES MELLITUS WITHOUT COMPLICATION, WITH LONG-TERM CURRENT USE OF INSULIN (HCC): ICD-10-CM

## 2022-01-01 DIAGNOSIS — R63.0 ANOREXIA: ICD-10-CM

## 2022-01-01 DIAGNOSIS — K21.9 GASTROESOPHAGEAL REFLUX DISEASE, UNSPECIFIED WHETHER ESOPHAGITIS PRESENT: ICD-10-CM

## 2022-01-01 DIAGNOSIS — E11.649 TYPE 2 DIABETES MELLITUS WITH HYPOGLYCEMIA WITHOUT COMA, WITH LONG-TERM CURRENT USE OF INSULIN (HCC): ICD-10-CM

## 2022-01-01 DIAGNOSIS — Z79.4 TYPE 2 DIABETES MELLITUS WITH HYPERGLYCEMIA, WITH LONG-TERM CURRENT USE OF INSULIN (HCC): Primary | ICD-10-CM

## 2022-01-01 DIAGNOSIS — R19.7 DIARRHEA, UNSPECIFIED TYPE: Primary | ICD-10-CM

## 2022-01-01 DIAGNOSIS — G47.01 INSOMNIA DUE TO MEDICAL CONDITION: ICD-10-CM

## 2022-01-01 DIAGNOSIS — R10.84 GENERALIZED ABDOMINAL PAIN: Primary | ICD-10-CM

## 2022-01-01 DIAGNOSIS — Z79.4 TYPE 2 DIABETES MELLITUS WITH HYPERGLYCEMIA, WITH LONG-TERM CURRENT USE OF INSULIN (HCC): ICD-10-CM

## 2022-01-01 DIAGNOSIS — K12.30 ORAL MUCOSITIS: ICD-10-CM

## 2022-01-01 DIAGNOSIS — R19.7 DIARRHEA, UNSPECIFIED TYPE: ICD-10-CM

## 2022-01-01 DIAGNOSIS — Z51.5 HOSPICE CARE PATIENT: Primary | ICD-10-CM

## 2022-01-01 DIAGNOSIS — R53.1 WEAKNESS: ICD-10-CM

## 2022-01-01 DIAGNOSIS — L30.9 DERMATITIS: Primary | ICD-10-CM

## 2022-01-01 DIAGNOSIS — K86.89 PANCREATIC INSUFFICIENCY: ICD-10-CM

## 2022-01-01 DIAGNOSIS — I26.94 MULTIPLE SUBSEGMENTAL PULMONARY EMBOLI WITHOUT ACUTE COR PULMONALE (HCC): ICD-10-CM

## 2022-01-01 DIAGNOSIS — R62.7 FAILURE TO THRIVE IN ADULT: ICD-10-CM

## 2022-01-01 DIAGNOSIS — K81.0 ACUTE CHOLECYSTITIS WITHOUT CALCULUS: ICD-10-CM

## 2022-01-01 DIAGNOSIS — R10.9 ABDOMINAL PAIN: Primary | ICD-10-CM

## 2022-01-01 DIAGNOSIS — E43 SEVERE PROTEIN-CALORIE MALNUTRITION (HCC): ICD-10-CM

## 2022-01-01 DIAGNOSIS — K30 INDIGESTION: ICD-10-CM

## 2022-01-01 DIAGNOSIS — E11.65 TYPE 2 DIABETES MELLITUS WITH HYPERGLYCEMIA, WITH LONG-TERM CURRENT USE OF INSULIN (HCC): Primary | ICD-10-CM

## 2022-01-01 DIAGNOSIS — K81.0 ACUTE CHOLECYSTITIS: Primary | ICD-10-CM

## 2022-01-01 DIAGNOSIS — D61.810 PANCYTOPENIA DUE TO CHEMOTHERAPY (HCC): ICD-10-CM

## 2022-01-01 DIAGNOSIS — N40.1 BENIGN PROSTATIC HYPERPLASIA WITH LOWER URINARY TRACT SYMPTOMS, SYMPTOM DETAILS UNSPECIFIED: ICD-10-CM

## 2022-01-01 DIAGNOSIS — R06.6 INTRACTABLE HICCUPS: ICD-10-CM

## 2022-01-01 DIAGNOSIS — M79.645 PAIN IN FINGER OF BOTH HANDS: ICD-10-CM

## 2022-01-01 LAB
ALBUMIN SERPL BCP-MCNC: 1.8 G/DL (ref 3.5–5)
ALBUMIN SERPL BCP-MCNC: 1.9 G/DL (ref 3.5–5)
ALBUMIN SERPL BCP-MCNC: 1.9 G/DL (ref 3.5–5)
ALBUMIN SERPL BCP-MCNC: 2.2 G/DL (ref 3.5–5)
ALBUMIN SERPL BCP-MCNC: 2.4 G/DL (ref 3.5–5)
ALBUMIN SERPL BCP-MCNC: 2.5 G/DL (ref 3.5–5)
ALBUMIN SERPL BCP-MCNC: 2.5 G/DL (ref 3.5–5)
ALBUMIN SERPL BCP-MCNC: 2.7 G/DL (ref 3.4–4.8)
ALBUMIN SERPL BCP-MCNC: 2.9 G/DL (ref 3.4–4.8)
ALBUMIN SERPL BCP-MCNC: 3.2 G/DL (ref 3.5–5)
ALBUMIN SERPL BCP-MCNC: 3.2 G/DL (ref 3.5–5)
ALBUMIN SERPL BCP-MCNC: 3.3 G/DL (ref 3.4–4.8)
ALP SERPL-CCNC: 161.4 U/L (ref 10–129)
ALP SERPL-CCNC: 247 U/L (ref 46–116)
ALP SERPL-CCNC: 256.1 U/L (ref 10–129)
ALP SERPL-CCNC: 284 U/L (ref 46–116)
ALP SERPL-CCNC: 305.5 U/L (ref 10–129)
ALP SERPL-CCNC: 355 U/L (ref 46–116)
ALP SERPL-CCNC: 382 U/L (ref 46–116)
ALP SERPL-CCNC: 450 U/L (ref 46–116)
ALP SERPL-CCNC: 473 U/L (ref 46–116)
ALP SERPL-CCNC: 520 U/L (ref 46–116)
ALP SERPL-CCNC: 602 U/L (ref 46–116)
ALP SERPL-CCNC: 675 U/L (ref 46–116)
ALT SERPL W P-5'-P-CCNC: 17 U/L (ref 12–78)
ALT SERPL W P-5'-P-CCNC: 17 U/L (ref 12–78)
ALT SERPL W P-5'-P-CCNC: 19 U/L (ref 12–78)
ALT SERPL W P-5'-P-CCNC: 19 U/L (ref 5–63)
ALT SERPL W P-5'-P-CCNC: 20 U/L (ref 12–78)
ALT SERPL W P-5'-P-CCNC: 20 U/L (ref 5–63)
ALT SERPL W P-5'-P-CCNC: 21 U/L (ref 5–63)
ALT SERPL W P-5'-P-CCNC: 26 U/L (ref 12–78)
ALT SERPL W P-5'-P-CCNC: 26 U/L (ref 12–78)
ALT SERPL W P-5'-P-CCNC: 33 U/L (ref 12–78)
ALT SERPL W P-5'-P-CCNC: 36 U/L (ref 12–78)
ALT SERPL W P-5'-P-CCNC: 38 U/L (ref 12–78)
ANION GAP SERPL CALCULATED.3IONS-SCNC: 10 MMOL/L (ref 4–13)
ANION GAP SERPL CALCULATED.3IONS-SCNC: 11 MMOL/L (ref 4–13)
ANION GAP SERPL CALCULATED.3IONS-SCNC: 5 MMOL/L (ref 4–13)
ANION GAP SERPL CALCULATED.3IONS-SCNC: 6 MMOL/L (ref 4–13)
ANION GAP SERPL CALCULATED.3IONS-SCNC: 7 MMOL/L (ref 4–13)
ANION GAP SERPL CALCULATED.3IONS-SCNC: 8 MMOL/L (ref 4–13)
ANION GAP SERPL CALCULATED.3IONS-SCNC: 8 MMOL/L (ref 4–13)
ANION GAP SERPL CALCULATED.3IONS-SCNC: 9 MMOL/L (ref 4–13)
ANISOCYTOSIS BLD QL SMEAR: PRESENT
ANISOCYTOSIS BLD QL SMEAR: PRESENT
AST SERPL W P-5'-P-CCNC: 15 U/L (ref 15–41)
AST SERPL W P-5'-P-CCNC: 16 U/L (ref 15–41)
AST SERPL W P-5'-P-CCNC: 18 U/L (ref 5–45)
AST SERPL W P-5'-P-CCNC: 18 U/L (ref 5–45)
AST SERPL W P-5'-P-CCNC: 20 U/L (ref 15–41)
AST SERPL W P-5'-P-CCNC: 20 U/L (ref 5–45)
AST SERPL W P-5'-P-CCNC: 22 U/L (ref 5–45)
AST SERPL W P-5'-P-CCNC: 23 U/L (ref 5–45)
AST SERPL W P-5'-P-CCNC: 24 U/L (ref 5–45)
AST SERPL W P-5'-P-CCNC: 33 U/L (ref 5–45)
AST SERPL W P-5'-P-CCNC: 34 U/L (ref 5–45)
AST SERPL W P-5'-P-CCNC: 34 U/L (ref 5–45)
BASOPHILS # BLD AUTO: 0.02 THOUSANDS/ΜL (ref 0–0.1)
BASOPHILS # BLD AUTO: 0.04 THOUSANDS/ΜL (ref 0–0.1)
BASOPHILS # BLD AUTO: 0.04 THOUSANDS/ΜL (ref 0–0.1)
BASOPHILS # BLD AUTO: 0.05 THOUSANDS/ΜL (ref 0–0.1)
BASOPHILS # BLD AUTO: 0.06 THOUSANDS/ΜL (ref 0–0.1)
BASOPHILS # BLD AUTO: 0.06 THOUSANDS/ΜL (ref 0–0.1)
BASOPHILS # BLD MANUAL: 0 THOUSAND/UL (ref 0–0.1)
BASOPHILS NFR BLD AUTO: 0 % (ref 0–1)
BASOPHILS NFR BLD AUTO: 1 % (ref 0–1)
BASOPHILS NFR BLD AUTO: 1 % (ref 0–1)
BASOPHILS NFR MAR MANUAL: 0 % (ref 0–1)
BILIRUB SERPL-MCNC: 0.29 MG/DL (ref 0.2–1)
BILIRUB SERPL-MCNC: 0.34 MG/DL (ref 0.2–1)
BILIRUB SERPL-MCNC: 0.36 MG/DL (ref 0.3–1.2)
BILIRUB SERPL-MCNC: 0.44 MG/DL (ref 0.2–1)
BILIRUB SERPL-MCNC: 0.46 MG/DL (ref 0.2–1)
BILIRUB SERPL-MCNC: 0.46 MG/DL (ref 0.2–1)
BILIRUB SERPL-MCNC: 0.47 MG/DL (ref 0.2–1)
BILIRUB SERPL-MCNC: 0.48 MG/DL (ref 0.2–1)
BILIRUB SERPL-MCNC: 0.49 MG/DL (ref 0.3–1.2)
BILIRUB SERPL-MCNC: 0.61 MG/DL (ref 0.2–1)
BILIRUB SERPL-MCNC: 0.61 MG/DL (ref 0.3–1.2)
BILIRUB SERPL-MCNC: 0.68 MG/DL (ref 0.2–1)
BUN SERPL-MCNC: 10 MG/DL (ref 5–25)
BUN SERPL-MCNC: 10 MG/DL (ref 5–25)
BUN SERPL-MCNC: 11 MG/DL (ref 5–25)
BUN SERPL-MCNC: 14 MG/DL (ref 5–25)
BUN SERPL-MCNC: 14 MG/DL (ref 5–25)
BUN SERPL-MCNC: 14 MG/DL (ref 6–20)
BUN SERPL-MCNC: 15 MG/DL (ref 5–25)
BUN SERPL-MCNC: 20 MG/DL (ref 6–20)
BUN SERPL-MCNC: 26 MG/DL (ref 6–20)
BUN SERPL-MCNC: 7 MG/DL (ref 5–25)
BUN SERPL-MCNC: 8 MG/DL (ref 5–25)
BUN SERPL-MCNC: 9 MG/DL (ref 5–25)
C DIFF TOX GENS STL QL NAA+PROBE: NEGATIVE
CALCIUM ALBUM COR SERPL-MCNC: 8.8 MG/DL (ref 8.3–10.1)
CALCIUM ALBUM COR SERPL-MCNC: 8.9 MG/DL (ref 8.3–10.1)
CALCIUM ALBUM COR SERPL-MCNC: 9.2 MG/DL (ref 8.3–10.1)
CALCIUM ALBUM COR SERPL-MCNC: 9.3 MG/DL (ref 8.3–10.1)
CALCIUM ALBUM COR SERPL-MCNC: 9.3 MG/DL (ref 8.3–10.1)
CALCIUM ALBUM COR SERPL-MCNC: 9.4 MG/DL (ref 8.3–10.1)
CALCIUM ALBUM COR SERPL-MCNC: 9.5 MG/DL (ref 8.3–10.1)
CALCIUM ALBUM COR SERPL-MCNC: 9.5 MG/DL (ref 8.3–10.1)
CALCIUM ALBUM COR SERPL-MCNC: 9.6 MG/DL (ref 8.3–10.1)
CALCIUM ALBUM COR SERPL-MCNC: 9.7 MG/DL (ref 8.3–10.1)
CALCIUM SERPL-MCNC: 7.5 MG/DL (ref 8.3–10.1)
CALCIUM SERPL-MCNC: 7.8 MG/DL (ref 8.3–10.1)
CALCIUM SERPL-MCNC: 7.8 MG/DL (ref 8.3–10.1)
CALCIUM SERPL-MCNC: 7.8 MG/DL (ref 8.4–10.2)
CALCIUM SERPL-MCNC: 8.1 MG/DL (ref 8.3–10.1)
CALCIUM SERPL-MCNC: 8.2 MG/DL (ref 8.3–10.1)
CALCIUM SERPL-MCNC: 8.3 MG/DL (ref 8.3–10.1)
CALCIUM SERPL-MCNC: 8.5 MG/DL (ref 8.4–10.2)
CALCIUM SERPL-MCNC: 8.6 MG/DL (ref 8.3–10.1)
CALCIUM SERPL-MCNC: 8.8 MG/DL (ref 8.4–10.2)
CAMPYLOBACTER DNA SPEC NAA+PROBE: NORMAL
CAMPYLOBACTER DNA SPEC NAA+PROBE: NORMAL
CANCER AG19-9 SERPL-ACNC: 3489 U/ML (ref 0–35)
CHLORIDE SERPL-SCNC: 101 MMOL/L (ref 96–108)
CHLORIDE SERPL-SCNC: 103 MMOL/L (ref 100–108)
CHLORIDE SERPL-SCNC: 103 MMOL/L (ref 96–108)
CHLORIDE SERPL-SCNC: 104 MMOL/L (ref 100–108)
CHLORIDE SERPL-SCNC: 105 MMOL/L (ref 100–108)
CHLORIDE SERPL-SCNC: 105 MMOL/L (ref 96–108)
CHLORIDE SERPL-SCNC: 106 MMOL/L (ref 100–108)
CHLORIDE SERPL-SCNC: 107 MMOL/L (ref 100–108)
CHLORIDE SERPL-SCNC: 99 MMOL/L (ref 100–108)
CHLORIDE SERPL-SCNC: 99 MMOL/L (ref 100–108)
CO2 SERPL-SCNC: 23 MMOL/L (ref 21–32)
CO2 SERPL-SCNC: 24 MMOL/L (ref 22–33)
CO2 SERPL-SCNC: 25 MMOL/L (ref 21–32)
CO2 SERPL-SCNC: 26 MMOL/L (ref 21–32)
CO2 SERPL-SCNC: 26 MMOL/L (ref 21–32)
CO2 SERPL-SCNC: 27 MMOL/L (ref 21–32)
CO2 SERPL-SCNC: 28 MMOL/L (ref 21–32)
CO2 SERPL-SCNC: 28 MMOL/L (ref 21–32)
CO2 SERPL-SCNC: 31 MMOL/L (ref 22–33)
CO2 SERPL-SCNC: 31 MMOL/L (ref 22–33)
CREAT SERPL-MCNC: 0.73 MG/DL (ref 0.5–1.2)
CREAT SERPL-MCNC: 0.74 MG/DL (ref 0.6–1.3)
CREAT SERPL-MCNC: 0.78 MG/DL (ref 0.6–1.3)
CREAT SERPL-MCNC: 0.81 MG/DL (ref 0.6–1.3)
CREAT SERPL-MCNC: 0.9 MG/DL (ref 0.6–1.3)
CREAT SERPL-MCNC: 0.91 MG/DL (ref 0.6–1.3)
CREAT SERPL-MCNC: 0.94 MG/DL (ref 0.5–1.2)
CREAT SERPL-MCNC: 1 MG/DL (ref 0.5–1.2)
CREAT SERPL-MCNC: 1.06 MG/DL (ref 0.6–1.3)
CREAT SERPL-MCNC: 1.13 MG/DL (ref 0.6–1.3)
CREAT SERPL-MCNC: 1.14 MG/DL (ref 0.6–1.3)
CREAT SERPL-MCNC: 1.19 MG/DL (ref 0.6–1.3)
EOSINOPHIL # BLD AUTO: 0.02 THOUSAND/ΜL (ref 0–0.61)
EOSINOPHIL # BLD AUTO: 0.03 THOUSAND/ΜL (ref 0–0.61)
EOSINOPHIL # BLD AUTO: 0.04 THOUSAND/ΜL (ref 0–0.61)
EOSINOPHIL # BLD AUTO: 0.07 THOUSAND/ΜL (ref 0–0.61)
EOSINOPHIL # BLD AUTO: 0.11 THOUSAND/ΜL (ref 0–0.61)
EOSINOPHIL # BLD AUTO: 0.12 THOUSAND/ΜL (ref 0–0.61)
EOSINOPHIL # BLD MANUAL: 0 THOUSAND/UL (ref 0–0.4)
EOSINOPHIL # BLD MANUAL: 0.21 THOUSAND/UL (ref 0–0.4)
EOSINOPHIL NFR BLD AUTO: 0 % (ref 0–6)
EOSINOPHIL NFR BLD AUTO: 1 % (ref 0–6)
EOSINOPHIL NFR BLD MANUAL: 0 % (ref 0–6)
EOSINOPHIL NFR BLD MANUAL: 2 % (ref 0–6)
ERYTHROCYTE [DISTWIDTH] IN BLOOD BY AUTOMATED COUNT: 16.9 % (ref 11.6–15.1)
ERYTHROCYTE [DISTWIDTH] IN BLOOD BY AUTOMATED COUNT: 16.9 % (ref 11.6–15.1)
ERYTHROCYTE [DISTWIDTH] IN BLOOD BY AUTOMATED COUNT: 17.1 % (ref 11.6–15.1)
ERYTHROCYTE [DISTWIDTH] IN BLOOD BY AUTOMATED COUNT: 17.2 % (ref 11.6–15.1)
ERYTHROCYTE [DISTWIDTH] IN BLOOD BY AUTOMATED COUNT: 18 % (ref 11.6–15.1)
ERYTHROCYTE [DISTWIDTH] IN BLOOD BY AUTOMATED COUNT: 18.1 % (ref 11.6–15.1)
ERYTHROCYTE [DISTWIDTH] IN BLOOD BY AUTOMATED COUNT: 18.3 % (ref 11.6–15.1)
ERYTHROCYTE [DISTWIDTH] IN BLOOD BY AUTOMATED COUNT: 18.5 % (ref 11.6–15.1)
ERYTHROCYTE [DISTWIDTH] IN BLOOD BY AUTOMATED COUNT: 19 % (ref 11.6–15.1)
GFR SERPL CREATININE-BSD FRML MDRD: 63 ML/MIN/1.73SQ M
GFR SERPL CREATININE-BSD FRML MDRD: 66 ML/MIN/1.73SQ M
GFR SERPL CREATININE-BSD FRML MDRD: 67 ML/MIN/1.73SQ M
GFR SERPL CREATININE-BSD FRML MDRD: 73 ML/MIN/1.73SQ M
GFR SERPL CREATININE-BSD FRML MDRD: 78 ML/MIN/1.73SQ M
GFR SERPL CREATININE-BSD FRML MDRD: 84 ML/MIN/1.73SQ M
GFR SERPL CREATININE-BSD FRML MDRD: 87 ML/MIN/1.73SQ M
GFR SERPL CREATININE-BSD FRML MDRD: 88 ML/MIN/1.73SQ M
GFR SERPL CREATININE-BSD FRML MDRD: 92 ML/MIN/1.73SQ M
GFR SERPL CREATININE-BSD FRML MDRD: 94 ML/MIN/1.73SQ M
GFR SERPL CREATININE-BSD FRML MDRD: 96 ML/MIN/1.73SQ M
GFR SERPL CREATININE-BSD FRML MDRD: 96 ML/MIN/1.73SQ M
GLUCOSE P FAST SERPL-MCNC: 115 MG/DL (ref 70–105)
GLUCOSE P FAST SERPL-MCNC: 271 MG/DL (ref 65–99)
GLUCOSE SERPL-MCNC: 106 MG/DL (ref 65–140)
GLUCOSE SERPL-MCNC: 124 MG/DL (ref 65–140)
GLUCOSE SERPL-MCNC: 129 MG/DL (ref 65–140)
GLUCOSE SERPL-MCNC: 145 MG/DL (ref 65–140)
GLUCOSE SERPL-MCNC: 147 MG/DL (ref 65–140)
GLUCOSE SERPL-MCNC: 152 MG/DL (ref 65–140)
GLUCOSE SERPL-MCNC: 154 MG/DL (ref 65–140)
GLUCOSE SERPL-MCNC: 166 MG/DL (ref 65–140)
GLUCOSE SERPL-MCNC: 166 MG/DL (ref 65–140)
GLUCOSE SERPL-MCNC: 170 MG/DL (ref 65–140)
GLUCOSE SERPL-MCNC: 172 MG/DL (ref 65–140)
GLUCOSE SERPL-MCNC: 174 MG/DL (ref 65–140)
GLUCOSE SERPL-MCNC: 192 MG/DL (ref 65–140)
GLUCOSE SERPL-MCNC: 197 MG/DL (ref 65–140)
GLUCOSE SERPL-MCNC: 205 MG/DL (ref 65–140)
GLUCOSE SERPL-MCNC: 205 MG/DL (ref 65–140)
GLUCOSE SERPL-MCNC: 215 MG/DL (ref 65–140)
GLUCOSE SERPL-MCNC: 234 MG/DL (ref 65–140)
GLUCOSE SERPL-MCNC: 234 MG/DL (ref 65–140)
GLUCOSE SERPL-MCNC: 244 MG/DL (ref 65–140)
GLUCOSE SERPL-MCNC: 265 MG/DL (ref 65–140)
GLUCOSE SERPL-MCNC: 279 MG/DL (ref 65–140)
GLUCOSE SERPL-MCNC: 293 MG/DL (ref 65–140)
GLUCOSE SERPL-MCNC: 325 MG/DL (ref 65–140)
HCT VFR BLD AUTO: 30.1 % (ref 36.5–49.3)
HCT VFR BLD AUTO: 31.1 % (ref 36.5–49.3)
HCT VFR BLD AUTO: 33.1 % (ref 36.5–49.3)
HCT VFR BLD AUTO: 33.1 % (ref 36.5–49.3)
HCT VFR BLD AUTO: 33.4 % (ref 36.5–49.3)
HCT VFR BLD AUTO: 34.4 % (ref 36.5–49.3)
HCT VFR BLD AUTO: 35 % (ref 36.5–49.3)
HCT VFR BLD AUTO: 35.5 % (ref 36.5–49.3)
HCT VFR BLD AUTO: 36.3 % (ref 36.5–49.3)
HCT VFR BLD AUTO: 37.3 % (ref 36.5–49.3)
HCT VFR BLD AUTO: 37.7 % (ref 36.5–49.3)
HGB BLD-MCNC: 10.5 G/DL (ref 12–17)
HGB BLD-MCNC: 10.7 G/DL (ref 12–17)
HGB BLD-MCNC: 10.7 G/DL (ref 12–17)
HGB BLD-MCNC: 11.1 G/DL (ref 12–17)
HGB BLD-MCNC: 11.2 G/DL (ref 12–17)
HGB BLD-MCNC: 11.4 G/DL (ref 12–17)
HGB BLD-MCNC: 11.6 G/DL (ref 12–17)
HGB BLD-MCNC: 12 G/DL (ref 12–17)
HGB BLD-MCNC: 9.3 G/DL (ref 12–17)
HYPERCHROMIA BLD QL SMEAR: PRESENT
IMM GRANULOCYTES # BLD AUTO: 0.09 THOUSAND/UL (ref 0–0.2)
IMM GRANULOCYTES # BLD AUTO: 0.1 THOUSAND/UL (ref 0–0.2)
IMM GRANULOCYTES # BLD AUTO: 0.1 THOUSAND/UL (ref 0–0.2)
IMM GRANULOCYTES # BLD AUTO: 0.12 THOUSAND/UL (ref 0–0.2)
IMM GRANULOCYTES # BLD AUTO: 0.16 THOUSAND/UL (ref 0–0.2)
IMM GRANULOCYTES # BLD AUTO: 0.18 THOUSAND/UL (ref 0–0.2)
IMM GRANULOCYTES NFR BLD AUTO: 1 % (ref 0–2)
IMM GRANULOCYTES NFR BLD AUTO: 2 % (ref 0–2)
LACTATE SERPL-SCNC: 1.6 MMOL/L (ref 0–2)
LG PLATELETS BLD QL SMEAR: PRESENT
LIPASE SERPL-CCNC: 13 U/L (ref 73–393)
LIPASE SERPL-CCNC: 15 U/L (ref 73–393)
LIPASE SERPL-CCNC: 95 U/L (ref 73–393)
LIPASE SERPL-CCNC: <6 U/L (ref 13–60)
LYMPHOCYTES # BLD AUTO: 0.55 THOUSAND/UL (ref 0.6–4.47)
LYMPHOCYTES # BLD AUTO: 0.79 THOUSAND/UL (ref 0.6–4.47)
LYMPHOCYTES # BLD AUTO: 0.84 THOUSANDS/ΜL (ref 0.6–4.47)
LYMPHOCYTES # BLD AUTO: 0.86 THOUSAND/UL (ref 0.6–4.47)
LYMPHOCYTES # BLD AUTO: 0.87 THOUSANDS/ΜL (ref 0.6–4.47)
LYMPHOCYTES # BLD AUTO: 1.01 THOUSANDS/ΜL (ref 0.6–4.47)
LYMPHOCYTES # BLD AUTO: 1.06 THOUSAND/UL (ref 0.6–4.47)
LYMPHOCYTES # BLD AUTO: 1.09 THOUSANDS/ΜL (ref 0.6–4.47)
LYMPHOCYTES # BLD AUTO: 1.27 THOUSANDS/ΜL (ref 0.6–4.47)
LYMPHOCYTES # BLD AUTO: 1.67 THOUSANDS/ΜL (ref 0.6–4.47)
LYMPHOCYTES # BLD AUTO: 1.79 THOUSAND/UL (ref 0.6–4.47)
LYMPHOCYTES # BLD AUTO: 10 % (ref 14–44)
LYMPHOCYTES # BLD AUTO: 3 % (ref 14–44)
LYMPHOCYTES # BLD AUTO: 6 % (ref 14–44)
LYMPHOCYTES # BLD AUTO: 6 % (ref 14–44)
LYMPHOCYTES # BLD AUTO: 8 % (ref 14–44)
LYMPHOCYTES NFR BLD AUTO: 21 % (ref 14–44)
LYMPHOCYTES NFR BLD AUTO: 6 % (ref 14–44)
LYMPHOCYTES NFR BLD AUTO: 7 % (ref 14–44)
LYMPHOCYTES NFR BLD AUTO: 8 % (ref 14–44)
LYMPHOCYTES NFR BLD AUTO: 9 % (ref 14–44)
LYMPHOCYTES NFR BLD AUTO: 9 % (ref 14–44)
MAGNESIUM SERPL-MCNC: 1.6 MG/DL (ref 1.6–2.6)
MAGNESIUM SERPL-MCNC: 1.8 MG/DL (ref 1.6–2.6)
MAGNESIUM SERPL-MCNC: 2 MG/DL (ref 1.6–2.6)
MAGNESIUM SERPL-MCNC: 2.2 MG/DL (ref 1.6–2.6)
MCH RBC QN AUTO: 29.6 PG (ref 26.8–34.3)
MCH RBC QN AUTO: 29.8 PG (ref 26.8–34.3)
MCH RBC QN AUTO: 30 PG (ref 26.8–34.3)
MCH RBC QN AUTO: 30.2 PG (ref 26.8–34.3)
MCH RBC QN AUTO: 30.3 PG (ref 26.8–34.3)
MCH RBC QN AUTO: 30.3 PG (ref 26.8–34.3)
MCH RBC QN AUTO: 30.7 PG (ref 26.8–34.3)
MCH RBC QN AUTO: 30.8 PG (ref 26.8–34.3)
MCH RBC QN AUTO: 31.2 PG (ref 26.8–34.3)
MCHC RBC AUTO-ENTMCNC: 30.8 G/DL (ref 31.4–37.4)
MCHC RBC AUTO-ENTMCNC: 30.9 G/DL (ref 31.4–37.4)
MCHC RBC AUTO-ENTMCNC: 31.1 G/DL (ref 31.4–37.4)
MCHC RBC AUTO-ENTMCNC: 31.3 G/DL (ref 31.4–37.4)
MCHC RBC AUTO-ENTMCNC: 31.4 G/DL (ref 31.4–37.4)
MCHC RBC AUTO-ENTMCNC: 31.7 G/DL (ref 31.4–37.4)
MCHC RBC AUTO-ENTMCNC: 31.7 G/DL (ref 31.4–37.4)
MCHC RBC AUTO-ENTMCNC: 32 G/DL (ref 31.4–37.4)
MCHC RBC AUTO-ENTMCNC: 32 G/DL (ref 31.4–37.4)
MCHC RBC AUTO-ENTMCNC: 32.2 G/DL (ref 31.4–37.4)
MCHC RBC AUTO-ENTMCNC: 33.8 G/DL (ref 31.4–37.4)
MCV RBC AUTO: 100 FL (ref 82–98)
MCV RBC AUTO: 90 FL (ref 82–98)
MCV RBC AUTO: 92 FL (ref 82–98)
MCV RBC AUTO: 94 FL (ref 82–98)
MCV RBC AUTO: 96 FL (ref 82–98)
MCV RBC AUTO: 97 FL (ref 82–98)
MCV RBC AUTO: 98 FL (ref 82–98)
MCV RBC AUTO: 99 FL (ref 82–98)
MCV RBC AUTO: 99 FL (ref 82–98)
MONOCYTES # BLD AUTO: 0 THOUSAND/UL (ref 0–1.22)
MONOCYTES # BLD AUTO: 0.18 THOUSAND/UL (ref 0–1.22)
MONOCYTES # BLD AUTO: 0.29 THOUSAND/ΜL (ref 0.17–1.22)
MONOCYTES # BLD AUTO: 0.36 THOUSAND/ΜL (ref 0.17–1.22)
MONOCYTES # BLD AUTO: 0.36 THOUSAND/ΜL (ref 0.17–1.22)
MONOCYTES # BLD AUTO: 0.4 THOUSAND/UL (ref 0–1.22)
MONOCYTES # BLD AUTO: 0.45 THOUSAND/ΜL (ref 0.17–1.22)
MONOCYTES # BLD AUTO: 0.57 THOUSAND/UL (ref 0–1.22)
MONOCYTES # BLD AUTO: 0.57 THOUSAND/ΜL (ref 0.17–1.22)
MONOCYTES # BLD AUTO: 0.64 THOUSAND/UL (ref 0–1.22)
MONOCYTES # BLD AUTO: 0.74 THOUSAND/ΜL (ref 0.17–1.22)
MONOCYTES NFR BLD AUTO: 2 % (ref 4–12)
MONOCYTES NFR BLD AUTO: 3 % (ref 4–12)
MONOCYTES NFR BLD AUTO: 6 % (ref 4–12)
MONOCYTES NFR BLD AUTO: 7 % (ref 4–12)
MONOCYTES NFR BLD: 0 % (ref 4–12)
MONOCYTES NFR BLD: 2 % (ref 4–12)
MONOCYTES NFR BLD: 4 % (ref 4–12)
MONOCYTES NFR BLD: 4 % (ref 4–12)
MONOCYTES NFR BLD: 6 % (ref 4–12)
NEUTROPHILS # BLD AUTO: 10.14 THOUSANDS/ΜL (ref 1.85–7.62)
NEUTROPHILS # BLD AUTO: 11.34 THOUSANDS/ΜL (ref 1.85–7.62)
NEUTROPHILS # BLD AUTO: 11.72 THOUSANDS/ΜL (ref 1.85–7.62)
NEUTROPHILS # BLD AUTO: 11.94 THOUSANDS/ΜL (ref 1.85–7.62)
NEUTROPHILS # BLD AUTO: 12.9 THOUSANDS/ΜL (ref 1.85–7.62)
NEUTROPHILS # BLD AUTO: 5.67 THOUSANDS/ΜL (ref 1.85–7.62)
NEUTROPHILS # BLD MANUAL: 12.92 THOUSAND/UL (ref 1.85–7.62)
NEUTROPHILS # BLD MANUAL: 57.92 THOUSAND/UL (ref 1.85–7.62)
NEUTROPHILS # BLD MANUAL: 7.65 THOUSAND/UL (ref 1.85–7.62)
NEUTROPHILS # BLD MANUAL: 8.14 THOUSAND/UL (ref 1.85–7.62)
NEUTROPHILS # BLD MANUAL: 8.38 THOUSAND/UL (ref 1.85–7.62)
NEUTS BAND NFR BLD MANUAL: 1 % (ref 0–8)
NEUTS BAND NFR BLD MANUAL: 16 % (ref 0–8)
NEUTS BAND NFR BLD MANUAL: 5 % (ref 0–8)
NEUTS SEG NFR BLD AUTO: 67 % (ref 43–75)
NEUTS SEG NFR BLD AUTO: 69 % (ref 43–75)
NEUTS SEG NFR BLD AUTO: 81 % (ref 43–75)
NEUTS SEG NFR BLD AUTO: 81 % (ref 43–75)
NEUTS SEG NFR BLD AUTO: 82 % (ref 43–75)
NEUTS SEG NFR BLD AUTO: 86 % (ref 43–75)
NEUTS SEG NFR BLD AUTO: 88 % (ref 43–75)
NEUTS SEG NFR BLD AUTO: 89 % (ref 43–75)
NEUTS SEG NFR BLD AUTO: 89 % (ref 43–75)
NEUTS SEG NFR BLD AUTO: 90 % (ref 43–75)
NEUTS SEG NFR BLD AUTO: 91 % (ref 43–75)
NRBC BLD AUTO-RTO: 0 /100 WBCS
OVALOCYTES BLD QL SMEAR: PRESENT
OVALOCYTES BLD QL SMEAR: PRESENT
PLATELET # BLD AUTO: 310 THOUSANDS/UL (ref 149–390)
PLATELET # BLD AUTO: 314 THOUSANDS/UL (ref 149–390)
PLATELET # BLD AUTO: 319 THOUSANDS/UL (ref 149–390)
PLATELET # BLD AUTO: 322 THOUSANDS/UL (ref 149–390)
PLATELET # BLD AUTO: 330 THOUSANDS/UL (ref 149–390)
PLATELET # BLD AUTO: 341 THOUSANDS/UL (ref 149–390)
PLATELET # BLD AUTO: 360 THOUSANDS/UL (ref 149–390)
PLATELET # BLD AUTO: 390 THOUSANDS/UL (ref 149–390)
PLATELET # BLD AUTO: 398 THOUSANDS/UL (ref 149–390)
PLATELET # BLD AUTO: 420 THOUSANDS/UL (ref 149–390)
PLATELET # BLD AUTO: 503 THOUSANDS/UL (ref 149–390)
PLATELET BLD QL SMEAR: ABNORMAL
PLATELET BLD QL SMEAR: ADEQUATE
PMV BLD AUTO: 10.5 FL (ref 8.9–12.7)
PMV BLD AUTO: 10.6 FL (ref 8.9–12.7)
PMV BLD AUTO: 10.6 FL (ref 8.9–12.7)
PMV BLD AUTO: 10.7 FL (ref 8.9–12.7)
PMV BLD AUTO: 10.7 FL (ref 8.9–12.7)
PMV BLD AUTO: 10.9 FL (ref 8.9–12.7)
PMV BLD AUTO: 10.9 FL (ref 8.9–12.7)
PMV BLD AUTO: 11 FL (ref 8.9–12.7)
PMV BLD AUTO: 11.4 FL (ref 8.9–12.7)
PMV BLD AUTO: 11.5 FL (ref 8.9–12.7)
PMV BLD AUTO: 12 FL (ref 8.9–12.7)
POLYCHROMASIA BLD QL SMEAR: PRESENT
POLYCHROMASIA BLD QL SMEAR: PRESENT
POTASSIUM SERPL-SCNC: 3.2 MMOL/L (ref 3.5–5.3)
POTASSIUM SERPL-SCNC: 3.6 MMOL/L (ref 3.5–5.3)
POTASSIUM SERPL-SCNC: 3.7 MMOL/L (ref 3.5–5)
POTASSIUM SERPL-SCNC: 3.7 MMOL/L (ref 3.5–5)
POTASSIUM SERPL-SCNC: 3.7 MMOL/L (ref 3.5–5.3)
POTASSIUM SERPL-SCNC: 3.9 MMOL/L (ref 3.5–5)
POTASSIUM SERPL-SCNC: 4 MMOL/L (ref 3.5–5.3)
POTASSIUM SERPL-SCNC: 4 MMOL/L (ref 3.5–5.3)
POTASSIUM SERPL-SCNC: 4.1 MMOL/L (ref 3.5–5.3)
POTASSIUM SERPL-SCNC: 5.3 MMOL/L (ref 3.5–5.3)
PROT SERPL-MCNC: 4.7 G/DL (ref 6.4–8.2)
PROT SERPL-MCNC: 4.8 G/DL (ref 6.4–8.2)
PROT SERPL-MCNC: 5 G/DL (ref 6.4–8.3)
PROT SERPL-MCNC: 5.2 G/DL (ref 6.4–8.2)
PROT SERPL-MCNC: 5.3 G/DL (ref 6.4–8.3)
PROT SERPL-MCNC: 5.5 G/DL (ref 6.4–8.2)
PROT SERPL-MCNC: 5.6 G/DL (ref 6.4–8.2)
PROT SERPL-MCNC: 5.8 G/DL (ref 6.4–8.2)
PROT SERPL-MCNC: 5.8 G/DL (ref 6.4–8.3)
PROT SERPL-MCNC: 6.2 G/DL (ref 6.4–8.2)
PROT SERPL-MCNC: 6.3 G/DL (ref 6.4–8.2)
PROT SERPL-MCNC: 6.4 G/DL (ref 6.4–8.2)
RBC # BLD AUTO: 3.1 MILLION/UL (ref 3.88–5.62)
RBC # BLD AUTO: 3.46 MILLION/UL (ref 3.88–5.62)
RBC # BLD AUTO: 3.47 MILLION/UL (ref 3.88–5.62)
RBC # BLD AUTO: 3.48 MILLION/UL (ref 3.88–5.62)
RBC # BLD AUTO: 3.49 MILLION/UL (ref 3.88–5.62)
RBC # BLD AUTO: 3.52 MILLION/UL (ref 3.88–5.62)
RBC # BLD AUTO: 3.59 MILLION/UL (ref 3.88–5.62)
RBC # BLD AUTO: 3.6 MILLION/UL (ref 3.88–5.62)
RBC # BLD AUTO: 3.78 MILLION/UL (ref 3.88–5.62)
RBC # BLD AUTO: 3.78 MILLION/UL (ref 3.88–5.62)
RBC # BLD AUTO: 4.06 MILLION/UL (ref 3.88–5.62)
RBC MORPH BLD: NORMAL
RBC MORPH BLD: PRESENT
SALMONELLA DNA SPEC QL NAA+PROBE: NORMAL
SALMONELLA DNA SPEC QL NAA+PROBE: NORMAL
SHIGA TOXIN STX GENE SPEC NAA+PROBE: NORMAL
SHIGA TOXIN STX GENE SPEC NAA+PROBE: NORMAL
SHIGELLA DNA SPEC QL NAA+PROBE: NORMAL
SHIGELLA DNA SPEC QL NAA+PROBE: NORMAL
SL AMB POCT HEMOGLOBIN AIC: 6.2 (ref ?–6.5)
SMUDGE CELLS BLD QL SMEAR: PRESENT
SODIUM SERPL-SCNC: 133 MMOL/L (ref 136–145)
SODIUM SERPL-SCNC: 136 MMOL/L (ref 136–145)
SODIUM SERPL-SCNC: 136 MMOL/L (ref 136–145)
SODIUM SERPL-SCNC: 137 MMOL/L (ref 136–145)
SODIUM SERPL-SCNC: 138 MMOL/L (ref 133–145)
SODIUM SERPL-SCNC: 138 MMOL/L (ref 136–145)
SODIUM SERPL-SCNC: 138 MMOL/L (ref 136–145)
SODIUM SERPL-SCNC: 139 MMOL/L (ref 136–145)
SODIUM SERPL-SCNC: 139 MMOL/L (ref 136–145)
SODIUM SERPL-SCNC: 140 MMOL/L (ref 133–145)
SODIUM SERPL-SCNC: 140 MMOL/L (ref 136–145)
SODIUM SERPL-SCNC: 141 MMOL/L (ref 133–145)
TSH SERPL DL<=0.05 MIU/L-ACNC: 0.34 UIU/ML (ref 0.45–4.5)
VARIANT LYMPHS # BLD AUTO: 10 %
VARIANT LYMPHS # BLD AUTO: 6 %
WBC # BLD AUTO: 10.62 THOUSAND/UL (ref 4.31–10.16)
WBC # BLD AUTO: 12.25 THOUSAND/UL (ref 4.31–10.16)
WBC # BLD AUTO: 12.86 THOUSAND/UL (ref 4.31–10.16)
WBC # BLD AUTO: 13.28 THOUSAND/UL (ref 4.31–10.16)
WBC # BLD AUTO: 13.61 THOUSAND/UL (ref 4.31–10.16)
WBC # BLD AUTO: 14.36 THOUSAND/UL (ref 4.31–10.16)
WBC # BLD AUTO: 14.48 THOUSAND/UL (ref 4.31–10.16)
WBC # BLD AUTO: 59.71 THOUSAND/UL (ref 4.31–10.16)
WBC # BLD AUTO: 8.16 THOUSAND/UL (ref 4.31–10.16)
WBC # BLD AUTO: 9.11 THOUSAND/UL (ref 4.31–10.16)
WBC # BLD AUTO: 9.93 THOUSAND/UL (ref 4.31–10.16)

## 2022-01-01 PROCEDURE — T2042 HOSPICE ROUTINE HOME CARE: HCPCS

## 2022-01-01 PROCEDURE — 96413 CHEMO IV INFUSION 1 HR: CPT

## 2022-01-01 PROCEDURE — 74230 X-RAY XM SWLNG FUNCJ C+: CPT

## 2022-01-01 PROCEDURE — 96374 THER/PROPH/DIAG INJ IV PUSH: CPT

## 2022-01-01 PROCEDURE — 85007 BL SMEAR W/DIFF WBC COUNT: CPT | Performed by: EMERGENCY MEDICINE

## 2022-01-01 PROCEDURE — NC001 PR NO CHARGE

## 2022-01-01 PROCEDURE — 96375 TX/PRO/DX INJ NEW DRUG ADDON: CPT

## 2022-01-01 PROCEDURE — C1769 GUIDE WIRE: HCPCS

## 2022-01-01 PROCEDURE — 74177 CT ABD & PELVIS W/CONTRAST: CPT

## 2022-01-01 PROCEDURE — 36415 COLL VENOUS BLD VENIPUNCTURE: CPT | Performed by: NURSE PRACTITIONER

## 2022-01-01 PROCEDURE — 83690 ASSAY OF LIPASE: CPT | Performed by: EMERGENCY MEDICINE

## 2022-01-01 PROCEDURE — 83036 HEMOGLOBIN GLYCOSYLATED A1C: CPT | Performed by: FAMILY MEDICINE

## 2022-01-01 PROCEDURE — 99214 OFFICE O/P EST MOD 30 MIN: CPT | Performed by: INTERNAL MEDICINE

## 2022-01-01 PROCEDURE — 96367 TX/PROPH/DG ADDL SEQ IV INF: CPT

## 2022-01-01 PROCEDURE — 96372 THER/PROPH/DIAG INJ SC/IM: CPT

## 2022-01-01 PROCEDURE — G0155 HHCP-SVS OF CSW,EA 15 MIN: HCPCS

## 2022-01-01 PROCEDURE — 97803 MED NUTRITION INDIV SUBSEQ: CPT | Performed by: DIETITIAN, REGISTERED

## 2022-01-01 PROCEDURE — 80053 COMPREHEN METABOLIC PANEL: CPT | Performed by: INTERNAL MEDICINE

## 2022-01-01 PROCEDURE — 36415 COLL VENOUS BLD VENIPUNCTURE: CPT | Performed by: PHYSICIAN ASSISTANT

## 2022-01-01 PROCEDURE — G0299 HHS/HOSPICE OF RN EA 15 MIN: HCPCS

## 2022-01-01 PROCEDURE — 83690 ASSAY OF LIPASE: CPT | Performed by: PHYSICIAN ASSISTANT

## 2022-01-01 PROCEDURE — 99222 1ST HOSP IP/OBS MODERATE 55: CPT

## 2022-01-01 PROCEDURE — 80053 COMPREHEN METABOLIC PANEL: CPT | Performed by: EMERGENCY MEDICINE

## 2022-01-01 PROCEDURE — 36415 COLL VENOUS BLD VENIPUNCTURE: CPT | Performed by: EMERGENCY MEDICINE

## 2022-01-01 PROCEDURE — 99223 1ST HOSP IP/OBS HIGH 75: CPT | Performed by: INTERNAL MEDICINE

## 2022-01-01 PROCEDURE — 10330064 SPONGE, GAUZE 8PLY N/S 4"X4" (200/PK 20P

## 2022-01-01 PROCEDURE — 85007 BL SMEAR W/DIFF WBC COUNT: CPT

## 2022-01-01 PROCEDURE — 83735 ASSAY OF MAGNESIUM: CPT | Performed by: INTERNAL MEDICINE

## 2022-01-01 PROCEDURE — 85027 COMPLETE CBC AUTOMATED: CPT | Performed by: EMERGENCY MEDICINE

## 2022-01-01 PROCEDURE — G0403 EKG FOR INITIAL PREVENT EXAM: HCPCS | Performed by: FAMILY MEDICINE

## 2022-01-01 PROCEDURE — 96366 THER/PROPH/DIAG IV INF ADDON: CPT

## 2022-01-01 PROCEDURE — 84443 ASSAY THYROID STIM HORMONE: CPT | Performed by: EMERGENCY MEDICINE

## 2022-01-01 PROCEDURE — 10330087 HSPC SERVICE INTENSITY ADD-ON

## 2022-01-01 PROCEDURE — 99239 HOSP IP/OBS DSCHRG MGMT >30: CPT | Performed by: INTERNAL MEDICINE

## 2022-01-01 PROCEDURE — 99285 EMERGENCY DEPT VISIT HI MDM: CPT | Performed by: EMERGENCY MEDICINE

## 2022-01-01 PROCEDURE — 85027 COMPLETE CBC AUTOMATED: CPT | Performed by: INTERNAL MEDICINE

## 2022-01-01 PROCEDURE — 83605 ASSAY OF LACTIC ACID: CPT | Performed by: PHYSICIAN ASSISTANT

## 2022-01-01 PROCEDURE — G1004 CDSM NDSC: HCPCS

## 2022-01-01 PROCEDURE — 82948 REAGENT STRIP/BLOOD GLUCOSE: CPT

## 2022-01-01 PROCEDURE — 99220 PR INITIAL OBSERVATION CARE/DAY 70 MINUTES: CPT | Performed by: NURSE PRACTITIONER

## 2022-01-01 PROCEDURE — 80053 COMPREHEN METABOLIC PANEL: CPT

## 2022-01-01 PROCEDURE — G0498 CHEMO EXTEND IV INFUS W/PUMP: HCPCS

## 2022-01-01 PROCEDURE — 85007 BL SMEAR W/DIFF WBC COUNT: CPT | Performed by: INTERNAL MEDICINE

## 2022-01-01 PROCEDURE — 36415 COLL VENOUS BLD VENIPUNCTURE: CPT

## 2022-01-01 PROCEDURE — 99497 ADVNCD CARE PLAN 30 MIN: CPT | Performed by: FAMILY MEDICINE

## 2022-01-01 PROCEDURE — 85025 COMPLETE CBC W/AUTO DIFF WBC: CPT

## 2022-01-01 PROCEDURE — 47536 EXCHANGE BILIARY DRG CATH: CPT

## 2022-01-01 PROCEDURE — 96361 HYDRATE IV INFUSION ADD-ON: CPT

## 2022-01-01 PROCEDURE — 99213 OFFICE O/P EST LOW 20 MIN: CPT | Performed by: FAMILY MEDICINE

## 2022-01-01 PROCEDURE — 10330057 MEDICATION, GENERAL

## 2022-01-01 PROCEDURE — 99225 PR SBSQ OBSERVATION CARE/DAY 25 MINUTES: CPT | Performed by: INTERNAL MEDICINE

## 2022-01-01 PROCEDURE — 99285 EMERGENCY DEPT VISIT HI MDM: CPT | Performed by: PHYSICIAN ASSISTANT

## 2022-01-01 PROCEDURE — 99285 EMERGENCY DEPT VISIT HI MDM: CPT

## 2022-01-01 PROCEDURE — 80053 COMPREHEN METABOLIC PANEL: CPT | Performed by: NURSE PRACTITIONER

## 2022-01-01 PROCEDURE — 1111F DSCHRG MED/CURRENT MED MERGE: CPT | Performed by: FAMILY MEDICINE

## 2022-01-01 PROCEDURE — 92610 EVALUATE SWALLOWING FUNCTION: CPT

## 2022-01-01 PROCEDURE — 71260 CT THORAX DX C+: CPT

## 2022-01-01 PROCEDURE — 10330064 BANDAGE, CNFRM 4"X4.1YDS N/S LF (12RL/BG

## 2022-01-01 PROCEDURE — 87505 NFCT AGENT DETECTION GI: CPT | Performed by: NURSE PRACTITIONER

## 2022-01-01 PROCEDURE — 47536 EXCHANGE BILIARY DRG CATH: CPT | Performed by: RADIOLOGY

## 2022-01-01 PROCEDURE — G0402 INITIAL PREVENTIVE EXAM: HCPCS | Performed by: FAMILY MEDICINE

## 2022-01-01 PROCEDURE — 96415 CHEMO IV INFUSION ADDL HR: CPT

## 2022-01-01 PROCEDURE — 87493 C DIFF AMPLIFIED PROBE: CPT | Performed by: EMERGENCY MEDICINE

## 2022-01-01 PROCEDURE — C1729 CATH, DRAINAGE: HCPCS

## 2022-01-01 PROCEDURE — 85007 BL SMEAR W/DIFF WBC COUNT: CPT | Performed by: PHYSICIAN ASSISTANT

## 2022-01-01 PROCEDURE — 10330064 DRESSING, ADAPTIC 3"X3" (50/BX)

## 2022-01-01 PROCEDURE — G0156 HHCP-SVS OF AIDE,EA 15 MIN: HCPCS

## 2022-01-01 PROCEDURE — 99284 EMERGENCY DEPT VISIT MOD MDM: CPT

## 2022-01-01 PROCEDURE — 10330064 DRESSING, NONADH OUCHLESS TELFA CTN 3"X4

## 2022-01-01 PROCEDURE — 85025 COMPLETE CBC W/AUTO DIFF WBC: CPT | Performed by: INTERNAL MEDICINE

## 2022-01-01 PROCEDURE — 85025 COMPLETE CBC W/AUTO DIFF WBC: CPT | Performed by: NURSE PRACTITIONER

## 2022-01-01 PROCEDURE — 96365 THER/PROPH/DIAG IV INF INIT: CPT

## 2022-01-01 PROCEDURE — 97163 PT EVAL HIGH COMPLEX 45 MIN: CPT

## 2022-01-01 PROCEDURE — 86301 IMMUNOASSAY TUMOR CA 19-9: CPT

## 2022-01-01 PROCEDURE — 99232 SBSQ HOSP IP/OBS MODERATE 35: CPT | Performed by: INTERNAL MEDICINE

## 2022-01-01 PROCEDURE — 83735 ASSAY OF MAGNESIUM: CPT | Performed by: EMERGENCY MEDICINE

## 2022-01-01 PROCEDURE — 85027 COMPLETE CBC AUTOMATED: CPT

## 2022-01-01 PROCEDURE — 36415 COLL VENOUS BLD VENIPUNCTURE: CPT | Performed by: INTERNAL MEDICINE

## 2022-01-01 PROCEDURE — 99214 OFFICE O/P EST MOD 30 MIN: CPT | Performed by: FAMILY MEDICINE

## 2022-01-01 PROCEDURE — 87493 C DIFF AMPLIFIED PROBE: CPT

## 2022-01-01 PROCEDURE — 80053 COMPREHEN METABOLIC PANEL: CPT | Performed by: PHYSICIAN ASSISTANT

## 2022-01-01 PROCEDURE — 85025 COMPLETE CBC W/AUTO DIFF WBC: CPT | Performed by: EMERGENCY MEDICINE

## 2022-01-01 PROCEDURE — 85027 COMPLETE CBC AUTOMATED: CPT | Performed by: PHYSICIAN ASSISTANT

## 2022-01-01 PROCEDURE — 99495 TRANSJ CARE MGMT MOD F2F 14D: CPT | Performed by: FAMILY MEDICINE

## 2022-01-01 PROCEDURE — 99215 OFFICE O/P EST HI 40 MIN: CPT | Performed by: INTERNAL MEDICINE

## 2022-01-01 PROCEDURE — 3008F BODY MASS INDEX DOCD: CPT | Performed by: FAMILY MEDICINE

## 2022-01-01 PROCEDURE — 92611 MOTION FLUOROSCOPY/SWALLOW: CPT

## 2022-01-01 PROCEDURE — 10330064 TAPE, ADHSV TRANSPORE WHT 1" (12RL/BX 10

## 2022-01-01 PROCEDURE — 87505 NFCT AGENT DETECTION GI: CPT | Performed by: EMERGENCY MEDICINE

## 2022-01-01 PROCEDURE — 10330064 SPONGE, EXCILON SPLIT DRN STR 4"X4" (2/P

## 2022-01-01 RX ORDER — SODIUM CHLORIDE 9 MG/ML
125 INJECTION, SOLUTION INTRAVENOUS CONTINUOUS
Status: DISCONTINUED | OUTPATIENT
Start: 2022-01-01 | End: 2022-01-01

## 2022-01-01 RX ORDER — OXYCODONE HYDROCHLORIDE 10 MG/1
10 TABLET ORAL EVERY 4 HOURS PRN
Status: DISCONTINUED | OUTPATIENT
Start: 2022-01-01 | End: 2022-01-01 | Stop reason: HOSPADM

## 2022-01-01 RX ORDER — SODIUM CHLORIDE 9 MG/ML
20 INJECTION, SOLUTION INTRAVENOUS ONCE
Status: CANCELLED | OUTPATIENT
Start: 2022-01-01

## 2022-01-01 RX ORDER — OXYCODONE HYDROCHLORIDE 5 MG/1
5 TABLET ORAL EVERY 4 HOURS PRN
Status: DISCONTINUED | OUTPATIENT
Start: 2022-01-01 | End: 2022-01-01 | Stop reason: HOSPADM

## 2022-01-01 RX ORDER — PALONOSETRON 0.05 MG/ML
0.25 INJECTION, SOLUTION INTRAVENOUS ONCE
Status: COMPLETED | OUTPATIENT
Start: 2022-01-01 | End: 2022-01-01

## 2022-01-01 RX ORDER — POTASSIUM CHLORIDE 20 MEQ/1
40 TABLET, EXTENDED RELEASE ORAL ONCE
Status: COMPLETED | OUTPATIENT
Start: 2022-01-01 | End: 2022-01-01

## 2022-01-01 RX ORDER — PALONOSETRON 0.05 MG/ML
0.25 INJECTION, SOLUTION INTRAVENOUS ONCE
Status: CANCELLED | OUTPATIENT
Start: 2022-01-01

## 2022-01-01 RX ORDER — ATROPINE SULFATE 1 MG/ML
0.25 INJECTION, SOLUTION INTRAMUSCULAR; INTRAVENOUS; SUBCUTANEOUS ONCE
Status: COMPLETED | OUTPATIENT
Start: 2022-01-01 | End: 2022-01-01

## 2022-01-01 RX ORDER — ATROPINE SULFATE 1 MG/ML
0.25 INJECTION, SOLUTION INTRAMUSCULAR; INTRAVENOUS; SUBCUTANEOUS ONCE
Status: CANCELLED | OUTPATIENT
Start: 2022-01-01

## 2022-01-01 RX ORDER — OXYCODONE HYDROCHLORIDE 5 MG/1
5-10 TABLET ORAL EVERY 4 HOURS PRN
Qty: 60 TABLET | Refills: 0 | Status: SHIPPED | OUTPATIENT
Start: 2022-01-01 | End: 2022-01-01 | Stop reason: SDUPTHER

## 2022-01-01 RX ORDER — SIMETHICONE 80 MG
80 TABLET,CHEWABLE ORAL EVERY 6 HOURS PRN
Qty: 30 TABLET | Refills: 0 | Status: SHIPPED | OUTPATIENT
Start: 2022-01-01

## 2022-01-01 RX ORDER — ATROPINE SULFATE 1 MG/ML
0.25 INJECTION, SOLUTION INTRAMUSCULAR; INTRAVENOUS; SUBCUTANEOUS ONCE AS NEEDED
Status: DISCONTINUED | OUTPATIENT
Start: 2022-01-01 | End: 2022-01-01 | Stop reason: HOSPADM

## 2022-01-01 RX ORDER — ALPRAZOLAM 0.5 MG/1
0.5 TABLET ORAL
Qty: 30 TABLET | Refills: 2 | Status: SHIPPED | OUTPATIENT
Start: 2022-01-01 | End: 2022-01-01 | Stop reason: ALTCHOICE

## 2022-01-01 RX ORDER — DIPHENOXYLATE HYDROCHLORIDE AND ATROPINE SULFATE 2.5; .025 MG/1; MG/1
TABLET ORAL
Qty: 60 TABLET | Refills: 0 | Status: SHIPPED | OUTPATIENT
Start: 2022-01-01

## 2022-01-01 RX ORDER — ATROPINE SULFATE 1 MG/ML
0.25 INJECTION, SOLUTION INTRAMUSCULAR; INTRAVENOUS; SUBCUTANEOUS ONCE AS NEEDED
Status: CANCELLED | OUTPATIENT
Start: 2022-01-01

## 2022-01-01 RX ORDER — DIPHENOXYLATE HYDROCHLORIDE AND ATROPINE SULFATE 2.5; .025 MG/1; MG/1
1 TABLET ORAL 4 TIMES DAILY
Status: DISCONTINUED | OUTPATIENT
Start: 2022-01-01 | End: 2022-01-01 | Stop reason: HOSPADM

## 2022-01-01 RX ORDER — DIPHENOXYLATE HYDROCHLORIDE AND ATROPINE SULFATE 2.5; .025 MG/1; MG/1
1 TABLET ORAL ONCE
Status: COMPLETED | OUTPATIENT
Start: 2022-01-01 | End: 2022-01-01

## 2022-01-01 RX ORDER — ONDANSETRON 2 MG/ML
4 INJECTION INTRAMUSCULAR; INTRAVENOUS ONCE
Status: COMPLETED | OUTPATIENT
Start: 2022-01-01 | End: 2022-01-01

## 2022-01-01 RX ORDER — PANCRELIPASE 24000; 76000; 120000 [USP'U]/1; [USP'U]/1; [USP'U]/1
CAPSULE, DELAYED RELEASE PELLETS ORAL
Qty: 270 CAPSULE | Refills: 0 | Status: SHIPPED | OUTPATIENT
Start: 2022-01-01 | End: 2022-01-01

## 2022-01-01 RX ORDER — ONDANSETRON 4 MG/1
4 TABLET, ORALLY DISINTEGRATING ORAL EVERY 6 HOURS PRN
Qty: 30 TABLET | Refills: 0 | Status: SHIPPED | OUTPATIENT
Start: 2022-01-01

## 2022-01-01 RX ORDER — DEXTROSE AND SODIUM CHLORIDE 5; .45 G/100ML; G/100ML
80 INJECTION, SOLUTION INTRAVENOUS CONTINUOUS
Status: DISCONTINUED | OUTPATIENT
Start: 2022-01-01 | End: 2022-01-01

## 2022-01-01 RX ORDER — TAMSULOSIN HYDROCHLORIDE 0.4 MG/1
0.4 CAPSULE ORAL
Status: DISCONTINUED | OUTPATIENT
Start: 2022-01-01 | End: 2022-01-01 | Stop reason: HOSPADM

## 2022-01-01 RX ORDER — ONDANSETRON 2 MG/ML
4 INJECTION INTRAMUSCULAR; INTRAVENOUS EVERY 6 HOURS PRN
Status: DISCONTINUED | OUTPATIENT
Start: 2022-01-01 | End: 2022-01-01 | Stop reason: HOSPADM

## 2022-01-01 RX ORDER — TRAZODONE HYDROCHLORIDE 100 MG/1
100 TABLET ORAL
Status: DISCONTINUED | OUTPATIENT
Start: 2022-01-01 | End: 2022-01-01

## 2022-01-01 RX ORDER — GABAPENTIN 100 MG/1
200 CAPSULE ORAL 3 TIMES DAILY
Status: DISCONTINUED | OUTPATIENT
Start: 2022-01-01 | End: 2022-01-01 | Stop reason: HOSPADM

## 2022-01-01 RX ORDER — DIPHENOXYLATE HYDROCHLORIDE AND ATROPINE SULFATE 2.5; .025 MG/1; MG/1
1 TABLET ORAL 4 TIMES DAILY PRN
Qty: 60 TABLET | Refills: 0 | Status: SHIPPED | OUTPATIENT
Start: 2022-01-01 | End: 2022-01-01

## 2022-01-01 RX ORDER — GABAPENTIN 100 MG/1
CAPSULE ORAL
Qty: 180 CAPSULE | Refills: 3 | Status: SHIPPED | OUTPATIENT
Start: 2022-01-01 | End: 2022-01-01 | Stop reason: SDUPTHER

## 2022-01-01 RX ORDER — OXYCODONE HYDROCHLORIDE 5 MG/1
5 TABLET ORAL EVERY 4 HOURS PRN
Qty: 60 TABLET | Refills: 0 | Status: SHIPPED | OUTPATIENT
Start: 2022-01-01 | End: 2022-01-01 | Stop reason: SDUPTHER

## 2022-01-01 RX ORDER — SODIUM CHLORIDE 9 MG/ML
20 INJECTION, SOLUTION INTRAVENOUS ONCE
Status: COMPLETED | OUTPATIENT
Start: 2022-01-01 | End: 2022-01-01

## 2022-01-01 RX ORDER — TAMSULOSIN HYDROCHLORIDE 0.4 MG/1
0.4 CAPSULE ORAL
Qty: 90 CAPSULE | Refills: 3 | Status: SHIPPED | OUTPATIENT
Start: 2022-01-01

## 2022-01-01 RX ORDER — GABAPENTIN 100 MG/1
200 CAPSULE ORAL 3 TIMES DAILY
Qty: 180 CAPSULE | Refills: 3 | Status: SHIPPED | OUTPATIENT
Start: 2022-01-01 | End: 2022-01-01 | Stop reason: DRUGHIGH

## 2022-01-01 RX ORDER — MORPHINE SULFATE 4 MG/ML
4 INJECTION, SOLUTION INTRAMUSCULAR; INTRAVENOUS ONCE
Status: COMPLETED | OUTPATIENT
Start: 2022-01-01 | End: 2022-01-01

## 2022-01-01 RX ORDER — LORAZEPAM 2 MG/ML
CONCENTRATE ORAL
Qty: 30 ML | Refills: 0 | Status: SHIPPED | OUTPATIENT
Start: 2022-01-01

## 2022-01-01 RX ORDER — DEXAMETHASONE 0.5 MG/5ML
1 ELIXIR ORAL 4 TIMES DAILY
Qty: 400 ML | Refills: 0 | Status: ON HOLD | OUTPATIENT
Start: 2022-01-01 | End: 2022-01-01

## 2022-01-01 RX ORDER — TRAZODONE HYDROCHLORIDE 50 MG/1
50 TABLET ORAL
Status: DISCONTINUED | OUTPATIENT
Start: 2022-01-01 | End: 2022-01-01 | Stop reason: HOSPADM

## 2022-01-01 RX ORDER — DEXAMETHASONE 0.5 MG/5ML
1 ELIXIR ORAL 4 TIMES DAILY
Qty: 400 ML | Refills: 0 | Status: SHIPPED | OUTPATIENT
Start: 2022-01-01 | End: 2022-01-01

## 2022-01-01 RX ORDER — ACETAMINOPHEN 325 MG/1
650 TABLET ORAL EVERY 6 HOURS PRN
Status: DISCONTINUED | OUTPATIENT
Start: 2022-01-01 | End: 2022-01-01 | Stop reason: HOSPADM

## 2022-01-01 RX ORDER — TRAZODONE HYDROCHLORIDE 50 MG/1
50-100 TABLET ORAL
Qty: 60 TABLET | Refills: 0 | Status: SHIPPED | OUTPATIENT
Start: 2022-01-01

## 2022-01-01 RX ORDER — OXYCODONE HYDROCHLORIDE 5 MG/1
5-10 TABLET ORAL EVERY 4 HOURS PRN
Qty: 60 TABLET | Refills: 0 | Status: SHIPPED | OUTPATIENT
Start: 2022-01-01 | End: 2022-01-01 | Stop reason: ALTCHOICE

## 2022-01-01 RX ORDER — LIDOCAINE WITH 8.4% SOD BICARB 0.9%(10ML)
SYRINGE (ML) INJECTION CODE/TRAUMA/SEDATION MEDICATION
Status: COMPLETED | OUTPATIENT
Start: 2022-01-01 | End: 2022-01-01

## 2022-01-01 RX ORDER — OLANZAPINE 5 MG/1
5 TABLET ORAL
Qty: 30 TABLET | Refills: 0 | Status: SHIPPED | OUTPATIENT
Start: 2022-01-01 | End: 2022-01-01 | Stop reason: ALTCHOICE

## 2022-01-01 RX ORDER — LANOLIN ALCOHOL/MO/W.PET/CERES
CREAM (GRAM) TOPICAL
Qty: 30 TABLET | Refills: 5 | Status: SHIPPED | OUTPATIENT
Start: 2022-01-01 | End: 2022-01-01 | Stop reason: ALTCHOICE

## 2022-01-01 RX ORDER — TRAZODONE HYDROCHLORIDE 50 MG/1
50 TABLET ORAL
Status: DISCONTINUED | OUTPATIENT
Start: 2022-01-01 | End: 2022-01-01

## 2022-01-01 RX ORDER — PREDNISONE 10 MG/1
TABLET ORAL
Qty: 90 TABLET | Refills: 0 | Status: SHIPPED | OUTPATIENT
Start: 2022-01-01

## 2022-01-01 RX ORDER — DIPHENOXYLATE HYDROCHLORIDE AND ATROPINE SULFATE 2.5; .025 MG/1; MG/1
1 TABLET ORAL 4 TIMES DAILY PRN
Qty: 30 TABLET | Refills: 0 | Status: SHIPPED | OUTPATIENT
Start: 2022-01-01 | End: 2022-01-01 | Stop reason: SDUPTHER

## 2022-01-01 RX ORDER — ATORVASTATIN CALCIUM 20 MG/1
20 TABLET, FILM COATED ORAL DAILY
Qty: 90 TABLET | Refills: 1 | Status: SHIPPED | OUTPATIENT
Start: 2022-01-01 | End: 2022-01-01 | Stop reason: ALTCHOICE

## 2022-01-01 RX ORDER — ATORVASTATIN CALCIUM 20 MG/1
20 TABLET, FILM COATED ORAL DAILY
Status: DISCONTINUED | OUTPATIENT
Start: 2022-01-01 | End: 2022-01-01 | Stop reason: HOSPADM

## 2022-01-01 RX ORDER — MAGNESIUM HYDROXIDE/ALUMINUM HYDROXICE/SIMETHICONE 120; 1200; 1200 MG/30ML; MG/30ML; MG/30ML
30 SUSPENSION ORAL EVERY 6 HOURS PRN
Qty: 355 ML | Refills: 0 | Status: SHIPPED | OUTPATIENT
Start: 2022-01-01

## 2022-01-01 RX ORDER — PREDNISONE 10 MG/1
10 TABLET ORAL
Status: DISCONTINUED | OUTPATIENT
Start: 2022-01-01 | End: 2022-01-01 | Stop reason: HOSPADM

## 2022-01-01 RX ORDER — PANCRELIPASE 24000; 76000; 120000 [USP'U]/1; [USP'U]/1; [USP'U]/1
CAPSULE, DELAYED RELEASE PELLETS ORAL
Qty: 270 CAPSULE | Refills: 0 | Status: SHIPPED | OUTPATIENT
Start: 2022-01-01

## 2022-01-01 RX ORDER — PANTOPRAZOLE SODIUM 40 MG/1
TABLET, DELAYED RELEASE ORAL
Qty: 60 TABLET | Refills: 1 | Status: SHIPPED | OUTPATIENT
Start: 2022-01-01 | End: 2022-01-01 | Stop reason: SDUPTHER

## 2022-01-01 RX ORDER — FLUOXETINE HYDROCHLORIDE 20 MG/1
20 CAPSULE ORAL DAILY
Status: DISCONTINUED | OUTPATIENT
Start: 2022-01-01 | End: 2022-01-01 | Stop reason: HOSPADM

## 2022-01-01 RX ORDER — MIRTAZAPINE 7.5 MG/1
7.5 TABLET, FILM COATED ORAL
Qty: 30 TABLET | Refills: 0 | Status: SHIPPED | OUTPATIENT
Start: 2022-01-01 | End: 2022-01-01

## 2022-01-01 RX ORDER — DICYCLOMINE HYDROCHLORIDE 10 MG/1
20 CAPSULE ORAL ONCE
Status: COMPLETED | OUTPATIENT
Start: 2022-01-01 | End: 2022-01-01

## 2022-01-01 RX ORDER — PANTOPRAZOLE SODIUM 40 MG/1
40 TABLET, DELAYED RELEASE ORAL 2 TIMES DAILY
Qty: 60 TABLET | Refills: 2 | Status: SHIPPED | OUTPATIENT
Start: 2022-01-01

## 2022-01-01 RX ORDER — ONDANSETRON 2 MG/ML
4 INJECTION INTRAMUSCULAR; INTRAVENOUS ONCE AS NEEDED
Status: DISCONTINUED | OUTPATIENT
Start: 2022-01-01 | End: 2022-01-01

## 2022-01-01 RX ORDER — FOLIC ACID 1 MG/1
1 TABLET ORAL DAILY
Status: DISCONTINUED | OUTPATIENT
Start: 2022-01-01 | End: 2022-01-01 | Stop reason: HOSPADM

## 2022-01-01 RX ORDER — TRAZODONE HYDROCHLORIDE 50 MG/1
50-100 TABLET ORAL
Qty: 60 TABLET | Refills: 0 | Status: SHIPPED | OUTPATIENT
Start: 2022-01-01 | End: 2022-01-01 | Stop reason: SDUPTHER

## 2022-01-01 RX ORDER — SIMETHICONE 80 MG
80 TABLET,CHEWABLE ORAL EVERY 6 HOURS PRN
Status: DISCONTINUED | OUTPATIENT
Start: 2022-01-01 | End: 2022-01-01 | Stop reason: HOSPADM

## 2022-01-01 RX ORDER — ASPIRIN 81 MG/1
81 TABLET, CHEWABLE ORAL DAILY
Status: DISCONTINUED | OUTPATIENT
Start: 2022-01-01 | End: 2022-01-01 | Stop reason: HOSPADM

## 2022-01-01 RX ORDER — HYDROMORPHONE HCL/PF 1 MG/ML
0.5 SYRINGE (ML) INJECTION ONCE
Status: COMPLETED | OUTPATIENT
Start: 2022-01-01 | End: 2022-01-01

## 2022-01-01 RX ORDER — MAGNESIUM HYDROXIDE/ALUMINUM HYDROXICE/SIMETHICONE 120; 1200; 1200 MG/30ML; MG/30ML; MG/30ML
30 SUSPENSION ORAL EVERY 6 HOURS PRN
Status: DISCONTINUED | OUTPATIENT
Start: 2022-01-01 | End: 2022-01-01 | Stop reason: HOSPADM

## 2022-01-01 RX ORDER — OLANZAPINE 2.5 MG/1
5 TABLET ORAL
Status: DISCONTINUED | OUTPATIENT
Start: 2022-01-01 | End: 2022-01-01 | Stop reason: HOSPADM

## 2022-01-01 RX ORDER — MAGNESIUM SULFATE HEPTAHYDRATE 40 MG/ML
2 INJECTION, SOLUTION INTRAVENOUS ONCE
Status: COMPLETED | OUTPATIENT
Start: 2022-01-01 | End: 2022-01-01

## 2022-01-01 RX ORDER — TRIAMCINOLONE ACETONIDE 1 MG/G
CREAM TOPICAL 2 TIMES DAILY
Qty: 60 G | Refills: 2 | Status: SHIPPED | OUTPATIENT
Start: 2022-01-01

## 2022-01-01 RX ORDER — PANTOPRAZOLE SODIUM 40 MG/1
40 TABLET, DELAYED RELEASE ORAL 2 TIMES DAILY
Status: DISCONTINUED | OUTPATIENT
Start: 2022-01-01 | End: 2022-01-01 | Stop reason: HOSPADM

## 2022-01-01 RX ORDER — SODIUM CHLORIDE 9 MG/ML
100 INJECTION, SOLUTION INTRAVENOUS CONTINUOUS
Status: DISCONTINUED | OUTPATIENT
Start: 2022-01-01 | End: 2022-01-01

## 2022-01-01 RX ORDER — HYDROMORPHONE HCL/PF 1 MG/ML
0.5 SYRINGE (ML) INJECTION EVERY 4 HOURS PRN
Status: DISCONTINUED | OUTPATIENT
Start: 2022-01-01 | End: 2022-01-01 | Stop reason: HOSPADM

## 2022-01-01 RX ADMIN — PANTOPRAZOLE SODIUM 40 MG: 40 TABLET, DELAYED RELEASE ORAL at 08:15

## 2022-01-01 RX ADMIN — PALONOSETRON 0.25 MG: 0.05 INJECTION, SOLUTION INTRAVENOUS at 09:49

## 2022-01-01 RX ADMIN — GABAPENTIN 200 MG: 100 CAPSULE ORAL at 08:03

## 2022-01-01 RX ADMIN — SODIUM CHLORIDE 20 ML/HR: 0.9 INJECTION, SOLUTION INTRAVENOUS at 08:14

## 2022-01-01 RX ADMIN — TAMSULOSIN HYDROCHLORIDE 0.4 MG: 0.4 CAPSULE ORAL at 21:36

## 2022-01-01 RX ADMIN — PANCRELIPASE 24000 UNITS: 24000; 76000; 120000 CAPSULE, DELAYED RELEASE PELLETS ORAL at 11:53

## 2022-01-01 RX ADMIN — PANCRELIPASE 24000 UNITS: 24000; 76000; 120000 CAPSULE, DELAYED RELEASE PELLETS ORAL at 08:15

## 2022-01-01 RX ADMIN — INSULIN LISPRO 1 UNITS: 100 INJECTION, SOLUTION INTRAVENOUS; SUBCUTANEOUS at 12:35

## 2022-01-01 RX ADMIN — PEGFILGRASTIM 6 MG: KIT SUBCUTANEOUS at 11:31

## 2022-01-01 RX ADMIN — GABAPENTIN 200 MG: 100 CAPSULE ORAL at 21:36

## 2022-01-01 RX ADMIN — SODIUM CHLORIDE 20 ML/HR: 0.9 INJECTION, SOLUTION INTRAVENOUS at 08:20

## 2022-01-01 RX ADMIN — IRINOTECAN HYDROCHLORIDE 113.95 MG: 4.3 INJECTION, POWDER, FOR SOLUTION INTRAVENOUS at 11:28

## 2022-01-01 RX ADMIN — PALONOSETRON HYDROCHLORIDE 0.25 MG: 0.25 INJECTION, SOLUTION INTRAVENOUS at 09:30

## 2022-01-01 RX ADMIN — PANTOPRAZOLE SODIUM 40 MG: 40 TABLET, DELAYED RELEASE ORAL at 08:51

## 2022-01-01 RX ADMIN — OXYCODONE HYDROCHLORIDE 5 MG: 5 TABLET ORAL at 21:50

## 2022-01-01 RX ADMIN — DIPHENOXYLATE HYDROCHLORIDE AND ATROPINE SULFATE 1 TABLET: 2.5; .025 TABLET ORAL at 08:15

## 2022-01-01 RX ADMIN — ASPIRIN 81 MG CHEWABLE TABLET 81 MG: 81 TABLET CHEWABLE at 08:03

## 2022-01-01 RX ADMIN — APIXABAN 5 MG: 5 TABLET, FILM COATED ORAL at 00:59

## 2022-01-01 RX ADMIN — SODIUM CHLORIDE 20 ML/HR: 0.9 INJECTION, SOLUTION INTRAVENOUS at 09:01

## 2022-01-01 RX ADMIN — FOLIC ACID 1 MG: 1 TABLET ORAL at 08:31

## 2022-01-01 RX ADMIN — ONDANSETRON 4 MG: 2 INJECTION INTRAMUSCULAR; INTRAVENOUS at 11:32

## 2022-01-01 RX ADMIN — FOLIC ACID 1 MG: 1 TABLET ORAL at 08:51

## 2022-01-01 RX ADMIN — PANTOPRAZOLE SODIUM 40 MG: 40 TABLET, DELAYED RELEASE ORAL at 08:03

## 2022-01-01 RX ADMIN — INSULIN LISPRO 2 UNITS: 100 INJECTION, SOLUTION INTRAVENOUS; SUBCUTANEOUS at 17:43

## 2022-01-01 RX ADMIN — INSULIN LISPRO 1 UNITS: 100 INJECTION, SOLUTION INTRAVENOUS; SUBCUTANEOUS at 21:19

## 2022-01-01 RX ADMIN — TAMSULOSIN HYDROCHLORIDE 0.4 MG: 0.4 CAPSULE ORAL at 21:19

## 2022-01-01 RX ADMIN — APIXABAN 5 MG: 5 TABLET, FILM COATED ORAL at 08:03

## 2022-01-01 RX ADMIN — DEXAMETHASONE SODIUM PHOSPHATE 10 MG: 10 INJECTION, SOLUTION INTRAMUSCULAR; INTRAVENOUS at 10:25

## 2022-01-01 RX ADMIN — MORPHINE SULFATE 4 MG: 4 INJECTION INTRAVENOUS at 11:31

## 2022-01-01 RX ADMIN — ATROPINE SULFATE 0.25 MG: 1 INJECTION, SOLUTION INTRAMUSCULAR; INTRAVENOUS; SUBCUTANEOUS at 09:30

## 2022-01-01 RX ADMIN — PANCRELIPASE 24000 UNITS: 24000; 76000; 120000 CAPSULE, DELAYED RELEASE PELLETS ORAL at 17:03

## 2022-01-01 RX ADMIN — APIXABAN 5 MG: 5 TABLET, FILM COATED ORAL at 08:16

## 2022-01-01 RX ADMIN — FOSAPREPITANT 150 MG: 150 INJECTION, POWDER, LYOPHILIZED, FOR SOLUTION INTRAVENOUS at 10:48

## 2022-01-01 RX ADMIN — FOSAPREPITANT DIMEGLUMINE 150 MG: 150 INJECTION, POWDER, LYOPHILIZED, FOR SOLUTION INTRAVENOUS at 10:21

## 2022-01-01 RX ADMIN — ACETAMINOPHEN 650 MG: 325 TABLET ORAL at 04:53

## 2022-01-01 RX ADMIN — PALONOSETRON 0.25 MG: 0.05 INJECTION, SOLUTION INTRAVENOUS at 09:00

## 2022-01-01 RX ADMIN — PANTOPRAZOLE SODIUM 40 MG: 40 TABLET, DELAYED RELEASE ORAL at 21:36

## 2022-01-01 RX ADMIN — PANTOPRAZOLE SODIUM 40 MG: 40 TABLET, DELAYED RELEASE ORAL at 21:17

## 2022-01-01 RX ADMIN — DEXTROSE AND SODIUM CHLORIDE 80 ML/HR: 5; .45 INJECTION, SOLUTION INTRAVENOUS at 15:34

## 2022-01-01 RX ADMIN — ATORVASTATIN CALCIUM 20 MG: 40 TABLET, FILM COATED ORAL at 08:16

## 2022-01-01 RX ADMIN — FOSAPREPITANT 150 MG: 150 INJECTION, POWDER, LYOPHILIZED, FOR SOLUTION INTRAVENOUS at 09:55

## 2022-01-01 RX ADMIN — INSULIN LISPRO 3 UNITS: 100 INJECTION, SOLUTION INTRAVENOUS; SUBCUTANEOUS at 12:27

## 2022-01-01 RX ADMIN — INSULIN LISPRO 1 UNITS: 100 INJECTION, SOLUTION INTRAVENOUS; SUBCUTANEOUS at 08:09

## 2022-01-01 RX ADMIN — FLUOXETINE HYDROCHLORIDE 20 MG: 10 CAPSULE ORAL at 08:16

## 2022-01-01 RX ADMIN — PANTOPRAZOLE SODIUM 40 MG: 40 TABLET, DELAYED RELEASE ORAL at 21:51

## 2022-01-01 RX ADMIN — PALONOSETRON 0.25 MG: 0.25 INJECTION, SOLUTION INTRAVENOUS at 09:53

## 2022-01-01 RX ADMIN — LEUCOVORIN CALCIUM 380 MG: 500 INJECTION, POWDER, LYOPHILIZED, FOR SOLUTION INTRAMUSCULAR; INTRAVENOUS at 11:35

## 2022-01-01 RX ADMIN — APIXABAN 5 MG: 5 TABLET, FILM COATED ORAL at 17:43

## 2022-01-01 RX ADMIN — MAGNESIUM SULFATE HEPTAHYDRATE 2 G: 40 INJECTION, SOLUTION INTRAVENOUS at 13:24

## 2022-01-01 RX ADMIN — ATROPINE SULFATE 0.25 MG: 1 INJECTION, SOLUTION INTRAMUSCULAR; INTRAVENOUS; SUBCUTANEOUS at 10:17

## 2022-01-01 RX ADMIN — IRINOTECAN HYDROCHLORIDE 129 MG: 4.3 INJECTION, POWDER, FOR SOLUTION INTRAVENOUS at 09:39

## 2022-01-01 RX ADMIN — OLANZAPINE 5 MG: 2.5 TABLET, FILM COATED ORAL at 21:36

## 2022-01-01 RX ADMIN — POTASSIUM CHLORIDE 40 MEQ: 1500 TABLET, EXTENDED RELEASE ORAL at 11:52

## 2022-01-01 RX ADMIN — LEUCOVORIN CALCIUM 800 MG: 200 INJECTION, POWDER, LYOPHILIZED, FOR SUSPENSION INTRAMUSCULAR; INTRAVENOUS at 12:14

## 2022-01-01 RX ADMIN — OXYCODONE HYDROCHLORIDE 5 MG: 5 TABLET ORAL at 00:52

## 2022-01-01 RX ADMIN — MORPHINE SULFATE 2 MG: 2 INJECTION, SOLUTION INTRAMUSCULAR; INTRAVENOUS at 15:47

## 2022-01-01 RX ADMIN — SODIUM CHLORIDE, SODIUM LACTATE, POTASSIUM CHLORIDE, AND CALCIUM CHLORIDE 1000 ML: .6; .31; .03; .02 INJECTION, SOLUTION INTRAVENOUS at 11:31

## 2022-01-01 RX ADMIN — INSULIN LISPRO 1 UNITS: 100 INJECTION, SOLUTION INTRAVENOUS; SUBCUTANEOUS at 21:52

## 2022-01-01 RX ADMIN — DIPHENOXYLATE HYDROCHLORIDE AND ATROPINE SULFATE 1 TABLET: 2.5; .025 TABLET ORAL at 17:03

## 2022-01-01 RX ADMIN — OLANZAPINE 5 MG: 2.5 TABLET, FILM COATED ORAL at 21:51

## 2022-01-01 RX ADMIN — PANCRELIPASE 24000 UNITS: 24000; 76000; 120000 CAPSULE, DELAYED RELEASE PELLETS ORAL at 08:04

## 2022-01-01 RX ADMIN — ATORVASTATIN CALCIUM 20 MG: 40 TABLET, FILM COATED ORAL at 08:31

## 2022-01-01 RX ADMIN — LEUCOVORIN CALCIUM 380 MG: 200 INJECTION, POWDER, LYOPHILIZED, FOR SOLUTION INTRAMUSCULAR; INTRAVENOUS at 12:13

## 2022-01-01 RX ADMIN — TRAZODONE HYDROCHLORIDE 50 MG: 50 TABLET ORAL at 21:50

## 2022-01-01 RX ADMIN — FOSAPREPITANT 150 MG: 150 INJECTION, POWDER, LYOPHILIZED, FOR SOLUTION INTRAVENOUS at 09:04

## 2022-01-01 RX ADMIN — DIPHENOXYLATE HYDROCHLORIDE AND ATROPINE SULFATE 1 TABLET: 2.5; .025 TABLET ORAL at 21:51

## 2022-01-01 RX ADMIN — DEXAMETHASONE SODIUM PHOSPHATE 10 MG: 10 INJECTION, SOLUTION INTRAMUSCULAR; INTRAVENOUS at 09:17

## 2022-01-01 RX ADMIN — POTASSIUM CHLORIDE 40 MEQ: 1500 TABLET, EXTENDED RELEASE ORAL at 17:39

## 2022-01-01 RX ADMIN — ASPIRIN 81 MG CHEWABLE TABLET 81 MG: 81 TABLET CHEWABLE at 08:15

## 2022-01-01 RX ADMIN — ATROPINE SULFATE 0.25 MG: 1 INJECTION, SOLUTION INTRAMUSCULAR; INTRAVENOUS; SUBCUTANEOUS at 10:32

## 2022-01-01 RX ADMIN — ATROPINE SULFATE 0.25 MG: 1 INJECTION, SOLUTION INTRAMUSCULAR; INTRAVENOUS; SUBCUTANEOUS at 09:44

## 2022-01-01 RX ADMIN — LEUCOVORIN CALCIUM 380 MG: 200 INJECTION, POWDER, LYOPHILIZED, FOR SOLUTION INTRAMUSCULAR; INTRAVENOUS at 12:09

## 2022-01-01 RX ADMIN — PALONOSETRON 0.25 MG: 0.25 INJECTION, SOLUTION INTRAVENOUS at 10:15

## 2022-01-01 RX ADMIN — DIPHENOXYLATE HYDROCHLORIDE AND ATROPINE SULFATE 1 TABLET: 2.5; .025 TABLET ORAL at 09:45

## 2022-01-01 RX ADMIN — DEXTROSE AND SODIUM CHLORIDE 80 ML/HR: 5; .45 INJECTION, SOLUTION INTRAVENOUS at 21:44

## 2022-01-01 RX ADMIN — PANCRELIPASE 24000 UNITS: 24000; 76000; 120000 CAPSULE, DELAYED RELEASE PELLETS ORAL at 08:30

## 2022-01-01 RX ADMIN — IOHEXOL 15 ML: 350 INJECTION, SOLUTION INTRAVENOUS at 11:36

## 2022-01-01 RX ADMIN — GABAPENTIN 200 MG: 100 CAPSULE ORAL at 16:39

## 2022-01-01 RX ADMIN — PEGFILGRASTIM 6 MG: KIT SUBCUTANEOUS at 10:14

## 2022-01-01 RX ADMIN — FLUOXETINE HYDROCHLORIDE 20 MG: 10 CAPSULE ORAL at 08:30

## 2022-01-01 RX ADMIN — ASPIRIN 81 MG CHEWABLE TABLET 81 MG: 81 TABLET CHEWABLE at 08:51

## 2022-01-01 RX ADMIN — INSULIN LISPRO 1 UNITS: 100 INJECTION, SOLUTION INTRAVENOUS; SUBCUTANEOUS at 08:35

## 2022-01-01 RX ADMIN — SODIUM CHLORIDE 20 ML/HR: 0.9 INJECTION, SOLUTION INTRAVENOUS at 09:17

## 2022-01-01 RX ADMIN — PEGFILGRASTIM 6 MG: KIT SUBCUTANEOUS at 11:08

## 2022-01-01 RX ADMIN — CYANOCOBALAMIN TAB 500 MCG 1000 MCG: 500 TAB at 08:16

## 2022-01-01 RX ADMIN — PEGFILGRASTIM 6 MG: KIT SUBCUTANEOUS at 11:05

## 2022-01-01 RX ADMIN — ATORVASTATIN CALCIUM 20 MG: 40 TABLET, FILM COATED ORAL at 08:51

## 2022-01-01 RX ADMIN — OXYCODONE HYDROCHLORIDE 10 MG: 10 TABLET ORAL at 08:02

## 2022-01-01 RX ADMIN — PREDNISONE 10 MG: 10 TABLET ORAL at 08:16

## 2022-01-01 RX ADMIN — SODIUM CHLORIDE 100 ML/HR: 0.9 INJECTION, SOLUTION INTRAVENOUS at 12:03

## 2022-01-01 RX ADMIN — LEUCOVORIN CALCIUM 380 MG: 200 INJECTION, POWDER, LYOPHILIZED, FOR SOLUTION INTRAMUSCULAR; INTRAVENOUS at 11:18

## 2022-01-01 RX ADMIN — Medication 10 ML: at 11:17

## 2022-01-01 RX ADMIN — GABAPENTIN 200 MG: 100 CAPSULE ORAL at 08:51

## 2022-01-01 RX ADMIN — INSULIN LISPRO 2 UNITS: 100 INJECTION, SOLUTION INTRAVENOUS; SUBCUTANEOUS at 17:32

## 2022-01-01 RX ADMIN — PEGFILGRASTIM 6 MG: KIT SUBCUTANEOUS at 11:50

## 2022-01-01 RX ADMIN — PANTOPRAZOLE SODIUM 40 MG: 40 TABLET, DELAYED RELEASE ORAL at 08:30

## 2022-01-01 RX ADMIN — ATORVASTATIN CALCIUM 20 MG: 40 TABLET, FILM COATED ORAL at 08:03

## 2022-01-01 RX ADMIN — DEXAMETHASONE SODIUM PHOSPHATE 10 MG: 10 INJECTION, SOLUTION INTRAMUSCULAR; INTRAVENOUS at 09:05

## 2022-01-01 RX ADMIN — APIXABAN 5 MG: 5 TABLET, FILM COATED ORAL at 17:39

## 2022-01-01 RX ADMIN — PREDNISONE 10 MG: 10 TABLET ORAL at 08:03

## 2022-01-01 RX ADMIN — APIXABAN 5 MG: 5 TABLET, FILM COATED ORAL at 08:51

## 2022-01-01 RX ADMIN — FOLIC ACID 1 MG: 1 TABLET ORAL at 08:03

## 2022-01-01 RX ADMIN — GABAPENTIN 200 MG: 100 CAPSULE ORAL at 15:36

## 2022-01-01 RX ADMIN — ASPIRIN 81 MG CHEWABLE TABLET 81 MG: 81 TABLET CHEWABLE at 08:30

## 2022-01-01 RX ADMIN — DEXAMETHASONE SODIUM PHOSPHATE 10 MG: 10 INJECTION, SOLUTION INTRAMUSCULAR; INTRAVENOUS at 09:19

## 2022-01-01 RX ADMIN — INSULIN LISPRO 2 UNITS: 100 INJECTION, SOLUTION INTRAVENOUS; SUBCUTANEOUS at 11:52

## 2022-01-01 RX ADMIN — HYDROMORPHONE HYDROCHLORIDE 0.5 MG: 1 INJECTION, SOLUTION INTRAMUSCULAR; INTRAVENOUS; SUBCUTANEOUS at 16:29

## 2022-01-01 RX ADMIN — DEXAMETHASONE SODIUM PHOSPHATE 10 MG: 10 INJECTION, SOLUTION INTRAMUSCULAR; INTRAVENOUS at 09:43

## 2022-01-01 RX ADMIN — SODIUM CHLORIDE 100 ML/HR: 0.9 INJECTION, SOLUTION INTRAVENOUS at 00:28

## 2022-01-01 RX ADMIN — CYANOCOBALAMIN TAB 500 MCG 1000 MCG: 500 TAB at 08:51

## 2022-01-01 RX ADMIN — INSULIN LISPRO 2 UNITS: 100 INJECTION, SOLUTION INTRAVENOUS; SUBCUTANEOUS at 17:40

## 2022-01-01 RX ADMIN — IRINOTECAN HYDROCHLORIDE 129 MG: 4.3 INJECTION, POWDER, FOR SOLUTION INTRAVENOUS at 09:51

## 2022-01-01 RX ADMIN — ATROPINE SULFATE 0.25 MG: 1 INJECTION, SOLUTION INTRAMUSCULAR; INTRAVENOUS; SUBCUTANEOUS at 11:23

## 2022-01-01 RX ADMIN — PANCRELIPASE 24000 UNITS: 24000; 76000; 120000 CAPSULE, DELAYED RELEASE PELLETS ORAL at 12:26

## 2022-01-01 RX ADMIN — FOSAPREPITANT 150 MG: 150 INJECTION, POWDER, LYOPHILIZED, FOR SOLUTION INTRAVENOUS at 09:36

## 2022-01-01 RX ADMIN — IOHEXOL 100 ML: 350 INJECTION, SOLUTION INTRAVENOUS at 16:23

## 2022-01-01 RX ADMIN — APIXABAN 5 MG: 5 TABLET, FILM COATED ORAL at 17:03

## 2022-01-01 RX ADMIN — IRINOTECAN HYDROCHLORIDE 129 MG: 4.3 INJECTION, POWDER, FOR SOLUTION INTRAVENOUS at 10:38

## 2022-01-01 RX ADMIN — PEGFILGRASTIM 6 MG: KIT SUBCUTANEOUS at 11:15

## 2022-01-01 RX ADMIN — PREDNISONE 10 MG: 10 TABLET ORAL at 08:31

## 2022-01-01 RX ADMIN — GABAPENTIN 200 MG: 100 CAPSULE ORAL at 21:17

## 2022-01-01 RX ADMIN — FOSAPREPITANT 150 MG: 150 INJECTION, POWDER, LYOPHILIZED, FOR SOLUTION INTRAVENOUS at 09:38

## 2022-01-01 RX ADMIN — PANCRELIPASE 24000 UNITS: 24000; 76000; 120000 CAPSULE, DELAYED RELEASE PELLETS ORAL at 17:43

## 2022-01-01 RX ADMIN — PALONOSETRON HYDROCHLORIDE 0.25 MG: 0.25 INJECTION INTRAVENOUS at 10:34

## 2022-01-01 RX ADMIN — PANCRELIPASE 24000 UNITS: 24000; 76000; 120000 CAPSULE, DELAYED RELEASE PELLETS ORAL at 12:35

## 2022-01-01 RX ADMIN — IOHEXOL 100 ML: 350 INJECTION, SOLUTION INTRAVENOUS at 15:31

## 2022-01-01 RX ADMIN — PANCRELIPASE 24000 UNITS: 24000; 76000; 120000 CAPSULE, DELAYED RELEASE PELLETS ORAL at 16:39

## 2022-01-01 RX ADMIN — IRINOTECAN HYDROCHLORIDE 129 MG: 4.3 INJECTION, POWDER, FOR SOLUTION INTRAVENOUS at 10:25

## 2022-01-01 RX ADMIN — PEGFILGRASTIM 6 MG: KIT SUBCUTANEOUS at 10:27

## 2022-01-01 RX ADMIN — GABAPENTIN 200 MG: 100 CAPSULE ORAL at 08:31

## 2022-01-01 RX ADMIN — SODIUM CHLORIDE 20 ML/HR: 9 INJECTION, SOLUTION INTRAVENOUS at 10:15

## 2022-01-01 RX ADMIN — ONDANSETRON 4 MG: 2 INJECTION INTRAMUSCULAR; INTRAVENOUS at 22:26

## 2022-01-01 RX ADMIN — DEXTROSE AND SODIUM CHLORIDE 80 ML/HR: 5; .45 INJECTION, SOLUTION INTRAVENOUS at 10:20

## 2022-01-01 RX ADMIN — OLANZAPINE 5 MG: 2.5 TABLET, FILM COATED ORAL at 21:17

## 2022-01-01 RX ADMIN — ONDANSETRON 4 MG: 2 INJECTION INTRAMUSCULAR; INTRAVENOUS at 08:26

## 2022-01-01 RX ADMIN — ATROPINE SULFATE 0.25 MG: 1 INJECTION, SOLUTION INTRAMUSCULAR; INTRAVENOUS; SUBCUTANEOUS at 10:14

## 2022-01-01 RX ADMIN — IRINOTECAN HYDROCHLORIDE 129 MG: 4.3 INJECTION, POWDER, FOR SOLUTION INTRAVENOUS at 11:10

## 2022-01-01 RX ADMIN — SODIUM CHLORIDE, SODIUM LACTATE, POTASSIUM CHLORIDE, AND CALCIUM CHLORIDE 1000 ML: .6; .31; .03; .02 INJECTION, SOLUTION INTRAVENOUS at 15:28

## 2022-01-01 RX ADMIN — DICYCLOMINE HYDROCHLORIDE 20 MG: 10 CAPSULE ORAL at 11:31

## 2022-01-01 RX ADMIN — FOSAPREPITANT 150 MG: 150 INJECTION, POWDER, LYOPHILIZED, FOR SOLUTION INTRAVENOUS at 08:48

## 2022-01-01 RX ADMIN — FOLIC ACID 1 MG: 1 TABLET ORAL at 08:15

## 2022-01-01 RX ADMIN — ATROPINE SULFATE 0.25 MG: 1 INJECTION, SOLUTION INTRAMUSCULAR; INTRAVENOUS; SUBCUTANEOUS at 11:08

## 2022-01-01 RX ADMIN — IOHEXOL 100 ML: 350 INJECTION, SOLUTION INTRAVENOUS at 14:02

## 2022-01-01 RX ADMIN — APIXABAN 5 MG: 5 TABLET, FILM COATED ORAL at 08:30

## 2022-01-01 RX ADMIN — SODIUM CHLORIDE 1000 ML: 0.9 INJECTION, SOLUTION INTRAVENOUS at 22:26

## 2022-01-01 RX ADMIN — LEUCOVORIN CALCIUM 760 MG: 200 INJECTION, POWDER, LYOPHILIZED, FOR SUSPENSION INTRAMUSCULAR; INTRAVENOUS at 12:50

## 2022-01-01 RX ADMIN — OXYCODONE HYDROCHLORIDE 10 MG: 10 TABLET ORAL at 00:59

## 2022-01-01 RX ADMIN — GABAPENTIN 200 MG: 100 CAPSULE ORAL at 21:51

## 2022-01-01 RX ADMIN — HYDROMORPHONE HYDROCHLORIDE 0.5 MG: 1 INJECTION, SOLUTION INTRAMUSCULAR; INTRAVENOUS; SUBCUTANEOUS at 08:26

## 2022-01-01 RX ADMIN — IRINOTECAN HYDROCHLORIDE 129 MG: 4.3 INJECTION, POWDER, FOR SOLUTION INTRAVENOUS at 10:20

## 2022-01-01 RX ADMIN — DEXAMETHASONE SODIUM PHOSPHATE 10 MG: 10 INJECTION, SOLUTION INTRAMUSCULAR; INTRAVENOUS at 08:27

## 2022-01-01 RX ADMIN — IOHEXOL 100 ML: 350 INJECTION, SOLUTION INTRAVENOUS at 14:04

## 2022-01-01 RX ADMIN — PREDNISONE 10 MG: 10 TABLET ORAL at 08:51

## 2022-01-01 RX ADMIN — SODIUM CHLORIDE 20 ML/HR: 0.9 INJECTION, SOLUTION INTRAVENOUS at 09:19

## 2022-01-01 RX ADMIN — GABAPENTIN 200 MG: 100 CAPSULE ORAL at 08:15

## 2022-01-01 RX ADMIN — DEXAMETHASONE SODIUM PHOSPHATE 10 MG: 10 INJECTION, SOLUTION INTRAMUSCULAR; INTRAVENOUS at 08:13

## 2022-01-01 RX ADMIN — CYANOCOBALAMIN TAB 500 MCG 1000 MCG: 500 TAB at 08:03

## 2022-01-01 RX ADMIN — IOHEXOL 100 ML: 350 INJECTION, SOLUTION INTRAVENOUS at 16:53

## 2022-01-01 RX ADMIN — OXYCODONE HYDROCHLORIDE 10 MG: 10 TABLET ORAL at 04:51

## 2022-01-01 RX ADMIN — PALONOSETRON HYDROCHLORIDE 0.25 MG: 0.25 INJECTION INTRAVENOUS at 09:03

## 2022-01-01 RX ADMIN — FLUOXETINE HYDROCHLORIDE 20 MG: 10 CAPSULE ORAL at 08:03

## 2022-01-01 RX ADMIN — HYDROMORPHONE HYDROCHLORIDE 0.5 MG: 1 INJECTION, SOLUTION INTRAMUSCULAR; INTRAVENOUS; SUBCUTANEOUS at 21:18

## 2022-01-01 RX ADMIN — TAMSULOSIN HYDROCHLORIDE 0.4 MG: 0.4 CAPSULE ORAL at 21:51

## 2022-01-01 RX ADMIN — FLUOXETINE HYDROCHLORIDE 20 MG: 10 CAPSULE ORAL at 08:56

## 2022-01-01 RX ADMIN — SODIUM CHLORIDE 20 ML/HR: 0.9 INJECTION, SOLUTION INTRAVENOUS at 09:42

## 2022-01-01 RX ADMIN — PANCRELIPASE 24000 UNITS: 24000; 76000; 120000 CAPSULE, DELAYED RELEASE PELLETS ORAL at 08:50

## 2022-01-01 RX ADMIN — CYANOCOBALAMIN TAB 500 MCG 1000 MCG: 500 TAB at 08:30

## 2022-01-03 NOTE — PROGRESS NOTES
Inbasket response from [address] message was forwarded to Dr Kathy Resendiz, per Dr Kathy Resendiz, plan to add Emend and Aloxi to premedications for treatment at infusion center  Also plan to prescribe Zofran to at home antiemetic profile

## 2022-01-04 NOTE — PROGRESS NOTES
Hematology/Oncology Outpatient Follow- up Note  Roscoe Jordan  73 y o  male MRN: @ Encounter: [id number]        Date:  1/4/2022    Presenting Complaint/Diagnosis :     Metastatic pancreatic adenocarcinoma    HPI:    Douglas Horowitz seen for initial consultation 10/23/200 regarding   A newly diagnosed pancreatic cancer   The patient presented to the hospital with obstructive jaundice   He ended up having a workup along with a biopsy which revealed  A 3 8 cm obstructing mass in the region of the head of the pancreas with marked pancreatic ductal dilatation worrisome for adenocarcinoma   Biopsy was done which proved this was adenocarcinoma   The patient also had cholecystitis at the time and ended up having a cholecystostomy tube placed and was treated with antibiotics  Valentin Etienne was seen by our colleagues in Surgical Oncology who recommended neoadjuvant chemotherapy upfront which we will initiate  Valentin Etienne is not a candidate for  FOLFIRINOX and so  Got gemcitabine and Abraxane  He then had progression and developed metastatic disease  Previous Hematologic/ Oncologic History:    Oncology History   Metastatic pancreatic cancer   5/7/2021 Biopsy    Head of pancreas:  Malignant Adenocarcinoma       6/3/2021 - 9/2/2021 Chemotherapy    pegfilgrastim (NEULASTA ONPRO), 6 mg, Subcutaneous, Once, 2 of 4 cycles  Administration: 6 mg (8/19/2021)  paclitaxel protein-bound (ABRAXANE) IVPB, 100 mg/m2 = 191 mg (80 % of original dose 125 mg/m2), Intravenous, Once, 4 of 6 cycles  Dose modification: 100 mg/m2 (original dose 125 mg/m2, Cycle 1, Reason: Other (Must fill in a comment), Comment: per protocol)  Administration: 191 mg (6/3/2021), 191 mg (6/10/2021), 191 mg (7/1/2021), 191 mg (7/8/2021), 191 mg (8/5/2021), 191 mg (8/12/2021), 191 mg (8/19/2021), 191 mg (9/2/2021)  gemcitabine (GEMZAR) infusion, 1,000 mg/m2 = 1,909 9 mg, Intravenous, Once, 4 of 6 cycles  Administration: 1,909 9 mg (6/3/2021), 1,909 9 mg (6/10/2021), 1,909 9 mg (7/1/2021), 1,909 9 mg (7/8/2021), 1,909 9 mg (8/5/2021), 1,909 9 mg (8/12/2021), 1,909 9 mg (8/19/2021), 1,909 9 mg (9/2/2021)     9/22/2021 - 12/3/2021 Chemotherapy    fluorouracil (ADRUCIL), 400 mg/m2 = 770 mg, Intravenous, Once, 5 of 5 cycles  Administration: 770 mg (9/22/2021), 770 mg (10/6/2021), 785 mg (10/20/2021), 785 mg (11/17/2021), 785 mg (12/1/2021)  pegfilgrastim (Rosario Parul), 6 mg, Subcutaneous, Once, 5 of 5 cycles  Administration: 6 mg (9/24/2021), 6 mg (10/8/2021), 6 mg (10/22/2021), 6 mg (11/19/2021), 6 mg (12/3/2021)  leucovorin calcium IVPB, 772 mg, Intravenous, Once, 5 of 5 cycles  Administration: 800 mg (9/22/2021), 800 mg (10/6/2021), 800 mg (10/20/2021), 800 mg (11/17/2021), 800 mg (12/1/2021)  oxaliplatin (ELOXATIN) chemo infusion, 85 mg/m2 = 164 05 mg, Intravenous, Once, 5 of 5 cycles  Administration: 164 05 mg (9/22/2021), 164 05 mg (10/6/2021), 166 6 mg (10/20/2021), 166 6 mg (11/17/2021), 166 6 mg (12/1/2021)  fluorouracil (ADRUCIL) ambulatory infusion Soln, 1,200 mg/m2/day = 4,630 mg, Intravenous, Over 46 hours, 5 of 5 cycles     12/29/2021 -  Chemotherapy    pegfilgrastim (NEULASTA ONPRO), 6 mg, Subcutaneous, Once, 1 of 6 cycles  Administration: 6 mg (12/31/2021)  leucovorin calcium IVPB, 400 mg/m2 = 780 mg, Intravenous, Once, 1 of 6 cycles  Administration: 800 mg (12/29/2021)  fluorouracil (ADRUCIL) ambulatory infusion Soln, 1,200 mg/m2/day = 4,680 mg, Intravenous, Over 46 hours, 1 of 6 cycles       Gemcitabine and Abraxane    modified FOLFOX 6  Current Hematologic/ Oncologic Treatment:      FOLFIRI with Neulasta growth factor support  Cycle 2   is on the 12th of January  Interval History:    Patient returns for follow-up visit  He states he is doing reasonably well  He tolerated his chemotherapy reasonably well  He does have some abdominal discomfort and did have some nausea after his chemotherapy so I will add on a mend and Aloxi    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  Overall he is trying to maintain his weight  Is on pain medication which he is now taking appropriately and his pain is somewhat controlled  Was enquiring about medical marijuana and I encouraged him to consider it after talking with his palliative care physician  Otherwise he is doing reasonably well  Denies any nausea currently  Denies any constipation  The rest of his 14 point review of systems today was negative  Test Results:    Imaging: XR chest 1 view portable    Result Date: 12/19/2021  Narrative: CHEST INDICATION:   ams  Patient has confirmed COVID-19  COMPARISON:  December 4, 2021 EXAM PERFORMED/VIEWS:  XR CHEST PORTABLE FINDINGS:  Left chest wall Port-A-Cath  Cardiomediastinal silhouette appears unremarkable  The lungs are clear  No pneumothorax or pleural effusion  Osseous structures appear within normal limits for patient age  Impression: No acute cardiopulmonary disease  Workstation performed: [id number]     CT head without contrast    Result Date: 12/19/2021  Narrative: CT BRAIN - WITHOUT CONTRAST INDICATION:   Mental status change, unknown cause Altered mental status  COMPARISON:  11/23/2021 TECHNIQUE:  CT examination of the brain was performed  In addition to axial images, sagittal and coronal 2D reformatted images were created and submitted for interpretation  Radiation dose length product (DLP) for this visit:  878 mGy-cm   This examination, like all CT scans performed in the [facility], was performed utilizing techniques to minimize radiation dose exposure, including the use of iterative reconstruction and automated exposure control  IMAGE QUALITY:  Diagnostic   FINDINGS: PARENCHYMA: Decreased attenuation is noted in periventricular and subcortical white matter demonstrating an appearance that is statistically most likely to represent mild microangiopathic change; this appearance is similar when compared to most recent prior examination  No CT signs of acute infarction  No intracranial mass, mass effect or midline shift  No acute parenchymal hemorrhage  VENTRICLES AND EXTRA-AXIAL SPACES:  Normal for the patient's age  VISUALIZED ORBITS AND PARANASAL SINUSES:  Unremarkable  CALVARIUM AND EXTRACRANIAL SOFT TISSUES:  Normal      Impression: No acute intracranial abnormality  Stable microangiopathic changes within the brain  Workstation performed: [id number]     PE Study with CT Abdomen and Pelvis with contrast    Result Date: 12/9/2021  Narrative: CT PULMONARY ANGIOGRAM OF THE CHEST AND CT ABDOMEN AND PELVIS WITH INTRAVENOUS CONTRAST INDICATION:   SOB, known PE and DVT  COMPARISON:  Multiple priors most recently CT 12/4/2021 TECHNIQUE:  CT examination of the chest, abdomen and pelvis was performed  Thin section CT angiographic technique was used in the chest in order to evaluate for pulmonary embolus and coronal 3D MIP postprocessing was performed on the acquisition scanner  Axial, sagittal, and coronal 2D reformatted images were created from the source data and submitted for interpretation  Radiation dose length product (DLP) for this visit:  0345 74 47 21 mGy-cm   This examination, like all CT scans performed in the [facility], was performed utilizing techniques to minimize radiation dose exposure, including the use of iterative reconstruction and automated exposure control  IV Contrast:  100 mL of iohexol (OMNIPAQUE) 85 mL of iohexol (OMNIPAQUE) Enteric Contrast:  Enteric contrast was not administered  FINDINGS: CHEST PULMONARY ARTERIAL TREE:  Mildly limited evaluation for pulmonary emboli due to respiratory motion  No pulmonary Identified within the limitations of the examination  LUNGS:  Bibasilar atelectasis  No suspicious mass or nodule  PLEURA:  Unremarkable  HEART/AORTA:  Heart is unremarkable for patient's age  No thoracic aortic aneurysm  MEDIASTINUM AND WAQAS:  Unremarkable   CHEST WALL AND LOWER NECK: Unremarkable  ABDOMEN LIVER/BILIARY TREE:  Percutaneous cholecystostomy catheter in place with its tip in the gallbladder  There is a common bile duct stent in place, with associated pneumobilia  Multiple hepatic metastatic lesions are again noted and essentially unchanged  A homogenously hyperattenuating lesion in the left lobe liver is unchanged  GALLBLADDER:  No calcified gallstones  No pericholecystic inflammatory change  SPLEEN:  Unremarkable  PANCREAS:  Pancreatic head mass is unchanged, with unchanged atrophy of the pancreatic body and tail  Pancreatic head mass abuts the right aspect of the superior mesenteric artery, encasing less than 180 degrees of the artery  ADRENAL GLANDS:  Unremarkable  KIDNEYS/URETERS:  One or more simple renal cyst(s) is noted  Otherwise unremarkable kidneys  No hydronephrosis  STOMACH AND BOWEL:  Unremarkable  APPENDIX:  No findings to suggest appendicitis  ABDOMINOPELVIC CAVITY:  No ascites  No pneumoperitoneum  No lymphadenopathy  VESSELS:  No aortic aneurysm or dissection  Superior mesenteric vein thrombus is 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 and unchanged  PELVIS REPRODUCTIVE ORGANS:  The prostate is enlarged  URINARY BLADDER:  Unremarkable  ABDOMINAL WALL/INGUINAL REGIONS:  Soft tissue nodule in the right upper quadrant anterior subcutaneous tissues measuring 2 5 x 1 3 cm, contacting the skin surface, unchanged since at least 2016, benign, probably a sebaceous cyst  OSSEOUS STRUCTURES:  No acute fracture or destructive osseous lesion  Impression: Previously seen pulmonary emboli are not visualized, which may be secondary to decreased clot burden or may be due to artifact obscuring visualization of small emboli  No pulmonary emboli identified on this examination  Unchanged pancreatic head mass with liver metastases and associated superior mesenteric vein thrombosis   Workstation performed: [id number]     CTA chest pe study    Result Date: 12/20/2021  Narrative: CTA - CHEST WITH IV CONTRAST - PULMONARY ANGIOGRAM INDICATION:   Pulmonary embolism (PE) suspected, neg D-dimer, history of PE, DVT, malignancy, now requiring oxygen supplementation and exhibiting AMS  Metastatic pancreatic cancer  Patient has confirmed COVID-19  Positive on 12/19/2021  COMPARISON: Chest radiograph and abdomen CT from 12/4/2021, chest CT from 12/9/2021 and 11/22/2021  Abdomen CT from 11/10/2016  TECHNIQUE: CT angiogram timed for optimal opacification of the pulmonary arteries  Axial, sagittal, and coronal 2D reformats created from source data  Coronal 3D MIP postprocessing on the acquisition scanner  Radiation dose length product (DLP):  425 mGy-cm   Radiation dose exposure minimized using iterative reconstruction and automated exposure control  IV Contrast:  85 mL of iohexol (OMNIPAQUE)  FINDINGS: PULMONARY ARTERIES:  No pulmonary embolus  LUNGS:  No acute disease  Dependent groundglass opacity due to atelectasis  AIRWAYS: No significant filling defects  PLEURA:  Unremarkable  HEART/GREAT VESSELS:  Normal for age  MEDIASTINUM AND WAQAS:  Left subclavian port in the SVC  CHEST WALL AND LOWER NECK: Stable 2 6 cm subcutaneous nodule in the right anterior abdominal wall since 2016 (2/25), likely a sebaceous cyst  UPPER ABDOMEN:  Diffuse hepatic steatosis, liver metastases better shown on recent abdominal CT  Pneumobilia  Right upper quadrant biliary stent and pigtail catheter on the  image  OSSEOUS STRUCTURES:  Mild degenerative disease in the spine  Healed bilateral rib fractures  Impression: No pulmonary embolus  No acute pulmonary disease with mild bilateral dependent atelectasis  Diffuse hepatic steatosis with liver metastases better shown on recent abdominal CT   Workstation performed: [id number]       Labs:   Lab Results   Component Value Date    WBC 5 97 12/27/2021    HGB 12 7 12/27/2021    HCT 39 4 12/27/2021    MCV 92 12/27/2021     12/27/2021     Lab Results   Component Value Date    NA 143 09/01/2017    K 4 1 12/27/2021     12/27/2021    CO2 29 12/27/2021    BUN 17 12/27/2021    CREATININE 0 97 12/27/2021    GLUCOSE 117 (H) 09/01/2017    GLUF 78 12/27/2021    CALCIUM 8 6 12/27/2021    CORRECTEDCA 9 2 12/27/2021    AST 39 12/27/2021    ALT 30 12/27/2021    ALKPHOS 164 0 (H) 12/27/2021    PROT 5 8 (L) 09/01/2017    BILITOT 1 0 09/01/2017    EGFR 81 12/27/2021       Lab Results   Component Value Date    PSA 1 6 11/28/2020    PSA 1 4 11/09/2019    PSA 1 7 11/25/2017       Lab Results   Component Value Date    CEA 0 9 04/29/2021       Lab Results   Component Value Date    IRON 16 (L) 07/13/2021    TIBC 248 (L) 07/13/2021    FERRITIN 625 (H) 07/13/2021       Lab Results   Component Value Date    CUVYIKHL01 238 07/13/2021         ROS: As stated in the history of present illness otherwise his 14 point review of systems today was negative        Active Problems:   Patient Active Problem List   Diagnosis    Rheumatoid arthritis ([address] )    Essential hypertension    Urgency of urination    Anxiety disorder    Hyperlipidemia    Liver mass    GERD    Multiple lipomas    Type 2 diabetes mellitus (HCC)    BPH (benign prostatic hyperplasia)    Recent cholecystitis    Leukocytosis    Metastatic pancreatic cancer    Chemotherapy induced neutropenia (HCC)    Pancreatic cancer metastasized to liver (HCC)    Abdominal pain    Sepsis without acute organ dysfunction (HCC)    Generalized weakness    Pancytopenia due to chemotherapy    Unspecified protein-calorie malnutrition (HCC)    Polymicrobial bacteremia    Dental infection    Chest pain    Pneumobilia due to Occluded CBD stent    Numbness and tingling of foot    Pulmonary embolism (HCC)    Mesenteric vein thrombosis (HCC)    Idiopathic osteoarthritis    Rotator cuff tear arthropathy    Tendinitis of right shoulder    Elevated transaminase level    Shortness of breath    Positive blood culture    Palliative care patient    Cancer related pain    COVID-19 virus infection    Metabolic encephalopathy       Past Medical History:   Past Medical History:   Diagnosis Date    Anxiety     Arthritis     Cancer ([place] [address] )     pancreatic    Cellulitis     LAST ASSESSED: 6/13/14    Diabetes mellitus ([place] [address] )     Enlarged prostate     Epidermal inclusion cyst     LAST ASSESSED: 10/4/13    Erythrasma     LAST ASSESSED: 9/23/13    Furuncle     LAST ASSESSED: 6/11/14    GERD (gastroesophageal reflux disease)     Hyperlipidemia     Hypertension     RA (rheumatoid arthritis) ([place] [address] )        Surgical History:   Past Surgical History:   Procedure Laterality Date    COLONOSCOPY      FL GUIDED CENTRAL VENOUS ACCESS DEVICE INSERTION  6/1/2021    HYDROCELE EXCISION / REPAIR Right 01/11/2018    SPERMATIC CORD EXCSION OF HYDROCELE; MANAGED BY: GABBIE CARBAJAL    IR BIOPSY LIVER MASS  8/16/2021    IR CHOLECYSTOSTOMY TUBE CHECK/CHANGE/REPOSITION/REINSERTION/UPSIZE  6/22/2021    IR CHOLECYSTOSTOMY TUBE CHECK/CHANGE/REPOSITION/REINSERTION/UPSIZE  8/23/2021    IR CHOLECYSTOSTOMY TUBE PLACEMENT  5/11/2021    MULTIPLE TOOTH EXTRACTIONS Left 7/22/2021    Procedure: EXTRACTION TEETH 14 & 15, ALVEOPLASTY, EXCISION OF CYST LEFT MAXILLA; Surgeon: Shaylee Herr DDS;  Location[facility];  Service: Maxillofacial    HI REMOVAL OF HYDROCELE,TUNICA,UNILAT Right 1/11/2018    Procedure: HYDROCELECTOMY;  Surgeon: Mary Womack MD;  Location: [facility];  Service: Urology    SCROTAL SURGERY      benign "lump"    TUNNELED VENOUS PORT PLACEMENT Left 6/1/2021    Procedure: INSERTION VENOUS PORT (PORT-A-CATH);   Surgeon: Daily Weber MD;  Location: [facility];  Service: Surgical Oncology       Family History:    Family History   Problem Relation Age of Onset    Heart disease Father         CARDIAC DISORDER    Hypertension Father     Hypertension Mother     No Known Problems Maternal Aunt     No Known Problems Maternal Uncle     No Known Problems Paternal Aunt     No Known Problems Paternal Uncle     No Known Problems Paternal Grandmother     No Known Problems Paternal Grandfather     Diabetes Maternal Grandmother         MELLITUS    No Known Problems Maternal Grandfather     Hypertension Other        Cancer-related family history is not on file      Social History:   Social History     Socioeconomic History    Marital status: /Civil Union     Spouse name: Not on file    Number of children: 2    Years of education: Not on file    Highest education level: Not on file   Occupational History    Occupation: FULL-TIME EMPLOYMENT   Tobacco Use    Smoking status: Former Smoker    Smokeless tobacco: Never Used    Tobacco comment: tobacco in a pipe in the early 1980's   Vaping Use    Vaping Use: Never used   Substance and Sexual Activity    Alcohol use: Never    Drug use: Never    Sexual activity: Not Currently     Comment: NOT CURRENTLY SEXUALLY ACTIVE AS PER [employer]   Other Topics Concern    Not on file   Social History Narrative    ALWAYS USES SEAT BELT    DENIED DAILY COFFEE CONSUMPTION (__CUPS/DAY)    DENIED DAILY COLA CONSUMPTION (__CANS/DAY)    DAILY TEA CONSUMPTION (__CUPS/DAY)    DENTAL CARE, REGULARLY    EXERCISE: WALKING    HIGH SCHOOL GRADUATE    DENIED MULTIPLE ORGAN DONOR    NO LIVING WILL    PETS/ANIMALS    DENIED POWER OF  IN EXISTENCE    WATER INTAKE, ADEQUATE (PER DAY)     Social Determinants of Health     Financial Resource Strain: Not on file   Food Insecurity: Not on file   Transportation Needs: Not on file   Physical Activity: Not on file   Stress: Not on file   Social Connections: Not on file   Intimate Partner Violence: Not on file   Housing Stability: Not on file       Current Medications:   Current Outpatient Medications   Medication Sig Dispense Refill    acetaminophen (TYLENOL) 325 mg tablet Take 2 tablets (650 mg total) by mouth every 8 (eight) hours 168 tablet 2    Alcohol Swabs 70 % PADS May substitute brand based on insurance coverage  Check glucose ACHS   200 each 0    apixaban (Eliquis) 5 mg Take 1 tablet (5 mg total) by mouth 2 (two) times a day 60 tablet 3    aspirin 81 mg chewable tablet Chew 81 mg       atorvastatin (LIPITOR) 20 mg tablet Take 1 tablet (20 mg total) by mouth daily 90 tablet 1    Blood Glucose Monitoring Suppl (ONE TOUCH ULTRA 2) w/Device KIT Use daily 1 each 1    Creon 13128-46813 units TAKE 1 CAPSULE BY MOUTH THREE TIMES DAILY WITH MEALS OR SNACKS 270 capsule 0    cyanocobalamin (VITAMIN B-12) 1000 MCG tablet Take 1 tablet (1,000 mcg total) by mouth daily 30 tablet 5    [START ON 1/12/2022] fluorouracil 4,680 mg in CADD infusion pump Infuse 4,680 mg (1,200 mg/m2/day x 1 95 m2) into a venous catheter over 46 hours for 2 days  Do not start before January 12, 2022  1 each 0    FLUoxetine (PROzac) 20 mg capsule TAKE ONE CAPSULE BY MOUTH EVERY DAY 90 capsule 2    folic acid (FOLVITE) 1 mg tablet Take by mouth daily      gabapentin (NEURONTIN) 100 mg capsule Take 2 capsules (200 mg total) by mouth 3 (three) times a day 180 capsule 3    glucose blood (OneTouch Ultra) test strip Use as instructed tid 100 each 5    insulin lispro (HumaLOG KwikPen) 100 units/mL injection pen Take 10 units with breakfast, lunch and dinner +scale 15 mL 5    Insulin Pen Needle (Unifine Pentips Plus) 32G X 4 MM MISC USE WITH INSULIN PENS FOUR TIMES DAILY 100 each 3    Lancets (onetouch ultrasoft) lancets Use as instructed 3x a day 100 each 5    Lantus SoloStar 100 units/mL injection pen INJECT 12 UNITS UNDER THE SKIN DAILY AT BEDTIME 15 mL 0    lisinopril-hydrochlorothiazide (PRINZIDE,ZESTORETIC) 10-12 5 MG per tablet Take 1 tablet by mouth 3 (three) times a week Monday Wednesday Friday 90 tablet 3    morphine (MS CONTIN) 30 mg 12 hr tablet Take 1 tablet (30 mg total) by mouth every 12 (twelve) hours Max Daily Amount: 60 mg 60 tablet 0    naloxone (NARCAN) 4 mg/0 1 mL nasal spray 0 1 mL (4 mg total) by Alternating Nares route every 3 (three) minutes as needed (accidental opioid overdose or respiratory depression) 1 each 1    ondansetron (ZOFRAN-ODT) 4 mg disintegrating tablet Take 1 tablet (4 mg total) by mouth every 6 (six) hours as needed for nausea or vomiting for up to 12 doses 12 tablet 0    oxyCODONE (ROXICODONE) 10 MG TABS Take 1-2 tablets (10-20 mg total) by mouth every 4 (four) hours as needed (moderate to severe cancer-related pain) Take 1-2 tablets (10-20mg) up to once every 4 hours as needed for pain  Maximum daily dose 120mg (12 tablets ) Max Daily Amount: 120 mg 360 tablet 0    pantoprazole (PROTONIX) 40 mg tablet Take 1 tablet (40 mg total) by mouth 2 (two) times a day Before breakfast, before bed, to reduce stomach acid 60 tablet 1    predniSONE 10 mg tablet Take 1 tablet (10 mg total) by mouth daily with breakfast 90 tablet 0    prochlorperazine (COMPAZINE) 10 mg tablet Take 1 tablet (10 mg total) by mouth every 6 (six) hours as needed for nausea or vomiting 45 tablet 3    simethicone (MYLICON,GAS-X) 425 MG capsule Take 1 capsule (180 mg total) by mouth every 8 (eight) hours as needed for flatulence 84 capsule 1    Sodium Chloride Flush (Normal Saline Flush) 0 9 % SOLN Gall bladder drain      tamsulosin (FLOMAX) 0 4 mg TAKE 1 CAPSULE BY MOUTH  DAILY AT BEDTIME 90 capsule 3    senna (SENOKOT) 8 6 MG tablet Take 1 tablet (8 6 mg total) by mouth daily at bedtime as needed for constipation 30 tablet 0     No current facility-administered medications for this visit  Allergies: Allergies   Allergen Reactions    Fentanyl Anaphylaxis and Other (See Comments)     Oxygen drops severely       Physical Exam:    Body surface area is 1 92 meters squared      Wt Readings from Last 3 Encounters:   01/04/22 74 8 kg (165 lb)   12/29/21 75 3 kg (166 lb)   12/20/21 79 1 kg (174 lb 6 1 oz)        Temp Readings from Last 3 Encounters:   01/04/22 98 °F (36 7 °C) (Temporal)   12/31/21 (!) 97 1 °F (36 2 °C) (Temporal)   12/29/21 (!) 96 9 °F (36 1 °C) (Temporal)        BP Readings from Last 3 Encounters:   01/04/22 120/70   12/31/21 124/68   12/29/21 127/81         Pulse Readings from Last 3 Encounters:   01/04/22 74   12/31/21 75   12/29/21 94         Physical Exam     Constitutional   General appearance: No acute distress, well appearing and well nourished  Eyes   Conjunctiva and lids: No swelling, erythema or discharge  Pupils and irises: Equal, round and reactive to light  Ears, Nose, Mouth, and Throat   External inspection of ears and nose: Normal     Nasal mucosa, septum, and turbinates: Normal without edema or erythema  Oropharynx: Normal with no erythema, edema, exudate or lesions  Pulmonary   Respiratory effort: No increased work of breathing or signs of respiratory distress  Auscultation of lungs: Clear to auscultation  Cardiovascular   Palpation of heart: Normal PMI, no thrills  Auscultation of heart: Normal rate and rhythm, normal S1 and S2, without murmurs  Examination of extremities for edema and/or varicosities: Normal     Carotid pulses: Normal     Abdomen   Abdomen: Non-tender, no masses  Liver and spleen: No hepatomegaly or splenomegaly  Lymphatic   Palpation of lymph nodes in neck: No lymphadenopathy  Musculoskeletal   Gait and station: Normal     Digits and nails: Normal without clubbing or cyanosis  Inspection/palpation of joints, bones, and muscles: Normal     Skin   Skin and subcutaneous tissue: Normal without rashes or lesions  Neurologic   Cranial nerves: Cranial nerves 2-12 intact  Sensation: No sensory loss      Psychiatric   Orientation to person, place, and time: Normal     Mood and affect: Normal         Assessment / Plan:      The patient is a pleasant 81-year-old male with newly diagnosed adenocarcinoma of the pancreas who was referred to see us for consideration of neoadjuvant chemotherapy   We decided to start gemcitabine and Abraxane based on his performance status and disease   He had approximately 3 cycles of treatment and was found to have liver lesions   We switched the patient to modified FOLFOX 6  He had a mixed response with some progression so we switched him to FOLFIRI  He is getting this with Neulasta  He is on Eliquis currently  I will add on emmend and Aloxi  I will see the patient back in a month  Until then if he has any questions he will call our office  Goals and Barriers:  Current Goal:  Prolong Survival from pancreatic cancer  Barriers: None  Patient's Capacity to Self Care:  Patient able to self care  Portions of the record may have been created with voice recognition software  Occasional wrong word or "sound a like" substitutions may have occurred due to the inherent limitations of voice recognition software  Read the chart carefully and recognize, using context, where substitutions have occurred

## 2022-01-12 NOTE — TELEPHONE ENCOUNTER
Calling to discuss at appointment nutrition because he's having a lack of appetite and pain after eating and if a  could talk to his spouse, having a hard time dealing with everything needing for support services

## 2022-01-12 NOTE — PROGRESS NOTES
Patient to infusion for Irinotecan and 5FU CADD  Patient complains of some abdominal pain but states it is not new pain, he gets everyday after eating breakfast   Labs reviewed from 1/10/22 alkaline phosphatase is elevation Makenna Bynum RN notified, currently awaiting response  Will continue to monitor

## 2022-01-13 NOTE — TELEPHONE ENCOUNTER
LSW received task from Ritika Harris RN for pt requesting LSW to f/u with pt's wife Jeremias Schreiber to provide support  Per Lilly Cuevas, she has spoken with pt and he provided permission for LSW to f/u with wife around 4pm today (wife currently working)  LSW phoned pt's wife but was unable to reach her  LSW left  for wife providing her contact information, and introduced role of [facility] SW including providing emotional support/helping to connect pt/wife to resources for any identified needs  Will await return call  Pt scheduled for office visit with LSW on 2/7

## 2022-01-14 NOTE — TELEPHONE ENCOUNTER
Set up GI appt for patient  Called patient and talked to patient  Patient wrote down appt date and time and location and was good with the appt

## 2022-01-14 NOTE — PROGRESS NOTES
Patient is here for a cadd d/c and neulasta onpro  He states he is having some soft to loose stools started yesterday  Patient states it is not diarrhea at this time  Educated patient on imodium if it does become that and he verbalized understanding  Patient complains of a "heaviness" in his stomach  He states he has had this for a long time  He states its not really nausea and he does take zofran at home as ordered  He denies nausea  Patient instructed to notify Dr Librado Malik if he feels worse and he verbalized understanding  cadd pump d/c'd per protocol with a res vol of 0 mls  neulasta onpro placed on right arm, confirmed working with green light flashing  Patient verbalized understanding of how it works and when to removed it  Next appointment confirmed and avs given

## 2022-01-14 NOTE — TELEPHONE ENCOUNTER
Patient stopped by [facility] to speak with provider because he is experiencing stomach pain  He isn't sure if it is from treatment or from something he has eaten  New pain has been going since yesterday

## 2022-01-15 NOTE — ED ATTENDING ATTESTATION
1/15/2022  IAye MD, saw and evaluated the patient  I have discussed the patient with the resident/non-physician practitioner and agree with the resident's/non-physician practitioner's findings, Plan of Care, and MDM as documented in the resident's/non-physician practitioner's note, except where noted  All available labs and Radiology studies were reviewed  I was present for key portions of any procedure(s) performed by the resident/non-physician practitioner and I was immediately available to provide assistance  At this point I agree with the current assessment done in the Emergency Department  I have conducted an independent evaluation of this patient a history and physical is as follows:    59-year-old male with history of metastatic pancreatic cancer, prior history of cholecystitis with residual biliary tube in place presented for evaluation abdominal pain after his dog jumped on him earlier this morning in bed  He reports that tube dislodged about a cm  Tube has not actively drained any bilious fluid in months  It was initially placed about 8 months ago  Reports he flushes it with saline each evening  He has been having some hiccups and some consistent diffuse abdominal pain  He does have pain at baseline secondary to malignancy which oral morphine typically helps  Reports he did not get any relief from morphine this afternoon  Tenderness throughout the abdomen  Plan CT to rule out acute injuries, potential dislodgement of tube, pain control and will re-evaluate  ED Course  ED Course as of 01/15/22 1901   Sat Sean 15, 2022   7109 CT shows that biliary tube remains in place  No evidence of acute traumatic injury  His pancreatic mass, however, has enlarged and is now completely encasing the SMA  There is new mesenteric edema and small bowel wall thickening  Results discussed with the patient  He has been having baseline increased abdominal pain for weeks    He follows closely with his oncologist   Mariana Amezquita to follow up as soon as possible or return to the emergency department if his abdominal pain worsens           Critical Care Time  Procedures

## 2022-01-15 NOTE — ED PROVIDER NOTES
History  Chief Complaint   Patient presents with    Medical Problem     Pt reports his dog jumped on him and he believes it dislodged his gallbladder drain      43-year-old male past medical history of stage IV pancreatic cancer presents with possible dislodgement his biliary tube  Patient states last night around 4 a m  (about 12 hours ago) his dog jumped onto him causing the drain to dislodge about half of an inch  Patient reports baseline upper abdominal pain secondary to pancreatic cancer but states that the pain is significantly worsening  Patient reports generalized abdominal discomfort  Patient denies any drainage from the drain, fevers, nausea or vomiting  Patient states the drain was placed in May of this year but has not drained any bile for months  Patient states he does frequently flush the drain with saline each day  Patient denies any other current complaints at this time  Medical Problem  Associated symptoms: abdominal pain    Associated symptoms: no chest pain, no cough, no ear pain, no fever, no rash, no shortness of breath, no sore throat and no vomiting        Prior to Admission Medications   Prescriptions Last Dose Informant Patient Reported? Taking? Alcohol Swabs 70 % PADS  Self No No   Sig: May substitute brand based on insurance coverage  Check glucose ACHS     Blood Glucose Monitoring Suppl (ONE TOUCH ULTRA 2) w/Device KIT  Self No No   Sig: Use daily   Creon 86488-70108 units 1/15/2022 at Unknown time  No Yes   Sig: TAKE 1 CAPSULE BY MOUTH THREE TIMES DAILY WITH MEALS OR SNACKS   FLUoxetine (PROzac) 20 mg capsule 1/15/2022 at Unknown time Self No Yes   Sig: TAKE ONE CAPSULE BY MOUTH EVERY DAY   Insulin Pen Needle (Unifine Pentips Plus) 32G X 4 MM MISC  Self No No   Sig: USE WITH INSULIN PENS FOUR TIMES DAILY   Lancets (onetouch ultrasoft) lancets  Self No No   Sig: Use as instructed 3x a day   Lantus SoloStar 100 units/mL injection pen 1/14/2022 at Unknown time Self No Yes Sig: INJECT 12 UNITS UNDER THE SKIN DAILY AT BEDTIME   Sodium Chloride Flush (Normal Saline Flush) 0 9 % SOLN  Self Yes No   Sig: Gall bladder drain   acetaminophen (TYLENOL) 325 mg tablet  Self No No   Sig: Take 2 tablets (650 mg total) by mouth every 8 (eight) hours   apixaban (Eliquis) 5 mg 1/15/2022 at Unknown time Self No Yes   Sig: Take 1 tablet (5 mg total) by mouth 2 (two) times a day   aspirin 81 mg chewable tablet 1/15/2022 at Unknown time Self Yes Yes   Sig: Chew 81 mg    atorvastatin (LIPITOR) 20 mg tablet   No No   Sig: Take 1 tablet (20 mg total) by mouth daily   cyanocobalamin (VITAMIN B-12) 1000 MCG tablet 1/15/2022 at Unknown time Self No Yes   Sig: Take 1 tablet (1,000 mcg total) by mouth daily   fluorouracil 4,680 mg in CADD infusion pump  Self No No   Sig: Infuse 4,680 mg (1,200 mg/m2/day x 1 95 m2) into a venous catheter over 46 hours for 2 days  Do not start before 2022     folic acid (FOLVITE) 1 mg tablet  Self Yes No   Sig: Take by mouth daily   gabapentin (NEURONTIN) 100 mg capsule 1/15/2022 at Unknown time Self No Yes   Sig: Take 2 capsules (200 mg total) by mouth 3 (three) times a day   glucose blood (OneTouch Ultra) test strip  Self No No   Sig: Use as instructed tid   insulin lispro (HumaLOG KwikPen) 100 units/mL injection pen  Self No No   Sig: Take 10 units with breakfast, lunch and dinner +scale   lisinopril-hydrochlorothiazide (PRINZIDE,ZESTORETIC) 10-12 5 MG per tablet  Self No No   Sig: Take 1 tablet by mouth 3 (three) times a week    morphine (MS CONTIN) 30 mg 12 hr tablet 1/15/2022 at Unknown time Self No Yes   Sig: Take 1 tablet (30 mg total) by mouth every 12 (twelve) hours Max Daily Amount: 60 mg   naloxone (NARCAN) 4 mg/0 1 mL nasal spray  Self No No   Si 1 mL (4 mg total) by Alternating Nares route every 3 (three) minutes as needed (accidental opioid overdose or respiratory depression)   ondansetron (ZOFRAN-ODT) 4 mg disintegrating tablet  Self No No   Sig: Take 1 tablet (4 mg total) by mouth every 6 (six) hours as needed for nausea or vomiting for up to 12 doses   oxyCODONE (ROXICODONE) 10 MG TABS  Self No No   Sig: Take 1-2 tablets (10-20 mg total) by mouth every 4 (four) hours as needed (moderate to severe cancer-related pain) Take 1-2 tablets (10-20mg) up to once every 4 hours as needed for pain   Maximum daily dose 120mg (12 tablets ) Max Daily Amount: 120 mg   pantoprazole (PROTONIX) 40 mg tablet 1/15/2022 at Unknown time Self No Yes   Sig: Take 1 tablet (40 mg total) by mouth 2 (two) times a day Before breakfast, before bed, to reduce stomach acid   predniSONE 10 mg tablet 1/15/2022 at Unknown time Self No Yes   Sig: Take 1 tablet (10 mg total) by mouth daily with breakfast   prochlorperazine (COMPAZINE) 10 mg tablet  Self No No   Sig: Take 1 tablet (10 mg total) by mouth every 6 (six) hours as needed for nausea or vomiting   senna (SENOKOT) 8 6 MG tablet  Self No No   Sig: Take 1 tablet (8 6 mg total) by mouth daily at bedtime as needed for constipation   simethicone (MYLICON,GAS-X) 660 MG capsule  Self No No   Sig: Take 1 capsule (180 mg total) by mouth every 8 (eight) hours as needed for flatulence   tamsulosin (FLOMAX) 0 4 mg 1/14/2022 at Unknown time Self No Yes   Sig: TAKE 1 CAPSULE BY MOUTH  DAILY AT BEDTIME      Facility-Administered Medications: None       Past Medical History:   Diagnosis Date    Anxiety     Arthritis     Cancer ([facility] )     pancreatic    Cellulitis     LAST ASSESSED: 6/13/14    Diabetes mellitus ([facility] )     Enlarged prostate     Epidermal inclusion cyst     LAST ASSESSED: 10/4/13    Erythrasma     LAST ASSESSED: 9/23/13    Furuncle     LAST ASSESSED: 6/11/14    GERD (gastroesophageal reflux disease)     Hyperlipidemia     Hypertension     RA (rheumatoid arthritis) ([facility] )        Past Surgical History:   Procedure Laterality Date    COLONOSCOPY      FL GUIDED CENTRAL VENOUS ACCESS DEVICE INSERTION 6/1/2021    HYDROCELE EXCISION / REPAIR Right 01/11/2018    SPERMATIC CORD EXCSION OF HYDROCELE; MANAGED BY: GABBIE CARBAJAL    IR BIOPSY LIVER MASS  8/16/2021    IR CHOLECYSTOSTOMY TUBE CHECK/CHANGE/REPOSITION/REINSERTION/UPSIZE  6/22/2021    IR CHOLECYSTOSTOMY TUBE CHECK/CHANGE/REPOSITION/REINSERTION/UPSIZE  8/23/2021    IR CHOLECYSTOSTOMY TUBE PLACEMENT  5/11/2021    MULTIPLE TOOTH EXTRACTIONS Left 7/22/2021    Procedure: EXTRACTION TEETH 14 & 15, ALVEOPLASTY, EXCISION OF CYST LEFT MAXILLA; Surgeon: Chung Kuhn DDS;  [facility];  Service: Maxillofacial    WA REMOVAL OF HYDROCELE,TUNICA,UNILAT Right 1/11/2018    Procedure: HYDROCELECTOMY;  Surgeon: Diana Rose MD;  [facility];  Service: Urology    SCROTAL SURGERY      benign "lump"    TUNNELED VENOUS PORT PLACEMENT Left 6/1/2021    Procedure: INSERTION VENOUS PORT (PORT-A-CATH); Surgeon: Nidhi Ledezma MD;  [facility];  Service: Surgical Oncology       Family History   Problem Relation Age of Onset    Heart disease Father         CARDIAC DISORDER    Hypertension Father     Hypertension Mother     No Known Problems Maternal Aunt     No Known Problems Maternal Uncle     No Known Problems Paternal Aunt     No Known Problems Paternal Uncle     No Known Problems Paternal Grandmother     No Known Problems Paternal Grandfather     Diabetes Maternal Grandmother         MELLITUS    No Known Problems Maternal Grandfather     Hypertension Other      I have reviewed and agree with the history as documented      E-Cigarette/Vaping    E-Cigarette Use Never User      E-Cigarette/Vaping Substances    Nicotine No     THC No     CBD No      Social History     Tobacco Use    Smoking status: Former Smoker    Smokeless tobacco: Never Used    Tobacco comment: tobacco in a pipe in the early 1980's   Vaping Use    Vaping Use: Never used   Substance Use Topics    Alcohol use: Never    Drug use: Never        Review of Systems   Constitutional: Negative for chills and fever  HENT: Negative for ear pain and sore throat  Eyes: Negative for pain and visual disturbance  Respiratory: Negative for cough and shortness of breath  Cardiovascular: Negative for chest pain and palpitations  Gastrointestinal: Positive for abdominal pain  Negative for vomiting  Genitourinary: Negative for dysuria and hematuria  Musculoskeletal: Negative for arthralgias and back pain  Skin: Negative for color change and rash  Neurological: Negative for seizures and syncope  All other systems reviewed and are negative  Physical Exam  ED Triage Vitals [01/15/22 1521]   Temperature Pulse Respirations Blood Pressure SpO2   98 9 °F (37 2 °C) 88 16 109/65 96 %      Temp Source Heart Rate Source Patient Position - Orthostatic VS BP Location FiO2 (%)   Oral Monitor Lying Right arm --      Pain Score       9             Orthostatic Vital Signs  Vitals:    01/15/22 1521   BP: 109/65   Pulse: 88   Patient Position - Orthostatic VS: Lying       Physical Exam  Vitals and nursing note reviewed  Constitutional:       General: He is in acute distress (secondary to pain, belching and hiccuping throughout encounter)  Appearance: Normal appearance  He is well-developed  HENT:      Head: Normocephalic and atraumatic  Mouth/Throat:      Mouth: Mucous membranes are moist    Eyes:      Conjunctiva/sclera: Conjunctivae normal    Cardiovascular:      Rate and Rhythm: Normal rate and regular rhythm  Heart sounds: No murmur heard  Pulmonary:      Effort: Pulmonary effort is normal  No respiratory distress  Breath sounds: Normal breath sounds  Abdominal:      Palpations: Abdomen is soft  Tenderness: There is abdominal tenderness in the right upper quadrant and epigastric area  There is no guarding or rebound  Musculoskeletal:         General: Normal range of motion  Cervical back: Neck supple     Skin:     General: Skin is warm and dry  Neurological:      Mental Status: He is alert  ED Medications  Medications   HYDROmorphone (DILAUDID) injection 0 5 mg (0 5 mg Intravenous Given 1/15/22 1629)   iohexol (OMNIPAQUE) 350 MG/ML injection (SINGLE-DOSE) 100 mL (100 mL Intravenous Given 1/15/22 1653)       Diagnostic Studies  Results Reviewed     None                 CT abdomen pelvis with contrast   Final Result by Rob Lawson MD (01/15 1831)      1  Gallbladder is mostly decompressed with a cholecystostomy tube in place  The tube appears to have slightly retracted from prior study and now lies close to the fundus, previously near the neck, but the locking loop still remains within the lumen and    there is no evidence of leak  No biliary dilatation  2   The superior mesenteric vein remains thrombosed, now with extension of nonocclusive thrombus approximately 8 mm into the portal confluence  The portal veins are otherwise patent  3   Diffuse edematous wall thickening of much of the small bowel bowel compatible with an enteritis, 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 noting new mesenteric edema  4   Increased size of pancreatic head mass now with greater than 180 degrees encasement of the SMA  5   Hepatic metastases are grossly stable  The study was marked in EPIC for significant notification  Workstation performed: [id number]               Procedures  Procedures      ED Course                                       MDM  Number of Diagnoses or Management Options  Abdominal pain  Pancreatic cancer [facility])  Diagnosis management comments: 69-year-old male past medical history of stage IV metastatic pancreatic cancer presents with possible dislodgement his biliary tube  Patient reports baseline upper abdominal pain secondary to pancreatic cancer but states that the pain is worsening  CT obtained to determine placement of biliary tube, which showed cholecystectomy tube in place    CT also showed increasing size of pancreatic mass that is now completely surrounding the SMA  CT findings were discussed with the patient  Discussed with patient that due to increasing size of mass and given the location of mass around the SMA, this could lead to further complications of bowel obstruction  Strict return precautions were discussed including to return to ED for signs of worsening abdominal pain  Patient reports improvement in pain following Dilaudid  Recommended close follow up with oncologist   Patient expresses understanding and states he already follows with oncology closely  Recommended for patient to also follow up with either surgery or IR for possible removal of cholecystectomy tube  Patient expresses understanding  Amount and/or Complexity of Data Reviewed  Tests in the radiology section of CPT®: ordered and reviewed  Decide to obtain previous medical records or to obtain history from someone other than the patient: yes  Review and summarize past medical records: yes    Risk of Complications, Morbidity, and/or Mortality  Presenting problems: moderate  Diagnostic procedures: moderate  Management options: low    Patient Progress  Patient progress: stable      Disposition  Final diagnoses:   Abdominal pain   Pancreatic cancer ([facility] )     Time reflects when diagnosis was documented in both MDM as applicable and the Disposition within this note     Time User Action Codes Description Comment    1/15/2022  6:48 PM Ry Bautista Add [R10 9] Abdominal pain     1/15/2022  6:48 PM Ry Bautista Add [C25 9] Pancreatic cancer [facility])       ED Disposition     ED Disposition Condition Date/Time Comment    Discharge Stable Sat Sean 15, 2022  6:48 PM Dontae Rockwell Sr  discharge to home/self care              Follow-up Information     Follow up With Specialties Details Why Contact Info Additional [address] Emergency Department Emergency Medicine  If symptoms worsen [address]  [address] [address] [facility] Emergency Department, [address], [place], [place], [address] [facility] [address] Surgery [facility] Surgery   [place] [address] [phone] [facility] General Surgery [address], [place], [place], [address], [phone]    [facility] Interventional Radiology [facility] Interventional Radiology   [address] [id number] [phone] [facility] Interventional Radiology [facility], [address], [place], [place], [address] [phone]    [facility] Hematology Oncology Specialists [facility] Hematology and Oncology Schedule an appointment as soon as possible for a visit   [address] [id number] [phone] [facility]ematology [facility]cology [facility], [address], [place], [place] [facility]          Discharge Medication List as of 1/15/2022  6:51 PM      CONTINUE these medications which have NOT CHANGED    Details   acetaminophen (TYLENOL) 325 mg tablet Take 2 tablets (650 mg total) by mouth every 8 (eight) hours, Starting Fri 11/26/2021, Until Fri 2/18/2022, Normal      Alcohol Swabs 70 % PADS May substitute brand based on insurance coverage   Check glucose ACHS , Normal      apixaban (Eliquis) 5 mg Take 1 tablet (5 mg total) by mouth 2 (two) times a day, Starting Thu 12/2/2021, Normal      aspirin 81 mg chewable tablet Chew 81 mg , Historical Med      atorvastatin (LIPITOR) 20 mg tablet Take 1 tablet (20 mg total) by mouth daily, Starting Mon 1/10/2022, Normal      Blood Glucose Monitoring Suppl (ONE TOUCH ULTRA 2) w/Device KIT Use daily, Starting Wed 3/17/2021, Normal      Creon 42138-10112 units TAKE 1 CAPSULE BY MOUTH THREE TIMES DAILY WITH MEALS OR SNACKS, Normal      cyanocobalamin (VITAMIN B-12) 1000 MCG tablet Take 1 tablet (1,000 mcg total) by mouth daily, Starting Tue 8/24/2021, Normal      FLUoxetine (PROzac) 20 mg capsule TAKE ONE CAPSULE BY MOUTH EVERY DAY, Normal      folic acid (FOLVITE) 1 mg tablet Take by mouth daily, Historical Med      gabapentin (NEURONTIN) 100 mg capsule Take 2 capsules (200 mg total) by mouth 3 (three) times a day, Starting Mon 8/30/2021, Normal      glucose blood (OneTouch Ultra) test strip Use as instructed tid, Normal      insulin lispro (HumaLOG KwikPen) 100 units/mL injection pen Take 10 units with breakfast, lunch and dinner +scale, Normal      Insulin Pen Needle (Unifine Pentips Plus) 32G X 4 MM MISC USE WITH INSULIN PENS FOUR TIMES DAILY, Normal      Lancets (onetouch ultrasoft) lancets Use as instructed 3x a day, Normal      Lantus SoloStar 100 units/mL injection pen INJECT 12 UNITS UNDER THE SKIN DAILY AT BEDTIME, Starting Mon 10/11/2021, Normal      lisinopril-hydrochlorothiazide (PRINZIDE,ZESTORETIC) 10-12 5 MG per tablet Take 1 tablet by mouth 3 (three) times a week Monday Wednesday Friday, Starting Mon 6/14/2021, Normal      morphine (MS CONTIN) 30 mg 12 hr tablet Take 1 tablet (30 mg total) by mouth every 12 (twelve) hours Max Daily Amount: 60 mg, Starting Mon 12/13/2021, Normal      naloxone (NARCAN) 4 mg/0 1 mL nasal spray 0 1 mL (4 mg total) by Alternating Nares route every 3 (three) minutes as needed (accidental opioid overdose or respiratory depression), Starting Mon 12/13/2021, Normal      ondansetron (ZOFRAN-ODT) 4 mg disintegrating tablet Take 1 tablet (4 mg total) by mouth every 6 (six) hours as needed for nausea or vomiting for up to 12 doses, Starting Sat 12/4/2021, Normal      oxyCODONE (ROXICODONE) 10 MG TABS Take 1-2 tablets (10-20 mg total) by mouth every 4 (four) hours as needed (moderate to severe cancer-related pain) Take 1-2 tablets (10-20mg) up to once every 4 hours as needed for pain   Maximum daily dose 120mg (12 tablets ) Max Daily Amount: 120 mg, St arting Fri 12/24/2021, Normal      pantoprazole (PROTONIX) 40 mg tablet Take 1 tablet (40 mg total) by mouth 2 (two) times a day Before breakfast, before bed, to reduce stomach acid, Starting Mon 12/13/2021, Normal      predniSONE 10 mg tablet Take 1 tablet (10 mg total) by mouth daily with breakfast, Starting Tue 9/28/2021, Normal      prochlorperazine (COMPAZINE) 10 mg tablet Take 1 tablet (10 mg total) by mouth every 6 (six) hours as needed for nausea or vomiting, Starting Fri 5/28/2021, Normal      senna (SENOKOT) 8 6 MG tablet Take 1 tablet (8 6 mg total) by mouth daily at bedtime as needed for constipation, Starting Fri 11/26/2021, Until Sun 12/26/2021 at 2359, Normal      simethicone (MYLICON,GAS-X) 011 MG capsule Take 1 capsule (180 mg total) by mouth every 8 (eight) hours as needed for flatulence, Starting Fri 11/26/2021, Until Fri 1/21/2022 at 2359, Normal      Sodium Chloride Flush (Normal Saline Flush) 0 9 % SOLN Gall bladder drain, Starting Wed 5/12/2021, Historical Med      tamsulosin (FLOMAX) 0 4 mg TAKE 1 CAPSULE BY MOUTH  DAILY AT BEDTIME, Normal         STOP taking these medications       fluorouracil 4,680 mg in CADD infusion pump Comments:   Reason for Stopping:             No discharge procedures on file  PDMP Review       Value Time User    PDMP Reviewed  Yes 12/13/2021 10:18 AM Arik Reece MD           ED Provider  Attending physically available and evaluated Darlin Clinton I managed the patient along with the ED Attending      Electronically Signed by         Jocelyn Peraza MD  01/15/22 2010

## 2022-01-17 NOTE — TELEPHONE ENCOUNTER
LSW phoned pt's wife Guido Bennett ([phone]) to introduce role of [address] expressed appreciation of call  Guido Bennett is currently dealing with her own health issues/surgery and is working at this time  Guido Bennett discussed having a lot of loss/difficulties of family members over the past few years  Lulu's dtr will be taking a "sabbatical" to provide assistance for pt/wife  Pt's wife expressed interest in meals on wheels for pt  LSW advised she can place referral for same  Guido Bennett discussed feeling some caregiver stress during conversation  Emotional support provided  Overall, Guido Bennett feels despite having a lot of challenges right now she is coping  She is enjoying having a new kitten  She is also happy that pt's insurance issues have been resolved  She has contact information for  [facility] and may consider attending a support group  LSW encouraged this activity  LSW also discussed with Guido Bennett she can assist with connecting pt with therapist/counselor if that is something she would be interested in in the future  LSW encouraged Guido Bennett to reach out if she has any further questions/is in need of emotional support  Guido Bennett appreciative of same

## 2022-01-19 NOTE — TELEPHONE ENCOUNTER
CAMW received TT from [facility] advising return call received from Khurram advising referral for pt was received and request is being processed and was assigned to a   If there are any further questions she can be reached at [phone]

## 2022-01-19 NOTE — DISCHARGE INSTRUCTIONS
TUBE CARE INSTRUCTIONS    Care after your procedure:    Resume your normal diet  Small sips of flat soda will help with nausea  1  The properly functioning catheter should be forward flushed once (1x) daily with 10ml of normal saline using clean technique  You will be given a prescription for flushes  To flush the tube, clean both connections with alcohol swab  Twist off the drainage bag/ bulb  tubing and twist the saline syringe into the drainage tube and flush  Remove the syringe and twist the drainage bag / bulb tubing tubing back on     2  The drainage bag/bulb may be emptied as necessary  Keep a record of the amount of fluid you drain from your tube  This should be done with clean technique as well  3  A fresh dressing should be applied daily over the tube insertion site  4  As the tube is secured to the skin with only a suture,try not to pull on your tube  Tub baths are not permitted  Showers are permitted if the patient's skin entry site is prevented from getting wet  Similarly, washcloth "baths" are acceptable  Contact Interventional Radiology at [phone] Pablo PATIENTS: Contact Interventional Radiology at [phone]) Francois Joseph PATIENTS: Contact Interventional Radiology at [phone]) if:    1  Leakage or large amounts of liquid around the catheter  2  Fever of 101 degrees lasting several hours without other obvious cause (such as sore throat, flu, etc)  3  Persistent nausea or vomiting  4  Diminished drainage, which may be associated with pressure or pain  Or when the     drainage from your tube is less than 10mls for 48 hours  5  Catheter pulled back or falls out  The following pharmacies carry the flush syringes         [facility]                     [facility]  [address] [facility]                         [address] [facility]  Phone [phone]            Phone [phone]   [place] [address]                                [phone]  [address] [facility] Isaiah MONTERROSO                      [address] [facility]  Phone [phone]            Phone [phone]                      Aleks Merino                                                                                                          [phone]  [facility]  [facility]    [address]  Phone [phone]

## 2022-01-19 NOTE — BRIEF OP NOTE (RAD/CATH)
INTERVENTIONAL RADIOLOGY PROCEDURE NOTE    Date: 1/19/2022    Procedure: IR CHOLECYSTOSTOMY TUBE CHECK/CHANGE/REPOSITION/REINSERTION/UPSIZE    Preoperative diagnosis:   1  Recent cholecystitis         Postoperative diagnosis: Same  Surgeon: Sven Butler MD     Assistant: None  No qualified resident was available  Blood loss:  Minimal    Specimens:  None     Findings:  Successful 10 Arabic cholecystostomy tube exchange  Complications: None immediate      Anesthesia: local

## 2022-01-20 NOTE — ED PROVIDER NOTES
History  Chief Complaint   Patient presents with    Post-op Problem     pt presents to ed via EMS with abd pain had surgery done here yesterday      Pt with PMH: Anxiety, Arthritis, Stage IV Pancreatic Cancer, liver mets, Cellulitis, Diabetes mellitus, Enlarged prostate, Epidermal inclusion cyst, Erythrasma, Furuncle, GERD, Hyperlipidemia, HTN, RA,   presents to ED c/o few day h/o generalized abd pain, nausea, decreased appetite/PO intake because doesn't have to desire to eat; pt states worse since biliary tube was replaced yesterday: 10 Slovak cholecystostomy tube  Patient denies any fevers, no vomiting, did take narcotic pain medication PTA; there is bilious drainage in bag, no cp, no sob            Prior to Admission Medications   Prescriptions Last Dose Informant Patient Reported? Taking? Alcohol Swabs 70 % PADS  Self No No   Sig: May substitute brand based on insurance coverage  Check glucose ACHS     Blood Glucose Monitoring Suppl (ONE TOUCH ULTRA 2) w/Device KIT  Self No No   Sig: Use daily   Creon 95637-25290 units   No No   Sig: TAKE 1 CAPSULE BY MOUTH THREE TIMES DAILY WITH MEALS OR SNACKS   FLUoxetine (PROzac) 20 mg capsule  Self No No   Sig: TAKE ONE CAPSULE BY MOUTH EVERY DAY   Insulin Pen Needle (Unifine Pentips Plus) 32G X 4 MM MISC  Self No No   Sig: USE WITH INSULIN PENS FOUR TIMES DAILY   Lancets (onetouch ultrasoft) lancets  Self No No   Sig: Use as instructed 3x a day   Lantus SoloStar 100 units/mL injection pen  Self No No   Sig: INJECT 12 UNITS UNDER THE SKIN DAILY AT BEDTIME   Sodium Chloride Flush (Normal Saline Flush) 0 9 % SOLN  Self Yes No   Sig: Gall bladder drain   acetaminophen (TYLENOL) 325 mg tablet  Self No No   Sig: Take 2 tablets (650 mg total) by mouth every 8 (eight) hours   apixaban (Eliquis) 5 mg  Self No No   Sig: Take 1 tablet (5 mg total) by mouth 2 (two) times a day   aspirin 81 mg chewable tablet  Self Yes No   Sig: Chew 81 mg    atorvastatin (LIPITOR) 20 mg tablet   No No   Sig: Take 1 tablet (20 mg total) by mouth daily   cyanocobalamin (VITAMIN B-12) 1000 MCG tablet  Self No No   Sig: Take 1 tablet (1,000 mcg total) by mouth daily   fluorouracil 4,560 mg in CADD infusion pump   No No   Sig: Infuse 4,560 mg (1,200 mg/m2/day x 1 9 m2) into a venous catheter over 46 hours for 2 days  Do not start before 2022  folic acid (FOLVITE) 1 mg tablet  Self Yes No   Sig: Take by mouth daily   gabapentin (NEURONTIN) 100 mg capsule  Self No No   Sig: Take 2 capsules (200 mg total) by mouth 3 (three) times a day   glucose blood (OneTouch Ultra) test strip  Self No No   Sig: Use as instructed tid   insulin lispro (HumaLOG KwikPen) 100 units/mL injection pen  Self No No   Sig: Take 10 units with breakfast, lunch and dinner +scale   lisinopril-hydrochlorothiazide (PRINZIDE,ZESTORETIC) 10-12 5 MG per tablet  Self No No   Sig: Take 1 tablet by mouth 3 (three) times a week    morphine (MS CONTIN) 30 mg 12 hr tablet  Self No No   Sig: Take 1 tablet (30 mg total) by mouth every 12 (twelve) hours Max Daily Amount: 60 mg   naloxone (NARCAN) 4 mg/0 1 mL nasal spray  Self No No   Si 1 mL (4 mg total) by Alternating Nares route every 3 (three) minutes as needed (accidental opioid overdose or respiratory depression)   ondansetron (ZOFRAN-ODT) 4 mg disintegrating tablet  Self No No   Sig: Take 1 tablet (4 mg total) by mouth every 6 (six) hours as needed for nausea or vomiting for up to 12 doses   oxyCODONE (ROXICODONE) 10 MG TABS  Self No No   Sig: Take 1-2 tablets (10-20 mg total) by mouth every 4 (four) hours as needed (moderate to severe cancer-related pain) Take 1-2 tablets (10-20mg) up to once every 4 hours as needed for pain   Maximum daily dose 120mg (12 tablets ) Max Daily Amount: 120 mg   pantoprazole (PROTONIX) 40 mg tablet  Self No No   Sig: Take 1 tablet (40 mg total) by mouth 2 (two) times a day Before breakfast, before bed, to reduce stomach acid predniSONE 10 mg tablet  Self No No   Sig: Take 1 tablet (10 mg total) by mouth daily with breakfast   prochlorperazine (COMPAZINE) 10 mg tablet  Self No No   Sig: Take 1 tablet (10 mg total) by mouth every 6 (six) hours as needed for nausea or vomiting   senna (SENOKOT) 8 6 MG tablet  Self No No   Sig: Take 1 tablet (8 6 mg total) by mouth daily at bedtime as needed for constipation   simethicone (MYLICON,GAS-X) 917 MG capsule  Self No No   Sig: Take 1 capsule (180 mg total) by mouth every 8 (eight) hours as needed for flatulence   tamsulosin (FLOMAX) 0 4 mg  Self No No   Sig: TAKE 1 CAPSULE BY MOUTH  DAILY AT BEDTIME      Facility-Administered Medications: None       Past Medical History:   Diagnosis Date    Anxiety     Arthritis     Cancer ([facility] )     pancreatic    Cellulitis     LAST ASSESSED: 6/13/14    Diabetes mellitus ([facility] )     Enlarged prostate     Epidermal inclusion cyst     LAST ASSESSED: 10/4/13    Erythrasma     LAST ASSESSED: 9/23/13    Furuncle     LAST ASSESSED: 6/11/14    GERD (gastroesophageal reflux disease)     Hyperlipidemia     Hypertension     RA (rheumatoid arthritis) ([facility] )        Past Surgical History:   Procedure Laterality Date    COLONOSCOPY      FL GUIDED CENTRAL VENOUS ACCESS DEVICE INSERTION  6/1/2021    HYDROCELE EXCISION / REPAIR Right 01/11/2018    SPERMATIC CORD EXCSION OF HYDROCELE; MANAGED BY: GABBIE CARBAJAL    IR BIOPSY LIVER MASS  8/16/2021    IR CHOLECYSTOSTOMY TUBE CHECK/CHANGE/REPOSITION/REINSERTION/UPSIZE  6/22/2021    IR CHOLECYSTOSTOMY TUBE CHECK/CHANGE/REPOSITION/REINSERTION/UPSIZE  8/23/2021    IR CHOLECYSTOSTOMY TUBE CHECK/CHANGE/REPOSITION/REINSERTION/UPSIZE  1/19/2022    IR CHOLECYSTOSTOMY TUBE PLACEMENT  5/11/2021    MULTIPLE TOOTH EXTRACTIONS Left 7/22/2021    Procedure: EXTRACTION TEETH 14 & 15, ALVEOPLASTY, EXCISION OF CYST LEFT MAXILLA;   Surgeon: Yasemin Erazo DDS;  [facility];  Service: Maxillofacial    AK REMOVAL OF HYDROCELE,TUNICA,UNILAT Right 1/11/2018    Procedure: HYDROCELECTOMY;  Surgeon: Sofía Forrest MD;  [facility];  Service: Urology    SCROTAL SURGERY      benign "lump"    TUNNELED VENOUS PORT PLACEMENT Left 6/1/2021    Procedure: INSERTION VENOUS PORT (PORT-A-CATH); Surgeon: Felecia Veronica MD;  Location:  MAIN OR;  Service: Surgical Oncology       Family History   Problem Relation Age of Onset    Heart disease Father         CARDIAC DISORDER    Hypertension Father     Hypertension Mother     No Known Problems Maternal Aunt     No Known Problems Maternal Uncle     No Known Problems Paternal Aunt     No Known Problems Paternal Uncle     No Known Problems Paternal Grandmother     No Known Problems Paternal Grandfather     Diabetes Maternal Grandmother         MELLITUS    No Known Problems Maternal Grandfather     Hypertension Other      I have reviewed and agree with the history as documented  E-Cigarette/Vaping    E-Cigarette Use Never User      E-Cigarette/Vaping Substances    Nicotine No     THC No     CBD No      Social History     Tobacco Use    Smoking status: Former Smoker    Smokeless tobacco: Never Used    Tobacco comment: tobacco in a pipe in the early 1980's   Vaping Use    Vaping Use: Never used   Substance Use Topics    Alcohol use: Never    Drug use: Never       Review of Systems   Constitutional: Positive for activity change and appetite change  Negative for chills and fever  HENT: Negative for hearing loss, sore throat and trouble swallowing  Eyes: Negative for visual disturbance  Respiratory: Negative for cough and shortness of breath  Cardiovascular: Negative for chest pain and leg swelling  Gastrointestinal: Positive for abdominal pain and nausea  Negative for vomiting  Genitourinary: Negative for dysuria and frequency  Musculoskeletal: Positive for myalgias  Negative for arthralgias  Skin: Negative for pallor     Neurological: Positive for weakness  Negative for dizziness and headaches  Psychiatric/Behavioral: Negative for behavioral problems  All other systems reviewed and are negative  Physical Exam  Physical Exam  Vitals and nursing note reviewed  Constitutional:       General: He is in acute distress  Appearance: Normal appearance  He is well-developed  HENT:      Head: Normocephalic and atraumatic  Right Ear: External ear normal       Left Ear: External ear normal       Nose: Nose normal       Mouth/Throat:      Mouth: Mucous membranes are moist       Pharynx: Oropharynx is clear  Eyes:      Conjunctiva/sclera: Conjunctivae normal    Cardiovascular:      Rate and Rhythm: Normal rate and regular rhythm  Pulmonary:      Effort: Pulmonary effort is normal  No respiratory distress  Breath sounds: Normal breath sounds  Abdominal:      General: Bowel sounds are normal       Palpations: Abdomen is soft  Tenderness: There is abdominal tenderness (mild diffuse )  There is no guarding  Comments: Cholecystostomy tube to RUQ, site is well appearing, draining bilious fluid noted in bag   Genitourinary:     Comments: deferred  Musculoskeletal:         General: Normal range of motion  Cervical back: Normal range of motion  Skin:     General: Skin is warm and dry  Capillary Refill: Capillary refill takes less than 2 seconds  Neurological:      General: No focal deficit present  Mental Status: He is alert and oriented to person, place, and time  Motor: No weakness     Psychiatric:         Behavior: Behavior normal          Vital Signs  ED Triage Vitals [01/20/22 1343]   Temperature Pulse Respirations Blood Pressure SpO2   97 6 °F (36 4 °C) 86 20 120/71 98 %      Temp Source Heart Rate Source Patient Position - Orthostatic VS BP Location FiO2 (%)   Oral Monitor Sitting Left arm --      Pain Score       8           Vitals:    01/20/22 1343 01/20/22 1546 01/20/22 1602   BP: 120/71 122/70 118/69 Pulse: 86 74 74   Patient Position - Orthostatic VS: Sitting Lying Lying         Visual Acuity      ED Medications  Medications   iohexol (OMNIPAQUE) 350 MG/ML injection (SINGLE-DOSE) 100 mL (100 mL Intravenous Given 1/20/22 1531)   morphine injection 2 mg (2 mg Intravenous Given 1/20/22 1547)       Diagnostic Studies  Results Reviewed     Procedure Component Value Units Date/Time    Manual Differential(PHLEBS Do Not Order) [[id number]]  (Abnormal) Collected: 01/20/22 1418    Lab Status: Final result Specimen: Blood from Arm, Right Updated: 01/20/22 1536     Segmented % 81 %      Bands % 1 %      Lymphocytes % 8 %      Monocytes % 4 %      Eosinophils, % 0 %      Basophils % 0 %      Atypical Lymphocytes % 6 %      Absolute Neutrophils 8 14 Thousand/uL      Lymphocytes Absolute 0 79 Thousand/uL      Monocytes Absolute 0 40 Thousand/uL      Eosinophils Absolute 0 00 Thousand/uL      Basophils Absolute 0 00 Thousand/uL      Total Counted --     RBC Morphology Present     Anisocytosis Present     Ovalocytes Present     Polychromasia Present     Platelet Estimate Adequate     Large Platelet Present    CBC and differential [[id number]]  (Abnormal) Collected: 01/20/22 1418    Lab Status: Final result Specimen: Blood from Arm, Right Updated: 01/20/22 1536     WBC 9 93 Thousand/uL      RBC 3 47 Million/uL      Hemoglobin 10 5 g/dL      Hematocrit 31 1 %      MCV 90 fL      MCH 30 3 pg      MCHC 33 8 g/dL      RDW 17 2 %      MPV 10 6 fL      Platelets 305 Thousands/uL     Narrative: This is an appended report  These results have been appended to a previously verified report      Lipase [[id number]]  (Abnormal) Collected: 01/20/22 1418    Lab Status: Final result Specimen: Blood from Arm, Right Updated: 01/20/22 1451     Lipase <6 u/L     Lactic acid [[id number]]  (Normal) Collected: 01/20/22 1418    Lab Status: Final result Specimen: Blood from Arm, Right Updated: 01/20/22 1442     LACTIC ACID 1 6 mmol/L     Narrative: Result may be elevated if tourniquet was used during collection  Comprehensive metabolic panel [[id number]]  (Abnormal) Collected: 01/20/22 1418    Lab Status: Final result Specimen: Blood from Arm, Right Updated: 01/20/22 1441     Sodium 138 mmol/L      Potassium 3 7 mmol/L      Chloride 105 mmol/L      CO2 24 mmol/L      ANION GAP 9 mmol/L      BUN 26 mg/dL      Creatinine 0 94 mg/dL      Glucose 154 mg/dL      Calcium 7 8 mg/dL      Corrected Calcium 8 8 mg/dL      AST 20 U/L      ALT 20 U/L      Alkaline Phosphatase 305 5 U/L      Total Protein 5 0 g/dL      Albumin 2 7 g/dL      Total Bilirubin 0 61 mg/dL      eGFR 84 ml/min/1 73sq m     Narrative:      [place] guidelines for Chronic Kidney Disease (CKD):     Stage 1 with normal or high GFR (GFR > 90 mL/min/1 73 square meters)    Stage 2 Mild CKD (GFR = 60-89 mL/min/1 73 square meters)    Stage 3A Moderate CKD (GFR = 45-59 mL/min/1 73 square meters)    Stage 3B Moderate CKD (GFR = 30-44 mL/min/1 73 square meters)    Stage 4 Severe CKD (GFR = 15-29 mL/min/1 73 square meters)    Stage 5 End Stage CKD (GFR <15 mL/min/1 73 square meters)  Note: GFR calculation is accurate only with a steady state creatinine                 CT abdomen pelvis with contrast   Final Result by Lilly Johnson DO (01/20 1548)      Cholecystostomy tube remains coiled within the decompressed gallbladder  Unchanged appearance of pancreatic head mass and hepatic metastatic disease  Additional findings, as described, unchanged  Workstation performed: [id number]                    Procedures  Procedures         ED Course                               SBIRT 22yo+      Most Recent Value   SBIRT (22 yo +)    In order to provide better care to our patients, we are screening all of our patients for alcohol and drug use  Would it be okay to ask you these screening questions?  No Filed at: 01/20/2022 1347                    [facility]  Number of Diagnoses or Management Options  Diagnosis management comments: Pt has narcotic pain medication at home, has multiple resources in place, including palliative care, no acute process, tube in place, drainage, stable for DC       Amount and/or Complexity of Data Reviewed  Clinical lab tests: ordered and reviewed  Tests in the radiology section of CPT®: ordered and reviewed  Review and summarize past medical records: yes  Discuss the patient with other providers: yes        Disposition  Final diagnoses:   Generalized abdominal pain   Pancreatic cancer ([address] )     Time reflects when diagnosis was documented in both MDM as applicable and the Disposition within this note     Time User Action Codes Description Comment    1/20/2022  4:29 PM Thebes Fu Add [R10 84] Generalized abdominal pain     1/20/2022  4:29 PM Thebes Fu Add [C25 9] Pancreatic cancer [facility])       ED Disposition     ED Disposition Condition Date/Time Comment    Discharge Stable u Jan 20, 2022  4:29 PM Tomasz Whitney Sr  discharge to home/self care  Follow-up Information     Follow up With Specialties Details Why Josh Whittaker MD Family Medicine  As needed [address] [place] [address]      Your palliative care team              Patient's Medications   Discharge Prescriptions    No medications on file       No discharge procedures on file      PDMP Review       Value Time User    PDMP Reviewed  Yes 12/13/2021 10:18 AM Odilon Mojica MD          ED Provider  Electronically Signed by           Olinda Brooke PA-C  01/20/22 7741

## 2022-01-20 NOTE — PROGRESS NOTES
LSW attempted follow up with pt via telephone, no answer  LSW left message with contact information and requested a call back  LSW will attempt another outreach

## 2022-01-22 NOTE — TELEPHONE ENCOUNTER
PT:  Juanito Low 1955- Patient wife Mario Camacho is requesting a call back @ [phone]  Patient is experiencing vomiting and diarrhea and family is requesting a call back for care advice

## 2022-01-22 NOTE — TELEPHONE ENCOUNTER
Diarrhea, nausea, lack of appetite, he is not taking Imodium, Creon or Zofran    He has good urine output    I instructed the patient go to the ER he declined    Imodium 2 tablets every 6 hours p r n , he was told to take Creon and he will follow up on Monday with his primary oncologist

## 2022-01-24 NOTE — TELEPHONE ENCOUNTER
----- Message from Elgin Sagastume RN sent at 1/24/2022  9:29 AM EST -----  Regarding: Patient symptoms  I placed a telephone note in the patient's chart regarding his symptoms  He does not appear to be compliant with any of his medication regimens  Would it be possible to have somebody from palliative care call him and review what he should be taking in addition to the symptoms he is currently having? It is not believed that his symptoms are necessarily treatment related and maybe consequence of his disease progression as well as his noncompliance with medication regimen  Thank you    Rosette Abrams

## 2022-01-24 NOTE — TELEPHONE ENCOUNTER
This nurse called patient who reports he has no pain so is not taking any medication for pain currently  His main problem is diarrhea and whenever he eats it comes right out that way for over 3 days now, He is presently awaiting c diff test at lab per hem/onc  He has been trying immodium 2 times daily per hem/onc but it is not helping  He did get the ordered Creon at [facility] and will start taking today  Please advise if any other suggestions at this time

## 2022-01-24 NOTE — TELEPHONE ENCOUNTER
I received a call from Ber Nasir describing having a lot of problems with chemo  He is s/p C2 of Modified [place] on 1/12  I believe several of his complaints are due to lack of adherence to his medications as well as disease process  However, could be related to treatment so we will hold treatment 1 week while his symptoms get appropriately managed  Patient was recently seen in the emergency room on January 20th for increased abdominal pain  He spoke with Dr Marisa Jasso on January 22nd regarding diarrhea, nausea/vomiting and decreased appetite  Further recommendation from Dr Marisa Jasso patient was to start Imodium 2 tablets every 6 hours and reinitiate his Creon  Dr Marisa Jasso also did advise the patient go to the ER for further evaluation upon which he declined  In speaking with the patient he states that he does not recall being told to go to the emergency room  He states that throughout the weekend he has been back and forth from the bed to the toilet  Three days and nothing but diarrhea  He can't eat anything  I reviewed with him the recommendations from Dr Marisa Jasso  He states that he is not taking anything other than Imodium  He did not remember being told to reinitiate his Creon  I did advise the patient that he should start taking his Creon as instructed by Dr Marisa Jasso  I will order Cdiff culture to be done as discussed with Sarah Sol  I will also send documentation over to Dr Elma Sequeira and palliative care who had been addressing his Creon in the past   I did explain to the patient that he may be a candidate for supportive care in regards to his appetite  I did re-educate the patient on the importance of adherence to his medication regimen he states he has not been taking any of his medication i e  pain medication, nausea medication, Creon etc  he will have the culture done  We will have Infusion call him to reschedule his chemotherapy to next week    We will also reach out to palliative care in an effort to address some of his other concerns

## 2022-01-25 NOTE — TELEPHONE ENCOUNTER
This nurse called patient back and left message about the Lomotil ordered today by Catalina Jimenez

## 2022-01-26 NOTE — TELEPHONE ENCOUNTER
Patient called and was wondering how many days after the last dose of his oxycodone will this be in his system  When will it all wear off?

## 2022-01-27 NOTE — TELEPHONE ENCOUNTER
Pt called stating the tips of his fingertips hurt and are peeling  States he's also experiencing bilateral foot pain  Asking if there is something he can do to help with these problems

## 2022-01-28 NOTE — TELEPHONE ENCOUNTER
Called patient  He denies rash or any discoloration on feet  There is no hot/cold feelings  He states that he has pain while walking on them  The left foot the pain is mostly located in the heel  The right foot the pain is located on the bottom behind the little toe  He does feel the pain while it is resting, but not as much as when he is walking on it  Regarding his fingertips, they are too the point that the skin is splitting open and they are very sore

## 2022-01-31 NOTE — PROGRESS NOTES
Assessment/Plan:    No problem-specific Assessment & Plan notes found for this encounter  Diagnoses and all orders for this visit:    Dermatitis  -     triamcinolone (KENALOG) 0 1 % cream; Apply topically 2 (two) times a day    Pain in finger of both hands        Discussion:  I reviewed with pt  Eczematous like reaction to finger tips and soles of feet bilat  Good capillary refill  No fingernail changes  Unclear cause  Continue to moisturize  Add triamcinolone cream bid for 2 weeks  pt to call next week with follow up  Pt to call for any other problems or concerns in the interim      Subjective:      Patient ID: Domi Fox  is a 73 y o  male  Pt her for evaluation of feet and hands  - 68 yo male undergoing chemo for metastatic pancreatic CA, notes dry, cracking skin over his finger tips over the last 1-2 weeks  (+) cracking and discomfort noted  He denies new topical products prior to onset, and notes minimal improvement with moisturizers and topical ab ointments  - pt also notes bilat plantar tingling in his feet over the same period of time  No swelling, dorsal erythema, or other abnormalities noted  Chemo was adjusted a few weeks ago and pt has been experiencing diarrhea (a primary side effect of his therapy) which oncology has been treating with Lomotil    - still with chronic abd discomfort  No change  Pt denies worsening CP, palp, SOB or other CV/resp issues      The following portions of the patient's history were reviewed and updated as appropriate:   He  has a past medical history of Anxiety, Arthritis, Cancer ([address] ), Cellulitis, Diabetes mellitus ([address] ), Enlarged prostate, Epidermal inclusion cyst, Erythrasma, Furuncle, GERD (gastroesophageal reflux disease), Hyperlipidemia, Hypertension, and RA (rheumatoid arthritis) ([facility] [address] )    He   Patient Active Problem List    Diagnosis Date Noted    COVID-19 virus infection 78/75/3610    Metabolic encephalopathy 74/11/6234    Palliative care patient 12/13/2021    Cancer related pain 12/13/2021    Positive blood culture 12/10/2021    Shortness of breath 12/09/2021    Idiopathic osteoarthritis 12/05/2021    Rotator cuff tear arthropathy 12/05/2021    Tendinitis of right shoulder 12/05/2021    Elevated transaminase level 12/05/2021    Pulmonary embolism ([facility] [address] ) 11/23/2021    Mesenteric vein thrombosis (HCC) 11/23/2021    Numbness and tingling of foot 10/29/2021    Pneumobilia due to Occluded CBD stent 08/24/2021    Chest pain 08/23/2021    Dental infection 08/04/2021    Polymicrobial bacteremia 07/20/2021    Unspecified protein-calorie malnutrition ([facility] [address] ) 07/15/2021    Sepsis without acute organ dysfunction ([facility] [address] ) 07/13/2021    Generalized weakness 07/13/2021    Pancytopenia due to chemotherapy 07/13/2021    Abdominal pain 06/15/2021    Pancreatic cancer metastasized to liver ([facility] [address] ) 06/14/2021    Metastatic pancreatic cancer 05/25/2021    Chemotherapy induced neutropenia ([facility] [address] ) 05/25/2021    Recent cholecystitis 05/11/2021    Leukocytosis 05/11/2021    BPH (benign prostatic hyperplasia) 05/06/2021    Type 2 diabetes mellitus ([facility] [address] ) 03/16/2021    Multiple lipomas 08/04/2020    Urgency of urination 01/31/2018    Rheumatoid arthritis ([facility] [address] ) 10/13/2017    Essential hypertension 10/13/2017    Liver mass 11/17/2016    Hyperlipidemia 02/03/2016    Anxiety disorder 08/15/2013    GERD 08/15/2013     He  has a past surgical history that includes Scrotal surgery; pr removal of hydrocele,tunica,unilat (Right, 1/11/2018); Hydrocele excision / repair (Right, 01/11/2018); IR cholecystostomy tube placement (5/11/2021); Colonoscopy; Tunneled venous port placement (Left, 6/1/2021); FL guided central venous access device insertion (6/1/2021); IR cholecystostomy tube check/change/reposition/reinsertion/upsize (6/22/2021); Multiple tooth extractions (Left, 7/22/2021); IR biopsy liver mass (8/16/2021);  IR cholecystostomy tube check/change/reposition/reinsertion/upsize (8/23/2021); and IR cholecystostomy tube check/change/reposition/reinsertion/upsize (1/19/2022)  He  reports that he has quit smoking  He has never used smokeless tobacco  He reports that he does not drink alcohol and does not use drugs  Current Outpatient Medications   Medication Sig Dispense Refill    acetaminophen (TYLENOL) 325 mg tablet Take 2 tablets (650 mg total) by mouth every 8 (eight) hours 168 tablet 2    Alcohol Swabs 70 % PADS May substitute brand based on insurance coverage  Check glucose ACHS   200 each 0    apixaban (Eliquis) 5 mg Take 1 tablet (5 mg total) by mouth 2 (two) times a day 60 tablet 3    aspirin 81 mg chewable tablet Chew 81 mg       atorvastatin (LIPITOR) 20 mg tablet Take 1 tablet (20 mg total) by mouth daily 90 tablet 1    Blood Glucose Monitoring Suppl (ONE TOUCH ULTRA 2) w/Device KIT Use daily 1 each 1    Creon 92385-16424 units TAKE 1 CAPSULE BY MOUTH THREE TIMES DAILY WITH MEALS OR SNACKS 270 capsule 0    cyanocobalamin (VITAMIN B-12) 1000 MCG tablet Take 1 tablet (1,000 mcg total) by mouth daily 30 tablet 5    diphenoxylate-atropine (LOMOTIL) 2 5-0 025 mg per tablet Take 1 tablet by mouth 4 (four) times a day as needed for diarrhea 30 tablet 0    [START ON 2/2/2022] fluorouracil 4,560 mg in CADD infusion pump Infuse 4,560 mg (1,200 mg/m2/day x 1 9 m2) into a venous catheter over 46 hours for 2 days  Do not start before February 2, 2022  1 each 0    FLUoxetine (PROzac) 20 mg capsule TAKE ONE CAPSULE BY MOUTH EVERY DAY 90 capsule 2    folic acid (FOLVITE) 1 mg tablet Take by mouth daily      gabapentin (NEURONTIN) 100 mg capsule Take 2 capsules (200 mg total) by mouth 3 (three) times a day 180 capsule 3    glucose blood (OneTouch Ultra) test strip Use as instructed tid 100 each 5    insulin lispro (HumaLOG KwikPen) 100 units/mL injection pen Take 10 units with breakfast, lunch and dinner +scale 15 mL 5    Insulin Pen Needle (Unifine Pentips Plus) 32G X 4 MM MISC USE WITH INSULIN PENS FOUR TIMES DAILY 100 each 3    Lancets (onetouch ultrasoft) lancets Use as instructed 3x a day 100 each 5    Lantus SoloStar 100 units/mL injection pen INJECT 12 UNITS UNDER THE SKIN DAILY AT BEDTIME 15 mL 0    lisinopril-hydrochlorothiazide (PRINZIDE,ZESTORETIC) 10-12 5 MG per tablet Take 1 tablet by mouth 3 (three) times a week Monday Wednesday Friday 90 tablet 3    morphine (MS CONTIN) 30 mg 12 hr tablet Take 1 tablet (30 mg total) by mouth every 12 (twelve) hours Max Daily Amount: 60 mg 60 tablet 0    naloxone (NARCAN) 4 mg/0 1 mL nasal spray 0 1 mL (4 mg total) by Alternating Nares route every 3 (three) minutes as needed (accidental opioid overdose or respiratory depression) 1 each 1    ondansetron (ZOFRAN-ODT) 4 mg disintegrating tablet Take 1 tablet (4 mg total) by mouth every 6 (six) hours as needed for nausea or vomiting for up to 12 doses 12 tablet 0    oxyCODONE (ROXICODONE) 10 MG TABS Take 1-2 tablets (10-20 mg total) by mouth every 4 (four) hours as needed (moderate to severe cancer-related pain) Take 1-2 tablets (10-20mg) up to once every 4 hours as needed for pain   Maximum daily dose 120mg (12 tablets ) Max Daily Amount: 120 mg 360 tablet 0    pantoprazole (PROTONIX) 40 mg tablet Take 1 tablet (40 mg total) by mouth 2 (two) times a day Before breakfast, before bed, to reduce stomach acid 60 tablet 1    predniSONE 10 mg tablet Take 1 tablet (10 mg total) by mouth daily with breakfast 90 tablet 0    prochlorperazine (COMPAZINE) 10 mg tablet Take 1 tablet (10 mg total) by mouth every 6 (six) hours as needed for nausea or vomiting 45 tablet 3    Sodium Chloride Flush (Normal Saline Flush) 0 9 % SOLN Gall bladder drain      tamsulosin (FLOMAX) 0 4 mg TAKE 1 CAPSULE BY MOUTH  DAILY AT BEDTIME 90 capsule 3    senna (SENOKOT) 8 6 MG tablet Take 1 tablet (8 6 mg total) by mouth daily at bedtime as needed for constipation 30 tablet 0    triamcinolone (KENALOG) 0 1 % cream Apply topically 2 (two) times a day 60 g 2     No current facility-administered medications for this visit  He is allergic to fentanyl       Review of Systems   Constitutional: Positive for activity change, appetite change and fatigue  Negative for chills, diaphoresis and fever  HENT: Negative  Eyes: Negative  Respiratory: Negative  Cardiovascular: Negative  Gastrointestinal: Positive for abdominal distention, abdominal pain, diarrhea and vomiting  Genitourinary: Negative  Musculoskeletal: Positive for arthralgias and myalgias  Skin: Positive for color change and rash  Neurological: Negative for dizziness, weakness, light-headedness and numbness  Objective:      /68   Pulse 76   Temp 98 °F (36 7 °C)   Ht 5' 10" (1 778 m)   Wt 69 4 kg (153 lb)   BMI 21 95 kg/m²          Physical Exam  Vitals reviewed  HENT:      Head: Normocephalic  Eyes:      Extraocular Movements: Extraocular movements intact  Conjunctiva/sclera: Conjunctivae normal       Pupils: Pupils are equal, round, and reactive to light  Cardiovascular:      Rate and Rhythm: Normal rate and regular rhythm  Pulses: Normal pulses  Pulmonary:      Effort: Pulmonary effort is normal    Musculoskeletal:         General: Swelling (? mild bilat hand distal phallanges), tenderness (over the finger tips) and deformity (erythema and mild swelling with dry skin and cracking across bilat hand distal phallanges) present  Cervical back: Normal range of motion  Lymphadenopathy:      Cervical: No cervical adenopathy  Skin:     General: Skin is warm  Findings: Erythema and rash present  Comments: Dry, skin over the distal phalanges of hands bilat with cracking and erythema  No discharge  Bilat feet with dry dermatitis with cracking over soles  Neurological:      General: No focal deficit present  Mental Status: He is alert        Sensory: No sensory deficit  Motor: No weakness

## 2022-01-31 NOTE — TELEPHONE ENCOUNTER
Patient called and stated he was supposed to call with an update on how he was doing with the cream prescribed  Patient stated his feet are doing good  Healing well  Patient stated his hands are healing well other than the tingling sensation in the tips of his fingers

## 2022-01-31 NOTE — TELEPHONE ENCOUNTER
Good  Hopefully the tingling is related to the inflammation   Once it calms down, I am hoping the tingling resolves

## 2022-02-02 NOTE — PROGRESS NOTES
Hematology/Oncology Outpatient Follow- up Note  Arleen Gist  73 y o  male MRN: @ Encounter: [id number]        Date:  2/2/2022    Presenting Complaint/Diagnosis : Metastatic pancreatic adenocarcinoma       HPI:    Anjelica Bangura seen for initial consultation 10/23/200 regarding   A newly diagnosed pancreatic cancer   The patient presented to the hospital with obstructive jaundice   He ended up having a workup along with a biopsy which revealed  A 3 8 cm obstructing mass in the region of the head of the pancreas with marked pancreatic ductal dilatation worrisome for adenocarcinoma   Biopsy was done which proved this was adenocarcinoma   The patient also had cholecystitis at the time and ended up having a cholecystostomy tube placed and was treated with antibiotics  [facility] was seen by our colleagues in Surgical Oncology who recommended neoadjuvant chemotherapy upfront which we will initiate  [facility] is not a candidate for  FOLFIRINOX and so  Got gemcitabine and Abraxane   He then had progression and developed metastatic disease  Previous Hematologic/ Oncologic History:    Oncology History   Metastatic pancreatic cancer   5/7/2021 Biopsy    Head of pancreas:  Malignant Adenocarcinoma       6/3/2021 - 9/2/2021 Chemotherapy    pegfilgrastim (NEULASTA ONPRO), 6 mg, Subcutaneous, Once, 2 of 4 cycles  Administration: 6 mg (8/19/2021)  paclitaxel protein-bound (ABRAXANE) IVPB, 100 mg/m2 = 191 mg (80 % of original dose 125 mg/m2), Intravenous, Once, 4 of 6 cycles  Dose modification: 100 mg/m2 (original dose 125 mg/m2, Cycle 1, Reason: Other (Must fill in a comment), Comment: per protocol)  Administration: 191 mg (6/3/2021), 191 mg (6/10/2021), 191 mg (7/1/2021), 191 mg (7/8/2021), 191 mg (8/5/2021), 191 mg (8/12/2021), 191 mg (8/19/2021), 191 mg (9/2/2021)  gemcitabine (GEMZAR) infusion, 1,000 mg/m2 = 1,909 9 mg, Intravenous, Once, 4 of 6 cycles  Administration: 1,909 9 mg (6/3/2021), 1,909 9 mg (6/10/2021), 1,909 9 mg (7/1/2021), 1,909 9 mg (7/8/2021), 1,909 9 mg (8/5/2021), 1,909 9 mg (8/12/2021), 1,909 9 mg (8/19/2021), 1,909 9 mg (9/2/2021)     9/22/2021 - 12/3/2021 Chemotherapy    fluorouracil (ADRUCIL), 400 mg/m2 = 770 mg, Intravenous, Once, 5 of 5 cycles  Administration: 770 mg (9/22/2021), 770 mg (10/6/2021), 785 mg (10/20/2021), 785 mg (11/17/2021), 785 mg (12/1/2021)  pegfilgrastim (Rick Payment), 6 mg, Subcutaneous, Once, 5 of 5 cycles  Administration: 6 mg (9/24/2021), 6 mg (10/8/2021), 6 mg (10/22/2021), 6 mg (11/19/2021), 6 mg (12/3/2021)  leucovorin calcium IVPB, 772 mg, Intravenous, Once, 5 of 5 cycles  Administration: 800 mg (9/22/2021), 800 mg (10/6/2021), 800 mg (10/20/2021), 800 mg (11/17/2021), 800 mg (12/1/2021)  oxaliplatin (ELOXATIN) chemo infusion, 85 mg/m2 = 164 05 mg, Intravenous, Once, 5 of 5 cycles  Administration: 164 05 mg (9/22/2021), 164 05 mg (10/6/2021), 166 6 mg (10/20/2021), 166 6 mg (11/17/2021), 166 6 mg (12/1/2021)  fluorouracil (ADRUCIL) ambulatory infusion Soln, 1,200 mg/m2/day = 4,630 mg, Intravenous, Over 46 hours, 5 of 5 cycles     12/29/2021 -  Chemotherapy    palonosetron (ALOXI), 0 25 mg, Intravenous, Once, 2 of 10 cycles  Administration: 0 25 mg (1/12/2022), 0 25 mg (2/2/2022)  pegfilgrastim (Rick Payment), 6 mg, Subcutaneous, Once, 3 of 11 cycles  Administration: 6 mg (12/31/2021), 6 mg (1/14/2022)  fosaprepitant (EMEND) IVPB, 150 mg, Intravenous, Once, 2 of 10 cycles  Administration: 150 mg (1/12/2022), 150 mg (2/2/2022)  leucovorin calcium IVPB, 400 mg/m2 = 780 mg, Intravenous, Once, 3 of 11 cycles  Administration: 800 mg (12/29/2021), 800 mg (1/12/2022), 760 mg (2/2/2022)  fluorouracil (ADRUCIL) ambulatory infusion Soln, 1,200 mg/m2/day = 4,680 mg, Intravenous, Over 46 hours, 3 of 11 cycles       Gemcitabine and Abraxane     modified FOLFOX 6  Current Hematologic/ Oncologic Treatment:    FOLFIRI with Neulasta growth factor support     the patient is in the midst of cycle 3     Interval History:     patient returns for follow-up visit  He is taking his blood thinner  He is otherwise doing well  He has some peeling of the hands that he showed me today and I will decrease his Leucovorin to 200 milligrams/meter2  Otherwise he is planning to go to [place]  He is no longer on any narcotic pain medication from what he is explaining  Denies any nausea denies any vomiting  His pain is much better to resolved  His last imaging did not show any bony metastases  He has some low-grade back pain which he states is chronic  The rest of his 14 point review of systems today was negative  Test Results:    Imaging: CT abdomen pelvis with contrast    Result Date: 1/20/2022  Narrative: CT ABDOMEN AND PELVIS WITH IV CONTRAST INDICATION:   abd pain, pancreatic CA, chol tube replaced yesterday  COMPARISON:  Multiple priors most recently CT 1/15/2022 TECHNIQUE:  CT examination of the abdomen and pelvis was performed  Axial, sagittal, and coronal 2D reformatted images were created from the source data and submitted for interpretation  Radiation dose length product (DLP) for this visit:  650 mGy-cm   This examination, like all CT scans performed in the [facility], was performed utilizing techniques to minimize radiation dose exposure, including the use of iterative reconstruction and automated exposure control  IV Contrast:  100 mL of iohexol (OMNIPAQUE) Enteric Contrast:  Enteric contrast was not administered  FINDINGS: ABDOMEN LOWER CHEST:  Small pericardial effusion  LIVER/BILIARY TREE:  Hepatic steatosis is again noted  Innumerable hypoenhancing an enhancing lesions throughout the liver compatible with metastatic disease unchanged since CT of 1/15/2022  Biliary stent is in place with expected pneumobilia, unchanged  GALLBLADDER:  Cholecystostomy tube is in place, coiled within the decompressed gallbladder, in appropriate position, without evidence of dislodgment  SPLEEN:  Unremarkable  PANCREAS: Unchanged size and configuration of heterogeneous hypoenhancing pancreatic head mass since CT of 1/15/2022  ADRENAL GLANDS:  Unremarkable  KIDNEYS/URETERS:  One or more simple renal cyst(s) is noted  Otherwise unremarkable kidneys  No hydronephrosis  STOMACH AND BOWEL:  No thickened or pathologically dilated loops of bowel  APPENDIX:  No findings to suggest appendicitis  ABDOMINOPELVIC CAVITY:  Trace free fluid in the pelvis is likely reactive  No pneumoperitoneum  No lymphadenopathy  VESSELS:  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, which is encased by tumor  Approximately 270 degrees of encasement of the superior mesenteric artery by the pancreatic head mass  PELVIS REPRODUCTIVE ORGANS:  The prostate is enlarged  URINARY BLADDER:  Unremarkable  ABDOMINAL WALL/INGUINAL REGIONS:  Unremarkable  OSSEOUS STRUCTURES:  No acute fracture or destructive osseous lesion  Impression: Cholecystostomy tube remains coiled within the decompressed gallbladder  Unchanged appearance of pancreatic head mass and hepatic metastatic disease  Additional findings, as described, unchanged  Workstation performed: [id number]     CT abdomen pelvis with contrast    Result Date: 1/15/2022  Narrative: CT ABDOMEN AND PELVIS WITH IV CONTRAST INDICATION:   Peritonitis or perforation suspected possible dislodgement of biliary tube  58-year-old male with history of stage IV pancreatic cancer COMPARISON:  CT chest/abdomen/pelvis 12/9/2021  TECHNIQUE:  CT examination of the abdomen and pelvis was performed  In addition to portal venous phase postcontrast scanning through the abdomen and pelvis, delayed phase postcontrast scanning was performed through the upper abdominal viscera  Axial, sagittal, and coronal 2D reformatted images were created from the source data and submitted for interpretation  Radiation dose length product (DLP) for this visit:  660 mGy-cm     This examination, like all CT scans performed in the [place], was performed utilizing techniques to minimize radiation dose exposure, including the use of iterative reconstruction and automated exposure control  IV Contrast:  100 mL of iohexol (OMNIPAQUE) Enteric Contrast:  Enteric contrast was not administered  FINDINGS: ABDOMEN LOWER CHEST: No clinically significant abnormality is identified in the visualized lower chest  No consolidation or effusion  LIVER: Diffuse hepatic steatosis  Numerous hypoenhancing and hyperenhancing metastatic lesions, some of which are slightly larger or smaller but most of which are stable, for example: Segment 5 lesion measuring 2 3 x 2 2 cm (previously 2 2 x 1 9 cm) on series 2/39, slightly increased  Segment 2 lesion measuring 2 0 x 1 8 cm (previously 1 9 x 1 7 cm) on series 2/17, slightly increased  Segment 2 lesion measuring 1 9 x 1 7 cm (previously 2 0 x 1 6 cm) on series 2/21, unchanged  A homogeneously hypoattenuating lesion in segment 3 is unchanged measuring 2 6 x 2 4 cm (previously 2 7 x 2 4 cm) on series 2/28  BILIARY: There is old metallic common bile duct stent in place  No biliary dilatation  Left-sided pneumobilia is unchanged  GALLBLADDER: The gallbladder is mostly decompressed with a cholecystostomy tube in place  The tube appears to have slightly retracted from prior study and now lies close to the fundus, previously near the neck, but the locking loop still remains within the lumen and there is no evidence of leak  SPLEEN: Within normal limits  No suspicious lesion  Normal spleen size  PANCREAS: Heterogeneous pancreatic head mass has increased in size now measuring 6 6 x 6 0 x 5 4 cm (previously 6 2 x 4 5 x 4 0 cm remeasured in same fashion) on series 2/37, 601/43  ADRENAL GLANDS: Within normal limits  KIDNEYS/URETERS: Normal size and position  Symmetric enhancement  One or more simple renal cyst(s) noted  No suspicious solid lesion  No calcified stones or hydronephrosis  Ureters within normal limits  STOMACH AND BOWEL: Stomach is grossly within normal limits  Normal caliber small bowel  Much of the small bowel is diffusely edematously thick-walled compatible with enteritis  Normal caliber large bowel  The colon and rectum are diffusely stool impacted  and mildly thick-walled suggesting constipation, overall similar prior study  APPENDIX: No findings to suggest acute appendicitis  ABDOMINOPELVIC CAVITY: No ascites  No intraperitoneal free air  No lymphadenopathy  Mild mesenteric edema which is new from prior study  No retroperitoneal hematoma  VESSELS: Normal caliber abdominal aorta with no detectable atherosclerotic plaque  The celiac, SMA, and PEYMAN are patent, though the SMA is now encased greater than 180 degrees by tumor  The hepatic veins are patent  The SMV remains encased by tumor and thrombosed, now with extension of nonocclusive thrombus approximately 8 mm into the portal confluence  The portal veins are otherwise patent  The splenic vein is patent  The renal arteries and veins are patent  PELVIS REPRODUCTIVE ORGANS:  Enlarged prostate  Symmetric seminal vesicles  URINARY BLADDER:  Within normal limits  No calculi  ABDOMINAL WALL/INGUINAL REGIONS:  Moderate gynecomastia  BONES:  Mild multilevel degenerative changes in the spine  Vertebral body height is maintained  No acute fracture or destructive osseous lesion  Grade 1 anterolisthesis of L4 on L5 on a degenerative basis  Impression: 1  Gallbladder is mostly decompressed with a cholecystostomy tube in place  The tube appears to have slightly retracted from prior study and now lies close to the fundus, previously near the neck, but the locking loop still remains within the lumen and there is no evidence of leak  No biliary dilatation  2   The superior mesenteric vein remains thrombosed, now with extension of nonocclusive thrombus approximately 8 mm into the portal confluence  The portal veins are otherwise patent   3  Diffuse edematous wall thickening of much of the small bowel bowel compatible with an enteritis, 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 noting new mesenteric edema  4   Increased size of pancreatic head mass now with greater than 180 degrees encasement of the SMA  5   Hepatic metastases are grossly stable  The study was marked in EPIC for significant notification  Workstation performed: [id number]     IR cholecystostomy tube check/change/reposition/reinsertion/upsize    Result Date: 1/19/2022  Narrative: IR CHOLECYSTOSTOMY TUBE CHECK AND CHANGE Fluoroscopy time: 0 8 minutes Images: Multiple Contrast: 15 mL of Omnipaque 350 Indications: Patient's indwelling cholecystostomy tube pulled out slightly inadvertently  This was placed in May 2021 and had not been exchanged since then  Technique/findings: Risks, benefits and alternatives were explained to the patient  Questions were answered  Once patient was in the radiology area a timeout was performed identifying patient and procedure  Patient was placed in the supine position on the fluoroscopy table  Catheter and surrounding skin was cleaned prepped and draped in the usual sterile fashion   imaging was obtained and demonstrated satisfactory position of the cholecystostomy tube  Contrast injection through the cholecystostomy tube confirmed appropriate positioning of drain within the gallbladder  There was opacification of the cystic duct indicating a patent cystic duct with contrast draining through the CBD stent into the bowel  Drainage was sluggish  Indwelling catheter was cut and retention suture was also cut  Guidewire placed through the indwelling catheter and coiled within the gallbladder lumen  Tube was removed over the wire and a new tube was exchanged over a wire  The wire was removed  Pigtail portion was formed  Small amount of contrast was injected and confirmed appropriate positioning of the catheter  Catheter was sutured to the skin  Catheter was connected to drainage bag  Patient tolerated the procedure well with no immediate post procedural complication  Impression: Impression: Successful exchange of a 10-German percutaneous cholecystostomy tube  Cystic duct appears to be patent with contrast draining slowly through the CBD stent into the small bowel  Although patient may be a candidate for capping trial and possible tube removal, my concern is that he has progressive pancreatic head mass and metastatic liver disease and he may eventually need tube replacement if this was removed  Therefore, I made the decision to leave this tube in place for now  Workstation performed: [id number]       Labs:   Lab Results   Component Value Date    WBC 13 28 (H) 02/01/2022    HGB 11 4 (L) 02/01/2022    HCT 36 3 (L) 02/01/2022    MCV 96 02/01/2022     (H) 02/01/2022     Lab Results   Component Value Date     09/01/2017    K 4 0 02/01/2022     02/01/2022    CO2 26 02/01/2022    BUN 14 02/01/2022    CREATININE 1 06 02/01/2022    GLUCOSE 117 (H) 09/01/2017    GLUF 78 12/27/2021    CALCIUM 8 1 (L) 02/01/2022    CORRECTEDCA 9 3 02/01/2022    AST 33 02/01/2022    ALT 33 02/01/2022    ALKPHOS 284 (H) 02/01/2022    PROT 5 8 (L) 09/01/2017    BILITOT 1 0 09/01/2017    EGFR 73 02/01/2022       Lab Results   Component Value Date    PSA 1 6 11/28/2020    PSA 1 4 11/09/2019    PSA 1 7 11/25/2017       Lab Results   Component Value Date    CEA 0 9 04/29/2021       No results found for:     No results found for: AFP    Lab Results   Component Value Date    IRON 16 (L) 07/13/2021    TIBC 248 (L) 07/13/2021    FERRITIN 625 (H) 07/13/2021       Lab Results   Component Value Date    [id number] 238 07/13/2021         ROS: As stated in the history of present illness otherwise his 14 point review of systems today was negative        Active Problems:   Patient Active Problem List   Diagnosis    Rheumatoid arthritis ([address] )    Essential hypertension  Urgency of urination    Anxiety disorder    Hyperlipidemia    Liver mass    GERD    Multiple lipomas    Type 2 diabetes mellitus (HCC)    BPH (benign prostatic hyperplasia)    Recent cholecystitis    Leukocytosis    Metastatic pancreatic cancer    Chemotherapy induced neutropenia (HCC)    Pancreatic cancer metastasized to liver (HCC)    Abdominal pain    Sepsis without acute organ dysfunction (HCC)    Generalized weakness    Pancytopenia due to chemotherapy    Unspecified protein-calorie malnutrition (HCC)    Polymicrobial bacteremia    Dental infection    Chest pain    Pneumobilia due to Occluded CBD stent    Numbness and tingling of foot    Pulmonary embolism (HCC)    Mesenteric vein thrombosis (HCC)    Idiopathic osteoarthritis    Rotator cuff tear arthropathy    Tendinitis of right shoulder    Elevated transaminase level    Shortness of breath    Positive blood culture    Palliative care patient    Cancer related pain    COVID-19 virus infection    Metabolic encephalopathy       Past Medical History:   Past Medical History:   Diagnosis Date    Anxiety     Arthritis     [facility]     pancreatic    Cellulitis     LAST ASSESSED: 6/13/14    Diabetes mellitus ([facility] )     Enlarged prostate     Epidermal inclusion cyst     LAST ASSESSED: 10/4/13    Erythrasma     LAST ASSESSED: 9/23/13    Furuncle     LAST ASSESSED: 6/11/14    GERD (gastroesophageal reflux disease)     Hyperlipidemia     Hypertension     RA (rheumatoid arthritis) (HCC)        Surgical History:   Past Surgical History:   Procedure Laterality Date    COLONOSCOPY      FL GUIDED CENTRAL VENOUS ACCESS DEVICE INSERTION  6/1/2021    HYDROCELE EXCISION / REPAIR Right 01/11/2018    SPERMATIC CORD EXCSION OF HYDROCELE; MANAGED BY: GABBIE CARBAJAL    IR BIOPSY LIVER MASS  8/16/2021    IR CHOLECYSTOSTOMY TUBE CHECK/CHANGE/REPOSITION/REINSERTION/UPSIZE  6/22/2021    IR CHOLECYSTOSTOMY TUBE CHECK/CHANGE/REPOSITION/REINSERTION/UPSIZE  8/23/2021    IR CHOLECYSTOSTOMY TUBE CHECK/CHANGE/REPOSITION/REINSERTION/UPSIZE  1/19/2022    IR CHOLECYSTOSTOMY TUBE PLACEMENT  5/11/2021    MULTIPLE TOOTH EXTRACTIONS Left 7/22/2021    Procedure: EXTRACTION TEETH 14 & 15, ALVEOPLASTY, EXCISION OF CYST LEFT MAXILLA; Surgeon: Ruth Mcnair DDS;  Location: [facility];  Service: Maxillofacial    GA REMOVAL OF HYDROCELE,TUNICA,UNILAT Right 1/11/2018    Procedure: HYDROCELECTOMY;  Surgeon: Marina Ambrosio MD;  Location: [facility];  Service: Urology    SCROTAL SURGERY      benign "lump"    TUNNELED VENOUS PORT PLACEMENT Left 6/1/2021    Procedure: INSERTION VENOUS PORT (PORT-A-CATH); Surgeon: Blake Covington MD;  Location: [facility];  Service: Surgical Oncology       Family History:    Family History   Problem Relation Age of Onset    Heart disease Father         CARDIAC DISORDER    Hypertension Father     Hypertension Mother     No Known Problems Maternal Aunt     No Known Problems Maternal Uncle     No Known Problems Paternal Aunt     No Known Problems Paternal Uncle     No Known Problems Paternal Grandmother     No Known Problems Paternal Grandfather     Diabetes Maternal Grandmother         MELLITUS    No Known Problems Maternal Grandfather     Hypertension Other        Cancer-related family history is not on file      Social History:   Social History     Socioeconomic History    Marital status: /Civil Union     Spouse name: Not on file    Number of children: 2    Years of education: Not on file    Highest education level: Not on file   Occupational History    Occupation: FULL-TIME EMPLOYMENT   Tobacco Use    Smoking status: Former Smoker    Smokeless tobacco: Never Used    Tobacco comment: tobacco in a pipe in the early 1980's   Vaping Use    Vaping Use: Never used   Substance and Sexual Activity    Alcohol use: Never    Drug use: Never    Sexual activity: Not Currently Comment: NOT CURRENTLY SEXUALLY ACTIVE AS PER [employer]   Other Topics Concern    Not on file   Social History Narrative    ALWAYS USES SEAT BELT    DENIED DAILY COFFEE CONSUMPTION (__CUPS/DAY)    DENIED DAILY COLA CONSUMPTION (__CANS/DAY)    DAILY TEA CONSUMPTION (__CUPS/DAY)    DENTAL CARE, REGULARLY    EXERCISE: WALKING    HIGH SCHOOL GRADUATE    DENIED MULTIPLE ORGAN DONOR    NO LIVING WILL    PETS/ANIMALS    DENIED POWER OF  IN EXISTENCE    WATER INTAKE, ADEQUATE (PER DAY)     Social Determinants of Health     Financial Resource Strain: Not on file   Food Insecurity: Not on file   Transportation Needs: Not on file   Physical Activity: Not on file   Stress: Not on file   Social Connections: Not on file   Intimate Partner Violence: Not on file   Housing Stability: Not on file       Current Medications:   Current Outpatient Medications   Medication Sig Dispense Refill    acetaminophen (TYLENOL) 325 mg tablet Take 2 tablets (650 mg total) by mouth every 8 (eight) hours 168 tablet 2    Alcohol Swabs 70 % PADS May substitute brand based on insurance coverage  Check glucose ACHS   200 each 0    apixaban (Eliquis) 5 mg Take 1 tablet (5 mg total) by mouth 2 (two) times a day 60 tablet 3    aspirin 81 mg chewable tablet Chew 81 mg       atorvastatin (LIPITOR) 20 mg tablet Take 1 tablet (20 mg total) by mouth daily 90 tablet 1    Blood Glucose Monitoring Suppl (ONE TOUCH ULTRA 2) w/Device KIT Use daily 1 each 1    Creon 62733-30391 units TAKE 1 CAPSULE BY MOUTH THREE TIMES DAILY WITH MEALS OR SNACKS 270 capsule 0    cyanocobalamin (VITAMIN B-12) 1000 MCG tablet Take 1 tablet (1,000 mcg total) by mouth daily 30 tablet 5    diphenoxylate-atropine (LOMOTIL) 2 5-0 025 mg per tablet Take 1 tablet by mouth 4 (four) times a day as needed for diarrhea 30 tablet 0    fluorouracil 4,560 mg in CADD infusion pump Infuse 4,560 mg (1,200 mg/m2/day x 1 9 m2) into a venous catheter over 46 hours for 2 days  Do not start before February 2, 2022  1 each 0    FLUoxetine (PROzac) 20 mg capsule TAKE ONE CAPSULE BY MOUTH EVERY DAY 90 capsule 2    folic acid (FOLVITE) 1 mg tablet Take by mouth daily      gabapentin (NEURONTIN) 100 mg capsule Take 2 capsules (200 mg total) by mouth 3 (three) times a day 180 capsule 3    glucose blood (OneTouch Ultra) test strip Use as instructed tid 100 each 5    insulin lispro (HumaLOG KwikPen) 100 units/mL injection pen Take 10 units with breakfast, lunch and dinner +scale 15 mL 5    Insulin Pen Needle (Unifine Pentips Plus) 32G X 4 MM MISC USE WITH INSULIN PENS FOUR TIMES DAILY 100 each 3    Lancets (onetouch ultrasoft) lancets Use as instructed 3x a day 100 each 5    Lantus SoloStar 100 units/mL injection pen INJECT 12 UNITS UNDER THE SKIN DAILY AT BEDTIME 15 mL 0    lisinopril-hydrochlorothiazide (PRINZIDE,ZESTORETIC) 10-12 5 MG per tablet Take 1 tablet by mouth 3 (three) times a week Monday Wednesday Friday 90 tablet 3    morphine (MS CONTIN) 30 mg 12 hr tablet Take 1 tablet (30 mg total) by mouth every 12 (twelve) hours Max Daily Amount: 60 mg 60 tablet 0    naloxone (NARCAN) 4 mg/0 1 mL nasal spray 0 1 mL (4 mg total) by Alternating Nares route every 3 (three) minutes as needed (accidental opioid overdose or respiratory depression) 1 each 1    ondansetron (ZOFRAN-ODT) 4 mg disintegrating tablet Take 1 tablet (4 mg total) by mouth every 6 (six) hours as needed for nausea or vomiting for up to 12 doses 12 tablet 0    oxyCODONE (ROXICODONE) 10 MG TABS Take 1-2 tablets (10-20 mg total) by mouth every 4 (four) hours as needed (moderate to severe cancer-related pain) Take 1-2 tablets (10-20mg) up to once every 4 hours as needed for pain   Maximum daily dose 120mg (12 tablets ) Max Daily Amount: 120 mg 360 tablet 0    pantoprazole (PROTONIX) 40 mg tablet Take 1 tablet (40 mg total) by mouth 2 (two) times a day Before breakfast, before bed, to reduce stomach acid 60 tablet 1    predniSONE 10 mg tablet Take 1 tablet (10 mg total) by mouth daily with breakfast 90 tablet 0    prochlorperazine (COMPAZINE) 10 mg tablet Take 1 tablet (10 mg total) by mouth every 6 (six) hours as needed for nausea or vomiting 45 tablet 3    Sodium Chloride Flush (Normal Saline Flush) 0 9 % SOLN Gall bladder drain      tamsulosin (FLOMAX) 0 4 mg TAKE 1 CAPSULE BY MOUTH  DAILY AT BEDTIME 90 capsule 3    triamcinolone (KENALOG) 0 1 % cream Apply topically 2 (two) times a day 60 g 2    senna (SENOKOT) 8 6 MG tablet Take 1 tablet (8 6 mg total) by mouth daily at bedtime as needed for constipation 30 tablet 0     No current facility-administered medications for this visit  Facility-Administered Medications Ordered in Other Visits   Medication Dose Route Frequency Provider Last Rate Last Admin    atropine injection 0 25 mg  0 25 mg Intravenous Once PRN Génesis Stahl MD           Allergies: Allergies   Allergen Reactions    Fentanyl Anaphylaxis and Other (See Comments)     Oxygen drops severely       Physical Exam:    Body surface area is 1 86 meters squared  Wt Readings from Last 3 Encounters:   02/02/22 71 2 kg (156 lb 15 5 oz)   02/02/22 71 2 kg (156 lb 15 5 oz)   01/28/22 69 4 kg (153 lb)        Temp Readings from Last 3 Encounters:   02/02/22 99 1 °F (37 3 °C)   02/02/22 99 1 °F (37 3 °C) (Tympanic)   01/28/22 98 °F (36 7 °C)        BP Readings from Last 3 Encounters:   02/02/22 129/76   02/02/22 129/76   01/28/22 120/68         Pulse Readings from Last 3 Encounters:   02/02/22 98   02/02/22 98   01/28/22 76         Physical Exam     Constitutional   General appearance: No acute distress, well appearing and well nourished  Eyes   Conjunctiva and lids: No swelling, erythema or discharge  Pupils and irises: Equal, round and reactive to light      Ears, Nose, Mouth, and Throat   External inspection of ears and nose: Normal     Nasal mucosa, septum, and turbinates: Normal without edema or erythema  Oropharynx: Normal with no erythema, edema, exudate or lesions  Pulmonary   Respiratory effort: No increased work of breathing or signs of respiratory distress  Auscultation of lungs: Clear to auscultation  Cardiovascular   Palpation of heart: Normal PMI, no thrills  Auscultation of heart: Normal rate and rhythm, normal S1 and S2, without murmurs  Examination of extremities for edema and/or varicosities: Normal     Carotid pulses: Normal     Abdomen   Abdomen: Non-tender, no masses  Liver and spleen: No hepatomegaly or splenomegaly  Lymphatic   Palpation of lymph nodes in neck: No lymphadenopathy  Musculoskeletal   Gait and station: Normal     Digits and nails: Normal without clubbing or cyanosis  Inspection/palpation of joints, bones, and muscles: Normal     Skin   Skin and subcutaneous tissue:   Peeling of the hands and fingers  Neurologic   Cranial nerves: Cranial nerves 2-12 intact  Sensation: No sensory loss  Psychiatric   Orientation to person, place, and time: Normal     Mood and affect: Normal         Assessment / Plan:      The patient is a pleasant 71-year-old male with newly diagnosed adenocarcinoma of the pancreas who was referred to see us for consideration of neoadjuvant chemotherapy   We decided to start gemcitabine and Abraxane based on his performance status and disease   He had approximately 3 cycles of treatment and was found to have liver lesions   We switched the patient to modified FOLFOX 6  He had a mixed response with some progression so we switched him to FOLFIRI  He is currently  Status post 3 cycles of this  He is getting this with Neulasta  He is on Eliquis currently  I will see him back in 4-6 weeks  His next chemotherapy will be delayed by a week because he is going to [place] for a race which has always been his dream any wishes to do this as he knows he is quite sick  I think this is reasonable  I will see him back in 4-6 weeks  Again he is not on narcotics at this point   From what he is describing today  Goals and Barriers:  Current Goal:  Prolong Survival from   Pancreatic cancer  Barriers: None  Patient's Capacity to Self Care:  Patient  able to self care  Portions of the record may have been created with voice recognition software  Occasional wrong word or "sound a like" substitutions may have occurred due to the inherent limitations of voice recognition software  Read the chart carefully and recognize, using context, where substitutions have occurred

## 2022-02-02 NOTE — PROGRESS NOTES
Pt here today for chemo  Tolerated well  No adverse reactions noted  Pt left with port accessed, CADD pump running all connections taped green light flashing   Pt  Declined AVS and is aware of next appointment at [facility][note 3] 2-4-22 @ 12:00

## 2022-02-02 NOTE — PLAN OF CARE
Problem: Potential for Falls  Goal: Patient will remain free of falls  Description: INTERVENTIONS:  - Educate patient/family on patient safety including physical limitations  - Instruct patient to call for assistance with activity    - Keep Call bell within reach  - Keep bed low and locked with side rails adjusted as appropriate  - Keep care items and personal belongings within reach  - Initiate and maintain comfort rounds  - Apply yellow socks and bracelet for high fall risk patients  - Consider moving patient to room near nurses station  2/2/2022 1026 by Julia Barragan RN  Outcome: Progressing  2/2/2022 1026 by Julia Barragan RN  Outcome: Progressing     Problem: Knowledge Deficit  Goal: Patient/family/caregiver demonstrates understanding of disease process, treatment plan, medications, and discharge instructions  Description: Complete learning assessment and assess knowledge base    Interventions:  - Provide teaching at level of understanding  - Provide teaching via preferred learning methods  Outcome: Progressing

## 2022-02-03 NOTE — TELEPHONE ENCOUNTER
I spoke with the patient's son Jamie who has various concerns regarding his father  He states that his father is not taking his pain medication the way he should because he is not wanting to give up his freedom and his ability to drive  The patient has told me also that he is not taking his pain medication because he is not in pain  However, has been seen in the ER in the past in relation to his pain and has voiced via telephone conversations at times that he is having abdominal pain  I was not aware that he has not been taking his medication  I advised patient's son that I would reach out to his pain management team as he does have an upcoming appointment on 02/07 who will be able to discuss this further with him as the patient is on narcotic pain medications and this may be problematic in managing his ongoing cancer related pain

## 2022-02-03 NOTE — TELEPHONE ENCOUNTER
Patients son called, he would like to talk to Dr Nadja Orellana to update him on his dads status  The son doesn't feel the patient is being completely honest when he comes in for his appts  Patient's memory is declining, he has missed major events over the past few weeks, he is having a hard time getting up the stairs   Best call back number is [phone]

## 2022-02-03 NOTE — PROGRESS NOTES
Pt was in the [facility] yesterday and this MSW met with him due to the a chart review indicating his assigned LSW had been making outreach to pt with no return calls from pt  Pt was friendly and conversational and open to chairside conversation  He spoke of his diagnosis and hoe it impacted his employment  The pt states that he received a letter from his employer stating he was terminated and was never given any documentation on insurance or given any warning that he would be losing his job  At this time, the pt is speaking about "getting an " and dealing with many unresolved issues with his employer  He proceeded to share how his treatment started and he had no coverage  At the same time, he began to receive bills from the hospital and his wife was getting "very overwhelmed  She is super anxious and has OCD  She is not handling this well "  Pt was unsure what the status of his patrice care application was and asked for this MSW to reach out to his wife, explaining that she "handles everything "    The remainder of this session was spent listening to the pt talk about his love of camping and how he has selected a campground near the [facility] to leave his camper for the season  He is looking forward to spending weekends at the [facility] relaxing  Additionally, the pt spoke about his love of [place] and how his nephew will be taking him to the [place] [id number] this year  The pt shared that his oncologist is scheduling his chemo around this trip as this is a "bucket list trip "  Overall, the pt presents to be appropriate with his emotions and realistic with his diagnosis  His main concern is his hospital bill and his wife  MSW did make outreach to the pt's wife and received her voicemail  MSW left a detailed message, along with a contact number and encouraged a return call    MSW also explained that FABI Jamison is the pt's assigned oncology social worker and will provide ongoing follow up support

## 2022-02-04 NOTE — PROGRESS NOTES
Patient arrives to infusion center for CADD pump disconnect and Neulasta Onpro application  Patient reports fatigue today and that he will be taking a nap when he gets home  Patient reports frustrations with not being able to do as many chores/activities as easily as prior to chemotherapy initiation  Patient does endorse excitement for [place] race coming up  CADD pump reservoir volume noted to be 0 mL  CADD pump disconnected without incident  Port remains with brisk excellent blood return, port flushed well  Port deaccessed, bandaid in place  Neulasta Onpro applied to RIGHT arm without incident, green light flashing  Patient aware of injection time and removal time of Neulasta tomorrow, times written on patient booklet and provided to patient  Patient aware next cycle his Leucovorin will be dose reduced  AVS declined, as patient just received one on Wednesday  [facility] contacted for patient   Patient AAOx4 and ambulatory upon DC

## 2022-02-07 PROBLEM — T45.1X5A CHEMOTHERAPY INDUCED DIARRHEA: Status: ACTIVE | Noted: 2022-01-01

## 2022-02-07 PROBLEM — K52.1 CHEMOTHERAPY INDUCED DIARRHEA: Status: ACTIVE | Noted: 2022-01-01

## 2022-02-07 NOTE — PATIENT INSTRUCTIONS
It was good to see you today  Thank you for coming in     · I'm glad you're pain is less severe than before  Will remove the long-acting morphine (MS Contin) from your med list as you had stopped this  Will switch oxycodone to 5mg tabs - take every 4 hours as needed  · Resume Xanax for insomnia, take at bedtime as needed  Take Xanax at least 1 hour apart from oxycodone  · Return in about 6 weeks  · Call us for refills on medications that we supply, as needed  · If something changes and you need to come in sooner, please call our office  PRESCRIPTION REFILL REMINDER:  All medication refills should be requested prior to [facility] on Friday  Any refill requests after noon on Friday would be addressed the following Monday

## 2022-02-07 NOTE — PROGRESS NOTES
Follow-up with Palliative and Supportive Care  Mary Palacios Sr  73 y o  male [id number]    ASSESSMENT & PLAN:  1  Metastatic pancreatic cancer    2  Pancreatic cancer metastasized to liver ([facility] [address] )    3  Gastroesophageal reflux disease, unspecified whether esophagitis present    4  Anxiety disorder, unspecified type    5  Idiopathic osteoarthritis    6  Generalized abdominal pain    7  Protein-calorie malnutrition, unspecified severity ([facility] [address] )    8  Chemotherapy induced diarrhea    9  Cancer related pain    10  Insomnia due to medical condition    11  Palliative care patient           Patient endorses abdominal "heaviness" similar to the pain he had experienced in the but nowhere near the severity it had been weeks/months ago  o Discontinue MSER, he has not taken this in at least 3 weeks  o Patient is only occasionally taking 10mg oxyIR and feels even this dose may be too high  Will reduce to 5mg tablets to take q4h PRN; he may take 2 tablets is pain is severe   Patient has been provided an Rx for intranasal naloxone   Patient reports sleep is poor; resume Xanax 0 5mg QHS PRN insomnia  Had tolerated in the past  Cautioned on effects in conjunction w/ opioids   Continue Tylenol ATC  Continue gabapentin ATC   Continue Creon   Continue Lomotil PRN chemotherapy-induced diarrhea  Patient declines offer of cholestyramine   Continue Prozac for mood   Continue Protonix  Continue antiemetic regimen   Case discussed w/ Medical Oncology via EMR messages   Emotional support provided   Reviewed notes (DC Summary, Medical Oncology, ), labs (12/12/21: Cr 0 70, alb 2 8,  2, Hb 11 8), imaging + procedures (11/22/21 CTACAP, 11/23/21 CT head, 12/4/21 CTAP, 12/9/21 CTACAP, 11/21/23  US)   Medication safety issues addressed - no driving under the influence of narcotics, watch for adverse effects including AMS and respiratory depression, keep medications stored in a safe/locked environment        Requested Prescriptions     Signed Prescriptions Disp Refills    oxyCODONE (Roxicodone) 5 immediate release tablet 60 tablet 0     Sig: Take 1 tablet (5 mg total) by mouth every 4 (four) hours as needed for moderate pain or severe pain Max Daily Amount: 30 mg    diphenoxylate-atropine (LOMOTIL) 2 5-0 025 mg per tablet 60 tablet 0     Sig: Take 1 tablet by mouth 4 (four) times a day as needed for diarrhea    ALPRAZolam (XANAX) 0 5 mg tablet 30 tablet 2     Sig: Take 1 tablet (0 5 mg total) by mouth daily at bedtime as needed (insomnia)       Medications Discontinued During This Encounter   Medication Reason    oxyCODONE (ROXICODONE) 10 MG TABS     morphine (MS CONTIN) 30 mg 12 hr tablet Therapy completed    diphenoxylate-atropine (LOMOTIL) 2 5-0 025 mg per tablet Reorder       Representatives have queried the patient's controlled substance dispensing history in the Prescription Drug Monitoring Program in compliance with regulations before I have prescribed any controlled substances  The prescription history is consistent with prescribed therapy and our practice policies  30+ minutes were spent face to face with patient with greater than 50% of the time spent in counseling or coordination of care including discussions of symptom assessment and management, medication review and adjustment, psychosocial support, chart review, imaging review, lab review, supportive listening and anticipatory guidance  All of the patient's questions were answered during this discussion  Return in about 6 weeks (around 3/21/2022)  SUBJECTIVE:  Chief Complaint   Patient presents with    Cancer    Cancer Pain    Abdominal Pain    Anxiety    Counseling    Follow-up    Insomnia    Shortness of Breath        ANATOLY    Tish Caban  is a 73 y o  male w/ a palliative diagnosis of stage IV pancreatic cancer, diagnosed in 05/2021  He follows w/ Dr Sima Benjamin (Medical Oncology) and his treatment plan includes modified FOLFIRI   He was noted to have 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, lungs, superior mesenteric vein; 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 SMA on 11/22/21  Patient is known to [facility] [place], operated by [facility]; last seen 12/13/21 for symptom management, psychosocial support  [facility]-21[facility]21 for COVID19 infection w/ fatigue, nausea, lightheadedness, AMS  Patient states his abdominal pain has gradually decreased in recent weeks  He has not taken MSER in at least three weeks, and he is using prescribed oxyIR 10mg tablets more infrequently  He feels at times the 10mg dose might be too high  He reports he is taking less pain medication not because he wishes to drive, but because there is less pain  Today he endorses mild generalized abdominal "heaviness"  Patient endorses insomnia which has become more bothersome in recent days  He is amenable to resuming Xanax (he had found 0 5mg doing useful for insomnia in the hospital)  Mood is stable  Patient endorses chemo-induced diarrhea (including today)  Lomotil has provided modest relief of this (initiated by Oncology, recently)  Patient is looking forward to his [employer] [employer] trip later this month; this is one of his "bucket list" experiences which he has been working on  He also wishes to drive his son's [address]  PDMP shows no concerns  The following portions of the medical history were reviewed: past medical history, problem list, medication list, and social history  Current Outpatient Medications:     acetaminophen (TYLENOL) 325 mg tablet, Take 2 tablets (650 mg total) by mouth every 8 (eight) hours, Disp: 168 tablet, Rfl: 2    Alcohol Swabs 70 % PADS, May substitute brand based on insurance coverage  Check glucose ACHS  , Disp: 200 each, Rfl: 0    ALPRAZolam (XANAX) 0 5 mg tablet, Take 1 tablet (0 5 mg total) by mouth daily at bedtime as needed (insomnia), Disp: 30 tablet, Rfl: 2    apixaban (Eliquis) 5 mg, Take 1 tablet (5 mg total) by mouth 2 (two) times a day, Disp: 60 tablet, Rfl: 3    aspirin 81 mg chewable tablet, Chew 81 mg , Disp: , Rfl:     atorvastatin (LIPITOR) 20 mg tablet, Take 1 tablet (20 mg total) by mouth daily, Disp: 90 tablet, Rfl: 1    Blood Glucose Monitoring Suppl (ONE TOUCH ULTRA 2) w/Device KIT, Use daily, Disp: 1 each, Rfl: 1    Creon 12168-93600 units, TAKE 1 CAPSULE BY MOUTH THREE TIMES DAILY WITH MEALS OR SNACKS, Disp: 270 capsule, Rfl: 0    cyanocobalamin (VITAMIN B-12) 1000 MCG tablet, Take 1 tablet (1,000 mcg total) by mouth daily, Disp: 30 tablet, Rfl: 5    diphenoxylate-atropine (LOMOTIL) 2 5-0 025 mg per tablet, Take 1 tablet by mouth 4 (four) times a day as needed for diarrhea, Disp: 60 tablet, Rfl: 0    FLUoxetine (PROzac) 20 mg capsule, TAKE ONE CAPSULE BY MOUTH EVERY DAY, Disp: 90 capsule, Rfl: 2    folic acid (FOLVITE) 1 mg tablet, Take by mouth daily, Disp: , Rfl:     gabapentin (NEURONTIN) 100 mg capsule, Take 2 capsules (200 mg total) by mouth 3 (three) times a day, Disp: 180 capsule, Rfl: 3    glucose blood (OneTouch Ultra) test strip, Use as instructed tid, Disp: 100 each, Rfl: 5    insulin lispro (HumaLOG KwikPen) 100 units/mL injection pen, Take 10 units with breakfast, lunch and dinner +scale, Disp: 15 mL, Rfl: 5    Insulin Pen Needle (Unifine Pentips Plus) 32G X 4 MM MISC, USE WITH INSULIN PENS FOUR TIMES DAILY, Disp: 100 each, Rfl: 3    Lancets (onetouch ultrasoft) lancets, Use as instructed 3x a day, Disp: 100 each, Rfl: 5    Lantus SoloStar 100 units/mL injection pen, INJECT 12 UNITS UNDER THE SKIN DAILY AT BEDTIME, Disp: 15 mL, Rfl: 0    lisinopril-hydrochlorothiazide (PRINZIDE,ZESTORETIC) 10-12 5 MG per tablet, Take 1 tablet by mouth 3 (three) times a week Monday Wednesday Friday, Disp: 90 tablet, Rfl: 3    naloxone (NARCAN) 4 mg/0 1 mL nasal spray, 0 1 mL (4 mg total) by Alternating Nares route every 3 (three) minutes as needed (accidental opioid overdose or respiratory depression), Disp: 1 each, Rfl: 1   ondansetron (ZOFRAN-ODT) 4 mg disintegrating tablet, Take 1 tablet (4 mg total) by mouth every 6 (six) hours as needed for nausea or vomiting for up to 12 doses, Disp: 12 tablet, Rfl: 0    oxyCODONE (Roxicodone) 5 immediate release tablet, Take 1 tablet (5 mg total) by mouth every 4 (four) hours as needed for moderate pain or severe pain Max Daily Amount: 30 mg, Disp: 60 tablet, Rfl: 0    pantoprazole (PROTONIX) 40 mg tablet, Take 1 tablet (40 mg total) by mouth 2 (two) times a day Before breakfast, before bed, to reduce stomach acid, Disp: 60 tablet, Rfl: 1    predniSONE 10 mg tablet, Take 1 tablet (10 mg total) by mouth daily with breakfast, Disp: 90 tablet, Rfl: 0    prochlorperazine (COMPAZINE) 10 mg tablet, Take 1 tablet (10 mg total) by mouth every 6 (six) hours as needed for nausea or vomiting, Disp: 45 tablet, Rfl: 3    senna (SENOKOT) 8 6 MG tablet, Take 1 tablet (8 6 mg total) by mouth daily at bedtime as needed for constipation, Disp: 30 tablet, Rfl: 0    Sodium Chloride Flush (Normal Saline Flush) 0 9 % SOLN, Gall bladder drain, Disp: , Rfl:     tamsulosin (FLOMAX) 0 4 mg, TAKE 1 CAPSULE BY MOUTH  DAILY AT BEDTIME, Disp: 90 capsule, Rfl: 3    triamcinolone (KENALOG) 0 1 % cream, Apply topically 2 (two) times a day, Disp: 60 g, Rfl: 2    Review of Systems   Constitutional: Positive for activity change, fatigue and unexpected weight change (gradually declining)  Negative for appetite change (appetite is "good now")  HENT: Negative for trouble swallowing  Eyes: Negative for pain and redness  Respiratory: Negative for shortness of breath  Cardiovascular: Positive for leg swelling (mild)  Gastrointestinal: Positive for abdominal pain ("heaviness") and diarrhea  Negative for constipation, nausea and vomiting  Endocrine: Negative for polydipsia and polyphagia  Musculoskeletal: Negative for back pain  Allergic/Immunologic: Positive for immunocompromised state     Neurological: Negative for facial asymmetry and speech difficulty  Psychiatric/Behavioral: Positive for sleep disturbance  Negative for behavioral problems, decreased concentration and dysphoric mood  OBJECTIVE:  /62 (BP Location: Left arm, Patient Position: Sitting, Cuff Size: Standard)   Pulse (!) 108   Temp 98 °F (36 7 °C) (Tympanic)   Resp 18   Ht 5' 8" (1 727 m)   Wt 69 9 kg (154 lb)   SpO2 96%   BMI 23 42 kg/m²   Physical Exam  Vitals reviewed  Constitutional:       General: He is not in acute distress  Appearance: He is normal weight  He is not toxic-appearing  HENT:      Head: Normocephalic and atraumatic  Right Ear: External ear normal       Left Ear: External ear normal    Eyes:      General: No scleral icterus  Right eye: No discharge  Left eye: No discharge  Extraocular Movements: Extraocular movements intact  Conjunctiva/sclera: Conjunctivae normal       Pupils: Pupils are equal, round, and reactive to light  Cardiovascular:      Rate and Rhythm: Tachycardia present  Pulmonary:      Effort: Pulmonary effort is normal  No tachypnea, bradypnea, accessory muscle usage or respiratory distress  Abdominal:      General: There is no distension  Tenderness: There is no guarding  Musculoskeletal:      Right lower leg: Edema (mild, to ankle) present  Left lower leg: Edema (mild, to ankle) present  Skin:     General: Skin is dry  Coloration: Skin is not pale  Neurological:      Mental Status: He is alert and oriented to person, place, and time  Cranial Nerves: No dysarthria or facial asymmetry  Motor: No weakness  Gait: Gait is intact  Psychiatric:         Attention and Perception: Attention normal          Mood and Affect: Mood and affect normal          Speech: Speech normal          Behavior: Behavior normal  Behavior is cooperative  Thought Content:  Thought content normal          Cognition and Memory: Cognition and memory normal          Judgment: Judgment normal           Cari Barnett MD  [facility] Palliative and Supportive Care      Portions of this document may have been created using dictation software and as such some "sound alike" terms may have been generated by the system  Do not hesitate to contact me with any questions or clarifications

## 2022-02-14 PROBLEM — R78.81 BACTEREMIA: Status: RESOLVED | Noted: 2021-01-01 | Resolved: 2022-01-01

## 2022-02-14 PROBLEM — A41.9 SEPSIS WITHOUT ACUTE ORGAN DYSFUNCTION (HCC): Status: RESOLVED | Noted: 2021-01-01 | Resolved: 2022-01-01

## 2022-02-14 PROBLEM — R39.15 URGENCY OF URINATION: Status: RESOLVED | Noted: 2018-01-31 | Resolved: 2022-01-01

## 2022-02-14 PROBLEM — R78.81 POSITIVE BLOOD CULTURE: Status: RESOLVED | Noted: 2021-01-01 | Resolved: 2022-01-01

## 2022-02-14 NOTE — PROGRESS NOTES
Assessment and Plan:     Problem List Items Addressed This Visit        Digestive    Metastatic pancreatic cancer     Unclear if new therapy is cause of his dermatitis  Onc is adjusting the therapy  For repeat scan in March? F/u with Onc  Recheck 3m            Endocrine    Type 2 diabetes mellitus ([facility] )       Lab Results   Component Value Date    HGBA1C 6 2 02/14/2022   BGs have been stable  Continue to monitor diet  Recheck 3m            Cardiovascular and Mediastinum    Essential hypertension     Well controlled  Cont present treatment  Monitor labs  Recheck 6m           Pulmonary embolism (HCC)     Breathing stable  Compliant with Eliquis  Continue present care  Recheck 3m            Musculoskeletal and Integument    Rheumatoid arthritis ([facility] )     Still with scattered joint pains  Continue present care  Monitor labs  F/u with rheum  Recheck 6m            Other    Cancer related pain     Stable at present  Continue present care  Recheck 3m         Relevant Medications    gabapentin (NEURONTIN) 100 mg capsule    Hyperlipidemia     Check labs  Continue atorvastatin  Recheck 3m           Other Visit Diagnoses     Welcome to Medicare preventive visit    -  Primary    Relevant Orders    POCT ECG (Completed)    POCT hemoglobin A1c (Completed)           Preventive health issues were discussed with patient, and age appropriate screening tests were ordered as noted in patient's After Visit Summary  Personalized health advice and appropriate referrals for health education or preventive services given if needed, as noted in patient's After Visit Summary       History of Present Illness:     Patient presents for Medicare Annual Wellness visit    Patient Care Team:  Cherylene Spear, MD as PCP - Jinnie Brave Brau, MD Curt Range, MD Slater Lies, MD Cherylene Spear, MD Erin Sender, SADIE (Nutrition)  Song White MD as Consulting Physician (Endocrinology)  Kathrene Lennox as  Care Manager ()     Problem List:     Patient Active Problem List   Diagnosis    Rheumatoid arthritis ([facility] )    Essential hypertension    Anxiety disorder    Hyperlipidemia    Liver mass    GERD    Multiple lipomas    Type [facility] diabetes mellitus [facility]    BPH (benign prostatic hyperplasia)    Recent cholecystitis    Leukocytosis    Metastatic pancreatic cancer    Chemotherapy induced neutropenia (HCC)    Pancreatic cancer metastasized to liver (HCC)    Abdominal pain    Generalized weakness    Pancytopenia due to chemotherapy    Unspecified protein-calorie malnutrition ([facility] )    Dental infection    Chest pain    Pneumobilia due to Occluded CBD stent    Numbness and tingling of foot    Pulmonary embolism (HCC)    Mesenteric vein thrombosis (HCC)    Idiopathic osteoarthritis    Rotator cuff tear arthropathy    Tendinitis of right shoulder    Elevated transaminase level    Shortness of breath    Palliative care patient    Cancer related pain    COVID-19 virus infection    Metabolic encephalopathy    Chemotherapy induced diarrhea      Past Medical and Surgical History:     Past Medical History:   Diagnosis Date    Anxiety     Arthritis     Cancer ([facility] )     pancreatic    Cellulitis     LAST ASSESSED: 6/13/14    Diabetes mellitus ([place] [address] )     Enlarged prostate     Epidermal inclusion cyst     LAST ASSESSED: 10/4/13    Erythrasma     LAST ASSESSED: 9/23/13    Furuncle     LAST ASSESSED: 6/11/14    GERD (gastroesophageal reflux disease)     Hyperlipidemia     Hypertension     RA (rheumatoid arthritis) (HCC)      Past Surgical History:   Procedure Laterality Date    COLONOSCOPY      FL GUIDED CENTRAL VENOUS ACCESS DEVICE INSERTION  6/1/2021    HYDROCELE EXCISION / REPAIR Right 01/11/2018    SPERMATIC CORD EXCSION OF HYDROCELE; MANAGED BY: GABBIE CARBAJAL    IR BIOPSY LIVER MASS  8/16/2021    IR CHOLECYSTOSTOMY TUBE CHECK/CHANGE/REPOSITION/REINSERTION/UPSIZE  6/22/2021    IR CHOLECYSTOSTOMY TUBE CHECK/CHANGE/REPOSITION/REINSERTION/UPSIZE  8/23/2021    IR CHOLECYSTOSTOMY TUBE CHECK/CHANGE/REPOSITION/REINSERTION/UPSIZE  1/19/2022    IR CHOLECYSTOSTOMY TUBE PLACEMENT  5/11/2021    MULTIPLE TOOTH EXTRACTIONS Left 7/22/2021    Procedure: EXTRACTION TEETH 14 & 15, ALVEOPLASTY, EXCISION OF CYST LEFT MAXILLA; Surgeon: Kathryn Vanegas DDS;  [facility];  Service: Maxillofacial    OK REMOVAL OF HYDROCELE,TUNICA,UNILAT Right 1/11/2018    Procedure: HYDROCELECTOMY;  Surgeon: Torie Schumacher MD;  [facility];  Service: Urology    SCROTAL SURGERY      benign "lump"    TUNNELED VENOUS PORT PLACEMENT Left 6/1/2021    Procedure: INSERTION VENOUS PORT (PORT-A-CATH);   Surgeon: Shanta Mendoza MD;  [facility];  Service: Surgical Oncology      Family History:     Family History   Problem Relation Age of Onset    Heart disease Father         CARDIAC DISORDER    Hypertension Father     Hypertension Mother     No Known Problems Maternal Aunt     No Known Problems Maternal Uncle     No Known Problems Paternal Aunt     No Known Problems Paternal Uncle     No Known Problems Paternal Grandmother     No Known Problems Paternal Grandfather     Diabetes Maternal Grandmother         MELLITUS    No Known Problems Maternal Grandfather     Hypertension Other       Social History:     Social History     Socioeconomic History    Marital status: /Civil Union     Spouse name: None    Number of children: 2    Years of education: None    Highest education level: None   Occupational History    Occupation: FULL-TIME EMPLOYMENT   Tobacco Use    Smoking status: Former Smoker    Smokeless tobacco: Never Used    Tobacco comment: tobacco in a pipe in the early 1980's   Vaping Use    Vaping Use: Never used   Substance and Sexual Activity    Alcohol use: Never    Drug use: Never    Sexual activity: Not Currently     Comment: NOT CURRENTLY SEXUALLY ACTIVE AS PER [employer]   Other Topics Concern    None   Social History Narrative    ALWAYS USES SEAT BELT    DENIED DAILY COFFEE CONSUMPTION (__CUPS/DAY)    DENIED DAILY COLA CONSUMPTION (__CANS/DAY)    DAILY TEA CONSUMPTION (__CUPS/DAY)    DENTAL CARE, REGULARLY    EXERCISE: WALKING    HIGH SCHOOL GRADUATE    DENIED MULTIPLE ORGAN DONOR    NO LIVING WILL    PETS/ANIMALS    DENIED POWER OF  IN EXISTENCE    WATER INTAKE, ADEQUATE (PER DAY)     Social Determinants of Health     Financial Resource Strain: Not on file   Food Insecurity: Not on file   Transportation Needs: Not on file   Physical Activity: Not on file   Stress: Not on file   Social Connections: Not on file   Intimate Partner Violence: Not on file   Housing Stability: Not on file      Medications and Allergies:     Current Outpatient Medications   Medication Sig Dispense Refill    acetaminophen (TYLENOL) 325 mg tablet Take 2 tablets (650 mg total) by mouth every 8 (eight) hours 168 tablet 2    Alcohol Swabs 70 % PADS May substitute brand based on insurance coverage  Check glucose ACHS   200 each 0    ALPRAZolam (XANAX) 0 5 mg tablet Take 1 tablet (0 5 mg total) by mouth daily at bedtime as needed (insomnia) 30 tablet 2    apixaban (Eliquis) 5 mg Take 1 tablet (5 mg total) by mouth 2 (two) times a day 60 tablet 3    aspirin 81 mg chewable tablet Chew 81 mg       atorvastatin (LIPITOR) 20 mg tablet Take 1 tablet (20 mg total) by mouth daily 90 tablet 1    Blood Glucose Monitoring Suppl (ONE TOUCH ULTRA 2) w/Device KIT Use daily 1 each 1    Creon 03711-14970 units TAKE 1 CAPSULE BY MOUTH THREE TIMES DAILY WITH MEALS OR SNACKS 270 capsule 0    cyanocobalamin (VITAMIN B-12) 1000 MCG tablet Take 1 tablet (1,000 mcg total) by mouth daily 30 tablet 5    diphenoxylate-atropine (LOMOTIL) 2 5-0 025 mg per tablet Take 1 tablet by mouth 4 (four) times a day as needed for diarrhea 60 tablet 0    FLUoxetine (PROzac) 20 mg capsule TAKE ONE CAPSULE BY MOUTH EVERY DAY 90 capsule 2    folic acid (FOLVITE) 1 mg tablet Take by mouth daily      gabapentin (NEURONTIN) 100 mg capsule Take 2 capsules (200 mg total) by mouth 3 (three) times a day 180 capsule 3    glucose blood (OneTouch Ultra) test strip Use as instructed tid 100 each 5    insulin lispro (HumaLOG KwikPen) 100 units/mL injection pen Take 10 units with breakfast, lunch and dinner +scale 15 mL 5    Insulin Pen Needle (Unifine Pentips Plus) 32G X 4 MM MISC USE WITH INSULIN PENS FOUR TIMES DAILY 100 each 3    Lancets (onetouch ultrasoft) lancets Use as instructed 3x a day 100 each 5    Lantus SoloStar 100 units/mL injection pen INJECT 12 UNITS UNDER THE SKIN DAILY AT BEDTIME 15 mL 0    lisinopril-hydrochlorothiazide (PRINZIDE,ZESTORETIC) 10-12 5 MG per tablet Take 1 tablet by mouth 3 (three) times a week Monday Wednesday Friday 90 tablet 3    naloxone (NARCAN) 4 mg/0 1 mL nasal spray 0 1 mL (4 mg total) by Alternating Nares route every 3 (three) minutes as needed (accidental opioid overdose or respiratory depression) 1 each 1    ondansetron (ZOFRAN-ODT) 4 mg disintegrating tablet Take 1 tablet (4 mg total) by mouth every 6 (six) hours as needed for nausea or vomiting for up to 12 doses 12 tablet 0    oxyCODONE (Roxicodone) 5 immediate release tablet Take 1 tablet (5 mg total) by mouth every 4 (four) hours as needed for moderate pain or severe pain Max Daily Amount: 30 mg 60 tablet 0    pantoprazole (PROTONIX) 40 mg tablet Take 1 tablet (40 mg total) by mouth 2 (two) times a day Before breakfast, before bed, to reduce stomach acid 60 tablet 1    predniSONE 10 mg tablet Take 1 tablet (10 mg total) by mouth daily with breakfast 90 tablet 0    prochlorperazine (COMPAZINE) 10 mg tablet Take 1 tablet (10 mg total) by mouth every 6 (six) hours as needed for nausea or vomiting 45 tablet 3    Sodium Chloride Flush (Normal Saline Flush) 0 9 % SOLN Gall bladder drain      tamsulosin (FLOMAX) 0 4 mg TAKE 1 CAPSULE BY MOUTH  DAILY AT BEDTIME 90 capsule 3    triamcinolone (KENALOG) 0 1 % cream Apply topically 2 (two) times a day 60 g 2    [START ON 2/23/2022] fluorouracil 4,560 mg in CADD infusion pump Infuse 4,560 mg (1,200 mg/m2/day x 1 9 m2) into a venous catheter over 46 hours for 2 days  Do not start before February 23, 2022  1 each 0    senna (SENOKOT) 8 6 MG tablet Take 1 tablet (8 6 mg total) by mouth daily at bedtime as needed for constipation 30 tablet 0     No current facility-administered medications for this visit  Allergies   Allergen Reactions    Fentanyl Anaphylaxis and Other (See Comments)     Oxygen drops severely      Immunizations:     Immunization History   Administered Date(s) Administered    INFLUENZA 10/01/2018, 10/27/2020    Influenza Quadrivalent 3 years and older 10/01/2018    Influenza Quadrivalent Preservative Free 3 years and older IM 10/27/2020    Influenza, high dose seasonal 0 7 mL 09/17/2021    Influenza, recombinant, quadrivalent,injectable, preservative free 11/07/2019    Influenza, seasonal, injectable 11/16/2005, 09/20/2011, 10/24/2016    Pneumococcal Polysaccharide PPV23 11/16/2005, 09/17/2021    Tdap 08/06/2009, 09/08/2015      Health Maintenance:         Topic Date Due    Colorectal Cancer Screening  02/25/2023    Hepatitis C Screening  Completed         Topic Date Due    COVID-19 Vaccine (1) Never done      Medicare Health Risk Assessment:     /62   Pulse 99   Temp 98 8 °F (37 1 °C)   Ht 5' 8" (1 727 m)   Wt 70 5 kg (155 lb 6 4 oz)   SpO2 97%   BMI 23 63 kg/m²      Michelle Frances is here for his Welcome to Medicare visit  Health Risk Assessment:   Patient rates overall health as poor  Patient feels that their physical health rating is same  Patient is satisfied with their life  Eyesight was rated as same  Hearing was rated as same  Patient feels that their emotional and mental health rating is same  Patients states they are sometimes angry   Patient states they are often unusually tired/fatigued  Pain experienced in the last 7 days has been none  Patient states that he has experienced weight loss or gain in last 6 months  Pt with metastatic pancreatic CA    Depression Screening:   PHQ-9 Score: 19      Fall Risk Screening: In the past year, patient has experienced: history of falling in past year    Number of falls: 1  Injured during fall?: No    Feels unsteady when standing or walking?: Yes    Worried about falling?: Yes      Home Safety:  Patient has trouble with stairs inside or outside of their home  Patient has working smoke alarms and has working carbon monoxide detector  Home safety hazards include: none  Nutrition:   Current diet is Regular  Medications:   Patient is currently taking over-the-counter supplements  OTC medications include: see medication list  Patient is able to manage medications  Activities of Daily Living (ADLs)/Instrumental Activities of Daily Living (IADLs):   Walk and transfer into and out of bed and chair?: Yes  Dress and groom yourself?: Yes    Bathe or shower yourself?: Yes    Feed yourself?  Yes  Do your laundry/housekeeping?: Yes  Manage your money, pay your bills and track your expenses?: Yes  Make your own meals?: Yes    Do your own shopping?: Yes    Previous Hospitalizations:   Any hospitalizations or ED visits within the last 12 months?: Yes    How many hospitalizations have you had in the last year?: more than 4    Advance Care Planning:   Living will: No    Durable POA for healthcare: No    Advanced directive: No    Advanced directive counseling given: Yes    Five wishes given: Yes      Cognitive Screening:   Provider or family/friend/caregiver concerned regarding cognition?: No    PREVENTIVE SCREENINGS      Cardiovascular Screening:    General: Screening Not Indicated and History Lipid Disorder      Diabetes Screening:     General: Screening Not Indicated and History Diabetes      Colorectal Cancer Screening: General: Screening Current      Prostate Cancer Screening:    General: Risks and Benefits Discussed    Due for: PSA      Osteoporosis Screening:    General: Screening Not Indicated      Abdominal Aortic Aneurysm (AAA) Screening:    Risk factors include: age between 73-67 yo and tobacco use        Lung Cancer Screening:     General: Screening Not Indicated      Hepatitis C Screening:    General: Screening Current    Screening, Brief Intervention, and Referral to Treatment (SBIRT)    Screening      AUDIT-C Screenin) How often did you have a drink containing alcohol in the past year? never  2) How many drinks did you have on a typical day when you were drinking in the past year? 0  3) How often did you have 6 or more drinks on one occasion in the past year? never    AUDIT-C Score: 0  Interpretation: Score 0-3 (male): Negative screen for alcohol misuse    Single Item Drug Screening:  How often have you used an illegal drug (including marijuana) or a prescription medication for non-medical reasons in the past year? never    Single Item Drug Screen Score: 0  Interpretation: Negative screen for possible drug use disorder    Review of Systems   Constitutional: Positive for activity change (unchanged), appetite change (sl improved) and fatigue (unchanged)  Negative for chills, diaphoresis and fever  HENT: Negative  Eyes: Negative  Respiratory: Negative  Negative for cough and shortness of breath  Cardiovascular: Negative  Gastrointestinal: Positive for abdominal distention (intermittent), abdominal pain (intermittent, unchanged) and diarrhea (occasional)  Negative for nausea and vomiting  Genitourinary: Negative  Musculoskeletal: Positive for arthralgias (chronic, scattered) and myalgias  Skin: Positive for color change (improved in hands, but not resolved) and rash (improved but not fully resolved)  Neurological: Negative for dizziness, weakness, light-headedness and numbness  Psychiatric/Behavioral: Positive for dysphoric mood  Negative for confusion, self-injury, sleep disturbance and suicidal ideas  The patient is not nervous/anxious  Physical Exam  Vitals reviewed  Constitutional:       Appearance: He is well-developed  HENT:      Head: Normocephalic and atraumatic  Right Ear: Tympanic membrane, ear canal and external ear normal       Left Ear: Tympanic membrane, ear canal and external ear normal    Eyes:      General: No scleral icterus  Extraocular Movements: Extraocular movements intact  Conjunctiva/sclera: Conjunctivae normal       Pupils: Pupils are equal, round, and reactive to light  Comments: No pallor appreciated   Neck:      Thyroid: No thyromegaly  Vascular: No carotid bruit  Cardiovascular:      Rate and Rhythm: Normal rate and regular rhythm  Pulses: Normal pulses  Heart sounds: Normal heart sounds  No murmur heard  Pulmonary:      Effort: Pulmonary effort is normal       Breath sounds: Normal breath sounds  No wheezing or rales  Abdominal:      General: Bowel sounds are normal  There is no distension  Palpations: Abdomen is soft  There is no mass  Tenderness: There is abdominal tenderness (mild RUQ  Biliary tube in place and is draining)  There is no right CVA tenderness or left CVA tenderness  Musculoskeletal:         General: No swelling, tenderness or deformity (mild arthritic changes in the hands/MCPs)  Normal range of motion  Cervical back: Normal range of motion and neck supple  No tenderness  No muscular tenderness  Right lower leg: No edema  Left lower leg: No edema  Lymphadenopathy:      Cervical: No cervical adenopathy  Skin:     General: Skin is warm  Capillary Refill: Capillary refill takes less than 2 seconds  Findings: Erythema present  Comments: Toes and finger tips with persistent mild erythema   Dry/cracked skin greatly improved   Neurological: Mental Status: He is alert and oriented to person, place, and time  Cranial Nerves: No cranial nerve deficit  Sensory: No sensory deficit  Motor: No weakness  Coordination: Coordination normal       Gait: Gait normal       Deep Tendon Reflexes: Reflexes normal       Comments: minicog    Psychiatric:         Behavior: Behavior normal          Thought Content: Thought content normal          Judgment: Judgment normal       Comments: PHQ-2/9 Depression Screening    Little interest or pleasure in doing things: 1 - several days  Feeling down, depressed, or hopeless: 1 - several days  Trouble falling or staying asleep, or sleeping too much: 3 - nearly every   day  Feeling tired or having little energy: 3 - nearly every day  Poor appetite or overeatin - not at all  Feeling bad about yourself - or that you are a failure or have let   yourself or your family down: 2 - more than half the days  Trouble concentrating on things, such as reading the newspaper or watching   television: 1 - several days  Moving or speaking so slowly that other people could have noticed   Or the   opposite - being so fidgety or restless that you have been moving around a   lot more than usual: 0 - not at all  Thoughts that you would be better off dead, or of hurting yourself in some   way: 0 - not at all  PHQ-9 Score: 11   PHQ-9 Interpretation: Moderate depression         Fabiana Poole MD

## 2022-02-14 NOTE — PROGRESS NOTES
Assessment/Plan:    Metastatic pancreatic cancer  Unclear if new therapy is cause of his dermatitis  Onc is adjusting the therapy  For repeat scan in March? F/u with Onc  Recheck 3m    Type 2 diabetes mellitus ([address] )    Lab Results   Component Value Date    HGBA1C 6 2 02/14/2022   BGs have been stable  Continue to monitor diet  Recheck 3m    Essential hypertension  Well controlled  Cont present treatment  Monitor labs  Recheck 6m      Pulmonary embolism (HCC)  Breathing stable  Compliant with Eliquis  Continue present care  Recheck 3m    Rheumatoid arthritis ([address] )  Still with scattered joint pains  Continue present care  Monitor labs  F/u with rheum  Recheck 6m    Cancer related pain  Stable at present  Continue present care  Recheck 3m    Hyperlipidemia  Check labs  Continue atorvastatin  Recheck 3m       Diagnoses and all orders for this visit:    Welcome to Medicare preventive visit  -     POCT ECG  -     POCT hemoglobin A1c    Metastatic pancreatic cancer    Cancer related pain  -     gabapentin (NEURONTIN) 100 mg capsule; Take 2 capsules (200 mg total) by mouth 3 (three) times a day    Type 2 diabetes mellitus without complication, with long-term current use of insulin (HCC)    Essential hypertension    Multiple subsegmental pulmonary emboli without acute cor pulmonale (HCC)    Rheumatoid arthritis involving multiple sites with positive rheumatoid factor (HCC)    Mixed hyperlipidemia          Subjective:      Patient ID: Osman Gerardo  is a 78 y o  male  f/u multiple med issues and Welcome to Medicare  - pt states that feet are better but hands are still with discomfort  The dry/cracking dermatitis has improved, but sensitivity remains  Oncology is adjusting chemo  - still with fatigue  - pt denies worsening CP, palp, SOB or other CV/resp issues  - biliary tube in place  May have been plugged over the weekend, but started draining again once he flushed it  No new GI issues     - no new  complaints  - no new extremity complaints  - Welcome to Medicare exam done      The following portions of the patient's history were reviewed and updated as appropriate:   He  has a past medical history of Anxiety, Arthritis, Cancer ([facility] ), Cellulitis, Diabetes mellitus ([facility] ), Enlarged prostate, Epidermal inclusion cyst, Erythrasma, Furuncle, GERD (gastroesophageal reflux disease), Hyperlipidemia, Hypertension, and RA (rheumatoid arthritis) ([facility] )    He   Patient Active Problem List    Diagnosis Date Noted    Chemotherapy induced diarrhea 02/07/2022    COVID-19 virus infection 50/96/1597    Metabolic encephalopathy 68/33/5155    Palliative care patient 12/13/2021    Cancer related pain 12/13/2021    Shortness of breath 12/09/2021    Idiopathic osteoarthritis 12/05/2021    Rotator cuff tear arthropathy 12/05/2021    Tendinitis of right shoulder 12/05/2021    Elevated transaminase level 12/05/2021    Pul[facility] embolism [facility] ) 11/23/2021    Mesenteric vein thrombosis (HCC) 11/23/2021    Numbness and tingling of foot 10/29/2021    Pneumobilia due to Occluded CBD stent 08/24/2021    Chest pain 08/23/2021    Dental infection 08/04/2021    Unspecified protein-calorie malnutrition ([facility] ) 07/15/2021    Generalized weakness 07/13/2021    Pancytopenia due to chemotherapy 07/13/2021    Abdominal pain 06/15/2021    Pancreatic cancer metastasized to liver ([facility] ) 06/14/2021    Metastatic pancreatic cancer 05/25/2021    Chemotherapy induced neutropenia ([facility] ) 05/25/2021    Recent cholecystitis 05/11/2021    Leukocytosis 05/11/2021    BPH (benign prostatic hyperplasia) 05/06/2021    Type 2 diabetes mellitus ([facility] ) 03/16/2021    Multiple lipomas 08/04/2020    Rheumatoid arthritis ([facility] ) 10/13/2017    Essential hypertension 10/13/2017    Liver mass 11/17/2016    Hyperlipidemia 02/03/2016    Anxiety disorder 08/15/2013    GERD 08/15/2013     He  has a past surgical history that includes Scrotal surgery; pr removal of hydrocele,tunica,unilat (Right, 1/11/2018); Hydrocele excision / repair (Right, 01/11/2018); IR cholecystostomy tube placement (5/11/2021); Colonoscopy; Tunneled venous port placement (Left, 6/1/2021); FL guided central venous access device insertion (6/1/2021); IR cholecystostomy tube check/change/reposition/reinsertion/upsize (6/22/2021); Multiple tooth extractions (Left, 7/22/2021); IR biopsy liver mass (8/16/2021); IR cholecystostomy tube check/change/reposition/reinsertion/upsize (8/23/2021); and IR cholecystostomy tube check/change/reposition/reinsertion/upsize (1/19/2022)  He  reports that he has quit smoking  He has never used smokeless tobacco  He reports that he does not drink alcohol and does not use drugs  Current Outpatient Medications   Medication Sig Dispense Refill    acetaminophen (TYLENOL) 325 mg tablet Take 2 tablets (650 mg total) by mouth every 8 (eight) hours 168 tablet 2    Alcohol Swabs 70 % PADS May substitute brand based on insurance coverage  Check glucose ACHS   200 each 0    ALPRAZolam (XANAX) 0 5 mg tablet Take 1 tablet (0 5 mg total) by mouth daily at bedtime as needed (insomnia) 30 tablet 2    apixaban (Eliquis) 5 mg Take 1 tablet (5 mg total) by mouth 2 (two) times a day 60 tablet 3    aspirin 81 mg chewable tablet Chew 81 mg       atorvastatin (LIPITOR) 20 mg tablet Take 1 tablet (20 mg total) by mouth daily 90 tablet 1    Blood Glucose Monitoring Suppl (ONE TOUCH ULTRA 2) w/Device KIT Use daily 1 each 1    Creon 22101-82134 units TAKE 1 CAPSULE BY MOUTH THREE TIMES DAILY WITH MEALS OR SNACKS 270 capsule 0    cyanocobalamin (VITAMIN B-12) 1000 MCG tablet Take 1 tablet (1,000 mcg total) by mouth daily 30 tablet 5    diphenoxylate-atropine (LOMOTIL) 2 5-0 025 mg per tablet Take 1 tablet by mouth 4 (four) times a day as needed for diarrhea 60 tablet 0    FLUoxetine (PROzac) 20 mg capsule TAKE ONE CAPSULE BY MOUTH EVERY DAY 90 capsule 2    folic acid (FOLVITE) 1 mg tablet Take by mouth daily      gabapentin (NEURONTIN) 100 mg capsule Take 2 capsules (200 mg total) by mouth 3 (three) times a day 180 capsule 3    glucose blood (OneTouch Ultra) test strip Use as instructed tid 100 each 5    insulin lispro (HumaLOG KwikPen) 100 units/mL injection pen Take 10 units with breakfast, lunch and dinner +scale 15 mL 5    Insulin Pen Needle (Unifine Pentips Plus) 32G X 4 MM MISC USE WITH INSULIN PENS FOUR TIMES DAILY 100 each 3    Lancets (onetouch ultrasoft) lancets Use as instructed 3x a day 100 each 5    Lantus SoloStar 100 units/mL injection pen INJECT 12 UNITS UNDER THE SKIN DAILY AT BEDTIME 15 mL 0    lisinopril-hydrochlorothiazide (PRINZIDE,ZESTORETIC) 10-12 5 MG per tablet Take 1 tablet by mouth 3 (three) times a week Monday Wednesday Friday 90 tablet 3    naloxone (NARCAN) 4 mg/0 1 mL nasal spray 0 1 mL (4 mg total) by Alternating Nares route every 3 (three) minutes as needed (accidental opioid overdose or respiratory depression) 1 each 1    ondansetron (ZOFRAN-ODT) 4 mg disintegrating tablet Take 1 tablet (4 mg total) by mouth every 6 (six) hours as needed for nausea or vomiting for up to 12 doses 12 tablet 0    oxyCODONE (Roxicodone) 5 immediate release tablet Take 1 tablet (5 mg total) by mouth every 4 (four) hours as needed for moderate pain or severe pain Max Daily Amount: 30 mg 60 tablet 0    pantoprazole (PROTONIX) 40 mg tablet Take 1 tablet (40 mg total) by mouth 2 (two) times a day Before breakfast, before bed, to reduce stomach acid 60 tablet 1    predniSONE 10 mg tablet Take 1 tablet (10 mg total) by mouth daily with breakfast 90 tablet 0    prochlorperazine (COMPAZINE) 10 mg tablet Take 1 tablet (10 mg total) by mouth every 6 (six) hours as needed for nausea or vomiting 45 tablet 3    Sodium Chloride Flush (Normal Saline Flush) 0 9 % SOLN Gall bladder drain      tamsulosin (FLOMAX) 0 4 mg TAKE 1 CAPSULE BY MOUTH  DAILY AT BEDTIME 90 capsule 3    triamcinolone (KENALOG) 0 1 % cream Apply topically 2 (two) times a day 60 g 2    [START ON 2/23/2022] fluorouracil 4,560 mg in CADD infusion pump Infuse 4,560 mg (1,200 mg/m2/day x 1 9 m2) into a venous catheter over 46 hours for 2 days  Do not start before February 23, 2022  1 each 0    senna (SENOKOT) 8 6 MG tablet Take 1 tablet (8 6 mg total) by mouth daily at bedtime as needed for constipation 30 tablet 0     No current facility-administered medications for this visit  He is allergic to fentanyl       Review of Systems   Constitutional: Positive for activity change (unchanged), appetite change (sl improved) and fatigue (unchanged)  Negative for chills, diaphoresis and fever  HENT: Negative  Eyes: Negative  Respiratory: Negative  Negative for cough and shortness of breath  Cardiovascular: Negative  Gastrointestinal: Positive for abdominal distention (intermittent), abdominal pain (intermittent, unchanged) and diarrhea (occasional)  Negative for nausea and vomiting  Genitourinary: Negative  Musculoskeletal: Positive for arthralgias (chronic, scattered) and myalgias  Skin: Positive for color change (improved in hands, but not resolved) and rash (improved but not fully resolved)  Neurological: Negative for dizziness, weakness, light-headedness and numbness  Psychiatric/Behavioral: Positive for dysphoric mood  Negative for confusion, self-injury, sleep disturbance and suicidal ideas  The patient is not nervous/anxious  Objective:      /62   Pulse 99   Temp 98 8 °F (37 1 °C)   Ht 5' 8" (1 727 m)   Wt 70 5 kg (155 lb 6 4 oz)   SpO2 97%   BMI 23 63 kg/m²          Physical Exam  Vitals reviewed  Constitutional:       Appearance: He is well-developed  HENT:      Head: Normocephalic and atraumatic  Right Ear: Tympanic membrane, ear canal and external ear normal       Left Ear: Tympanic membrane, ear canal and external ear normal    Eyes:      General: No scleral icterus  Extraocular Movements: Extraocular movements intact  Conjunctiva/sclera: Conjunctivae normal       Pupils: Pupils are equal, round, and reactive to light  Comments: No pallor appreciated   Neck:      Thyroid: No thyromegaly  Vascular: No carotid bruit  Cardiovascular:      Rate and Rhythm: Normal rate and regular rhythm  Pulses: Normal pulses  Heart sounds: Normal heart sounds  No murmur heard  Pulmonary:      Effort: Pulmonary effort is normal       Breath sounds: Normal breath sounds  No wheezing or rales  Abdominal:      General: Bowel sounds are normal  There is no distension  Palpations: Abdomen is soft  There is no mass  Tenderness: There is abdominal tenderness (mild RUQ  Biliary tube in place and is draining)  There is no right CVA tenderness or left CVA tenderness  Musculoskeletal:         General: No swelling, tenderness or deformity (mild arthritic changes in the hands/MCPs)  Normal range of motion  Cervical back: Normal range of motion and neck supple  No tenderness  No muscular tenderness  Right lower leg: No edema  Left lower leg: No edema  Lymphadenopathy:      Cervical: No cervical adenopathy  Skin:     General: Skin is warm  Capillary Refill: Capillary refill takes less than 2 seconds  Findings: Erythema present  Comments: Toes and finger tips with persistent mild erythema  Dry/cracked skin greatly improved   Neurological:      Mental Status: He is alert and oriented to person, place, and time  Cranial Nerves: No cranial nerve deficit  Sensory: No sensory deficit  Motor: No weakness  Coordination: Coordination normal       Gait: Gait normal       Deep Tendon Reflexes: Reflexes normal       Comments: minicog 5/5   Psychiatric:         Behavior: Behavior normal          Thought Content:  Thought content normal          Judgment: Judgment normal       Comments: PHQ-2/9 Depression Screening    Little interest or pleasure in doing things: 1 - several days  Feeling down, depressed, or hopeless: 1 - several days  Trouble falling or staying asleep, or sleeping too much: 3 - nearly every   day  Feeling tired or having little energy: 3 - nearly every day  Poor appetite or overeatin - not at all  Feeling bad about yourself - or that you are a failure or have let   yourself or your family down: 2 - more than half the days  Trouble concentrating on things, such as reading the newspaper or watching   television: 1 - several days  Moving or speaking so slowly that other people could have noticed   Or the   opposite - being so fidgety or restless that you have been moving around a   lot more than usual: 0 - not at all  Thoughts that you would be better off dead, or of hurting yourself in some   way: 0 - not at all  PHQ-9 Score: 11   PHQ-9 Interpretation: Moderate depression

## 2022-02-14 NOTE — PATIENT INSTRUCTIONS
Medicare Preventive Visit Patient Instructions  Thank you for completing your Welcome to Medicare Visit or Medicare Annual Wellness Visit today  Your next wellness visit will be due in one year (2/15/2023)  The screening/preventive services that you may require over the next 5-10 years are detailed below  Some tests may not apply to you based off risk factors and/or age  Screening tests ordered at today's visit but not completed yet may show as past due  Also, please note that scanned in results may not display below  Preventive Screenings:  Service Recommendations Previous Testing/Comments   Colorectal Cancer Screening  · Colonoscopy    · Fecal Occult Blood Test (FOBT)/Fecal Immunochemical Test (FIT)  · Fecal DNA/Cologuard Test  · Flexible Sigmoidoscopy Age: 54-65 years old   Colonoscopy: every 10 years (May be performed more frequently if at higher risk)  OR  FOBT/FIT: every 1 year  OR  Cologuard: every 3 years  OR  Sigmoidoscopy: every 5 years  Screening may be recommended earlier than age 48 if at higher risk for colorectal cancer  Also, an individualized decision between you and your healthcare provider will decide whether screening between the ages of 74-80 would be appropriate   Colonoscopy: 02/25/2013  FOBT/FIT: Not on file  Cologuard: Not on file  Sigmoidoscopy: Not on file    Screening Current     Prostate Cancer Screening Individualized decision between patient and health care provider in men between ages of 53-78   Medicare will cover every 12 months beginning on the day after your 50th birthday PSA: 1 6 ng/mL           Hepatitis C Screening Once for adults born between 1945 and 1965  More frequently in patients at high risk for Hepatitis C Hep C Antibody: 04/16/2021    Screening Current   Diabetes Screening 1-2 times per year if you're at risk for diabetes or have pre-diabetes Fasting glucose: 78 mg/dL   A1C: 6 6 %    Screening Not Indicated  History Diabetes   Cholesterol Screening Once every 5 years if you don't have a lipid disorder  May order more often based on risk factors  Lipid panel: 05/05/2021    Screening Not Indicated  History Lipid Disorder      Other Preventive Screenings Covered by Medicare:  1  Abdominal Aortic Aneurysm (AAA) Screening: covered once if your at risk  You're considered to be at risk if you have a family history of AAA or a male between the age of 73-68 who smoking at least 100 cigarettes in your lifetime  2  Lung Cancer Screening: covers low dose CT scan once per year if you meet all of the following conditions: (1) Age 50-69; (2) No signs or symptoms of lung cancer; (3) Current smoker or have quit smoking within the last 15 years; (4) You have a tobacco smoking history of at least 30 pack years (packs per day x number of years you smoked); (5) You get a written order from a healthcare provider  3  Glaucoma Screening: covered annually if you're considered high risk: (1) You have diabetes OR (2) Family history of glaucoma OR (3)  aged 48 and older OR (3)  American aged 72 and older  3  Osteoporosis Screening: covered every 2 years if you meet one of the following conditions: (1) Have a vertebral abnormality; (2) On glucocorticoid therapy for more than 3 months; (3) Have primary hyperparathyroidism; (4) On osteoporosis medications and need to assess response to drug therapy  5  HIV Screening: covered annually if you're between the age of 12-76  Also covered annually if you are younger than 13 and older than 72 with risk factors for HIV infection  For pregnant patients, it is covered up to 3 times per pregnancy      Immunizations:  Immunization Recommendations   Influenza Vaccine Annual influenza vaccination during flu season is recommended for all persons aged >= 6 months who do not have contraindications   Pneumococcal Vaccine (Prevnar and Pneumovax)  * Prevnar = PCV13  * Pneumovax = PPSV23 Adults 25-60 years old: 1-3 doses may be recommended based on certain risk factors  Adults 72 years old: Prevnar (PCV13) vaccine recommended followed by Pneumovax (PPSV23) vaccine  If already received PPSV23 since turning 65, then PCV13 recommended at least one year after PPSV23 dose  Hepatitis B Vaccine 3 dose series if at intermediate or high risk (ex: diabetes, end stage renal disease, liver disease)   Tetanus (Td) Vaccine - COST NOT COVERED BY MEDICARE PART B Following completion of primary series, a booster dose should be given every 10 years to maintain immunity against tetanus  Td may also be given as tetanus wound prophylaxis  Tdap Vaccine - COST NOT COVERED BY MEDICARE PART B Recommended at least once for all adults  For pregnant patients, recommended with each pregnancy  Shingles Vaccine (Shingrix) - COST NOT COVERED BY MEDICARE PART B  2 shot series recommended in those aged 48 and above     Health Maintenance Due:      Topic Date Due    HIV Screening  Never done    Colorectal Cancer Screening  02/25/2023    Hepatitis C Screening  Completed     Immunizations Due:      Topic Date Due    COVID-19 Vaccine (1) Never done     Advance Directives   What are advance directives? Advance directives are legal documents that state your wishes and plans for medical care  These plans are made ahead of time in case you lose your ability to make decisions for yourself  Advance directives can apply to any medical decision, such as the treatments you want, and if you want to donate organs  What are the types of advance directives? There are many types of advance directives, and each state has rules about how to use them  You may choose a combination of any of the following:  · Living will: This is a written record of the treatment you want  You can also choose which treatments you do not want, which to limit, and which to stop at a certain time  This includes surgery, medicine, IV fluid, and tube feedings  · Durable power of  for healthcare [facility]):   This is a written record that states who you want to make healthcare choices for you when you are unable to make them for yourself  This person, called a proxy, is usually a family member or a friend  You may choose more than 1 proxy  · Do not resuscitate (DNR) order:  A DNR order is used in case your heart stops beating or you stop breathing  It is a request not to have certain forms of treatment, such as CPR  A DNR order may be included in other types of advance directives  · Medical directive: This covers the care that you want if you are in a coma, near death, or unable to make decisions for yourself  You can list the treatments you want for each condition  Treatment may include pain medicine, surgery, blood transfusions, dialysis, IV or tube feedings, and a ventilator (breathing machine)  · Values history: This document has questions about your views, beliefs, and how you feel and think about life  This information can help others choose the care that you would choose  Why are advance directives important? An advance directive helps you control your care  Although spoken wishes may be used, it is better to have your wishes written down  Spoken wishes can be misunderstood, or not followed  Treatments may be given even if you do not want them  An advance directive may make it easier for your family to make difficult choices about your care  Fall Prevention    Fall prevention  includes ways to make your home and other areas safer  It also includes ways you can move more carefully to prevent a fall  Health conditions that cause changes in your blood pressure, vision, or muscle strength and coordination may increase your risk for falls  Medicines may also increase your risk for falls if they make you dizzy, weak, or sleepy  Fall prevention tips:   · Stand or sit up slowly  · Use assistive devices as directed  · Wear shoes that fit well and have soles that   · Wear a personal alarm  · Stay active  · Manage your medical conditions  Home Safety Tips:  · Add items to prevent falls in the bathroom  · Keep paths clear  · Install bright lights in your home  · Keep items you use often on shelves within reach  · Paint or place reflective tape on the edges of your stairs  © Copyright [employer] 2018 Information is for End User's use only and may not be sold, redistributed or otherwise used for commercial purposes   All illustrations and images included in CareNotes® are the copyrighted property of A D A M , Inc  or [address] [place] [address] [facility]

## 2022-02-16 NOTE — ASSESSMENT & PLAN NOTE
Lab Results   Component Value Date    HGBA1C 6 2 02/14/2022   BGs have been stable  Continue to monitor diet   Recheck 3m

## 2022-02-16 NOTE — ASSESSMENT & PLAN NOTE
Unclear if new therapy is cause of his dermatitis  Onc is adjusting the therapy  For repeat scan in March? F/u with Onc   Recheck 3m

## 2022-02-23 NOTE — PROGRESS NOTES
Patient completed treatment today without incident  Port remains with excellent brisk blood return, port flushed well  CADD connected by ARNOLDO Velázquez RN and double checked with July Mcdermott RN  Pump patent and running, green light flashing  Patient aware of disconnect time on Friday  [facility] contacted for patient   AVS printed and provided to patient  Patient [id number] and ambulatory upon DC without gait disturbance

## 2022-02-23 NOTE — PROGRESS NOTES
Patient arrives to infusion center for D1 C4 Onivyde, Leucovorin and 5 FU CADD connection  Patient endorses some abdominal pain, which is not new for him  Patient reports he did have eggs for breakfast  Labs reviewed from 2/22/22 and are within parameters for treatment today  L PAC accessed by [facility] RN  Premedications in progress, patient resting on recliner chair, call bell within reach

## 2022-02-25 NOTE — PROGRESS NOTES
Pt to infusion center for CADD d/c and Neulasta  Tolerated 46 hour infusion at home without issues  CADD d/c, port flushed per protocol  Neulasta applied to right arm, pt educated on time when to remove  Pt confirmed follow up and declined AVS at d/c

## 2022-03-02 NOTE — PROGRESS NOTES
Medical Nutrition Therapy      Assessment    Chief complaint T2DM    Visit Type: Follow-up visit    HPI: Kriss Nixon returned for follow-up today  Most recent HbA1c has improved to 6 2% from previously 6 6%  No blood sugar log or glucometer to review today  He reports that for the past 2 months he has not been checking his blood sugar and has also stopped taking both his long-acting and rapid acting insulin  He reports that he stopped taking his blood sugar due to his fingers hurting from the chemotherapy that is currently undergoing  He also reports occasional reduced appetite but not all the time  Smiley food record reveals inconsistent carbohydrate intake, excess sodium intake, and inadequate intake of whole grains and high-fiber fruit  Overall, Smiley meals record in his food record range between 0-90 grams carbohydrate  Based on meal plan review and food record provided education about keeping meals consistently to between 45-60 g of carbohydrate each, reducing sodium intake, incorporating at least 2 servings of high-fiber fruit throughout his day, and also incorporating more whole grains  Discussed examples of foods in his food record either very high in sodium such as [employer] frozen meals and frozen chicken pot pie, as well as corn beef and hash, and frozen hash brown  Discussed examples of meal planning and how to modify meals to reduce sodium intake, decreased saturated fat intake, and provide consistent carbohydrate intake in the appropriate 45-60 g carbohydrate amount per meal   Kriss Nixon verbalized good understanding of topics and recommendations discussed today and will call with any questions prior to next follow-up appointment in 6 months        Ht Readings from Last 1 Encounters:   02/23/22 5' 7 99" (1 727 m)     Wt Readings from Last 2 Encounters:   02/23/22 70 8 kg (156 lb)   02/14/22 70 5 kg (155 lb 6 4 oz)     Weight Change: Yes 20 lb decrease over last 4 months    Medical Diagnosis/reason for visit E11 65, Z79 4 (ICD-10-CM) - Type 2 diabetes mellitus with hyperglycemia, with long-term current use of insulin ([facility])    Food Log:    Please see scanned 3 day food diary      Exercise not consistently, went over to his sister's house one day to ride their stationary bike  He wants to start doing this more  Calorie needs 2,139 kcals/day Carbs: 45-60 g/meal, 15 g/snack     Fat: 59 g/day    Protein:75 g/day    Nutrition Diagnosis:  Inconsistent carbohydrate intake  intake related to Food and nutrition related knowledge deficit concerning appropriate amount and timing of carbohydrate intake as evidenced by  Estimated carbohydrate intake that is different from recommended types or ingested on an irregular basis    Intervention: label reading, carbohydrate counting, increased plant based foods, meal planning, monitoring portion control and food diary     Treatment Goals: Patient understands education and recommendations, Patient will monitor portion control, Patient will increase their intake of plant based foods and Patient will count carbohydrates    Monitoring and evaluation:    Term code indicator  FH 1 6 3 Carbohydrate Intake Criteria: 45-60 g carbohydrate per meal, 15 g carbohydrate per snack  Term code indicator  FH 4 4 Mealtime Behavior Criteria: Three meals per day, 4-5 hours apart  Up to 3 snacks per day, at least 2 hours apart from meals  Patients Response to Instruction:  Lucia Singh  Expected Compliancegood    Thank you for coming to the [facility] for education today  Please feel free to call with any questions or concerns  Start:  2:55 p m  Stop:  3:30 p m    Referred by: Nurys Mcclain MD    [address] [place] [id number]

## 2022-03-02 NOTE — PATIENT INSTRUCTIONS
Try to be consistent with 45-60 grams of carbohydrate per meal  - breakfast has usually been too low and lunch has occasionally been too high  Limit frequency of frozen meals  Try to incorporate 1-2 servings of high-fiber fruit per day(apples, pears, berries, figs, tangerines, cherries, shanna, papaya) - but remember these are also carbohydrates that need to be taken into account with the rest of the carbs at your meals  Lunch options to replace frozen meals - lean protein sanwiches (low sodium turkey or ham) on 2 slices whole wheat plus a high fiber fruit (apple or pear)

## 2022-03-08 NOTE — TELEPHONE ENCOUNTER
Contacted the patient to remind him that we need him to get his labs done prior to his scheduled tx tomorrow 3/9  Patient verbalized understanding and stated that he is currently out and planning to get them done

## 2022-03-09 NOTE — PROGRESS NOTES
Patient is here for modified FOLFOX   Labs reviewed from 3/8, within treatment parameters  Port was accessed with good blood return noted  Patient resting with no complains  Will continue to monitor

## 2022-03-09 NOTE — PROGRESS NOTES
Patient tolerated treatment without any problems  Good blood return before, during, and after treatment  Patient connected to CADD pump for 46 hour infusion of 5FU  All connections secured  Green indicator light verified flashing before d/c  Patient aware to return on 3/11 at 1000 for disconnect  AVS declined

## 2022-03-11 NOTE — PROGRESS NOTES
Pt to clinic for CADD pump disconnect  Pt tolerated at home infusion  Reservoir volume is 0 ml at d/c  Port flushed and blood return noted and was de-accessed  Neulasta Onpro applied to pt's RIGHT ARM, green light blinking  Pt aware of when Neulasta Onpro will start and when he can remove it  Pt aware of future appts   Pt declined AVS

## 2022-03-16 NOTE — PROGRESS NOTES
Hematology/Oncology Outpatient Follow- up Note  Domi Fox  78 y o  male MRN: @ Encounter: [id number]        Date:  3/16/2022    Presenting Complaint/Diagnosis : Metastatic pancreatic adenocarcinoma    HPI:    Moriah Rubio seen for initial consultation 10/23/200 regarding   A newly diagnosed pancreatic cancer   The patient presented to the hospital with obstructive jaundice   He ended up having a workup along with a biopsy which revealed  A 3 8 cm obstructing mass in the region of the head of the pancreas with marked pancreatic ductal dilatation worrisome for adenocarcinoma   Biopsy was done which proved this was adenocarcinoma   The patient also had cholecystitis at the time and ended up having a cholecystostomy tube placed and was treated with antibiotics  [facility] was seen by our colleagues in Surgical Oncology who recommended neoadjuvant chemotherapy upfront which we will initiate  [facility] is not a candidate for  FOLFIRINOX and so  Got gemcitabine and Abraxane   He then had progression and developed metastatic disease  Previous Hematologic/ Oncologic History:    Oncology History   Metastatic pancreatic cancer   5/7/2021 Biopsy    Head of pancreas:  Malignant Adenocarcinoma       6/3/2021 - 9/2/2021 Chemotherapy    pegfilgrastim (NEULASTA ONPRO), 6 mg, Subcutaneous, Once, 2 of 4 cycles  Administration: 6 mg (8/19/2021)  paclitaxel protein-bound (ABRAXANE) IVPB, 100 mg/m2 = 191 mg (80 % of original dose 125 mg/m2), Intravenous, Once, 4 of 6 cycles  Dose modification: 100 mg/m2 (original dose 125 mg/m2, Cycle 1, Reason: Other (Must fill in a comment), Comment: per protocol)  Administration: 191 mg (6/3/2021), 191 mg (6/10/2021), 191 mg (7/1/2021), 191 mg (7/8/2021), 191 mg (8/5/2021), 191 mg (8/12/2021), 191 mg (8/19/2021), 191 mg (9/2/2021)  gemcitabine (GEMZAR) infusion, 1,000 mg/m2 = 1,909 9 mg, Intravenous, Once, 4 of 6 cycles  Administration: 1,909 9 mg (6/3/2021), 1,909 9 mg (6/10/2021), 1,909 9 mg (7/1/2021), 1,909 9 mg (7/8/2021), 1,909 9 mg (8/5/2021), 1,909 9 mg (8/12/2021), 1,909 9 mg (8/19/2021), 1,909 9 mg (9/2/2021)     9/22/2021 - 12/3/2021 Chemotherapy    fluorouracil (ADRUCIL), 400 mg/m2 = 770 mg, Intravenous, Once, 5 of 5 cycles  Administration: 770 mg (9/22/2021), 770 mg (10/6/2021), 785 mg (10/20/2021), 785 mg (11/17/2021), 785 mg (12/1/2021)  pegfilgrastim ([place] Josh), 6 mg, Subcutaneous, Once, 5 of 5 cycles  Administration: 6 mg (9/24/2021), 6 mg (10/8/2021), 6 mg (10/22/2021), 6 mg (11/19/2021), 6 mg (12/3/2021)  leucovorin calcium IVPB, 772 mg, Intravenous, Once, 5 of 5 cycles  Administration: 800 mg (9/22/2021), 800 mg (10/6/2021), 800 mg (10/20/2021), 800 mg (11/17/2021), 800 mg (12/1/2021)  oxaliplatin (ELOXATIN) chemo infusion, 85 mg/m2 = 164 05 mg, Intravenous, Once, 5 of 5 cycles  Administration: 164 05 mg (9/22/2021), 164 05 mg (10/6/2021), 166 6 mg (10/20/2021), 166 6 mg (11/17/2021), 166 6 mg (12/1/2021)  fluorouracil (ADRUCIL) ambulatory infusion Soln, 1,200 mg/m2/day = 4,630 mg, Intravenous, Over 46 hours, 5 of 5 cycles     12/29/2021 -  Chemotherapy    palonosetron (ALOXI), 0 25 mg, Intravenous, Once, 4 of 10 cycles  Administration: 0 25 mg (1/12/2022), 0 25 mg (2/2/2022), 0 25 mg (2/23/2022), 0 25 mg (3/9/2022)  pegfilgrastim ([place] Josh), 6 mg, Subcutaneous, Once, 5 of 11 cycles  Administration: 6 mg (12/31/2021), 6 mg (1/14/2022), 6 mg (2/4/2022), 6 mg (2/25/2022), 6 mg (3/11/2022)  fosaprepitant (EMEND) IVPB, 150 mg, Intravenous, Once, 4 of 10 cycles  Administration: 150 mg (1/12/2022), 150 mg (2/2/2022), 150 mg (2/23/2022), 150 mg (3/9/2022)  leucovorin calcium IVPB, 400 mg/m2 = 780 mg, Intravenous, Once, 5 of 11 cycles  Dose modification: 200 mg/m2 (original dose 400 mg/m2, Cycle 4, Reason: Other (Must fill in a comment), Comment:   peeling of the hands)  Administration: 800 mg (12/29/2021), 800 mg (1/12/2022), 760 mg (2/2/2022), 380 mg (2/23/2022), 380 mg (3/9/2022)  fluorouracil (ADRUCIL) ambulatory infusion Soln, 1,200 mg/m2/day = 4,680 mg, Intravenous, Over 46 hours, 5 of 11 cycles       Gemcitabine and Abraxane     modified FOLFOX 6      Current Hematologic/ Oncologic Treatment:      FOLFIRI with Neulasta growth factor support  Cycle 6  Is coming up  Interval History:    Patient returns for follow-up visit  He is doing reasonably well  He went to [facility] for race and came back  He has gained 2 lb  He is eating slightly better  I am hoping he is having a response to treatment  Denies any nausea denies any vomiting denies any diarrhea  Blood work is stable  Patient himself clinically seems to doing reasonably well  His only complaint is fatigue  He states that is understandable however since he is on chemotherapy and also has a stage IV malignancy  He will discuss his difficulty with sleep with palliative Care when he sees them  Test Results:    Imaging: No results found      Labs:   Lab Results   Component Value Date    WBC 14 48 (H) 03/08/2022    HGB 11 1 (L) 03/08/2022    HCT 35 5 (L) 03/08/2022    MCV 99 (H) 03/08/2022     03/08/2022     Lab Results   Component Value Date     09/01/2017    K 4 1 03/08/2022     03/08/2022    CO2 25 03/08/2022    BUN 14 03/08/2022    CREATININE 0 91 03/08/2022    GLUCOSE 117 (H) 09/01/2017    GLUF 115 (H) 02/22/2022    CALCIUM 8 3 03/08/2022    CORRECTEDCA 8 9 03/08/2022    AST 20 03/08/2022    ALT 38 03/08/2022    ALKPHOS 247 (H) 03/08/2022    PROT 5 8 (L) 09/01/2017    BILITOT 1 0 09/01/2017    EGFR 87 03/08/2022       Lab Results   Component Value Date    PSA 1 6 11/28/2020    PSA 1 4 11/09/2019    PSA 1 7 11/25/2017       Lab Results   Component Value Date    CEA 0 9 04/29/2021       Lab Results   Component Value Date    IRON 16 (L) 07/13/2021    TIBC 248 (L) 07/13/2021    FERRITIN 625 (H) 07/13/2021       Lab Results   Component Value Date    KBVODRSK76 238 07/13/2021         ROS: As stated in the history of present illness otherwise his 14 point review of systems today was negative        Active Problems:   Patient Active Problem List   Diagnosis    Rheumatoid arthritis ([facility] )    Essential hypertension    Anxiety disorder    Hyperlipidemia    Liver mass    GERD    Multiple lipomas    Type 2 diabetes mellitus (HCC)    BPH (benign prostatic hyperplasia)    Recent cholecystitis    Leukocytosis    Metastatic pancreatic cancer    Chemotherapy induced neutropenia (HCC)    Pancreatic cancer metastasized to liver (HCC)    Abdominal pain    Generalized weakness    Pancytopenia due to chemotherapy    Unspecified protein-calorie malnutrition ([facility] )    Dental infection    Chest pain    Pneumobilia due to Occluded CBD stent    Numbness and tingling of foot    Pulmonary embolism (HCC)    Mesenteric vein thrombosis (HCC)    Idiopathic osteoarthritis    Rotator cuff tear arthropathy    Tendinitis of right shoulder    Elevated transaminase level    Shortness of breath    Palliative care patient    Cancer related pain    COVID-19 virus infection    Metabolic encephalopathy    Chemotherapy induced diarrhea       Past Medical History:   Past Medical History:   Diagnosis Date    Anxiety     Arthritis     Cancer ([facility] )     pancreatic    Cellulitis     LAST ASSESSED: 6/13/14    [facility]     Enlarged prostate     Epidermal inclusion cyst     LAST ASSESSED: 10/4/13    Erythrasma     LAST ASSESSED: 9/23/13    Furuncle     LAST ASSESSED: 6/11/14    GERD (gastroesophageal reflux disease)     Hyperlipidemia     Hypertension     RA (rheumatoid arthritis) ([facility] )        Surgical History:   Past Surgical History:   Procedure Laterality Date    COLONOSCOPY      FL GUIDED CENTRAL VENOUS ACCESS DEVICE INSERTION  6/1/2021    HYDROCELE EXCISION / REPAIR Right 01/11/2018    SPERMATIC CORD EXCSION OF HYDROCELE; MANAGED BY: Oswaldo CONTRERAS BIOPSY LIVER MASS  8/16/2021  IR CHOLECYSTOSTOMY TUBE CHECK/CHANGE/REPOSITION/REINSERTION/UPSIZE  6/22/2021    IR CHOLECYSTOSTOMY TUBE CHECK/CHANGE/REPOSITION/REINSERTION/UPSIZE  8/23/2021    IR CHOLECYSTOSTOMY TUBE CHECK/CHANGE/REPOSITION/REINSERTION/UPSIZE  1/19/2022    IR CHOLECYSTOSTOMY TUBE PLACEMENT  5/11/2021    MULTIPLE TOOTH EXTRACTIONS Left 7/22/2021    Procedure: EXTRACTION TEETH 14 & 15, ALVEOPLASTY, EXCISION OF CYST LEFT MAXILLA; Surgeon: Pollo Gaines DDS;  [facility];  Service: Maxillofacial    AR REMOVAL OF HYDROCELE,TUNICA,UNILAT Right 1/11/2018    Procedure: HYDROCELECTOMY;  Surgeon: Talia Mejia MD;  [facility];  Service: Urology    SCROTAL SURGERY      benign "lump"    TUNNELED VENOUS PORT PLACEMENT Left 6/1/2021    Procedure: INSERTION VENOUS PORT (PORT-A-CATH); Surgeon: Adan Carter MD;  [facility];  Service: Surgical Oncology       Family History:    Family History   Problem Relation Age of Onset    Heart disease Father         CARDIAC DISORDER    Hypertension Father     Hypertension Mother     No Known Problems Maternal Aunt     No Known Problems Maternal Uncle     No Known Problems Paternal Aunt     No Known Problems Paternal Uncle     No Known Problems Paternal Grandmother     No Known Problems Paternal Grandfather     Diabetes Maternal Grandmother         MELLITUS    No Known Problems Maternal Grandfather     Hypertension Other        Cancer-related family history is not on file      Social History:   Social History     Socioeconomic History    Marital status: /Civil Union     Spouse name: Not on file    Number of children: 2    Years of education: Not on file    Highest education level: Not on file   Occupational History    Occupation: FULL-TIME EMPLOYMENT   Tobacco Use    Smoking status: Former Smoker    Smokeless tobacco: Never Used    Tobacco comment: tobacco in a pipe in the early 1980's   Vaping Use    Vaping Use: Never used Substance and Sexual Activity    Alcohol use: Never    Drug use: Never    Sexual activity: Not Currently     Comment: NOT CURRENTLY SEXUALLY ACTIVE AS PER [employer]   Other Topics Concern    Not on file   Social History Narrative    ALWAYS USES SEAT BELT    DENIED DAILY COFFEE CONSUMPTION (__CUPS/DAY)    DENIED DAILY COLA CONSUMPTION (__CANS/DAY)    DAILY TEA CONSUMPTION (__CUPS/DAY)    DENTAL CARE, REGULARLY    EXERCISE: WALKING    HIGH SCHOOL GRADUATE    DENIED MULTIPLE ORGAN DONOR    NO LIVING WILL    PETS/ANIMALS    DENIED POWER OF  IN EXISTENCE    WATER INTAKE, ADEQUATE (PER DAY)     Social Determinants of Health     Financial Resource Strain: Not on file   Food Insecurity: Not on file   Transportation Needs: Not on file   Physical Activity: Not on file   Stress: Not on file   Social Connections: Not on file   Intimate Partner Violence: Not on file   Housing Stability: Not on file       Current Medications:   Current Outpatient Medications   Medication Sig Dispense Refill    Alcohol Swabs 70 % PADS May substitute brand based on insurance coverage  Check glucose ACHS   200 each 0    ALPRAZolam (XANAX) 0 5 mg tablet Take 1 tablet (0 5 mg total) by mouth daily at bedtime as needed (insomnia) 30 tablet 2    apixaban (Eliquis) 5 mg Take 1 tablet (5 mg total) by mouth 2 (two) times a day 60 tablet 3    aspirin 81 mg chewable tablet Chew 81 mg       atorvastatin (LIPITOR) 20 mg tablet Take 1 tablet (20 mg total) by mouth daily 90 tablet 1    Creon [id number] units TAKE 1 CAPSULE BY MOUTH THREE TIMES DAILY WITH MEALS OR SNACKS 270 capsule 0    cyanocobalamin (VITAMIN B-12) 1000 MCG tablet Take 1 tablet (1,000 mcg total) by mouth daily 30 tablet 5    diphenoxylate-atropine (LOMOTIL) 2 5-0 025 mg per tablet Take 1 tablet by mouth 4 (four) times a day as needed for diarrhea 60 tablet 0    FLUoxetine (PROzac) 20 mg capsule TAKE ONE CAPSULE BY MOUTH EVERY DAY 90 capsule 2    folic acid (FOLVITE) 1 mg tablet Take by mouth daily      gabapentin (NEURONTIN) 100 mg capsule Take 2 capsules (200 mg total) by mouth 3 (three) times a day 180 capsule 3    glucose blood (OneTouch Ultra) test strip Use as instructed tid 100 each 5    lisinopril-hydrochlorothiazide (PRINZIDE,ZESTORETIC) 10-12 5 MG per tablet Take 1 tablet by mouth 3 (three) times a week Monday Wednesday Friday 90 tablet 3    naloxone (NARCAN) 4 mg/0 1 mL nasal spray 0 1 mL (4 mg total) by Alternating Nares route every 3 (three) minutes as needed (accidental opioid overdose or respiratory depression) 1 each 1    ondansetron (ZOFRAN-ODT) 4 mg disintegrating tablet Take 1 tablet (4 mg total) by mouth every 6 (six) hours as needed for nausea or vomiting for up to 12 doses 12 tablet 0    oxyCODONE (Roxicodone) 5 immediate release tablet Take 1 tablet (5 mg total) by mouth every 4 (four) hours as needed for moderate pain or severe pain Max Daily Amount: 30 mg 60 tablet 0    pantoprazole (PROTONIX) 40 mg tablet TAKE ONE TABLET BY MOUTH TWICE A DAY BEFORE BREAKFAST AND BEFORE BED TO REDUCE STOMACH ACID 60 tablet 1    predniSONE 10 mg tablet Take 1 tablet (10 mg total) by mouth daily with breakfast 90 tablet 0    prochlorperazine (COMPAZINE) 10 mg tablet Take 1 tablet (10 mg total) by mouth every 6 (six) hours as needed for nausea or vomiting 45 tablet 3    Sodium Chloride Flush (Normal Saline Flush) 0 9 % SOLN Gall bladder drain      tamsulosin (FLOMAX) 0 4 mg Take 1 capsule (0 4 mg total) by mouth daily at bedtime 90 capsule 3    triamcinolone (KENALOG) 0 1 % cream Apply topically 2 (two) times a day 60 g 2    Blood Glucose Monitoring Suppl (ONE TOUCH ULTRA 2) w/Device KIT Use daily (Patient not taking: Reported on 3/2/2022 ) 1 each 1    insulin lispro (HumaLOG KwikPen) 100 units/mL injection pen Take 10 units with breakfast, lunch and dinner +scale (Patient not taking: Reported on 3/2/2022 ) 15 mL 5    Insulin Pen Needle (Unifine Pentips Plus) 32G X 4 MM MISC USE WITH INSULIN PENS FOUR TIMES DAILY (Patient not taking: Reported on 3/2/2022 ) 100 each 3    Lancets (onetouch ultrasoft) lancets Use as instructed 3x a day (Patient not taking: Reported on 3/2/2022 ) 100 each 5    Lantus SoloStar 100 units/mL injection pen INJECT 12 UNITS UNDER THE SKIN DAILY AT BEDTIME (Patient not taking: Reported on 3/2/2022 ) 15 mL 0    senna (SENOKOT) 8 6 MG tablet Take 1 tablet (8 6 mg total) by mouth daily at bedtime as needed for constipation 30 tablet 0     No current facility-administered medications for this visit  Allergies: Allergies   Allergen Reactions    Fentanyl Anaphylaxis and Other (See Comments)     Oxygen drops severely       Physical Exam:    Body surface area is 1 84 meters squared  Wt Readings from Last 3 Encounters:   03/16/22 71 2 kg (157 lb)   03/09/22 71 4 kg (157 lb 8 oz)   03/02/22 71 3 kg (157 lb 3 2 oz)        Temp Readings from Last 3 Encounters:   03/16/22 97 8 °F (36 6 °C)   03/09/22 98 4 °F (36 9 °C)   02/23/22 (!) 96 7 °F (35 9 °C) (Temporal)        BP Readings from Last 3 Encounters:   03/16/22 126/90   03/09/22 122/62   02/23/22 112/76         Pulse Readings from Last 3 Encounters:   03/16/22 105   03/09/22 94   02/23/22 75     @LASTSAO2(3)@      Physical Exam     Constitutional   General appearance: No acute distress, well appearing and well nourished  Eyes   Conjunctiva and lids: No swelling, erythema or discharge  Pupils and irises: Equal, round and reactive to light  Ears, Nose, Mouth, and Throat   External inspection of ears and nose: Normal     Nasal mucosa, septum, and turbinates: Normal without edema or erythema  Oropharynx: Normal with no erythema, edema, exudate or lesions  Pulmonary   Respiratory effort: No increased work of breathing or signs of respiratory distress  Auscultation of lungs: Clear to auscultation  Cardiovascular   Palpation of heart: Normal PMI, no thrills      Auscultation of heart: Normal rate and rhythm, normal S1 and S2, without murmurs  Examination of extremities for edema and/or varicosities: Normal     Carotid pulses: Normal     Abdomen   Abdomen: Non-tender, no masses  Liver and spleen: No hepatomegaly or splenomegaly  Lymphatic   Palpation of lymph nodes in neck: No lymphadenopathy  Musculoskeletal   Gait and station: Normal     Digits and nails: Normal without clubbing or cyanosis  Inspection/palpation of joints, bones, and muscles: Normal     Skin   Skin and subcutaneous tissue: Normal without rashes or lesions  Neurologic   Cranial nerves: Cranial nerves 2-12 intact  Sensation: No sensory loss  Psychiatric   Orientation to person, place, and time: Normal     Mood and affect: Normal         Assessment / Plan:    The patient is a pleasant 18-year-old male with newly diagnosed adenocarcinoma of the pancreas who was referred to see us for consideration of neoadjuvant chemotherapy   We decided to start gemcitabine and Abraxane based on his performance status and disease   He had approximately 3 cycles of treatment and was found to have liver lesions   We switched the patient to modified FOLFOX 6   He had a mixed response with some progression so we switched him to FOLFIRI  cycle 6  Is coming up on the 23rd of March  I will see him back in 4-6 weeks with repeat imaging  Hopefully will have a response  He did have a break in the middle secondary to going for a race  Clinically he is stable  Does have an appetite  We will re-evaluate with imaging  Goals and Barriers:  Current Goal:  Prolong Survival from metastatic pancreatic adenocarcinoma  Barriers: None  Patient's Capacity to Self Care:  Patient able to self care  Portions of the record may have been created with voice recognition software  Occasional wrong word or "sound a like" substitutions may have occurred due to the inherent limitations of voice recognition software    Read the chart carefully and recognize, using context, where substitutions have occurred

## 2022-03-21 PROBLEM — G47.01 INSOMNIA DUE TO MEDICAL CONDITION: Status: ACTIVE | Noted: 2022-01-01

## 2022-03-21 NOTE — PATIENT INSTRUCTIONS
It was good to see you today  Thank you for coming in  · Will try a different medication for insomnia:  · Stop Xanax  · Start trazodone, 50mg at bedtime  If tolerated but not helping with sleep after 3 nights, you can increase to 100mg per dose  · Continue other medications  · Return in about 6 weeks  · Call us for refills on medications that we supply, as needed  · If something changes and you need to come in sooner, please call our office  PRESCRIPTION REFILL REMINDER:  All medication refills should be requested prior to [facility] on Friday  Any refill requests after noon on Friday would be addressed the following Monday

## 2022-03-21 NOTE — PROGRESS NOTES
[facility] met with patient/family to introduce the [facility] care/treatment  Patient/family were given the opportunity to have their questions/concerns addressed  [facility] encouraged patient/family to reach out to  as needed for support and/or resources as needed  Palliative Outpatient Assessment of Need    LSW completed an assessment of need which was completed with (patient, family, or both) in the office or via phone/video conference    Family dynamics: Pt reports support from children/nephew; Reports some stress in relationship with wife  Relationship status:    Duration of relationship: 20 years   Name of significant other: Serena Tuttle and Ages: 2 sons, 1 stepdtr   Pets: 1 kitten, 1 dog  Other important family information: Son moving to [place]  Living situation: Lives with wife    Patient's primary caregiver: Self    Any limitations of caregiver:  Environmental concerns or barriers:   history: None  Employment history/source of income: Worked as a  for 43 years  Disability: Currently receiving long-term disability   Spirituality/ Lutheran: "I believe"    Patient's strengths, social supports, and resources: Supportive family/friends  Cultural information:   Mental Health current or previous: Reports increased depression/stress due to strained relationship with wife   Substance use or history: None   Sleep: Trouble sleeping  Exercise: Active  Diet/nutrition: Stomach feels "heavy" and things taste differently  Durable Medical Equipment needs: None  Transportation: Drives self  Financial concerns: Pt does not express financial concerns  Hobbies/Interests: Going to races ([employer]), camping, fishing  Advanced Directive: Five Wishes on file  Designates son Lynsey Arrington as decision-maker with son Fco Quintero and step-dtr Melinda as alternates  Other medical or social work providers involved:  Oncology  Patient/caregiver current level of coping: Pt appears to be coping well overall with medical status    Understanding: Pt appears understanding of medical status  Patient/family concerns and areas of need: Pt focused on his "bucket list" and making memories with his family as a priority  *All questions may not be answered due to constraints  Follow-up discussions may need to occur    [facility] Support Kristofer Hammond reviewed and provided to pt  [facility] met with patient/family to review Palliative and Supportive Care Psychosocial Contract  Opportunity was provided for questions and/or concerns  Document was signed by patient,  and Attending Physician  Document will be scanned into patient's chart  LSW provided pt with her card with contact information and advised she is available for on-going emotional support and can assist with connecting pt to resources should any needs arise in the future  Pt appreciative of same  LSW will see pt in office again in 6 weeks

## 2022-03-21 NOTE — PROGRESS NOTES
Follow-up with Palliative and Supportive Care  Salima Manjarrez  78 y o  male [id number]    ASSESSMENT & PLAN:  1  Metastatic pancreatic cancer    2  Pancreatic cancer metastasized to liver ([facility] [address] )    3  Anxiety disorder, unspecified type    4  Idiopathic osteoarthritis    5  Gastroesophageal reflux disease, unspecified whether esophagitis present    6  Insomnia due to medical condition    7  Generalized abdominal pain    8  Cancer related pain    9  Chemotherapy induced diarrhea    10  Palliative care patient           Patient continues to endorse abdominal tenderness (described as "heaviness") but he feels this pain is manageable  o Patient reports very rare use of oxyIR    o Family concerned patient downplays his pain so he may still be able to drive and maintain his independence; patient agrees to an extent but does not feel his pain requires regular use of opioids  o Continue oxyIR PRN   Patient has been provided an Rx for intranasal naloxone   Patient reports ongoing insomnia; Xanax was ineffective   o Stop Xanax   o Trial of trazodone 50mg QHS, may increase to 100mg QHS if tolerated but insufficient   Continue Tylenol ATC  Continue gabapentin ATC   Continue Creon   Continue Imodium PRN chemotherapy-induced diarrhea, use Lomotil if Imodium insufficient   Continue Prozac for mood   Continue Protonix   Continue antiemetic regimen   Reviewed notes (FM, Diabetes Nutrition, Medical Oncology), labs (3/8/22: Cr 0 91, alb 3 2, Hb 11 1), imaging + procedures (1/20/22 CTAP)   Emotional support provided  Seen w/ [facility] SW    Medication safety issues addressed - no driving under the influence of narcotics, watch for adverse effects including AMS and respiratory depression, keep medications stored in a safe/locked environment        Requested Prescriptions     Signed Prescriptions Disp Refills    traZODone (DESYREL) 50 mg tablet 60 tablet 0     Sig: Take 1-2 tablets ( mg total) by mouth daily at bedtime       Medications Discontinued During This Encounter   Medication Reason    ALPRAZolam (XANAX) 0 5 mg tablet Alternate therapy       Representatives have queried the patient's controlled substance dispensing history in the Prescription Drug Monitoring Program in compliance with regulations before I have prescribed any controlled substances  The prescription history is consistent with prescribed therapy and our practice policies  30+ minutes were spent face to face with patient with greater than 50% of the time spent in counseling or coordination of care including discussions of symptom assessment and management, medication review and adjustment, psychosocial support, chart review, imaging review, lab review, supportive listening and anticipatory guidance  All of the patient's questions were answered during this discussion  Return in about 6 weeks (around 5/2/2022)  SUBJECTIVE:  Chief Complaint   Patient presents with    Follow-up    Insomnia    Cancer    Cancer Pain    Counseling    Diarrhea        HPI    Tish Caban  is a 78 y o  male w/ a palliative diagnosis of stage IV pancreatic cancer, diagnosed in 05/2021  He follows w/ Dr Sima Benjamin (Medical Oncology) and his treatment plan includes modified FOLFIRI  He was noted to have 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, lungs, superior mesenteric vein; 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 SMA on 11/22/21  Patient is known to [facility] clinic; last seen 2/7/22 for symptom management, psychosocial support  Patient is happy to talk about his recent trip down to [facility] for the [place] [employer], and other activities he has been enjoying (including camping)  He has been working on spending time with family and building memories with them  Some time spent today in life review and discussing activities on his "bucket list"  Patient notes occasional "heavy" feeling in his abdomen but reports no oxyIR use in months   He feels he pain is tolerable w/o the need to use opioids - though he admits to preferring enduring some discomfort so he can retain independence and the ability to drive  Unfortunately, insomnia continues to be an issue despite use of Xanax  He would like to consider alternate options, as long as they do not adversely affect him the following day  Patient reports occasional diarrhea ("every so often") with associated urgency  He has relied heavily on Imodium in the past, and reports there have been times he has overcorrected w/ Imodium or Lomotil  He has not needed to use antidiarrheals in recent days  PDMP shows no concerns  The following portions of the medical history were reviewed: past medical history, problem list, medication list, and social history  Current Outpatient Medications:     Alcohol Swabs 70 % PADS, May substitute brand based on insurance coverage  Check glucose ACHS  , Disp: 200 each, Rfl: 0    apixaban (Eliquis) 5 mg, Take 1 tablet (5 mg total) by mouth 2 (two) times a day, Disp: 60 tablet, Rfl: 3    aspirin 81 mg chewable tablet, Chew 81 mg , Disp: , Rfl:     atorvastatin (LIPITOR) 20 mg tablet, Take 1 tablet (20 mg total) by mouth daily, Disp: 90 tablet, Rfl: 1    Creon 71786-19970 units, TAKE 1 CAPSULE BY MOUTH THREE TIMES DAILY WITH MEALS OR SNACKS, Disp: 270 capsule, Rfl: 0    cyanocobalamin (VITAMIN B-12) 1000 MCG tablet, Take 1 tablet (1,000 mcg total) by mouth daily, Disp: 30 tablet, Rfl: 5    diphenoxylate-atropine (LOMOTIL) 2 5-0 025 mg per tablet, Take 1 tablet by mouth 4 (four) times a day as needed for diarrhea, Disp: 60 tablet, Rfl: 0    [START ON 3/23/2022] fluorouracil 4,560 mg in CADD infusion pump, Infuse 4,560 mg (1,200 mg/m2/day x 1 9 m2) into a venous catheter over 46 hours for 2 days  Do not start before March 23, 2022 , Disp: 1 each, Rfl: 0    FLUoxetine (PROzac) 20 mg capsule, TAKE ONE CAPSULE BY MOUTH EVERY DAY, Disp: 90 capsule, Rfl: 2    folic acid (FOLVITE) 1 mg tablet, Take by mouth daily, Disp: , Rfl:     gabapentin (NEURONTIN) 100 mg capsule, Take 2 capsules (200 mg total) by mouth 3 (three) times a day, Disp: 180 capsule, Rfl: 3    lisinopril-hydrochlorothiazide (PRINZIDE,ZESTORETIC) 10-12 5 MG per tablet, Take 1 tablet by mouth 3 (three) times a week Monday Wednesday Friday, Disp: 90 tablet, Rfl: 3    naloxone (NARCAN) 4 mg/0 1 mL nasal spray, 0 1 mL (4 mg total) by Alternating Nares route every 3 (three) minutes as needed (accidental opioid overdose or respiratory depression), Disp: 1 each, Rfl: 1    ondansetron (ZOFRAN-ODT) 4 mg disintegrating tablet, Take 1 tablet (4 mg total) by mouth every 6 (six) hours as needed for nausea or vomiting for up to 12 doses, Disp: 12 tablet, Rfl: 0    oxyCODONE (Roxicodone) 5 immediate release tablet, Take 1 tablet (5 mg total) by mouth every 4 (four) hours as needed for moderate pain or severe pain Max Daily Amount: 30 mg, Disp: 60 tablet, Rfl: 0    pantoprazole (PROTONIX) 40 mg tablet, TAKE ONE TABLET BY MOUTH TWICE A DAY BEFORE BREAKFAST AND BEFORE BED TO REDUCE STOMACH ACID, Disp: 60 tablet, Rfl: 1    predniSONE 10 mg tablet, Take 1 tablet (10 mg total) by mouth daily with breakfast, Disp: 90 tablet, Rfl: 0    prochlorperazine (COMPAZINE) 10 mg tablet, Take 1 tablet (10 mg total) by mouth every 6 (six) hours as needed for nausea or vomiting, Disp: 45 tablet, Rfl: 3    Sodium Chloride Flush (Normal Saline Flush) 0 9 % SOLN, Gall bladder drain, Disp: , Rfl:     tamsulosin (FLOMAX) 0 4 mg, Take 1 capsule (0 4 mg total) by mouth daily at bedtime, Disp: 90 capsule, Rfl: 3    triamcinolone (KENALOG) 0 1 % cream, Apply topically 2 (two) times a day, Disp: 60 g, Rfl: 2    Blood Glucose Monitoring Suppl (ONE TOUCH ULTRA 2) w/Device KIT, Use daily (Patient not taking: Reported on 3/2/2022 ), Disp: 1 each, Rfl: 1    glucose blood (OneTouch Ultra) test strip, Use as instructed tid (Patient not taking: Reported on 3/21/2022 ), Disp: 100 each, Rfl: 5    insulin lispro (HumaLOG KwikPen) 100 units/mL injection pen, Take 10 units with breakfast, lunch and dinner +scale (Patient not taking: Reported on 3/21/2022 ), Disp: 15 mL, Rfl: 5    Insulin Pen Needle (Unifine Pentips Plus) 32G X 4 MM MISC, USE WITH INSULIN PENS FOUR TIMES DAILY (Patient not taking: Reported on 3/2/2022 ), Disp: 100 each, Rfl: 3    Lancets (onetouch ultrasoft) lancets, Use as instructed 3x a day (Patient not taking: Reported on 3/2/2022 ), Disp: 100 each, Rfl: 5    Lantus SoloStar 100 units/mL injection pen, INJECT 12 UNITS UNDER THE SKIN DAILY AT BEDTIME (Patient not taking: Reported on 3/2/2022 ), Disp: 15 mL, Rfl: 0    senna (SENOKOT) 8 6 MG tablet, Take 1 tablet (8 6 mg total) by mouth daily at bedtime as needed for constipation, Disp: 30 tablet, Rfl: 0    traZODone (DESYREL) 50 mg tablet, Take 1-2 tablets ( mg total) by mouth daily at bedtime, Disp: 60 tablet, Rfl: 0    Review of Systems   Constitutional: Positive for activity change and fatigue  Negative for appetite change and unexpected weight change (defending his weight)  HENT: Negative for trouble swallowing  Eyes: Negative for pain and redness  Respiratory: Negative for shortness of breath  Cardiovascular: Positive for leg swelling  Gastrointestinal: Positive for abdominal pain and diarrhea  Negative for constipation, nausea and vomiting  Endocrine: Negative for polydipsia and polyphagia  Musculoskeletal: Negative for back pain  Allergic/Immunologic: Positive for immunocompromised state  Neurological: Negative for facial asymmetry and speech difficulty  Psychiatric/Behavioral: Positive for sleep disturbance  Negative for behavioral problems, decreased concentration and dysphoric mood         OBJECTIVE:  /70 (BP Location: Left arm, Patient Position: Sitting, Cuff Size: Standard)   Pulse 82   Temp (!) 97 2 °F (36 2 °C) (Temporal)   Resp 18   Ht 5' 7" (1 702 m)   Wt 71 7 kg (158 lb)   SpO2 97%   BMI 24 75 kg/m²   Physical Exam  Vitals reviewed  Constitutional:       General: He is not in acute distress  Appearance: He is well-developed, well-groomed and normal weight  He is not toxic-appearing  HENT:      Head: Normocephalic and atraumatic  Right Ear: External ear normal       Left Ear: External ear normal    Eyes:      General: No scleral icterus  Right eye: No discharge  Left eye: No discharge  Extraocular Movements: Extraocular movements intact  Conjunctiva/sclera: Conjunctivae normal       Pupils: Pupils are equal, round, and reactive to light  Cardiovascular:      Rate and Rhythm: Normal rate  Pulmonary:      Effort: Pulmonary effort is normal  No tachypnea, bradypnea, accessory muscle usage or respiratory distress  Abdominal:      General: There is no distension  Tenderness: There is no guarding  Musculoskeletal:      Cervical back: Normal range of motion  Skin:     General: Skin is dry  Coloration: Skin is not pale  Neurological:      Mental Status: He is alert and oriented to person, place, and time  Cranial Nerves: No dysarthria or facial asymmetry  Psychiatric:         Attention and Perception: Attention normal          Mood and Affect: Mood and affect normal          Speech: Speech normal          Behavior: Behavior normal  Behavior is cooperative  Thought Content: Thought content normal          Cognition and Memory: Cognition and memory normal          Judgment: Judgment normal         Cristina Gonzalez MD  [facility] Palliative and Supportive Care      Portions of this document may have been created using dictation software and as such some "sound alike" terms may have been generated by the system  Do not hesitate to contact me with any questions or clarifications

## 2022-03-23 NOTE — PROGRESS NOTES
Patient tolerated treatment today  CADD pump connected with 2 RN check  Port noted to have good blood return prior to connection of CADD  All connections secured  Pump running and green light flashing at time of DC  Patient declined AVS, he is aware to return on Friday at 1015

## 2022-03-23 NOTE — PROGRESS NOTES
Patient to infusion for Chemo today  He states he has knee pain which is new  He thinks he turned wrong and is planning to call his MD while here in infusion today and ask for xray  Port accessed without issue, good blood return noted  Vital sign stable  Call bell within reach will continue to monitor

## 2022-03-23 NOTE — PLAN OF CARE
Problem: Knowledge Deficit  Goal: Patient/family/caregiver demonstrates understanding of disease process, treatment plan, medications, and discharge instructions  Description: Complete learning assessment and assess knowledge base    Interventions:  - Provide teaching at level of understanding  - Provide teaching via preferred learning methods  Outcome: Progressing normal

## 2022-03-23 NOTE — TELEPHONE ENCOUNTER
Patient called   He turned the wrong way on Sunday and his Rt knee is very painful and has a bubble on it     He is requesting a xray order   He is at Chemo today and cant come in       Please advise

## 2022-03-25 NOTE — PROGRESS NOTES
Patient here for CADD pump d/c and Neulasta Onpro  Reservoir volume 0 ml  Pump disconnected without complications  Port flushed, blood returned noted  Brando Wade applied to R arm, green light flashing, pt aware of medication injection and completion time  Pt aware of next appointment   Patient provided AVS

## 2022-04-04 NOTE — ED PROVIDER NOTES
History  Chief Complaint   Patient presents with   [facility] or [address] [place] [address] patient had a bowel movement and had bright red blood in it  Pt had 1-2 bm since then without blood  Pt reports increased fatigue  HPI     78-year-old male presenting to the emergency department with bloody bowel movements yesterday  Past medical history significant for stage IV pancreatic cancer on chemotherapy, hyperlipidemia, hypertension, GERD, rheumatoid arthritis, and diabetes  Patient reports that he has had diarrhea over the past several months for which he has previously been given loperamide without much improvement  Patient has been having feelings of lower abdominal heaviness that have increased in the past several days  Patient started to have bright red blood per rectum yesterday with 1-2 diarrheal bowel movements  Patient reports that his rectal area feels sore  Patient has continued to have diarrhea today, however, there is no more blood  States that he has had episodes in the past where his stool looked black, however this is not the case today  Last chemotherapy was last week on 03/25  Patient denies recent fever, chest pain, palpitations, weakness, shortness of breath, nausea, vomiting, urinary symptoms, leg pain or leg swelling, rash  Prior to Admission Medications   Prescriptions Last Dose Informant Patient Reported? Taking? Alcohol Swabs 70 % PADS  Self No No   Sig: May substitute brand based on insurance coverage  Check glucose ACHS     Blood Glucose Monitoring Suppl (ONE TOUCH ULTRA 2) w/Device KIT  Self No No   Sig: Use daily   Patient not taking: Reported on 3/2/2022    Creon [id number] units   No No   Sig: TAKE 1 CAPSULE BY MOUTH THREE TIMES DAILY WITH MEALS OR SNACKS   FLUoxetine (PROzac) 20 mg capsule  Self No No   Sig: TAKE ONE CAPSULE BY MOUTH EVERY DAY   Insulin Pen Needle (Unifine Pentips Plus) 32G X 4 MM MISC  Self No No   Sig: USE WITH INSULIN PENS FOUR TIMES DAILY   Patient not taking: Reported on 3/2/2022    Lancets (onetouch ultrasoft) lancets  Self No No   Sig: Use as instructed 3x a day   Patient not taking: Reported on 3/2/2022    Lantus SoloStar 100 units/mL injection pen  Self No No   Sig: INJECT 12 UNITS UNDER THE SKIN DAILY AT BEDTIME   Patient not taking: Reported on 3/2/2022    Sodium Chloride Flush (Normal Saline Flush) 0 9 % SOLN  Self Yes No   Sig: Gall bladder drain   apixaban (Eliquis) 5 mg  Self No No   Sig: Take 1 tablet (5 mg total) by mouth 2 (two) times a day   aspirin 81 mg chewable tablet  Self Yes No   Sig: Chew 81 mg    atorvastatin (LIPITOR) 20 mg tablet  Self No No   Sig: Take 1 tablet (20 mg total) by mouth daily   diphenoxylate-atropine (LOMOTIL) 2 5-0 025 mg per tablet  Self No No   Sig: Take 1 tablet by mouth 4 (four) times a day as needed for diarrhea   fluorouracil 4,560 mg in CADD infusion pump   No No   Sig: Infuse 4,560 mg (1,200 mg/m2/day x 1 9 m2) into a venous catheter over 46 hours for 2 days  Do not start before 2022     folic acid (FOLVITE) 1 mg tablet  Self Yes No   Sig: Take by mouth daily   gabapentin (NEURONTIN) 100 mg capsule   No No   Sig: Take 2 capsules (200 mg total) by mouth 3 (three) times a day   glucose blood (OneTouch Ultra) test strip  Self No No   Sig: Use as instructed tid   Patient not taking: Reported on 3/21/2022    insulin lispro (HumaLOG KwikPen) 100 units/mL injection pen  Self No No   Sig: Take 10 units with breakfast, lunch and dinner +scale   Patient not taking: Reported on 3/21/2022    lisinopril-hydrochlorothiazide (PRINZIDE,ZESTORETIC) 10-12 5 MG per tablet  Self No No   Sig: Take 1 tablet by mouth 3 (three) times a week    naloxone (NARCAN) 4 mg/0 1 mL nasal spray  Self No No   Si 1 mL (4 mg total) by Alternating Nares route every 3 (three) minutes as needed (accidental opioid overdose or respiratory depression)   ondansetron (ZOFRAN-ODT) 4 mg disintegrating tablet  Self No No Sig: Take 1 tablet (4 mg total) by mouth every 6 (six) hours as needed for nausea or vomiting for up to 12 doses   oxyCODONE (Roxicodone) 5 immediate release tablet  Self No No   Sig: Take 1 tablet (5 mg total) by mouth every 4 (four) hours as needed for moderate pain or severe pain Max Daily Amount: 30 mg   pantoprazole (PROTONIX) 40 mg tablet   No No   Sig: TAKE ONE TABLET BY MOUTH TWICE A DAY BEFORE BREAKFAST AND BEFORE BED TO REDUCE STOMACH ACID   predniSONE 10 mg tablet  Self No No   Sig: Take 1 tablet (10 mg total) by mouth daily with breakfast   prochlorperazine (COMPAZINE) 10 mg tablet  Self No No   Sig: Take 1 tablet (10 mg total) by mouth every 6 (six) hours as needed for nausea or vomiting   senna (SENOKOT) 8 6 MG tablet  Self No No   Sig: Take 1 tablet (8 6 mg total) by mouth daily at bedtime as needed for constipation   tamsulosin (FLOMAX) 0 4 mg   No No   Sig: Take 1 capsule (0 4 mg total) by mouth daily at bedtime   traZODone (DESYREL) 50 mg tablet   No No   Sig: Take 1-2 tablets ( mg total) by mouth daily at bedtime   triamcinolone (KENALOG) 0 1 % cream  Self No No   Sig: Apply topically 2 (two) times a day   vitamin B-12 (VITAMIN B-12) 1,000 mcg tablet   No No   Sig: TAKE 1 TABLET BY MOUTH DAILY      Facility-Administered Medications: None       Past Medical History:   Diagnosis Date    Anxiety     Arthritis     Cancer ([place] [address] )     pancreatic    Cellulitis     LAST ASSESSED: 6/13/14    Diabetes mellitus ([facility] )     Enlarged prostate     Epidermal inclusion cyst     LAST ASSESSED: 10/4/13    Erythrasma     LAST ASSESSED: 9/23/13    Furuncle     LAST ASSESSED: 6/11/14    GERD (gastroesophageal reflux disease)     Hyperlipidemia     Hypertension     RA ([facility]umatoid arthritis) ([facility] )        Past Surgical History:   Procedure Laterality Date    COLONOSCOPY      FL GUIDED CENTRAL VENOUS ACCESS DEVICE INSERTION  6/1/2021    HYDROCELE EXCISION / REPAIR Right 01/11/2018    SPERMATIC CORD EXCSION OF HYDROCELE; MANAGED BY: GABBIE CARBAJAL    IR BIOPSY LIVER MASS  8/16/2021    IR CHOLECYSTOSTOMY TUBE CHECK/CHANGE/REPOSITION/REINSERTION/UPSIZE  6/22/2021    IR CHOLECYSTOSTOMY TUBE CHECK/CHANGE/REPOSITION/REINSERTION/UPSIZE  8/23/2021    IR CHOLECYSTOSTOMY TUBE CHECK/CHANGE/REPOSITION/REINSERTION/UPSIZE  1/19/2022    IR CHOLECYSTOSTOMY TUBE PLACEMENT  5/11/2021    MULTIPLE TOOTH EXTRACTIONS Left 7/22/2021    Procedure: EXTRACTION TEETH 14 & 15, ALVEOPLASTY, EXCISION OF CYST LEFT MAXILLA; Surgeon: Jeremias Valentin DDS;  [facility];  Service: Maxillofacial    DC REMOVAL OF HYDROCELE,TUNICA,UNILAT Right 1/11/2018    Procedure: HYDROCELECTOMY;  Surgeon: Martha Vallecillo MD;  Location: [facility];  Service: Urology    SCROTAL SURGERY      benign "lump"    TUNNELED VENOUS PORT PLACEMENT Left 6/1/2021    Procedure: INSERTION VENOUS PORT (PORT-A-CATH); Surgeon: Olegario Hobson MD;  [facility];  Service: Surgical Oncology       Family History   Problem Relation Age of Onset    Heart disease Father         CARDIAC DISORDER    Hypertension Father     Hypertension Mother     No Known Problems Maternal Aunt     No Known Problems Maternal Uncle     No Known Problems Paternal Aunt     No Known Problems Paternal Uncle     No Known Problems Paternal Grandmother     No Known Problems Paternal Grandfather     Diabetes Maternal Grandmother         MELLITUS    No Known Problems Maternal Grandfather     Hypertension Other      I have reviewed and agree with the history as documented      E-Cigarette/Vaping    E-Cigarette Use Never User      E-Cigarette/Vaping Substances    Nicotine No     THC No     CBD No      Social History     Tobacco Use    Smoking status: Former Smoker    Smokeless tobacco: Never Used    Tobacco comment: tobacco in a pipe in the early 1980's   Vaping Use    Vaping Use: Never used   Substance Use Topics    Alcohol use: Never    Drug use: Never       Review of Systems   Constitutional: Negative for activity change, chills and fever  HENT: Negative for sneezing and sore throat  Eyes: Negative for pain  Respiratory: Negative for apnea, cough, choking, chest tightness, shortness of breath, wheezing and stridor  Cardiovascular: Negative for chest pain, palpitations and leg swelling  Gastrointestinal: Positive for abdominal pain, blood in stool, diarrhea and rectal pain  Negative for abdominal distention, anal bleeding, constipation, nausea and vomiting  Genitourinary: Negative for dysuria and hematuria  Musculoskeletal: Negative for back pain, gait problem, myalgias, neck pain and neck stiffness  Skin: Negative for color change, pallor, rash and wound  Neurological: Negative for dizziness, tremors, seizures, syncope, facial asymmetry, speech difficulty, weakness, light-headedness, numbness and headaches  Physical Exam  Physical Exam  Vitals and nursing note reviewed  Constitutional:       General: He is not in acute distress  Appearance: He is well-developed  He is not diaphoretic  HENT:      Head: Normocephalic and atraumatic  Right Ear: External ear normal       Left Ear: External ear normal       Nose: Nose normal    Eyes:      General:         Right eye: No discharge  Left eye: No discharge  Conjunctiva/sclera: Conjunctivae normal       Pupils: Pupils are equal, round, and reactive to light  Cardiovascular:      Rate and Rhythm: Regular rhythm  Tachycardia present  Heart sounds: Normal heart sounds  No friction rub  No gallop  Pulmonary:      Effort: Pulmonary effort is normal  No respiratory distress  Breath sounds: Normal breath sounds  No stridor  No wheezing or rales  Chest:      Chest wall: No tenderness  Abdominal:      General: Abdomen is flat  Bowel sounds are normal  There is no distension  Palpations: Abdomen is soft  Tenderness: There is abdominal tenderness (Diffuse)   There is no guarding or rebound  Comments: Biliary drain in place, well appearing, no surrounding erythema or tenderness   Genitourinary:     Comments: No anal fissures noted, no active bleeding per rectum, no hemorrhoids  Irritation/redness in the gluteal fold  Musculoskeletal:         General: No tenderness or deformity  Normal range of motion  Cervical back: Normal range of motion and neck supple  Skin:     General: Skin is warm and dry  Capillary Refill: Capillary refill takes less than 2 seconds  Coloration: Skin is pale  Neurological:      Mental Status: He is alert and oriented to person, place, and time           Vital Signs  ED Triage Vitals [04/04/22 1426]   Temperature Pulse Respirations Blood Pressure SpO2   98 1 °F (36 7 °C) (!) 115 20 100/58 97 %      Temp Source Heart Rate Source Patient Position - Orthostatic VS BP Location FiO2 (%)   Oral Monitor Sitting Left arm --      Pain Score       --           Vitals:    04/04/22 1426 04/04/22 1445   BP: 100/58 117/71   Pulse: (!) 115 100   Patient Position - Orthostatic VS: Sitting          Visual Acuity      ED Medications  Medications   lactated ringers bolus 1,000 mL (1,000 mL Intravenous New Bag 4/4/22 1528)   iohexol (OMNIPAQUE) 350 MG/ML injection (SINGLE-DOSE) 100 mL (100 mL Intravenous Given 4/4/22 1623)       Diagnostic Studies  Results Reviewed     Procedure Component Value Units Date/Time    Clostridium difficile toxin by PCR with EIA [[id number]] Collected: 04/04/22 1754    Lab Status: No result Specimen: Stool from Per Rectum     Stool Enteric Bacterial Panel by PCR [[id number]] Collected: 04/04/22 1754    Lab Status: No result Specimen: Stool from Rectum     TSH [[id number]]  (Abnormal) Collected: 04/04/22 1525    Lab Status: Final result Specimen: Blood from Arm, Left Updated: 04/04/22 1559     TSH 3RD GENERATON 0 335 uIU/mL     Narrative:      Patients undergoing fluorescein dye angiography may retain small amounts of fluorescein in the body for 48-72 hours post procedure  Samples containing fluorescein can produce falsely depressed TSH values  If the patient had this procedure,a specimen should be resubmitted post fluorescein clearance  Lipase [[id number]]  (Abnormal) Collected: 04/04/22 1525    Lab Status: Final result Specimen: Blood from Arm, Left Updated: 04/04/22 1559     Lipase 15 u/L     Magnesium [[id number]]  (Normal) Collected: 04/04/22 1525    Lab Status: Final result Specimen: Blood from Arm, Left Updated: 04/04/22 1559     Magnesium 1 8 mg/dL     CBC and differential [[id number]]  (Abnormal) Collected: 04/04/22 1525    Lab Status: Final result Specimen: Blood from Arm, Left Updated: 04/04/22 1558     WBC 14 36 Thousand/uL      RBC 3 59 Million/uL      Hemoglobin 11 2 g/dL      Hematocrit 35 0 %      MCV 98 fL      MCH 31 2 pg      MCHC 32 0 g/dL      RDW 17 2 %      MPV 10 6 fL      Platelets 250 Thousands/uL     Narrative: This is an appended report  These results have been appended to a previously verified report      Manual Differential(PHLEBS Do Not Order) [[id number]]  (Abnormal) Collected: 04/04/22 1525    Lab Status: Final result Specimen: Blood from Arm, Left Updated: 04/04/22 1558     Segmented % 89 %      Bands % 1 %      Lymphocytes % 6 %      Monocytes % 4 %      Eosinophils, % 0 %      Basophils % 0 %      Absolute Neutrophils 12 92 Thousand/uL      Lymphocytes Absolute 0 86 Thousand/uL      Monocytes Absolute 0 57 Thousand/uL      Eosinophils Absolute 0 00 Thousand/uL      Basophils Absolute 0 00 Thousand/uL      Total Counted --     RBC Morphology Normal     Platelet Estimate Increased     Large Platelet Present    Comprehensive metabolic panel [[id number]]  (Abnormal) Collected: 04/04/22 1525    Lab Status: Final result Specimen: Blood from Arm, Left Updated: 04/04/22 1550     Sodium 133 mmol/L      Potassium 5 3 mmol/L      Chloride 99 mmol/L      CO2 23 mmol/L      ANION GAP 11 mmol/L      BUN 9 mg/dL Creatinine 1 13 mg/dL      Glucose 325 mg/dL      Calcium 8 2 mg/dL      Corrected Calcium 9 4 mg/dL      AST 34 U/L      ALT 26 U/L      Alkaline Phosphatase 520 U/L      Total Protein 6 2 g/dL      Albumin 2 5 g/dL      Total Bilirubin 0 48 mg/dL      eGFR 67 ml/min/1 73sq m     Narrative:      [place] guidelines for Chronic Kidney Disease (CKD):     Stage 1 with normal or high GFR (GFR > 90 mL/min/1 73 square meters)    Stage 2 Mild CKD (GFR = 60-89 mL/min/1 73 square meters)    Stage 3A Moderate CKD (GFR = 45-59 mL/min/1 73 square meters)    Stage 3B Moderate CKD (GFR = 30-44 mL/min/1 73 square meters)    Stage 4 Severe CKD (GFR = 15-29 mL/min/1 73 square meters)    Stage 5 End Stage CKD (GFR <15 mL/min/1 73 square meters)  Note: GFR calculation is accurate only with a steady state creatinine                 CT chest abdomen pelvis w contrast   Final Result by Eduardo Sims MD (04/04 1704)      1  Enlarging pancreatic mass compatible with known malignancy and concerning for disease progression  The SMA is now encircled by the mass  SMV is again not visualized likely thrombosed and occluded by the mass  2   Mostly stable multiple hepatic masses compatible with metastasis  3   No new suspicious findings in the chest             Workstation performed: [id number]                    Procedures  Procedures         ED Course  ED Course as of 04/04/22 1755   Mon Apr 04, 2022   150 81-year-old male presenting to the emergency department with left lower quadrant abdominal pain, diarrhea intermittently bloody  Vital signs on arrival notable for tachycardia  Physical exam is remarkable for a pale-appearing gentleman with generalized abdominal tenderness on exam   No guarding, no rebound  Abdomen is soft, nondistended  Pancreatic drain is in place  No surrounding erythema      Differential diagnosis includes infectious diarrhea, inflammatory diarrhea, diverticulitis, hyperthyroidism, electrolyte imbalance, dehydration, CHRISTIANO, chemotherapeutic side effects  Lab work and imaging was ordered  At this time, patient does not wish for any antiemetics or analgesia  1 L fluid bolus was ordered  1527 Pulse: 100   1542 WBC(!): 14 36   1542 Hemoglobin(!): 11 2   1542 Platelet Count(!): 670   1556 Sodium(!): 133  Worse than prior   1556 BUN: 9   1556 Creatinine: 1 13   1556 eGFR: 67  Similar to previous   1556 Glucose, Random(!): 325   1557 Alkaline Phosphatase(!): 520  Worse than prior   1604 TSH 3RD GENERATON(!): 0 335   1720 CT chest abdomen pelvis w contrast  1  Enlarging pancreatic mass compatible with known malignancy and concerning for disease progression  The SMA is now encircled by the mass  SMV is again not visualized likely thrombosed and occluded by the mass  2   Mostly stable multiple hepatic masses compatible with metastasis  3   No new suspicious findings in the chest    1750 On reassessment, patient overall feels stable  Reports that he does not have significant abdominal pain  Labs show leukocytosis, however patient does not demonstrate any particular source of infection with exception of diarrhea  CT abdomen pelvis was unremarkable for infectious or inflammatory changes in the bowels  Stool studies were able to be collected and have been sent  At this time, patient would like to manage his symptoms at home and will contact his physicians tomorrow  Patient was notified of enlarging pancreatic mass  Vital signs have improved since fluid bolus  Strict return precautions were given  Upon discharge, patient was fully alert, oriented, GCS 15, ambulatory, without further complaints                                               MDM    Disposition  Final diagnoses:   Diarrhea     Time reflects when diagnosis was documented in both MDM as applicable and the Disposition within this note     Time User Action Codes Description Comment    4/4/2022  5:52 PM Madeline Dallas Add [R19 7] Diarrhea       ED Disposition     ED Disposition Condition Date/Time Comment    Discharge Stable Mon Apr 4, 2022  5:52 PM Sandrita Mena Sr  discharge to home/self care  Follow-up Information     Follow up With Specialties Details Why Contact Info    Elizabeth Lerma MD Family Medicine Call in 1 day For re-check [id number]  [facility]      Yair Yusuf MD Palliative Care, Family Medicine Call in 1 day For re-check [address] [place] [phone]            Patient's Medications   Discharge Prescriptions    No medications on file       No discharge procedures on file      PDMP Review       Value Time User    PDMP Reviewed  Yes 3/21/2022  3:24 PM Yair Yusuf MD          ED Provider  Electronically Signed by           Cathy Johns MD  04/04/22 [phone]

## 2022-04-04 NOTE — TELEPHONE ENCOUNTER
Patient called stating that they are experiencing bloody diarrhea     Patient called asking if there is a medication that can be called in to the pharmacy or if he should come in to be seen

## 2022-04-04 NOTE — DISCHARGE INSTRUCTIONS
Please make sure to call your primary care doctor and your oncologist tomorrow for further evaluation of your chronic diarrhea as well as your CT scan findings  If you develop any fever, worsening stomach pain, rapid bleeding per rectum, or any other concerning symptoms, please return to the emergency department as these could be signs of worsening illness

## 2022-04-05 NOTE — TELEPHONE ENCOUNTER
Called and spoke to pt to touch base to see how he's feeling since recent hospital dc yesterday  Pt was admitted for loose bloody stools that have resolved  Pt is due for treatment today and states feeling fine and denies any complaints  Eating and drinking ok  Most recent lab work from yesterday looks good and pt advised to repeat labs tomorrow prior to treatment  Pt is currently not taking any oral antibiotics at this time  Infusion made aware ok to treat and plan released from hold

## 2022-04-06 NOTE — PROGRESS NOTES
Pt here for chemotherapy-pt reports having watery diarrhea since the weekend  His BM this morning had 'more consistency to it', has significant urgency  Reports that Lomotil helps his diarrhea-took a dose at 0330 today, forgot his prescription at home-requested lomotil while he was at infusion  Noted a 10lb wt loss since last infusion, 3/23  Notified Nikita RN/Valentine RN of above-Lomotil ordered for infusion today, pt encouraged to take lomotil at home with diarrhea and contact provider with concerns  Will proceed with treatment as ordered today  Vitals stable upon admission  Labs reviewed from 4/5-within parameters for treatment  ANC used from labs on 4/4 due to no differential drawn 4/5  Call bell in reach, will continue to monitor

## 2022-04-06 NOTE — PROGRESS NOTES
Pt tolerated treatment well  CADD connected after two RN check  Green light flashing, screen displays running upon discharge  Pt aware to return Friday at 1050 for disconnect-[facility] transport notified  AVS provided

## 2022-04-08 NOTE — PROGRESS NOTES
Pt  C/o diarrhea, he took lomotil at home  CADD pump disconnected,  res  Vol  0 mL , good blood return noted  Neulasta Onpro applied to [facility]  AVS declined  AVS declined  Next appt  confirmed

## 2022-04-11 PROBLEM — R63.0 ANOREXIA: Status: ACTIVE | Noted: 2022-01-01

## 2022-04-11 NOTE — TELEPHONE ENCOUNTER
Called pt back to discuss his current symptoms  He mentioned that he has difficulty with poor appetite and not eating much  He states that food is not appealing to him  I advised him to follow up with palliative care and reach out to them to be seen sooner in office to possible be prescribed an appetite stimulant  Pt verbalized good understanding  He does not have any difficulty swallowing

## 2022-04-11 NOTE — TELEPHONE ENCOUNTER
Called patient, discussed options  Will trial low dose mirtazapine  Other options might include dronabinol  Would not recommend increasing mirtazapine as patient is on an SSRI and taking trazodone

## 2022-04-11 NOTE — TELEPHONE ENCOUNTER
Patient called left a message he has no appetite  since his last chemo when he eats he chew and chews but cant swallow  Can you prescribe something for appetite

## 2022-04-14 NOTE — TELEPHONE ENCOUNTER
Pt was notified to stop taking Mirtazapine and see if symptoms subside  If no relief he will call our office back  Pt will have updated blood work for treatment next week  Pt verbalized understanding and was agreeable to the plan

## 2022-04-14 NOTE — TELEPHONE ENCOUNTER
Received call from patient stating he is experiencing a numbness sensation to both lips like he just had novocain  Pt denied any facial drooping, slurred speech, weakness to either side of the body or neurological changes  Pt state he is able to swallow and drink without issue, denying any SOB or trouble breathing  Reviewed with the patient this is not a likely symptom from his treatment  Pt does have a few mouth sores, denies thrush  Pt was advised to call 911 if he started with facial drooping, slurred speech, weakness to either side of the body, neurological changes, SOB or trouble swallowing  Pt verbalized understanding and was agreeable to this  Pt stated he just started taking Mirtazapine on 4/12 at night and started noticing this symptom on 4/13  Reviewed with the patient that I will send this off to the palliative care team to review  Pt verbalized understanding and was agreeable to this  Dr Phan Snider, can you please review  Pt was started on Mirtazapine on 4/12 and pt started with numbness in lips starting on 4/13

## 2022-04-14 NOTE — TELEPHONE ENCOUNTER
Localized loss of sensation can happen in 1-10% of patients; I recommend he stop the mirtazapine and see if this symptom changes  There are numerous potential causes of this symptom, including electrolyte imbalance

## 2022-04-16 NOTE — ED PROVIDER NOTES
History  Chief Complaint   Patient presents with    Diarrhea     Liquid BM's for past 4 days; prior h/o similar episodes of diarrhea that resolved in February  Also h/o pancreatic cancer, last treatment was Wednesday  60-year-old male with a history of stage IV pancreatic cancer, is presenting to the ED for evaluation of generalized weakness, chronic diarrhea  He states he has multiple episodes of watery diarrhea daily  His family saw him recently and noticed that he did look well and recommended he come to the ER to be seen  He denies any other new symptoms except for sores in his mouth and tingling and numbness to his mouth and tongue which started a few days ago after starting a new medication, mirtazapine  He has since stopped this medication  History provided by:  Patient   used: No    Diarrhea      Prior to Admission Medications   Prescriptions Last Dose Informant Patient Reported? Taking? Alcohol Swabs 70 % PADS Not Taking at Unknown time Self No No   Sig: May substitute brand based on insurance coverage  Check glucose ACHS     Patient not taking: Reported on 4/16/2022    Blood Glucose Monitoring Suppl (ONE TOUCH ULTRA 2) w/Device KIT Not Taking at Unknown time Self No No   Sig: Use daily   Patient not taking: Reported on 3/2/2022    Creon [id number] units 4/16/2022 at Unknown time  No Yes   Sig: TAKE 1 CAPSULE BY MOUTH THREE TIMES DAILY WITH MEALS OR SNACKS   FLUoxetine (PROzac) 20 mg capsule 4/16/2022 at Unknown time Self No Yes   Sig: TAKE ONE CAPSULE BY MOUTH EVERY DAY   Insulin Pen Needle (Unifine Pentips Plus) 32G X 4 MM MISC Not Taking at Unknown time Self No No   Sig: USE WITH INSULIN PENS FOUR TIMES DAILY   Patient not taking: Reported on 3/2/2022    Lancets (onetouch ultrasoft) lancets Not Taking at Unknown time Self No No   Sig: Use as instructed 3x a day   Patient not taking: Reported on 3/2/2022    Lantus SoloStar 100 units/mL injection pen Not Taking at Unknown time Self No No   Sig: INJECT 12 UNITS UNDER THE SKIN DAILY AT BEDTIME   Patient not taking: Reported on 3/2/2022    Sodium Chloride Flush (Normal Saline Flush) 0 9 % SOLN Past Week at Unknown time Self Yes Yes   Sig: Gall bladder drain   apixaban (Eliquis) 5 mg 4/16/2022 at Unknown time Self No Yes   Sig: Take 1 tablet (5 mg total) by mouth 2 (two) times a day   aspirin 81 mg chewable tablet 4/16/2022 at Unknown time Self Yes Yes   Sig: Chew 81 mg    atorvastatin (LIPITOR) 20 mg tablet 4/15/2022 at Unknown time Self No Yes   Sig: Take 1 tablet (20 mg total) by mouth daily   diphenoxylate-atropine (LOMOTIL) 2 5-0 025 mg per tablet 4/16/2022 at Unknown time  No Yes   Sig: TAKE 1 TABLET BY MOUTH FOUR TIMES A DAY AS NEEDED FOR DIARRHEA   fluorouracil 4,560 mg in CADD infusion pump Not Taking at Unknown time  No No   Sig: Infuse 4,560 mg (1,200 mg/m2/day x 1 9 m2) into a venous catheter over 46 hours for 2 days  Do not start before April 20, 2022     Patient not taking: Reported on 5/56/1152    folic acid (FOLVITE) 1 mg tablet 4/16/2022 at Unknown time Self Yes Yes   Sig: Take by mouth daily   gabapentin (NEURONTIN) 100 mg capsule 4/16/2022 at Unknown time  No Yes   Sig: Take 2 capsules (200 mg total) by mouth 3 (three) times a day   glucose blood (OneTouch Ultra) test strip Not Taking at Unknown time Self No No   Sig: Use as instructed tid   Patient not taking: Reported on 3/21/2022    insulin lispro (HumaLOG KwikPen) 100 units/mL injection pen Not Taking at Unknown time Self No No   Sig: Take 10 units with breakfast, lunch and dinner +scale   Patient not taking: Reported on 3/21/2022    lisinopril-hydrochlorothiazide (PRINZIDE,ZESTORETIC) 10-12 5 MG per tablet Past Week at Unknown time Self No Yes   Sig: Take 1 tablet by mouth 3 (three) times a week Monday Wednesday Friday   mirtazapine (REMERON) 7 5 MG tablet 4/15/2022 at Unknown time  No Yes   Sig: Take 1 tablet (7 5 mg total) by mouth daily at bedtime naloxone (NARCAN) 4 mg/0 1 mL nasal spray More than a month at Unknown time Self No No   Si 1 mL (4 mg total) by Alternating Nares route every 3 (three) minutes as needed (accidental opioid overdose or respiratory depression)   ondansetron (ZOFRAN-ODT) 4 mg disintegrating tablet 2022 at Unknown time Self No Yes   Sig: Take 1 tablet (4 mg total) by mouth every 6 (six) hours as needed for nausea or vomiting for up to 12 doses   oxyCODONE (Roxicodone) 5 immediate release tablet Past Month at Unknown time Self No Yes   Sig: Take 1 tablet (5 mg total) by mouth every 4 (four) hours as needed for moderate pain or severe pain Max Daily Amount: 30 mg   pantoprazole (PROTONIX) 40 mg tablet 2022 at Unknown time  No Yes   Sig: TAKE ONE TABLET BY MOUTH TWICE A DAY BEFORE BREAKFAST AND BEFORE BED TO REDUCE STOMACH ACID   predniSONE 10 mg tablet 2022 at Unknown time Self No Yes   Sig: Take 1 tablet (10 mg total) by mouth daily with breakfast   prochlorperazine (COMPAZINE) 10 mg tablet Past Week at Unknown time Self No Yes   Sig: Take 1 tablet (10 mg total) by mouth every 6 (six) hours as needed for nausea or vomiting   senna (SENOKOT) 8 6 MG tablet  Self No No   Sig: Take 1 tablet (8 6 mg total) by mouth daily at bedtime as needed for constipation   tamsulosin (FLOMAX) 0 4 mg 4/15/2022 at Unknown time  No Yes   Sig: Take 1 capsule (0 4 mg total) by mouth daily at bedtime   traZODone (DESYREL) 50 mg tablet 4/15/2022 at Unknown time  No Yes   Sig: Take 1-2 tablets ( mg total) by mouth daily at bedtime   triamcinolone (KENALOG) 0 1 % cream 2022 at Unknown time Self No Yes   Sig: Apply topically 2 (two) times a day   vitamin B-12 (VITAMIN B-12) 1,000 mcg tablet 2022 at Unknown time  No Yes   Sig: TAKE 1 TABLET BY MOUTH DAILY      Facility-Administered Medications: None       Past Medical History:   Diagnosis Date    Anxiety     Arthritis     Cancer ([address] )     pancreatic    Cellulitis     LAST ASSESSED: 6/13/14    Diabetes mellitus ([facility] )     Enlarged prostate     Epidermal inclusion cyst     LAST ASSESSED: 10/4/13    Erythrasma     LAST ASSESSED: 9/23/13    Furuncle     LAST ASSESSED: 6/11/14    GERD (gastroesophageal reflux disease)     Hyperlipidemia     Hypertension     [facility]        Past Surgical History:   Procedure Laterality Date    COLONOSCOPY      FL GUIDED CENTRAL VENOUS ACCESS DEVICE INSERTION  6/1/2021    HYDROCELE EXCISION / REPAIR Right 01/11/2018    SPERMATIC CORD EXCSION OF HYDROCELE; MANAGED BY: GABBIE CARBAJAL    IR BIOPSY LIVER MASS  8/16/2021    IR CHOLECYSTOSTOMY TUBE CHECK/CHANGE/REPOSITION/REINSERTION/UPSIZE  6/22/2021    IR CHOLECYSTOSTOMY TUBE CHECK/CHANGE/REPOSITION/REINSERTION/UPSIZE  8/23/2021    IR CHOLECYSTOSTOMY TUBE CHECK/CHANGE/REPOSITION/REINSERTION/UPSIZE  1/19/2022    IR CHOLECYSTOSTOMY TUBE PLACEMENT  5/11/2021    MULTIPLE TOOTH EXTRACTIONS Left 7/22/2021    Procedure: EXTRACTION TEETH 14 & 15, ALVEOPLASTY, EXCISION OF CYST LEFT MAXILLA; Surgeon: Benna Blizzard, DDS;  [facility];  Service: Maxillofacial    AK REMOVAL OF HYDROCELE,TUNICA,UNILAT Right 1/11/2018    Procedure: HYDROCELECTOMY;  Surgeon: Alexander Nuñez MD;  [facility];  Service: Urology    SCROTAL SURGERY      benign "lump"    TUNNELED VENOUS PORT PLACEMENT Left 6/1/2021    Procedure: INSERTION VENOUS PORT (PORT-A-CATH);   Surgeon: Sona Pablo MD;  [facility];  Service: Surgical Oncology       Family History   Problem Relation Age of Onset    Heart disease Father         CARDIAC DISORDER    Hypertension Father     Hypertension Mother     No Known Problems Maternal Aunt     No Known Problems Maternal Uncle     No Known Problems Paternal Aunt     No Known Problems Paternal Uncle     No Known Problems Paternal Grandmother     No Known Problems Paternal Grandfather     Diabetes Maternal Grandmother         MELLITUS    No Known Problems Maternal Grandfather     Hypertension Other      I have reviewed and agree with the history as documented  E-Cigarette/Vaping    E-Cigarette Use Never User      E-Cigarette/Vaping Substances    Nicotine No     THC No     CBD No      Social History     Tobacco Use    Smoking status: Former Smoker    Smokeless tobacco: Never Used    Tobacco comment: tobacco in a pipe in the early 1980's   Vaping Use    Vaping Use: Never used   Substance Use Topics    Alcohol use: Never    Drug use: Never       Review of Systems   Constitutional: Positive for fatigue  Gastrointestinal: Positive for diarrhea  All other systems reviewed and are negative  Physical Exam  Physical Exam  Vitals and nursing note reviewed  Constitutional:       General: He is not in acute distress  Appearance: Normal appearance  He is normal weight  He is not ill-appearing  HENT:      Head: Normocephalic and atraumatic  Right Ear: External ear normal       Left Ear: External ear normal       Nose: Nose normal  No congestion or rhinorrhea  Mouth/Throat:      Mouth: Mucous membranes are moist       Pharynx: Oropharynx is clear  Comments: There are several white canker sores to the inferior side aspect of the tongue  Eyes:      General: No scleral icterus  Right eye: No discharge  Left eye: No discharge  Extraocular Movements: Extraocular movements intact  Conjunctiva/sclera: Conjunctivae normal       Pupils: Pupils are equal, round, and reactive to light  Cardiovascular:      Rate and Rhythm: Normal rate and regular rhythm  Pulses: Normal pulses  Heart sounds: Normal heart sounds  Pulmonary:      Effort: Pulmonary effort is normal  No respiratory distress  Breath sounds: Normal breath sounds  Abdominal:      General: Abdomen is flat  There is no distension  Palpations: Abdomen is soft  Tenderness: There is abdominal tenderness   There is no guarding or rebound  Comments: There is a RUQ cholecystostomy tube in place, fluid in the bag noted  Musculoskeletal:         General: No swelling  Normal range of motion  Cervical back: Normal range of motion and neck supple  Skin:     General: Skin is warm and dry  Capillary Refill: Capillary refill takes less than 2 seconds  Neurological:      General: No focal deficit present  Mental Status: He is alert and oriented to person, place, and time  Mental status is at baseline     Psychiatric:         Mood and Affect: Mood normal          Behavior: Behavior normal          Vital Signs  ED Triage Vitals [04/16/22 1103]   Temperature Pulse Respirations Blood Pressure SpO2   (!) 96 6 °F (35 9 °C) 75 18 134/78 99 %      Temp Source Heart Rate Source Patient Position - Orthostatic VS BP Location FiO2 (%)   Oral Monitor Lying Right arm --      Pain Score       3           Vitals:    04/16/22 1103 04/16/22 1245 04/16/22 1500   BP: 134/78 131/74 119/74   Pulse: 75 64 64   Patient Position - Orthostatic VS: Lying Lying Lying         Visual Acuity  Visual Acuity      Most Recent Value   L Pupil Size (mm) 3   R Pupil Size (mm) 3          ED Medications  Medications   ondansetron (ZOFRAN) injection 4 mg (4 mg Intravenous Given 4/16/22 1132)   dicyclomine (BENTYL) capsule 20 mg (20 mg Oral Given 4/16/22 1131)   lactated ringers bolus 1,000 mL (0 mL Intravenous Stopped 4/16/22 1329)   morphine (PF) 4 mg/mL injection 4 mg (4 mg Intravenous Given 4/16/22 1131)   iohexol (OMNIPAQUE) 350 MG/ML injection (SINGLE-DOSE) 100 mL (100 mL Intravenous Given 4/16/22 1402)       Diagnostic Studies  Results Reviewed     Procedure Component Value Units Date/Time    CMP [[id number]]  (Abnormal) Collected: 04/16/22 1306    Lab Status: Final result Specimen: Blood from Arm, Left Updated: 04/16/22 1341     Sodium 136 mmol/L      Potassium 3 7 mmol/L      Chloride 103 mmol/L      CO2 25 mmol/L      ANION GAP 8 mmol/L      BUN 11 mg/dL      Creatinine 1 14 mg/dL      Glucose 205 mg/dL      Calcium 8 3 mg/dL      Corrected Calcium 9 6 mg/dL      AST 18 U/L      ALT 26 U/L      Alkaline Phosphatase 675 U/L      Total Protein 5 8 g/dL      Albumin 2 4 g/dL      Total Bilirubin 0 34 mg/dL      eGFR 66 ml/min/1 73sq m     Narrative:      [place] guidelines for Chronic Kidney Disease (CKD):     Stage 1 with normal or high GFR (GFR > 90 mL/min/1 73 square meters)    Stage 2 Mild CKD (GFR = 60-89 mL/min/1 73 square meters)    Stage 3A Moderate CKD (GFR = 45-59 mL/min/1 73 square meters)    Stage 3B Moderate CKD (GFR = 30-44 mL/min/1 73 square meters)    Stage 4 Severe CKD (GFR = 15-29 mL/min/1 73 square meters)    Stage 5 End Stage CKD (GFR <15 mL/min/1 73 square meters)  Note: GFR calculation is accurate only with a steady state creatinine    CBC and differential [[id number]]  (Abnormal) Collected: 04/16/22 1131    Lab Status: Final result Specimen: Blood from Arm, Left Updated: 04/16/22 1218     WBC 9 11 Thousand/uL      RBC 3 46 Million/uL      Hemoglobin 10 5 g/dL      Hematocrit 33 1 %      MCV 96 fL      MCH 30 3 pg      MCHC 31 7 g/dL      RDW 18 1 %      MPV 12 0 fL      Platelets 893 Thousands/uL     Narrative: This is an appended report  These results have been appended to a previously verified report      Manual Differential(PHLEBS Do Not Order) [[id number]]  (Abnormal) Collected: 04/16/22 1131    Lab Status: Final result Specimen: Blood from Arm, Left Updated: 04/16/22 1218     Segmented % 91 %      Bands % 1 %      Lymphocytes % 6 %      Monocytes % 2 %      Eosinophils, % 0 %      Basophils % 0 %      Absolute Neutrophils 8 38 Thousand/uL      Lymphocytes Absolute 0 55 Thousand/uL      Monocytes Absolute 0 18 Thousand/uL      Eosinophils Absolute 0 00 Thousand/uL      Basophils Absolute 0 00 Thousand/uL      Total Counted --     RBC Morphology Present     Hypochromia Present     Platelet Estimate Adequate    Magnesium [[id number]]  (Normal) Collected: 04/16/22 1131    Lab Status: Final result Specimen: Blood from Arm, Left Updated: 04/16/22 1205     Magnesium 2 0 mg/dL     Lipase [[id number]]  (Abnormal) Collected: 04/16/22 1131    Lab Status: Final result Specimen: Blood from Arm, Left Updated: 04/16/22 1203     Lipase 13 u/L     UA (URINE) with reflex to Scope [[id number]]     Lab Status: No result Specimen: Urine                  CT abdomen pelvis with contrast   Final Result by Ekaterina Coronado MD (04/16 1617)      Compared to the recent CT from 4/4/2022, there has been no significant acute changes  Again seen is a large pancreatic head mass, and multiple liver lesions consistent with metastatic pancreatic cancer  Percutaneous cholecystostomy tube and common bile duct stent remain in proper position  Workstation performed: [id number]                    Procedures  Procedures         ED Course  ED Course as of 04/16/22 1826   Sat Apr 16, 2022   1630 Pt states feeling better  Ct abd/pelvis unchanged, labs stable, normal renal function and electrolytes  Stable for d/c home, f/u pcp, hem/onc  SBIRT 20yo+      Most Recent Value   SBIRT (22 yo +)    In order to provide better care to our patients, we are screening all of our patients for alcohol and drug use  Would it be okay to ask you these screening questions? Yes Filed at: 04/16/2022 1106   Initial Alcohol Screen: US AUDIT-C     1  How often do you have a drink containing alcohol? 0 Filed at: 04/16/2022 1106   2  How many drinks containing alcohol do you have on a typical day you are drinking? 0 Filed at: 04/16/2022 1106   3a  Male UNDER 65: How often do you have five or more drinks on one occasion? 0 Filed at: 04/16/2022 1106   3b  FEMALE Any Age, or MALE 65+: How often do you have 4 or more drinks on one occassion?  0 Filed at: 04/16/2022 1106   Audit-C Score 0 Filed at: 04/16/2022 1106   RITO: How many times in the past year have you    Used an illegal drug or used a prescription medication for non-medical reasons? Never Filed at: 04/16/2022 1106                    [facility]  Number of Diagnoses or Management Options  Diarrhea: new and requires workup  Weakness: new and requires workup     Amount and/or Complexity of Data Reviewed  Clinical lab tests: ordered and reviewed  Tests in the radiology section of CPT®: ordered and reviewed  Decide to obtain previous medical records or to obtain history from someone other than the patient: yes    Risk of Complications, Morbidity, and/or Mortality  Presenting problems: moderate  Diagnostic procedures: moderate  Management options: moderate    Patient Progress  Patient progress: stable      Disposition  Final diagnoses:   Diarrhea   Weakness     Time reflects when diagnosis was documented in both MDM as applicable and the Disposition within this note     Time User Action Codes Description Comment    4/16/2022  4:31 PM Al Dark [R19 7] Diarrhea     4/16/2022  4:31 PM Hayde Roland Add [R53 1] Weakness       ED Disposition     ED Disposition Condition Date/Time Comment    Discharge Stable Sat Apr 16, 2022  4:31 PM Nova Esparza Sr  discharge to home/self care  Follow-up Information     Follow up With Specialties Details Why Daisy Rodriguez MD Family Medicine In 3 days  [address] [phone]            Discharge Medication List as of 4/16/2022  4:33 PM      CONTINUE these medications which have NOT CHANGED    Details   apixaban (Eliquis) 5 mg Take 1 tablet (5 mg total) by mouth 2 (two) times a day, Starting Thu 12/2/2021, Normal      aspirin 81 mg chewable tablet Chew 81 mg , Historical Med      atorvastatin (LIPITOR) 20 mg tablet Take 1 tablet (20 mg total) by mouth daily, Starting Mon 1/10/2022, Normal      Creon [id number] units TAKE 1 CAPSULE BY MOUTH THREE TIMES DAILY WITH MEALS OR SNACKS, Normal diphenoxylate-atropine (LOMOTIL) 2 5-0 025 mg per tablet TAKE 1 TABLET BY MOUTH FOUR TIMES A DAY AS NEEDED FOR DIARRHEA, Normal      FLUoxetine (PROzac) 20 mg capsule TAKE ONE CAPSULE BY MOUTH EVERY DAY, Normal      folic acid (FOLVITE) 1 mg tablet Take by mouth daily, Historical Med      gabapentin (NEURONTIN) 100 mg capsule Take 2 capsules (200 mg total) by mouth 3 (three) times a day, Starting Mon 2/14/2022, Normal      lisinopril-hydrochlorothiazide (PRINZIDE,ZESTORETIC) 10-12 5 MG per tablet Take 1 tablet by mouth 3 (three) times a week Monday Wednesday Friday, Starting Mon 6/14/2021, Normal      mirtazapine (REMERON) 7 5 MG tablet Take 1 tablet (7 5 mg total) by mouth daily at bedtime, Starting Mon 4/11/2022, Normal      ondansetron (ZOFRAN-ODT) 4 mg disintegrating tablet Take 1 tablet (4 mg total) by mouth every 6 (six) hours as needed for nausea or vomiting for up to 12 doses, Starting Sat 12/4/2021, Normal      oxyCODONE (Roxicodone) 5 immediate release tablet Take 1 tablet (5 mg total) by mouth every 4 (four) hours as needed for moderate pain or severe pain Max Daily Amount: 30 mg, Starting Mon 2/7/2022, Normal      pantoprazole (PROTONIX) 40 mg tablet TAKE ONE TABLET BY MOUTH TWICE A DAY BEFORE BREAKFAST AND BEFORE BED TO REDUCE STOMACH ACID, Normal      predniSONE 10 mg tablet Take 1 tablet (10 mg total) by mouth daily with breakfast, Starting Tue 9/28/2021, Normal      prochlorperazine (COMPAZINE) 10 mg tablet Take 1 tablet (10 mg total) by mouth every 6 (six) hours as needed for nausea or vomiting, Starting Fri 5/28/2021, Normal      Sodium Chloride Flush (Normal Saline Flush) 0 9 % SOLN Gall bladder drain, Starting Wed 5/12/2021, Historical Med      tamsulosin (FLOMAX) 0 4 mg Take 1 capsule (0 4 mg total) by mouth daily at bedtime, Starting Wed 3/2/2022, Normal      traZODone (DESYREL) 50 mg tablet Take 1-2 tablets ( mg total) by mouth daily at bedtime, Starting Mon 3/21/2022, Normal triamcinolone (KENALOG) 0 1 % cream Apply topically 2 (two) times a day, Starting Fri 1/28/2022, Normal      vitamin B-12 (VITAMIN B-12) 1,000 mcg tablet TAKE 1 TABLET BY MOUTH DAILY, Normal      Alcohol Swabs 70 % PADS May substitute brand based on insurance coverage  Check glucose ACHS , Normal      Blood Glucose Monitoring Suppl (ONE TOUCH ULTRA 2) w/Device KIT Use daily, Starting Wed 3/17/2021, Normal      fluorouracil 4,560 mg in CADD infusion pump Infuse 4,560 mg (1,200 mg/m2/day x 1 9 m2) into a venous catheter over 46 hours for 2 days  Do not start before April 20, 2022 , Starting Wed 4/20/2022, Until Fri 4/22/2022, Print      glucose blood (OneTouch Ultra) test strip Use as instructed tid, Normal      insulin lispro (HumaLOG KwikPen) 100 units/mL injection pen Take 10 units with breakfast, lunch and dinner +scale, Normal      Insulin Pen Needle (Unifine Pentips Plus) 32G X 4 MM MISC USE WITH INSULIN PENS FOUR TIMES DAILY, Normal      Lancets (onetouch ultrasoft) lancets Use as instructed 3x a day, Normal      Lantus SoloStar 100 units/mL injection pen INJECT 12 UNITS UNDER THE SKIN DAILY AT BEDTIME, Starting Mon 10/11/2021, Normal      naloxone (NARCAN) 4 mg/0 1 mL nasal spray 0 1 mL (4 mg total) by Alternating Nares route every 3 (three) minutes as needed (accidental opioid overdose or respiratory depression), Starting Mon 12/13/2021, Normal      senna (SENOKOT) 8 6 MG tablet Take 1 tablet (8 6 mg total) by mouth daily at bedtime as needed for constipation, Starting Fri 11/26/2021, Until Sun 12/26/2021 at 2359, Normal             No discharge procedures on file      PDMP Review       Value Time User    PDMP Reviewed  Yes 4/15/2022  8:19 AM Isaura Pastrana MD          ED Provider  Electronically Signed by           Garrick Unger DO  04/16/22 4671

## 2022-04-18 PROBLEM — K12.30 ORAL MUCOSITIS: Status: ACTIVE | Noted: 2022-01-01

## 2022-04-18 NOTE — TELEPHONE ENCOUNTER
Called patient  Counseled that it is unlikely that mirtazapine (which he has not been taking in recent days) is the culprit when it comes to oral ulcers, but these are common occurrences when on FOLFIRI  Will prescribe some swish + spit oral dexamethasone elixir  DO NOT SWALLOW AS MAY CAUSE HYPERGLYCEMIA in this diabetic patient who is already taking prednisone  May worsen insomnia, may worsen diarrhea  Mouth sores    You mouth may become sore or dry after your treatment  Mouth problems can be caused by the chemo  Tell your nurse or doctor if you have this problem, as they can prescribe special mouthwashes and medicine to help  It is important to avoid hot, spicy or acidic foods  Steps to provide good mouth care:  · Use a soft toothbrush when brushing your teeth to prevent sore gums and bleeding  · Use toothpaste for sensitive teeth if normal toothpaste bothers you  · Floss gently, avoid rough flossing as your gums may be very sensitive and can easily bleed  · Brush and rinse your dentures after eating  Remove your dentures while sleeping  · Rinse your mouth at least 4 times a day  Use baking soda or salt and water solution  Mix a teaspoonful baking soda or salt into 1 cup (250ml) of water  You may also use commercial mouthwash such as Biotene  · DO NOT use products which contain alcohol such as Listerine or Scope because the alcohol will worsen pain if there are any open sores

## 2022-04-18 NOTE — TELEPHONE ENCOUNTER
Patient was on answering machine stating the medication that was prescribed for stimulating his appetite had to be discontinued due to causing mouth sores  He is having difficulty eatting and drinking because of these mouth sores  Can Dr Antionette Goetz prescribe something to help with these sores?

## 2022-04-18 NOTE — TELEPHONE ENCOUNTER
Called pt back who had c/o mouth sores (Mucocitis) Advised to suck on ice chips and called in order for magic mouth wash to treat to P/U tomorrow and to f/u to see if giving any relief  Want to order acyclovir/antivirals?  Or additional treatment

## 2022-04-18 NOTE — TELEPHONE ENCOUNTER
CALL RETURN FORM   Reason for patient call? Patient is calling in indicating that he is unsure whether he is having a chemo reaction    Patient's primary oncologist? [address]     Name of person the patient was calling for?   Dr Santos    Any additional information to add, if applicable? n/a   Informed patient that the message will be forwarded to the team and someone will get back to them as soon as possible    Did you relay this information to the patient?  yes, patient can be reached back at [phone]

## 2022-04-18 NOTE — TELEPHONE ENCOUNTER
I will call Karen Pinon again  Please set Karen Pinon up for an appointment sometime in the next 1-2 weeks, he has poor enough symptom control that it is inadvisable to wait until 5/2/21 for his next visit  Thank you

## 2022-04-19 NOTE — TELEPHONE ENCOUNTER
Called and spoke with pt, who stated he is still experiencing about 7 bowel movements a day  Taking Limotil without much relief  Pt stated his mouth sores are pretty severe and is having trouble eating  Able to drink without issue  Pt stated he is fatigued and mouth sores are slowly improving  Reviewed above information with Dr Hakan Moreno, treatment is to be postponed by one week  Per Dr Hakan Moreno starting next treatment for the remainder of treatment, the following dose reductions are to be made: Onivyde 60 mg/m2  Fluorouracil 1,100 mg/m2/day and Leucovorin is to be discontinued  Pt was called and notified of postponing treatment by 1 week and the dose reductions being made  Pt verbalized understanding and was agreeable to this

## 2022-04-19 NOTE — TELEPHONE ENCOUNTER
Pt was rescheduled for 5/25/2022, AN infusion 10:00 AM  Pt was called and notified  He was agreeable  Infusion staff will notify [facility]

## 2022-04-22 NOTE — TELEPHONE ENCOUNTER
Called and touched base with pt in regards to his diarrhea using immodium/lamotil  Pt stats diarrhea has improved and has only been going 1-2 per day  Pt has been eating and drinking and sticking with the BRAT diet  Pt feels well enough to have treatment on Monday

## 2022-04-25 PROBLEM — R13.10 DYSPHAGIA: Status: ACTIVE | Noted: 2022-01-01

## 2022-04-25 NOTE — PATIENT INSTRUCTIONS
It was good to see you today  Thank you for coming in  · Use the oxycodone for pain - use as directed  One tablet for moderate pain, two tablets for severe pain, up to every 4 hours as needed  · Use the over-the-counter Imodium first for diarrhea - if not effective, then use the Lomotil  · I recommend scheduling a swallow study then speaking to the swallow specialists - call [phone] to schedule the swallow study  · Continue oral dexamethasone solution (swish and spit) for oral mucositis until this symptom has resolved  · Return in about 1 month  · Call us for refills on medications that we supply, as needed  · If something changes and you need to come in sooner, please call our office  PRESCRIPTION REFILL REMINDER:  All medication refills should be requested prior to [facility] on Friday  Any refill requests after noon on Friday would be addressed the following Monday

## 2022-04-25 NOTE — PROGRESS NOTES
Pt here for mFolfiri  Pt reports symptoms of diarrhea and mucositis have largely improved and he is ready for treatment today  Pt appears healthy and looks good  Vitals stable; labs within parameters for treatment  Callbell within reach; will continue to monitor

## 2022-04-25 NOTE — PROGRESS NOTES
Pt tolerated treatment well  CADD connected after two RN check  Pt declined AVS  Pt aware to return Wednesday around 1110 for disconnect

## 2022-04-25 NOTE — PROGRESS NOTES
Palliative Supportive Care  met with patient/family to continue to provide emotional support and guidance  Updated biopsychosocial information relevant to support: Met with pt in office  Pt discussed he is scheduled for next chemo today after visit  Pt reports having difficulty with swallowing and will be f/u with a specialist  Pt discussed having approximate weight loss of 10lbs over the past 2 weeks  Pt discussed recent losses in his family but overall he is coping okay  Pt's main stressors are related to tension with his wife  Pt spent time reflecting on the difficulty dynamics between he and his wife  Emotional support provided  Pt expressed frustration that he has not traveled or been on a vacation much in the past 20 years and now he is trying to make the most of whatever time he has left  Supportive listening provided  Pt will be going to the campground next weekend and is looking forward to having a picnic  Pt also has obtained remote "toys" that he is looking forward to using at the [facility]  LSW encouraged pt to continue with activities that bring him sergio  Pt requesting LSW to reach out to his wife this afternoon to provide support  LSW advised pt she will reach out this afternoon for same  Identified areas of need include: Emotional Support  Resources provided: None  Areas that need future follow-up include: Emotional Support    LSW will f/u with pt in office in 6 weeks  Phone call made to pt's wife Osman Chinchilla ([phone]) to offer support  Osman Chinchilla discussed going through a really difficult time currently as she is dealing with her own medical set-backs and the passing away of her ex-  Osman Chinchilla also concerned with family strain/tension  LSW allowed Osman Chinchilla time to vent frustrations and provided support  Osman Chinchilla expressed awareness that she may benefit from speaking with a therapist and would be agreeable with W providing resources   LSW advised Osman Chinchilla she would mail out resources today for [facility] and local outpt [address] providers in-network with her insurance ([employer])  Lulu agreeable and appreciative of same  Resources mailed out to Letitia LEVYW encouraged Letitia to reach out for continued support if needed

## 2022-04-25 NOTE — PROGRESS NOTES
Follow-up with Palliative and Supportive Care  Bubba Rodriguez  78 y o  male [id number]    ASSESSMENT & PLAN:  1  Metastatic pancreatic cancer    2  Pancreatic cancer metastasized to liver ([facility] )    3  Type 2 diabetes mellitus with hyperglycemia, with long-term current use of insulin ([facility] )    4  Anxiety disorder, unspecified type    5  Gastroesophageal reflux disease, unspecified whether esophagitis present    6  Idiopathic osteoarthritis    7  Generalized abdominal pain    8  Cancer related pain    9  Insomnia due to medical condition    10  Oral mucositis    11  Palliative care patient    12  Dysphagia, unspecified type            Patient c/o abdominal pain of multiple etiologies (malignancy, GERD)  o Continue Protonix BID  Continue Creon   o He states he was not able to  oxycodone (but did not elaborate on why, or call our office for assistance w/ this Rx) so was taking prior tabs of MSER 30mg PRN  Counseled on not using MSER in this fashion  o Patient tends to avoid opioids as he wishes to safely drive (driving, RVing bring him great enjoyment)  o Encouraged safe and more frequent use of oxyIR PRN  Use as directed  o Continue Tylenol ATC  Continue gabapentin ATC   Continue trazodone for insomnia, anxiousness, dysphoric mood   Patient states he struggles to eat solids - though he has an appetite  He feels there may be a psychologic component to this  Will investigate; ordering VSS + reflex to SLP   Patient has been provided an Rx for intranasal naloxone   Continue Imodium PRN chemotherapy-induced diarrhea, use Lomotil if Imodium insufficient   Continue Prozac for mood   Continue antiemetic regimen   Reviewed notes (ED x2, SW, MedOnc), labs (4/19/22: Cr 1 15, eGFR 65,  Alb 3 6, Hb 12 0), imaging + procedures (4/16/22 CTAP, 4/4/22 CTCAP)   Return in about 1 month (around 5/25/2022)   Emotional support provided     Medication safety issues addressed - no driving under the influence of narcotics, watch for adverse effects including AMS and respiratory depression, keep medications stored in a safe/locked environment  Requested Prescriptions     Signed Prescriptions Disp Refills    pantoprazole (PROTONIX) 40 mg tablet 60 tablet 2     Sig: Take 1 tablet (40 mg total) by mouth 2 (two) times a day - before breakfast and before bed    oxyCODONE (Roxicodone) 5 immediate release tablet 60 tablet 0     Sig: Take 1-2 tablets (5-10 mg total) by mouth every 4 (four) hours as needed for moderate pain or severe pain Max Daily Amount: 60 mg    traZODone (DESYREL) 50 mg tablet 60 tablet 0     Sig: Take 1-2 tablets ( mg total) by mouth daily at bedtime       Medications Discontinued During This Encounter   Medication Reason    oxyCODONE (Roxicodone) 5 immediate release tablet Reorder    pantoprazole (PROTONIX) 40 mg tablet Reorder    traZODone (DESYREL) 50 mg tablet Reorder       Representatives have queried the patient's controlled substance dispensing history in the Prescription Drug Monitoring Program in compliance with regulations before I have prescribed any controlled substances  The prescription history is consistent with prescribed therapy and our practice policies  30+ minutes were spent in this ambulatory visit with greater than 50% of the time spent face to face with patient in counseling or coordination of care including discussions of symptom assessment and management, medication review and adjustment, psychosocial support, chart review, imaging review, lab review, supportive listening and anticipatory guidance  All of the patient's questions were answered during this discussion  SUBJECTIVE:  Chief Complaint   Patient presents with    Follow-up    Cancer    Cancer Pain    Diarrhea    Anxiety    Insomnia    Counseling    Dysphagia        HPI    Masoud Kuhn  is a 78 y o  male w/ a palliative diagnosis of stage IV pancreatic cancer, diagnosed in 05/2021   He follows w/ Dr Jl Mayen (Medical Oncology) and his treatment plan includes modified FOLFIRI  He was noted to have 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, lungs, superior mesenteric vein; 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 SMA on 11/22/21  Patient is [facility] to [facility]; last seen 3/21/22 for symptom management, psychosocial support  Seen in the ED twice since last visit (diarrhea)  Patient reports his wife Letitia has suffered some eliu-operative complications; this plus the recent death of his mother and other losses has contributes to stress, anxiousness, dysphoric mood  He is appreciative of offered support  Patient reports that he continues to struggle with abdominal discomfort  He states he was not able to receive the oxyIR prescribed in 02/2022 (though he did not call about this) and as he was in pain he took his prior (discontinued) MSER, which helped  He notes it did affect his ability to drive, so he would not drive while under MSER effects; he admits that he will prefer to drive and avoid medication use in some cases, as driving and independence are very important to his QOL  He is taking Creon and pantoprazole as prescribed  Patient reports insomnia will improve w/ trazodone use  He has an appetite ("I get hungry" normally) but when he tried to eat solids, he states "I can't swallow"  He feels this may be psychologic given abdominal discomfort, but he is unsure  He has had slow progressive weight loss  He endorses some nausea (with an episode of vomiting last week)  Diarrhea "comes and goes" and he will reach for Lomotil first for this symptom, as he feels it is more effective than Imodium  PDMP shows no concerns  The following portions of the medical history were reviewed: past medical history, surgical history, problem list, medication list, family history, and social history        Current Outpatient Medications:     al mag oxide-diphenhydramine-lidocaine viscous (MAGIC MOUTHWASH) 1:1:1 suspension, Swish and spit 10 mL every 4 (four) hours as needed for mouth pain or discomfort, Disp: 90 mL, Rfl: 3    apixaban (Eliquis) 5 mg, Take 1 tablet (5 mg total) by mouth 2 (two) times a day, Disp: 60 tablet, Rfl: 3    aspirin 81 mg chewable tablet, Chew 81 mg , Disp: , Rfl:     atorvastatin (LIPITOR) 20 mg tablet, Take 1 tablet (20 mg total) by mouth daily, Disp: 90 tablet, Rfl: 1    Creon [address]20-67356 units, TAKE 1 CAPSULE BY MOUTH THREE TIMES DAILY WITH MEALS OR SNACKS, Disp: 270 capsule, Rfl: 0    dexamethasone 0 5 MG/5ML elixir, Take 10 mL (1 mg total) by mouth 4 (four) times a day for 10 days - SWISH AND SPIT, DO NOT SWALLOW, Disp: 400 mL, Rfl: 0    diphenoxylate-atropine (LOMOTIL) 2 5-0 025 mg per tablet, TAKE 1 TABLET BY MOUTH FOUR TIMES A DAY AS NEEDED FOR DIARRHEA, Disp: 60 tablet, Rfl: 0    fluorouracil 4,180 mg in CADD infusion pump, Infuse 4,180 mg (1,100 mg/m2/day x 1 9 m2) into a venous catheter over 46 hours for 2 days  Do not start before April 25, 2022 , Disp: 1 each, Rfl: 0    FLUoxetine (PROzac) 20 mg capsule, TAKE ONE CAPSULE BY MOUTH EVERY DAY, Disp: 90 capsule, Rfl: 2    folic acid (FOLVITE) 1 mg tablet, Take by mouth daily, Disp: , Rfl:     gabapentin (NEURONTIN) 100 mg capsule, Take 2 capsules (200 mg total) by mouth 3 (three) times a day, Disp: 180 capsule, Rfl: 3    lisinopril-hydrochlorothiazide (PRINZIDE,ZESTORETIC) 10-12 5 MG per tablet, Take 1 tablet by mouth 3 (three) times a week Monday Wednesday Friday, Disp: 90 tablet, Rfl: 3    naloxone (NARCAN) 4 mg/0 1 mL nasal spray, 0 1 mL (4 mg total) by Alternating Nares route every 3 (three) minutes as needed (accidental opioid overdose or respiratory depression), Disp: 1 each, Rfl: 1    ondansetron (ZOFRAN-ODT) 4 mg disintegrating tablet, Take 1 tablet (4 mg total) by mouth every 6 (six) hours as needed for nausea or vomiting for up to 12 doses, Disp: 12 tablet, Rfl: 0    pantoprazole (PROTONIX) 40 mg tablet, Take 1 tablet (40 mg total) by mouth 2 (two) times a day - before breakfast and before bed, Disp: 60 tablet, Rfl: 2    predniSONE 10 mg tablet, Take 1 tablet (10 mg total) by mouth daily with breakfast, Disp: 90 tablet, Rfl: 0    prochlorperazine (COMPAZINE) 10 mg tablet, Take 1 tablet (10 mg total) by mouth every 6 (six) hours as needed for nausea or vomiting, Disp: 45 tablet, Rfl: 3    Sodium Chloride Flush (Normal Saline Flush) 0 9 % SOLN, Gall bladder drain, Disp: , Rfl:     tamsulosin (FLOMAX) 0 4 mg, Take 1 capsule (0 4 mg total) by mouth daily at bedtime, Disp: 90 capsule, Rfl: 3    traZODone (DESYREL) 50 mg tablet, Take 1-2 tablets ( mg total) by mouth daily at bedtime, Disp: 60 tablet, Rfl: 0    triamcinolone (KENALOG) 0 1 % cream, Apply topically 2 (two) times a day, Disp: 60 g, Rfl: 2    vitamin B-12 (VITAMIN B-12) 1,000 mcg tablet, TAKE 1 TABLET BY MOUTH DAILY, Disp: 30 tablet, Rfl: 5    Alcohol Swabs 70 % PADS, May substitute brand based on insurance coverage  Check glucose ACHS   (Patient not taking: Reported on 4/16/2022 ), Disp: 200 each, Rfl: 0    Blood Glucose Monitoring Suppl (ONE TOUCH ULTRA 2) w/Device KIT, Use daily (Patient not taking: Reported on 3/2/2022 ), Disp: 1 each, Rfl: 1    glucose blood (OneTouch Ultra) test strip, Use as instructed tid (Patient not taking: Reported on 3/21/2022 ), Disp: 100 each, Rfl: 5    insulin lispro (HumaLOG KwikPen) 100 units/mL injection pen, Take 10 units with breakfast, lunch and dinner +scale (Patient not taking: Reported on 3/21/2022 ), Disp: 15 mL, Rfl: 5    Insulin Pen Needle (Unifine Pentips Plus) 32G X 4 MM MISC, USE WITH INSULIN PENS FOUR TIMES DAILY (Patient not taking: Reported on 3/2/2022 ), Disp: 100 each, Rfl: 3    Lancets (onetouch ultrasoft) lancets, Use as instructed 3x a day (Patient not taking: Reported on 3/2/2022 ), Disp: 100 each, Rfl: 5    Lantus SoloStar 100 units/mL injection pen, INJECT 12 UNITS UNDER THE SKIN DAILY AT BEDTIME (Patient not taking: Reported on 3/2/2022 ), Disp: 15 mL, Rfl: 0    oxyCODONE (Roxicodone) 5 immediate release tablet, Take 1-2 tablets (5-10 mg total) by mouth every 4 (four) hours as needed for moderate pain or severe pain Max Daily Amount: 60 mg, Disp: 60 tablet, Rfl: 0    senna (SENOKOT) 8 6 MG tablet, Take 1 tablet (8 6 mg total) by mouth daily at bedtime as needed for constipation, Disp: 30 tablet, Rfl: 0    Review of Systems   Constitutional: Positive for activity change, fatigue and unexpected weight change  Negative for appetite change  HENT: Positive for trouble swallowing  Eyes: Negative for pain and redness  Respiratory: Negative for shortness of breath  Cardiovascular: Negative for chest pain  Gastrointestinal: Positive for abdominal pain, diarrhea, nausea and vomiting  Negative for constipation  Reflux  Endocrine: Negative for polydipsia and polyphagia  Musculoskeletal: Negative for back pain and gait problem  Allergic/Immunologic: Positive for immunocompromised state  Neurological: Negative for facial asymmetry and speech difficulty  Psychiatric/Behavioral: Positive for dysphoric mood and sleep disturbance  Negative for agitation and confusion  The patient is nervous/anxious  OBJECTIVE:  BP 92/62 (BP Location: Left arm, Patient Position: Sitting, Cuff Size: Standard)   Pulse (!) 43   Temp (!) 96 1 °F (35 6 °C) (Temporal)   Resp 18   Ht 5' 7" (1 702 m)   Wt 64 4 kg (142 lb)   SpO2 97%   BMI 22 24 kg/m²   Physical Exam  Vitals reviewed  Constitutional:       General: He is not in acute distress  Appearance: Normal appearance  He is well-groomed and normal weight  He is not toxic-appearing  HENT:      Head: Normocephalic and atraumatic  Right Ear: External ear normal       Left Ear: External ear normal    Eyes:      General: No scleral icterus  Right eye: No discharge  Left eye: No discharge        Extraocular Movements: Extraocular movements intact  Conjunctiva/sclera: Conjunctivae normal       Pupils: Pupils are equal, round, and reactive to light  Cardiovascular:      Rate and Rhythm: Bradycardia present  Pulmonary:      Effort: Pulmonary effort is normal  No tachypnea, bradypnea, accessory muscle usage or respiratory distress  Abdominal:      General: There is no distension  Tenderness: There is no guarding  Musculoskeletal:      Cervical back: Normal range of motion  Right lower leg: No edema  Left lower leg: No edema  Skin:     General: Skin is dry  Coloration: Skin is not pale  Neurological:      Mental Status: He is alert and oriented to person, place, and time  Cranial Nerves: No dysarthria or facial asymmetry  Gait: Gait normal    Psychiatric:         Attention and Perception: Attention normal          Mood and Affect: Mood and affect normal          Speech: Speech normal          Behavior: Behavior normal  Behavior is cooperative  Thought Content: Thought content normal          Cognition and Memory: Cognition and memory normal          Judgment: Judgment normal         Shruthi Medel MD  [facility] Palliative and Supportive Care      Portions of this document may have been created using dictation software and as such some "sound alike" terms may have been generated by the system  Do not hesitate to contact me with any questions or clarifications  This note was not shared with the patient due to privacy exception: note includes other individuals

## 2022-04-26 NOTE — TELEPHONE ENCOUNTER
Liquid or pills? A single 2mg dose or double (4mg dose)? Abd pain? Fever/chills?   He can take another dose of immodium now, but I need to know the answers to these questions

## 2022-04-26 NOTE — TELEPHONE ENCOUNTER
Patient states he took 4 mg of gel pill    No abd pain    No fever/chills    It has slowed up a bit  [place] He has not had diarrhea for 1 1/2 hrs  Should patient take another dose of immodium 2 mg or 4 mg?

## 2022-04-27 PROBLEM — R19.7 NAUSEA VOMITING AND DIARRHEA: Status: ACTIVE | Noted: 2022-01-01

## 2022-04-27 PROBLEM — R11.2 NAUSEA VOMITING AND DIARRHEA: Status: ACTIVE | Noted: 2022-01-01

## 2022-04-27 PROBLEM — R62.7 FAILURE TO THRIVE IN ADULT: Status: ACTIVE | Noted: 2022-01-01

## 2022-04-27 PROBLEM — D64.9 ANEMIA: Status: ACTIVE | Noted: 2022-01-01

## 2022-04-27 PROBLEM — R62.51 FAILURE TO THRIVE (CHILD): Status: ACTIVE | Noted: 2022-01-01

## 2022-04-27 PROBLEM — T45.1X5A CHEMOTHERAPY INDUCED NAUSEA AND VOMITING: Status: ACTIVE | Noted: 2022-01-01

## 2022-04-27 NOTE — PROGRESS NOTES
Pt  offers no complaints  CADD pump disconnected,  res  Vol  0 mL, good blood return noted  Neulasta Onpro applied to [facility]  AVS declined  Next appt  confirmed

## 2022-04-28 NOTE — ASSESSMENT & PLAN NOTE
 Blood pressure is reviewed and acceptable   o Last recorded pressure: 129/60   Given the fact patient is a poor appetite, and blood pressure is running on the softer side, will hold antihypertensive medications

## 2022-04-28 NOTE — ASSESSMENT & PLAN NOTE
· Patient known to Dr Opal Rodriguez of Heme-Onc  · Diagnosed in May 2021  · Currently on Folfiri with Neulasta growth factor support

## 2022-04-28 NOTE — TELEPHONE ENCOUNTER
CALL RETURN FORM   Reason for patient call? Son of patient calling about results of PET scan  Patient is currently hospitalized   Patient's primary oncologist? Dr Andres Lawton    Name of person the patient was calling for? Dr Andres Lawton   Any additional information to add, if applicable? Please call [phone]   Informed patient that the message will be forwarded to the team and someone will get back to them as soon as possible    Did you relay this information to the patient?   Yes

## 2022-04-28 NOTE — ED PROVIDER NOTES
History  Chief Complaint   Patient presents with    Diarrhea     pt presents via EMS, coming from home  Pt c/o of multiple episodes of diarrhea for the past couple of weeks  loss of appetite  reports feeling weak   Abdominal Pain     69-year-old male presents the emergency department by EMS for evaluation weakness and dehydration  Patient states that he has been having multiple episodes of loose watery nonbloody stool for the past 2 weeks  Patient states that he has very poor appetite and cannot tolerate solids  Liquids tend to run right through him  He has lost approximately 16 lb over the past 2 weeks  He has stable generalized abdominal pain  He is currently being treated for stage IV pancreatic cancer with chemotherapy which he last had on Monday  Patient denies fevers or chills  He does report normal urine output  Patient feels generally weak  Wt Readings from Last 3 Encounters:  04/25/22 : 63 kg (139 lb)  04/25/22 : 64 4 kg (142 lb)  04/16/22 : 70 5 kg (155 lb 6 8 oz)      History provided by:  Patient, medical records and EMS personnel   used: No    Diarrhea  Quality:  Copious  Severity:  Severe  Onset quality:  Gradual  Duration:  2 weeks  Timing:  Constant  Progression:  Unchanged  Relieved by:  Nothing  Worsened by:  Nothing  Ineffective treatments:  Anti-motility medications  Associated symptoms: abdominal pain    Associated symptoms: no arthralgias, no fever, no headaches and no vomiting    Risk factors: no sick contacts        Prior to Admission Medications   Prescriptions Last Dose Informant Patient Reported? Taking? Alcohol Swabs 70 % PADS  Self No No   Sig: May substitute brand based on insurance coverage  Check glucose ACHS     Patient not taking: Reported on 4/16/2022    Blood Glucose Monitoring Suppl (ONE TOUCH ULTRA 2) w/Device KIT  Self No No   Sig: Use daily   Patient not taking: Reported on 3/2/2022    Creon [id number] units   No No   Sig: TAKE 1 CAPSULE BY MOUTH THREE TIMES DAILY WITH MEALS OR SNACKS   FLUoxetine (PROzac) 20 mg capsule  Self No No   Sig: TAKE ONE CAPSULE BY MOUTH EVERY DAY   Insulin Pen Needle (Unifine Pentips Plus) 32G X 4 MM MISC  Self No No   Sig: USE WITH INSULIN PENS FOUR TIMES DAILY   Patient not taking: Reported on 3/2/2022    Lancets (onetouch ultrasoft) lancets  Self No No   Sig: Use as instructed 3x a day   Patient not taking: Reported on 3/2/2022    Lantus SoloStar 100 units/mL injection pen  Self No No   Sig: INJECT 12 UNITS UNDER THE SKIN DAILY AT BEDTIME   Patient not taking: Reported on 3/2/2022    Sodium Chloride Flush (Normal Saline Flush) 0 9 % SOLN  Self Yes No   Sig: Gall bladder drain   al mag oxide-diphenhydramine-lidocaine viscous (MAGIC MOUTHWASH) 1:1:1 suspension   No No   Sig: Swish and spit 10 mL every 4 (four) hours as needed for mouth pain or discomfort   apixaban (Eliquis) 5 mg  Self No No   Sig: Take 1 tablet (5 mg total) by mouth 2 (two) times a day   aspirin 81 mg chewable tablet  Self Yes No   Sig: Chew 81 mg    atorvastatin (LIPITOR) 20 mg tablet  Self No No   Sig: Take 1 tablet (20 mg total) by mouth daily   dexamethasone 0 5 MG/5ML elixir   No No   Sig: Take 10 mL (1 mg total) by mouth 4 (four) times a day for 10 days - SWISH AND SPIT, DO NOT SWALLOW   diphenoxylate-atropine (LOMOTIL) 2 5-0 025 mg per tablet   No No   Sig: TAKE 1 TABLET BY MOUTH FOUR TIMES A DAY AS NEEDED FOR DIARRHEA   fluorouracil 4,180 mg in CADD infusion pump   No No   Sig: Infuse 4,180 mg (1,100 mg/m2/day x 1 9 m2) into a venous catheter over 46 hours for 2 days  Do not start before April 25, 2022     folic acid (FOLVITE) 1 mg tablet  Self Yes No   Sig: Take by mouth daily   gabapentin (NEURONTIN) 100 mg capsule   No No   Sig: Take 2 capsules (200 mg total) by mouth 3 (three) times a day   glucose blood (OneTouch Ultra) test strip  Self No No   Sig: Use as instructed tid   Patient not taking: Reported on 3/21/2022    insulin lispro (HumaLOG KwikPen) 100 units/mL injection pen  Self No No   Sig: Take 10 units with breakfast, lunch and dinner +scale   Patient not taking: Reported on 3/21/2022    lisinopril-hydrochlorothiazide (PRINZIDE,ZESTORETIC) 10-12 5 MG per tablet  Self No No   Sig: Take 1 tablet by mouth 3 (three) times a week    naloxone (NARCAN) 4 mg/0 1 mL nasal spray  Self No No   Si 1 mL (4 mg total) by Alternating Nares route every 3 (three) minutes as needed (accidental opioid overdose or respiratory depression)   ondansetron (ZOFRAN-ODT) 4 mg disintegrating tablet  Self No No   Sig: Take 1 tablet (4 mg total) by mouth every 6 (six) hours as needed for nausea or vomiting for up to 12 doses   oxyCODONE (Roxicodone) 5 immediate release tablet   No No   Sig: Take 1-2 tablets (5-10 mg total) by mouth every 4 (four) hours as needed for moderate pain or severe pain Max Daily Amount: 60 mg   pantoprazole (PROTONIX) 40 mg tablet   No No   Sig: Take 1 tablet (40 mg total) by mouth 2 (two) times a day - before breakfast and before bed   predniSONE 10 mg tablet  Self No No   Sig: Take 1 tablet (10 mg total) by mouth daily with breakfast   prochlorperazine (COMPAZINE) 10 mg tablet  Self No No   Sig: Take 1 tablet (10 mg total) by mouth every 6 (six) hours as needed for nausea or vomiting   senna (SENOKOT) 8 6 MG tablet  Self No No   Sig: Take 1 tablet (8 6 mg total) by mouth daily at bedtime as needed for constipation   tamsulosin (FLOMAX) 0 4 mg   No No   Sig: Take 1 capsule (0 4 mg total) by mouth daily at bedtime   traZODone (DESYREL) 50 mg tablet   No No   Sig: Take 1-2 tablets ( mg total) by mouth daily at bedtime   triamcinolone (KENALOG) 0 1 % cream  Self No No   Sig: Apply topically 2 (two) times a day   vitamin B-12 (VITAMIN B-12) 1,000 mcg tablet   No No   Sig: TAKE 1 TABLET BY MOUTH DAILY      Facility-Administered Medications: None       Past Medical History:   Diagnosis Date    Anxiety     Arthritis     Cancer ([place] [address] )     pancreatic    Cellulitis     LAST ASSESSED: 6/13/14    Diabetes mellitus ([place] [address] )     Enlarged prostate     Epidermal inclusion cyst     LAST ASSESSED: 10/4/13    Erythrasma     LAST ASSESSED: 9/23/13    Furuncle     LAST ASSESSED: 6/11/14    GERD (gastroesophageal reflux disease)     Hyperlipidemia     Hypertension     RA (rheumatoid arthritis) ([place] [address] )        Past Surgical History:   Procedure Laterality Date    COLONOSCOPY      FL GUIDED CENTRAL VENOUS ACCESS DEVICE INSERTION  6/1/2021    HYDROCELE EXCISION / REPAIR Right 01/11/2018    SPERMATIC CORD EXCSION OF HYDROCELE; MANAGED BY: GABBIE CARBAJAL    IR BIOPSY LIVER MASS  8/16/2021    IR CHOLECYSTOSTOMY TUBE CHECK/CHANGE/REPOSITION/REINSERTION/UPSIZE  6/22/2021    IR CHOLECYSTOSTOMY TUBE CHECK/CHANGE/REPOSITION/REINSERTION/UPSIZE  8/23/2021    IR CHOLECYSTOSTOMY TUBE CHECK/CHANGE/REPOSITION/REINSERTION/UPSIZE  1/19/2022    IR CHOLECYSTOSTOMY TUBE PLACEMENT  5/11/2021    MULTIPLE TOOTH EXTRACTIONS Left 7/22/2021    Procedure: EXTRACTION TEETH 14 & 15, ALVEOPLASTY, EXCISION OF CYST LEFT MAXILLA; Surgeon: Lin Tabor DDS;  [facility];  Service: Maxillofacial    OK REMOVAL OF HYDROCELE,TUNICA,UNILAT Right 1/11/2018    Procedure: HYDROCELECTOMY;  Surgeon: Mir Ching MD;  Location: [facility];  Service: Urology    SCROTAL SURGERY      benign "lump"    TUNNELED VENOUS PORT PLACEMENT Left 6/1/2021    Procedure: INSERTION VENOUS PORT (PORT-A-CATH);   Surgeon: Deo Rodriguez MD;  [facility];  Service: Surgical Oncology       Family History   Problem Relation Age of Onset    Heart disease Father         CARDIAC DISORDER    Hypertension Father     Hypertension Mother     No Known Problems Maternal Aunt     No Known Problems Maternal Uncle     No Known Problems Paternal Aunt     No Known Problems Paternal Uncle     No Known Problems Paternal Grandmother     No Known Problems Paternal Grandfather    Sada Pollard Diabetes Maternal Grandmother         MELLITUS    No Known Problems Maternal Grandfather     Hypertension Other      I have reviewed and agree with the history as documented  E-Cigarette/Vaping    E-Cigarette Use Never User      E-Cigarette/Vaping Substances    Nicotine No     THC No     CBD No      Social History     Tobacco Use    Smoking status: Former Smoker    Smokeless tobacco: Never Used    Tobacco comment: tobacco in a pipe in the early 1980's   Vaping Use    Vaping Use: Never used   Substance Use Topics    Alcohol use: Never    Drug use: Never       Review of Systems   Constitutional: Positive for appetite change, fatigue and unexpected weight change  Negative for fever  Respiratory: Negative for cough and shortness of breath  Cardiovascular: Negative for leg swelling  Gastrointestinal: Positive for abdominal pain, diarrhea and nausea  Negative for vomiting  Genitourinary: Negative for dysuria  Musculoskeletal: Negative for arthralgias and back pain  Neurological: Positive for weakness  Negative for headaches  All other systems reviewed and are negative  Physical Exam  Physical Exam  Vitals reviewed  Constitutional:       Appearance: Normal appearance  He is well-developed  HENT:      Head: Normocephalic and atraumatic  Eyes:      General: No scleral icterus  Conjunctiva/sclera: Conjunctivae normal       Pupils: Pupils are equal, round, and reactive to light  Cardiovascular:      Rate and Rhythm: Normal rate and regular rhythm  Heart sounds: Normal heart sounds  Pulmonary:      Effort: Pulmonary effort is normal  No accessory muscle usage or respiratory distress  Breath sounds: Normal breath sounds  Chest:      Chest wall: No tenderness  Abdominal:      General: Bowel sounds are normal  There is no distension  Palpations: Abdomen is soft  Tenderness: There is generalized abdominal tenderness   There is no right CVA tenderness, guarding or rebound  Hernia: No hernia is present  Musculoskeletal:         General: No tenderness or deformity  Normal range of motion  Cervical back: Normal range of motion and neck supple  Lymphadenopathy:      Cervical: No cervical adenopathy  Skin:     General: Skin is warm and dry  Coloration: Skin is pale and sallow  Findings: No rash  Neurological:      Mental Status: He is alert and oriented to person, place, and time  Coordination: Coordination normal       Deep Tendon Reflexes: Reflexes are normal and symmetric  Psychiatric:         Mood and Affect: Mood is depressed  Behavior: Behavior normal          Thought Content:  Thought content normal          Judgment: Judgment normal          Vital Signs  ED Triage Vitals [04/27/22 2103]   Temperature Pulse Respirations Blood Pressure SpO2   98 °F (36 7 °C) 72 18 129/60 97 %      Temp Source Heart Rate Source Patient Position - Orthostatic VS BP Location FiO2 (%)   Oral Monitor Lying Left arm --      Pain Score       --           Vitals:    04/27/22 2103   BP: 129/60   Pulse: 72   Patient Position - Orthostatic VS: Lying         Visual Acuity      ED Medications  Medications   sodium chloride 0 9 % infusion (has no administration in time range)   sodium chloride 0 9 % bolus 1,000 mL (1,000 mL Intravenous New Bag 4/27/22 2226)   ondansetron (ZOFRAN) injection 4 mg (4 mg Intravenous Given 4/27/22 2226)       Diagnostic Studies  Results Reviewed     Procedure Component Value Units Date/Time    Stool Enteric Bacterial Panel by PCR [[id number]]     Lab Status: No result Specimen: Stool     Comprehensive metabolic panel [[id number]]  (Abnormal) Collected: 04/27/22 2227    Lab Status: Final result Specimen: Blood from Arm, Left Updated: 04/27/22 2257     Sodium 137 mmol/L      Potassium 3 7 mmol/L      Chloride 104 mmol/L      CO2 26 mmol/L      ANION GAP 7 mmol/L      BUN 10 mg/dL      Creatinine 0 90 mg/dL      Glucose 192 mg/dL      Calcium 8 3 mg/dL      Corrected Calcium 9 7 mg/dL      AST 24 U/L      ALT 17 U/L      Alkaline Phosphatase 450 U/L      Total Protein 5 5 g/dL      Albumin 2 2 g/dL      Total Bilirubin 0 46 mg/dL      eGFR 88 ml/min/1 73sq m     Narrative:      [place] guidelines for Chronic Kidney Disease (CKD):     Stage 1 with normal or high GFR (GFR > 90 mL/min/1 73 square meters)    Stage 2 Mild CKD (GFR = 60-89 mL/min/1 73 square meters)    Stage 3A Moderate CKD (GFR = 45-59 mL/min/1 73 square meters)    Stage 3B Moderate CKD (GFR = 30-44 mL/min/1 73 square meters)    Stage 4 Severe CKD (GFR = 15-29 mL/min/1 73 square meters)    Stage 5 End Stage CKD (GFR <15 mL/min/1 73 square meters)  Note: GFR calculation is accurate only with a steady state creatinine    Lipase [[id number]]  (Normal) Collected: 04/27/22 2227    Lab Status: Final result Specimen: Blood from Arm, Left Updated: 04/27/22 2257     Lipase 95 u/L     CBC and differential [[id number]]  (Abnormal) Collected: 04/27/22 2227    Lab Status: Final result Specimen: Blood from Arm, Left Updated: 04/27/22 2245     WBC 12 86 Thousand/uL      RBC 3 52 Million/uL      Hemoglobin 10 5 g/dL      Hematocrit 33 1 %      MCV 94 fL      MCH 29 8 pg      MCHC 31 7 g/dL      RDW 17 2 %      MPV 10 7 fL      Platelets 957 Thousands/uL      nRBC 0 /100 WBCs      Neutrophils Relative 88 %      Immat GRANS % 1 %      Lymphocytes Relative 8 %      Monocytes Relative 3 %      Eosinophils Relative 0 %      Basophils Relative 0 %      Neutrophils Absolute 11 34 Thousands/µL      Immature Grans Absolute 0 10 Thousand/uL      Lymphocytes Absolute 1 01 Thousands/µL      Monocytes Absolute 0 36 Thousand/µL      Eosinophils Absolute 0 03 Thousand/µL      Basophils Absolute 0 02 Thousands/µL     Clostridium difficile toxin by PCR with EIA [[id number]]     Lab Status: No result Specimen: Stool                  No orders to display Procedures  Procedures         ED Course                                             MDM  Number of Diagnoses or Management Options  Dehydration: new and requires workup  Diarrhea: new and requires workup  Pancreatic cancer [facility]): new and requires workup     Amount and/or Complexity of Data Reviewed  Clinical lab tests: ordered and reviewed  Tests in the radiology section of CPT®: reviewed  Decide to obtain previous medical records or to obtain history from someone other than the patient: yes  Discuss the patient with other providers: yes  Independent visualization of images, tracings, or specimens: yes    Patient Progress  Patient progress: stable      Disposition  Final diagnoses:   Diarrhea   Dehydration   Pancreatic cancer ([facility] [address] )     Time reflects when diagnosis was documented in both MDM as applicable and the Disposition within this note     Time User Action Codes Description Comment    4/27/2022 11:30 PM Missy Segundo Add [R19 7] Diarrhea     4/27/2022 11:30 PM Missy Segundo Add [E86 0] Dehydration     4/27/2022 11:31 PM Missy Segundo Add [C25 9] Pancreatic cancer [facility])       ED Disposition     ED Disposition Condition Date/Time Comment    Admit Stable Wed Apr 27, 2022 11:30 PM Case was discussed with KRISS Casanova and the patient's admission status was agreed to be Admission Status: observation status to the service of Dr Khanh Dorsey   Follow-up Information    None         Patient's Medications   Discharge Prescriptions    No medications on file       No discharge procedures on file      PDMP Review       Value Time User    PDMP Reviewed  Yes 4/25/2022  8:50 AM [place] Salmon MD          ED Provider  Electronically Signed by           Chel Valdez DO  04/27/22 6896

## 2022-04-28 NOTE — ASSESSMENT & PLAN NOTE
· Present on admission, WBCs 12 86  · Patient receiving Neulasta in the setting of active chemo treatments  · Trend CBC

## 2022-04-28 NOTE — ASSESSMENT & PLAN NOTE
Lab Results   Component Value Date    HGBA1C 6 2 02/14/2022       Recent Labs     04/28/22  0635 04/28/22  1144   POCGLU 170* 172*       Blood Sugar Average: Last 72 hrs:  · (P) 171 PTA regimen of   · Accu-Checks and SSI  · Hypoglycemia protocol

## 2022-04-28 NOTE — ASSESSMENT & PLAN NOTE
 Blood pressure is reviewed and acceptable   o Last recorded pressure: 129/60   Continue PTA medications of lisinopril and HCTZ   Monitor blood pressure

## 2022-04-28 NOTE — ASSESSMENT & PLAN NOTE
Malnutrition Findings:   · Evidenced by recent weight loss, muscle wasting related to malignancy  BMI Findings:  ·  BMI 21 77  · Nutrition consult

## 2022-04-28 NOTE — ASSESSMENT & PLAN NOTE
· Presentation: Patient presents due to complaints of diarrhea  He reports that he has been having great than 10 episodes of watery, non bloody diarrhea over the past 3 days  He reports that he received his most recent chemotherapy treatment on Monday, 04/25/2022  He reports nausea but denies any recent vomiting  He reports that his abdominal pain is not worse than usual  He reports chills but denies fevers  · CT A/P from 04/16/2022: "Compared to the recent CT from 4/4/2022, there has been no significant acute changes  Again seen is a large pancreatic head mass, and multiple liver lesions consistent with metastatic pancreatic cancer  Percutaneous cholecystostomy tube and common bile duct stent remain in proper position "  · Obtain C diff sample and stool enteric bacterial panel  · IV hydration  · IV antiemetics  · Regular diet as tolerated  · Nutrition consult  Patient mentioned that since he has been admitted he has not had any episodes of diarrhea

## 2022-04-28 NOTE — ASSESSMENT & PLAN NOTE
· Presentation: Patient presents due to complaints of   · CT A/P from 04/16/2022: "Compared to the recent CT from 4/4/2022, there has been no significant acute changes  Again seen is a large pancreatic head mass, and multiple liver lesions consistent with metastatic pancreatic cancer  Percutaneous cholecystostomy tube and common bile duct stent remain in proper position "  · Obtain C diff sample and stool enteric bacterial panel  · IV hydration  · IV antiemetics  · Clear liquids, advance diet as tolerated  · Nutrition consult

## 2022-04-28 NOTE — ASSESSMENT & PLAN NOTE
· Patient known to Dr Martínez Tselakai Dezza of Palliative care  · Continue home pain regimen of Oxycodone and Gabapentin  · Palliative care consult

## 2022-04-28 NOTE — ASSESSMENT & PLAN NOTE
· Presentation: Patient presents due to complaints of diarrhea  He reports that he has been having great than 10 episodes of watery, non bloody diarrhea over the past 3 days  He reports that he received his most recent chemotherapy treatment on Monday, 04/25/2022  He reports nausea but denies any recent vomiting  He reports that his abdominal pain is not worse than usual  He reports chills but denies fevers  · CT A/P from 04/16/2022: "Compared to the recent CT from 4/4/2022, there has been no significant acute changes  Again seen is a large pancreatic head mass, and multiple liver lesions consistent with metastatic pancreatic cancer  Percutaneous cholecystostomy tube and common bile duct stent remain in proper position "  · Obtain C diff sample and stool enteric bacterial panel  · IV hydration  · IV antiemetics  · Regular diet as tolerated  · Nutrition consult

## 2022-04-28 NOTE — CONSULTS
Medical Oncology/Hematology Consult Note  Drew Ramirez  , male, 78 y o , 1955,  S /S -01, [id number]     Assessment and Plan  1  Pancreatic Cancer  2  Diarrhea  3  Failure to Thrive  -This is a 56-year-old male with stage IV metastatic pancreatic cancer on dose reduced modified FOLFIRI seen in consultation for diarrhea and failure to thrive  He has had a 20 lb weight loss over the last month and is unable to tolerate oral intake very well  States that it is hard to swallow due to dry mouth and his abdominal pain  He sees palliative care as an outpatient for symptom management  He is receiving IV fluids for hydration  The patient is very treatment focused at this time  He has a planned barbecue with his grandchildren this weekend that he wants to "smoke a ham    - I reviewed with the patient his CT scan results in detail  He wanted me to call his son with these reports but was unable to get hold of him at this time  He will need to reschedule his follow-up with Dr Rain Fernández in the office to discuss his treatment plan moving forward  After discussion with my attending, Dr Tor Zarco, there may be utility in having him seen by  Surgical Oncology, but the patient is unsure if that is the route he would like to proceed with at this time and would like to discuss things further with his primary oncologist   -It does not appear that the patient is able to control his symptoms with the regimen provided by palliative care  He did not try taking the Lomotil before coming into the ER for his diarrhea and has not had a bowel movement since admission  He states he does not have much support at home  I reiterated that the patient must call Dr Kimo Krueger team for help and suggestions regarding his symptoms   - He has Neulasta Onpro on currently which needs to be removed around 3:30 p m this evening  He will make nursing staff aware       I have discussed this case with Dr Tor Zarco, and they are in agreement with my assessment and plan  Reason for consultation: Diarrhea, Pancreatic Cancer, Failure to thrive    History of present illness:   Refugio Preciado is a 70-year-old male with PMH of metastatic pancreatic cancer (follows with Dr Zimmerman Parents outpatient), PE on eliquis, DM2, HTN, HLD, GERD and RA who presented to the ER last evening with nausea, diarrhea and poor oral intake over the past 2 days  He was having >10 episodes of watery, non-bloody diarrhea for 2 days  He last chemo treatment was on 4/25/22  He also has lost about 16 over the past 2 weeks, patient states its is probably closer to 20 pounds in 4 weeks  He says that it is hard to swallow and that is why he does not want to eat  He is able to drink 2 Boost/Ensure shakes and the only thing that taste good to him is chicken noodle soup  He feels that he does drink enough fluids  Before coming to the ER he was only taking Imodium and not using the Lomotil as prescribed by palliative  Oncology History:  He was originally diagnosed in March of 2021 with adenocarcinoma the pancreas and found to not be a candidate forFOLFIRINOX and was started on Gemzar and Abraxane as neoadjuvant treatment  After cycle 3 he was found to have progression in the liver and was placed on modified FOLFOX 6  His scans in December of 2021 showed a mixed response in he was then started on FOLFIRI  His treatment course has been complicated by side effects and for cycle 7 Leucovorin was dose reduced to 200 mg  His side effects continued and for cycle 8 Leucovorin was discontinued, Onivyde dose reduced to 60 mg/m2 and Fluorouracil 1,100 mg/m2/day  His last treatment was on 04/25/2022-04/27/2022  He was scheduled with an appointment outpatient Dr Elsie uDncan to discuss treatment plan moving forward based on CT scan that was completed on 04/23/2022  Oncology History   Metastatic pancreatic cancer   5/7/2021 Biopsy    Head of pancreas:  Malignant Adenocarcinoma       6/3/2021 - 9/2/2021 Chemotherapy    pegfilgrastim (NEULASTA ONPRO), 6 mg, Subcutaneous, Once, 2 of 4 cycles  Administration: 6 mg (8/19/2021)  paclitaxel protein-bound (ABRAXANE) IVPB, 100 mg/m2 = 191 mg (80 % of original dose 125 mg/m2), Intravenous, Once, 4 of 6 cycles  Dose modification: 100 mg/m2 (original dose 125 mg/m2, Cycle 1, Reason: Other (Must fill in a comment), Comment: per protocol)  Administration: 191 mg (6/3/2021), 191 mg (6/10/2021), 191 mg (7/1/2021), 191 mg (7/8/2021), 191 mg (8/5/2021), 191 mg (8/12/2021), 191 mg (8/19/2021), 191 mg (9/2/2021)  gemcitabine (GEMZAR) infusion, 1,000 mg/m2 = 1,909 9 mg, Intravenous, Once, 4 of 6 cycles  Administration: 1,909 9 mg (6/3/2021), 1,909 9 mg (6/10/2021), 1,909 9 mg (7/1/2021), 1,909 9 mg (7/8/2021), 1,909 9 mg (8/5/2021), 1,909 9 mg (8/12/2021), 1,909 9 mg (8/19/2021), 1,909 9 mg (9/2/2021)     9/22/2021 - 12/3/2021 Chemotherapy    fluorouracil (ADRUCIL), 400 mg/m2 = 770 mg, Intravenous, Once, 5 of 5 cycles  Administration: 770 mg (9/22/2021), 770 mg (10/6/2021), 785 mg (10/20/2021), 785 mg (11/17/2021), 785 mg (12/1/2021)  pegfilgrastim (Gueydan Satsuma), 6 mg, Subcutaneous, Once, 5 of 5 cycles  Administration: 6 mg (9/24/2021), 6 mg (10/8/2021), 6 mg (10/22/2021), 6 mg (11/19/2021), 6 mg (12/3/2021)  leucovorin calcium IVPB, 772 mg, Intravenous, Once, 5 of 5 cycles  Administration: 800 mg (9/22/2021), 800 mg (10/6/2021), 800 mg (10/20/2021), 800 mg (11/17/2021), 800 mg (12/1/2021)  oxaliplatin (ELOXATIN) chemo infusion, 85 mg/m2 = 164 05 mg, Intravenous, Once, 5 of 5 cycles  Administration: 164 05 mg (9/22/2021), 164 05 mg (10/6/2021), 166 6 mg (10/20/2021), 166 6 mg (11/17/2021), 166 6 mg (12/1/2021)  fluorouracil (ADRUCIL) ambulatory infusion Soln, 1,200 mg/m2/day = 4,630 mg, Intravenous, Over 46 hours, 5 of 5 cycles     12/29/2021 -  Chemotherapy    palonosetron (ALOXI), 0 25 mg, Intravenous, Once, 7 of 10 cycles  Administration: 0 25 mg (1/12/2022), 0 25 mg (2/2/2022), 0 25 mg (2/23/2022), 0 25 mg (3/9/2022), 0 25 mg (3/23/2022), 0 25 mg (4/6/2022), 0 25 mg (4/25/2022)  pegfilgrastim (Igor Nickels), 6 mg, Subcutaneous, Once, 8 of 11 cycles  Administration: 6 mg (12/31/2021), 6 mg (1/14/2022), 6 mg (2/4/2022), 6 mg (2/25/2022), 6 mg (3/11/2022), 6 mg (3/25/2022), 6 mg (4/8/2022), 6 mg (4/27/2022)  fosaprepitant (EMEND) IVPB, 150 mg, Intravenous, Once, 7 of 10 cycles  Administration: 150 mg (1/12/2022), 150 mg (2/2/2022), 150 mg (2/23/2022), 150 mg (3/9/2022), 150 mg (3/23/2022), 150 mg (4/6/2022), 150 mg (4/25/2022)  leucovorin calcium IVPB, 400 mg/m2 = 780 mg, Intravenous, Once, 7 of 7 cycles  Dose modification: 200 mg/m2 (original dose 400 mg/m2, Cycle 4, Reason: Other (Must fill in a comment), Comment:   peeling of the hands)  Administration: 800 mg (12/29/2021), 800 mg (1/12/2022), 760 mg (2/2/2022), 380 mg (2/23/2022), 380 mg (3/9/2022), 380 mg (3/23/2022), 380 mg (4/6/2022)  fluorouracil (ADRUCIL) ambulatory infusion Soln, 1,200 mg/m2/day = 4,680 mg, Intravenous, Over 46 hours, 8 of 11 cycles  Dose modification: 1,100 mg/m2/day (original dose 1,200 mg/m2/day, Cycle 9, Reason: Dose modified as per discussion with consulting physician, Comment: Diarrhea ), 1,100 mg/m2/day (original dose 1,200 mg/m2/day, Cycle 8, Reason: Other (Must fill in a comment), Comment: Diarrhea)             Review of Systems:   Review of Systems   Constitutional: Positive for activity change, appetite change, fatigue and unexpected weight change  Negative for fever  HENT: Positive for trouble swallowing  Negative for voice change  Eyes: Negative for photophobia and visual disturbance  Respiratory: Negative for cough, chest tightness and shortness of breath  Cardiovascular: Negative for chest pain, palpitations and leg swelling  Gastrointestinal: Positive for abdominal pain, diarrhea and nausea  Negative for abdominal distention, constipation and vomiting     Endocrine: Negative for cold intolerance and heat intolerance  Genitourinary: Negative for dysuria, hematuria and urgency  Musculoskeletal: Negative for arthralgias, back pain, gait problem and joint swelling  Skin: Negative for pallor and rash  Neurological: Positive for weakness  Hematological: Negative for adenopathy  Does not bruise/bleed easily  Psychiatric/Behavioral: Negative for confusion and sleep disturbance  Past Medical History:   Diagnosis Date    Anxiety     Arthritis     Cancer ([place] [address] )     pancreatic    Cellulitis     LAST ASSESSED: 6/13/14    Diabetes mellitus ([place] [address] )     Enlarged prostate     Epidermal inclusion cyst     LAST ASSESSED: 10/4/13    Erythrasma     LAST ASSESSED: 9/23/13    Furuncle     LAST ASSESSED: 6/11/14    GERD (gastroesophageal reflux disease)     Hyperlipidemia     Hypertension     RA (rheumatoid arthritis) ([place] [address] )        Past Surgical History:   Procedure Laterality Date    COLONOSCOPY      FL GUIDED CENTRAL VENOUS ACCESS DEVICE INSERTION  6/1/2021    HYDROCELE EXCISION / REPAIR Right 01/11/2018    SPERMATIC CORD EXCSION OF HYDROCELE; MANAGED BY: GABBIE CARBAJAL    IR BIOPSY LIVER MASS  8/16/2021    IR CHOLECYSTOSTOMY TUBE CHECK/CHANGE/REPOSITION/REINSERTION/UPSIZE  6/22/2021    IR CHOLECYSTOSTOMY TUBE CHECK/CHANGE/REPOSITION/REINSERTION/UPSIZE  8/23/2021    IR CHOLECYSTOSTOMY TUBE CHECK/CHANGE/REPOSITION/REINSERTION/UPSIZE  1/19/2022    IR CHOLECYSTOSTOMY TUBE PLACEMENT  5/11/2021    MULTIPLE TOOTH EXTRACTIONS Left 7/22/2021    Procedure: EXTRACTION TEETH 14 & 15, ALVEOPLASTY, EXCISION OF CYST LEFT MAXILLA;   Surgeon: Iván Monreal DDS;  [facility];  Service: Maxillofacial    OR REMOVAL OF HYDROCELE,TUNICA,UNILAT Right 1/11/2018    Procedure: HYDROCELECTOMY;  Surgeon: Brian Nick MD;  [facility];  Service: Urology    SCROTAL SURGERY      benign "lump"    TUNNELED VENOUS PORT PLACEMENT Left 6/1/2021    Procedure: INSERTION VENOUS PORT (PORT-A-CATH);   Surgeon: Adan Joe MD;  [facility];  Service: Surgical Oncology       Family History   Problem Relation Age of Onset    Heart disease Father         CARDIAC DISORDER    Hypertension Father     Hypertension Mother     No Known Problems Maternal Aunt     No Known Problems Maternal Uncle     No Known Problems Paternal Aunt     No Known Problems Paternal Uncle     No Known Problems Paternal Grandmother     No Known Problems Paternal Grandfather     Diabetes Maternal Grandmother         MELLITUS    No Known Problems Maternal Grandfather     Hypertension Other        Social History     Socioeconomic History    Marital status: /Civil Union     Spouse name: None    Number of children: 2    Years of education: None    Highest education level: None   Occupational History    Occupation: FULL-TIME EMPLOYMENT   Tobacco Use    Smoking status: Former Smoker    Smokeless tobacco: Never Used    Tobacco comment: tobacco in a pipe in the early 1980's   Vaping Use    Vaping Use: Never used   Substance and Sexual Activity    Alcohol use: Never    Drug use: Never    Sexual activity: Not Currently     Comment: NOT CURRENTLY SEXUALLY ACTIVE AS PER [employer]   Other Topics Concern    None   Social History Narrative    Family dynamics: Pt reports support from children/nephew; Reports some stress in relationship with wife    Relationship status:      Duration of relationship: 20 years     Name of significant other: Jacque Rice and Ages: 2 sons, 1 stepdtr     Pets: 1 kitten, 1 dog    Other important family information: Son moving to [place]    Living situation: Lives with wife               Patient's primary caregiver: Self      Any limitations of caregiver:    Environmental concerns or barriers:     history: None    Employment history/source of income: Worked as a  for 43 years    Disability: Currently receiving long-term disability Spirituality/ Alevism: "I believe"      Patient's strengths, social supports, and resources: Supportive family/friends    Cultural information:     Mental Health current or previous: Reports increased depression/stress due to strained relationship with wife     Substance use or history: None     Sleep: Trouble sleeping    Exercise: Active    Diet/nutrition: Stomach feels "heavy" and things taste differently    Durable Medical Equipment needs: None    Transportation: Drives self    Financial concerns: Pt does not express financial concerns    Hobbies/Interests: Going to [employer] [place]    Advanced Directive: Five Wishes on file  Designates son Alex Tirado as decision-maker with son Asim May and step-dtr Melinda as alternates    Other medical or social work providers involved: Oncology    Patient/caregiver current level of coping: Pt appears to be coping well overall with medical status      Understanding: Pt appears understanding of medical status    Patient/family concerns and areas of need: Pt focused on his "bucket list" and making memories with his family as a priority       Social Determinants of Health     Financial Resource Strain: Not on file   Food Insecurity: Not on file   Transportation Needs: Not on file   Physical Activity: Not on file   Stress: Not on file   Social Connections: Not on file   Intimate Partner Violence: Not on file   Housing Stability: Not on file         Current Facility-Administered Medications:     acetaminophen (TYLENOL) tablet 650 mg, 650 mg, Oral, Q6H PRN, CAROL Sheffield    al mag oxide-diphenhydramine-lidocaine viscous (MAGIC MOUTHWASH) suspension 10 mL, 10 mL, Swish & Spit, Q4H PRN, CAROL Sheffield    aluminum-magnesium hydroxide-simethicone (MYLANTA) oral suspension 30 mL, 30 mL, Oral, Q6H PRN, CAROL Sheffield    apixaban (ELIQUIS) tablet 5 mg, 5 mg, Oral, BID, CAROL Sutherland, 5 mg at 04/28/22 0830    aspirin chewable tablet 81 mg, 81 mg, Oral, Daily, CAROL Snider, 81 mg at 04/28/22 0830    atorvastatin (LIPITOR) tablet 20 mg, 20 mg, Oral, Daily, CAROL Sutherland, 20 mg at 04/28/22 0831    cyanocobalamin (VITAMIN B-12) tablet 1,000 mcg, 1,000 mcg, Oral, Daily, CAROL Snider, 1,000 mcg at 04/28/22 0830    FLUoxetine (PROzac) capsule 20 mg, 20 mg, Oral, Daily, CAROL Sutherland, 20 mg at 74/68/42 9276    folic acid (FOLVITE) tablet 1 mg, 1 mg, Oral, Daily, CAROL Sutherland, 1 mg at 04/28/22 0831    gabapentin (NEURONTIN) capsule 200 mg, 200 mg, Oral, TID, CAROL Sutherland, 200 mg at 04/28/22 0831    HYDROmorphone (DILAUDID) injection 0 5 mg, 0 5 mg, Intravenous, Q4H PRN, CAROL Snider, 0 5 mg at 04/28/22 0826    insulin lispro (HumaLOG) 100 units/mL subcutaneous injection 1-5 Units, 1-5 Units, Subcutaneous, TID AC, 1 Units at 04/28/22 0835 **AND** Fingerstick Glucose (POCT), , , TID AC, CAROL Sutherland    insulin lispro (HumaLOG) 100 units/mL subcutaneous injection 1-5 Units, 1-5 Units, Subcutaneous, HS, CRAOL Sutherland    [START ON 4/29/2022] lisinopril-hydrochlorothiazide (PRINZIDE 10/12  5) combo dose, , Oral, Once per day on Mon Wed Fri, CAROL Sutherland    [facility]ron [facility]) injection 4 mg, 4 mg, Intravenous, Once PRN, Missy Segundo DO    ondansetron (ZOFRAN) injection 4 mg, 4 mg, Intravenous, Q6H PRN, CAROL Snider, 4 mg at 04/28/22 8375    oxyCODONE (ROXICODONE) immediate release tablet 10 mg, 10 mg, Oral, Q4H PRN, CAROL Snider, 10 mg at 04/28/22 0059    oxyCODONE (ROXICODONE) IR tablet 5 mg, 5 mg, Oral, Q4H PRN, CAROL Snider    pancrelipase (Lip-Prot-Amyl) (CREON) delayed release capsule 24,000 Units, 24,000 Units, Oral, TID With Meals, CAROL Snider, 24,000 Units at 04/28/22 0830    pantoprazole (PROTONIX) EC tablet 40 mg, 40 mg, Oral, BID, CAROL Sutherland, 40 mg at 04/28/22 0830    predniSONE tablet 10 mg, 10 mg, Oral, Daily With Breakfast, CAROL Bradley, 10 mg at 22 0831    sodium chloride 0 9 % infusion, 100 mL/hr, Intravenous, Continuous, CAROL Sutherland, Last Rate: 100 mL/hr at 22 0952, 100 mL/hr at 22 0952    tamsulosin (FLOMAX) capsule 0 4 mg, 0 4 mg, Oral, HS, Kinnie Orlandoar, CRNP    traZODone (DESYREL) tablet 50 mg, 50 mg, Oral, HS, Kinnie Pillar, CRNP    Medications Prior to Admission   Medication    al mag oxide-diphenhydramine-lidocaine viscous (MAGIC MOUTHWASH) 1:1:1 suspension    apixaban (Eliquis) 5 mg    aspirin 81 mg chewable tablet    atorvastatin (LIPITOR) 20 mg tablet    FLUoxetine (PROzac) 20 mg capsule    folic acid (FOLVITE) 1 mg tablet    gabapentin (NEURONTIN) 100 mg capsule    lisinopril-hydrochlorothiazide (PRINZIDE,ZESTORETIC) 10-12 5 MG per tablet    naloxone (NARCAN) 4 mg/0 1 mL nasal spray    ondansetron (ZOFRAN-ODT) 4 mg disintegrating tablet    oxyCODONE (Roxicodone) 5 immediate release tablet    pantoprazole (PROTONIX) 40 mg tablet    predniSONE 10 mg tablet    prochlorperazine (COMPAZINE) 10 mg tablet    Sodium Chloride Flush (Normal Saline Flush) 0 9 % SOLN    tamsulosin (FLOMAX) 0 4 mg    traZODone (DESYREL) 50 mg tablet    triamcinolone (KENALOG) 0 1 % cream    vitamin B-12 (VITAMIN B-12) 1,000 mcg tablet    Alcohol Swabs 70 % PADS    Blood Glucose Monitoring Suppl (ONE TOUCH ULTRA 2) w/Device KIT    Creon 41724-23666 units    dexamethasone 0 5 MG/5ML elixir    diphenoxylate-atropine (LOMOTIL) 2 5-0 025 mg per tablet    [] fluorouracil 4,180 mg in CADD infusion pump    glucose blood (OneTouch Ultra) test strip    insulin lispro (HumaLOG KwikPen) 100 units/mL injection pen    Insulin Pen Needle (Unifine Pentips Plus) 32G X 4 MM MISC    Lancets (onetouch ultrasoft) lancets    Lantus SoloStar 100 units/mL injection pen    senna (SENOKOT) 8 6 MG tablet       Allergies   Allergen Reactions    Fentanyl Anaphylaxis and Other (See Comments)     Oxygen drops severely         Physical Exam:    /62 (BP Location: Right arm)   Pulse 57   Temp 98 °F (36 7 °C) (Oral)   Resp 16   Ht 5' 7" (1 702 m)   Wt 63 kg (138 lb 14 2 oz)   SpO2 96%   BMI 21 75 kg/m²     Physical Exam  Constitutional:       Appearance: He is ill-appearing  HENT:      Head: Atraumatic  Eyes:      Extraocular Movements: Extraocular movements intact  Conjunctiva/sclera: Conjunctivae normal    Cardiovascular:      Rate and Rhythm: Normal rate and regular rhythm  Pulses: Normal pulses  Heart sounds: Normal heart sounds  No murmur heard  Pulmonary:      Effort: Pulmonary effort is normal  No respiratory distress  Breath sounds: Normal breath sounds  Abdominal:      General: Abdomen is flat  Bowel sounds are normal       Palpations: Abdomen is soft  Musculoskeletal:      Cervical back: Neck supple  No tenderness  Right lower leg: No edema  Left lower leg: No edema  Lymphadenopathy:      Cervical: No cervical adenopathy  Skin:     General: Skin is warm and dry  Capillary Refill: Capillary refill takes less than 2 seconds  Coloration: Skin is pale  Findings: No bruising  Neurological:      General: No focal deficit present  Mental Status: He is alert and oriented to person, place, and time  Mental status is at baseline  Motor: Weakness present  Psychiatric:         Mood and Affect: Mood normal          Behavior: Behavior normal          Thought Content:  Thought content normal          Judgment: Judgment normal          Recent Results (from the past 48 hour(s))   CBC and differential    Collection Time: 04/27/22 10:27 PM   Result Value Ref Range    WBC 12 86 (H) 4 31 - 10 16 Thousand/uL    RBC 3 52 (L) 3 88 - 5 62 Million/uL    Hemoglobin 10 5 (L) 12 0 - 17 0 g/dL    Hematocrit 33 1 (L) 36 5 - 49 3 %    MCV 94 82 - 98 fL    MCH 29 8 26 8 - 34 3 pg    MCHC 31 7 31 4 - 37 4 g/dL    RDW 17 2 (H) 11 6 - 15 1 %    MPV 10 7 8 9 - 12 7 fL    Platelets 703 550 - 350 Thousands/uL    nRBC 0 /100 WBCs    Neutrophils Relative 88 (H) 43 - 75 %    Immat GRANS % 1 0 - 2 %    Lymphocytes Relative 8 (L) 14 - 44 %    Monocytes Relative 3 (L) 4 - 12 %    Eosinophils Relative 0 0 - 6 %    Basophils Relative 0 0 - 1 %    Neutrophils Absolute 11 34 (H) 1 85 - 7 62 Thousands/µL    Immature Grans Absolute 0 10 0 00 - 0 20 Thousand/uL    Lymphocytes Absolute 1 01 0 60 - 4 47 Thousands/µL    Monocytes Absolute 0 36 0 17 - 1 22 Thousand/µL    Eosinophils Absolute 0 03 0 00 - 0 61 Thousand/µL    Basophils Absolute 0 02 0 00 - 0 10 Thousands/µL   Comprehensive metabolic panel    Collection Time: 04/27/22 10:27 PM   Result Value Ref Range    Sodium 137 136 - 145 mmol/L    Potassium 3 7 3 5 - 5 3 mmol/L    Chloride 104 100 - 108 mmol/L    CO2 26 21 - 32 mmol/L    ANION GAP 7 4 - 13 mmol/L    BUN 10 5 - 25 mg/dL    Creatinine 0 90 0 60 - 1 30 mg/dL    Glucose 192 (H) 65 - 140 mg/dL    Calcium 8 3 8 3 - 10 1 mg/dL    Corrected Calcium 9 7 8 3 - 10 1 mg/dL    AST 24 5 - 45 U/L    ALT 17 12 - 78 U/L    Alkaline Phosphatase 450 (H) 46 - 116 U/L    Total Protein 5 5 (L) 6 4 - 8 2 g/dL    Albumin 2 2 (L) 3 5 - 5 0 g/dL    Total Bilirubin 0 46 0 20 - 1 00 mg/dL    eGFR 88 ml/min/1 73sq m   Lipase    Collection Time: 04/27/22 10:27 PM   Result Value Ref Range    Lipase 95 73 - 393 u/L   Comprehensive metabolic panel    Collection Time: 04/28/22  5:07 AM   Result Value Ref Range    Sodium 139 136 - 145 mmol/L    Potassium 3 6 3 5 - 5 3 mmol/L    Chloride 107 100 - 108 mmol/L    CO2 27 21 - 32 mmol/L    ANION GAP 5 4 - 13 mmol/L    BUN 10 5 - 25 mg/dL    Creatinine 0 74 0 60 - 1 30 mg/dL    Glucose 145 (H) 65 - 140 mg/dL    Calcium 7 5 (L) 8 3 - 10 1 mg/dL    Corrected Calcium 9 3 8 3 - 10 1 mg/dL    AST 18 5 - 45 U/L    ALT 17 12 - 78 U/L    Alkaline Phosphatase 382 (H) 46 - 116 U/L    Total Protein 4 7 (L) 6 4 - 8 2 g/dL Albumin 1 8 (L) 3 5 - 5 0 g/dL    Total Bilirubin 0 47 0 20 - 1 00 mg/dL    eGFR 96 ml/min/1 73sq m   CBC and differential    Collection Time: 04/28/22  5:07 AM   Result Value Ref Range    WBC 13 61 (H) 4 31 - 10 16 Thousand/uL    RBC 3 10 (L) 3 88 - 5 62 Million/uL    Hemoglobin 9 3 (L) 12 0 - 17 0 g/dL    Hematocrit 30 1 (L) 36 5 - 49 3 %    MCV 97 82 - 98 fL    MCH 30 0 26 8 - 34 3 pg    MCHC 30 9 (L) 31 4 - 37 4 g/dL    RDW 17 1 (H) 11 6 - 15 1 %    MPV 10 7 8 9 - 12 7 fL    Platelets 861 395 - 037 Thousands/uL    nRBC 0 /100 WBCs    Neutrophils Relative 86 (H) 43 - 75 %    Immat GRANS % 1 0 - 2 %    Lymphocytes Relative 9 (L) 14 - 44 %    Monocytes Relative 3 (L) 4 - 12 %    Eosinophils Relative 1 0 - 6 %    Basophils Relative 0 0 - 1 %    Neutrophils Absolute 11 72 (H) 1 85 - 7 62 Thousands/µL    Immature Grans Absolute 0 10 0 00 - 0 20 Thousand/uL    Lymphocytes Absolute 1 27 0 60 - 4 47 Thousands/µL    Monocytes Absolute 0 36 0 17 - 1 22 Thousand/µL    Eosinophils Absolute 0 12 0 00 - 0 61 Thousand/µL    Basophils Absolute 0 04 0 00 - 0 10 Thousands/µL   Fingerstick Glucose (POCT)    Collection Time: 04/28/22  6:35 AM   Result Value Ref Range    POC Glucose 170 (H) 65 - 140 mg/dl       CT chest abdomen pelvis w contrast    Result Date: 4/27/2022  Narrative: CT CHEST, ABDOMEN AND PELVIS WITH IV CONTRAST INDICATION:   C25 9: Malignant neoplasm of pancreas, unspecified D70 1: Agranulocytosis secondary to cancer chemotherapy T45  1X5A: Adverse effect of antineoplastic and immunosuppressive drugs, initial encounter C25 9: Malignant neoplasm of pancreas, unspecified C78 7: Secondary malignant neoplasm of liver and intrahepatic bile duct  COMPARISON:  CT chest abdomen pelvis 4/4/2022  CT abdomen pelvis 4/16/2022  CT abdomen pelvis 11/10/2016  MRI abdomen 4/27/2021  TECHNIQUE: CT examination of the chest, abdomen and pelvis was performed   Axial, sagittal, and coronal 2D reformatted images were created from the source data and submitted for interpretation  Radiation dose length product (DLP) for this visit:  995 mGy-cm   This examination, like all CT scans performed in the [facility], was performed utilizing techniques to minimize radiation dose exposure, including the use of iterative reconstruction and automated exposure control  IV Contrast:  100 mL of iohexol (OMNIPAQUE) Enteric Contrast: Enteric contrast was administered  FINDINGS: CHEST LUNGS:  No new or enlarging pulmonary nodules or focal consolidations  Stable pleural-based scarring is noted within the lingula and right apex  There is no discrete tracheal or endobronchial lesion  PLEURA:  Unremarkable  HEART/GREAT VESSELS: Heart is normal in size  Small pericardial effusion is similar to prior exam   Atherosclerotic calcifications are noted within the thoracic aorta and branching vessels Left-sided infusion catheter is seen with its tip terminating in  the mid SVC  MEDIASTINUM AND WAQAS: Trace enteric contrast is noted within the esophagus  Otherwise unremarkable  CHEST WALL AND LOWER NECK:  Possible sebaceous cyst is again noted within the right anterior chest wall, stable from exams  ABDOMEN LIVER/BILIARY TREE:  There is diffuse hypoattenuation of the liver parenchyma compatible steatosis  -The hypodense lesion seen within segment 5 on prior CT of 4/4/2022 measuring 1 5 cm is no longer evident  -Numerous additional metastatic and cystic lesions are essentially unchanged from 4/4/2022  Includes a previously biopsied lesion along the anterior aspect of segment 3 measuring which measures approximately 1 4 x 1 0 cm (3:62)  -There is again a segment 3 lesion measuring 2 1 x 2 6 cm (2:42) which demonstrates somewhat heterogeneous peripheral arterial enhancement with homogeneous enhancement on portal venous and delayed imaging    This is stable dating back to CT of November 2016 and 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   -Common bile duct stent is in stable position  Pneumobilia is again noted predominantly within the left hepatic lobe, similar to prior exam  GALLBLADDER: Cholecystostomy tube remains coiled in a decompressed gallbladder  SPLEEN:  Unremarkable  PANCREAS:  Heterogeneous pancreatic head head mass measures approximately 7 1 x 6 7 cm (3:72), similar in size to prior CT of 4/4/2022  Unk Trey There is again is again central hypodensity suggestive of necrosis  This is similar to CT of 4/4/2021 when accounting for differences in measuring technique  There is again upstream pancreatic ductal dilatation and parenchymal atrophy  The mass again encases the superior mesenteric artery there is  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  ADRENAL GLANDS:  Unremarkable  KIDNEYS/URETERS:  No hydronephrosis or hydroureter  Renal cysts are noted  Additional cyst sharply circumscribed subcentimeter renal hypodensities are present, too small to accurately characterize, and statistically most likely benign findings  According to recent literature (radiology 2019) no further workup of these findings is recommended  STOMACH AND BOWEL:  There is moderate gastric distention  There is narrowing of the lumen of the distal stomach and proximal duodenum secondary to mass effect from large pancreatic lesion described above  Enteric contrast is seen reaching the colon  No abnormal small bowel dilatation  APPENDIX:  No findings to suggest appendicitis  ABDOMINOPELVIC CAVITY:  No pneumoperitoneum  There is trace free fluid within the dependent pelvis  Nodular soft tissue densities are seen extending inferiorly from the pancreatic mass along the expected course of the mesenteric venous vasculature  The largest conglomerate density measures 3 7 x 1 9 cm (3:86)    This is slightly increased in size from prior study of 4/4/2022 and may represent metastatic adenopathy versus worsening occlusion/thrombosis of the mesenteric vasculature  VESSELS:  There is 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 mass as described above  Portions of the superior mesenteric vein and inferior mesenteric vasculature again demonstrate a somewhat nodular appearance which may be secondary to thrombosis versus The mass does encase the superior mesenteric artery  There is flattening of the inferior vena cava secondary to mass effect from the aforementioned pancreatic mass  PELVIS REPRODUCTIVE ORGANS:  Unremarkable for patient's age  URINARY BLADDER:  Unremarkable  ABDOMINAL WALL/INGUINAL REGIONS:  Unremarkable  OSSEOUS STRUCTURES:  No acute fracture or destructive osseous lesion  Impression: 1  Redemonstration of pancreatic head mass which is grossly stable in size from prior CT of 4/4/2022  There is again upstream pancreatic ductal dilatation/parenchymal atrophy as well as encasement of the superior mesenteric artery and occlusion/thrombosis of the superior mesenteric vein  2   Redemonstration of multiple metastatic/cystic lesions throughout the liver  Hypodense lesion previously seen within segment 5 on 4/4/2022 is no longer evident  Remaining lesions appear grossly stable  3   Interval worsening of soft tissue nodularity along the expected course of the mesenteric veins  Findings may represent worsening thrombus versus worsening mesenteric metastasis  4   Moderate distention of the stomach with luminal narrowing of the distal stomach  Findings are concerning for worsening partial gastric outlet obstruction secondary to mass effect from the pancreatic lesion  Enteric contrast does reach the colon  The study was marked in EPIC for significant notification  Workstation performed: [id number]     CT chest abdomen pelvis w contrast    Result Date: 4/4/2022  Narrative: CT CHEST, ABDOMEN AND PELVIS WITH IV CONTRAST INDICATION:   pancreatic cancer abd pain  COMPARISON:  CT abdomen pelvis 1/20/2022   TECHNIQUE: CT examination of the chest, abdomen and pelvis was performed  Scanning through the abdomen was performed in arterial, venous and delayed phases according a protocol specially designed to evaluate upper abdominal viscera  Axial, sagittal, and  coronal 2D reformatted images were created from the source data and submitted for interpretation  Radiation dose length product (DLP) for this visit:  905 mGy-cm   This examination, like all CT scans performed in the [facility], was performed utilizing techniques to minimize radiation dose exposure, including the use of iterative reconstruction and automated exposure control  IV Contrast:  100 mL of iohexol (OMNIPAQUE) Enteric Contrast: Enteric contrast was not administered  FINDINGS: CHEST LUNGS:  Tiny calcified granuloma in the right lower lobe  No suspicious pulmonary nodules or masses  Trachea and bronchi are patent  PLEURA:  Unremarkable  HEART/GREAT VESSELS: Heart is normal size  Small pericardial effusion, stable  No thoracic aortic aneurysm  MEDIASTINUM AND WAQAS:  Unremarkable  CHEST WALL AND LOWER NECK:   Left-sided Mediport line tip terminates in the SVC  Small sebaceous cyst suggested at the right anterior chest wall, stable  ABDOMEN LIVER/BILIARY TREE:  Diffuse fatty infiltration of the liver  Multiple hepatic masses again seen compatible with metastasis  Hypodense mass in the left lobe laterally measures up to 2 7 cm image 70 series 2, stable, previously 2 6 cm  Hypodense mass in the left lobe lateral segment measures up to 1 7 cm image 60 series 2, unchanged  Smaller hypodense mass in the right lobe of the liver inferiorly measuring up to 1 5 cm image 81 series 2, previously 2 5 cm  Stable positioning of the CBD stent  Intrahepatic biliary ductal gas  GALLBLADDER:  Cholecystostomy tube remains in position  SPLEEN:  Unremarkable   PANCREAS:  Large pancreatic head/body mass measures 7 1 x 6 1 cm image 77 series 2, larger previously 6 3 x 5 5 cm measured at a similar slice  Central hypoattenuation suggests necrosis  The SMA is encircled by the mass  SMV not visualized thrombosed and  occluded by the mass  Distal pancreatic duct is dilated with atrophy of the remaining body and tail  ADRENAL GLANDS:  Unremarkable  KIDNEYS/URETERS:  No hydronephrosis or urinary tract calculus  One or more sharply circumscribed subcentimeter renal hypodensities are present, too small to accurately characterize, and statistically most likely benign findings  According to recent literature (Radiology 2019) no further workup of these findings is recommended  STOMACH AND BOWEL:  Limited evaluation without enteric contrast  No bowel obstruction  APPENDIX:  No findings to suggest appendicitis  ABDOMINOPELVIC CAVITY:  No ascites  No pneumoperitoneum  No lymphadenopathy  VESSELS:  Splenic varices  PELVIS REPRODUCTIVE ORGANS:  Enlarged heterogeneous prostate gland with scattered calcifications  URINARY BLADDER:  Unremarkable  ABDOMINAL WALL/INGUINAL REGIONS:  Small fat-containing left inguinal hernia  OSSEOUS STRUCTURES:  No acute fracture or destructive osseous lesion  Impression: 1  Enlarging pancreatic mass compatible with known malignancy and concerning for disease progression  The SMA is now encircled by the mass  SMV is again not visualized likely thrombosed and occluded by the mass  2   Mostly stable multiple hepatic masses compatible with metastasis  3   No new suspicious findings in the chest  Workstation performed: [id number]     CT abdomen pelvis with contrast    Result Date: 4/16/2022  Narrative: CT ABDOMEN AND PELVIS WITH IV CONTRAST INDICATION:   diarrhea, abd pain  Dr Anastacio Mora note from 4/16/2022 was reviewed  Patient has history of stage IV pancreatic cancer presenting with generalized weakness and chronic diarrhea  COMPARISON:  Chest, abdomen, pelvic CT from 4/4/2022  A remote MR from 12/2/2016 also reviewed   TECHNIQUE:  CT examination of the abdomen and pelvis was performed  Axial, sagittal, and coronal 2D reformatted images were created from the source data and submitted for interpretation  Radiation dose length product (DLP) for this visit:  668 mGy-cm   This examination, like all CT scans performed in the [facility], was performed utilizing techniques to minimize radiation dose exposure, including the use of iterative reconstruction and automated exposure control  IV Contrast:  100 mL of iohexol (OMNIPAQUE) Enteric Contrast:  Enteric contrast was not administered  FINDINGS: ABDOMEN LOWER CHEST:  No clinically significant abnormality identified in the visualized lower chest  LIVER/BILIARY TREE:  Unchanged multiple liver metastases and diffuse fatty liver change  The liver metastases are predominantly hypoenhancing relative to the rest of the liver  Please note there is also a 2 7 cm diffusely hyperenhancing lesion in segment 3 of the liver (series 4 image 30,) which has demonstrated long-term stability and is consistent with a hemangioma based on prior MRI  Common bile duct stent remains in proper position, with expected intrahepatic pneumobilia  GALLBLADDER:  Percutaneous cholecystostomy tube remains in proper position  SPLEEN:  Unremarkable  PANCREAS:  Unchanged 7 cm pancreatic head mass consistent with patient's known pancreatic cancer  Again seen is upstream pancreatic ductal dilatation and parenchymal atrophy  Again seen is encasement SMA by the pancreatic mass  Also 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  ADRENAL GLANDS:  Unremarkable  KIDNEYS/URETERS:  No hydronephrosis or urinary tract calculus  One or more sharply circumscribed subcentimeter renal hypodensities are present, too small to accurately characterize, and statistically most likely benign findings  According to recent literature (Radiology 2019) no further workup of these findings is recommended  STOMACH AND BOWEL:  Unremarkable   APPENDIX:  No findings to suggest appendicitis  ABDOMINOPELVIC CAVITY:  No ascites  No pneumoperitoneum  No lymphadenopathy  VESSELS:  Unremarkable for patient's age  PELVIS REPRODUCTIVE ORGANS:  Unremarkable for patient's age  URINARY BLADDER:  Unremarkable  ABDOMINAL WALL/INGUINAL REGIONS:  Unremarkable  OSSEOUS STRUCTURES:  No acute fracture or destructive osseous lesion  Impression: Compared to the recent CT from 4/4/2022, there has been no significant acute changes  Again seen is a large pancreatic head mass, and multiple liver lesions consistent with metastatic pancreatic cancer  Percutaneous cholecystostomy tube and common bile duct stent remain in proper position  Workstation performed: [id number]       Labs and pertinent reports reviewed  This note has been generated by voice recognition software system  Therefore, there may be spelling, grammar, and or syntax errors  Please contact if questions arise

## 2022-04-28 NOTE — H&P
[facility]  H&P- Drew Ramirez   1955, 78 y o  male MRN: [id number]  Unit/Bed#: ED 16 Encounter: [id number]  Primary Care Provider: Domonique Paredes MD   Date and time admitted to hospital: 4/27/2022  8:58 PM    * Chemotherapy induced diarrhea  Assessment & Plan  · Presentation: Patient presents due to complaints of diarrhea  He reports that he has been having great than 10 episodes of watery, non bloody diarrhea over the past 3 days  He reports that he received his most recent chemotherapy treatment on Monday, 04/25/2022  He reports nausea but denies any recent vomiting  He reports that his abdominal pain is not worse than usual  He reports chills but denies fevers  · CT A/P from 04/16/2022: "Compared to the recent CT from 4/4/2022, there has been no significant acute changes  Again seen is a large pancreatic head mass, and multiple liver lesions consistent with metastatic pancreatic cancer  Percutaneous cholecystostomy tube and common bile duct stent remain in proper position "  · Obtain C diff sample and stool enteric bacterial panel  · IV hydration  · IV antiemetics  · Regular diet as tolerated  · Nutrition consult  Metastatic pancreatic cancer  Assessment & Plan  · Patient known to Dr Rain Fernández of Heme-Onc  · Diagnosed in May 2021  · Currently on Folfiri with Neulasta growth factor support  Failure to thrive in adult  Assessment & Plan  · 2/2 to metastatic pancreatic cancer and actively undergoing chemo treatments with associated nausea, vomiting and diarrhea  · Palliative care consult  Leukocytosis  Assessment & Plan  · Present on admission, WBCs 12 86  · Patient receiving Neulasta in the setting of active chemo treatments  · Trend CBC  Anemia  Assessment & Plan  · 2/2 malignancy with active chemo treatments  · Hemoglobin 10 5 on admission  · Monitor CBC      Palliative care patient  Assessment & Plan  · Patient known to Dr Crow Maria of Palliative care   · Continue home pain regimen of Oxycodone and Gabapentin  · Palliative care consult  Pulmonary embolism [facility])  Assessment & Plan  · Diagnosed with PE in November 2021  · Continue Eliquis  Unspecified protein-calorie malnutrition ([address] )  Assessment & Plan  Malnutrition Findings:   · Evidenced by recent weight loss, muscle wasting related to malignancy  BMI Findings:  ·  BMI 21 77  · Nutrition consult  Type 2 diabetes mellitus [facility])  Assessment & Plan  Lab Results   Component Value Date    HGBA1C 6 2 02/14/2022       No results for input(s): POCGLU in the last 72 hours  Blood Sugar Average: Last 72 hrs:  ·  PTA regimen of   · Accu-Checks and SSI  · Hypoglycemia protocol  GERD  Assessment & Plan  · Continue PPI  Hyperlipidemia  Assessment & Plan  · Continue statin  Essential hypertension  Assessment & Plan   Blood pressure is reviewed and acceptable   o Last recorded pressure: 129/60   Continue PTA medications of lisinopril and HCTZ   Monitor blood pressure  VTE Pharmacologic Prophylaxis: VTE Score: 7 High Risk (Score >/= 5) - Pharmacological DVT Prophylaxis Ordered: apixaban (Eliquis)  Sequential Compression Devices Ordered  Code Status: Level 1 - Full Code per patient    Anticipated Length of Stay: Patient will be admitted on an observation basis with an anticipated length of stay of less than 2 midnights secondary to IV hydration  Total Time for Visit, including Counseling / Coordination of Care: 70 minutes Greater than 50% of this total time spent on direct patient counseling and coordination of care  Chief Complaint: Nausea, diarrhea, poor oral intake    History of Present Illness:  Lynda Murray  is a 78 y o  male with a PMH of metastatic pancreatic cancer, PE on Eliquis, DM2, HTN, HLD, GERD and RA who presents with nausea, diarrhea and poor oral intake over the past 2 days   He reports that he has been having greater than 10 episodes of watery, non bloody diarrhea over the past 2 days  He reports that he received his most recent chemotherapy treatment on Monday, 04/25/2022  He reports nausea but denies any recent vomiting  He states that his abdominal pain is not worse than usual  He reports chills but denies fevers  Patient will be admitted for IV hydration, heme-oncology consult and palliative care consult  Review of Systems:  Review of Systems   Constitutional: Positive for appetite change, chills and fatigue  Negative for fever  Respiratory: Negative for cough, chest tightness and shortness of breath  Cardiovascular: Negative for chest pain, palpitations and leg swelling  Gastrointestinal: Positive for abdominal pain, diarrhea and nausea  Negative for blood in stool and vomiting  Neurological: Negative for dizziness and light-headedness         Past Medical and Surgical History:   Past Medical History:   Diagnosis Date    Anxiety     Arthritis     Cancer ([place] [address] )     pancreatic    Cellulitis     LAST ASSESSED: 6/13/14    Diabetes mellitus ([place] [address] )     Enlarged prostate     Epidermal inclusion cyst     LAST ASSESSED: 10/4/13    Erythrasma     LAST ASSESSED: 9/23/13    Furuncle     LAST ASSESSED: 6/11/14    GERD (gastroesophageal reflux disease)     Hyperlipidemia     Hypertension     RA (rheumatoid arthritis) ([place] [address] )        Past Surgical History:   Procedure Laterality Date    COLONOSCOPY      FL GUIDED CENTRAL VENOUS ACCESS DEVICE INSERTION  6/1/2021    HYDROCELE EXCISION / REPAIR Right 01/11/2018    SPERMATIC CORD EXCSION OF HYDROCELE; MANAGED BY: GABBIE CARBAJAL    IR BIOPSY LIVER MASS  8/16/2021    IR CHOLECYSTOSTOMY TUBE CHECK/CHANGE/REPOSITION/REINSERTION/UPSIZE  6/22/2021    IR CHOLECYSTOSTOMY TUBE CHECK/CHANGE/REPOSITION/REINSERTION/UPSIZE  8/23/2021    IR CHOLECYSTOSTOMY TUBE CHECK/CHANGE/REPOSITION/REINSERTION/UPSIZE  1/19/2022    IR CHOLECYSTOSTOMY TUBE PLACEMENT  5/11/2021    MULTIPLE TOOTH EXTRACTIONS Left 7/22/2021 Procedure: EXTRACTION TEETH 14 & 15, ALVEOPLASTY, EXCISION OF CYST LEFT MAXILLA; Surgeon: Isaac Albarran DDS;  [facility];  Service: Maxillofacial    WI REMOVAL OF HYDROCELE,TUNICA,UNILAT Right 1/11/2018    Procedure: HYDROCELECTOMY;  Surgeon: Kellie Ye MD;  [facility];  Service: Urology    SCROTAL SURGERY      benign "lump"    TUNNELED VENOUS PORT PLACEMENT Left 6/1/2021    Procedure: INSERTION VENOUS PORT (PORT-A-CATH); Surgeon: Belia Ramesh MD;  [facility];  Service: Surgical Oncology       Meds/Allergies:  Prior to Admission medications    Medication Sig Start Date End Date Taking? Authorizing Provider   qasim Aguillon Binder oxide-diphenhydramine-lidocaine viscous (MAGIC MOUTHWASH) 1:1:1 suspension Swish and spit 10 mL every 4 (four) hours as needed for mouth pain or discomfort 4/18/22   Minerva Vaca MD   Alcohol Swabs 70 % PADS May substitute brand based on insurance coverage  Check glucose ACHS    Patient not taking: Reported on 4/16/2022 6/21/21   Gretel Díaz PA-C   apixaban (Eliquis) 5 mg Take 1 tablet (5 mg total) by mouth 2 (two) times a day 12/2/21   Rachel Chawla MD   aspirin 81 mg chewable tablet Chew 81 mg     Historical Provider, MD   atorvastatin (LIPITOR) 20 mg tablet Take 1 tablet (20 mg total) by mouth daily 1/10/22   Rachel Chawla MD   Blood Glucose Monitoring Suppl (ONE TOUCH ULTRA 2) w/Device KIT Use daily  Patient not taking: Reported on 3/2/2022  3/17/21   Rachel Chawla MD   Creon 00723-17020 units TAKE 1 CAPSULE BY MOUTH THREE TIMES DAILY WITH MEALS OR SNACKS 3/1/22   Lori Logan MD   dexamethasone 0 5 MG/5ML elixir Take 10 mL (1 mg total) by mouth 4 (four) times a day for 10 days - SWISH AND SPIT, DO NOT SWALLOW 4/18/22 4/28/22  Farida David MD   diphenoxylate-atropine (LOMOTIL) 2 5-0 025 mg per tablet TAKE 1 TABLET BY MOUTH FOUR TIMES A DAY AS NEEDED FOR DIARRHEA 4/15/22   Farida David MD   fluorouracil 4,180 mg in CADD infusion pump Infuse 4,180 mg (1,100 mg/m2/day x 1 9 m2) into a venous catheter over 46 hours for 2 days  Do not start before April 25, 2022 4/25/22 4/27/22  Sharon Borwn MD   FLUoxetine (PROzac) 20 mg capsule TAKE ONE CAPSULE BY MOUTH EVERY DAY 8/23/21   Dennis Michaud MD   folic acid (FOLVITE) 1 mg tablet Take by mouth daily    Historical Provider, MD   gabapentin (NEURONTIN) 100 mg capsule Take 2 capsules (200 mg total) by mouth 3 (three) times a day 2/14/22   Dennis Michaud MD   glucose blood (OneTouch Ultra) test strip Use as instructed tid  Patient not taking: Reported on 3/21/2022  11/12/21   Dennis Michaud MD   insulin lispro (HumaLOG KwikPen) 100 units/mL injection pen Take 10 units with breakfast, lunch and dinner +scale  Patient not taking: Reported on 3/21/2022  8/11/21   Nguyễn Day MD   Insulin Pen Needle (Unifine Pentips Plus) 32G X 4 MM MISC USE WITH INSULIN PENS FOUR TIMES DAILY  Patient not taking: Reported on 3/2/2022  11/30/21   Dennis Michaud MD   Lancets (onetouch ultrasoft) lancets Use as instructed 3x a day  Patient not taking: Reported on 3/2/2022  12/2/21   Dennis Michaud MD   Lantus SoloStar 100 units/mL injection pen INJECT 12 UNITS UNDER THE SKIN DAILY AT BEDTIME  Patient not taking: Reported on 3/2/2022  10/11/21   Nguyễn Day MD   lisinopril-hydrochlorothiazide (PRINZIDE,ZESTORETIC) 10-12 5 MG per tablet Take 1 tablet by mouth 3 (three) times a week Monday Wednesday Friday 6/14/21   Lesvia Cardona MD   naloxone [facility]) 4 mg/0 1 mL nasal spray 0 1 mL (4 mg total) by Alternating Nares route every 3 (three) minutes as needed (accidental opioid overdose or respiratory depression) 12/13/21   Jagdeep Espinoza MD   ondansetron (ZOFRAN-ODT) 4 mg disintegrating tablet Take 1 tablet (4 mg total) by mouth every 6 (six) hours as needed for nausea or vomiting for up to 12 doses 12/4/21   Wayne Solo DO oxyCODONE (Roxicodone) 5 immediate release tablet Take 1-2 tablets (5-10 mg total) by mouth every 4 (four) hours as needed for moderate pain or severe pain Max Daily Amount: 60 mg 4/25/22   Selma Mancera MD   pantoprazole (PROTONIX) 40 mg tablet Take 1 tablet (40 mg total) by mouth 2 (two) times a day - before breakfast and before bed 4/25/22   Selma Mancera MD   predniSONE 10 mg tablet Take 1 tablet (10 mg total) by mouth daily with breakfast 9/28/21   Cheryle Vickers MD   prochlorperazine (COMPAZINE) 10 mg tablet Take 1 tablet (10 mg total) by mouth every 6 (six) hours as needed for nausea or vomiting 5/28/21   Ralf Feliciano MD   senna (SENOKOT) 8 6 MG tablet Take 1 tablet (8 6 mg total) by mouth daily at bedtime as needed for constipation 11/26/21 12/26/21  Brooks Iqbal MD   Sodium Chloride Flush (Normal Saline Flush) 0 9 % SOLN Gall bladder drain 5/12/21   Historical Provider, MD   tamsulosin (FLOMAX) 0 4 mg Take 1 capsule (0 4 mg total) by mouth daily at bedtime 3/2/22   Al Neal MD   traZODone (DESYREL) 50 mg tablet Take 1-2 tablets ( mg total) by mouth daily at bedtime 4/25/22   Selma Mancera MD   triamcinolone (KENALOG) 0 1 % cream Apply topically 2 (two) times a day 1/28/22   Al Neal MD   vitamin B-12 (VITAMIN B-12) 1,000 mcg tablet TAKE 1 TABLET BY MOUTH DAILY 3/23/22   Al Neal MD     I have reviewed home medications with patient personally  Allergies:    Allergies   Allergen Reactions    Fentanyl Anaphylaxis and Other (See Comments)     Oxygen drops severely       Social History:  Marital Status: /Civil Union   Occupation: Unknown  Patient Pre-hospital Living Situation: Home  Patient Pre-hospital Level of Mobility: walks  Patient Pre-hospital Diet Restrictions: None  Substance Use History:   Social History     Substance and Sexual Activity   Alcohol Use Never     Social History     Tobacco Use   Smoking Status Former Smoker   Smokeless Tobacco Never Used   Tobacco Comment    tobacco in a pipe in the early 1980's     Social History     Substance and Sexual Activity   Drug Use Never       Family History:  Family History   Problem Relation Age of Onset    Heart disease Father         CARDIAC DISORDER    Hypertension Father     Hypertension Mother     No Known Problems Maternal Aunt     No Known Problems Maternal Uncle     No Known Problems Paternal Aunt     No Known Problems Paternal Uncle     No Known Problems Paternal Grandmother     No Known Problems Paternal Grandfather     Diabetes Maternal Grandmother         MELLITUS    No Known Problems Maternal Grandfather     Hypertension Other        Physical Exam:     Vitals:   Blood Pressure: 129/60 (04/27/22 2103)  Pulse: 72 (04/27/22 2103)  Temperature: 98 °F (36 7 °C) (04/27/22 2103)  Temp Source: Oral (04/27/22 2103)  Respirations: 18 (04/27/22 2103)  SpO2: 97 % (04/27/22 2103)    Physical Exam  Vitals and nursing note reviewed  Constitutional:       General: He is not in acute distress  Appearance: He is ill-appearing (chronically)  He is not diaphoretic  HENT:      Head: Normocephalic  Nose: Nose normal       Mouth/Throat:      Pharynx: Oropharynx is clear  Eyes:      General: No scleral icterus  Conjunctiva/sclera: Conjunctivae normal    Cardiovascular:      Rate and Rhythm: Normal rate and regular rhythm  Pulses: Normal pulses  Heart sounds: Normal heart sounds  No murmur heard  Pulmonary:      Effort: Pulmonary effort is normal  No respiratory distress  Breath sounds: Normal breath sounds  No wheezing, rhonchi or rales  Chest:      Chest wall: No tenderness  Abdominal:      General: Bowel sounds are normal       Tenderness: There is abdominal tenderness in the epigastric area  Comments: Cachetic  Margaret drain present   Musculoskeletal:         General: No swelling or deformity  Normal range of motion        Cervical back: Normal range of motion  Skin:     General: Skin is warm and dry  Capillary Refill: Capillary refill takes 2 to 3 seconds  Neurological:      Mental Status: He is alert and oriented to person, place, and time  Psychiatric:         Speech: Speech normal           Additional Data:     Lab Results:  Results from last 7 days   Lab Units 04/27/22  2227   WBC Thousand/uL 12 86*   HEMOGLOBIN g/dL 10 5*   HEMATOCRIT % 33 1*   PLATELETS Thousands/uL 360   NEUTROS PCT % 88*   LYMPHS PCT % 8*   MONOS PCT % 3*   EOS PCT % 0     Results from last 7 days   Lab Units 04/27/22  2227   SODIUM mmol/L 137   POTASSIUM mmol/L 3 7   CHLORIDE mmol/L 104   CO2 mmol/L 26   BUN mg/dL 10   CREATININE mg/dL 0 90   ANION GAP mmol/L 7   CALCIUM mg/dL 8 3   ALBUMIN g/dL 2 2*   TOTAL BILIRUBIN mg/dL 0 46   ALK PHOS U/L 450*   ALT U/L 17   AST U/L 24   GLUCOSE RANDOM mg/dL 192*                       Imaging: No pertinent imaging reviewed  No orders to display       EKG and Other Studies Reviewed on Admission:   · EKG: No EKG obtained  ** Please Note: This note has been constructed using a voice recognition system   **

## 2022-04-28 NOTE — TELEPHONE ENCOUNTER
Called and spoke with patients son  Reviewed with him that our services were consulted while he is admitted so our nurse practitioner will be seeing him today to discuss  Stated we will bring Pablo Mariama into the office once he is d/c from the hospital to see Dr Armando Servin to determine a plan going forward  He verbalized understanding and was appreciative of my call     Kraig Padgett, pt will be d/c tonight or tomorrow  Can we please schedule the patient to see Dr Armando Servin next week  Can we make son aware of apt as well

## 2022-04-28 NOTE — ASSESSMENT & PLAN NOTE
Lab Results   Component Value Date    HGBA1C 6 2 02/14/2022       No results for input(s): POCGLU in the last 72 hours  Blood Sugar Average: Last 72 hrs:  ·  PTA regimen of   · Accu-Checks and SSI  · Hypoglycemia protocol

## 2022-04-28 NOTE — SOCIAL WORK
Palliative LSW saw patient at the bedside today  LSW appreciates the opportunity to provider patient/family with inpatient emotional support and guidance while patient continues to receive medical attention from the medical team     Topics discussed: Met with pt at bedside  Pt discouraged with being hospitalized at this time  Pt last seen by LSW at outpt appt 4/25  PTA pt had been experiencing multiple episodes of BMs, however he states he has not had one now since admission  Pt remains concerned with his being able to eat and is hoping to get a swallow evaluation  Pt looking to be d/c as soon as possible as his main goal is to be able to get to the campground this weekend  Camping is one of pt's main sources of happiness and he was greatly looking forward to doing that this weekend with family  LSW spent time providing supportive listening as pt vented frustrations and reflected on his current family dynamics  Pt discussed despite everything currently going on he is trying to remain positive and focused on doing activities that bring him happiness  LSW encouraged pt to continue with this      Pt appreciative of continued support from [facility] team       Areas that need follow-up: Emotional Support  Resources given: None  Others present: None      LSW will continue to follow as requested by the medical team, patient, or family

## 2022-04-28 NOTE — ASSESSMENT & PLAN NOTE
· Patient known to Dr Wellington Jackson of Palliative care  · Continue home pain regimen of Oxycodone and Gabapentin  · Palliative care consult

## 2022-04-28 NOTE — PROGRESS NOTES
[facility]  Progress Note - Benny Todd Sr  1955, 78 y o  male MRN: [id number]  Unit/Bed#: S -01 Encounter: [id number]  Primary Care Provider: Teresia Kussmaul, MD   Date and time admitted to hospital: 4/27/2022  8:58 PM    Please refer to the Past Medical History, Past Surgical History, Family History and Social History dictated/documented by CAROL Gregorio on 4/27/22 @ 11 am     ROS:   A thorough 10 point review of system was done which is a negative falls and that mentioned in the subjective  Patient will need to be made a full inpatient admission secondary to significant weakness lethargy from intractable diarrhea and dehydration  Continued on IV fluids  Patient will require 2 midnight stay and hence we will make the patient inpatient admission today onwards  * Chemotherapy induced diarrhea  Assessment & Plan  · Presentation: Patient presents due to complaints of diarrhea  He reports that he has been having great than 10 episodes of watery, non bloody diarrhea over the past 3 days  He reports that he received his most recent chemotherapy treatment on Monday, 04/25/2022  He reports nausea but denies any recent vomiting  He reports that his abdominal pain is not worse than usual  He reports chills but denies fevers  · CT A/P from 04/16/2022: "Compared to the recent CT from 4/4/2022, there has been no significant acute changes  Again seen is a large pancreatic head mass, and multiple liver lesions consistent with metastatic pancreatic cancer  Percutaneous cholecystostomy tube and common bile duct stent remain in proper position "  · Obtain C diff sample and stool enteric bacterial panel  · IV hydration  · IV antiemetics  · Regular diet as tolerated  · Nutrition consult  Patient mentioned that since he has been admitted he has not had any episodes of diarrhea      Metastatic pancreatic cancer  Assessment & Plan  · Patient known to Dr Ling Whelan of Heme-Onc  · Diagnosed in May 2021  · Currently on Folfiri with Neulasta growth factor support  Unspecified protein-calorie malnutrition ([address] )  Assessment & Plan  Malnutrition Findings:   · Evidenced by recent weight loss, muscle wasting related to malignancy  BMI Findings:  ·  BMI 21 77  · Nutrition consult  Essential hypertension  Assessment & Plan   Blood pressure is reviewed and acceptable   o Last recorded pressure: 129/60   Given the fact patient is a poor appetite, and blood pressure is running on the softer side, will hold antihypertensive medications  Palliative care patient  Assessment & Plan  · Patient known to Dr Sheron Mcgowan of Palliative care  · Continue home pain regimen of Oxycodone and Gabapentin  · Palliative care consult  Anemia  Assessment & Plan  · 2/2 malignancy with active chemo treatments  · Hemoglobin 10 5 on admission  · Monitor CBC  Failure to thrive in adult  Assessment & Plan  · 2/2 to metastatic pancreatic cancer and actively undergoing chemo treatments with associated nausea, vomiting and diarrhea  · Palliative care consult  Pulmonary embolism [facility])  Assessment & Plan  · Diagnosed with PE in November 2021  · Continue Eliquis  Leukocytosis  Assessment & Plan  · Present on admission, WBCs 12 86  · Patient receiving Neulasta in the setting of active chemo treatments  · Trend CBC  Type 2 diabetes mellitus [facility])  Assessment & Plan  Lab Results   Component Value Date    HGBA1C 6 2 02/14/2022       Recent Labs     04/28/22  0635 04/28/22  1144   POCGLU 170* 172*       Blood Sugar Average: Last 72 hrs:  · (P) 171 PTA regimen of   · Accu-Checks and SSI  · Hypoglycemia protocol  Pharmacologic VTE Prophylaxis: Yes Eliquis  Mechanical VTE Prophylaxis in Place: No   Patient Centered Rounds: I have performed bedside rounds with the Nursing staff today     Current Length of Stay: 0 day(s)  Current Patient Status: Observation   Code Status: Level 1 - Full Code  Time Spent for Care:  35 minutes  More than 50% of total time spent on counseling and coordination of care as described above  Discussions with Specialists or Other Care Team Provider: Yes  Education and Discussions with Family / Patient: Yes, Updated family member  son  Discharge Plan: 87-36 hrs  Certification Statement: The patient will continue to require additional inpatient hospital stay due to Dehydration, weakness  Subjective:   I have seen and Examined the patient at the bedside  No CP or Sob  No fevers or chills, No nausea or vomiting  Overnight events reviewed with the RN  No Other complains  C/o generalized weakness  No Abd pain  No nausea or vomiting  No fevers or chills  Review of System:   Denies any CP or SOB  Denies any Cough or Cold  Denies any Fevers or chills  Denies any focal tingling numbness or weakness in any extremities  Denies any abdominal pain, Nausea or vomiting  Objective:   Temp (24hrs), Av °F (36 7 °C), Min:98 °F (36 7 °C), Max:98 °F (36 7 °C)    Temp:  [98 °F (36 7 °C)] 98 °F (36 7 °C)  HR:  [50-72] 57  Resp:  [16-18] 16  BP: ()/(53-62) 116/62  SpO2:  [95 %-98 %] 96 %  Body mass index is 21 75 kg/m²  Input and Output Summary (last 24 hours): Intake/Output Summary (Last 24 hours) at 2022 1348  Last data filed at 2022 1203  Gross per 24 hour   Intake 1700 ml   Output --   Net 1700 ml     I/O        0701   0700  0701   0700  0701   0700    I V  (mL/kg)   700 (11 1)    IV Piggyback  1000     Total Intake(mL/kg)  1000 700 (11 1)    Net  +1000 +700                 Physical Exam:   General : Alert, Awake and oriented x 2-3, NAD  Oral mucosa Dry  Neck : Supple  Eyes:  LESLIE, EOMI  CVS : S1, S2, RRR    R/S : Clear to auscultate anteriorly  Abd: Soft, NT, ND  Bs+ve  Extremity: No pedal edema noted  Skin: No acute Rash noted  CNS: No acute FND       Additional Data:     Labs, Culture & Imaging Data Reviewed:    Results from last 7 days   Lab Units 04/28/22  0507   WBC Thousand/uL 13 61*   HEMOGLOBIN g/dL 9 3*   HEMATOCRIT % 30 1*   PLATELETS Thousands/uL 310     Results from last 7 days   Lab Units 04/28/22  0507   POTASSIUM mmol/L 3 6   CHLORIDE mmol/L 107   CO2 mmol/L 27   BUN mg/dL 10   CREATININE mg/dL 0 74   CALCIUM mg/dL 7 5*   ALK PHOS U/L 382*   ALT U/L 17   AST U/L 18         Lab Results   Component Value Date    HGBA1C 6 2 02/14/2022      No orders to display       Cultures:   Blood Culture:   Lab Results   Component Value Date    BLOODCX No Growth After 5 Days  12/19/2021    BLOODCX No Growth After 5 Days   12/19/2021     Urine Culture:   Lab Results   Component Value Date    URINECX No Growth <1000 cfu/mL 03/29/2016     Sputum Culture: No components found for: SPUTUMCX  Wound Culture: No results found for: WOUNDCULT    Last 24 Hours Medication List:   Current Facility-Administered Medications   Medication Dose Route Frequency Provider Last Rate    acetaminophen  650 mg Oral Q6H PRN Washington Rack, CRNP      al mag oxide-diphenhydramine-lidocaine viscous  10 mL Swish & Spit Q4H PRN Laron Rack, CRNP      aluminum-magnesium hydroxide-simethicone  30 mL Oral Q6H PRN Laron Rack, CRNP      apixaban  5 mg Oral BID Laron Rack, CRNP      aspirin  81 mg Oral Daily Washington Rack, CRNP      atorvastatin  20 mg Oral Daily Laron Rack, CRNP      vitamin B-12  1,000 mcg Oral Daily Washington Rack, CRNP      FLUoxetine  20 mg Oral Daily Washington Rack, CRNP      folic acid  1 mg Oral Daily Laron Rack, CRNP      gabapentin  200 mg Oral TID Laron Rack, CRNP      HYDROmorphone  0 5 mg Intravenous Q4H PRN Laron Rack, CRNP      insulin lispro  1-5 Units Subcutaneous TID AC Briseida Socorro, CRNP      insulin lispro  1-5 Units Subcutaneous HS Briseida Quintanilla, CAROL      ondansetron  4 mg Intravenous Once PRN Missy Segundo, DO      ondansetron  4 mg Intravenous Q6H PRN Lorette Ishmael, CRNP      oxyCODONE  10 mg Oral Q4H PRN Lorette Ishmael, CRNP      oxyCODONE  5 mg Oral Q4H PRN Lorette Ishmael, CRNP      pancrelipase (Lip-Prot-Amyl)  24,000 Units Oral TID With Meals Lorette Ishmael, CRNP      pantoprazole  40 mg Oral BID Lorette Ishmael, CRNP      predniSONE  10 mg Oral Daily With Breakfast Lorette Ishmael, CRNP      sodium chloride  100 mL/hr Intravenous Continuous Lorette Ishmael, CRNP 100 mL/hr (04/28/22 1203)    tamsulosin  0 4 mg Oral HS Lorette Ishmael, CRNP      traZODone  50 mg Oral HS Lorette Ishmael, CRNP         Patient is at moderate risk for morbidity and mortality due to above mentioned illness and comorbidities

## 2022-04-28 NOTE — ASSESSMENT & PLAN NOTE
· 2/2 to metastatic pancreatic cancer and actively undergoing chemo treatments with associated nausea, vomiting and diarrhea  · Palliative care consult

## 2022-04-28 NOTE — PLAN OF CARE
Problem: Prexisting or High Potential for Compromised Skin Integrity  Goal: Skin integrity is maintained or improved  Description: INTERVENTIONS:  - Identify patients at risk for skin breakdown  - Assess and monitor skin integrity  - Assess and monitor nutrition and hydration status  - Monitor labs   - Assess for incontinence   - Turn and reposition patient  - Assist with mobility/ambulation  - Relieve pressure over bony prominences  - Avoid friction and shearing  - Provide appropriate hygiene as needed including keeping skin clean and dry  - Evaluate need for skin moisturizer/barrier cream  - Collaborate with interdisciplinary team   - Patient/family teaching  - Consider wound care consult   4/28/2022 1048 by Natalie Bradshaw RN  Outcome: Progressing  4/28/2022 1047 by Natalie Bradshaw RN  Outcome: Progressing     Problem: Potential for Falls  Goal: Patient will remain free of falls  Description: INTERVENTIONS:  - Educate patient/family on patient safety including physical limitations  - Instruct patient to call for assistance with activity   - Consult OT/PT to assist with strengthening/mobility   - Keep Call bell within reach  - Keep bed low and locked with side rails adjusted as appropriate  - Keep care items and personal belongings within reach  - Initiate and maintain comfort rounds  - Make Fall Risk Sign visible to staff  - Offer Toileting every 2 Hours, in advance of need  - Initiate/Maintain bed alarm  - Obtain necessary fall risk management equipment: call light in reach   - Apply yellow socks and bracelet for high fall risk patients  - Consider moving patient to room near nurses station  4/28/2022 1048 by Natalie Bradshaw RN  Outcome: Progressing  4/28/2022 1047 by Natalie Bradshaw RN  Outcome: Progressing     Problem: METABOLIC, FLUID AND ELECTROLYTES - ADULT  Goal: Electrolytes maintained within normal limits  Description: INTERVENTIONS:  - Monitor labs and assess patient for signs and symptoms of electrolyte imbalances  - Administer electrolyte replacement as ordered  - Monitor response to electrolyte replacements, including repeat lab results as appropriate  - Instruct patient on fluid and nutrition as appropriate  Outcome: Progressing  Goal: Fluid balance maintained  Description: INTERVENTIONS:  - Monitor labs   - Monitor I/O and WT  - Instruct patient on fluid and nutrition as appropriate  - Assess for signs & symptoms of volume excess or deficit  Outcome: Progressing  Goal: Glucose maintained within target range  Description: INTERVENTIONS:  - Monitor Blood Glucose as ordered  - Assess for signs and symptoms of hyperglycemia and hypoglycemia  - Administer ordered medications to maintain glucose within target range  - Assess nutritional intake and initiate nutrition service referral as needed  Outcome: Progressing     Problem: MUSCULOSKELETAL - ADULT  Goal: Maintain or return mobility to safest level of function  Description: INTERVENTIONS:  - Assess patient's ability to carry out ADLs; assess patient's baseline for ADL function and identify physical deficits which impact ability to perform ADLs (bathing, care of mouth/teeth, toileting, grooming, dressing, etc )  - Assess/evaluate cause of self-care deficits   - Assess range of motion  - Assess patient's mobility  - Assess patient's need for assistive devices and provide as appropriate  - Encourage maximum independence but intervene and supervise when necessary  - Involve family in performance of ADLs  - Assess for home care needs following discharge   - Consider OT consult to assist with ADL evaluation and planning for discharge  - Provide patient education as appropriate  Outcome: Progressing  Goal: Maintain proper alignment of affected body part  Description: INTERVENTIONS:  - Support, maintain and protect limb and body alignment  - Provide patient/ family with appropriate education  Outcome: Progressing

## 2022-04-28 NOTE — TELEPHONE ENCOUNTER
Received call from [facility], Critical CT results from 4/23/2022  Pt will be seen in office today  Janey Kumar CT scan has been read  .

## 2022-04-28 NOTE — CONSULTS
Consultation - Palliative & Supportive Care   Timothy Dalal Sr   78 y o   male  S /S -01   MRN: [id number]  Encounter: [id number]    ASSESSMENT:    Patient Active Problem List   Diagnosis    [facility]    Essential hypertension    Anxiety disorder    Hyperlipidemia    Liver mass    GERD    Multiple lipomas    Type 2 diabetes mellitus ([facility]    BPH (benign prostatic hyperplasia)    Recent cholecystitis    Leukocytosis    Metastatic pancreatic cancer    Chemotherapy induced neutropenia ([facility] )    Pancreatic cancer metastasized to liver (HCC)    Abdominal pain    Generalized weakness    Pancytopenia due to chemotherapy    Unspecified protein-calorie malnutrition ([facility] )    Dental infection    Chest pain    Pneumobilia due to Occluded CBD stent    Numbness and tingling of foot    Pulmonary embolism (HCC)    Mesenteric vein thrombosis (HCC)    Idiopathic osteoarthritis    Rotator cuff tear arthropathy    Tendinitis of right shoulder    Elevated transaminase level    Shortness of breath    Palliative care patient    Cancer related pain    COVID-19 virus infection    Metabolic encephalopathy    Chemotherapy induced diarrhea    Insomnia due to medical condition    Anorexia    Oral mucositis    Dysphagia    Anemia    Failure to thrive in adult     Active issues specifically addressed today include: goals of care, diarrhea, stage IV pancreatic cancer, Palliative Care patient, dysphagia, FTT, dyspnea, cancer-related pain, protein calorie malnutrition, anorexia, insomnia, anxiety, GERD    67M w/ stage IV pancreatic cancer on modified FOLFIRI (last infusion 4/27/22) admitted 4/27/22 for chemotherapy-induced diarrhea, failure to thrive  [facility] consulted for goals of care  PLAN:    1  Goals:    Recommend PT eval given weakness  Ordered   Continue full cares w/o limit  Confirmed LOC1   Patient wishes to continue to pursue any offered diseased-directed treatments  He wishes to extend life expectancy and reduce symptom burden   Palliative will follow for ongoing goals of care discussions as situation evolves  2  Social Support:   Supportive listening provided   Provided anxiety containment   Advocated for patient/family with interdisciplinary team    3  Symptom management:   Start Zyprexa 5mg QHS for anorexia, CINV, insomnia, mood  o Patient had reported he felt recent oral mucositis was s/t mirtazapine but that is unlikely; monitor for oral lesions  o If oral mucositis returns, consider resuming dexamethasone 0 5mg/5mL elixir, 1mg QID, swish and spit, do not swallow   Continue hydromorphone 0 5mg IV q4h PRN breakthrough pain   Start simethicone   Otherwise, continue home palliative regimen w/ modifications  o Magic Mouthwash q4h PRN  o Creon TID w/ meals or snacks  o Tylenol PRN  o Consider resuming Lomotil 2 5-0 025 tablet QID PRN diarrhea - no diarrhea since admission  o Fluoxetine 20mg daily  o Gabapentin 200mg TID ATC  o OxyIR 5-10mg q4h PRN moderate-severe pain  o Pantoprazole 40mg BID  o Prednisone 10mg qAM  o Hold Compazine for now  o Trazodone 50mg QHS PRN insomnia  o Agree w/ switching Zofran to 4mg IV q6h PRN N/V      Code status: Level 1 - Full Code   Decisional apparatus:  Patient does have capacity to make medical decisions on my exam today  If such capacity is lost, patient's substitute decision maker would default to christophe Solis [place] 169  Advance Directive / Living Will / POLST:  Has Five Wishes on file; reviewed  I have reviewed the patient's controlled substance dispensing history in the Prescription Drug Monitoring Program in compliance with the [facility] regulations before prescribing any controlled substances  We appreciate the opportunity to participate in this patient's care  We will continue to follow   Please do not hesitate to contact our on-call provider through our clinic answering service at [phone] should you have acute symptom control concerns  IDENTIFICATION:  Inpatient consult to Palliative Care  Consult performed by: Yakelin Garcia MD  Consult ordered by: Bhumi Chaidez, [address]      Reason for Consult / Principal Problem: goals of care    HISTORY OF PRESENT ILLNESS:    Zeke Gómez  is a 78 y o  male with stage IV pancreatic cancer on modified FOLFIRI; extensive thrombotic disease; anxiety, dysphoric mood, cancer-related pain, insomnia, anorexia, dysphagia, unintentional weight loss, protein calorie malnutrition, OA, GERD, DM, HTN+HLD, RA, BPH  He has had multiple ED visits in the past several weeks for chemotherapy-induced diarrhea, and returned to the [facility] ED 4/27/22 for the same, as well as nausea w/o vomiting, anorexia, weight loss  [facility] consulted for "goals of care"  This patient was seen by me in the office on 4/25/22; see my note for that date  Patient has been clear that he wishes to pursue any offered life-extending and symptom-reducing therapies  He confirms this today  He confirms LOC1 status  He is hopeful that he will be home in time to participate in a pic[employer] w/ his grandson; he would like to "smoke a ham" in his new smoker  Review of Systems   Constitutional: Positive for activity change, appetite change, fatigue and unexpected weight change  HENT: Positive for trouble swallowing  Eyes: Negative for pain and redness  Respiratory: Positive for shortness of breath (on exertion)  Cardiovascular: Negative for chest pain  Gastrointestinal: Positive for abdominal pain (mild discomfort, bloating from gas), diarrhea (none since admission) and nausea (improved)  Eructation, flatulence  Reflux  Endocrine: Negative for polydipsia and polyphagia  Allergic/Immunologic: Positive for immunocompromised state  Neurological: Negative for facial asymmetry and speech difficulty  Psychiatric/Behavioral: Positive for dysphoric mood and sleep disturbance  The patient is nervous/anxious  Past Medical History:   Diagnosis Date    Anxiety     Arthritis     Cancer ([facility] )     pancreatic    Cellulitis     LAST ASSESSED: 6/13/14    Diabetes mellitus ([place] [address] )     Enlarged prostate     Epidermal inclusion cyst     LAST ASSESSED: 10/4/13    Erythrasma     LAST ASSESSED: 9/23/13    Furuncle     LAST ASSESSED: 6/11/14    GERD (gastroesophageal reflux disease)     Hyperlipidemia     Hypertension     RA (rheumatoid arthritis) ([place] [address] )      Past Surgical History:   Procedure Laterality Date    COLONOSCOPY      FL GUIDED CENTRAL VENOUS ACCESS DEVICE INSERTION  6/1/2021    HYDROCELE EXCISION / REPAIR Right 01/11/2018    SPERMATIC CORD EXCSION OF HYDROCELE; MANAGED BY: GABBIE CARBAJAL    IR BIOPSY LIVER MASS  8/16/2021    IR CHOLECYSTOSTOMY TUBE CHECK/CHANGE/REPOSITION/REINSERTION/UPSIZE  6/22/2021    IR CHOLECYSTOSTOMY TUBE CHECK/CHANGE/REPOSITION/REINSERTION/UPSIZE  8/23/2021    IR CHOLECYSTOSTOMY TUBE CHECK/CHANGE/REPOSITION/REINSERTION/UPSIZE  1/19/2022    IR CHOLECYSTOSTOMY TUBE PLACEMENT  5/11/2021    MULTIPLE TOOTH EXTRACTIONS Left 7/22/2021    Procedure: EXTRACTION TEETH 14 & 15, ALVEOPLASTY, EXCISION OF CYST LEFT MAXILLA; Surgeon: Yong Ortiz DDS;  [facility];  Service: Maxillofacial    WA REMOVAL OF HYDROCELE,TUNICA,UNILAT Right 1/11/2018    Procedure: HYDROCELECTOMY;  Surgeon: Cyndee Jaquez MD;  [facility];  Service: Urology    SCROTAL SURGERY      benign "lump"    TUNNELED VENOUS PORT PLACEMENT Left 6/1/2021    Procedure: INSERTION VENOUS PORT (PORT-A-CATH);   Surgeon: Devin Rubalcava MD;  [facility];  Service: Surgical Oncology     Social History     Socioeconomic History    Marital status: /Civil Union     Spouse name: Not on file    Number of children: 2    Years of education: Not on file    Highest education level: Not on file   Occupational History    Occupation: FULL-TIME EMPLOYMENT   Tobacco Use    Smoking status: Former Smoker  Smokeless tobacco: Never Used    Tobacco comment: tobacco in a pipe in the early 1980's   Vaping Use    Vaping Use: Never used   Substance and Sexual Activity    Alcohol use: Never    Drug use: Never    Sexual activity: Not Currently     Comment: NOT CURRENTLY SEXUALLY ACTIVE AS PER [employer]   Other Topics Concern    Not on file   Social History Narrative    Family dynamics: Pt reports support from children/nephew; Reports some stress in relationship with wife    Relationship status:      Duration of relationship: 20 years     Name of significant other: Meredith Garcia and Ages: 2 sons, 1 stepdtr     Pets: 1 kitten, 1 dog    Other important family information: Son moving to [place]    Living situation: Lives with wife               Patient's primary caregiver: Self      Any limitations of caregiver:    Environmental concerns or barriers:     history: None    Employment history/source of income: Worked as a  for 43 years    Disability: Currently receiving long-term disability     Spirituality/ Evangelical: "I believe"      Patient's strengths, social supports, and resources: Supportive family/friends    Cultural information:     Mental Health current or previous: Reports increased depression/stress due to strained relationship with wife     Substance use or history: None     Sleep: Trouble sleeping    Exercise: Active    Diet/nutrition: Stomach feels "heavy" and things taste differently    Durable Medical Equipment needs: None    Transportation: Drives self    Financial concerns: Pt does not express financial concerns    Hobbies/Interests: Going to [employer] camping, fishing    Advanced Directive: Five Wishes on file  Designates son Eldora Romberg as decision-maker with son Epi Martin and step-dtr Melinda as alternates    Other medical or social work providers involved:  Oncology    Patient/caregiver current level of coping: Pt appears to be coping well overall with medical status Understanding: Pt appears understanding of medical status    Patient/family concerns and areas of need: Pt focused on his "bucket list" and making memories with his family as a priority       Social Determinants of Health     Financial Resource Strain: Not on file   Food Insecurity: Not on file   Transportation Needs: Not on file   Physical Activity: Not on file   Stress: Not on file   Social Connections: Not on file   Intimate Partner Violence: Not on file   Housing Stability: Not on file     Family History   Problem Relation Age of Onset    Heart disease Father         CARDIAC DISORDER    Hypertension Father     Hypertension Mother     No Known Problems Maternal Aunt     No Known Problems Maternal Uncle     No Known Problems Paternal Aunt     No Known Problems Paternal Uncle     No Known Problems Paternal Grandmother     No Known Problems Paternal Grandfather     Diabetes Maternal Grandmother         MELLITUS    No Known Problems Maternal Grandfather     Hypertension Other        MEDICATIONS / ALLERGIES:  all current active meds have been reviewed and current meds:   Current Facility-Administered Medications   Medication Dose Route Frequency    acetaminophen (TYLENOL) tablet 650 mg  650 mg Oral Q6H PRN    al mag oxide-diphenhydramine-lidocaine viscous (MAGIC MOUTHWASH) suspension 10 mL  10 mL Swish & Spit Q4H PRN    aluminum-magnesium hydroxide-simethicone (MYLANTA) oral suspension 30 mL  30 mL Oral Q6H PRN    apixaban (ELIQUIS) tablet 5 mg  5 mg Oral BID    aspirin chewable tablet 81 mg  81 mg Oral Daily    atorvastatin (LIPITOR) tablet 20 mg  20 mg Oral Daily    cyanocobalamin (VITAMIN B-12) tablet 1,000 mcg  1,000 mcg Oral Daily    FLUoxetine (PROzac) capsule 20 mg  20 mg Oral Daily    folic acid (FOLVITE) tablet 1 mg  1 mg Oral Daily    gabapentin (NEURONTIN) capsule 200 mg  200 mg Oral TID    HYDROmorphone (DILAUDID) injection 0 5 mg  0 5 mg Intravenous Q4H PRN    insulin lispro (HumaLOG) 100 units/mL subcutaneous injection 1-5 Units  1-5 Units Subcutaneous TID AC    insulin lispro (HumaLOG) 100 units/mL subcutaneous injection 1-5 Units  1-5 Units Subcutaneous HS    [START ON 4/29/2022] lisinopril-hydrochlorothiazide (PRINZIDE 10/12  5) combo dose   Oral Once per day on Mon Wed Fri    [facility] injection 4 mg  4 mg Intravenous Once PRN    ondansetron (ZOFRAN) injection 4 mg  4 mg Intravenous Q6H PRN    oxyCODONE (ROXICODONE) immediate release tablet 10 mg  10 mg Oral Q4H PRN    oxyCODONE (ROXICODONE) IR tablet 5 mg  5 mg Oral Q4H PRN    pancrelipase (Lip-Prot-Amyl) (CREON) delayed release capsule 24,000 Units  24,000 Units Oral TID With Meals    pantoprazole (PROTONIX) EC tablet 40 mg  40 mg Oral BID    predniSONE tablet 10 mg  10 mg Oral Daily With Breakfast    sodium chloride 0 9 % infusion  100 mL/hr Intravenous Continuous    tamsulosin (FLOMAX) capsule 0 4 mg  0 4 mg Oral HS    traZODone (DESYREL) tablet 50 mg  50 mg Oral HS       Allergies   Allergen Reactions    Fentanyl Anaphylaxis and Other (See Comments)     Oxygen drops severely       OBJECTIVE:  /62 (BP Location: Right arm)   Pulse 57   Temp 98 °F (36 7 °C) (Oral)   Resp 16   Ht 5' 7" (1 702 m)   Wt 63 kg (138 lb 14 2 oz)   SpO2 96%   BMI 21 75 kg/m²   Nursing notes reviewed  Physical Exam  Vitals reviewed  Constitutional:       General: He is not in acute distress  Appearance: He is underweight  He is ill-appearing  He is not toxic-appearing  Comments: Debilitated  HENT:      Head: Normocephalic and atraumatic  Right Ear: External ear normal       Left Ear: External ear normal    Eyes:      General: No scleral icterus  Right eye: No discharge  Left eye: No discharge  Extraocular Movements: Extraocular movements intact  Conjunctiva/sclera: Conjunctivae normal       Pupils: Pupils are equal, round, and reactive to light     Cardiovascular:      Rate and Rhythm: Bradycardia present  Pulmonary:      Effort: Pulmonary effort is normal  No tachypnea, bradypnea, accessory muscle usage or respiratory distress  Abdominal:      General: There is no distension  Tenderness: There is no guarding  Musculoskeletal:      Cervical back: Normal range of motion  Right lower leg: No edema  Left lower leg: No edema  Skin:     General: Skin is dry  Coloration: Skin is pale  Neurological:      Mental Status: He is alert and oriented to person, place, and time  Cranial Nerves: No dysarthria or facial asymmetry  Psychiatric:         Attention and Perception: Attention normal          Mood and Affect: Mood is depressed  Affect is not inappropriate  Speech: Speech normal          Behavior: Behavior normal  Behavior is cooperative  Thought Content: Thought content normal          Cognition and Memory: Cognition and memory normal          Judgment: Judgment normal        Lab Results: I have personally reviewed pertinent labs      CHEM PROFILE:   4/19/2022 10:33 4/27/2022 22:27 4/28/2022 05:07   Sodium 140 137 139   Potassium 4 5 3 7 3 6   Chloride 105 104 107   CO2 24 26 27   Anion Gap 11 7 5   BUN 12 10 10   Creatinine 1 15 0 90 0 74   Glucose, Random  192 (H) 145 (H)   GLUCOSE FASTING 200 (H)     Calcium 9 2 8 3 7 5 (L)   CORRECTED CALCIUM  9 7 9 3   AST 17 24 18   ALT 17 17 17   Alkaline Phosphatase 578 (H) 450 (H) 382 (H)   Total Protein 5 9 (L) 5 5 (L) 4 7 (L)   Albumin 3 6 2 2 (L) 1 8 (L)   TOTAL BILIRUBIN 0 50 0 46 0 47   eGFR 65 88 96   Lipase  95      HEMATOLOGY:   4/19/2022 10:33 4/27/2022 22:27 4/28/2022 05:07   WBC 12 22 (H) 12 86 (H) 13 61 (H)   Red Blood Cell Count 3 96 3 52 (L) 3 10 (L)   Hemoglobin 12 0 10 5 (L) 9 3 (L)   HCT 37 7 33 1 (L) 30 1 (L)   MCV 95 94 97   MCH 30 3 29 8 30 0   MCHC 31 8 31 7 30 9 (L)   RDW 17 5 (H) 17 2 (H) 17 1 (H)   Platelet Count 472 (H) 360 310   MPV 10 5 10 7 10 7      4/19/2022 10:33   CA 19-9 3489 (H)     Imaging Studies: I have personally reviewed pertinent reports  4/23/22 CTCAP: "Redemonstration of pancreatic head mass which is grossly stable in size from prior CT of 4/4/2022  There is again upstream pancreatic ductal dilatation/parenchymal atrophy as well as encasement of the superior mesenteric artery and occlusion/thrombosis of the superior mesenteric vein  Redemonstration of multiple metastatic/cystic lesions throughout the liver  Hypodense lesion previously seen within segment 5 on 4/4/2022 is no longer evident  Remaining lesions appear grossly stable  Interval worsening of soft tissue nodularity along the expected course of the mesenteric veins  Findings may represent worsening thrombus versus worsening mesenteric metastasis  Moderate distention of the stomach with luminal narrowing of the distal stomach  Findings are concerning for worsening partial gastric outlet obstruction secondary to mass effect from the pancreatic lesion  Enteric contrast does reach the colon "    EKG, Pathology, and Other Studies: I have personally reviewed pertinent reports  Counseling / Coordination of Care: Total floor / unit time spent today 45 minutes  Greater than 50% of total time was spent with the patient and / or family counseling and / or coordination of care  A description of the counseling / coordination of care: symptom assessment and management, medication review and adjustment, psychosocial support, chart review, imaging review, lab review, advanced directives, goals of care, supportive listening and anticipatory guidance  Dona Melvin MD  [facility] Palliative and Supportive Care      Portions of this document may have been created using dictation software and as such some "sound alike" terms may have been generated by the system  Do not hesitate to contact me with any questions or clarifications

## 2022-04-29 PROBLEM — D72.823 LEUKEMOID REACTION: Status: ACTIVE | Noted: 2021-05-11

## 2022-04-29 NOTE — PROGRESS NOTES
[facility]  Progress Note - Dariana Mustafa Sr  1955, 78 y o  male MRN: [id number]  Unit/Bed#: S -01 Encounter: [id number]  Primary Care Provider: Al Neal MD   Date and time admitted to hospital: 4/27/2022  8:58 PM    * Chemotherapy induced diarrhea  Assessment & Plan  · Presentation: Patient presents due to complaints of diarrhea  He reports that he has been having great than 10 episodes of watery, non bloody diarrhea over the past 3 days  He reports that he received his most recent chemotherapy treatment on Monday, 04/25/2022  He reports nausea but denies any recent vomiting  He reports that his abdominal pain is not worse than usual  He reports chills but denies fevers  · CT A/P from 04/16/2022: "Compared to the recent CT from 4/4/2022, there has been no significant acute changes  Again seen is a large pancreatic head mass, and multiple liver lesions consistent with metastatic pancreatic cancer  Percutaneous cholecystostomy tube and common bile duct stent remain in proper position "  · Obtain C diff sample and stool enteric bacterial panel  · Continue IV hydration  · Continue on p r n  IV antiemetics  · Regular diet as tolerated  · Nutrition consult  This morning patient did have 1 episode of loose/watery bowel movement and stool samples have been sent for C diff & enteric bacterial panel  Metastatic pancreatic cancer  Assessment & Plan  · Patient known to Dr Agus Bermeo of Heme-Onc  · Diagnosed in May 2021  · Currently off of any chemotherapy or Neulasta patch  Patient's most recent imaging had shown progression of cancer in spite of his chemotherapy, palliative Care and Oncology have discuss the imaging results with the patient  Leukemoid reaction  Assessment & Plan  · Secondary to Neulasta patch, continue supportive care    ·     Unspecified protein-calorie malnutrition ([facility] [address] )  Assessment & Plan  Malnutrition Findings:   · Evidenced by recent weight loss, muscle wasting related to malignancy  BMI Findings:  ·  BMI 21 77  · Nutrition consult  Essential hypertension  Assessment & Plan   Blood pressure is reviewed and acceptable   o Last recorded pressure: 129/60   Given the fact patient is a poor appetite, and blood pressure is running on the softer side, will hold antihypertensive medications  Palliative care patient  Assessment & Plan  · Patient known to Dr Sharlene Fiore of Palliative care  · Continue home pain regimen of Oxycodone and Gabapentin  · Palliative care consult appreciated  Anemia  Assessment & Plan  · 2/2 malignancy with active chemo treatments  · Hemoglobin 10 5 on admission  · Monitor CBC  Failure to thrive in adult  Assessment & Plan  · 2/2 to metastatic pancreatic cancer and actively undergoing chemo treatments with associated nausea, vomiting and diarrhea  · Palliative care consult  Pulmonary embolism [facility])  Assessment & Plan  · Diagnosed with PE in November 2021  · Continue Eliquis  Pharmacologic VTE Prophylaxis: Yes Eliquis  Mechanical VTE Prophylaxis in Place: No   Patient Centered Rounds: I have performed bedside rounds with the Nursing staff today  Current Length of Stay: 1 day(s)  Current Patient Status: Inpatient   Code Status: Level 1 - Full Code  Time Spent for Care:  35 minutes  More than 50% of total time spent on counseling and coordination of care as described above  Discussions with Specialists or Other Care Team Provider: Yes  Education and Discussions with Family / Patient: Yes, Updated family member  Son over the phone  Discharge Plan: 93-29 hrs   Certification Statement: The patient will continue to require additional inpatient hospital stay due to Dehydration  Subjective:   I have seen and Examined the patient at the bedside  No CP or Sob  No fevers or chills, No nausea or vomiting  Overnight events reviewed with the RN  No Other complains   Did have one BM which was semi-solid today   Complained of some abdominal pain which was improved with oxycodone  Review of System:   Denies any CP or SOB  Denies any Cough or Cold  Denies any Fevers or chills  Denies any focal tingling numbness or weakness in any extremities  Denies any Nausea or vomiting  Objective:   Temp (24hrs), Av 4 °F (36 9 °C), Min:98 2 °F (36 8 °C), Max:98 5 °F (36 9 °C)    Temp:  [98 2 °F (36 8 °C)-98 5 °F (36 9 °C)] 98 4 °F (36 9 °C)  HR:  [60-62] 62  Resp:  [16-18] 18  BP: (122-152)/(67-74) 122/67  SpO2:  [93 %-97 %] 93 %  Body mass index is 21 68 kg/m²  Input and Output Summary (last 24 hours): Intake/Output Summary (Last 24 hours) at 2022 1244  Last data filed at 2022 1155  Gross per 24 hour   Intake 840 ml   Output 1900 ml   Net -1060 ml     I/O        0701   0700  0701   0700  0701   0700    P  O   420 120    I V  (mL/kg)  1000 (15 9)     IV Piggyback 1000      Total Intake(mL/kg) 1000 1420 (22 6) 120 (1 9)    Urine (mL/kg/hr)  1450 (1) 450 (1 3)    Total Output  1450 450    Net +1000 -30 -330                 Physical Exam:   General : Alert, Awake and oriented x 2-3, NAD  Neck : Supple  Eyes:  LESLIE, EOMI  Oral mucosa dry  CVS : S1, S2, RRR    R/S : Clear to auscultate anteriorly  Abd: Soft, NT, ND  Bs+ve  Extremity: No pedal edema noted  Skin: No acute Rash noted  CNS: No acute FND       Additional Data:     Labs, Culture & Imaging Data Reviewed:    Results from last 7 days   Lab Units 22  0443   WBC Thousand/uL 59 71*   HEMOGLOBIN g/dL 10 7*   HEMATOCRIT % 33 4*   PLATELETS Thousands/uL 330     Results from last 7 days   Lab Units 22  0443   POTASSIUM mmol/L 3 2*   CHLORIDE mmol/L 99*   CO2 mmol/L 28   BUN mg/dL 8   CREATININE mg/dL 0 81   CALCIUM mg/dL 7 8*   ALK PHOS U/L 473*   ALT U/L 19   AST U/L 23         Lab Results   Component Value Date    HGBA1C 6 2 2022      No orders to display       Cultures:   Blood Culture: Lab Results   Component Value Date    BLOODCX No Growth After 5 Days  12/19/2021    BLOODCX No Growth After 5 Days   12/19/2021     Urine Culture:   Lab Results   Component Value Date    URINECX No Growth <1000 cfu/mL 03/29/2016     Sputum Culture: No components found for: SPUTUMCX  Wound Culture: No results found for: WOUNDCULT    Last 24 Hours Medication List:   Current Facility-Administered Medications   Medication Dose Route Frequency Provider Last Rate    acetaminophen  650 mg Oral Q6H PRN Velma Holstein, CRNP      al mag oxide-diphenhydramine-lidocaine viscous  10 mL Swish & Spit Q4H PRN Velma Holstein, CRNP      aluminum-magnesium hydroxide-simethicone  30 mL Oral Q6H PRN Velma Holstein, CRNP      apixaban  5 mg Oral BID Velma Holstein, CRNP      aspirin  81 mg Oral Daily Velma Holstein, CRNP      atorvastatin  20 mg Oral Daily Velma Holstein, CRNP      vitamin B-12  1,000 mcg Oral Daily Velma Holstein, CRNP      dextrose 5 % and sodium chloride 0 45 %  80 mL/hr Intravenous Continuous Francine Martin MD 80 mL/hr (04/28/22 1534)    FLUoxetine  20 mg Oral Daily Velma Holstein, CRNP      folic acid  1 mg Oral Daily Velma Holstein, CRNP      gabapentin  200 mg Oral TID Velma Holstein, CRNP      HYDROmorphone  0 5 mg Intravenous Q4H PRN Velma Holstein, CRNP      insulin lispro  1-5 Units Subcutaneous TID AC Briseida Socorro, CRNP      insulin lispro  1-5 Units Subcutaneous HS Briseida Jones, CRNP      OLANZapine  5 mg Oral HS Shruthi Medel MD      ondansetron  4 mg Intravenous Q6H PRN Velma Holstein, CRNP      oxyCODONE  10 mg Oral Q4H PRN Velma Holstein, CRNP      oxyCODONE  5 mg Oral Q4H PRN Velma Holstein, CRNP      pancrelipase (Lip-Prot-Amyl)  24,000 Units Oral TID With Meals Velma Holstein, CRNP      pantoprazole  40 mg Oral BID Velma Holstein, CRNP      predniSONE  10 mg Oral Daily With Breakfast Velma Holstein, CRNP      simethicone  80 mg Oral Q6H PRN Mira Graham MD      tamsulosin  0 4 mg Oral HS CAROL Prieto      traZODone  50 mg Oral HS PRN Mira Graham MD         Patient is at moderate risk for morbidity and mortality due to above mentioned illness and comorbidities

## 2022-04-29 NOTE — PLAN OF CARE
Problem: Prexisting or High Potential for Compromised Skin Integrity  Goal: Skin integrity is maintained or improved  Description: INTERVENTIONS:  - Identify patients at risk for skin breakdown  - Assess and monitor skin integrity  - Assess and monitor nutrition and hydration status  - Monitor labs   - Assess for incontinence   - Turn and reposition patient  - Assist with mobility/ambulation  - Relieve pressure over bony prominences  - Avoid friction and shearing  - Provide appropriate hygiene as needed including keeping skin clean and dry  - Evaluate need for skin moisturizer/barrier cream  - Collaborate with interdisciplinary team   - Patient/family teaching  - Consider wound care consult   Outcome: Progressing     Problem: Potential for Falls  Goal: Patient will remain free of falls  Description: INTERVENTIONS:  - Educate patient/family on patient safety including physical limitations  - Instruct patient to call for assistance with activity   - Consult OT/PT to assist with strengthening/mobility   - Keep Call bell within reach  - Keep bed low and locked with side rails adjusted as appropriate  - Keep care items and personal belongings within reach  - Initiate and maintain comfort rounds  - Make Fall Risk Sign visible to staff  - Offer Toileting every  Hours, in advance of need  - Initiate/Maintain alarm  - Obtain necessary fall risk management equipment    - Apply yellow socks and bracelet for high fall risk patients  - Consider moving patient to room near nurses station  Outcome: Progressing     Problem: METABOLIC, FLUID AND ELECTROLYTES - ADULT  Goal: Electrolytes maintained within normal limits  Description: INTERVENTIONS:  - Monitor labs and assess patient for signs and symptoms of electrolyte imbalances  - Administer electrolyte replacement as ordered  - Monitor response to electrolyte replacements, including repeat lab results as appropriate  - Instruct patient on fluid and nutrition as appropriate  Outcome: Progressing  Goal: Fluid balance maintained  Description: INTERVENTIONS:  - Monitor labs   - Monitor I/O and WT  - Instruct patient on fluid and nutrition as appropriate  - Assess for signs & symptoms of volume excess or deficit  Outcome: Progressing  Goal: Glucose maintained within target range  Description: INTERVENTIONS:  - Monitor Blood Glucose as ordered  - Assess for signs and symptoms of hyperglycemia and hypoglycemia  - Administer ordered medications to maintain glucose within target range  - Assess nutritional intake and initiate nutrition service referral as needed  Outcome: Progressing     Problem: MUSCULOSKELETAL - ADULT  Goal: Maintain or return mobility to safest level of function  Description: INTERVENTIONS:  - Assess patient's ability to carry out ADLs; assess patient's baseline for ADL function and identify physical deficits which impact ability to perform ADLs (bathing, care of mouth/teeth, toileting, grooming, dressing, etc )  - Assess/evaluate cause of self-care deficits   - Assess range of motion  - Assess patient's mobility  - Assess patient's need for assistive devices and provide as appropriate  - Encourage maximum independence but intervene and supervise when necessary  - Involve family in performance of ADLs  - Assess for home care needs following discharge   - Consider OT consult to assist with ADL evaluation and planning for discharge  - Provide patient education as appropriate  Outcome: Progressing  Goal: Maintain proper alignment of affected body part  Description: INTERVENTIONS:  - Support, maintain and protect limb and body alignment  - Provide patient/ family with appropriate education  Outcome: Progressing     Problem: Nutrition/Hydration-ADULT  Goal: Nutrient/Hydration intake appropriate for improving, restoring or maintaining nutritional needs  Description: Monitor and assess patient's nutrition/hydration status for malnutrition   Collaborate with interdisciplinary team and initiate plan and interventions as ordered  Monitor patient's weight and dietary intake as ordered or per policy  Utilize nutrition screening tool and intervene as necessary  Determine patient's food preferences and provide high-protein, high-caloric foods as appropriate       INTERVENTIONS:  - Monitor oral intake, urinary output, labs, and treatment plans  - Assess nutrition and hydration status and recommend course of action  - Evaluate amount of meals eaten  - Assist patient with eating if necessary   - Allow adequate time for meals  - Recommend/ encourage appropriate diets, oral nutritional supplements, and vitamin/mineral supplements  - Order, calculate, and assess calorie counts as needed  - Recommend, monitor, and adjust tube feedings and TPN/PPN based on assessed needs  - Assess need for intravenous fluids  - Provide specific nutrition/hydration education as appropriate  - Include patient/family/caregiver in decisions related to nutrition  Outcome: Progressing

## 2022-04-29 NOTE — SOCIAL WORK
Palliative LSW saw patient at the bedside today  LSW appreciates the opportunity to provide patient/family with inpatient emotional support and guidance while patient continues to receive medical attention from the medical team      Topics discussed: Met with pt at bedside  Pt discussed he is feeling a bit better today than yesterday  He had a "soft" BM this morning and states his stomach is feeling better  Pt was able to eat a few bites of [place] toast, but is still concerned with his not being able to eat much  Pt would be willing to try BOOST/Ensure supplements if appropriate  Pt discussed he has not yet gotten an update if he would be able to be discharged today, but is hopeful that he can be today as he plans [place] camp[place] this [place] with [place]  Pt discussed he is just taking things day by day  He appears to be in better spirits today and emphasizes he is trying to do as many things as he can that brings him happiness moving forward for whatever time he has left  He denies any further needs at this time      Areas that need follow-up: Emotional Support  Resources given: None  Others present: None      LSW will continue to follow as requested by the medical team, patient, or family

## 2022-04-29 NOTE — MALNUTRITION/BMI
This medical record reflects one or more clinical indicators suggestive of malnutrition and/or morbid obesity  Malnutrition Findings:   Adult Malnutrition type: Chronic illness  Adult Degree of Malnutrition: Other severe protein calorie malnutrition  Malnutrition Characteristics: Muscle loss,Weight loss,Inadequate energy              360 Statement: Severe protein calorie malnutrition r/t catabolic illness as evidenced by a 13% weight loss over 1 month, <75% of energy needs > 1 month, and muscle loss to deltoids; currently being treated with nutritional supplement    BMI Findings: Body mass index is 21 68 kg/m²  See Nutrition note dated 4/29/2022 for additional details  Completed nutrition assessment is viewable in the nutrition documentation

## 2022-04-29 NOTE — ASSESSMENT & PLAN NOTE
· Patient known to Dr Diana Morillo of Heme-Onc  · Diagnosed in May 2021  · Currently off of any chemotherapy or Neulasta patch  Patient's most recent imaging had shown progression of cancer in spite of his chemotherapy, palliative Care and Oncology have discuss the imaging results with the patient

## 2022-04-29 NOTE — ASSESSMENT & PLAN NOTE
· Patient known to Dr Hall Kind of Palliative care  · Continue home pain regimen of Oxycodone and Gabapentin  · Palliative care consult appreciated

## 2022-04-29 NOTE — PROGRESS NOTES
Progress Note - Palliative & Supportive Care  Alona Benitez    78 y o   male  S /S -01   MRN: [id number]  Encounter: [id number]     ASSESSMENT:    Patient Active Problem List   Diagnosis    [facility]    Essential hypertension    Anxiety disorder    Hyperlipidemia    Liver mass    GERD    Multiple lipomas    Type 2 diabetes mellitus ([facility]    BPH (benign prostatic hyperplasia)    Recent cholecystitis    Leukemoid reaction    Metastatic pancreatic cancer    Chemotherapy induced neutropenia (HCC)    Pancreatic cancer metastasized to liver (HCC)    Abdominal pain    Generalized weakness    Pancytopenia due to chemotherapy    Unspecified protein-calorie malnutrition ([facility]    Dental infection    Chest pain    Pneumobilia due to Occluded CBD stent    Numbness and tingling of foot    Pulmonary embolism (HCC)    Mesenteric vein thrombosis (HCC)    Idiopathic osteoarthritis    Rotator cuff tear arthropathy    Tendinitis of right shoulder    Elevated transaminase level    Shortness of breath    Palliative care patient    Cancer related pain    COVID-19 virus infection    Metabolic encephalopathy    Chemotherapy induced diarrhea    Insomnia due to medical condition    Anorexia    Oral mucositis    Dysphagia    Anemia    Failure to thrive in adult     Active issues specifically addressed today include: goals of care, diarrhea, stage IV pancreatic cancer, Palliative Care patient, dysphagia, FTT, dyspnea, cancer-related pain, protein calorie malnutrition, anorexia, insomnia, anxiety, GERD    67M w/ stage IV pancreatic cancer on modified FOLFIRI (last infusion 4/27/22) admitted 4/27/22 for chemotherapy-induced diarrhea, failure to thrive  [facility] consulted for goals of care  PLAN:    1  Goals:    Recommend PT eval given weakness  Ordered PT eval yesterday, hopefully they can see him before he is discharged (discharge date unknown)     Continue full cares w/o limit    Patient again confirms he wishes to continue to pursue any offered diseased-directed treatments  He wishes to extend life expectancy and reduce symptom burden   o He will consider Surgical Oncology consult after speaking w/ his primary Medical Oncology provider next week   Palliative will follow for ongoing goals of care discussions as situation evolves  2  Social Support:   Supportive listening provided    3  Symptom management:   Continue Zyprexa 5mg QHS for anorexia, CINV, insomnia, mood  o Patient had reported he felt recent oral mucositis was s/t mirtazapine but that is unlikely; monitor for oral lesions  o If oral mucositis returns, consider resuming dexamethasone 0 5mg/5mL elixir, 1mg QID, swish and spit, do not swallow   Continue hydromorphone 0 5mg IV q4h PRN breakthrough pain   Continue simethicone PRN   Continue home palliative regimen w/ modifications  o Magic Mouthwash q4h PRN  o Creon TID w/ meals or snacks  o Tylenol PRN  o Recommend resuming Lomotil 2 5-0 025 tablet QID PRN diarrhea (no diarrhea since admission); should be first line after discharge as Imodium was not helpful  o Fluoxetine 20mg daily  o Gabapentin 200mg TID ATC  o OxyIR 5-10mg q4h PRN moderate-severe pain  o Pantoprazole 40mg BID  o Prednisone 10mg qAM  o Hold Compazine for now  o Trazodone 50mg QHS PRN insomnia  o Continue Zofran to 4mg IV q6h PRN N/V - resume PO on discharge    Code status: Level 1 - Full Code   Decisional apparatus:  Patient does have capacity to make medical decisions on my exam today  If such capacity is lost, patient's substitute decision maker would default to son Chitra Arana [place]  Advance Directive / Living Will / POLST:  Five Wishes in EMR; reviewed  We appreciate the opportunity to participate in this patient's care  We will continue to follow   Please do not hesitate to contact our on-call provider through our clinic answering service at [phone] should you have acute symptom control concerns  INTERVAL HISTORY:    Patient seen and examined at bedside, initially sleeping peacefully in the RLD position but waking easily to gently vocal stimuli  He states he felt sleepy after receiving a dose of oxyIR and wished to rest  He was willing to state that he has not had any bowel movements since admission, he denies N/V  He feels his pain is well-controlled and he is willing to continue his home pain regimen, even if it can cause some somnolence  He reviewed recent imaging with Medical Oncology yesterday, and is able to relate that his pancreatic lesion is stable, lymphadenopathy and encasement of blood vessels may be worsening and gastric outlet obstruction is an issue  He states he is willing to consider a Surgical Oncology consult after he speaks w/ Dr Antonina David next week  Review of Systems   Constitutional: Positive for activity change, appetite change, fatigue and unexpected weight change  HENT: Positive for trouble swallowing  Respiratory: Positive for shortness of breath (on exertion)  Gastrointestinal: Positive for abdominal pain and diarrhea (but no BM since admission)  Eructation, flatulence  Reflux  Allergic/Immunologic: Positive for immunocompromised state  Psychiatric/Behavioral: Positive for dysphoric mood and sleep disturbance  The patient is nervous/anxious  All other systems reviewed and are negative      MEDICATIONS / ALLERGIES:  all current active meds have been reviewed and current meds:   Current Facility-Administered Medications   Medication Dose Route Frequency    acetaminophen (TYLENOL) tablet 650 mg  650 mg Oral Q6H PRN    al mag oxide-diphenhydramine-lidocaine viscous (MAGIC MOUTHWASH) suspension 10 mL  10 mL Swish & Spit Q4H PRN    aluminum-magnesium hydroxide-simethicone (MYLANTA) oral suspension 30 mL  30 mL Oral Q6H PRN    apixaban (ELIQUIS) tablet 5 mg  5 mg Oral BID    aspirin chewable tablet 81 mg  81 mg Oral Daily    atorvastatin (LIPITOR) tablet 20 mg  20 mg Oral Daily    cyanocobalamin (VITAMIN B-12) tablet 1,000 mcg  1,000 mcg Oral Daily    dextrose 5 % and sodium chloride 0 45 % infusion  80 mL/hr Intravenous Continuous    FLUoxetine (PROzac) capsule 20 mg  20 mg Oral Daily    folic acid (FOLVITE) tablet 1 mg  1 mg Oral Daily    gabapentin (NEURONTIN) capsule 200 mg  200 mg Oral TID    HYDROmorphone (DILAUDID) injection 0 5 mg  0 5 mg Intravenous Q4H PRN    insulin lispro (HumaLOG) 100 units/mL subcutaneous injection 1-5 Units  1-5 Units Subcutaneous TID AC    insulin lispro (HumaLOG) 100 units/mL subcutaneous injection 1-5 Units  1-5 Units Subcutaneous HS    OLANZapine (ZyPREXA) tablet 5 mg  5 mg Oral HS    ondansetron (ZOFRAN) injection 4 mg  4 mg Intravenous Q6H PRN    oxyCODONE (ROXICODONE) immediate release tablet 10 mg  10 mg Oral Q4H PRN    oxyCODONE (ROXICODONE) IR tablet 5 mg  5 mg Oral Q4H PRN    pancrelipase (Lip-Prot-Amyl) (CREON) delayed release capsule 24,000 Units  24,000 Units Oral TID With Meals    pantoprazole (PROTONIX) EC tablet 40 mg  40 mg Oral BID    predniSONE tablet 10 mg  10 mg Oral Daily With Breakfast    simethicone (MYLICON) chewable tablet 80 mg  80 mg Oral Q6H PRN    tamsulosin (FLOMAX) capsule 0 4 mg  0 4 mg Oral HS    traZODone (DESYREL) tablet 50 mg  50 mg Oral HS PRN       Allergies   Allergen Reactions    Fentanyl Anaphylaxis and Other (See Comments)     Oxygen drops severely       OBJECTIVE:  /67 (BP Location: Right arm)   Pulse 62   Temp 98 4 °F (36 9 °C) (Oral)   Resp 18   Ht 5' 7" (1 702 m)   Wt 62 8 kg (138 lb 7 2 oz)   SpO2 93%   BMI 21 68 kg/m²   Nursing notes reviewed  Physical Exam  Vitals reviewed  Constitutional:       General: He is not in acute distress  Appearance: He is ill-appearing (chronically)  He is not toxic-appearing  Comments: Debilitated  HENT:      Head: Normocephalic and atraumatic        Right Ear: External ear normal  Left Ear: External ear normal    Eyes:      General: No scleral icterus  Right eye: No discharge  Left eye: No discharge  Extraocular Movements: Extraocular movements intact  Conjunctiva/sclera: Conjunctivae normal       Pupils: Pupils are equal, round, and reactive to light  Cardiovascular:      Rate and Rhythm: Normal rate  Pulmonary:      Effort: Pulmonary effort is normal  No tachypnea, bradypnea, accessory muscle usage or respiratory distress  Abdominal:      General: There is no distension  Tenderness: There is no guarding  Musculoskeletal:      Cervical back: Normal range of motion  Right lower leg: No edema  Left lower leg: No edema  Skin:     General: Skin is dry  Coloration: Skin is pale  Neurological:      Mental Status: He is alert and oriented to person, place, and time  Cranial Nerves: No dysarthria or facial asymmetry  Psychiatric:         Attention and Perception: Attention normal          Mood and Affect: Mood is depressed  Mood is not anxious  Affect is not inappropriate  Speech: Speech normal          Behavior: Behavior normal  Behavior is cooperative  Thought Content: Thought content normal          Cognition and Memory: Cognition and memory normal          Judgment: Judgment normal        Lab Results: I have personally reviewed pertinent labs    CBC:  Lab Results   Component Value Date    WBC 59 71 (HH) 04/29/2022    HGB 10 7 (L) 04/29/2022    HCT 33 4 (L) 04/29/2022    MCV 96 04/29/2022     04/29/2022    MCH 30 7 04/29/2022    MCHC 32 0 04/29/2022    RDW 16 9 (H) 04/29/2022    MPV 11 5 04/29/2022     CMP:  Lab Results   Component Value Date    SODIUM 136 04/29/2022    K 3 2 (L) 04/29/2022    CL 99 (L) 04/29/2022    CO2 28 04/29/2022    BUN 8 04/29/2022    CREATININE 0 81 04/29/2022    CALCIUM 7 8 (L) 04/29/2022    AST 23 04/29/2022    ALT 19 04/29/2022    ALKPHOS 473 (H) 04/29/2022    EGFR 92 04/29/2022 Albumin:  0   Lab Value Date/Time    ALB 1 9 (L) 04/29/2022 0443    ALB 3 9 09/01/2017 0717     Imaging Studies: I have personally reviewed pertinent reports  EKG, Pathology, and Other Studies: I have personally reviewed pertinent reports  Counseling / Coordination of Care: Total floor / unit time spent today 35 minutes  Greater than 50% of total time was spent with the patient and / or family counseling and / or coordination of care  A description of the counseling / coordination of care: symptom assessment and management, medication review, psychosocial support, chart review, imaging review, lab review, goals of care, supportive listening and anticipatory guidance  Mansoor Gray MD  [facility] Palliative and Supportive Care      Portions of this document may have been created using dictation software and as such some "sound alike" terms may have been generated by the system  Do not hesitate to contact me with any questions or clarifications

## 2022-04-29 NOTE — ASSESSMENT & PLAN NOTE
· Presentation: Patient presents due to complaints of diarrhea  He reports that he has been having great than 10 episodes of watery, non bloody diarrhea over the past 3 days  He reports that he received his most recent chemotherapy treatment on Monday, 04/25/2022  He reports nausea but denies any recent vomiting  He reports that his abdominal pain is not worse than usual  He reports chills but denies fevers  · CT A/P from 04/16/2022: "Compared to the recent CT from 4/4/2022, there has been no significant acute changes  Again seen is a large pancreatic head mass, and multiple liver lesions consistent with metastatic pancreatic cancer  Percutaneous cholecystostomy tube and common bile duct stent remain in proper position "  · Obtain C diff sample and stool enteric bacterial panel  · Continue IV hydration  · Continue on p r n  IV antiemetics  · Regular diet as tolerated  · Nutrition consult  This morning patient did have 1 episode of loose/watery bowel movement and stool samples have been sent for C diff & enteric bacterial panel

## 2022-04-29 NOTE — SPEECH THERAPY NOTE
Speech-Language Pathology Bedside Swallow Evaluation        Patient Name: Edgard Miramontes  Today's Date: 4/29/2022     Problem List  Principal Problem:    Chemotherapy induced diarrhea  Active Problems:    Essential hypertension    Hyperlipidemia    GERD    Type 2 diabetes mellitus (HCC)    Leukemoid reaction    Metastatic pancreatic cancer    Unspecified protein-calorie malnutrition ([facility] )    Pulmonary embolism ([facility] )    Palliative care patient    Anemia    Failure to thrive in adult         Past Medical History  Past Medical History:   Diagnosis Date    Anxiety     Arthritis     Cancer ([facility]     pancreatic    Cellulitis     LAST ASSESSED: 6/13/14    Diabetes mellitus ([facility]     Enlarged prostate     Epidermal inclusion cyst     LAST ASSESSED: 10/4/13    Erythrasma     LAST ASSESSED: 9/23/13    Furuncle     LAST ASSESSED: 6/11/14    GERD (gastroesophageal reflux disease)     Hyperlipidemia     Hypertension     [facility]        Past Surgical History  Past Surgical History:   Procedure Laterality Date    COLONOSCOPY      FL GUIDED CENTRAL VENOUS ACCESS DEVICE INSERTION  6/1/2021    HYDROCELE EXCISION / REPAIR Right 01/11/2018    SPERMATIC CORD EXCSION OF HYDROCELE; MANAGED BY: GABBIE CARBAJAL    IR BIOPSY LIVER MASS  8/16/2021    IR CHOLECYSTOSTOMY TUBE CHECK/CHANGE/REPOSITION/REINSERTION/UPSIZE  6/22/2021    IR CHOLECYSTOSTOMY TUBE CHECK/CHANGE/REPOSITION/REINSERTION/UPSIZE  8/23/2021    IR CHOLECYSTOSTOMY TUBE CHECK/CHANGE/REPOSITION/REINSERTION/UPSIZE  1/19/2022    IR CHOLECYSTOSTOMY TUBE PLACEMENT  5/11/2021    MULTIPLE TOOTH EXTRACTIONS Left 7/22/2021    Procedure: EXTRACTION TEETH 14 & 15, ALVEOPLASTY, EXCISION OF CYST LEFT MAXILLA;   Surgeon: Timoteo Pizarro DDS;  [facility]: [facility];  Service: Maxillofacial    VT REMOVAL OF HYDROCELE,TUNICA,UNILAT Right 1/11/2018    Procedure: HYDROCELECTOMY;  Surgeon: Luisa Lawrence MD;  [facility];  Service: Urology  SCROTAL SURGERY      benign "lump"    TUNNELED VENOUS PORT PLACEMENT Left 6/1/2021    Procedure: INSERTION VENOUS PORT (PORT-A-CATH); Surgeon: Pablo Singh MD;  Location: [facility];  Service: Surgical Oncology       Summary    Pt presents with functional to mild oral dysphagia due to prolonged oral processing of textured foods  Pt stated he thinks "it's a mental thing"  We discussed using applesauce, ice cream, or sips of liquids in between bites of foods to aid w/ transfer and swallow initiation  Pt denied difficulty w/ thin liquids and no overt s/s aspiration were noted  Recommendations:   Diet: regular diet and thin liquids   Meds: whole with liquid   Frequent Oral care: 2x/day  Other Recommendations/ considerations: no follow up tx needed  Strategies discussed, pt stated understanding  Current Medical Status  Pt is a 78 y o  male who presented to [address] [place] [address]  with chemotherapy induced diarrhea  He reports that he has been having greater than 10 episodes of watery, non bloody diarrhea over the past 2 days  He reports that he received his most recent chemotherapy treatment on Monday, 04/25/2022  He reports nausea but denies any recent vomiting  He states that his abdominal pain is not worse than usual  He reports chills but denies fevers    Patient will be admitted for IV hydration, heme-oncology consult and palliative care consult  Past medical history:   Please see H&P for details    Special Studies:  CT-chest/abd/pelvis w/ contrast: 4/23/22 LUNGS:  No new or enlarging pulmonary nodules or focal consolidations  Stable pleural-based scarring is noted within the lingula and right apex  There is no discrete tracheal or endobronchial lesion  Moderate distention of the stomach with luminal narrowing of the distal stomach  Findings are concerning for worsening partial gastric outlet obstruction secondary to mass effect from the pancreatic lesion    Enteric contrast does reach the colon  Social/Education/Vocational Hx:  Pt lives  At home     Swallow Information   Current Risks for Dysphagia & Aspiration: c/o feeling full quickly due to gastric outlet obstruction; generalized weakness from chemo  Current Symptoms/Concerns: c/o difficulty initiating a swallow, "I Just chew and chew"  Current Diet: regular diet and thin liquids   Baseline Diet: regular diet and thin liquids  Takes pills- whole w/ water     Baseline Assessment   Behavior/Cognition: alert  Speech/Language Status: able to participate in conversation and able to follow commands  Patient Positioning: upright in bed     Swallow Mechanism Exam   Facial: symmetrical  Labial: WFL  Lingual: WFL  Velum: unable to visualize  Mandible: adequate ROM  Dentition: adequate  Vocal quality:clear/adequate   Volitional Cough: strong/productive   Respiratory: RA    Consistencies Assessed and Performance   Consistencies Administered: pt seen at lunch, ate min amts due to c/o feeling full, not hungry  Pt agreeable to a few bites of pasta, meatball, ice cream and water by straw  Oral Stage: pt took bites of pasta and meatball, tsps of ice cream and drank water by straw w/ good oral control  Mastication was effective and prolonged  Attempted sips of water to aid  W/ clearance, pt stated the water just goes down and the food remains, however tsps of ice cream aided to transfer food bolus  No oral residue noted once pt swallowed  Pharyngeal Stage: swallows were prompt and complete w/ no coughing, throat clearing or c/o food dysphagia symptoms         Esophageal Concerns: none reported      Results Reviewed with: patient   Dysphagia Goals: none at this time      April Aaron Morris CCC-SLP  Speech Pathologist  [place] license # [id number] [facility] [id number]  [place] license # [id number]  Available via [employer]

## 2022-04-30 PROBLEM — E43 SEVERE PROTEIN-CALORIE MALNUTRITION (HCC): Status: ACTIVE | Noted: 2022-01-01

## 2022-04-30 NOTE — PLAN OF CARE
Problem: Prexisting or High Potential for Compromised Skin Integrity  Goal: Skin integrity is maintained or improved  Description: INTERVENTIONS:  - Identify patients at risk for skin breakdown  - Assess and monitor skin integrity  - Assess and monitor nutrition and hydration status  - Monitor labs   - Assess for incontinence   - Turn and reposition patient  - Assist with mobility/ambulation  - Relieve pressure over bony prominences  - Avoid friction and shearing  - Provide appropriate hygiene as needed including keeping skin clean and dry  - Evaluate need for skin moisturizer/barrier cream  - Collaborate with interdisciplinary team   - Patient/family teaching  - Consider wound care consult   Outcome: Progressing     Problem: Potential for Falls  Goal: Patient will remain free of falls  Description: INTERVENTIONS:  - Educate patient/family on patient safety including physical limitations  - Instruct patient to call for assistance with activity   - Consult OT/PT to assist with strengthening/mobility   - Keep Call bell within reach  - Keep bed low and locked with side rails adjusted as appropriate  - Keep care items and personal belongings within reach  - Initiate and maintain comfort rounds  - Make Fall Risk Sign visible to staff  - Offer Toileting every 2 Hours, in advance of need  - Initiate/Maintain bed alarm  - Apply yellow socks and bracelet for high fall risk patients  - Consider moving patient to room near nurses station  Outcome: Progressing     Problem: METABOLIC, FLUID AND ELECTROLYTES - ADULT  Goal: Electrolytes maintained within normal limits  Description: INTERVENTIONS:  - Monitor labs and assess patient for signs and symptoms of electrolyte imbalances  - Administer electrolyte replacement as ordered  - Monitor response to electrolyte replacements, including repeat lab results as appropriate  - Instruct patient on fluid and nutrition as appropriate  Outcome: Progressing  Goal: Fluid balance maintained  Description: INTERVENTIONS:  - Monitor labs   - Monitor I/O and WT  - Instruct patient on fluid and nutrition as appropriate  - Assess for signs & symptoms of volume excess or deficit  Outcome: Progressing  Goal: Glucose maintained within target range  Description: INTERVENTIONS:  - Monitor Blood Glucose as ordered  - Assess for signs and symptoms of hyperglycemia and hypoglycemia  - Administer ordered medications to maintain glucose within target range  - Assess nutritional intake and initiate nutrition service referral as needed  Outcome: Progressing     Problem: Nutrition/Hydration-ADULT  Goal: Nutrient/Hydration intake appropriate for improving, restoring or maintaining nutritional needs  Description: Monitor and assess patient's nutrition/hydration status for malnutrition  Collaborate with interdisciplinary team and initiate plan and interventions as ordered  Monitor patient's weight and dietary intake as ordered or per policy  Utilize nutrition screening tool and intervene as necessary  Determine patient's food preferences and provide high-protein, high-caloric foods as appropriate       INTERVENTIONS:  - Monitor oral intake, urinary output, labs, and treatment plans  - Assess nutrition and hydration status and recommend course of action  - Evaluate amount of meals eaten  - Assist patient with eating if necessary   - Allow adequate time for meals  - Recommend/ encourage appropriate diets, oral nutritional supplements, and vitamin/mineral supplements  - Order, calculate, and assess calorie counts as needed  - Recommend, monitor, and adjust tube feedings and TPN/PPN based on assessed needs  - Assess need for intravenous fluids  - Provide specific nutrition/hydration education as appropriate  - Include patient/family/caregiver in decisions related to nutrition  Outcome: Progressing

## 2022-04-30 NOTE — PROGRESS NOTES
[facility]  Progress Note - Xander Cantu   1955, 78 y o  male MRN: [id number]  Unit/Bed#: S -01 Encounter: [id number]  Primary Care Provider: Elisabet Westbrook MD   Date and time admitted to hospital: 4/27/2022  8:58 PM    * Chemotherapy induced diarrhea  Assessment & Plan  · Presentation: Patient presents due to complaints of diarrhea  He reports that he has been having great than 10 episodes of watery, non bloody diarrhea over the past 3 days  He reports that he received his most recent chemotherapy treatment on Monday, 04/25/2022  He reports nausea but denies any recent vomiting  He reports that his abdominal pain is not worse than usual  He reports chills but denies fevers  · CT A/P from 04/16/2022: "Compared to the recent CT from 4/4/2022, there has been no significant acute changes  Again seen is a large pancreatic head mass, and multiple liver lesions consistent with metastatic pancreatic cancer  Percutaneous cholecystostomy tube and common bile duct stent remain in proper position "  · Follow-up C diff sample and stool enteric bacterial panel  · Continue IV hydration,  Will consider discontinue IV antibiotics later on, encouraged  Increased oral intake  · Continue on p r n  IV antiemetics  · Regular diet as tolerated  · Nutrition consult  Severe protein-calorie malnutrition ([address] )  Assessment & Plan  Malnutrition Findings:   Adult Malnutrition type: Chronic illness  Adult Degree of Malnutrition: Other severe protein calorie malnutrition  Malnutrition Characteristics: Muscle loss,Weight loss,Inadequate energy                  360 Statement: Severe protein calorie malnutrition r/t catabolic illness as evidenced by a 13% weight loss over 1 month, <75% of energy needs > 1 month, and muscle loss to deltoids; currently being treated with nutritional supplement    BMI Findings: Body mass index is 21 68 kg/m²         Consult nutritionist, continue patient protein supplements as recommended dietitian  Metastatic pancreatic cancer  Assessment & Plan  · Patient known to Dr Jl Mayen of Heme-Onc  · Diagnosed in May 2021  · Currently off of any chemotherapy or Neulasta patch  Patient's most recent imaging had shown progression of cancer in spite of his chemotherapy, palliative Care and Oncology have discuss the imaging results with the patient  Leukemoid reaction  Assessment & Plan  · Secondary to Neulasta patch, continue supportive care  ·     Unspecified protein-calorie malnutrition ([address] )  Assessment & Plan  Malnutrition Findings:   · Evidenced by recent weight loss, muscle wasting related to malignancy  BMI Findings:  ·  BMI 21 77  · Nutrition consult  Essential hypertension  Assessment & Plan   Blood pressure is reviewed and acceptable   o Last recorded pressure: 129/60   Given the fact patient is a poor appetite, and blood pressure is running on the softer side, will hold antihypertensive medications  Palliative care patient  Assessment & Plan  · Patient known to Dr Marcus Wells of Palliative care  · Continue home pain regimen of Oxycodone and Gabapentin  · Palliative care consult appreciated  Pharmacologic VTE Prophylaxis: Yes   Mechanical VTE Prophylaxis in Place: No   Patient Centered Rounds: I have performed bedside rounds with the Nursing staff today  Current Length of Stay: 2 day(s)  Current Patient Status: Inpatient   Code Status: Level 1 - Full Code  Time Spent for Care:  35 minutes  More than 50% of total time spent on counseling and coordination of care as described above  Discussions with Specialists or Other Care Team Provider: Yes  Education and Discussions with Family / Patient: Yes, Updated family member  Wife on the phone  Discharge Plan: 24 hrs  Certification Statement: The patient will continue to require additional inpatient hospital stay due to Dehydration, Diarrhea  abd pain   failure to thrive    Subjective:   I have seen and Examined the patient at the bedside  No CP or Sob  No fevers or chills, No nausea or vomiting  Overnight events reviewed with the RN  No Other complains  Patient mentioned that his abdominal pain is still there but better  Still has very poor appetite  Complains dryness in the back of the throat and not able to swallow without difficulty  No coughing  No fevers or chills  Still continues to have some  Diarrhea loose and watery on and off  Review of System:   Denies any CP or SOB  Denies any Cough or Cold  Denies any Fevers or chills  Denies any focal tingling numbness or weakness in any extremities  Objective:   Temp (24hrs), Av 1 °F (36 7 °C), Min:98 °F (36 7 °C), Max:98 1 °F (36 7 °C)    Temp:  [98 °F (36 7 °C)-98 1 °F (36 7 °C)] 98 1 °F (36 7 °C)  HR:  [59-66] 59  Resp:  [18] 18  BP: (123-127)/(66-69) 123/69  SpO2:  [96 %] 96 %  Body mass index is 21 68 kg/m²  Input and Output Summary (last 24 hours): Intake/Output Summary (Last 24 hours) at 2022 1345  Last data filed at 2022 1313  Gross per 24 hour   Intake 1140 ml   Output 1725 ml   Net -585 ml     I/O        0701   0700  0701   0700  0701   0700    P  O  420 120 240    I V  (mL/kg) 1000 (15 9)  900 (14 3)    IV Piggyback       Total Intake(mL/kg) 1420 (22 6) 120 (1 9) 1140 (18 2)    Urine (mL/kg/hr) 1450 (1) 2075 (1 4) 0 (0)    Emesis/NG output  50 50    Total Output 1450 2125 50    Net - +1090                 Physical Exam:   General : Alert, Awake and oriented x 2-3, NAD  Chronically ill-looking  Neck : Supple  Eyes:  LESLIE, EOMI  CVS : S1, S2, RRR    R/S : Clear to auscultate anteriorly  Abd: Soft, NT, ND  Bs+ve  Extremity: No pedal edema noted  Skin: No acute Rash noted  CNS: No acute FND      Psych evaluation: Depressed  Additional Data:     Labs, Culture & Imaging Data Reviewed:    Results from last 7 days   Lab Units 04/29/22  0443   WBC Thousand/uL 59 71*   HEMOGLOBIN g/dL 10 7*   HEMATOCRIT % 33 4*   PLATELETS Thousands/uL 330     Results from last 7 days   Lab Units 04/30/22  0452   POTASSIUM mmol/L 4 0   CHLORIDE mmol/L 104   CO2 mmol/L 28   BUN mg/dL 7   CREATININE mg/dL 0 78   CALCIUM mg/dL 7 8*   ALK PHOS U/L 602*   ALT U/L 20   AST U/L 22         Lab Results   Component Value Date    HGBA1C 6 2 02/14/2022      No orders to display       Cultures:   Blood Culture:   Lab Results   Component Value Date    BLOODCX No Growth After 5 Days  12/19/2021    BLOODCX No Growth After 5 Days   12/19/2021     Urine Culture:   Lab Results   Component Value Date    URINECX No Growth <1000 cfu/mL 03/29/2016     Sputum Culture: No components found for: SPUTUMCX  Wound Culture: No results found for: WOUNDCULT    Last 24 Hours Medication List:   Current Facility-Administered Medications   Medication Dose Route Frequency Provider Last Rate    acetaminophen  650 mg Oral Q6H PRN [facility], CRNP      al mag oxide-diphenhydramine-lidocaine viscous  10 mL Swish & Spit Q4H PRN [facility], CRNP      aluminum-magnesium hydroxide-simethicone  30 mL Oral Q6H PRN [facility], CRNP      apixaban  5 mg Oral BID [facility], CRNP      aspirin  81 mg Oral Daily [facility], CRNP      atorvastatin  20 mg Oral Daily [facility], CRNP      vitamin B-12  1,000 mcg Oral Daily [facility], CRNP      FLUoxetine  20 mg Oral Daily [facility], CRNP      folic acid  1 mg Oral Daily [facility], CRNP      gabapentin  200 mg Oral TID [facility], CRNP      HYDROmorphone  0 5 mg Intravenous Q4H PRN [facility], CRNP      insulin lispro  1-5 Units Subcutaneous TID AC Briseida Quintanilla, CRNP      insulin lispro  1-5 Units Subcutaneous HS Briseida Jones, CRNP      OLANZapine  5 mg Oral HS Florida Salmon MD      ondansetron  4 mg Intravenous Q6H PRN [facility], CRNP      oxyCODONE  10 mg Oral Q4H PRN Red Cassi, CRNP      oxyCODONE  5 mg Oral Q4H PRN Red Cassi, CRNP      pancrelipase (Lip-Prot-Amyl)  24,000 Units Oral TID With Meals Red Cassi, CRNP      pantoprazole  40 mg Oral BID Red Cassi, CRNP      predniSONE  10 mg Oral Daily With Breakfast Red Cassi, CRNP      simethicone  80 mg Oral Q6H PRN Glenn Cottrell MD      tamsulosin  0 4 mg Oral HS Red Cassi, CRNP      traZODone  50 mg Oral HS PRN Glenn Cottrell MD         Patient is at moderate risk for morbidity and mortality due to above mentioned illness and comorbidities

## 2022-04-30 NOTE — PLAN OF CARE
Problem: PHYSICAL THERAPY ADULT  Goal: Performs mobility at highest level of function for planned discharge setting  See evaluation for individualized goals  Description: Treatment/Interventions: ADL retraining,Functional transfer training,LE strengthening/ROM,Elevations,Endurance training,Therapeutic exercise,Patient/family training,Equipment eval/education,Bed mobility,Gait training,Compensatory technique education,Continued evaluation,Spoke to nursing          See flowsheet documentation for full assessment, interventions and recommendations  Note: Prognosis: Fair  Problem List: Decreased endurance,Impaired balance,Decreased mobility  Assessment: Pt is a 78 y o  male who presented to ED 4/27/22 with c/o diarrhea x weeks and loss of appetite, c/o weakness  Dx:  Dehydration, diarrhea, r/o C  Diff; pancreatic Ca currently getting chemo  Comorbidities affecting pt's physical performance at time of assessment include: arthritis, Ca, DM  Personal factors affecting pt at time of IE include: fatigue, IV, 12 steps in home, difficulty eating  PLOF and home set up listed above; Upon evaluation: Pt mod I for bed mobility, S for sit to stand, and S for ambulation without AD  Full objective findings from PT assessment regarding body systems outlined above  Current limitations include impaired balance, decreased endurance/activity tolerance, gait deviations, fall risk and fatigue; Pt's clinical presentation is currently unstable/unpredictable seen in pt's presentation of continuous monitoring in hospital, fatigue  Pt would benefit from continued PT while in hospital and follow up with HHCPT at D/C to increase strength, balance, endurance, independence with funcitonal mobility to return to PLOF, maximize independence, decrease caregiver burden and improve quality of life  The patient's AM-PAC Basic Mobility Inpatient Short Form Raw Score is 23   A Raw score of greater than 17 suggests the patient may benefit from discharge to home  Please also refer to the recommendation of the Physical Therapist for safe discharge planning  Barriers to Discharge: Inaccessible home environment,Decreased caregiver support        PT Discharge Recommendation: Home with home health rehabilitation          See flowsheet documentation for full assessment

## 2022-04-30 NOTE — ASSESSMENT & PLAN NOTE
· Patient known to Dr Sharlene Fiore of Palliative care  · Continue home pain regimen of Oxycodone and Gabapentin  · Palliative care consult appreciated

## 2022-04-30 NOTE — DISCHARGE SUMMARY
Discharge Summary - [facility]    Patient Information: Gutierrez Hodgson  78 y o  male MRN: [id number]  Unit/Bed#: S -01 Encounter: [id number]    Discharging Physician / Practitioner: Sadaf Hdz MD  PCP: Dennis Michaud MD  Admission Date: 4/27/2022  Discharge Date: 04/30/22    Reason for Admission: Diarrhea (pt presents via EMS, coming from home  Pt c/o of multiple episodes of diarrhea for the past couple of weeks  loss of appetite  reports feeling weak ) and Abdominal Pain    Discharge Diagnoses:     Primary Discharge Diagnosis:   Principal Problem:    Chemotherapy induced diarrhea  Active Problems:    Leukemoid reaction    Metastatic pancreatic cancer    Severe protein-calorie malnutrition ([facility] )    Essential hypertension    Palliative care patient    Anemia    Failure to thrive in adult    Hyperlipidemia    GERD    Type 2 diabetes mellitus ([facility] )    Pulmonary embolism (HCC)  Resolved Problems:    * No resolved hospital problems  *  Severe Protein-Calorie Malnutrition    Consultations During Hospital Stay:  IP CONSULT TO PALLIATIVE CARE  IP CONSULT TO ONCOLOGY    Procedures Performed:   · None    Significant Findings:   · Refer to hospital course and above listed diagnosis related plan for details    Imaging while in hospital:  none  Incidental Findings:     Test Results Pending at Discharge (will require follow up):   · As per After Visit Summary     Outpatient Tests Requested:  Complications:  Refer to hospital course and above listed diagnosis related plan, if any    Hospital Course: Gutierrez Hodgson is a 78 y o  male patient with PMHx of metastatic pancreatic cancer, pulmonary embolism on Eliquis, type 2 diabetes, hypertension, hyperlipidemia, GERD, rheumatoid arthritis who originally presented to the hospital on 4/27/2022 due to chief complaints of intractable nausea diarrhea, poor oral intake for the past 2 3 days    Patient was recently on chemotherapy and on chemotherapy infusion  Patient had stool sample sent for C diff and enteric bacterial panel which were both negative  Most likely etiology for patient's intractable diarrhea secondary to chemotherapy induced  With Imodium patient's diarrhea improved but still small amounts  Patient's appetite still was not very good, these are all probably side effects agreed to chemotherapy  Patient was evaluated by palliative Care, Oncology while in hospital   And palliative Care recommended starting the patient on Zyprexa at bedtime  Patient was started on Zyprexa at bedtime for insomnia as well as anorexia  Patient appetite was poor and initially patient was dehydrated and was given IV fluids, electrolytes were stable, patient also had depression secondary to cancer and recent imaging studies done as an outpatient showing no improvement on his metastatic cancer disease in spite of being on chemotherapy  There will be a plan for considering hospice or palliative care in the future  Please see above list of diagnoses and related plan for additional information  Condition at Discharge: fair     Discharge Day Visit / Exam:     Subjective:  I have seen and examined the patient at bedside  Overnight events reviewed with the RN  Vitals: Blood Pressure: 119/68 (04/30/22 1425)  Pulse: 57 (04/30/22 1425)  Temperature: 98 °F (36 7 °C) (04/30/22 1425)  Temp Source: Oral (04/30/22 1425)  Respirations: 18 (04/30/22 1425)  Height: 5' 7" (170 2 cm) (04/28/22 0957)  Weight - Scale: 62 8 kg (138 lb 7 2 oz) (04/29/22 0536)  SpO2: 95 % (04/30/22 1425)  Exam:   Physical Exam  General Exam: Alert and Oriented x 3, NAD  Eyes: LESLIE  Neck: Supple  CVS: S1, S2 [place], RRR    R/S: Clear to auscultate anteriorly  Abd: Soft, NT, ND, BS+ve  Extremities: No edema noted  Skin: No acute Rash noted  CNS: No acute FND  Moves all 4 extremities  Psych: Co-operative, Not agitated       Discharge instructions/Information to patient and family:(Discharge Medications and Follow up):   See after visit summary for information provided to patient and family  Provisions for Follow-Up Care:  See after visit summary for information related to follow-up care and any pertinent home health orders  Disposition: Home    Planned Readmission:  No     Discharge Statement:  I spent 35 minutes discharging the patient  This time was spent on the day of discharge  I had direct contact with the patient on the day of discharge  Greater than 50% of the total time was spent examining patient, answering all patient questions, arranging and discussing plan of care with patient as well as directly providing post-discharge instructions  Additional time then spent on discharge activities  Discharge Medications:  See after visit summary for reconciled discharge medications provided to patient and family  ** Please Note: "This note has been constructed using a voice recognition system  Therefore there may be syntax, spelling, and/or grammatical errors   Please call if you have any questions  "**

## 2022-04-30 NOTE — PHYSICAL THERAPY NOTE
PHYSICAL THERAPY Evaluation    Performed at least 2 patient identifiers during session:  Patient Active Problem List   Diagnosis    Rheumatoid arthritis ([facility] )    Essential hypertension    Anxiety disorder    Hyperlipidemia    Liver mass    GERD    Multiple lipomas    Type 2 diabetes mellitus ([facility] )    BPH (benign prostatic hyperplasia)    Recent cholecystitis    Leukemoid reaction    Metastatic pancreatic cancer    Chemotherapy induced neutropenia (HCC)    Pancreatic cancer metastasized to liver (HCC)    Abdominal pain    Generalized weakness    Pancytopenia due to chemotherapy    Unspecified protein-calorie malnutrition [facility] )    Dental infection    Chest pain    Pneumobilia due to Occluded CBD stent    Numbness and tingling of foot    Pulmonary embolism (HCC)    Mesenteric vein thrombosis (HCC)    Idiopathic osteoarthritis    Rotator cuff tear arthropathy    Tendinitis of right shoulder    Elevated transaminase level    Shortness of breath    Palliative care patient    Cancer related pain    COVID-19 virus infection    Metabolic encephalopathy    Chemotherapy induced diarrhea    Insomnia due to medical condition    Anorexia    Oral mucositis    Dysphagia    Anemia    Failure to thrive in adult       Past Medical History:   Diagnosis Date    Anxiety     Arthritis     Cancer ([facility] )     pancreatic    Cellulitis     LAST ASSESSED: 6/13/14    Diabetes mellitus ([facility] )     Enlarged prostate     Epidermal inclusion cyst     LAST ASSESSED: 10/4/13    Erythrasma     LAST ASSESSED: 9/23/13    Furuncle     LAST ASSESSED: 6/11/14    GERD (gastroesophageal reflux disease)     Hyperlipidemia     Hypertension     RA (rheumatoid arthritis) ([place] [address] )        Past Surgical History:   Procedure Laterality Date    COLONOSCOPY      FL GUIDED CENTRAL VENOUS ACCESS DEVICE INSERTION  6/1/2021    HYDROCELE EXCISION / REPAIR Right 01/11/2018    SPERMATIC CORD EXCSION OF HYDROCELE; MANAGED BY: GABBIE CARBAJAL    IR BIOPSY LIVER MASS  8/16/2021    IR CHOLECYSTOSTOMY TUBE CHECK/CHANGE/REPOSITION/REINSERTION/UPSIZE  6/22/2021    IR CHOLECYSTOSTOMY TUBE CHECK/CHANGE/REPOSITION/REINSERTION/UPSIZE  8/23/2021    IR CHOLECYSTOSTOMY TUBE CHECK/CHANGE/REPOSITION/REINSERTION/UPSIZE  1/19/2022    IR CHOLECYSTOSTOMY TUBE PLACEMENT  5/11/2021    MULTIPLE TOOTH EXTRACTIONS Left 7/22/2021    Procedure: EXTRACTION TEETH 14 & 15, ALVEOPLASTY, EXCISION OF CYST LEFT MAXILLA; Surgeon: Ana Gonzalez DDS;  Location: [facility];  Service: Maxillofacial    AK REMOVAL OF HYDROCELE,TUNICA,UNILAT Right 1/11/2018    Procedure: HYDROCELECTOMY;  Surgeon: Scout Weathers MD;  [facility];  Service: Urology    SCROTAL SURGERY      benign "lump"    TUNNELED VENOUS PORT PLACEMENT Left 6/1/2021    Procedure: INSERTION VENOUS PORT (PORT-A-CATH);   Surgeon: Dary Sr MD;  [facility]: [facility];  Service: Surgical Oncology        04/30/22 0908   PT Last Visit   PT Visit Date 04/30/22   Note Type   Note type Evaluation   Pain Assessment   Pain Assessment Tool 0-10   Pain Score No Pain   Restrictions/Precautions   Weight Bearing Precautions Per Order No   Other Precautions Multiple lines;Contact/isolation  (C Diff)   Home Living   Type of [address] [place] [address] Two level;Bed/bath upstairs  (3 GREG)   Additional Comments pt completely independent at baseline   Prior Function   Level of [place] Independent with ADLs and functional mobility   Lives With Spouse   Receives Help From Family   ADL Assistance Independent   IADLs Independent   General   Additional Pertinent History Pt states he feels very tired, continues to have difficulty swallowing and is also just not very hungry   Family/Caregiver Present No   Cognition   Overall Cognitive Status WFL   Arousal/Participation Arousable   Orientation Level Oriented X4   Memory Within functional limits   Following Commands Follows all commands and directions without difficulty   Subjective   Subjective pt states he feels very tired   RUE Assessment   RUE Assessment WFL   LUE Assessment   LUE Assessment WFL   RLE Assessment   RLE Assessment WFL   LLE Assessment   LLE Assessment WFL   Bed Mobility   Supine to Sit 6  Modified independent   Additional Comments pt remains OOB in chair at end of session   Transfers   Sit to Stand 5  Supervision   Additional items Increased time required   Stand to Sit 5  Supervision   Additional items Increased time required   Ambulation/Elevation   Gait pattern Narrow JASIEL; Decreased foot clearance   Gait Assistance 5  Supervision   Additional items Assist x 1   Assistive Device None   Distance 50ft x 2 without AD, no LOB, slow stacy;  limited by fatigue   Stair Management Assistance Not tested  (limited by fatigue;  pt has 3 GREG and 12 steps to bed/bath)   Balance   Static Sitting Fair   Dynamic Sitting Fair   Static Standing Fair   Dynamic Standing Fair   Ambulatory Fair   Activity Tolerance   Activity Tolerance Patient limited by fatigue   Nurse Made Aware Bruno   Assessment   Prognosis Fair   Problem List Decreased endurance; Impaired balance;Decreased mobility   Assessment Pt is a 78 y o  male who presented to ED 4/27/22 with c/o diarrhea x weeks and loss of appetite, c/o weakness  Dx:  Dehydration, diarrhea, r/o C  Diff; pancreatic Ca currently getting chemo  Comorbidities affecting pt's physical performance at time of assessment include: arthritis, Ca, DM  Personal factors affecting pt at time of IE include: fatigue, IV, 12 steps in home, difficulty eating  PLOF and home set up listed above; Upon evaluation: Pt mod I for bed mobility, S for sit to stand, and S for ambulation without AD  Full objective findings from PT assessment regarding body systems outlined above   Current limitations include impaired balance, decreased endurance/activity tolerance, gait deviations, fall risk and fatigue; Pt's clinical presentation is currently unstable/unpredictable seen in pt's presentation of continuous monitoring in hospital, fatigue  Pt would benefit from continued PT while in hospital and follow up with HHCPT at D/C to increase strength, balance, endurance, independence with funcitonal mobility to return to PLOF, maximize independence, decrease caregiver burden and improve quality of life  The patient's AM-PAC Basic Mobility Inpatient Short Form Raw Score is 23  A Raw score of greater than 17 suggests the patient may benefit from discharge to home  Please also refer to the recommendation of the Physical Therapist for safe discharge planning  Barriers to Discharge Inaccessible home environment;Decreased caregiver support   Goals   Patient Goals to get stronger and be able to eat   STG Expiration Date 05/14/22   Short Term Goal #1 Pt will be able to perform:  mod I sit to/from standing without AD;  mod I to ambulate 150ft without AD;  mod I to ascend/descend 12 steps with handrail;  to return to PLOF and 2 level home with bed/bath upstairs   Plan   Treatment/Interventions ADL retraining;Functional transfer training;LE strengthening/ROM; Elevations; Endurance training; Therapeutic exercise;Patient/family training;Equipment eval/education; Bed mobility;Gait training; Compensatory technique education;Continued evaluation;Spoke to nursing   PT Frequency 2-3x/wk   Recommendation   PT Discharge Recommendation Home with home health rehabilitation   [address] [place] [address] Mobility Inpatient   Turning in Bed Without Bedrails 4   Lying on Back to Sitting on Edge of Flat Bed 4   Moving Bed to Chair 4   Standing Up From Chair 4   Walk in Room 4   Climb 3-5 Stairs 3   Basic Mobility Inpatient Raw Score 23   Basic Mobility Standardized Score 50 88   Highest Level Of Mobility   JH-HLM Goal 7: Walk 25 feet or more   JH-HLM Highest Level of Mobility 7: Walk 25 feet or more   JH-HLM Goal Achieved Yes Dick Newby PT      Patient Name: Buddy Gaines    Today's Date: 4/30/2022

## 2022-04-30 NOTE — ASSESSMENT & PLAN NOTE
· Patient known to Dr Maria Elena Schmidt of [facility]  · Diagnosed in May 2021  · Currently off of any chemotherapy or Neulasta patch  Patient's most recent imaging had shown progression of cancer in spite of his chemotherapy, palliative Care and Oncology have discuss the imaging results with the patient

## 2022-04-30 NOTE — ASSESSMENT & PLAN NOTE
Malnutrition Findings:   Adult Malnutrition type: Chronic illness  Adult Degree of Malnutrition: Other severe protein calorie malnutrition  Malnutrition Characteristics: Muscle loss,Weight loss,Inadequate energy                  360 Statement: Severe protein calorie malnutrition r/t catabolic illness as evidenced by a 13% weight loss over 1 month, <75% of energy needs > 1 month, and muscle loss to deltoids; currently being treated with nutritional supplement    BMI Findings: Body mass index is 21 68 kg/m²  Consult nutritionist, continue patient protein supplements as recommended dietitian

## 2022-04-30 NOTE — DISCHARGE INSTR - AVS FIRST PAGE
Dear Ilya Mccann ,     It was our pleasure to care for you here at [facility]  It is our hope that we were always able to exceed the expected standards for your care during your stay  You were hospitalized due to intractable diarrhea and weakness  You were cared for on the 3rd floor under the service of Yamel Talavera MD with the [facility] who covers for your primary care physician (PCP), Rain Pal MD, while you were hospitalized  If you have any questions or concerns related to this hospitalization, you may contact us at [phone]  For follow up as well as medication refills, we recommend that you follow up with your primary care physician  A registered nurse will reach out to you by phone within a few days after your discharge to answer any additional questions that you may have after going home  However, at this time we provide for you here, the most important instructions / recommendations at discharge:     Notable Medication Adjustments -   Simethicone  Zyprexa for Insomnia and Poor appetite  Testing Required after Discharge -   Important follow up information -   F/u with Palliative care and Oncology  Other Instructions -   Please review this entire after visit summary as additional general instructions including medication list, appointments, activity, diet, any pertinent wound care, and other additional recommendations from your care team that may be provided for you        Sincerely,     Yamel Talavera MD

## 2022-04-30 NOTE — ASSESSMENT & PLAN NOTE
· Presentation: Patient presents due to complaints of diarrhea  He reports that he has been having great than 10 episodes of watery, non bloody diarrhea over the past 3 days  He reports that he received his most recent chemotherapy treatment on Monday, 04/25/2022  He reports nausea but denies any recent vomiting  He reports that his abdominal pain is not worse than usual  He reports chills but denies fevers  · CT A/P from 04/16/2022: "Compared to the recent CT from 4/4/2022, there has been no significant acute changes  Again seen is a large pancreatic head mass, and multiple liver lesions consistent with metastatic pancreatic cancer  Percutaneous cholecystostomy tube and common bile duct stent remain in proper position "  · Follow-up C diff sample and stool enteric bacterial panel  · Continue IV hydration,  Will consider discontinue IV antibiotics later on, encouraged  Increased oral intake  · Continue on p r n  IV antiemetics  · Regular diet as tolerated  · Nutrition consult

## 2022-05-01 NOTE — PLAN OF CARE
Problem: Prexisting or High Potential for Compromised Skin Integrity  Goal: Skin integrity is maintained or improved  Description: INTERVENTIONS:  - Identify patients at risk for skin breakdown  - Assess and monitor skin integrity  - Assess and monitor nutrition and hydration status  - Monitor labs   - Assess for incontinence   - Turn and reposition patient  - Assist with mobility/ambulation  - Relieve pressure over bony prominences  - Avoid friction and shearing  - Provide appropriate hygiene as needed including keeping skin clean and dry  - Evaluate need for skin moisturizer/barrier cream  - Collaborate with interdisciplinary team   - Patient/family teaching  - Consider wound care consult   Outcome: Progressing     Problem: Potential for Falls  Goal: Patient will remain free of falls  Description: INTERVENTIONS:  - Educate patient/family on patient safety including physical limitations  - Instruct patient to call for assistance with activity   - Consult OT/PT to assist with strengthening/mobility   - Keep Call bell within reach  - Keep bed low and locked with side rails adjusted as appropriate  - Keep care items and personal belongings within reach  - Initiate and maintain comfort rounds  - Make Fall Risk Sign visible to staff  - Apply yellow socks and bracelet for high fall risk patients  - Consider moving patient to room near nurses station  Outcome: Progressing     Problem: METABOLIC, FLUID AND ELECTROLYTES - ADULT  Goal: Electrolytes maintained within normal limits  Description: INTERVENTIONS:  - Monitor labs and assess patient for signs and symptoms of electrolyte imbalances  - Administer electrolyte replacement as ordered  - Monitor response to electrolyte replacements, including repeat lab results as appropriate  - Instruct patient on fluid and nutrition as appropriate  Outcome: Progressing  Goal: Fluid balance maintained  Description: INTERVENTIONS:  - Monitor labs   - Monitor I/O and WT  - Instruct patient on fluid and nutrition as appropriate  - Assess for signs & symptoms of volume excess or deficit  Outcome: Progressing  Goal: Glucose maintained within target range  Description: INTERVENTIONS:  - Monitor Blood Glucose as ordered  - Assess for signs and symptoms of hyperglycemia and hypoglycemia  - Administer ordered medications to maintain glucose within target range  - Assess nutritional intake and initiate nutrition service referral as needed  Outcome: Progressing     Problem: MUSCULOSKELETAL - ADULT  Goal: Maintain or return mobility to safest level of function  Description: INTERVENTIONS:  - Assess patient's ability to carry out ADLs; assess patient's baseline for ADL function and identify physical deficits which impact ability to perform ADLs (bathing, care of mouth/teeth, toileting, grooming, dressing, etc )  - Assess/evaluate cause of self-care deficits   - Assess range of motion  - Assess patient's mobility  - Assess patient's need for assistive devices and provide as appropriate  - Encourage maximum independence but intervene and supervise when necessary  - Involve family in performance of ADLs  - Assess for home care needs following discharge   - Consider OT consult to assist with ADL evaluation and planning for discharge  - Provide patient education as appropriate  Outcome: Progressing  Goal: Maintain proper alignment of affected body part  Description: INTERVENTIONS:  - Support, maintain and protect limb and body alignment  - Provide patient/ family with appropriate education  Outcome: Progressing

## 2022-05-01 NOTE — PLAN OF CARE
Problem: Prexisting or High Potential for Compromised Skin Integrity  Goal: Skin integrity is maintained or improved  Description: INTERVENTIONS:  - Identify patients at risk for skin breakdown  - Assess and monitor skin integrity  - Assess and monitor nutrition and hydration status  - Monitor labs   - Assess for incontinence   - Turn and reposition patient  - Assist with mobility/ambulation  - Relieve pressure over bony prominences  - Avoid friction and shearing  - Provide appropriate hygiene as needed including keeping skin clean and dry  - Evaluate need for skin moisturizer/barrier cream  - Collaborate with interdisciplinary team   - Patient/family teaching  - Consider wound care consult   Outcome: Progressing     Problem: Potential for Falls  Goal: Patient will remain free of falls  Description: INTERVENTIONS:  - Educate patient/family on patient safety including physical limitations  - Instruct patient to call for assistance with activity   - Consult OT/PT to assist with strengthening/mobility   - Keep Call bell within reach  - Keep bed low and locked with side rails adjusted as appropriate  - Keep care items and personal belongings within reach  - Initiate and maintain comfort rounds  - Make Fall Risk Sign visible to staff  - Offer Toileting every 2 Hours, in advance of need  - Apply yellow socks and bracelet for high fall risk patients  - Consider moving patient to room near nurses station  Outcome: Progressing     Problem: METABOLIC, FLUID AND ELECTROLYTES - ADULT  Goal: Electrolytes maintained within normal limits  Description: INTERVENTIONS:  - Monitor labs and assess patient for signs and symptoms of electrolyte imbalances  - Administer electrolyte replacement as ordered  - Monitor response to electrolyte replacements, including repeat lab results as appropriate  - Instruct patient on fluid and nutrition as appropriate  Outcome: Progressing  Goal: Fluid balance maintained  Description: INTERVENTIONS:  - Monitor labs   - Monitor I/O and WT  - Instruct patient on fluid and nutrition as appropriate  - Assess for signs & symptoms of volume excess or deficit  Outcome: Progressing  Goal: Glucose maintained within target range  Description: INTERVENTIONS:  - Monitor Blood Glucose as ordered  - Assess for signs and symptoms of hyperglycemia and hypoglycemia  - Administer ordered medications to maintain glucose within target range  - Assess nutritional intake and initiate nutrition service referral as needed  Outcome: Progressing     Problem: MUSCULOSKELETAL - ADULT  Goal: Maintain or return mobility to safest level of function  Description: INTERVENTIONS:  - Assess patient's ability to carry out ADLs; assess patient's baseline for ADL function and identify physical deficits which impact ability to perform ADLs (bathing, care of mouth/teeth, toileting, grooming, dressing, etc )  - Assess/evaluate cause of self-care deficits   - Assess range of motion  - Assess patient's mobility  - Assess patient's need for assistive devices and provide as appropriate  - Encourage maximum independence but intervene and supervise when necessary  - Involve family in performance of ADLs  - Assess for home care needs following discharge   - Consider OT consult to assist with ADL evaluation and planning for discharge  - Provide patient education as appropriate  Outcome: Progressing  Goal: Maintain proper alignment of affected body part  Description: INTERVENTIONS:  - Support, maintain and protect limb and body alignment  - Provide patient/ family with appropriate education  Outcome: Progressing     Problem: Nutrition/Hydration-ADULT  Goal: Nutrient/Hydration intake appropriate for improving, restoring or maintaining nutritional needs  Description: Monitor and assess patient's nutrition/hydration status for malnutrition  Collaborate with interdisciplinary team and initiate plan and interventions as ordered    Monitor patient's weight and dietary intake as ordered or per policy  Utilize nutrition screening tool and intervene as necessary  Determine patient's food preferences and provide high-protein, high-caloric foods as appropriate       INTERVENTIONS:  - Monitor oral intake, urinary output, labs, and treatment plans  - Assess nutrition and hydration status and recommend course of action  - Evaluate amount of meals eaten  - Assist patient with eating if necessary   - Allow adequate time for meals  - Recommend/ encourage appropriate diets, oral nutritional supplements, and vitamin/mineral supplements  - Order, calculate, and assess calorie counts as needed  - Recommend, monitor, and adjust tube feedings and TPN/PPN based on assessed needs  - Assess need for intravenous fluids  - Provide specific nutrition/hydration education as appropriate  - Include patient/family/caregiver in decisions related to nutrition  Outcome: Progressing

## 2022-05-03 NOTE — Clinical Note
Christiano Alaniz Sr 35AN male  [birth year]  with stage IV pancreatic adenocarcinoma  He was initially diagnosed with biopsy on 2021 and underwent treatment  Despite treatment ca 19 9 is rising 3489 and he is no longer candidate for further treatment  Having issues with swallowing, poor appetite and weak  He has opted for Hospice at this time  PPS 50-60  Approve, RLOC?

## 2022-05-03 NOTE — PROGRESS NOTES
Hematology/Oncology Outpatient Follow- up Note  Fernando Astudillo  78 y o  male MRN: @ Encounter: [id number]        Date:  5/3/2022    Presenting Complaint/Diagnosis :  Metastatic pancreatic adenocarcinoma    HPI:    Bindu Singh seen for initial consultation 10/23/200 regarding   A newly diagnosed pancreatic cancer   The patient presented to the hospital with obstructive jaundice   He ended up having a workup along with a biopsy which revealed  A 3 8 cm obstructing mass in the region of the head of the pancreas with marked pancreatic ductal dilatation worrisome for adenocarcinoma   Biopsy was done which proved this was adenocarcinoma   The patient also had cholecystitis at the time and ended up having a cholecystostomy tube placed and was treated with antibiotics  Kris Styles was seen by our colleagues in Surgical Oncology who recommended neoadjuvant chemotherapy upfront which we will initiate  Kris Styles is not a candidate for  FOLFIRINOX and so  Got gemcitabine and Abraxane   He then had progression and developed metastatic disease  Previous Hematologic/ Oncologic History:    Oncology History   Metastatic pancreatic cancer   5/7/2021 Biopsy    Head of pancreas:  Malignant Adenocarcinoma       6/3/2021 - 9/2/2021 Chemotherapy    pegfilgrastim (NEULASTA ONPRO), 6 mg, Subcutaneous, Once, 2 of 4 cycles  Administration: 6 mg (8/19/2021)  paclitaxel protein-bound (ABRAXANE) IVPB, 100 mg/m2 = 191 mg (80 % of original dose 125 mg/m2), Intravenous, Once, 4 of 6 cycles  Dose modification: 100 mg/m2 (original dose 125 mg/m2, Cycle 1, Reason: Other (Must fill in a comment), Comment: per protocol)  Administration: 191 mg (6/3/2021), 191 mg (6/10/2021), 191 mg (7/1/2021), 191 mg (7/8/2021), 191 mg (8/5/2021), 191 mg (8/12/2021), 191 mg (8/19/2021), 191 mg (9/2/2021)  gemcitabine (GEMZAR) infusion, 1,000 mg/m2 = 1,909 9 mg, Intravenous, Once, 4 of 6 cycles  Administration: 1,909 9 mg (6/3/2021), 1,909 9 mg (6/10/2021), 1,909 9 mg (7/1/2021), 1,909 9 mg (7/8/2021), 1,909 9 mg (8/5/2021), 1,909 9 mg (8/12/2021), 1,909 9 mg (8/19/2021), 1,909 9 mg (9/2/2021)     9/22/2021 - 12/3/2021 Chemotherapy    fluorouracil (ADRUCIL), 400 mg/m2 = 770 mg, Intravenous, Once, 5 of 5 cycles  Administration: 770 mg (9/22/2021), 770 mg (10/6/2021), 785 mg (10/20/2021), 785 mg (11/17/2021), 785 mg (12/1/2021)  pegfilgrastim (Charna Sciara), 6 mg, Subcutaneous, Once, 5 of 5 cycles  Administration: 6 mg (9/24/2021), 6 mg (10/8/2021), 6 mg (10/22/2021), 6 mg (11/19/2021), 6 mg (12/3/2021)  leucovorin calcium IVPB, 772 mg, Intravenous, Once, 5 of 5 cycles  Administration: 800 mg (9/22/2021), 800 mg (10/6/2021), 800 mg (10/20/2021), 800 mg (11/17/2021), 800 mg (12/1/2021)  oxaliplatin (ELOXATIN) chemo infusion, 85 mg/m2 = 164 05 mg, Intravenous, Once, 5 of 5 cycles  Administration: 164 05 mg (9/22/2021), 164 05 mg (10/6/2021), 166 6 mg (10/20/2021), 166 6 mg (11/17/2021), 166 6 mg (12/1/2021)  fluorouracil (ADRUCIL) ambulatory infusion Soln, 1,200 mg/m2/day = 4,630 mg, Intravenous, Over 46 hours, 5 of 5 cycles     12/29/2021 -  Chemotherapy    palonosetron (ALOXI), 0 25 mg, Intravenous, Once, 7 of 10 cycles  Administration: 0 25 mg (1/12/2022), 0 25 mg (2/2/2022), 0 25 mg (2/23/2022), 0 25 mg (3/9/2022), 0 25 mg (3/23/2022), 0 25 mg (4/6/2022), 0 25 mg (4/25/2022)  pegfilgrastim (NEULASTA ONPRO), 6 mg, Subcutaneous, Once, 8 of 11 cycles  Administration: 6 mg (12/31/2021), 6 mg (1/14/2022), 6 mg (2/4/2022), 6 mg (2/25/2022), 6 mg (3/11/2022), 6 mg (3/25/2022), 6 mg (4/8/2022), 6 mg (4/27/2022)  fosaprepitant (EMEND) IVPB, 150 mg, Intravenous, Once, 7 of 10 cycles  Administration: 150 mg (1/12/2022), 150 mg (2/2/2022), 150 mg (2/23/2022), 150 mg (3/9/2022), 150 mg (3/23/2022), 150 mg (4/6/2022), 150 mg (4/25/2022)  leucovorin calcium IVPB, 400 mg/m2 = 780 mg, Intravenous, Once, 7 of 7 cycles  Dose modification: 200 mg/m2 (original dose 400 mg/m2, Cycle 4, Reason: Other (Must fill in a comment), Comment:   peeling of the hands)  Administration: 800 mg (12/29/2021), 800 mg (1/12/2022), 760 mg (2/2/2022), 380 mg (2/23/2022), 380 mg (3/9/2022), 380 mg (3/23/2022), 380 mg (4/6/2022)  fluorouracil (ADRUCIL) ambulatory infusion Soln, 1,200 mg/m2/day = 4,680 mg, Intravenous, Over 46 hours, 8 of 11 cycles  Dose modification: 1,100 mg/m2/day (original dose 1,200 mg/m2/day, Cycle 9, Reason: Dose modified as per discussion with consulting physician, Comment: Diarrhea ), 1,100 mg/m2/day (original dose 1,200 mg/m2/day, Cycle 8, Reason: Other (Must fill in a comment), Comment: Diarrhea)         Current Hematologic/ Oncologic Treatment:    FOLFIRI    Interval History:    Patient returns for follow-up visit  Most recent imaging is suggestive of progression  He has lost a lot of weight and is not doing well  Has had diarrhea  ECOG performance status is a 2  At this point I think hospice is reasonable based on his most recent imaging  The patient and his son agree  He is fatigued  He has no appetite  Has difficulty swallowing and getting food down which I suspect is from extrinsic compression form is pancreatic mass  Overall he is doing poorly  He is no longer candidate for chemotherapy and has run out of good chemotherapy options  The rest of his 14 point review of systems today was negative  Test Results:    Imaging: CT chest abdomen pelvis w contrast    Result Date: 4/27/2022  Narrative: CT CHEST, ABDOMEN AND PELVIS WITH IV CONTRAST INDICATION:   C25 9: Malignant neoplasm of pancreas, unspecified D70 1: Agranulocytosis secondary to cancer chemotherapy T45  1X5A: Adverse effect of antineoplastic and immunosuppressive drugs, initial encounter C25 9: Malignant neoplasm of pancreas, unspecified C78 7: Secondary malignant neoplasm of liver and intrahepatic bile duct  COMPARISON:  CT chest abdomen pelvis 4/4/2022  CT abdomen pelvis 4/16/2022  CT abdomen pelvis 11/10/2016    MRI abdomen 4/27/2021  TECHNIQUE: CT examination of the chest, abdomen and pelvis was performed  Axial, sagittal, and coronal 2D reformatted images were created from the source data and submitted for interpretation  Radiation dose length product (DLP) for this visit:  995 mGy-cm   This examination, like all CT scans performed in the [facility], was performed utilizing techniques to minimize radiation dose exposure, including the use of iterative reconstruction and automated exposure control  IV Contrast:  100 mL of iohexol (OMNIPAQUE) Enteric Contrast: Enteric contrast was administered  FINDINGS: CHEST LUNGS:  No new or enlarging pulmonary nodules or focal consolidations  Stable pleural-based scarring is noted within the lingula and right apex  There is no discrete tracheal or endobronchial lesion  PLEURA:  Unremarkable  HEART/GREAT VESSELS: Heart is normal in size  Small pericardial effusion is similar to prior exam   Atherosclerotic calcifications are noted within the thoracic aorta and branching vessels Left-sided infusion catheter is seen with its tip terminating in  the mid SVC  MEDIASTINUM AND WAQAS: Trace enteric contrast is noted within the esophagus  Otherwise unremarkable  CHEST WALL AND LOWER NECK:  Possible sebaceous cyst is again noted within the right anterior chest wall, stable from exams  ABDOMEN LIVER/BILIARY TREE:  There is diffuse hypoattenuation of the liver parenchyma compatible steatosis  -The hypodense lesion seen within segment 5 on prior CT of 4/4/2022 measuring 1 5 cm is no longer evident  -Numerous additional metastatic and cystic lesions are essentially unchanged from 4/4/2022  Includes a previously biopsied lesion along the anterior aspect of segment 3 measuring which measures approximately 1 4 x 1 0 cm (3:62)    -There is again a segment 3 lesion measuring 2 1 x 2 6 cm (2:42) which demonstrates somewhat heterogeneous peripheral arterial enhancement with homogeneous enhancement on portal venous and delayed imaging  This is stable dating back to CT of November 2016 and 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   -Common bile duct stent is in stable position  Pneumobilia is again noted predominantly within the left hepatic lobe, similar to prior exam  GALLBLADDER: Cholecystostomy tube remains coiled in a decompressed gallbladder  SPLEEN:  Unremarkable  PANCREAS:  Heterogeneous pancreatic head head mass measures approximately 7 1 x 6 7 cm (3:72), similar in size to prior CT of 4/4/2022  Jeannetta Given There is again is again central hypodensity suggestive of necrosis  This is similar to CT of 4/4/2021 when accounting for differences in measuring technique  There is again upstream pancreatic ductal dilatation and parenchymal atrophy  The mass again encases the superior mesenteric artery there is  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  ADRENAL GLANDS:  Unremarkable  KIDNEYS/URETERS:  No hydronephrosis or hydroureter  Renal cysts are noted  Additional cyst sharply circumscribed subcentimeter renal hypodensities are present, too small to accurately characterize, and statistically most likely benign findings  According to recent literature (radiology 2019) no further workup of these findings is recommended  STOMACH AND BOWEL:  There is moderate gastric distention  There is narrowing of the lumen of the distal stomach and proximal duodenum secondary to mass effect from large pancreatic lesion described above  Enteric contrast is seen reaching the colon  No abnormal small bowel dilatation  APPENDIX:  No findings to suggest appendicitis  ABDOMINOPELVIC CAVITY:  No pneumoperitoneum  There is trace free fluid within the dependent pelvis  Nodular soft tissue densities are seen extending inferiorly from the pancreatic mass along the expected course of the mesenteric venous vasculature  The largest conglomerate density measures 3 7 x 1 9 cm (3:86)    This is slightly increased in size from prior study of 4/4/2022 and may represent metastatic adenopathy versus worsening occlusion/thrombosis of the mesenteric vasculature  VESSELS:  There is 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 mass as described above  Portions of the superior mesenteric vein and inferior mesenteric vasculature again demonstrate a somewhat nodular appearance which may be secondary to thrombosis versus The mass does encase the superior mesenteric artery  There is flattening of the inferior vena cava secondary to mass effect from the aforementioned pancreatic mass  PELVIS REPRODUCTIVE ORGANS:  Unremarkable for patient's age  URINARY BLADDER:  Unremarkable  ABDOMINAL WALL/INGUINAL REGIONS:  Unremarkable  OSSEOUS STRUCTURES:  No acute fracture or destructive osseous lesion  Impression: 1  Redemonstration of pancreatic head mass which is grossly stable in size from prior CT of 4/4/2022  There is again upstream pancreatic ductal dilatation/parenchymal atrophy as well as encasement of the superior mesenteric artery and occlusion/thrombosis of the superior mesenteric vein  2   Redemonstration of multiple metastatic/cystic lesions throughout the liver  Hypodense lesion previously seen within segment 5 on 4/4/2022 is no longer evident  Remaining lesions appear grossly stable  3   Interval worsening of soft tissue nodularity along the expected course of the mesenteric veins  Findings may represent worsening thrombus versus worsening mesenteric metastasis  4   Moderate distention of the stomach with luminal narrowing of the distal stomach  Findings are concerning for worsening partial gastric outlet obstruction secondary to mass effect from the pancreatic lesion  Enteric contrast does reach the colon  The study was marked in EPIC for significant notification   Workstation performed: [id number]     CT chest abdomen pelvis w contrast    Result Date: 4/4/2022  Narrative: CT CHEST, ABDOMEN AND PELVIS WITH IV CONTRAST INDICATION:   pancreatic cancer abd pain  COMPARISON:  CT abdomen pelvis 1/20/2022  TECHNIQUE: CT examination of the chest, abdomen and pelvis was performed  Scanning through the abdomen was performed in arterial, venous and delayed phases according a protocol specially designed to evaluate upper abdominal viscera  Axial, sagittal, and  coronal 2D reformatted images were created from the source data and submitted for interpretation  Radiation dose length product (DLP) for this visit:  905 mGy-cm   This examination, like all CT scans performed in the [facility], was performed utilizing techniques to minimize radiation dose exposure, including the use of iterative reconstruction and automated exposure control  IV Contrast:  100 mL of iohexol (OMNIPAQUE) Enteric Contrast: Enteric contrast was not administered  FINDINGS: CHEST LUNGS:  Tiny calcified granuloma in the right lower lobe  No suspicious pulmonary nodules or masses  Trachea and bronchi are patent  PLEURA:  Unremarkable  HEART/GREAT VESSELS: Heart is normal size  Small pericardial effusion, stable  No thoracic aortic aneurysm  MEDIASTINUM AND WAQAS:  Unremarkable  CHEST WALL AND LOWER NECK:   Left-sided Mediport line tip terminates in the SVC  Small sebaceous cyst suggested at the right anterior chest wall, stable  ABDOMEN LIVER/BILIARY TREE:  Diffuse fatty infiltration of the liver  Multiple hepatic masses again seen compatible with metastasis  Hypodense mass in the left lobe laterally measures up to 2 7 cm image 70 series 2, stable, previously 2 6 cm  Hypodense mass in the left lobe lateral segment measures up to 1 7 cm image 60 series 2, unchanged  Smaller hypodense mass in the right lobe of the liver inferiorly measuring up to 1 5 cm image 81 series 2, previously 2 5 cm  Stable positioning of the CBD stent  Intrahepatic biliary ductal gas   GALLBLADDER:  Cholecystostomy tube remains in position  SPLEEN:  Unremarkable  PANCREAS:  Large pancreatic head/body mass measures 7 1 x 6 1 cm image 77 series 2, larger previously 6 3 x 5 5 cm measured at a similar slice  Central hypoattenuation suggests necrosis  The SMA is encircled by the mass  SMV not visualized thrombosed and  occluded by the mass  Distal pancreatic duct is dilated with atrophy of the remaining body and tail  ADRENAL GLANDS:  Unremarkable  KIDNEYS/URETERS:  No hydronephrosis or urinary tract calculus  One or more sharply circumscribed subcentimeter renal hypodensities are present, too small to accurately characterize, and statistically most likely benign findings  According to recent literature (Radiology 2019) no further workup of these findings is recommended  STOMACH AND BOWEL:  Limited evaluation without enteric contrast  No bowel obstruction  APPENDIX:  No findings to suggest appendicitis  ABDOMINOPELVIC CAVITY:  No ascites  No pneumoperitoneum  No lymphadenopathy  VESSELS:  Splenic varices  PELVIS REPRODUCTIVE ORGANS:  Enlarged heterogeneous prostate gland with scattered calcifications  URINARY BLADDER:  Unremarkable  ABDOMINAL WALL/INGUINAL REGIONS:  Small fat-containing left inguinal hernia  OSSEOUS STRUCTURES:  No acute fracture or destructive osseous lesion  Impression: 1  Enlarging pancreatic mass compatible with known malignancy and concerning for disease progression  The SMA is now encircled by the mass  SMV is again not visualized likely thrombosed and occluded by the mass  2   Mostly stable multiple hepatic masses compatible with metastasis  3   No new suspicious findings in the chest  Workstation performed: [id number]     CT abdomen pelvis with contrast    Result Date: 4/16/2022  Narrative: CT ABDOMEN AND PELVIS WITH IV CONTRAST INDICATION:   diarrhea, abd pain  Dr Librado Reina note from 4/16/2022 was reviewed    Patient has history of stage IV pancreatic cancer presenting with generalized weakness and chronic diarrhea  COMPARISON:  Chest, abdomen, pelvic CT from 4/4/2022  A remote MR from 12/2/2016 also reviewed  TECHNIQUE:  CT examination of the abdomen and pelvis was performed  Axial, sagittal, and coronal 2D reformatted images were created from the source data and submitted for interpretation  Radiation dose length product (DLP) for this visit:  668 mGy-cm   This examination, like all CT scans performed in the [facility], was performed utilizing techniques to minimize radiation dose exposure, including the use of iterative reconstruction and automated exposure control  IV Contrast:  100 mL of iohexol (OMNIPAQUE) Enteric Contrast:  Enteric contrast was not administered  FINDINGS: ABDOMEN LOWER CHEST:  No clinically significant abnormality identified in the visualized lower chest  LIVER/BILIARY TREE:  Unchanged multiple liver metastases and diffuse fatty liver change  The liver metastases are predominantly hypoenhancing relative to the rest of the liver  Please note there is also a 2 7 cm diffusely hyperenhancing lesion in segment 3 of the liver (series 4 image 30,) which has demonstrated long-term stability and is consistent with a hemangioma based on prior MRI  Common bile duct stent remains in proper position, with expected intrahepatic pneumobilia  GALLBLADDER:  Percutaneous cholecystostomy tube remains in proper position  SPLEEN:  Unremarkable  PANCREAS:  Unchanged 7 cm pancreatic head mass consistent with patient's known pancreatic cancer  Again seen is upstream pancreatic ductal dilatation and parenchymal atrophy  Again seen is encasement SMA by the pancreatic mass  Also 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  ADRENAL GLANDS:  Unremarkable  KIDNEYS/URETERS:  No hydronephrosis or urinary tract calculus    One or more sharply circumscribed subcentimeter renal hypodensities are present, too small to accurately characterize, and statistically most likely benign findings  According to recent literature (Radiology 2019) no further workup of these findings is recommended  STOMACH AND BOWEL:  Unremarkable  APPENDIX:  No findings to suggest appendicitis  ABDOMINOPELVIC CAVITY:  No ascites  No pneumoperitoneum  No lymphadenopathy  VESSELS:  Unremarkable for patient's age  PELVIS REPRODUCTIVE ORGANS:  Unremarkable for patient's age  URINARY BLADDER:  Unremarkable  ABDOMINAL WALL/INGUINAL REGIONS:  Unremarkable  OSSEOUS STRUCTURES:  No acute fracture or destructive osseous lesion  Impression: Compared to the recent CT from 4/4/2022, there has been no significant acute changes  Again seen is a large pancreatic head mass, and multiple liver lesions consistent with metastatic pancreatic cancer  Percutaneous cholecystostomy tube and common bile duct stent remain in proper position   Workstation performed: [id number]       Labs:   Lab Results   Component Value Date    WBC 59 71 (HH) 04/29/2022    HGB 10 7 (L) 04/29/2022    HCT 33 4 (L) 04/29/2022    MCV 96 04/29/2022     04/29/2022     Lab Results   Component Value Date     09/01/2017    K 4 0 04/30/2022     04/30/2022    CO2 28 04/30/2022    BUN 7 04/30/2022    CREATININE 0 78 04/30/2022    GLUCOSE 117 (H) 09/01/2017    GLUF 200 (H) 04/19/2022    CALCIUM 7 8 (L) 04/30/2022    CORRECTEDCA 9 5 04/30/2022    AST 22 04/30/2022    ALT 20 04/30/2022    ALKPHOS 602 (H) 04/30/2022    PROT 5 8 (L) 09/01/2017    BILITOT 1 0 09/01/2017    EGFR 94 04/30/2022       Lab Results   Component Value Date    PSA 1 6 11/28/2020    PSA 1 4 11/09/2019    PSA 1 7 11/25/2017       Lab Results   Component Value Date    CEA 0 9 04/29/2021       Lab Results   Component Value Date    IRON 16 (L) 07/13/2021    TIBC 248 (L) 07/13/2021    FERRITIN 625 (H) 07/13/2021       Lab Results   Component Value Date    GLQEETBX94 238 07/13/2021         ROS: As stated in the history of present illness otherwise his 14 point review of systems today was negative        Active Problems:   Patient Active Problem List   Diagnosis    Rheumatoid arthritis ([facility] )    Essential hypertension    Anxiety disorder    Hyperlipidemia    Liver mass    GERD    Multiple lipomas    Type 2 diabetes mellitus (HCC)    BPH (benign prostatic hyperplasia)    Recent cholecystitis    Leukemoid reaction    Metastatic pancreatic cancer    Chemotherapy induced neutropenia (HCC)    Pancreatic cancer metastasized to liver (HCC)    Abdominal pain    Generalized weakness    Pancytopenia due to chemotherapy    Unspecified protein-calorie malnutrition ([facility] )    Dental infection    Chest pain    Pneumobilia due to Occluded CBD stent    Numbness and tingling of foot    Pulmonary embolism (HCC)    Mesenteric vein thrombosis (HCC)    Idiopathic osteoarthritis    Rotator cuff tear arthropathy    Tendinitis of right shoulder    Elevated transaminase level    Shortness of breath    Palliative care patient    Cancer related pain    COVID-19 virus infection    Metabolic encephalopathy    Chemotherapy induced diarrhea    Insomnia due to medical condition    Anorexia    Oral mucositis    Dysphagia    Anemia    Failure to thrive in adult    Severe protein-calorie malnutrition (HCC)       Past Medical History:   Past Medical History:   Diagnosis Date    Anxiety     Arthritis     Cancer ([facility] [address] [facility]     pancreatic    Cellulitis     LAST ASSESSED: 6/13/14    [facility]     Enlarged prostate     Epidermal inclusion cyst     LAST ASSESSED: 10/4/13    Erythrasma     LAST ASSESSED: 9/23/13    Furuncle     LAST ASSESSED: 6/11/14    GERD (gastroesophageal reflux disease)     Hyperlipidemia     Hypertension     RA (rheumatoid arthritis) ([facility] )        Surgical History:   Past Surgical History:   Procedure Laterality Date    COLONOSCOPY      FL GUIDED CENTRAL VENOUS ACCESS DEVICE INSERTION  6/1/2021    HYDROCELE EXCISION / REPAIR Right 01/11/2018    SPERMATIC CORD EXCSION OF HYDROCELE; MANAGED BY: GABBIE CARBAJAL    IR BIOPSY LIVER MASS  8/16/2021    IR CHOLECYSTOSTOMY TUBE CHECK/CHANGE/REPOSITION/REINSERTION/UPSIZE  6/22/2021    IR CHOLECYSTOSTOMY TUBE CHECK/CHANGE/REPOSITION/REINSERTION/UPSIZE  8/23/2021    IR CHOLECYSTOSTOMY TUBE CHECK/CHANGE/REPOSITION/REINSERTION/UPSIZE  1/19/2022    IR CHOLECYSTOSTOMY TUBE PLACEMENT  5/11/2021    MULTIPLE TOOTH EXTRACTIONS Left 7/22/2021    Procedure: EXTRACTION TEETH 14 & 15, ALVEOPLASTY, EXCISION OF CYST LEFT MAXILLA; Surgeon: Bia Nobles DDS;  Location: [facility];  Service: Maxillofacial    MD REMOVAL OF HYDROCELE,TUNICA,UNILAT Right 1/11/2018    Procedure: HYDROCELECTOMY;  Surgeon: Yuri Bneites MD;  Location: [facility];  Service: Urology    SCROTAL SURGERY      benign "lump"    TUNNELED VENOUS PORT PLACEMENT Left 6/1/2021    Procedure: INSERTION VENOUS PORT (PORT-A-CATH); Surgeon: Yoel Ma MD;  Location: [facility];  Service: Surgical Oncology       Family History:    Family History   Problem Relation Age of Onset    Heart disease Father         CARDIAC DISORDER    Hypertension Father     Hypertension Mother     No Known Problems Maternal Aunt     No Known Problems Maternal Uncle     No Known Problems Paternal Aunt     No Known Problems Paternal Uncle     No Known Problems Paternal Grandmother     No Known Problems Paternal Grandfather     Diabetes Maternal Grandmother         MELLITUS    No Known Problems Maternal Grandfather     Hypertension Other        Cancer-related family history is not on file      Social History:   Social History     Socioeconomic History    Marital status: /Civil Union     Spouse name: Not on file    Number of children: 2    Years of education: Not on file    Highest education level: Not on file   Occupational History    Occupation: FULL-TIME EMPLOYMENT   Tobacco Use    Smoking status: Former Smoker    Smokeless tobacco: Never Used    Tobacco comment: tobacco in a pipe in the early 1980's   Vaping Use    Vaping Use: Never used   Substance and Sexual Activity    Alcohol use: Never    Drug use: Never    Sexual activity: Not Currently     Comment: NOT CURRENTLY SEXUALLY ACTIVE AS PER [employer]   Other Topics Concern    Not on file   Social History Narrative    Family dynamics: Pt reports support from children/nephew; Reports some stress in relationship with wife    Relationship status:      Duration of relationship: 20 years     Name of significant other: Junior Maynard and Ages: 2 sons, 1 stepdtr     Pets: 1 kitten, 1 dog    Other important family information: Son moving to [place]    Living situation: Lives with wife               Patient's primary caregiver: Self      Any limitations of caregiver:    Environmental concerns or barriers:     history: None    Employment history/source of income: Worked as a  for 43 years    Disability: Currently receiving long-term disability     Spirituality/ Methodist: "I believe"      Patient's strengths, social supports, and resources: Supportive family/friends    Cultural information:     Mental Health current or previous: Reports increased depression/stress due to strained relationship with wife     Substance use or history: None     Sleep: Trouble sleeping    Exercise: Active    Diet/nutrition: Stomach feels "heavy" and things taste differently    Durable Medical Equipment needs: None    Transportation: Drives self    Financial concerns: Pt does not express financial concerns    Hobbies/Interests: Going to [place] camping, fishing    Advanced Directive: Five Wishes on file  Designates christophe Patel as decision-maker with christophe Christine and step-dtr Melinda as alternates    Other medical or social work providers involved:  Oncology    Patient/caregiver current level of coping: Pt appears to be coping well overall with medical status      Understanding: Pt appears understanding of medical status    Patient/family concerns and areas of need: Pt focused on his "bucket list" and making memories with his family as a priority       Social Determinants of Health     Financial Resource Strain: Not on file   Food Insecurity: Not on file   Transportation Needs: Not on file   Physical Activity: Not on file   Stress: Not on file   Social Connections: Not on file   Intimate Partner Violence: Not on file   Housing Stability: Not on file       Current Medications:   Current Outpatient Medications   Medication Sig Dispense Refill    al mag oxide-diphenhydramine-lidocaine viscous (MAGIC MOUTHWASH) 1:1:1 suspension Swish and spit 10 mL every 4 (four) hours as needed for mouth pain or discomfort 90 mL 3    aluminum-magnesium hydroxide-simethicone (MYLANTA) 200-200-20 mg/5 mL suspension Take 30 mL by mouth every 6 (six) hours as needed for indigestion or heartburn 355 mL 0    apixaban (Eliquis) 5 mg Take 1 tablet (5 mg total) by mouth 2 (two) times a day 60 tablet 3    aspirin 81 mg chewable tablet Chew 81 mg       atorvastatin (LIPITOR) 20 mg tablet Take 1 tablet (20 mg total) by mouth daily 90 tablet 1    Creon [id number] units TAKE 1 CAPSULE BY MOUTH THREE TIMES DAILY WITH MEALS OR SNACKS 270 capsule 0    dexamethasone 0 5 MG/5ML elixir Take 10 mL (1 mg total) by mouth 4 (four) times a day for 10 days - SWISH AND SPIT, DO NOT SWALLOW 400 mL 0    diphenoxylate-atropine (LOMOTIL) 2 5-0 025 mg per tablet TAKE 1 TABLET BY MOUTH FOUR TIMES A DAY AS NEEDED FOR DIARRHEA 60 tablet 0    fluorouracil 4,180 mg in CADD infusion pump Infuse 4,180 mg (1,100 mg/m2/day x 1 9 m2) into a venous catheter over 46 hours for 2 days 1 each 0    FLUoxetine (PROzac) 20 mg capsule TAKE ONE CAPSULE BY MOUTH EVERY DAY 90 capsule 2    folic acid (FOLVITE) 1 mg tablet Take by mouth daily      gabapentin (NEURONTIN) 100 mg capsule Take 2 capsules (200 mg total) by mouth 3 (three) times a day 180 capsule 3    naloxone (NARCAN) 4 mg/0 1 mL nasal spray 0 1 mL (4 mg total) by Alternating Nares route every 3 (three) minutes as needed (accidental opioid overdose or respiratory depression) 1 each 1    OLANZapine (ZyPREXA) 5 mg tablet Take 1 tablet (5 mg total) by mouth daily at bedtime 30 tablet 0    ondansetron (ZOFRAN-ODT) 4 mg disintegrating tablet Take 1 tablet (4 mg total) by mouth every 6 (six) hours as needed for nausea or vomiting for up to 12 doses 12 tablet 0    oxyCODONE (Roxicodone) 5 immediate release tablet Take 1-2 tablets (5-10 mg total) by mouth every 4 (four) hours as needed for moderate pain or severe pain Max Daily Amount: 60 mg 60 tablet 0    pantoprazole (PROTONIX) 40 mg tablet Take 1 tablet (40 mg total) by mouth 2 (two) times a day - before breakfast and before bed 60 tablet 2    predniSONE 10 mg tablet Take 1 tablet (10 mg total) by mouth daily with breakfast 90 tablet 0    prochlorperazine (COMPAZINE) 10 mg tablet Take 1 tablet (10 mg total) by mouth every 6 (six) hours as needed for nausea or vomiting 45 tablet 3    simethicone (MYLICON) 80 mg chewable tablet Chew 1 tablet (80 mg total) every 6 (six) hours as needed (gas bloating and/or discomfort, flatulance, eructation) 30 tablet 0    Sodium Chloride Flush (Normal Saline Flush) 0 9 % SOLN Gall bladder drain      tamsulosin (FLOMAX) 0 4 mg Take 1 capsule (0 4 mg total) by mouth daily at bedtime 90 capsule 3    traZODone (DESYREL) 50 mg tablet Take 1-2 tablets ( mg total) by mouth daily at bedtime 60 tablet 0    triamcinolone (KENALOG) 0 1 % cream Apply topically 2 (two) times a day 60 g 2    vitamin B-12 (VITAMIN B-12) 1,000 mcg tablet TAKE 1 TABLET BY MOUTH DAILY 30 tablet 5    Alcohol Swabs 70 % PADS May substitute brand based on insurance coverage  Check glucose ACHS   (Patient not taking: Reported on 4/16/2022 ) 200 each 0    Blood Glucose Monitoring Suppl (ONE TOUCH ULTRA 2) w/Device KIT Use daily (Patient not taking: Reported on 3/2/2022 ) 1 each 1    Insulin Pen Needle (Unifine Pentips Plus) 32G X 4 MM MISC USE WITH INSULIN PENS FOUR TIMES DAILY (Patient not taking: Reported on 3/2/2022 ) 100 each 3    Lancets (onetouch ultrasoft) lancets Use as instructed 3x a day (Patient not taking: Reported on 3/2/2022 ) 100 each 5    senna (SENOKOT) 8 6 MG tablet Take 1 tablet (8 6 mg total) by mouth daily at bedtime as needed for constipation 30 tablet 0     No current facility-administered medications for this visit  Allergies: Allergies   Allergen Reactions    Fentanyl Anaphylaxis and Other (See Comments)     Oxygen drops severely       Physical Exam:    Body surface area is 1 73 meters squared  Wt Readings from Last 3 Encounters:   05/03/22 62 6 kg (138 lb)   04/29/22 62 8 kg (138 lb 7 2 oz)   04/25/22 63 kg (139 lb)        Temp Readings from Last 3 Encounters:   05/03/22 97 8 °F (36 6 °C) (Temporal)   05/01/22 98 6 °F (37 °C) (Oral)   04/25/22 (!) 97 °F (36 1 °C) (Temporal)        BP Readings from Last 3 Encounters:   05/03/22 100/64   05/01/22 118/70   04/25/22 100/58         Pulse Readings from Last 3 Encounters:   05/03/22 86   05/01/22 71   04/25/22 63         Physical Exam     Constitutional   General appearance: No acute distress, cachectic   Eyes   Conjunctiva and lids: No swelling, erythema or discharge  Pupils and irises: Equal, round and reactive to light  Ears, Nose, Mouth, and Throat   External inspection of ears and nose: Normal     Nasal mucosa, septum, and turbinates: Normal without edema or erythema  Oropharynx: Normal with no erythema, edema, exudate or lesions  Pulmonary   Respiratory effort: No increased work of breathing or signs of respiratory distress  Auscultation of lungs: Clear to auscultation  Cardiovascular   Palpation of heart: Normal PMI, no thrills  Auscultation of heart: Normal rate and rhythm, normal S1 and S2, without murmurs      Examination of extremities for edema and/or varicosities: Normal     Carotid pulses: Normal     Abdomen   Abdomen: Non-tender, no masses  Liver and spleen: No hepatomegaly or splenomegaly  Lymphatic   Palpation of lymph nodes in neck: No lymphadenopathy  Musculoskeletal   Gait and station:  In a wheelchair   Digits and nails: Normal without clubbing or cyanosis  Inspection/palpation of joints, bones, and muscles: Normal     Skin   Skin and subcutaneous tissue: Normal without rashes or lesions  Neurologic   Cranial nerves: Cranial nerves 2-12 intact  Sensation: No sensory loss  Psychiatric   Orientation to person, place, and time: Normal     Mood and affect: Normal         Assessment / Plan:      The patient is a pleasant 52-year-old male with stage IV adenocarcinoma of the pancreas who was referred to see us for consideration of neoadjuvant chemotherapy   We decided to start gemcitabine and Abraxane based on his performance status and disease   He had approximately 3 cycles of treatment and was found to have liver lesions   We switched the patient to modified FOLFOX 6   He had a mixed response with some progression so we switched him to FOLFIRI  His most recent imaging shows interval worsening of soft tissue nodularity along the expected course of the mesenteric veins which may represent worsening thrombus from versus worsening mesenteric metastases  There is also evidence of moderate distention of the stomach with luminal narrowing of the distal stomach concerning for worsening partial gastric outlet obstruction secondary to mass effect from the pancreatic lesion  Disease in the liver is somewhat stable  He was in the hospital for diarrhea from his chemotherapy  CA 19 9 has gone up to 3489 compared to 17/3 4 in December  Based on the imaging, the fact that he has been losing weight and his rising CA 19 9 it appears he is having progression    At this point I have recommended hospice as there are no good options for his tumor having progressed on gemcitabine and Abraxane, modified FOLFOX 6 and now FOLFIRI  He is not tolerating the chemotherapy well either  The patient agrees with hospice  I will set him up  He will see me as needed  Goals and Barriers:  Current Goal:  Prolong Survival from metastatic pancreatic cancer  Barriers: None  Patient's Capacity to Self Care:  Patient able to self care  Portions of the record may have been created with voice recognition software  Occasional wrong word or "sound a like" substitutions may have occurred due to the inherent limitations of voice recognition software  Read the chart carefully and recognize, using context, where substitutions have occurred

## 2022-05-09 NOTE — PROGRESS NOTES
Assessment/Plan:    Pancreatic cancer metastasized to liver [facility])  Chemotherapy appears to be ineffective  Patient with significant side effects to therapy  Discussed treatment going forward  Patient agrees to enter hospice  Serious illness conversation done  Discussed code status-patient to [address] about it and get back to me later this week  Recheck 3-4 weeks-earlier if needed    Metastatic pancreatic cancer  Chemotherapy appears to be ineffective  Patient with significant side effects to therapy  Discussed treatment going forward  Patient agrees to enter hospice  Serious illness conversation done  Discussed code status-patient to [address] about it and get back to me later this week  Recheck 3-4 weeks-earlier if needed    Dysphagia  Patient states that this is 1 of his biggest problems at present and has a strong desire to be able to continue to eat  Despite intermittent nausea, he states that he is usually fairly hungry  I reassured patient to be evaluated by ENT this afternoon to rule out obstructive mass  Further recommendations based on this evaluation  Chemotherapy induced diarrhea  Swelling improved but not resolved  Continue Lomotil and Imodium  Recheck 3-4 weeks    Essential hypertension  Blood pressure stable at present  He is presently off all blood pressure medicines  We will continue to monitor  I do not expect, given present health status, then we will need to restart, but will continue to monitor    Rheumatoid arthritis ([address] )  Symptoms actually improved with chemotherapy  The patient was offered treatment will monitor for worsening symptoms  Recheck 3-4 weeks-earlier if worse    Severe protein-calorie malnutrition ([address] )  Malnutrition Findings:  Weight loss, temporal wasting, low protein levels on labs  Discussed feeding strategies  The patient will be seen by ENT this afternoon to evaluate swallowing    Continue liquid caloric supplements such as Ensure, boost, [employer] instant breakfast etc   Discussed strategies  Recheck 3-4 weeks                                BMI Findings: Body mass index is 20 99 kg/m²  Advanced care planning/counseling discussion  Serous illness conversation done and entered in chart  Patient will be entering hospice on Wednesday   patient patient will call for any questions or concerns       Diagnoses and all orders for this visit:    Encounter for support and coordination of transition of care    Chemotherapy induced diarrhea    Dysphagia, unspecified type  -     Ambulatory Referral to Otolaryngology; Future    Metastatic pancreatic cancer    Severe protein-calorie malnutrition (HCC)    Pancreatic cancer metastasized to liver [facility])    Essential hypertension    Rheumatoid arthritis involving multiple sites with positive rheumatoid factor (HCC)    Nausea  -     ondansetron (ZOFRAN-ODT) 4 mg disintegrating tablet; Take 1 tablet (4 mg total) by mouth every 6 (six) hours as needed for nausea or vomiting for up to 12 doses    Advanced care planning/counseling discussion        Subjective:     TCM Call (since 4/10/2022)     Date and time call was made  5/3/2022  1:49 PM    Hospital care reviewed  Records reviewed    Patient was hospitialized at  [address] [place] [address]    Date of Admission  04/27/22    Date of discharge  05/01/22    Diagnosis  Chemotherapy induced diarrhea    Disposition  Home    Were the patients medications reviewed and updated  Yes    Current Symptoms  Fatigue    Fatigue severity  Mild      TCM Call (since 4/10/2022)     Scheduled for follow up?   Yes    Did you obtain your prescribed medications  Yes    Do you need help managing your prescriptions or medications  No    Is transportation to your appointment needed  No    I have advised the patient to call PCP with any new or worsening symptoms  [id number] or [address] other    Are you recieving any outpatient services  No    Are you recieving home care services  No    Are you using any community resources  No    Current waiver services  No    Have you fallen in the last 12 months  No    Interperter language line needed  No           Patient ID: Izaiah Steen  is a 78 y o  male  here for TCM visit  - 77 yo male with hx of metastatic pancreatic CA developed worsening diarrhea with intermittent nausea several days prior to admission  Pt failed OTC Imodium for his persistent diarrhea  BMs were watery, and frequent (>10/day) but without blood/melena  Pt denies fever/chills  On day of admission, pt felt weaker due to poor oral intake and went to [facility] ED  In the ED, pt was found to ill appearing with sl elevated WBC, low Hgb and low TPr/albumen levels  Pt was subsequently admitted for IV fluids and further testing  Oncology and Palliative Care were consulted  Stool CX and C Diff testing were negative  Oncology determined that the diarrhea was secondary to chemo  Review of recent studies revealed moderate distension of the stomach due to the pancreatic mass, possible worsening of the metastases along the mesenteric veins, but other mets remained unchanged  B/C of apparent lack of efficacy of his third chemo regimen and his significant symptoms on this regimen, pt was advised to enter hospice  Pt agreed and was discharged to home on 5/1  Pt is here for TCM visit  - since discharge, pt still having intermittent diarrhea  He states that his biggest problem is the feeling that food is getting stuck in his throat  Pt states that he is hungry, but has difficulty swallowing solid food (and his meds as well)  Weight has decreased  Pt is using Ensure and other liquid supplements  Pt states that it feels like something is there that is keeping food from passing  - prior to discharge, oncology discussed starting hospice due to lack of efficacy of his treatment, and his side effects to treatment  Pt has agreed and is to start Wednesday   ACP discussed, and it appears that pt wants to still be a full code  Serious Illness conversation done  - I reviewed available hospital records, lab reports and study results with pt and family                    The following portions of the patient's history were reviewed and updated as appropriate:   He  has a past medical history of Allergic rhinitis, Anxiety, Arthritis, Cancer ([address] ), Cellulitis, Diabetes mellitus ([address] ), Enlarged prostate, Epidermal inclusion cyst, Erythrasma, Furuncle, GERD (gastroesophageal reflux disease), Hyperlipidemia, Hypertension, and RA (rheumatoid arthritis) ([address] )    He   Patient Active Problem List    Diagnosis Date Noted    Advanced care planning/counseling discussion 05/11/2022    Severe protein-calorie malnutrition ([facility] ) 04/30/2022    Anemia 04/27/2022    Failure to thrive in adult 04/27/2022    Dysphagia 04/25/2022    Oral mucositis 04/18/2022    Anorexia 04/11/2022    Insomnia due to medical condition 03/21/2022    Chemotherapy induced diarrhea 02/07/2022    COVID-19 virus infection 40/14/4159    Metabolic encephalopathy 95/47/7369    Palliative care patient 12/13/2021    Cancer related pain 12/13/2021    Shortness of breath 12/09/2021    Idiopathic osteoarthritis 12/05/2021    Rotator cuff tear arthropathy 12/05/2021    Tendinitis of right shoulder 12/05/2021    Elevated transaminase level 12/05/2021    Pulmonary embolism ([address] ) 11/23/2021    Mesenteric vein thrombosis (HCC) 11/23/2021    Numbness and tingling of foot 10/29/2021    Pneumobilia due to Occluded CBD stent 08/24/2021    Chest pain 08/23/2021    Dental infection 08/04/2021    Unspecified protein-calorie malnutrition ([address] ) 07/15/2021    Generalized weakness 07/13/2021    Pancytopenia due to chemotherapy 07/13/2021    Abdominal pain 06/15/2021    Pancreatic cancer metastasized to liver ([address] ) 06/14/2021    Metastatic pancreatic cancer 05/25/2021    Chemotherapy induced neutropenia ([facility] ) 05/25/2021    Recent cholecystitis 05/11/2021    BPH (benign prostatic hyperplasia) 05/06/2021    Type 2 diabetes mellitus ([facility] ) 03/16/2021    Multiple lipomas 08/04/2020    Rheumatoid arthritis ([facility] ) 10/13/2017    Essential hypertension 10/13/2017    Liver mass 11/17/2016    Hyperlipidemia 02/03/2016    Anxiety disorder 08/15/2013    GERD 08/15/2013     He  has a past surgical history that includes Scrotal surgery; pr removal of hydrocele,tunica,unilat (Right, 1/11/2018); Hydrocele excision / repair (Right, 01/11/2018); IR cholecystostomy tube placement (5/11/2021); Colonoscopy; Tunneled venous port placement (Left, 6/1/2021); FL guided central venous access device insertion (6/1/2021); IR cholecystostomy tube check/change/reposition/reinsertion/upsize (6/22/2021); Multiple tooth extractions (Left, 7/22/2021); IR biopsy liver mass (8/16/2021); IR cholecystostomy tube check/change/reposition/reinsertion/upsize (8/23/2021); and IR cholecystostomy tube check/change/reposition/reinsertion/upsize (1/19/2022)  He  reports that he has never smoked  He has never used smokeless tobacco  He reports that he does not drink alcohol and does not use drugs  Current Outpatient Medications   Medication Sig Dispense Refill    al mag oxide-diphenhydramine-lidocaine viscous (MAGIC MOUTHWASH) 1:1:1 suspension Swish and spit 10 mL every 4 (four) hours as needed for mouth pain or discomfort 90 mL 3    Alcohol Swabs 70 % PADS May substitute brand based on insurance coverage  Check glucose ACHS   (Patient not taking: Reported on 4/16/2022 ) 200 each 0    aluminum-magnesium hydroxide-simethicone (MYLANTA) 200-200-20 mg/5 mL suspension Take 30 mL by mouth every 6 (six) hours as needed for indigestion or heartburn 355 mL 0    apixaban (Eliquis) 5 mg Take 1 tablet (5 mg total) by mouth 2 (two) times a day 60 tablet 3    aspirin 81 mg chewable tablet Chew 81 mg       atorvastatin (LIPITOR) 20 mg tablet Take 1 tablet (20 mg total) by mouth daily 90 tablet 1    Blood Glucose Monitoring Suppl (ONE TOUCH ULTRA 2) w/Device KIT Use daily 1 each 1    Creon 14517-79537 units TAKE 1 CAPSULE BY MOUTH THREE TIMES DAILY WITH MEALS OR SNACKS 270 capsule 0    diphenoxylate-atropine (LOMOTIL) 2 5-0 025 mg per tablet TAKE 1 TABLET BY MOUTH FOUR TIMES A DAY AS NEEDED FOR DIARRHEA 60 tablet 0    FLUoxetine (PROzac) 20 mg capsule TAKE ONE CAPSULE BY MOUTH EVERY DAY 90 capsule 2    folic acid (FOLVITE) 1 mg tablet Take by mouth daily      gabapentin (NEURONTIN) 100 mg capsule Take 2 capsules (200 mg total) by mouth 3 (three) times a day 180 capsule 3    Insulin Pen Needle (Unifine Pentips Plus) 32G X 4 MM MISC USE WITH INSULIN PENS FOUR TIMES DAILY 100 each 3    Lancets (onetouch ultrasoft) lancets Use as instructed 3x a day 100 each 5    naloxone (NARCAN) 4 mg/0 1 mL nasal spray 0 1 mL (4 mg total) by Alternating Nares route every 3 (three) minutes as needed (accidental opioid overdose or respiratory depression) 1 each 1    nystatin (MYCOSTATIN) 500,000 units/5 mL suspension Apply 5 mL (500,000 Units total) to the mouth or throat 4 (four) times a day 240 mL 2    OLANZapine (ZyPREXA) 5 mg tablet Take 1 tablet (5 mg total) by mouth daily at bedtime 30 tablet 0    ondansetron (ZOFRAN-ODT) 4 mg disintegrating tablet Take 1 tablet (4 mg total) by mouth every 6 (six) hours as needed for nausea or vomiting for up to 12 doses 30 tablet 0    oxyCODONE (Roxicodone) 5 immediate release tablet Take 1-2 tablets (5-10 mg total) by mouth every 4 (four) hours as needed for moderate pain or severe pain Max Daily Amount: 60 mg 60 tablet 0    pantoprazole (PROTONIX) 40 mg tablet Take 1 tablet (40 mg total) by mouth 2 (two) times a day - before breakfast and before bed 60 tablet 2    predniSONE 10 mg tablet Take 1 tablet (10 mg total) by mouth daily with breakfast 90 tablet 0    prochlorperazine (COMPAZINE) 10 mg tablet Take 1 tablet (10 mg total) by mouth every 6 (six) hours as needed for nausea or vomiting 45 tablet 3    senna (SENOKOT) 8 6 MG tablet Take 1 tablet (8 6 mg total) by mouth daily at bedtime as needed for constipation 30 tablet 0    simethicone (MYLICON) 80 mg chewable tablet Chew 1 tablet (80 mg total) every 6 (six) hours as needed (gas bloating and/or discomfort, flatulance, eructation) 30 tablet 0    Sodium Chloride Flush (Normal Saline Flush) 0 9 % SOLN Gall bladder drain      tamsulosin (FLOMAX) 0 4 mg Take 1 capsule (0 4 mg total) by mouth daily at bedtime 90 capsule 3    traZODone (DESYREL) 50 mg tablet Take 1-2 tablets ( mg total) by mouth daily at bedtime 60 tablet 0    triamcinolone (KENALOG) 0 1 % cream Apply topically 2 (two) times a day 60 g 2    vitamin B-12 (VITAMIN B-12) 1,000 mcg tablet TAKE 1 TABLET BY MOUTH DAILY 30 tablet 5     No current facility-administered medications for this visit  He is allergic to fentanyl and other       Review of Systems   Constitutional: Positive for activity change, fatigue and unexpected weight change  Negative for appetite change, chills, diaphoresis and fever  HENT: Positive for trouble swallowing  Negative for ear discharge, ear pain, sinus pressure, sinus pain and sore throat  Eyes: Negative  Respiratory: Negative  Negative for shortness of breath  Cardiovascular: Negative  Gastrointestinal: Positive for abdominal pain (intermittent, scattered), diarrhea (intermittent) and nausea (occasional)  Negative for abdominal distention, constipation and vomiting  Genitourinary: Negative  Musculoskeletal: Positive for arthralgias (improved while on chemo) and gait problem (mild weakness)  Negative for joint swelling and myalgias  Skin: Negative  Neurological: Positive for weakness  Negative for dizziness, light-headedness, numbness and headaches  Hematological: Negative            Objective:      /64   Pulse 78   Temp 98 3 °F (36 8 °C)   Ht 5' 7" (1 702 m) Wt 60 8 kg (134 lb)   BMI 20 99 kg/m²          Physical Exam  Vitals reviewed  Constitutional:       Comments: Thin, and pale appearing male in no resp distress   HENT:      Head: Normocephalic  Right Ear: Tympanic membrane, ear canal and external ear normal       Left Ear: Tympanic membrane, ear canal and external ear normal       Nose: No congestion  Mouth/Throat:      Mouth: Mucous membranes are moist    Eyes:      Extraocular Movements: Extraocular movements intact  Pupils: Pupils are equal, round, and reactive to light  Comments: (+) conjunctival pallor  No icterus appreciated   Cardiovascular:      Rate and Rhythm: Normal rate and regular rhythm  Pulses: Normal pulses  Pulmonary:      Breath sounds: No wheezing or rales  Abdominal:      General: There is no distension  Palpations: There is mass (?epigastric area fullness)  Tenderness: There is abdominal tenderness  There is no right CVA tenderness or left CVA tenderness  Comments: Biliarry tube in place and draining  No swelling or erythema at the insertion site   Musculoskeletal:         General: Deformity (mild arthritic changes diffusely) present  No tenderness  Cervical back: Normal range of motion  No tenderness  Right lower leg: No edema  Left lower leg: No edema  Lymphadenopathy:      Cervical: No cervical adenopathy  Skin:     General: Skin is warm  Capillary Refill: Capillary refill takes less than 2 seconds  Neurological:      Mental Status: He is oriented to person, place, and time  Cranial Nerves: No cranial nerve deficit  Sensory: No sensory deficit  Motor: No weakness

## 2022-05-09 NOTE — PROGRESS NOTES
Serious Illness Conversation    1  What is your understanding now of where you are with your illness? Prognostic Understanding: appropriate understanding of prognosis  Pt entering hospice on Wednesday on recommendation of Oncology     2  How much information about what is likely to be ahead with your illness would you like to have? Information: patient wants to be fully informed  Wife does not want to know, but pt does  3  What did you (clinician) communicate to the patient? Discussed the persistent symptoms, and the worsening of metastatic tumors despite 3 different chemo regimens  I explained that his decision to accept hospice should help the treatment of his symptoms     4  If your health situation worsens, what are your most important goals? Goals: be independent, live as long as possible, no matter what, not be a burden  Pt wants to remain independent and live as long as possible  Pt is willing to accept help/support but does not want to be a burden     5  What are the biggest fears and worries about the future and your health? Fears/Worries: loss of mobility  Pt's biggest fear appears to be the ending of his life  6  What abilities are so critical to your life that you cannot imagine living without them? Unacceptable Function: not being able to care for myself, including toileting and feeding, being in pain or very uncomfortable     7  What gives you strength as you think about the future with your illness? Pt unable to answer     8  If you become sicker, how much are you willing to go through for the possibility of gaining more time? 9  How much does your proxy and family know about your priorities and wishes? Discussion Discussion: some discussion but incomplete  Pt has communicated some of his wishes with his wife, but has not completely decided on his own wishes yet   He plans on making his caretakers/Med POA aware once he has made his own decisions     Edin heard you say that being able to do things on your own is really important to you  Keeping that in mind, and what we know about your illness, I recommend that we address your swallowing, and other issues to potentially help you with your strength  This will help us make sure that your treatment plans reflect whats important to you  How does this plan sound to you? I will do everything I can to help you through this  Patient verbalized understanding of the plan     I have spent 30 minutes speaking with my patient on advanced care planning today or during this visit    Patient will be entering hospice on Wednesday    Discussed code status-patient     Advanced directives  Five Wishes: Patient has Five Wishes, not in chart

## 2022-05-09 NOTE — TELEPHONE ENCOUNTER
Patient's wife called in to let you know that the ENT is sending Chiqui Castaneda for a swallow test

## 2022-05-11 PROBLEM — D72.823 LEUKEMOID REACTION: Status: RESOLVED | Noted: 2021-05-11 | Resolved: 2022-01-01

## 2022-05-11 PROBLEM — Z71.89 ADVANCED CARE PLANNING/COUNSELING DISCUSSION: Status: ACTIVE | Noted: 2022-01-01

## 2022-05-11 NOTE — ASSESSMENT & PLAN NOTE
Blood pressure stable at present  He is presently off all blood pressure medicines  We will continue to monitor    I do not expect, given present health status, then we will need to restart, but will continue to monitor

## 2022-05-11 NOTE — ASSESSMENT & PLAN NOTE
Symptoms actually improved with chemotherapy  The patient was offered treatment will monitor for worsening symptoms    Recheck 3-4 weeks-earlier if worse

## 2022-05-11 NOTE — ASSESSMENT & PLAN NOTE
Serous illness conversation done and entered in chart  Patient will be entering hospice on Wednesday     patient patient will call for any questions or concerns

## 2022-05-11 NOTE — ASSESSMENT & PLAN NOTE
Chemotherapy appears to be ineffective  Patient with significant side effects to therapy  Discussed treatment going forward  Patient agrees to enter hospice  Serious illness conversation done  Discussed code status-patient to [address] about it and get back to me later this week    Recheck 3-4 weeks-earlier if needed

## 2022-05-11 NOTE — ASSESSMENT & PLAN NOTE
Patient states that this is 1 of his biggest problems at present and has a strong desire to be able to continue to eat  Despite intermittent nausea, he states that he is usually fairly hungry  I reassured patient to be evaluated by ENT this afternoon to rule out obstructive mass  Further recommendations based on this evaluation

## 2022-05-11 NOTE — ASSESSMENT & PLAN NOTE
Malnutrition Findings:  Weight loss, temporal wasting, low protein levels on labs  Discussed feeding strategies  The patient will be seen by ENT this afternoon to evaluate swallowing  Continue liquid caloric supplements such as Ensure, boost, [place] instant breakfast etc   Discussed strategies  Recheck 3-4 weeks                                BMI Findings: Body mass index is 20 99 kg/m²

## 2022-05-12 NOTE — TELEPHONE ENCOUNTER
Primary palliative medicine provider: Katerin Carrion    Medication requested: oxycodone 5 mg     If for pain, how has the patient been taking their pain medicine?      Last appointment:  4 25    Next scheduled appointment: 6 2    However patient states he will be signing onto hospice on 5 18 after he has barrium swallow study     PDMP review:    Last filled 4 25  60/5

## 2022-05-17 NOTE — PROCEDURES
Video Swallow Study      Patient Name: Monet Calvin  Today's Date: 5/17/2022        Past Medical History  Past Medical History:   Diagnosis Date    Allergic rhinitis     Anxiety     Arthritis     Cancer ([facility] )     pancreatic    Cellulitis     LAST ASSESSED: 6/13/14    Diabetes mellitus ([facility] )     Enlarged prostate     Epidermal inclusion cyst     LAST ASSESSED: 10/4/13    Erythrasma     LAST ASSESSED: 9/23/13    Furuncle     LAST ASSESSED: 6/11/14    GERD (gastroesophageal reflux disease)     Hyperlipidemia     Hypertension     RA (rheumatoid arthritis) ([facility] )         Past Surgical History  Past Surgical History:   Procedure Laterality Date    COLONOSCOPY      FL GUIDED CENTRAL VENOUS ACCESS DEVICE INSERTION  6/1/2021    HYDROCELE EXCISION / REPAIR Right 01/11/2018    SPERMATIC CORD EXCSION OF HYDROCELE; MANAGED BY: GABBIE CARBAJAL    IR BIOPSY LIVER MASS  8/16/2021    IR CHOLECYSTOSTOMY TUBE CHECK/CHANGE/REPOSITION/REINSERTION/UPSIZE  6/22/2021    IR CHOLECYSTOSTOMY TUBE CHECK/CHANGE/REPOSITION/REINSERTION/UPSIZE  8/23/2021    IR CHOLECYSTOSTOMY TUBE CHECK/CHANGE/REPOSITION/REINSERTION/UPSIZE  1/19/2022    IR CHOLECYSTOSTOMY TUBE PLACEMENT  5/11/2021    MULTIPLE TOOTH EXTRACTIONS Left 7/22/2021    Procedure: EXTRACTION TEETH 14 & 15, ALVEOPLASTY, EXCISION OF CYST LEFT MAXILLA; Surgeon: Lin Tabor DDS;  Location: [facility];  Service: Maxillofacial    DC REMOVAL OF HYDROCELE,TUNICA,UNILAT Right 1/11/2018    Procedure: HYDROCELECTOMY;  Surgeon: Mir Ching MD;  Location: [facility];  Service: Urology    SCROTAL SURGERY      benign "lump"    TUNNELED VENOUS PORT PLACEMENT Left 6/1/2021    Procedure: INSERTION VENOUS PORT (PORT-A-CATH); Surgeon: Deo Rodriguez MD;  Location: [facility];  Service: Surgical Oncology     Video Barium Swallow Study    Summary:  Images are on PACS for review     The pt presents w/ a functional swallow to continue w/ his current diet  Mildly reduced esophageal clearing at the end of the study as well as belching but otherwise no overt difficulty today  No penetration or aspiration occurred  Recommendations:  Diet: regular- choose moist  Liquids: thin   Meds: as desired  Strategies: alternate bites w/ sips  Upright position  Consider consult with: GI if needed  Results reviewed with: pt      H&P/pertinent provider notes: (PMH noted above)  Pt is a 77 y/o m referred for a VBS to assess his swallow function  Recently seen by ENT, he told the physician that he has   been having problems swallowing his pills for the past couple of days, and has been having trouble swallowing foods for a few weeks  Pt says he is able to swallow beverages, but still chokes at times  He says he has oral lesions which make it difficult to try any gargles or washes  The pt denies difficulty w/ liquids when in the VBS suite, states he has difficulty chewing & transferring material     Special Studies:  -    Previous VBS:  -    Does the pt have pain? yes    Precautions:  -  Food Allergies:  -  Current Diet:   regular w/ thin   Dentition:  Natural   O2 requirement:  RA  Oral mech:  Strength and ROM: fair, mildly reduced movement on the R    Vocal Quality/Speech:  Clear,intelligible   Cognitive status:  Awake, alert    Consistencies administered: thin, nectar thick, honey thick, puree, pill w/ water, cookie  Pt was seated laterally at 90 degrees  Pt viewed in AP position    Oral stage:    Lip closure: wfl  Mastication: mildly prolonged  Bolus formation: fair, mild to & fro movement  Bolus control: fair, functional   Transfer: piecemeal w/ the solids  Residue: minimal to none once the oral cavity is cleared  Pharyngeal stage:    Swallow promptness: min delay at times to prompt    Improved w/ the cup/straw sips  Spill to valleculae: with all  Spill to pyriforms: with all   Epiglottic inversion: reduced inversion, fair   Laryngeal excursion: mildly reduced anterior inversion   Pharyngeal constriction: min reduced to fair  Vallecular retention: min pooling w/thin liquids  after the swallow  Pyriform retention: min pooling after thin liquids  PPW coating: mild coating   Osteophytes:-  CP prominence: fair  Retropulsion from prominence: -  Transient penetration:   Epiglottic undercoat:  Penetration: trace w/ the thin by tsp (PAS 3 & one time PAS 4)  Aspiration: none noted   Strategies: pt able to use independent mult swallows to clear the min pooling     Screening of Esophageal stage:    Dysmotility: lower esophageal   Slow motility: mild   Retention/Stasis: at the lower esophagus      8-Point Penetration-Aspiration Scale   1 Material does not enter the airway   2 Material enters the airway, remains above the vocal folds, and is ejected  from the  airway    3 Material enters the airway, remains above the vocal folds, and is not ejected from the airway   4 Material enters the airway, contacts the vocal folds, and is ejected from the airway   5 Material enters the airway, contacts the vocal folds, and is not ejected from the airway    6 Material enters the airway, passes below the vocal folds and is ejected into the larynx or out of the airway    7 Material enters the airway, passes below the vocal folds, and is not ejected from the trachea despite effort    8 Material enters the airway, passes below the vocal folds, and no effort is made to eject      NOMS ([address]): Swallowing "Score"    LEVEL 7: The individuals ability to eat independently is not limited by swallow function  Swallowing would be safe and efficient for all consistencies   Compensatory strategies are effectively used when needed

## 2022-05-18 NOTE — CASE COMMUNICATION
Ship to    Home  Branch: [facility]      Dry Dressings   Gauze 4x4 N/S 200 4x4s per unit  [id number]   1   Gauze Sabino stretch roll 4inch n/s 12 rolls per unit  [id number] 1  Drain sponges 4x4 split 136611  10  Telfa pad 3x4 9973  10  Tape  Transpore white 1 in 076786  2   Moist Wound Products  Gauze oil emulsion 268654  10

## 2022-05-23 NOTE — HOSPICE
Fall - skin tear    hard time getting things down  once down stays down  3 small meals       diarrhea - immodium

## 2022-06-11 NOTE — PROGRESS NOTES
6/11/2022 11:26 AM  [facility] home patient requests emergency fill until [employer] order arrives. Filled electronically via Epic as per [place] State Law. Requested Prescriptions     Signed Prescriptions Disp Refills   • LORazepam (ATIVAN) 2 mg/mL concentrated solution 30 mL 0     Sig: Take 0.5 mL (1 mg total) by mouth every 4 (four) hours. May also take 0.5 mL (1 mg total) every hour as needed for anxiety (restlessness, agitation, sob). Jami Winslow MD  [facility]  Hospice Answering Service: [phone]  You can find me on [employer]!

## 2022-06-12 NOTE — Clinical Note
Spouse Lulu tearful on arrival but states, "I'm doing okay "  Sister Damion Flaherty, her spouse Samantha Butler, and youngest son (I did not catch his name), were all present  Osker Dine and Damion Flaherty were sitting on the couch going through pictures when they noticed pt was no longer breathing  Osker Dine will need bereavement support asap  Sister Damion Flaherty spoke with me for a few moments alone in the kitchen  She asked me why she is always the one to take care of everyone  She stated that Bill's death, "will be the one that breaks me "  I asked if she has anyone to talk to and she said she will be fine  I briefly, very briefly spoke of our bereavement services  She said she doesn't need it but if she isn't already followed, I think she could benefit  Osker Dine was calling all of Davie's friends while I was there  The  home came and neighbors congregated on the porch  Osker Dine became very emotional again but was very well supported by many people  Lulu requested chaplan support  I called Edgard Phillips, left a brief message and contact information  I left prior to a return tc to Osker Dine  Osker Dinkatherine wants medical equipment out ASAP  I told her I would request tomorrow  but do not have control over it

## 2022-06-14 ENCOUNTER — HOME CARE VISIT (OUTPATIENT)
Dept: HOME HOSPICE | Facility: HOSPICE | Age: 67
End: 2022-06-14
Payer: MEDICARE

## 2022-06-14 NOTE — HOSPICE
Condolence call made to spouse Chang Bowman who described her experience of Bills death  Chang Bowman identifies as an empath and she said does not sense Bills presence  She explored her beliefs about where he might be  Chang Oseiion said she was comforted by my description of conversation with Soy Andre about his belief in God  Chang Bowman wonders why she and Soy Andre never talked about it  Chang Bowman has been busy planning  service  She spoke about good support from her employer, family, friends and Holiness community  Chang Bowman also spoke about a classmate/friend who is a  at [employer] [facility] [employer]  Chang Bowman said her anxiety has been worse and she takes med for the same  I encouraged her to use the supports she has in place    Chang Bowman appreciated being able to talk and she anticipates need for ongoing BR support however at this time she is busy dealing with many things

## 2023-11-07 NOTE — UTILIZATION REVIEW
Abdominal Pain, N/V/D Notification of Discharge   This is a Notification of Discharge from our facility [address]  Please be advised that this patient has been discharge from our facility  Below you will find the admission and discharge date and time including the patients disposition  UTILIZATION REVIEW CONTACT:  Rufus Luong  Utilization   Network Utilization Review Department  Phone: [phone] x carefully listen to the prompts  All voicemails are confidential   Email: [email or web address]     PHYSICIAN ADVISORY SERVICES:  FOR [id number] REVIEW - MEDICAL NECESSITY DENIAL  Phone: [phone]  Fax: [phone]  Email: [email or web address]     PRESENTATION DATE: 4/27/2021  5:03 PM  OBERVATION ADMISSION DATE:   INPATIENT ADMISSION DATE: 4/27/21 2058   DISCHARGE DATE: 4/29/2021  7:53 PM  DISPOSITION: Home/Self Care Home/Self Care      IMPORTANT INFORMATION:  Send all requests for admission clinical reviews, approved or denied determinations and any other requests to dedicated fax number below belonging to the campus where the patient is receiving treatment   List of dedicated fax numbers:  [address] DENIALS (Administrative/Medical Necessity) [phone]   [address] [place] [address] (Maternity/NICU/Pediatrics) [phone]   Conda Sas [phone]   Jamas Piggs [phone]   Adeola Dress [phone]   Levell Pleasure [facility] [address] [facility] [phone]   [facility]  [phone]   220[address] [facility]  24[address] [facility] 1000 [address] [facility] [phone]

## 2025-05-27 NOTE — PROGRESS NOTES
Addended by: TANIA HANSON on: 5/27/2025 10:04 AM     Modules accepted: Orders     Outpatient Virtual Regular Visit - Follow-up with Palliative and [address] [facility] 73 y o  male [id number]    Intake:    Reason for virtual visit is f/up cancer pain, intractable hiccups  The patient is unable to visit the clinic safely due to the coronavirus pandemic  After connecting through [facility], the patient was identified by name and date of birth  Sy Ortiz or their agent was informed that this was a telemedicine visit and that the visit is being conducted through [address] Now and patient was informed that this is a secure, HIPAA-compliant platform  He agrees to proceed     My office door was closed  No one else was in the room  They acknowledged consent and understanding of privacy and security of the video platform  The patient or agent has agreed to participate and understands they can discontinue the visit at any time  Assessment & Plan  No diagnosis found   - Counseling on health screening and disease prevention, COVID-19 specific (CPT V65 49)    Medications adjusted this encounter:  Requested Prescriptions      No prescriptions requested or ordered in this encounter     No orders of the defined types were placed in this encounter  There are no discontinued medications  Sy Ortiz was assessed today for symptoms and care planning related to above illnesses  I have reviewed the patient's controlled substance dispensing history in the Prescription Drug Monitoring Program in compliance with the [facility] regulations before prescribing any controlled substances  He is invited to continue to follow with us  If there are questions or concerns, please contact us through our clinic/answering service 24 hours a day, seven days a week        Gale Romero MD  [facility]  [phone]        Visit Information    Accompanied By: No one    Source of History: Self    History Limitations: None    Contacts: wife Dirk Ravi    History of Present Illness      Cale Stein is a 73 y o  male who presents in follow up of symptoms related to adenocarcinoma of the pancreas  He follows with Dr Cristina Arvizu of Med/Onc  Pertinent issues include: symptom management, depression or anxiety, nausea, hiccups      Since last visit, he had a port placed on 6/1, and was sent home same day, as there were no complications nor special challenges  Unfortunately, since our last visit, there has been only deepening symptoms and oddness with his abdomen  He has had some relief with oxyIR from pain, but still noting relfux, crampy pain, and now hiccups that come in waves, and are distracting, uncomfortable, and intractable, though they do seem to subside with time  We had offered baclofen for these hiccups just prior to w/e  He has found this completely unhelpful  Importantly, he has noted rather severe dry mouth and even visual changes since this process with hiccups started  We advised the following:   - STOP baclofen; not helpful   - DECREASE frequency of lisinopril; may be worsening intravascular depletion   - DECREASE compazine usage; this has been somewhat helpful for nausea, but has been asso with visual changes   = continue with oxyIR in small doses PRN   + Trial diazepam for diaphragmatic spasm  Pt is advised that this may be a soporific substance      He has f/up with several other providers in person in next few days; he is advised to keep these appts  Importantly, he notes a foul-smelling drainage from one of his surgical tubes, but he cannot articulate to me where the tube is going nor what it might be draining  He denies fevers, and other signs of systemic sepsis  From our consult on 5/27:   " [ ] This fellow presents to us just prior to surgical approach to his primary pancreas mass (4 x 2 5cm in panc head)  There are mets noted to liver as well    Pt has lost a great deal of weight recently, despite use of panc enzymes, which have helped reduce stool changes and nausea      Moving bowels daily  Taking oxyIR for pain; not entirely helpful  Crampy abd pain appears idiopathic: unrelated to eating or time of day  Does improve with lateral decub positioning, upright ambulation  We theorized that this is primarily a mass effect, with bowels becoming more spasmodic when contacting the mass by direct pressure from gravity  He is agreedable to low-dose steroids, used sparingly d/t concurrent severe and uncomfortable reflux, which has not improved even on high dose PPI therapy  Otherwise, he confirms a full cares plan, and seems too distracted by his pain to engage with full goals conversation "      Past Medical History:   Diagnosis Date    Anxiety     Arthritis     [facility] )     pancreatic    Cellulitis     LAST ASSESSED: 6/13/14    Enlarged prostate     Epidermal inclusion cyst     LAST ASSESSED: 10/4/13    Erythrasma     LAST ASSESSED: 9/23/13    Furuncle     LAST ASSESSED: 6/11/14    GERD (gastroesophageal reflux disease)     Hyperlipidemia     Hypertension     RA (rheumatoid arthritis) ([facility] )      Past Surgical History:   Procedure Laterality Date    COLONOSCOPY      FL GUIDED CENTRAL VENOUS ACCESS DEVICE INSERTION  6/1/2021    HYDROCELE EXCISION / REPAIR Right 01/11/2018    SPERMATIC CORD EXCSION OF HYDROCELE; MANAGED BY: Sheila Melgar    IR CHOLECYSTOSTOMY TUBE PLACEMENT  5/11/2021    MI REMOVAL OF HYDROCELE,TUNICA,UNILAT Right 1/11/2018    Procedure: HYDROCELECTOMY;  Surgeon: Javi Hussein MD;  Location[facility];  Service: Urology    SCROTAL SURGERY      benign "lump"    TUNNELED VENOUS PORT PLACEMENT Left 6/1/2021    Procedure: INSERTION VENOUS PORT (PORT-A-CATH);   Surgeon: Carlos Nguyen MD;  Location:  MAIN OR;  Service: Surgical Oncology     Current Outpatient Medications   Medication Sig Dispense Refill    aspirin 81 mg chewable tablet Chew 81 mg      atorvastatin (LIPITOR) 10 mg tablet Take 10 mg by mouth daily      Baclofen 5 MG TABS Take 5 mg by mouth 2 (two) times a day as needed (hiccups) 30 tablet 0    Blood Glucose Monitoring Suppl (ONE TOUCH ULTRA 2) w/Device KIT Use daily 1 each 1    FLUoxetine (PROzac) 20 mg capsule TAKE 1 CAPSULE BY MOUTH EVERY DAY  90 capsule 2    folic acid (FOLVITE) 1 mg tablet Take by mouth daily      glucose blood (OneTouch Ultra) test strip Use as instructed once a day 50 each 5    Lancets (onetouch ultrasoft) lancets Use as instructed once a day 50 each 5    lidocaine (LIDODERM) 5 % Apply 1 patch topically daily Remove & Discard patch within 12 hours or as directed by MD (Patient not taking: Reported on 5/27/2021) 30 patch 0    lisinopril-hydrochlorothiazide (PRINZIDE,ZESTORETIC) 10-12 5 MG per tablet TAKE ONE TABLET BY MOUTH EVERY DAY 90 tablet 3    Multiple Vitamins-Minerals (MULTIVITAMIN MEN) TABS Take 1 tablet by mouth daily      Omega-3 Fatty Acids (FISH OIL) 1,000 mg Take 1,000 mg by mouth daily      omeprazole (PriLOSEC) 20 mg delayed release capsule Take 1 capsule (20 mg total) by mouth daily before breakfast 30 capsule 0    oxyCODONE (ROXICODONE) 5 mg immediate release tablet Take 1 tablet (5 mg total) by mouth every 4 (four) hours as needed (cancer pain)Max Daily Amount: 30 mg 30 tablet 0    pancrelipase, Lip-Prot-Amyl, (CREON) 24,000 units Take 1 capsule (24,000 Units total) by mouth 3 (three) times a day with meals 90 capsule 0    pantoprazole (PROTONIX) 40 mg tablet Take 1 tablet (40 mg total) by mouth 2 (two) times a day Before breakfast, before bed, to reduce stomach acid 60 tablet 0    polyethylene glycol (MIRALAX) 17 g packet Take 17 g by mouth daily as needed (Constipation)  0    predniSONE 10 mg tablet Take 1 tablet (10 mg total) by mouth daily With breakfast, to reduce tumor inflammation and improve appetite   30 tablet 0    prochlorperazine (COMPAZINE) 10 mg tablet Take 1 tablet (10 mg total) by mouth every 6 (six) hours as needed for nausea or vomiting 45 tablet 3    senna (SENOKOT) 8 6 MG tablet Take 1 tablet (8 6 mg total) by mouth daily as needed for constipation 30 tablet 0    senna-docusate sodium (SENOKOT S) 8 6-50 mg per tablet Take 1 tablet by mouth 2 (two) times a day  0    Sodium Chloride Flush (Normal Saline Flush) 0 9 % SOLN Gall bladder drain      tamsulosin (FLOMAX) 0 4 mg TAKE 1 CAPSULE BY MOUTH  DAILY AT BEDTIME 90 capsule 3    Tofacitinib Citrate ER (Xeljanz XR) 11 MG TB24 Take by mouth       No current facility-administered medications for this visit  Allergies   Allergen Reactions    Fentanyl Anaphylaxis and Other (See Comments)     Oxygen drops severely       Review of Systems   Constitutional: Positive for activity change  Negative for appetite change, chills and diaphoresis  HENT: Negative for mouth sores and nosebleeds  Eyes: Negative for redness and visual disturbance  Respiratory: Negative for cough, shortness of breath and wheezing  Cardiovascular: Negative for chest pain and palpitations  Gastrointestinal: Positive for nausea  Negative for abdominal pain, constipation and diarrhea  Hiccups   Endocrine: Negative for polydipsia, polyphagia and polyuria  Genitourinary: Negative for hematuria and urgency  Musculoskeletal: Positive for arthralgias and back pain  Negative for gait problem and joint swelling  Skin: Positive for pallor and wound  Neurological: Positive for weakness  Negative for seizures and syncope  Psychiatric/Behavioral: Positive for dysphoric mood and sleep disturbance  Negative for agitation and confusion  The patient is nervous/anxious  Video Exam  There were no vitals filed for this visit  Physical Exam  Constitutional:       General: He is not in acute distress  Appearance: He is ill-appearing  He is not toxic-appearing or diaphoretic  HENT:      Head: Normocephalic and atraumatic        Right Ear: External ear normal       Left Ear: External ear normal  Eyes:      General:         Right eye: No discharge  Left eye: No discharge  Conjunctiva/sclera: Conjunctivae normal       Pupils: Pupils are equal, round, and reactive to light  Neck:      Trachea: No tracheal deviation  Pulmonary:      Effort: Pulmonary effort is normal  No respiratory distress  Breath sounds: No stridor  Abdominal:      General: There is no distension  Skin:     General: Skin is warm and dry  Coloration: Skin is pale  Findings: Lesion (appears to be dry granulation tissue vs scabbing on tube in question; pt cannot hold camera far enough away from body to give clear picture from external anatomy to determine tube location) present  No erythema or rash  Neurological:      General: No focal deficit present  Mental Status: He is alert and oriented to person, place, and time  Mental status is at baseline  Cranial Nerves: No cranial nerve deficit  Psychiatric:         Mood and Affect: Mood normal          Behavior: Behavior normal          Thought Content: Thought content normal          Judgment: Judgment normal            I spent 30+ minutes was spent during this virtual visit by Kimberly, face to face with Project Manager [facility] with greater than 50% of the time spent in counseling or coordination of care including discussions of etiology of diagnosis, pathogenesis of diagnosis, risks and benefits of treatment, follow up requirements and patient and family counseling/involvement in care   All of the patient's or agent's questions were answered during this discussion  Encounter provider Peter Langston MD, located at:  [address] [place] [id number] [phone]    Recent Visits  No visits were found meeting these conditions    Showing recent visits within past 7 days and meeting all other requirements  Future Appointments  No visits were found meeting these conditions  Showing future appointments within next 150 days and meeting all other requirements       VIRTUAL VISIT [address] [place] [address] 110 or their agent has consented to an online visit or consultation  They understands that the online visit is based solely on information provided by the patient or agent, and that, in the absence of a face-to-face physical evaluation by the physician, the diagnosis they receive is both limited and provisional in terms of accuracy and completeness  This is not intended to replace a full medical face-to-face evaluation by the physician  Tae Cabrera or their agent understands and accepts these terms  The patient or their agent was informed this is a billable service

## (undated) DEVICE — ELECTRODE BLADE MOD E-Z CLEAN 2.5IN 6.4CM -0012M

## (undated) DEVICE — 2000CC GUARDIAN II: Brand: GUARDIAN

## (undated) DEVICE — SUT SILK 2-0 18 IN A185H

## (undated) DEVICE — GLOVE INDICATOR PI UNDERGLOVE SZ 7.5 BLUE

## (undated) DEVICE — TUBING SUCTION 5MM X 12 FT

## (undated) DEVICE — SUT PROLENE 2-0 SH 36 IN 8523H

## (undated) DEVICE — GLOVE SRG BIOGEL ORTHOPEDIC 7.5

## (undated) DEVICE — RAZOR STERILE

## (undated) DEVICE — MAGNETIC INSTRUMENT PAD 16" X 20"; LARGE; DISPOSABLE: Brand: CARDINAL HEALTH

## (undated) DEVICE — NEEDLE 25G X 1 1/2

## (undated) DEVICE — CHLORAPREP HI-LITE 26ML ORANGE

## (undated) DEVICE — SPONGE GAUZE 4 X 9

## (undated) DEVICE — GLOVE INDICATOR PI UNDERGLOVE SZ 7 BLUE

## (undated) DEVICE — STRL UNIVERSAL MINOR GENERAL: Brand: CARDINAL HEALTH

## (undated) DEVICE — INTENDED FOR TISSUE SEPARATION, AND OTHER PROCEDURES THAT REQUIRE A SHARP SURGICAL BLADE TO PUNCTURE OR CUT.: Brand: BARD-PARKER SAFETY BLADES SIZE 11, STERILE

## (undated) DEVICE — OCCLUSIVE GAUZE STRIP,3% BISMUTH TRIBROMOPHENATE IN PETROLATUM BLEND: Brand: XEROFORM

## (undated) DEVICE — GLOVE SRG BIOGEL ECLIPSE 7

## (undated) DEVICE — SCD SEQUENTIAL COMPRESSION COMFORT SLEEVE MEDIUM KNEE LENGTH: Brand: KENDALL SCD

## (undated) DEVICE — SUT VICRYL 3-0 SH 27 IN J416H

## (undated) DEVICE — GAUZE SPONGES,16 PLY: Brand: CURITY

## (undated) DEVICE — GAUZE SPONGES,USP TYPE VII GAUZE, 12 PLY: Brand: CURITY

## (undated) DEVICE — HYDROPHILIC WOUND DRESSING WITH ZINC PLUS VITAMINS A AND B6.: Brand: DERMAGRAN®-B

## (undated) DEVICE — STRETCH BANDAGE: Brand: CURITY

## (undated) DEVICE — 3M™ STERI-STRIP™ COMPOUND BENZOIN TINCTURE 40 BAGS/CARTON 4 CARTONS/CASE C1544: Brand: 3M™ STERI-STRIP™

## (undated) DEVICE — POOLE SUCTION HANDLE: Brand: CARDINAL HEALTH

## (undated) DEVICE — BAG DECANTER

## (undated) DEVICE — VEIN PICK

## (undated) DEVICE — 3M™ TEGADERM™ TRANSPARENT FILM DRESSING FRAME STYLE, 1626W, 4 IN X 4-3/4 IN (10 CM X 12 CM), 50/CT 4CT/CASE: Brand: 3M™ TEGADERM™

## (undated) DEVICE — 3M™ TEGADERM™ TRANSPARENT FILM DRESSING FRAME STYLE, 1622W, 1-3/4 IN X 1-3/4 IN (4.4 CM X 4.4 CM), 100/CT 4CT/CASE: Brand: 3M™ TEGADERM™

## (undated) DEVICE — 3M™ STERI-STRIP™ REINFORCED ADHESIVE SKIN CLOSURES, R1547, 1/2 IN X 4 IN (12 MM X 100 MM), 6 STRIPS/ENVELOPE: Brand: 3M™ STERI-STRIP™

## (undated) DEVICE — LIGHT HANDLE COVER SLEEVE DISP BLUE STELLAR

## (undated) DEVICE — SUT MONOCRYL 4-0 PS-2 27 IN Y426H

## (undated) DEVICE — PAD GROUNDING ADULT

## (undated) DEVICE — 3000CC GUARDIAN II: Brand: GUARDIAN

## (undated) DEVICE — INTENDED FOR TISSUE SEPARATION, AND OTHER PROCEDURES THAT REQUIRE A SHARP SURGICAL BLADE TO PUNCTURE OR CUT.: Brand: BARD-PARKER SAFETY BLADES SIZE 15, STERILE

## (undated) DEVICE — SUT VICRYL 2-0 SH 27 IN UNDYED J417H

## (undated) DEVICE — PACK PBDS MANDIBLE RF

## (undated) DEVICE — GLOVE SRG BIOGEL ECLIPSE 7.5

## (undated) DEVICE — MEDI-VAC YANK SUCT HNDL W/TPRD BULBOUS TIP: Brand: CARDINAL HEALTH

## (undated) DEVICE — ROSEBUD DISSECTORS: Brand: DEROYAL

## (undated) DEVICE — STRL BETHLACCESS CATHETER PACK: Brand: CARDINAL HEALTH

## (undated) DEVICE — BULB SYRINGE,IRRIGATION WITH PROTECTIVE CAP: Brand: DOVER

## (undated) DEVICE — PLUMEPEN PRO 10FT

## (undated) DEVICE — REM POLYHESIVE ADULT PATIENT RETURN ELECTRODE: Brand: VALLEYLAB

## (undated) DEVICE — SUT CHROMIC 3-0 SH 27 IN G122H